# Patient Record
Sex: MALE | Race: WHITE | NOT HISPANIC OR LATINO | Employment: OTHER | URBAN - METROPOLITAN AREA
[De-identification: names, ages, dates, MRNs, and addresses within clinical notes are randomized per-mention and may not be internally consistent; named-entity substitution may affect disease eponyms.]

---

## 2017-01-13 ENCOUNTER — HOSPITAL ENCOUNTER (EMERGENCY)
Facility: HOSPITAL | Age: 57
Discharge: HOME | End: 2017-01-13
Attending: EMERGENCY MEDICINE
Payer: MEDICARE

## 2017-01-13 VITALS
HEART RATE: 97 BPM | HEIGHT: 67 IN | DIASTOLIC BLOOD PRESSURE: 46 MMHG | OXYGEN SATURATION: 96 % | WEIGHT: 309 LBS | TEMPERATURE: 100 F | BODY MASS INDEX: 48.5 KG/M2 | RESPIRATION RATE: 19 BRPM | SYSTOLIC BLOOD PRESSURE: 97 MMHG

## 2017-01-13 DIAGNOSIS — E86.0 DEHYDRATION: Primary | ICD-10-CM

## 2017-01-13 DIAGNOSIS — R55 NEAR SYNCOPE: ICD-10-CM

## 2017-01-13 LAB
ALBUMIN SERPL BCP-MCNC: 3.7 G/DL
ALP SERPL-CCNC: 175 U/L
ALT SERPL W/O P-5'-P-CCNC: 52 U/L
ANION GAP SERPL CALC-SCNC: 18 MMOL/L
AST SERPL-CCNC: 45 U/L
BASOPHILS # BLD AUTO: 0.02 K/UL
BASOPHILS NFR BLD: 0.3 %
BILIRUB SERPL-MCNC: 0.7 MG/DL
BUN SERPL-MCNC: 51 MG/DL
CALCIUM SERPL-MCNC: 8.9 MG/DL
CHLORIDE SERPL-SCNC: 97 MMOL/L
CO2 SERPL-SCNC: 25 MMOL/L
CREAT SERPL-MCNC: 10.7 MG/DL
DIFFERENTIAL METHOD: ABNORMAL
EOSINOPHIL # BLD AUTO: 0.2 K/UL
EOSINOPHIL NFR BLD: 2.7 %
ERYTHROCYTE [DISTWIDTH] IN BLOOD BY AUTOMATED COUNT: 15.3 %
EST. GFR  (AFRICAN AMERICAN): 6 ML/MIN/1.73 M^2
EST. GFR  (NON AFRICAN AMERICAN): 5 ML/MIN/1.73 M^2
FLUAV AG SPEC QL IA: NEGATIVE
FLUBV AG SPEC QL IA: NEGATIVE
GLUCOSE SERPL-MCNC: 208 MG/DL
HCT VFR BLD AUTO: 33.1 %
HGB BLD-MCNC: 10.5 G/DL
LYMPHOCYTES # BLD AUTO: 1.5 K/UL
LYMPHOCYTES NFR BLD: 19.7 %
MCH RBC QN AUTO: 38.6 PG
MCHC RBC AUTO-ENTMCNC: 31.7 %
MCV RBC AUTO: 122 FL
MONOCYTES # BLD AUTO: 0.7 K/UL
MONOCYTES NFR BLD: 8.5 %
NEUTROPHILS # BLD AUTO: 5.4 K/UL
NEUTROPHILS NFR BLD: 68.5 %
PLATELET # BLD AUTO: 124 K/UL
PMV BLD AUTO: 10.6 FL
POTASSIUM SERPL-SCNC: 4.3 MMOL/L
PROT SERPL-MCNC: 6.9 G/DL
RBC # BLD AUTO: 2.72 M/UL
SODIUM SERPL-SCNC: 140 MMOL/L
SPECIMEN SOURCE: NORMAL
WBC # BLD AUTO: 7.81 K/UL

## 2017-01-13 PROCEDURE — 93005 ELECTROCARDIOGRAM TRACING: CPT

## 2017-01-13 PROCEDURE — 87400 INFLUENZA A/B EACH AG IA: CPT

## 2017-01-13 PROCEDURE — 25000003 PHARM REV CODE 250: Performed by: EMERGENCY MEDICINE

## 2017-01-13 PROCEDURE — 80053 COMPREHEN METABOLIC PANEL: CPT

## 2017-01-13 PROCEDURE — 85025 COMPLETE CBC W/AUTO DIFF WBC: CPT

## 2017-01-13 PROCEDURE — 99284 EMERGENCY DEPT VISIT MOD MDM: CPT

## 2017-01-13 RX ADMIN — SODIUM CHLORIDE 100 ML: 0.9 INJECTION, SOLUTION INTRAVENOUS at 05:01

## 2017-01-13 NOTE — ED AVS SNAPSHOT
[facility]  [address]  [place] [address]               Angel Farmer Jr.   2017  3:33 PM   ED    Description:  Male : 1960   Department:  [facility]           Your Care was Coordinated By:     Provider Role From To    Lakhwinder Garrido MD Attending Provider [phone] --      Reason for Visit     Dizziness           Diagnoses this Visit        Comments    Dehydration    -  Primary     Near syncope           ED Disposition     None           To Do List           Follow-up Information     Schedule an appointment as soon as possible for a visit with Vadim Berry Iii, MD.    Specialty:  Family Medicine    Contact information:    [address]  [address]  [place] [address]  [phone]        [facility]     [facility] Nurse Care [facility] -  Assistance  Registered nurses in the [facility] provide clinical advisement, health education, appointment booking, and other advisory services.  Call for this free service at [phone].             Medications           Message regarding Medications     Verify the changes and/or additions to your medication regime listed below are the same as discussed with your clinician today.  If any of these changes or additions are incorrect, please notify your healthcare provider.        These medications were administered today        Dose Freq    sodium chloride 0.9% bolus 100 mL 100 mL ED 1 Time    Sig: Inject 100 mLs into the vein ED 1 Time.    Class: Normal    Route: Intravenous           Verify that the below list of medications is an accurate representation of the medications you are currently taking.  If none reported, the list may be blank. If incorrect, please contact your healthcare provider. Carry this list with you in case of emergency.           Current Medications     ammonium lactate 12 % Crea Apply 1 application topically 3 (three) times daily as needed.    aspirin  "81 MG Chew Take 1 tablet (81 mg total) by mouth once daily.    AZOPT 1 % ophthalmic suspension     B complex-vitamin C-folic acid (B COMPLEX-VITAMIN C-FOLIC ACID) 0.8 mg Tab Take 0.8 mg by mouth once daily.      blood sugar diagnostic (ONETOUCH ULTRA TEST) Strp TEST BLOOD GLUCOSE FOUR TIMES A DAY. At AM, noon, 6pm and bedtime.    blood-glucose meter (ADVANCED GLUCOSE METER) Misc 1 each by Misc.(Non-Drug; Combo Route) route 4 (four) times daily with meals and nightly.    bromfenac (XIBROM) 0.09 % ophthalmic solution     diclofenac (VOLTAREN) 0.1 % ophthalmic solution     fluticasone (FLONASE) 50 mcg/actuation nasal spray 1 spray by Each Nare route once daily.    folic acid (FOLVITE) 1 MG tablet Take 1 mg by mouth once daily.    FOSRENOL 1,000 mg chewable tablet     gabapentin (NEURONTIN) 300 MG capsule TAKE 1 BY MOUTH EVERY      EVENING    GLUCOTROL 5 mg tablet     insulin aspart (NOVOLOG) 100 unit/mL injection Inject 15 Units into the skin 3 (three) times daily before meals. And additional insulin as directed.    insulin glargine (LANTUS SOLOSTAR) 100 unit/mL (3 mL) InPn pen Inject 55 Units into the skin every evening.    lancets Misc 1 each by Misc.(Non-Drug; Combo Route) route 4 (four) times daily with meals and nightly.    liraglutide 0.6 mg/0.1 mL, 18 mg/3 mL, subq PNIJ (VICTOZA 2-SHOAIB) 0.6 mg/0.1 mL (18 mg/3 mL) PnIj Inject 1.2 mg into the skin every morning.    omega-3 acid ethyl esters (LOVAZA) 1 gram capsule Take 2 capsules (2 g total) by mouth 2 (two) times daily.    pen needle, diabetic (BD INSULIN PEN NEEDLE UF MINI) 31 gauge x 3/16" Ndle Use 4-5 times a day.    pravastatin (PRAVACHOL) 40 MG tablet Take 1 tablet (40 mg total) by mouth once daily.    prednisoLONE acetate (PRED FORTE) 1 % DrpS     SENSIPAR 30 mg Tab     sevelamer carbonate (RENVELA) 2.4 gram PwPk     tobramycin-dexamethasone 0.3-0.1% (TOBRADEX) 0.3-0.1 % DrpS            Clinical Reference Information           Your Vitals Were     BP Pulse " "Temp Resp Height Weight    107/65 90 100 °F (37.8 °C) (Oral) 13 5' 7" (1.702 m) 140.2 kg (309 lb)    SpO2 BMI             97% 48.4 kg/m2         Allergies as of 1/13/2017        Reactions    Keflex [Cephalexin] Nausea Only      Immunizations Administered on Date of Encounter - 1/13/2017     None      ED Micro, Lab, POCT     Start Ordered       Status Ordering Provider    01/13/17 1542 01/13/17 1542  CBC auto differential  STAT      Final result     01/13/17 1542 01/13/17 1542  Comprehensive metabolic panel  STAT      Final result     01/13/17 1542 01/13/17 1542  Influenza antigen Nasal Swab  STAT      Final result       ED Imaging Orders     Start Ordered       Status Ordering Provider    01/13/17 1542 01/13/17 1542  X-Ray Chest PA And Lateral  1 time imaging      Final result       Discharge References/Attachments     NEAR SYNCOPE, UNKNOWN (ENGLISH)       [facility] complies with applicable Federal civil rights laws and does not discriminate on the basis of race, color, national origin, age, disability, or sex.        Language Assistance Services     ATTENTION: Language assistance services are available, free of charge. Please call [phone].      ATENCIÓN: Si habla español, tiene a cortez disposición servicios gratuitos de asistencia lingüística. Llame al [phone].     CHÚ Ý: N?u b?n nói Ti?ng Vi?t, có các d?ch v? h? tr? ngôn ng? mi?n phí dành cho b?n. G?i s? [phone].        "

## 2017-01-13 NOTE — ED PROVIDER NOTES
"Encounter Date: 1/13/2017       History     Chief Complaint   Patient presents with    Dizziness     Patient had full dialysis and complaints of dizziness with low blood pressure.  Gave fluids after feeling dizzy.     Review of patient's allergies indicates:   Allergen Reactions    Keflex [cephalexin] Nausea Only     HPI Comments: 56-year-old male with history of end-stage renal disease on hemodialysis, who self dialyzes at home, presents to the emergency department reporting lightheadedness.  Patient reports he believes too much fluid was taken off, and while he was able to drink some fluids at home, his lightheadedness has persisted and his blood pressure has been somewhat low.  Reports this is happening the past, but is really this severe.  Does not report dizziness, as noted in the triage note, and denies any spinning sensation.  Reports "when I stand up, I feel very lightheaded."  Denies any headache, focal numbness or weakness, changes in vision, nausea, vomiting, chest pain, shortness of breath, abdominal pain, constipation, diarrhea fever, chills.    The history is provided by the patient.     Past Medical History   Diagnosis Date    Diabetes mellitus type II     Dialysis patient     Hyperlipidemia     Hypertension     Kidney failure     MIKE (obstructive sleep apnea)     Urinary tract infection      No past medical history pertinent negatives.  Past Surgical History   Procedure Laterality Date    Tonsillectomy, adenoidectomy      Av fistula placement       left lower arm     Family History   Problem Relation Age of Onset    Colon cancer Mother     Lung cancer Mother     Diabetes Mother     Diabetes Father     Heart disease Father     Hypertension Father     Diabetes Maternal Grandmother     Diabetes Maternal Grandfather      Social History   Substance Use Topics    Smoking status: Never Smoker    Smokeless tobacco: Never Used    Alcohol use No     Review of Systems   Constitutional: " Negative for chills, fatigue and fever.   HENT: Negative for congestion, rhinorrhea, sinus pressure, sore throat and voice change.    Eyes: Negative for photophobia, pain and redness.   Respiratory: Negative for cough and choking.    Cardiovascular: Negative for chest pain, palpitations and leg swelling.   Gastrointestinal: Negative for abdominal pain, constipation, diarrhea, nausea and vomiting.   Genitourinary: Negative for flank pain, penile pain and penile swelling.   Musculoskeletal: Negative for back pain, neck pain and neck stiffness.   Neurological: Positive for light-headedness. Negative for tremors, seizures, numbness and headaches.   All other systems reviewed and are negative.      Physical Exam   Initial Vitals   BP Pulse Resp Temp SpO2   01/13/17 1501 01/13/17 1501 01/13/17 1501 01/13/17 1501 01/13/17 1501   125/69 84 20 100 °F (37.8 °C) 100 %     Physical Exam    Nursing note and vitals reviewed.  Constitutional: He appears well-developed and well-nourished. No distress.   Obese   HENT:   Head: Normocephalic and atraumatic.   Oropharynx clear; dry mucous membranes   Eyes: Conjunctivae and EOM are normal. Pupils are equal, round, and reactive to light.   Neck: Normal range of motion. Neck supple. No tracheal deviation present.   Cardiovascular: Normal rate, regular rhythm, normal heart sounds and intact distal pulses.   Pulmonary/Chest: Breath sounds normal. No respiratory distress. He has no wheezes. He has no rhonchi. He has no rales.   Abdominal: Soft. Bowel sounds are normal. He exhibits no distension. There is no tenderness. There is no rebound and no guarding.   Musculoskeletal: Normal range of motion. He exhibits no edema or tenderness.   Shunt to the left upper extremity   Neurological: He is alert and oriented to person, place, and time. He has normal strength. No cranial nerve deficit or sensory deficit.   Skin: Skin is warm and dry.   Psychiatric: He has a normal mood and affect. Thought  content normal.         ED Course   Procedures  Labs Reviewed   CBC W/ AUTO DIFFERENTIAL   COMPREHENSIVE METABOLIC PANEL   INFLUENZA A AND B ANTIGEN     EKG Readings: (Independently Interpreted)   Initial Reading: No STEMI. Previous EKG: Compared with most recent EKG Previous EKG Date: 1/12/15 (Minimal non-specific change). Heart Rate: 77. Conduction: Nonspecific intraventricular block. Axis: Left Axis Deviation.       X-Rays:   Independently Interpreted Readings:   Chest X-Ray: Technique:  Chest PA and lateral radiographs    Comparison: 1/12/15    Findings:     Lung volumes are low but symmetric. No pleural fluid or pneumothorax. The mediastinal structures are midline. The cardiac silhouette is normal in size. The osseous structures demonstrate no acute abnormality.    Read by radiologist and visualized by me     Medical Decision Making:   Initial Assessment:   56-year-old male presents to the ED after dialysis complaining of near syncope and lightheadedness  Differential Diagnosis:   Dehydration, metabolic derangement, CVA, infection  Independently Interpreted Test(s):   I have ordered and independently interpreted X-rays - see prior notes.  I have ordered and independently interpreted EKG Reading(s) - see prior notes  Clinical Tests:   Lab Tests: Reviewed       <> Summary of Lab: Within normal limits  ED Management:  Patient gently rehydrated.  At this time, reports resolution of symptoms.  Blood pressures have been stable and constant.  Able to ambulate about the ED without any difficulty or lightheadedness.  Instructed to follow-up with his primary care physician Monday, return to the emergency room for any recurrent or worsening symptoms.                   ED Course     Clinical Impression:   The primary encounter diagnosis was Dehydration. A diagnosis of Near syncope was also pertinent to this visit.    Disposition:   Disposition: Discharged  Condition: Stable       Lakhwinder Garrido MD  01/13/17 [id number]

## 2017-01-14 NOTE — ED NOTES
Pt ambulates around nurses' station with steady gait. No complaints. Pt O2 sat 98% while ambulating.

## 2017-01-14 NOTE — ED NOTES
Pt arrived to ED via EMS with c/o hypotension after dialysis. Pt states he became hypotensive during dialysis, but was able to finish his treatment. On arrival, pt hypotensive but AAO. Only c/o is that pt feels more fatigued than usual. Pt has no other complaints. Pt does not present in distress. Pt placed on cardiac monitor, bp cuff and continuous pulse ox. Call light within reach, side rails up x 2.

## 2017-02-15 RX ORDER — PRAVASTATIN SODIUM 40 MG/1
40 TABLET ORAL DAILY
Qty: 30 TABLET | Refills: 6 | Status: SHIPPED | OUTPATIENT
Start: 2017-02-15 | End: 2017-06-16 | Stop reason: SDUPTHER

## 2017-03-14 ENCOUNTER — HOSPITAL ENCOUNTER (OUTPATIENT)
Dept: CARDIOLOGY | Facility: HOSPITAL | Age: 57
Discharge: HOME | End: 2017-03-14
Attending: NURSE PRACTITIONER
Payer: MEDICARE

## 2017-03-14 DIAGNOSIS — N18.6 END STAGE RENAL DISEASE: ICD-10-CM

## 2017-03-14 LAB
DIASTOLIC DYSFUNCTION: NO
ESTIMATED PA SYSTOLIC PRESSURE: 18.05
MITRAL VALVE MOBILITY: NORMAL
RETIRED EF AND QEF - SEE NOTES: 65 (ref 55–65)
TRICUSPID VALVE REGURGITATION: NORMAL

## 2017-03-14 PROCEDURE — 93306 TTE W/DOPPLER COMPLETE: CPT | Mod: 26,,, | Performed by: INTERNAL MEDICINE

## 2017-03-14 PROCEDURE — 93306 TTE W/DOPPLER COMPLETE: CPT

## 2017-03-15 ENCOUNTER — OFFICE VISIT (OUTPATIENT)
Dept: PODIATRY | Facility: CLINIC | Age: 57
End: 2017-03-15
Payer: MEDICARE

## 2017-03-15 VITALS
SYSTOLIC BLOOD PRESSURE: 99 MMHG | DIASTOLIC BLOOD PRESSURE: 58 MMHG | HEIGHT: 68 IN | WEIGHT: 309 LBS | BODY MASS INDEX: 46.83 KG/M2 | HEART RATE: 64 BPM

## 2017-03-15 DIAGNOSIS — B35.1 ONYCHOMYCOSIS DUE TO DERMATOPHYTE: ICD-10-CM

## 2017-03-15 DIAGNOSIS — L85.9 HYPERKERATOSIS: ICD-10-CM

## 2017-03-15 DIAGNOSIS — B35.3 TINEA PEDIS OF LEFT FOOT: ICD-10-CM

## 2017-03-15 DIAGNOSIS — E11.42 DIABETIC POLYNEUROPATHY ASSOCIATED WITH TYPE 2 DIABETES MELLITUS: Primary | ICD-10-CM

## 2017-03-15 PROCEDURE — 99214 OFFICE O/P EST MOD 30 MIN: CPT | Mod: PBBFAC,PO | Performed by: PODIATRIST

## 2017-03-15 PROCEDURE — 99213 OFFICE O/P EST LOW 20 MIN: CPT | Mod: S$PBB,,, | Performed by: PODIATRIST

## 2017-03-15 PROCEDURE — 99999 PR PBB SHADOW E&M-EST. PATIENT-LVL IV: CPT | Mod: PBBFAC,,, | Performed by: PODIATRIST

## 2017-03-15 RX ORDER — INSULIN ASPART 100 [IU]/ML
15 INJECTION, SOLUTION INTRAVENOUS; SUBCUTANEOUS
COMMUNITY
Start: 2017-02-23 | End: 2017-11-23 | Stop reason: SDUPTHER

## 2017-03-15 NOTE — MR AVS SNAPSHOT
[facility]   [place]  [place] [address]  Phone: [phone]                  Angel Farmer Jr.   3/15/2017 3:00 PM   Office Visit    Description:  Male : 1960   Provider:  Galindo Anderson DPM   Department:  [facility]           Reason for Visit     Follow-up     Diabetes Mellitus                To Do List           Future Appointments        Provider Department Dept Phone    2017 8:00 AM Balwinder Chen DPM [facility] [phone]      Goals (5 Years of Data)     None      [facility]     [facility] Care [facility] -  Assistance  Registered nurses in the [facility] Center provide clinical advisement, health education, appointment booking, and other advisory services.  Call for this free service at [phone].             Medications           Message regarding Medications     Verify the changes and/or additions to your medication regime listed below are the same as discussed with your clinician today.  If any of these changes or additions are incorrect, please notify your healthcare provider.        STOP taking these medications     insulin aspart (NOVOLOG) 100 unit/mL injection Inject 15 Units into the skin 3 (three) times daily before meals. And additional insulin as directed.    tobramycin-dexamethasone 0.3-0.1% (TOBRADEX) 0.3-0.1 % DrpS            Verify that the below list of medications is an accurate representation of the medications you are currently taking.  If none reported, the list may be blank. If incorrect, please contact your healthcare provider. Carry this list with you in case of emergency.           Current Medications     ammonium lactate 12 % Crea Apply 1 application topically 3 (three) times daily as needed.    AZOPT 1 % ophthalmic suspension     B complex-vitamin C-folic acid (B COMPLEX-VITAMIN C-FOLIC ACID) 0.8 mg Tab Take 0.8 mg by mouth once daily.      blood sugar diagnostic (ONETOUCH ULTRA TEST) Strp TEST  "BLOOD GLUCOSE FOUR TIMES A DAY. At AM, noon, 6pm and bedtime.    bromfenac (XIBROM) 0.09 % ophthalmic solution     diclofenac (VOLTAREN) 0.1 % ophthalmic solution     fluticasone (FLONASE) 50 mcg/actuation nasal spray 1 spray by Each Nare route once daily.    folic acid (FOLVITE) 1 MG tablet Take 1 mg by mouth once daily.    FOSRENOL 1,000 mg chewable tablet     gabapentin (NEURONTIN) 300 MG capsule TAKE 1 BY MOUTH EVERY      EVENING    GLUCOTROL 5 mg tablet     insulin glargine (LANTUS SOLOSTAR) 100 unit/mL (3 mL) InPn pen Inject 55 Units into the skin every evening.    lancets Misc 1 each by Misc.(Non-Drug; Combo Route) route 4 (four) times daily with meals and nightly.    liraglutide 0.6 mg/0.1 mL, 18 mg/3 mL, subq PNIJ (VICTOZA 2-SHOAIB) 0.6 mg/0.1 mL (18 mg/3 mL) PnIj Inject 1.2 mg into the skin every morning.    NOVOLOG FLEXPEN 100 unit/mL InPn pen     pen needle, diabetic (BD INSULIN PEN NEEDLE UF MINI) 31 gauge x 3/16" Ndle Use 4-5 times a day.    pravastatin (PRAVACHOL) 40 MG tablet Take 1 tablet (40 mg total) by mouth once daily.    prednisoLONE acetate (PRED FORTE) 1 % DrpS     SENSIPAR 30 mg Tab     sevelamer carbonate (RENVELA) 2.4 gram PwPk     aspirin 81 MG Chew Take 1 tablet (81 mg total) by mouth once daily.    blood-glucose meter (ADVANCED GLUCOSE METER) Misc 1 each by Misc.(Non-Drug; Combo Route) route 4 (four) times daily with meals and nightly.    omega-3 acid ethyl esters (LOVAZA) 1 gram capsule Take 2 capsules (2 g total) by mouth 2 (two) times daily.           Clinical Reference Information           Your Vitals Were     BP Pulse Height Weight BMI    99/58 64 5' 8" (1.727 m) 140.2 kg (309 lb) 46.98 kg/m2      Blood Pressure          Most Recent Value    BP  (!)  99/58      Allergies as of 3/15/2017     Keflex [Cephalexin]      Immunizations Administered on Date of Encounter - 3/15/2017     None      Language Assistance Services     ATTENTION: Language assistance services are available, free of " charge. Please call [phone].      ATENCIÓN: Si habla español, tiene a cortez disposición servicios gratuitos de asistencia lingüística. Llame al [phone].     CHÚ Ý: N?u b?n nói Ti?ng Vi?t, có các d?ch v? h? tr? ngôn ng? mi?n phí dành cho b?n. G?i s? [phone].         [facility] complies with applicable Federal civil rights laws and does not discriminate on the basis of race, color, national origin, age, disability, or sex.

## 2017-03-19 NOTE — PROGRESS NOTES
Subjective:      Patient ID: Angel Farmer Jr. is a 56 y.o. male.    Chief Complaint: Follow-up and Diabetes Mellitus    Angel is a 56 y.o. male who presents to the clinic upon referral from Dr. Lynnette alonzo. provider found  for evaluation and treatment of diabetic feet. Angel has a past medical history of Diabetes mellitus type II; Dialysis patient; Hyperlipidemia; Hypertension; Kidney failure; MIKE (obstructive sleep apnea); and Urinary tract infection. Complaints today consist of neuropathy with tingling in his feet and thick, fungal nails.   He had a LLE revascularization in 2015 at [facility]. He denies any history of lower extremity wounds, infections and/or injury.    PCP: Vadim Berry Iii, MD    Date Last Seen by PCP: 12/1/16    Current shoe gear: Casual shoes    Hemoglobin A1C   Date Value Ref Range Status   07/09/2013 6.8 (H) 4.0 - 6.2 % Final             Review of Systems   Constitution: Negative for chills, fever and malaise/fatigue.   Cardiovascular: Negative for chest pain, leg swelling, orthopnea and palpitations.   Respiratory: Negative for cough, shortness of breath and wheezing.    Skin: Positive for color change, dry skin, itching and nail changes. Negative for poor wound healing and rash.   Musculoskeletal: Negative for arthritis, gout, joint pain, joint swelling, muscle weakness and myalgias.   Neurological: Positive for numbness and paresthesias. Negative for disturbances in coordination, dizziness, focal weakness and tremors.           Objective:      Physical Exam   Cardiovascular:   Dorsalis pedis and posterior tibial pulses are diminished Bilaterally. Toes are cool to touch. Feet are warm proximally.There is decreased digital hair. Skin is atrophic, slightly hyperpigmented, and mildly edematous       Musculoskeletal:        Right foot: There is normal range of motion and no deformity.        Left foot: There is normal range of motion and no deformity.   Musculoskeletal:  Muscle strength is  5/5 in all groups bilaterally.  Metatarsophalangeal and subtalar range of motion are within normal limits without crepitus bilaterally. There is limitation of ankle dorsiflexion with knees extended and flexed Bilaterally.       Feet:   Right Foot:   Protective Sensation: 10 sites tested. 8 sites sensed.   Skin Integrity: Positive for callus and dry skin (superficial heel fissures, bilat). Negative for ulcer, blister or skin breakdown.   Left Foot:   Protective Sensation: 10 sites tested. 8 sites sensed.   Skin Integrity: Positive for callus and dry skin. Negative for ulcer, blister or skin breakdown.   Neurological:   Kingfisher-Bailey 5.07 monofilamant testing is diminished both feet. Sharp/dull sensation diminished Bilaterally. Light touch absent Bilaterally.       Skin: Skin is warm, dry and intact.   Toenails 1-5 bilaterally are elongated by 2-3 mm, thickened by 2-3 mm, discolored/yellowed, dystrophic, brittle with subungual debris. No incurvation. Mild xerosis, bilat. No open wounds.    Diffuse hyperkeratosis and mild fissuring at plantar heels, L>R.               Assessment:       Encounter Diagnoses   Name Primary?    Diabetic polyneuropathy associated with type 2 diabetes mellitus Yes    Onychomycosis due to dermatophyte     Tinea pedis of left foot     Hyperkeratosis          Plan:       Angel was seen today for foot pain and foot problem.    Diagnoses and associated orders for this visit:    Hyperkeratosis/heel fissures  - ammonium lactate (AMLACTIN) 12 % Crea; Apply 1 application topically 2 (two) times daily.      I counseled the patient on his conditions, their implications and medical management.    - Amlactin 2-3 x daily    - Gabapentin 300 mg before bed. Potential side effects discussed.     - Shoe inspection. Diabetic Foot Education. Patient reminded of the importance of good nutrition and blood sugar control to help prevent podiatric complications of diabetes. Patient instructed on proper foot  hygeine. We discussed wearing proper shoe gear, daily foot inspections, never walking without protective shoe gear, never putting sharp instruments to feet    - With patient's permission, nails were aggressively reduced and debrided x 10 to their soft tissue attachment mechanically and with electric , removing all offending nail and debris. Patient relates relief following the procedure. She will continue to monitor the areas daily, inspect her feet, wear protective shoe gear when ambulatory, moisturizer to maintain skin integrity and follow in this office in approximately 3-4 months, sooner p.r.n.    Requests f/u on Tues or Thursday due to HD schedule.

## 2017-04-30 ENCOUNTER — HOSPITAL ENCOUNTER (INPATIENT)
Facility: HOSPITAL | Age: 57
LOS: 1 days | Discharge: HOME | DRG: 871 | End: 2017-05-01
Attending: EMERGENCY MEDICINE | Admitting: FAMILY MEDICINE
Payer: MEDICARE

## 2017-04-30 DIAGNOSIS — I10 ESSENTIAL HYPERTENSION: ICD-10-CM

## 2017-04-30 DIAGNOSIS — R78.81 BACTEREMIA: Primary | ICD-10-CM

## 2017-04-30 DIAGNOSIS — Z99.2 ESRD ON DIALYSIS: ICD-10-CM

## 2017-04-30 DIAGNOSIS — N18.6 ESRD (END STAGE RENAL DISEASE): ICD-10-CM

## 2017-04-30 DIAGNOSIS — G47.33 OSA (OBSTRUCTIVE SLEEP APNEA): ICD-10-CM

## 2017-04-30 DIAGNOSIS — N18.6 ESRD ON DIALYSIS: ICD-10-CM

## 2017-04-30 LAB
ALBUMIN SERPL BCP-MCNC: 3.6 G/DL
ALP SERPL-CCNC: 186 U/L
ALT SERPL W/O P-5'-P-CCNC: 55 U/L
ANION GAP SERPL CALC-SCNC: 19 MMOL/L
AST SERPL-CCNC: 44 U/L
BASOPHILS # BLD AUTO: 0 K/UL
BASOPHILS NFR BLD: 0 %
BILIRUB SERPL-MCNC: 0.7 MG/DL
BUN SERPL-MCNC: 64 MG/DL
CALCIUM SERPL-MCNC: 8.4 MG/DL
CHLORIDE SERPL-SCNC: 101 MMOL/L
CO2 SERPL-SCNC: 22 MMOL/L
CREAT SERPL-MCNC: 12.7 MG/DL
DIFFERENTIAL METHOD: ABNORMAL
EOSINOPHIL # BLD AUTO: 0.1 K/UL
EOSINOPHIL NFR BLD: 2.7 %
ERYTHROCYTE [DISTWIDTH] IN BLOOD BY AUTOMATED COUNT: 14.4 %
EST. GFR  (AFRICAN AMERICAN): 5 ML/MIN/1.73 M^2
EST. GFR  (NON AFRICAN AMERICAN): 4 ML/MIN/1.73 M^2
GLUCOSE SERPL-MCNC: 233 MG/DL
HCT VFR BLD AUTO: 30.2 %
HGB BLD-MCNC: 9.8 G/DL
LYMPHOCYTES # BLD AUTO: 1.3 K/UL
LYMPHOCYTES NFR BLD: 24.2 %
MCH RBC QN AUTO: 38.3 PG
MCHC RBC AUTO-ENTMCNC: 32.5 %
MCV RBC AUTO: 118 FL
MONOCYTES # BLD AUTO: 0.6 K/UL
MONOCYTES NFR BLD: 10.9 %
NEUTROPHILS # BLD AUTO: 3.3 K/UL
NEUTROPHILS NFR BLD: 62.2 %
PLATELET # BLD AUTO: 111 K/UL
PMV BLD AUTO: 10.3 FL
POCT GLUCOSE: 171 MG/DL (ref 70–110)
POCT GLUCOSE: 236 MG/DL (ref 70–110)
POTASSIUM SERPL-SCNC: 4.3 MMOL/L
PROT SERPL-MCNC: 7.1 G/DL
RBC # BLD AUTO: 2.56 M/UL
SODIUM SERPL-SCNC: 142 MMOL/L
WBC # BLD AUTO: 5.24 K/UL

## 2017-04-30 PROCEDURE — 99285 EMERGENCY DEPT VISIT HI MDM: CPT | Mod: 25

## 2017-04-30 PROCEDURE — 25000003 PHARM REV CODE 250: Performed by: FAMILY MEDICINE

## 2017-04-30 PROCEDURE — 85025 COMPLETE CBC W/AUTO DIFF WBC: CPT

## 2017-04-30 PROCEDURE — 96365 THER/PROPH/DIAG IV INF INIT: CPT

## 2017-04-30 PROCEDURE — 63600175 PHARM REV CODE 636 W HCPCS: Performed by: FAMILY MEDICINE

## 2017-04-30 PROCEDURE — 27000190 HC CPAP FULL FACE MASK W/VALVE

## 2017-04-30 PROCEDURE — 80053 COMPREHEN METABOLIC PANEL: CPT

## 2017-04-30 PROCEDURE — 11000001 HC ACUTE MED/SURG PRIVATE ROOM

## 2017-04-30 PROCEDURE — 25000003 PHARM REV CODE 250: Performed by: EMERGENCY MEDICINE

## 2017-04-30 PROCEDURE — 94660 CPAP INITIATION&MGMT: CPT

## 2017-04-30 PROCEDURE — 87040 BLOOD CULTURE FOR BACTERIA: CPT

## 2017-04-30 RX ORDER — IBUPROFEN 200 MG
24 TABLET ORAL
Status: DISCONTINUED | OUTPATIENT
Start: 2017-04-30 | End: 2017-05-01 | Stop reason: HOSPADM

## 2017-04-30 RX ORDER — HEPARIN SODIUM 5000 [USP'U]/ML
5000 INJECTION, SOLUTION INTRAVENOUS; SUBCUTANEOUS EVERY 8 HOURS
Status: DISCONTINUED | OUTPATIENT
Start: 2017-04-30 | End: 2017-05-01 | Stop reason: HOSPADM

## 2017-04-30 RX ORDER — SEVELAMER CARBONATE 800 MG/1
800 TABLET, FILM COATED ORAL
Status: DISCONTINUED | OUTPATIENT
Start: 2017-04-30 | End: 2017-05-01 | Stop reason: HOSPADM

## 2017-04-30 RX ORDER — PRAVASTATIN SODIUM 40 MG/1
40 TABLET ORAL DAILY
Status: DISCONTINUED | OUTPATIENT
Start: 2017-05-01 | End: 2017-04-30

## 2017-04-30 RX ORDER — GLUCAGON 1 MG
1 KIT INJECTION
Status: DISCONTINUED | OUTPATIENT
Start: 2017-04-30 | End: 2017-05-01 | Stop reason: HOSPADM

## 2017-04-30 RX ORDER — MEROPENEM AND SODIUM CHLORIDE 500 MG/50ML
500 INJECTION, SOLUTION INTRAVENOUS
Status: COMPLETED | OUTPATIENT
Start: 2017-04-30 | End: 2017-04-30

## 2017-04-30 RX ORDER — LANTHANUM CARBONATE 500 MG/1
1000 TABLET, CHEWABLE ORAL
Status: DISCONTINUED | OUTPATIENT
Start: 2017-04-30 | End: 2017-05-01 | Stop reason: HOSPADM

## 2017-04-30 RX ORDER — ONDANSETRON 2 MG/ML
4 INJECTION INTRAMUSCULAR; INTRAVENOUS EVERY 6 HOURS PRN
Status: DISCONTINUED | OUTPATIENT
Start: 2017-04-30 | End: 2017-05-01 | Stop reason: HOSPADM

## 2017-04-30 RX ORDER — DOXYCYCLINE 50 MG/1
50 TABLET ORAL DAILY
COMMUNITY
End: 2018-03-20

## 2017-04-30 RX ORDER — IBUPROFEN 200 MG
16 TABLET ORAL
Status: DISCONTINUED | OUTPATIENT
Start: 2017-04-30 | End: 2017-05-01 | Stop reason: HOSPADM

## 2017-04-30 RX ORDER — MEROPENEM AND SODIUM CHLORIDE 500 MG/50ML
500 INJECTION, SOLUTION INTRAVENOUS EVERY 24 HOURS
Status: DISCONTINUED | OUTPATIENT
Start: 2017-05-01 | End: 2017-05-01 | Stop reason: SDUPTHER

## 2017-04-30 RX ORDER — ONDANSETRON 2 MG/ML
4 INJECTION INTRAMUSCULAR; INTRAVENOUS
Status: DISPENSED | OUTPATIENT
Start: 2017-04-30 | End: 2017-05-01

## 2017-04-30 RX ORDER — GABAPENTIN 300 MG/1
300 CAPSULE ORAL NIGHTLY
Status: DISCONTINUED | OUTPATIENT
Start: 2017-04-30 | End: 2017-05-01 | Stop reason: HOSPADM

## 2017-04-30 RX ORDER — INSULIN ASPART 100 [IU]/ML
1-10 INJECTION, SOLUTION INTRAVENOUS; SUBCUTANEOUS
Status: DISCONTINUED | OUTPATIENT
Start: 2017-04-30 | End: 2017-05-01 | Stop reason: HOSPADM

## 2017-04-30 RX ORDER — FOLIC ACID 1 MG/1
1 TABLET ORAL DAILY
Status: DISCONTINUED | OUTPATIENT
Start: 2017-05-01 | End: 2017-05-01 | Stop reason: HOSPADM

## 2017-04-30 RX ORDER — PRAVASTATIN SODIUM 40 MG/1
40 TABLET ORAL NIGHTLY
Status: DISCONTINUED | OUTPATIENT
Start: 2017-04-30 | End: 2017-05-01 | Stop reason: HOSPADM

## 2017-04-30 RX ORDER — CINACALCET 30 MG/1
30 TABLET, FILM COATED ORAL
Status: DISCONTINUED | OUTPATIENT
Start: 2017-05-01 | End: 2017-05-01

## 2017-04-30 RX ORDER — BRINZOLAMIDE 10 MG/ML
1 SUSPENSION/ DROPS OPHTHALMIC 3 TIMES DAILY
Status: DISCONTINUED | OUTPATIENT
Start: 2017-04-30 | End: 2017-05-01 | Stop reason: HOSPADM

## 2017-04-30 RX ORDER — LANTHANUM CARBONATE 500 MG/1
1000 TABLET, CHEWABLE ORAL
Status: DISCONTINUED | OUTPATIENT
Start: 2017-04-30 | End: 2017-04-30

## 2017-04-30 RX ADMIN — BRINZOLAMIDE 1 DROP: 10 SUSPENSION/ DROPS OPHTHALMIC at 09:04

## 2017-04-30 RX ADMIN — GABAPENTIN 300 MG: 300 CAPSULE ORAL at 09:04

## 2017-04-30 RX ADMIN — MEROPENEM AND SODIUM CHLORIDE 500 MG: 500 INJECTION, SOLUTION INTRAVENOUS at 01:04

## 2017-04-30 RX ADMIN — INSULIN ASPART 2 UNITS: 100 INJECTION, SOLUTION INTRAVENOUS; SUBCUTANEOUS at 05:04

## 2017-04-30 RX ADMIN — INSULIN DETEMIR 30 UNITS: 100 INJECTION, SOLUTION SUBCUTANEOUS at 09:04

## 2017-04-30 RX ADMIN — PRAVASTATIN SODIUM 40 MG: 40 TABLET ORAL at 09:04

## 2017-04-30 RX ADMIN — HEPARIN SODIUM 5000 UNITS: 5000 INJECTION, SOLUTION INTRAVENOUS; SUBCUTANEOUS at 09:04

## 2017-04-30 NOTE — ED PROVIDER NOTES
"Encounter Date: 4/30/2017       History     Chief Complaint   Patient presents with    Abnormal Lab     blood culture result from blood drawn on Wednesday while at dialysis and Dr Peter called him to come to ER for evaluation     Review of patient's allergies indicates:   Allergen Reactions    Keflex [cephalexin] Nausea Only     HPI Comments: 57 Y/O M WITH ESRD ON DIALYSIS M/W/F PRESENTS TO ED FOR ADMISSION FOR GRAM NEG BACTEREMIA.  PT REPORTS HE HAS HAD A FEVER AFTER DIALYSIS FOR "WEEKS" AND "I RAN A FEVER AFTER DIALYSIS ON WED SO WHEN I WAS THERE ON Friday THEY GAVE ME AN IV ANTI-BIOTIC.  APPARENTLY THAT ONE ISN'T GOING TO WORK BECAUSE THE DOCTOR CALLED ME TODAY AND TOLD ME TO COME HERE."  PT REPORTS HE IS FEELING WELL.  HE DENIES ANY CHEST PAIN, COUGH, SOB, ABD PAIN, HA, NECK PAIN OR RASH.  PT REPORTS HE FEELS WELL.  PT REPORTS EATING 2 DONUTS THIS MORNING FOR BREAKFAST    The history is provided by the patient. No  was used.     Past Medical History:   Diagnosis Date    Diabetes mellitus type II     Dialysis patient     Hyperlipidemia     Hypertension     Kidney failure     MIKE (obstructive sleep apnea)     Urinary tract infection      Past Surgical History:   Procedure Laterality Date    AV FISTULA PLACEMENT      left lower arm    TONSILLECTOMY, ADENOIDECTOMY       Family History   Problem Relation Age of Onset    Colon cancer Mother     Lung cancer Mother     Diabetes Mother     Diabetes Father     Heart disease Father     Hypertension Father     Diabetes Maternal Grandmother     Diabetes Maternal Grandfather      Social History   Substance Use Topics    Smoking status: Never Smoker    Smokeless tobacco: Never Used    Alcohol use No     Review of Systems   Constitutional: Positive for fever. Negative for activity change, appetite change and chills.   HENT: Negative for congestion and sore throat.    Eyes: Negative for discharge and redness.   Respiratory: Negative for " cough and shortness of breath.    Cardiovascular: Negative for chest pain and palpitations.   Gastrointestinal: Negative for abdominal pain, diarrhea, nausea and vomiting.   Endocrine: Negative for polydipsia and polyphagia.   Genitourinary: Negative for flank pain.   Musculoskeletal: Negative for neck pain and neck stiffness.   Skin: Negative for pallor and rash.   Neurological: Negative for dizziness and weakness.   Hematological: Does not bruise/bleed easily.   Psychiatric/Behavioral: Negative for agitation and confusion.   All other systems reviewed and are negative.      Physical Exam   Initial Vitals   BP Pulse Resp Temp SpO2   04/30/17 1139 04/30/17 1139 04/30/17 1139 04/30/17 1139 04/30/17 1139   126/65 68 20 98.6 °F (37 °C) 96 %     Physical Exam    Nursing note and vitals reviewed.  Constitutional: He appears well-developed and well-nourished. He is not diaphoretic. No distress.   HENT:   Head: Normocephalic and atraumatic.   Mouth/Throat: Oropharynx is clear and moist.   Eyes: Conjunctivae and EOM are normal. No scleral icterus.   Neck: Normal range of motion. Neck supple.   Cardiovascular: Normal rate, regular rhythm and normal heart sounds. Exam reveals no gallop and no friction rub.    No murmur heard.  Pulmonary/Chest: Breath sounds normal. No respiratory distress. He has no wheezes. He has no rhonchi. He has no rales. He exhibits no tenderness.   Abdominal: Soft. Bowel sounds are normal. He exhibits no distension. There is no tenderness.   Musculoskeletal: Normal range of motion.   Lymphadenopathy:     He has no cervical adenopathy.   Neurological: He is alert and oriented to person, place, and time.   Skin: Skin is warm and dry.   Psychiatric: He has a normal mood and affect. His behavior is normal. Judgment and thought content normal.         ED Course   Procedures  Labs Reviewed   CBC W/ AUTO DIFFERENTIAL - Abnormal; Notable for the following:        Result Value    RBC 2.56 (*)     Hemoglobin 9.8  (*)     Hematocrit 30.2 (*)      (*)     MCH 38.3 (*)     Platelets 111 (*)     All other components within normal limits   COMPREHENSIVE METABOLIC PANEL - Abnormal; Notable for the following:     CO2 22 (*)     Glucose 233 (*)     BUN, Bld 64 (*)     Creatinine 12.7 (*)     Calcium 8.4 (*)     Alkaline Phosphatase 186 (*)     AST 44 (*)     ALT 55 (*)     Anion Gap 19 (*)     eGFR if  5 (*)     eGFR if non  4 (*)     All other components within normal limits             Medical Decision Making:   Initial Assessment:   57 Y/O WM ON DIALYSIS PRESENTS FOR ADMISSION OF GN BACTEREMIA NOTED ON BLOOD CULTURES DONE AT DIALYSIS  Differential Diagnosis:   DDX: BACTEREMIA, ESRD ON DIALYSIS  Clinical Tests:   Lab Tests: Ordered and Reviewed       <> Summary of Lab: NORMAL WBC  ED Management:  PT HAS BEEN TREATED WITH IV MERREM AND WILL BE ADMITTED TO [facility] FOR IV ABX.   Other:   I have discussed this case with another health care provider.       <> Summary of the Discussion: [facility]                   ED Course     Clinical Impression:   The primary encounter diagnosis was Bacteremia. A diagnosis of ESRD on dialysis was also pertinent to this visit.    Disposition:   Disposition: Admitted  Condition: Stable       Peri Lund MD  04/30/17 1214

## 2017-04-30 NOTE — IP AVS SNAPSHOT
[facility]  [address]  [place] [address]  Phone: [phone]           Patient Discharge Instructions   Our goal is to set you up for success. This packet includes information on your condition, medications, and your home care.  It will help you care for yourself to prevent having to return to the hospital.     Please ask your nurse if you have any questions.      There are many details to remember when preparing to leave the hospital. Here is what you will need to do:    1. Take your medicine. If you are prescribed medications, review your Medication List on the following pages. You may have new medications to  at the pharmacy and others that you'll need to stop taking. Review the instructions for how and when to take your medications. Talk with your doctor or nurses if you are unsure of what to do.     2. Go to your follow-up appointments. Specific follow-up information is listed in the following pages. Your may be contacted by a nurse or clinical provider about future appointments. Be sure we have all of the phone numbers to reach you. Please contact your provider's office if you are unable to make an appointment.     3. Watch for warning signs. Your doctor or nurse will give you detailed warning signs to watch for and when to call for assistance. These instructions may also include educational information about your condition. If you experience any of warning signs to your health, call your doctor.               ** Verify the list of medication(s) below is accurate and up to date. Carry this with you in case of emergency. If your medications have changed, please notify your healthcare provider.             Medication List      START taking these medications        Additional Info                      ceFEPIme in dextrose 5% 2 gram/50 mL Pgbk   Commonly known as:  MAXIPIME   Quantity:  14 g   Refills:  0   Indications:  Bacteremia    Instructions:  Following dialysis: 2 gram IV Q  Mon and Wed x 2 weeks 3 gram IV Q Friday x 2 weeks     Begin Date    AM    Noon    PM    Bedtime       vitamin renal formula (B-complex-vitamin c-folic acid) 1 mg Cap   Commonly known as:  NEPHROCAP   Quantity:  30 capsule   Refills:  2   Dose:  1 capsule    Last time this was given:  1 capsule on 5/1/2017 12:05 PM   Instructions:  Take 1 capsule by mouth once daily.     Begin Date    AM    Noon    PM    Bedtime         CONTINUE taking these medications        Additional Info                      ammonium lactate 12 % Crea   Quantity:  140 g   Refills:  3   Dose:  1 application    Instructions:  Apply 1 application topically 3 (three) times daily as needed.     Begin Date    AM    Noon    PM    Bedtime       aspirin 81 MG Chew   Quantity:  360 tablet   Refills:  0   Dose:  81 mg    Instructions:  Take 1 tablet (81 mg total) by mouth once daily.     Begin Date    AM    Noon    PM    Bedtime       AZOPT 1 % ophthalmic suspension   Refills:  0   Generic drug:  brinzolamide    Last time this was given:  1 drop on 5/1/2017  5:26 AM     Begin Date    AM    Noon    PM    Bedtime       blood sugar diagnostic Strp   Commonly known as:  ONETOUCH ULTRA TEST   Quantity:  100 strip   Refills:  5   Comments:  ICD10 E11.21    Instructions:  TEST BLOOD GLUCOSE FOUR TIMES A DAY. At AM, noon, 6pm and bedtime.     Begin Date    AM    Noon    PM    Bedtime       blood-glucose meter Misc   Commonly known as:  ADVANCED GLUCOSE METER   Quantity:  1 each   Refills:  0   Dose:  1 each    Instructions:  1 each by Misc.(Non-Drug; Combo Route) route 4 (four) times daily with meals and nightly.     Begin Date    AM    Noon    PM    Bedtime       bromfenac 0.09 % ophthalmic solution   Commonly known as:  XIBROM   Refills:  0      Begin Date    AM    Noon    PM    Bedtime       diclofenac 0.1 % ophthalmic solution   Commonly known as:  VOLTAREN   Refills:  0      Begin Date    AM    Noon    PM    Bedtime       doxycycline 50 MG tablet   Commonly  known as:  ADOXA   Refills:  0   Dose:  50 mg    Instructions:  Take 50 mg by mouth once daily.     Begin Date    AM    Noon    PM    Bedtime       fluticasone 50 mcg/actuation nasal spray   Commonly known as:  FLONASE   Quantity:  16 g   Refills:  0   Dose:  1 spray    Instructions:  1 spray by Each Nare route once daily.     Begin Date    AM    Noon    PM    Bedtime       folic acid 1 MG tablet   Commonly known as:  FOLVITE   Refills:  0   Dose:  1 mg    Last time this was given:  1 mg on 5/1/2017  8:31 AM   Instructions:  Take 1 mg by mouth once daily.     Begin Date    AM    Noon    PM    Bedtime       FOSRENOL 1000 MG chewable tablet   Refills:  0   Generic drug:  lanthanum    Last time this was given:  1,000 mg on 5/1/2017 12:00 PM     Begin Date    AM    Noon    PM    Bedtime       gabapentin 300 MG capsule   Commonly known as:  NEURONTIN   Quantity:  90 capsule   Refills:  3    Last time this was given:  300 mg on 4/30/2017  9:24 PM   Instructions:  TAKE 1 BY MOUTH EVERY      EVENING     Begin Date    AM    Noon    PM    Bedtime       GLUCOTROL 5 MG tablet   Refills:  0   Generic drug:  glipiZIDE      Begin Date    AM    Noon    PM    Bedtime       insulin glargine 100 unit/mL (3 mL) Inpn pen   Commonly known as:  LANTUS SOLOSTAR   Quantity:  16.5 mL   Refills:  11   Dose:  55 Units    Instructions:  Inject 55 Units into the skin every evening.     Begin Date    AM    Noon    PM    Bedtime       lancets Misc   Quantity:  200 each   Refills:  6   Dose:  1 each    Instructions:  1 each by Misc.(Non-Drug; Combo Route) route 4 (four) times daily with meals and nightly.     Begin Date    AM    Noon    PM    Bedtime       liraglutide 0.6 mg/0.1 mL (18 mg/3 mL) subq PNIJ 0.6 mg/0.1 mL (18 mg/3 mL) Pnij   Commonly known as:  VICTOZA 2-SHOAIB   Quantity:  6 mL   Refills:  11   Dose:  1.2 mg    Instructions:  Inject 1.2 mg into the skin every morning.     Begin Date    AM    Noon    PM    Bedtime       NEPHRO-TINO 0.8 mg  "Tab   Refills:  0   Dose:  0.8 mg   Generic drug:  B complex-vitamin C-folic acid    Instructions:  Take 0.8 mg by mouth once daily.     Begin Date    AM    Noon    PM    Bedtime       NOVOLOG FLEXPEN 100 unit/mL Inpn pen   Refills:  0   Generic drug:  insulin aspart    Last time this was given:  4 Units on 5/1/2017 12:03 PM     Begin Date    AM    Noon    PM    Bedtime       omega-3 acid ethyl esters 1 gram capsule   Commonly known as:  LOVAZA   Quantity:  120 capsule   Refills:  11   Dose:  2 g    Instructions:  Take 2 capsules (2 g total) by mouth 2 (two) times daily.     Begin Date    AM    Noon    PM    Bedtime       pen needle, diabetic 31 gauge x 3/16" Ndle   Commonly known as:  BD INSULIN PEN NEEDLE UF MINI   Quantity:  500 each   Refills:  6    Instructions:  Use 4-5 times a day.     Begin Date    AM    Noon    PM    Bedtime       pravastatin 40 MG tablet   Commonly known as:  PRAVACHOL   Quantity:  30 tablet   Refills:  6   Dose:  40 mg   Comments:  Advise patient to get future refills from Dr GIOVANA Vásquez.    Last time this was given:  40 mg on 4/30/2017  9:25 PM   Instructions:  Take 1 tablet (40 mg total) by mouth once daily.     Begin Date    AM    Noon    PM    Bedtime       prednisoLONE acetate 1 % Drps   Commonly known as:  PRED FORTE   Refills:  0      Begin Date    AM    Noon    PM    Bedtime       SENSIPAR 30 MG Tab   Refills:  0   Generic drug:  cinacalcet      Begin Date    AM    Noon    PM    Bedtime       sevelamer carbonate 2.4 gram Pwpk   Commonly known as:  RENVELA   Refills:  0      Begin Date    AM    Noon    PM    Bedtime            Where to Get Your Medications      These medications were sent to [facility] [facility] [place] - [address]  [address], [place] [address]     Phone:  [phone]     vitamin renal formula (B-complex-vitamin c-folic acid) 1 mg Cap         You can get these medications from any pharmacy     Bring a paper " prescription for each of these medications     ceFEPIme in dextrose 5% 2 gram/50 mL Pgbk                  Please bring to all follow up appointments:    1. A copy of your discharge instructions.  2. All medicines you are currently taking in their original bottles.  3. Identification and insurance card.    Please arrive 15 minutes ahead of scheduled appointment time.    Please call 24 hours in advance if you must reschedule your appointment and/or time.        Your Scheduled Appointments     May 09, 2017  2:00 PM CDT   Established Patient Visit with Benito Doherty MD   [facility] ([facility]    [address], [address]  [facility] [address]   [phone]            Jul 25, 2017  8:00 AM CDT   Established Patient Visit with Balwinder Chen DPM   [facility] ([facility])    [address]  [place] [id number] [phone]              Follow-up Information     Follow up with [facility] On 5/9/2017.    Specialty:  Family Medicine    Why:  time: 2:00    Contact information:    [address], [address]  [facility] [id number] [phone]        Discharge Instructions     Future Orders    Activity as tolerated     Call MD for:  difficulty breathing or increased cough     Call MD for:  increased confusion or weakness     Call MD for:  persistent dizziness, light-headedness, or visual disturbances     Call MD for:  persistent nausea and vomiting or diarrhea     Call MD for:  redness, tenderness, or signs of infection (pain, swelling, redness, odor or green/yellow discharge around incision site)     Call MD for:  severe persistent headache     Call MD for:  severe uncontrolled pain     Call MD for:  temperature >100.4     Call MD for:  worsening rash     Diet renal       Discharge References/Attachments     BACTEREMIA, SUSPECTED (ADULT) (ENGLISH)    DIABETES, DIET (ENGLISH)    HIGH BLOOD PRESSURE (HYPERTENSION), DISCHARGE  "INSTRUCTIONS (ENGLISH)    CEFEPIME INJECTION (ENGLISH)    RENAL B-COMPLEX VITAMIN WITH FOLATE AND VITAMIN C (ENGLISH)        Primary Diagnosis     Your primary diagnosis was:  Bacteria In Blood      Admission Information     Date & Time Provider Department CSN    4/30/2017 11:45 AM Bruna Gauthier MD [facility] [id number]      Care Providers     Provider Role Specialty Primary office phone    Bruna Gauthier MD Attending Provider Family Medicine [phone]    Vadim Berry III, MD Physician  Family Medicine  [phone]    Margoth Felton MD Consulting Physician  Nephrology [phone]    Carey Christensen MD Consulting Physician  Infectious Diseases [phone]    Beatriz Sanchez MD Consulting Physician  Infectious Diseases [phone]      Your Vitals Were     BP Pulse Temp Resp Height Weight    114/76 (BP Location: Right arm, Patient Position: Lying, BP Method: Automatic) 60 97.9 °F (36.6 °C) (Oral) 18 5' 7" (1.702 m) 141.3 kg (311 lb 8.2 oz)    SpO2 BMI             97% 48.79 kg/m2         Recent Lab Values        7/9/2013                           8:32 AM           A1C 6.8 (H)                       Pending Labs     Order Current Status    Hepatitis B Surface Antibody, Qual/Quant In process    Hepatitis B surface antigen In process    Blood culture Preliminary result    Blood culture Preliminary result      Allergies as of 5/1/2017        Reactions    Keflex [Cephalexin] Nausea Only      [facility]     [facility] Care [facility] - 24/7 Assistance  Unless otherwise directed by your provider, please contact [facility], our nurse care line that is available for 24/7 assistance.     Registered nurses in the [facility] provide clinical advisement, health education, appointment booking, and other advisory services.  Call for this free service at [phone].        Advance Directives     An advance directive is a document which, in the event you are no longer " able to make decisions for yourself, tells your healthcare team what kind of treatment you do or do not want to receive, or who you would like to make those decisions for you.  If you do not currently have an advance directive, [facility] encourages you to create one.  For more information call:  [phone]), [phone] ([phone]),  or log on to [email or web address].        Language Assistance Services     ATTENTION: Language assistance services are available, free of charge. Please call [phone].      ATENCIÓN: Si habla español, tiene a cortez disposición servicios gratuitos de asistencia lingüística. Llame al [phone].     CHÚ Ý: N?u b?n nói Ti?ng Vi?t, có các d?ch v? h? tr? ngôn ng? mi?n phí dành cho b?n. G?i s? [phone].        Pneumonmia Discharge Instructions                Chronic Kindey Disease Education             Diabetes Discharge Instructions                                    [facility] complies with applicable Federal civil rights laws and does not discriminate on the basis of race, color, national origin, age, disability, or sex.

## 2017-04-30 NOTE — ED TRIAGE NOTES
States blood cultures were drawn at dialysis last week and was called and told culture was positive and would need to come to hospital for IV abx. Nephrologist is Dr Forrest / [place]

## 2017-04-30 NOTE — PLAN OF CARE
Pt transferred to room 427 via wheelchair from ED, pt AAOx4, VSS, NAD noted, tele applied, fall and safety precautions in place, pt oriented to room and call bell, bed alarm set, fall contact signed by pt,  plan of care reviewed with pt, will continue to monitor pt status.

## 2017-04-30 NOTE — H&P
"[facility]  History & Physical    SUBJECTIVE:     Chief Complaint/Reason for Admission: Blood culture positive for G negative rods at outside facility     History of Present Illness:  Patient is a 56 y.o. male with a pmhx of ESRD with HD MWF, HTN., IDDN, presents to the ED after having been called by Dr. Lopez for a blood culture that was positive for G- rods and told to present himself to the ED. For the past 6-8 weeks as per patient he has had "fevers" ranging from 100.3-99.6 and was recently given abx during HD. When asked which abx he was given he could not specifically recollect. Patient denies felling bad, states that he has not had any cough, night sweats, fatigue, change in appetite and was actually not happy about being admitted but states that he was told he would could die if he did not.     In the ED CBC did not show signs if active infections but was started on Meropenem due to G- rods in outside culture. We do not have access here to those cultures or possible abx sensitivities.      PTA Medications   Medication Sig    ammonium lactate 12 % Crea Apply 1 application topically 3 (three) times daily as needed.    AZOPT 1 % ophthalmic suspension     B complex-vitamin C-folic acid (B COMPLEX-VITAMIN C-FOLIC ACID) 0.8 mg Tab Take 0.8 mg by mouth once daily.      bromfenac (XIBROM) 0.09 % ophthalmic solution     doxycycline (ADOXA) 50 MG tablet Take 50 mg by mouth once daily.    folic acid (FOLVITE) 1 MG tablet Take 1 mg by mouth once daily.    gabapentin (NEURONTIN) 300 MG capsule TAKE 1 BY MOUTH EVERY      EVENING    insulin glargine (LANTUS SOLOSTAR) 100 unit/mL (3 mL) InPn pen Inject 55 Units into the skin every evening.    liraglutide 0.6 mg/0.1 mL, 18 mg/3 mL, subq PNIJ (VICTOZA 2-SHOAIB) 0.6 mg/0.1 mL (18 mg/3 mL) PnIj Inject 1.2 mg into the skin every morning.    NOVOLOG FLEXPEN 100 unit/mL InPn pen     pravastatin (PRAVACHOL) 40 MG tablet Take 1 tablet (40 mg total) by " "mouth once daily.    SENSIPAR 30 mg Tab     aspirin 81 MG Chew Take 1 tablet (81 mg total) by mouth once daily.    blood sugar diagnostic (ONETOUCH ULTRA TEST) Strp TEST BLOOD GLUCOSE FOUR TIMES A DAY. At AM, noon, 6pm and bedtime.    blood-glucose meter (ADVANCED GLUCOSE METER) Misc 1 each by Misc.(Non-Drug; Combo Route) route 4 (four) times daily with meals and nightly.    diclofenac (VOLTAREN) 0.1 % ophthalmic solution     fluticasone (FLONASE) 50 mcg/actuation nasal spray 1 spray by Each Nare route once daily.    FOSRENOL 1,000 mg chewable tablet     GLUCOTROL 5 mg tablet     lancets Misc 1 each by Misc.(Non-Drug; Combo Route) route 4 (four) times daily with meals and nightly.    omega-3 acid ethyl esters (LOVAZA) 1 gram capsule Take 2 capsules (2 g total) by mouth 2 (two) times daily.    pen needle, diabetic (BD INSULIN PEN NEEDLE UF MINI) 31 gauge x 3/16" Ndle Use 4-5 times a day.    prednisoLONE acetate (PRED FORTE) 1 % DrpS     sevelamer carbonate (RENVELA) 2.4 gram PwPk        Review of patient's allergies indicates:   Allergen Reactions    Keflex [cephalexin] Nausea Only       Past Medical History:   Diagnosis Date    Diabetes mellitus type II     Dialysis patient     Hyperlipidemia     Hypertension     Kidney failure     MIKE (obstructive sleep apnea)     Urinary tract infection      Past Surgical History:   Procedure Laterality Date    AV FISTULA PLACEMENT      left lower arm    TONSILLECTOMY, ADENOIDECTOMY       Family History   Problem Relation Age of Onset    Colon cancer Mother     Lung cancer Mother     Diabetes Mother     Diabetes Father     Heart disease Father     Hypertension Father     Diabetes Maternal Grandmother     Diabetes Maternal Grandfather      Social History   Substance Use Topics    Smoking status: Never Smoker    Smokeless tobacco: Never Used    Alcohol use No        Review of Systems:  Review of Systems   Constitutional: Positive for fever. Negative " for chills, diaphoresis, malaise/fatigue and weight loss.   Cardiovascular: Negative for chest pain, palpitations, orthopnea, claudication, leg swelling and PND.   Neurological: Negative for weakness.         OBJECTIVE:     Vital Signs (Most Recent):  Temp: 98.1 °F (36.7 °C) (04/30/17 1616)  Pulse: (!) 55 (04/30/17 1616)  Resp: 18 (04/30/17 1616)  BP: 121/65 (04/30/17 1616)  SpO2: 98 % (04/30/17 1616)    Physical Exam:  Physical Exam   Constitutional: He is well-developed, well-nourished, and in no distress.   Morbidly obese, left upper extremity AVF with palpable thrill    Eyes: Pupils are equal, round, and reactive to light.   Cardiovascular: Regular rhythm, normal heart sounds and intact distal pulses.  Exam reveals no gallop and no friction rub.    No murmur heard.  Pulmonary/Chest: Effort normal and breath sounds normal. No respiratory distress. He has no wheezes. He has no rales. He exhibits no tenderness.   Abdominal: Soft. He exhibits no distension and no mass. There is no tenderness. There is no rebound and no guarding.   Musculoskeletal: Normal range of motion.   Nursing note and vitals reviewed.      Laboratory:  CBC:   Recent Labs  Lab 04/30/17  1229   WBC 5.24   RBC 2.56*   HGB 9.8*   HCT 30.2*   *   *   MCH 38.3*   MCHC 32.5     BMP:   Recent Labs  Lab 04/30/17  1229   *      K 4.3      CO2 22*   BUN 64*   CREATININE 12.7*   CALCIUM 8.4*     CMP:   Recent Labs  Lab 04/30/17  1229   *   CALCIUM 8.4*   ALBUMIN 3.6   PROT 7.1      K 4.3   CO2 22*      BUN 64*   CREATININE 12.7*   ALKPHOS 186*   ALT 55*   AST 44*   BILITOT 0.7     LFTs:   Recent Labs  Lab 04/30/17  1229   ALT 55*   AST 44*   ALKPHOS 186*   BILITOT 0.7   PROT 7.1   ALBUMIN 3.6     Cardiac markers: No results for input(s): CKMB, CPKMB, TROPONINT, TROPONINI, MYOGLOBIN in the last 168 hours.  Microbiology Results (last 7 days)     Procedure Component Value Units Date/Time    Blood culture  [[id number]] Collected:  04/30/17 1343    Order Status:  Sent Specimen:  Blood from Peripheral, Hand, Right Updated:  04/30/17 1344    Blood culture [[id number]] Collected:  04/30/17 1343    Order Status:  Sent Specimen:  Blood from Peripheral, Hand, Right Updated:  04/30/17 1344          Diagnostic Results:  Labs: Reviewed    ASSESSMENT/PLAN:   Patient is a 55 yo male with pmhx of ESRD, , IDDM that has been admitted for Gram negative bacteremia     Gram negative rods bacteremia   Patient has no active signs of sepsis, normal WBC, afebrile, no tachycardia  Have started patient on renally dosed meropenem   Blood cultures re-drawn today to evaluate bactermia status  Will attempt to reach out to Dr. Lopez for culture results and possible abx sensitivities     ESRD  Consulted Nephrology for HD MWF  Will continue home medications   Will get lipid panel to evaluate risk score       IDDM  Patient takes both victoza and lantus at home  Will start patient on 30 units detemir BID with sliding scale   hgba1c will be done on this admission last one in 2013 of 6.8     PPX  Famotidine   Heparin     Dispo: Follow up on repeat cultures, patient is not septic or SIRS but due to high risk for infection patient admitted to repeat cultures and start IV abx. Will need to determine if patient will need to continue outpatient IV abx vs PO abx. Consider ID consult based on results and John results.   Ron Arteaga MD  4/30/2017  5:13 PM  [employer] [facility] PGY-2

## 2017-05-01 VITALS
OXYGEN SATURATION: 97 % | DIASTOLIC BLOOD PRESSURE: 70 MMHG | SYSTOLIC BLOOD PRESSURE: 110 MMHG | BODY MASS INDEX: 48.89 KG/M2 | HEIGHT: 67 IN | RESPIRATION RATE: 18 BRPM | TEMPERATURE: 98 F | HEART RATE: 76 BPM | WEIGHT: 311.5 LBS

## 2017-05-01 LAB
ALBUMIN SERPL BCP-MCNC: 3.5 G/DL
ALP SERPL-CCNC: 174 U/L
ALT SERPL W/O P-5'-P-CCNC: 50 U/L
ANION GAP SERPL CALC-SCNC: 20 MMOL/L
AST SERPL-CCNC: 41 U/L
BASOPHILS # BLD AUTO: 0.01 K/UL
BASOPHILS NFR BLD: 0.2 %
BILIRUB SERPL-MCNC: 0.6 MG/DL
BUN SERPL-MCNC: 73 MG/DL
CALCIUM SERPL-MCNC: 8.5 MG/DL
CHLORIDE SERPL-SCNC: 102 MMOL/L
CO2 SERPL-SCNC: 24 MMOL/L
CREAT SERPL-MCNC: 14.2 MG/DL
DIFFERENTIAL METHOD: ABNORMAL
EOSINOPHIL # BLD AUTO: 0.2 K/UL
EOSINOPHIL NFR BLD: 3.9 %
ERYTHROCYTE [DISTWIDTH] IN BLOOD BY AUTOMATED COUNT: 14.6 %
EST. GFR  (AFRICAN AMERICAN): 4 ML/MIN/1.73 M^2
EST. GFR  (NON AFRICAN AMERICAN): 3 ML/MIN/1.73 M^2
GLUCOSE SERPL-MCNC: 97 MG/DL
HCT VFR BLD AUTO: 31 %
HGB BLD-MCNC: 10.1 G/DL
LYMPHOCYTES # BLD AUTO: 1.8 K/UL
LYMPHOCYTES NFR BLD: 32.6 %
MAGNESIUM SERPL-MCNC: 2.7 MG/DL
MCH RBC QN AUTO: 38.4 PG
MCHC RBC AUTO-ENTMCNC: 32.6 %
MCV RBC AUTO: 118 FL
MONOCYTES # BLD AUTO: 0.5 K/UL
MONOCYTES NFR BLD: 9.2 %
NEUTROPHILS # BLD AUTO: 3.1 K/UL
NEUTROPHILS NFR BLD: 53.9 %
PHOSPHATE SERPL-MCNC: 5.2 MG/DL
PLATELET # BLD AUTO: 115 K/UL
PMV BLD AUTO: 10.5 FL
POCT GLUCOSE: 124 MG/DL (ref 70–110)
POCT GLUCOSE: 152 MG/DL (ref 70–110)
POCT GLUCOSE: 216 MG/DL (ref 70–110)
POTASSIUM SERPL-SCNC: 4.2 MMOL/L
PROT SERPL-MCNC: 6.7 G/DL
RBC # BLD AUTO: 2.63 M/UL
SODIUM SERPL-SCNC: 146 MMOL/L
WBC # BLD AUTO: 5.65 K/UL

## 2017-05-01 PROCEDURE — 86706 HEP B SURFACE ANTIBODY: CPT

## 2017-05-01 PROCEDURE — 63600175 PHARM REV CODE 636 W HCPCS: Performed by: HOSPITALIST

## 2017-05-01 PROCEDURE — 36415 COLL VENOUS BLD VENIPUNCTURE: CPT

## 2017-05-01 PROCEDURE — 80100016 HC MAINTENANCE HEMODIALYSIS

## 2017-05-01 PROCEDURE — 83735 ASSAY OF MAGNESIUM: CPT

## 2017-05-01 PROCEDURE — 63600175 PHARM REV CODE 636 W HCPCS: Performed by: FAMILY MEDICINE

## 2017-05-01 PROCEDURE — 25000003 PHARM REV CODE 250: Performed by: FAMILY MEDICINE

## 2017-05-01 PROCEDURE — 80053 COMPREHEN METABOLIC PANEL: CPT

## 2017-05-01 PROCEDURE — 84100 ASSAY OF PHOSPHORUS: CPT

## 2017-05-01 PROCEDURE — 85025 COMPLETE CBC W/AUTO DIFF WBC: CPT

## 2017-05-01 PROCEDURE — 25000003 PHARM REV CODE 250: Performed by: INTERNAL MEDICINE

## 2017-05-01 PROCEDURE — 87340 HEPATITIS B SURFACE AG IA: CPT

## 2017-05-01 RX ORDER — SODIUM CHLORIDE 9 MG/ML
INJECTION, SOLUTION INTRAVENOUS ONCE
Status: DISCONTINUED | OUTPATIENT
Start: 2017-05-01 | End: 2017-05-01 | Stop reason: HOSPADM

## 2017-05-01 RX ORDER — CINACALCET 30 MG/1
30 TABLET, FILM COATED ORAL
Status: DISCONTINUED | OUTPATIENT
Start: 2017-05-01 | End: 2017-05-01 | Stop reason: HOSPADM

## 2017-05-01 RX ORDER — SODIUM CHLORIDE 9 MG/ML
INJECTION, SOLUTION INTRAVENOUS
Status: DISCONTINUED | OUTPATIENT
Start: 2017-05-01 | End: 2017-05-01 | Stop reason: HOSPADM

## 2017-05-01 RX ORDER — CEFEPIME HYDROCHLORIDE 2 G/50ML
INJECTION, SOLUTION INTRAVENOUS
Qty: 14 G | Refills: 0 | OUTPATIENT
Start: 2017-05-01 | End: 2017-05-01

## 2017-05-01 RX ORDER — CEFEPIME HYDROCHLORIDE 2 G/50ML
2 INJECTION, SOLUTION INTRAVENOUS ONCE
Status: COMPLETED | OUTPATIENT
Start: 2017-05-01 | End: 2017-05-01

## 2017-05-01 RX ORDER — CEFEPIME HYDROCHLORIDE 2 G/50ML
INJECTION, SOLUTION INTRAVENOUS
Qty: 14 G | Refills: 0 | Status: ON HOLD | OUTPATIENT
Start: 2017-05-01 | End: 2017-05-25

## 2017-05-01 RX ADMIN — INSULIN ASPART 4 UNITS: 100 INJECTION, SOLUTION INTRAVENOUS; SUBCUTANEOUS at 12:05

## 2017-05-01 RX ADMIN — Medication 1 CAPSULE: at 12:05

## 2017-05-01 RX ADMIN — MEROPENEM 500 MG: 500 INJECTION, POWDER, FOR SOLUTION INTRAVENOUS at 08:05

## 2017-05-01 RX ADMIN — INSULIN DETEMIR 30 UNITS: 100 INJECTION, SOLUTION SUBCUTANEOUS at 08:05

## 2017-05-01 RX ADMIN — BRINZOLAMIDE 1 DROP: 10 SUSPENSION/ DROPS OPHTHALMIC at 05:05

## 2017-05-01 RX ADMIN — LANTHANUM CARBONATE 1000 MG: 500 TABLET, CHEWABLE ORAL at 07:05

## 2017-05-01 RX ADMIN — LANTHANUM CARBONATE 1000 MG: 500 TABLET, CHEWABLE ORAL at 12:05

## 2017-05-01 RX ADMIN — HEPARIN SODIUM 5000 UNITS: 5000 INJECTION, SOLUTION INTRAVENOUS; SUBCUTANEOUS at 05:05

## 2017-05-01 RX ADMIN — CEFEPIME HYDROCHLORIDE 2 G: 2 INJECTION, SOLUTION INTRAVENOUS at 06:05

## 2017-05-01 RX ADMIN — FOLIC ACID 1 MG: 1 TABLET ORAL at 08:05

## 2017-05-01 NOTE — PLAN OF CARE
Future Appointments  Date Time Provider Department Center   7/25/2017 8:00 AM Balwinder Chen DPM [facility] [place]     Awaiting ID recommendations for IV abx as patient goes to HD MWF if patient can have abx with HD or if patient needs IV abx QD.        05/01/17 1146   Discharge Assessment   Assessment Type Discharge Planning Assessment   Confirmed/corrected address and phone number on facesheet? Yes   Prior to hospitilization cognitive status: Alert/Oriented   Prior to hospitalization functional status: Independent   Current cognitive status: Alert/Oriented   Arrived From home or self-care   Is patient able to care for self after discharge? Yes   Patient's perception of discharge disposition home or selfcare   Readmission Within The Last 30 Days no previous admission in last 30 days   Patient currently being followed by outpatient case management? No   Patient currently receives home health services? No   Does the patient currently use HME? Yes   Equipment Currently Used at Home CPAP   Do you have any problems affording any of your prescribed medications? No   Is the patient taking medications as prescribed? yes   Do you have any financial concerns preventing you from receiving the healthcare you need? No   Does the patient have transportation to healthcare appointments? Yes   Transportation Available car;family or friend will provide   On Dialysis? Yes   If yes, what is the name of the dialysis unit? [facility] M[facility] 545am   Does the patient receive outpatient dialysis? Yes   Does the patient receive services at the [facility]? No   Discharge Plan A Home;Home with family   Discharge Plan B Home;Home with family   Patient/Family In Agreement With Plan yes     Sarika Lai RN, CCM, CMSRN  RN Transition Navigator  [phone]

## 2017-05-01 NOTE — SUBJECTIVE & OBJECTIVE
Past Medical History:   Diagnosis Date    Diabetes mellitus type II     Dialysis patient     Hyperlipidemia     Hypertension     Kidney failure     MIKE (obstructive sleep apnea)     Urinary tract infection        Past Surgical History:   Procedure Laterality Date    AV FISTULA PLACEMENT      left lower arm    TONSILLECTOMY, ADENOIDECTOMY         Review of patient's allergies indicates:   Allergen Reactions    Keflex [cephalexin] Nausea Only       Medications:  Prescriptions Prior to Admission   Medication Sig    ammonium lactate 12 % Crea Apply 1 application topically 3 (three) times daily as needed.    AZOPT 1 % ophthalmic suspension     B complex-vitamin C-folic acid (B COMPLEX-VITAMIN C-FOLIC ACID) 0.8 mg Tab Take 0.8 mg by mouth once daily.      bromfenac (XIBROM) 0.09 % ophthalmic solution     doxycycline (ADOXA) 50 MG tablet Take 50 mg by mouth once daily.    folic acid (FOLVITE) 1 MG tablet Take 1 mg by mouth once daily.    gabapentin (NEURONTIN) 300 MG capsule TAKE 1 BY MOUTH EVERY      EVENING    insulin glargine (LANTUS SOLOSTAR) 100 unit/mL (3 mL) InPn pen Inject 55 Units into the skin every evening.    liraglutide 0.6 mg/0.1 mL, 18 mg/3 mL, subq PNIJ (VICTOZA 2-SHOAIB) 0.6 mg/0.1 mL (18 mg/3 mL) PnIj Inject 1.2 mg into the skin every morning.    NOVOLOG FLEXPEN 100 unit/mL InPn pen     pravastatin (PRAVACHOL) 40 MG tablet Take 1 tablet (40 mg total) by mouth once daily.    SENSIPAR 30 mg Tab     aspirin 81 MG Chew Take 1 tablet (81 mg total) by mouth once daily.    blood sugar diagnostic (ONETOUCH ULTRA TEST) Strp TEST BLOOD GLUCOSE FOUR TIMES A DAY. At AM, noon, 6pm and bedtime.    blood-glucose meter (ADVANCED GLUCOSE METER) Misc 1 each by Misc.(Non-Drug; Combo Route) route 4 (four) times daily with meals and nightly.    diclofenac (VOLTAREN) 0.1 % ophthalmic solution     fluticasone (FLONASE) 50 mcg/actuation nasal spray 1 spray by Each Nare route once daily.    FOSRENOL 1,000  "mg chewable tablet     GLUCOTROL 5 mg tablet     lancets Misc 1 each by Misc.(Non-Drug; Combo Route) route 4 (four) times daily with meals and nightly.    omega-3 acid ethyl esters (LOVAZA) 1 gram capsule Take 2 capsules (2 g total) by mouth 2 (two) times daily.    pen needle, diabetic (BD INSULIN PEN NEEDLE UF MINI) 31 gauge x 3/16" Ndle Use 4-5 times a day.    prednisoLONE acetate (PRED FORTE) 1 % DrpS     sevelamer carbonate (RENVELA) 2.4 gram PwPk      Antibiotics     Start     Stop Route Frequency Ordered    04/30/17 1615  meropenem 500 mg in sodium chloride 0.9 % 100 mL IVPB (ready to mix system)      05/01 0414 IV ED 1 Time 04/30/17 1606    05/01/17 0900  meropenem (MERREM) 500 mg in sodium chloride 0.9% 50 mL IVPB      -- IV Every 24 hours (non-standard times) 05/01/17 0749        Antifungals     None        Antivirals     None             There is no immunization history on file for this patient.    Family History     Problem Relation (Age of Onset)    Colon cancer Mother    Diabetes Mother, Father, Maternal Grandmother, Maternal Grandfather    Heart disease Father    Hypertension Father    Lung cancer Mother        Social History     Social History    Marital status: Single     Spouse name: N/A    Number of children: N/A    Years of education: N/A     Social History Main Topics    Smoking status: Never Smoker    Smokeless tobacco: Never Used    Alcohol use No    Drug use: No    Sexual activity: Not Asked     Other Topics Concern    None     Social History Narrative     Review of Systems   Constitutional: Positive for activity change and fever. Negative for appetite change and chills.   HENT: Positive for congestion. Negative for sore throat.    Respiratory: Negative for cough and chest tightness.    Cardiovascular: Negative for chest pain and palpitations.   Gastrointestinal: Negative for abdominal distention and abdominal pain.   Genitourinary: Negative for dysuria, flank pain and " frequency.   Musculoskeletal: Negative for back pain and joint swelling.   Skin: Negative for pallor, rash and wound.   Neurological: Negative for weakness and headaches.   Hematological: Negative for adenopathy.     Objective:     Vital Signs (Most Recent):  Temp: 98 °F (36.7 °C) (05/01/17 0758)  Pulse: (!) 54 (05/01/17 0758)  Resp: 19 (05/01/17 0758)  BP: 130/61 (05/01/17 0758)  SpO2: 97 % (05/01/17 0200) Vital Signs (24h Range):  Temp:  [97.3 °F (36.3 °C)-98.6 °F (37 °C)] 98 °F (36.7 °C)  Pulse:  [51-78] 54  Resp:  [18-20] 19  SpO2:  [96 %-98 %] 97 %  BP: (107-134)/(57-69) 130/61     Weight: (!) 141.3 kg (311 lb 8.2 oz)  Body mass index is 48.79 kg/(m^2).    Estimated Creatinine Clearance: 8.1 mL/min (based on Cr of 14.2).    Physical Exam   Constitutional: He is oriented to person, place, and time. He appears well-developed and well-nourished.   HENT:   Head: Normocephalic and atraumatic.   Eyes: EOM are normal. Pupils are equal, round, and reactive to light.   Cardiovascular: Normal rate and regular rhythm.    Pulmonary/Chest: Effort normal and breath sounds normal.   Abdominal: Soft. Bowel sounds are normal. He exhibits no distension. There is no tenderness.   Musculoskeletal: Normal range of motion.        Arms:  Neurological: He is alert and oriented to person, place, and time.   Skin: Skin is warm and dry.   Psychiatric: He has a normal mood and affect.       Significant Labs:   Blood Culture:   Recent Labs  Lab 04/30/17  1343   LABBLOO No Growth to date  No Growth to date     CMP:   Recent Labs  Lab 04/30/17  1229 05/01/17  0318    146*   K 4.3 4.2    102   CO2 22* 24   * 97   BUN 64* 73*   CREATININE 12.7* 14.2*   CALCIUM 8.4* 8.5*   PROT 7.1 6.7   ALBUMIN 3.6 3.5   BILITOT 0.7 0.6   ALKPHOS 186* 174*   AST 44* 41*   ALT 55* 50*   ANIONGAP 19* 20*   EGFRNONAA 4* 3*       Significant Imaging: CXR: I have reviewed all pertinent results/findings within the past 24 hours:  low lung  volumes, no area of focal consolidation

## 2017-05-01 NOTE — PLAN OF CARE
Faxed Prescription for IV [facility]fepime to [facility] (ph [phone]/fax [phone]) spoke to Dick and she received fax that they will give him his IV cefepime on this Wednesday. Informed nurse LOLIS Marte to inform patient upon discharge.    Future Appointments  Date Time Provider Department Center   5/9/2017 2:00 PM Benito Doherty MD [facility]   7/25/2017 8:00 AM PAVAN Johnson [facility] [place]     No other dme or HH needed.       05/01/17 1638   Final Note   Assessment Type Final Discharge Note   Discharge Disposition Home   Discharge planning education complete? Yes   Hospital Follow Up  Appt(s) scheduled? Yes   Discharge plans and expectations educations in teach back method with documentation complete? Yes   Offered [facility] -- Bedside Delivery? n/a   Discharge/Hospital Encounter Summary to (non[facility]) PCP No   Referral to Outpatient Case Management complete? No   Referral to / orders for Home Health Complete? No   30 day supply of medicines given at discharge, if documented non-compliance / non-adherence? No   Any social issues identified prior to discharge? No   Did you assess the readiness or willingness of the family or caregiver to support self management of care? No   Right Care Referral Info   Post Acute Recommendation No Care     Sarika Lai RN, [facility], CMSRN  RN Transition Navigator  [phone]

## 2017-05-01 NOTE — PLAN OF CARE
Problem: Hemodialysis (Adult)  Goal: Signs and Symptoms of Listed Potential Problems Will be Absent, Minimized or Managed (Hemodialysis)  Signs and symptoms of listed potential problems will be absent, minimized or managed by discharge/transition of care (reference Hemodialysis (Adult) CPG).  Outcome: Ongoing (interventions implemented as appropriate)    05/01/17 1616   Hemodialysis   Problems Assessed (Hemodialysis) all   Problems Present (Hemodialysis) electrolyte imbalance;fluid imbalance   HD with Uf x 4.5h. POC reviewed with pt.

## 2017-05-01 NOTE — CONSULTS
[facility]  Infectious Disease  Consult Note    Patient Name: Angel Farmer Jr.  MRN: [id number]  Admission Date: 4/30/2017  Hospital Length of Stay: 1 days  Attending Physician: Bruna Gauthier MD  Primary Care Provider: Vadim Berry Iii, MD     Isolation Status: No active isolations        Consults  Assessment/Plan:     * Bacteremia  Patient with 3 positive blood cultures at HD unit in the past 3 months (listed above). All different organisms. Makes it more likely that patient is being contaminated from needle sticks either at HD or from insulin injection.  Started on meropenem 4/30.  Blood Cx 4/30 NGTD.    Rec:   ---Stop meropenem   ---Ordered ceftriaxone 2 g to be given at end of HD  ---Give 2 week course of ceftriaxone 2-2-3g with MWF HD.  ---Can be discharged today if able to arrange antibiotics with HD.  ---Repeat blood cultures 1 week after completion of antibiotic course to ensure clearance      Thank you for your consult. I will sign off. Please contact us if you have any additional questions.    Maggie Blanco MD HO-II  Infectious Disease  [facility]    Subjective:     Principal Problem: Bacteremia    HPI: 56M with  ESRD on MWF HD at [facility] on [address] with Dr. Forrest, HTN, DMII was admitted 4/30 after being called by nephrologist to present to ED for admission for GNR bacteremia. Patient reports that for the past 6-8 weeks he has had fevers of 99.9-101 F after his HD sessions (takes his temperature with a thermometer at home). Had blood cultures drawn at his HD unit this past Wednesday and was given antibiotics with HD. Called yesterday and told to come to hospital for IV antibiotics. Admitted by family medicine team and started on Meropenem. Patient reports he feels well. Denies fever, chills, or cough. No skin breakdown. Does report some sinus congestion.    Spoke with the nurse at his HD unit ([phone]).   3/6/2017 Acinetobacter and Proprionobacter in anaerobic  bottle only  4/10/17 MRSA  (Per nephrology consult note, patient received Vancomycin with HD after this result)  4/26/17 with Serratia marcescens sensitive to Cefazolin, cefepime, ceftazadime, ceftriaxone, ciprofloxacin, gentamicin, levofloxacin, and tobramycin.        Past Medical History:   Diagnosis Date    Diabetes mellitus type II     Dialysis patient     Hyperlipidemia     Hypertension     Kidney failure     MIKE (obstructive sleep apnea)     Urinary tract infection        Past Surgical History:   Procedure Laterality Date    AV FISTULA PLACEMENT      left lower arm    TONSILLECTOMY, ADENOIDECTOMY         Review of patient's allergies indicates:   Allergen Reactions    Keflex [cephalexin] Nausea Only       Medications:  Prescriptions Prior to Admission   Medication Sig    ammonium lactate 12 % Crea Apply 1 application topically 3 (three) times daily as needed.    AZOPT 1 % ophthalmic suspension     B complex-vitamin C-folic acid (B COMPLEX-VITAMIN C-FOLIC ACID) 0.8 mg Tab Take 0.8 mg by mouth once daily.      bromfenac (XIBROM) 0.09 % ophthalmic solution     doxycycline (ADOXA) 50 MG tablet Take 50 mg by mouth once daily.    folic acid (FOLVITE) 1 MG tablet Take 1 mg by mouth once daily.    gabapentin (NEURONTIN) 300 MG capsule TAKE 1 BY MOUTH EVERY      EVENING    insulin glargine (LANTUS SOLOSTAR) 100 unit/mL (3 mL) InPn pen Inject 55 Units into the skin every evening.    liraglutide 0.6 mg/0.1 mL, 18 mg/3 mL, subq PNIJ (VICTOZA 2-SHOAIB) 0.6 mg/0.1 mL (18 mg/3 mL) PnIj Inject 1.2 mg into the skin every morning.    NOVOLOG FLEXPEN 100 unit/mL InPn pen     pravastatin (PRAVACHOL) 40 MG tablet Take 1 tablet (40 mg total) by mouth once daily.    SENSIPAR 30 mg Tab     aspirin 81 MG Chew Take 1 tablet (81 mg total) by mouth once daily.    blood sugar diagnostic (ONETOUCH ULTRA TEST) Strp TEST BLOOD GLUCOSE FOUR TIMES A DAY. At AM, noon, 6pm and bedtime.    blood-glucose meter (ADVANCED  "GLUCOSE METER) Misc 1 each by Misc.(Non-Drug; Combo Route) route 4 (four) times daily with meals and nightly.    diclofenac (VOLTAREN) 0.1 % ophthalmic solution     fluticasone (FLONASE) 50 mcg/actuation nasal spray 1 spray by Each Nare route once daily.    FOSRENOL 1,000 mg chewable tablet     GLUCOTROL 5 mg tablet     lancets Misc 1 each by Misc.(Non-Drug; Combo Route) route 4 (four) times daily with meals and nightly.    omega-3 acid ethyl esters (LOVAZA) 1 gram capsule Take 2 capsules (2 g total) by mouth 2 (two) times daily.    pen needle, diabetic (BD INSULIN PEN NEEDLE UF MINI) 31 gauge x 3/16" Ndle Use 4-5 times a day.    prednisoLONE acetate (PRED FORTE) 1 % DrpS     sevelamer carbonate (RENVELA) 2.4 gram PwPk      Antibiotics     Start     Stop Route Frequency Ordered    04/30/17 1615  meropenem 500 mg in sodium chloride 0.9 % 100 mL IVPB (ready to mix system)      05/01 0414 IV ED 1 Time 04/30/17 1606    05/01/17 0900  meropenem (MERREM) 500 mg in sodium chloride 0.9% 50 mL IVPB      -- IV Every 24 hours (non-standard times) 05/01/17 0749        Antifungals     None        Antivirals     None             There is no immunization history on file for this patient.    Family History     Problem Relation (Age of Onset)    Colon cancer Mother    Diabetes Mother, Father, Maternal Grandmother, Maternal Grandfather    Heart disease Father    Hypertension Father    Lung cancer Mother        Social History     Social History    Marital status: Single     Spouse name: N/A    Number of children: N/A    Years of education: N/A     Social History Main Topics    Smoking status: Never Smoker    Smokeless tobacco: Never Used    Alcohol use No    Drug use: No    Sexual activity: Not Asked     Other Topics Concern    None     Social History Narrative     Review of Systems   Constitutional: Positive for activity change and fever. Negative for appetite change and chills.   HENT: Positive for congestion. " Negative for sore throat.    Respiratory: Negative for cough and chest tightness.    Cardiovascular: Negative for chest pain and palpitations.   Gastrointestinal: Negative for abdominal distention and abdominal pain.   Genitourinary: Negative for dysuria, flank pain and frequency.   Musculoskeletal: Negative for back pain and joint swelling.   Skin: Negative for pallor, rash and wound.   Neurological: Negative for weakness and headaches.   Hematological: Negative for adenopathy.     Objective:     Vital Signs (Most Recent):  Temp: 98 °F (36.7 °C) (05/01/17 0758)  Pulse: (!) 54 (05/01/17 0758)  Resp: 19 (05/01/17 0758)  BP: 130/61 (05/01/17 0758)  SpO2: 97 % (05/01/17 0200) Vital Signs (24h Range):  Temp:  [97.3 °F (36.3 °C)-98.6 °F (37 °C)] 98 °F (36.7 °C)  Pulse:  [51-78] 54  Resp:  [18-20] 19  SpO2:  [96 %-98 %] 97 %  BP: (107-134)/(57-69) 130/61     Weight: (!) 141.3 kg (311 lb 8.2 oz)  Body mass index is 48.79 kg/(m^2).    Estimated Creatinine Clearance: 8.1 mL/min (based on Cr of 14.2).    Physical Exam   Constitutional: He is oriented to person, place, and time. He appears well-developed and well-nourished.   HENT:   Head: Normocephalic and atraumatic.   Eyes: EOM are normal. Pupils are equal, round, and reactive to light.   Cardiovascular: Normal rate and regular rhythm.    Pulmonary/Chest: Effort normal and breath sounds normal.   Abdominal: Soft. Bowel sounds are normal. He exhibits no distension. There is no tenderness.   Musculoskeletal: Normal range of motion.        Arms:  Neurological: He is alert and oriented to person, place, and time.   Skin: Skin is warm and dry.   Psychiatric: He has a normal mood and affect.       Significant Labs:   Blood Culture:   Recent Labs  Lab 04/30/17  1343   LABBLOO No Growth to date  No Growth to date     CMP:   Recent Labs  Lab 04/30/17  1229 05/01/17  0318    146*   K 4.3 4.2    102   CO2 22* 24   * 97   BUN 64* 73*   CREATININE 12.7* 14.2*    CALCIUM 8.4* 8.5*   PROT 7.1 6.7   ALBUMIN 3.6 3.5   BILITOT 0.7 0.6   ALKPHOS 186* 174*   AST 44* 41*   ALT 55* 50*   ANIONGAP 19* 20*   EGFRNONAA 4* 3*       Significant Imaging: CXR: I have reviewed all pertinent results/findings within the past 24 hours:  low lung volumes, no area of focal consolidation

## 2017-05-01 NOTE — PLAN OF CARE
Problem: Sepsis/Septic Shock (Adult)  Goal: Signs and Symptoms of Listed Potential Problems Will be Absent, Minimized or Managed (Sepsis/Septic Shock)  Signs and symptoms of listed potential problems will be absent, minimized or managed by discharge/transition of care (reference Sepsis/Septic Shock (Adult) CPG).   Outcome: Ongoing (interventions implemented as appropriate)  Pt labs were drawn, IV antibiotics administered in the ED. Pt remained Afebrile throughout the night. Pt stable. NO distress noted. POC reviewed with pt. Pt verbalized understanding. Pt remains SB-SR on the monitor. NO true red alarms noted. Pt denied pain. Fall precautions maintained. Bed in lowest position, call light in reach and bed alarm on.

## 2017-05-01 NOTE — CONSULTS
Nephrology Consult  H&P      Consult Requested By: Bruna Gauthier MD  Reason for Consult: Gr- bacteremia, ESRD    SUBJECTIVE:     History of Present Illness:  Patient is a 56 y.o. male sent by me yesterday to Ed after Reciving call from [facility] regarding blood cultures for Gr - rods. Pt was getting Vancomycin for MRSA + bacteremia from 4/10/17 and still having intermittent fevers   ESRD on HD with Dr. Araceli Pickens in [facility] MWF, 4.5 hours, EDW 140kg  ROS    Past Medical History:   Diagnosis Date    Diabetes mellitus type II     Dialysis patient     Hyperlipidemia     Hypertension     Kidney failure     MIKE (obstructive sleep apnea)     Urinary tract infection      Past Surgical History:   Procedure Laterality Date    AV FISTULA PLACEMENT      left lower arm    TONSILLECTOMY, ADENOIDECTOMY       Family History   Problem Relation Age of Onset    Colon cancer Mother     Lung cancer Mother     Diabetes Mother     Diabetes Father     Heart disease Father     Hypertension Father     Diabetes Maternal Grandmother     Diabetes Maternal Grandfather      Social History   Substance Use Topics    Smoking status: Never Smoker    Smokeless tobacco: Never Used    Alcohol use No       Review of patient's allergies indicates:   Allergen Reactions    Keflex [cephalexin] Nausea Only            OBJECTIVE:     Vital Signs (Most Recent)  Vitals:    05/01/17 0400 05/01/17 0520 05/01/17 0720 05/01/17 0758   BP: (!) 107/57   130/61   BP Location:    Right arm   Patient Position:    Sitting   BP Method:    Automatic   Pulse: (!) 51 (!) 52 (!) 55 (!) 54   Resp: 20   19   Temp: 97.5 °F (36.4 °C)   98 °F (36.7 °C)   TempSrc: Oral   Oral   SpO2:       Weight:       Height:               Date 05/01/17 0700 - 05/02/17 0659   Shift [id number] 1154-3606 [id number] 24 Hour Total   I  N  T  A  K  E   P.O. 180   180    Shift Total  (mL/kg) 180  (1.3)   180  (1.3)   O  U  T  P  U  T   Shift Total  (mL/kg)       Weight  (kg) 141.3 141.3 141.3 141.3           Medications:   sodium chloride 0.9%   Intravenous Once    brinzolamide  1 drop Both Eyes TID    cinacalcet  30 mg Oral Daily with dinner    folic acid  1 mg Oral Daily    gabapentin  300 mg Oral QHS    heparin (porcine)  5,000 Units Subcutaneous Q8H    insulin detemir  30 Units Subcutaneous BID    lanthanum  1,000 mg Oral TID WM    meropenem (MERREM) IVPB  500 mg Intravenous Q24H    pravastatin  40 mg Oral QHS    sevelamer carbonate  800 mg Oral TID WM           Physical Exam    Laboratory:    Recent Labs  Lab 04/30/17  1229 05/01/17  0318   WBC 5.24 5.65   HGB 9.8* 10.1*   HCT 30.2* 31.0*   * 115*   MONO 10.9  0.6 9.2  0.5       Recent Labs  Lab 04/30/17  1229 05/01/17  0318    146*   K 4.3 4.2    102   CO2 22* 24   BUN 64* 73*   CREATININE 12.7* 14.2*   CALCIUM 8.4* 8.5*   PHOS  --  5.2*       Diagnostic Results:  X-Ray: Reviewed    ASSESSMENT/PLAN:   1. Serratia maracennes bacteremia - please consult ID for recommendations--> 3rd episode of different organism bacteremia in the last 6 weeks    2 ESRD - on HD with Dr. Araceli Pickens in [facility] MWF, 4.5 hours, EDW 140kg  HD today as per normal schedule    2. HTN - controlled, cont home meds  3. Anemia - Hb 10.1 today, will hold epo today  4. MBD - cont sensipar and Fosrenol  5. Access - L wrist AVF, recent ballon PTA by Dr. Moran in [facility], f/u in 2 weeks  6. Nutrition - renal diet, nephrocaps  7. DM2  8. Severe MIKE- BiPAP 16/8      Thank you for consult, will follow  With any question please call [phone]  Margoth Felton    [facility]  SHALA Forrest MD, FACP,   KADEN Pickens MD,   MD GEORGIA Walker, NP  [address]  [place], [address] [phone]

## 2017-05-01 NOTE — PROGRESS NOTES
.Pharmacy New Medication Education    Patient accepted medication education.    Pharmacy educated patient on the following medications, using the teach-back method.   Brinzolamide  Cinacalcet  Folic acid  Gabapentin  Heparin  Novolog  Detemir  Lanthanum  Merrem  Pravachol  renvela  Learners of pharmacy medication education included:  patient    Patient +/- learner response:  verbalize understanding

## 2017-05-01 NOTE — ASSESSMENT & PLAN NOTE
Patient with 3 positive blood cultures at HD unit in the past 3 months (listed above). All different organisms. Makes it more likely that patient is being contaminated from needle sticks either at HD or from insulin injection.  Started on meropenem 4/30.  Blood Cx 4/30 NGTD.    Rec:   ---Stop meropenem   ---Ordered ceftriaxone 2 g to be given at end of HD  ---Give 2 week course of ceftriaxone 2-2-3g with F HD.  ---Can be discharged today if able to arrange antibiotics with HD.  ---Repeat blood cultures 1 week after completion of antibiotic course to ensure clearance

## 2017-05-01 NOTE — PROGRESS NOTES
"PGY-1 Progress Note    Hospital Stay Day 1    Patient is 56 y.o. year old male with PMH of ESRD (HD MWF), HTN, IDDM admitted for gram negative paige bacteriemia found on outside labs.    Subjective: Patient seen and examined. Pt is concerned about HD states that he usually has HD at 5 am on Monday.  He has no other complaints. Denies fever, chills, sob, cp,n/v/d/c.    Scheduled Meds:   brinzolamide  1 drop Both Eyes TID    cinacalcet  30 mg Oral Daily with breakfast    folic acid  1 mg Oral Daily    gabapentin  300 mg Oral QHS    heparin (porcine)  5,000 Units Subcutaneous Q8H    insulin detemir  30 Units Subcutaneous BID    lanthanum  1,000 mg Oral TID WM    meropenem (MERREM) IVPB  500 mg Intravenous Q24H    pravastatin  40 mg Oral QHS    sevelamer carbonate  800 mg Oral TID WM     Continuous Infusions:   PRN Meds:dextrose 50%, dextrose 50%, glucagon (human recombinant), glucose, glucose, insulin aspart, ondansetron    Review of patient's allergies indicates:   Allergen Reactions    Keflex [cephalexin] Nausea Only     ROS  As above.  Objectives:     Vitals(Most Recent)      BP  Min: 107/57  Max: 134/59  Temp  Av.9 °F (36.6 °C)  Min: 97.3 °F (36.3 °C)  Max: 98.6 °F (37 °C)  Pulse  Av.4  Min: 51  Max: 78  Resp  Av  Min: 18  Max: 20  SpO2  Av.8 %  Min: 96 %  Max: 98 %  Height  Av' 7" (170.2 cm)  Min: 5' 7" (170.2 cm)  Max: 5' 7" (170.2 cm)  Weight  Av kg (310 lb 12.1 oz)  Min: 140.6 kg (310 lb)  Max: 141.3 kg (311 lb 8.2 oz)             Vitals([id number])  Temp:  [97.3 °F (36.3 °C)-98.6 °F (37 °C)]   Pulse:  [51-78]   Resp:  [18-20]   BP: (107-134)/(57-69)   SpO2:  [96 %-98 %]      Physical Exam   Constitutional: He is well-developed, well-nourished, and in no distress.   Morbidly obese, left upper extremity AVF with palpable thrill    Eyes: Pupils are equal, round, and reactive to light.   Cardiovascular: Regular rhythm, normal heart sounds and intact distal pulses. Exam reveals " no gallop and no friction rub.   No murmur heard.  Pulmonary/Chest: Effort normal and breath sounds normal. No respiratory distress. He has no wheezes. He has no rales. He exhibits no tenderness.   Abdominal: Soft. He exhibits no distension and no mass. There is no tenderness. There is no rebound and no guarding.   Musculoskeletal: Normal range of motion.   Nursing note and vitals reviewed.  Labs:    Recent Labs  Lab 04/30/17  1229 05/01/17  0318   WBC 5.24 5.65   HGB 9.8* 10.1*   HCT 30.2* 31.0*   * 118*   RBC 2.56* 2.63*   MCH 38.3* 38.4*   MCHC 32.5 32.6   RDW 14.4 14.6*   * 115*   MPV 10.3 10.5   GRAN 62.2  3.3 53.9  3.1   LYMPH 24.2  1.3 32.6  1.8   MONO 10.9  0.6 9.2  0.5   EOSINOPHIL 2.7 3.9   BASOPHIL 0.0 0.2       Recent Labs  Lab 04/30/17  1229 05/01/17 0318    146*   K 4.3 4.2    102   CO2 22* 24   BUN 64* 73*   CREATININE 12.7* 14.2*   * 97   CALCIUM 8.4* 8.5*   PROT 7.1 6.7   ALBUMIN 3.6 3.5   ALKPHOS 186* 174*   BILITOT 0.7 0.6   ALT 55* 50*   AST 44* 41*   ESTGFRAFRICA 5* 4*   EGFRNONAA 4* 3*   ANIONGAP 19* 20*   MG  --  2.7*   PHOS  --  5.2*       Imaging  Imaging Results         X-Ray Chest 1 View (Final result) Result time:  05/01/17 03:02:20    Final result by Luca Goel MD (05/01/17 03:02:20)    Impression:    Hypoventilatory lung changes.  No interval change when compared to radiograph dated 01/13/2017.    Narrative:    Findings:    Mediastinal structures are midline.  Cardiac silhouette is magnified by AP technique, limiting accurate assessment.  Lungs are underexpanded but symmetric.  No focal consolidation.  No pneumothorax or pleural effusions.  No acute osseous abnormalities.  No free air beneath the diaphragm.              Micro:  Microbiology Results (last 7 days)     Procedure Component Value Units Date/Time    Blood culture [[id number]] Collected:  04/30/17 1343    Order Status:  Completed Specimen:  Blood from Peripheral, Hand, Right  Updated:  05/01/17 0315     Blood Culture, Routine No Growth to date    Blood culture [[id number]] Collected:  04/30/17 1343    Order Status:  Completed Specimen:  Blood from Peripheral, Hand, Right Updated:  05/01/17 0315     Blood Culture, Routine No Growth to date           Assessment/Plan: Patient is 56 y.o. year old male with PMH of ESRD (HD MWF), HTN, IDDM admitted for gram negative paige bacteriemia found on outside labs.      Gram negative rods bacteremia   - Patient has no active signs of sepsis, normal WBC, afebrile, no tachycardia  - Blood cx x 2 No growth x 2 days  -Patient started on meropenum on admission, will likely deescalate today   -Will attempt to reach out to Dr. Lopez for culture results and possible abx sensitivities      ESRD  -Consulted Nephrology for HD MWF  -Will continue home medications  -Will get lipid panel to evaluate risk score         IDDM  -Patient takes both victoza and lantus at home  -Will start patient on 30 units detemir BID with sliding scale   -[id number]  7/9/13 of 6.8     PPx: famotidine, heparin  Diet: diabetic  Code; full  Dispo: pending ID recs, pending nephro, blood cx    Maya Mckinnon D.O.  \[facility] [place] [facility]\"" Family Medicine HO-1  05/01/2017

## 2017-05-02 ENCOUNTER — PATIENT OUTREACH (OUTPATIENT)
Dept: ADMINISTRATIVE | Facility: CLINIC | Age: 57
End: 2017-05-02
Payer: MEDICARE

## 2017-05-02 LAB — HBV SURFACE AG SERPL QL IA: NEGATIVE

## 2017-05-02 NOTE — PATIENT INSTRUCTIONS
Bacteremia, Suspected (Adult)  Your fever today is probably due to a viral illness. However, sometimes fever can be an early sign of a more serious bacterial infection. Bacteremia is a bacterial infection that has spread to the bloodstream. This is serious because it can spread to other organs, including the kidneys, brain, and lungs.  Your healthcare provider will perform tests (cultures) to check for bacteremia. Until the test results are known you should watch for the signs listed below.  Causes  Bacteremia usually starts with a typical infection, but it then spreads to the blood. Almost any type of infection can cause bacteremia, including a urinary tract infection, skin infection, gastrointestinal problem, surgical complication, or pneumonia.  Symptoms  At first symptoms may seem like any typical infection or illness, but then they worsen. Symptoms of bacteremia can include:  · Fever and chills  · Loss of appetite  · Nausea or vomiting  · Trouble breathing or fast breathing  · Fast heart rate  · Feeling lightheaded or faint  · Skin rashes or blotches  Home care  Follow these guidelines when caring for yourself at home.  · Rest at home for the first 2 to 3 days. When resuming activity, don't let yourself become overly tired.  · You can take acetaminophen or ibuprofen for pain, unless you were given a different pain medicine to use. (Note: If you have chronic liver or kidney disease or have ever had a stomach ulcer or gastrointestinal bleeding, talk with your healthcare provider before using these medicines. Also talk to your provider if you are taking medicine to prevent blood clots.) Aspirin should never be given to anyone younger than 18 years of age who is ill with a viral infection or fever. It may cause severe liver or brain damage.  · If you were given antibiotics, take them until they are used up, or your healthcare provider tells you to stop. It is important to finish the antibiotics even though you  feel better. This is to make sure the infection has cleared.  · Your appetite may be poor, so a light diet is fine. Avoid dehydration by drinking 6 to 8 glasses of fluid per day (such as water, soft drinks, sports drinks, juices, tea, or soup).  Follow-up care  Follow up with your healthcare provider, or as advised.  · If a culture was done, you will be notified if your treatment needs to be changed. You can call as directed for the results.  · If X-rays, a CT, or an ultrasound were done, a specialist will review them. You will be notified of any findings that may affect your care.  Call 911  Contact emergency services right away if any of these occur:  · Trouble breathing or swallowing, or wheezing  · Chest pain  · Confusion or sudden change in behavior  · Extreme drowsiness or trouble awakening  · Fainting or loss of consciousness  · Rapid heart rate  · Low blood pressure  · Vomiting blood, or large amounts of blood in stool  · Seizure  When to seek medical advice  Call your healthcare provider right away if any of these occur:  · Cough with lots of colored sputum (mucus), or blood in your sputum  · Severe headache  · Severe face, neck, throat, or ear pain  · Pain in the right lower abdomen  · Weakness, dizziness, repeated vomiting, or diarrhea  · Joint pain or a new rash  · Burning when urinating  · Fever of 100.4°F (38°C) or higher  Date Last Reviewed: 7/30/2015  © 9006-9984 [employer] [address] [place] [address], [place] [address]. All rights reserved. This information is not intended as a substitute for professional medical care. Always follow your healthcare professional's instructions.

## 2017-05-02 NOTE — NURSING
Patient discharge, IV removed per protocol, discharge instructions received and verbalized understanding. Fall precautions in place

## 2017-05-03 LAB
HEP. B SURF AB, QUAL: NEGATIVE
HEP. B SURF AB, QUANT.: 7 MIU/ML

## 2017-05-04 NOTE — DISCHARGE SUMMARY
"[facility]  Discharge Summary      Admit Date: 4/30/2017    Discharge Date and Time: 5/1/2017  8:15 PM    Attending Physician: Dr. Bruna Gauthier    Reason for Admission: Blood culture positive for G negative rods at outside facility    Procedures Performed: * No surgery found *    Hospital Course:   Patient is a 56 y.o. male with a pmhx of ESRD with HD MWF, HTN., IDDN, presents to the ED after having been called by Dr. Lopez for a blood culture that was positive for G- rods and told to present himself to the ED. For the past 6-8 weeks as per patient he has had "fevers" ranging from 100.3-99.6 and was recently given abx during HD. When asked which abx he was given he could not specifically recollect. Patient denies felling bad, states that he has not had any cough, night sweats, fatigue, change in appetite and was actually not happy about being admitted but states that he was told he would could die if he did not.      In the ED, CBC did not show signs of active infections but he was started on Meropenem due to G- rods in outside culture with and continued until time of discharge. Blood cx for [facility] showed no growth however, OSH labs showed Serratia marcescens. Infections disease was consulted with recommendations for 2 weeks of IV ABX cefepime 2-2-3g following HD and repeat blood cx following completion.      Patient was hemodynamically stable and deemed appropriate for discharge.     Consults: ID    Significant Diagnostic Studies:   Blood cx x 2: 4/30/17 No growth to date      Final Diagnoses:    Principal Problem: Bacteremia   Secondary Diagnoses:   Active Hospital Problems    Diagnosis  POA    *Bacteremia [R78.81]  Yes    MIKE (obstructive sleep apnea) - very severe latest sleep study says BPAP @ 16/8 [G47.33]  Yes    ESRD on dialysis [N18.6, Z99.2]  Yes    Diabetes mellitus, type 2 [E11.9]  Yes    Hypertension [I10]  Yes     -TTE (3/25/14): EF 60%, pseudonormal diastolic dysfunction. " E/e' 11.         Resolved Hospital Problems    Diagnosis Date Resolved POA   No resolved problems to display.       Discharged Condition: good    Disposition: Home or Self Care    Follow Up/Patient Instructions:     Medications:  Reconciled Home Medications:   Discharge Medication List as of 5/1/2017  5:20 PM      START taking these medications    Details   vitamin renal formula, B-complex-vitamin c-folic acid, (NEPHROCAP) 1 mg Cap Take 1 capsule by mouth once daily., Starting 5/1/2017, Until Discontinued, Normal         CONTINUE these medications which have CHANGED    Details   ceFEPIme in dextrose 5% (MAXIPIME) 2 gram/50 mL PgBk Following dialysis:  2 gram IV Q Mon and Wed x 2 weeks  3 gram IV Q Friday x 2 weeks, Print         CONTINUE these medications which have NOT CHANGED    Details   ammonium lactate 12 % Crea Apply 1 application topically 3 (three) times daily as needed., Starting 11/30/2016, Until Discontinued, Normal      aspirin 81 MG Chew Take 1 tablet (81 mg total) by mouth once daily., Starting 3/18/2014, Until Wed 8/24/16, Normal      AZOPT 1 % ophthalmic suspension Starting 8/2/2016, Until Discontinued, Historical Med      B complex-vitamin C-folic acid (B COMPLEX-VITAMIN C-FOLIC ACID) 0.8 mg Tab Take 0.8 mg by mouth once daily.  , Until Discontinued, Historical Med      blood sugar diagnostic (ONETOUCH ULTRA TEST) Strp TEST BLOOD GLUCOSE FOUR TIMES A DAY. At AM, noon, 6pm and bedtime., Print      blood-glucose meter (ADVANCED GLUCOSE METER) Misc 1 each by Misc.(Non-Drug; Combo Route) route 4 (four) times daily with meals and nightly., Starting 10/20/2014, Until Wed 8/24/16, Print      bromfenac (XIBROM) 0.09 % ophthalmic solution Starting 9/24/2015, Until Discontinued, Historical Med      diclofenac (VOLTAREN) 0.1 % ophthalmic solution Starting 8/11/2016, Until Discontinued, Historical Med      doxycycline (ADOXA) 50 MG tablet Take 50 mg by mouth once daily., Until Discontinued, Historical Med     "  fluticasone (FLONASE) 50 mcg/actuation nasal spray 1 spray by Each Nare route once daily., Starting 1/13/2014, Until Discontinued, Normal      folic acid (FOLVITE) 1 MG tablet Take 1 mg by mouth once daily., Until Discontinued, Historical Med      FOSRENOL 1,000 mg chewable tablet Starting 12/19/2013, Until Discontinued, Historical Med      gabapentin (NEURONTIN) 300 MG capsule TAKE 1 BY MOUTH EVERY      EVENING, Normal      insulin glargine (LANTUS SOLOSTAR) 100 unit/mL (3 mL) InPn pen Inject 55 Units into the skin every evening., Starting 12/1/2016, Until Fri 12/1/17, Normal      lancets Misc 1 each by Misc.(Non-Drug; Combo Route) route 4 (four) times daily with meals and nightly., Starting 4/10/2015, Until Discontinued, Normal      liraglutide 0.6 mg/0.1 mL, 18 mg/3 mL, subq PNIJ (VICTOZA 2-SHOAIB) 0.6 mg/0.1 mL (18 mg/3 mL) PnIj Inject 1.2 mg into the skin every morning., Starting 10/28/2016, Until Sat 10/28/17, Normal      NOVOLOG FLEXPEN 100 unit/mL InPn pen Starting 2/23/2017, Until Discontinued, Historical Med      omega-3 acid ethyl esters (LOVAZA) 1 gram capsule Take 2 capsules (2 g total) by mouth 2 (two) times daily., Starting 3/12/2013, Until Wed 8/24/16, Print      pen needle, diabetic (BD INSULIN PEN NEEDLE UF MINI) 31 gauge x 3/16" Ndle Use 4-5 times a day., Normal      pravastatin (PRAVACHOL) 40 MG tablet Take 1 tablet (40 mg total) by mouth once daily., Starting 2/15/2017, Until Discontinued, Normal      prednisoLONE acetate (PRED FORTE) 1 % DrpS Starting 9/20/2016, Until Discontinued, Historical Med      SENSIPAR 30 mg Tab Starting 8/11/2016, Until Discontinued, Historical Med      GLUCOTROL 5 mg tablet Starting 10/22/2015, Until Discontinued, Historical Med      sevelamer carbonate (RENVELA) 2.4 gram PwPk Starting 10/1/2015, Until Discontinued, Historical Med             Discharge Procedure Orders  Diet renal     Activity as tolerated     Call MD for:  temperature >100.4     Call MD for:  " persistent nausea and vomiting or diarrhea     Call MD for:  severe uncontrolled pain     Call MD for:  redness, tenderness, or signs of infection (pain, swelling, redness, odor or green/yellow discharge around incision site)     Call MD for:  difficulty breathing or increased cough     Call MD for:  severe persistent headache     Call MD for:  worsening rash     Call MD for:  persistent dizziness, light-headedness, or visual disturbances     Call MD for:  increased confusion or weakness       Follow-up Information     Follow up with [facility] On 5/9/2017.    Specialty:  Family Medicine    Why:  time: 2:00    Contact information:    [address], [address]  [place] [address] [phone]        Maya Mckinnon D.O.  [facility] HO-1  05/04/2017

## 2017-05-05 LAB
BACTERIA BLD CULT: NORMAL
BACTERIA BLD CULT: NORMAL

## 2017-05-09 ENCOUNTER — TELEPHONE (OUTPATIENT)
Dept: FAMILY MEDICINE | Facility: HOSPITAL | Age: 57
End: 2017-05-09

## 2017-05-09 ENCOUNTER — OFFICE VISIT (OUTPATIENT)
Dept: FAMILY MEDICINE | Facility: HOSPITAL | Age: 57
End: 2017-05-09
Attending: FAMILY MEDICINE
Payer: MEDICARE

## 2017-05-09 VITALS
HEART RATE: 73 BPM | DIASTOLIC BLOOD PRESSURE: 60 MMHG | HEIGHT: 67 IN | SYSTOLIC BLOOD PRESSURE: 112 MMHG | WEIGHT: 311.75 LBS | BODY MASS INDEX: 48.93 KG/M2

## 2017-05-09 DIAGNOSIS — E11.00 TYPE 2 DIABETES MELLITUS WITH HYPEROSMOLARITY WITHOUT COMA, UNSPECIFIED LONG TERM INSULIN USE STATUS: ICD-10-CM

## 2017-05-09 DIAGNOSIS — R78.81 BACTEREMIA: Primary | ICD-10-CM

## 2017-05-09 PROCEDURE — 99215 OFFICE O/P EST HI 40 MIN: CPT | Performed by: FAMILY MEDICINE

## 2017-05-09 RX ORDER — DOXYCYCLINE HYCLATE 50 MG/1
CAPSULE ORAL
Status: ON HOLD | COMMUNITY
Start: 2017-04-21 | End: 2017-05-25 | Stop reason: HOSPADM

## 2017-05-09 NOTE — PROGRESS NOTES
Subjective:       Patient ID: Angel Farmer Jr. is a 56 y.o. male.    Chief Complaint: Follow-up (ER)    HPI   Pt is a 57 yo M with hx of ESRD, HTN and DM who presents to the clinic for a follow up to a recent hospital stay. Pt was sent to the hospital after blood work at dialysis after he was found have + blood cx. Blood cx did not grow anything while at the hospital and pt was eventually d/c for 2 weeks of Cefepime to be given in HD. Since his d/c, pt has remained afebrile but he does endorse feeling warm daily.      Review of Systems   Constitutional: Negative for chills, diaphoresis, fatigue and fever.        Pt c/o feeling warm    HENT: Negative for sneezing and sore throat.    Eyes: Negative for pain.   Respiratory: Negative for cough, chest tightness, shortness of breath and wheezing.    Cardiovascular: Negative for chest pain and palpitations.   Gastrointestinal: Negative for abdominal pain, constipation, diarrhea, rectal pain and vomiting.   Genitourinary: Negative for dysuria.   Musculoskeletal: Negative for back pain.   Skin: Negative for rash.   Neurological: Negative for headaches.   Psychiatric/Behavioral: Negative for confusion.       Objective:      Vitals:    05/09/17 1405   BP: 112/60   Pulse: 73     Physical Exam   Constitutional: He is oriented to person, place, and time. He appears well-developed and well-nourished.   Obese.    HENT:   Head: Normocephalic and atraumatic.   Eyes: Conjunctivae are normal. Pupils are equal, round, and reactive to light.   Neck: Normal range of motion.   Cardiovascular: Normal rate, regular rhythm and normal heart sounds.    Pulmonary/Chest: Effort normal and breath sounds normal. He has no wheezes.   Abdominal: Soft. Bowel sounds are normal. There is no tenderness.   Musculoskeletal: Normal range of motion.   left upper extremity AVF with palpable thrill     Neurological: He is alert and oriented to person, place, and time.   Skin: Skin is warm and dry. No rash  noted.         3/6/2017 Acinetobacter and Proprionobacter in anaerobic bottle only  4/10/17 MRSA (Per nephrology consult note, patient received Vancomycin with HD after this result)  4/26/17 with Serratia marcescens sensitive to Cefazolin, cefepime, ceftazadime, ceftriaxone, ciprofloxacin, gentamicin, levofloxacin, and tobramycin.    Assessment:       1. Bacteremia    2. Type 2 diabetes mellitus with hyperosmolarity without coma, unspecified long term insulin use status        Plan:       Bacteremia  Last Bcx were no growth on 4/30 x 2  Pt still on cefepime with dialysis for total of 2 weeks  Pt states he was told he needs a follow up with ID. Although, no indications for follow up will put in referral per pt request.   Referral placed to [facility] ID    Type 2 diabetes mellitus with hyperosmolarity without coma, unspecified long term insulin use status  - Refilled test strips for pt  - Cont current therapy      Return if symptoms worsen or fail to improve.

## 2017-05-11 DIAGNOSIS — E11.9 TYPE 2 DIABETES MELLITUS WITHOUT COMPLICATION: ICD-10-CM

## 2017-05-11 DIAGNOSIS — E11.21 TYPE 2 DIABETES MELLITUS WITH DIABETIC NEPHROPATHY, WITH LONG-TERM CURRENT USE OF INSULIN: ICD-10-CM

## 2017-05-11 DIAGNOSIS — Z79.4 TYPE 2 DIABETES MELLITUS WITH DIABETIC NEPHROPATHY, WITH LONG-TERM CURRENT USE OF INSULIN: ICD-10-CM

## 2017-05-16 RX ORDER — PEN NEEDLE, DIABETIC 31 GX5/16"
NEEDLE, DISPOSABLE MISCELLANEOUS
Qty: 500 EACH | Refills: 5 | Status: SHIPPED | OUTPATIENT
Start: 2017-05-16 | End: 2018-04-19 | Stop reason: SDUPTHER

## 2017-05-19 DIAGNOSIS — R78.81 BACTEREMIA: Primary | ICD-10-CM

## 2017-05-20 ENCOUNTER — HOSPITAL ENCOUNTER (INPATIENT)
Facility: HOSPITAL | Age: 57
LOS: 5 days | Discharge: HOME | DRG: 864 | End: 2017-05-25
Attending: EMERGENCY MEDICINE | Admitting: FAMILY MEDICINE
Payer: MEDICARE

## 2017-05-20 DIAGNOSIS — E11.00 TYPE 2 DIABETES MELLITUS WITH HYPEROSMOLARITY WITHOUT COMA, UNSPECIFIED LONG TERM INSULIN USE STATUS: ICD-10-CM

## 2017-05-20 DIAGNOSIS — Z99.2 TYPE 2 DIABETES MELLITUS WITH CHRONIC KIDNEY DISEASE ON CHRONIC DIALYSIS, WITH LONG-TERM CURRENT USE OF INSULIN: ICD-10-CM

## 2017-05-20 DIAGNOSIS — E11.22 HYPERTENSION ASSOCIATED WITH STAGE 5 CHRONIC KIDNEY DISEASE DUE TO TYPE 2 DIABETES MELLITUS: ICD-10-CM

## 2017-05-20 DIAGNOSIS — N18.5 HYPERTENSION ASSOCIATED WITH STAGE 5 CHRONIC KIDNEY DISEASE DUE TO TYPE 2 DIABETES MELLITUS: ICD-10-CM

## 2017-05-20 DIAGNOSIS — N18.6 ESRD ON DIALYSIS: ICD-10-CM

## 2017-05-20 DIAGNOSIS — E11.22 TYPE 2 DIABETES MELLITUS WITH CHRONIC KIDNEY DISEASE ON CHRONIC DIALYSIS, WITH LONG-TERM CURRENT USE OF INSULIN: ICD-10-CM

## 2017-05-20 DIAGNOSIS — R50.9 FUO (FEVER OF UNKNOWN ORIGIN): ICD-10-CM

## 2017-05-20 DIAGNOSIS — Z99.2 ESRD ON DIALYSIS: ICD-10-CM

## 2017-05-20 DIAGNOSIS — R78.81 BACTEREMIA: ICD-10-CM

## 2017-05-20 DIAGNOSIS — N18.6 TYPE 2 DIABETES MELLITUS WITH CHRONIC KIDNEY DISEASE ON CHRONIC DIALYSIS, WITH LONG-TERM CURRENT USE OF INSULIN: ICD-10-CM

## 2017-05-20 DIAGNOSIS — H00.014 HORDEOLUM EXTERNUM OF LEFT UPPER EYELID: ICD-10-CM

## 2017-05-20 DIAGNOSIS — R50.9 FEVER: Primary | ICD-10-CM

## 2017-05-20 DIAGNOSIS — Z79.4 TYPE 2 DIABETES MELLITUS WITH CHRONIC KIDNEY DISEASE ON CHRONIC DIALYSIS, WITH LONG-TERM CURRENT USE OF INSULIN: ICD-10-CM

## 2017-05-20 DIAGNOSIS — I12.0 HYPERTENSION ASSOCIATED WITH STAGE 5 CHRONIC KIDNEY DISEASE DUE TO TYPE 2 DIABETES MELLITUS: ICD-10-CM

## 2017-05-20 DIAGNOSIS — N18.6 ESRD (END STAGE RENAL DISEASE): ICD-10-CM

## 2017-05-20 LAB
ALBUMIN SERPL BCP-MCNC: 3.5 G/DL
ALP SERPL-CCNC: 146 U/L
ALT SERPL W/O P-5'-P-CCNC: 39 U/L
ANION GAP SERPL CALC-SCNC: 18 MMOL/L
AST SERPL-CCNC: 36 U/L
BASOPHILS # BLD AUTO: 0.02 K/UL
BASOPHILS NFR BLD: 0.4 %
BILIRUB SERPL-MCNC: 1 MG/DL
BUN SERPL-MCNC: 46 MG/DL
CALCIUM SERPL-MCNC: 8.9 MG/DL
CHLORIDE SERPL-SCNC: 97 MMOL/L
CO2 SERPL-SCNC: 25 MMOL/L
CREAT SERPL-MCNC: 10.5 MG/DL
CRP SERPL-MCNC: 36.4 MG/L
DIFFERENTIAL METHOD: ABNORMAL
EOSINOPHIL # BLD AUTO: 0.1 K/UL
EOSINOPHIL NFR BLD: 1.2 %
ERYTHROCYTE [DISTWIDTH] IN BLOOD BY AUTOMATED COUNT: 15.4 %
ERYTHROCYTE [SEDIMENTATION RATE] IN BLOOD BY WESTERGREN METHOD: 40 MM/HR
EST. GFR  (AFRICAN AMERICAN): 6 ML/MIN/1.73 M^2
EST. GFR  (NON AFRICAN AMERICAN): 5 ML/MIN/1.73 M^2
FLUAV AG SPEC QL IA: NEGATIVE
FLUBV AG SPEC QL IA: NEGATIVE
GLUCOSE SERPL-MCNC: 146 MG/DL
HCT VFR BLD AUTO: 33 %
HGB BLD-MCNC: 10.6 G/DL
LACTATE SERPL-SCNC: 1.6 MMOL/L
LYMPHOCYTES # BLD AUTO: 0.7 K/UL
LYMPHOCYTES NFR BLD: 13 %
MAGNESIUM SERPL-MCNC: 1.9 MG/DL
MCH RBC QN AUTO: 38.4 PG
MCHC RBC AUTO-ENTMCNC: 32.1 %
MCV RBC AUTO: 120 FL
MONOCYTES # BLD AUTO: 0.5 K/UL
MONOCYTES NFR BLD: 10.2 %
NEUTROPHILS # BLD AUTO: 3.8 K/UL
NEUTROPHILS NFR BLD: 75 %
PHOSPHATE SERPL-MCNC: 4.6 MG/DL
PLATELET # BLD AUTO: 100 K/UL
PMV BLD AUTO: 11.1 FL
POCT GLUCOSE: 193 MG/DL (ref 70–110)
POCT GLUCOSE: 89 MG/DL (ref 70–110)
POTASSIUM SERPL-SCNC: 4.5 MMOL/L
PROCALCITONIN SERPL IA-MCNC: 4.36 NG/ML
PROT SERPL-MCNC: 7.2 G/DL
RBC # BLD AUTO: 2.76 M/UL
SODIUM SERPL-SCNC: 140 MMOL/L
SPECIMEN SOURCE: NORMAL
WBC # BLD AUTO: 5.08 K/UL

## 2017-05-20 PROCEDURE — 85025 COMPLETE CBC W/AUTO DIFF WBC: CPT

## 2017-05-20 PROCEDURE — 84100 ASSAY OF PHOSPHORUS: CPT

## 2017-05-20 PROCEDURE — 25000003 PHARM REV CODE 250: Performed by: INTERNAL MEDICINE

## 2017-05-20 PROCEDURE — 63600175 PHARM REV CODE 636 W HCPCS: Performed by: INTERNAL MEDICINE

## 2017-05-20 PROCEDURE — 85652 RBC SED RATE AUTOMATED: CPT

## 2017-05-20 PROCEDURE — 99284 EMERGENCY DEPT VISIT MOD MDM: CPT | Mod: 25

## 2017-05-20 PROCEDURE — 11000001 HC ACUTE MED/SURG PRIVATE ROOM

## 2017-05-20 PROCEDURE — 80053 COMPREHEN METABOLIC PANEL: CPT

## 2017-05-20 PROCEDURE — 25000003 PHARM REV CODE 250: Performed by: FAMILY MEDICINE

## 2017-05-20 PROCEDURE — 84145 PROCALCITONIN (PCT): CPT

## 2017-05-20 PROCEDURE — 94761 N-INVAS EAR/PLS OXIMETRY MLT: CPT

## 2017-05-20 PROCEDURE — 86140 C-REACTIVE PROTEIN: CPT

## 2017-05-20 PROCEDURE — 96374 THER/PROPH/DIAG INJ IV PUSH: CPT

## 2017-05-20 PROCEDURE — 83735 ASSAY OF MAGNESIUM: CPT

## 2017-05-20 PROCEDURE — 87040 BLOOD CULTURE FOR BACTERIA: CPT

## 2017-05-20 PROCEDURE — 87400 INFLUENZA A/B EACH AG IA: CPT | Mod: 59

## 2017-05-20 PROCEDURE — 25000003 PHARM REV CODE 250: Performed by: STUDENT IN AN ORGANIZED HEALTH CARE EDUCATION/TRAINING PROGRAM

## 2017-05-20 PROCEDURE — 83605 ASSAY OF LACTIC ACID: CPT

## 2017-05-20 RX ORDER — INSULIN ASPART 100 [IU]/ML
0-5 INJECTION, SOLUTION INTRAVENOUS; SUBCUTANEOUS
Status: DISCONTINUED | OUTPATIENT
Start: 2017-05-20 | End: 2017-05-25 | Stop reason: HOSPADM

## 2017-05-20 RX ORDER — CIPROFLOXACIN 2 MG/ML
400 INJECTION, SOLUTION INTRAVENOUS
Status: DISCONTINUED | OUTPATIENT
Start: 2017-05-20 | End: 2017-05-20

## 2017-05-20 RX ORDER — LANTHANUM CARBONATE 500 MG/1
500 TABLET, CHEWABLE ORAL
Status: DISCONTINUED | OUTPATIENT
Start: 2017-05-20 | End: 2017-05-21

## 2017-05-20 RX ORDER — CEFEPIME HYDROCHLORIDE 1 G/50ML
1 INJECTION, SOLUTION INTRAVENOUS DAILY
Status: DISCONTINUED | OUTPATIENT
Start: 2017-05-21 | End: 2017-05-25 | Stop reason: HOSPADM

## 2017-05-20 RX ORDER — IBUPROFEN 200 MG
16 TABLET ORAL
Status: DISCONTINUED | OUTPATIENT
Start: 2017-05-20 | End: 2017-05-25 | Stop reason: HOSPADM

## 2017-05-20 RX ORDER — GABAPENTIN 300 MG/1
300 CAPSULE ORAL 3 TIMES DAILY
Status: DISCONTINUED | OUTPATIENT
Start: 2017-05-20 | End: 2017-05-22 | Stop reason: DRUGHIGH

## 2017-05-20 RX ORDER — IBUPROFEN 200 MG
24 TABLET ORAL
Status: DISCONTINUED | OUTPATIENT
Start: 2017-05-20 | End: 2017-05-25 | Stop reason: HOSPADM

## 2017-05-20 RX ORDER — ACETAMINOPHEN 325 MG/1
650 TABLET ORAL EVERY 6 HOURS PRN
Status: DISCONTINUED | OUTPATIENT
Start: 2017-05-20 | End: 2017-05-21

## 2017-05-20 RX ORDER — PRAVASTATIN SODIUM 40 MG/1
40 TABLET ORAL DAILY
Status: DISCONTINUED | OUTPATIENT
Start: 2017-05-20 | End: 2017-05-25 | Stop reason: HOSPADM

## 2017-05-20 RX ORDER — FLUTICASONE PROPIONATE 50 MCG
1 SPRAY, SUSPENSION (ML) NASAL DAILY
Status: DISCONTINUED | OUTPATIENT
Start: 2017-05-20 | End: 2017-05-25 | Stop reason: HOSPADM

## 2017-05-20 RX ORDER — NAPROXEN SODIUM 220 MG/1
81 TABLET, FILM COATED ORAL DAILY
Status: DISCONTINUED | OUTPATIENT
Start: 2017-05-20 | End: 2017-05-25 | Stop reason: HOSPADM

## 2017-05-20 RX ORDER — FOLIC ACID 1 MG/1
1 TABLET ORAL DAILY
Status: DISCONTINUED | OUTPATIENT
Start: 2017-05-20 | End: 2017-05-25 | Stop reason: HOSPADM

## 2017-05-20 RX ORDER — GLUCAGON 1 MG
1 KIT INJECTION
Status: DISCONTINUED | OUTPATIENT
Start: 2017-05-20 | End: 2017-05-25 | Stop reason: HOSPADM

## 2017-05-20 RX ORDER — CEFEPIME HYDROCHLORIDE 1 G/50ML
1 INJECTION, SOLUTION INTRAVENOUS ONCE
Status: COMPLETED | OUTPATIENT
Start: 2017-05-20 | End: 2017-05-20

## 2017-05-20 RX ORDER — ONDANSETRON 8 MG/1
8 TABLET, ORALLY DISINTEGRATING ORAL EVERY 8 HOURS PRN
Status: DISCONTINUED | OUTPATIENT
Start: 2017-05-20 | End: 2017-05-25 | Stop reason: HOSPADM

## 2017-05-20 RX ORDER — LANTHANUM CARBONATE 1000 MG/1
250 TABLET, CHEWABLE ORAL
Status: DISCONTINUED | OUTPATIENT
Start: 2017-05-20 | End: 2017-05-20

## 2017-05-20 RX ADMIN — VANCOMYCIN HYDROCHLORIDE 1000 MG: 1 INJECTION, POWDER, LYOPHILIZED, FOR SOLUTION INTRAVENOUS at 06:05

## 2017-05-20 RX ADMIN — CEFEPIME HYDROCHLORIDE 1 G: 1 INJECTION, SOLUTION INTRAVENOUS at 03:05

## 2017-05-20 RX ADMIN — PRAVASTATIN SODIUM 40 MG: 40 TABLET ORAL at 06:05

## 2017-05-20 RX ADMIN — GABAPENTIN 300 MG: 300 CAPSULE ORAL at 09:05

## 2017-05-20 RX ADMIN — ACETAMINOPHEN 650 MG: 325 TABLET ORAL at 01:05

## 2017-05-20 RX ADMIN — LANTHANUM CARBONATE 500 MG: 500 TABLET, CHEWABLE ORAL at 07:05

## 2017-05-20 RX ADMIN — ASPIRIN 81 MG CHEWABLE TABLET 81 MG: 81 TABLET CHEWABLE at 03:05

## 2017-05-20 NOTE — CONSULTS
Nephrology Consult  H&P      Consult Requested By: Jamal Millan MD  Reason for Consult: persistent infection, ESRD    SUBJECTIVE:     History of Present Illness:  Patient is a 56 y.o. male recently admitted for Cristela Saldaña. Pt was getting Vancomycin for MRSA + bacteremia from 4/10/17, was completed, he also on doxycycline for stye and still having intermittent fevers   ESRD on HD with Dr. Araceli Pickens in [facility], 4.5 hours, EDW 140kg  He saw Dr. Rivas in  this Thursday and was recommended to get SERENITY    Review of Systems   Constitutional: Positive for diaphoresis and fever. Negative for chills.   HENT: Negative for hearing loss.    Eyes: Negative for blurred vision and double vision.   Respiratory: Negative for cough and shortness of breath.    Cardiovascular: Negative for chest pain and leg swelling.   Gastrointestinal: Negative for abdominal pain, nausea and vomiting.   Genitourinary: Negative for dysuria and urgency.   Musculoskeletal: Positive for myalgias.   Skin: Negative for itching and rash.   Neurological: Positive for weakness. Negative for dizziness, tingling and headaches.   Endo/Heme/Allergies: Does not bruise/bleed easily.   Psychiatric/Behavioral: Negative for depression.       Past Medical History:   Diagnosis Date    Diabetes mellitus type II     Dialysis patient     Hyperlipidemia     Hypertension     Kidney failure     MIKE (obstructive sleep apnea)     Urinary tract infection      Past Surgical History:   Procedure Laterality Date    AV FISTULA PLACEMENT      left lower arm    TONSILLECTOMY, ADENOIDECTOMY       Family History   Problem Relation Age of Onset    Colon cancer Mother     Lung cancer Mother     Diabetes Mother     Diabetes Father     Heart disease Father     Hypertension Father     Diabetes Maternal Grandmother     Diabetes Maternal Grandfather      Social History   Substance Use Topics    Smoking status: Never Smoker    Smokeless tobacco:  "Never Used    Alcohol use No       Review of patient's allergies indicates:   Allergen Reactions    Keflex [cephalexin] Nausea Only            OBJECTIVE:     Vital Signs (Most Recent)  Vitals:    05/20/17 1030 05/20/17 1325   BP: 122/87    BP Location: Right arm    Patient Position: Sitting    Pulse: 90    Resp: 16    Temp: (!) 101.1 °F (38.4 °C) 99.6 °F (37.6 °C)   TempSrc: Oral Oral   SpO2: 97%    Weight: (!) 140.2 kg (309 lb)    Height: 5' 7" (1.702 m)        Medications:   aspirin  81 mg Oral Daily    fluticasone  1 spray Each Nare Daily    folic acid  1 mg Oral Daily    gabapentin  300 mg Oral TID    lanthanum  1,000 mg Oral TID WM    pravastatin  40 mg Oral Daily    vitamin renal formula (B-complex-vitamin c-folic acid)  1 capsule Oral Daily           Physical Exam   Constitutional: He is oriented to person, place, and time and well-developed, well-nourished, and in no distress. No distress.   HENT:   Head: Atraumatic.   Mouth/Throat: Oropharynx is clear and moist.   Eyes: EOM are normal. No scleral icterus.   Neck: Neck supple. No JVD present.   Cardiovascular: Normal rate, regular rhythm, normal heart sounds and intact distal pulses.  Exam reveals no friction rub.    No murmur heard.  Pulmonary/Chest: Effort normal and breath sounds normal. No respiratory distress. He has no wheezes. He has no rales.   Abdominal: Soft. Bowel sounds are normal. He exhibits no distension. There is no tenderness.   Musculoskeletal: He exhibits no edema.   L wrist AVF   Neurological: He is alert and oriented to person, place, and time.   Skin: Skin is warm. No rash noted. He is diaphoretic. No erythema.       Laboratory:    Recent Labs  Lab 05/20/17  1139   WBC 5.08   HGB 10.6*   HCT 33.0*   *   MONO 10.2  0.5       Recent Labs  Lab 05/20/17  1139      K 4.5   CL 97   CO2 25   BUN 46*   CREATININE 10.5*   CALCIUM 8.9   PHOS 4.6*       Diagnostic Results:  X-Ray: Reviewed    ASSESSMENT/PLAN:   1. " Fever/chills - pan culture, please consult ID for recommendations--> 4th episode of different organism bacteremia in the last 8 weeks. I discussed case with Dr. Scanlon (EJ) - his ID doctor who saw him 3 days ago. For now he recommended Vanc + cefepime and cont doxy. He will need SERENITY      2 ESRD - on HD with Dr. Araceli Pickens in [facility] MWF, 4.5 hours, EDW 140kg  HD monday as per normal schedule    2. HTN - controlled, cont home meds  3. Anemia -epo with HD  4. MBD - cont sensipar and Fosrenol  5. Access - L wrist AVF, recent ballon PTA by Dr. Moran in [facility], f/u in 2 weeks  6. Nutrition - renal diet, nephrocaps  7. DM2  8. Severe MIKE- BiPAP 16/8      Thank you for consult, will follow  With any question please call [phone]  Margoth Felton    [facility]  SHALA Forrest MD, FACP,   KADEN Pickens MD,   MD GEORGIA Walker, NP  [address]  [place], [address] [phone]

## 2017-05-20 NOTE — PLAN OF CARE
Problem: Patient Care Overview  Goal: Plan of Care Review  Pt on RA SPO2 96%.  No apparent distress.  Will continue to monitor.

## 2017-05-20 NOTE — CONSULTS
Full note to follow     Patient with h/o serratia bacteremia although this culture not in [facility] system   Treated with cefipime or perhaps some other drug   14 days then fevers recurred     Rec   Vanc x 1 dose   Cefipime 1d qd   No need for cipro - will DC

## 2017-05-20 NOTE — H&P
[facility]  History & Physical    SUBJECTIVE:     Chief Complaint/Reason for Admission:  Fevers    History of Present Illness:  Patient is a 56 y.o. male with a pmhx of ESRD with HD MWF (aneuric) , HTN., IDDM presents to ED for fevers x 2 days with Tmax at home of 102.  Patient also c/o nausea, decreased appetite, abdominal pain and body aches.     Recent Hospitalization 4/30-5/1 or Bacteriemia with  Serratia marcescens ([facility] blood cx).  Infections disease was consulted  with recommendations for 2 weeks of outpatient IV ABX cefepime 2-2-3g following HD and repeat blood cx following completion.      In ED, Tmax 101.1. Flu negative, CXR no acute process. Blood cx obtained.     (Not in a hospital admission)    Review of patient's allergies indicates:   Allergen Reactions    Keflex [cephalexin] Nausea Only       Past Medical History:   Diagnosis Date    Diabetes mellitus type II     Dialysis patient     Hyperlipidemia     Hypertension     Kidney failure     MIKE (obstructive sleep apnea)     Urinary tract infection      Past Surgical History:   Procedure Laterality Date    AV FISTULA PLACEMENT      left lower arm    TONSILLECTOMY, ADENOIDECTOMY       Family History   Problem Relation Age of Onset    Colon cancer Mother     Lung cancer Mother     Diabetes Mother     Diabetes Father     Heart disease Father     Hypertension Father     Diabetes Maternal Grandmother     Diabetes Maternal Grandfather      Social History   Substance Use Topics    Smoking status: Never Smoker    Smokeless tobacco: Never Used    Alcohol use No        Review of Systems:  Review of Systems   Constitutional: Negative for activity change, chills. positive for fatigue, fevers, generalized muscle aches.   HENT: Negative for congestion, rhinorrhea and sore throat.    Eyes: Negative for visual disturbance.   Respiratory: Negative for cough, chest tightness and shortness of breath.    Cardiovascular: Negative for  chest pain, palpitations and leg swelling.   Gastrointestinal: positive ab pain, nausea. Negative constipation, diarrhea, nausea and vomiting.   Genitourinary: does not produce urine at baseline  Musculoskeletal: Negative for arthralgias and myalgias.   Skin: Negative for rash.   Neurological: Negative for dizziness, light-headedness and headaches.   Psychiatric/Behavioral:  Negative for agitation.        OBJECTIVE:     Vital Signs (Most Recent):  Temp: 99.6 °F (37.6 °C) (05/20/17 1325)  Pulse: 90 (05/20/17 1030)  Resp: 16 (05/20/17 1030)  BP: 122/87 (05/20/17 1030)  SpO2: 97 % (05/20/17 1030)    Physical Exam:  Physical Exam   Constitutional:  oriented to person, place, and time.  appears well-developed and well-nourished. No distress.   HENT:   Head: stye on left upper eye lid  Mouth/Throat: Oropharynx is clear and moist.   Eyes: Conjunctivae and EOM are normal. Pupils are equal, round, and reactive to light.   Neck: Normal range of motion. Neck supple.   Cardiovascular: Normal rate, regular rhythm, normal heart sounds and intact distal pulses.    Pulmonary/Chest: Effort normal and breath sounds normal.   Abdominal: Soft. Bowel sounds are normal. no distension. There is no tenderness.   Musculoskeletal: Normal range of motion.  no edema, tenderness or deformity.   Neurological:  alert and oriented to person, place, and time.  normal reflexes.   Skin: Left AVF   Psychiatric:  normal mood and affect.  behavior is normal. Judgment and thought content normal.   Nursing note and vitals reviewed.      Laboratory:  LABS  CBC    Recent Labs  Lab 05/20/17  1139   WBC 5.08   RBC 2.76*   HGB 10.6*   HCT 33.0*   *   *   MCH 38.4*   MCHC 32.1     BMP    Recent Labs  Lab 05/20/17  1139      K 4.5   CO2 25   CL 97   BUN 46*   CREATININE 10.5*   *       POCT-Glucose  No results found for: POCTGLUCOSE      Recent Labs  Lab 05/20/17  1139   CALCIUM 8.9   MG 1.9   PHOS 4.6*     LFT    Recent Labs  Lab  05/20/17  1139   PROT 7.2   ALBUMIN 3.5   BILITOT 1.0   AST 36   ALKPHOS 146*   ALT 39       LAST HbA1c  Lab Results   Component Value Date    HGBA1C 6.8 (H) 07/09/2013       Diagnostic Results:  Imaging Results         CT Abdomen Pelvis  Without Contrast (Final result) Result time:  05/20/17 15:07:44    Final result by Last Plummer MD (05/20/17 15:07:44)    Impression:         No acute process in the abdomen or pelvis.  Etiology of fever is not visualized on this examination.    Diffuse atherosclerosis of the aorta and its branches.    Stable myelolipoma of the left adrenal gland.    Bilateral nonobstructing renal stones.      Electronically signed by: LAST PLUMMER MD  Date:     05/20/17  Time:    15:07     Narrative:    Time of Procedure: 05/20/17 14:57:46  Accession # [id number]    CT OF THE ABDOMEN & PELVIS WITH PO CONTRAST:       Technique: CT examination of the abdomen and pelvis was obtained using 5 mm axial images. Coronal and sagittal reformats were obtained.    Comparison: CT abdomen and pelvis from 08/23/2012     Clinical Indication:  Fever of unknown origin    Results:    The visualized portions of the lungs, heart, and pericardium demonstrate no significant abnormalities.  Gynecomastia is noted.    Liver is enlarged in size.  No focal hepatic lesions in this noncontrast examination.  The gallbladder demonstrates no evidence of gallbladder wall thickening, dilatation, or surrounding inflammatory changes.  No evidence of cholelithiasis.No evidence of intra or extrahepatic biliary ductal dilation. Spleen demonstrates a few calcifications consistent with prior granulomatous disease.  Stomach, duodenum, pancreas, and right adrenal gland are normal in appearance.  Left adrenal gland demonstrates a 1.5 CM fat containing lesion consistent with myelolipoma.    Kidneys are atrophic bilaterally.  Multiple nonobstructing renal stones are visualized bilaterally.  The largest on the right measures 7 mm.  The  largest on the left measures 10 mm.  No hydronephrosis.  The ureters are normal in course and caliber with no evidence of nephrolithiasis, dilatation, or surrounding inflammatory changes.  Urinary bladder is decompressed.  Prostate is not enlarged.    Small bowel is normal in caliber.  No evidence of obstruction.  Normal appendix (series 601, image 61).  Colon demonstrates no focal wall thickening.    Aorta tapers appropriately with mild calcific atherosclerosis.  Atherosclerosis of the celiac and superior mesenteric artery, renal arteries, and JASON.    No lymphadenopathy.    The osseous structures demonstrate no evidence of fractures, dislocations, or osseous destructive processes.            X-Ray Chest PA And Lateral (Final result) Result time:  05/20/17 11:33:41    Final result by Last Plummer MD (05/20/17 11:33:41)    Impression:       No acute intrathoracic process.          Electronically signed by: LAST PLUMMER MD  Date:     05/20/17  Time:    11:33     Narrative:    [id number]  Accession # [id number]      Study:  XR CHEST PA AND LATERAL    Indication: Fever.    Comparison: Chest radiograph from 04/30/2017.    Findings:     XR CHEST PA AND LATERAL.    Cardiac silhouette is normal in appearance.  Pulmonary vasculature is within normal limits.    Lungs well-aerated.  No focal consolidation.   No pleural effusion.  Degenerative changes of the thoracic spine.                ASSESSMENT/PLAN:   Patient is a 56 y.o. male with a pmhx of ESRD with HD MWF (aneuric) , HTN., IDDM, hx of serratia bacteremia admitted for fevers.     Plan: Will admit for observation to [facility].    Fevers of unknown origin  - Patient with recent hospitialziation for fevers 2/2 serratia bacterima and completed 2 week course of IV Cefepime per ID recommendations.   - Fevers at home x 2 days with Tmax 102   - In ED Tmax 101.1, LA wnl, WBC 5.08, cxr no acute process  - ID consulted  - Echo to assess for vegetations pending  - CT  abdomen: no acute process  - IV Abx: Vanc, cefepime, cipro  -Blood cx pending  - Can not obtain urine cx as patient is anuric     ESRD HD ([facility])  - Nephro consulted, Dr. Felton  Per her note patient was seen by Dr. Christina at  and recommended SERENITY  - HD per nephro    IDDM  -A1c 6.8 (7/9/13)  repeat pending  -   - SSI  - POCT glucose QID    HTN  -stable  -continue home medications    Anemia of chronic disease  -Epo with HD  - folic acid    Code: full  Diet: Cardiac/diabetic 2000/renal   Ppx:  scd  Dispo: F/u ID consult, bcx, temperature    Case discussed with staff, Dr. Millan.      Maya Mckinnon D.O.  [facility] HO-1  05/20/2017

## 2017-05-20 NOTE — ED NOTES
Pt sitting up in bed, AAO x's 3, wife at bedside. Pt stated that he came to the ER with c/o fever that started after HD yesterday.

## 2017-05-20 NOTE — ED PROVIDER NOTES
"Encounter Date: 5/20/2017       History     Chief Complaint   Patient presents with    Fever     Patient states that he has been running fever off and on x 8 weeks - diagnosed with "blood virus". Continues with fever, stye to left eyelid, and nausea. Presents awake, alert, oriented. Skin w/d.      Review of patient's allergies indicates:   Allergen Reactions    Keflex [cephalexin] Nausea Only     HPI Comments: Patient is a 56-year-old male who says he had fever yesterday of 102.  He was admitted in this facility about 3 weeks ago with bacteremia.  He has a history of end-stage renal disease and was dialyzed yesterday.  He denies shortness of breath today.  He has had no chest pain or abdominal pain.  He has nausea but no vomiting.  No congestion or cough.    The history is provided by the patient.     Past Medical History:   Diagnosis Date    Diabetes mellitus type II     Dialysis patient     Hyperlipidemia     Hypertension     Kidney failure     MIKE (obstructive sleep apnea)     Urinary tract infection      Past Surgical History:   Procedure Laterality Date    AV FISTULA PLACEMENT      left lower arm    TONSILLECTOMY, ADENOIDECTOMY       Family History   Problem Relation Age of Onset    Colon cancer Mother     Lung cancer Mother     Diabetes Mother     Diabetes Father     Heart disease Father     Hypertension Father     Diabetes Maternal Grandmother     Diabetes Maternal Grandfather      Social History   Substance Use Topics    Smoking status: Never Smoker    Smokeless tobacco: Never Used    Alcohol use No     Review of Systems   Constitutional: Positive for fatigue and fever.   Respiratory: Negative for cough and shortness of breath.    Cardiovascular: Negative for chest pain.   Gastrointestinal: Positive for nausea. Negative for abdominal pain and vomiting.   All other systems reviewed and are negative.      Physical Exam   Initial Vitals   BP Pulse Resp Temp SpO2   05/20/17 1030 05/20/17 " 1030 05/20/17 1030 05/20/17 1030 05/20/17 1030   122/87 90 16 101.1 °F (38.4 °C) 97 %     Physical Exam    Nursing note and vitals reviewed.  Constitutional: No distress.   HENT:   Head: Normocephalic and atraumatic.   Mouth/Throat: Oropharynx is clear and moist.   Eyes: EOM are normal.   Neck: Normal range of motion. Neck supple.   Cardiovascular: Normal rate, regular rhythm and normal heart sounds.   Pulmonary/Chest: Breath sounds normal.   Abdominal: Soft. There is no tenderness.   Musculoskeletal: Normal range of motion.   Neurological: He is alert and oriented to person, place, and time.   Skin: Skin is warm and dry.   Psychiatric: His behavior is normal. Thought content normal.         ED Course   Procedures  Labs Reviewed   CBC W/ AUTO DIFFERENTIAL - Abnormal; Notable for the following:        Result Value    RBC 2.76 (*)     Hemoglobin 10.6 (*)     Hematocrit 33.0 (*)      (*)     MCH 38.4 (*)     RDW 15.4 (*)     Platelets 100 (*)     Lymph # 0.7 (*)     Gran% 75.0 (*)     Lymph% 13.0 (*)     All other components within normal limits   COMPREHENSIVE METABOLIC PANEL - Abnormal; Notable for the following:     Glucose 146 (*)     BUN, Bld 46 (*)     Creatinine 10.5 (*)     Alkaline Phosphatase 146 (*)     Anion Gap 18 (*)     eGFR if  6 (*)     eGFR if non  5 (*)     All other components within normal limits   PHOSPHORUS - Abnormal; Notable for the following:     Phosphorus 4.6 (*)     All other components within normal limits   CULTURE, BLOOD   CULTURE, BLOOD   LACTIC ACID, PLASMA   INFLUENZA A AND B ANTIGEN   MAGNESIUM     Imaging Results         X-Ray Chest PA And Lateral (Final result) Result time:  05/20/17 11:33:41    Final result by Last Plummer MD (05/20/17 11:33:41)    Impression:       No acute intrathoracic process.          Electronically signed by: LAST PLUMMER MD  Date:     05/20/17  Time:    11:33     Narrative:    [id number]  Accession #  [id number]      Study:  XR CHEST PA AND LATERAL    Indication: Fever.    Comparison: Chest radiograph from 04/30/2017.    Findings:     XR CHEST PA AND LATERAL.    Cardiac silhouette is normal in appearance.  Pulmonary vasculature is within normal limits.    Lungs well-aerated.  No focal consolidation.   No pleural effusion.  Degenerative changes of the thoracic spine.                                        ED Course     Clinical Impression:   The primary encounter diagnosis was Fever. Diagnoses of ESRD (end stage renal disease) and Type 2 diabetes mellitus with hyperosmolarity without coma, unspecified long term insulin use status were also pertinent to this visit.          Víctor Zarate MD  05/20/17 1961

## 2017-05-21 LAB
ALBUMIN SERPL BCP-MCNC: 3.2 G/DL
ALP SERPL-CCNC: 127 U/L
ALT SERPL W/O P-5'-P-CCNC: 36 U/L
ANION GAP SERPL CALC-SCNC: 20 MMOL/L
AST SERPL-CCNC: 36 U/L
BASOPHILS # BLD AUTO: 0 K/UL
BASOPHILS NFR BLD: 0 %
BILIRUB SERPL-MCNC: 0.9 MG/DL
BUN SERPL-MCNC: 63 MG/DL
CALCIUM SERPL-MCNC: 8.5 MG/DL
CHLORIDE SERPL-SCNC: 95 MMOL/L
CO2 SERPL-SCNC: 25 MMOL/L
CREAT SERPL-MCNC: 13.1 MG/DL
DIFFERENTIAL METHOD: ABNORMAL
EOSINOPHIL # BLD AUTO: 0.1 K/UL
EOSINOPHIL NFR BLD: 1.3 %
ERYTHROCYTE [DISTWIDTH] IN BLOOD BY AUTOMATED COUNT: 15.1 %
EST. GFR  (AFRICAN AMERICAN): 4 ML/MIN/1.73 M^2
EST. GFR  (NON AFRICAN AMERICAN): 4 ML/MIN/1.73 M^2
GLUCOSE SERPL-MCNC: 187 MG/DL
HCT VFR BLD AUTO: 29.1 %
HGB BLD-MCNC: 9.4 G/DL
LYMPHOCYTES # BLD AUTO: 0.8 K/UL
LYMPHOCYTES NFR BLD: 15.4 %
MAGNESIUM SERPL-MCNC: 1.9 MG/DL
MCH RBC QN AUTO: 38.2 PG
MCHC RBC AUTO-ENTMCNC: 32.3 %
MCV RBC AUTO: 118 FL
MONOCYTES # BLD AUTO: 0.8 K/UL
MONOCYTES NFR BLD: 13.7 %
NEUTROPHILS # BLD AUTO: 3.8 K/UL
NEUTROPHILS NFR BLD: 69.4 %
PHOSPHATE SERPL-MCNC: 6.6 MG/DL
PLATELET # BLD AUTO: 90 K/UL
PMV BLD AUTO: 10.9 FL
POCT GLUCOSE: 171 MG/DL (ref 70–110)
POCT GLUCOSE: 217 MG/DL (ref 70–110)
POCT GLUCOSE: 269 MG/DL (ref 70–110)
POCT GLUCOSE: 283 MG/DL (ref 70–110)
POTASSIUM SERPL-SCNC: 4.6 MMOL/L
PROT SERPL-MCNC: 6.6 G/DL
RBC # BLD AUTO: 2.46 M/UL
SODIUM SERPL-SCNC: 140 MMOL/L
VANCOMYCIN SERPL-MCNC: 9.5 UG/ML
WBC # BLD AUTO: 5.46 K/UL

## 2017-05-21 PROCEDURE — 25000003 PHARM REV CODE 250: Performed by: INTERNAL MEDICINE

## 2017-05-21 PROCEDURE — 84100 ASSAY OF PHOSPHORUS: CPT

## 2017-05-21 PROCEDURE — 83036 HEMOGLOBIN GLYCOSYLATED A1C: CPT

## 2017-05-21 PROCEDURE — 83735 ASSAY OF MAGNESIUM: CPT

## 2017-05-21 PROCEDURE — 25000003 PHARM REV CODE 250: Performed by: FAMILY MEDICINE

## 2017-05-21 PROCEDURE — 80053 COMPREHEN METABOLIC PANEL: CPT

## 2017-05-21 PROCEDURE — 25000003 PHARM REV CODE 250: Performed by: STUDENT IN AN ORGANIZED HEALTH CARE EDUCATION/TRAINING PROGRAM

## 2017-05-21 PROCEDURE — 63600175 PHARM REV CODE 636 W HCPCS: Performed by: STUDENT IN AN ORGANIZED HEALTH CARE EDUCATION/TRAINING PROGRAM

## 2017-05-21 PROCEDURE — 11000001 HC ACUTE MED/SURG PRIVATE ROOM

## 2017-05-21 PROCEDURE — 97161 PT EVAL LOW COMPLEX 20 MIN: CPT

## 2017-05-21 PROCEDURE — 85025 COMPLETE CBC W/AUTO DIFF WBC: CPT

## 2017-05-21 PROCEDURE — 94761 N-INVAS EAR/PLS OXIMETRY MLT: CPT

## 2017-05-21 PROCEDURE — 97165 OT EVAL LOW COMPLEX 30 MIN: CPT

## 2017-05-21 PROCEDURE — 36415 COLL VENOUS BLD VENIPUNCTURE: CPT

## 2017-05-21 PROCEDURE — 80202 ASSAY OF VANCOMYCIN: CPT

## 2017-05-21 PROCEDURE — 63600175 PHARM REV CODE 636 W HCPCS: Performed by: FAMILY MEDICINE

## 2017-05-21 PROCEDURE — G8980 MOBILITY D/C STATUS: HCPCS | Mod: CH

## 2017-05-21 PROCEDURE — G8979 MOBILITY GOAL STATUS: HCPCS | Mod: CH

## 2017-05-21 PROCEDURE — G8978 MOBILITY CURRENT STATUS: HCPCS | Mod: CH

## 2017-05-21 RX ORDER — SODIUM CHLORIDE 9 MG/ML
INJECTION, SOLUTION INTRAVENOUS ONCE
Status: DISCONTINUED | OUTPATIENT
Start: 2017-05-21 | End: 2017-05-25 | Stop reason: HOSPADM

## 2017-05-21 RX ORDER — LANTHANUM CARBONATE 500 MG/1
1000 TABLET, CHEWABLE ORAL
Status: DISCONTINUED | OUTPATIENT
Start: 2017-05-21 | End: 2017-05-24

## 2017-05-21 RX ORDER — SODIUM CHLORIDE 9 MG/ML
INJECTION, SOLUTION INTRAVENOUS
Status: DISCONTINUED | OUTPATIENT
Start: 2017-05-21 | End: 2017-05-25 | Stop reason: HOSPADM

## 2017-05-21 RX ORDER — CINACALCET 30 MG/1
30 TABLET, FILM COATED ORAL
Status: DISCONTINUED | OUTPATIENT
Start: 2017-05-22 | End: 2017-05-25 | Stop reason: HOSPADM

## 2017-05-21 RX ADMIN — GABAPENTIN 300 MG: 300 CAPSULE ORAL at 05:05

## 2017-05-21 RX ADMIN — LANTHANUM CARBONATE 500 MG: 500 TABLET, CHEWABLE ORAL at 08:05

## 2017-05-21 RX ADMIN — ACETAMINOPHEN 650 MG: 325 TABLET ORAL at 03:05

## 2017-05-21 RX ADMIN — INSULIN ASPART 2 UNITS: 100 INJECTION, SOLUTION INTRAVENOUS; SUBCUTANEOUS at 05:05

## 2017-05-21 RX ADMIN — CEFEPIME HYDROCHLORIDE 1 G: 1 INJECTION, SOLUTION INTRAVENOUS at 08:05

## 2017-05-21 RX ADMIN — ASPIRIN 81 MG CHEWABLE TABLET 81 MG: 81 TABLET CHEWABLE at 08:05

## 2017-05-21 RX ADMIN — GABAPENTIN 300 MG: 300 CAPSULE ORAL at 01:05

## 2017-05-21 RX ADMIN — PRAVASTATIN SODIUM 40 MG: 40 TABLET ORAL at 08:05

## 2017-05-21 RX ADMIN — LANTHANUM CARBONATE 1000 MG: 500 TABLET, CHEWABLE ORAL at 05:05

## 2017-05-21 RX ADMIN — INSULIN ASPART 3 UNITS: 100 INJECTION, SOLUTION INTRAVENOUS; SUBCUTANEOUS at 01:05

## 2017-05-21 RX ADMIN — Medication 1 CAPSULE: at 08:05

## 2017-05-21 RX ADMIN — INSULIN DETEMIR 22 UNITS: 100 INJECTION, SOLUTION SUBCUTANEOUS at 08:05

## 2017-05-21 RX ADMIN — FOLIC ACID 1 MG: 1 TABLET ORAL at 08:05

## 2017-05-21 RX ADMIN — LANTHANUM CARBONATE 1000 MG: 500 TABLET, CHEWABLE ORAL at 12:05

## 2017-05-21 RX ADMIN — GABAPENTIN 300 MG: 300 CAPSULE ORAL at 09:05

## 2017-05-21 NOTE — CONSULTS
"[facility]  Infectious Disease  Consult Note    Patient Name: Angel Farmer Jr.  MRN: [id number]  Admission Date: 5/20/2017  Hospital Length of Stay: 1 days  Attending Physician: Jamal Millan MD  Primary Care Provider: Vadim Berry Iii, MD     Isolation Status: No active isolations    Patient information was obtained from patient and ER records.      Consults  Assessment/Plan:     * Fever    Unclear etiology of fever - concerning for bacteria again and possible abscess or access related infection.  Recent CT-abd was normal and acces sie does not appear clinicall infected.  Agree with 1 dose of vanc and cefipime for now and await culture results             Thank you for your consult. I will follow-up with patient. Please contact us if you have any additional questions.    Galileo Katz MD  Infectious Disease  [facility]    Subjective:     Principal Problem: Fever    HPI: No notes on file    Past Medical History:   Diagnosis Date    Diabetes mellitus type II     Dialysis patient     Hyperlipidemia     Hypertension     Kidney failure     MIKE (obstructive sleep apnea)     Urinary tract infection        Past Surgical History:   Procedure Laterality Date    AV FISTULA PLACEMENT      left lower arm    TONSILLECTOMY, ADENOIDECTOMY         Review of patient's allergies indicates:   Allergen Reactions    Keflex [cephalexin] Nausea Only       Medications:  Prescriptions Prior to Admission   Medication Sig    ammonium lactate 12 % Crea Apply 1 application topically 3 (three) times daily as needed.    aspirin 81 MG Chew Take 1 tablet (81 mg total) by mouth once daily.    AZOPT 1 % ophthalmic suspension     B complex-vitamin C-folic acid (B COMPLEX-VITAMIN C-FOLIC ACID) 0.8 mg Tab Take 0.8 mg by mouth once daily.      BD INSULIN PEN NEEDLE UF MINI 31 gauge x 3/16" Ndle USE 4 TO 5 TIMES A DAY    blood sugar diagnostic (ONETOUCH ULTRA TEST) Strp TEST BLOOD GLUCOSE FOUR " TIMES A DAY. At AM, noon, 6pm and bedtime.    blood-glucose meter (ADVANCED GLUCOSE METER) Misc 1 each by Misc.(Non-Drug; Combo Route) route 4 (four) times daily with meals and nightly.    bromfenac (XIBROM) 0.09 % ophthalmic solution     ceFEPIme in dextrose 5% (MAXIPIME) 2 gram/50 mL PgBk Following dialysis:  2 gram IV Q Mon and Wed x 2 weeks  3 gram IV Q Friday x 2 weeks    diclofenac (VOLTAREN) 0.1 % ophthalmic solution     doxycycline (ADOXA) 50 MG tablet Take 50 mg by mouth once daily.    doxycycline (VIBRAMYCIN) 50 MG capsule     fluticasone (FLONASE) 50 mcg/actuation nasal spray 1 spray by Each Nare route once daily.    folic acid (FOLVITE) 1 MG tablet Take 1 mg by mouth once daily.    FOSRENOL 1,000 mg chewable tablet 3 (three) times daily with meals.     gabapentin (NEURONTIN) 300 MG capsule TAKE 1 BY MOUTH EVERY      EVENING    insulin glargine (LANTUS SOLOSTAR) 100 unit/mL (3 mL) InPn pen Inject 55 Units into the skin every evening.    lancets Misc 1 each by Misc.(Non-Drug; Combo Route) route 4 (four) times daily with meals and nightly.    liraglutide 0.6 mg/0.1 mL, 18 mg/3 mL, subq PNIJ (VICTOZA 2-SHOAIB) 0.6 mg/0.1 mL (18 mg/3 mL) PnIj Inject 1.2 mg into the skin every morning.    NOVOLOG FLEXPEN 100 unit/mL InPn pen 15 Units 3 (three) times daily with meals.     omega-3 acid ethyl esters (LOVAZA) 1 gram capsule Take 2 capsules (2 g total) by mouth 2 (two) times daily.    pravastatin (PRAVACHOL) 40 MG tablet Take 1 tablet (40 mg total) by mouth once daily.    prednisoLONE acetate (PRED FORTE) 1 % DrpS     SENSIPAR 30 mg Tab      Antibiotics     Start     Stop Route Frequency Ordered    05/21/17 0900  cefepime in dextrose 5 % 1 gram/50 mL IVPB 1 g      -- IV Daily 05/20/17 1717        Antifungals     None        Antivirals     None             There is no immunization history on file for this patient.    Family History     Problem Relation (Age of Onset)    Colon cancer Mother    Diabetes  Mother, Father, Maternal Grandmother, Maternal Grandfather    Heart disease Father    Hypertension Father    Lung cancer Mother        Social History     Social History    Marital status: Single     Spouse name: N/A    Number of children: N/A    Years of education: N/A     Social History Main Topics    Smoking status: Never Smoker    Smokeless tobacco: Never Used    Alcohol use No    Drug use: No    Sexual activity: Not Currently     Other Topics Concern    None     Social History Narrative    None     Review of Systems   Constitutional: Positive for fever. Negative for chills, diaphoresis and fatigue.   HENT: Negative.    Eyes: Negative.    Respiratory: Negative.    Cardiovascular: Negative.    Gastrointestinal: Positive for abdominal distention and diarrhea.   Endocrine: Negative.    Genitourinary: Negative.    Musculoskeletal: Negative.    Allergic/Immunologic: Negative.    Neurological: Negative.    Hematological: Negative.    Psychiatric/Behavioral: Negative.      Objective:     Vital Signs (Most Recent):  Temp: 99.6 °F (37.6 °C) (05/21/17 0603)  Pulse: 81 (05/21/17 0508)  Resp: 19 (05/21/17 0508)  BP: (!) 160/91 (05/21/17 0508)  SpO2: 95 % (05/21/17 0342) Vital Signs (24h Range):  Temp:  [99.6 °F (37.6 °C)-101.1 °F (38.4 °C)] 99.6 °F (37.6 °C)  Pulse:  [72-90] 81  Resp:  [16-19] 19  SpO2:  [94 %-99 %] 95 %  BP: (108-160)/(56-91) 160/91     Weight: (!) 140.6 kg (310 lb)  Body mass index is 48.55 kg/m².    Estimated Creatinine Clearance: 8.7 mL/min (based on Cr of 13.1).    Physical Exam   Constitutional: He is oriented to person, place, and time. He appears well-developed and well-nourished.   HENT:   Head: Normocephalic.   Neck: Normal range of motion. Neck supple.   Cardiovascular: Normal rate, regular rhythm, normal heart sounds and intact distal pulses.    Pulmonary/Chest: Effort normal and breath sounds normal.   Abdominal: Soft. He exhibits distension.   Musculoskeletal: Normal range of motion.    Neurological: He is alert and oriented to person, place, and time.   Skin: Skin is warm and dry.   Left arm access wnl        Significant Labs: All pertinent labs within the past 24 hours have been reviewed.    Significant Imaging: I have reviewed all pertinent imaging results/findings within the past 24 hours.

## 2017-05-21 NOTE — SUBJECTIVE & OBJECTIVE
"Past Medical History:   Diagnosis Date    Diabetes mellitus type II     Dialysis patient     Hyperlipidemia     Hypertension     Kidney failure     MIKE (obstructive sleep apnea)     Urinary tract infection        Past Surgical History:   Procedure Laterality Date    AV FISTULA PLACEMENT      left lower arm    TONSILLECTOMY, ADENOIDECTOMY         Review of patient's allergies indicates:   Allergen Reactions    Keflex [cephalexin] Nausea Only       Medications:  Prescriptions Prior to Admission   Medication Sig    ammonium lactate 12 % Crea Apply 1 application topically 3 (three) times daily as needed.    aspirin 81 MG Chew Take 1 tablet (81 mg total) by mouth once daily.    AZOPT 1 % ophthalmic suspension     B complex-vitamin C-folic acid (B COMPLEX-VITAMIN C-FOLIC ACID) 0.8 mg Tab Take 0.8 mg by mouth once daily.      BD INSULIN PEN NEEDLE UF MINI 31 gauge x 3/16" Ndle USE 4 TO 5 TIMES A DAY    blood sugar diagnostic (ONETOUCH ULTRA TEST) Strp TEST BLOOD GLUCOSE FOUR TIMES A DAY. At AM, noon, 6pm and bedtime.    blood-glucose meter (ADVANCED GLUCOSE METER) Misc 1 each by Misc.(Non-Drug; Combo Route) route 4 (four) times daily with meals and nightly.    bromfenac (XIBROM) 0.09 % ophthalmic solution     ceFEPIme in dextrose 5% (MAXIPIME) 2 gram/50 mL PgBk Following dialysis:  2 gram IV Q Mon and Wed x 2 weeks  3 gram IV Q Friday x 2 weeks    diclofenac (VOLTAREN) 0.1 % ophthalmic solution     doxycycline (ADOXA) 50 MG tablet Take 50 mg by mouth once daily.    doxycycline (VIBRAMYCIN) 50 MG capsule     fluticasone (FLONASE) 50 mcg/actuation nasal spray 1 spray by Each Nare route once daily.    folic acid (FOLVITE) 1 MG tablet Take 1 mg by mouth once daily.    FOSRENOL 1,000 mg chewable tablet 3 (three) times daily with meals.     gabapentin (NEURONTIN) 300 MG capsule TAKE 1 BY MOUTH EVERY      EVENING    insulin glargine (LANTUS SOLOSTAR) 100 unit/mL (3 mL) InPn pen Inject 55 Units into the " skin every evening.    lancets Misc 1 each by Misc.(Non-Drug; Combo Route) route 4 (four) times daily with meals and nightly.    liraglutide 0.6 mg/0.1 mL, 18 mg/3 mL, subq PNIJ (VICTOZA 2-SHOAIB) 0.6 mg/0.1 mL (18 mg/3 mL) PnIj Inject 1.2 mg into the skin every morning.    NOVOLOG FLEXPEN 100 unit/mL InPn pen 15 Units 3 (three) times daily with meals.     omega-3 acid ethyl esters (LOVAZA) 1 gram capsule Take 2 capsules (2 g total) by mouth 2 (two) times daily.    pravastatin (PRAVACHOL) 40 MG tablet Take 1 tablet (40 mg total) by mouth once daily.    prednisoLONE acetate (PRED FORTE) 1 % DrpS     SENSIPAR 30 mg Tab      Antibiotics     Start     Stop Route Frequency Ordered    05/21/17 0900  cefepime in dextrose 5 % 1 gram/50 mL IVPB 1 g      -- IV Daily 05/20/17 1717        Antifungals     None        Antivirals     None           There is no immunization history for the selected administration types on file for this patient.    Family History     Problem Relation (Age of Onset)    Colon cancer Mother    Diabetes Mother, Father, Maternal Grandmother, Maternal Grandfather    Heart disease Father    Hypertension Father    Lung cancer Mother        Social History     Social History    Marital status: Single     Spouse name: N/A    Number of children: N/A    Years of education: N/A     Social History Main Topics    Smoking status: Never Smoker    Smokeless tobacco: Never Used    Alcohol use No    Drug use: No    Sexual activity: Not Currently     Other Topics Concern    None     Social History Narrative    None     Review of Systems   Constitutional: Positive for fever. Negative for chills, diaphoresis and fatigue.   HENT: Negative.    Eyes: Negative.    Respiratory: Negative.    Cardiovascular: Negative.    Gastrointestinal: Positive for abdominal distention and diarrhea.   Endocrine: Negative.    Genitourinary: Positive for penile pain.   Musculoskeletal: Negative.    Allergic/Immunologic: Negative.     Neurological: Negative.    Hematological: Negative.    Psychiatric/Behavioral: Negative.      Objective:     Vital Signs (Most Recent):  Temp: 99.1 °F (37.3 °C) (05/21/17 0800)  Pulse: 69 (05/21/17 0800)  Resp: 19 (05/21/17 0800)  BP: 126/67 (05/21/17 0800)  SpO2: 96 % (05/21/17 1609) Vital Signs (24h Range):  Temp:  [99.1 °F (37.3 °C)-100.7 °F (38.2 °C)] 99.1 °F (37.3 °C)  Pulse:  [69-83] 69  Resp:  [16-19] 19  SpO2:  [94 %-98 %] 96 %  BP: (108-160)/(56-91) 126/67     Weight: (!) 140.6 kg (310 lb)  Body mass index is 48.55 kg/m².    Estimated Creatinine Clearance: 8.7 mL/min (based on Cr of 13.1).    Physical Exam   Constitutional: He is oriented to person, place, and time. He appears well-developed and well-nourished.   HENT:   Head: Normocephalic.   Neck: Normal range of motion. Neck supple.   Cardiovascular: Normal rate, regular rhythm, normal heart sounds and intact distal pulses.    Pulmonary/Chest: Effort normal and breath sounds normal.   Abdominal: Soft. He exhibits distension.   Musculoskeletal: Normal range of motion.   Neurological: He is alert and oriented to person, place, and time.   Skin: Skin is warm and dry.   Left arm access wnl      Past Medical History:   Diagnosis Date    Diabetes mellitus type II     Dialysis patient     Hyperlipidemia     Hypertension     Kidney failure     MIKE (obstructive sleep apnea)     Urinary tract infection        Past Surgical History:   Procedure Laterality Date    AV FISTULA PLACEMENT      left lower arm    TONSILLECTOMY, ADENOIDECTOMY         Review of patient's allergies indicates:   Allergen Reactions    Keflex [cephalexin] Nausea Only       Medications:  Prescriptions Prior to Admission   Medication Sig    ammonium lactate 12 % Crea Apply 1 application topically 3 (three) times daily as needed.    aspirin 81 MG Chew Take 1 tablet (81 mg total) by mouth once daily.    AZOPT 1 % ophthalmic suspension     B complex-vitamin C-folic acid (B  "COMPLEX-VITAMIN C-FOLIC ACID) 0.8 mg Tab Take 0.8 mg by mouth once daily.      BD INSULIN PEN NEEDLE UF MINI 31 gauge x 3/16" Ndle USE 4 TO 5 TIMES A DAY    blood sugar diagnostic (ONETOUCH ULTRA TEST) Strp TEST BLOOD GLUCOSE FOUR TIMES A DAY. At AM, noon, 6pm and bedtime.    blood-glucose meter (ADVANCED GLUCOSE METER) Misc 1 each by Misc.(Non-Drug; Combo Route) route 4 (four) times daily with meals and nightly.    bromfenac (XIBROM) 0.09 % ophthalmic solution     ceFEPIme in dextrose 5% (MAXIPIME) 2 gram/50 mL PgBk Following dialysis:  2 gram IV Q Mon and Wed x 2 weeks  3 gram IV Q Friday x 2 weeks    diclofenac (VOLTAREN) 0.1 % ophthalmic solution     doxycycline (ADOXA) 50 MG tablet Take 50 mg by mouth once daily.    doxycycline (VIBRAMYCIN) 50 MG capsule     fluticasone (FLONASE) 50 mcg/actuation nasal spray 1 spray by Each Nare route once daily.    folic acid (FOLVITE) 1 MG tablet Take 1 mg by mouth once daily.    FOSRENOL 1,000 mg chewable tablet 3 (three) times daily with meals.     gabapentin (NEURONTIN) 300 MG capsule TAKE 1 BY MOUTH EVERY      EVENING    insulin glargine (LANTUS SOLOSTAR) 100 unit/mL (3 mL) InPn pen Inject 55 Units into the skin every evening.    lancets Misc 1 each by Misc.(Non-Drug; Combo Route) route 4 (four) times daily with meals and nightly.    liraglutide 0.6 mg/0.1 mL, 18 mg/3 mL, subq PNIJ (VICTOZA 2-SHOAIB) 0.6 mg/0.1 mL (18 mg/3 mL) PnIj Inject 1.2 mg into the skin every morning.    NOVOLOG FLEXPEN 100 unit/mL InPn pen 15 Units 3 (three) times daily with meals.     omega-3 acid ethyl esters (LOVAZA) 1 gram capsule Take 2 capsules (2 g total) by mouth 2 (two) times daily.    pravastatin (PRAVACHOL) 40 MG tablet Take 1 tablet (40 mg total) by mouth once daily.    prednisoLONE acetate (PRED FORTE) 1 % DrpS     SENSIPAR 30 mg Tab      Antibiotics     Start     Stop Route Frequency Ordered    05/21/17 0900  cefepime in dextrose 5 % 1 gram/50 mL IVPB 1 g      -- IV " Daily 05/20/17 6517        Antifungals     None        Antivirals     None           There is no immunization history for the selected administration types on file for this patient.    Family History     Problem Relation (Age of Onset)    Colon cancer Mother    Diabetes Mother, Father, Maternal Grandmother, Maternal Grandfather    Heart disease Father    Hypertension Father    Lung cancer Mother        Social History     Social History    Marital status: Single     Spouse name: N/A    Number of children: N/A    Years of education: N/A     Social History Main Topics    Smoking status: Never Smoker    Smokeless tobacco: Never Used    Alcohol use No    Drug use: No    Sexual activity: Not Currently     Other Topics Concern    None     Social History Narrative    None     Review of Systems   Constitutional: Positive for fever. Negative for chills, diaphoresis and fatigue.   HENT: Negative.    Eyes: Negative.    Respiratory: Negative.    Cardiovascular: Negative.    Gastrointestinal: Positive for abdominal distention and diarrhea.   Endocrine: Negative.    Genitourinary: Negative.    Musculoskeletal: Negative.    Allergic/Immunologic: Negative.    Neurological: Negative.    Hematological: Negative.    Psychiatric/Behavioral: Negative.      Objective:     Vital Signs (Most Recent):  Temp: 99.1 °F (37.3 °C) (05/21/17 0800)  Pulse: 69 (05/21/17 0800)  Resp: 19 (05/21/17 0800)  BP: 126/67 (05/21/17 0800)  SpO2: 96 % (05/21/17 1609) Vital Signs (24h Range):  Temp:  [99.1 °F (37.3 °C)-100.7 °F (38.2 °C)] 99.1 °F (37.3 °C)  Pulse:  [69-83] 69  Resp:  [16-19] 19  SpO2:  [94 %-98 %] 96 %  BP: (108-160)/(56-91) 126/67     Weight: (!) 140.6 kg (310 lb)  Body mass index is 48.55 kg/m².    Estimated Creatinine Clearance: 8.7 mL/min (based on Cr of 13.1).    Physical Exam   Constitutional: He is oriented to person, place, and time. He appears well-developed and well-nourished.   HENT:   Head: Normocephalic.   Neck: Normal  range of motion. Neck supple.   Cardiovascular: Normal rate, regular rhythm, normal heart sounds and intact distal pulses.    Pulmonary/Chest: Effort normal and breath sounds normal.   Abdominal: Soft. He exhibits distension.   Musculoskeletal: Normal range of motion.   Neurological: He is alert and oriented to person, place, and time.   Skin: Skin is warm and dry.   Left arm access wnl        Significant Labs: All pertinent labs within the past 24 hours have been reviewed.    Significant Imaging: I have reviewed all pertinent imaging results/findings within the past 24 hours.    Significant Labs: All pertinent labs within the past 24 hours have been reviewed.    Significant Imaging: I have reviewed all pertinent imaging results/findings within the past 24 hours.

## 2017-05-21 NOTE — PT/OT/SLP EVAL
Occupational Therapy  Evaluation/Discharge    Angel Farmer Jr.   MRN: [id number]   Admitting Diagnosis: Fever    OT Date of Treatment: 17   OT Start Time: 1158  OT Stop Time: 1217  OT Total Time (min): 19 min    Billable Minutes:  Evaluation 19    Diagnosis: Fever     Past Medical History:   Diagnosis Date    Diabetes mellitus type II     Dialysis patient     Hyperlipidemia     Hypertension     Kidney failure     MIKE (obstructive sleep apnea)     Urinary tract infection       Past Surgical History:   Procedure Laterality Date    AV FISTULA PLACEMENT      left lower arm    TONSILLECTOMY, ADENOIDECTOMY           General Precautions: Standard, fall  Orthopedic Precautions:    Braces:            Patient History:  Living Environment  Living Environment Comment: Lives w/brother and brother's GF, children in [place], [place]; has tub/shower combo, but will soak in tub.  Indep all needs, no DME, drives.    Prior level of function:            Dominant hand: right    Subjective:  Communicated with nurse prior to session.    Chief Complaint: I have a blood infection  Patient/Family stated goals: get rid f this infection    Pain Ratin/10              Pain Rating Post-Intervention: 0/10    Objective:       Cognitive Exam:  Oriented to: AO4  Follows Commands/attention: Follows multistep  commands  Communication: clear/fluent  Memory:  No Deficits noted  Safety awareness/insight to disability: intact  Coping skills/emotional control: Appropriate to situation    Visual/perceptual:  Intact    Physical Exam:  Postural examination/scapula alignment: Rounded shoulder and Head forward  Skin integrity: bruising, skin tears extremities  Edema: Mild     Sensation:   Intact    Upper Extremity Range of Motion:  Right Upper Extremity: WFL  Left Upper Extremity: WFL    Upper Extremity Strength:  Right Upper Extremity: WFL  Left Upper Extremity: WFL   Strength: WFL    Fine motor coordination:   Intact    Gross motor coordination:  "WFL    Functional Mobility:  Bed Mobility:  Rolling/Turning Right: Modified independent  Scooting/Bridging: Independent  Supine to Sit: Independent    Transfers:  Sit <> Stand Assistance: Independent  Sit <> Stand Assistive Device: No Assistive Device    Functional Ambulation: indep-mod I----defer to PT    Activities of Daily Living:  Feeding Level of Assistance: Independent  Feeding adaptive equipment:      UE adaptive equipment:   LE Dressing Level of Assistance: Modified independent  LE adaptive equipment:      Grooming Level of Assistance: Independent              Bathing adaptive equipment:     Balance:   Static Sit: NORMAL: No deviations seen in posture held statically  Dynamic Sit: NORMAL: No deviations seen in posture held dynamically  Static Stand: GOOD+: Takes MAXIMAL challenges from all directions  Dynamic stand: GOOD+: Independent gait (with or without assistive device)    Therapeutic Activities and Exercises:      AM-PAC 6 CLICK ADL  How much help from another person does this patient currently need?  1 = Unable, Total/Dependent Assistance  2 = A lot, Maximum/Moderate Assistance  3 = A little, Minimum/Contact Guard/Supervision  4 = None, Modified [place]/Independent    Putting on and taking off regular lower body clothing? : 4  Bathing (including washing, rinsing, drying)?: 4  Toileting, which includes using toilet, bedpan, or urinal? : 4  Putting on and taking off regular upper body clothing?: 4  Taking care of personal grooming such as brushing teeth?: 4  Eating meals?: 4  Total Score: 24    AM-PAC Raw Score CMS "G-Code Modifier Level of Impairment Assistance   6 % Total / Unable   7 - 9 CM 80 - 100% Maximal Assist   10 - 14 CL 60 - 80% Moderate Assist   15 - 19 CK 40 - 60% Moderate Assist   20 - 22 CJ 20 - 40% Minimal Assist   23 CI 1-20% SBA / CGA   24 CH 0% Independent/ Mod I       Patient left HOB elevated with all lines intact, call button in reach and family " present    Assessment:  Angel Farmer Jr. is a 56 y.o. male with a medical diagnosis of Fever and presents with no functional deficits at this time.  Discharge OT.    Rehab identified problem list/impairments:      Rehab potential is excellent.    Activity tolerance: Excellent    Discharge recommendations: Discharge Facility/Level Of Care Needs: home     Barriers to discharge: Barriers to Discharge: None    Equipment recommendations: none     GOALS:    Occupational Therapy Goals     Not on file          Multidisciplinary Problems (Resolved)        Problem: Occupational Therapy Goal    Goal Priority Disciplines Outcome Interventions   Occupational Therapy Goal   (Resolved)     OT, PT/OT Outcome(s) achieved                    PLAN:  Discharge OT  Plan of Care reviewed with: patient, family         Vicente Reeves OT  05/21/2017

## 2017-05-21 NOTE — PLAN OF CARE
"   05/21/17 1849   Discharge Assessment   Assessment Type Discharge Planning Assessment   Confirmed/corrected address and phone number on facesheet? Yes   Assessment information obtained from? Patient   Expected Length of Stay (days) 3   Communicated expected length of stay with patient/caregiver yes   Prior to hospitilization cognitive status: Alert/Oriented   Prior to hospitalization functional status: Independent   Current cognitive status: Alert/Oriented   Current Functional Status: Independent   Arrived From home or self-care   Lives With alone   Able to Return to Prior Arrangements yes   Is patient able to care for self after discharge? Yes   Does the patient have family/friends to help with healtcare needs after discharge? yes   How many people do you have in your home that can help with your care after discharge? 0   Who are your caregiver(s) and their phone number(s)? Natalia De Guzman [phone]   Patient's perception of discharge disposition home or selfcare   Readmission Within The Last 30 Days other (see comments)  (last admit4/30-5/1 for "blood virus" , this admit for fever >102)   Patient currently being followed by outpatient case management? No   Patient currently receives home health services? No   Does the patient currently use HME? Yes   Patient currently receives private duty nursing? No   Patient currently receives any other outside agency services? No   Equipment Currently Used at Home CPAP   Do you have any problems affording any of your prescribed medications? No   Is the patient taking medications as prescribed? yes   Do you have any financial concerns preventing you from receiving the healthcare you need? No   Does the patient have transportation to healthcare appointments? Yes   Transportation Available family or friend will provide;car   On Dialysis? Yes   If yes, what is the name of the dialysis unit? [facility] 5:45 am   Does the patient receive outpatient dialysis? Yes "   Does the patient receive services at the [facility]? No   Are there any open cases? No   Discharge Plan A Home   Discharge Plan B Home with family   Patient/Family In Agreement With Plan yes     Sonia Orosco RN Transitional Navigator  [phone]

## 2017-05-21 NOTE — PROGRESS NOTES
"[facility]  Infectious Disease  Progress Note    Patient Name: Angel Farmer Jr.  MRN: [id number]  Admission Date: 5/20/2017  Length of Stay: 1 days  Attending Physician: Jamal Millan MD  Primary Care Provider: Vadim Berry Iii, MD    Isolation Status: No active isolations  Assessment/Plan:      * Fever    Unclear etiology of fever - concerning for bacteria again and possible abscess or access related infection.  Recent CT-abd was normal and acces sie does not appear clinicall infected.  Agree with 1 dose of vanc and cefipime for now and await culture results     Keep on current Abx  - still no clear source  - await culture results             Anticipated Disposition:     Thank you for your consult. I will follow-up with patient. Please contact us if you have any additional questions.    Galileo Katz MD  Infectious Disease  [facility]    Subjective:     Principal Problem:Fever    HPI: No notes on file  Past Medical History:   Diagnosis Date    Diabetes mellitus type II     Dialysis patient     Hyperlipidemia     Hypertension     Kidney failure     MIKE (obstructive sleep apnea)     Urinary tract infection        Past Surgical History:   Procedure Laterality Date    AV FISTULA PLACEMENT      left lower arm    TONSILLECTOMY, ADENOIDECTOMY         Review of patient's allergies indicates:   Allergen Reactions    Keflex [cephalexin] Nausea Only       Medications:  Prescriptions Prior to Admission   Medication Sig    ammonium lactate 12 % Crea Apply 1 application topically 3 (three) times daily as needed.    aspirin 81 MG Chew Take 1 tablet (81 mg total) by mouth once daily.    AZOPT 1 % ophthalmic suspension     B complex-vitamin C-folic acid (B COMPLEX-VITAMIN C-FOLIC ACID) 0.8 mg Tab Take 0.8 mg by mouth once daily.      BD INSULIN PEN NEEDLE UF MINI 31 gauge x 3/16" Ndle USE 4 TO 5 TIMES A DAY    blood sugar diagnostic (ONETOUCH ULTRA TEST) Strp TEST BLOOD " GLUCOSE FOUR TIMES A DAY. At AM, noon, 6pm and bedtime.    blood-glucose meter (ADVANCED GLUCOSE METER) Misc 1 each by Misc.(Non-Drug; Combo Route) route 4 (four) times daily with meals and nightly.    bromfenac (XIBROM) 0.09 % ophthalmic solution     ceFEPIme in dextrose 5% (MAXIPIME) 2 gram/50 mL PgBk Following dialysis:  2 gram IV Q Mon and Wed x 2 weeks  3 gram IV Q Friday x 2 weeks    diclofenac (VOLTAREN) 0.1 % ophthalmic solution     doxycycline (ADOXA) 50 MG tablet Take 50 mg by mouth once daily.    doxycycline (VIBRAMYCIN) 50 MG capsule     fluticasone (FLONASE) 50 mcg/actuation nasal spray 1 spray by Each Nare route once daily.    folic acid (FOLVITE) 1 MG tablet Take 1 mg by mouth once daily.    FOSRENOL 1,000 mg chewable tablet 3 (three) times daily with meals.     gabapentin (NEURONTIN) 300 MG capsule TAKE 1 BY MOUTH EVERY      EVENING    insulin glargine (LANTUS SOLOSTAR) 100 unit/mL (3 mL) InPn pen Inject 55 Units into the skin every evening.    lancets Misc 1 each by Misc.(Non-Drug; Combo Route) route 4 (four) times daily with meals and nightly.    liraglutide 0.6 mg/0.1 mL, 18 mg/3 mL, subq PNIJ (VICTOZA 2-SHOAIB) 0.6 mg/0.1 mL (18 mg/3 mL) PnIj Inject 1.2 mg into the skin every morning.    NOVOLOG FLEXPEN 100 unit/mL InPn pen 15 Units 3 (three) times daily with meals.     omega-3 acid ethyl esters (LOVAZA) 1 gram capsule Take 2 capsules (2 g total) by mouth 2 (two) times daily.    pravastatin (PRAVACHOL) 40 MG tablet Take 1 tablet (40 mg total) by mouth once daily.    prednisoLONE acetate (PRED FORTE) 1 % DrpS     SENSIPAR 30 mg Tab      Antibiotics     Start     Stop Route Frequency Ordered    05/21/17 0900  cefepime in dextrose 5 % 1 gram/50 mL IVPB 1 g      -- IV Daily 05/20/17 1717        Antifungals     None        Antivirals     None           There is no immunization history for the selected administration types on file for this patient.    Family History     Problem Relation  (Age of Onset)    Colon cancer Mother    Diabetes Mother, Father, Maternal Grandmother, Maternal Grandfather    Heart disease Father    Hypertension Father    Lung cancer Mother        Social History     Social History    Marital status: Single     Spouse name: N/A    Number of children: N/A    Years of education: N/A     Social History Main Topics    Smoking status: Never Smoker    Smokeless tobacco: Never Used    Alcohol use No    Drug use: No    Sexual activity: Not Currently     Other Topics Concern    None     Social History Narrative    None     Review of Systems   Constitutional: Positive for fever. Negative for chills, diaphoresis and fatigue.   HENT: Negative.    Eyes: Negative.    Respiratory: Negative.    Cardiovascular: Negative.    Gastrointestinal: Positive for abdominal distention and diarrhea.   Endocrine: Negative.    Genitourinary: Positive for penile pain.   Musculoskeletal: Negative.    Allergic/Immunologic: Negative.    Neurological: Negative.    Hematological: Negative.    Psychiatric/Behavioral: Negative.      Objective:     Vital Signs (Most Recent):  Temp: 99.1 °F (37.3 °C) (05/21/17 0800)  Pulse: 69 (05/21/17 0800)  Resp: 19 (05/21/17 0800)  BP: 126/67 (05/21/17 0800)  SpO2: 96 % (05/21/17 1609) Vital Signs (24h Range):  Temp:  [99.1 °F (37.3 °C)-100.7 °F (38.2 °C)] 99.1 °F (37.3 °C)  Pulse:  [69-83] 69  Resp:  [16-19] 19  SpO2:  [94 %-98 %] 96 %  BP: (108-160)/(56-91) 126/67     Weight: (!) 140.6 kg (310 lb)  Body mass index is 48.55 kg/m².    Estimated Creatinine Clearance: 8.7 mL/min (based on Cr of 13.1).    Physical Exam   Constitutional: He is oriented to person, place, and time. He appears well-developed and well-nourished.   HENT:   Head: Normocephalic.   Neck: Normal range of motion. Neck supple.   Cardiovascular: Normal rate, regular rhythm, normal heart sounds and intact distal pulses.    Pulmonary/Chest: Effort normal and breath sounds normal.   Abdominal: Soft. He  "exhibits distension.   Musculoskeletal: Normal range of motion.   Neurological: He is alert and oriented to person, place, and time.   Skin: Skin is warm and dry.   Left arm access wnl      Past Medical History:   Diagnosis Date    Diabetes mellitus type II     Dialysis patient     Hyperlipidemia     Hypertension     Kidney failure     MIKE (obstructive sleep apnea)     Urinary tract infection        Past Surgical History:   Procedure Laterality Date    AV FISTULA PLACEMENT      left lower arm    TONSILLECTOMY, ADENOIDECTOMY         Review of patient's allergies indicates:   Allergen Reactions    Keflex [cephalexin] Nausea Only       Medications:  Prescriptions Prior to Admission   Medication Sig    ammonium lactate 12 % Crea Apply 1 application topically 3 (three) times daily as needed.    aspirin 81 MG Chew Take 1 tablet (81 mg total) by mouth once daily.    AZOPT 1 % ophthalmic suspension     B complex-vitamin C-folic acid (B COMPLEX-VITAMIN C-FOLIC ACID) 0.8 mg Tab Take 0.8 mg by mouth once daily.      BD INSULIN PEN NEEDLE UF MINI 31 gauge x 3/16" Ndle USE 4 TO 5 TIMES A DAY    blood sugar diagnostic (ONETOUCH ULTRA TEST) Strp TEST BLOOD GLUCOSE FOUR TIMES A DAY. At AM, noon, 6pm and bedtime.    blood-glucose meter (ADVANCED GLUCOSE METER) Misc 1 each by Misc.(Non-Drug; Combo Route) route 4 (four) times daily with meals and nightly.    bromfenac (XIBROM) 0.09 % ophthalmic solution     ceFEPIme in dextrose 5% (MAXIPIME) 2 gram/50 mL PgBk Following dialysis:  2 gram IV Q Mon and Wed x 2 weeks  3 gram IV Q Friday x 2 weeks    diclofenac (VOLTAREN) 0.1 % ophthalmic solution     doxycycline (ADOXA) 50 MG tablet Take 50 mg by mouth once daily.    doxycycline (VIBRAMYCIN) 50 MG capsule     fluticasone (FLONASE) 50 mcg/actuation nasal spray 1 spray by Each Nare route once daily.    folic acid (FOLVITE) 1 MG tablet Take 1 mg by mouth once daily.    FOSRENOL 1,000 mg chewable tablet 3 (three) " times daily with meals.     gabapentin (NEURONTIN) 300 MG capsule TAKE 1 BY MOUTH EVERY      EVENING    insulin glargine (LANTUS SOLOSTAR) 100 unit/mL (3 mL) InPn pen Inject 55 Units into the skin every evening.    lancets Misc 1 each by Misc.(Non-Drug; Combo Route) route 4 (four) times daily with meals and nightly.    liraglutide 0.6 mg/0.1 mL, 18 mg/3 mL, subq PNIJ (VICTOZA 2-SHOAIB) 0.6 mg/0.1 mL (18 mg/3 mL) PnIj Inject 1.2 mg into the skin every morning.    NOVOLOG FLEXPEN 100 unit/mL InPn pen 15 Units 3 (three) times daily with meals.     omega-3 acid ethyl esters (LOVAZA) 1 gram capsule Take 2 capsules (2 g total) by mouth 2 (two) times daily.    pravastatin (PRAVACHOL) 40 MG tablet Take 1 tablet (40 mg total) by mouth once daily.    prednisoLONE acetate (PRED FORTE) 1 % DrpS     SENSIPAR 30 mg Tab      Antibiotics     Start     Stop Route Frequency Ordered    05/21/17 0900  cefepime in dextrose 5 % 1 gram/50 mL IVPB 1 g      -- IV Daily 05/20/17 1717        Antifungals     None        Antivirals     None           There is no immunization history for the selected administration types on file for this patient.    Family History     Problem Relation (Age of Onset)    Colon cancer Mother    Diabetes Mother, Father, Maternal Grandmother, Maternal Grandfather    Heart disease Father    Hypertension Father    Lung cancer Mother        Social History     Social History    Marital status: Single     Spouse name: N/A    Number of children: N/A    Years of education: N/A     Social History Main Topics    Smoking status: Never Smoker    Smokeless tobacco: Never Used    Alcohol use No    Drug use: No    Sexual activity: Not Currently     Other Topics Concern    None     Social History Narrative    None     Review of Systems   Constitutional: Positive for fever. Negative for chills, diaphoresis and fatigue.   HENT: Negative.    Eyes: Negative.    Respiratory: Negative.    Cardiovascular: Negative.     Gastrointestinal: Positive for abdominal distention and diarrhea.   Endocrine: Negative.    Genitourinary: Negative.    Musculoskeletal: Negative.    Allergic/Immunologic: Negative.    Neurological: Negative.    Hematological: Negative.    Psychiatric/Behavioral: Negative.      Objective:     Vital Signs (Most Recent):  Temp: 99.1 °F (37.3 °C) (05/21/17 0800)  Pulse: 69 (05/21/17 0800)  Resp: 19 (05/21/17 0800)  BP: 126/67 (05/21/17 0800)  SpO2: 96 % (05/21/17 1609) Vital Signs (24h Range):  Temp:  [99.1 °F (37.3 °C)-100.7 °F (38.2 °C)] 99.1 °F (37.3 °C)  Pulse:  [69-83] 69  Resp:  [16-19] 19  SpO2:  [94 %-98 %] 96 %  BP: (108-160)/(56-91) 126/67     Weight: (!) 140.6 kg (310 lb)  Body mass index is 48.55 kg/m².    Estimated Creatinine Clearance: 8.7 mL/min (based on Cr of 13.1).    Physical Exam   Constitutional: He is oriented to person, place, and time. He appears well-developed and well-nourished.   HENT:   Head: Normocephalic.   Neck: Normal range of motion. Neck supple.   Cardiovascular: Normal rate, regular rhythm, normal heart sounds and intact distal pulses.    Pulmonary/Chest: Effort normal and breath sounds normal.   Abdominal: Soft. He exhibits distension.   Musculoskeletal: Normal range of motion.   Neurological: He is alert and oriented to person, place, and time.   Skin: Skin is warm and dry.   Left arm access wnl        Significant Labs: All pertinent labs within the past 24 hours have been reviewed.    Significant Imaging: I have reviewed all pertinent imaging results/findings within the past 24 hours.    Significant Labs: All pertinent labs within the past 24 hours have been reviewed.    Significant Imaging: I have reviewed all pertinent imaging results/findings within the past 24 hours.

## 2017-05-21 NOTE — PLAN OF CARE
05/21/17 1846   Readmission Questionnaire   At the time of your discharge, did someone talk to you about what your health problems were? Yes   At the time of discharge, did someone talk to you about what to watch out for regarding worsening of your health problem? Yes   At the time of discharge, did someone talk to you about what to do if you experienced worsening of your health problem? Yes   At the time of discharge, did someone talk to you about which medication to take when you left the hospital and which ones to stop taking? Yes   At the time of discharge, did someone talk to you about when and where to follow up with a doctor after you left the hospital? Yes   What do you believe caused you to be sick enough to be re-admitted? fever >102   How often do you need to have someone help you when you read instructions, pamphlets, or other written material from your doctor or pharmacy? Never   Do you have problems taking your medications as prescribed? Yes   Do you have any problems affording any of  your prescribed medications? To be determined   Do you have problems obtaining/receiving your medications? No   Does the patient have transportation to healthcare appointments? Yes   Lives With friend(s)   Living Arrangements house   Does the patient have family/friends to help with healtcare needs after discharge? yes   Who are your caregiver(s) and their phone number(s)? Natalia Gab [phone]   Does your caregiver provide all the help you need? Yes   Are you currently feeling confused? No   Are you currently having problems thinking? No   Are you currently having memory problems? No   Have you felt down, depressed, or hopeless? 0   Have you felt little interest or pleasure in doing things? 0   In the last 7 days, my sleep quality was: good     Sonia Orosco RN Transitional Navigator  [phone]

## 2017-05-21 NOTE — SUBJECTIVE & OBJECTIVE
"Past Medical History:   Diagnosis Date    Diabetes mellitus type II     Dialysis patient     Hyperlipidemia     Hypertension     Kidney failure     MIKE (obstructive sleep apnea)     Urinary tract infection        Past Surgical History:   Procedure Laterality Date    AV FISTULA PLACEMENT      left lower arm    TONSILLECTOMY, ADENOIDECTOMY         Review of patient's allergies indicates:   Allergen Reactions    Keflex [cephalexin] Nausea Only       Medications:  Prescriptions Prior to Admission   Medication Sig    ammonium lactate 12 % Crea Apply 1 application topically 3 (three) times daily as needed.    aspirin 81 MG Chew Take 1 tablet (81 mg total) by mouth once daily.    AZOPT 1 % ophthalmic suspension     B complex-vitamin C-folic acid (B COMPLEX-VITAMIN C-FOLIC ACID) 0.8 mg Tab Take 0.8 mg by mouth once daily.      BD INSULIN PEN NEEDLE UF MINI 31 gauge x 3/16" Ndle USE 4 TO 5 TIMES A DAY    blood sugar diagnostic (ONETOUCH ULTRA TEST) Strp TEST BLOOD GLUCOSE FOUR TIMES A DAY. At AM, noon, 6pm and bedtime.    blood-glucose meter (ADVANCED GLUCOSE METER) Misc 1 each by Misc.(Non-Drug; Combo Route) route 4 (four) times daily with meals and nightly.    bromfenac (XIBROM) 0.09 % ophthalmic solution     ceFEPIme in dextrose 5% (MAXIPIME) 2 gram/50 mL PgBk Following dialysis:  2 gram IV Q Mon and Wed x 2 weeks  3 gram IV Q Friday x 2 weeks    diclofenac (VOLTAREN) 0.1 % ophthalmic solution     doxycycline (ADOXA) 50 MG tablet Take 50 mg by mouth once daily.    doxycycline (VIBRAMYCIN) 50 MG capsule     fluticasone (FLONASE) 50 mcg/actuation nasal spray 1 spray by Each Nare route once daily.    folic acid (FOLVITE) 1 MG tablet Take 1 mg by mouth once daily.    FOSRENOL 1,000 mg chewable tablet 3 (three) times daily with meals.     gabapentin (NEURONTIN) 300 MG capsule TAKE 1 BY MOUTH EVERY      EVENING    insulin glargine (LANTUS SOLOSTAR) 100 unit/mL (3 mL) InPn pen Inject 55 Units into the " skin every evening.    lancets Misc 1 each by Misc.(Non-Drug; Combo Route) route 4 (four) times daily with meals and nightly.    liraglutide 0.6 mg/0.1 mL, 18 mg/3 mL, subq PNIJ (VICTOZA 2-SHOAIB) 0.6 mg/0.1 mL (18 mg/3 mL) PnIj Inject 1.2 mg into the skin every morning.    NOVOLOG FLEXPEN 100 unit/mL InPn pen 15 Units 3 (three) times daily with meals.     omega-3 acid ethyl esters (LOVAZA) 1 gram capsule Take 2 capsules (2 g total) by mouth 2 (two) times daily.    pravastatin (PRAVACHOL) 40 MG tablet Take 1 tablet (40 mg total) by mouth once daily.    prednisoLONE acetate (PRED FORTE) 1 % DrpS     SENSIPAR 30 mg Tab      Antibiotics     Start     Stop Route Frequency Ordered    05/21/17 0900  cefepime in dextrose 5 % 1 gram/50 mL IVPB 1 g      -- IV Daily 05/20/17 1717        Antifungals     None        Antivirals     None             There is no immunization history on file for this patient.    Family History     Problem Relation (Age of Onset)    Colon cancer Mother    Diabetes Mother, Father, Maternal Grandmother, Maternal Grandfather    Heart disease Father    Hypertension Father    Lung cancer Mother        Social History     Social History    Marital status: Single     Spouse name: N/A    Number of children: N/A    Years of education: N/A     Social History Main Topics    Smoking status: Never Smoker    Smokeless tobacco: Never Used    Alcohol use No    Drug use: No    Sexual activity: Not Currently     Other Topics Concern    None     Social History Narrative    None     Review of Systems   Constitutional: Positive for fever. Negative for chills, diaphoresis and fatigue.   HENT: Negative.    Eyes: Negative.    Respiratory: Negative.    Cardiovascular: Negative.    Gastrointestinal: Positive for abdominal distention and diarrhea.   Endocrine: Negative.    Genitourinary: Negative.    Musculoskeletal: Negative.    Allergic/Immunologic: Negative.    Neurological: Negative.    Hematological:  Negative.    Psychiatric/Behavioral: Negative.      Objective:     Vital Signs (Most Recent):  Temp: 99.6 °F (37.6 °C) (05/21/17 0603)  Pulse: 81 (05/21/17 0508)  Resp: 19 (05/21/17 0508)  BP: (!) 160/91 (05/21/17 0508)  SpO2: 95 % (05/21/17 0342) Vital Signs (24h Range):  Temp:  [99.6 °F (37.6 °C)-101.1 °F (38.4 °C)] 99.6 °F (37.6 °C)  Pulse:  [72-90] 81  Resp:  [16-19] 19  SpO2:  [94 %-99 %] 95 %  BP: (108-160)/(56-91) 160/91     Weight: (!) 140.6 kg (310 lb)  Body mass index is 48.55 kg/m².    Estimated Creatinine Clearance: 8.7 mL/min (based on Cr of 13.1).    Physical Exam   Constitutional: He is oriented to person, place, and time. He appears well-developed and well-nourished.   HENT:   Head: Normocephalic.   Neck: Normal range of motion. Neck supple.   Cardiovascular: Normal rate, regular rhythm, normal heart sounds and intact distal pulses.    Pulmonary/Chest: Effort normal and breath sounds normal.   Abdominal: Soft. He exhibits distension.   Musculoskeletal: Normal range of motion.   Neurological: He is alert and oriented to person, place, and time.   Skin: Skin is warm and dry.   Left arm access wnl        Significant Labs: All pertinent labs within the past 24 hours have been reviewed.    Significant Imaging: I have reviewed all pertinent imaging results/findings within the past 24 hours.

## 2017-05-21 NOTE — PT/OT/SLP EVAL
Physical Therapy  Evaluation/Discharge    Angel Farmer Jr.   MRN: [id number]   Admitting Diagnosis: Fever    PT Received On: 05/21/17  PT Start Time: 1032     PT Stop Time: 1053    PT Total Time (min): 21 min       Billable Minutes:  Evaluation 21    Diagnosis: Fever    Past Medical History:   Diagnosis Date    Diabetes mellitus type II     Dialysis patient     Hyperlipidemia     Hypertension     Kidney failure     MIKE (obstructive sleep apnea)     Urinary tract infection       Past Surgical History:   Procedure Laterality Date    AV FISTULA PLACEMENT      left lower arm    TONSILLECTOMY, ADENOIDECTOMY         Referring physician: Mckinnon   Date referred to PT: 5/20/17    General Precautions: Standard, fall  Orthopedic Precautions: N/A   Braces: N/A       Do you have any cultural, spiritual, Cheondoism conflicts, given your current situation?: none    Patient History:  Living Environment Comment: pt lives with his brother and brother's girlfriend, SSH, threshold entry , tub shower combo, sits or stands as needed. Drives self to HD at [facility] 1 mile from home. Baseline ambulation about 200 feet before getting tired.   Equipment Currently Used at Home: none, CPAP  DME owned (not currently used): none though has access to his mother's shower chair and other DME, but pt's mother was only 160# and pt is 310#    Previous Level of Function:  Ambulation Skills: independent  Transfer Skills: independent  ADL Skills: independent  Work/Leisure Activity: independent    Subjective:  Communicated with RN prior to session.  Patient reports his baseline gait is 200 feet before he gets tired. Reports recent fall which appears to be an accident, as he missed the lip of a step climbing onto a gazebo. Reports B foot neuropathy / pain, worse on HD days and in L foot. Does not want to use any DME. Says he stands to shower and then sits in the tub to soak to help his neuropathy  Chief Complaint: none  Patient goals:  PLOF    Pain Ratin/10               Pain Rating Post-Intervention: 0/10    Objective:   Patient found with: peripheral IV     Cognitive Exam:  Oriented to: Person, Place, Time and Situation    Follows Commands/attention: Follows multistep  commands  Communication: clear/fluent  Safety awareness/insight to disability: intact    Physical Exam:  Postural examination/scapula alignment: Rounded shoulder, Head forward and Posterior pelvic tilt, morbidly obese    Skin integrity: Visible skin intact  Edema: None noted     Sensation: reports neuropathy, worse in L foot      Lower Extremity Range of Motion:  Right Lower Extremity: WFL  Left Lower Extremity: WFL    Lower Extremity Strength:  Right Lower Extremity: WFL  Left Lower Extremity: WFL     Gross motor coordination: WFL    Functional Mobility:  Bed Mobility:  Rolling/Turning to Left: Modified independent  Rolling/Turning Right: Modified independent  Scooting/Bridging: Modified Independent  Supine to Sit: Modified Independent  Sit to Supine: Modified Independent    Transfers:  Sit <> Stand Assistance: Modified Independent  Sit <> Stand Assistive Device: No Assistive Device    Gait:   Gait Distance: over 500 feet without DME, mod Ind, wide base of support, lateral sway, normal kaykay, able to ambulate while manipulating his cell phone.  Assistance 1: Modified Independent  Gait Assistive Device: No device      Balance:   Static Sit: GOOD+: Takes MAXIMAL challenges from all directions.    Dynamic Sit: GOOD+: Maintains balance through MAXIMAL excursions of active trunk motion  Static Stand: GOOD: Takes MODERATE challenges from all directions  Dynamic stand: GOOD: Needs SUPERVISION only during gait and able to self right with moderate     Therapeutic Activities and Exercises:      AM-PAC 6 CLICK MOBILITY  How much help from another person does this patient currently need?   1 = Unable, Total/Dependent Assistance  2 = A lot, Maximum/Moderate Assistance  3 = A little,  Minimum/Contact Guard/Supervision  4 = None, Modified [place]/Independent    Turning over in bed (including adjusting bedclothes, sheets and blankets)?: 4  Sitting down on and standing up from a chair with arms (e.g., wheelchair, bedside commode, etc.): 4  Moving from lying on back to sitting on the side of the bed?: 4  Moving to and from a bed to a chair (including a wheelchair)?: 4  Need to walk in hospital room?: 4  Climbing 3-5 steps with a railing?: 4  Total Score: 24     AM-PAC Raw Score CMS G-Code Modifier Level of Impairment Assistance   6 % Total / Unable   7 - 9 CM 80 - 100% Maximal Assist   10 - 14 CL 60 - 80% Moderate Assist   15 - 19 CK 40 - 60% Moderate Assist   20 - 22 CJ 20 - 40% Minimal Assist   23 CI 1-20% SBA / CGA   24 CH 0% Independent/ Mod I     Patient left supine with all lines intact, call button in reach and family present.    Assessment:   Angel Farmer Jr. is a 56 y.o. male with a medical diagnosis of Fever and presents with no functional deficits, only minimal deficits in endurance. No further PT needs, may ambulate with mobility tech while here. .    Rehab identified problem list/impairments:      Rehab potential is good.    Activity tolerance: Good    Discharge recommendations: Discharge Facility/Level Of Care Needs: home     Barriers to discharge: Barriers to Discharge: None    Equipment recommendations: Equipment Needed After Discharge: none     GOALS:    Physical Therapy Goals        Problem: Physical Therapy Goal    Goal Priority Disciplines Outcome Goal Variances Interventions   Physical Therapy Goal   (Resolved)     PT/OT, PT Outcome(s) achieved                     PLAN:    Plan of Care reviewed with: patient    Functional Assessment Tool Used: ampac  Score: 24  Functional Limitation: Mobility: Walking and moving around  Mobility: Walking and Moving Around Current Status (): CH  Mobility: Walking and Moving Around Goal Status (): CH  Mobility: Walking and  Moving Around Discharge Status (): EMILE Moncada, PT  05/21/2017

## 2017-05-21 NOTE — PLAN OF CARE
Problem: Patient Care Overview  Goal: Plan of Care Review  Outcome: Ongoing (interventions implemented as appropriate)  Patient oriented to room. Call bell with in reach, bed in low position, side rails up x2 and bed alarm activated. Patient instructed to call for assistance before attempting to get out of bed.   Patient informed on hourly rounding schedule.

## 2017-05-21 NOTE — PROGRESS NOTES
Progress Note  Nephrology      Consult Requested By: Jamal Millan MD  Reason for Consult: ESRD, fever    SUBJECTIVE:     Review of Systems   Constitutional: Positive for chills and fever.   Respiratory: Negative for cough and shortness of breath.    Cardiovascular: Negative for chest pain and leg swelling.   Gastrointestinal: Negative for nausea.     Patient Active Problem List   Diagnosis    Hypertension    Diabetes mellitus, type 2    Hyperlipidemia    Macrocytic anemia    Allergic rhinitis    ESRD on dialysis    Atypical chest pain    Psoriasis    Sleep apnea    Foot pain    MIKE (obstructive sleep apnea) - very severe latest sleep study says BPAP @ 16/8    Multifactorial peripheral neuropathy    Fungal infection of foot    Flu-like symptoms    Bacteremia    Fever       OBJECTIVE:     Medications:   aspirin  81 mg Oral Daily    ceFEPime (MAXIPIME) IVPB  1 g Intravenous Daily    fluticasone  1 spray Each Nare Daily    folic acid  1 mg Oral Daily    gabapentin  300 mg Oral TID    lanthanum  1,000 mg Oral TID WM    pravastatin  40 mg Oral Daily    vitamin renal formula (B-complex-vitamin c-folic acid)  1 capsule Oral Daily        Vitals:    05/21/17 0800   BP: 126/67   Pulse: 69   Resp: 19   Temp: 99.1 °F (37.3 °C)     No intake/output data recorded.  Physical Exam   Constitutional: He is oriented to person, place, and time. He appears well-developed and well-nourished. No distress.   HENT:   Head: Normocephalic and atraumatic.   Eyes: EOM are normal. No scleral icterus.   Cardiovascular: Normal rate, regular rhythm, normal heart sounds and intact distal pulses.  Exam reveals no gallop and no friction rub.    No murmur heard.  Pulmonary/Chest: Effort normal and breath sounds normal. He has no wheezes. He has no rales.   Abdominal: Soft. Bowel sounds are normal. He exhibits no distension. There is no tenderness.   Musculoskeletal: Normal range of motion. He exhibits no edema.    Lymphadenopathy:     He has no cervical adenopathy.   Neurological: He is alert and oriented to person, place, and time.   Skin: Skin is warm and dry. No rash noted. He is not diaphoretic.   Psychiatric: Thought content normal.     Laboratory:    Recent Labs  Lab 05/20/17  1139 05/21/17  0501   WBC 5.08 5.46   HGB 10.6* 9.4*   HCT 33.0* 29.1*   * 90*   MONO 10.2  0.5 13.7  0.8       Recent Labs  Lab 05/20/17  1139 05/21/17  0501    140   K 4.5 4.6   CL 97 95   CO2 25 25   BUN 46* 63*   CREATININE 10.5* 13.1*   CALCIUM 8.9 8.5*   PHOS 4.6* 6.6*     Labs reviewed  Diagnostic Results:  X-Ray: Reviewed  US: Reviewed  Echo: Reviewed      ASSESSMENT/PLAN:     1. Fever/chills - pan culture, appreciate ID recommendations--> 4th episode of different organism bacteremia in the last 8 weeks. I discussed case with Dr. Scanlon () - his ID doctor    Vanc + cefepime    He  needs SERENITY        2 ESRD - on HD with Dr. Araceli Pickens in [facility] MWF, 4.5 hours, EDW 140kg  HD monday as per normal schedule     2. HTN - controlled, cont home meds  3. Anemia -epo with HD  4. MBD - cont sensipar and Fosrenol  5. Access - L wrist AVF, recent ballon PTA by Dr. Moran in [facility], f/u in 2 weeks  6. Nutrition - renal diet, nephrocaps  7. DM2  8. Severe MIKE- BiPAP 16/8     Thank you for allowing me to participate in the care of your patients  With any question please call [phone]  Margoth Felton    [facility]  SHALA Forrest MD, FACP,   KADEN Pickens MD,   MD GEORGIA Walker, NP  [address]  [place], [id number] [phone]

## 2017-05-21 NOTE — PLAN OF CARE
Problem: Occupational Therapy Goal  Goal: Occupational Therapy Goal  Outcome: Outcome(s) achieved Date Met: 05/21/17  OT katina performed, pt with no functional deficits at this time.  Discharge OT

## 2017-05-21 NOTE — PLAN OF CARE
Problem: Physical Therapy Goal  Goal: Physical Therapy Goal  Outcome: Outcome(s) achieved Date Met: 05/21/17  PT evaluation completed. No acute needs identified.

## 2017-05-21 NOTE — PLAN OF CARE
Problem: Patient Care Overview  Goal: Plan of Care Review  Outcome: Ongoing (interventions implemented as appropriate)  Pt in NAD. AAOX4. Scheduled medication given per MAR. Pt temp raised to 100.4 given PRN tylenol given. Blood sugar maintained without need for supplemental insulin. Continuous cardiac monitoring in place box: 8641 normal sinus rhythm HR: 80s. Denies pain. Encouraged to call for assistance verbalized understanding. Safety maintained bed in low locked position, SR up X2, and call bell in reach. Will continue to monitor.

## 2017-05-21 NOTE — PROGRESS NOTES
Progress Note  [facility]    Admit Date: 5/20/2017   LOS: 1 day     SUBJECTIVE:     Follow-up For:  Fever    Angel Farmer Jr. had an episode of fever overnight which was treated with medication. He has been feeling malaise and warm overnight. He denies N/V/CP/SOB.     Scheduled Meds:   aspirin  81 mg Oral Daily    ceFEPime (MAXIPIME) IVPB  1 g Intravenous Daily    fluticasone  1 spray Each Nare Daily    folic acid  1 mg Oral Daily    gabapentin  300 mg Oral TID    lanthanum  1,000 mg Oral TID WM    pravastatin  40 mg Oral Daily    vitamin renal formula (B-complex-vitamin c-folic acid)  1 capsule Oral Daily     Continuous Infusions:   PRN Meds:acetaminophen, dextrose 50%, dextrose 50%, glucagon (human recombinant), glucose, glucose, insulin aspart, ondansetron    Review of patient's allergies indicates:   Allergen Reactions    Keflex [cephalexin] Nausea Only       Review of Systems  Review of Systems   Constitutional: Negative for activity change, chills. positive for fatigue, fevers, generalized muscle aches.   HENT: Negative for congestion, rhinorrhea and sore throat.    Eyes: Negative for visual disturbance.   Respiratory: Negative for cough, chest tightness and shortness of breath.    Cardiovascular: Negative for chest pain, palpitations and leg swelling.   Gastrointestinal: Negative constipation, diarrhea, nausea and vomiting.   Genitourinary: does not produce urine at baseline  Musculoskeletal: Negative for arthralgias and myalgias.   Skin: Negative for rash.   Neurological: Negative for dizziness, light-headedness and headaches.   Psychiatric/Behavioral:  Negative for agitation.       OBJECTIVE:     Vital Signs (Most Recent)  Temp: 99.1 °F (37.3 °C) (05/21/17 0800)  Pulse: 69 (05/21/17 0800)  Resp: 19 (05/21/17 0800)  BP: 126/67 (05/21/17 0800)  SpO2: 98 % (05/21/17 0847)    Vital Signs Range (Last 24H):  Temp:  [99.1 °F (37.3 °C)-101.1 °F (38.4 °C)]   Pulse:  [69-90]   Resp:  [16-19]   BP:  (108-160)/(56-91)   SpO2:  [94 %-99 %]     I & O (Last 24H):No intake or output data in the 24 hours ending 05/21/17 0915  Wt Readings from Last 3 Encounters:   05/20/17 (!) 140.6 kg (310 lb)   05/09/17 (!) 141.4 kg (311 lb 11.7 oz)   04/30/17 (!) 141.3 kg (311 lb 8.2 oz)     Physical Exam:  Physical Exam   Constitutional:  oriented to person, place, and time.  appears well-developed and well-nourished. No distress.   HENT:   Head: stye on left upper eye lid  Mouth/Throat: Oropharynx is clear and moist.   Eyes: Conjunctivae and EOM are normal. Pupils are equal, round, and reactive to light.   Neck: Normal range of motion. Neck supple.   Cardiovascular: Normal rate, regular rhythm, normal heart sounds and intact distal pulses.    Pulmonary/Chest: Effort normal and breath sounds normal.   Abdominal: Soft. Bowel sounds are normal. no distension. There is no tenderness.   Musculoskeletal: Normal range of motion.  no edema, tenderness or deformity.   Neurological:  alert and oriented to person, place, and time.  normal reflexes.   Skin: Left AVF   Psychiatric:  normal mood and affect.  behavior is normal. Judgment and thought content normal.   Nursing note and vitals reviewed.    Laboratory:  LABS  CBC    Recent Labs  Lab 05/20/17  1139 05/21/17  0501   WBC 5.08 5.46   RBC 2.76* 2.46*   HGB 10.6* 9.4*   HCT 33.0* 29.1*   * 90*   * 118*   MCH 38.4* 38.2*   MCHC 32.1 32.3     BMP    Recent Labs  Lab 05/20/17  1139 05/21/17  0501    140   K 4.5 4.6   CO2 25 25   CL 97 95   BUN 46* 63*   CREATININE 10.5* 13.1*   * 187*       POCT-Glucose  POCT Glucose   Date Value Ref Range Status   05/21/2017 171 (H) 70 - 110 mg/dL Final   05/20/2017 193 (H) 70 - 110 mg/dL Final   05/20/2017 89 70 - 110 mg/dL Final         Recent Labs  Lab 05/20/17  1139 05/21/17  0501   CALCIUM 8.9 8.5*   MG 1.9 1.9   PHOS 4.6* 6.6*     LFT    Recent Labs  Lab 05/20/17  1139 05/21/17  0501   PROT 7.2 6.6   ALBUMIN 3.5 3.2*    BILITOT 1.0 0.9   AST 36 36   ALKPHOS 146* 127   ALT 39 36         COAGS  No results for input(s): INR, APTT in the last 168 hours.    Invalid input(s): PT  CE  No results for input(s): TROPONINI, CKTOTAL, CKMB in the last 168 hours.  ABGs  No results for input(s): PH, PCO2, PO2, HCO3, POCSATURATED, BE in the last 24 hours.  BNP  No results for input(s): BNP in the last 168 hours.  UA  No results for input(s): COLORU, CLARITYU, SPECGRAV, PHUR, PROTEINUA, GLUCOSEU, BLOODU, WBCU, RBCU, BACTERIA, MUCUS in the last 24 hours.    Invalid input(s):  BILIRUBINCON  LAST HbA1c  Lab Results   Component Value Date    HGBA1C 6.8 (H) 07/09/2013       ASSESSMENT/PLAN:   Patient is a 56 y.o. male with a pmhx of ESRD with HD MWF (aneuric) , HTN., IDDM, hx of serratia bacteremia admitted for fevers.         Fevers of unknown origin  - Patient with recent hospitialziation for fevers 2/2 serratia bacterima and completed 2 week course of IV Cefepime per ID recommendations.   - Fevers at home x 2 days with Tmax 102   - In ED Tmax 101.1, LA wnl, WBC 5.08, cxr no acute process  - ID consulted: agreed with antibiotics regimen and awaiting cultures.   - Echo to assess for vegetations pending  - CT abdomen: no acute process  - IV Abx: Vanc, cefepime, cipro  -Blood cx pending  - Can not obtain urine cx as patient is anuric   - ESR: 40, PROCAL: 4.36  - Tylenol was given once overnight for pt was complaining of malaise with fever.    ESRD HD (MWF)  - Nephro consulted, Dr. Felton  Per her note patient was seen by Dr. Christina at  and recommended SERENITY  - HD per nephro     IDDM  -A1c pending  -   - SSI  - POCT glucose QID     HTN  -stable  -continue home medications     Anemia of chronic disease  -Epo with HD  - folic acid     Code: full  Diet: Cardiac/diabetic 2000/renal   Ppx:  scd  Dispo: F/u ID consult, bcx, temperature         5/21/2017 Radha Castañeda MD  9:25 AM

## 2017-05-22 LAB
ALBUMIN SERPL BCP-MCNC: 3.3 G/DL
ALP SERPL-CCNC: 171 U/L
ALT SERPL W/O P-5'-P-CCNC: 48 U/L
ANION GAP SERPL CALC-SCNC: 23 MMOL/L
AST SERPL-CCNC: 51 U/L
BASOPHILS # BLD AUTO: 0.01 K/UL
BASOPHILS NFR BLD: 0.2 %
BILIRUB SERPL-MCNC: 0.7 MG/DL
BUN SERPL-MCNC: 83 MG/DL
CALCIUM SERPL-MCNC: 8.6 MG/DL
CHLORIDE SERPL-SCNC: 94 MMOL/L
CO2 SERPL-SCNC: 23 MMOL/L
CREAT SERPL-MCNC: 15.8 MG/DL
DIFFERENTIAL METHOD: ABNORMAL
EOSINOPHIL # BLD AUTO: 0.2 K/UL
EOSINOPHIL NFR BLD: 4.5 %
ERYTHROCYTE [DISTWIDTH] IN BLOOD BY AUTOMATED COUNT: 14.8 %
EST. GFR  (AFRICAN AMERICAN): 3 ML/MIN/1.73 M^2
EST. GFR  (NON AFRICAN AMERICAN): 3 ML/MIN/1.73 M^2
ESTIMATED AVG GLUCOSE: 143 MG/DL
GLUCOSE SERPL-MCNC: 162 MG/DL
HBA1C MFR BLD HPLC: 6.6 %
HCT VFR BLD AUTO: 31.3 %
HGB BLD-MCNC: 10.3 G/DL
LYMPHOCYTES # BLD AUTO: 1.4 K/UL
LYMPHOCYTES NFR BLD: 28.8 %
MAGNESIUM SERPL-MCNC: 2.4 MG/DL
MCH RBC QN AUTO: 37.9 PG
MCHC RBC AUTO-ENTMCNC: 32.9 %
MCV RBC AUTO: 115 FL
MONOCYTES # BLD AUTO: 0.6 K/UL
MONOCYTES NFR BLD: 12.6 %
NEUTROPHILS # BLD AUTO: 2.6 K/UL
NEUTROPHILS NFR BLD: 53.9 %
PHOSPHATE SERPL-MCNC: 7.9 MG/DL
PLATELET # BLD AUTO: 95 K/UL
PMV BLD AUTO: 10.9 FL
POCT GLUCOSE: 169 MG/DL (ref 70–110)
POCT GLUCOSE: 188 MG/DL (ref 70–110)
POCT GLUCOSE: 199 MG/DL (ref 70–110)
POTASSIUM SERPL-SCNC: 5.1 MMOL/L
PROT SERPL-MCNC: 6.9 G/DL
RBC # BLD AUTO: 2.72 M/UL
SODIUM SERPL-SCNC: 140 MMOL/L
WBC # BLD AUTO: 4.86 K/UL

## 2017-05-22 PROCEDURE — 85025 COMPLETE CBC W/AUTO DIFF WBC: CPT

## 2017-05-22 PROCEDURE — 80100016 HC MAINTENANCE HEMODIALYSIS

## 2017-05-22 PROCEDURE — 25000003 PHARM REV CODE 250: Performed by: STUDENT IN AN ORGANIZED HEALTH CARE EDUCATION/TRAINING PROGRAM

## 2017-05-22 PROCEDURE — 93306 TTE W/DOPPLER COMPLETE: CPT

## 2017-05-22 PROCEDURE — 25000003 PHARM REV CODE 250: Performed by: OPHTHALMOLOGY

## 2017-05-22 PROCEDURE — 80053 COMPREHEN METABOLIC PANEL: CPT

## 2017-05-22 PROCEDURE — 84100 ASSAY OF PHOSPHORUS: CPT

## 2017-05-22 PROCEDURE — 25000003 PHARM REV CODE 250: Performed by: FAMILY MEDICINE

## 2017-05-22 PROCEDURE — 86703 HIV-1/HIV-2 1 RESULT ANTBDY: CPT

## 2017-05-22 PROCEDURE — 80074 ACUTE HEPATITIS PANEL: CPT

## 2017-05-22 PROCEDURE — 11000001 HC ACUTE MED/SURG PRIVATE ROOM

## 2017-05-22 PROCEDURE — 36415 COLL VENOUS BLD VENIPUNCTURE: CPT

## 2017-05-22 PROCEDURE — 83735 ASSAY OF MAGNESIUM: CPT

## 2017-05-22 PROCEDURE — 94761 N-INVAS EAR/PLS OXIMETRY MLT: CPT

## 2017-05-22 RX ORDER — ERYTHROMYCIN 5 MG/G
OINTMENT OPHTHALMIC NIGHTLY
Status: DISCONTINUED | OUTPATIENT
Start: 2017-05-22 | End: 2017-05-25 | Stop reason: HOSPADM

## 2017-05-22 RX ORDER — DOXYCYCLINE HYCLATE 100 MG
100 TABLET ORAL EVERY 12 HOURS
Status: DISCONTINUED | OUTPATIENT
Start: 2017-05-22 | End: 2017-05-25 | Stop reason: HOSPADM

## 2017-05-22 RX ORDER — SIMETHICONE 125 MG
125 TABLET,CHEWABLE ORAL EVERY 6 HOURS PRN
Status: DISCONTINUED | OUTPATIENT
Start: 2017-05-22 | End: 2017-05-25 | Stop reason: HOSPADM

## 2017-05-22 RX ORDER — ACETAMINOPHEN 325 MG/1
650 TABLET ORAL ONCE
Status: COMPLETED | OUTPATIENT
Start: 2017-05-22 | End: 2017-05-22

## 2017-05-22 RX ORDER — GABAPENTIN 300 MG/1
300 CAPSULE ORAL NIGHTLY
Status: DISCONTINUED | OUTPATIENT
Start: 2017-05-23 | End: 2017-05-25 | Stop reason: HOSPADM

## 2017-05-22 RX ADMIN — LANTHANUM CARBONATE 1000 MG: 500 TABLET, CHEWABLE ORAL at 06:05

## 2017-05-22 RX ADMIN — INSULIN DETEMIR 22 UNITS: 100 INJECTION, SOLUTION SUBCUTANEOUS at 09:05

## 2017-05-22 RX ADMIN — GABAPENTIN 300 MG: 300 CAPSULE ORAL at 05:05

## 2017-05-22 RX ADMIN — ERYTHROMYCIN: 5 OINTMENT OPHTHALMIC at 09:05

## 2017-05-22 RX ADMIN — ACETAMINOPHEN 650 MG: 325 TABLET ORAL at 08:05

## 2017-05-22 RX ADMIN — DOXYCYCLINE HYCLATE 100 MG: 100 TABLET, COATED ORAL at 09:05

## 2017-05-22 RX ADMIN — CINACALCET HYDROCHLORIDE 30 MG: 30 TABLET, COATED ORAL at 06:05

## 2017-05-22 NOTE — PLAN OF CARE
Problem: Hemodialysis (Adult)  Goal: Signs and Symptoms of Listed Potential Problems Will be Absent, Minimized or Managed (Hemodialysis)  Signs and symptoms of listed potential problems will be absent, minimized or managed by discharge/transition of care (reference Hemodialysis (Adult) CPG).  Outcome: Ongoing (interventions implemented as appropriate)   05/22/17 1027   Hemodialysis   Problems Assessed (Hemodialysis) all   Problems Present (Hemodialysis) fluid imbalance;electrolyte imbalance   POC discussed with patient  HD with [facility] today

## 2017-05-22 NOTE — PROGRESS NOTES
Progress Note  [facility]    Admit Date: 5/20/2017   LOS: 2 days     SUBJECTIVE:     Follow-up For:  Fever    ID: Patient is a 56 y.o. male with a pmhx of ESRD with HD MWF (aneuric) , HTN., IDDM, recent  bacteriemas (treated with cefepime x 2 weeks) admitted for fevers.     Subjective:  Patient seen and examined.  Patient seen in HD today. He endorses mild abdominal distention but states that he is passing gas and denies n/v/d/c. Denies any CP, SOB, headaches, fever or chills.       Scheduled Meds:   sodium chloride 0.9%   Intravenous Once    aspirin  81 mg Oral Daily    ceFEPime (MAXIPIME) IVPB  1 g Intravenous Daily    cinacalcet  30 mg Oral Daily with breakfast    fluticasone  1 spray Each Nare Daily    folic acid  1 mg Oral Daily    gabapentin  300 mg Oral TID    insulin detemir  22 Units Subcutaneous BID    lanthanum  1,000 mg Oral TID WM    pravastatin  40 mg Oral Daily    vitamin renal formula (B-complex-vitamin c-folic acid)  1 capsule Oral Daily     Continuous Infusions:   PRN Meds:sodium chloride 0.9%, dextrose 50%, dextrose 50%, glucagon (human recombinant), glucose, glucose, insulin aspart, ondansetron, pneumoc 13-edwin conj-dip cr(PF)    Review of patient's allergies indicates:   Allergen Reactions    Keflex [cephalexin] Nausea Only       Review of Systems  Review of Systems   Constitutional: Negative for activity change, chills. positive for fatigue, fevers, generalized muscle aches.   HENT: Negative for congestion, rhinorrhea and sore throat.    Eyes: Negative for visual disturbance.   Respiratory: Negative for cough, chest tightness and shortness of breath.    Cardiovascular: Negative for chest pain, palpitations and leg swelling.   Gastrointestinal: Negative constipation, diarrhea, nausea and vomiting.   Genitourinary: does not produce urine at baseline  Musculoskeletal: Negative for arthralgias and myalgias.   Skin: Negative for rash.   Neurological: Negative for dizziness,  light-headedness and headaches.   Psychiatric/Behavioral:  Negative for agitation.       OBJECTIVE:     Vital Signs (Most Recent)  Temp: 98.2 °F (36.8 °C) (05/22/17 0420)  Pulse: (!) 57 (05/22/17 0420)  Resp: 19 (05/22/17 0420)  BP: 119/67 (05/22/17 0420)  SpO2: 97 % (05/22/17 0412)    Vital Signs Range (Last 24H):  Temp:  [98.2 °F (36.8 °C)-99.1 °F (37.3 °C)]   Pulse:  [57-69]   Resp:  [19]   BP: (119-138)/(62-69)   SpO2:  [94 %-98 %]     I & O (Last 24H):    Intake/Output Summary (Last 24 hours) at 05/22/17 0747  Last data filed at 05/21/17 1200   Gross per 24 hour   Intake              890 ml   Output                0 ml   Net              890 ml     Wt Readings from Last 3 Encounters:   05/20/17 (!) 140.6 kg (310 lb)   05/09/17 (!) 141.4 kg (311 lb 11.7 oz)   04/30/17 (!) 141.3 kg (311 lb 8.2 oz)     Physical Exam:  Physical Exam   Constitutional:  oriented to person, place, and time.  appears well-developed and well-nourished. No distress.   HENT:   Head: stye on left upper eye lid  Mouth/Throat: Oropharynx is clear and moist.   Eyes: Conjunctivae and EOM are normal. Pupils are equal, round, and reactive to light.   Neck: Normal range of motion. Neck supple.   Cardiovascular: Normal rate, regular rhythm, normal heart sounds and intact distal pulses.    Pulmonary/Chest: Effort normal and breath sounds normal.   Abdominal: Soft. Bowel sounds are normal. no distension. There is no tenderness.   Musculoskeletal: Normal range of motion.  no edema, tenderness or deformity.   Neurological:  alert and oriented to person, place, and time.  normal reflexes.   Skin: Left AVF   Psychiatric:  normal mood and affect.  behavior is normal. Judgment and thought content normal.   Nursing note and vitals reviewed.    Laboratory:  LABS  CBC    Recent Labs  Lab 05/20/17  1139 05/21/17  0501 05/22/17  0456   WBC 5.08 5.46 4.86   RBC 2.76* 2.46* 2.72*   HGB 10.6* 9.4* 10.3*   HCT 33.0* 29.1* 31.3*   * 90* 95*   * 118*  115*   MCH 38.4* 38.2* 37.9*   MCHC 32.1 32.3 32.9     BMP    Recent Labs  Lab 05/20/17  1139 05/21/17  0501 05/22/17  0456    140 140   K 4.5 4.6 5.1   CO2 25 25 23   CL 97 95 94*   BUN 46* 63* 83*   CREATININE 10.5* 13.1* 15.8*   * 187* 162*       POCT-Glucose  POCT Glucose   Date Value Ref Range Status   05/22/2017 199 (H) 70 - 110 mg/dL Final   05/21/2017 269 (H) 70 - 110 mg/dL Final   05/21/2017 217 (H) 70 - 110 mg/dL Final   05/21/2017 283 (H) 70 - 110 mg/dL Final   05/21/2017 171 (H) 70 - 110 mg/dL Final   05/20/2017 193 (H) 70 - 110 mg/dL Final   05/20/2017 89 70 - 110 mg/dL Final         Recent Labs  Lab 05/20/17  1139 05/21/17  0501 05/22/17  0456   CALCIUM 8.9 8.5* 8.6*   MG 1.9 1.9 2.4   PHOS 4.6* 6.6* 7.9*     LFT    Recent Labs  Lab 05/20/17  1139 05/21/17  0501 05/22/17  0456   PROT 7.2 6.6 6.9   ALBUMIN 3.5 3.2* 3.3*   BILITOT 1.0 0.9 0.7   AST 36 36 51*   ALKPHOS 146* 127 171*   ALT 39 36 48*     LAST HbA1c  Lab Results   Component Value Date    HGBA1C 6.8 (H) 07/09/2013       ASSESSMENT/PLAN:   Patient is a 56 y.o. male with a pmhx of ESRD with HD MWF (aneuric) , HTN., IDDM, hx of serratia bacteremia admitted for fevers.      Fevers of unknown origin  - Patient with recent hospitialziation for fevers 2/2 serratia bacterima and completed 2 week course of IV Cefepime per ID recommendations.   - Fevers at home x 2 days with Tmax 102   - In ED Tmax 101.1, LA wnl, WBC 5.08, cxr no acute process.  - Today, afebrile >24hours, WBC 4.86  - ID consulted: agreed with antibiotics regimen and awaiting cultures.   - Echo to assess for vegetations pending  - CT abdomen: no acute process  - s/p IV Abx: Vanc, cefepime, cipro  - Now on IV cipro  -Blood cx no growth  - Can not obtain urine cx as patient is anuric   - ESR: 40, PROCAL: 4.36  - HIV and HEP panel pending.       ESRD HD ([facility])  - Nephro consulted, Dr. Felton  Per her note patient was seen by ID: Dr. Christina at  and recommended SERENITY  - HD  per nephro     IDDM  - A1c pending  -   - SSI  - POCT glucose QID     HTN  -stable  -continue home medications     Anemia of chronic disease  -Epo with HD  - folic acid     Code: full  Diet: Cardiac/diabetic 2000/renal   Ppx:  scd  Dispo: F/u ID consult, bcx, temperature     Maya Mckinnon D.O.  [facility] HO-1  05/22/2017

## 2017-05-22 NOTE — PROCEDURES
Pt seen and examined on HD, tolerating procedure well  Review of Systems   Constitutional: Negative for chills and fever.   Respiratory: Negative for cough and shortness of breath.    Cardiovascular: Negative for chest pain and leg swelling.   Gastrointestinal: Negative for nausea.     ,   AP -180  +200  Temp:  [97.8 °F (36.6 °C)-98.2 °F (36.8 °C)]   Pulse:  [57-58]   Resp:  [18-19]   BP: (108-122)/(56-69)   SpO2:  [97 %]     Physical Exam   Constitutional: He is oriented to person, place, and time and well-developed, well-nourished, and in no distress. No distress.   Morbidly obese   HENT:   Head: Normocephalic and atraumatic.   Mouth/Throat: Oropharynx is clear and moist.   Eyes: EOM are normal. No scleral icterus.   Neck: Neck supple. No JVD present.   Cardiovascular: Normal rate and regular rhythm.  Exam reveals no friction rub.    No murmur heard.  Pulmonary/Chest: Effort normal and breath sounds normal. No respiratory distress. He has no wheezes. He has no rales.   Abdominal: Soft. Bowel sounds are normal. He exhibits no distension. There is no tenderness.   Musculoskeletal: He exhibits no edema.   Neurological: He is alert and oriented to person, place, and time.   Skin: Skin is warm and dry. No rash noted. He is not diaphoretic. No erythema.   Psychiatric: Affect normal.       Recent Labs  Lab 05/21/17  0501 05/22/17  0456   WBC 5.46 4.86   HGB 9.4* 10.3*   HCT 29.1* 31.3*   PLT 90* 95*       Recent Labs  Lab 05/21/17  0501 05/22/17  0456    140   K 4.6 5.1   CL 95 94*   CO2 25 23   BUN 63* 83*   CREATININE 13.1* 15.8*   CALCIUM 8.5* 8.6*     A/P  1. ESRD (N18.6 Z99.2) - on HD with Dr. Araceli Pickens in [facility] MWF, 4.5 hours, EDW 140kg  2. HTN (I10) - controlled cont home meds  3. Anemia of chronic kidney disease treated with JUANITO (N18.9 D63.1) -hold epo today    Recent Labs  Lab 05/20/17  1139 05/21/17  0501 05/22/17  0456   HGB 10.6* 9.4* 10.3*   HCT 33.0* 29.1* 31.3*   * 90*  95*       Iron on hold due to FUO  Lab Results   Component Value Date    IRON 21 (L) 09/27/2011    TIBC 193 (L) 09/27/2011    FERRITIN 489 (H) 09/27/2011       4. MBD (E88.9 M90.80) -    Recent Labs  Lab 05/22/17  0456   CALCIUM 8.6*   PHOS 7.9*       Recent Labs  Lab 05/20/17  1139 05/21/17  0501 05/22/17  0456   MG 1.9 1.9 2.4       cont sensipar and Fosrenol    5. Hemodialysis Access (Z99.2 V45.11)-   6. Nutrition/Hypoalbuminemia (E88.09) - L wrist AVF, recent ballon PTA by Dr. Moran in [facility], f/u in 2 weeks    Recent Labs  Lab 05/21/17  0501 05/22/17  0456   ALBUMIN 3.2* 3.3*     Nepro with meals TID. Renal vitamins daily    7. DM2  8. Severe MIKE- BiPAP 16/8  9. Fever/chills - pan culture, appreciate ID recommendations--> 4th episode of different organism bacteremia in the last 8 weeks. I discussed case with Dr. Scanlon (EJ) - his ID doctor    Vanc + cefepime - now afebrile, blood cultures so far negative   He  needs SERENITY      Thank you for allowing me to participate in care of your patient  With any question please call [phone]  Margoth Felton    [facility]  SHALA Forrest MD, FACP,   KADEN Pickens MD,   MD GEORGIA Walker, NP  [address]  [place], [address]  [phone]  After hours answering service: [phone]

## 2017-05-22 NOTE — PROGRESS NOTES
V/O Dr IGGY Mckinnon Reduce dose Gabapentin to 300mg po qhs based on renal function(CrCl 7.2 ml/min)

## 2017-05-22 NOTE — PLAN OF CARE
Problem: Patient Care Overview  Goal: Plan of Care Review  Outcome: Ongoing (interventions implemented as appropriate)  Pt in NAD. AAOX4. Scheduled medication given per MAR. Blood sugar maintained WDL. Continuous cardiac monitoring in place box: 8641 normal sinus rhythm HR: 80s. Denies pain. Encouraged to call for assistance verbalized understanding. Safety maintained bed in low locked position, SR up X2, and call bell in reach. Will continue to monitor

## 2017-05-22 NOTE — H&P
56 year old man c/o of stye on left upper eyelid for 3- 4 months comes and goes not draining at present and is large. Cannot see well with eye due to DM, also sees Dr. Hammond for stye, was doing warm compresses but not doing better  POH - Sees retina specialist and has been getting injections in eye, cataracts  PMH - Admitted with fever and has IDDM, HTN, Kidney failure, sleep dyspnea  Exam Va cc OD = 20/50, OS - 20/200  Pupil- wnl, EOM - full, Ext + chalazion large on left upper eyelid  SLE Conj- clear OU, Cornea - clear OU, Lens - + NSC OU, AC- deep OU  Fundus - C:D = 0.3 OU, + NPDR OU with macular edema  Imp/PLan  1. Chalazion KAREN - will start Doxycycline 100 mg po bid for 10 days, Erythromycin oint OS qhs  Will come tomorrow to drain the chalazion, to have endoscopy tomorrow.  2. IDDM -  does have retinopathy, f/u with Dr. Pollock when discharged.

## 2017-05-23 LAB
ALBUMIN SERPL BCP-MCNC: 3.1 G/DL
ALP SERPL-CCNC: 166 U/L
ALT SERPL W/O P-5'-P-CCNC: 46 U/L
ANION GAP SERPL CALC-SCNC: 16 MMOL/L
AST SERPL-CCNC: 46 U/L
BASOPHILS # BLD AUTO: 0.01 K/UL
BASOPHILS NFR BLD: 0.3 %
BILIRUB SERPL-MCNC: 0.7 MG/DL
BUN SERPL-MCNC: 49 MG/DL
CALCIUM SERPL-MCNC: 7.8 MG/DL
CHLORIDE SERPL-SCNC: 96 MMOL/L
CO2 SERPL-SCNC: 27 MMOL/L
CREAT SERPL-MCNC: 10.5 MG/DL
DIASTOLIC DYSFUNCTION: NO
DIFFERENTIAL METHOD: ABNORMAL
EOSINOPHIL # BLD AUTO: 0.2 K/UL
EOSINOPHIL NFR BLD: 5.3 %
ERYTHROCYTE [DISTWIDTH] IN BLOOD BY AUTOMATED COUNT: 14.7 %
EST. GFR  (AFRICAN AMERICAN): 6 ML/MIN/1.73 M^2
EST. GFR  (NON AFRICAN AMERICAN): 5 ML/MIN/1.73 M^2
ESTIMATED PA SYSTOLIC PRESSURE: 15.11
GLUCOSE SERPL-MCNC: 163 MG/DL
HAV IGM SERPL QL IA: NEGATIVE
HBV CORE IGM SERPL QL IA: NEGATIVE
HBV SURFACE AG SERPL QL IA: NEGATIVE
HCT VFR BLD AUTO: 29.3 %
HCV AB SERPL QL IA: NEGATIVE
HGB BLD-MCNC: 9.4 G/DL
HIV 1+2 AB+HIV1 P24 AG SERPL QL IA: NEGATIVE
LYMPHOCYTES # BLD AUTO: 1.2 K/UL
LYMPHOCYTES NFR BLD: 29 %
MAGNESIUM SERPL-MCNC: 2.1 MG/DL
MCH RBC QN AUTO: 37.6 PG
MCHC RBC AUTO-ENTMCNC: 32.1 %
MCV RBC AUTO: 117 FL
MONOCYTES # BLD AUTO: 0.6 K/UL
MONOCYTES NFR BLD: 14.9 %
NEUTROPHILS # BLD AUTO: 2 K/UL
NEUTROPHILS NFR BLD: 50.5 %
PHOSPHATE SERPL-MCNC: 5.8 MG/DL
PLATELET # BLD AUTO: 92 K/UL
PMV BLD AUTO: 11 FL
POCT GLUCOSE: 155 MG/DL (ref 70–110)
POCT GLUCOSE: 163 MG/DL (ref 70–110)
POCT GLUCOSE: 166 MG/DL (ref 70–110)
POCT GLUCOSE: 197 MG/DL (ref 70–110)
POTASSIUM SERPL-SCNC: 4.1 MMOL/L
PROT SERPL-MCNC: 6.5 G/DL
RBC # BLD AUTO: 2.5 M/UL
RETIRED EF AND QEF - SEE NOTES: 55 (ref 55–65)
SODIUM SERPL-SCNC: 139 MMOL/L
WBC # BLD AUTO: 3.97 K/UL

## 2017-05-23 PROCEDURE — 25000003 PHARM REV CODE 250: Performed by: INTERNAL MEDICINE

## 2017-05-23 PROCEDURE — 25000003 PHARM REV CODE 250: Performed by: FAMILY MEDICINE

## 2017-05-23 PROCEDURE — 71000039 HC RECOVERY, EACH ADD'L HOUR: Performed by: INTERNAL MEDICINE

## 2017-05-23 PROCEDURE — 25000003 PHARM REV CODE 250: Performed by: OPHTHALMOLOGY

## 2017-05-23 PROCEDURE — 63600175 PHARM REV CODE 636 W HCPCS: Performed by: STUDENT IN AN ORGANIZED HEALTH CARE EDUCATION/TRAINING PROGRAM

## 2017-05-23 PROCEDURE — 84100 ASSAY OF PHOSPHORUS: CPT

## 2017-05-23 PROCEDURE — 25000003 PHARM REV CODE 250: Performed by: STUDENT IN AN ORGANIZED HEALTH CARE EDUCATION/TRAINING PROGRAM

## 2017-05-23 PROCEDURE — 36415 COLL VENOUS BLD VENIPUNCTURE: CPT

## 2017-05-23 PROCEDURE — 83735 ASSAY OF MAGNESIUM: CPT

## 2017-05-23 PROCEDURE — 089PXZZ DRAINAGE OF LEFT UPPER EYELID, EXTERNAL APPROACH: ICD-10-PCS | Performed by: OPHTHALMOLOGY

## 2017-05-23 PROCEDURE — 11000001 HC ACUTE MED/SURG PRIVATE ROOM

## 2017-05-23 PROCEDURE — 94761 N-INVAS EAR/PLS OXIMETRY MLT: CPT

## 2017-05-23 PROCEDURE — 93325 DOPPLER ECHO COLOR FLOW MAPG: CPT

## 2017-05-23 PROCEDURE — 85025 COMPLETE CBC W/AUTO DIFF WBC: CPT

## 2017-05-23 PROCEDURE — 80053 COMPREHEN METABOLIC PANEL: CPT

## 2017-05-23 PROCEDURE — 71000033 HC RECOVERY, INTIAL HOUR: Performed by: INTERNAL MEDICINE

## 2017-05-23 PROCEDURE — 93320 DOPPLER ECHO COMPLETE: CPT

## 2017-05-23 RX ORDER — FENTANYL CITRATE 50 UG/ML
100 INJECTION, SOLUTION INTRAMUSCULAR; INTRAVENOUS ONCE
Status: COMPLETED | OUTPATIENT
Start: 2017-05-23 | End: 2017-05-23

## 2017-05-23 RX ORDER — MIDAZOLAM HYDROCHLORIDE 1 MG/ML
2 INJECTION INTRAMUSCULAR; INTRAVENOUS ONCE
Status: COMPLETED | OUTPATIENT
Start: 2017-05-23 | End: 2017-05-23

## 2017-05-23 RX ADMIN — FENTANYL CITRATE 50 MCG: 50 INJECTION, SOLUTION INTRAMUSCULAR; INTRAVENOUS at 02:05

## 2017-05-23 RX ADMIN — DOXYCYCLINE HYCLATE 100 MG: 100 TABLET, COATED ORAL at 08:05

## 2017-05-23 RX ADMIN — GABAPENTIN 300 MG: 300 CAPSULE ORAL at 08:05

## 2017-05-23 RX ADMIN — PRAVASTATIN SODIUM 40 MG: 40 TABLET ORAL at 08:05

## 2017-05-23 RX ADMIN — Medication 1 CAPSULE: at 08:05

## 2017-05-23 RX ADMIN — ASPIRIN 81 MG CHEWABLE TABLET 81 MG: 81 TABLET CHEWABLE at 08:05

## 2017-05-23 RX ADMIN — FLUTICASONE PROPIONATE 1 SPRAY: 50 SPRAY, METERED NASAL at 08:05

## 2017-05-23 RX ADMIN — INSULIN DETEMIR 22 UNITS: 100 INJECTION, SOLUTION SUBCUTANEOUS at 08:05

## 2017-05-23 RX ADMIN — MIDAZOLAM HYDROCHLORIDE 1 MG: 1 INJECTION, SOLUTION INTRAMUSCULAR; INTRAVENOUS at 02:05

## 2017-05-23 RX ADMIN — FOLIC ACID 1 MG: 1 TABLET ORAL at 08:05

## 2017-05-23 RX ADMIN — LANTHANUM CARBONATE 1000 MG: 500 TABLET, CHEWABLE ORAL at 05:05

## 2017-05-23 RX ADMIN — ERYTHROMYCIN: 5 OINTMENT OPHTHALMIC at 08:05

## 2017-05-23 RX ADMIN — CEFEPIME HYDROCHLORIDE 1 G: 1 INJECTION, SOLUTION INTRAVENOUS at 08:05

## 2017-05-23 NOTE — SUBJECTIVE & OBJECTIVE
"Interval History: Patient feels a bit better - SERENITY done - another procedure planned for today    Review of Systems   All other systems reviewed and are negative.    Past Medical History:   Diagnosis Date    Diabetes mellitus type II     Dialysis patient     Hyperlipidemia     Hypertension     Kidney failure     MIKE (obstructive sleep apnea)     Urinary tract infection        Past Surgical History:   Procedure Laterality Date    AV FISTULA PLACEMENT      left lower arm    TONSILLECTOMY, ADENOIDECTOMY         Review of patient's allergies indicates:   Allergen Reactions    Keflex [cephalexin] Nausea Only       Medications:  Prescriptions Prior to Admission   Medication Sig    ammonium lactate 12 % Crea Apply 1 application topically 3 (three) times daily as needed.    aspirin 81 MG Chew Take 1 tablet (81 mg total) by mouth once daily.    AZOPT 1 % ophthalmic suspension     B complex-vitamin C-folic acid (B COMPLEX-VITAMIN C-FOLIC ACID) 0.8 mg Tab Take 0.8 mg by mouth once daily.      BD INSULIN PEN NEEDLE UF MINI 31 gauge x 3/16" Ndle USE 4 TO 5 TIMES A DAY    blood sugar diagnostic (ONETOUCH ULTRA TEST) Strp TEST BLOOD GLUCOSE FOUR TIMES A DAY. At AM, noon, 6pm and bedtime.    blood-glucose meter (ADVANCED GLUCOSE METER) Misc 1 each by Misc.(Non-Drug; Combo Route) route 4 (four) times daily with meals and nightly.    bromfenac (XIBROM) 0.09 % ophthalmic solution     ceFEPIme in dextrose 5% (MAXIPIME) 2 gram/50 mL PgBk Following dialysis:  2 gram IV Q Mon and Wed x 2 weeks  3 gram IV Q Friday x 2 weeks    diclofenac (VOLTAREN) 0.1 % ophthalmic solution     doxycycline (ADOXA) 50 MG tablet Take 50 mg by mouth once daily.    doxycycline (VIBRAMYCIN) 50 MG capsule     fluticasone (FLONASE) 50 mcg/actuation nasal spray 1 spray by Each Nare route once daily.    folic acid (FOLVITE) 1 MG tablet Take 1 mg by mouth once daily.    FOSRENOL 1,000 mg chewable tablet 3 (three) times daily with meals.     " gabapentin (NEURONTIN) 300 MG capsule TAKE 1 BY MOUTH EVERY      EVENING    insulin glargine (LANTUS SOLOSTAR) 100 unit/mL (3 mL) InPn pen Inject 55 Units into the skin every evening.    lancets Misc 1 each by Misc.(Non-Drug; Combo Route) route 4 (four) times daily with meals and nightly.    liraglutide 0.6 mg/0.1 mL, 18 mg/3 mL, subq PNIJ (VICTOZA 2-SHOAIB) 0.6 mg/0.1 mL (18 mg/3 mL) PnIj Inject 1.2 mg into the skin every morning.    NOVOLOG FLEXPEN 100 unit/mL InPn pen 15 Units 3 (three) times daily with meals.     omega-3 acid ethyl esters (LOVAZA) 1 gram capsule Take 2 capsules (2 g total) by mouth 2 (two) times daily.    pravastatin (PRAVACHOL) 40 MG tablet Take 1 tablet (40 mg total) by mouth once daily.    prednisoLONE acetate (PRED FORTE) 1 % DrpS     SENSIPAR 30 mg Tab      Antibiotics     Start     Stop Route Frequency Ordered    05/21/17 0900  cefepime in dextrose 5 % 1 gram/50 mL IVPB 1 g      -- IV Daily 05/20/17 1717        Antifungals     None        Antivirals     None           There is no immunization history for the selected administration types on file for this patient.    Family History     Problem Relation (Age of Onset)    Colon cancer Mother    Diabetes Mother, Father, Maternal Grandmother, Maternal Grandfather    Heart disease Father    Hypertension Father    Lung cancer Mother        Social History     Social History    Marital status: Single     Spouse name: N/A    Number of children: N/A    Years of education: N/A     Social History Main Topics    Smoking status: Never Smoker    Smokeless tobacco: Never Used    Alcohol use No    Drug use: No    Sexual activity: Not Currently     Other Topics Concern    None     Social History Narrative    None     Review of Systems   Constitutional: Positive for fever. Negative for chills, diaphoresis and fatigue.   HENT: Negative.    Eyes: Negative.    Respiratory: Negative.    Cardiovascular: Negative.    Gastrointestinal: Positive for  abdominal distention and diarrhea.   Endocrine: Negative.    Genitourinary: Positive for penile pain.   Musculoskeletal: Negative.    Allergic/Immunologic: Negative.    Neurological: Negative.    Hematological: Negative.    Psychiatric/Behavioral: Negative.      Objective:     Vital Signs (Most Recent):  Temp: 99.1 °F (37.3 °C) (05/21/17 0800)  Pulse: 69 (05/21/17 0800)  Resp: 19 (05/21/17 0800)  BP: 126/67 (05/21/17 0800)  SpO2: 96 % (05/21/17 1609) Vital Signs (24h Range):  Temp:  [99.1 °F (37.3 °C)-100.7 °F (38.2 °C)] 99.1 °F (37.3 °C)  Pulse:  [69-83] 69  Resp:  [16-19] 19  SpO2:  [94 %-98 %] 96 %  BP: (108-160)/(56-91) 126/67     Weight: (!) 140.6 kg (310 lb)  Body mass index is 48.55 kg/m².    Estimated Creatinine Clearance: 8.7 mL/min (based on Cr of 13.1).    Physical Exam   Constitutional: He is oriented to person, place, and time. He appears well-developed and well-nourished.   HENT:   Head: Normocephalic.   Neck: Normal range of motion. Neck supple.   Cardiovascular: Normal rate, regular rhythm, normal heart sounds and intact distal pulses.    Pulmonary/Chest: Effort normal and breath sounds normal.   Abdominal: Soft. He exhibits distension.   Musculoskeletal: Normal range of motion.   Neurological: He is alert and oriented to person, place, and time.   Skin: Skin is warm and dry.   Left arm access wnl      Past Medical History:   Diagnosis Date    Diabetes mellitus type II     Dialysis patient     Hyperlipidemia     Hypertension     Kidney failure     MIKE (obstructive sleep apnea)     Urinary tract infection        Past Surgical History:   Procedure Laterality Date    AV FISTULA PLACEMENT      left lower arm    TONSILLECTOMY, ADENOIDECTOMY         Review of patient's allergies indicates:   Allergen Reactions    Keflex [cephalexin] Nausea Only       Medications:  Prescriptions Prior to Admission   Medication Sig    ammonium lactate 12 % Crea Apply 1 application topically 3 (three) times daily  "as needed.    aspirin 81 MG Chew Take 1 tablet (81 mg total) by mouth once daily.    AZOPT 1 % ophthalmic suspension     B complex-vitamin C-folic acid (B COMPLEX-VITAMIN C-FOLIC ACID) 0.8 mg Tab Take 0.8 mg by mouth once daily.      BD INSULIN PEN NEEDLE UF MINI 31 gauge x 3/16" Ndle USE 4 TO 5 TIMES A DAY    blood sugar diagnostic (ONETOUCH ULTRA TEST) Strp TEST BLOOD GLUCOSE FOUR TIMES A DAY. At AM, noon, 6pm and bedtime.    blood-glucose meter (ADVANCED GLUCOSE METER) Misc 1 each by Misc.(Non-Drug; Combo Route) route 4 (four) times daily with meals and nightly.    bromfenac (XIBROM) 0.09 % ophthalmic solution     ceFEPIme in dextrose 5% (MAXIPIME) 2 gram/50 mL PgBk Following dialysis:  2 gram IV Q Mon and Wed x 2 weeks  3 gram IV Q Friday x 2 weeks    diclofenac (VOLTAREN) 0.1 % ophthalmic solution     doxycycline (ADOXA) 50 MG tablet Take 50 mg by mouth once daily.    doxycycline (VIBRAMYCIN) 50 MG capsule     fluticasone (FLONASE) 50 mcg/actuation nasal spray 1 spray by Each Nare route once daily.    folic acid (FOLVITE) 1 MG tablet Take 1 mg by mouth once daily.    FOSRENOL 1,000 mg chewable tablet 3 (three) times daily with meals.     gabapentin (NEURONTIN) 300 MG capsule TAKE 1 BY MOUTH EVERY      EVENING    insulin glargine (LANTUS SOLOSTAR) 100 unit/mL (3 mL) InPn pen Inject 55 Units into the skin every evening.    lancets Misc 1 each by Misc.(Non-Drug; Combo Route) route 4 (four) times daily with meals and nightly.    liraglutide 0.6 mg/0.1 mL, 18 mg/3 mL, subq PNIJ (VICTOZA 2-SHOAIB) 0.6 mg/0.1 mL (18 mg/3 mL) PnIj Inject 1.2 mg into the skin every morning.    NOVOLOG FLEXPEN 100 unit/mL InPn pen 15 Units 3 (three) times daily with meals.     omega-3 acid ethyl esters (LOVAZA) 1 gram capsule Take 2 capsules (2 g total) by mouth 2 (two) times daily.    pravastatin (PRAVACHOL) 40 MG tablet Take 1 tablet (40 mg total) by mouth once daily.    prednisoLONE acetate (PRED FORTE) 1 % Abhay  "    SENSIPAR 30 mg Tab      Antibiotics     Start     Stop Route Frequency Ordered    05/21/17 0900  cefepime in dextrose 5 % 1 gram/50 mL IVPB 1 g      -- IV Daily 05/20/17 1717        Antifungals     None        Antivirals     None           There is no immunization history for the selected administration types on file for this patient.    Family History     Problem Relation (Age of Onset)    Colon cancer Mother    Diabetes Mother, Father, Maternal Grandmother, Maternal Grandfather    Heart disease Father    Hypertension Father    Lung cancer Mother        Social History     Social History    Marital status: Single     Spouse name: N/A    Number of children: N/A    Years of education: N/A     Social History Main Topics    Smoking status: Never Smoker    Smokeless tobacco: Never Used    Alcohol use No    Drug use: No    Sexual activity: Not Currently     Other Topics Concern    None     Social History Narrative    None     Review of Systems   Constitutional: Positive for fever. Negative for chills, diaphoresis and fatigue.   HENT: Negative.    Eyes: Negative.    Respiratory: Negative.    Cardiovascular: Negative.    Gastrointestinal: Positive for abdominal distention and diarrhea.   Endocrine: Negative.    Genitourinary: Negative.    Musculoskeletal: Negative.    Allergic/Immunologic: Negative.    Neurological: Negative.    Hematological: Negative.    Psychiatric/Behavioral: Negative.      Objective:     Vital Signs (Most Recent):  Temp: 99.1 °F (37.3 °C) (05/21/17 0800)  Pulse: 69 (05/21/17 0800)  Resp: 19 (05/21/17 0800)  BP: 126/67 (05/21/17 0800)  SpO2: 96 % (05/21/17 1609) Vital Signs (24h Range):  Temp:  [99.1 °F (37.3 °C)-100.7 °F (38.2 °C)] 99.1 °F (37.3 °C)  Pulse:  [69-83] 69  Resp:  [16-19] 19  SpO2:  [94 %-98 %] 96 %  BP: (108-160)/(56-91) 126/67     Weight: (!) 140.6 kg (310 lb)  Body mass index is 48.55 kg/m².    Estimated Creatinine Clearance: 8.7 mL/min (based on Cr of 13.1).    Physical  Exam   Constitutional: He is oriented to person, place, and time. He appears well-developed and well-nourished.   HENT:   Head: Normocephalic.   Neck: Normal range of motion. Neck supple.   Cardiovascular: Normal rate, regular rhythm, normal heart sounds and intact distal pulses.    Pulmonary/Chest: Effort normal and breath sounds normal.   Abdominal: Soft. He exhibits distension.   Musculoskeletal: Normal range of motion.   Neurological: He is alert and oriented to person, place, and time.   Skin: Skin is warm and dry.   Left arm access wnl        Significant Labs: All pertinent labs within the past 24 hours have been reviewed.    Significant Imaging: I have reviewed all pertinent imaging results/findings within the past 24 hours.    Significant Labs: All pertinent labs within the past 24 hours have been reviewed.    Significant Imaging: I have reviewed all pertinent imaging results/findings within the past 24 hours.  Objective:     Vital Signs (Most Recent):  Temp: 97 °F (36.1 °C) (05/23/17 1455)  Pulse: (!) 48 (05/23/17 1425)  Resp: 12 (05/23/17 1425)  BP: (!) 103/52 (05/23/17 1440)  SpO2: 98 % (05/23/17 1603) Vital Signs (24h Range):  Temp:  [97 °F (36.1 °C)-99.1 °F (37.3 °C)] 97 °F (36.1 °C)  Pulse:  [48-65] 48  Resp:  [12-20] 12  SpO2:  [91 %-99 %] 98 %  BP: ()/(52-95) 103/52     Weight: (!) 140.6 kg (310 lb)  Body mass index is 48.55 kg/m².    Estimated Creatinine Clearance: 10.9 mL/min (based on Cr of 10.5).    Physical Exam    Significant Labs: All pertinent labs within the past 24 hours have been reviewed.    Significant Imaging: I have reviewed all pertinent imaging results/findings within the past 24 hours.

## 2017-05-23 NOTE — PROGRESS NOTES
Progress Note  [facility]    Admit Date: 5/20/2017   LOS: 3 days     SUBJECTIVE:     Follow-up For:  Fever    ID: Patient is a 56 y.o. male with a pmhx of ESRD with HD MWF (aneuric) , HTN., IDDM, recent  bacteriemas (treated with cefepime x 2 weeks) admitted for fevers.     Subjective:  Patient seen and examined. Patient remains afebrile. He c/o hunger (currently NPO pending SERENITY). He has not other complaints. Denies any CP, SOB, N/V/D, headaches, fever or chills. '      Scheduled Meds:   sodium chloride 0.9%   Intravenous Once    aspirin  81 mg Oral Daily    ceFEPime (MAXIPIME) IVPB  1 g Intravenous Daily    cinacalcet  30 mg Oral Daily with breakfast    doxycycline  100 mg Oral Q12H    erythromycin   Left Eye QHS    fluticasone  1 spray Each Nare Daily    folic acid  1 mg Oral Daily    gabapentin  300 mg Oral QHS    insulin detemir  22 Units Subcutaneous BID    lanthanum  1,000 mg Oral TID WM    pravastatin  40 mg Oral Daily    vitamin renal formula (B-complex-vitamin c-folic acid)  1 capsule Oral Daily     Continuous Infusions:   PRN Meds:sodium chloride 0.9%, dextrose 50%, dextrose 50%, glucagon (human recombinant), glucose, glucose, insulin aspart, ondansetron, pneumoc 13-edwin conj-dip cr(PF), simethicone    Review of patient's allergies indicates:   Allergen Reactions    Keflex [cephalexin] Nausea Only       Review of Systems  Review of Systems   Constitutional: Negative for activity change, chills.  HENT: Negative for congestion, rhinorrhea and sore throat.    Eyes: Negative for visual disturbance.   Respiratory: Negative for cough, chest tightness and shortness of breath.    Cardiovascular: Negative for chest pain, palpitations and leg swelling.   Gastrointestinal: Negative constipation, diarrhea, nausea and vomiting.   Genitourinary: does not produce urine at baseline  Musculoskeletal: Negative for arthralgias and myalgias.   Skin: Negative for rash.   Neurological: Negative for  dizziness, light-headedness and headaches.   Psychiatric/Behavioral:  Negative for agitation.       OBJECTIVE:     Vital Signs (Most Recent)  Temp: 97.3 °F (36.3 °C) (05/23/17 0800)  Pulse: 61 (05/23/17 0800)  Resp: 18 (05/23/17 0800)  BP: (!) 89/52 (05/23/17 0800)  SpO2: 99 % (05/23/17 0819)    Vital Signs Range (Last 24H):  Temp:  [97.3 °F (36.3 °C)-99.1 °F (37.3 °C)]   Pulse:  [55-72]   Resp:  [18-19]   BP: ()/(52-88)   SpO2:  [98 %-99 %]     I & O (Last 24H):    Intake/Output Summary (Last 24 hours) at 05/23/17 0937  Last data filed at 05/22/17 1420   Gross per 24 hour   Intake              400 ml   Output             2400 ml   Net            -2000 ml     Wt Readings from Last 3 Encounters:   05/20/17 (!) 140.6 kg (310 lb)   05/09/17 (!) 141.4 kg (311 lb 11.7 oz)   04/30/17 (!) 141.3 kg (311 lb 8.2 oz)     Physical Exam:  Physical Exam   Constitutional:  oriented to person, place, and time.  appears well-developed and well-nourished. No distress.   HENT:   Head: stye on left upper eye lid  Mouth/Throat: Oropharynx is clear and moist.   Eyes: Conjunctivae and EOM are normal. Pupils are equal, round, and reactive to light.   Neck: Normal range of motion. Neck supple.   Cardiovascular: Normal rate, regular rhythm, normal heart sounds and intact distal pulses.    Pulmonary/Chest: Effort normal and breath sounds normal.   Abdominal: Soft. Bowel sounds are normal. no distension. There is no tenderness.   Musculoskeletal: Normal range of motion.  no edema, tenderness or deformity.   Neurological:  alert and oriented to person, place, and time.  normal reflexes.   Skin: Left AVF   Psychiatric:  normal mood and affect.  behavior is normal. Judgment and thought content normal.   Nursing note and vitals reviewed.    Laboratory:  LABS  CBC    Recent Labs  Lab 05/21/17  0501 05/22/17  0456 05/23/17  0622   WBC 5.46 4.86 3.97   RBC 2.46* 2.72* 2.50*   HGB 9.4* 10.3* 9.4*   HCT 29.1* 31.3* 29.3*   PLT 90* 95* 92*   MCV  118* 115* 117*   MCH 38.2* 37.9* 37.6*   MCHC 32.3 32.9 32.1     BMP    Recent Labs  Lab 05/21/17  0501 05/22/17  0456 05/23/17  0622    140 139   K 4.6 5.1 4.1   CO2 25 23 27   CL 95 94* 96   BUN 63* 83* 49*   CREATININE 13.1* 15.8* 10.5*   * 162* 163*       POCT-Glucose  POCT Glucose   Date Value Ref Range Status   05/23/2017 166 (H) 70 - 110 mg/dL Final   05/22/2017 188 (H) 70 - 110 mg/dL Final   05/22/2017 169 (H) 70 - 110 mg/dL Final   05/22/2017 199 (H) 70 - 110 mg/dL Final   05/21/2017 269 (H) 70 - 110 mg/dL Final   05/21/2017 217 (H) 70 - 110 mg/dL Final   05/21/2017 283 (H) 70 - 110 mg/dL Final   05/21/2017 171 (H) 70 - 110 mg/dL Final   05/20/2017 193 (H) 70 - 110 mg/dL Final   05/20/2017 89 70 - 110 mg/dL Final         Recent Labs  Lab 05/21/17  0501 05/22/17  0456 05/23/17  0622   CALCIUM 8.5* 8.6* 7.8*   MG 1.9 2.4 2.1   PHOS 6.6* 7.9* 5.8*     LFT    Recent Labs  Lab 05/21/17  0501 05/22/17  0456 05/23/17  0622   PROT 6.6 6.9 6.5   ALBUMIN 3.2* 3.3* 3.1*   BILITOT 0.9 0.7 0.7   AST 36 51* 46*   ALKPHOS 127 171* 166*   ALT 36 48* 46*     LAST HbA1c  Lab Results   Component Value Date    HGBA1C 6.6 (H) 05/21/2017       ASSESSMENT/PLAN:   Patient is a 56 y.o. male with a pmhx of ESRD with HD MWF (aneuric) , HTN., IDDM, hx of serratia bacteremia admitted for fevers.      Fevers of unknown origin  - Patient with recent hospitialziation for fevers 2/2 serratia bacterima and completed 2 week course of IV Cefepime per ID recommendations.   - In ED Tmax 101.1, LA wnl, WBC 5.08, cxr no acute process.  - Today, afebrile since 5/20 @0508   - WBC 3.97  - ID consulted  - Echo: no vegatations noted.  SERENITY pending  - CT abdomen: no acute process  - Now on IV cipro  -Blood cx no growth  - HIV and HEP panel pending.   - Flu negative      ESRD HD ([facility])  - Nephro consulted, Dr. Felton  Per her note patient was seen by ID: Dr. Christina at  and recommended SERENITY  - HD per nephro     IDDM  - A1c pending  - BG  162  - SSI  - POCT glucose QID     HTN  -stable  -continue home medications     Anemia of chronic disease  -Epo with HD  - folic acid     Code: full  Diet: Cardiac/diabetic 2000/renal   Ppx:  scd  Dispo: F/u ID consult, bcx, temperature     Maya Mckinnon D.O.  [facility] HO-1  05/23/2017

## 2017-05-23 NOTE — PROGRESS NOTES
Nephrology Progress Note    DATE OF ADMISSION: 5/20/2017 10:47 AM  DATE OF SERVICE: 5/23/2017  REFERRING PHYSICIAN: Jamal Millan MD  REASON FOR CONSULTATION: ESRD    He is doing well, says he typically has fever on dialysis days, last fever was Sunday, none yesterday after dialysis.    Physical Exam:  Temp:  [97 °F (36.1 °C)-99.1 °F (37.3 °C)] 97 °F (36.1 °C)  Pulse:  [48-65] 48  Resp:  [12-20] 12  SpO2:  [91 %-99 %] 98 %  BP: ()/(52-95) 103/52  Vitals:    05/23/17 1440 05/23/17 1450 05/23/17 1455 05/23/17 1603   BP: (!) 103/52      BP Location:       Patient Position:       BP Method:       Pulse:       Resp:       Temp:   97 °F (36.1 °C)    TempSrc:   Skin    SpO2:  (!) 94% 95% 98%   Weight:       Height:         Intake/Output Summary (Last 24 hours) at 05/23/17 1607  Last data filed at 05/23/17 1456   Gross per 24 hour   Intake               50 ml   Output                0 ml   Net               50 ml     Wt Readings from Last 1 Encounters:   05/20/17 1645 (!) 140.6 kg (310 lb)   05/20/17 1030 (!) 140.2 kg (309 lb)     Vitals - reviewed  Gen - No acute distress, well nourished, well developed, obese  HENT - normocephalic, atraumatic  Eyes - extraocular motions intact  CVS - regular rate and rhythm, no murmurs or rubs  Resp - Clear to auscultation, no wheezes, rales, or rhonchi  Abd - obese, Soft, non-tender, non-distended, no rebound or guarding  Ext - no cyanosis or edema  Skin - no rash or lesions appreciated  Neuro - alert and oriented, speech normal, moving all four extremities  Psych - Normal mood and affect, normal thought process  Access - L wrist AVF with good thrill, no erythema, induration, TTP or signs of infection noted.  Healing sites from old sutures removed    Meds - Scheduled Meds:   sodium chloride 0.9%   Intravenous Once    aspirin  81 mg Oral Daily    benzocaine   Mouth/Throat Once    ceFEPime (MAXIPIME) IVPB  1 g Intravenous Daily    cinacalcet  30 mg Oral Daily with breakfast     doxycycline  100 mg Oral Q12H    erythromycin   Left Eye QHS    fluticasone  1 spray Each Nare Daily    folic acid  1 mg Oral Daily    gabapentin  300 mg Oral QHS    insulin detemir  22 Units Subcutaneous BID    lanthanum  1,000 mg Oral TID WM    pravastatin  40 mg Oral Daily    vitamin renal formula (B-complex-vitamin c-folic acid)  1 capsule Oral Daily     Continuous Infusions:   PRN Meds:.sodium chloride 0.9%, dextrose 50%, dextrose 50%, glucagon (human recombinant), glucose, glucose, insulin aspart, ondansetron, pneumoc 13-edwin conj-dip cr(PF), simethicone    Labs -   Recent Labs  Lab 05/21/17  0501 05/22/17  0456 05/23/17  0622   WBC 5.46 4.86 3.97   HGB 9.4* 10.3* 9.4*   HCT 29.1* 31.3* 29.3*   PLT 90* 95* 92*   MONO 13.7  0.8 12.6  0.6 14.9  0.6       Recent Labs  Lab 05/21/17  0501 05/22/17  0456 05/23/17  0622    140 139   K 4.6 5.1 4.1   CL 95 94* 96   CO2 25 23 27   BUN 63* 83* 49*   CREATININE 13.1* 15.8* 10.5*   CALCIUM 8.5* 8.6* 7.8*   PHOS 6.6* 7.9* 5.8*   MG 1.9 2.4 2.1     Imaging:   Echo 5/23/17:  EF: normal > 55%.   Valve: no significant valvular abnormalities. No significant regurgitation or stenosis.   No vegetations seen.   Mild descending aortic plaques    A/P   1. ESRD - usual HD on MWF with Dr Araceli Pick[facility] with Rx: 4hrs 45 min, Dry weight 140kg.   - hold HD today and continue HD on usual MWF schedule  - renally dose medications for dialysis  2. HTN - continue current regimen  3. Anemia of chronic kidney disease - Epo with HD, holding IV iron in the setting of FUO  4. MBD/secondary hyperparathyroidism - uncontrolled hyperphosphatemia, continue sensipar and forenol, low phos diet  5. Access - L wrist AVF, recent ballon PTA by Dr. Moran in [facility], f/u in 2 weeks  6. Nutrition - Nepro with meals, renal vitamins, renal diet     DM2  Severe MIKE- BiPAP 16/8    Fever/chills - Cx thus far NGTD, appreciate ID recommendations--> Dr. Scanlon () - his ID doctor    Cefepime and Doxy - now afebrile, blood cultures so far negative  Negative SERENITY    Thank you for allowing me to participate in the care of your patient.  Please call with any questions.    Hazel Chew MD   Nephrology  [facility]  Office [phone]    Cross covering for:  [facility]  SHALA Forrest MD, KADEN MITCHELL MD,   MD GEORGIA Walker, NP  [address]  [place], [address] [phone]  After hours answering service: [phone]

## 2017-05-23 NOTE — PLAN OF CARE
Problem: Patient Care Overview  Goal: Plan of Care Review  Outcome: Ongoing (interventions implemented as appropriate)  Received pt on RA; no distress noted.

## 2017-05-23 NOTE — PLAN OF CARE
Problem: Patient Care Overview  Goal: Plan of Care Review  Outcome: Ongoing (interventions implemented as appropriate)  Pt on RA, SPO2  99%.  Pt in no apparent respiratory distress. Will continue to monitor.

## 2017-05-23 NOTE — PLAN OF CARE
Pt resting quietly, denies complaints, VSS. O2 sat 93-95% on room air. Back to baseline. Will call report to floor.

## 2017-05-23 NOTE — BRIEF OP NOTE
Transesophageal echocardiogram.   Indication: bacteremia.     Staff: Donaldo Mai MD.   Fellow: Colin Solorio MD.     Consent obtained.     Sedation: Versed and fentanyl. See nursing note for exact dosage.     Findings:   EF: normal > 55%.   Valve: no significant valvular abnormalities. No significant regurgitation or stenosis.   No vegetations seen.   Mild descending aortic plaques.     Complications: none.     Plan/rec:   Post procedure recovery per protocol.   Continue treatment for bacteremia per ID protocol.       Colin Solorio MD

## 2017-05-23 NOTE — PROGRESS NOTES
"[facility]  Infectious Disease  Progress Note    Patient Name: Angel Faremr Jr.  MRN: [id number]  Admission Date: 5/20/2017  Length of Stay: 3 days  Attending Physician: Jamal Millan MD  Primary Care Provider: Vadim eBrry Iii, MD    Isolation Status: No active isolations  Assessment/Plan:      * Fever    Doing a bit better - review of EJ cultures and [facility] not that helpful   SERENITY done - report pending     Another procedure planned for today     For now keep on cefipime             Anticipated Disposition:     Thank you for your consult. I will follow-up with patient. Please contact us if you have any additional questions.    Galileo Katz MD  Infectious Disease  [facility]    Subjective:     Principal Problem:Fever    HPI: No notes on file  Interval History: Patient feels a bit better - SERENITY done - another procedure planned for today    Review of Systems   All other systems reviewed and are negative.    Past Medical History:   Diagnosis Date    Diabetes mellitus type II     Dialysis patient     Hyperlipidemia     Hypertension     Kidney failure     MIKE (obstructive sleep apnea)     Urinary tract infection        Past Surgical History:   Procedure Laterality Date    AV FISTULA PLACEMENT      left lower arm    TONSILLECTOMY, ADENOIDECTOMY         Review of patient's allergies indicates:   Allergen Reactions    Keflex [cephalexin] Nausea Only       Medications:  Prescriptions Prior to Admission   Medication Sig    ammonium lactate 12 % Crea Apply 1 application topically 3 (three) times daily as needed.    aspirin 81 MG Chew Take 1 tablet (81 mg total) by mouth once daily.    AZOPT 1 % ophthalmic suspension     B complex-vitamin C-folic acid (B COMPLEX-VITAMIN C-FOLIC ACID) 0.8 mg Tab Take 0.8 mg by mouth once daily.      BD INSULIN PEN NEEDLE UF MINI 31 gauge x 3/16" Ndle USE 4 TO 5 TIMES A DAY    blood sugar diagnostic (ONETOUCH ULTRA TEST) Strp TEST BLOOD " GLUCOSE FOUR TIMES A DAY. At AM, noon, 6pm and bedtime.    blood-glucose meter (ADVANCED GLUCOSE METER) Misc 1 each by Misc.(Non-Drug; Combo Route) route 4 (four) times daily with meals and nightly.    bromfenac (XIBROM) 0.09 % ophthalmic solution     ceFEPIme in dextrose 5% (MAXIPIME) 2 gram/50 mL PgBk Following dialysis:  2 gram IV Q Mon and Wed x 2 weeks  3 gram IV Q Friday x 2 weeks    diclofenac (VOLTAREN) 0.1 % ophthalmic solution     doxycycline (ADOXA) 50 MG tablet Take 50 mg by mouth once daily.    doxycycline (VIBRAMYCIN) 50 MG capsule     fluticasone (FLONASE) 50 mcg/actuation nasal spray 1 spray by Each Nare route once daily.    folic acid (FOLVITE) 1 MG tablet Take 1 mg by mouth once daily.    FOSRENOL 1,000 mg chewable tablet 3 (three) times daily with meals.     gabapentin (NEURONTIN) 300 MG capsule TAKE 1 BY MOUTH EVERY      EVENING    insulin glargine (LANTUS SOLOSTAR) 100 unit/mL (3 mL) InPn pen Inject 55 Units into the skin every evening.    lancets Misc 1 each by Misc.(Non-Drug; Combo Route) route 4 (four) times daily with meals and nightly.    liraglutide 0.6 mg/0.1 mL, 18 mg/3 mL, subq PNIJ (VICTOZA 2-SHOAIB) 0.6 mg/0.1 mL (18 mg/3 mL) PnIj Inject 1.2 mg into the skin every morning.    NOVOLOG FLEXPEN 100 unit/mL InPn pen 15 Units 3 (three) times daily with meals.     omega-3 acid ethyl esters (LOVAZA) 1 gram capsule Take 2 capsules (2 g total) by mouth 2 (two) times daily.    pravastatin (PRAVACHOL) 40 MG tablet Take 1 tablet (40 mg total) by mouth once daily.    prednisoLONE acetate (PRED FORTE) 1 % DrpS     SENSIPAR 30 mg Tab      Antibiotics     Start     Stop Route Frequency Ordered    05/21/17 0900  cefepime in dextrose 5 % 1 gram/50 mL IVPB 1 g      -- IV Daily 05/20/17 1717        Antifungals     None        Antivirals     None           There is no immunization history for the selected administration types on file for this patient.    Family History     Problem Relation  (Age of Onset)    Colon cancer Mother    Diabetes Mother, Father, Maternal Grandmother, Maternal Grandfather    Heart disease Father    Hypertension Father    Lung cancer Mother        Social History     Social History    Marital status: Single     Spouse name: N/A    Number of children: N/A    Years of education: N/A     Social History Main Topics    Smoking status: Never Smoker    Smokeless tobacco: Never Used    Alcohol use No    Drug use: No    Sexual activity: Not Currently     Other Topics Concern    None     Social History Narrative    None     Review of Systems   Constitutional: Positive for fever. Negative for chills, diaphoresis and fatigue.   HENT: Negative.    Eyes: Negative.    Respiratory: Negative.    Cardiovascular: Negative.    Gastrointestinal: Positive for abdominal distention and diarrhea.   Endocrine: Negative.    Genitourinary: Positive for penile pain.   Musculoskeletal: Negative.    Allergic/Immunologic: Negative.    Neurological: Negative.    Hematological: Negative.    Psychiatric/Behavioral: Negative.      Objective:     Vital Signs (Most Recent):  Temp: 99.1 °F (37.3 °C) (05/21/17 0800)  Pulse: 69 (05/21/17 0800)  Resp: 19 (05/21/17 0800)  BP: 126/67 (05/21/17 0800)  SpO2: 96 % (05/21/17 1609) Vital Signs (24h Range):  Temp:  [99.1 °F (37.3 °C)-100.7 °F (38.2 °C)] 99.1 °F (37.3 °C)  Pulse:  [69-83] 69  Resp:  [16-19] 19  SpO2:  [94 %-98 %] 96 %  BP: (108-160)/(56-91) 126/67     Weight: (!) 140.6 kg (310 lb)  Body mass index is 48.55 kg/m².    Estimated Creatinine Clearance: 8.7 mL/min (based on Cr of 13.1).    Physical Exam   Constitutional: He is oriented to person, place, and time. He appears well-developed and well-nourished.   HENT:   Head: Normocephalic.   Neck: Normal range of motion. Neck supple.   Cardiovascular: Normal rate, regular rhythm, normal heart sounds and intact distal pulses.    Pulmonary/Chest: Effort normal and breath sounds normal.   Abdominal: Soft. He  "exhibits distension.   Musculoskeletal: Normal range of motion.   Neurological: He is alert and oriented to person, place, and time.   Skin: Skin is warm and dry.   Left arm access wnl      Past Medical History:   Diagnosis Date    Diabetes mellitus type II     Dialysis patient     Hyperlipidemia     Hypertension     Kidney failure     MIKE (obstructive sleep apnea)     Urinary tract infection        Past Surgical History:   Procedure Laterality Date    AV FISTULA PLACEMENT      left lower arm    TONSILLECTOMY, ADENOIDECTOMY         Review of patient's allergies indicates:   Allergen Reactions    Keflex [cephalexin] Nausea Only       Medications:  Prescriptions Prior to Admission   Medication Sig    ammonium lactate 12 % Crea Apply 1 application topically 3 (three) times daily as needed.    aspirin 81 MG Chew Take 1 tablet (81 mg total) by mouth once daily.    AZOPT 1 % ophthalmic suspension     B complex-vitamin C-folic acid (B COMPLEX-VITAMIN C-FOLIC ACID) 0.8 mg Tab Take 0.8 mg by mouth once daily.      BD INSULIN PEN NEEDLE UF MINI 31 gauge x 3/16" Ndle USE 4 TO 5 TIMES A DAY    blood sugar diagnostic (ONETOUCH ULTRA TEST) Strp TEST BLOOD GLUCOSE FOUR TIMES A DAY. At AM, noon, 6pm and bedtime.    blood-glucose meter (ADVANCED GLUCOSE METER) Misc 1 each by Misc.(Non-Drug; Combo Route) route 4 (four) times daily with meals and nightly.    bromfenac (XIBROM) 0.09 % ophthalmic solution     ceFEPIme in dextrose 5% (MAXIPIME) 2 gram/50 mL PgBk Following dialysis:  2 gram IV Q Mon and Wed x 2 weeks  3 gram IV Q Friday x 2 weeks    diclofenac (VOLTAREN) 0.1 % ophthalmic solution     doxycycline (ADOXA) 50 MG tablet Take 50 mg by mouth once daily.    doxycycline (VIBRAMYCIN) 50 MG capsule     fluticasone (FLONASE) 50 mcg/actuation nasal spray 1 spray by Each Nare route once daily.    folic acid (FOLVITE) 1 MG tablet Take 1 mg by mouth once daily.    FOSRENOL 1,000 mg chewable tablet 3 (three) " times daily with meals.     gabapentin (NEURONTIN) 300 MG capsule TAKE 1 BY MOUTH EVERY      EVENING    insulin glargine (LANTUS SOLOSTAR) 100 unit/mL (3 mL) InPn pen Inject 55 Units into the skin every evening.    lancets Misc 1 each by Misc.(Non-Drug; Combo Route) route 4 (four) times daily with meals and nightly.    liraglutide 0.6 mg/0.1 mL, 18 mg/3 mL, subq PNIJ (VICTOZA 2-SHOAIB) 0.6 mg/0.1 mL (18 mg/3 mL) PnIj Inject 1.2 mg into the skin every morning.    NOVOLOG FLEXPEN 100 unit/mL InPn pen 15 Units 3 (three) times daily with meals.     omega-3 acid ethyl esters (LOVAZA) 1 gram capsule Take 2 capsules (2 g total) by mouth 2 (two) times daily.    pravastatin (PRAVACHOL) 40 MG tablet Take 1 tablet (40 mg total) by mouth once daily.    prednisoLONE acetate (PRED FORTE) 1 % DrpS     SENSIPAR 30 mg Tab      Antibiotics     Start     Stop Route Frequency Ordered    05/21/17 0900  cefepime in dextrose 5 % 1 gram/50 mL IVPB 1 g      -- IV Daily 05/20/17 1717        Antifungals     None        Antivirals     None           There is no immunization history for the selected administration types on file for this patient.    Family History     Problem Relation (Age of Onset)    Colon cancer Mother    Diabetes Mother, Father, Maternal Grandmother, Maternal Grandfather    Heart disease Father    Hypertension Father    Lung cancer Mother        Social History     Social History    Marital status: Single     Spouse name: N/A    Number of children: N/A    Years of education: N/A     Social History Main Topics    Smoking status: Never Smoker    Smokeless tobacco: Never Used    Alcohol use No    Drug use: No    Sexual activity: Not Currently     Other Topics Concern    None     Social History Narrative    None     Review of Systems   Constitutional: Positive for fever. Negative for chills, diaphoresis and fatigue.   HENT: Negative.    Eyes: Negative.    Respiratory: Negative.    Cardiovascular: Negative.     Gastrointestinal: Positive for abdominal distention and diarrhea.   Endocrine: Negative.    Genitourinary: Negative.    Musculoskeletal: Negative.    Allergic/Immunologic: Negative.    Neurological: Negative.    Hematological: Negative.    Psychiatric/Behavioral: Negative.      Objective:     Vital Signs (Most Recent):  Temp: 99.1 °F (37.3 °C) (05/21/17 0800)  Pulse: 69 (05/21/17 0800)  Resp: 19 (05/21/17 0800)  BP: 126/67 (05/21/17 0800)  SpO2: 96 % (05/21/17 1609) Vital Signs (24h Range):  Temp:  [99.1 °F (37.3 °C)-100.7 °F (38.2 °C)] 99.1 °F (37.3 °C)  Pulse:  [69-83] 69  Resp:  [16-19] 19  SpO2:  [94 %-98 %] 96 %  BP: (108-160)/(56-91) 126/67     Weight: (!) 140.6 kg (310 lb)  Body mass index is 48.55 kg/m².    Estimated Creatinine Clearance: 8.7 mL/min (based on Cr of 13.1).    Physical Exam   Constitutional: He is oriented to person, place, and time. He appears well-developed and well-nourished.   HENT:   Head: Normocephalic.   Neck: Normal range of motion. Neck supple.   Cardiovascular: Normal rate, regular rhythm, normal heart sounds and intact distal pulses.    Pulmonary/Chest: Effort normal and breath sounds normal.   Abdominal: Soft. He exhibits distension.   Musculoskeletal: Normal range of motion.   Neurological: He is alert and oriented to person, place, and time.   Skin: Skin is warm and dry.   Left arm access wnl        Significant Labs: All pertinent labs within the past 24 hours have been reviewed.    Significant Imaging: I have reviewed all pertinent imaging results/findings within the past 24 hours.    Significant Labs: All pertinent labs within the past 24 hours have been reviewed.    Significant Imaging: I have reviewed all pertinent imaging results/findings within the past 24 hours.  Objective:     Vital Signs (Most Recent):  Temp: 97 °F (36.1 °C) (05/23/17 1455)  Pulse: (!) 48 (05/23/17 1425)  Resp: 12 (05/23/17 1425)  BP: (!) 103/52 (05/23/17 1440)  SpO2: 98 % (05/23/17 1603) Vital Signs  (24h Range):  Temp:  [97 °F (36.1 °C)-99.1 °F (37.3 °C)] 97 °F (36.1 °C)  Pulse:  [48-65] 48  Resp:  [12-20] 12  SpO2:  [91 %-99 %] 98 %  BP: ()/(52-95) 103/52     Weight: (!) 140.6 kg (310 lb)  Body mass index is 48.55 kg/m².    Estimated Creatinine Clearance: 10.9 mL/min (based on Cr of 10.5).    Physical Exam    Significant Labs: All pertinent labs within the past 24 hours have been reviewed.    Significant Imaging: I have reviewed all pertinent imaging results/findings within the past 24 hours.

## 2017-05-23 NOTE — PLAN OF CARE
Patient not seen on rounds - getting SERENITY     Did get information from Dr. Peterson at  - no growth from cultures from last week     Data from [facility] confirm the previous ID notes - last infection with serratia sensitive to cefipime and ceftriaxone and many more abx.    Continue cefipime for now - await results of SERENITY

## 2017-05-23 NOTE — PROGRESS NOTES
.Pharmacy New Medication Education    Patient accepted medication education.    Pharmacy educated patient on the following medications, using the teach-back method.   Asa  Cefepime  Cincalcet  D50%  Doxycycline  Erythromycin  Flonase  Folic acid  Gabapentin  Glucagon  Glucose  Novolog  Detemir  Lanthanum  Zofran  Pravachol  Simethicone  Nephrocaps    Learners of pharmacy medication education included:  patient    Patient +/- learner response:  verbalize understanding

## 2017-05-23 NOTE — PLAN OF CARE
Procedure complete, bite block removed, no adverse effects noted, pt fully awake, denies complaints, no distress noted. See assessment and VS per flowsheet

## 2017-05-24 LAB
ALBUMIN SERPL BCP-MCNC: 3.3 G/DL
ALP SERPL-CCNC: 169 U/L
ALT SERPL W/O P-5'-P-CCNC: 47 U/L
ANION GAP SERPL CALC-SCNC: 18 MMOL/L
AST SERPL-CCNC: 46 U/L
BASOPHILS # BLD AUTO: 0.01 K/UL
BASOPHILS NFR BLD: 0.2 %
BILIRUB SERPL-MCNC: 0.6 MG/DL
BUN SERPL-MCNC: 68 MG/DL
CALCIUM SERPL-MCNC: 8.1 MG/DL
CHLORIDE SERPL-SCNC: 96 MMOL/L
CO2 SERPL-SCNC: 23 MMOL/L
CREAT SERPL-MCNC: 13.1 MG/DL
DIASTOLIC DYSFUNCTION: NO
DIFFERENTIAL METHOD: ABNORMAL
EOSINOPHIL # BLD AUTO: 0.2 K/UL
EOSINOPHIL NFR BLD: 4.5 %
ERYTHROCYTE [DISTWIDTH] IN BLOOD BY AUTOMATED COUNT: 14.3 %
EST. GFR  (AFRICAN AMERICAN): 4 ML/MIN/1.73 M^2
EST. GFR  (NON AFRICAN AMERICAN): 4 ML/MIN/1.73 M^2
GLUCOSE SERPL-MCNC: 152 MG/DL
HCT VFR BLD AUTO: 29.4 %
HGB BLD-MCNC: 9.9 G/DL
LYMPHOCYTES # BLD AUTO: 1.7 K/UL
LYMPHOCYTES NFR BLD: 38 %
MAGNESIUM SERPL-MCNC: 2.4 MG/DL
MCH RBC QN AUTO: 38.8 PG
MCHC RBC AUTO-ENTMCNC: 33.7 %
MCV RBC AUTO: 115 FL
MONOCYTES # BLD AUTO: 0.5 K/UL
MONOCYTES NFR BLD: 11.7 %
NEUTROPHILS # BLD AUTO: 2 K/UL
NEUTROPHILS NFR BLD: 45.6 %
PHOSPHATE SERPL-MCNC: 7.6 MG/DL
PLATELET # BLD AUTO: 99 K/UL
PMV BLD AUTO: 10.7 FL
POCT GLUCOSE: 152 MG/DL (ref 70–110)
POCT GLUCOSE: 206 MG/DL (ref 70–110)
POCT GLUCOSE: 207 MG/DL (ref 70–110)
POTASSIUM SERPL-SCNC: 4.3 MMOL/L
PROCALCITONIN SERPL IA-MCNC: 3.66 NG/ML
PROT SERPL-MCNC: 6.7 G/DL
RBC # BLD AUTO: 2.55 M/UL
RETIRED EF AND QEF - SEE NOTES: 55 (ref 55–65)
SODIUM SERPL-SCNC: 137 MMOL/L
WBC # BLD AUTO: 4.45 K/UL

## 2017-05-24 PROCEDURE — 25000003 PHARM REV CODE 250: Performed by: OPHTHALMOLOGY

## 2017-05-24 PROCEDURE — 84100 ASSAY OF PHOSPHORUS: CPT

## 2017-05-24 PROCEDURE — 25000003 PHARM REV CODE 250: Performed by: INTERNAL MEDICINE

## 2017-05-24 PROCEDURE — 25000003 PHARM REV CODE 250: Performed by: STUDENT IN AN ORGANIZED HEALTH CARE EDUCATION/TRAINING PROGRAM

## 2017-05-24 PROCEDURE — 36415 COLL VENOUS BLD VENIPUNCTURE: CPT

## 2017-05-24 PROCEDURE — 83735 ASSAY OF MAGNESIUM: CPT

## 2017-05-24 PROCEDURE — 85025 COMPLETE CBC W/AUTO DIFF WBC: CPT

## 2017-05-24 PROCEDURE — 80053 COMPREHEN METABOLIC PANEL: CPT

## 2017-05-24 PROCEDURE — 94761 N-INVAS EAR/PLS OXIMETRY MLT: CPT

## 2017-05-24 PROCEDURE — 84145 PROCALCITONIN (PCT): CPT

## 2017-05-24 PROCEDURE — 80100016 HC MAINTENANCE HEMODIALYSIS

## 2017-05-24 PROCEDURE — 25000003 PHARM REV CODE 250: Performed by: FAMILY MEDICINE

## 2017-05-24 PROCEDURE — 11000001 HC ACUTE MED/SURG PRIVATE ROOM

## 2017-05-24 RX ORDER — LANTHANUM CARBONATE 500 MG/1
1500 TABLET, CHEWABLE ORAL
Status: DISCONTINUED | OUTPATIENT
Start: 2017-05-24 | End: 2017-05-25 | Stop reason: HOSPADM

## 2017-05-24 RX ADMIN — FLUTICASONE PROPIONATE 1 SPRAY: 50 SPRAY, METERED NASAL at 08:05

## 2017-05-24 RX ADMIN — DOXYCYCLINE HYCLATE 100 MG: 100 TABLET, COATED ORAL at 08:05

## 2017-05-24 RX ADMIN — ERYTHROMYCIN: 5 OINTMENT OPHTHALMIC at 08:05

## 2017-05-24 RX ADMIN — INSULIN ASPART 2 UNITS: 100 INJECTION, SOLUTION INTRAVENOUS; SUBCUTANEOUS at 04:05

## 2017-05-24 RX ADMIN — LANTHANUM CARBONATE 1000 MG: 500 TABLET, CHEWABLE ORAL at 08:05

## 2017-05-24 RX ADMIN — INSULIN DETEMIR 22 UNITS: 100 INJECTION, SOLUTION SUBCUTANEOUS at 08:05

## 2017-05-24 RX ADMIN — GABAPENTIN 300 MG: 300 CAPSULE ORAL at 08:05

## 2017-05-24 RX ADMIN — CEFEPIME HYDROCHLORIDE 1 G: 1 INJECTION, SOLUTION INTRAVENOUS at 08:05

## 2017-05-24 RX ADMIN — PRAVASTATIN SODIUM 40 MG: 40 TABLET ORAL at 08:05

## 2017-05-24 RX ADMIN — Medication 1 CAPSULE: at 08:05

## 2017-05-24 RX ADMIN — FOLIC ACID 1 MG: 1 TABLET ORAL at 08:05

## 2017-05-24 RX ADMIN — ASPIRIN 81 MG CHEWABLE TABLET 81 MG: 81 TABLET CHEWABLE at 08:05

## 2017-05-24 RX ADMIN — LANTHANUM CARBONATE 1500 MG: 500 TABLET, CHEWABLE ORAL at 04:05

## 2017-05-24 NOTE — PLAN OF CARE
Problem: Hemodialysis (Adult)  Goal: Signs and Symptoms of Listed Potential Problems Will be Absent, Minimized or Managed (Hemodialysis)  Signs and symptoms of listed potential problems will be absent, minimized or managed by discharge/transition of care (reference Hemodialysis (Adult) CPG).   Outcome: Ongoing (interventions implemented as appropriate)   05/24/17 1007   Hemodialysis   Problems Assessed (Hemodialysis) electrolyte imbalance;fluid imbalance   Problems Present (Hemodialysis) electrolyte imbalance;fluid imbalance   Hd with uf

## 2017-05-24 NOTE — PROGRESS NOTES
Nephrology Progress Note    DATE OF ADMISSION: 5/20/2017 10:47 AM  DATE OF SERVICE: 5/24/2017  REFERRING PHYSICIAN: Jamal Millan MD  REASON FOR CONSULTATION: ESRD    He is doing well, no fevers or chills overnight.  He has chronic diarrhea at baseline, remains unchanged, no melana or hematochezia.  No CP, SOB, N/V.  Tolerating dialysis.    Physical Exam:  Temp:  [97 °F (36.1 °C)-98.6 °F (37 °C)] 98 °F (36.7 °C)  Pulse:  [48-64] 59  Resp:  [12-20] 18  SpO2:  [91 %-99 %] 98 %  BP: ()/(49-95) 116/72  Vitals:    05/24/17 0845 05/24/17 0900 05/24/17 0930 05/24/17 1000   BP: (!) 137/52 126/73 (!) 115/57 116/72   BP Location: Right arm Right arm Right arm Right arm   Patient Position: Lying Lying Lying Lying   BP Method: Automatic Automatic Automatic Automatic   Pulse: (!) 49 (!) 50 62 (!) 59   Resp:       Temp:       TempSrc:       SpO2:       Weight:       Height:           Intake/Output Summary (Last 24 hours) at 05/24/17 1153  Last data filed at 05/23/17 1456   Gross per 24 hour   Intake               50 ml   Output                0 ml   Net               50 ml     Wt Readings from Last 1 Encounters:   05/20/17 1645 (!) 140.6 kg (310 lb)   05/20/17 1030 (!) 140.2 kg (309 lb)     Vitals - reviewed  Gen - No acute distress, well nourished, well developed, obese  HENT - normocephalic, atraumatic  Eyes - extraocular motions intact  CVS - regular rate and rhythm, no murmurs or rubs  Resp - Clear to auscultation, no wheezes, rales, or rhonchi  Abd - obese, Soft, non-tender, non-distended, no rebound or guarding  Ext - no cyanosis or edema  Skin - no rash or lesions appreciated  Neuro - alert and oriented, speech normal, moving all four extremities  Psych - Normal mood and affect, normal thought process  Access - L wrist AVF with good thrill, no erythema, induration, TTP or signs of infection noted.  Healing sites from old sutures removed    Patient seen and examined on dialysis.  /65  HR 56  AP -230    180        Meds - Scheduled Meds:   sodium chloride 0.9%   Intravenous Once    aspirin  81 mg Oral Daily    benzocaine   Mouth/Throat Once    ceFEPime (MAXIPIME) IVPB  1 g Intravenous Daily    cinacalcet  30 mg Oral Daily with breakfast    doxycycline  100 mg Oral Q12H    erythromycin   Left Eye QHS    fluticasone  1 spray Each Nare Daily    folic acid  1 mg Oral Daily    gabapentin  300 mg Oral QHS    insulin detemir  22 Units Subcutaneous BID    lanthanum  1,000 mg Oral TID WM    pravastatin  40 mg Oral Daily    vitamin renal formula (B-complex-vitamin c-folic acid)  1 capsule Oral Daily     Continuous Infusions:   PRN Meds:.sodium chloride 0.9%, dextrose 50%, dextrose 50%, glucagon (human recombinant), glucose, glucose, insulin aspart, ondansetron, pneumoc 13-edwin conj-dip cr(PF), simethicone    Labs -     Recent Labs  Lab 05/22/17 0456 05/23/17  0622 05/24/17  0616   WBC 4.86 3.97 4.45   HGB 10.3* 9.4* 9.9*   HCT 31.3* 29.3* 29.4*   PLT 95* 92* 99*   MONO 12.6  0.6 14.9  0.6 11.7  0.5       Recent Labs  Lab 05/22/17 0456 05/23/17  0622 05/24/17  0616    139 137   K 5.1 4.1 4.3   CL 94* 96 96   CO2 23 27 23   BUN 83* 49* 68*   CREATININE 15.8* 10.5* 13.1*   CALCIUM 8.6* 7.8* 8.1*   PHOS 7.9* 5.8* 7.6*   MG 2.4 2.1 2.4     Imaging:   Echo 5/23/17:  EF: normal > 55%.   Valve: no significant valvular abnormalities. No significant regurgitation or stenosis.   No vegetations seen.   Mild descending aortic plaques    A/P   1. ESRD - usual HD on MWF with Dr Araceli Pickens at [facility] with Rx: 4hrs 45 min, Dry weight 140kg.   - HD today and continue HD on usual [facility] schedule  - renally dose medications for dialysis  2. HTN - continue current regimen  3. Anemia of chronic kidney disease - Epo with HD, holding IV iron in the setting of FUO  4. MBD/secondary hyperparathyroidism - uncontrolled hyperphosphatemia, continue sensipar and fosrenol, low phos diet, increase Fosrenol to 1500  QAC  5. Access - L wrist AVF, recent ballon PTA by Dr. Moran in [facility], f/u in 2 weeks  6. Nutrition - Nepro with meals, renal vitamins, renal diet     DM2  Severe MIKE- BiPAP 16/8    Fever/chills - Cx thus far NGTD, appreciate ID recommendations--> Dr. Scanlon ([facility]) - his ID doctor   Cefepime and Doxy - now afebrile, blood cultures so far negative  Negative SERENITY    Thank you for allowing me to participate in the care of your patient.  Please call with any questions.    Hazel Chew MD   Nephrology  [facility]  Office [phone]    Cross covering for:  [facility]  SHALA Forrest MD, KADEN MITCHELL MD,   MD GEORGIA Walker, NP  [address]  [place], [address]  [phone]  After hours answering service: [phone]

## 2017-05-24 NOTE — PLAN OF CARE
Problem: Patient Care Overview  Goal: Plan of Care Review  Outcome: Ongoing (interventions implemented as appropriate)  Patient on RA, no respiratory distress noted. Will continue to monitor.

## 2017-05-24 NOTE — PROGRESS NOTES
Patient seen for incision and drainage of chalazion left upper eyelid. Informed consent obtained. Subcutaneous injection of 2% lidocaine with epinephrine was administered to the left upper eyelid in the chalazion area. A clamp was then placed and the lid everted. An incision was made with the 11 blade on the backside and the lesion was drained using a curette. A lot of oily material extruded from this incision site. The clamp was removed and erythromycin ointment was placed in the eye. The patient tolerated the procedure well. The patient will be followed in clinic on discharge and was asked to continue doxycycline and ointment.

## 2017-05-24 NOTE — PROGRESS NOTES
Progress Note  [facility]    Admit Date: 5/20/2017   LOS: 4 days     SUBJECTIVE:     Follow-up For:  Fever    ID: Patient is a 56 y.o. male with a pmhx of ESRD with HD MWF (aneuric) , HTN., IDDM, recent  bacteriemas (treated with cefepime x 2 weeks) admitted for fevers.     Subjective:  Patient seen and examined while in HD today.  Patient remains afebrile.  He has no complaints. Denies any CP, SOB, N/V/D, headaches, fever or chills.       Scheduled Meds:   sodium chloride 0.9%   Intravenous Once    aspirin  81 mg Oral Daily    benzocaine   Mouth/Throat Once    ceFEPime (MAXIPIME) IVPB  1 g Intravenous Daily    cinacalcet  30 mg Oral Daily with breakfast    doxycycline  100 mg Oral Q12H    erythromycin   Left Eye QHS    fluticasone  1 spray Each Nare Daily    folic acid  1 mg Oral Daily    gabapentin  300 mg Oral QHS    insulin detemir  22 Units Subcutaneous BID    lanthanum  1,000 mg Oral TID WM    pravastatin  40 mg Oral Daily    vitamin renal formula (B-complex-vitamin c-folic acid)  1 capsule Oral Daily     Continuous Infusions:   PRN Meds:sodium chloride 0.9%, dextrose 50%, dextrose 50%, glucagon (human recombinant), glucose, glucose, insulin aspart, ondansetron, pneumoc 13-edwin conj-dip cr(PF), simethicone    Review of patient's allergies indicates:   Allergen Reactions    Keflex [cephalexin] Nausea Only       Review of Systems  Review of Systems   Constitutional: Negative for activity change, chills.  HENT: Negative for congestion, rhinorrhea and sore throat.    Eyes: Negative for visual disturbance.   Respiratory: Negative for cough, chest tightness and shortness of breath.    Cardiovascular: Negative for chest pain, palpitations and leg swelling.   Gastrointestinal: Negative constipation, diarrhea, nausea and vomiting.   Genitourinary: does not produce urine at baseline  Musculoskeletal: Negative for arthralgias and myalgias.   Skin: Negative for rash.   Neurological: Negative for  dizziness, light-headedness and headaches.   Psychiatric/Behavioral:  Negative for agitation.       OBJECTIVE:     Vital Signs (Most Recent)  Temp: 98 °F (36.7 °C) (05/24/17 0840)  Pulse: (!) 59 (05/24/17 1000)  Resp: 18 (05/24/17 0840)  BP: 116/72 (05/24/17 1000)  SpO2: 98 % (05/24/17 0434)    Vital Signs Range (Last 24H):  Temp:  [97 °F (36.1 °C)-98.6 °F (37 °C)]   Pulse:  [48-64]   Resp:  [12-20]   BP: ()/(49-95)   SpO2:  [91 %-99 %]     I & O (Last 24H):    Intake/Output Summary (Last 24 hours) at 05/24/17 1018  Last data filed at 05/23/17 1456   Gross per 24 hour   Intake               50 ml   Output                0 ml   Net               50 ml     Wt Readings from Last 3 Encounters:   05/20/17 (!) 140.6 kg (310 lb)   05/09/17 (!) 141.4 kg (311 lb 11.7 oz)   04/30/17 (!) 141.3 kg (311 lb 8.2 oz)     Physical Exam:  Physical Exam   Constitutional:  oriented to person, place, and time.  appears well-developed and well-nourished. No distress.   HENT:   Head: stye on left upper eye lid lanced yesterday  Mouth/Throat: Oropharynx is clear and moist.   Eyes: Conjunctivae and EOM are normal. Pupils are equal, round, and reactive to light.   Neck: Normal range of motion. Neck supple.   Cardiovascular: Normal rate, regular rhythm, normal heart sounds and intact distal pulses.    Pulmonary/Chest: Effort normal and breath sounds normal.   Abdominal: Soft. Bowel sounds are normal. no distension. There is no tenderness.   Musculoskeletal: Normal range of motion.  no edema, tenderness or deformity.   Neurological:  alert and oriented to person, place, and time.  normal reflexes.   Skin: Left AVF   Psychiatric:  normal mood and affect.  behavior is normal. Judgment and thought content normal.   Nursing note and vitals reviewed.    Laboratory:  LABS  CBC    Recent Labs  Lab 05/22/17  0456 05/23/17  0622 05/24/17  0616   WBC 4.86 3.97 4.45   RBC 2.72* 2.50* 2.55*   HGB 10.3* 9.4* 9.9*   HCT 31.3* 29.3* 29.4*   PLT 95* 92*  99*   * 117* 115*   MCH 37.9* 37.6* 38.8*   MCHC 32.9 32.1 33.7     BMP    Recent Labs  Lab 05/22/17 0456 05/23/17 0622 05/24/17  0616    139 137   K 5.1 4.1 4.3   CO2 23 27 23   CL 94* 96 96   BUN 83* 49* 68*   CREATININE 15.8* 10.5* 13.1*   * 163* 152*       POCT-Glucose  POCT Glucose   Date Value Ref Range Status   05/24/2017 152 (H) 70 - 110 mg/dL Final   05/23/2017 197 (H) 70 - 110 mg/dL Final   05/23/2017 155 (H) 70 - 110 mg/dL Final   05/23/2017 163 (H) 70 - 110 mg/dL Final   05/23/2017 166 (H) 70 - 110 mg/dL Final   05/22/2017 188 (H) 70 - 110 mg/dL Final   05/22/2017 169 (H) 70 - 110 mg/dL Final   05/22/2017 199 (H) 70 - 110 mg/dL Final   05/21/2017 269 (H) 70 - 110 mg/dL Final   05/21/2017 217 (H) 70 - 110 mg/dL Final   05/21/2017 283 (H) 70 - 110 mg/dL Final         Recent Labs  Lab 05/22/17 0456 05/23/17 0622 05/24/17  0616   CALCIUM 8.6* 7.8* 8.1*   MG 2.4 2.1 2.4   PHOS 7.9* 5.8* 7.6*     LFT    Recent Labs  Lab 05/22/17 0456 05/23/17 0622 05/24/17  0616   PROT 6.9 6.5 6.7   ALBUMIN 3.3* 3.1* 3.3*   BILITOT 0.7 0.7 0.6   AST 51* 46* 46*   ALKPHOS 171* 166* 169*   ALT 48* 46* 47*     LAST HbA1c  Lab Results   Component Value Date    HGBA1C 6.6 (H) 05/21/2017       ASSESSMENT/PLAN:   Patient is a 56 y.o. male with a pmhx of ESRD with HD MWF (aneuric) , HTN., IDDM, hx of serratia bacteremia admitted for fevers and has been afebrile since 5/20 with current negative work up.       Fevers of unknown origin  - Patient with recent hospitialziation for fevers 2/2 serratia bacterima and completed 2 week course of IV Cefepime per ID recommendations.   - In ED Tmax 101.1, LA wnl, WBC 5.08, cxr no acute process.  - Today, afebrile since 5/20 @0508   - WBC 4.45  - ID consulted  - Echo: no vegatations noted.  SERENITY no vegatations  - CT abdomen: no acute process  -  IV cipro  - Blood cx no growth  - HIV negative   - Hep panel negative  - Flu negative    Chalazion on left upper eyelid  -  Opthalmology consulted  - s/p lancing  - doxycycline 100mg BID      ESRD HD ([facility])  - Nephro consulted, Dr. Felton  - HD today     IDDM  - A1c 6.6  -   - SSI  - POCT glucose QID     HTN  -stable  -continue home medications     Anemia of chronic disease  -Epo with HD  - folic acid     Code: full  Diet: Cardiac/diabetic 2000/renal   Ppx:  scd  Dispo: F/u ID consult, bcx, temperature     Maya Mckinnon D.O.  [facility] HO-1  05/24/2017

## 2017-05-25 VITALS
OXYGEN SATURATION: 98 % | HEART RATE: 58 BPM | TEMPERATURE: 98 F | RESPIRATION RATE: 18 BRPM | WEIGHT: 306.5 LBS | SYSTOLIC BLOOD PRESSURE: 109 MMHG | BODY MASS INDEX: 48.11 KG/M2 | HEIGHT: 67 IN | DIASTOLIC BLOOD PRESSURE: 58 MMHG

## 2017-05-25 LAB
ALBUMIN SERPL BCP-MCNC: 3.2 G/DL
ALP SERPL-CCNC: 165 U/L
ALT SERPL W/O P-5'-P-CCNC: 54 U/L
ANION GAP SERPL CALC-SCNC: 14 MMOL/L
AST SERPL-CCNC: 57 U/L
BACTERIA BLD CULT: NORMAL
BACTERIA BLD CULT: NORMAL
BASOPHILS # BLD AUTO: 0.01 K/UL
BASOPHILS NFR BLD: 0.2 %
BILIRUB SERPL-MCNC: 0.6 MG/DL
BUN SERPL-MCNC: 34 MG/DL
CALCIUM SERPL-MCNC: 8.2 MG/DL
CHLORIDE SERPL-SCNC: 98 MMOL/L
CO2 SERPL-SCNC: 27 MMOL/L
CREAT SERPL-MCNC: 8.4 MG/DL
DIFFERENTIAL METHOD: ABNORMAL
EOSINOPHIL # BLD AUTO: 0.2 K/UL
EOSINOPHIL NFR BLD: 3.4 %
ERYTHROCYTE [DISTWIDTH] IN BLOOD BY AUTOMATED COUNT: 14.3 %
EST. GFR  (AFRICAN AMERICAN): 7 ML/MIN/1.73 M^2
EST. GFR  (NON AFRICAN AMERICAN): 6 ML/MIN/1.73 M^2
GLUCOSE SERPL-MCNC: 148 MG/DL
HCT VFR BLD AUTO: 29.3 %
HGB BLD-MCNC: 9.5 G/DL
LYMPHOCYTES # BLD AUTO: 1.6 K/UL
LYMPHOCYTES NFR BLD: 35.8 %
MAGNESIUM SERPL-MCNC: 2.2 MG/DL
MCH RBC QN AUTO: 37.5 PG
MCHC RBC AUTO-ENTMCNC: 32.4 %
MCV RBC AUTO: 116 FL
MONOCYTES # BLD AUTO: 0.5 K/UL
MONOCYTES NFR BLD: 10.5 %
NEUTROPHILS # BLD AUTO: 2.2 K/UL
NEUTROPHILS NFR BLD: 49.9 %
PHOSPHATE SERPL-MCNC: 5.1 MG/DL
PLATELET # BLD AUTO: 93 K/UL
PMV BLD AUTO: 10.7 FL
POCT GLUCOSE: 155 MG/DL (ref 70–110)
POCT GLUCOSE: 188 MG/DL (ref 70–110)
POTASSIUM SERPL-SCNC: 3.9 MMOL/L
PROT SERPL-MCNC: 6.7 G/DL
RBC # BLD AUTO: 2.53 M/UL
SODIUM SERPL-SCNC: 139 MMOL/L
WBC # BLD AUTO: 4.38 K/UL

## 2017-05-25 PROCEDURE — 25000003 PHARM REV CODE 250: Performed by: FAMILY MEDICINE

## 2017-05-25 PROCEDURE — 25000003 PHARM REV CODE 250: Performed by: INTERNAL MEDICINE

## 2017-05-25 PROCEDURE — 25000003 PHARM REV CODE 250: Performed by: STUDENT IN AN ORGANIZED HEALTH CARE EDUCATION/TRAINING PROGRAM

## 2017-05-25 PROCEDURE — 36415 COLL VENOUS BLD VENIPUNCTURE: CPT

## 2017-05-25 PROCEDURE — 25000003 PHARM REV CODE 250: Performed by: OPHTHALMOLOGY

## 2017-05-25 PROCEDURE — 94761 N-INVAS EAR/PLS OXIMETRY MLT: CPT

## 2017-05-25 PROCEDURE — 80053 COMPREHEN METABOLIC PANEL: CPT

## 2017-05-25 PROCEDURE — 84100 ASSAY OF PHOSPHORUS: CPT

## 2017-05-25 PROCEDURE — 85025 COMPLETE CBC W/AUTO DIFF WBC: CPT

## 2017-05-25 PROCEDURE — 83735 ASSAY OF MAGNESIUM: CPT

## 2017-05-25 RX ORDER — CEFEPIME HYDROCHLORIDE 2 G/50ML
INJECTION, SOLUTION INTRAVENOUS
Qty: 14 G | Refills: 0 | OUTPATIENT
Start: 2017-05-25 | End: 2017-05-25

## 2017-05-25 RX ORDER — DOXYCYCLINE HYCLATE 100 MG
100 TABLET ORAL EVERY 12 HOURS
Qty: 24 TABLET | Refills: 0 | Status: SHIPPED | OUTPATIENT
Start: 2017-05-25 | End: 2017-06-06

## 2017-05-25 RX ORDER — CEFEPIME HYDROCHLORIDE 2 G/50ML
INJECTION, SOLUTION INTRAVENOUS
Qty: 14 G | Refills: 0 | Status: SHIPPED | OUTPATIENT
Start: 2017-05-25 | End: 2018-12-02 | Stop reason: ALTCHOICE

## 2017-05-25 RX ORDER — ERYTHROMYCIN 5 MG/G
OINTMENT OPHTHALMIC NIGHTLY
Qty: 1 G | Refills: 1 | Status: SHIPPED | OUTPATIENT
Start: 2017-05-25 | End: 2017-05-30

## 2017-05-25 RX ORDER — CEFEPIME HYDROCHLORIDE 1 G/50ML
2 INJECTION, SOLUTION INTRAVENOUS
Qty: 1200 ML | Refills: 0
Start: 2017-05-26 | End: 2017-06-25

## 2017-05-25 RX ADMIN — FOLIC ACID 1 MG: 1 TABLET ORAL at 08:05

## 2017-05-25 RX ADMIN — DOXYCYCLINE HYCLATE 100 MG: 100 TABLET, COATED ORAL at 08:05

## 2017-05-25 RX ADMIN — FLUTICASONE PROPIONATE 1 SPRAY: 50 SPRAY, METERED NASAL at 08:05

## 2017-05-25 RX ADMIN — LANTHANUM CARBONATE 1500 MG: 500 TABLET, CHEWABLE ORAL at 12:05

## 2017-05-25 RX ADMIN — PRAVASTATIN SODIUM 40 MG: 40 TABLET ORAL at 08:05

## 2017-05-25 RX ADMIN — INSULIN DETEMIR 22 UNITS: 100 INJECTION, SOLUTION SUBCUTANEOUS at 08:05

## 2017-05-25 RX ADMIN — ASPIRIN 81 MG CHEWABLE TABLET 81 MG: 81 TABLET CHEWABLE at 08:05

## 2017-05-25 RX ADMIN — CEFEPIME HYDROCHLORIDE 1 G: 1 INJECTION, SOLUTION INTRAVENOUS at 08:05

## 2017-05-25 RX ADMIN — CINACALCET HYDROCHLORIDE 30 MG: 30 TABLET, COATED ORAL at 07:05

## 2017-05-25 RX ADMIN — Medication 1 CAPSULE: at 08:05

## 2017-05-25 RX ADMIN — LANTHANUM CARBONATE 1500 MG: 500 TABLET, CHEWABLE ORAL at 07:05

## 2017-05-25 NOTE — PLAN OF CARE
Discharge instructions given. Patient verbalized understanding. No distress noted. Iv removed with catheter tip intact. Personal belongings at bedside. Patient discharged to front of hospital via wheelchair.

## 2017-05-25 NOTE — PLAN OF CARE
Patient at HD today could not examine - no new issues by chart review     Discussion with primary team - no clear source of fever/infection/leuocyctosis could be found but WBC clearly responded to ABX with cefipime.      Recommend:     1. IV cefipime 2 grams IV after dialysis - ie 3x per week  (no additional doses are needed with this regimen)     Will discuss with patient in the AM  - plan for at least 4 weeks of treatment

## 2017-05-25 NOTE — PROGRESS NOTES
.Pharmacy New Medication Education    Patient accepted medication education.    Pharmacy educated patient on the following medications, using the teach-back method.   fosrenol  Learners of pharmacy medication education included:  patient    Patient +/- learner response:  verbalize understanding

## 2017-05-25 NOTE — PROGRESS NOTES
Progress Note  [facility]    Admit Date: 5/20/2017   LOS: 5 days     SUBJECTIVE:     Follow-up For:  Fever    ID: Patient is a 56 y.o. male with a pmhx of ESRD with HD MWF (aneuric) , HTN., IDDM, recent  bacteriemas (treated with cefepime x 2 weeks) admitted for fevers.     Subjective:  Patient seen and examined. Patient remains afebrile.  He has no complaints. Denies any CP, SOB, N/V/D, headaches, fever or chills.       Scheduled Meds:   sodium chloride 0.9%   Intravenous Once    aspirin  81 mg Oral Daily    benzocaine   Mouth/Throat Once    ceFEPime (MAXIPIME) IVPB  1 g Intravenous Daily    cinacalcet  30 mg Oral Daily with breakfast    doxycycline  100 mg Oral Q12H    erythromycin   Left Eye QHS    fluticasone  1 spray Each Nare Daily    folic acid  1 mg Oral Daily    gabapentin  300 mg Oral QHS    insulin detemir  22 Units Subcutaneous BID    lanthanum  1,500 mg Oral TID WM    pravastatin  40 mg Oral Daily    vitamin renal formula (B-complex-vitamin c-folic acid)  1 capsule Oral Daily     Continuous Infusions:   PRN Meds:sodium chloride 0.9%, dextrose 50%, dextrose 50%, glucagon (human recombinant), glucose, glucose, insulin aspart, ondansetron, pneumoc 13-edwin conj-dip cr(PF), simethicone    Review of patient's allergies indicates:   Allergen Reactions    Keflex [cephalexin] Nausea Only       Review of Systems  Review of Systems   Constitutional: Negative for activity change, chills.  HENT: Negative for congestion, rhinorrhea and sore throat.    Eyes: Negative for visual disturbance.   Respiratory: Negative for cough, chest tightness and shortness of breath.    Cardiovascular: Negative for chest pain, palpitations and leg swelling.   Gastrointestinal: Negative constipation, diarrhea, nausea and vomiting.   Genitourinary: does not produce urine at baseline  Musculoskeletal: Negative for arthralgias and myalgias.   Skin: Negative for rash.   Neurological: Negative for dizziness,  light-headedness and headaches.   Psychiatric/Behavioral:  Negative for agitation.       OBJECTIVE:     Vital Signs (Most Recent)  Temp: 97.6 °F (36.4 °C) (05/25/17 0757)  Pulse: 66 (05/25/17 0805)  Resp: 18 (05/25/17 0757)  BP: (!) 104/53 (05/25/17 0757)  SpO2: 97 % (05/25/17 0825)    Vital Signs Range (Last 24H):  Temp:  [97.6 °F (36.4 °C)-98.6 °F (37 °C)]   Pulse:  [53-66]   Resp:  [16-18]   BP: ()/(46-70)   SpO2:  [97 %-98 %]     I & O (Last 24H):    Intake/Output Summary (Last 24 hours) at 05/25/17 1024  Last data filed at 05/25/17 0015   Gross per 24 hour   Intake              520 ml   Output             2400 ml   Net            -1880 ml     Wt Readings from Last 3 Encounters:   05/25/17 (!) 139 kg (306 lb 8 oz)   05/09/17 (!) 141.4 kg (311 lb 11.7 oz)   04/30/17 (!) 141.3 kg (311 lb 8.2 oz)     Physical Exam:  Physical Exam   Constitutional:  oriented to person, place, and time.  appears well-developed and well-nourished. No distress.   HENT:   Head: stye on left upper eye lid lanced yesterday  Mouth/Throat: Oropharynx is clear and moist.   Eyes: Conjunctivae and EOM are normal. Pupils are equal, round, and reactive to light.   Neck: Normal range of motion. Neck supple.   Cardiovascular: Normal rate, regular rhythm, normal heart sounds and intact distal pulses.    Pulmonary/Chest: Effort normal and breath sounds normal.   Abdominal: Soft. Bowel sounds are normal. no distension. There is no tenderness.   Musculoskeletal: Normal range of motion.  no edema, tenderness or deformity.   Neurological:  alert and oriented to person, place, and time.  normal reflexes.   Skin: Left AVF   Psychiatric:  normal mood and affect.  behavior is normal. Judgment and thought content normal.   Nursing note and vitals reviewed.    Laboratory:  LABS  CBC    Recent Labs  Lab 05/23/17  0622 05/24/17  0616 05/25/17  0506   WBC 3.97 4.45 4.38   RBC 2.50* 2.55* 2.53*   HGB 9.4* 9.9* 9.5*   HCT 29.3* 29.4* 29.3*   PLT 92* 99* 93*    * 115* 116*   MCH 37.6* 38.8* 37.5*   MCHC 32.1 33.7 32.4     BMP    Recent Labs  Lab 05/23/17  0622 05/24/17  0616 05/25/17  0506    137 139   K 4.1 4.3 3.9   CO2 27 23 27   CL 96 96 98   BUN 49* 68* 34*   CREATININE 10.5* 13.1* 8.4*   * 152* 148*       POCT-Glucose  POCT Glucose   Date Value Ref Range Status   05/25/2017 155 (H) 70 - 110 mg/dL Final   05/24/2017 207 (H) 70 - 110 mg/dL Final   05/24/2017 206 (H) 70 - 110 mg/dL Final   05/24/2017 152 (H) 70 - 110 mg/dL Final   05/23/2017 197 (H) 70 - 110 mg/dL Final   05/23/2017 155 (H) 70 - 110 mg/dL Final   05/23/2017 163 (H) 70 - 110 mg/dL Final   05/23/2017 166 (H) 70 - 110 mg/dL Final   05/22/2017 188 (H) 70 - 110 mg/dL Final   05/22/2017 169 (H) 70 - 110 mg/dL Final         Recent Labs  Lab 05/23/17  0622 05/24/17  0616 05/25/17  0506   CALCIUM 7.8* 8.1* 8.2*   MG 2.1 2.4 2.2   PHOS 5.8* 7.6* 5.1*     LFT    Recent Labs  Lab 05/23/17  0622 05/24/17  0616 05/25/17  0506   PROT 6.5 6.7 6.7   ALBUMIN 3.1* 3.3* 3.2*   BILITOT 0.7 0.6 0.6   AST 46* 46* 57*   ALKPHOS 166* 169* 165*   ALT 46* 47* 54*     LAST HbA1c  Lab Results   Component Value Date    HGBA1C 6.6 (H) 05/21/2017       ASSESSMENT/PLAN:   Patient is a 56 y.o. male with a pmhx of ESRD with HD MWF (aneuric) , HTN., IDDM, hx of serratia bacteremia admitted for fevers and has been afebrile since 5/20 with current negative work up.       Fevers of unknown origin  - Patient with recent hospitialziation for fevers 2/2 serratia bacterima and completed 2 week course of IV Cefepime per ID recommendations.  patient states that HD site did not have cefepime so alternative IVabx was used.   - In ED Tmax 101.1, LA wnl, WBC 5.08, cxr no acute process.  - Today, afebrile since 5/20 @0508   - WBC 4.38  - ID consulted  recommended cefepime 2 g MWF following HD.  - Echo: no vegatations noted.  SERENITY no vegatations  - CT abdomen: no acute process  -  IV cipro  - Blood cx no growth  - HIV negative    - Hep panel negative  - Flu negative    Chalazion on left upper eyelid  - Opthalmology consulted  - s/p lancing  - doxycycline 100mg BID      ESRD HD ([facility])  - Nephro consulted, Dr. Felton  - HD yesterday      IDDM  - A1c 6.6  - stable  - SSI  - POCT glucose QID     HTN  -stable  -continue home medications     Anemia of chronic disease  -Epo with HD  - folic acid     Code: full  Diet: Cardiac/diabetic 2000/renal   Ppx:  scd  Dispo: F/u social work set up of IVabx     Maya Mckinnon D.O.  \[facility]\"" Family Medicine HO-1  05/25/2017

## 2017-05-25 NOTE — PLAN OF CARE
Problem: Patient Care Overview  Goal: Plan of Care Review  Outcome: Ongoing (interventions implemented as appropriate)  Pt on RA with sats of 98. Will continue to monitor.

## 2017-05-25 NOTE — PLAN OF CARE
Problem: Patient Care Overview  Goal: Plan of Care Review  Outcome: Ongoing (interventions implemented as appropriate)  Pt in NAD. AAOX4. Scheduled medication given per MAR. Blood sugar maintained with supplemental insulin. Pt temp remained WDL. Continuous cardiac monitoring in place box: 8641 normal sinus rhythm HR: 80s. Denies pain. Encouraged to call for assistance verbalized understanding. Safety maintained bed in low locked position, SR up X2, and call bell in reach. Will continue to monitor

## 2017-05-26 ENCOUNTER — NURSE TRIAGE (OUTPATIENT)
Dept: ADMINISTRATIVE | Facility: CLINIC | Age: 57
End: 2017-05-26

## 2017-05-26 NOTE — TELEPHONE ENCOUNTER
Reason for Disposition   [1] Follow-up call to recent contact AND [2] information only call, no triage required    Protocols used: ST INFORMATION ONLY CALL-A-AH    Patient was attempting to return call to [facility] nurse. Please call patient when available. Please contact caller with any further care advice.

## 2017-05-29 NOTE — DISCHARGE SUMMARY
[facility]  Discharge Summary     Patient ID:  Angel Farmer Jr.  [id number]  56 y.o.  1960    Admit date: 5/20/2017    Discharge Date and Time: 5/25/2017  1:25 PM    Admitting Physician: Jamal Millan MD     Discharge Provider: Galileo Vásquez MD    Reason for Admission: ESRD (end stage renal disease) [N18.6]  Fever [R50.9]  Type 2 diabetes mellitus with hyperosmolarity without coma, unspecified long term insulin use status [E11.00]  Bacteremia [R78.81]    Admission Condition: stable    Procedures Performed: Procedure(s) (LRB):  TRANSESOPHAGEAL ECHOCARDIOGRAM (SERENITY) (N/A)    History of Present Illness:  Patient is a 56 y.o. male with a pmhx of ESRD with HD MWF (aneuric) , HTN., IDDM presents to ED for fevers x 2 days with Tmax at home of 102.  Patient also c/o nausea, decreased appetite, abdominal pain and body aches.      Recent Hospitalization 4/30-5/1 or Bacteriemia with  Serratia marcescens (OSH blood cx).  Infections disease was consulted  with recommendations for 2 weeks of outpatient IV ABX cefepime 2-2-3g following HD and repeat blood cx following completion.       In ED, Tmax 101.1. Flu negative, CXR no acute process. Blood cx obtained.     Hospital Course (synopsis of major diagnoses, care, treatment, and services provided during the course of the hospital stay):     Patient was started on broad spectrum antibiotics. Patient was seen by ID from  who recommended cefepime however patient received rocephin which was sensitive on blood cultures at the [facility]. Patient blood cultures, UA, and urine cultures were ordered. Patient was non septic. ID was consulted who contacted ID at . CXR and CT abdomen found no acute process. Nephrology was consulted for patient's HD requirements. Nephrology discussed with ID at  who recommended patient to have an SERENITY concerns for vegetations. 2D echo followed by SERENITY found no evidence of vegetations. Patient remained afebrile and without signs of  infection during hospital course.  ID recommend to complete 4 week course of cefepime 3 times a week after HD.  Patient was stable at discharge.     Fevers of unknown origin  - Patient with recent hospitialziation for fevers 2/2 serratia bacterima and completed 2 week course of IV Cefepime per ID recommendations.  patient states that HD site did not have cefepime so alternative IVabx was used.   - In ED Tmax 101.1, LA wnl, WBC 5.08, cxr no acute process.  - Today, afebrile since 5/20 @0508   - WBC 4.38  - ID consulted  recommended cefepime 2 g MWF following HD.  - Echo: no vegatations noted.  SERENITY no vegatations  - CT abdomen: no acute process  -  IV cipro  - Blood cx no growth  - HIV negative   - Hep panel negative  - Flu negative     Chalazion on left upper eyelid  - Opthalmology consulted  - s/p lancing  - doxycycline 100mg BID        ESRD HD (MWF)  - Nephro consulted, Dr. Felton  - HD yesterday      IDDM  - A1c 6.6  - stable  - SSI  - POCT glucose QID     HTN  -stable  -continue home medications     Anemia of chronic disease  -Epo with HD  - folic acid    Consults: Infectious Disease, Nephrology and Ophthalmology.    Significant Diagnostic Studies:   Imaging Results          CT Abdomen Pelvis  Without Contrast (Final result)  Result time 05/20/17 15:07:44    Final result by Last Plummer MD (05/20/17 15:07:44)                 Impression:         No acute process in the abdomen or pelvis.  Etiology of fever is not visualized on this examination.    Diffuse atherosclerosis of the aorta and its branches.    Stable myelolipoma of the left adrenal gland.    Bilateral nonobstructing renal stones.      Electronically signed by: LAST PLUMMER MD  Date:     05/20/17  Time:    15:07              Narrative:    Time of Procedure: 05/20/17 14:57:46  Accession # [id number]    CT OF THE ABDOMEN & PELVIS WITH PO CONTRAST:       Technique: CT examination of the abdomen and pelvis was obtained using 5 mm axial images.  Coronal and sagittal reformats were obtained.    Comparison: CT abdomen and pelvis from 08/23/2012     Clinical Indication:  Fever of unknown origin    Results:    The visualized portions of the lungs, heart, and pericardium demonstrate no significant abnormalities.  Gynecomastia is noted.    Liver is enlarged in size.  No focal hepatic lesions in this noncontrast examination.  The gallbladder demonstrates no evidence of gallbladder wall thickening, dilatation, or surrounding inflammatory changes.  No evidence of cholelithiasis.No evidence of intra or extrahepatic biliary ductal dilation. Spleen demonstrates a few calcifications consistent with prior granulomatous disease.  Stomach, duodenum, pancreas, and right adrenal gland are normal in appearance.  Left adrenal gland demonstrates a 1.5 CM fat containing lesion consistent with myelolipoma.    Kidneys are atrophic bilaterally.  Multiple nonobstructing renal stones are visualized bilaterally.  The largest on the right measures 7 mm.  The largest on the left measures 10 mm.  No hydronephrosis.  The ureters are normal in course and caliber with no evidence of nephrolithiasis, dilatation, or surrounding inflammatory changes.  Urinary bladder is decompressed.  Prostate is not enlarged.    Small bowel is normal in caliber.  No evidence of obstruction.  Normal appendix (series 601, image 61).  Colon demonstrates no focal wall thickening.    Aorta tapers appropriately with mild calcific atherosclerosis.  Atherosclerosis of the celiac and superior mesenteric artery, renal arteries, and JASON.    No lymphadenopathy.    The osseous structures demonstrate no evidence of fractures, dislocations, or osseous destructive processes.                             X-Ray Chest PA And Lateral (Final result)  Result time 05/20/17 11:33:41    Final result by Last Plummer MD (05/20/17 11:33:41)                 Impression:       No acute intrathoracic process.          Electronically  signed by: TRA YOUNGBLOOD MD  Date:     05/20/17  Time:    11:33              Narrative:    [id number]  Accession # [id number]      Study:  XR CHEST PA AND LATERAL    Indication: Fever.    Comparison: Chest radiograph from 04/30/2017.    Findings:     XR CHEST PA AND LATERAL.    Cardiac silhouette is normal in appearance.  Pulmonary vasculature is within normal limits.    Lungs well-aerated.  No focal consolidation.   No pleural effusion.  Degenerative changes of the thoracic spine.                               Final Diagnoses:    Principal Problem: Fever   Secondary Diagnoses:   Active Hospital Problems    Diagnosis  POA    *Fever [R50.9]  Yes    ESRD on dialysis [N18.6, Z99.2]  Not Applicable    Macrocytic anemia [D53.9]  Yes    Diabetes mellitus, type 2 [E11.9]  Yes    Hypertension [I10]  Yes     -TTE (3/25/14): EF 60%, pseudonormal diastolic dysfunction. E/e' 11.         Resolved Hospital Problems    Diagnosis Date Resolved POA   No resolved problems to display.       Discharged Condition: stable    Discharge Exam:  Constitutional:  oriented to person, place, and time.  appears well-developed and well-nourished. No distress.   HENT:   Head: stye on left upper eye lid lanced yesterday  Mouth/Throat: Oropharynx is clear and moist.   Eyes: Conjunctivae and EOM are normal. Pupils are equal, round, and reactive to light.   Neck: Normal range of motion. Neck supple.   Cardiovascular: Normal rate, regular rhythm, normal heart sounds and intact distal pulses.    Pulmonary/Chest: Effort normal and breath sounds normal.   Abdominal: Soft. Bowel sounds are normal. no distension. There is no tenderness.   Musculoskeletal: Normal range of motion.  no edema, tenderness or deformity.   Neurological:  alert and oriented to person, place, and time.  normal reflexes.   Skin: Left AVF   Psychiatric:  normal mood and affect.  behavior is normal. Judgment and thought content normal.   Nursing note and vitals  "reviewed.    Disposition: Home or Self Care    Follow Up/Patient Instructions:     Medications:  Reconciled Home Medications:   Discharge Medication List as of 5/26/2017 10:15 AM      START taking these medications    Details   cefepime in dextrose 5 % (MAXIPIME) 1 gram/50 mL PgBk Inject 100 mLs (2 g total) into the vein every Mon, Wed, Fri. Give after dialysis, Starting Fri 5/26/2017, Until Sun 6/25/2017, No Print      doxycycline (VIBRA-TABS) 100 MG tablet Take 1 tablet (100 mg total) by mouth every 12 (twelve) hours., Starting Thu 5/25/2017, Until Tue 6/6/2017, Normal      erythromycin (ROMYCIN) ophthalmic ointment Place into the left eye every evening., Starting Thu 5/25/2017, Until Tue 5/30/2017, Normal         CONTINUE these medications which have CHANGED    Details   ceFEPIme in dextrose 5% (MAXIPIME) 2 gram/50 mL PgBk Following dialysis:  2 gram IV Q Mon, Wed, Friday x 4 weeks, Print         CONTINUE these medications which have NOT CHANGED    Details   ammonium lactate 12 % Crea Apply 1 application topically 3 (three) times daily as needed., Starting 11/30/2016, Until Discontinued, Normal      aspirin 81 MG Chew Take 1 tablet (81 mg total) by mouth once daily., Starting 3/18/2014, Until Wed 8/24/16, Normal      AZOPT 1 % ophthalmic suspension Starting 8/2/2016, Until Discontinued, Historical Med      B complex-vitamin C-folic acid (B COMPLEX-VITAMIN C-FOLIC ACID) 0.8 mg Tab Take 0.8 mg by mouth once daily.  , Until Discontinued, Historical Med      BD INSULIN PEN NEEDLE UF MINI 31 gauge x 3/16" Ndle USE 4 TO 5 TIMES A DAY, Normal      blood sugar diagnostic (ONETOUCH ULTRA TEST) Strp TEST BLOOD GLUCOSE FOUR TIMES A DAY. At AM, noon, 6pm and bedtime., Print      blood-glucose meter (ADVANCED GLUCOSE METER) Misc 1 each by Misc.(Non-Drug; Combo Route) route 4 (four) times daily with meals and nightly., Starting 10/20/2014, Until Wed 8/24/16, Print      bromfenac (XIBROM) 0.09 % ophthalmic solution Starting " 9/24/2015, Until Discontinued, Historical Med      diclofenac (VOLTAREN) 0.1 % ophthalmic solution Starting 8/11/2016, Until Discontinued, Historical Med      doxycycline (ADOXA) 50 MG tablet Take 50 mg by mouth once daily., Until Discontinued, Historical Med      fluticasone (FLONASE) 50 mcg/actuation nasal spray 1 spray by Each Nare route once daily., Starting 1/13/2014, Until Discontinued, Normal      folic acid (FOLVITE) 1 MG tablet Take 1 mg by mouth once daily., Until Discontinued, Historical Med      FOSRENOL 1,000 mg chewable tablet 3 (three) times daily with meals. , Starting 12/19/2013, Until Discontinued, Historical Med      gabapentin (NEURONTIN) 300 MG capsule TAKE 1 BY MOUTH EVERY      EVENING, Normal      insulin glargine (LANTUS SOLOSTAR) 100 unit/mL (3 mL) InPn pen Inject 55 Units into the skin every evening., Starting 12/1/2016, Until Fri 12/1/17, Normal      lancets Misc 1 each by Misc.(Non-Drug; Combo Route) route 4 (four) times daily with meals and nightly., Starting 4/10/2015, Until Discontinued, Normal      liraglutide 0.6 mg/0.1 mL, 18 mg/3 mL, subq PNIJ (VICTOZA 2-SHOAIB) 0.6 mg/0.1 mL (18 mg/3 mL) PnIj Inject 1.2 mg into the skin every morning., Starting 10/28/2016, Until Sat 10/28/17, Normal      NOVOLOG FLEXPEN 100 unit/mL InPn pen 15 Units 3 (three) times daily with meals. , Starting 2/23/2017, Until Discontinued, Historical Med      omega-3 acid ethyl esters (LOVAZA) 1 gram capsule Take 2 capsules (2 g total) by mouth 2 (two) times daily., Starting 3/12/2013, Until Wed 8/24/16, Print      pravastatin (PRAVACHOL) 40 MG tablet Take 1 tablet (40 mg total) by mouth once daily., Starting 2/15/2017, Until Discontinued, Normal      prednisoLONE acetate (PRED FORTE) 1 % DrpS Starting 9/20/2016, Until Discontinued, Historical Med      SENSIPAR 30 mg Tab Starting 8/11/2016, Until Discontinued, Historical Med         STOP taking these medications       doxycycline (VIBRAMYCIN) 50 MG capsule Comments:    Reason for Stopping:               Discharge Procedure Orders  Diet general     Diet Diabetic 2000 Calories     Diet renal     Activity as tolerated     Call MD for:  temperature >100.4     Call MD for:  persistent nausea and vomiting or diarrhea     Call MD for:  severe uncontrolled pain     Call MD for:  redness, tenderness, or signs of infection (pain, swelling, redness, odor or green/yellow discharge around incision site)     Call MD for:  difficulty breathing or increased cough     Call MD for:  severe persistent headache     Call MD for:  worsening rash     Call MD for:  persistent dizziness, light-headedness, or visual disturbances     Call MD for:  increased confusion or weakness         Activity: activity as tolerated  Diet: renal diet    Signed:  Galileo Vásquez MD  5/29/2017  2:29 PM

## 2017-06-15 PROBLEM — N18.5 HYPERTENSION ASSOCIATED WITH STAGE 5 CHRONIC KIDNEY DISEASE DUE TO TYPE 2 DIABETES MELLITUS: Status: ACTIVE | Noted: 2017-06-15

## 2017-06-15 PROBLEM — I12.0 HYPERTENSION ASSOCIATED WITH STAGE 5 CHRONIC KIDNEY DISEASE DUE TO TYPE 2 DIABETES MELLITUS: Status: ACTIVE | Noted: 2017-06-15

## 2017-06-15 PROBLEM — E11.22 HYPERTENSION ASSOCIATED WITH STAGE 5 CHRONIC KIDNEY DISEASE DUE TO TYPE 2 DIABETES MELLITUS: Status: ACTIVE | Noted: 2017-06-15

## 2017-06-15 PROBLEM — R50.9 FUO (FEVER OF UNKNOWN ORIGIN): Status: ACTIVE | Noted: 2017-06-15

## 2017-06-17 RX ORDER — PRAVASTATIN SODIUM 40 MG/1
40 TABLET ORAL DAILY
Qty: 30 TABLET | Refills: 6 | Status: SHIPPED | OUTPATIENT
Start: 2017-06-17 | End: 2017-12-21 | Stop reason: SDUPTHER

## 2017-06-27 DIAGNOSIS — G62.9 MULTIFACTORIAL PERIPHERAL NEUROPATHY: ICD-10-CM

## 2017-06-28 RX ORDER — GABAPENTIN 300 MG
CAPSULE ORAL
Qty: 90 CAPSULE | Refills: 2 | Status: SHIPPED | OUTPATIENT
Start: 2017-06-28 | End: 2018-08-09 | Stop reason: SDUPTHER

## 2017-08-01 ENCOUNTER — OFFICE VISIT (OUTPATIENT)
Dept: PODIATRY | Facility: CLINIC | Age: 57
End: 2017-08-01
Payer: MEDICARE

## 2017-08-01 VITALS
HEIGHT: 67 IN | SYSTOLIC BLOOD PRESSURE: 98 MMHG | WEIGHT: 306 LBS | DIASTOLIC BLOOD PRESSURE: 60 MMHG | HEART RATE: 60 BPM | BODY MASS INDEX: 48.03 KG/M2

## 2017-08-01 DIAGNOSIS — B35.1 ONYCHOMYCOSIS DUE TO DERMATOPHYTE: ICD-10-CM

## 2017-08-01 DIAGNOSIS — E11.42 DIABETIC POLYNEUROPATHY ASSOCIATED WITH TYPE 2 DIABETES MELLITUS: Primary | ICD-10-CM

## 2017-08-01 PROCEDURE — 3044F HG A1C LEVEL LT 7.0%: CPT | Mod: ,,, | Performed by: PODIATRIST

## 2017-08-01 PROCEDURE — 11721 DEBRIDE NAIL 6 OR MORE: CPT | Mod: S$PBB,Q9,, | Performed by: PODIATRIST

## 2017-08-01 PROCEDURE — 99214 OFFICE O/P EST MOD 30 MIN: CPT | Mod: PBBFAC,PO | Performed by: PODIATRIST

## 2017-08-01 PROCEDURE — 99999 PR PBB SHADOW E&M-EST. PATIENT-LVL IV: CPT | Mod: PBBFAC,,, | Performed by: PODIATRIST

## 2017-08-01 PROCEDURE — 11721 DEBRIDE NAIL 6 OR MORE: CPT | Mod: PBBFAC,PO | Performed by: PODIATRIST

## 2017-08-01 PROCEDURE — 99212 OFFICE O/P EST SF 10 MIN: CPT | Mod: 25,S$PBB,, | Performed by: PODIATRIST

## 2017-08-01 RX ORDER — DOXYCYCLINE 100 MG/1
CAPSULE ORAL
COMMUNITY
Start: 2017-07-19 | End: 2018-03-20

## 2017-08-01 NOTE — PROGRESS NOTES
Subjective:      Patient ID: Angel Farmer Jr. is a 56 y.o. male.    Chief Complaint: Follow-up and Diabetic Foot Exam    Angel is a 56 y.o. male who presents to the clinic upon referral from Dr. Lynnette alonzo. provider found  for evaluation and treatment of diabetic feet. Angel has a past medical history of Diabetes mellitus type II; Dialysis patient; Hyperlipidemia; Hypertension; Kidney failure; MIKE (obstructive sleep apnea); and Urinary tract infection. Complaints today consist of neuropathy with tingling in his feet and thick, fungal nails.   [facility] He denies any history of lower extremity wounds, infections and/or injury.    PCP: Vadim Berry Iii, MD  Seen by Dr. Doherty on 5/9/17  Date Last Seen by PCP: 12/1/16    Current shoe gear: Casual shoes    Hemoglobin A1C   Date Value Ref Range Status   05/21/2017 6.6 (H) 4.5 - 6.2 % Final     Comment:     According to ADA guidelines, hemoglobin A1C <7.0% represents  optimal control in non-pregnant diabetic patients.  Different  metrics may apply to specific populations.   Standards of Medical Care in Diabetes - 2016.  For the purpose of screening for the presence of diabetes:  <5.7%     Consistent with the absence of diabetes  5.7-6.4%  Consistent with increasing risk for diabetes   (prediabetes)  >or=6.5%  Consistent with diabetes  Currently no consensus exists for use of hemoglobin A1C  for diagnosis of diabetes for children.     07/09/2013 6.8 (H) 4.0 - 6.2 % Final             Review of Systems   Constitution: Negative for chills, fever and malaise/fatigue.   Cardiovascular: Negative for chest pain, leg swelling, orthopnea and palpitations.   Respiratory: Negative for cough, shortness of breath and wheezing.    Skin: Positive for color change, dry skin, itching and nail changes. Negative for poor wound healing and rash.   Musculoskeletal: Negative for arthritis, gout, joint pain, joint swelling, muscle weakness and  myalgias.   Neurological: Positive for numbness and paresthesias. Negative for disturbances in coordination, dizziness, focal weakness and tremors.           Objective:      Physical Exam   Cardiovascular:   Dorsalis pedis and posterior tibial pulses are diminished Bilaterally. Toes are cool to touch. Feet are warm proximally.There is decreased digital hair. Skin is atrophic, slightly hyperpigmented, and mildly edematous       Musculoskeletal:        Right foot: There is normal range of motion and no deformity.        Left foot: There is normal range of motion and no deformity.   Musculoskeletal:  Muscle strength is 5/5 in all groups bilaterally.  Metatarsophalangeal and subtalar range of motion are within normal limits without crepitus bilaterally. There is limitation of ankle dorsiflexion with knees extended and flexed Bilaterally.       Feet:   Right Foot:   Protective Sensation: 10 sites tested. 8 sites sensed.   Skin Integrity: Positive for callus and dry skin (superficial heel fissures, bilat). Negative for ulcer, blister or skin breakdown.   Left Foot:   Protective Sensation: 10 sites tested. 8 sites sensed.   Skin Integrity: Positive for callus and dry skin. Negative for ulcer, blister or skin breakdown.   Neurological:   [place] 5.07 monofilamant testing is diminished both feet. Sharp/dull sensation diminished Bilaterally. Light touch absent Bilaterally.       Skin: Skin is warm, dry and intact.   Toenails 1-5 bilaterally are elongated by 2-3 mm, thickened by 2-3 mm, discolored/yellowed, dystrophic, brittle with subungual debris. No incurvation. Mild xerosis, bilat. No open wounds.    Diffuse hyperkeratosis and mild fissuring at plantar heels, L>R.               Assessment:       Encounter Diagnoses   Name Primary?    Diabetic polyneuropathy associated with type 2 diabetes mellitus Yes    Onychomycosis due to dermatophyte          Plan:       Angel was seen today for foot pain and foot  problem.    Diagnoses and associated orders for this visit:    Encounter Diagnoses   Name Primary?    Diabetic polyneuropathy associated with type 2 diabetes mellitus Yes    Onychomycosis due to dermatophyte          I counseled the patient on his conditions, their implications and medical management.    - Shoe inspection. Diabetic Foot Education. Patient reminded of the importance of good nutrition and blood sugar control to help prevent podiatric complications of diabetes. Patient instructed on proper foot hygeine. We discussed wearing proper shoe gear, daily foot inspections, never walking without protective shoe gear, never putting sharp instruments to feet    - With patient's permission, nails were aggressively reduced and debrided x 10 to their soft tissue attachment mechanically and with electric , removing all offending nail and debris. Patient relates relief following the procedure. She will continue to monitor the areas daily, inspect her feet, wear protective shoe gear when ambulatory, moisturizer to maintain skin integrity and follow in this office in approximately 3-4 months, sooner p.r.n.    Requests f/u on Tues or Thursday due to HD schedule.    Leticia Singh DPM PGY-3    I have personally taken the history and examined this patient and agree with the resident's note as stated as above.   Balwinder Chen DPM, PREETHI

## 2017-10-05 DIAGNOSIS — E11.22 TYPE 2 DIABETES MELLITUS WITH DIABETIC CHRONIC KIDNEY DISEASE: ICD-10-CM

## 2017-10-10 DIAGNOSIS — Z79.4 TYPE 2 DIABETES MELLITUS WITH CHRONIC KIDNEY DISEASE, WITH LONG-TERM CURRENT USE OF INSULIN, UNSPECIFIED CKD STAGE: ICD-10-CM

## 2017-10-10 DIAGNOSIS — E11.22 TYPE 2 DIABETES MELLITUS WITH CHRONIC KIDNEY DISEASE, WITH LONG-TERM CURRENT USE OF INSULIN, UNSPECIFIED CKD STAGE: ICD-10-CM

## 2017-10-10 RX ORDER — LIRAGLUTIDE 6 MG/ML
INJECTION SUBCUTANEOUS
Refills: 10 | Status: CANCELLED | OUTPATIENT
Start: 2017-10-10

## 2017-10-21 ENCOUNTER — HOSPITAL ENCOUNTER (EMERGENCY)
Facility: HOSPITAL | Age: 57
Discharge: HOME | End: 2017-10-21
Attending: EMERGENCY MEDICINE
Payer: MEDICARE

## 2017-10-21 VITALS
SYSTOLIC BLOOD PRESSURE: 96 MMHG | OXYGEN SATURATION: 96 % | WEIGHT: 304 LBS | HEIGHT: 67 IN | HEART RATE: 75 BPM | RESPIRATION RATE: 18 BRPM | BODY MASS INDEX: 47.71 KG/M2 | TEMPERATURE: 100 F | DIASTOLIC BLOOD PRESSURE: 56 MMHG

## 2017-10-21 DIAGNOSIS — M25.561 PAIN OF RIGHT PATELLOFEMORAL JOINT: Primary | ICD-10-CM

## 2017-10-21 PROCEDURE — 25000003 PHARM REV CODE 250: Performed by: EMERGENCY MEDICINE

## 2017-10-21 PROCEDURE — 99283 EMERGENCY DEPT VISIT LOW MDM: CPT

## 2017-10-21 RX ORDER — HYDROCODONE BITARTRATE AND ACETAMINOPHEN 5; 325 MG/1; MG/1
1 TABLET ORAL EVERY 4 HOURS PRN
Qty: 18 TABLET | Refills: 0 | Status: SHIPPED | OUTPATIENT
Start: 2017-10-21 | End: 2018-03-20

## 2017-10-21 RX ORDER — DICLOFENAC SODIUM 10 MG/G
2 GEL TOPICAL 4 TIMES DAILY
Qty: 100 G | Refills: 0 | Status: SHIPPED | OUTPATIENT
Start: 2017-10-21 | End: 2018-03-20

## 2017-10-21 RX ORDER — OXYCODONE AND ACETAMINOPHEN 5; 325 MG/1; MG/1
2 TABLET ORAL
Status: COMPLETED | OUTPATIENT
Start: 2017-10-21 | End: 2017-10-21

## 2017-10-21 RX ADMIN — OXYCODONE AND ACETAMINOPHEN 2 TABLET: 5; 325 TABLET ORAL at 02:10

## 2017-10-21 NOTE — ED PROVIDER NOTES
"Encounter Date: 10/21/2017       History     Chief Complaint   Patient presents with    Knee Pain     pain to R knee after "stooping down and feeling a crack". pt states he applied ice at home. Pain resolved for few days until he heard "crack" again yesterday. Reports pain with weight bearing.      56-year-old male presents emergency Department with right-sided knee pain.  4 days ago he bent down to get something from underneath the bed, and felt a crack.  The pain got better for a few days and then today when he was walking back and after getting dialysis he heard a crack.  His wife reports that he sometimes feels like he is walking with his leg stiff.  He has some swelling, and pain but has full range of motion and strength.  Pain is worse when taking stairs and it's in the front of his knee.  Denies any trauma aside from bending down to get something out from under her bed      The history is provided by the patient.     Review of patient's allergies indicates:   Allergen Reactions    Keflex [cephalexin] Nausea Only     Past Medical History:   Diagnosis Date    Diabetes mellitus type II     Dialysis patient     Hyperlipidemia     Hypertension     Kidney failure     MIKE (obstructive sleep apnea)     Urinary tract infection      Past Surgical History:   Procedure Laterality Date    AV FISTULA PLACEMENT      left lower arm    TONSILLECTOMY, ADENOIDECTOMY       Family History   Problem Relation Age of Onset    Colon cancer Mother     Lung cancer Mother     Diabetes Mother     Diabetes Father     Heart disease Father     Hypertension Father     Diabetes Maternal Grandmother     Diabetes Maternal Grandfather      Social History   Substance Use Topics    Smoking status: Never Smoker    Smokeless tobacco: Never Used    Alcohol use No     Review of Systems   Eyes: Negative.    Endocrine: Negative.    Genitourinary: Negative.    All other systems reviewed and are negative.      Physical Exam "     Initial Vitals   BP Pulse Resp Temp SpO2   10/21/17 1308 10/21/17 1253 10/21/17 1253 10/21/17 1253 10/21/17 1253   (!) 91/45 74 18 99.6 °F (37.6 °C) 95 %      MAP       10/21/17 1308       60.33         Physical Exam    Nursing note and vitals reviewed.  Constitutional:   Chronically ill appearing   HENT:   Head: Normocephalic.   Eyes: EOM are normal. Pupils are equal, round, and reactive to light.   Neck: Normal range of motion.   Cardiovascular: Normal rate.   Pulmonary/Chest: Breath sounds normal.   Abdominal: Soft. Bowel sounds are normal.   Musculoskeletal: Normal range of motion.   Has crepitus over knee mild amount of swelling, No bony tenderness, some medial joint line tenderness, has full flexion to 135, extension to -10, has no laxity with ACL or PCL, negative lou   Neurological: He is alert and oriented to person, place, and time.   Skin: Skin is warm and dry.   Psychiatric: He has a normal mood and affect.         ED Course   Procedures  Labs Reviewed - No data to display          Medical Decision Making:   Initial Assessment:   55 yo M with several days of nontraumatic R knee pain, overweight. Discussed topical NSAIDS, analgesics, ice and wrap, f/u with Dr Fierro. No indication for Xrays, and patient agreed. Patient was counseled on reasons to return to the ED and expressed understanding, patient was discharged in stable condition with appropriate outpatient follow up plan.                      ED Course      Clinical Impression:   The encounter diagnosis was Pain of right patellofemoral joint.                           Noni Gonzalez MD  10/21/17 2117

## 2017-10-21 NOTE — DISCHARGE INSTRUCTIONS
1) PLEASE FOLLOW UP WITH YOUR PRIMARY CARE PROVIDER IN 3 DAYS IF YOU ARE NOT SIGNIFICANTLY IMPROVED  2) PLEASE TAKE YOUR MEDICATIONS AS PRESCRIBED  3) RETURN TO THE ED FOR WORSENING SYMPTOMS- ICE YOUR KNEE 20min on 20 min off as often as possible

## 2017-10-21 NOTE — ED NOTES
Patient has verified the spelling of their name and  on armband  LOC: The patient is awake, alert, and aware of environment with an appropriate affect, the patient is oriented x 3 and speaking appropriately.   APPEARANCE: Patient resting comfortably and in no acute distress, patient is clean and well groomed, patient's clothing is properly fastened.   SKIN: The skin is warm and dry, color consistent with ethnicity, patient has normal skin turgor and moist mucus membranes, skin intact, no breakdown or bruising noted.   :   Anuric, HD on M,W,F with fistula to left arm positive for thrill and bruit  MUSCULOSKELETAL: Patient moving all extremities spontaneously, no obvious swelling or deformities noted.   RESPIRATORY: Airway is open and patent, respirations are spontaneous, patient has a normal effort and rate, no accessory muscle use noted, bilateral breath sounds clear, denies SOB   ABDOMEN: Soft and non tender to palpation, no distention noted, normoactive bowel sounds present in all four quadrants.   CARDIAC:  Normal rate and rhythm, no peripheral edema noted, less then 3 second capillary refill, denies chest pain  COMPLAINT:  Right knee pain which he rates 8 out 10, hypotension, nausea, generalized weakness

## 2017-10-26 DIAGNOSIS — Z79.4 TYPE 2 DIABETES MELLITUS WITH STAGE 4 CHRONIC KIDNEY DISEASE, WITH LONG-TERM CURRENT USE OF INSULIN: ICD-10-CM

## 2017-10-26 DIAGNOSIS — E11.22 TYPE 2 DIABETES MELLITUS WITH STAGE 4 CHRONIC KIDNEY DISEASE, WITH LONG-TERM CURRENT USE OF INSULIN: ICD-10-CM

## 2017-10-26 DIAGNOSIS — N18.4 TYPE 2 DIABETES MELLITUS WITH STAGE 4 CHRONIC KIDNEY DISEASE, WITH LONG-TERM CURRENT USE OF INSULIN: ICD-10-CM

## 2017-10-27 DIAGNOSIS — N18.4 TYPE 2 DIABETES MELLITUS WITH STAGE 4 CHRONIC KIDNEY DISEASE, WITH LONG-TERM CURRENT USE OF INSULIN: ICD-10-CM

## 2017-10-27 DIAGNOSIS — Z79.4 TYPE 2 DIABETES MELLITUS WITH STAGE 4 CHRONIC KIDNEY DISEASE, WITH LONG-TERM CURRENT USE OF INSULIN: ICD-10-CM

## 2017-10-27 DIAGNOSIS — E11.22 TYPE 2 DIABETES MELLITUS WITH STAGE 4 CHRONIC KIDNEY DISEASE, WITH LONG-TERM CURRENT USE OF INSULIN: ICD-10-CM

## 2017-10-27 RX ORDER — INSULIN GLARGINE 100 [IU]/ML
INJECTION, SOLUTION SUBCUTANEOUS
Refills: 10 | Status: CANCELLED | OUTPATIENT
Start: 2017-10-27

## 2017-10-27 RX ORDER — INSULIN GLARGINE 100 [IU]/ML
55 INJECTION, SOLUTION SUBCUTANEOUS NIGHTLY
Qty: 16.5 ML | Refills: 11 | Status: SHIPPED | OUTPATIENT
Start: 2017-10-27 | End: 2018-03-08 | Stop reason: ALTCHOICE

## 2017-11-02 ENCOUNTER — OFFICE VISIT (OUTPATIENT)
Dept: PODIATRY | Facility: CLINIC | Age: 57
End: 2017-11-02
Payer: MEDICARE

## 2017-11-02 VITALS
DIASTOLIC BLOOD PRESSURE: 51 MMHG | HEART RATE: 78 BPM | HEIGHT: 67 IN | BODY MASS INDEX: 47.71 KG/M2 | WEIGHT: 304 LBS | SYSTOLIC BLOOD PRESSURE: 80 MMHG

## 2017-11-02 DIAGNOSIS — B35.1 ONYCHOMYCOSIS: ICD-10-CM

## 2017-11-02 DIAGNOSIS — E11.42 DIABETIC POLYNEUROPATHY ASSOCIATED WITH TYPE 2 DIABETES MELLITUS: Primary | ICD-10-CM

## 2017-11-02 PROCEDURE — 99999 PR PBB SHADOW E&M-EST. PATIENT-LVL III: CPT | Mod: PBBFAC,,, | Performed by: PODIATRIST

## 2017-11-02 PROCEDURE — 99213 OFFICE O/P EST LOW 20 MIN: CPT | Mod: PBBFAC,PO | Performed by: PODIATRIST

## 2017-11-02 PROCEDURE — 99499 UNLISTED E&M SERVICE: CPT | Mod: S$PBB,,, | Performed by: PODIATRIST

## 2017-11-02 PROCEDURE — 11721 DEBRIDE NAIL 6 OR MORE: CPT | Mod: PBBFAC,PO | Performed by: PODIATRIST

## 2017-11-02 PROCEDURE — 11721 DEBRIDE NAIL 6 OR MORE: CPT | Mod: S$PBB,Q9,, | Performed by: PODIATRIST

## 2017-11-02 RX ORDER — DOXYCYCLINE HYCLATE 100 MG
TABLET ORAL
COMMUNITY
Start: 2017-09-20 | End: 2018-03-20

## 2017-11-03 NOTE — PROGRESS NOTES
Subjective:      Patient ID: nAgel Farmer Jr. is a 57 y.o. male.    Chief Complaint: Follow-up (Dr. Berry 10/27/17) and Diabetic Foot Exam    Angel is a 57 y.o. male who presents to the clinic upon referral from Dr. Lynnette alonzo. provider found  for evaluation and treatment of diabetic feet. Angel has a past medical history of Diabetes mellitus type II; Dialysis patient; Hyperlipidemia; Hypertension; Kidney failure; MIKE (obstructive sleep apnea); and Urinary tract infection. Complaints today consist of neuropathy with tingling in his feet and thick, fungal nails.   He [facility].     PCP: Vadim Berry Iii, MD    Date Last Seen by PCP: 10/27/17    Current shoe gear: Casual shoes    Hemoglobin A1C   Date Value Ref Range Status   05/21/2017 6.6 (H) 4.5 - 6.2 % Final     Comment:     According to ADA guidelines, hemoglobin A1C <7.0% represents  optimal control in non-pregnant diabetic patients.  Different  metrics may apply to specific populations.   Standards of Medical Care in Diabetes - 2016.  For the purpose of screening for the presence of diabetes:  <5.7%     Consistent with the absence of diabetes  5.7-6.4%  Consistent with increasing risk for diabetes   (prediabetes)  >or=6.5%  Consistent with diabetes  Currently no consensus exists for use of hemoglobin A1C  for diagnosis of diabetes for children.     07/09/2013 6.8 (H) 4.0 - 6.2 % Final             Review of Systems   Constitution: Negative for chills, fever and malaise/fatigue.   Cardiovascular: Negative for chest pain, leg swelling, orthopnea and palpitations.   Respiratory: Negative for cough, shortness of breath and wheezing.    Skin: Positive for color change, dry skin and nail changes. Negative for itching, poor wound healing and rash.   Musculoskeletal: Negative for arthritis, gout, joint pain, joint swelling, muscle weakness and myalgias.   Neurological: Positive for numbness and paresthesias. Negative for  disturbances in coordination, dizziness, focal weakness and tremors.           Objective:      Physical Exam   Constitutional: He appears well-nourished. No distress.   Cardiovascular:   Pulses:       Dorsalis pedis pulses are 1+ on the right side, and 1+ on the left side.        Posterior tibial pulses are 1+ on the right side, and 1+ on the left side.   Dorsalis pedis and posterior tibial pulses are diminished Bilaterally. Toes are cool to touch. Feet are warm proximally.There is decreased digital hair. Skin is atrophic, slightly hyperpigmented, and mildly edematous       Musculoskeletal:        Right foot: There is normal range of motion and no deformity.        Left foot: There is normal range of motion and no deformity.   Musculoskeletal:  Muscle strength is 5/5 in all groups bilaterally.  Metatarsophalangeal and subtalar range of motion are within normal limits without crepitus bilaterally. There is limitation of ankle dorsiflexion with knees extended and flexed Bilaterally.       Feet:   Right Foot:   Protective Sensation: 10 sites tested. 8 sites sensed.   Skin Integrity: Positive for dry skin (superficial heel fissures, bilat). Negative for ulcer, blister, skin breakdown or callus.   Left Foot:   Protective Sensation: 10 sites tested. 8 sites sensed.   Skin Integrity: Positive for dry skin. Negative for ulcer, blister, skin breakdown or callus.   Neurological:   [place]-Bailey 5.07 monofilamant testing is diminished both feet. Sharp/dull sensation diminished Bilaterally. Light touch absent Bilaterally.       Skin: Skin is warm, dry and intact. Capillary refill takes less than 2 seconds. No ecchymosis and no rash noted. He is not diaphoretic. No cyanosis or erythema. Nails show no clubbing.   Toenails 1-5 bilaterally are elongated by 2-3 mm, thickened by 2-3 mm, discolored/yellowed, dystrophic, brittle with subungual debris. No incurvation. Mild xerosis, bilat. No open wounds.    Diffuse hyperkeratosis of  heel bilateral.    No open lesions or macerations bilateral lower extremity.                 Assessment:       Encounter Diagnoses   Name Primary?    Diabetic polyneuropathy associated with type 2 diabetes mellitus Yes    Onychomycosis          Plan:       I counseled the patient on his conditions, their implications and medical management.    Shoe inspection. Diabetic Foot Education. Patient reminded of the importance of good nutrition and blood sugar control to help prevent podiatric complications of diabetes. Patient instructed on proper foot hygeine. We discussed wearing proper shoe gear, daily foot inspections, never walking without protective shoe gear, never putting sharp instruments to feet    With patient's permission, nails were aggressively reduced and debrided x 10 to their soft tissue attachment mechanically and with electric , removing all offending nail and debris. Patient relates relief following the procedure. She will continue to monitor the areas daily, inspect her feet, wear protective shoe gear when ambulatory, moisturizer to maintain skin integrity and follow in this office in approximately 3-4 months, sooner p.r.n.

## 2017-11-27 RX ORDER — INSULIN ASPART 100 [IU]/ML
15 INJECTION, SOLUTION INTRAVENOUS; SUBCUTANEOUS
Qty: 13.5 ML | Refills: 11 | Status: SHIPPED | OUTPATIENT
Start: 2017-11-27 | End: 2018-08-21

## 2018-02-06 ENCOUNTER — OFFICE VISIT (OUTPATIENT)
Dept: PODIATRY | Facility: CLINIC | Age: 58
End: 2018-02-06
Payer: MEDICARE

## 2018-02-06 VITALS
SYSTOLIC BLOOD PRESSURE: 72 MMHG | DIASTOLIC BLOOD PRESSURE: 49 MMHG | HEIGHT: 67 IN | BODY MASS INDEX: 47.71 KG/M2 | WEIGHT: 304 LBS | HEART RATE: 72 BPM

## 2018-02-06 DIAGNOSIS — E11.42 DIABETIC POLYNEUROPATHY ASSOCIATED WITH TYPE 2 DIABETES MELLITUS: Primary | ICD-10-CM

## 2018-02-06 DIAGNOSIS — B35.1 ONYCHOMYCOSIS: ICD-10-CM

## 2018-02-06 PROCEDURE — 11721 DEBRIDE NAIL 6 OR MORE: CPT | Mod: PBBFAC,PO | Performed by: PODIATRIST

## 2018-02-06 PROCEDURE — 99499 UNLISTED E&M SERVICE: CPT | Mod: S$PBB,,, | Performed by: PODIATRIST

## 2018-02-06 PROCEDURE — 99212 OFFICE O/P EST SF 10 MIN: CPT | Mod: PBBFAC,PO,25 | Performed by: PODIATRIST

## 2018-02-06 PROCEDURE — 99999 PR PBB SHADOW E&M-EST. PATIENT-LVL II: CPT | Mod: PBBFAC,,, | Performed by: PODIATRIST

## 2018-02-06 RX ORDER — KETOROLAC TROMETHAMINE 5 MG/ML
SOLUTION OPHTHALMIC
COMMUNITY
Start: 2018-01-25 | End: 2018-12-02 | Stop reason: ALTCHOICE

## 2018-02-06 RX ORDER — DOXYCYCLINE HYCLATE 50 MG/1
CAPSULE ORAL
COMMUNITY
Start: 2017-12-27 | End: 2018-08-09

## 2018-02-06 NOTE — PROCEDURES
"Routine Foot Care  Date/Time: 2/6/2018 8:12 AM  Performed by: DLYON THOMPSON  Authorized by: DYLON THOMPSON     Time out: Immediately prior to procedure a "time out" was called to verify the correct patient, procedure, equipment, support staff and site/side marked as required.    Consent Done?:  Yes (Verbal)  Hyperkeratotic Skin Lesions?: No      Nail Care Type:  Debride  Location(s): All  (Left 1st Toe, Left 3rd Toe, Left 2nd Toe, Left 4th Toe, Left 5th Toe, Right 1st Toe, Right 2nd Toe, Right 3rd Toe, Right 4th Toe and Right 5th Toe)  Patient tolerance:  Patient tolerated the procedure well with no immediate complications      "

## 2018-02-07 NOTE — PROGRESS NOTES
Subjective:      Patient ID: Angel Farmer Jr. is a 57 y.o. male.    Chief Complaint: No chief complaint on file.    Angel is a 57 y.o. male who presents to the clinic upon referral from Dr. Lynnette alonzo. provider found  for evaluation and treatment of diabetic feet. Angel has a past medical history of Diabetes mellitus type II; Dialysis patient; Hyperlipidemia; Hypertension; Kidney failure; MIKE (obstructive sleep apnea); and Urinary tract infection. Complaints today consist of neuropathy with tingling in his feet and thick, fungal nails.   He had a LLE revascularization in 2015 at [facility] for PAD. No new complaints.    PCP: Vadim Berry Iii, MD    Date Last Seen by PCP: 10/27/17    Current shoe gear: Casual shoes    Hemoglobin A1C   Date Value Ref Range Status   05/21/2017 6.6 (H) 4.5 - 6.2 % Final     Comment:     According to ADA guidelines, hemoglobin A1C <7.0% represents  optimal control in non-pregnant diabetic patients.  Different  metrics may apply to specific populations.   Standards of Medical Care in Diabetes - 2016.  For the purpose of screening for the presence of diabetes:  <5.7%     Consistent with the absence of diabetes  5.7-6.4%  Consistent with increasing risk for diabetes   (prediabetes)  >or=6.5%  Consistent with diabetes  Currently no consensus exists for use of hemoglobin A1C  for diagnosis of diabetes for children.     07/09/2013 6.8 (H) 4.0 - 6.2 % Final             Review of Systems   Constitution: Negative for chills, fever and malaise/fatigue.   Cardiovascular: Negative for chest pain, leg swelling, orthopnea and palpitations.   Respiratory: Negative for cough, shortness of breath and wheezing.    Skin: Positive for color change, dry skin and nail changes. Negative for itching, poor wound healing and rash.   Musculoskeletal: Negative for arthritis, gout, joint pain, joint swelling, muscle weakness and myalgias.   Neurological: Positive for numbness and paresthesias. Negative for disturbances  in coordination, dizziness, focal weakness and tremors.           Objective:      Physical Exam   Constitutional: He appears well-nourished. No distress.   Cardiovascular:   Pulses:       Dorsalis pedis pulses are 1+ on the right side, and 1+ on the left side.        Posterior tibial pulses are 1+ on the right side, and 1+ on the left side.   Dorsalis pedis and posterior tibial pulses are diminished Bilaterally. Toes are cool to touch. Feet are warm proximally.There is decreased digital hair. Skin is atrophic, slightly hyperpigmented, and mildly edematous       Musculoskeletal:        Right foot: There is normal range of motion and no deformity.        Left foot: There is normal range of motion and no deformity.   Musculoskeletal:  Muscle strength is 5/5 in all groups bilaterally.  Metatarsophalangeal and subtalar range of motion are within normal limits without crepitus bilaterally. There is limitation of ankle dorsiflexion with knees extended and flexed Bilaterally.       Feet:   Right Foot:   Protective Sensation: 10 sites tested. 8 sites sensed.   Skin Integrity: Positive for dry skin (superficial heel fissures, bilat). Negative for ulcer, blister, skin breakdown or callus.   Left Foot:   Protective Sensation: 10 sites tested. 8 sites sensed.   Skin Integrity: Positive for dry skin. Negative for ulcer, blister, skin breakdown or callus.   Neurological:   [place]-Bailey 5.07 monofilamant testing is diminished both feet. Sharp/dull sensation diminished Bilaterally. Light touch absent Bilaterally.       Skin: Skin is warm, dry and intact. Capillary refill takes less than 2 seconds. No ecchymosis and no rash noted. He is not diaphoretic. No cyanosis or erythema. Nails show no clubbing.   Toenails 1-5 bilaterally are elongated by 2-3 mm, thickened by 2-3 mm, discolored/yellowed, dystrophic, brittle with subungual debris. No incurvation. Mild xerosis, bilat. No open wounds.    Diffuse hyperkeratosis of heel  bilateral.    No open lesions or macerations bilateral lower extremity.                 Assessment:       Encounter Diagnoses   Name Primary?    Diabetic polyneuropathy associated with type 2 diabetes mellitus Yes    Onychomycosis          Plan:       I counseled the patient on his conditions, their implications and medical management.    Shoe inspection. Diabetic Foot Education. Patient reminded of the importance of good nutrition and blood sugar control to help prevent podiatric complications of diabetes. Patient instructed on proper foot hygeine. We discussed wearing proper shoe gear, daily foot inspections, never walking without protective shoe gear, never putting sharp instruments to feet    With patient's permission, nails were aggressively reduced and debrided x 10 to their soft tissue attachment mechanically and with electric , removing all offending nail and debris. Patient relates relief following the procedure. She will continue to monitor the areas daily, inspect her feet, wear protective shoe gear when ambulatory, moisturizer to maintain skin integrity and follow in this office in approximately 3-4 months, sooner p.r.n.

## 2018-03-08 ENCOUNTER — OFFICE VISIT (OUTPATIENT)
Dept: FAMILY MEDICINE | Facility: HOSPITAL | Age: 58
End: 2018-03-08
Attending: FAMILY MEDICINE
Payer: MEDICARE

## 2018-03-08 VITALS
RESPIRATION RATE: 20 BRPM | SYSTOLIC BLOOD PRESSURE: 83 MMHG | DIASTOLIC BLOOD PRESSURE: 60 MMHG | WEIGHT: 305.56 LBS | HEART RATE: 64 BPM | HEIGHT: 67 IN | BODY MASS INDEX: 47.96 KG/M2

## 2018-03-08 DIAGNOSIS — Z79.4 TYPE 2 DIABETES MELLITUS WITH DIABETIC NEPHROPATHY, WITH LONG-TERM CURRENT USE OF INSULIN: ICD-10-CM

## 2018-03-08 DIAGNOSIS — N18.6 TYPE 2 DIABETES MELLITUS WITH CHRONIC KIDNEY DISEASE ON CHRONIC DIALYSIS, WITH LONG-TERM CURRENT USE OF INSULIN: Primary | ICD-10-CM

## 2018-03-08 DIAGNOSIS — E11.21 TYPE 2 DIABETES MELLITUS WITH DIABETIC NEPHROPATHY, WITH LONG-TERM CURRENT USE OF INSULIN: ICD-10-CM

## 2018-03-08 DIAGNOSIS — Z99.2 TYPE 2 DIABETES MELLITUS WITH CHRONIC KIDNEY DISEASE ON CHRONIC DIALYSIS, WITH LONG-TERM CURRENT USE OF INSULIN: Primary | ICD-10-CM

## 2018-03-08 DIAGNOSIS — Z79.4 TYPE 2 DIABETES MELLITUS WITH CHRONIC KIDNEY DISEASE ON CHRONIC DIALYSIS, WITH LONG-TERM CURRENT USE OF INSULIN: Primary | ICD-10-CM

## 2018-03-08 DIAGNOSIS — E11.22 TYPE 2 DIABETES MELLITUS WITH CHRONIC KIDNEY DISEASE ON CHRONIC DIALYSIS, WITH LONG-TERM CURRENT USE OF INSULIN: Primary | ICD-10-CM

## 2018-03-08 DIAGNOSIS — M65.342 TRIGGER FINGER, LEFT RING FINGER: ICD-10-CM

## 2018-03-08 PROCEDURE — 99213 OFFICE O/P EST LOW 20 MIN: CPT | Performed by: FAMILY MEDICINE

## 2018-03-08 RX ORDER — INSULIN GLARGINE 100 [IU]/ML
55 INJECTION, SOLUTION SUBCUTANEOUS DAILY
Qty: 1 BOX | Refills: 3 | Status: SHIPPED | OUTPATIENT
Start: 2018-03-08 | End: 2018-08-06 | Stop reason: SDUPTHER

## 2018-03-08 RX ORDER — INSULIN GLARGINE 100 [IU]/ML
55 INJECTION, SOLUTION SUBCUTANEOUS DAILY
Qty: 1 BOX | Refills: 3 | OUTPATIENT
Start: 2018-03-08 | End: 2018-03-08 | Stop reason: SDUPTHER

## 2018-03-08 RX ORDER — INSULIN GLARGINE 100 [IU]/ML
55 INJECTION, SOLUTION SUBCUTANEOUS DAILY
Status: DISCONTINUED | OUTPATIENT
Start: 2018-03-08 | End: 2018-03-08

## 2018-03-08 NOTE — PROGRESS NOTES
Subjective:       Patient ID: Angel Farmer Jr. is a 57 y.o. male.    Chief Complaint: Medication Refill    HPI   58 y/o M here for medication refill on his insulin and diabetic test strips. Patient has ESRD and had HD yesterday and had 4.5L taken off. States this is about 1 L than he normal gets taken off. States he feels fine though. Also complains of some locking and clicking of his left 4th finger. States that it started after he started HD. Mildly painful with finger flexion. Denies trauma to the effected finger or hand.     Review of Systems   Constitutional: Negative for chills and fever.   HENT: Negative for congestion, rhinorrhea and sore throat.    Eyes: Negative for discharge and redness.   Respiratory: Negative for cough, shortness of breath, wheezing and stridor.    Cardiovascular: Negative for chest pain, palpitations and leg swelling.   Gastrointestinal: Negative for abdominal distention, abdominal pain, blood in stool, constipation, diarrhea, nausea and vomiting.   Genitourinary: Negative for difficulty urinating, dysuria and hematuria.   Musculoskeletal: Negative for back pain and neck pain.   Skin: Negative for rash.   Neurological: Negative for dizziness, seizures, light-headedness and headaches.   Psychiatric/Behavioral: Negative for agitation, behavioral problems and confusion.   All other systems reviewed and are negative.      Objective:      Vitals:    03/08/18 1149   BP: (!) 83/60   Pulse: 64   Resp: 20     Physical Exam   Constitutional: He is oriented to person, place, and time. He appears well-developed and well-nourished. No distress.   HENT:   Head: Normocephalic and atraumatic.   Right Ear: External ear normal.   Left Ear: External ear normal.   Nose: Nose normal.   Mouth/Throat: Oropharynx is clear and moist. No oropharyngeal exudate.   Eyes: Conjunctivae and EOM are normal. Pupils are equal, round, and reactive to light. Right eye exhibits no discharge. Left eye exhibits no  discharge. No scleral icterus.   Neck: Normal range of motion. Neck supple. No tracheal deviation present. No thyromegaly present.   Cardiovascular: Normal rate, regular rhythm, normal heart sounds and intact distal pulses.  Exam reveals no gallop and no friction rub.    No murmur heard.  Pulmonary/Chest: Effort normal and breath sounds normal. No respiratory distress. He has no wheezes. He has no rales. He exhibits no tenderness.   Abdominal: Soft. Bowel sounds are normal. He exhibits no distension and no mass. There is no tenderness.   Musculoskeletal: Normal range of motion. He exhibits no edema or tenderness.   Lymphadenopathy:     He has no cervical adenopathy.   Neurological: He is alert and oriented to person, place, and time.   Skin: Skin is warm. No rash noted. He is not diaphoretic.   Psychiatric: He has a normal mood and affect. His behavior is normal. Judgment and thought content normal.   Nursing note and vitals reviewed.      Assessment:       1. 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 Trigger finger, left ring finger        Plan:       Type 2 diabetes mellitus with chronic kidney disease on chronic dialysis, with long-term current use of insulin    Trigger finger, left ring finger    Refilled basaglar and test strips and sent to [facility] mail order with staff attending signature (Dr Shafer). BP low but stable and patient is asymptomatic.     No Follow-up on file.      3/14/2018 Satya Rhoades M.D.  [facility] Resident PGY-2

## 2018-03-14 PROBLEM — M65.342 TRIGGER FINGER, LEFT RING FINGER: Status: ACTIVE | Noted: 2018-03-14

## 2018-03-15 NOTE — PROGRESS NOTES
I was present in clinic during the visit and assume the primary medical care of this patient.  I discussed the case with Dr. Rhoades, and I have reviewed and agree with the assessment and plan.

## 2018-03-20 ENCOUNTER — OFFICE VISIT (OUTPATIENT)
Dept: FAMILY MEDICINE | Facility: HOSPITAL | Age: 58
End: 2018-03-20
Attending: FAMILY MEDICINE
Payer: MEDICARE

## 2018-03-20 VITALS
HEART RATE: 67 BPM | WEIGHT: 308.63 LBS | SYSTOLIC BLOOD PRESSURE: 102 MMHG | DIASTOLIC BLOOD PRESSURE: 59 MMHG | BODY MASS INDEX: 46.77 KG/M2 | HEIGHT: 68 IN

## 2018-03-20 DIAGNOSIS — L02.211 ABSCESS OF ABDOMINAL WALL: Primary | ICD-10-CM

## 2018-03-20 PROCEDURE — 99215 OFFICE O/P EST HI 40 MIN: CPT

## 2018-03-20 RX ORDER — CHLORHEXIDINE GLUCONATE 40 MG/ML
SOLUTION TOPICAL DAILY PRN
Qty: 473 ML | Refills: 0 | Status: SHIPPED | OUTPATIENT
Start: 2018-03-20 | End: 2018-12-12

## 2018-03-20 NOTE — PROGRESS NOTES
Subjective:       Patient ID: Angel Farmer Jr. is a 57 y.o. male.    Chief Complaint: Mass    HPI   56 yo M hx of ESRD, previously HTN, DM (6.6, may 2017), HLD, MIKE comes in for abscess on abdominal wall. Reports that he noticed a bump on his abdomen last Friday. Reports mild pain to palpation, 3/10, non-radiating. Reports bump has improved since Friday.    Review of Systems   Constitutional: Negative for chills, fatigue, fever and unexpected weight change.   HENT: Negative for sore throat.    Eyes: Negative for visual disturbance.   Respiratory: Negative for cough, chest tightness and shortness of breath.    Cardiovascular: Negative for chest pain.   Gastrointestinal: Negative for abdominal pain, constipation, diarrhea, nausea and vomiting.   Endocrine: Negative for polyuria.   Genitourinary: Negative for dysuria and hematuria.   Neurological: Negative for headaches.       Objective:      Vitals:    03/20/18 1454   BP: (!) 102/59   Pulse: 67     Physical Exam   Constitutional: He is oriented to person, place, and time. He appears well-developed and well-nourished. No distress.   HENT:   Head: Normocephalic and atraumatic.   Right Ear: External ear normal.   Left Ear: External ear normal.   Eyes: Conjunctivae and EOM are normal. Right eye exhibits no discharge. Left eye exhibits no discharge. No scleral icterus.   Neck: Normal range of motion.   Cardiovascular: Normal rate, regular rhythm and normal heart sounds.  Exam reveals no gallop and no friction rub.    No murmur heard.  Pulmonary/Chest: Effort normal and breath sounds normal. No respiratory distress. He has no wheezes. He has no rales. He exhibits no tenderness.   Musculoskeletal: He exhibits no edema or tenderness.   Neurological: He is alert and oriented to person, place, and time.   Skin: Skin is warm. He is not diaphoretic. There is erythema (non-tender).   Psychiatric: He has a normal mood and affect. His behavior is normal. Judgment and thought  content normal.   Nursing note and vitals reviewed.              Assessment:       1. Abscess of abdominal wall        Plan:       Abscess of abdominal wall  -     chlorhexidine (HIBICLENS) 4 % external liquid; Apply topically daily as needed.  Dispense: 473 mL; Refill: 0    - advised to warm compress area. If persistent or worsen, RTC for possible I&D. Will need cultures due to history of bacteremia    Follow-up in about 1 week (around 3/27/2018), or if symptoms worsen or fail to improve.

## 2018-03-27 ENCOUNTER — OFFICE VISIT (OUTPATIENT)
Dept: FAMILY MEDICINE | Facility: HOSPITAL | Age: 58
End: 2018-03-27
Attending: FAMILY MEDICINE
Payer: MEDICARE

## 2018-03-27 VITALS
WEIGHT: 304.44 LBS | HEIGHT: 68 IN | BODY MASS INDEX: 46.14 KG/M2 | SYSTOLIC BLOOD PRESSURE: 95 MMHG | HEART RATE: 70 BPM | DIASTOLIC BLOOD PRESSURE: 55 MMHG

## 2018-03-27 DIAGNOSIS — N18.6 ESRD ON DIALYSIS: ICD-10-CM

## 2018-03-27 DIAGNOSIS — Z79.4 TYPE 2 DIABETES MELLITUS WITH CHRONIC KIDNEY DISEASE ON CHRONIC DIALYSIS, WITH LONG-TERM CURRENT USE OF INSULIN: ICD-10-CM

## 2018-03-27 DIAGNOSIS — E78.5 HYPERLIPIDEMIA, UNSPECIFIED HYPERLIPIDEMIA TYPE: ICD-10-CM

## 2018-03-27 DIAGNOSIS — Z99.2 ESRD ON DIALYSIS: ICD-10-CM

## 2018-03-27 DIAGNOSIS — Z99.2 TYPE 2 DIABETES MELLITUS WITH CHRONIC KIDNEY DISEASE ON CHRONIC DIALYSIS, WITH LONG-TERM CURRENT USE OF INSULIN: ICD-10-CM

## 2018-03-27 DIAGNOSIS — E11.22 TYPE 2 DIABETES MELLITUS WITH CHRONIC KIDNEY DISEASE ON CHRONIC DIALYSIS, WITH LONG-TERM CURRENT USE OF INSULIN: ICD-10-CM

## 2018-03-27 DIAGNOSIS — I10 ESSENTIAL HYPERTENSION: ICD-10-CM

## 2018-03-27 DIAGNOSIS — N18.6 TYPE 2 DIABETES MELLITUS WITH CHRONIC KIDNEY DISEASE ON CHRONIC DIALYSIS, WITH LONG-TERM CURRENT USE OF INSULIN: ICD-10-CM

## 2018-03-27 PROCEDURE — 99215 OFFICE O/P EST HI 40 MIN: CPT

## 2018-03-27 NOTE — PROGRESS NOTES
I have reviewed the notes, assessments, and/or procedures performed by  Dr. Vásquez, I concur with her/his documentation of Angel Farmer Jr..

## 2018-03-27 NOTE — PROGRESS NOTES
Subjective:       Patient ID: Angel Farmer Jr. is a 57 y.o. male.    Chief Complaint: Follow-up    HPI   56 yo M hx of ESRD, previously HTN, DM (6.6, may 2017), HLD, MIKE comes in for f/u of an abscess. Patient applied warm compress and reports he feels it improved. Came in to confirm.    Review of Systems   Constitutional: Negative for chills, fatigue, fever and unexpected weight change.   HENT: Negative for sore throat.    Eyes: Negative for visual disturbance.   Respiratory: Negative for cough, chest tightness and shortness of breath.    Cardiovascular: Negative for chest pain.   Gastrointestinal: Negative for abdominal pain, constipation, diarrhea, nausea and vomiting.   Endocrine: Negative for polyuria.   Genitourinary: Negative for dysuria and hematuria.   Neurological: Negative for headaches.       Objective:      Vitals:    03/27/18 1434   BP: (!) 95/55   Pulse: 70     Physical Exam   Constitutional: He is oriented to person, place, and time. He appears well-developed and well-nourished. No distress.   HENT:   Head: Normocephalic and atraumatic.   Right Ear: External ear normal.   Left Ear: External ear normal.   Eyes: Conjunctivae and EOM are normal. Right eye exhibits no discharge. Left eye exhibits no discharge. No scleral icterus.   Neck: Normal range of motion.   Cardiovascular: Normal rate, regular rhythm and normal heart sounds.  Exam reveals no gallop and no friction rub.    No murmur heard.  Pulmonary/Chest: Effort normal and breath sounds normal. No respiratory distress. He has no wheezes. He has no rales. He exhibits no tenderness.   Musculoskeletal: He exhibits no edema or tenderness.   Neurological: He is alert and oriented to person, place, and time.   Skin: Skin is warm. He is not diaphoretic. No erythema.   Non-tender, non-erythematous, abscess resolving   Psychiatric: He has a normal mood and affect. His behavior is normal. Judgment and thought content normal.   Nursing note and vitals  reviewed.      Assessment:       1. Abscess, abdomen    2. Type 2 diabetes mellitus with chronic kidney disease on chronic dialysis, with long-term current use of insulin    3. ESRD on dialysis    4. Hyperlipidemia, unspecified hyperlipidemia type    5. Essential hypertension        Plan:       Abscess, abdomen    Type 2 diabetes mellitus with chronic kidney disease on chronic dialysis, with long-term current use of insulin    ESRD on dialysis    Hyperlipidemia, unspecified hyperlipidemia type    Essential hypertension    - Abscess resolved  - HTN stable on no meds  - HbA1C 6 (2/2018) reported from HD  - Lipid panel provided for [facility]  - Labs will be sent from [facility]    Follow-up in about 6 months (around 9/27/2018).

## 2018-04-19 DIAGNOSIS — E11.9 TYPE 2 DIABETES MELLITUS WITHOUT COMPLICATION: ICD-10-CM

## 2018-04-20 RX ORDER — PEN NEEDLE, DIABETIC 31 GX5/16"
NEEDLE, DISPOSABLE MISCELLANEOUS
Qty: 500 EACH | Refills: 4 | Status: SHIPPED | OUTPATIENT
Start: 2018-04-20 | End: 2021-03-15 | Stop reason: SDUPTHER

## 2018-04-24 ENCOUNTER — OFFICE VISIT (OUTPATIENT)
Dept: FAMILY MEDICINE | Facility: HOSPITAL | Age: 58
End: 2018-04-24
Attending: FAMILY MEDICINE
Payer: MEDICARE

## 2018-04-24 VITALS
WEIGHT: 305.75 LBS | SYSTOLIC BLOOD PRESSURE: 88 MMHG | HEIGHT: 67 IN | HEART RATE: 63 BPM | BODY MASS INDEX: 47.99 KG/M2 | DIASTOLIC BLOOD PRESSURE: 52 MMHG

## 2018-04-24 DIAGNOSIS — R10.32 LEFT GROIN PAIN: Primary | ICD-10-CM

## 2018-04-24 PROCEDURE — 99215 OFFICE O/P EST HI 40 MIN: CPT

## 2018-04-24 NOTE — PROGRESS NOTES
Subjective:       Patient ID: Angel Farmer Jr. is a 57 y.o. male.    Chief Complaint: Groin Pain    HPI   58 yo M hx of ESRD, previously HTN, DM, HLD, MIKE comes in for left groin pain. Reports that he was in his usual health when he noticed left groin pain while attempting to get out of bed. Reports aching like pain that is non-radiating. Denies N/V. Reports improve formed stool (typically diarrhea on HD) since having that left groin pain. Currently has no pain right now    Review of Systems   Constitutional: Negative for chills, fatigue, fever and unexpected weight change.   HENT: Negative for sore throat.    Eyes: Negative for visual disturbance.   Respiratory: Negative for cough, chest tightness and shortness of breath.    Cardiovascular: Negative for chest pain.   Gastrointestinal: Negative for abdominal pain, constipation, diarrhea, nausea and vomiting.   Endocrine: Negative for polyuria.   Genitourinary: Negative for dysuria and hematuria.   Neurological: Negative for headaches.       Objective:      Vitals:    04/24/18 0904   BP: (!) 88/52   Pulse: 63     Physical Exam   Constitutional: He is oriented to person, place, and time. He appears well-developed and well-nourished. No distress.   HENT:   Head: Normocephalic and atraumatic.   Right Ear: External ear normal.   Left Ear: External ear normal.   Eyes: Conjunctivae and EOM are normal. Right eye exhibits no discharge. Left eye exhibits no discharge. No scleral icterus.   Neck: Normal range of motion.   Cardiovascular: Normal rate, regular rhythm and normal heart sounds.  Exam reveals no gallop and no friction rub.    No murmur heard.  Pulmonary/Chest: Effort normal and breath sounds normal. No respiratory distress. He has no wheezes. He has no rales. He exhibits no tenderness.   Abdominal: Soft. Bowel sounds are normal. He exhibits no distension. There is no tenderness. There is no guarding.   Morbid obesity, unable to assess groin   Musculoskeletal: He  exhibits no edema or tenderness.   Neurological: He is alert and oriented to person, place, and time.   Skin: Skin is warm. He is not diaphoretic.   Psychiatric: He has a normal mood and affect. His behavior is normal. Judgment and thought content normal.   Nursing note and vitals reviewed.      Assessment:       1. Left groin pain        Plan:       Left groin pain  -     Cancel: POCT URINALYSIS  -     Cancel: Ambulatory Referral to General Surgery  -     US Abdomen Limited; Future; Expected date: 04/24/2018    - HTN stable on no meds  - HbA1C 6 (2/2018) reported from HD. Reports will bring labs next visit    Follow-up in about 1 month (around 5/24/2018), or if symptoms worsen or fail to improve.

## 2018-04-26 ENCOUNTER — HOSPITAL ENCOUNTER (OUTPATIENT)
Dept: RADIOLOGY | Facility: HOSPITAL | Age: 58
Discharge: HOME | End: 2018-04-26
Payer: MEDICARE

## 2018-04-26 DIAGNOSIS — R10.32 LEFT GROIN PAIN: ICD-10-CM

## 2018-04-26 PROCEDURE — 76705 ECHO EXAM OF ABDOMEN: CPT | Mod: TC

## 2018-04-26 PROCEDURE — 76705 ECHO EXAM OF ABDOMEN: CPT | Mod: 26,,, | Performed by: RADIOLOGY

## 2018-05-01 ENCOUNTER — OFFICE VISIT (OUTPATIENT)
Dept: FAMILY MEDICINE | Facility: HOSPITAL | Age: 58
End: 2018-05-01
Attending: FAMILY MEDICINE
Payer: MEDICARE

## 2018-05-01 VITALS
SYSTOLIC BLOOD PRESSURE: 81 MMHG | HEART RATE: 66 BPM | BODY MASS INDEX: 45.88 KG/M2 | DIASTOLIC BLOOD PRESSURE: 51 MMHG | HEIGHT: 68 IN | WEIGHT: 302.69 LBS

## 2018-05-01 DIAGNOSIS — E78.5 HYPERLIPIDEMIA, UNSPECIFIED HYPERLIPIDEMIA TYPE: ICD-10-CM

## 2018-05-01 DIAGNOSIS — Z79.4 TYPE 2 DIABETES MELLITUS WITH CHRONIC KIDNEY DISEASE ON CHRONIC DIALYSIS, WITH LONG-TERM CURRENT USE OF INSULIN: ICD-10-CM

## 2018-05-01 DIAGNOSIS — R53.1 WEAKNESS: Primary | ICD-10-CM

## 2018-05-01 DIAGNOSIS — Z99.2 TYPE 2 DIABETES MELLITUS WITH CHRONIC KIDNEY DISEASE ON CHRONIC DIALYSIS, WITH LONG-TERM CURRENT USE OF INSULIN: ICD-10-CM

## 2018-05-01 DIAGNOSIS — E11.22 TYPE 2 DIABETES MELLITUS WITH CHRONIC KIDNEY DISEASE ON CHRONIC DIALYSIS, WITH LONG-TERM CURRENT USE OF INSULIN: ICD-10-CM

## 2018-05-01 DIAGNOSIS — N18.6 TYPE 2 DIABETES MELLITUS WITH CHRONIC KIDNEY DISEASE ON CHRONIC DIALYSIS, WITH LONG-TERM CURRENT USE OF INSULIN: ICD-10-CM

## 2018-05-01 DIAGNOSIS — M79.18 GLUTEAL PAIN: ICD-10-CM

## 2018-05-01 PROCEDURE — 99215 OFFICE O/P EST HI 40 MIN: CPT

## 2018-05-01 NOTE — PROGRESS NOTES
Subjective:       Patient ID: Angel Farmer Jr. is a 57 y.o. male.    Chief Complaint: Results    HPI   56 yo M hx of ESRD, previously HTN, DM, HLD, MIKE comes in for f/u or left groin pain. Reports pain have resolved. US neg for hernia. Reports having an abscess on his left buttocks that started yesterday. Denies pain but he feels it on his buttocks. Reports using hot compress without improvement. Also concerned about having low BP the day after HD. Reports feeling somewhat tired and fatigue with the low BP    Review of Systems   Constitutional: Negative for chills, fatigue, fever and unexpected weight change.   HENT: Negative for sore throat.    Eyes: Negative for visual disturbance.   Respiratory: Negative for cough, chest tightness and shortness of breath.    Cardiovascular: Negative for chest pain.   Gastrointestinal: Negative for abdominal pain, constipation, diarrhea, nausea and vomiting.   Endocrine: Negative for polyuria.   Genitourinary: Negative for dysuria and hematuria.   Neurological: Negative for headaches.       Objective:      Vitals:    05/01/18 0851   BP: (!) 81/51   Pulse: 66     Physical Exam   Constitutional: He is oriented to person, place, and time. He appears well-developed and well-nourished. No distress.   HENT:   Head: Normocephalic and atraumatic.   Right Ear: External ear normal.   Left Ear: External ear normal.   Eyes: Conjunctivae and EOM are normal. Right eye exhibits no discharge. Left eye exhibits no discharge. No scleral icterus.   Neck: Normal range of motion.   Cardiovascular: Normal rate, regular rhythm and normal heart sounds.  Exam reveals no gallop and no friction rub.    No murmur heard.  Pulmonary/Chest: Effort normal and breath sounds normal. No respiratory distress. He has no wheezes. He has no rales. He exhibits no tenderness.   Musculoskeletal: He exhibits no edema or tenderness.   Neurological: He is alert and oriented to person, place, and time.   Darken lesion,  non-tender, non-erythematous around left medial aspect of the buttocks, ~ 1 cm in diameter   Skin: Skin is warm. He is not diaphoretic.   Psychiatric: He has a normal mood and affect. His behavior is normal. Judgment and thought content normal.   Nursing note and vitals reviewed.      Assessment:       1. Weakness    2. Hyperlipidemia, unspecified hyperlipidemia type    3. Type 2 diabetes mellitus with chronic kidney disease on chronic dialysis, with long-term current use of insulin    4. Gluteal pain        Plan:       Weakness  -     Ambulatory referral to Home Health    Hyperlipidemia, unspecified hyperlipidemia type    Type 2 diabetes mellitus with chronic kidney disease on chronic dialysis, with long-term current use of insulin    Gluteal pain    - HbA1C 6 (2/2018) reported from HD  - Gluteal pain does not appear to be an abscess. 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  - Reports having difficulty with movement inside his home. Will have social work and nursing come to home to evaluate and assist to prevent falls.  - Advised patient to discuss low BP with Dr. Jauregui, nephrologist.    Follow-up in about 3 months (around 8/1/2018), or if symptoms worsen or fail to improve.

## 2018-05-08 ENCOUNTER — OFFICE VISIT (OUTPATIENT)
Dept: PODIATRY | Facility: CLINIC | Age: 58
End: 2018-05-08
Payer: MEDICARE

## 2018-05-08 VITALS
BODY MASS INDEX: 47.4 KG/M2 | HEIGHT: 67 IN | DIASTOLIC BLOOD PRESSURE: 51 MMHG | SYSTOLIC BLOOD PRESSURE: 88 MMHG | WEIGHT: 302 LBS | HEART RATE: 88 BPM

## 2018-05-08 DIAGNOSIS — B35.1 ONYCHOMYCOSIS: ICD-10-CM

## 2018-05-08 DIAGNOSIS — E11.42 DIABETIC POLYNEUROPATHY ASSOCIATED WITH TYPE 2 DIABETES MELLITUS: Primary | ICD-10-CM

## 2018-05-08 PROCEDURE — 99499 UNLISTED E&M SERVICE: CPT | Mod: S$PBB,,, | Performed by: PODIATRIST

## 2018-05-08 PROCEDURE — 99999 PR PBB SHADOW E&M-EST. PATIENT-LVL III: CPT | Mod: PBBFAC,,, | Performed by: PODIATRIST

## 2018-05-08 PROCEDURE — 11721 DEBRIDE NAIL 6 OR MORE: CPT | Mod: PBBFAC,PO | Performed by: PODIATRIST

## 2018-05-08 PROCEDURE — 99213 OFFICE O/P EST LOW 20 MIN: CPT | Mod: PBBFAC,PO,25 | Performed by: PODIATRIST

## 2018-05-08 NOTE — PROCEDURES
"Angel Farmer Jr. is a 57 y.o. male patient.    Pulse: 88 (05/08/18 0726)  BP: (!) 88/51 (05/08/18 0726)  Weight: (!) 137 kg (302 lb) (05/08/18 0726)  Height: 5' 7" (170.2 cm) (05/08/18 0726)       Routine Foot Care  Date/Time: 5/8/2018 8:21 AM  Performed by: BALWINDER CHEN  Authorized by: BALWINDER CHEN     Time out: Immediately prior to procedure a "time out" was called to verify the correct patient, procedure, equipment, support staff and site/side marked as required.    Hyperkeratotic Skin Lesions?: No      Nail Care Type:  Debride  Location(s): All  (Left 1st Toe, Left 3rd Toe, Left 2nd Toe, Left 4th Toe, Left 5th Toe, Right 1st Toe, Right 2nd Toe, Right 3rd Toe, Right 4th Toe and Right 5th Toe)  Patient tolerance:  Patient tolerated the procedure well with no immediate complications        Balwinder Chen  5/8/2018  "

## 2018-05-10 NOTE — PROGRESS NOTES
Subjective:      Patient ID: Angel Farmer Jr. is a 57 y.o. male.    Chief Complaint: Diabetes Mellitus (DR.Nguyen ECHEVERRIA 5/1/18); Diabetic Foot Exam; and Routine Foot Care    Angel is a 57 y.o. male who presents to the clinic for evaluation and treatment of diabetic feet. Angel has a past medical history of Diabetes mellitus type II; Dialysis patient; Hyperlipidemia; Hypertension; Kidney failure; MIKE (obstructive sleep apnea); and Urinary tract infection. He had a LLE revascularization in 2015 at [facility] for PAD. No new complaints.    PCP: Vadim Berry Iii, MD  Seen by Galileo Vásquez MD on 5/1/18  Date Last Seen by PCP: 10/27/17    Current shoe gear: Casual shoes    Hemoglobin A1C   Date Value Ref Range Status   05/21/2017 6.6 (H) 4.5 - 6.2 % Final     Comment:     According to ADA guidelines, hemoglobin A1C <7.0% represents  optimal control in non-pregnant diabetic patients.  Different  metrics may apply to specific populations.   Standards of Medical Care in Diabetes - 2016.  For the purpose of screening for the presence of diabetes:  <5.7%     Consistent with the absence of diabetes  5.7-6.4%  Consistent with increasing risk for diabetes   (prediabetes)  >or=6.5%  Consistent with diabetes  Currently no consensus exists for use of hemoglobin A1C  for diagnosis of diabetes for children.     07/09/2013 6.8 (H) 4.0 - 6.2 % Final             Review of Systems   Constitution: Negative for chills, fever and malaise/fatigue.   Cardiovascular: Negative for chest pain, leg swelling, orthopnea and palpitations.   Respiratory: Negative for cough, shortness of breath and wheezing.    Skin: Positive for color change, dry skin and nail changes. Negative for itching, poor wound healing and rash.   Musculoskeletal: Negative for arthritis, gout, joint pain, joint swelling, muscle weakness and myalgias.   Neurological: Positive for numbness and paresthesias. Negative for disturbances in coordination, dizziness, focal weakness and  tremors.           Objective:      Physical Exam   Constitutional: He appears well-nourished. No distress.   Cardiovascular:   Pulses:       Dorsalis pedis pulses are 1+ on the right side, and 1+ on the left side.        Posterior tibial pulses are 1+ on the right side, and 1+ on the left side.   Dorsalis pedis and posterior tibial pulses are diminished Bilaterally. Toes are cool to touch. Feet are warm proximally.There is decreased digital hair. Skin is atrophic, slightly hyperpigmented, and mildly edematous       Musculoskeletal:        Right foot: There is normal range of motion and no deformity.        Left foot: There is normal range of motion and no deformity.   Musculoskeletal:  Muscle strength is 5/5 in all groups bilaterally.  Metatarsophalangeal and subtalar range of motion are within normal limits without crepitus bilaterally. There is limitation of ankle dorsiflexion with knees extended and flexed Bilaterally.       Feet:   Right Foot:   Protective Sensation: 10 sites tested. 8 sites sensed.   Skin Integrity: Positive for dry skin (superficial heel fissures, bilat). Negative for ulcer, blister, skin breakdown or callus.   Left Foot:   Protective Sensation: 10 sites tested. 8 sites sensed.   Skin Integrity: Positive for dry skin. Negative for ulcer, blister, skin breakdown or callus.   Neurological:   [place]-Bailey 5.07 monofilamant testing is diminished both feet. Sharp/dull sensation diminished Bilaterally. Light touch absent Bilaterally.       Skin: Skin is warm, dry and intact. Capillary refill takes less than 2 seconds. No ecchymosis and no rash noted. He is not diaphoretic. No cyanosis or erythema. Nails show no clubbing.   Toenails 1-5 bilaterally are elongated by 2-3 mm, thickened by 2-3 mm, discolored/yellowed, dystrophic, brittle with subungual debris. No incurvation. Mild xerosis, bilat. No open wounds.    Diffuse hyperkeratosis of heel bilateral.    No open lesions or macerations bilateral  lower extremity.                 Assessment:       Encounter Diagnoses   Name Primary?    Diabetic polyneuropathy associated with type 2 diabetes mellitus Yes    Onychomycosis          Plan:       I counseled the patient on his conditions, their implications and medical management.    Shoe inspection. Diabetic Foot Education. Patient reminded of the importance of good nutrition and blood sugar control to help prevent podiatric complications of diabetes. Patient instructed on proper foot hygeine. We discussed wearing proper shoe gear, daily foot inspections, never walking without protective shoe gear, never putting sharp instruments to feet    With patient's permission, nails were aggressively reduced and debrided x 10 to their soft tissue attachment mechanically and with electric , removing all offending nail and debris. Patient relates relief following the procedure. She will continue to monitor the areas daily, inspect her feet, wear protective shoe gear when ambulatory, moisturizer to maintain skin integrity and follow in this office in approximately 3-4 months, sooner p.r.n.

## 2018-05-24 ENCOUNTER — HOSPITAL ENCOUNTER (OUTPATIENT)
Dept: CARDIOLOGY | Facility: HOSPITAL | Age: 58
Discharge: HOME | End: 2018-05-24
Attending: INTERNAL MEDICINE
Payer: MEDICARE

## 2018-05-24 DIAGNOSIS — I49.9 IRREGULAR HEARTBEAT: ICD-10-CM

## 2018-05-24 DIAGNOSIS — I95.9 HYPOTENSION, UNSPECIFIED HYPOTENSION TYPE: ICD-10-CM

## 2018-05-24 DIAGNOSIS — I10 HYPERTENSION: Primary | ICD-10-CM

## 2018-05-24 LAB
DIASTOLIC DYSFUNCTION: NO
ESTIMATED PA SYSTOLIC PRESSURE: 8.86
RETIRED EF AND QEF - SEE NOTES: 65 (ref 55–65)

## 2018-05-24 PROCEDURE — 93306 TTE W/DOPPLER COMPLETE: CPT

## 2018-05-25 ENCOUNTER — CLINICAL SUPPORT (OUTPATIENT)
Dept: LAB | Facility: HOSPITAL | Age: 58
End: 2018-05-25
Attending: INTERNAL MEDICINE
Payer: MEDICARE

## 2018-05-25 DIAGNOSIS — I49.9 IRREGULAR HEARTBEAT: ICD-10-CM

## 2018-05-25 DIAGNOSIS — I10 HYPERTENSION: ICD-10-CM

## 2018-05-25 PROCEDURE — 93005 ELECTROCARDIOGRAM TRACING: CPT

## 2018-05-25 PROCEDURE — 93010 EKG 12-LEAD: ICD-10-PCS | Mod: S$GLB,,, | Performed by: INTERNAL MEDICINE

## 2018-05-25 PROCEDURE — 93010 ELECTROCARDIOGRAM REPORT: CPT | Mod: S$GLB,,, | Performed by: INTERNAL MEDICINE

## 2018-08-06 NOTE — TELEPHONE ENCOUNTER
----- Message from Zelalem Farmer sent at 8/2/2018  4:57 PM CDT -----  PT NEED REFILL insulin glargine (BASAGLAR KWIKPEN U-100 INSULIN) 100 unit/mL (3 mL) InPn pen

## 2018-08-07 ENCOUNTER — HOSPITAL ENCOUNTER (OUTPATIENT)
Dept: RADIOLOGY | Facility: HOSPITAL | Age: 58
Discharge: HOME | End: 2018-08-07
Attending: INTERNAL MEDICINE
Payer: MEDICARE

## 2018-08-07 DIAGNOSIS — N18.6 ESRD (END STAGE RENAL DISEASE): ICD-10-CM

## 2018-08-07 PROCEDURE — 76770 US EXAM ABDO BACK WALL COMP: CPT | Mod: TC

## 2018-08-07 PROCEDURE — 76770 US EXAM ABDO BACK WALL COMP: CPT | Mod: 26,,, | Performed by: RADIOLOGY

## 2018-08-07 RX ORDER — INSULIN GLARGINE 100 [IU]/ML
55 INJECTION, SOLUTION SUBCUTANEOUS DAILY
Qty: 1 BOX | Refills: 3 | Status: SHIPPED | OUTPATIENT
Start: 2018-08-07 | End: 2018-08-21

## 2018-08-09 ENCOUNTER — OFFICE VISIT (OUTPATIENT)
Dept: PODIATRY | Facility: CLINIC | Age: 58
End: 2018-08-09
Payer: MEDICARE

## 2018-08-09 VITALS
BODY MASS INDEX: 47.4 KG/M2 | WEIGHT: 302 LBS | HEART RATE: 64 BPM | DIASTOLIC BLOOD PRESSURE: 44 MMHG | HEIGHT: 67 IN | SYSTOLIC BLOOD PRESSURE: 70 MMHG | TEMPERATURE: 99 F

## 2018-08-09 DIAGNOSIS — N18.6 ESRD (END STAGE RENAL DISEASE) ON DIALYSIS: ICD-10-CM

## 2018-08-09 DIAGNOSIS — B35.1 ONYCHOMYCOSIS: ICD-10-CM

## 2018-08-09 DIAGNOSIS — E11.42 DIABETIC POLYNEUROPATHY ASSOCIATED WITH TYPE 2 DIABETES MELLITUS: Primary | ICD-10-CM

## 2018-08-09 DIAGNOSIS — Z99.2 ESRD (END STAGE RENAL DISEASE) ON DIALYSIS: ICD-10-CM

## 2018-08-09 DIAGNOSIS — G62.9 MULTIFACTORIAL PERIPHERAL NEUROPATHY: ICD-10-CM

## 2018-08-09 PROCEDURE — 99999 PR PBB SHADOW E&M-EST. PATIENT-LVL III: CPT | Mod: PBBFAC,,, | Performed by: PODIATRIST

## 2018-08-09 PROCEDURE — 99499 UNLISTED E&M SERVICE: CPT | Mod: S$PBB,,, | Performed by: PODIATRIST

## 2018-08-09 PROCEDURE — 11721 DEBRIDE NAIL 6 OR MORE: CPT | Mod: PBBFAC,PO | Performed by: PODIATRIST

## 2018-08-09 PROCEDURE — 99213 OFFICE O/P EST LOW 20 MIN: CPT | Mod: PBBFAC,PO | Performed by: PODIATRIST

## 2018-08-09 RX ORDER — MIDODRINE HYDROCHLORIDE 10 MG/1
TABLET ORAL
Status: ON HOLD | COMMUNITY
Start: 2018-07-27 | End: 2019-02-28 | Stop reason: HOSPADM

## 2018-08-09 RX ORDER — GABAPENTIN 300 MG/1
CAPSULE ORAL
Qty: 90 CAPSULE | Refills: 3 | Status: SHIPPED | OUTPATIENT
Start: 2018-08-09 | End: 2021-03-15 | Stop reason: SDUPTHER

## 2018-08-09 NOTE — PROCEDURES
"Routine Foot Care  Date/Time: 8/9/2018 9:00 AM  Performed by: DYLON THOMPSON  Authorized by: DYLON THOMPSON     Time out: Immediately prior to procedure a "time out" was called to verify the correct patient, procedure, equipment, support staff and site/side marked as required.    Consent Done?:  Yes (Verbal)  Hyperkeratotic Skin Lesions?: No      Nail Care Type:  Debride  Location(s): All  (Left 1st Toe, Left 3rd Toe, Left 2nd Toe, Left 4th Toe, Left 5th Toe, Right 1st Toe, Right 2nd Toe, Right 3rd Toe, Right 4th Toe and Right 5th Toe)  Patient tolerance:  Patient tolerated the procedure well with no immediate complications        "

## 2018-08-10 NOTE — PROGRESS NOTES
"Subjective:      Patient ID: Angel Farmer Jr. is a 57 y.o. male.    Chief Complaint: No chief complaint on file.    Angel is a 57 y.o. male who presents to the clinic for evaluation and treatment of diabetic feet. Angel has a past medical history of Diabetes mellitus type II; Dialysis patient; Hyperlipidemia; Hypertension; Kidney failure; MIKE (obstructive sleep apnea); and Urinary tract infection. He had a LLE revascularization in 2015 at [facility] for PAD. Complains of hypotension the past month. Pending stress test.    PCP: Satya Noriega MD  Seen by Galileo Vásquez MD on 5/1/18  Date Last Seen by PCP: 10/27/17    Current shoe gear: Casual shoes    Hemoglobin A1C   Date Value Ref Range Status   05/21/2017 6.6 (H) 4.5 - 6.2 % Final     Comment:     According to ADA guidelines, hemoglobin A1C <7.0% represents  optimal control in non-pregnant diabetic patients.  Different  metrics may apply to specific populations.   Standards of Medical Care in Diabetes - 2016.  For the purpose of screening for the presence of diabetes:  <5.7%     Consistent with the absence of diabetes  5.7-6.4%  Consistent with increasing risk for diabetes   (prediabetes)  >or=6.5%  Consistent with diabetes  Currently no consensus exists for use of hemoglobin A1C  for diagnosis of diabetes for children.     07/09/2013 6.8 (H) 4.0 - 6.2 % Final     Vitals:    08/09/18 0822   BP: (!) 70/44   Pulse: 64   Temp: 98.5 °F (36.9 °C)   Weight: (!) 137 kg (302 lb)   Height: 5' 7" (1.702 m)   PainSc:   4      Past Medical History:   Diagnosis Date    Diabetes mellitus type II     Dialysis patient     Hyperlipidemia     Hypertension     Kidney failure     MIKE (obstructive sleep apnea)     Urinary tract infection        Past Surgical History:   Procedure Laterality Date    AV FISTULA PLACEMENT      left lower arm    TONSILLECTOMY, ADENOIDECTOMY         Family History   Problem Relation Age of Onset    Colon cancer Mother     Lung cancer Mother " "    Diabetes Mother     Diabetes Father     Heart disease Father     Hypertension Father     Diabetes Maternal Grandmother     Diabetes Maternal Grandfather        Social History     Social History    Marital status: Single     Spouse name: N/A    Number of children: N/A    Years of education: N/A     Social History Main Topics    Smoking status: Never Smoker    Smokeless tobacco: Never Used    Alcohol use No    Drug use: No    Sexual activity: Not Currently     Other Topics Concern    None     Social History Narrative    None       Current Outpatient Prescriptions   Medication Sig Dispense Refill    ammonium lactate 12 % Crea Apply 1 application topically 3 (three) times daily as needed. 140 g 3    AZOPT 1 % ophthalmic suspension       B complex-vitamin C-folic acid (B COMPLEX-VITAMIN C-FOLIC ACID) 0.8 mg Tab Take 0.8 mg by mouth once daily.        BD ULTRA-FINE MINI PEN NEEDLE 31 gauge x 3/16" Ndle USE 4 TO 5 TIMES A DAY     ( DISCARD PEN NEEDLE AFTER EACH USE ) 500 each 4    blood sugar diagnostic (ONETOUCH ULTRA TEST) Strp TEST BLOOD GLUCOSE FOUR TIMES A DAY. At AM, noon, 6pm and bedtime. 100 strip 11    bromfenac (XIBROM) 0.09 % ophthalmic solution       ceFEPIme in dextrose 5% (MAXIPIME) 2 gram/50 mL PgBk Following dialysis:  2 gram IV Q Mon, Wed, Friday x 4 weeks 14 g 0    chlorhexidine (HIBICLENS) 4 % external liquid Apply topically daily as needed. 473 mL 0    fluticasone (FLONASE) 50 mcg/actuation nasal spray 1 spray by Each Nare route once daily. 16 g 0    FOSRENOL 1,000 mg chewable tablet 3 (three) times daily with meals.       gabapentin (NEURONTIN) 300 MG capsule TAKE 1 BY MOUTH EVERY      EVENING 90 capsule 3    insulin aspart (NOVOLOG FLEXPEN) 100 unit/mL InPn pen Inject 15 Units into the skin 3 (three) times daily before meals. 13.5 mL 11    insulin glargine (BASAGLAR KWIKPEN U-100 INSULIN) 100 unit/mL (3 mL) InPn pen Inject 55 Units into the skin once daily. 1 Box 3    " ketorolac 0.5% (ACULAR) 0.5 % Drop       lancets Misc 1 each by Misc.(Non-Drug; Combo Route) route 4 (four) times daily with meals and nightly. 200 each 6    liraglutide 0.6 mg/0.1 mL, 18 mg/3 mL, subq PNIJ (VICTOZA 2-SHOAIB) 0.6 mg/0.1 mL (18 mg/3 mL) PnIj Inject 1.2 mg into the skin every morning. 6 mL 11    midodrine (PROAMATINE) 10 MG tablet       PRAVACHOL 40 mg tablet TAKE 1 BY MOUTH DAILY      *GET FUTURE REFILLS FROM   DR CHAPO DUNNE* 30 tablet 4    prednisoLONE acetate (PRED FORTE) 1 % DrpS       aspirin 81 MG Chew Take 1 tablet (81 mg total) by mouth once daily. 360 tablet 0    blood-glucose meter (ADVANCED GLUCOSE METER) Misc 1 each by Misc.(Non-Drug; Combo Route) route 4 (four) times daily with meals and nightly. 1 each 0    omega-3 acid ethyl esters (LOVAZA) 1 gram capsule Take 2 capsules (2 g total) by mouth 2 (two) times daily. 120 capsule 11     No current facility-administered medications for this visit.        Review of patient's allergies indicates:   Allergen Reactions    Keflex [cephalexin] Nausea Only             Review of Systems   Constitution: Negative for chills, fever and malaise/fatigue.   Cardiovascular: Negative for chest pain, leg swelling, orthopnea and palpitations.   Respiratory: Negative for cough, shortness of breath and wheezing.    Skin: Positive for color change, dry skin and nail changes. Negative for itching, poor wound healing and rash.   Musculoskeletal: Negative for arthritis, gout, joint pain, joint swelling, muscle weakness and myalgias.   Neurological: Positive for numbness and paresthesias. Negative for disturbances in coordination, dizziness, focal weakness and tremors.           Objective:      Physical Exam   Constitutional: He appears well-nourished. No distress.   Cardiovascular:   Pulses:       Dorsalis pedis pulses are 1+ on the right side, and 1+ on the left side.        Posterior tibial pulses are 1+ on the right side, and 1+ on the left side.   Dorsalis  pedis and posterior tibial pulses are diminished Bilaterally. Toes are cool to touch. Feet are warm proximally.There is decreased digital hair. Skin is atrophic, slightly hyperpigmented, and mildly edematous       Musculoskeletal:        Right foot: There is normal range of motion and no deformity.        Left foot: There is normal range of motion and no deformity.   Musculoskeletal:  Muscle strength is 5/5 in all groups bilaterally.  Metatarsophalangeal and subtalar range of motion are within normal limits without crepitus bilaterally. There is limitation of ankle dorsiflexion with knees extended and flexed Bilaterally.       Feet:   Right Foot:   Protective Sensation: 10 sites tested. 8 sites sensed.   Skin Integrity: Positive for dry skin (superficial heel fissures, bilat). Negative for ulcer, blister, skin breakdown or callus.   Left Foot:   Protective Sensation: 10 sites tested. 8 sites sensed.   Skin Integrity: Positive for dry skin. Negative for ulcer, blister, skin breakdown or callus.   Neurological:   [place]-Bailey 5.07 monofilamant testing is diminished both feet. Sharp/dull sensation diminished Bilaterally. Light touch absent Bilaterally.       Skin: Skin is warm, dry and intact. Capillary refill takes less than 2 seconds. No ecchymosis and no rash noted. He is not diaphoretic. No cyanosis or erythema. Nails show no clubbing.   Toenails 1-5 bilaterally are elongated by 2-3 mm, thickened by 2-3 mm, discolored/yellowed, dystrophic, brittle with subungual debris. No incurvation. Mild xerosis, bilat. No open wounds.    Diffuse hyperkeratosis of heel bilateral.    No open lesions or macerations bilateral lower extremity.                 Assessment:       Encounter Diagnoses   Name Primary?    Diabetic polyneuropathy associated with type 2 diabetes mellitus Yes    Onychomycosis     Multifactorial peripheral neuropathy     ESRD (end stage renal disease) on dialysis          Plan:       I counseled the  patient on his conditions, their implications and medical management.    Shoe inspection. Diabetic Foot Education. Patient reminded of the importance of good nutrition and blood sugar control to help prevent podiatric complications of diabetes. Patient instructed on proper foot hygeine. We discussed wearing proper shoe gear, daily foot inspections, never walking without protective shoe gear, never putting sharp instruments to feet    Routine foot care per attached note. Patient relates relief following the procedure. She will continue to monitor the areas daily, inspect her feet, wear protective shoe gear when ambulatory, moisturizer to maintain skin integrity and follow in this office in approximately 3-4 months, sooner p.r.n.

## 2018-08-21 ENCOUNTER — OFFICE VISIT (OUTPATIENT)
Dept: FAMILY MEDICINE | Facility: HOSPITAL | Age: 58
End: 2018-08-21
Attending: FAMILY MEDICINE
Payer: MEDICARE

## 2018-08-21 VITALS
BODY MASS INDEX: 48.58 KG/M2 | DIASTOLIC BLOOD PRESSURE: 49 MMHG | SYSTOLIC BLOOD PRESSURE: 82 MMHG | HEART RATE: 69 BPM | WEIGHT: 310.19 LBS

## 2018-08-21 DIAGNOSIS — E11.42 DIABETIC POLYNEUROPATHY ASSOCIATED WITH TYPE 2 DIABETES MELLITUS: ICD-10-CM

## 2018-08-21 DIAGNOSIS — Z99.2 ESRD ON DIALYSIS: Primary | ICD-10-CM

## 2018-08-21 DIAGNOSIS — Z79.4 TYPE 2 DIABETES MELLITUS WITH CHRONIC KIDNEY DISEASE ON CHRONIC DIALYSIS, WITH LONG-TERM CURRENT USE OF INSULIN: ICD-10-CM

## 2018-08-21 DIAGNOSIS — N18.6 TYPE 2 DIABETES MELLITUS WITH CHRONIC KIDNEY DISEASE ON CHRONIC DIALYSIS, WITH LONG-TERM CURRENT USE OF INSULIN: ICD-10-CM

## 2018-08-21 DIAGNOSIS — E11.22 TYPE 2 DIABETES MELLITUS WITH CHRONIC KIDNEY DISEASE ON CHRONIC DIALYSIS, WITH LONG-TERM CURRENT USE OF INSULIN: ICD-10-CM

## 2018-08-21 DIAGNOSIS — Z99.2 TYPE 2 DIABETES MELLITUS WITH CHRONIC KIDNEY DISEASE ON CHRONIC DIALYSIS, WITH LONG-TERM CURRENT USE OF INSULIN: ICD-10-CM

## 2018-08-21 DIAGNOSIS — G47.33 OSA (OBSTRUCTIVE SLEEP APNEA): ICD-10-CM

## 2018-08-21 DIAGNOSIS — N18.6 ESRD ON DIALYSIS: Primary | ICD-10-CM

## 2018-08-21 PROCEDURE — 99215 OFFICE O/P EST HI 40 MIN: CPT | Performed by: FAMILY MEDICINE

## 2018-08-21 RX ORDER — PRAVASTATIN SODIUM 40 MG/1
40 TABLET ORAL DAILY
Qty: 90 TABLET | Refills: 0 | Status: SHIPPED | OUTPATIENT
Start: 2018-08-21 | End: 2018-08-21

## 2018-08-21 RX ORDER — INSULIN GLARGINE 100 [IU]/ML
55 INJECTION, SOLUTION SUBCUTANEOUS DAILY
Qty: 49.5 ML | Refills: 0 | Status: SHIPPED | OUTPATIENT
Start: 2018-08-21 | End: 2018-12-14 | Stop reason: SDUPTHER

## 2018-08-21 RX ORDER — INSULIN ASPART 100 [IU]/ML
15 INJECTION, SOLUTION INTRAVENOUS; SUBCUTANEOUS
Qty: 40.5 ML | Refills: 0 | Status: SHIPPED | OUTPATIENT
Start: 2018-08-21 | End: 2018-12-12

## 2018-08-21 RX ORDER — PRAVASTATIN SODIUM 40 MG/1
40 TABLET ORAL DAILY
Qty: 90 TABLET | Refills: 0 | Status: SHIPPED | OUTPATIENT
Start: 2018-08-21 | End: 2018-12-12

## 2018-08-21 NOTE — PROGRESS NOTES
I have reviewed the notes, assessments, and/or procedures performed by Dr. Noriega, I concur with her/his documentation of Angel Farmer Jr..

## 2018-08-21 NOTE — PROGRESS NOTES
Subjective:       Patient ID: Angel Farmer Jr. is a 57 y.o. male.    Chief Complaint: Diabetes (follow-up)    Mr. Farmer is a 57 year old male with PMH ESRD dialyzed M,W,F; previously HTN, DM, HLD, MIKE who presents for DM and hypotension follow-up. States he has been feeling warm and nauseated. Denied LH. Has trouble walking because he feels like he is gonna fall. Denies vertigo. Denies weakness. Eats a turkey sandwich lunch, eats fruits every night before bed. Denies sodas/sugar drinks. Saw eye doctor a month ago (retina specialist for injections). Checks feet daily. Reports neuropathy in feet, gabapentin is helping a lot. Saw podiatrist earlier this month, due again next year.     DMII- Novolog, glargine and victoza. 55units glargine nightly. 15U bid novolog. BS's have been running , occasionally up to 210-220. Last A1C 6.0. Denies polyuria, doesn't urinate at all.     Patient has had low BP's during dialysis and on the days after. States he started midodrine about a month ago, 10mg TID by the nephrologist. Doesn't think it has been helping.     MIKE- very severe MIKE, uses bipap at home about 85% of the time. States it has been helping. He doesn't fall asleep during     States he went to his cardiologist and did a stress test that didn't show problems. Was recommended to start eating salt. Nephrology recommended against a high salt diet.       Review of Systems   Constitutional: Negative for chills and fever.   HENT: Negative for congestion and sore throat.    Eyes: Negative for pain and discharge.   Respiratory: Negative for cough and shortness of breath.    Cardiovascular: Negative for chest pain.   Gastrointestinal: Negative for abdominal pain, diarrhea, nausea and vomiting.   Genitourinary: Negative for difficulty urinating and dysuria.   Musculoskeletal: Negative for back pain and neck pain.   Skin: Negative for rash.   Neurological: Negative for light-headedness and headaches.   Hematological: Does  not bruise/bleed easily.   Psychiatric/Behavioral: Negative for agitation and behavioral problems.       Objective:      Vitals:    08/21/18 1452   BP: (!) 82/49   Pulse: 69     Physical Exam   Constitutional: He is oriented to person, place, and time. He appears well-developed and well-nourished. No distress.   BMI 48.58, 310lbs.   HENT:   Head: Normocephalic and atraumatic.   Right Ear: External ear normal.   Left Ear: External ear normal.   Mouth/Throat: No oropharyngeal exudate.   Eyes: Conjunctivae and EOM are normal. Right eye exhibits no discharge. Left eye exhibits no discharge.   Neck: Normal range of motion. Neck supple. No tracheal deviation present.   Cardiovascular: Normal rate, regular rhythm and normal heart sounds. Exam reveals no gallop and no friction rub.   No murmur heard.  Pulmonary/Chest: Effort normal and breath sounds normal. No respiratory distress.   Abdominal: Soft. He exhibits no distension. There is no tenderness.   Musculoskeletal: He exhibits no edema or deformity.   L forearm graft +thrill    Foot exam: very dry skin, no skin breakdown. Healed ulcer over L big toe.     >10seconds on up and go test   Neurological: He is alert and oriented to person, place, and time.   Skin: Skin is warm and dry. He is not diaphoretic.   Acanthosis nigricans over neck region.    Psychiatric: He has a normal mood and affect. His behavior is normal.   Nursing note and vitals reviewed.      Assessment:       1. ESRD on dialysis    2. MIKE (obstructive sleep apnea) - very severe latest sleep study says YAMINI @ 16/8    3. Type 2 diabetes mellitus with chronic kidney disease on chronic dialysis, with long-term current use of insulin    4. Diabetic polyneuropathy associated with type 2 diabetes mellitus        Plan:       ESRD on dialysis  - continue dialysis M,W,F; continue midodrine for low bp, continue to manage in conjunction with nephrology and cardiology.     MIKE (obstructive sleep apnea) - very severe  latest sleep study says BPAP @ 16/8  - continue BPAP machine, continue to encourage weight loss    Type 2 diabetes mellitus with chronic kidney disease on chronic dialysis, with long-term current use of insulin  -     insulin glargine (BASAGLAR KWIKPEN U-100 INSULIN) 100 unit/mL (3 mL) InPn pen; Inject 55 Units into the skin once daily.  Dispense: 49.5 mL; Refill: 0  -     liraglutide 0.6 mg/0.1 mL, 18 mg/3 mL, subq PNIJ (VICTOZA 2-SHOAIB) 0.6 mg/0.1 mL (18 mg/3 mL) PnIj; Inject 1.2 mg into the skin every morning.  Dispense: 18 mL; Refill: 0  -     insulin aspart U-100 (NOVOLOG FLEXPEN U-100 INSULIN) 100 unit/mL InPn pen; Inject 15 Units into the skin 3 (three) times daily before meals.  Dispense: 40.5 mL; Refill: 0  -     Discontinue: pravastatin (PRAVACHOL) 40 MG tablet; Take 1 tablet (40 mg total) by mouth once daily.  Dispense: 90 tablet; Refill: 0  -     pravastatin (PRAVACHOL) 40 MG tablet; Take 1 tablet (40 mg total) by mouth once daily.  Dispense: 90 tablet; Refill: 0    Diabetic polyneuropathy associated with type 2 diabetes mellitus    Counseled patient on diet for weight loss. Patient not interested in nutrition consult. Offered patient physical therapy for fall risk, can offer again at next visit.     Follow-up in about 3 months (around 11/21/2018).

## 2018-09-04 DIAGNOSIS — E11.21 TYPE 2 DIABETES MELLITUS WITH DIABETIC NEPHROPATHY, WITH LONG-TERM CURRENT USE OF INSULIN: ICD-10-CM

## 2018-09-04 DIAGNOSIS — Z79.4 TYPE 2 DIABETES MELLITUS WITH DIABETIC NEPHROPATHY, WITH LONG-TERM CURRENT USE OF INSULIN: ICD-10-CM

## 2018-09-04 NOTE — TELEPHONE ENCOUNTER
----- Message from Verna Sinha sent at 9/4/2018  8:50 AM CDT -----  Patient needs a prescription for blood sugar diagnostic (ONETOUCH ULTRA TEST) Strp. Needs 100 strips. Needs ASAP

## 2018-09-06 ENCOUNTER — HOSPITAL ENCOUNTER (EMERGENCY)
Facility: HOSPITAL | Age: 58
Discharge: HOME | End: 2018-09-06
Attending: EMERGENCY MEDICINE
Payer: MEDICARE

## 2018-09-06 VITALS
DIASTOLIC BLOOD PRESSURE: 62 MMHG | OXYGEN SATURATION: 97 % | WEIGHT: 310 LBS | SYSTOLIC BLOOD PRESSURE: 120 MMHG | RESPIRATION RATE: 16 BRPM | BODY MASS INDEX: 48.65 KG/M2 | HEIGHT: 67 IN | TEMPERATURE: 98 F | HEART RATE: 72 BPM
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
## 2018-09-06 DIAGNOSIS — S42.255A CLOSED NONDISPLACED FRACTURE OF GREATER TUBEROSITY OF LEFT HUMERUS, INITIAL ENCOUNTER: ICD-10-CM

## 2018-09-06 DIAGNOSIS — S02.401A MAXILLARY SINUS FRACTURE, CLOSED, INITIAL ENCOUNTER: ICD-10-CM

## 2018-09-06 PROCEDURE — 99285 EMERGENCY DEPT VISIT HI MDM: CPT

## 2018-09-06 PROCEDURE — 25000003 PHARM REV CODE 250: Performed by: EMERGENCY MEDICINE

## 2018-09-06 RX ORDER — DOXYCYCLINE 100 MG/1
100 CAPSULE ORAL 2 TIMES DAILY
Qty: 14 CAPSULE | Refills: 0 | Status: SHIPPED | OUTPATIENT
Start: 2018-09-06 | End: 2018-09-13

## 2018-09-06 RX ORDER — HYDROCODONE BITARTRATE AND ACETAMINOPHEN 5; 325 MG/1; MG/1
1 TABLET ORAL
Status: COMPLETED | OUTPATIENT
Start: 2018-09-06 | End: 2018-09-06

## 2018-09-06 RX ORDER — HYDROCODONE BITARTRATE AND ACETAMINOPHEN 5; 325 MG/1; MG/1
1 TABLET ORAL EVERY 6 HOURS PRN
Qty: 30 TABLET | Refills: 0 | Status: SHIPPED | OUTPATIENT
Start: 2018-09-06 | End: 2018-12-12

## 2018-09-06 RX ORDER — AMOXICILLIN 250 MG
1 CAPSULE ORAL 2 TIMES DAILY
Qty: 30 TABLET | Refills: 0 | Status: SHIPPED | OUTPATIENT
Start: 2018-09-06 | End: 2018-12-14

## 2018-09-06 RX ADMIN — HYDROCODONE BITARTRATE AND ACETAMINOPHEN 1 TABLET: 5; 325 TABLET ORAL at 11:09

## 2018-09-06 NOTE — ED PROVIDER NOTES
Encounter Date: 9/6/2018    SCRIBE #1 NOTE: I, Gabino Vásquez, am scribing for, and in the presence of,  Dr. Windy Garces. I have scribed the entire note.       History     Chief Complaint   Patient presents with    Fall     trip and fall at home resultuing in epistaxis and shoulder pain     Angel Farmer Jr. is a 57 y.o. male who  has a past medical history of Diabetes mellitus type II, Dialysis patient, Hyperlipidemia, Hypertension, Kidney failure, MIKE (obstructive sleep apnea), and Urinary tract infection.    The patient presents to the ED due to fall with head injury that occurred prior to arrival.  Patient reports he tripped and fell face first onto the ground and landed on his face and left shoulder.  He admits to epistaxis, right cheek bone pain, left shoulder pain, and abd upset but denies any LOC, nausea, vomiting, or excessive drowsiness.  PT was helped up by bystanders.  Epistaxis is resolved at this time.        The history is provided by the patient.     Review of patient's allergies indicates:   Allergen Reactions    Keflex [cephalexin] Nausea Only     Past Medical History:   Diagnosis Date    Diabetes mellitus type II     Dialysis patient     Hyperlipidemia     Hypertension     Kidney failure     MIKE (obstructive sleep apnea)     Urinary tract infection      Past Surgical History:   Procedure Laterality Date    AV FISTULA PLACEMENT      left lower arm    TONSILLECTOMY, ADENOIDECTOMY       Family History   Problem Relation Age of Onset    Colon cancer Mother     Lung cancer Mother     Diabetes Mother     Diabetes Father     Heart disease Father     Hypertension Father     Diabetes Maternal Grandmother     Diabetes Maternal Grandfather      Social History     Tobacco Use    Smoking status: Never Smoker    Smokeless tobacco: Never Used   Substance Use Topics    Alcohol use: No    Drug use: No     Review of Systems   HENT: Positive for nosebleeds.         Right cheek bone pain.    Gastrointestinal: Negative for nausea and vomiting.   Musculoskeletal: Positive for arthralgias and myalgias.   Neurological: Negative for syncope.        No excessive drowsiness.   All other systems reviewed and are negative.      Physical Exam     Initial Vitals [09/06/18 1021]   BP Pulse Resp Temp SpO2   (!) 106/57 (!) 56 20 98 °F (36.7 °C) (!) 94 %      MAP       --         Physical Exam    Nursing note and vitals reviewed.  Constitutional: He appears well-developed and well-nourished. No distress.   HENT:   Head: Normocephalic.       Blood noted from left nare. No active bleeding. Tenderness to left maxillary sinus. No bony deformity noted.   Eyes: Conjunctivae and EOM are normal. Pupils are equal, round, and reactive to light.   Neck: Normal range of motion.   Cardiovascular: Normal rate and regular rhythm.   No murmur heard.  Pulmonary/Chest: Breath sounds normal. No respiratory distress.   Abdominal: Bowel sounds are normal. He exhibits no distension.   Musculoskeletal: He exhibits no tenderness.   Left shoulder non-tender to palpation. Left shoulder with limited abduction and could not actively lift shoulder 90 degrees.  NO deformity noted.     Neurological: He is alert and oriented to person, place, and time. He has normal strength.   Skin: Skin is warm and dry.   Psychiatric: He has a normal mood and affect. His behavior is normal.         ED Course   Procedures  Labs Reviewed - No data to display       Imaging Results          CT Maxillofacial Without Contrast (Final result)  Result time 09/06/18 11:32:07    Final result by Vadim Franco MD (09/06/18 11:32:07)                 Impression:      Comminuted fracture of the posterior wall of the left maxillary sinus.      Electronically signed by: Vadim Franco MD  Date:    09/06/2018  Time:    11:32             Narrative:    EXAMINATION:  CT MAXILLOFACIAL WITHOUT CONTRAST    CLINICAL HISTORY:  fall with left cheek pain and nose bleed;    TECHNIQUE:  Low  dose axial images, sagittal and coronal reformations were obtained through the face.  Contrast was not administered.    COMPARISON:  None    FINDINGS:  There is a comminuted fracture of the posterior wall of the left maxillary sinus.  There is associated hemorrhage within the maxillary antrum and soft tissue gas.    Bony orbits, nasal bones, zygomatic arches, pterygoid plates, maxilla and mandible are otherwise intact.  Temporomandibular joints are aligned.    Mastoid air cells are aerated.    Optic globes, optic nerves, and extraocular muscles are unremarkable.  Left periorbital soft tissue swelling noted.  No infiltration of retrobulbar fat.    Salivary glands are unremarkable.                               CT Head Without Contrast (Final result)  Result time 09/06/18 11:24:41    Final result by Vadim Franco MD (09/06/18 11:24:41)                 Impression:      1. No intracranial hemorrhage.  2. Air-fluid level with hyperdense material in the left maxillary antrum.      Electronically signed by: Vadim Franco MD  Date:    09/06/2018  Time:    11:24             Narrative:    EXAMINATION:  CT HEAD WITHOUT CONTRAST    CLINICAL HISTORY:  closed head injury;    TECHNIQUE:  Low dose axial CT images obtained throughout the head without intravenous contrast. Sagittal and coronal reconstructions were performed.    COMPARISON:  None.    FINDINGS:  Intracranial compartment:    Ventricles and sulci are normal in size for age without evidence of hydrocephalus. No extra-axial blood or fluid collections.    The brain parenchyma appears normal. No parenchymal mass, hemorrhage, edema or major vascular distribution infarct.    Skull/extracranial contents (limited evaluation): No fracture. Mastoid air cells and are essentially clear.  Air-fluid level with hyperdense material noted in the left maxillary antrum.                               X-Ray Shoulder 2 or More Views Left (Final result)  Result time 09/06/18 11:32:36     Final result by Vadim Franco MD (09/06/18 11:32:36)                 Impression:      As above.      Electronically signed by: Vadim Franco MD  Date:    09/06/2018  Time:    11:32             Narrative:    EXAMINATION:  XR SHOULDER COMPLETE 2 OR MORE VIEWS LEFT    CLINICAL HISTORY:  left shoulder pain s/p fall;    TECHNIQUE:  Two or three views of the left shoulder were performed.    COMPARISON:  None    FINDINGS:  Bones: Suspected nondisplaced fracture of the greater tuberosity.  No lytic or blastic lesion.    Joints: No evidence for glenohumeral dislocation.  Mild acromioclavicular arthrosis noted.    Soft tissues: Unremarkable.                                 Medical Decision Making:   Clinical Tests:   Radiological Study: Ordered and Reviewed  ED Management:  12:03 PM Case discussed with ENT, Dr. Arrington regarding maxillary sinus fracture.    12:05 PM Case discussed with Orthopedic Surgeon, Elian regarding left shoulder fracture.    No IC injury.  Treat maxillary sinus fx with abx, pain meds, sinus precautions.  Treat humerus fracture with sling, pain meds, and Ortho follow up.          Other:   I have discussed this case with another health care provider.                      Clinical Impression:     1. Fall, initial encounter    2. Closed nondisplaced fracture of greater tuberosity of left humerus, initial encounter    3. Maxillary sinus fracture, closed, initial encounter            Disposition:   Disposition: Discharged  Condition: Stable       I, Dr. Windy Garces, personally performed the services described in this documentation.   All medical record entries made by the scribe were at my direction and in my presence.   I have reviewed the chart and agree that the record is accurate and complete.   Windy Gacres MD.  7:15 PM 09/06/2018                    Windy Garces MD  09/06/18 1918

## 2018-09-06 NOTE — ED NOTES
Dried blood removed with sterile water and 4X4.  No lacerations or open areas noted.  Collection of dried blood noted in left nares, cleaned with bleeding resolved

## 2018-09-06 NOTE — ED NOTES
"Patient states he "tripped over his own feet" and landed face first on the floor.  Denies LOC.  CO left shoulder pain, states unable to lift arm  "

## 2018-09-06 NOTE — DISCHARGE INSTRUCTIONS
Wear sling for comfort and to prevent further damage.  Apply ice to area of pain.  Take pain medication as needed.  Take antibiotics as directed to prevent sinus infection.  No blowing your nose; no sucking on straws.  Follow up with ENT for your sinus fracture.

## 2018-09-12 ENCOUNTER — TELEPHONE (OUTPATIENT)
Dept: FAMILY MEDICINE | Facility: HOSPITAL | Age: 58
End: 2018-09-12

## 2018-09-12 RX ORDER — NEOMYCIN SULFATE, POLYMYXIN B SULFATE, AND DEXAMETHASONE 3.5; 10000; 1 MG/G; [USP'U]/G; MG/G
OINTMENT OPHTHALMIC
COMMUNITY
Start: 2018-08-28 | End: 2018-12-14

## 2018-09-12 NOTE — TELEPHONE ENCOUNTER
----- Message from Verna Sinha sent at 9/12/2018  2:31 PM CDT -----  Patient needs blood sugar diagnostic (ONETOUCH ULTRA TEST) Strp to go to the correct pharmacy [facility] [address]

## 2018-09-14 DIAGNOSIS — E11.21 TYPE 2 DIABETES MELLITUS WITH DIABETIC NEPHROPATHY, WITH LONG-TERM CURRENT USE OF INSULIN: ICD-10-CM

## 2018-09-14 DIAGNOSIS — Z79.4 TYPE 2 DIABETES MELLITUS WITH DIABETIC NEPHROPATHY, WITH LONG-TERM CURRENT USE OF INSULIN: ICD-10-CM

## 2018-09-14 NOTE — TELEPHONE ENCOUNTER
----- Message from Verna Sinha sent at 9/14/2018  4:06 PM CDT -----  Patient needs a refill on blood sugar diagnostic (ONETOUCH ULTRA TEST) Strp. This is the 4th message he has left. He is completely out and needs the refill ASAP please send it to the [facility] in [place]

## 2018-09-17 ENCOUNTER — HOSPITAL ENCOUNTER (EMERGENCY)
Facility: HOSPITAL | Age: 58
Discharge: HOME | End: 2018-09-17
Attending: EMERGENCY MEDICINE
Payer: MEDICARE

## 2018-09-17 VITALS
DIASTOLIC BLOOD PRESSURE: 58 MMHG | TEMPERATURE: 98 F | HEART RATE: 55 BPM | HEIGHT: 67 IN | BODY MASS INDEX: 48.44 KG/M2 | SYSTOLIC BLOOD PRESSURE: 123 MMHG | RESPIRATION RATE: 18 BRPM | OXYGEN SATURATION: 98 % | WEIGHT: 308.63 LBS
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
## 2018-09-17 DIAGNOSIS — S83.91XA SPRAIN OF RIGHT KNEE, UNSPECIFIED LIGAMENT, INITIAL ENCOUNTER: Primary | ICD-10-CM

## 2018-09-17 PROCEDURE — 99283 EMERGENCY DEPT VISIT LOW MDM: CPT | Mod: 25

## 2018-09-17 PROCEDURE — 63600175 PHARM REV CODE 636 W HCPCS: Performed by: EMERGENCY MEDICINE

## 2018-09-17 PROCEDURE — 96372 THER/PROPH/DIAG INJ SC/IM: CPT

## 2018-09-17 RX ORDER — TRAMADOL HYDROCHLORIDE 50 MG/1
50 TABLET ORAL EVERY 6 HOURS PRN
Qty: 12 TABLET | Refills: 0 | Status: SHIPPED | OUTPATIENT
Start: 2018-09-17 | End: 2018-09-27

## 2018-09-17 RX ORDER — KETOROLAC TROMETHAMINE 30 MG/ML
30 INJECTION, SOLUTION INTRAMUSCULAR; INTRAVENOUS
Status: COMPLETED | OUTPATIENT
Start: 2018-09-17 | End: 2018-09-17

## 2018-09-17 RX ADMIN — KETOROLAC TROMETHAMINE 30 MG: 30 INJECTION, SOLUTION INTRAMUSCULAR at 12:09

## 2018-09-17 NOTE — ED NOTES
S/p had fall last night, right knee buckle and had fall, today pain is worse, wanted to get it check out, denies loc, denies blood thinners

## 2018-09-17 NOTE — ED PROVIDER NOTES
"Encounter Date: 9/17/2018    SCRIBE #1 NOTE: I, Juanito Rivera, am scribing for, and in the presence of,  Dr. Garrido. I have scribed the entire note.       History     Chief Complaint   Patient presents with    Fall     fell last night states "right knee buckled" reports pain to right knee unable to bear weight on rle denies loc or hitting head.     57-year-old male presents emergency department complaining of right knee pain.  Reports an aching pain to the anterior inferior aspect of his knee that is constant but much worse when ambulating, states that the pain makes it unable for him to bear weight.  He reports that his pain began a week or 2 ago when he fell as he was leaving his primary care physician's office.  He reports he has had an aching pain there intermittently but more persistently since that time.  It is similar to pain he had last year when he had what he believes was a meniscal injury that was unrepaired.  Reports his pain became more severe when he fell last night.  Reports he was walking up some steps when his right knee buckled, and he then fell on that knee.  Denies any other injury. No other symptoms reported at this time.      The history is provided by the patient.     Review of patient's allergies indicates:   Allergen Reactions    Keflex [cephalexin] Nausea Only     Past Medical History:   Diagnosis Date    Diabetes mellitus type II     Dialysis patient     Hyperlipidemia     Hypertension     Kidney failure     MIKE (obstructive sleep apnea)     Urinary tract infection      Past Surgical History:   Procedure Laterality Date    AV FISTULA PLACEMENT      left lower arm    TONSILLECTOMY, ADENOIDECTOMY      TRANSESOPHAGEAL ECHOCARDIOGRAM (SERENITY) N/A 5/23/2017    Performed by Donaldo Mai MD at [facility] OR     Family History   Problem Relation Age of Onset    Colon cancer Mother     Lung cancer Mother     Diabetes Mother     Diabetes Father     Heart disease Father     " Hypertension Father     Diabetes Maternal Grandmother     Diabetes Maternal Grandfather      Social History     Tobacco Use    Smoking status: Never Smoker    Smokeless tobacco: Never Used   Substance Use Topics    Alcohol use: No    Drug use: No     Review of Systems   Constitutional: Negative for chills, fatigue and fever.   HENT: Negative for facial swelling, trouble swallowing and voice change.    Eyes: Negative for photophobia, pain and redness.   Respiratory: Negative for cough, choking and shortness of breath.    Cardiovascular: Negative for chest pain, palpitations and leg swelling.   Gastrointestinal: Negative for abdominal pain, diarrhea, nausea and vomiting.   Genitourinary: Negative for dysuria, frequency and urgency.   Musculoskeletal: Negative for back pain, neck pain and neck stiffness.        Right knee pain   Neurological: Negative for seizures, speech difficulty, light-headedness, numbness and headaches.   All other systems reviewed and are negative.      Physical Exam     Initial Vitals [09/17/18 1158]   BP Pulse Resp Temp SpO2   (!) 123/58 (!) 55 18 98.4 °F (36.9 °C) 98 %      MAP       --         Physical Exam    Nursing note and vitals reviewed.  Constitutional: He appears well-developed and well-nourished. No distress.   Obese   HENT:   Head: Normocephalic and atraumatic.   Mouth/Throat: Oropharynx is clear and moist.   Eyes: Conjunctivae and EOM are normal. Pupils are equal, round, and reactive to light.   Neck: Normal range of motion. Neck supple. No tracheal deviation present.   Cardiovascular: Normal rate, regular rhythm, normal heart sounds and intact distal pulses.   Pulmonary/Chest: Breath sounds normal. No respiratory distress. He has no wheezes. He has no rhonchi. He has no rales.   Abdominal: Soft. He exhibits no distension. There is no tenderness.   Musculoskeletal: He exhibits tenderness. He exhibits no edema.   Right knee ecchymosis. Able to flex and extend the knee albeit  with mild pain; diffuse tenderness without point or bony tenderness, no deformity, mild swelling   Neurological: He is alert and oriented to person, place, and time. He has normal strength. No cranial nerve deficit or sensory deficit.   Skin: Skin is warm and dry. Capillary refill takes less than 2 seconds.         ED Course   Procedures  Labs Reviewed - No data to display     Imaging interpreted by radiologist and visualized by me:   Imaging Results          X-Ray Knee 3 View Right (Final result)  Result time 09/17/18 12:22:54    Final result by Thomas Poe MD (09/17/18 12:22:54)                 Impression:      Right knee suspected mild soft tissue swelling/contusion, without acute displaced fracture-dislocation identified.      Electronically signed by: Thomas Poe MD  Date:    09/17/2018  Time:    12:22             Narrative:    EXAMINATION:  XR KNEE 3 VIEW RIGHT    CLINICAL HISTORY:  Unspecified fall, initial encounter    TECHNIQUE:  AP, lateral, and Merchant views of the right knee were performed.    COMPARISON:  None    FINDINGS:  Overall alignment is within normal limits.  No displaced fracture, dislocation or destructive osseous process.  There is generalized osteopenia, advanced for age.  No large suprapatellar joint effusion definitively seen.  Joint spaces appear relatively maintained.  No subcutaneous emphysema or radiodense retained foreign body.  There is mild subcutaneous stranding noted about the knee which may represent localized edema/contusion in the setting of trauma.  Scattered atherosclerotic vascular calcifications, advanced for age.                            Medical Decision Making:   Initial Assessment:   Angel Farmer Jr. is a 57 y.o. male who presents to the ED due to a fall last night.  Clinical Tests:   Radiological Study: Ordered and Reviewed                      Clinical Impression:     1. Sprain of right knee, unspecified ligament, initial encounter    2. Fall              Scribe Attestation I, Dr. Lakhwinder Garrido, personally performed the services described in this documentation. All medical record entries made by the scribe were at my direction and in my presence.  I have reviewed the chart and agree that the record reflects my personal performance and is accurate and complete. Lakhwinder Garrido MD.  12:50 PM 09/17/2018                        Lakhwinder Garrido MD  09/17/18 [id number]

## 2018-09-18 DIAGNOSIS — E11.21 TYPE 2 DIABETES MELLITUS WITH DIABETIC NEPHROPATHY, WITH LONG-TERM CURRENT USE OF INSULIN: ICD-10-CM

## 2018-09-18 DIAGNOSIS — Z79.4 TYPE 2 DIABETES MELLITUS WITH DIABETIC NEPHROPATHY, WITH LONG-TERM CURRENT USE OF INSULIN: ICD-10-CM

## 2018-10-12 DIAGNOSIS — Z99.2 TYPE 2 DIABETES MELLITUS WITH CHRONIC KIDNEY DISEASE ON CHRONIC DIALYSIS, WITH LONG-TERM CURRENT USE OF INSULIN: ICD-10-CM

## 2018-10-12 DIAGNOSIS — N18.6 TYPE 2 DIABETES MELLITUS WITH CHRONIC KIDNEY DISEASE ON CHRONIC DIALYSIS, WITH LONG-TERM CURRENT USE OF INSULIN: ICD-10-CM

## 2018-10-12 DIAGNOSIS — Z79.4 TYPE 2 DIABETES MELLITUS WITH CHRONIC KIDNEY DISEASE ON CHRONIC DIALYSIS, WITH LONG-TERM CURRENT USE OF INSULIN: ICD-10-CM

## 2018-10-12 DIAGNOSIS — E11.22 TYPE 2 DIABETES MELLITUS WITH CHRONIC KIDNEY DISEASE ON CHRONIC DIALYSIS, WITH LONG-TERM CURRENT USE OF INSULIN: ICD-10-CM

## 2018-10-18 ENCOUNTER — OFFICE VISIT (OUTPATIENT)
Dept: OTOLARYNGOLOGY | Facility: CLINIC | Age: 58
End: 2018-10-18
Payer: MEDICARE

## 2018-10-18 VITALS
WEIGHT: 314.13 LBS | BODY MASS INDEX: 49.3 KG/M2 | SYSTOLIC BLOOD PRESSURE: 132 MMHG | HEIGHT: 67 IN | DIASTOLIC BLOOD PRESSURE: 74 MMHG | HEART RATE: 83 BPM

## 2018-10-18 DIAGNOSIS — S02.40DA CLOSED FRACTURE OF LEFT SIDE OF MAXILLA, INITIAL ENCOUNTER: Primary | ICD-10-CM

## 2018-10-18 PROCEDURE — 99213 OFFICE O/P EST LOW 20 MIN: CPT | Mod: PBBFAC,PN | Performed by: OTOLARYNGOLOGY

## 2018-10-18 PROCEDURE — 99203 OFFICE O/P NEW LOW 30 MIN: CPT | Mod: S$PBB,,, | Performed by: OTOLARYNGOLOGY

## 2018-10-18 PROCEDURE — 99999 PR PBB SHADOW E&M-EST. PATIENT-LVL III: CPT | Mod: PBBFAC,,, | Performed by: OTOLARYNGOLOGY

## 2018-10-18 RX ORDER — AMOXICILLIN 500 MG/1
CAPSULE ORAL
COMMUNITY
Start: 2018-10-16 | End: 2018-12-02 | Stop reason: ALTCHOICE

## 2018-10-18 RX ORDER — HYDROCODONE BITARTRATE AND ACETAMINOPHEN 7.5; 325 MG/1; MG/1
TABLET ORAL
COMMUNITY
Start: 2018-10-16 | End: 2018-12-02 | Stop reason: ALTCHOICE

## 2018-10-18 NOTE — PROGRESS NOTES
"CC:  "Check broken bones in the face"  Subjective:     Angel Farmer Jr. is a 57 y.o. male who was referred by the emergency room physician for facial trauma. He presented to the ED on 9/2/2018 after a  fall with head injury.  Patient reports he tripped and fell face first onto the ground and landed on his face and left shoulder.  He had epistaxis, right cheek bone pain, left shoulder pain at the time of the fall. He denies these symptoms today.       Past Medical History  He has a past medical history of Diabetes mellitus type II, Dialysis patient, Hyperlipidemia, Hypertension, Kidney failure, MIKE (obstructive sleep apnea), and Urinary tract infection.    Past Surgical History  He has a past surgical history that includes TONSILLECTOMY, ADENOIDECTOMY; AV fistula placement; and TRANSESOPHAGEAL ECHOCARDIOGRAM (SERENITY) (N/A, 5/23/2017).    Family History  His family history includes Colon cancer in his mother; Diabetes in his father, maternal grandfather, maternal grandmother, and mother; Heart disease in his father; Hypertension in his father; Lung cancer in his mother.    Social History  He reports that  has never smoked. he has never used smokeless tobacco. He reports that he does not drink alcohol or use drugs.    Allergies  He is allergic to keflex [cephalexin].    Medications  He has a current medication list which includes the following prescription(s): ammonium lactate, amoxicillin, azopt, b complex-vitamin c-folic acid, bd ultra-fine mini pen needle, bromfenac, cefepime in dextrose 5%, chlorhexidine, fluticasone, fosrenol, gabapentin, hydrocodone-acetaminophen, insulin aspart u-100, insulin glargine, ketorolac 0.5%, lancets, liraglutide 0.6 mg/0.1 ml (18 mg/3 ml) subq pnij, midodrine, neomycin-polymyxin-dexamethasone, onetouch ultra blue test strip, pravastatin, prednisolone acetate, senna-docusate 8.6-50 mg, aspirin, blood-glucose meter, hydrocodone-acetaminophen, and omega-3 acid ethyl esters.    Review of " "Systems   Constitutional: Negative for activity change and unexpected weight change.   Eyes: Negative for pain and visual disturbance.   Respiratory: Negative for shortness of breath and stridor.    Cardiovascular: Negative for chest pain and leg swelling.   Gastrointestinal: Negative for abdominal pain and nausea.   Endocrine: Negative for cold intolerance and heat intolerance.   Musculoskeletal: Negative for gait problem and joint swelling.   Skin: Negative for color change and wound.   Allergic/Immunologic: Negative for immunocompromised state.   Neurological: Negative for seizures and weakness.   Hematological: Negative for adenopathy. Does not bruise/bleed easily.   Psychiatric/Behavioral: Negative for agitation and confusion.          Objective:     /74 (BP Location: Right arm, Patient Position: Sitting, BP Method: Large (Automatic))   Pulse 83   Ht 5' 7" (1.702 m)   Wt (!) 142.5 kg (314 lb 2.5 oz)   BMI 49.20 kg/m²    Physical Exam    Constitutional: Well appearing / communicating.  NAD.  Eyes: EOM I Bilaterally  Head/Face: Normocephalic.  Negative paranasal sinus pressure/tenderness.  Salivary glands WNL.  House Brackmann I Bilaterally.    Right Ear: External Auditory Canal WNL,TM w/o masses/lesions/perforations.  Auricle WNL.  Left Ear: External Auditory Canal WNL,TM w/o masses/lesions/perforations. Auricle WNL.  Nose: No gross nasal septal deviation. Inferior Turbinates 3+ bilaterally. No septal perforation. No masses/lesions. External nasal skin without masses/lesions.  Oral Cavity: Gingiva/lips WNL.  FOM Soft, no masses palpated. Oral Tongue mobile. Hard Palate WNL.   Oropharynx: BOT WNL. No masses/lesions noted. Tonsillar fossa/pharyngeal wall without lesions. Posterior oropharynx WNL.  Soft palate without masses. Midline uvula.   Neck/Lymphatic: No LAD I-VI bilaterally.  No thyromegaly.  No masses noted on exam.    Mirror laryngoscopy/nasopharyngoscopy: Active gag reflex.  Unable to " perform.    Neuro/Psychiatric: AOx3.  Normal mood and affect.   Cardiovascular: Normal carotid pulses bilaterally, no increasing jugular venous distention noted at cervical region bilaterally.    Respiratory: Normal respiratory effort, no stridor, no retractions noted.        Data Reviewed    CT maxillofacial 9/6/2018: Comminuted fracture of the posterior wall of the left maxillary sinus.      Assessment:     1. Closed fracture of left side of maxilla, initial encounter          Plan:     I had a long discussion with the patient regarding his condition.  I reassured him that his fracture of the left posterior maxillary wall will not require any further intervention and has/will heal spontaneously.  All questions regarding his fracture and condition were answered to his apparent satisfaction.     Follow-up if symptoms worsen or fail to improve.     Edelmira Arrington MD

## 2018-12-02 ENCOUNTER — HOSPITAL ENCOUNTER (EMERGENCY)
Facility: HOSPITAL | Age: 58
Discharge: HOME | End: 2018-12-02
Attending: EMERGENCY MEDICINE
Payer: MEDICARE

## 2018-12-02 VITALS
RESPIRATION RATE: 16 BRPM | HEIGHT: 67 IN | WEIGHT: 310 LBS | BODY MASS INDEX: 48.65 KG/M2 | DIASTOLIC BLOOD PRESSURE: 68 MMHG | TEMPERATURE: 97 F | HEART RATE: 75 BPM | SYSTOLIC BLOOD PRESSURE: 158 MMHG | OXYGEN SATURATION: 99 %

## 2018-12-02 DIAGNOSIS — R53.81 PHYSICAL DECONDITIONING: ICD-10-CM

## 2018-12-02 DIAGNOSIS — N18.6 ESRD (END STAGE RENAL DISEASE): ICD-10-CM

## 2018-12-02 DIAGNOSIS — E66.01 MORBID OBESITY: ICD-10-CM

## 2018-12-02 DIAGNOSIS — M79.604 PAIN IN BOTH LOWER EXTREMITIES: Primary | ICD-10-CM

## 2018-12-02 DIAGNOSIS — M79.605 PAIN IN BOTH LOWER EXTREMITIES: Primary | ICD-10-CM

## 2018-12-02 PROCEDURE — 99282 EMERGENCY DEPT VISIT SF MDM: CPT

## 2018-12-02 NOTE — ED TRIAGE NOTES
Pt complains of bilateral posterior  upper leg pain x 1 week, no new injury, pt did fall in sept and family states he has progressively gotten weaker,family reports he sits at his computer all day and makes very little movement through the day.   Pt complains of leg pain when attempting to get up from a sitting position.   Pt also complains of 2 warts on dorsal side of right hand x 1 week itching

## 2018-12-02 NOTE — ED PROVIDER NOTES
Encounter Date: 12/2/2018    SCRIBE #1 NOTE: I, Suzanne Phelps, am scribing for, and in the presence of,  Dr. Huggins. I have scribed the entire note.       History     Chief Complaint   Patient presents with    Groin Pain     Bilateral groin pain x 1 week with no h/o injury. States pain is worse when he is trying to get up. Presents in no distress.     Time seen by provider: 1:00 AM    This is a 58 y.o. male who presents with complaint of bilateral groin/hamstring pain x 1 week, with left tiburcio pain tonight after standing up from a seated position. He notes his pain is worsened when standing up after being seated, or with any movement. Family reports patient has not been active recently, and spends most of the day seated. Patient denies any abdominal pain, nausea, vomiting, fever, numbness/tingling, or any other concerning symptoms. He has had minimal relief with Tylenol at home in the past. He is a MWF dialysis patient, last dialyzed on Friday, and no longer makes urine. He has not been to physical therapy for these issues.      The history is provided by the patient.     Review of patient's allergies indicates:   Allergen Reactions    Keflex [cephalexin] Nausea Only     Past Medical History:   Diagnosis Date    Diabetes mellitus type II     Dialysis patient     Hyperlipidemia     Hypertension     Kidney failure     MIKE (obstructive sleep apnea)     Urinary tract infection      Past Surgical History:   Procedure Laterality Date    AV FISTULA PLACEMENT      left lower arm    TONSILLECTOMY, ADENOIDECTOMY      TRANSESOPHAGEAL ECHOCARDIOGRAM (SERENITY) N/A 5/23/2017    Performed by Donaldo Mai MD at [facility] OR     Family History   Problem Relation Age of Onset    Colon cancer Mother     Lung cancer Mother     Diabetes Mother     Diabetes Father     Heart disease Father     Hypertension Father     Diabetes Maternal Grandmother     Diabetes Maternal Grandfather      Social History     Tobacco Use     Smoking status: Never Smoker    Smokeless tobacco: Never Used   Substance Use Topics    Alcohol use: No    Drug use: No     Review of Systems   Musculoskeletal: Positive for arthralgias and myalgias.   All other systems reviewed and are negative.      Physical Exam     Initial Vitals [12/02/18 0004]   BP Pulse Resp Temp SpO2   (!) 158/68 75 16 97 °F (36.1 °C) 99 %      MAP       --         Physical Exam    Nursing note and vitals reviewed.  Constitutional: He appears well-developed and well-nourished. He is not diaphoretic. No distress.   HENT:   Head: Normocephalic and atraumatic.   Mouth/Throat: Oropharynx is clear and moist.   Eyes: Conjunctivae and EOM are normal.   Neck: Normal range of motion. Neck supple.   Cardiovascular: Normal rate, regular rhythm and normal heart sounds.   Pulmonary/Chest: Breath sounds normal. No respiratory distress.   Abdominal: Soft. There is no tenderness.   Musculoskeletal: Normal range of motion. He exhibits edema and tenderness.   Mild TTP to the lateral aspect of the left quadriceps muscle  Bilateral pitting LE edema   Neurological: He is alert and oriented to person, place, and time. He has normal strength.   Skin: Skin is warm and dry.       ED Course   Procedures  Labs Reviewed - No data to display          Medical Decision Making:   ED Management:  58-year-old male who has progressive difficulty with ambulation and complains of pain to both of his hamstring muscles.  Patient says has increased pain when getting up from a seated position.  There has been no trauma. He has been compliant with dialysis.  I see no emergency medical condition here and have advised him to follow up with his primary physician for possible physical therapy if they feel this is warranted.                      Clinical Impression:     1. Pain in both lower extremities    2. Physical deconditioning    3. ESRD (end stage renal disease)    4. Morbid obesity      Disposition:   Disposition:  Discharged  Condition: Stable       I, Dr. Víctor Zarate, personally performed the services described in this documentation. All medical record entries made by the scribe were at my direction and in my presence. I have reviewed the chart and agree that the record reflects my personal performance and is accurate and complete. Víctor Zarate MD.  2:27 AM 12/02/2018       Víctor Zarate MD  12/02/18 0229

## 2018-12-12 ENCOUNTER — OFFICE VISIT (OUTPATIENT)
Dept: FAMILY MEDICINE | Facility: HOSPITAL | Age: 58
End: 2018-12-12
Attending: FAMILY MEDICINE
Payer: MEDICARE

## 2018-12-12 VITALS
BODY MASS INDEX: 48.72 KG/M2 | SYSTOLIC BLOOD PRESSURE: 103 MMHG | HEART RATE: 64 BPM | DIASTOLIC BLOOD PRESSURE: 53 MMHG | WEIGHT: 310.44 LBS | HEIGHT: 67 IN

## 2018-12-12 DIAGNOSIS — Z99.2 TYPE 2 DIABETES MELLITUS WITH CHRONIC KIDNEY DISEASE ON CHRONIC DIALYSIS, WITH LONG-TERM CURRENT USE OF INSULIN: ICD-10-CM

## 2018-12-12 DIAGNOSIS — E11.22 TYPE 2 DIABETES MELLITUS WITH CHRONIC KIDNEY DISEASE ON CHRONIC DIALYSIS, WITH LONG-TERM CURRENT USE OF INSULIN: ICD-10-CM

## 2018-12-12 DIAGNOSIS — Z99.2 ESRD ON DIALYSIS: ICD-10-CM

## 2018-12-12 DIAGNOSIS — L02.91 ABSCESS: Primary | ICD-10-CM

## 2018-12-12 DIAGNOSIS — B37.2 YEAST DERMATITIS: ICD-10-CM

## 2018-12-12 DIAGNOSIS — Z79.4 TYPE 2 DIABETES MELLITUS WITH CHRONIC KIDNEY DISEASE ON CHRONIC DIALYSIS, WITH LONG-TERM CURRENT USE OF INSULIN: ICD-10-CM

## 2018-12-12 DIAGNOSIS — N18.6 ESRD ON DIALYSIS: ICD-10-CM

## 2018-12-12 DIAGNOSIS — N18.6 TYPE 2 DIABETES MELLITUS WITH CHRONIC KIDNEY DISEASE ON CHRONIC DIALYSIS, WITH LONG-TERM CURRENT USE OF INSULIN: ICD-10-CM

## 2018-12-12 PROCEDURE — 99214 OFFICE O/P EST MOD 30 MIN: CPT | Performed by: FAMILY MEDICINE

## 2018-12-12 RX ORDER — NYSTATIN 100000 U/G
CREAM TOPICAL 2 TIMES DAILY
Qty: 30 G | Refills: 1 | Status: ON HOLD | OUTPATIENT
Start: 2018-12-12 | End: 2019-02-28 | Stop reason: HOSPADM

## 2018-12-12 RX ORDER — PRAVASTATIN SODIUM 40 MG/1
TABLET ORAL
COMMUNITY
Start: 2018-11-28 | End: 2018-12-14 | Stop reason: SDUPTHER

## 2018-12-12 RX ORDER — CLINDAMYCIN HYDROCHLORIDE 300 MG/1
300 CAPSULE ORAL EVERY 6 HOURS
Qty: 28 CAPSULE | Refills: 0 | Status: SHIPPED | OUTPATIENT
Start: 2018-12-12 | End: 2019-01-12 | Stop reason: SDUPTHER

## 2018-12-12 NOTE — PROGRESS NOTES
"Subjective:       Patient ID: Angel Farmer Jr. is a 58 y.o. male.    Chief Complaint: Abscess    Mr. Farmer is a 57yo male presenting to the office today with complaint of skin rash and skin lesion. Pt notes that he recently noticed a lesion on his lower R trunk. This has been present for 1 week. He describes it at a abscess-like lesion, and notes that his brother tried to "pop" it 2 days prior, and did express some puss. He notes it is painful and red. Pt also complains today of an itchy, odorous rash in his inguinal fold. He states he is unable to clean the area well, and is only able to wash 3 days per week due to his dialysis schedule. He has tried some OTC powders, but these have been ineffective thus far. Pt denies any fevers, chills, N/V, diarrhea, CP, SOB, or rhinorrhea. Pt has no other concerns at this time.       Review of Systems   Constitutional: Negative for chills, fatigue and fever.   HENT: Negative for congestion, rhinorrhea and sore throat.    Respiratory: Negative for cough, shortness of breath and wheezing.    Cardiovascular: Negative for chest pain.   Gastrointestinal: Negative for abdominal distention, abdominal pain, constipation, diarrhea, nausea and vomiting.   Musculoskeletal: Positive for arthralgias.   Skin: Positive for wound.   Neurological: Negative for light-headedness and headaches.       Objective:      Vitals:    12/12/18 1526   BP: (!) 103/53   Pulse: 64     Physical Exam   Constitutional: He is oriented to person, place, and time. He appears well-developed and well-nourished.   HENT:   Right Ear: External ear normal.   Left Ear: External ear normal.   Neck: Neck supple. Thyromegaly present.   Cardiovascular: Normal rate, regular rhythm and normal heart sounds.   Pulmonary/Chest: Effort normal and breath sounds normal. He has no wheezes.   Abdominal: Soft. He exhibits no distension. There is no tenderness.   Neurological: He is alert and oriented to person, place, and time. "   Skin: Lesion and rash noted.            Assessment:       1. Abscess    2. Yeast dermatitis    3. Type 2 diabetes mellitus with chronic kidney disease on chronic dialysis, with long-term current use of insulin    4. ESRD on dialysis        Plan:       Abscess  Procedure: Angel was seen in the clinic room and was unable to safely climb onto on the treatment table therefore procedure was performed in the chair. Attention was directed to the abscess which was located on the right flank area, where an wound measuring 2 cm is noted. The area was prepped with betadine. Approximately 2 cc of 2% lidocaine with epi was injected into the muna-abscess area. Once the area was anesthetized allowing 2-3 minutes for the anesthetic to take effect, I utilized a #11 scalpel to cut through the skin into the abscess area. I allowed the pus to drain utilizing gauze to absorb drainage and blood.. The abscess cavity was explored breaking up any loculations within the abscess cavity. The wound was then packed with antibiotic coated gauze, placing a gauze dressing over the wound and securing with paper tape. The patient tolerated the procedure well.    -     clindamycin (CLEOCIN) 300 MG capsule; Take 1 capsule (300 mg total) by mouth every 6 (six) hours.  Dispense: 28 capsule; Refill: 0    Yeast dermatitis  -     nystatin (MYCOSTATIN) cream; Apply topically 2 (two) times daily.  Dispense: 30 g; Refill: 1  Advised daily cleaning, thorough drying of skin.     Type 2 diabetes mellitus with chronic kidney disease on chronic dialysis, with long-term current use of insulin  Last A1c was 6.1  Diabetes controlled  Continue glargine and liraglutide daily injections    ESRD on dialysis  Stable volume status today.  Follow up with Nephrologist for HD as instructed.      Follow-up in about 2 days (around 12/14/2018) for wound check and re-packing.      12/12/2018 Satya Rhoades M.D.  [facility] Resident PGY3

## 2018-12-13 NOTE — PROGRESS NOTES
I assume primary medical responsibility for this patient, I have reviewed the case history, findings, diagnosis and treatment plan with the resident and agree that the care is reasonable and necessary. This service has been performed by a resident without the presence of a teaching physician under the primary care exception  Bruna Gauthier  12/13/2018

## 2018-12-14 ENCOUNTER — OFFICE VISIT (OUTPATIENT)
Dept: FAMILY MEDICINE | Facility: HOSPITAL | Age: 58
End: 2018-12-14
Attending: FAMILY MEDICINE
Payer: MEDICARE

## 2018-12-14 VITALS
DIASTOLIC BLOOD PRESSURE: 49 MMHG | HEIGHT: 67 IN | HEART RATE: 60 BPM | WEIGHT: 308.88 LBS | BODY MASS INDEX: 48.48 KG/M2 | SYSTOLIC BLOOD PRESSURE: 90 MMHG

## 2018-12-14 DIAGNOSIS — N18.6 TYPE 2 DIABETES MELLITUS WITH CHRONIC KIDNEY DISEASE ON CHRONIC DIALYSIS, WITH LONG-TERM CURRENT USE OF INSULIN: ICD-10-CM

## 2018-12-14 DIAGNOSIS — Z99.2 TYPE 2 DIABETES MELLITUS WITH CHRONIC KIDNEY DISEASE ON CHRONIC DIALYSIS, WITH LONG-TERM CURRENT USE OF INSULIN: ICD-10-CM

## 2018-12-14 DIAGNOSIS — E11.22 TYPE 2 DIABETES MELLITUS WITH CHRONIC KIDNEY DISEASE ON CHRONIC DIALYSIS, WITH LONG-TERM CURRENT USE OF INSULIN: ICD-10-CM

## 2018-12-14 DIAGNOSIS — L02.211 ABSCESS OF FLANK: Primary | ICD-10-CM

## 2018-12-14 DIAGNOSIS — E78.5 HYPERLIPIDEMIA, UNSPECIFIED HYPERLIPIDEMIA TYPE: ICD-10-CM

## 2018-12-14 DIAGNOSIS — Z79.4 TYPE 2 DIABETES MELLITUS WITH CHRONIC KIDNEY DISEASE ON CHRONIC DIALYSIS, WITH LONG-TERM CURRENT USE OF INSULIN: ICD-10-CM

## 2018-12-14 PROCEDURE — 99214 OFFICE O/P EST MOD 30 MIN: CPT | Performed by: STUDENT IN AN ORGANIZED HEALTH CARE EDUCATION/TRAINING PROGRAM

## 2018-12-14 RX ORDER — PRAVASTATIN SODIUM 40 MG/1
40 TABLET ORAL DAILY
Qty: 30 TABLET | Refills: 6 | Status: SHIPPED | OUTPATIENT
Start: 2018-12-14 | End: 2022-01-24 | Stop reason: DRUGHIGH

## 2018-12-14 RX ORDER — INSULIN GLARGINE 100 [IU]/ML
55 INJECTION, SOLUTION SUBCUTANEOUS NIGHTLY
Qty: 49.5 ML | Refills: 0 | Status: SHIPPED | OUTPATIENT
Start: 2018-12-14 | End: 2018-12-18 | Stop reason: SDUPTHER

## 2018-12-15 NOTE — PROGRESS NOTES
Subjective:       Patient ID: Angel Farmer Jr. is a 58 y.o. male.    Chief Complaint: Wound Check    HPI   Patient here today for wound check of right flank abscess that was drained and subsequently packed intiially on 12/12. Patient has no fevers, chills, n/v, chest pain or SOB since last seen. Patient notes some drainage since wound was packed. Patient has not changed bandage since last packed. Patient is ESRD on HD requesting insulin refill and statin. Patient has no new complaints at this time.     Review of Systems   Constitutional: Negative for appetite change and diaphoresis.   Eyes: Negative for visual disturbance.   Respiratory: Negative for shortness of breath and wheezing.    Cardiovascular: Negative for chest pain.   Musculoskeletal: Positive for arthralgias and back pain.   Skin: Positive for wound. Negative for color change.   Allergic/Immunologic: Negative for immunocompromised state.   Neurological: Negative for headaches.   Hematological: Negative for adenopathy.   Psychiatric/Behavioral: Negative for agitation.       Objective:      Vitals:    12/14/18 1213   BP: (!) 90/49   Pulse: 60     Physical Exam   Constitutional: He is oriented to person, place, and time. He appears well-developed and well-nourished.   HENT:   Head: Normocephalic and atraumatic.   Eyes: EOM are normal.   Neck: Normal range of motion. Neck supple.   Cardiovascular: Normal rate and regular rhythm.   Pulmonary/Chest: Effort normal and breath sounds normal.   Abdominal: Soft. Bowel sounds are normal. He exhibits no distension. There is no tenderness.   Patient large panus with notable right flank abscess. 0.5 previous packing removed. No active signs of infx.    Neurological: He is alert and oriented to person, place, and time.   Psychiatric: He has a normal mood and affect.       Assessment:       1. Abscess of flank    2. Type 2 diabetes mellitus with chronic kidney disease on chronic dialysis, with long-term current use of  insulin    3. Hyperlipidemia, unspecified hyperlipidemia type        Plan:       Abscess of flank        -    Patient with 0.5 cm abscess on right flank that is healing well.        -    Procedure completed by Dr. Berry. 2cc of 1% lidocaine injected into wound site. Packing removed with no complication noted. Borders cleaned with EToH and bandage placed over wound site. No packing placed. Wound to close on own. Patient to continue Cleocin course as prescribed by his PCP.     Type 2 diabetes mellitus with chronic kidney disease on chronic dialysis, with long-term current use of insulin  -     insulin (BASAGLAR KWIKPEN U-100 INSULIN) glargine 100 units/mL (3mL) SubQ pen; Inject 55 Units into the skin every evening.  Dispense: 49.5 mL; Refill: 0    Hyperlipidemia, unspecified hyperlipidemia type  -     pravastatin (PRAVACHOL) 40 MG tablet; Take 1 tablet (40 mg total) by mouth once daily.  Dispense: 30 tablet; Refill: 6      Follow-up if symptoms worsen or fail to improve.     Daniel Cat MD  [employer] [facility], PGY-1

## 2018-12-18 DIAGNOSIS — Z99.2 TYPE 2 DIABETES MELLITUS WITH CHRONIC KIDNEY DISEASE ON CHRONIC DIALYSIS, WITH LONG-TERM CURRENT USE OF INSULIN: ICD-10-CM

## 2018-12-18 DIAGNOSIS — E11.22 TYPE 2 DIABETES MELLITUS WITH CHRONIC KIDNEY DISEASE ON CHRONIC DIALYSIS, WITH LONG-TERM CURRENT USE OF INSULIN: ICD-10-CM

## 2018-12-18 DIAGNOSIS — Z79.4 TYPE 2 DIABETES MELLITUS WITH CHRONIC KIDNEY DISEASE ON CHRONIC DIALYSIS, WITH LONG-TERM CURRENT USE OF INSULIN: ICD-10-CM

## 2018-12-18 DIAGNOSIS — N18.6 TYPE 2 DIABETES MELLITUS WITH CHRONIC KIDNEY DISEASE ON CHRONIC DIALYSIS, WITH LONG-TERM CURRENT USE OF INSULIN: ICD-10-CM

## 2018-12-18 RX ORDER — INSULIN GLARGINE 100 [IU]/ML
55 INJECTION, SOLUTION SUBCUTANEOUS NIGHTLY
Qty: 49.5 ML | Refills: 0 | Status: SHIPPED | OUTPATIENT
Start: 2018-12-18 | End: 2018-12-21 | Stop reason: SDUPTHER

## 2018-12-18 NOTE — TELEPHONE ENCOUNTER
----- Message from Diana Claros MA sent at 12/18/2018 12:56 PM CST -----  Contact: [place]; [facility]  [phone]  Requesting refill on basaglar; please advise pharmacy.  Thanks.

## 2018-12-21 ENCOUNTER — TELEPHONE (OUTPATIENT)
Dept: FAMILY MEDICINE | Facility: HOSPITAL | Age: 58
End: 2018-12-21

## 2018-12-21 DIAGNOSIS — N18.6 TYPE 2 DIABETES MELLITUS WITH CHRONIC KIDNEY DISEASE ON CHRONIC DIALYSIS, WITH LONG-TERM CURRENT USE OF INSULIN: ICD-10-CM

## 2018-12-21 DIAGNOSIS — Z79.4 TYPE 2 DIABETES MELLITUS WITH CHRONIC KIDNEY DISEASE ON CHRONIC DIALYSIS, WITH LONG-TERM CURRENT USE OF INSULIN: ICD-10-CM

## 2018-12-21 DIAGNOSIS — E11.22 TYPE 2 DIABETES MELLITUS WITH CHRONIC KIDNEY DISEASE ON CHRONIC DIALYSIS, WITH LONG-TERM CURRENT USE OF INSULIN: ICD-10-CM

## 2018-12-21 DIAGNOSIS — Z99.2 TYPE 2 DIABETES MELLITUS WITH CHRONIC KIDNEY DISEASE ON CHRONIC DIALYSIS, WITH LONG-TERM CURRENT USE OF INSULIN: ICD-10-CM

## 2018-12-21 RX ORDER — INSULIN GLARGINE 100 [IU]/ML
55 INJECTION, SOLUTION SUBCUTANEOUS NIGHTLY
Qty: 49.5 ML | Refills: 0 | Status: SHIPPED | OUTPATIENT
Start: 2018-12-21 | End: 2018-12-21 | Stop reason: SDUPTHER

## 2018-12-21 RX ORDER — INSULIN GLARGINE 100 [IU]/ML
55 INJECTION, SOLUTION SUBCUTANEOUS NIGHTLY
Qty: 49.5 ML | Refills: 0 | Status: ON HOLD | OUTPATIENT
Start: 2018-12-21 | End: 2019-03-20 | Stop reason: HOSPADM

## 2018-12-21 NOTE — TELEPHONE ENCOUNTER
----- Message from Diana Claros MA sent at 12/21/2018 11:24 AM CST -----  Contact: Natalia Darian; sister in law  [phone]  Patient was seen on 12/19; rx for insulin was printed instead of being escribed.  Sister in law said they have left 4 prior messages and patient is on his last dose.  Please send to pharmacy.  Thanks.

## 2019-01-12 ENCOUNTER — HOSPITAL ENCOUNTER (EMERGENCY)
Facility: HOSPITAL | Age: 59
Discharge: HOME | End: 2019-01-12
Attending: EMERGENCY MEDICINE
Payer: MEDICARE

## 2019-01-12 VITALS
TEMPERATURE: 99 F | BODY MASS INDEX: 46.68 KG/M2 | HEART RATE: 60 BPM | OXYGEN SATURATION: 99 % | WEIGHT: 308 LBS | SYSTOLIC BLOOD PRESSURE: 141 MMHG | HEIGHT: 68 IN | RESPIRATION RATE: 16 BRPM | DIASTOLIC BLOOD PRESSURE: 63 MMHG

## 2019-01-12 DIAGNOSIS — L02.211 ABSCESS OF SKIN OF ABDOMEN: Primary | ICD-10-CM

## 2019-01-12 LAB
ALBUMIN SERPL BCP-MCNC: 3.2 G/DL
ALP SERPL-CCNC: 192 U/L
ALT SERPL W/O P-5'-P-CCNC: 49 U/L
ANION GAP SERPL CALC-SCNC: 11 MMOL/L
AST SERPL-CCNC: 51 U/L
BASOPHILS # BLD AUTO: 0.01 K/UL
BASOPHILS NFR BLD: 0.2 %
BILIRUB SERPL-MCNC: 0.7 MG/DL
BUN SERPL-MCNC: 42 MG/DL
CALCIUM SERPL-MCNC: 9.6 MG/DL
CHLORIDE SERPL-SCNC: 107 MMOL/L
CO2 SERPL-SCNC: 25 MMOL/L
CREAT SERPL-MCNC: 8.8 MG/DL
CRP SERPL-MCNC: 20.5 MG/L
DIFFERENTIAL METHOD: ABNORMAL
EOSINOPHIL # BLD AUTO: 0.1 K/UL
EOSINOPHIL NFR BLD: 2.8 %
ERYTHROCYTE [DISTWIDTH] IN BLOOD BY AUTOMATED COUNT: 14.8 %
EST. GFR  (AFRICAN AMERICAN): 7 ML/MIN/1.73 M^2
EST. GFR  (NON AFRICAN AMERICAN): 6 ML/MIN/1.73 M^2
GLUCOSE SERPL-MCNC: 168 MG/DL
HCT VFR BLD AUTO: 36.5 %
HGB BLD-MCNC: 11.1 G/DL
LYMPHOCYTES # BLD AUTO: 1.1 K/UL
LYMPHOCYTES NFR BLD: 22.4 %
MCH RBC QN AUTO: 33.4 PG
MCHC RBC AUTO-ENTMCNC: 30.4 G/DL
MCV RBC AUTO: 110 FL
MONOCYTES # BLD AUTO: 0.5 K/UL
MONOCYTES NFR BLD: 10.3 %
NEUTROPHILS # BLD AUTO: 3.2 K/UL
NEUTROPHILS NFR BLD: 64.1 %
PLATELET # BLD AUTO: 126 K/UL
PMV BLD AUTO: 9.8 FL
POCT GLUCOSE: 136 MG/DL (ref 70–110)
POCT GLUCOSE: 199 MG/DL (ref 70–110)
POTASSIUM SERPL-SCNC: 3.7 MMOL/L
PROT SERPL-MCNC: 6.8 G/DL
RBC # BLD AUTO: 3.32 M/UL
SODIUM SERPL-SCNC: 143 MMOL/L
WBC # BLD AUTO: 4.95 K/UL

## 2019-01-12 PROCEDURE — 25000003 PHARM REV CODE 250: Performed by: EMERGENCY MEDICINE

## 2019-01-12 PROCEDURE — 10060 I&D ABSCESS SIMPLE/SINGLE: CPT

## 2019-01-12 PROCEDURE — 80053 COMPREHEN METABOLIC PANEL: CPT

## 2019-01-12 PROCEDURE — 96365 THER/PROPH/DIAG IV INF INIT: CPT | Mod: 59

## 2019-01-12 PROCEDURE — 85025 COMPLETE CBC W/AUTO DIFF WBC: CPT

## 2019-01-12 PROCEDURE — 99284 EMERGENCY DEPT VISIT MOD MDM: CPT | Mod: 25

## 2019-01-12 PROCEDURE — S0077 INJECTION, CLINDAMYCIN PHOSP: HCPCS | Performed by: EMERGENCY MEDICINE

## 2019-01-12 PROCEDURE — 82962 GLUCOSE BLOOD TEST: CPT | Mod: 91

## 2019-01-12 PROCEDURE — 96366 THER/PROPH/DIAG IV INF ADDON: CPT

## 2019-01-12 PROCEDURE — 86140 C-REACTIVE PROTEIN: CPT

## 2019-01-12 RX ORDER — LIDOCAINE HYDROCHLORIDE 10 MG/ML
1 INJECTION INFILTRATION; PERINEURAL
Status: COMPLETED | OUTPATIENT
Start: 2019-01-12 | End: 2019-01-12

## 2019-01-12 RX ORDER — CLINDAMYCIN PHOSPHATE 600 MG/50ML
600 INJECTION, SOLUTION INTRAVENOUS
Status: COMPLETED | OUTPATIENT
Start: 2019-01-12 | End: 2019-01-12

## 2019-01-12 RX ORDER — LIDOCAINE HYDROCHLORIDE 10 MG/ML
INJECTION INFILTRATION; PERINEURAL
Status: DISCONTINUED
Start: 2019-01-12 | End: 2019-01-12 | Stop reason: HOSPADM

## 2019-01-12 RX ORDER — CLINDAMYCIN HYDROCHLORIDE 300 MG/1
300 CAPSULE ORAL EVERY 6 HOURS
Qty: 28 CAPSULE | Refills: 0 | Status: SHIPPED | OUTPATIENT
Start: 2019-01-12 | End: 2019-04-23

## 2019-01-12 RX ADMIN — CLINDAMYCIN IN 5 PERCENT DEXTROSE 600 MG: 12 INJECTION, SOLUTION INTRAVENOUS at 05:01

## 2019-01-12 RX ADMIN — LIDOCAINE HYDROCHLORIDE 1 ML: 10 INJECTION, SOLUTION INFILTRATION; PERINEURAL at 04:01

## 2019-01-12 NOTE — ED PROVIDER NOTES
Encounter Date: 1/12/2019    SCRIBE #1 NOTE: I, Ofelia Davenport, am scribing for, and in the presence of,  Dr. Contreras. I have scribed the entire note.       History     Chief Complaint   Patient presents with    Abscess     c/o abscess to RLQ x3 days. States he received 1 dose of IV antibiotics yesterday in dialysis.      Angel Farmer Jr. is a 58 y.o. male who  has a past medical history of Diabetes mellitus type II, Dialysis patient, Hyperlipidemia, Hypertension, Kidney failure, MIKE (obstructive sleep apnea), and Urinary tract infection.    He reports onset of symptoms was 4 days ago. The patient states he has area of pain and swelling to abdomen.  The patient notes yesterday he received IV antibiotics while at dialysis. He is uncertain of the name of the antibiotics. He denies any associated drainage, fever, chills, nausea or vomiting. He has experienced similar symptoms in the past. Has had previous I&D.  Of note the patient receives dialysis every Mon/Wed/Fri with his last session being yesterday. The patient admits to no longer producing urine.        The history is provided by the patient.     Review of patient's allergies indicates:   Allergen Reactions    Keflex [cephalexin] Nausea Only     Past Medical History:   Diagnosis Date    Diabetes mellitus type II     Dialysis patient     Hyperlipidemia     Hypertension     Kidney failure     MIKE (obstructive sleep apnea)     Urinary tract infection      Past Surgical History:   Procedure Laterality Date    AV FISTULA PLACEMENT      left lower arm    TONSILLECTOMY, ADENOIDECTOMY      TRANSESOPHAGEAL ECHOCARDIOGRAM (SERENITY) N/A 5/23/2017    Performed by Donaldo Mai MD at [facility] OR     Family History   Problem Relation Age of Onset    Colon cancer Mother     Lung cancer Mother     Diabetes Mother     Diabetes Father     Heart disease Father     Hypertension Father     Diabetes Maternal Grandmother     Diabetes Maternal Grandfather       Social History     Tobacco Use    Smoking status: Never Smoker    Smokeless tobacco: Never Used   Substance Use Topics    Alcohol use: No    Drug use: No     Review of Systems   Constitutional: Negative for chills and fever.   HENT: Negative for congestion, ear pain, rhinorrhea and sore throat.    Respiratory: Negative for cough, shortness of breath and wheezing.    Cardiovascular: Negative for chest pain and palpitations.   Gastrointestinal: Negative for abdominal pain, diarrhea, nausea and vomiting.   Genitourinary: Negative for dysuria and hematuria.   Musculoskeletal: Negative for back pain, myalgias and neck pain.   Skin: Positive for wound. Negative for rash.        Positive for abscess to right lower abdomen   Neurological: Negative for dizziness, weakness, light-headedness and headaches.   Psychiatric/Behavioral: Negative for confusion.       Physical Exam     Initial Vitals [01/12/19 1505]   BP Pulse Resp Temp SpO2   (!) 145/67 67 18 98.5 °F (36.9 °C) 99 %      MAP       --         Physical Exam    Nursing note and vitals reviewed.  Constitutional: He appears well-developed and well-nourished. He is not diaphoretic. No distress.   HENT:   Head: Normocephalic and atraumatic.   Right Ear: External ear normal.   Left Ear: External ear normal.   Nose: Nose normal.   Eyes: Conjunctivae and EOM are normal.   Neck: Normal range of motion. Neck supple.   Cardiovascular: Normal rate, regular rhythm and normal heart sounds. Exam reveals no gallop and no friction rub.    No murmur heard.  Pulmonary/Chest: Breath sounds normal. He has no wheezes. He has no rhonchi. He has no rales.   Abdominal: Soft. There is tenderness (to abscess). There is no rebound and no guarding.   Obese abdomen. Large area of induration and erythema to RLQ with tenderness. No drainage   Musculoskeletal: Normal range of motion. He exhibits no edema or tenderness.   Dialysis shunt to left forearm with palpable thrill   Lymphadenopathy:      He has no cervical adenopathy.   Neurological: He is alert and oriented to person, place, and time. He has normal strength. GCS score is 15. GCS eye subscore is 4. GCS verbal subscore is 5. GCS motor subscore is 6.   Skin: Skin is warm and dry. Abscess noted. No rash noted.         ED Course   I & D - Incision and Drainage  Date/Time: 1/12/2019 4:26 PM  Performed by: Homa Contreras MD  Authorized by: Homa Contreras MD   Type: abscess  Body area: trunk  Location details: abdomen  Anesthesia: local infiltration    Anesthesia:  Local Anesthetic: lidocaine 1% without epinephrine  Anesthetic total: 2 mL  Patient sedated: no  Scalpel size: 11  Incision type: single straight  Complexity: simple  Drainage: bloody and  purulent  Drainage amount: copious  Wound treatment: incision,  drainage and  expression of material  Packing material: none  Complications: No  Specimens: No  Implants: No  Patient tolerance: Patient tolerated the procedure well with no immediate complications        Labs Reviewed   CBC W/ AUTO DIFFERENTIAL - Abnormal; Notable for the following components:       Result Value    RBC 3.32 (*)     Hemoglobin 11.1 (*)     Hematocrit 36.5 (*)      (*)     MCH 33.4 (*)     MCHC 30.4 (*)     RDW 14.8 (*)     Platelets 126 (*)     All other components within normal limits   COMPREHENSIVE METABOLIC PANEL - Abnormal; Notable for the following components:    Glucose 168 (*)     BUN, Bld 42 (*)     Creatinine 8.8 (*)     Albumin 3.2 (*)     Alkaline Phosphatase 192 (*)     AST 51 (*)     ALT 49 (*)     eGFR if  7 (*)     eGFR if non  6 (*)     All other components within normal limits   C-REACTIVE PROTEIN - Abnormal; Notable for the following components:    CRP 20.5 (*)     All other components within normal limits   POCT GLUCOSE - Abnormal; Notable for the following components:    POCT Glucose 199 (*)     All other components within normal limits   POCT GLUCOSE - Abnormal;  Notable for the following components:    POCT Glucose 136 (*)     All other components within normal limits   POCT GLUCOSE MONITORING CONTINUOUS          Imaging Results    None     ---------------------------BEDSIDE US LIMITED---------------------  Interpreted by the emergency provider  Time: 3:24 PM  Indication: abdominal lesion  Structures/organs investigated:right abdominal wall  Interpretation: fluid collection noted  Post-procedure: Patient tolerated the procedure well with no immediate complication       Medical Decision Making:   Initial Assessment:   The patient presents to the ED due to abscess  Differential Diagnosis:   Abscess, cellulitis, wound  Clinical Tests:   Lab Tests: Ordered and Reviewed  ED Management:  4:30 PM I&D completed without complication.     Upon re-evaluation, the patient's status has improved.  After complete ED evaluation, clinical impression is most consistent with abscess.  PCP follow-up within 1 week was recommended.    After taking into careful account the patient's history, physical exam findings, as well as empirical and objective data obtained throughout ED workup, I feel no emergent medical condition has been identified. No further evaluation or admission was felt to be required, and the patient is stable for discharge from the ED. The patient and any additional family present were updated with test results, overall clinical impression, and recommended further plan of care, including discharge instructions as provided and outpatient follow-up for continued evaluation and management as needed. All questions were answered. The patient expressed understanding and agreed with current plan for discharge and follow-up plan of care. Strict ED return precautions were provided, including return/worsening of current symptoms, new symptoms, or any other concerns.                        Clinical Impression:     1. Abscess of skin of abdomen        Disposition:   Disposition:  Discharged  Condition: Stable    I, Homa Contreras,  personally performed the services described in this documentation. All medical record entries made by the scribe were at my direction and in my presence.  I have reviewed the chart and agree that the record reflects my personal performance and is accurate and complete. Homa Contreras M.D. 6:43 PM01/12/2019                      Homa Contreras MD  01/12/19 1849

## 2019-01-13 NOTE — ED NOTES
"Pt seen in the ED complaining of an abscess on the RLQ of the abd, pt stated "yesterday it was flat and when I woke up today I had a bump". According to pt report he had blood cultures drawn yesterday and abx tx during her dialysis appointment. Pt denies any fever, chills or exudate draining from the abscess. Pt stable, will continue to monitor.    "

## 2019-02-12 DIAGNOSIS — E78.5 HYPERLIPIDEMIA, UNSPECIFIED HYPERLIPIDEMIA TYPE: ICD-10-CM

## 2019-02-26 ENCOUNTER — HOSPITAL ENCOUNTER (INPATIENT)
Facility: HOSPITAL | Age: 59
LOS: 2 days | Discharge: HOME | DRG: 551 | End: 2019-02-28
Attending: EMERGENCY MEDICINE | Admitting: FAMILY MEDICINE
Payer: MEDICARE

## 2019-02-26 DIAGNOSIS — R53.1 WEAKNESS: ICD-10-CM

## 2019-02-26 DIAGNOSIS — R27.0 ATAXIA: Primary | ICD-10-CM

## 2019-02-26 DIAGNOSIS — Z99.2 ESRD ON DIALYSIS: ICD-10-CM
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
## 2019-02-26 DIAGNOSIS — N18.6 ESRD ON DIALYSIS: ICD-10-CM

## 2019-02-26 DIAGNOSIS — I63.9 CVA (CEREBRAL VASCULAR ACCIDENT): ICD-10-CM

## 2019-02-26 DIAGNOSIS — M54.2 NECK PAIN: ICD-10-CM

## 2019-02-26 LAB
ALBUMIN SERPL BCP-MCNC: 3.7 G/DL
ALP SERPL-CCNC: 179 U/L
ALT SERPL W/O P-5'-P-CCNC: 84 U/L
ANION GAP SERPL CALC-SCNC: 10 MMOL/L
APTT BLDCRRT: 31 SEC
AST SERPL-CCNC: 81 U/L
BASOPHILS # BLD AUTO: 0.01 K/UL
BASOPHILS NFR BLD: 0.2 %
BILIRUB SERPL-MCNC: 1 MG/DL
BUN SERPL-MCNC: 42 MG/DL
CALCIUM SERPL-MCNC: 9.4 MG/DL
CHLORIDE SERPL-SCNC: 105 MMOL/L
CO2 SERPL-SCNC: 26 MMOL/L
CREAT SERPL-MCNC: 8.6 MG/DL
DIFFERENTIAL METHOD: ABNORMAL
EOSINOPHIL # BLD AUTO: 0.2 K/UL
EOSINOPHIL NFR BLD: 2.5 %
ERYTHROCYTE [DISTWIDTH] IN BLOOD BY AUTOMATED COUNT: 15.3 %
EST. GFR  (AFRICAN AMERICAN): 7 ML/MIN/1.73 M^2
EST. GFR  (NON AFRICAN AMERICAN): 6 ML/MIN/1.73 M^2
GLUCOSE SERPL-MCNC: 78 MG/DL
HCT VFR BLD AUTO: 33.9 %
HGB BLD-MCNC: 10.4 G/DL
LYMPHOCYTES # BLD AUTO: 1.2 K/UL
LYMPHOCYTES NFR BLD: 18.8 %
MAGNESIUM SERPL-MCNC: 2.4 MG/DL
MCH RBC QN AUTO: 32.5 PG
MCHC RBC AUTO-ENTMCNC: 30.7 G/DL
MCV RBC AUTO: 106 FL
MONOCYTES # BLD AUTO: 0.6 K/UL
MONOCYTES NFR BLD: 9.5 %
NEUTROPHILS # BLD AUTO: 4.2 K/UL
NEUTROPHILS NFR BLD: 68.8 %
PHOSPHATE SERPL-MCNC: 4.1 MG/DL
PLATELET # BLD AUTO: 90 K/UL
PMV BLD AUTO: 10.5 FL
POCT GLUCOSE: 74 MG/DL (ref 70–110)
POCT GLUCOSE: 94 MG/DL (ref 70–110)
POTASSIUM SERPL-SCNC: 4.5 MMOL/L
PROT SERPL-MCNC: 6.7 G/DL
RBC # BLD AUTO: 3.2 M/UL
SODIUM SERPL-SCNC: 141 MMOL/L
TROPONIN I SERPL DL<=0.01 NG/ML-MCNC: 0.03 NG/ML
WBC # BLD AUTO: 6.12 K/UL

## 2019-02-26 PROCEDURE — 83735 ASSAY OF MAGNESIUM: CPT

## 2019-02-26 PROCEDURE — 99285 EMERGENCY DEPT VISIT HI MDM: CPT | Mod: 25

## 2019-02-26 PROCEDURE — 84484 ASSAY OF TROPONIN QUANT: CPT

## 2019-02-26 PROCEDURE — 93010 ELECTROCARDIOGRAM REPORT: CPT | Mod: ,,, | Performed by: INTERNAL MEDICINE

## 2019-02-26 PROCEDURE — 84100 ASSAY OF PHOSPHORUS: CPT

## 2019-02-26 PROCEDURE — 82962 GLUCOSE BLOOD TEST: CPT

## 2019-02-26 PROCEDURE — 80053 COMPREHEN METABOLIC PANEL: CPT

## 2019-02-26 PROCEDURE — 85730 THROMBOPLASTIN TIME PARTIAL: CPT

## 2019-02-26 PROCEDURE — 93010 EKG 12-LEAD: ICD-10-PCS | Mod: ,,, | Performed by: INTERNAL MEDICINE

## 2019-02-26 PROCEDURE — 25000003 PHARM REV CODE 250: Performed by: STUDENT IN AN ORGANIZED HEALTH CARE EDUCATION/TRAINING PROGRAM

## 2019-02-26 PROCEDURE — 25000003 PHARM REV CODE 250: Performed by: EMERGENCY MEDICINE

## 2019-02-26 PROCEDURE — 93005 ELECTROCARDIOGRAM TRACING: CPT

## 2019-02-26 PROCEDURE — 85025 COMPLETE CBC W/AUTO DIFF WBC: CPT

## 2019-02-26 PROCEDURE — 11000001 HC ACUTE MED/SURG PRIVATE ROOM

## 2019-02-26 RX ORDER — LABETALOL HYDROCHLORIDE 5 MG/ML
10 INJECTION, SOLUTION INTRAVENOUS EVERY 4 HOURS PRN
Status: DISCONTINUED | OUTPATIENT
Start: 2019-02-26 | End: 2019-02-28 | Stop reason: HOSPADM

## 2019-02-26 RX ORDER — INSULIN ASPART 100 [IU]/ML
8 INJECTION, SOLUTION INTRAVENOUS; SUBCUTANEOUS
Status: DISCONTINUED | OUTPATIENT
Start: 2019-02-27 | End: 2019-02-28 | Stop reason: HOSPADM

## 2019-02-26 RX ORDER — NAPROXEN SODIUM 220 MG/1
81 TABLET, FILM COATED ORAL DAILY
Status: DISCONTINUED | OUTPATIENT
Start: 2019-02-27 | End: 2019-02-28 | Stop reason: HOSPADM

## 2019-02-26 RX ORDER — MIDODRINE HYDROCHLORIDE 5 MG/1
10 TABLET ORAL 3 TIMES DAILY
Status: DISCONTINUED | OUTPATIENT
Start: 2019-02-27 | End: 2019-02-28 | Stop reason: HOSPADM

## 2019-02-26 RX ORDER — SODIUM CHLORIDE 0.9 % (FLUSH) 0.9 %
3 SYRINGE (ML) INJECTION EVERY 8 HOURS
Status: DISCONTINUED | OUTPATIENT
Start: 2019-02-27 | End: 2019-02-28 | Stop reason: HOSPADM

## 2019-02-26 RX ORDER — GABAPENTIN 300 MG/1
300 CAPSULE ORAL NIGHTLY
Status: DISCONTINUED | OUTPATIENT
Start: 2019-02-26 | End: 2019-02-28

## 2019-02-26 RX ORDER — HEPARIN SODIUM 5000 [USP'U]/ML
7500 INJECTION, SOLUTION INTRAVENOUS; SUBCUTANEOUS EVERY 8 HOURS
Status: DISCONTINUED | OUTPATIENT
Start: 2019-02-27 | End: 2019-02-28 | Stop reason: HOSPADM

## 2019-02-26 RX ORDER — ONDANSETRON 8 MG/1
8 TABLET, ORALLY DISINTEGRATING ORAL EVERY 6 HOURS PRN
Status: DISCONTINUED | OUTPATIENT
Start: 2019-02-26 | End: 2019-02-28 | Stop reason: HOSPADM

## 2019-02-26 RX ORDER — ACETAMINOPHEN 500 MG
1000 TABLET ORAL
Status: COMPLETED | OUTPATIENT
Start: 2019-02-26 | End: 2019-02-26

## 2019-02-26 RX ORDER — PRAVASTATIN SODIUM 20 MG/1
40 TABLET ORAL DAILY
Status: DISCONTINUED | OUTPATIENT
Start: 2019-02-27 | End: 2019-02-28 | Stop reason: HOSPADM

## 2019-02-26 RX ADMIN — ACETAMINOPHEN 1000 MG: 500 TABLET ORAL at 06:02

## 2019-02-26 RX ADMIN — GABAPENTIN 300 MG: 300 CAPSULE ORAL at 11:02

## 2019-02-26 RX ADMIN — ONDANSETRON 8 MG: 8 TABLET, ORALLY DISINTEGRATING ORAL at 11:02

## 2019-02-26 NOTE — ED NOTES
Pt to Room 25 with c/o fall this morning. Family states he stumbled this morning with his walker as he was getting dressed for dialysis and landed on right side. Pt complains of left shoulder pain and left calf pain. Pt states his calf normally hurts due to previous injury and surgery. Pt denies any head injury or LOC. Pt denies any chest pain, nausea, vomiting, diarrhea, constipation, dizziness, or headache. Pt and family states the patient has generalized weakness since Sunday. Pt has 2+ pitting edema bilateral lower extremities. Pt is AAO x 3. Respirations even and unlabored, lung sounds clear and equal bilaterally. Skin warm and dry to touch. Abdomen soft and non-distended, no guarding or tenderness noted. No acute distress noted. VSS. Cardiac monitor and pulse ox applied. Fall precautions. Family at the bedside. Will continue to monitor closely.

## 2019-02-26 NOTE — ED PROVIDER NOTES
"Encounter Date: 2/26/2019    SCRIBE #1 NOTE: I, Ofelia Davenport, am scribing for, and in the presence of,  Dr. Jones I have scribed the entire note.       History     Chief Complaint   Patient presents with    Fall     pt on heprin, wife reports increased weakness and " stumbling " since sun, pt states fell on right side, complains of left shoulder pain      This is a 58 y.o. male who has a past medical history of Diabetes mellitus type II, Dialysis patient, Hyperlipidemia, Hypertension, Kidney failure, MIKE (obstructive sleep apnea), and Urinary tract infection.     The patient presents to the Emergency Department due to fall.   He reports onset of symptoms was about 5 AM this morning  The patient states he was attempting to get ready to go to dialysis when his arms became weak causing him to fall.  He reports he fell unto his right side.  The patient denies any LOC or head injury associated with the fall.  The family was able to assist the patient from the floor with her spouse.  As per family the patient has been stumbling with walking since Saturday.   She notes the patient will walk then appear as though he is going to fall and catch himself along the wall  The patient reports when he walks he feels uncoordinated.   Per family the patient does have difficulty getting out of bed at baseline but she believes this has gotten worse since Saturday.   Symptoms are associated with bilateral shoulder pain.  Pt denies neck pain, back pain, leg pain, dizziness, light headedness, nausea, vomiting, chest pain or shortness.   The symptoms are worsened with exertion   Patient has no prior history of similar symptoms.   Of note he was able to have dialysis today        The history is provided by the patient.     Review of patient's allergies indicates:   Allergen Reactions    Keflex [cephalexin] Nausea Only     Past Medical History:   Diagnosis Date    Anemia due to stage 5 chronic kidney disease 3/1/2019    Diabetes " mellitus type II     Dialysis patient     Hyperlipidemia     Hypertension     Kidney failure     MIKE (obstructive sleep apnea)     Urinary tract infection      Past Surgical History:   Procedure Laterality Date    AV FISTULA PLACEMENT      left lower arm    SPINE, CERVICAL, POSTERIOR APPROACH  LAMINECTOMY C3-C6; C6-C7; WITH MEDIAL DISCECTOMY N/A 3/3/2019    Performed by Ruddy Wylie MD at [facility] OR 2ND FLR    TONSILLECTOMY, ADENOIDECTOMY      TRANSESOPHAGEAL ECHOCARDIOGRAM (SERENITY) N/A 5/23/2017    Performed by Donaldo [facility] at [facility] OR     Family History   Problem Relation Age of Onset    Colon cancer Mother     Lung cancer Mother     Diabetes Mother     Diabetes Father     Heart disease Father     Hypertension Father     Diabetes Maternal Grandmother     Diabetes Maternal Grandfather      Social History     Tobacco Use    Smoking status: Never Smoker    Smokeless tobacco: Never Used   Substance Use Topics    Alcohol use: No    Drug use: No     Review of Systems   Constitutional: Negative for chills and fever.   Respiratory: Negative for shortness of breath.    Cardiovascular: Negative for chest pain.   Gastrointestinal: Negative for nausea and vomiting.   Musculoskeletal:        Positive for right and left arm pain   Neurological: Positive for weakness (arms and legs). Negative for dizziness and numbness.       Physical Exam     Initial Vitals [02/26/19 1339]   BP Pulse Resp Temp SpO2   (!) 125/58 63 20 98.5 °F (36.9 °C) 99 %      MAP       --         Physical Exam    Nursing note and vitals reviewed.  Constitutional: He appears well-developed and well-nourished. He is not diaphoretic. No distress.   Morbidly obese. Appears comfortable lying in bed   HENT:   Head: Normocephalic and atraumatic.   Poor dentition   Eyes: Conjunctivae are normal.   Horizontal nystagmus with rapid movements but fatigable. No vertical or rotary nystagmus   Neck: Neck supple.   Cardiovascular: Normal rate,  regular rhythm and normal heart sounds. Exam reveals no gallop and no friction rub.    No murmur heard.  Pulmonary/Chest: Breath sounds normal. He has no wheezes. He has no rhonchi. He has no rales.   Abdominal: Soft. There is no tenderness. There is no rebound and no guarding.   Obese   Musculoskeletal: He exhibits edema and tenderness.    Intact distal pulses. TTP over left and right shoulder. Severe limitation of ROM for left and right shoulder secondary to arthritis and pain.   No midline C/T/L spine tenderness, step-off or deformity  Tenderness to left anterior thigh, no significant swelling. 1-2+ pitting edema.   Neurological: He is alert and oriented to person, place, and time.   Equal  strength bilaterally. Upper extrermity coordination unable to fully assessed but appears normal. Unable to test LUE  Unable to test heel to shin. 4/5 strength bilaterally. No sensory deficit. Patient able to stand with assistance. However, with initiation of walking, legs become weak and shaky.    Skin: Skin is warm and dry. No rash noted.   Ecchymosis to right right side of abdomen just above iliac crest. No wounds noted         ED Course   Procedures  Labs Reviewed   CBC W/ AUTO DIFFERENTIAL - Abnormal; Notable for the following components:       Result Value    RBC 3.20 (*)     Hemoglobin 10.4 (*)     Hematocrit 33.9 (*)      (*)     MCH 32.5 (*)     MCHC 30.7 (*)     RDW 15.3 (*)     Platelets 90 (*)     All other components within normal limits   COMPREHENSIVE METABOLIC PANEL - Abnormal; Notable for the following components:    BUN, Bld 42 (*)     Creatinine 8.6 (*)     Alkaline Phosphatase 179 (*)     AST 81 (*)     ALT 84 (*)     eGFR if  7 (*)     eGFR if non  6 (*)     All other components within normal limits   TROPONIN I - Abnormal; Notable for the following components:    Troponin I 0.034 (*)     All other components within normal limits   MAGNESIUM   PHOSPHORUS   APTT    POCT GLUCOSE   POCT GLUCOSE     EKG Readings: (Independently Interpreted)   EKG: sinu bradycardia at 56 bpm, left axis deviation, LBBB, no hypertrophy, no ST-T changes as read by me (Dr. Ha).      ECG Results          EKG 12-lead (Final result)  Result time 02/26/19 18:24:05    Final result by Interface, Lab In [facility] (02/26/19 18:24:05)                 Narrative:    Test Reason : R53.1,    Vent. Rate : 056 BPM     Atrial Rate : 056 BPM     P-R Int : 166 ms          QRS Dur : 142 ms      QT Int : 502 ms       P-R-T Axes : 028 -72 090 degrees     QTc Int : 484 ms    Sinus bradycardia  Left axis deviation  Nonspecific intraventricular block  Inferior infarct ,age undetermined  Abnormal ECG  When compared with ECG of 25-MAY-2018 12:52,  Significant changes have occurred  Confirmed by Mahesh Villegas MD ([id number]) on 2/26/2019 6:23:55 PM    Referred By: [email or web address]   SELF           Confirmed By:Mahesh Villegas MD                            Imaging Results          MRI Brain Without Contrast (Final result)  Result time 02/26/19 23:16:39    Final result by Gabriella Drake MD (02/26/19 23:16:39)                 Impression:      No acute intracranial abnormalities identified.  No evidence of acute infarction.    Mild to moderate periventricular chronic microvascular ischemic disease.      Electronically signed by: Gabriella Drake MD  Date:    02/26/2019  Time:    23:16             Narrative:    EXAMINATION:  MRI BRAIN WITHOUT CONTRAST    CLINICAL HISTORY:  Syncope/fainting; Unspecified fall, initial encounter    TECHNIQUE:  Multiplanar multisequence MR imaging of the brain was performed without contrast.    COMPARISON:  CT head from the same date.    FINDINGS:  Motion limited examination, specifically involving axial T2 FLAIR sequence.    There is mild-to-moderate periventricular chronic microvascular ischemic disease.  No diffusion signal abnormality to suggest acute infarction.  Ventricles are normal in size and  configuration without evidence for hydrocephalus.  No intracranial hemorrhage or extraaxial fluid collection.  No mass or mass effect.  Flow voids are normal in appearance.    Visualized paranasal sinuses and mastoid air cells are clear.  Orbits appear normal.                               MRA Neck without contrast (Final result)  Result time 02/26/19 23:19:02   Procedure changed from MRA Neck with and without contrast     Final result by Gabriella Drake MD (02/26/19 23:19:02)                 Impression:      MRA neck without significant focal stenosis or occlusion.      Electronically signed by: Gabriella Drake MD  Date:    02/26/2019  Time:    23:19             Narrative:    EXAMINATION:  MRA NECK WITHOUT CONTRAST    CLINICAL HISTORY:  Stroke;Ataxia, unspecified    TECHNIQUE:  MRA neck was performed without the use of IV contrast.    COMPARISON:  Concurrent MRI brain.    FINDINGS:  Mild motion artifact seen at the vessel origins from the arch.  Vertebral artery origins are within normal limits.  The vertebral arteries are unremarkable throughout their course without evidence for focal stenosis or occlusion.  The bilateral common carotid arteries, carotid bifurcation, and extra cranial portions of the internal carotid arteries are patent without evidence for focal stenosis or occlusion.  Bilateral internal carotid arteries are tortuous along their mid to distal course.                               CT Head Without Contrast (Final result)  Result time 02/26/19 17:08:32    Final result by Thomas Poe MD (02/26/19 17:08:32)                 Impression:      No acute intracranial abnormality identified.  Specifically, no intracranial hemorrhage.      Electronically signed by: Thomas Poe MD  Date:    02/26/2019  Time:    17:08             Narrative:    EXAMINATION:  CT HEAD WITHOUT CONTRAST    CLINICAL HISTORY:  fall, heparinized for dialysis; Unspecified fall, initial encounter    TECHNIQUE:  Low dose axial CT  images obtained throughout the head without intravenous contrast. Sagittal and coronal reconstructions were performed.    COMPARISON:  Head and maxillofacial CT 09/06/2018    FINDINGS:  Intracranial compartment:    Ventricles and sulci are normal in size for age without evidence of hydrocephalus. No extra-axial blood or fluid collections.    The brain parenchyma appears normal.  No new focal areas of abnormal parenchymal attenuation.  No parenchymal mass, hemorrhage, edema or major vascular distribution infarct.  Skull base atherosclerotic vascular calcifications noted.    Skull/extracranial contents (limited evaluation): No fracture. Mastoid air cells and paranasal sinuses are essentially clear.  Visualized portions of the orbits are within normal limits.                                 Medical Decision Making:   Initial Assessment:   This is an emergent evaluation of a 58 y.o.male patient with presentation of unsteady gait/ataxia, frequent falls.  Patient has history of chronic left upper extremity weakness but now also progressively worsening right upper extremity weakness and pain. History is limited regarding timeline of patient's neuro deficits.    Initial differentials include but are not limited to:  Acute CVA, brain mass, neuropathy, spinal cord lesion    Plan:  Basic labs, troponin, head CT, EKG    Clinical Tests:   Lab Tests: Ordered and Reviewed  Radiological Study: Ordered and Reviewed  Medical Tests: Ordered and Reviewed  Other:   I have discussed this case with another health care provider.       <> Summary of the Discussion: Case turned over to Dr. Shea at 6:00 p.m. pending workup and admit for ataxia, r/o CVA.                   ED Course as of Mar 05 2039   Tue Feb 26, 2019   1337 Sort note: Angel Farmer Jr. nontoxic/afebrile 58 y.o.  presented to the ED with c/o fall to right side.  Dialysis pt on heparin     Patient seen and medically screened by Physician assistant in Sort process due to ED  crowding.  Appropriate tests and/or medications ordered.  Care transferred to an alternate provider when [facility], additional orders, and disposition. AHM    [AM]   1555 WBC: 6.12 [NP]   1555 Hemoglobin: (!) 10.4 [NP]   1556 Platelets: (!) 90 [NP]   1556 Sodium: 141 [NP]   1556 Potassium: 4.5 [NP]   1556 Chloride: 105 [NP]   1556 CO2: 26 [NP]   1556 Glucose: 78 [NP]   1556 BUN, Bld: (!) 42 [NP]   1556 Creatinine: (!) 8.6 [NP]   1556 Calcium: 9.4 [NP]   1556 Magnesium: 2.4 [NP]   1556 Phosphorus: 4.1 [NP]   1556 POCT Glucose: 74 [NP]      ED Course User Index  [AM] Elizabeth Murillo PA-C  [NP] Galindo Ha MD     Clinical Impression:     1. Ataxia    2. Weakness    3. Fall         Scribe attestation:  I, Dr. Galindo Ha, personally performed the services described in this documentation.   All medical record entries made by the scribe were at my direction and in my presence.   I have reviewed the chart and agree that the record is accurate and complete.   Galindo Ha MD.                    Galindo Ha MD  03/05/19 2040

## 2019-02-27 LAB
ALBUMIN SERPL BCP-MCNC: 3.4 G/DL
ALP SERPL-CCNC: 159 U/L
ALT SERPL W/O P-5'-P-CCNC: 80 U/L
ANION GAP SERPL CALC-SCNC: 13 MMOL/L
APTT BLDCRRT: 31.9 SEC
AST SERPL-CCNC: 86 U/L
BASOPHILS # BLD AUTO: 0.01 K/UL
BASOPHILS NFR BLD: 0.2 %
BILIRUB SERPL-MCNC: 1 MG/DL
BNP SERPL-MCNC: 69 PG/ML
BUN SERPL-MCNC: 57 MG/DL
CALCIUM SERPL-MCNC: 9.7 MG/DL
CHLORIDE SERPL-SCNC: 104 MMOL/L
CHOLEST SERPL-MCNC: 103 MG/DL
CHOLEST/HDLC SERPL: 2.3 {RATIO}
CK SERPL-CCNC: 621 U/L
CO2 SERPL-SCNC: 23 MMOL/L
CREAT SERPL-MCNC: 10.1 MG/DL
CRP SERPL-MCNC: 7.5 MG/L
DIFFERENTIAL METHOD: ABNORMAL
EOSINOPHIL # BLD AUTO: 0.2 K/UL
EOSINOPHIL NFR BLD: 2.6 %
ERYTHROCYTE [DISTWIDTH] IN BLOOD BY AUTOMATED COUNT: 15.4 %
ERYTHROCYTE [SEDIMENTATION RATE] IN BLOOD BY WESTERGREN METHOD: 31 MM/HR
EST. GFR  (AFRICAN AMERICAN): 6 ML/MIN/1.73 M^2
EST. GFR  (NON AFRICAN AMERICAN): 5 ML/MIN/1.73 M^2
ESTIMATED AVG GLUCOSE: 97 MG/DL
FOLATE SERPL-MCNC: 11.9 NG/ML
GLUCOSE SERPL-MCNC: 90 MG/DL
HBA1C MFR BLD HPLC: 5 %
HCT VFR BLD AUTO: 33.1 %
HDLC SERPL-MCNC: 45 MG/DL
HDLC SERPL: 43.7 %
HGB BLD-MCNC: 10.2 G/DL
INR PPP: 1
LDLC SERPL CALC-MCNC: 44.6 MG/DL
LYMPHOCYTES # BLD AUTO: 1.4 K/UL
LYMPHOCYTES NFR BLD: 22.9 %
MAGNESIUM SERPL-MCNC: 2.3 MG/DL
MAGNESIUM SERPL-MCNC: 2.3 MG/DL
MCH RBC QN AUTO: 32.5 PG
MCHC RBC AUTO-ENTMCNC: 30.8 G/DL
MCV RBC AUTO: 105 FL
MONOCYTES # BLD AUTO: 0.6 K/UL
MONOCYTES NFR BLD: 9.2 %
NEUTROPHILS # BLD AUTO: 4 K/UL
NEUTROPHILS NFR BLD: 64.9 %
NONHDLC SERPL-MCNC: 58 MG/DL
PHOSPHATE SERPL-MCNC: 6.8 MG/DL
PHOSPHATE SERPL-MCNC: 6.9 MG/DL
PLATELET # BLD AUTO: 96 K/UL
PMV BLD AUTO: 10.7 FL
POCT GLUCOSE: 102 MG/DL (ref 70–110)
POCT GLUCOSE: 139 MG/DL (ref 70–110)
POCT GLUCOSE: 91 MG/DL (ref 70–110)
POCT GLUCOSE: 95 MG/DL (ref 70–110)
POCT GLUCOSE: 98 MG/DL (ref 70–110)
POTASSIUM SERPL-SCNC: 4.8 MMOL/L
PROT SERPL-MCNC: 6.4 G/DL
PROTHROMBIN TIME: 10.7 SEC
RBC # BLD AUTO: 3.14 M/UL
SODIUM SERPL-SCNC: 140 MMOL/L
TRIGL SERPL-MCNC: 67 MG/DL
TSH SERPL DL<=0.005 MIU/L-ACNC: 1.54 UIU/ML
VIT B12 SERPL-MCNC: 968 PG/ML
WBC # BLD AUTO: 6.12 K/UL

## 2019-02-27 PROCEDURE — 84443 ASSAY THYROID STIM HORMONE: CPT

## 2019-02-27 PROCEDURE — 63600175 PHARM REV CODE 636 W HCPCS: Performed by: FAMILY MEDICINE

## 2019-02-27 PROCEDURE — 11000001 HC ACUTE MED/SURG PRIVATE ROOM

## 2019-02-27 PROCEDURE — 83036 HEMOGLOBIN GLYCOSYLATED A1C: CPT

## 2019-02-27 PROCEDURE — 85610 PROTHROMBIN TIME: CPT

## 2019-02-27 PROCEDURE — 63600175 PHARM REV CODE 636 W HCPCS: Performed by: STUDENT IN AN ORGANIZED HEALTH CARE EDUCATION/TRAINING PROGRAM

## 2019-02-27 PROCEDURE — 84252 ASSAY OF VITAMIN B-2: CPT

## 2019-02-27 PROCEDURE — 86140 C-REACTIVE PROTEIN: CPT

## 2019-02-27 PROCEDURE — 25000003 PHARM REV CODE 250: Performed by: STUDENT IN AN ORGANIZED HEALTH CARE EDUCATION/TRAINING PROGRAM

## 2019-02-27 PROCEDURE — 84425 ASSAY OF VITAMIN B-1: CPT

## 2019-02-27 PROCEDURE — 82607 VITAMIN B-12: CPT

## 2019-02-27 PROCEDURE — 84100 ASSAY OF PHOSPHORUS: CPT | Mod: 91

## 2019-02-27 PROCEDURE — A4216 STERILE WATER/SALINE, 10 ML: HCPCS | Performed by: STUDENT IN AN ORGANIZED HEALTH CARE EDUCATION/TRAINING PROGRAM

## 2019-02-27 PROCEDURE — 85730 THROMBOPLASTIN TIME PARTIAL: CPT

## 2019-02-27 PROCEDURE — 97530 THERAPEUTIC ACTIVITIES: CPT

## 2019-02-27 PROCEDURE — 25000003 PHARM REV CODE 250: Performed by: FAMILY MEDICINE

## 2019-02-27 PROCEDURE — 82550 ASSAY OF CK (CPK): CPT

## 2019-02-27 PROCEDURE — 97165 OT EVAL LOW COMPLEX 30 MIN: CPT

## 2019-02-27 PROCEDURE — 86592 SYPHILIS TEST NON-TREP QUAL: CPT

## 2019-02-27 PROCEDURE — 82140 ASSAY OF AMMONIA: CPT

## 2019-02-27 PROCEDURE — 94761 N-INVAS EAR/PLS OXIMETRY MLT: CPT

## 2019-02-27 PROCEDURE — 83880 ASSAY OF NATRIURETIC PEPTIDE: CPT

## 2019-02-27 PROCEDURE — 82746 ASSAY OF FOLIC ACID SERUM: CPT

## 2019-02-27 PROCEDURE — 85652 RBC SED RATE AUTOMATED: CPT

## 2019-02-27 PROCEDURE — S5571 INSULIN DISPOS PEN 3 ML: HCPCS | Performed by: STUDENT IN AN ORGANIZED HEALTH CARE EDUCATION/TRAINING PROGRAM

## 2019-02-27 PROCEDURE — 97161 PT EVAL LOW COMPLEX 20 MIN: CPT

## 2019-02-27 PROCEDURE — 84100 ASSAY OF PHOSPHORUS: CPT

## 2019-02-27 PROCEDURE — 85025 COMPLETE CBC W/AUTO DIFF WBC: CPT

## 2019-02-27 PROCEDURE — 36415 COLL VENOUS BLD VENIPUNCTURE: CPT

## 2019-02-27 PROCEDURE — 80053 COMPREHEN METABOLIC PANEL: CPT

## 2019-02-27 PROCEDURE — 90935 HEMODIALYSIS ONE EVALUATION: CPT

## 2019-02-27 PROCEDURE — 80061 LIPID PANEL: CPT

## 2019-02-27 PROCEDURE — 84207 ASSAY OF VITAMIN B-6: CPT

## 2019-02-27 PROCEDURE — 86592 SYPHILIS TEST NON-TREP QUAL: CPT | Mod: 91

## 2019-02-27 PROCEDURE — 83735 ASSAY OF MAGNESIUM: CPT

## 2019-02-27 PROCEDURE — 83735 ASSAY OF MAGNESIUM: CPT | Mod: 91

## 2019-02-27 PROCEDURE — 99900035 HC TECH TIME PER 15 MIN (STAT)

## 2019-02-27 RX ORDER — AMOXICILLIN 250 MG
1 CAPSULE ORAL DAILY PRN
Status: DISCONTINUED | OUTPATIENT
Start: 2019-02-27 | End: 2019-02-28 | Stop reason: HOSPADM

## 2019-02-27 RX ORDER — LIDOCAINE 50 MG/G
1 PATCH TOPICAL
Status: DISCONTINUED | OUTPATIENT
Start: 2019-02-27 | End: 2019-02-28 | Stop reason: HOSPADM

## 2019-02-27 RX ORDER — SODIUM CHLORIDE 9 MG/ML
INJECTION, SOLUTION INTRAVENOUS
Status: DISCONTINUED | OUTPATIENT
Start: 2019-02-27 | End: 2019-02-28 | Stop reason: HOSPADM

## 2019-02-27 RX ORDER — SODIUM CHLORIDE 9 MG/ML
INJECTION, SOLUTION INTRAVENOUS ONCE
Status: DISCONTINUED | OUTPATIENT
Start: 2019-02-27 | End: 2019-02-28 | Stop reason: HOSPADM

## 2019-02-27 RX ORDER — ACETAMINOPHEN 325 MG/1
650 TABLET ORAL ONCE
Status: COMPLETED | OUTPATIENT
Start: 2019-02-27 | End: 2019-02-27

## 2019-02-27 RX ADMIN — INSULIN DETEMIR 20 UNITS: 100 INJECTION, SOLUTION SUBCUTANEOUS at 09:02

## 2019-02-27 RX ADMIN — LIDOCAINE 1 PATCH: 50 PATCH TOPICAL at 10:02

## 2019-02-27 RX ADMIN — INSULIN ASPART 8 UNITS: 100 INJECTION, SOLUTION INTRAVENOUS; SUBCUTANEOUS at 09:02

## 2019-02-27 RX ADMIN — MENTHOL, METHYL SALICYLATE: 10; 15 CREAM TOPICAL at 04:02

## 2019-02-27 RX ADMIN — PRAVASTATIN SODIUM 40 MG: 20 TABLET ORAL at 09:02

## 2019-02-27 RX ADMIN — HEPARIN SODIUM 7500 UNITS: 5000 INJECTION, SOLUTION INTRAVENOUS; SUBCUTANEOUS at 04:02

## 2019-02-27 RX ADMIN — Medication 3 ML: at 09:02

## 2019-02-27 RX ADMIN — HEPARIN SODIUM 7500 UNITS: 5000 INJECTION, SOLUTION INTRAVENOUS; SUBCUTANEOUS at 06:02

## 2019-02-27 RX ADMIN — ASPIRIN 81 MG 81 MG: 81 TABLET ORAL at 09:02

## 2019-02-27 RX ADMIN — LORAZEPAM 2 MG: 2 INJECTION INTRAMUSCULAR; INTRAVENOUS at 04:02

## 2019-02-27 RX ADMIN — HEPARIN SODIUM 7500 UNITS: 5000 INJECTION, SOLUTION INTRAVENOUS; SUBCUTANEOUS at 09:02

## 2019-02-27 RX ADMIN — GABAPENTIN 300 MG: 300 CAPSULE ORAL at 09:02

## 2019-02-27 RX ADMIN — MENTHOL, METHYL SALICYLATE: 10; 15 CREAM TOPICAL at 09:02

## 2019-02-27 RX ADMIN — Medication 3 ML: at 06:02

## 2019-02-27 RX ADMIN — ACETAMINOPHEN 650 MG: 325 TABLET ORAL at 12:02

## 2019-02-27 NOTE — ASSESSMENT & PLAN NOTE
Patient with complaint of ataxia and weakness   CT head is negative for any acute hemorrhagic event  Anti-platelet therapy: ASA, consider plavix   Atorvastatin for secondary stroke prevention   Labs: HgA1C, Lipid Panel, TSH, Vitamin B-12, B6, Thiamine, Folate, RPR  admit to tele  Consult Neuro  Q4 nuero checks  MRA head and neck   2D echo with bubble study  PT/OT/ST evaluation  NPO pending speech evaluation  Elevate the head of the bed with aspiration precautions  Fall precautions  hold home blood pressure medications to allow for permissive hypertension    IV labetalol PRN for SBP >220 or DBP >120  Will provide stroke education prior to discharge

## 2019-02-27 NOTE — CONSULTS
Nephrology Consult  H&P      Consult Requested By: Severyn Yaroshevsky, MD  Reason for Consult: persistent infection, ESRD    SUBJECTIVE:     History of Present Illness:  Patient is a 58 y.o. male  admitted for TIA  [facility] 140[facility]      Review of Systems   Constitutional: Negative for chills, diaphoresis and fever.   HENT: Negative for hearing loss.    Eyes: Negative for blurred vision and double vision.   Respiratory: Negative for cough and shortness of breath.    Cardiovascular: Negative for chest pain and leg swelling.   Gastrointestinal: Negative for abdominal pain, nausea and vomiting.   Genitourinary: Negative for dysuria and urgency.   Musculoskeletal: Positive for myalgias.   Skin: Negative for itching and rash.   Neurological: Positive for weakness. Negative for dizziness, tingling and headaches.   Endo/Heme/Allergies: Does not bruise/bleed easily.   Psychiatric/Behavioral: Negative for depression.       Past Medical History:   Diagnosis Date    Diabetes mellitus type II     Dialysis patient     Hyperlipidemia     Hypertension     Kidney failure     MIKE (obstructive sleep apnea)     Urinary tract infection      Past Surgical History:   Procedure Laterality Date    AV FISTULA PLACEMENT      left lower arm    TONSILLECTOMY, ADENOIDECTOMY      TRANSESOPHAGEAL ECHOCARDIOGRAM (SERENITY) N/A 5/23/2017    Performed by Donaldo Mai MD at [facility] OR     Family History   Problem Relation Age of Onset    Colon cancer Mother     Lung cancer Mother     Diabetes Mother     Diabetes Father     Heart disease Father     Hypertension Father     Diabetes Maternal Grandmother     Diabetes Maternal Grandfather      Social History     Tobacco Use    Smoking status: Never Smoker    Smokeless tobacco: Never Used   Substance Use Topics    Alcohol use: No    Drug use: No       Review of patient's allergies indicates:   Allergen Reactions    Keflex  [cephalexin] Nausea Only            OBJECTIVE:     Vital Signs (Most Recent)  Vitals:    02/27/19 0734 02/27/19 0852 02/27/19 0907 02/27/19 1206   BP:  (!) 147/65     BP Location:       Patient Position:  Lying     Pulse:  68 62 66   Resp:  18     Temp:  98 °F (36.7 °C)     TempSrc:  Oral     SpO2: 97%      Weight:       Height:           Medications:   sodium chloride 0.9%   Intravenous Once    aspirin  81 mg Oral Daily    gabapentin  300 mg Oral QHS    heparin (porcine)  7,500 Units Subcutaneous Q8H    insulin aspart U-100  8 Units Subcutaneous QID (WM & HS)    insulin detemir U-100  30 Units Subcutaneous QHS    lidocaine  1 patch Transdermal Q24H    methyl salicylate-menthol 15-10%   Topical (Top) TID    midodrine  10 mg Oral TID    pravastatin  40 mg Oral Daily    sodium chloride 0.9%  3 mL Intravenous Q8H           Physical Exam   Constitutional: He is oriented to person, place, and time and well-developed, well-nourished, and in no distress. No distress.   HENT:   Head: Atraumatic.   Mouth/Throat: Oropharynx is clear and moist.   Eyes: EOM are normal. No scleral icterus.   Neck: Neck supple. No JVD present.   Cardiovascular: Normal rate, regular rhythm, normal heart sounds and intact distal pulses. Exam reveals no friction rub.   No murmur heard.  Pulmonary/Chest: Effort normal and breath sounds normal. No respiratory distress. He has no wheezes. He has no rales.   Abdominal: Soft. Bowel sounds are normal. He exhibits no distension. There is no tenderness.   Musculoskeletal: He exhibits no edema.   L wrist AVF   Neurological: He is alert and oriented to person, place, and time.   Skin: Skin is warm. No rash noted. He is diaphoretic. No erythema.       Laboratory:  Recent Labs   Lab 02/26/19  1517 02/27/19  0844   WBC 6.12 6.12   HGB 10.4* 10.2*   HCT 33.9* 33.1*   PLT 90* 96*   MONO 9.5  0.6 9.2  0.6     Recent Labs   Lab 02/26/19  1517 02/27/19  0844    140   K 4.5 4.8    104   CO2 26  23   BUN 42* 57*   CREATININE 8.6* 10.1*   CALCIUM 9.4 9.7   PHOS 4.1 6.9*  6.8*       Diagnostic Results:  X-Ray: Reviewed    ASSESSMENT/PLAN:   1. ESRD - on HD with Dr. Araceli Pickens in [facility] MWF, 4.5 hours, EDW 140kg  HD monday as per normal schedule    2. HTN - controlled, cont home meds  3. Anemia -epo with HD    Recent Labs   Lab 02/26/19  1517 02/27/19  0844   WBC 6.12 6.12   HGB 10.4* 10.2*   HCT 33.9* 33.1*   PLT 90* 96*       Lab Results   Component Value Date    IRON 21 (L) 09/27/2011    TIBC 193 (L) 09/27/2011    FERRITIN 489 (H) 09/27/2011     4. MBD - cont sensipar and Fosrenol  Lab Results   Component Value Date    CALCIUM 9.7 02/27/2019    PHOS 6.8 (H) 02/27/2019    PHOS 6.9 (H) 02/27/2019     Recent Labs   Lab 02/26/19  1517 02/27/19  0844   MG 2.4 2.3  2.3       No results found for: [id number]  Lab Results   Component Value Date    CO2 23 02/27/2019       5. Access - L wrist AVF, follows Dr. Moran [facility]  6. Nutrition - renal diet, nephrocaps  7. DM2  8. Severe MIKE- BiPAP 16/8      Thank you for consult, will follow  With any question please call [phone]  Margoth Felton    [facility]  SHALA Forrest MD, FACP,   KADEN Pickens MD,   OM[facility] [place]IA Graham, NP  [address]  [place], [address] [phone]

## 2019-02-27 NOTE — PROVIDER PROGRESS NOTES - EMERGENCY DEPT.
Encounter Date: 2/26/2019    ED Physician Progress Notes        Physician Note:   Discussed pt's case with family medicine who will admit for mri and rehab placement

## 2019-02-27 NOTE — PLAN OF CARE
Recommendations     1. Change diet order to Diabetic 1800 calories, Cardiac.   2. Encourage good diet compliance.   Goals: Pt to meet 85% of EEN/EPN.   Nutrition Goal Status: new

## 2019-02-27 NOTE — ASSESSMENT & PLAN NOTE
ESRD on HD MWF   Last dialysis was 02/26/2019  No need for emergent dialysis   Will continue to monitor   Nephrology consulted to arrange dialysis Dr. Pickens

## 2019-02-27 NOTE — PLAN OF CARE
Problem: Physical Therapy Goal  Goal: Physical Therapy Goal  Goals to be met by: 3/27/19     Patient will increase functional independence with mobility by performin. Supine <> sit with MInimal Assistance  2. Sit to stand transfer with Contact Guard Assistance  3. Bed to chair transfer with Contact Guard Assistance using Rolling Walker  4. Gait  x 25 feet with Contact Guard Assistance using Rolling Walker.     Outcome: Ongoing (interventions implemented as appropriate)  PT evaluation completed, note to follow. Pt presenting with L thigh pain and L shoulder pain with limited L hip ROM/strength and BUE ROM/strength leading to pt requiring increased assist for functional mobility and limited activity tolerance this date. Recommending SNF placement upon d/c.

## 2019-02-27 NOTE — SIGNIFICANT EVENT
Patient seen and examined in the hospital. Patient is 57 yo male with morbid obesity (BMI 48), DMII, MIKE, HTN who presents after a fall. Patient was seen by me in the clinic once on 08/21/2018, and did not present for his f/u three months later. Seen by Dr. Rhoades for abscess drainage in December. During first visit, patient was having some trouble walking but refused PT/OT, nutrition counseling. Patient admitted for stroke, low suspicion given symptoms, MRI reviewed. Would consider CPK to rule out myopathy (patient on statin) & ESR/CRP to rule out polymyalgia rheumatica , but likely cause is chronic deconditioning in the setting of severe chronic disease as he has had similar symptoms since August. Recommend PT/OT while in the hospital with nutrition counseling. Patients long term prognosis is very poor with ESRD, morbid obesity, DMII, MIKE and non-adherence to medical plan. Can consider palliative consult as well to better address patient's pain complaints.     2/27/2019 8:49 AM Satya Noriega M.D.

## 2019-02-27 NOTE — HPI
Angel Farmer Jr. is a 58 y.o. male with history of Diabetes mellitus type II, ESRD on HD MWF, Hyperlipidemia, Hypertension and MIKE who presents to the ED for evaluation of upper extremity weakness and ataxia after a fall today at 5 am.The patient states he was attempting to get ready to go to dialysis when his arms became weak causing him to fall. He reports he fell unto his right side.The patient denies any LOC or head injury associated with the fall. The family was able to assist the patient from the floor with her spouse. As per family the patient has been stumbling with walking since Saturday. Notes that the  patient will walk then appear as though he is going to fall and catch himself along the wall.  The patient reports when he walks he feels uncoordinated. Per family the patient does have difficulty getting out of bed at baseline but they  believe this has gotten worse since Saturday. Symptoms are associated with bilateral shoulder pain, weakness in BUE and weakness in the LLE. .Pt denies neck pain, back pain, leg pain, dizziness, light headedness, nausea, vomiting, chest pain or shortness. The symptoms are worsened with exertion. Patient has no prior history of similar symptoms.     Of note he was able to have dialysis today, the patient states he missed Monday appt because he was having difficulty walking.

## 2019-02-27 NOTE — ED NOTES
Pt repositioned and pulled up in the bed. Pt denies any other needs. Pain medication administered.

## 2019-02-27 NOTE — PROCEDURES
Pt seen and examined on HD, tolerating procedure well  Temp:  [98 °F (36.7 °C)-98.1 °F (36.7 °C)]   Pulse:  [62-68]   Resp:  [16-18]   BP: (147-148)/(65-70)   SpO2:  [97 %-99 %]     For more details please see consult note from earlier today

## 2019-02-27 NOTE — PROGRESS NOTES
"PGY-3 Progress Note [facility]    Follow up for:   Chief Complaint   Patient presents with    Fall     pt on heprin, wife reports increased weakness and " stumbling " since sun, pt states fell on right side, complains of left shoulder pain        Hospital Stay Day 1    Subjective: AF VSS, good uop, -bm since /+flatus, tolerating diet without n/v, ambulating with assistance but reluctant 2/2 pain. Pt reports pain in LLE quadriceps with movement, no pain on palpation.  Denies any CP, SOB, N/V/D, headaches, fever or chills. Patient reports a small folliculitis on stomach.    Scheduled Meds:   sodium chloride 0.9%   Intravenous Once    aspirin  81 mg Oral Daily    gabapentin  300 mg Oral QHS    heparin (porcine)  7,500 Units Subcutaneous Q8H    insulin aspart U-100  8 Units Subcutaneous QID (WM & HS)    insulin detemir U-100  30 Units Subcutaneous QHS    midodrine  10 mg Oral TID    pravastatin  40 mg Oral Daily    sodium chloride 0.9%  3 mL Intravenous Q8H     Continuous Infusions:  PRN Meds:sodium chloride 0.9%, labetalol, ondansetron    Review of patient's allergies indicates:   Allergen Reactions    Keflex [cephalexin] Nausea Only       Objectives:     Vital Signs (Most Recent)  Temp: 98 °F (36.7 °C) (19)  Pulse: 62 (19 09)  Resp: 18 (19)  BP: (!) 147/65 (19 0852)  SpO2: 97 % (19 0734)    Vitals(Most Recent)      BP  Min: 125/58  Max: 149/70  Temp  Av °F (36.7 °C)  Min: 97.7 °F (36.5 °C)  Max: 98.5 °F (36.9 °C)  Pulse  Av.1  Min: 60  Max: 71  Resp  Av.5  Min: 15  Max: 20  SpO2  Av.8 %  Min: 97 %  Max: 100 %  Height  Av' 7" (170.2 cm)  Min: 5' 7" (170.2 cm)  Max: 5' 7" (170.2 cm)  Weight  Av.8 kg (310 lb 7.7 oz)  Min: 140 kg (308 lb 10.3 oz)  Max: 141.7 kg (312 lb 6.3 oz)             Vitals([id number])  Temp:  [97.7 °F (36.5 °C)-98.5 °F (36.9 °C)]   Pulse:  [60-71]   Resp:  [15-20]   BP: (125-149)/(58-72)   SpO2:  [97 %-100 %]     I & " O([id number])  No intake or output data in the 24 hours ending 02/27/19 0958  Urinalysis  No results for input(s): COLORU, CLARITYU, SPECGRAV, PHUR, PROTEINUA, GLUCOSEU, BILIRUBINCON, BLOODU, WBCU, RBCU, BACTERIA, MUCUS, NITRITE, LEUKOCYTESUR, UROBILINOGEN, HYALINECASTS in the last 24 hours.      Physical Exam   Constitutional: He is oriented to person, place, and time. He appears well-developed and well-nourished. He is cooperative.   Morbidly obese, unkempt    HENT:   Head: Normocephalic and atraumatic.   Right Ear: External ear normal.   Left Ear: External ear normal.   Mouth/Throat: Abnormal dentition (poor dentition).   Eyes: Conjunctivae are normal. Pupils are equal, round, and reactive to light.    Neck: Normal range of motion. Neck supple.   Cardiovascular: Normal rate, regular rhythm, normal heart sounds and intact distal pulses. Exam reveals no gallop and no friction rub.   No murmur heard.  Pulmonary/Chest: Effort normal and breath sounds normal. No respiratory distress.   Abdominal: Soft. Bowel sounds are normal. He exhibits no distension. There is no tenderness. There is no guarding.   Musculoskeletal: Normal range of motion. He exhibits edema (1-2+ BLE) with venous stasis changes.        Arms:  Severe limitation of ROM for left and right shoulder secondary to arthritis and pain.   No midline C/T/L spine tenderness, step-off or deformity  Tenderness to left anterior thigh, no significant swelling. 1-2+ pitting edema.   Feet:   Right Foot:   Skin Integrity: Positive for dry skin (dry cracked skin ).   Left Foot:   Skin Integrity: Positive for dry skin (dry cracked skin ).   Neurological: He is alert and oriented to person, place, and time. No cranial nerve deficit or sensory deficit.   Equal  strength bilaterally. Upper extrermity coordination unable to fully assessed but appears normal. Unable to test LUE  Unable to test heel to shin. 4/5 strength BUE. No sensory deficit. Patient able to stand with  assistance. However, with initiation of walking, legs become weak and shake, gait unsteady   Skin: Skin is warm and dry.   Psychiatric: He has a normal mood and affect.   Nursing note and vitals reviewed.       CRANIAL NERVES      CN III, IV, VI   Pupils are equal, round, and reactive to light.        LABS  CBC  Recent Labs   Lab 02/26/19  1517 02/27/19  0844   WBC 6.12 6.12   RBC 3.20* 3.14*   HGB 10.4* 10.2*   HCT 33.9* 33.1*   PLT 90* 96*   * 105*   MCH 32.5* 32.5*   MCHC 30.7* 30.8*     BMP  Recent Labs   Lab 02/26/19  1517 02/27/19  0844    140   K 4.5 4.8   CO2 26 23    104   BUN 42* 57*   CREATININE 8.6* 10.1*   GLU 78 90       POCT-Glucose  POCT Glucose   Date Value Ref Range Status   02/27/2019 91 70 - 110 mg/dL Final   02/27/2019 98 70 - 110 mg/dL Final   02/26/2019 94 70 - 110 mg/dL Final   02/26/2019 74 70 - 110 mg/dL Final       Recent Labs   Lab 02/26/19  1517 02/27/19  0844   CALCIUM 9.4 9.7   MG 2.4 2.3  2.3   PHOS 4.1 6.9*  6.8*     LFT  Recent Labs   Lab 02/26/19  1517 02/27/19  0844   PROT 6.7 6.4   ALBUMIN 3.7 3.4*   BILITOT 1.0 1.0   AST 81* 86*   ALKPHOS 179* 159*   ALT 84* 80*         COAGS  Recent Labs   Lab 02/26/19  1517 02/27/19  0844   INR  --  1.0   APTT 31.0 31.9     CE  Recent Labs   Lab 02/26/19  1517   TROPONINI 0.034*     ABGs  No results for input(s): PH, PCO2, PO2, HCO3, POCSATURATED, BE in the last 24 hours.  BNP  Recent Labs   Lab 02/27/19  0845   BNP 69     UA  No results for input(s): COLORU, CLARITYU, SPECGRAV, PHUR, PROTEINUA, GLUCOSEU, BLOODU, WBCU, RBCU, BACTERIA, MUCUS in the last 24 hours.    Invalid input(s):  BILIRUBINCON  LAST HbA1c  Lab Results   Component Value Date    HGBA1C 5.0 02/27/2019           Imaging  Imaging Results          MRI Brain Without Contrast (Final result)  Result time 02/26/19 23:16:39    Final result by Gabriella Drake MD (02/26/19 23:16:39)                 Impression:      No acute intracranial abnormalities identified.   No evidence of acute infarction.    Mild to moderate periventricular chronic microvascular ischemic disease.      Electronically signed by: Gabriella Drake MD  Date:    02/26/2019  Time:    23:16             Narrative:    EXAMINATION:  MRI BRAIN WITHOUT CONTRAST    CLINICAL HISTORY:  Syncope/fainting; Unspecified fall, initial encounter    TECHNIQUE:  Multiplanar multisequence MR imaging of the brain was performed without contrast.    COMPARISON:  CT head from the same date.    FINDINGS:  Motion limited examination, specifically involving axial T2 FLAIR sequence.    There is mild-to-moderate periventricular chronic microvascular ischemic disease.  No diffusion signal abnormality to suggest acute infarction.  Ventricles are normal in size and configuration without evidence for hydrocephalus.  No intracranial hemorrhage or extraaxial fluid collection.  No mass or mass effect.  Flow voids are normal in appearance.    Visualized paranasal sinuses and mastoid air cells are clear.  Orbits appear normal.                               MRA Neck without contrast (Final result)  Result time 02/26/19 23:19:02   Procedure changed from MRA Neck with and without contrast     Final result by Gabriella rDake MD (02/26/19 23:19:02)                 Impression:      MRA neck without significant focal stenosis or occlusion.      Electronically signed by: Gabriella Drake MD  Date:    02/26/2019  Time:    23:19             Narrative:    EXAMINATION:  MRA NECK WITHOUT CONTRAST    CLINICAL HISTORY:  Stroke;Ataxia, unspecified    TECHNIQUE:  MRA neck was performed without the use of IV contrast.    COMPARISON:  Concurrent MRI brain.    FINDINGS:  Mild motion artifact seen at the vessel origins from the arch.  Vertebral artery origins are within normal limits.  The vertebral arteries are unremarkable throughout their course without evidence for focal stenosis or occlusion.  The bilateral common carotid arteries, carotid bifurcation, and extra  cranial portions of the internal carotid arteries are patent without evidence for focal stenosis or occlusion.  Bilateral internal carotid arteries are tortuous along their mid to distal course.                               CT Head Without Contrast (Final result)  Result time 02/26/19 17:08:32    Final result by Thomas Poe MD (02/26/19 17:08:32)                 Impression:      No acute intracranial abnormality identified.  Specifically, no intracranial hemorrhage.      Electronically signed by: Thomas Poe MD  Date:    02/26/2019  Time:    17:08             Narrative:    EXAMINATION:  CT HEAD WITHOUT CONTRAST    CLINICAL HISTORY:  fall, heparinized for dialysis; Unspecified fall, initial encounter    TECHNIQUE:  Low dose axial CT images obtained throughout the head without intravenous contrast. Sagittal and coronal reconstructions were performed.    COMPARISON:  Head and maxillofacial CT 09/06/2018    FINDINGS:  Intracranial compartment:    Ventricles and sulci are normal in size for age without evidence of hydrocephalus. No extra-axial blood or fluid collections.    The brain parenchyma appears normal.  No new focal areas of abnormal parenchymal attenuation.  No parenchymal mass, hemorrhage, edema or major vascular distribution infarct.  Skull base atherosclerotic vascular calcifications noted.    Skull/extracranial contents (limited evaluation): No fracture. Mastoid air cells and paranasal sinuses are essentially clear.  Visualized portions of the orbits are within normal limits.                                Micro:  Microbiology Results (last 7 days)     ** No results found for the last 168 hours. **               Assessment/Plan:   Patient is a 58 y.o. male who has a past medical history of Diabetes mellitus type II, Dialysis patient, Hyperlipidemia, Hypertension, Kidney failure, MIKE (obstructive sleep apnea), and Urinary tract infection. presents today with   Active Hospital Problems    Diagnosis  POA     *CVA (cerebral vascular accident) [I63.9]  Unknown    Ataxia [R27.0]  Yes    MIKE (obstructive sleep apnea) - very severe latest sleep study says BPAP @ 16/8 [G47.33]  Yes    ESRD on dialysis [N18.6, Z99.2]  Not Applicable    Diabetes mellitus, type 2 [E11.9]  Yes    Hyperlipidemia [E78.5]  Yes    Hypertension [I10]  Yes     -TTE (3/25/14): EF 60%, pseudonormal diastolic dysfunction. E/e' 11.         Resolved Hospital Problems   No resolved problems to display.     CVA (cerebral vascular accident)  Patient with complaint of ataxia and weakness   CT head is negative for any acute hemorrhagic event  Anti-platelet therapy: ASA, consider plavix   Atorvastatin for secondary stroke prevention   Labs: HgA1C, Lipid Panel, TSH, Vitamin B-12, B6, Thiamine, Folate, RPR  admit to tele  Consult Neuro  Q4 nuero checks  MRA head and neck   2D echo with bubble study  PT/OT/ST evaluation  NPO pending speech evaluation  Elevate the head of the bed with aspiration precautions  Fall precautions  hold home blood pressure medications to allow for permissive hypertension    IV labetalol PRN for SBP >220 or DBP >120  Will provide stroke education prior to discharge     Ataxia  Will get MRI c spine  MRI Brain - No acute intracranial abnormalities identified.  No evidence of acute infarction. Mild to moderate periventricular chronic microvascular ischemic disease.  MRA neck without significant focal stenosis or occlusion      MIKE (obstructive sleep apnea)   very severe latest sleep study says BPAP @ 16/8  patient with history of MIKE   CPAP QHS      ESRD on dialysis  ESRD on HD MWF   Last dialysis was 02/26/2019  No need for emergent dialysis   Will continue to monitor   Nephrology consulted to arrange dialysis Dr. Pickens      Hyperlipidemia  Pending lipid panel   Resume home statin      Diabetes mellitus, type 2  Pending Hemoglobin A1c   Detemir 30 units QHS   aspart 8 units ACHS   POCT Glucose ACHS   Titrate insulin as needed to  maintain optimal glucose levels   U/S of LLE 2/2 quad pain     Hypertension  Patient with history of HTN   Will hold Home BP medications for 24 hours to allow for permissive hypertension   PRN labetalol for SBP>220    Current Diet Order   Procedures    Diet renal [facility]; Cardiac (Low Na/Chol), Consistent Carbohydrate     Order Specific Question:   Indicate patient location for additional diet options:     Answer:   [facility]     Order Specific Question:   Additional Diet Options:     Answer:   Cardiac (Low Na/Chol)     Order Specific Question:   Additional Diet Options:     Answer:   Consistent Carbohydrate     VTE Risk Mitigation (From admission, onward)        Ordered     heparin (porcine) injection 7,500 Units  Every 8 hours      02/26/19 2316     IP VTE HIGH RISK PATIENT  Once      02/26/19 2316     Place sequential compression device  Until discontinued      02/26/19 2316          PT/OT: ordered and following, recommend SNF  Code: Full  Dispo: Awaiting Neuro recs, HD today    Case discussed with staff    Guillermo Martell Jr., MD  [facility] HO-3  02/27/2019 8:41 AM

## 2019-02-27 NOTE — PLAN OF CARE
Problem: Adult Inpatient Plan of Care  Goal: Plan of Care Review  Outcome: Ongoing (interventions implemented as appropriate)  Plan of care discussed with patient. Patient treated with PRN medication for pain and nausea, well controlled at this time. Telemetry monitored. Blood sugar monitored. Patient encouraged to use call light as needs arise. Will continue to monitor.

## 2019-02-27 NOTE — PLAN OF CARE
Problem: Occupational Therapy Goal  Goal: Occupational Therapy Goal  Goals to be met by: 3/27/19     Patient will increase functional independence with ADLs by performing:    LE Dressing with Moderate Assistance.  Grooming while standing with Minimal Assistance.  Toileting from toilet with Moderate Assistance for hygiene and clothing management.   Supine to sit with Moderate Assistance.  Step transfer with Contact Guard Assistance  Toilet transfer to bedside commode with Contact Guard Assistance.  Increased functional strength to WNL for self care skills and functional mobility.  Upper extremity exercise program x10 reps per handout, with independence.    Outcome: Ongoing (interventions implemented as appropriate)  Pt reports varying prior levels regarding ADLs and functional mobility. Currently, pt requiring increased assist at this time and unsafe to return home; pt is a high fall risk. Recommending SNF placement at d/c

## 2019-02-27 NOTE — PT/OT/SLP EVAL
"Occupational Therapy   Evaluation    Name: Angel Farmer Jr.  MRN: [id number]  Admitting Diagnosis:  CVA (cerebral vascular accident)      Recommendations:     Discharge Recommendations: nursing facility, skilled  Discharge Equipment Recommendations:  commode, tub bench  Barriers to discharge:  Decreased caregiver support, Inaccessible home environment    Assessment:     Angel Farmer Jr. is a 58 y.o. male with a medical diagnosis of CVA (cerebral vascular accident).  He presents with pain, deconditioning, impaired ADLs and functional mobility . Performance deficits affecting function: weakness, impaired self care skills, impaired balance, decreased coordination, decreased safety awareness, decreased ROM, impaired endurance, impaired functional mobilty, gait instability, decreased lower extremity function, decreased upper extremity function, impaired coordination, impaired sensation, pain, impaired fine motor.      Pt reports varying prior levels regarding ADLs and functional mobility. Currently, pt requiring increased assist at this time and unsafe to return home; pt is a high fall risk. Recommending SNF placement at d/c     Rehab Prognosis: Good; patient would benefit from acute skilled OT services to address these deficits and reach maximum level of function.       Plan:     Patient to be seen 5 x/week to address the above listed problems via self-care/home management, therapeutic activities, therapeutic exercises  · Plan of Care Expires: 03/27/19  · Plan of Care Reviewed with: patient    Subjective     Chief Complaint: Pt states, " My quads hurt." Pt continues to state, " I can't" with all requests asked my therapists, however, does not try before stating   Patient/Family Comments/goals: return to PLOF     Occupational Profile:  Living Environment: Pt states he lives with his brother and TROO in [facility], 2 steps to enter, t/s combo  Previous level of function: pt questionable historian- reports varying level of " assists over the past 2 weeks. States he was mod I prior to Sunday in which he is currently having to use a RW and w/c, states he falls OOB often, however, still drives. States last week he was attending OP therapy as well as going to the gym. States he was leg pressing 100# and performing bench presses with 5#; states it was when he was standing at home 2 weeks ago, he felt is quad pop and it has been hurting ever since   Equipment Used at Home:  wheelchair, walker, rolling  Assistance upon Discharge: reports someone is home 24/7 at this time, however, unknown if they are able to provide current level of assist required by pt     Pain/Comfort:  · Pain Rating 1: 10/10(0 at rest; 10/10 with movement )  · Location - Side 1: Left  · Location - Orientation 1: generalized  · Location 1: (quadriceps)  · Pain Addressed 1: Reposition, Distraction, Nurse notified, Cessation of Activity    Patients cultural, spiritual, Sabianist conflicts given the current situation:      Objective:     Communicated with: nsg prior to session.      General Precautions: Standard, fall   Orthopedic Precautions:N/A   Braces: N/A     Occupational Performance:    Bed Mobility:    · Patient completed Scooting/Bridging with maximal assistance  · Patient completed Supine to Sit with maximal assistance and 2 persons  · Patient completed Sit to Supine with minimum assistance    Functional Mobility/Transfers:  · Patient completed Sit <> Stand Transfer with contact guard assistance and moderate assistance  with  rolling walker   · Functional Mobility: CGA lateral steps to HOB with RW, however, then pt unable to take forward steps     Activities of Daily Living:  · Upper Body Dressing: maximal assistance    · Lower Body Dressing: total assistance    · Toileting: total assistance pt soiled in urine     Cognitive/Visual Perceptual:  Cognitive/Psychosocial Skills:     -       Oriented to: Person, Place, Time and Situation   -       Follows  "Commands/attention:Follows multistep  commands  -       Communication: clear/fluent  -       Memory: No Deficits noted  -       Safety awareness/insight to disability: impaired   -       Mood/Affect/Coping skills/emotional control: Appropriate to situation    Physical Exam:  Balance:    -       static sitting balance SBA; pt with limited effort to perform dynamic balance and falling posteriorly with BLE MMT; poor standing   Postural examination/scapula alignment:    -       Forward head  Skin integrity: Bruising of L posterior back   Edema:  Mild B hands   Sensation:    -       Intact light touch, however, reports, " I have no feeling in my hands."   Dominant hand:    -       Right   Upper Extremity Range of Motion:   BUE ROM significantly limited at shoulders and appears at elbows as well (pt noted to put forth minimal effort during testing), WFL at wrists and hands   Upper Extremity Strength:  Unable to accurately assess; minimal effort put forth by pt during testing and stating, " I can't. I can't." after all requests prior to trying    Strength:  Fair - / poor bilaterally   Fine Motor Coordination:    -       Intact    AMPAC 6 Click ADL:  AMPAC Total Score: 11    Treatment & Education:  Pt requires increased time 2/2 continually stating with all requests, " I can't, I can't" prior to all attempts at axs   Performed skills as above   Functional standing trials x 3; 1st attempt mod A of 2; 2nd and 3rd attempts pt was CGA  Lateral steps to HOB CGA with RW, however, when attempting to step forward, pt states he is unable to lift feet off of floor to step forward   Max/total A for all ADLs this date; with all attempts, pt states, " I can't." prior to attempting and then states, " Can you just do it? I can't." and puts forth minimal effort for axs   Education:    Patient left supine with all lines intact, call button in reach, bed alarm on and nsg notified    GOALS:   Multidisciplinary Problems     Occupational " Therapy Goals        Problem: Occupational Therapy Goal    Goal Priority Disciplines Outcome Interventions   Occupational Therapy Goal     OT, PT/OT Ongoing (interventions implemented as appropriate)    Description:  Goals to be met by: 3/27/19     Patient will increase functional independence with ADLs by performing:    LE Dressing with Moderate Assistance.  Grooming while standing with Minimal Assistance.  Toileting from toilet with Moderate Assistance for hygiene and clothing management.   Supine to sit with Moderate Assistance.  Step transfer with Contact Guard Assistance  Toilet transfer to bedside commode with Contact Guard Assistance.  Increased functional strength to WNL for self care skills and functional mobility.  Upper extremity exercise program x10 reps per handout, with independence.                      History:     Past Medical History:   Diagnosis Date    Diabetes mellitus type II     Dialysis patient     Hyperlipidemia     Hypertension     Kidney failure     MIKE (obstructive sleep apnea)     Urinary tract infection        Past Surgical History:   Procedure Laterality Date    AV FISTULA PLACEMENT      left lower arm    TONSILLECTOMY, ADENOIDECTOMY      TRANSESOPHAGEAL ECHOCARDIOGRAM (SERENITY) N/A 5/23/2017    Performed by Donaldo Mai MD at [facility] OR       Time Tracking:     OT Date of Treatment: 02/27/19  OT Start Time: 0852  OT Stop Time: 0930  OT Total Time (min): 38 min    Billable Minutes:Evaluation 10  Self Care/Home Management 13 co treatment with HARESH Juárez OT  2/27/2019

## 2019-02-27 NOTE — ASSESSMENT & PLAN NOTE
Patient with history of HTN   Will hold Home BP medications for 24 hours to allow for permissive hypertension   PRN labetalol fir SBP>220

## 2019-02-27 NOTE — ED NOTES
Pt repositioned in bed and informed of planned admission. Pt and family informed of Family medicine en route to assess patient and update plan of care.

## 2019-02-27 NOTE — ASSESSMENT & PLAN NOTE
Pending Hemoglobin A1c   Detemir 30 units QHS   aspart 8 units ACHS   POCT Glucose ACHS   Titrate insulin as needed to maintain optimal glucose levels

## 2019-02-27 NOTE — CONSULTS
Neurology Consult Note    Reason for Consult - cva workup and ataxia      History of Present Illness -   59 yo M with h/o MIKE, DM2, HTN, HLD, ESRD presented to ED yesterday with complaint of UE weakness and ataxia that led to fall that AM. Patient initially an acute onset process of UE weakness and ataxia to primary team but to neurology, explains more of a gradual process ongoing for the past few months. Patient states that he is an ESRD patient on HD and has been for the past 7 years. He states that he has been generally weak and fatigued since then. 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  However, since ESRD, he stays at home all day everyday except every other day when goes to dialysis for a few hours. At home, he is tired throughout the day since ESRD and sleeps during daylight and then has trouble falling asleep at night and staying asleep at time due to daytime napping and because of noncompliance with CPAP. He states that he has slowly been deconditioning and doing less and less physical activity since diagnosis of ESRD 7 years ago. He can still brush his teeth, bathe himself, and clean his room but mostly sleeps during day and lays in bed and watches TV at night. Regarding new onset ataxia, he states that he was walking without unsteadiness until September 2018 when he tripped on his foot for unknown reason.  When he fell, he injured his left shoulder and since then felt unsteady when walking and has needed a walker to ambulate and states this unsteadiness only occurred after the fall and injury. He states that he started to see PT and slowly over time his confidence in walking improved and then by November 2018, he no longer needed the walker. He states 3 days ago, he fell but denies suddenly worsening in gait prior to that fall; states while gait improved  gradually since fall in September, it never fully improved. Denies sudden onset weakness. No dizziness or lightheadedness.     Regarding his UE weakness, he states that 1.5 months ago, he started to develop weakness in his BUE's; he states that it was gradual but cannot specify which part of limb the weakness started in.  Denies burning pain in UE's. States he also has numbness in bilateral hands for years. Denies weakness in hands until 1.5 months ago. Denies neck pain.     Of note, per chart review, he has had complaints of chronic left hip pain as far back as May 2018 and left shoulder pain since 2018. Today, on interview, his consistent concern is his left leg pain and bilateral upper extremity weakness and overall deconditioning and fatigue.  On review of systems, he does have neuropathic pain (burning pain and tingling) of BLE's restricted to bilateral feet; ongoing/stable x 8 years; under control with gabapentin. No history of seizures or strokes or episodes of LOC. No falls except in 2018 once and now again once at day of admit.       Vitals -     Vitals:    19 1206   BP:    Pulse: 66   Resp:    Temp:        Neurological Exam -     Gen: Alert and oriented to state/city/place name, year/month/date/day, full name/, situation    CN: PERRLA. EOMI. No hemianopsia/quandrantopia appreciated. Tactile sensation subjectively intact and equal bilaterally in V1-3 distribution.  Smile symmetric; no nasolabial fold flattening.  Uvual midline.  Testing of trapezius and sternocleidomastoid resulted with adequate strength to resistance.  Tongue protrusion midline.    Tonicity:No appreciable hyper/hypo-tonicity of the upper/lower extremities.    Strength: flexion/extension at shoulder, elbow, and wrist and abduction of fingers 5/5 on right. Adduction of shoulder on right 5/5. Abduction of shoulder of shoulder on right 3/5. Abduction of thumb on rightt 4/5. Extension/flexion of MCP/PIP/DIP's on  right 5/5. flexion/extension at shoulder, elbow, and wrist and abduction of fingers 3/5 on left. Adduction/abduction of shoulder on left 3/5. Abduction of thumb on left 3/5. Flexion of hip 5/5 on right; 2/5 on left but limited due to pain. Flexion of knee 5/5 on right; 3/5 on left but limited due to pain. plantarflexion/dorsiflexion 5/5 bilaterally.    Sensation: tactile sensation grossly and subjectively intact in upper and lower extremities bilaterally. Pinprick decreased in bilateral LE's distal to knee. Vibration/proprioception intact in BLE's. Pinprick decreased proximally throughout LUE proximal to wrist (does not follow any dermatome). Pinprick intact throughout RUE.    Reflexes: triceps, biceps, brachioradialis, patellar 2+ and symmetric bilaterally. achillis 1+ bilaterally.    Special reflexes: On attempt, did not illicit Sanford's or Babinski's bilaterally.    Cerebellar: finger-nose nonconcerning bilaterally.    Other: romberg testing deferred; patient states cannot stand due to pain in LLE    Gait: deferred; patient states cannot stand due to pain in LLE          Assessment and Plan -       57 yo M with gradual onset ataxia and UE weakness    Per chart review, he has had complaints of chronic left hip pain as far back as May 2018 and left shoulder pain since September 2018. Today, on interview, his consistent concern is his left leg pain and bilateral upper extremity weakness and overall deconditioning and fatigue. There is no right lower extremity weakness proximally or distally. There is no distal left lower extremity weakness noted; left proximal strength testing is limited due to pain. There is small fiber neuropathy evident in BLE's but not large fiber neuropathy. There is no signs of cerebellar abnormalities on exam or MRI Brain.    Gait abnormality; presumed 2/2 left leg pain, generalized deconditioning due to ESRD, and decreased confidence in walking now due to weakness/pain in UE  - continue  PT/OT; dispo and DME's per these services  - MRI Brain without abnormalities significant for clinical complaint but also confounded by motion artifact; can be repeated after MRI C spine as patient unlikely to tolerate both in a row given his claustrophobia  - workup of left leg pain per primary; potential orthopedic concern    BUE weakness and LUE pain; possible R CTS; multiple distributions of weakness in LUE but exam all confounded by pain which is due to injury to left shoulder  - MRI C spine WO; provide ativan 2 mg PO 30 min prior MRI and then 1 mg 15 min prior to MRI; discuss with radiology tech a larger mask during MRI for comfort if mask needed (patient complained of this)  - EMG/NCS outpatient  - workup of left shoulder injury per primary; potential orthopedic concern    Deconditioning  - per primary; would start with nutrition service recommendations    Depression/anxiety  - per primary; patient did endorse some concern here; denied SI/HI/AH/VH  - addressing this would help with his overall well being / quality of life    Insomnia  - per primary; addressing this would help with his overall well being / quality of life    Refer to neurology clinic to be seen within 2-3 weeks    Ravi Underwood MD  PGY-IV Neurology

## 2019-02-27 NOTE — H&P
"[facility] Medicine  History & Physical    Patient Name: Angel Farmer Jr.  MRN: [id number]  Admission Date: 2/26/2019  Attending Physician: Severyn Yaroshevsky, MD   Primary Care Provider: Satya Noriega MD         Patient information was obtained from patient, relative(s) and ER records.     Subjective:     Principal Problem:CVA (cerebral vascular accident)    Chief Complaint:   Chief Complaint   Patient presents with    Fall     pt on heprin, wife reports increased weakness and " stumbling " since sun, pt states fell on right side, complains of left shoulder pain         HPI: Angel Farmer Jr. is a 58 y.o. male with history of Diabetes mellitus type II, ESRD on HD MWF, Hyperlipidemia, Hypertension and MIKE who presents to the ED for evaluation of upper extremity weakness and ataxia after a fall today at 5 am.The patient states he was attempting to get ready to go to dialysis when his arms became weak causing him to fall. He reports he fell unto his right side.The patient denies any LOC or head injury associated with the fall. The family was able to assist the patient from the floor with her spouse. As per family the patient has been stumbling with walking since Saturday. Notes that the  patient will walk then appear as though he is going to fall and catch himself along the wall.  The patient reports when he walks he feels uncoordinated. Per family the patient does have difficulty getting out of bed at baseline but they  believe this has gotten worse since Saturday. Symptoms are associated with bilateral shoulder pain, weakness in BUE and weakness in the LLE. .Pt denies neck pain, back pain, leg pain, dizziness, light headedness, nausea, vomiting, chest pain or shortness. The symptoms are worsened with exertion. Patient has no prior history of similar symptoms.     Of note he was able to have dialysis today, the patient states he missed Monday appt because he was having difficulty " "walking.                Past Medical History:   Diagnosis Date    Diabetes mellitus type II     Dialysis patient     Hyperlipidemia     Hypertension     Kidney failure     MIKE (obstructive sleep apnea)     Urinary tract infection        Past Surgical History:   Procedure Laterality Date    AV FISTULA PLACEMENT      left lower arm    TONSILLECTOMY, ADENOIDECTOMY      TRANSESOPHAGEAL ECHOCARDIOGRAM (SERENITY) N/A 5/23/2017    Performed by Donaldo Mai MD at [facility] OR       Review of patient's allergies indicates:   Allergen Reactions    Keflex [cephalexin] Nausea Only       No current facility-administered medications on file prior to encounter.      Current Outpatient Medications on File Prior to Encounter   Medication Sig    BD ULTRA-FINE MINI PEN NEEDLE 31 gauge x 3/16" Ndle USE 4 TO 5 TIMES A DAY     ( DISCARD PEN NEEDLE AFTER EACH USE )    clindamycin (CLEOCIN) 300 MG capsule Take 1 capsule (300 mg total) by mouth every 6 (six) hours.    fluticasone (FLONASE) 50 mcg/actuation nasal spray 1 spray by Each Nare route once daily.    FOSRENOL 1,000 mg chewable tablet 3 (three) times daily with meals.     gabapentin (NEURONTIN) 300 MG capsule TAKE 1 BY MOUTH EVERY      EVENING    insulin (BASAGLAR KWIKPEN U-100 INSULIN) glargine 100 units/mL (3mL) SubQ pen Inject 55 Units into the skin every evening.    lancets Misc 1 each by Misc.(Non-Drug; Combo Route) route 4 (four) times daily with meals and nightly.    liraglutide 0.6 mg/0.1 mL, 18 mg/3 mL, subq PNIJ (VICTOZA 2-SHOAIB) 0.6 mg/0.1 mL (18 mg/3 mL) PnIj Inject 1.2 mg into the skin once daily.    midodrine (PROAMATINE) 10 MG tablet     nystatin (MYCOSTATIN) cream Apply topically 2 (two) times daily.    ONETOUCH ULTRA BLUE TEST STRIP Strp USE ONE STRIP TO CHECK BLOOD GLUCOSE 4 TIMES DAILY.AT MORNING, NOON,6:OO PM AND BEDTIME.    pravastatin (PRAVACHOL) 40 MG tablet Take 1 tablet (40 mg total) by mouth once daily.    prednisoLONE acetate (PRED " FORTE) 1 % DrpS     ammonium lactate 12 % Crea Apply 1 application topically 3 (three) times daily as needed.    aspirin 81 MG Chew Take 1 tablet (81 mg total) by mouth once daily.    AZOPT 1 % ophthalmic suspension     blood-glucose meter (ADVANCED GLUCOSE METER) Misc 1 each by Misc.(Non-Drug; Combo Route) route 4 (four) times daily with meals and nightly.     Family History     Problem Relation (Age of Onset)    Colon cancer Mother    Diabetes Mother, Father, Maternal Grandmother, Maternal Grandfather    Heart disease Father    Hypertension Father    Lung cancer Mother        Tobacco Use    Smoking status: Never Smoker    Smokeless tobacco: Never Used   Substance and Sexual Activity    Alcohol use: No    Drug use: No    Sexual activity: Not Currently     Review of Systems   Constitutional: Positive for activity change. Negative for chills, fatigue and fever.   HENT: Negative for congestion and ear pain.    Eyes: Negative for pain.   Respiratory: Negative for apnea, chest tightness, shortness of breath and wheezing.    Cardiovascular: Negative for chest pain and palpitations.   Gastrointestinal: Negative for abdominal distention, abdominal pain, constipation, diarrhea, nausea and rectal pain.   Endocrine: Negative for polydipsia.   Genitourinary: Negative for flank pain.   Musculoskeletal: Positive for gait problem. Negative for arthralgias, back pain, neck pain and neck stiffness.   Skin: Negative for rash.   Neurological: Positive for weakness. Negative for dizziness, tremors, facial asymmetry, speech difficulty and light-headedness.   Psychiatric/Behavioral: Negative for agitation.     Objective:     Vital Signs (Most Recent):  Temp: 97.7 °F (36.5 °C) (02/26/19 1732)  Pulse: 70 (02/26/19 2059)  Resp: 15 (02/26/19 2059)  BP: (!) 147/70 (02/26/19 2059)  SpO2: 97 % (02/26/19 2059) Vital Signs (24h Range):  Temp:  [97.7 °F (36.5 °C)-98.5 °F (36.9 °C)] 97.7 °F (36.5 °C)  Pulse:  [60-70] 70  Resp:  [15-20]  15  SpO2:  [97 %-100 %] 97 %  BP: (125-149)/(58-70) 147/70     Weight: (!) 140 kg (308 lb 10.3 oz)  Body mass index is 48.34 kg/m².    Physical Exam   Constitutional: He is oriented to person, place, and time. He appears well-developed and well-nourished. He is cooperative.   Morbidly obese, unkempt    HENT:   Head: Normocephalic and atraumatic.   Right Ear: External ear normal.   Left Ear: External ear normal.   Mouth/Throat: Abnormal dentition (poor dentition).   Eyes: Conjunctivae are normal. Pupils are equal, round, and reactive to light. Right eye exhibits nystagmus. Left eye exhibits nystagmus.   Horizontal nystagmus with rapid movements but fatigable. No vertical or rotary nystagmus    Neck: Normal range of motion. Neck supple.   Cardiovascular: Normal rate, regular rhythm, normal heart sounds and intact distal pulses. Exam reveals no gallop and no friction rub.   No murmur heard.  Pulmonary/Chest: Effort normal and breath sounds normal. No respiratory distress.   Abdominal: Soft. Bowel sounds are normal. He exhibits no distension. There is no tenderness. There is no guarding.   Musculoskeletal: Normal range of motion. He exhibits edema (1-2+ BLE).        Arms:  Severe limitation of ROM for left and right shoulder secondary to arthritis and pain.   No midline C/T/L spine tenderness, step-off or deformity  Tenderness to left anterior thigh, no significant swelling. 1-2+ pitting edema.   Feet:   Right Foot:   Skin Integrity: Positive for dry skin (dry cracked skin ).   Left Foot:   Skin Integrity: Positive for dry skin (dry cracked skin ).   Neurological: He is alert and oriented to person, place, and time. No cranial nerve deficit or sensory deficit.   Equal  strength bilaterally. Upper extrermity coordination unable to fully assessed but appears normal. Unable to test LUE  Unable to test heel to shin. 4/5 strength BUE. No sensory deficit. Patient able to stand with assistance. However, with initiation of  walking, legs become weak and shake, gait unsteady   Skin: Skin is warm and dry.   Psychiatric: He has a normal mood and affect.   Nursing note and vitals reviewed.        CRANIAL NERVES     CN III, IV, VI   Pupils are equal, round, and reactive to light.       Significant Labs:   A1C: No results for input(s): HGBA1C in the last 4320 hours.  CBC:   Recent Labs   Lab 02/26/19  1517   WBC 6.12   HGB 10.4*   HCT 33.9*   PLT 90*     CMP:   Recent Labs   Lab 02/26/19  1517      K 4.5      CO2 26   GLU 78   BUN 42*   CREATININE 8.6*   CALCIUM 9.4   PROT 6.7   ALBUMIN 3.7   BILITOT 1.0   ALKPHOS 179*   AST 81*   ALT 84*   ANIONGAP 10   EGFRNONAA 6*     Cardiac Markers: No results for input(s): CKMB, MYOGLOBIN, BNP, TROPISTAT in the last 48 hours.  Coagulation:   Recent Labs   Lab 02/26/19  1517   APTT 31.0     Lipid Panel: No results for input(s): CHOL, HDL, LDLCALC, TRIG, CHOLHDL in the last 48 hours.  POCT Glucose:   Recent Labs   Lab 02/26/19  1520 02/26/19  1911   POCTGLUCOSE 74 94     Troponin:   Recent Labs   Lab 02/26/19  1517   TROPONINI 0.034*     TSH: No results for input(s): TSH in the last 4320 hours.  Urine Studies: No results for input(s): COLORU, APPEARANCEUA, PHUR, SPECGRAV, PROTEINUA, GLUCUA, KETONESU, BILIRUBINUA, OCCULTUA, NITRITE, UROBILINOGEN, LEUKOCYTESUR, RBCUA, WBCUA, BACTERIA, SQUAMEPITHEL, HYALINECASTS in the last 48 hours.    Invalid input(s): WRIGHTSUR  All pertinent labs within the past 24 hours have been reviewed.    Significant Imaging:   CT Head Without Contrast   Final Result      No acute intracranial abnormality identified.  Specifically, no intracranial hemorrhage.         Electronically signed by: Thomas Poe MD   Date:    02/26/2019   Time:    17:08      MRI Brain Without Contrast    (Results Pending)   MRA Neck without contrast    (Results Pending)      I have reviewed and interpreted all pertinent imaging results/findings within the past 24 hours.     EKG: Sinus  Bradycardia with left axis deviation. Nonspecific intraventricular block. Inferior infarct , age undetermined. When compared to previous EKG 05/25/2018 significant changes have occurred     Assessment/Plan:     * CVA (cerebral vascular accident)    Patient with complaint of ataxia and weakness   CT head is negative for any acute hemorrhagic event  Anti-platelet therapy: ASA, consider plavix   Atorvastatin for secondary stroke prevention   Labs: HgA1C, Lipid Panel, TSH, Vitamin B-12, B6, Thiamine, Folate, RPR  admit to tele  Consult Neuro  Q4 nuero checks  MRA head and neck   2D echo with bubble study  PT/OT/ST evaluation  NPO pending speech evaluation  Elevate the head of the bed with aspiration precautions  Fall precautions  hold home blood pressure medications to allow for permissive hypertension    IV labetalol PRN for SBP >220 or DBP >120  Will provide stroke education prior to discharge       Ataxia    As above        MIKE (obstructive sleep apnea) - very severe latest sleep study says BPAP @ 16/8    patient with history of MIKE   CPAP QHS        ESRD on dialysis    ESRD on HD MWF   Last dialysis was 02/26/2019  No need for emergent dialysis   Will continue to monitor   Nephrology consulted to arrange dialysis Dr. Pickens        Hyperlipidemia    Pending lipid panel   Resume home statin        Diabetes mellitus, type 2    Pending Hemoglobin A1c   Detemir 30 units QHS   aspart 8 units ACHS   POCT Glucose ACHS   Titrate insulin as needed to maintain optimal glucose levels        Hypertension    Patient with history of HTN   Will hold Home BP medications for 24 hours to allow for permissive hypertension   PRN labetalol fir SBP>220         VTE Risk Mitigation (From admission, onward)    None             D'Amico C Johnson, MD  Department of Hospital Medicine   [facility]

## 2019-02-27 NOTE — PT/OT/SLP EVAL
Physical Therapy Evaluation and Treatment    Patient Name:  Angel Farmer Jr.   MRN:  [id number]    Recommendations:     Discharge Recommendations:  nursing facility, skilled   Discharge Equipment Recommendations: (TBD)   Barriers to discharge: Inaccessible home and increased need for physical assist    Assessment:     Angel Farmer Jr. is a 58 y.o. male admitted with a medical diagnosis of CVA (cerebral vascular accident).  He presents with the following impairments/functional limitations:  weakness, impaired endurance, impaired self care skills, impaired functional mobilty, gait instability, impaired balance, decreased lower extremity function, decreased upper extremity function, pain, decreased ROM, decreased safety awareness. Pt presenting with L thigh pain and L shoulder pain with limited L hip ROM/strength and BUE ROM/strength leading to pt requiring increased assist for functional mobility and limited activity tolerance this date. Recommending SNF placement upon d/c.    Rehab Prognosis: Good; patient would benefit from acute skilled PT services to address these deficits and reach maximum level of function.    Recent Surgery: * No surgery found *      Plan:     During this hospitalization, patient to be seen 6 x/week to address the identified rehab impairments via gait training, therapeutic activities, therapeutic exercises and progress toward the following goals:    · Plan of Care Expires:  03/27/19    Subjective     Chief Complaint: R quad pain  Patient/Family Comments/goals: pt reporting R quad pain onset ~2 weeks ago, L shoulder pain since L shoulder fracture in September of 2018. Reporting weakness worsening on Sunday  Pain/Comfort:  · Pain Rating 1: (0/10 at rest, 10/10 with activity)  · Location - Side 1: Left  · Location - Orientation 1: generalized  · Location 1: thigh  · Pain Addressed 1: Reposition, Distraction, Cessation of Activity, Nurse notified  · Pain Rating Post-Intervention 1:  "0/10    Patients cultural, spiritual, Orthodox conflicts given the current situation: no    Living Environment:  Pt lives with his brother and sister-in-law in a [facility] with 2 SLY with no rails and tub/shower combo.  Pt provided following information and is a questionable historian. Prior to admission, patients level of function was mod I without AD for gait and ADLs, requiring increased time for dressing and ADLs 2/2 UE ROM limitations but able to complete without assist. Pt reporting even at baseline he has difficulty getting out of bed, stating 90% of the times he falls to his knees then stands up from the floor holding onto the bed. Reporting since this past Sunday, weakness progression in LLE and BUEs and has been using a RW. Prior was driving himself to dialysis MWF and to OP therapy Tues and Thurs. Stating he heard a "pop" when standing from seated position at home ~2 weeks ago and since then has had intermittent burning pain in L quad with mobility. Stating send of last week he was still able to go to therapy and complete bench press with 5# and leg press with 100#.  Equipment used at home: walker, rolling, wheelchair.  DME owned (not currently used): wheelchair.  Upon discharge, patient will have assistance from -- reporting either his brother or TORO is home with him at all times but unsure of physical assist they are able to provide..    Objective:     Communicated with LOLIS Tovar prior to session.  Patient found all lines intact bed alarm  upon PT entry to room.    General Precautions: Standard, fall   Orthopedic Precautions:N/A   Braces: N/A     Exams:  · Pt states "I can't" when asked to complete functional mobility and self-care tasks prior to attempting to complete  · Gross Motor Coordination:  no deficits noted  · Postural Exam:  Patient presented with the following abnormalities:    · -       Rounded shoulders  · Sensation:    · -       Impaired  light/touch intact on testing but pt reporting "I can't " "feel my hands"  · Skin Integrity/Edema:      · -       Edema: Mild BUEs  · RLE ROM: WFL except hip ROM limited by body habitus and weakness as pt throws trunk backwards when attempting to raise R hip into flexion  · RLE Strength: Deficits: ankle DF 4/5, knee ext 4+/5, hip flexion 1+/5 (pushing back into extension)  · LLE ROM: WFL except hip flexion limited by body habitus  · LLE Strength: Deficits: ankle DF 4/5, knee ext 4+/5, hip flexion 1+/5 (reporting L quad pain with attempt)    Functional Mobility:  · Bed Mobility:     · Scooting: contact guard assistance  · Supine to Sit: maximal assistance and of 2 persons  · Sit to Supine: minimum assistance and to raise LLE into bed  · Transfers:     · Sit to Stand:  Mod A x 2 for 1st stand, CGA x 2 for 2nd/3rd stands with rolling walker  · Gait: 3-4 side steps left towards HOB with RW and CGA/min A. Attempted to take steps forward away from bed but pt with LLE shaking and reporting his leg is too weak.      Therapeutic Activities and Exercises:  Pt reporting he is unable to use washcloth to wash his face prior to attempting, was able to complete with motivation although pt with limited B shoulder ROM as pt puts forth limited effort with activities, RUE able to reach mouth and face.   Pt sat EOB with supervision and completed 3 stands from EOB:  1st stand: mod A x 2 -- maintains hands on RW 2/2 shoulder weakness and limited ROM  Stands 2 and 3: CGA x 2  Able to side steps towards HOB without knee buckling then c/o L thigh pain and LLE weakness with attempt at forward stepping.    AM-PAC 6 CLICK MOBILITY  Total Score:11     Patient left HOB elevated with all lines intact, call button in reach, bed alarm on and RN notified.    GOALS:   Multidisciplinary Problems     Physical Therapy Goals        Problem: Physical Therapy Goal    Goal Priority Disciplines Outcome Goal Variances Interventions   Physical Therapy Goal     PT, PT/OT Ongoing (interventions implemented as " appropriate)     Description:  Goals to be met by: 3/27/19     Patient will increase functional independence with mobility by performin. Supine <> sit with MInimal Assistance  2. Sit to stand transfer with Contact Guard Assistance  3. Bed to chair transfer with Contact Guard Assistance using Rolling Walker  4. Gait  x 25 feet with Contact Guard Assistance using Rolling Walker.                       History:     Past Medical History:   Diagnosis Date    Diabetes mellitus type II     Dialysis patient     Hyperlipidemia     Hypertension     Kidney failure     MIKE (obstructive sleep apnea)     Urinary tract infection        Past Surgical History:   Procedure Laterality Date    AV FISTULA PLACEMENT      left lower arm    TONSILLECTOMY, ADENOIDECTOMY      TRANSESOPHAGEAL ECHOCARDIOGRAM (SERENITY) N/A 2017    Performed by Donaldo Mai MD at [facility] OR       Time Tracking:     PT Received On: 19  PT Start Time: 852     PT Stop Time: 930  PT Total Time (min): 38 min with OT    Billable Minutes: Evaluation 15 and Therapeutic Activity 10      Soo Kam, PT  2019

## 2019-02-27 NOTE — SUBJECTIVE & OBJECTIVE
"Past Medical History:   Diagnosis Date    Diabetes mellitus type II     Dialysis patient     Hyperlipidemia     Hypertension     Kidney failure     MIKE (obstructive sleep apnea)     Urinary tract infection        Past Surgical History:   Procedure Laterality Date    AV FISTULA PLACEMENT      left lower arm    TONSILLECTOMY, ADENOIDECTOMY      TRANSESOPHAGEAL ECHOCARDIOGRAM (SERENITY) N/A 5/23/2017    Performed by Donaldo Mai MD at [facility] OR       Review of patient's allergies indicates:   Allergen Reactions    Keflex [cephalexin] Nausea Only       No current facility-administered medications on file prior to encounter.      Current Outpatient Medications on File Prior to Encounter   Medication Sig    BD ULTRA-FINE MINI PEN NEEDLE 31 gauge x 3/16" Ndle USE 4 TO 5 TIMES A DAY     ( DISCARD PEN NEEDLE AFTER EACH USE )    clindamycin (CLEOCIN) 300 MG capsule Take 1 capsule (300 mg total) by mouth every 6 (six) hours.    fluticasone (FLONASE) 50 mcg/actuation nasal spray 1 spray by Each Nare route once daily.    FOSRENOL 1,000 mg chewable tablet 3 (three) times daily with meals.     gabapentin (NEURONTIN) 300 MG capsule TAKE 1 BY MOUTH EVERY      EVENING    insulin (BASAGLAR KWIKPEN U-100 INSULIN) glargine 100 units/mL (3mL) SubQ pen Inject 55 Units into the skin every evening.    lancets Misc 1 each by Misc.(Non-Drug; Combo Route) route 4 (four) times daily with meals and nightly.    liraglutide 0.6 mg/0.1 mL, 18 mg/3 mL, subq PNIJ (VICTOZA 2-SHOAIB) 0.6 mg/0.1 mL (18 mg/3 mL) PnIj Inject 1.2 mg into the skin once daily.    midodrine (PROAMATINE) 10 MG tablet     nystatin (MYCOSTATIN) cream Apply topically 2 (two) times daily.    ONETOUCH ULTRA BLUE TEST STRIP Strp USE ONE STRIP TO CHECK BLOOD GLUCOSE 4 TIMES DAILY.AT MORNING, NOON,6:OO PM AND BEDTIME.    pravastatin (PRAVACHOL) 40 MG tablet Take 1 tablet (40 mg total) by mouth once daily.    prednisoLONE acetate (PRED FORTE) 1 % DrpS     " ammonium lactate 12 % Crea Apply 1 application topically 3 (three) times daily as needed.    aspirin 81 MG Chew Take 1 tablet (81 mg total) by mouth once daily.    AZOPT 1 % ophthalmic suspension     blood-glucose meter (ADVANCED GLUCOSE METER) Misc 1 each by Misc.(Non-Drug; Combo Route) route 4 (four) times daily with meals and nightly.     Family History     Problem Relation (Age of Onset)    Colon cancer Mother    Diabetes Mother, Father, Maternal Grandmother, Maternal Grandfather    Heart disease Father    Hypertension Father    Lung cancer Mother        Tobacco Use    Smoking status: Never Smoker    Smokeless tobacco: Never Used   Substance and Sexual Activity    Alcohol use: No    Drug use: No    Sexual activity: Not Currently     Review of Systems   Constitutional: Positive for activity change. Negative for chills, fatigue and fever.   HENT: Negative for congestion and ear pain.    Eyes: Negative for pain.   Respiratory: Negative for apnea, chest tightness, shortness of breath and wheezing.    Cardiovascular: Negative for chest pain and palpitations.   Gastrointestinal: Negative for abdominal distention, abdominal pain, constipation, diarrhea, nausea and rectal pain.   Endocrine: Negative for polydipsia.   Genitourinary: Negative for flank pain.   Musculoskeletal: Positive for gait problem. Negative for arthralgias, back pain, neck pain and neck stiffness.   Skin: Negative for rash.   Neurological: Positive for weakness. Negative for dizziness, tremors, facial asymmetry, speech difficulty and light-headedness.   Psychiatric/Behavioral: Negative for agitation.     Objective:     Vital Signs (Most Recent):  Temp: 97.7 °F (36.5 °C) (02/26/19 1732)  Pulse: 70 (02/26/19 2059)  Resp: 15 (02/26/19 2059)  BP: (!) 147/70 (02/26/19 2059)  SpO2: 97 % (02/26/19 2059) Vital Signs (24h Range):  Temp:  [97.7 °F (36.5 °C)-98.5 °F (36.9 °C)] 97.7 °F (36.5 °C)  Pulse:  [60-70] 70  Resp:  [15-20] 15  SpO2:  [97 %-100 %]  97 %  BP: (125-149)/(58-70) 147/70     Weight: (!) 140 kg (308 lb 10.3 oz)  Body mass index is 48.34 kg/m².    Physical Exam   Constitutional: He is oriented to person, place, and time. He appears well-developed and well-nourished. He is cooperative.   Morbidly obese, unkempt    HENT:   Head: Normocephalic and atraumatic.   Right Ear: External ear normal.   Left Ear: External ear normal.   Mouth/Throat: Abnormal dentition (poor dentition).   Eyes: Conjunctivae are normal. Pupils are equal, round, and reactive to light. Right eye exhibits nystagmus. Left eye exhibits nystagmus.   Horizontal nystagmus with rapid movements but fatigable. No vertical or rotary nystagmus    Neck: Normal range of motion. Neck supple.   Cardiovascular: Normal rate, regular rhythm, normal heart sounds and intact distal pulses. Exam reveals no gallop and no friction rub.   No murmur heard.  Pulmonary/Chest: Effort normal and breath sounds normal. No respiratory distress.   Abdominal: Soft. Bowel sounds are normal. He exhibits no distension. There is no tenderness. There is no guarding.   Musculoskeletal: Normal range of motion. He exhibits edema (1-2+ BLE).        Arms:  Severe limitation of ROM for left and right shoulder secondary to arthritis and pain.   No midline C/T/L spine tenderness, step-off or deformity  Tenderness to left anterior thigh, no significant swelling. 1-2+ pitting edema.   Feet:   Right Foot:   Skin Integrity: Positive for dry skin (dry cracked skin ).   Left Foot:   Skin Integrity: Positive for dry skin (dry cracked skin ).   Neurological: He is alert and oriented to person, place, and time. No cranial nerve deficit or sensory deficit.   Equal  strength bilaterally. Upper extrermity coordination unable to fully assessed but appears normal. Unable to test LUE  Unable to test heel to shin. 4/5 strength BUE. No sensory deficit. Patient able to stand with assistance. However, with initiation of walking, legs become  weak and shake, gait unsteady   Skin: Skin is warm and dry.   Psychiatric: He has a normal mood and affect.   Nursing note and vitals reviewed.        CRANIAL NERVES     CN III, IV, VI   Pupils are equal, round, and reactive to light.       Significant Labs:   A1C: No results for input(s): HGBA1C in the last 4320 hours.  CBC:   Recent Labs   Lab 02/26/19  1517   WBC 6.12   HGB 10.4*   HCT 33.9*   PLT 90*     CMP:   Recent Labs   Lab 02/26/19  1517      K 4.5      CO2 26   GLU 78   BUN 42*   CREATININE 8.6*   CALCIUM 9.4   PROT 6.7   ALBUMIN 3.7   BILITOT 1.0   ALKPHOS 179*   AST 81*   ALT 84*   ANIONGAP 10   EGFRNONAA 6*     Cardiac Markers: No results for input(s): CKMB, MYOGLOBIN, BNP, TROPISTAT in the last 48 hours.  Coagulation:   Recent Labs   Lab 02/26/19  1517   APTT 31.0     Lipid Panel: No results for input(s): CHOL, HDL, LDLCALC, TRIG, CHOLHDL in the last 48 hours.  POCT Glucose:   Recent Labs   Lab 02/26/19  1520 02/26/19  1911   POCTGLUCOSE 74 94     Troponin:   Recent Labs   Lab 02/26/19  1517   TROPONINI 0.034*     TSH: No results for input(s): TSH in the last 4320 hours.  Urine Studies: No results for input(s): COLORU, APPEARANCEUA, PHUR, SPECGRAV, PROTEINUA, GLUCUA, KETONESU, BILIRUBINUA, OCCULTUA, NITRITE, UROBILINOGEN, LEUKOCYTESUR, RBCUA, WBCUA, BACTERIA, SQUAMEPITHEL, HYALINECASTS in the last 48 hours.    Invalid input(s): WRIGHTSUR  All pertinent labs within the past 24 hours have been reviewed.    Significant Imaging:   CT Head Without Contrast   Final Result      No acute intracranial abnormality identified.  Specifically, no intracranial hemorrhage.         Electronically signed by: Thomas Poe MD   Date:    02/26/2019   Time:    17:08      MRI Brain Without Contrast    (Results Pending)   MRA Neck without contrast    (Results Pending)      I have reviewed and interpreted all pertinent imaging results/findings within the past 24 hours.     EKG: Sinus Bradycardia with left axis  deviation. Nonspecific intraventricular block. Inferior infarct , age undetermined. When compared to previous EKG 05/25/2018 significant changes have occurred

## 2019-02-28 ENCOUNTER — HOSPITAL ENCOUNTER (INPATIENT)
Facility: HOSPITAL | Age: 59
LOS: 20 days | Discharge: NURSING, REHAB OR LONG-TERM CARE | DRG: 518 | End: 2019-03-20
Attending: NEUROLOGICAL SURGERY | Admitting: NEUROLOGICAL SURGERY
Payer: MEDICARE

## 2019-02-28 VITALS
SYSTOLIC BLOOD PRESSURE: 117 MMHG | BODY MASS INDEX: 47.16 KG/M2 | TEMPERATURE: 97 F | HEIGHT: 67 IN | DIASTOLIC BLOOD PRESSURE: 56 MMHG | OXYGEN SATURATION: 98 % | HEART RATE: 59 BPM | RESPIRATION RATE: 18 BRPM | WEIGHT: 300.5 LBS

## 2019-02-28 DIAGNOSIS — M79.605 LEFT LEG PAIN: ICD-10-CM

## 2019-02-28 DIAGNOSIS — Z74.09 IMPAIRED FUNCTIONAL MOBILITY AND ENDURANCE: ICD-10-CM

## 2019-02-28 DIAGNOSIS — Z79.4 TYPE 2 DIABETES MELLITUS WITH CHRONIC KIDNEY DISEASE ON CHRONIC DIALYSIS, WITH LONG-TERM CURRENT USE OF INSULIN: ICD-10-CM

## 2019-02-28 DIAGNOSIS — D63.1 ANEMIA DUE TO STAGE 5 CHRONIC KIDNEY DISEASE: ICD-10-CM

## 2019-02-28 DIAGNOSIS — Z99.2 TYPE 2 DIABETES MELLITUS WITH CHRONIC KIDNEY DISEASE ON CHRONIC DIALYSIS, WITH LONG-TERM CURRENT USE OF INSULIN: ICD-10-CM

## 2019-02-28 DIAGNOSIS — Z98.890 S/P LAMINECTOMY: ICD-10-CM

## 2019-02-28 DIAGNOSIS — E66.01 CLASS 3 SEVERE OBESITY DUE TO EXCESS CALORIES WITH SERIOUS COMORBIDITY AND BODY MASS INDEX (BMI) OF 45.0 TO 49.9 IN ADULT: ICD-10-CM

## 2019-02-28 DIAGNOSIS — R94.31 LONG QT INTERVAL: ICD-10-CM

## 2019-02-28 DIAGNOSIS — Z99.2 ESRD (END STAGE RENAL DISEASE) ON DIALYSIS: ICD-10-CM

## 2019-02-28 DIAGNOSIS — R19.7 DIARRHEA, UNSPECIFIED TYPE: ICD-10-CM

## 2019-02-28 DIAGNOSIS — N18.6 ESRD (END STAGE RENAL DISEASE) ON DIALYSIS: ICD-10-CM

## 2019-02-28 DIAGNOSIS — M89.8X9 METABOLIC BONE DISEASE: ICD-10-CM

## 2019-02-28 DIAGNOSIS — N18.6 ESRD ON DIALYSIS: ICD-10-CM

## 2019-02-28 DIAGNOSIS — I95.9 HYPOTENSION, UNSPECIFIED HYPOTENSION TYPE: ICD-10-CM

## 2019-02-28 DIAGNOSIS — Z99.2 ESRD ON DIALYSIS: ICD-10-CM

## 2019-02-28 DIAGNOSIS — G47.33 OSA (OBSTRUCTIVE SLEEP APNEA): ICD-10-CM

## 2019-02-28 DIAGNOSIS — N18.6 TYPE 2 DIABETES MELLITUS WITH CHRONIC KIDNEY DISEASE ON CHRONIC DIALYSIS, WITH LONG-TERM CURRENT USE OF INSULIN: ICD-10-CM

## 2019-02-28 DIAGNOSIS — N18.5 ANEMIA DUE TO STAGE 5 CHRONIC KIDNEY DISEASE: ICD-10-CM

## 2019-02-28 DIAGNOSIS — E11.22 TYPE 2 DIABETES MELLITUS WITH CHRONIC KIDNEY DISEASE ON CHRONIC DIALYSIS, WITH LONG-TERM CURRENT USE OF INSULIN: ICD-10-CM

## 2019-02-28 DIAGNOSIS — E83.39 HYPERPHOSPHATEMIA: ICD-10-CM

## 2019-02-28 DIAGNOSIS — Z78.9 PROBLEM WITH VASCULAR ACCESS: ICD-10-CM

## 2019-02-28 DIAGNOSIS — R00.1 BRADYCARDIA: ICD-10-CM

## 2019-02-28 DIAGNOSIS — I95.9 HYPOTENSIVE EPISODE: ICD-10-CM

## 2019-02-28 DIAGNOSIS — G95.9 CERVICAL MYELOPATHY: ICD-10-CM

## 2019-02-28 LAB
ALBUMIN SERPL BCP-MCNC: 3.3 G/DL
ALP SERPL-CCNC: 165 U/L
ALT SERPL W/O P-5'-P-CCNC: 80 U/L
AMMONIA PLAS-SCNC: 78 UMOL/L
ANION GAP SERPL CALC-SCNC: 9 MMOL/L
AORTIC ROOT ANNULUS: 3.56 CM
AORTIC VALVE CUSP SEPERATION: 2.13 CM
AST SERPL-CCNC: 91 U/L
AV INDEX (PROSTH): 0.55
AV MEAN GRADIENT: 6.92 MMHG
AV PEAK GRADIENT: 13.54 MMHG
AV VALVE AREA: 1.75 CM2
AV VELOCITY RATIO: 0.59
BASOPHILS # BLD AUTO: 0.01 K/UL
BASOPHILS NFR BLD: 0.2 %
BILIRUB SERPL-MCNC: 1 MG/DL
BSA FOR ECHO PROCEDURE: 2.58 M2
BUN SERPL-MCNC: 38 MG/DL
CALCIUM SERPL-MCNC: 9.3 MG/DL
CHLORIDE SERPL-SCNC: 101 MMOL/L
CO2 SERPL-SCNC: 26 MMOL/L
CREAT SERPL-MCNC: 6.9 MG/DL
CV ECHO LV RWT: 0.41 CM
DIFFERENTIAL METHOD: ABNORMAL
DOP CALC AO PEAK VEL: 1.84 M/S
DOP CALC AO VTI: 43.03 CM
DOP CALC LVOT AREA: 3.2 CM2
DOP CALC LVOT DIAMETER: 2.02 CM
DOP CALC LVOT PEAK VEL: 1.09 M/S
DOP CALC LVOT STROKE VOLUME: 75.27 CM3
DOP CALCLVOT PEAK VEL VTI: 23.5 CM
E WAVE DECELERATION TIME: 260.67 MSEC
E/A RATIO: 1.27
ECHO LV POSTERIOR WALL: 1.12 CM (ref 0.6–1.1)
EOSINOPHIL # BLD AUTO: 0.2 K/UL
EOSINOPHIL NFR BLD: 3.5 %
ERYTHROCYTE [DISTWIDTH] IN BLOOD BY AUTOMATED COUNT: 15.3 %
EST. GFR  (AFRICAN AMERICAN): 9 ML/MIN/1.73 M^2
EST. GFR  (NON AFRICAN AMERICAN): 8 ML/MIN/1.73 M^2
FRACTIONAL SHORTENING: 22 % (ref 28–44)
GLUCOSE SERPL-MCNC: 86 MG/DL
HCT VFR BLD AUTO: 33.9 %
HGB BLD-MCNC: 10.5 G/DL
INTERVENTRICULAR SEPTUM: 1.49 CM (ref 0.6–1.1)
IVRT: 0.1 MSEC
LEFT ATRIUM SIZE: 4.12 CM
LEFT INTERNAL DIMENSION IN SYSTOLE: 4.24 CM (ref 2.1–4)
LEFT VENTRICLE DIASTOLIC VOLUME INDEX: 60.27 ML/M2
LEFT VENTRICLE DIASTOLIC VOLUME: 144.89 ML
LEFT VENTRICLE MASS INDEX: 125.8 G/M2
LEFT VENTRICLE SYSTOLIC VOLUME INDEX: 33.4 ML/M2
LEFT VENTRICLE SYSTOLIC VOLUME: 80.3 ML
LEFT VENTRICULAR INTERNAL DIMENSION IN DIASTOLE: 5.46 CM (ref 3.5–6)
LEFT VENTRICULAR MASS: 302.44 G
LYMPHOCYTES # BLD AUTO: 1.5 K/UL
LYMPHOCYTES NFR BLD: 31.3 %
MAGNESIUM SERPL-MCNC: 2.3 MG/DL
MCH RBC QN AUTO: 32.5 PG
MCHC RBC AUTO-ENTMCNC: 31 G/DL
MCV RBC AUTO: 105 FL
MONOCYTES # BLD AUTO: 0.5 K/UL
MONOCYTES NFR BLD: 11 %
MV PEAK A VEL: 0.59 M/S
MV PEAK E VEL: 0.75 M/S
NEUTROPHILS # BLD AUTO: 2.7 K/UL
NEUTROPHILS NFR BLD: 53.8 %
PHOSPHATE SERPL-MCNC: 6.8 MG/DL
PLATELET # BLD AUTO: 95 K/UL
PMV BLD AUTO: 11.3 FL
POCT GLUCOSE: 109 MG/DL (ref 70–110)
POCT GLUCOSE: 145 MG/DL (ref 70–110)
POCT GLUCOSE: 93 MG/DL (ref 70–110)
POCT GLUCOSE: 97 MG/DL (ref 70–110)
POTASSIUM SERPL-SCNC: 4.3 MMOL/L
PROT SERPL-MCNC: 6.4 G/DL
PV PEAK VELOCITY: 1.48 CM/S
RA PRESSURE: 3 MMHG
RBC # BLD AUTO: 3.23 M/UL
RPR SER QL: NORMAL
RPR SER QL: NORMAL
SODIUM SERPL-SCNC: 136 MMOL/L
WBC # BLD AUTO: 4.92 K/UL

## 2019-02-28 PROCEDURE — 20600001 HC STEP DOWN PRIVATE ROOM

## 2019-02-28 PROCEDURE — 63600175 PHARM REV CODE 636 W HCPCS: Performed by: FAMILY MEDICINE

## 2019-02-28 PROCEDURE — 80053 COMPREHEN METABOLIC PANEL: CPT

## 2019-02-28 PROCEDURE — 25000003 PHARM REV CODE 250: Performed by: STUDENT IN AN ORGANIZED HEALTH CARE EDUCATION/TRAINING PROGRAM

## 2019-02-28 PROCEDURE — 97116 GAIT TRAINING THERAPY: CPT

## 2019-02-28 PROCEDURE — 36415 COLL VENOUS BLD VENIPUNCTURE: CPT

## 2019-02-28 PROCEDURE — 85025 COMPLETE CBC W/AUTO DIFF WBC: CPT

## 2019-02-28 PROCEDURE — 25000003 PHARM REV CODE 250: Performed by: FAMILY MEDICINE

## 2019-02-28 PROCEDURE — 83735 ASSAY OF MAGNESIUM: CPT

## 2019-02-28 PROCEDURE — 86580 TB INTRADERMAL TEST: CPT | Performed by: FAMILY MEDICINE

## 2019-02-28 PROCEDURE — A4216 STERILE WATER/SALINE, 10 ML: HCPCS | Performed by: STUDENT IN AN ORGANIZED HEALTH CARE EDUCATION/TRAINING PROGRAM

## 2019-02-28 PROCEDURE — 63600175 PHARM REV CODE 636 W HCPCS: Performed by: STUDENT IN AN ORGANIZED HEALTH CARE EDUCATION/TRAINING PROGRAM

## 2019-02-28 PROCEDURE — 97110 THERAPEUTIC EXERCISES: CPT

## 2019-02-28 PROCEDURE — 97530 THERAPEUTIC ACTIVITIES: CPT

## 2019-02-28 PROCEDURE — 94761 N-INVAS EAR/PLS OXIMETRY MLT: CPT

## 2019-02-28 PROCEDURE — 84100 ASSAY OF PHOSPHORUS: CPT

## 2019-02-28 RX ORDER — TRAMADOL HYDROCHLORIDE 50 MG/1
50 TABLET ORAL EVERY 8 HOURS PRN
Status: DISCONTINUED | OUTPATIENT
Start: 2019-02-28 | End: 2019-02-28 | Stop reason: HOSPADM

## 2019-02-28 RX ORDER — PRAVASTATIN SODIUM 40 MG/1
TABLET ORAL
Qty: 90 TABLET | Refills: 1 | OUTPATIENT
Start: 2019-02-28

## 2019-02-28 RX ORDER — ACETAMINOPHEN 325 MG/1
650 TABLET ORAL EVERY 6 HOURS PRN
Status: DISCONTINUED | OUTPATIENT
Start: 2019-02-28 | End: 2019-02-28 | Stop reason: HOSPADM

## 2019-02-28 RX ORDER — SYRING-NEEDL,DISP,INSUL,0.3 ML 29 G X1/2"
296 SYRINGE, EMPTY DISPOSABLE MISCELLANEOUS ONCE
Status: COMPLETED | OUTPATIENT
Start: 2019-02-28 | End: 2019-02-28

## 2019-02-28 RX ORDER — VENLAFAXINE 37.5 MG/1
37.5 TABLET ORAL DAILY
Status: DISCONTINUED | OUTPATIENT
Start: 2019-02-28 | End: 2019-02-28 | Stop reason: HOSPADM

## 2019-02-28 RX ORDER — GABAPENTIN 100 MG/1
100 CAPSULE ORAL
Status: DISCONTINUED | OUTPATIENT
Start: 2019-03-01 | End: 2019-02-28 | Stop reason: HOSPADM

## 2019-02-28 RX ADMIN — HEPARIN SODIUM 7500 UNITS: 5000 INJECTION, SOLUTION INTRAVENOUS; SUBCUTANEOUS at 05:02

## 2019-02-28 RX ADMIN — TUBERCULIN PURIFIED PROTEIN DERIVATIVE 5 UNITS: 5 INJECTION INTRADERMAL at 04:02

## 2019-02-28 RX ADMIN — LIDOCAINE 1 PATCH: 50 PATCH TOPICAL at 12:02

## 2019-02-28 RX ADMIN — ACETAMINOPHEN 650 MG: 325 TABLET ORAL at 01:02

## 2019-02-28 RX ADMIN — HEPARIN SODIUM 7500 UNITS: 5000 INJECTION, SOLUTION INTRAVENOUS; SUBCUTANEOUS at 09:02

## 2019-02-28 RX ADMIN — ASPIRIN 81 MG 81 MG: 81 TABLET ORAL at 10:02

## 2019-02-28 RX ADMIN — Medication 3 ML: at 05:02

## 2019-02-28 RX ADMIN — Medication 296 ML: at 12:02

## 2019-02-28 RX ADMIN — Medication 3 ML: at 09:02

## 2019-02-28 RX ADMIN — ACETAMINOPHEN 650 MG: 325 TABLET ORAL at 09:02

## 2019-02-28 RX ADMIN — HEPARIN SODIUM 7500 UNITS: 5000 INJECTION, SOLUTION INTRAVENOUS; SUBCUTANEOUS at 03:02

## 2019-02-28 RX ADMIN — VENLAFAXINE 37.5 MG: 37.5 TABLET ORAL at 03:02

## 2019-02-28 RX ADMIN — PRAVASTATIN SODIUM 40 MG: 20 TABLET ORAL at 10:02

## 2019-02-28 RX ADMIN — MIDODRINE HYDROCHLORIDE 10 MG: 5 TABLET ORAL at 10:02

## 2019-02-28 RX ADMIN — TRAMADOL HYDROCHLORIDE 50 MG: 50 TABLET, FILM COATED ORAL at 11:02

## 2019-02-28 RX ADMIN — MENTHOL, METHYL SALICYLATE: 10; 15 CREAM TOPICAL at 03:02

## 2019-02-28 RX ADMIN — Medication 3 ML: at 03:02

## 2019-02-28 RX ADMIN — MENTHOL, METHYL SALICYLATE: 10; 15 CREAM TOPICAL at 09:02

## 2019-02-28 RX ADMIN — MENTHOL, METHYL SALICYLATE: 10; 15 CREAM TOPICAL at 10:02

## 2019-02-28 NOTE — DISCHARGE SUMMARY
[facility]  Discharge Summary      Admit Date: 2/26/2019    Discharge Date and Time: 2/28/2019 11:01 PM    Attending Physician: Jamal Millan M.D.      Reason for Admission: Fall, CVA workup    Procedures Performed: None    Hospital Course:    Angel Farmer Jr. is a 58 y.o. male with history of Diabetes mellitus type II, ESRD on HD MWF, Hyperlipidemia, Hypertension and MIKE who presented to the ED on 2/26/19 for evaluation of upper extremity weakness and ataxia after a fall today at 5 am.The patient stated he was attempting to get ready to go to dialysis when his arms became weak causing him to fall. He reports he fell unto his right side.The patient denied any LOC or head injury associated with the fall. The family was able to assist the patient from the floor. As per family the patient has been stumbling with walking since Saturday. Notes that the  patient will walk then appear as though he is going to fall and catch himself along the wall.  The patient reported when he walks he feels uncoordinated. Per family the patient did have difficulty getting out of bed at baseline but they  believe this has gotten worse since Saturday. Symptoms are associated with bilateral shoulder pain, weakness in BUE and weakness in the LLE. Pt denied neck pain, back pain, leg pain, dizziness, light headedness, nausea, vomiting, chest pain or shortness of breath. The symptoms worsened with exertion. Patient ha no prior history of similar symptoms.      Of note he was able to have dialysis on day of admission, the patient stated he missed Monday appt because he was having difficulty walking.     Patient was admitted to tele for workup for potential CVA, as he was complaining of ataxia and weakness. The CT head was negative for any acute hemorrhagic event, and MRA head and neck was ordered and demonstrated mild-to-moderate periventricular chronic microvascular ischemic disease, no acute infarction suggested.  Patient was receiving heparin with dialysis. Patient was placed on ASA. Atorvastatin was provided for secondary stroke prevention, and labs including HbA1C, Lipid Panel, TSH, B12, B6, thiamine, folate, and RPR were ordered. Patient received Q4 neuro checks, and neurology was consulted. Home BP medications were held for permissive hypertension with PRN labetalol for SBP>220. Patient has MIKE, provided with CPAP QHS.  Nephrology was consulted for dialysis needs. Home statin was continued for HLD. HgbA1c was drawn, DMIII was treated with detamir 30 units QHS, Aspart 8 units ACHS, POCT glucose ACHS, and insulin was titrated as needed.    On 2/27/19 patient reported LLE quad pain with movement. Patient had venous stasis changes BLE, with +1 pitting edema. Patient had 4/5 strength BUE. Patient was able to stand with assistance, but was unsteady when walking. Patient continued to refuse CPAP qhs. Patient given tylenol PRN for leg pain.    Neurology consult stated that patient must comply with MIKE treatment as it is leading to significant deconditioning. Also stated that patient should not be on gabapentin or Lyrica due to GFR=5, recommended switching to a hepatically metabolized neuropathic pain reliever. As B12 <400, supplement.  Requested MRI C spine, demonstrated significant motion artifacts and multilevel variable degree of foraminal narrowing.  Patient transferred and admitted to [facility] for close monitoring and higher level of care.     Consults: dietary, nephrology and neurology    Significant Diagnostic Studies: Labs:   BMP:   Recent Labs   Lab 02/26/19  1517 02/27/19  0844 02/28/19  0501   GLU 78 90 86    140 136   K 4.5 4.8 4.3    104 101   CO2 26 23 26   BUN 42* 57* 38*   CREATININE 8.6* 10.1* 6.9*   CALCIUM 9.4 9.7 9.3   MG 2.4 2.3  2.3 2.3   , CMP   Recent Labs   Lab 02/26/19  1517 02/27/19  0844 02/28/19  0501    140 136   K 4.5 4.8 4.3    104 101   CO2 26 23 26   GLU 78 90 86   BUN 42*  57* 38*   CREATININE 8.6* 10.1* 6.9*   CALCIUM 9.4 9.7 9.3   PROT 6.7 6.4 6.4   ALBUMIN 3.7 3.4* 3.3*   BILITOT 1.0 1.0 1.0   ALKPHOS 179* 159* 165*   AST 81* 86* 91*   ALT 84* 80* 80*   ANIONGAP 10 13 9   ESTGFRAFRICA 7* 6* 9*   EGFRNONAA 6* 5* 8*   , CBC   Recent Labs   Lab 02/26/19  1517 02/27/19  0844 02/28/19  0501   WBC 6.12 6.12 4.92   HGB 10.4* 10.2* 10.5*   HCT 33.9* 33.1* 33.9*   PLT 90* 96* 95*   , INR   Lab Results   Component Value Date    INR 1.0 02/27/2019    INR 1.0 03/14/2014    INR 1.0 12/27/2011   , Lipid Panel   Lab Results   Component Value Date    CHOL 103 (L) 02/27/2019    HDL 45 02/27/2019    LDLCALC 44.6 (L) 02/27/2019    TRIG 67 02/27/2019    CHOLHDL 43.7 02/27/2019   , Troponin   Recent Labs   Lab 02/26/19  1517   TROPONINI 0.034*    and A1C:   Recent Labs   Lab 02/27/19  0845   HGBA1C 5.0     Microbiology:   Blood Culture   Lab Results   Component Value Date    LABBLOO No growth after 5 days. 05/20/2017   , Sputum Culture No results found for: GSRESP, RESPIRATORYC, Urine Culture    Lab Results   Component Value Date    LABURIN  03/30/2012     MULTIPLE ORGANISMS ISOLATED. NONE IN PREDOMINANCE. REPEAT IF CLINICALLY NECESSARY.    and Wound Culture:   Radiology:   Imaging Results          MRI Brain Without Contrast (Final result)  Result time 02/26/19 23:16:39    Final result by Gabriella Drake MD (02/26/19 23:16:39)                 Impression:      No acute intracranial abnormalities identified.  No evidence of acute infarction.    Mild to moderate periventricular chronic microvascular ischemic disease.      Electronically signed by: Gabriella Drake MD  Date:    02/26/2019  Time:    23:16             Narrative:    EXAMINATION:  MRI BRAIN WITHOUT CONTRAST    CLINICAL HISTORY:  Syncope/fainting; Unspecified fall, initial encounter    TECHNIQUE:  Multiplanar multisequence MR imaging of the brain was performed without contrast.    COMPARISON:  CT head from the same date.    FINDINGS:  Motion  limited examination, specifically involving axial T2 FLAIR sequence.    There is mild-to-moderate periventricular chronic microvascular ischemic disease.  No diffusion signal abnormality to suggest acute infarction.  Ventricles are normal in size and configuration without evidence for hydrocephalus.  No intracranial hemorrhage or extraaxial fluid collection.  No mass or mass effect.  Flow voids are normal in appearance.    Visualized paranasal sinuses and mastoid air cells are clear.  Orbits appear normal.                               MRA Neck without contrast (Final result)  Result time 02/26/19 23:19:02   Procedure changed from MRA Neck with and without contrast     Final result by Gabriella Drake MD (02/26/19 23:19:02)                 Impression:      MRA neck without significant focal stenosis or occlusion.      Electronically signed by: Gabriella Drake MD  Date:    02/26/2019  Time:    23:19             Narrative:    EXAMINATION:  MRA NECK WITHOUT CONTRAST    CLINICAL HISTORY:  Stroke;Ataxia, unspecified    TECHNIQUE:  MRA neck was performed without the use of IV contrast.    COMPARISON:  Concurrent MRI brain.    FINDINGS:  Mild motion artifact seen at the vessel origins from the arch.  Vertebral artery origins are within normal limits.  The vertebral arteries are unremarkable throughout their course without evidence for focal stenosis or occlusion.  The bilateral common carotid arteries, carotid bifurcation, and extra cranial portions of the internal carotid arteries are patent without evidence for focal stenosis or occlusion.  Bilateral internal carotid arteries are tortuous along their mid to distal course.                               CT Head Without Contrast (Final result)  Result time 02/26/19 17:08:32    Final result by Thomas Poe MD (02/26/19 17:08:32)                 Impression:      No acute intracranial abnormality identified.  Specifically, no intracranial hemorrhage.      Electronically  signed by: Thomas Poe MD  Date:    02/26/2019  Time:    17:08             Narrative:    EXAMINATION:  CT HEAD WITHOUT CONTRAST    CLINICAL HISTORY:  fall, heparinized for dialysis; Unspecified fall, initial encounter    TECHNIQUE:  Low dose axial CT images obtained throughout the head without intravenous contrast. Sagittal and coronal reconstructions were performed.    COMPARISON:  Head and maxillofacial CT 09/06/2018    FINDINGS:  Intracranial compartment:    Ventricles and sulci are normal in size for age without evidence of hydrocephalus. No extra-axial blood or fluid collections.    The brain parenchyma appears normal.  No new focal areas of abnormal parenchymal attenuation.  No parenchymal mass, hemorrhage, edema or major vascular distribution infarct.  Skull base atherosclerotic vascular calcifications noted.    Skull/extracranial contents (limited evaluation): No fracture. Mastoid air cells and paranasal sinuses are essentially clear.  Visualized portions of the orbits are within normal limits.                                Cardiac Graphics: Echocardiogram:   2D echo with color flow doppler:   Results for orders placed or performed during the hospital encounter of 05/24/18   2D echo with color flow doppler   Result Value Ref Range    QEF 65 55 - 65    Diastolic Dysfunction No     Est. PA Systolic Pressure 8.86     and Transthoracic echo (TTE) complete (Cupid Only): No results found for this or any previous visit.  Endoscopy:     Final Diagnoses:    Principal Problem: CVA (cerebral vascular accident)   Secondary Diagnoses:   Active Hospital Problems    Diagnosis  POA    *CVA (cerebral vascular accident) [I63.9]  Unknown    Ataxia [R27.0]  Yes    MIKE (obstructive sleep apnea) - very severe latest sleep study says BPAHARESH @ 16/8 [G47.33]  Yes    ESRD on dialysis [N18.6, Z99.2]  Not Applicable    Diabetes mellitus, type 2 [E11.9]  Yes    Hyperlipidemia [E78.5]  Yes    Hypertension [I10]  Yes     -TTE  (3/25/14): EF 60%, pseudonormal diastolic dysfunction. E/e' 11.         Resolved Hospital Problems   No resolved problems to display.       Discharged Condition: stable    Disposition: Another Health Care Institution Not Defined    Follow Up/Patient Instructions:     Medications:  Transfer Medications (for Discharge Readmit only):   No current facility-administered medications for this encounter.      No current outpatient medications on file.     Facility-Administered Medications Ordered in Other Encounters   Medication Dose Route Frequency Provider Last Rate Last Dose    0.9%  NaCl infusion   Intravenous PRN Queenie Law MD        0.9%  NaCl infusion   Intravenous Once Queenie Law MD        acetaminophen tablet 500 mg  500 mg Oral Q6H PRN Shyanne Doan MD   500 mg at 03/05/19 0918    dextrose 50% injection 12.5 g  12.5 g Intravenous PRN Vania Pineda NP        diclofenac sodium 1 % gel   Topical (Top) Daily Shyanne Doan MD        epoetin shefali (PROCRIT) injection 7,000 units kit  7,000 Units Intravenous Every Mon, Wed, Fri Delia Gabriel NP   7,000 Units at 03/04/19 1300    gabapentin capsule 300 mg  300 mg Oral QHS Shyanne Doan MD   300 mg at 03/04/19 2052    glucagon (human recombinant) injection 1 mg  1 mg Intramuscular PRN Vania Pineda NP        glucose chewable tablet 16 g  16 g Oral PRN Rocco Chand MD        insulin aspart U-100 pen 1-10 Units  1-10 Units Subcutaneous Q6H PRN Vania Pineda NP   4 Units at 03/05/19 1411    insulin aspart U-100 pen 5 Units  5 Units Subcutaneous Q6H Ayad oRgers PA-C        midodrine tablet 10 mg  10 mg Per NG tube TID Jorge Oviedo MD   10 mg at 03/05/19 1600    norepinephrine 32 mg in dextrose 5 % 250 mL infusion  0.02 mcg/kg/min Intravenous Continuous Jayson Roach MD 15.1 mL/hr at 03/05/19 1701 0.24 mcg/kg/min at 03/05/19 1701    ondansetron injection 4 mg  4 mg Intravenous Q6H PRN Gladis Johnson PA-C   4 mg at  03/04/19 1522    oxyCODONE immediate release tablet 5 mg  5 mg Oral Q6H PRN Jayson Roach MD   5 mg at 03/05/19 0918    pantoprazole suspension 40 mg  40 mg Per NG tube BID Vania Pineda NP   40 mg at 03/04/19 2057    polyethylene glycol packet 17 g  17 g Oral Daily Thomas Davila MD   17 g at 03/02/19 0831    pravastatin tablet 40 mg  40 mg Oral Daily Rocco Chand MD   40 mg at 03/05/19 0901    senna-docusate 8.6-50 mg per tablet 1 tablet  1 tablet Oral BID Thomas Davila MD   1 tablet at 03/05/19 0904     No discharge procedures on file.  Follow-up Information     [facility] On 3/12/2019.    Specialty:  Family Medicine  Why:  11:00 am       Contact information:  [address], [address]  [facility] [address] [phone]           Please follow up.    Why:  [facility] outpatient [facility].  Dr. Weber in [address] [place] [facility]  [phone].

## 2019-02-28 NOTE — PROGRESS NOTES
made phone contact with long term care access services and completed a level of care eligibility tool (LOCET) for nursing facility admission screening.  Level one preadmission screening and resident review form faxed to [employer] for nursing facility admission approval.

## 2019-02-28 NOTE — PLAN OF CARE
Problem: Adult Inpatient Plan of Care  Goal: Plan of Care Review  Outcome: Ongoing (interventions implemented as appropriate)  Pt is aaox4. Patient safety maintained. Pain controlled with acetaminophen as orderd. Blood sugar monitored. Patient sleep and rested over the night. No N&V,  No diarrhea. No distress noted. Will continue to monitor.

## 2019-02-28 NOTE — NURSING
Family Medicine notified patient is c/o upper leg pain . MD to put tylenol prn for pain . Will continue to monitor.

## 2019-02-28 NOTE — PLAN OF CARE
02/28/19 1726   Post-Acute Status   Post-Acute Authorization Placement   Post-Acute Placement Status Patient Evaluation by Facility     Referral declined by [facility].  [facility] and [facility] have referral under review.    Pas[address] and [address] form attached in [facility].

## 2019-02-28 NOTE — PLAN OF CARE
Problem: Occupational Therapy Goal  Goal: Occupational Therapy Goal  Goals to be met by: 3/27/19     Patient will increase functional independence with ADLs by performing:    LE Dressing with Moderate Assistance.  Grooming while standing with Minimal Assistance.  Toileting from toilet with Moderate Assistance for hygiene and clothing management.   Supine to sit with Moderate Assistance.  Step transfer with Contact Guard Assistance  Toilet transfer to bedside commode with Contact Guard Assistance.  Increased functional strength to WNL for self care skills and functional mobility.  Upper extremity exercise program x10 reps per handout, with independence.     Outcome: Ongoing (interventions implemented as appropriate)  Angel Farmer Jr. is a 58 y.o. male with a medical diagnosis of CVA (cerebral vascular accident).  Performance deficits affecting function are weakness, impaired endurance, impaired self care skills, impaired functional mobilty, gait instability, impaired balance, decreased upper extremity function, decreased coordination, decreased lower extremity function, decreased safety awareness, pain, impaired fine motor. Pt found up in chair.  Agreeable to therapy.  Pt continues to report high level of pain in L quad which hinders progression of gait.  Decreased BUE strength however no pain excluding end range pain in L shld planes. Continue OT services to address functional goals, progressing as able.    AICHA Parrish/L

## 2019-02-28 NOTE — PROGRESS NOTES
Per admit TN note 2/27:  Pt on HD MWF at [facility];  goes to OP PT at [facility]   pt has home Bipap, wc and rw       TN spoke with pt and TORO Fisher at length -- per pt and Ms. Fisher - ok to consult SNF per therapy recc -   1.  [facility]; 2.  [facility]  and 3.  [facility]     referrals sent per [facility]        This TN notified DR. Martell of need for CXR and PPD orders.  REGINA Parisi aware of case.

## 2019-02-28 NOTE — PT/OT/SLP PROGRESS
Occupational Therapy   Treatment    Name: Angel Farmer Jr.  MRN: [id number]  Admitting Diagnosis:  CVA (cerebral vascular accident)       Recommendations:     Discharge Recommendations: nursing facility, skilled  Discharge Equipment Recommendations:  commode, tub bench  Barriers to discharge:  Decreased caregiver support, Inaccessible home environment    Assessment:     Angel Farmer Jr. is a 58 y.o. male with a medical diagnosis of CVA (cerebral vascular accident).  Performance deficits affecting function are weakness, impaired endurance, impaired self care skills, impaired functional mobilty, gait instability, impaired balance, decreased upper extremity function, decreased coordination, decreased lower extremity function, decreased safety awareness, pain, impaired fine motor. Pt found up in chair.  Agreeable to therapy.  Pt continues to report high level of pain in L quad which hinders progression of gait.  Decreased BUE strength however no pain excluding end range pain in L shld planes. Continue OT services to address functional goals, progressing as able.      Rehab Prognosis:  Fair; patient would benefit from acute skilled OT services to address these deficits and reach maximum level of function.       Plan:     Patient to be seen 5 x/week to address the above listed problems via self-care/home management, therapeutic activities, therapeutic exercises  · Plan of Care Expires: 03/27/19  · Plan of Care Reviewed with: patient    Subjective     Pain/Comfort:  · Pain Rating 1: 8/10  · Location - Side 1: Left  · Location - Orientation 1: generalized  · Location 1: thigh(quad)  · Pain Addressed 1: Reposition, Pre-medicate for activity, Nurse notified, Distraction  · Pain Rating Post-Intervention 1: 8/10    Objective:     Communicated with: RN prior to session.  Patient found up in chair with bed alarm, peripheral IV upon OT entry to room.    General Precautions: Standard, fall   Orthopedic Precautions:N/A   Braces:  N/A     Occupational Performance:     Bed Mobility:    · Declined back to bed    Functional Mobility/Transfers:  · Patient completed Sit <> Stand Transfer with moderate assistance and of 2 persons  with  rolling walker and vc's for effective technqiue and hand placement.  Pt required [place] assist to maintain L hand on chair to push up.    · Functional Mobility: Pt ambulated 5 steps forward with Min-Mod A x 1 using RW and additional assist following with chair for safety.  Pt with poor control of LE's and difficulty maintaining grasp on RW with L hand. Pt requires assist and vc's for RW mgmt/safety.    [facility] 6 Click ADL: 11    Treatment & Education:  Sit<>stand x 2 trials with Mod A x 2 using RW.  1st stand, pt stood ~ 1-1 1/2 minutes, 2nd stand pt ambulated.   Decreased strength throughout BUE's.    Patient left up in chair with all lines intact, call button in reach and RN notifiedEducation:      GOALS:   Multidisciplinary Problems     Occupational Therapy Goals        Problem: Occupational Therapy Goal    Goal Priority Disciplines Outcome Interventions   Occupational Therapy Goal     OT, PT/OT Ongoing (interventions implemented as appropriate)    Description:  Goals to be met by: 3/27/19     Patient will increase functional independence with ADLs by performing:    LE Dressing with Moderate Assistance.  Grooming while standing with Minimal Assistance.  Toileting from toilet with Moderate Assistance for hygiene and clothing management.   Supine to sit with Moderate Assistance.  Step transfer with Contact Guard Assistance  Toilet transfer to bedside commode with Contact Guard Assistance.  Increased functional strength to WNL for self care skills and functional mobility.  Upper extremity exercise program x10 reps per handout, with independence.                      Time Tracking:     OT Date of Treatment: 02/28/19  OT Start Time: 1114  OT Stop Time: 1154  OT Total Time (min): 40 min    Billable Minutes:Therapeutic  Activity 23    AICHA Parrish/LIZZIE  2/28/2019

## 2019-02-28 NOTE — PLAN OF CARE
Problem: Adult Inpatient Plan of Care  Goal: Plan of Care Review  Outcome: Ongoing (interventions implemented as appropriate)  Patient on RA with sats as documented.  Will continue to monitor.  Patient states he does not want QHS cpap.

## 2019-02-28 NOTE — PLAN OF CARE
TN met with pt   lives with brother Luciano and TORO De Guzman  [phone]  Ms. De Guzman is pt's primary caregiver   pt was able to perform adl's until the last few weeks   family assists at home --     Pt on HD MWF at [facility];  goes to OP PT [facility]   pt has home Bipap, wc and rw      TN spoke with pt and TORO Fisher at length -- per pt and Ms. Fisher - ok to consult SNF per therapy recc -   1.  [facility]; 2.  [facility]  and 3.  [facility]     referrals sent per [facility]     TN / SW -- pt needs a CXR / PPD        02/27/19 5267   Discharge Assessment   Assessment Type Discharge Planning Assessment   Confirmed/corrected address and phone number on facesheet? Yes   Assessment information obtained from? Patient   Expected Length of Stay (days) 3   Communicated expected length of stay with patient/caregiver yes   Prior to hospitilization cognitive status: Alert/Oriented   Prior to hospitalization functional status: Assistive Equipment   Current cognitive status: Alert/Oriented   Current Functional Status: Assistive Equipment   Lives With sibling(s)   Able to Return to Prior Arrangements yes   Is patient able to care for self after discharge? Yes   Who are your caregiver(s) and their phone number(s)? (primary caregiver - sister in law Natalia De Guzman  [phone]; brother Luciano )   Patient's perception of discharge disposition home or selfcare;home health   Readmission Within the Last 30 Days no previous admission in last 30 days   Patient currently being followed by outpatient case management? No   Patient currently receives any other outside agency services? No   Equipment Currently Used at Home walker, rolling;wheelchair;BIPAP   Do you have any problems affording any of your prescribed medications? TBD   Is the patient taking medications as prescribed? yes   Does the patient have transportation home? Yes   Transportation Anticipated family or friend will provide   Does the patient receive  services at the [facility]? No   Discharge Plan A Skilled Nursing Facility   Patient/Family in Agreement with Plan yes

## 2019-02-28 NOTE — NURSING
VN cued into pt's room for evening round. Pt denies pain and nausea at this time. Reviewed POC with pt. Pt verbalized understanding. Pt states he feels uncomfortable and would like a stool softener. LBM 2/24/19. Informed MD. No new order.

## 2019-02-28 NOTE — RESIDENT HANDOFF
Handoff     Primary Team: Networked reference to record PCT  Room Number: K529/K529 A     Patient Name: Angel Farmer Jr. MRN: [id number]     Date of Birth: [id number] Allergies: Keflex [cephalexin]     Age: 58 y.o. Admit Date: 2/26/2019     Sex: male  BMI: Body mass index is 47.06 kg/m².     Code Status: Full Code        Illness Level (current clinical status): Watcher - NO    Reason for Admission: Fall w. CVA WORKUP    Brief HPI (pertinent PMH and diagnosis or differential diagnosis):   Angel Farmer Jr. is a 58 y.o. male with history of Diabetes mellitus type II, ESRD on HD MWF, Hyperlipidemia, Hypertension and MIKE who presented to the ED on 2/26/19 for evaluation of upper extremity weakness and ataxia after a fall.     Procedure Date: None    Hospital Course (updated, brief assessment by system or problem, significant events):     Imaging negative for acute hemorrhagic changes, neuro recs for outpatient follow-up. Neurosurgery consult pending for spinal stenosis on MRI. Pt recieves MWF dialysis.     PT recommended SNF for DC on 2/27/19.    Tasks (specific, using if-then statements): If patient's neurosurgery recs are for outpt f/u, discharge to SNF.    Estimated Discharge Date: 2/29, if neurosurgery recs outpatient f/u    Discharge Disposition: SNF pending acceptance

## 2019-03-01 PROBLEM — I95.9 HYPOTENSION: Status: ACTIVE | Noted: 2019-03-01

## 2019-03-01 PROBLEM — R55 PRE-SYNCOPE: Status: ACTIVE | Noted: 2019-03-01

## 2019-03-01 PROBLEM — D63.1 ANEMIA DUE TO STAGE 5 CHRONIC KIDNEY DISEASE: Status: ACTIVE | Noted: 2019-03-01

## 2019-03-01 PROBLEM — R00.1 BRADYCARDIA: Status: ACTIVE | Noted: 2019-03-01

## 2019-03-01 PROBLEM — M79.605 LEFT LEG PAIN: Status: ACTIVE | Noted: 2019-03-01

## 2019-03-01 PROBLEM — M89.8X9 METABOLIC BONE DISEASE: Status: ACTIVE | Noted: 2019-03-01

## 2019-03-01 PROBLEM — E83.39 HYPERPHOSPHATEMIA: Status: ACTIVE | Noted: 2019-03-01

## 2019-03-01 PROBLEM — Z78.9 PROBLEM WITH VASCULAR ACCESS: Status: ACTIVE | Noted: 2019-03-01

## 2019-03-01 PROBLEM — Z01.818 PRE-OP EVALUATION: Status: ACTIVE | Noted: 2019-03-01

## 2019-03-01 PROBLEM — N18.5 ANEMIA DUE TO STAGE 5 CHRONIC KIDNEY DISEASE: Status: ACTIVE | Noted: 2019-03-01

## 2019-03-01 LAB
ALBUMIN SERPL BCP-MCNC: 3.2 G/DL
ALP SERPL-CCNC: 163 U/L
ALT SERPL W/O P-5'-P-CCNC: 67 U/L
ANION GAP SERPL CALC-SCNC: 11 MMOL/L
ANION GAP SERPL CALC-SCNC: 12 MMOL/L
AST SERPL-CCNC: 80 U/L
BASOPHILS # BLD AUTO: 0.02 K/UL
BASOPHILS # BLD AUTO: 0.02 K/UL
BASOPHILS NFR BLD: 0.4 %
BASOPHILS NFR BLD: 0.4 %
BILIRUB SERPL-MCNC: 0.8 MG/DL
BUN SERPL-MCNC: 65 MG/DL
BUN SERPL-MCNC: 69 MG/DL
CALCIUM SERPL-MCNC: 8.7 MG/DL
CALCIUM SERPL-MCNC: 9 MG/DL
CHLORIDE SERPL-SCNC: 97 MMOL/L
CHLORIDE SERPL-SCNC: 97 MMOL/L
CO2 SERPL-SCNC: 23 MMOL/L
CO2 SERPL-SCNC: 25 MMOL/L
CREAT SERPL-MCNC: 9.1 MG/DL
CREAT SERPL-MCNC: 9.4 MG/DL
DIFFERENTIAL METHOD: ABNORMAL
DIFFERENTIAL METHOD: ABNORMAL
EOSINOPHIL # BLD AUTO: 0.1 K/UL
EOSINOPHIL # BLD AUTO: 0.2 K/UL
EOSINOPHIL NFR BLD: 2.4 %
EOSINOPHIL NFR BLD: 3.2 %
ERYTHROCYTE [DISTWIDTH] IN BLOOD BY AUTOMATED COUNT: 15 %
ERYTHROCYTE [DISTWIDTH] IN BLOOD BY AUTOMATED COUNT: 15 %
EST. GFR  (AFRICAN AMERICAN): 6.4 ML/MIN/1.73 M^2
EST. GFR  (AFRICAN AMERICAN): 6.7 ML/MIN/1.73 M^2
EST. GFR  (NON AFRICAN AMERICAN): 5.6 ML/MIN/1.73 M^2
EST. GFR  (NON AFRICAN AMERICAN): 5.8 ML/MIN/1.73 M^2
FERRITIN SERPL-MCNC: 914 NG/ML
GLUCOSE SERPL-MCNC: 103 MG/DL
GLUCOSE SERPL-MCNC: 90 MG/DL
HCT VFR BLD AUTO: 29.5 %
HCT VFR BLD AUTO: 31.7 %
HGB BLD-MCNC: 10.2 G/DL
HGB BLD-MCNC: 9.5 G/DL
IMM GRANULOCYTES # BLD AUTO: 0.01 K/UL
IMM GRANULOCYTES # BLD AUTO: 0.02 K/UL
IMM GRANULOCYTES NFR BLD AUTO: 0.2 %
IMM GRANULOCYTES NFR BLD AUTO: 0.4 %
IRON SERPL-MCNC: 169 UG/DL
LACTATE SERPL-SCNC: 1 MMOL/L
LYMPHOCYTES # BLD AUTO: 1.2 K/UL
LYMPHOCYTES # BLD AUTO: 1.4 K/UL
LYMPHOCYTES NFR BLD: 27.3 %
LYMPHOCYTES NFR BLD: 30.3 %
MAGNESIUM SERPL-MCNC: 4.1 MG/DL
MCH RBC QN AUTO: 32.9 PG
MCH RBC QN AUTO: 33.4 PG
MCHC RBC AUTO-ENTMCNC: 32.2 G/DL
MCHC RBC AUTO-ENTMCNC: 32.2 G/DL
MCV RBC AUTO: 102 FL
MCV RBC AUTO: 104 FL
MONOCYTES # BLD AUTO: 0.4 K/UL
MONOCYTES # BLD AUTO: 0.7 K/UL
MONOCYTES NFR BLD: 13.8 %
MONOCYTES NFR BLD: 8.9 %
NEUTROPHILS # BLD AUTO: 2.5 K/UL
NEUTROPHILS # BLD AUTO: 2.7 K/UL
NEUTROPHILS NFR BLD: 51.9 %
NEUTROPHILS NFR BLD: 60.8 %
NRBC BLD-RTO: 0 /100 WBC
NRBC BLD-RTO: 0 /100 WBC
PHOSPHATE SERPL-MCNC: 8.2 MG/DL
PLATELET # BLD AUTO: 76 K/UL
PLATELET # BLD AUTO: 90 K/UL
PMV BLD AUTO: 11.1 FL
PMV BLD AUTO: 11.4 FL
POCT GLUCOSE: 112 MG/DL (ref 70–110)
POCT GLUCOSE: 119 MG/DL (ref 70–110)
POCT GLUCOSE: 121 MG/DL (ref 70–110)
POCT GLUCOSE: 142 MG/DL (ref 70–110)
POTASSIUM SERPL-SCNC: 5 MMOL/L
POTASSIUM SERPL-SCNC: 5.3 MMOL/L
PROT SERPL-MCNC: 7.1 G/DL
RBC # BLD AUTO: 2.89 M/UL
RBC # BLD AUTO: 3.05 M/UL
SATURATED IRON: 78 %
SODIUM SERPL-SCNC: 131 MMOL/L
SODIUM SERPL-SCNC: 134 MMOL/L
TOTAL IRON BINDING CAPACITY: 218 UG/DL
TRANSFERRIN SERPL-MCNC: 147 MG/DL
TROPONIN I SERPL DL<=0.01 NG/ML-MCNC: 0.02 NG/ML
TROPONIN I SERPL DL<=0.01 NG/ML-MCNC: 0.02 NG/ML
WBC # BLD AUTO: 4.51 K/UL
WBC # BLD AUTO: 4.72 K/UL

## 2019-03-01 PROCEDURE — 93010 ELECTROCARDIOGRAM REPORT: CPT | Mod: ,,, | Performed by: INTERNAL MEDICINE

## 2019-03-01 PROCEDURE — 93005 ELECTROCARDIOGRAM TRACING: CPT

## 2019-03-01 PROCEDURE — 93010 ELECTROCARDIOGRAM REPORT: CPT | Mod: 76,,, | Performed by: INTERNAL MEDICINE

## 2019-03-01 PROCEDURE — 84100 ASSAY OF PHOSPHORUS: CPT

## 2019-03-01 PROCEDURE — 99222 PR INITIAL HOSPITAL CARE,LEVL II: ICD-10-PCS | Mod: GC,,, | Performed by: HOSPITALIST

## 2019-03-01 PROCEDURE — 99233 PR SUBSEQUENT HOSPITAL CARE,LEVL III: ICD-10-PCS | Mod: ,,, | Performed by: INTERNAL MEDICINE

## 2019-03-01 PROCEDURE — 99223 1ST HOSP IP/OBS HIGH 75: CPT | Mod: AI,GC,, | Performed by: NEUROLOGICAL SURGERY

## 2019-03-01 PROCEDURE — 83540 ASSAY OF IRON: CPT

## 2019-03-01 PROCEDURE — 25000003 PHARM REV CODE 250: Performed by: STUDENT IN AN ORGANIZED HEALTH CARE EDUCATION/TRAINING PROGRAM

## 2019-03-01 PROCEDURE — 90935 HEMODIALYSIS ONE EVALUATION: CPT | Mod: ,,, | Performed by: INTERNAL MEDICINE

## 2019-03-01 PROCEDURE — 80053 COMPREHEN METABOLIC PANEL: CPT

## 2019-03-01 PROCEDURE — 80100016 HC MAINTENANCE HEMODIALYSIS

## 2019-03-01 PROCEDURE — 27000190 HC CPAP FULL FACE MASK W/VALVE

## 2019-03-01 PROCEDURE — 36415 COLL VENOUS BLD VENIPUNCTURE: CPT

## 2019-03-01 PROCEDURE — 99223 PR INITIAL HOSPITAL CARE,LEVL III: ICD-10-PCS | Mod: AI,GC,, | Performed by: NEUROLOGICAL SURGERY

## 2019-03-01 PROCEDURE — 82728 ASSAY OF FERRITIN: CPT

## 2019-03-01 PROCEDURE — 90935 PR HEMODIALYSIS, ONE EVALUATION: ICD-10-PCS | Mod: ,,, | Performed by: INTERNAL MEDICINE

## 2019-03-01 PROCEDURE — 20000000 HC ICU ROOM

## 2019-03-01 PROCEDURE — 99222 PR INITIAL HOSPITAL CARE,LEVL II: ICD-10-PCS | Mod: 25,,, | Performed by: INTERNAL MEDICINE

## 2019-03-01 PROCEDURE — 93010 EKG 12-LEAD: ICD-10-PCS | Mod: 76,,, | Performed by: INTERNAL MEDICINE

## 2019-03-01 PROCEDURE — 86901 BLOOD TYPING SEROLOGIC RH(D): CPT

## 2019-03-01 PROCEDURE — 99900035 HC TECH TIME PER 15 MIN (STAT)

## 2019-03-01 PROCEDURE — 99222 1ST HOSP IP/OBS MODERATE 55: CPT | Mod: GC,,, | Performed by: HOSPITALIST

## 2019-03-01 PROCEDURE — 83735 ASSAY OF MAGNESIUM: CPT

## 2019-03-01 PROCEDURE — 83605 ASSAY OF LACTIC ACID: CPT

## 2019-03-01 PROCEDURE — 99222 1ST HOSP IP/OBS MODERATE 55: CPT | Mod: 25,,, | Performed by: INTERNAL MEDICINE

## 2019-03-01 PROCEDURE — 84484 ASSAY OF TROPONIN QUANT: CPT | Mod: 91

## 2019-03-01 PROCEDURE — 85025 COMPLETE CBC W/AUTO DIFF WBC: CPT | Mod: 91

## 2019-03-01 PROCEDURE — 80048 BASIC METABOLIC PNL TOTAL CA: CPT

## 2019-03-01 PROCEDURE — 99233 SBSQ HOSP IP/OBS HIGH 50: CPT | Mod: ,,, | Performed by: INTERNAL MEDICINE

## 2019-03-01 PROCEDURE — 84484 ASSAY OF TROPONIN QUANT: CPT

## 2019-03-01 RX ORDER — GENTAMICIN SULFATE 80 MG/100ML
80 INJECTION, SOLUTION INTRAVENOUS
Status: DISCONTINUED | OUTPATIENT
Start: 2019-03-01 | End: 2019-03-04

## 2019-03-01 RX ORDER — GABAPENTIN 300 MG/1
300 CAPSULE ORAL NIGHTLY
Status: DISCONTINUED | OUTPATIENT
Start: 2019-03-02 | End: 2019-03-20 | Stop reason: HOSPADM

## 2019-03-01 RX ORDER — ACETAMINOPHEN 500 MG
500 TABLET ORAL EVERY 6 HOURS PRN
Status: DISCONTINUED | OUTPATIENT
Start: 2019-03-01 | End: 2019-03-20 | Stop reason: HOSPADM

## 2019-03-01 RX ORDER — SODIUM CHLORIDE 9 MG/ML
INJECTION, SOLUTION INTRAVENOUS ONCE
Status: DISCONTINUED | OUTPATIENT
Start: 2019-03-01 | End: 2019-03-07

## 2019-03-01 RX ORDER — GABAPENTIN 300 MG/1
300 CAPSULE ORAL 3 TIMES DAILY
Status: DISCONTINUED | OUTPATIENT
Start: 2019-03-01 | End: 2019-03-01

## 2019-03-01 RX ORDER — SODIUM CHLORIDE 9 MG/ML
INJECTION, SOLUTION INTRAVENOUS CONTINUOUS
Status: DISCONTINUED | OUTPATIENT
Start: 2019-03-01 | End: 2019-03-01

## 2019-03-01 RX ORDER — IBUPROFEN 200 MG
16 TABLET ORAL
Status: DISCONTINUED | OUTPATIENT
Start: 2019-03-01 | End: 2019-03-06

## 2019-03-01 RX ORDER — DICLOFENAC SODIUM 10 MG/G
GEL TOPICAL DAILY
Status: DISCONTINUED | OUTPATIENT
Start: 2019-03-01 | End: 2019-03-20 | Stop reason: HOSPADM

## 2019-03-01 RX ORDER — PRAVASTATIN SODIUM 40 MG/1
40 TABLET ORAL DAILY
Status: DISCONTINUED | OUTPATIENT
Start: 2019-03-01 | End: 2019-03-20 | Stop reason: HOSPADM

## 2019-03-01 RX ORDER — LANTHANUM CARBONATE 500 MG/1
1000 TABLET, CHEWABLE ORAL
Status: DISCONTINUED | OUTPATIENT
Start: 2019-03-01 | End: 2019-03-01

## 2019-03-01 RX ORDER — SODIUM CHLORIDE 9 MG/ML
INJECTION, SOLUTION INTRAVENOUS
Status: DISCONTINUED | OUTPATIENT
Start: 2019-03-01 | End: 2019-03-07

## 2019-03-01 RX ORDER — INSULIN ASPART 100 [IU]/ML
0-5 INJECTION, SOLUTION INTRAVENOUS; SUBCUTANEOUS
Status: DISCONTINUED | OUTPATIENT
Start: 2019-03-01 | End: 2019-03-03

## 2019-03-01 RX ADMIN — DICLOFENAC: 10 GEL TOPICAL at 08:03

## 2019-03-01 RX ADMIN — PRAVASTATIN SODIUM 40 MG: 40 TABLET ORAL at 09:03

## 2019-03-01 RX ADMIN — ACETAMINOPHEN 500 MG: 500 TABLET, FILM COATED ORAL at 10:03

## 2019-03-01 RX ADMIN — GABAPENTIN 300 MG: 300 CAPSULE ORAL at 09:03

## 2019-03-01 NOTE — CONSULTS
[facility] Hospital Medicine  Consult Note    Patient Name: Angel Farmer Jr.  MRN: [id number]  Admission Date: 2/28/2019  Hospital Length of Stay: 1 days  Attending Physician: Ruddy Wylie MD   Primary Care Provider: Satya Noriega MD     [facility] Medicine Team: Networked reference to record PCT  Amy Zimmer MD      Patient information was obtained from patient and past medical records.     Inpatient consult to Hospital Medicine-General  Consult performed by: Amy Zimmer MD  Consult ordered by: Ed Rahman DO  Reason for consult: pre-op risk evaluation         Subjective:     Principal Problem: <principal problem not specified>    Chief Complaint:   Chief Complaint   Patient presents with    Gait Problem        HPI: 57 yo male with a PMH of ESRD with dialysis MWF (last on weds), DM, presenting as transfer from [facility] as admission to neurosurgery for evaluation after progressive course of unsteady gait as well as bilateral upper extremity weakness for the last several months.  Mr. Farmer has had wobbly gait since this past September, at which point he recalls falling and has been at 80-90% of his normal function.  He has been able to walk without assist, however not for very long distances.  He states that since last Sunday he has felt progression of his symptoms and now feels it would be difficult to stand up to ambulate at all. His UE symptoms have been present for about 3 months including not writing as well as he is used to. No sensory disturbance noted as well as no neck or back pain and no pain from the neck radiating into the hands. MRI at OSH on 2/27 revealed significant cervical stenosis.      Hospital medicine consulted for pre-op risk assessment. Pt reports that he can not walk for couple blocks or going up stairs due to weakness but denies chest pain or sob. He was diagnosed with sever MIKE based on sleep study on 2015. He usis BiPAP at  night but did not use it for the last week. He is diabetic and uses long acting insulin only. He is on HD MWF with Cr 9 today. Denies previous history of stroke, TIA or CAD. Last Echo on 02828/19 with EF 60%    Past Medical History:   Diagnosis Date    Diabetes mellitus type II     Dialysis patient     Hyperlipidemia     Hypertension     Kidney failure     MIKE (obstructive sleep apnea)     Urinary tract infection        Past Surgical History:   Procedure Laterality Date    AV FISTULA PLACEMENT      left lower arm    TONSILLECTOMY, ADENOIDECTOMY      TRANSESOPHAGEAL ECHOCARDIOGRAM (SERNEITY) N/A 5/23/2017    Performed by Donaldo Mai MD at [facility] OR       Review of patient's allergies indicates:   Allergen Reactions    Keflex [cephalexin] Nausea Only       Current Facility-Administered Medications on File Prior to Encounter   Medication    [COMPLETED] magnesium citrate solution 296 mL    [COMPLETED] tuberculin injection 5 Units    [DISCONTINUED] 0.9%  NaCl infusion    [DISCONTINUED] 0.9%  NaCl infusion    [DISCONTINUED] acetaminophen tablet 650 mg    [DISCONTINUED] aspirin chewable tablet 81 mg    [DISCONTINUED] gabapentin capsule 100 mg    [DISCONTINUED] gabapentin capsule 300 mg    [DISCONTINUED] heparin (porcine) injection 7,500 Units    [DISCONTINUED] insulin aspart U-100 pen 8 Units    [DISCONTINUED] insulin detemir U-100 pen 20 Units    [DISCONTINUED] labetalol injection 10 mg    [DISCONTINUED] lidocaine 5 % patch 1 patch    [DISCONTINUED] lorazepam (ATIVAN) injection 2 mg    [DISCONTINUED] methyl salicylate-menthol 15-10% cream    [DISCONTINUED] midodrine tablet 10 mg    [DISCONTINUED] ondansetron disintegrating tablet 8 mg    [DISCONTINUED] pravastatin tablet 40 mg    [DISCONTINUED] senna-docusate 8.6-50 mg per tablet 1 tablet    [DISCONTINUED] sodium chloride 0.9% flush 3 mL    [DISCONTINUED] traMADol tablet 50 mg    [DISCONTINUED] venlafaxine tablet 37.5 mg     Current  "Outpatient Medications on File Prior to Encounter   Medication Sig    ammonium lactate 12 % Crea Apply 1 application topically 3 (three) times daily as needed.    aspirin 81 MG Chew Take 1 tablet (81 mg total) by mouth once daily.    BD ULTRA-FINE MINI PEN NEEDLE 31 gauge x 3/16" Ndle USE 4 TO 5 TIMES A DAY     ( DISCARD PEN NEEDLE AFTER EACH USE )    blood-glucose meter (ADVANCED GLUCOSE METER) Misc 1 each by Misc.(Non-Drug; Combo Route) route 4 (four) times daily with meals and nightly.    clindamycin (CLEOCIN) 300 MG capsule Take 1 capsule (300 mg total) by mouth every 6 (six) hours.    fluticasone (FLONASE) 50 mcg/actuation nasal spray 1 spray by Each Nare route once daily.    gabapentin (NEURONTIN) 300 MG capsule TAKE 1 BY MOUTH EVERY      EVENING    insulin (BASAGLAR KWIKPEN U-100 INSULIN) glargine 100 units/mL (3mL) SubQ pen Inject 55 Units into the skin every evening. (Patient taking differently: Inject 40 Units into the skin every evening. )    lancets Misc 1 each by Misc.(Non-Drug; Combo Route) route 4 (four) times daily with meals and nightly.    liraglutide 0.6 mg/0.1 mL, 18 mg/3 mL, subq PNIJ (VICTOZA 2-SHOAIB) 0.6 mg/0.1 mL (18 mg/3 mL) PnIj Inject 1.2 mg into the skin once daily.    ONETOUCH ULTRA BLUE TEST STRIP Strp USE ONE STRIP TO CHECK BLOOD GLUCOSE 4 TIMES DAILY.AT MORNING, NOON,6:OO PM AND BEDTIME.    pravastatin (PRAVACHOL) 40 MG tablet Take 1 tablet (40 mg total) by mouth once daily. (Patient taking differently: Take 40 mg by mouth every evening. )     Family History     Problem Relation (Age of Onset)    Colon cancer Mother    Diabetes Mother, Father, Maternal Grandmother, Maternal Grandfather    Heart disease Father    Hypertension Father    Lung cancer Mother        Tobacco Use    Smoking status: Never Smoker    Smokeless tobacco: Never Used   Substance and Sexual Activity    Alcohol use: No    Drug use: No    Sexual activity: Not Currently     Review of Systems "   Constitutional: Negative for chills, fever and unexpected weight change.   HENT: Negative for ear pain, sinus pressure and sore throat.    Eyes: Negative for pain.   Respiratory: Negative for cough and shortness of breath.    Cardiovascular: Negative for chest pain and leg swelling.   Gastrointestinal: Negative for abdominal pain, diarrhea, nausea and vomiting.   Genitourinary: Negative for dysuria and hematuria.   Musculoskeletal: Positive for arthralgias (left upper shoulder) and gait problem. Negative for back pain and myalgias.   Skin: Negative for rash and wound.   Neurological: Positive for weakness (BUE). Negative for headaches.     Objective:     Vital Signs (Most Recent):  Temp: 97.6 °F (36.4 °C) (03/01/19 1101)  Pulse: (!) 58 (03/01/19 1101)  Resp: 17 (03/01/19 1101)  BP: 136/60 (03/01/19 1101)  SpO2: 99 % (03/01/19 1101) Vital Signs (24h Range):  Temp:  [97 °F (36.1 °C)-98.1 °F (36.7 °C)] 97.6 °F (36.4 °C)  Pulse:  [56-60] 58  Resp:  [17-20] 17  SpO2:  [96 %-99 %] 99 %  BP: (117-166)/(56-78) 136/60        There is no height or weight on file to calculate BMI.    Physical Exam        General: Well developed, well nourished male in NAD  HEENT: Conjunctiva clear; Oropharynx clear  Neck: No JVD noted, Supple  CV: Normal S1, S2 with no murmurs  Resp: Lungs CTA Bilaterally, Unlabored  Abdomen: NTND, BS normoactive x4 quads, soft  Extrem: No cyanosis, clubbing, edema.  Skin: No rashes, lesions, ulcers  PSYCH: Oriented x3  Neuro:   No neck pain or back pain.   5/5 strength in the upper extremities b/l.   3+ UE reflexes  LLE 3/5 strength proximally (limited due to muscle strain)  RLE 5/5 strength  2+ LE reflexes, no clonus or babinsky    Significant Labs:   Recent Labs   Lab 03/01/19  0449   WBC 4.72   RBC 3.05*   HGB 10.2*   HCT 31.7*   PLT 76*   *   MCH 33.4*   MCHC 32.2       Recent Labs   Lab 02/26/19  1517 02/27/19  0844 02/28/19  0501 03/01/19  0449    140 136 134*   K 4.5 4.8 4.3 5.3*   CL  105 104 101 97   CO2 26 23 26 25   BUN 42* 57* 38* 65*   CREATININE 8.6* 10.1* 6.9* 9.1*   CALCIUM 9.4 9.7 9.3 9.0   MG 2.4 2.3  2.3 2.3  --        Significant Imaging: Reviewed    Assessment/Plan:     Pre-op evaluation     A 59 yo male with PMH ESRD (MWF dialysis, anuric), DM presenting with worsening gait disturbance with upper extremity loss of coordination progressive symptoms over last month.  Increased reflexes in UE's, dysmetria, patient has signs concerning for cervical myelopathy with MRI C3-6 stenosis most critical at C5-6.  Neurosurgery planing to for surgery and asking for pre-op risk evaluation     Cardiovascular Risk Assessment:  Non-emergent surgery.  No active cardiac problems   Intermediate risk surgery.  Functional Status: < 4 METS likely due to weakness but pt denies chest pain or sob  Revised cardiac risk index is 10.1  Pt with sever MIKE. Keep bed elevated and use BiPAP at night     Recommendation:  1. Pt has 10.1 % for major cardiac events during the surgery   2. Pt with limited bilateral upper extremities weakness and seem benefit outweighs the risk  3. Pt with left thigh pain after history of fall down, recommend left thigh x-ray   4. Recommend Nephrology evaluation for the need of dialysis before surgery        VTE Risk Mitigation (From admission, onward)        Ordered     IP VTE HIGH RISK PATIENT  Once      03/01/19 0240     Place SWAPNIL hose  Until discontinued      03/01/19 0240     Place sequential compression device  Until discontinued      03/01/19 0240              Thank you for your consult. I will sign off. Please contact us if you have any additional questions.    Amy Zimmer MD  Department of Hospital Medicine   [facility]

## 2019-03-01 NOTE — ASSESSMENT & PLAN NOTE
On fosrenol as outpatient 2000mg tid  Will order 100 mg tid wm for now and adjust as needed   if fosrenol is unavailable would use sevelamer as a phosphate binder 800-1600 mg tid wm

## 2019-03-01 NOTE — SUBJECTIVE & OBJECTIVE
Past Medical History:   Diagnosis Date    Diabetes mellitus type II     Dialysis patient     Hyperlipidemia     Hypertension     Kidney failure     MIKE (obstructive sleep apnea)     Urinary tract infection        Past Surgical History:   Procedure Laterality Date    AV FISTULA PLACEMENT      left lower arm    TONSILLECTOMY, ADENOIDECTOMY      TRANSESOPHAGEAL ECHOCARDIOGRAM (SERENITY) N/A 5/23/2017    Performed by Donaldo Mai MD at [facility] OR       Review of patient's allergies indicates:   Allergen Reactions    Keflex [cephalexin] Nausea Only       Current Facility-Administered Medications on File Prior to Encounter   Medication    [COMPLETED] magnesium citrate solution 296 mL    [COMPLETED] tuberculin injection 5 Units    [DISCONTINUED] 0.9%  NaCl infusion    [DISCONTINUED] 0.9%  NaCl infusion    [DISCONTINUED] acetaminophen tablet 650 mg    [DISCONTINUED] aspirin chewable tablet 81 mg    [DISCONTINUED] gabapentin capsule 100 mg    [DISCONTINUED] gabapentin capsule 300 mg    [DISCONTINUED] heparin (porcine) injection 7,500 Units    [DISCONTINUED] insulin aspart U-100 pen 8 Units    [DISCONTINUED] insulin detemir U-100 pen 20 Units    [DISCONTINUED] labetalol injection 10 mg    [DISCONTINUED] lidocaine 5 % patch 1 patch    [DISCONTINUED] lorazepam (ATIVAN) injection 2 mg    [DISCONTINUED] methyl salicylate-menthol 15-10% cream    [DISCONTINUED] midodrine tablet 10 mg    [DISCONTINUED] ondansetron disintegrating tablet 8 mg    [DISCONTINUED] pravastatin tablet 40 mg    [DISCONTINUED] senna-docusate 8.6-50 mg per tablet 1 tablet    [DISCONTINUED] sodium chloride 0.9% flush 3 mL    [DISCONTINUED] traMADol tablet 50 mg    [DISCONTINUED] venlafaxine tablet 37.5 mg     Current Outpatient Medications on File Prior to Encounter   Medication Sig    ammonium lactate 12 % Crea Apply 1 application topically 3 (three) times daily as needed.    aspirin 81 MG Chew Take 1 tablet (81 mg total) by  "mouth once daily.    BD ULTRA-FINE MINI PEN NEEDLE 31 gauge x 3/16" Ndle USE 4 TO 5 TIMES A DAY     ( DISCARD PEN NEEDLE AFTER EACH USE )    blood-glucose meter (ADVANCED GLUCOSE METER) Misc 1 each by Misc.(Non-Drug; Combo Route) route 4 (four) times daily with meals and nightly.    clindamycin (CLEOCIN) 300 MG capsule Take 1 capsule (300 mg total) by mouth every 6 (six) hours.    fluticasone (FLONASE) 50 mcg/actuation nasal spray 1 spray by Each Nare route once daily.    gabapentin (NEURONTIN) 300 MG capsule TAKE 1 BY MOUTH EVERY      EVENING    insulin (BASAGLAR KWIKPEN U-100 INSULIN) glargine 100 units/mL (3mL) SubQ pen Inject 55 Units into the skin every evening. (Patient taking differently: Inject 40 Units into the skin every evening. )    lancets Misc 1 each by Misc.(Non-Drug; Combo Route) route 4 (four) times daily with meals and nightly.    liraglutide 0.6 mg/0.1 mL, 18 mg/3 mL, subq PNIJ (VICTOZA 2-SHOAIB) 0.6 mg/0.1 mL (18 mg/3 mL) PnIj Inject 1.2 mg into the skin once daily.    ONETOUCH ULTRA BLUE TEST STRIP Strp USE ONE STRIP TO CHECK BLOOD GLUCOSE 4 TIMES DAILY.AT MORNING, NOON,6:OO PM AND BEDTIME.    pravastatin (PRAVACHOL) 40 MG tablet Take 1 tablet (40 mg total) by mouth once daily. (Patient taking differently: Take 40 mg by mouth every evening. )     Family History     Problem Relation (Age of Onset)    Colon cancer Mother    Diabetes Mother, Father, Maternal Grandmother, Maternal Grandfather    Heart disease Father    Hypertension Father    Lung cancer Mother        Tobacco Use    Smoking status: Never Smoker    Smokeless tobacco: Never Used   Substance and Sexual Activity    Alcohol use: No    Drug use: No    Sexual activity: Not Currently     Review of Systems   Constitutional: Negative for chills, fever and unexpected weight change.   HENT: Negative for ear pain, sinus pressure and sore throat.    Eyes: Negative for pain.   Respiratory: Negative for cough and shortness of breath.  "   Cardiovascular: Negative for chest pain and leg swelling.   Gastrointestinal: Negative for abdominal pain, diarrhea, nausea and vomiting.   Genitourinary: Negative for dysuria and hematuria.   Musculoskeletal: Positive for arthralgias (left upper shoulder) and gait problem. Negative for back pain and myalgias.   Skin: Negative for rash and wound.   Neurological: Positive for weakness (BUE). Negative for headaches.     Objective:     Vital Signs (Most Recent):  Temp: 97.6 °F (36.4 °C) (03/01/19 1101)  Pulse: (!) 58 (03/01/19 1101)  Resp: 17 (03/01/19 1101)  BP: 136/60 (03/01/19 1101)  SpO2: 99 % (03/01/19 1101) Vital Signs (24h Range):  Temp:  [97 °F (36.1 °C)-98.1 °F (36.7 °C)] 97.6 °F (36.4 °C)  Pulse:  [56-60] 58  Resp:  [17-20] 17  SpO2:  [96 %-99 %] 99 %  BP: (117-166)/(56-78) 136/60        There is no height or weight on file to calculate BMI.    Physical Exam        General: Well developed, well nourished male in NAD  HEENT: Conjunctiva clear; Oropharynx clear  Neck: No JVD noted, Supple  CV: Normal S1, S2 with no murmurs  Resp: Lungs CTA Bilaterally, Unlabored  Abdomen: NTND, BS normoactive x4 quads, soft  Extrem: No cyanosis, clubbing, edema.  Skin: No rashes, lesions, ulcers  PSYCH: Oriented x3  Neuro:   No neck pain or back pain.   5/5 strength in the upper extremities b/l.   3+ UE reflexes  LLE 3/5 strength proximally (limited due to muscle strain)  RLE 5/5 strength  2+ LE reflexes, no clonus or babinsky    Significant Labs:   Recent Labs   Lab 03/01/19  0449   WBC 4.72   RBC 3.05*   HGB 10.2*   HCT 31.7*   PLT 76*   *   MCH 33.4*   MCHC 32.2       Recent Labs   Lab 02/26/19  1517 02/27/19  0844 02/28/19  0501 03/01/19  0449    140 136 134*   K 4.5 4.8 4.3 5.3*    104 101 97   CO2 26 23 26 25   BUN 42* 57* 38* 65*   CREATININE 8.6* 10.1* 6.9* 9.1*   CALCIUM 9.4 9.7 9.3 9.0   MG 2.4 2.3  2.3 2.3  --        Significant Imaging: Reviewed

## 2019-03-01 NOTE — ASSESSMENT & PLAN NOTE
59 yo male with a PMH of ESRD with dialysis MWF (last on weds), DM, presenting as transfer from [facility] as admission to neurosurgery for evaluation after progressive course of unsteady gait as well as bilateral upper extremity weakness for the last several months.     ESRD on HD with Dr. Araceli Pickens in [facility] MWF, 4.5 hours, EDW 140kg  Last HD 2/27/19 @L removed no available current DW    HD today for clearance and volume management  No longer on midodrine as bp improved as per patient    Renal 2 gm K 1000 cc fluid restriction diet

## 2019-03-01 NOTE — SUBJECTIVE & OBJECTIVE
Interval History: naeon    Medications:  Continuous Infusions:  Scheduled Meds:   gabapentin  300 mg Oral TID    pravastatin  40 mg Oral Daily     PRN Meds:dextrose 50%, glucose, insulin aspart U-100     Review of Systems  Objective:        There is no height or weight on file to calculate BMI.  Vital Signs (Most Recent):  Temp: 97.5 °F (36.4 °C) (03/01/19 0751)  Pulse: 60 (03/01/19 0751)  Resp: 18 (03/01/19 0751)  BP: (!) 148/66 (03/01/19 0751)  SpO2: 99 % (03/01/19 0751) Vital Signs (24h Range):  Temp:  [97 °F (36.1 °C)-98.1 °F (36.7 °C)] 97.5 °F (36.4 °C)  Pulse:  [56-60] 60  Resp:  [18-20] 18  SpO2:  [96 %-99 %] 99 %  BP: (117-166)/(56-78) 148/66                           Hemodialysis AV Fistula Left forearm (Active)   Needle Size 15ga 2/28/2019  8:20 AM   Site Assessment Clean;Dry;Intact;No redness;No swelling 2/28/2019  8:20 AM   Patency Thrill;Bruit 2/28/2019  8:20 AM   Status Accessed 2/28/2019  8:20 AM   Flows Good 2/28/2019  8:20 AM   Dressing Status Clean;Dry;Intact 2/27/2019  7:25 PM   Site Condition No complications 2/28/2019  8:20 AM   Dressing Gauze 2/27/2019  7:30 AM       Neurosurgery Physical Exam   AOX3  PERRL, face symm, tongue midline  RUE: 4/5 in right deltoid, biceps, WE, triceps; 3/5 HI  LUE: 3/5 in left deltoid, 4/5 biceps, WE, triceps; 3/5 HI  BLE: 4/5  SILT  No paulina's    Significant Labs:  Recent Labs   Lab 02/28/19  0501 03/01/19  0449   GLU 86 103    134*   K 4.3 5.3*    97   CO2 26 25   BUN 38* 65*   CREATININE 6.9* 9.1*   CALCIUM 9.3 9.0   MG 2.3  --      Recent Labs   Lab 02/28/19  0501 03/01/19  0449   WBC 4.92 4.72   HGB 10.5* 10.2*   HCT 33.9* 31.7*   PLT 95* 76*     No results for input(s): LABPT, INR, APTT in the last 48 hours.  Microbiology Results (last 7 days)     ** No results found for the last 168 hours. **            Significant Diagnostics:

## 2019-03-01 NOTE — ASSESSMENT & PLAN NOTE
57 yo male with PMH ESRD (MWF dialysis, anuric), DM presenting with worsening gait disturbance with upper extremity loss of coordination progressive symptoms over last month.  Increased reflexes in UE's, dysmetria, patient has signs concerning for cervical myelopathy with MRI C3-6 stenosis most critical at C5-6.         --Nephrology following for dialysis.  Patient last received dialysis on Wednesday.  Will ensure receives dosage tomorrow  --Full pre operative lab work-up  --Will discuss risk and benefit of surgery, pt is anxious about idea of surgery and would like to further discuss options  --Review of imaging with staff in AM and formulation of operative plan  --Please page neurosurgery with any changes in exam

## 2019-03-01 NOTE — PLAN OF CARE
PT LIVES WITH SIBLINGS    TRANSFERRED FROM [facility]        03/01/19 1348   Discharge Assessment   Assessment Type Discharge Planning Assessment   Confirmed/corrected address and phone number on facesheet? Yes   Assessment information obtained from? Patient   Expected Length of Stay (days) 3   Communicated expected length of stay with patient/caregiver yes   Prior to hospitilization cognitive status: Alert/Oriented   Prior to hospitalization functional status: Assistive Equipment   Current cognitive status: Alert/Oriented   Current Functional Status: Assistive Equipment   Lives With sibling(s)   Able to Return to Prior Arrangements yes   Is patient able to care for self after discharge? Yes   Patient's perception of discharge disposition home or selfcare;home health   Readmission Within the Last 30 Days no previous admission in last 30 days   Patient currently being followed by outpatient case management? No   Patient currently receives any other outside agency services? No   Equipment Currently Used at Home walker, rolling;wheelchair;BIPAP   Do you have any problems affording any of your prescribed medications? No   Is the patient taking medications as prescribed? yes   Does the patient have transportation home? Yes   Transportation Anticipated family or friend will provide   Does the patient receive services at the [facility]? No   Discharge Plan A Skilled Nursing Facility   Discharge Plan B Rehab   Patient/Family in Agreement with Plan yes

## 2019-03-01 NOTE — HPI
57 yo male with a PMH of ESRD with dialysis MWF (last on weds), DM, presenting as transfer from [facility] as admission to neurosurgery for evaluation after progressive course of unsteady gait as well as bilateral upper extremity weakness for the last several months.  Mr. Farmer has had wobbly gait since this past September, at which point he recalls falling and has been at 80-90% of his normal function.  He has been able to walk without assist, however not for very long distances.  He states that since last Sunday he has felt progression of his symptoms and now feels it would be difficult to stand up to ambulate at all. His UE symptoms have been present for about 3 months including not writing as well as he is used to. No sensory disturbance noted as well as no neck or back pain and no pain from the neck radiating into the hands. MRI at OSH on 2/27 revealed significant cervical stenosis.      Hospital medicine consulted for pre-op risk assessment. Pt reports that he can not walk for couple blocks or going up stairs due to weakness but denies chest pain or sob. He was diagnosed with sever MIKE based on sleep study on 2015. He usis BiPAP at night but did not use it for the last week. He is diabetic and uses long acting insulin only. He is on HD MWF with Cr 9 today. Denies previous history of stroke, TIA or CAD. Last Echo on 02828/19 with EF 60%

## 2019-03-01 NOTE — ASSESSMENT & PLAN NOTE
58 M with progressive cervical myelopathy and stenosis from C3-6 with myelomalacia  -Medicine for preop clearance  -NPO pm  -Will plan for C3-6 posterior decompression/fusion tomorrow.

## 2019-03-01 NOTE — PROGRESS NOTES
Neurology Progress Note    Interval Hx -     Patient still with BUE weakness. Still complains of unsteadiness when walking; walked with PT; states cannot get up and stand up for me due to pain in LLE. All issues above stable; no worsening. No other complaints brought up by patient.      Vitals -     Vitals:    19 1702   BP: 132/61   Pulse: (!) 56   Resp: 20   Temp: 97.7 °F (36.5 °C)       Neurological Exam -     Gen: Alert and oriented to state/city/place name, year/month/date/day, full name/, situation    CN: PERBOB. EOMI. No hemianopsia/quandrantopia appreciated. Tactile sensation subjectively intact and equal bilaterally in V1-3 distribution.  Smile symmetric; no nasolabial fold flattening.  Uvual midline.  Testing of trapezius and sternocleidomastoid resulted with adequate strength to resistance.  Tongue protrusion midline.    Tonicity:No appreciable hyper/hypo-tonicity of the upper/lower extremities.    Strength: flexion/extension at shoulder, elbow, and wrist and abduction of fingers 5/5 on right. Adduction of shoulder on right 5/5. Abduction of shoulder of shoulder on right 3/5. Abduction of thumb on rightt 4/5. Extension/flexion of MCP/PIP/DIP's on right 5/5. flexion/extension at shoulder, elbow, and wrist and abduction of fingers 3/5 on left. Adduction/abduction of shoulder on left 3/5. Abduction of thumb on left 3/5. Flexion of hip 5/5 on right; 2/5 on left but limited due to pain. Flexion of knee 5/5 on right; 3/5 on left but limited due to pain. plantarflexion/dorsiflexion 5/5 bilaterally.    Sensation: tactile sensation grossly and subjectively intact in upper and lower extremities bilaterally. Pinprick decreased in bilateral LE's distal to knee. Vibration/proprioception intact in BLE's. Pinprick decreased proximally throughout LUE proximal to wrist (does not follow any dermatome). Pinprick intact throughout RUE.    Reflexes: triceps, biceps, brachioradialis, patellar 2+ and symmetric  bilaterally. achillis 1+ bilaterally.    Special reflexes: On attempt, did not illicit Sanford's or Babinski's bilaterally.    Cerebellar: finger-nose nonconcerning bilaterally.    Other: romberg testing deferred; patient states cannot stand due to pain in LLE    Gait: deferred; patient states cannot stand due to pain in LLE          Assessment and Plan -       57 yo M with gradual onset unsteadiness in gait and UE weakness. Gait abnormality 2/2 LLE proximal pain. UE weakness 2/2 cervical cord abnormalities.     Per chart review, he has had complaints of chronic left hip pain as far back as May 2018 and left shoulder pain since September 2018. His consistent concern is his left leg pain and bilateral upper extremity weakness and overall deconditioning and fatigue. There is no right lower extremity weakness proximally or distally. There is no distal left lower extremity weakness noted; left proximal strength testing is limited due to pain. There is small fiber neuropathy evident in BLE's but no large fiber neuropathy. There is no signs of cerebellar abnormalities on exam or MRI Brain.    Gait abnormality; presumed 2/2 left leg pain, generalized deconditioning due to ESRD, and decreased confidence in walking now due to weakness/pain in UE  - continue PT/OT; dispo and DME's per these services  - MRI Brain without abnormalities significant for clinical complaint but also confounded by motion artifact; can be repeated under sedation when appropriate but not necessary at this time  - workup of left leg pain per primary; potential orthopedic concern    BUE weakness and LUE pain; weakness secondary to cervical spinal stenosis and NF narrowing; LUE pain due to injury to left shoulder  - MRI C spine WO with significant spinal stenosis C3-4 and C6-7; moderate-severe bilateral NF narrowing at C3-C6; consult neurosurgery; likely to be an outpatient procedure but would differ triaging to neurosurgery  - EMG/NCS outpatient to  assess severity  - workup of left shoulder injury per primary; potential orthopedic concern    Bilateral lower extremity neuropathic pain; likely 2/2 DM and ESRD  - EMG/NCS outpatient  - switch gabapentin 300 mg QHS to 100 mg daily but only on days of HD; provide medication 4 hours after HD. Add venlafaxine 37.5 mg once daily; renal dosing; may help with depression/anxiety, as well    Deconditioning  - per primary; would start with nutrition service recommendations    Depression/anxiety  - per primary; patient did endorse some concern here; denied SI/HI/AH/VH  - addressing this would help with his overall well being / quality of life    Insomnia  - per primary; addressing this would help with his overall well being / quality of life    Will be signing off at this time. Dr. Brumfield has been made aware of this case and will check to see if patient needs anything else tomorrow from neurology but we are unlikely to provide any further recommendations at this time.    Refer to neurology clinic to be seen within 2-3 weeks    Ravi Underwood MD  PGY-IV Neurology

## 2019-03-01 NOTE — HPI
CC: cervical stenosis with gait impairment    Nephrology consulted for ESRD management    57 yo male with a PMH of ESRD with dialysis MWF (last on weds), DM, presenting as transfer from [facility] as admission to neurosurgery for evaluation after progressive course of unsteady gait as well as bilateral upper extremity weakness for the last several months.  Mr. Farmer has had wobbly gait since this past September, at which point he recalls falling and has been at 80-90% of his normal function.  He has been able to walk without assist, however not for very long distances.  He states that since last Sunday he has felt progression of his symptoms and now feels it would be difficult to stand up to ambulate at all.  He also endorses a pulled muscle of his LLE that is limiting his mobility.  His UE symptoms have been present for about 3 months including not writing as well as he is used to.  HE is able to write numbers, but not always his name.  He has been having difficulty using a fork to eat over the past month.  No sensory disturbance noted as well as no neck or back pain and no pain from the neck radiating into the hands.  He has intermittently been taking ASA 81 mg qdaily. He denies recent falls or trauma to the neck.  No other blood thinning medication.  MRI at OSH on 2/27 revealed significant cervical stenosis.    No SoB, cp he has in the last 2 weeks had evaluation and fistulogram of AVF and treatment at outpatient center    ESRD on HD with Dr. Araceli Pickens in [facility] MWF, 4.5 hours, EDW 140kg  Last HD 2/27/19 @L removed no available current DW

## 2019-03-01 NOTE — PROGRESS NOTES
Duration of HD treatment 1 hours and 500cc removed. Needles removed and hemostasis achieved. Dressing intact and no drainage noted. Thrill and bruit present.

## 2019-03-01 NOTE — SIGNIFICANT EVENT
Having trouble obtaining bp during dialysis; hr decreased to 40's. Patient c/o feeling bad; began to complain of things getting cloudy. BP: 60's-70's systolic when able to obtain. Dr. Law at bedside; patient rinsed back. Code called (see code documentation). Critical Care and Neurosurgery spoke; patient will be admitted to ICU. Patient feeling better; no cloudiness. VS stable. Labs drawn. Needles pulled per JOSHUA Rae LPN. Pressure held X 5 minutes. Hemostasis achieved. Pressure dressings applied. + thrill and bruit. Patient transferred to [facility] accompanied by icu RN and dialysis nurse.

## 2019-03-01 NOTE — PROGRESS NOTES
Progress Note  Nephrology      Consult Requested By: Severyn Yaroshevsky, MD      SUBJECTIVE:     Overnight events  Patient is a 58 y.o. male     Patient Active Problem List   Diagnosis    Hypertension    Diabetes mellitus, type 2    Hyperlipidemia    Macrocytic anemia    Allergic rhinitis    ESRD on dialysis    Atypical chest pain    Psoriasis    Sleep apnea    Foot pain    MIKE (obstructive sleep apnea) - very severe latest sleep study says BPAP @ 16/8    Multifactorial peripheral neuropathy    Fungal infection of foot    Flu-like symptoms    Bacteremia    Fever    Hypertension associated with stage 5 chronic kidney disease due to type 2 diabetes mellitus    FUO (fever of unknown origin)    Trigger finger, left ring finger    CVA (cerebral vascular accident)    Ataxia    Cervical myelopathy     Past Medical History:   Diagnosis Date    Diabetes mellitus type II     Dialysis patient     Hyperlipidemia     Hypertension     Kidney failure     MIKE (obstructive sleep apnea)     Urinary tract infection               OBJECTIVE:     Vitals:    02/28/19 1228 02/28/19 1544 02/28/19 1702 02/28/19 1942   BP: (!) 121/58  132/61    BP Location:       Patient Position: Sitting  Lying    Pulse: 60 60 (!) 56 (!) 57   Resp: 20 20 18   Temp: 98.1 °F (36.7 °C)  97.7 °F (36.5 °C)    TempSrc: Oral  Oral    SpO2:  96%  98%   Weight:       Height:           Temp: 97.7 °F (36.5 °C) (02/28/19 1702)  Pulse: (!) 57 (02/28/19 1942)  Resp: 18 (02/28/19 1942)  BP: 132/61 (02/28/19 1702)  SpO2: 98 % (02/28/19 1942)    Date 02/28/19 0700 - 03/01/19 0659   Shift 8315-1397 9716-2325 9621-5573 24 Hour Total   INTAKE   P.O. 330 240  570   Shift Total(mL/kg) 330(2.4) 240(1.8)  570(4.2)   OUTPUT   Urine(mL/kg/hr) 0(0) 0  0   Shift Total(mL/kg) 0(0) 0(0)  0(0)   Weight (kg) 136.3 136.3 136.3 136.3             Medications:   sodium chloride 0.9%   Intravenous Once    aspirin  81 mg Oral Daily    [START ON 3/1/2019]  gabapentin  100 mg Oral Every Mon, Wed, Fri    heparin (porcine)  7,500 Units Subcutaneous Q8H    insulin aspart U-100  8 Units Subcutaneous QID (WM & HS)    insulin detemir U-100  20 Units Subcutaneous QHS    lidocaine  1 patch Transdermal Q24H    methyl salicylate-menthol 15-10%   Topical (Top) TID    midodrine  10 mg Oral TID    pravastatin  40 mg Oral Daily    sodium chloride 0.9%  3 mL Intravenous Q8H    venlafaxine  37.5 mg Oral Daily                 Physical Exam:  General appearance: NAD  Pain LLE  Weak  Unsteady  No SOB  No cough  Poor intake  Lungs: diminished breath sounds  Heart: pulse 57  Abdomen: soft  Extremities: edema   Skin: edema      Laboratory:  ABG  Labs reviewed  No results found for this or any previous visit (from the past 336 hour(s)).  Recent Results (from the past 336 hour(s))   CBC auto differential    Collection Time: 02/28/19  5:01 AM   Result Value Ref Range    WBC 4.92 3.90 - 12.70 K/uL    Hemoglobin 10.5 (L) 14.0 - 18.0 g/dL    Hematocrit 33.9 (L) 40.0 - 54.0 %    Platelets 95 (L) 150 - 350 K/uL   CBC auto differential    Collection Time: 02/27/19  8:44 AM   Result Value Ref Range    WBC 6.12 3.90 - 12.70 K/uL    Hemoglobin 10.2 (L) 14.0 - 18.0 g/dL    Hematocrit 33.1 (L) 40.0 - 54.0 %    Platelets 96 (L) 150 - 350 K/uL   CBC auto differential    Collection Time: 02/26/19  3:17 PM   Result Value Ref Range    WBC 6.12 3.90 - 12.70 K/uL    Hemoglobin 10.4 (L) 14.0 - 18.0 g/dL    Hematocrit 33.9 (L) 40.0 - 54.0 %    Platelets 90 (L) 150 - 350 K/uL     Urinalysis  No results for input(s): COLORU, CLARITYU, SPECGRAV, PHUR, PROTEINUA, GLUCOSEU, BILIRUBINCON, BLOODU, WBCU, RBCU, BACTERIA, MUCUS, NITRITE, LEUKOCYTESUR, UROBILINOGEN, HYALINECASTS in the last 24 hours.    Diagnostic Results:  X-Ray: Reviewed  US: Reviewed  Echo: Reviewed  ACCESS    ASSESSMENT/PLAN:   Cervical cord abnormalities  LLE pain  ESRD  Anemia  Metabolic bone disease  Dialysis in am

## 2019-03-01 NOTE — H&P
[facility] Hospital Medicine  History & Physical    Patient Name: Angel Farmer Jr.  MRN: [id number]  Admission Date: 2/28/2019  Attending Physician: Ruddy Wylie MD  Primary Care Provider: Satya Noriega MD    [facility] Medicine Team: Networked reference to record PCT  Rocco Chand MD     Patient information was obtained from patient.     Subjective:     Principal Problem: Cervical myelopathy    Chief Complaint:   Chief Complaint   Patient presents with    Gait Problem        HPI: 59 yo male with a PMH of ESRD with dialysis MWF (last on weds), DM, presenting as transfer from [facility] as admission to neurosurgery for evaluation after progressive course of unsteady gait as well as bilateral upper extremity weakness for the last several months.  Mr. Farmer has had wobbly gait since this past September, at which point he recalls falling and has been at 80-90% of his normal function.  He has been able to walk without assist, however not for very long distances.  He states that since last Sunday he has felt progression of his symptoms and now feels it would be difficult to stand up to ambulate at all.  He also endorses a pulled muscle of his LLE that is limiting his mobility.  His UE symptoms have been present for about 3 months including not writing as well as he is used to.  HE is able to write numbers, but not always his name.  He has been having difficulty using a fork to eat over the past month.  No sensory disturbance noted as well as no neck or back pain and no pain from the neck radiating into the hands.  He has intermittently been taking ASA 81 mg qdaily. He denies recent falls or trauma to the neck.  No other blood thinning medication.  MRI at OSH on 2/27 revealed significant cervical stenosis.      Past Medical History:   Diagnosis Date    Diabetes mellitus type II     Dialysis patient     Hyperlipidemia     Hypertension     Kidney failure     MIKE (obstructive sleep  "apnea)     Urinary tract infection        Past Surgical History:   Procedure Laterality Date    AV FISTULA PLACEMENT      left lower arm    TONSILLECTOMY, ADENOIDECTOMY      TRANSESOPHAGEAL ECHOCARDIOGRAM (SERENITY) N/A 5/23/2017    Performed by Donaldo Mai MD at [facility] OR       Review of patient's allergies indicates:   Allergen Reactions    Keflex [cephalexin] Nausea Only       Current Facility-Administered Medications on File Prior to Encounter   Medication    [COMPLETED] magnesium citrate solution 296 mL    [COMPLETED] tuberculin injection 5 Units    [DISCONTINUED] 0.9%  NaCl infusion    [DISCONTINUED] 0.9%  NaCl infusion    [DISCONTINUED] acetaminophen tablet 650 mg    [DISCONTINUED] aspirin chewable tablet 81 mg    [DISCONTINUED] gabapentin capsule 100 mg    [DISCONTINUED] gabapentin capsule 300 mg    [DISCONTINUED] heparin (porcine) injection 7,500 Units    [DISCONTINUED] insulin aspart U-100 pen 8 Units    [DISCONTINUED] insulin detemir U-100 pen 20 Units    [DISCONTINUED] labetalol injection 10 mg    [DISCONTINUED] lidocaine 5 % patch 1 patch    [DISCONTINUED] lorazepam (ATIVAN) injection 2 mg    [DISCONTINUED] methyl salicylate-menthol 15-10% cream    [DISCONTINUED] midodrine tablet 10 mg    [DISCONTINUED] ondansetron disintegrating tablet 8 mg    [DISCONTINUED] pravastatin tablet 40 mg    [DISCONTINUED] senna-docusate 8.6-50 mg per tablet 1 tablet    [DISCONTINUED] sodium chloride 0.9% flush 3 mL    [DISCONTINUED] traMADol tablet 50 mg    [DISCONTINUED] venlafaxine tablet 37.5 mg     Current Outpatient Medications on File Prior to Encounter   Medication Sig    ammonium lactate 12 % Crea Apply 1 application topically 3 (three) times daily as needed.    aspirin 81 MG Chew Take 1 tablet (81 mg total) by mouth once daily.    BD ULTRA-FINE MINI PEN NEEDLE 31 gauge x 3/16" Ndle USE 4 TO 5 TIMES A DAY     ( DISCARD PEN NEEDLE AFTER EACH USE )    blood-glucose meter (ADVANCED " GLUCOSE METER) Misc 1 each by Misc.(Non-Drug; Combo Route) route 4 (four) times daily with meals and nightly.    clindamycin (CLEOCIN) 300 MG capsule Take 1 capsule (300 mg total) by mouth every 6 (six) hours.    fluticasone (FLONASE) 50 mcg/actuation nasal spray 1 spray by Each Nare route once daily.    gabapentin (NEURONTIN) 300 MG capsule TAKE 1 BY MOUTH EVERY      EVENING    insulin (BASAGLAR KWIKPEN U-100 INSULIN) glargine 100 units/mL (3mL) SubQ pen Inject 55 Units into the skin every evening. (Patient taking differently: Inject 40 Units into the skin every evening. )    lancets Misc 1 each by Misc.(Non-Drug; Combo Route) route 4 (four) times daily with meals and nightly.    liraglutide 0.6 mg/0.1 mL, 18 mg/3 mL, subq PNIJ (VICTOZA 2-SHOAIB) 0.6 mg/0.1 mL (18 mg/3 mL) PnIj Inject 1.2 mg into the skin once daily.    ONETOUCH ULTRA BLUE TEST STRIP Strp USE ONE STRIP TO CHECK BLOOD GLUCOSE 4 TIMES DAILY.AT MORNING, NOON,6:OO PM AND BEDTIME.    pravastatin (PRAVACHOL) 40 MG tablet Take 1 tablet (40 mg total) by mouth once daily. (Patient taking differently: Take 40 mg by mouth every evening. )     Family History     Problem Relation (Age of Onset)    Colon cancer Mother    Diabetes Mother, Father, Maternal Grandmother, Maternal Grandfather    Heart disease Father    Hypertension Father    Lung cancer Mother        Tobacco Use    Smoking status: Never Smoker    Smokeless tobacco: Never Used   Substance and Sexual Activity    Alcohol use: No    Drug use: No    Sexual activity: Not Currently     Review of Systems  Objective:     Vital Signs (Most Recent):    Vital Signs (24h Range):  Temp:  [97 °F (36.1 °C)-98.1 °F (36.7 °C)] 97 °F (36.1 °C)  Pulse:  [56-77] 59  Resp:  [18-20] 18  SpO2:  [96 %-99 %] 98 %  BP: (115-134)/(55-65) 117/56        There is no height or weight on file to calculate BMI.    Physical Exam        CN 2-12 intact  No neck pain or back pain.   5/5 strength in the upper extremities b/l. L  deltoid strength diminished secondary to inflamed left shoulder joint from arthritis.   Positive hoffmans bilaterally with 3+ UE reflexes  LLE 3/5 strength proximally (limited due to muscle strain)  RLE 5/5 strength  2+ LE reflexes, no clonus or babinsky  Sensation intact to light tough  Proprioception intact  +significant dysmetria bilaterally.     Significant Labs: Reviewed    Significant Imaging: Reviewed    Assessment/Plan:     Cervical myelopathy    59 yo male with PMH ESRD (MWF dialysis, anuric), DM presenting with worsening gait disturbance with upper extremity loss of coordination progressive symptoms over last month.  Increased reflexes in UE's, dysmetria, patient has signs concerning for cervical myelopathy with MRI C3-6 stenosis most critical at C5-6.         --Nephrology following for dialysis.  Patient last received dialysis on Wednesday.  Will ensure receives dosage tomorrow  --Full pre operative lab work-up  --Will discuss risk and benefit of surgery, pt is anxious about idea of surgery and would like to further discuss options  --Restarted on home medications  --NPO after midnight for possible surgery, will likely need pre op optimization given comorbidities. On IVF 100cc/hour  --Review of imaging with staff in AM and formulation of operative plan  --Please page neurosurgery with any changes in exam             VTE Risk Mitigation (From admission, onward)        Ordered     IP VTE HIGH RISK PATIENT  Once      03/01/19 0240     Place SWAPNIL hose  Until discontinued      03/01/19 0240     Place sequential compression device  Until discontinued      03/01/19 0240             Rocco Chand MD  Department of Hospital Medicine   [facility]

## 2019-03-01 NOTE — PROGRESS NOTES
[facility] STAFF HEMODIALYSIS NOTE     Patient currently on hemodialysis for removal of uremic toxins and volume.    Patient seen and evaluated on hemodialysis, tolerating treatment, see HD flowsheet for vitals and assessments.      Ultrafiltration goal is 2-3 L as tolerated     Labs have been reviewed and the dialysate bath has been adjusted.     Assessment/Plan:     HD today for removal of uremic toxins and volume.

## 2019-03-01 NOTE — PLAN OF CARE
Problem: Adult Inpatient Plan of Care  Goal: Plan of Care Review  Outcome: Ongoing (interventions implemented as appropriate)  Patient on RA with sats as documented.\  Will continue to monitor.

## 2019-03-01 NOTE — PLAN OF CARE
Problem: Physical Therapy Goal  Goal: Physical Therapy Goal  Goals to be met by: 3/27/19     Patient will increase functional independence with mobility by performin. Supine <> sit with MInimal Assistance  2. Sit to stand transfer with Contact Guard Assistance  3. Bed to chair transfer with Contact Guard Assistance using Rolling Walker  4. Gait  x 25 feet with Contact Guard Assistance using Rolling Walker.      Outcome: Ongoing (interventions implemented as appropriate)  Continue working toward goals.

## 2019-03-01 NOTE — CONSULTS
[facility] [address]  Nephrology  Consult Note    Patient Name: Angel Farmer Jr.  MRN: [id number]  Admission Date: 2/28/2019  Hospital Length of Stay: 1 days  Attending Provider: Ruddy Wylie MD   Primary Care Physician: Satya Noriega MD  Principal Problem:<principal problem not specified>    Consults  Subjective:     HPI: CC: cervical stenosis with gait impairment    Nephrology consulted for ESRD management    57 yo male with a PMH of ESRD with dialysis MWF (last on weds), DM, presenting as transfer from [facility] as admission to neurosurgery for evaluation after progressive course of unsteady gait as well as bilateral upper extremity weakness for the last several months.  Mr. Farmer has had wobbly gait since this past September, at which point he recalls falling and has been at 80-90% of his normal function.  He has been able to walk without assist, however not for very long distances.  He states that since last Sunday he has felt progression of his symptoms and now feels it would be difficult to stand up to ambulate at all.  He also endorses a pulled muscle of his LLE that is limiting his mobility.  His UE symptoms have been present for about 3 months including not writing as well as he is used to.  HE is able to write numbers, but not always his name.  He has been having difficulty using a fork to eat over the past month.  No sensory disturbance noted as well as no neck or back pain and no pain from the neck radiating into the hands.  He has intermittently been taking ASA 81 mg qdaily. He denies recent falls or trauma to the neck.  No other blood thinning medication.  MRI at OSH on 2/27 revealed significant cervical stenosis.    No SoB, cp he has in the last 2 weeks had evaluation and fistulogram of AVF and treatment at outpatient center    ESRD on HD with Dr. Araceli Pickens in [facility] MWF, 4.5 hours, EDW 140kg  Last HD 2/27/19 @L removed no available current DW        Past  Medical History:   Diagnosis Date    Diabetes mellitus type II     Dialysis patient     Hyperlipidemia     Hypertension     Kidney failure     MIKE (obstructive sleep apnea)     Urinary tract infection        Past Surgical History:   Procedure Laterality Date    AV FISTULA PLACEMENT      left lower arm    TONSILLECTOMY, ADENOIDECTOMY      TRANSESOPHAGEAL ECHOCARDIOGRAM (SERENITY) N/A 5/23/2017    Performed by Donaldo Mai MD at [facility] OR       Review of patient's allergies indicates:   Allergen Reactions    Keflex [cephalexin] Nausea Only     Current Facility-Administered Medications   Medication Frequency    dextrose 50% injection 12.5 g PRN    gabapentin capsule 300 mg TID    glucose chewable tablet 16 g PRN    insulin aspart U-100 pen 0-5 Units QID (AC + HS) PRN    pravastatin tablet 40 mg Daily     Family History     Problem Relation (Age of Onset)    Colon cancer Mother    Diabetes Mother, Father, Maternal Grandmother, Maternal Grandfather    Heart disease Father    Hypertension Father    Lung cancer Mother        Tobacco Use    Smoking status: Never Smoker    Smokeless tobacco: Never Used   Substance and Sexual Activity    Alcohol use: No    Drug use: No    Sexual activity: Not Currently     Review of Systems   Constitutional: Negative for activity change, appetite change, chills, diaphoresis, fatigue, fever and unexpected weight change.   HENT: Negative for congestion, ear discharge, ear pain, facial swelling, hearing loss, nosebleeds, sinus pressure, sore throat and trouble swallowing.    Eyes: Negative for photophobia, pain, discharge, redness, itching and visual disturbance.   Respiratory: Negative for apnea, cough, chest tightness, shortness of breath and wheezing.    Cardiovascular: Negative for chest pain, palpitations and leg swelling.   Gastrointestinal: Negative for abdominal distention, abdominal pain, constipation, diarrhea and vomiting.   Endocrine: Negative for cold  intolerance, heat intolerance, polydipsia and polyuria.   Genitourinary: Negative for decreased urine volume, difficulty urinating, dysuria, flank pain, frequency, hematuria, scrotal swelling, testicular pain and urgency.   Musculoskeletal: Negative for arthralgias, back pain, gait problem, joint swelling, myalgias, neck pain and neck stiffness.   Skin: Negative for color change, pallor, rash and wound.   Allergic/Immunologic: Negative for environmental allergies and food allergies.   Neurological: Positive for weakness. Negative for dizziness, tremors, seizures, syncope, facial asymmetry, speech difficulty, light-headedness, numbness and headaches.   Hematological: Negative for adenopathy. Does not bruise/bleed easily.   Psychiatric/Behavioral: Negative for agitation, behavioral problems, dysphoric mood and sleep disturbance. The patient is not nervous/anxious.      Objective:     Vital Signs (Most Recent):  Temp: 97.6 °F (36.4 °C) (03/01/19 1101)  Pulse: (!) 58 (03/01/19 1101)  Resp: 17 (03/01/19 1101)  BP: 136/60 (03/01/19 1101)  SpO2: 99 % (03/01/19 1101)  O2 Device (Oxygen Therapy): room air (03/01/19 0800) Vital Signs (24h Range):  Temp:  [97 °F (36.1 °C)-98.1 °F (36.7 °C)] 97.6 °F (36.4 °C)  Pulse:  [56-60] 58  Resp:  [17-20] 17  SpO2:  [96 %-99 %] 99 %  BP: (117-166)/(56-78) 136/60        There is no height or weight on file to calculate BMI.  There is no height or weight on file to calculate BSA.    No intake/output data recorded.    Physical Exam   Constitutional: He is oriented to person, place, and time. He appears well-developed and well-nourished. No distress.   Morbid obesity precludes most of exam  NAD   HENT:   Head: Normocephalic and atraumatic.   Mouth/Throat: Oropharynx is clear and moist. No oropharyngeal exudate.   Eyes: Conjunctivae and EOM are normal. Pupils are equal, round, and reactive to light. Right eye exhibits no discharge. Left eye exhibits no discharge. No scleral icterus.   Neck:  Normal range of motion. Neck supple. No JVD present. No tracheal deviation present. No thyromegaly present.   Cardiovascular: Normal rate, regular rhythm and normal heart sounds. Exam reveals no gallop and no friction rub.   No murmur heard.  LLA AVF with guaze at an old cannulation site with BRB\  Good thrill and bruit no tenderness  + 2 bilateral LE pitting edema   Pulmonary/Chest: Effort normal and breath sounds normal. No stridor. No respiratory distress. He has no wheezes. He has no rales. He exhibits no tenderness.   Abdominal: Soft. Bowel sounds are normal. He exhibits no distension and no mass. There is no tenderness. There is no rebound and no guarding. No hernia.   Musculoskeletal: Normal range of motion. He exhibits no edema or tenderness.   Lymphadenopathy:     He has no cervical adenopathy.   Neurological: He is alert and oriented to person, place, and time. No cranial nerve deficit. He exhibits normal muscle tone. Coordination normal.   Skin: Skin is warm and dry. No rash noted. He is not diaphoretic. No erythema. No pallor.   Psychiatric: He has a normal mood and affect. His behavior is normal. Judgment and thought content normal.   Nursing note and vitals reviewed.      Significant Labs:  All labs within the past 24 hours have been reviewed.    Significant Imaging:  Labs: Reviewed  X-Ray: Reviewed  Echo: Reviewed    Assessment/Plan:     ESRD on dialysis    59 yo male with a PMH of ESRD with dialysis MWF (last on weds), DM, presenting as transfer from [facility] as admission to neurosurgery for evaluation after progressive course of unsteady gait as well as bilateral upper extremity weakness for the last several months.     ESRD on HD with Dr. Araceli Pickens in [facility] MWF, 4.5 hours, EDW 140kg  Last HD 2/27/19 @L removed no available current DW    HD today for clearance and volume management  No longer on midodrine as bp improved as per patient    Renal 2 gm K 1000 cc fluid restriction  diet         Anemia due to stage 5 chronic kidney disease    Hbg 10.2 no acute need for JUANITO but will likely need at next treatment   Will order iron studies first and assess    Thrombocytopenia  eval as per primary team     Metabolic bone disease    Check pth vit d   Follow calcium and phos serially     Problem with vascular access    Known LAVF stensoois with recent procedure now with bleeding at cannulation site 48 hrs after last cannulation    Advisevascular surgery  to evaluate and   Will order US of fistula     Hyperphosphatemia    On fosrenol as outpatient 2000mg tid  Will order 100 mg tid wm for now and adjust as needed   if fosrenol is unavailable would use sevelamer as a phosphate binder 800-1600 mg tid wm            Thank you for your consult. I will follow-up with patient. Please contact us if you have any additional questions.    Queenie Lwa MD  Nephrology  [facility]

## 2019-03-01 NOTE — PLAN OF CARE
Problem: Adult Inpatient Plan of Care  Goal: Plan of Care Review  Outcome: Ongoing (interventions implemented as appropriate)  Pt is oriented x4. Patient safety maintained. Blood sugar monitored. Pt is anxious but cooperative. Pt is obese and is to be ambulated with assistance due to generalized weakness and throbbing pain in left leg. Continue to monitor.

## 2019-03-01 NOTE — PLAN OF CARE
SW following for DC needs. SW in communication with CM.    Needs to be determined after the OR.     Ofelia Lund, LONG  [facility]  [id number]

## 2019-03-01 NOTE — HOSPITAL COURSE
3/1: naeon, discussed surgery with pt and mother  3/2: Pt margarette down & became hypotensive in dialysis - code blue - ok now, but going to MICU for workup.  Nephro thinks hypotensive from hypovolemia. He is stable overnight. EKG with old changes. Trop normal. Neurologically at baseline.   3/3: Stepdown and cleared by MICU for surgery. OR today for C3-C6 decompression and fusion   3/4 -  -151, ow AFVSS, NAEON, leukocytosis to 20, sodium 134, potassium 6.3, ow labs stable, exam stable  3.5: NAEON.   3/6: NAEON. No issues per nursing. Still on Levo for MAPs goal. Exam stable.   3/7: Last day of MAP goal. NAEON. Stable on Exam. On Levo.  3/8 -  -184, ow AFVSS, NAEON, labs stable, exam stable  3/11 - AFVSS, NAEON, platelets 85, ow labs stable, exam stable  3/12 : AF, -168.  NEIL ON.   3/13 - AFVSS, NAEON, hgb 7.4, ow labs stable, exam stable  3/14: H/H stable. Dialysis today. Pending rehab.   3/15: h/h improving. + diarrhea/vomiting. Ordered c diff panel and KUB. Pending rehab.  3/16: Patient admits continued diarrhea, but denies vomiting. Abdominal XR shows partial SBO vs ileus. + c diff antigen, - c diff toxins. Started on PO vancomycin. Consulted GI. Dialysis today.  3/17: hypotensive at times, continues to endorse diarrhea, ow AFVSS, NAEON, labs stable, exam stable  3/18: AFVSS. NAEON. SBP: 115-142. Admits diarrhea. ID rec no c diff treatment. gen surg rec CT with contrast and with oral contrast. Coordinated CT with nephrology and radiology. Will get CT in am and dialysis in afternoon. Sutures removed  3/19: AFVSS. NAEON. No BM since this am. CT abdomen shows no SBO or ileus. Patient had HD today. Awaiting endo rec's before discharge. Likely discharge tomorrow.  3/20: Accepted for discharge to [facility].

## 2019-03-01 NOTE — ASSESSMENT & PLAN NOTE
Hbg 10.2 no acute need for JUANITO but will likely need at next treatment   Will order iron studies first and assess    Thrombocytopenia  eval as per primary team

## 2019-03-01 NOTE — PROGRESS NOTES
[facility]  Neurosurgery  Progress Note    Subjective:     History of Present Illness: No notes on file    Post-Op Info:  * No surgery found *         Interval History: naeon    Medications:  Continuous Infusions:  Scheduled Meds:   gabapentin  300 mg Oral TID    pravastatin  40 mg Oral Daily     PRN Meds:dextrose 50%, glucose, insulin aspart U-100     Review of Systems    Review of Systems   Constitutional: Negative for diaphoresis, fever and weight loss.   Respiratory: Negative for shortness of breath.    Cardiovascular: Negative for chest pain.   Gastrointestinal: Negative for blood in stool.   Genitourinary: Negative for hematuria.   Endo/Heme/Allergies: Does not bruise/bleed easily.   All other systems reviewed and are negative.      Objective:        There is no height or weight on file to calculate BMI.  Vital Signs (Most Recent):  Temp: 97.5 °F (36.4 °C) (03/01/19 0751)  Pulse: 60 (03/01/19 0751)  Resp: 18 (03/01/19 0751)  BP: (!) 148/66 (03/01/19 0751)  SpO2: 99 % (03/01/19 0751) Vital Signs (24h Range):  Temp:  [97 °F (36.1 °C)-98.1 °F (36.7 °C)] 97.5 °F (36.4 °C)  Pulse:  [56-60] 60  Resp:  [18-20] 18  SpO2:  [96 %-99 %] 99 %  BP: (117-166)/(56-78) 148/66                           Hemodialysis AV Fistula Left forearm (Active)   Needle Size 15ga 2/28/2019  8:20 AM   Site Assessment Clean;Dry;Intact;No redness;No swelling 2/28/2019  8:20 AM   Patency Thrill;Bruit 2/28/2019  8:20 AM   Status Accessed 2/28/2019  8:20 AM   Flows Good 2/28/2019  8:20 AM   Dressing Status Clean;Dry;Intact 2/27/2019  7:25 PM   Site Condition No complications 2/28/2019  8:20 AM   Dressing Gauze 2/27/2019  7:30 AM       Neurosurgery Physical Exam   AOX3  PERRL, face symm, tongue midline  RUE: 4/5 in right deltoid, biceps, WE, triceps; 3/5 HI  LUE: 3/5 in left deltoid, 4/5 biceps, WE, triceps; 3/5 HI  BLE: 4/5  SILT  Lynnette gallardo's    Significant Labs:  Recent Labs   Lab 02/28/19  0501 03/01/19  0449   GLU 86  103    134*   K 4.3 5.3*    97   CO2 26 25   BUN 38* 65*   CREATININE 6.9* 9.1*   CALCIUM 9.3 9.0   MG 2.3  --      Recent Labs   Lab 02/28/19  0501 03/01/19  0449   WBC 4.92 4.72   HGB 10.5* 10.2*   HCT 33.9* 31.7*   PLT 95* 76*     No results for input(s): LABPT, INR, APTT in the last 48 hours.  Microbiology Results (last 7 days)     ** No results found for the last 168 hours. **            Significant Diagnostics:      Assessment/Plan:     Cervical myelopathy    58 M with progressive cervical myelopathy and stenosis from C3-6 with myelomalacia  -Medicine for preop clearance  -NPO pm  -Will plan for C3-6 posterior decompression/fusion tomorrow.         Ed Rahman, DO  Neurosurgery  [facility]

## 2019-03-01 NOTE — ASSESSMENT & PLAN NOTE
Cardiovascular Risk Assessment:  Non-emergent surgery.  No active cardiac problems   Intermediate risk surgery.  Functional Status: < 4 METS likely due to weakness but pt denies chest pain or sob  Revised cardiac risk index is 10.1  Pt with sever MIKE. Keep bed elevated and use BiPAP at night     Recommendation:  1. Pt has 10.1 % for major cardiac events during the surgery   2. Pt with limited bilateral upper extremities weakness and seem benefit outweighs the risk  3. Pt with left thigh pain after history of fall down, recommend left thigh x-ray   4. Recommend Nephrology evaluation for the need of dialysis before surgery

## 2019-03-01 NOTE — PT/OT/SLP PROGRESS
"Physical Therapy Treatment    Patient Name:  Angel Farmer Jr.   MRN:  [id number]    Recommendations:     Discharge Recommendations:  nursing facility, skilled   Discharge Equipment Recommendations: commode, tub bench   Barriers to discharge: increased physical assistance for all functional mobility    Assessment:     Angel Farmer Jr. is a 58 y.o. male admitted with a medical diagnosis of CVA (cerebral vascular accident).  He presents with the following impairments/functional limitations:  weakness, impaired endurance, impaired functional mobilty, impaired self care skills, gait instability, impaired balance, decreased upper extremity function, decreased lower extremity function, decreased safety awareness, pain, decreased coordination, decreased ROM, impaired fine motor.  Pt would continue to benefit from P.T. To address impairments listed above.    Rehab Prognosis: Fair; patient would benefit from acute skilled PT services to address these deficits and reach maximum level of function.    Recent Surgery: * No surgery found *      Plan:     During this hospitalization, patient to be seen 6 x/week to address the identified rehab impairments via gait training, therapeutic activities, therapeutic exercises and progress toward the following goals:    · Plan of Care Expires:  03/27/19    Subjective     Chief Complaint: thigh pain.    Patient/Family Comments/goals: "My thigh muscle hurts.  Pain/Comfort:  Pain Rating 1: 8/10  Location - Side 1: Left  Location - Orientation 1: generalized  Location 1: (quad muscle(thigh))  Pain Addressed 1: Reposition, Pre-medicate for activity, Nurse notified  Pain Rating Post-Intervention 1: 8/10      Objective:     Communicated with RN(Yajaira) prior to session.  Patient found all lines intact, call button in reach, chair alarm on and RN notified peripheral IV(chair alarm)  upon PT entry to room.     General Precautions: Standard, fall   Orthopedic Precautions:N/A   Braces:   "     Functional Mobility:  · Transfers:     · Sit to Stand:  moderate assistance and of 2\ persons with rolling walker  · Gait: 5 forward and backward with Rw and Mod/Loli of 1 with assist of another for chair pulled behind pt for increased safety.  Pt ambulates with decreased kaykay and step lenght with decreased foot clearance from floor,L > R, and no heel strike.  VC's for upright posture and sequencing. and no   · Balance: sitting good, standing fair/fair- Rw, gait fair-/poor+      AM-PAC 6 CLICK MOBILITY  Turning over in bed (including adjusting bedclothes, sheets and blankets)?: 2  Sitting down on and standing up from a chair with arms (e.g., wheelchair, bedside commode, etc.): 2  Moving from lying on back to sitting on the side of the bed?: 2  Moving to and from a bed to a chair (including a wheelchair)?: 3  Need to walk in hospital room?: 2  Climbing 3-5 steps with a railing?: 1  Basic Mobility Total Score: 12       Therapeutic Activities and Exercises:   Seated BLE therex: AP, LAQ, hip ABD/ADD with pillow, hip flexion x 15 reps   with pt's trunk leaning back in chair for increased range.  Pt sits leaning to the left resting on L forearm.  Standing marching in place with Rw and Loli of 2 for balance/safety with decreased L hip flexion on first bout and decreased hip flexion on R second bout.  10 reps x 2      Patient left up in chair with all lines intact, call button in reach, chair alarm on and RN notified..    GOALS:   Multidisciplinary Problems     Physical Therapy Goals        Problem: Physical Therapy Goal    Goal Priority Disciplines Outcome Goal Variances Interventions   Physical Therapy Goal     PT, PT/OT Ongoing (interventions implemented as appropriate)     Description:  Goals to be met by: 3/27/19     Patient will increase functional independence with mobility by performin. Supine <> sit with MInimal Assistance  2. Sit to stand transfer with Contact Guard Assistance  3. Bed to chair  transfer with Contact Guard Assistance using Rolling Walker  4. Gait  x 25 feet with Contact Guard Assistance using Rolling Walker.                       Time Tracking:     PT Received On: 02/28/19  PT Start Time: 1114     PT Stop Time: 1208  PT Total Time (min): 54 min   1154 to 1208 PTA treating pt alone for therex.     Billable Minutes: Gait Training 9  Therapeutic Exercise 14      Treatment Type: Treatment  PT/PTA: PTA     PTA Visit Number: 1     Linda Devi PTA  03/01/2019

## 2019-03-01 NOTE — PROGRESS NOTES
HD treatment started. Access to left lower arm. Access arterial line without any issues. Venous site was stuck 4 time before getting a successful line. Dr. Law notified. No complaints of discomfort at this time.

## 2019-03-01 NOTE — ASSESSMENT & PLAN NOTE
Known LAVF stensoois with recent procedure now with bleeding at cannulation site 48 hrs after last cannulation    Advisevascular surgery  to evaluate and   Will order US of fistula

## 2019-03-01 NOTE — SUBJECTIVE & OBJECTIVE
Past Medical History:   Diagnosis Date    Diabetes mellitus type II     Dialysis patient     Hyperlipidemia     Hypertension     Kidney failure     MIKE (obstructive sleep apnea)     Urinary tract infection        Past Surgical History:   Procedure Laterality Date    AV FISTULA PLACEMENT      left lower arm    TONSILLECTOMY, ADENOIDECTOMY      TRANSESOPHAGEAL ECHOCARDIOGRAM (SERENITY) N/A 5/23/2017    Performed by Donaldo Mai MD at [facility] OR       Review of patient's allergies indicates:   Allergen Reactions    Keflex [cephalexin] Nausea Only     Current Facility-Administered Medications   Medication Frequency    dextrose 50% injection 12.5 g PRN    gabapentin capsule 300 mg TID    glucose chewable tablet 16 g PRN    insulin aspart U-100 pen 0-5 Units QID (AC + HS) PRN    pravastatin tablet 40 mg Daily     Family History     Problem Relation (Age of Onset)    Colon cancer Mother    Diabetes Mother, Father, Maternal Grandmother, Maternal Grandfather    Heart disease Father    Hypertension Father    Lung cancer Mother        Tobacco Use    Smoking status: Never Smoker    Smokeless tobacco: Never Used   Substance and Sexual Activity    Alcohol use: No    Drug use: No    Sexual activity: Not Currently     Review of Systems   Constitutional: Negative for activity change, appetite change, chills, diaphoresis, fatigue, fever and unexpected weight change.   HENT: Negative for congestion, ear discharge, ear pain, facial swelling, hearing loss, nosebleeds, sinus pressure, sore throat and trouble swallowing.    Eyes: Negative for photophobia, pain, discharge, redness, itching and visual disturbance.   Respiratory: Negative for apnea, cough, chest tightness, shortness of breath and wheezing.    Cardiovascular: Negative for chest pain, palpitations and leg swelling.   Gastrointestinal: Negative for abdominal distention, abdominal pain, constipation, diarrhea and vomiting.   Endocrine: Negative for cold  intolerance, heat intolerance, polydipsia and polyuria.   Genitourinary: Negative for decreased urine volume, difficulty urinating, dysuria, flank pain, frequency, hematuria, scrotal swelling, testicular pain and urgency.   Musculoskeletal: Negative for arthralgias, back pain, gait problem, joint swelling, myalgias, neck pain and neck stiffness.   Skin: Negative for color change, pallor, rash and wound.   Allergic/Immunologic: Negative for environmental allergies and food allergies.   Neurological: Positive for weakness. Negative for dizziness, tremors, seizures, syncope, facial asymmetry, speech difficulty, light-headedness, numbness and headaches.   Hematological: Negative for adenopathy. Does not bruise/bleed easily.   Psychiatric/Behavioral: Negative for agitation, behavioral problems, dysphoric mood and sleep disturbance. The patient is not nervous/anxious.      Objective:     Vital Signs (Most Recent):  Temp: 97.6 °F (36.4 °C) (03/01/19 1101)  Pulse: (!) 58 (03/01/19 1101)  Resp: 17 (03/01/19 1101)  BP: 136/60 (03/01/19 1101)  SpO2: 99 % (03/01/19 1101)  O2 Device (Oxygen Therapy): room air (03/01/19 0800) Vital Signs (24h Range):  Temp:  [97 °F (36.1 °C)-98.1 °F (36.7 °C)] 97.6 °F (36.4 °C)  Pulse:  [56-60] 58  Resp:  [17-20] 17  SpO2:  [96 %-99 %] 99 %  BP: (117-166)/(56-78) 136/60        There is no height or weight on file to calculate BMI.  There is no height or weight on file to calculate BSA.    No intake/output data recorded.    Physical Exam   Constitutional: He is oriented to person, place, and time. He appears well-developed and well-nourished. No distress.   Morbid obesity precludes most of exam  NAD   HENT:   Head: Normocephalic and atraumatic.   Mouth/Throat: Oropharynx is clear and moist. No oropharyngeal exudate.   Eyes: Conjunctivae and EOM are normal. Pupils are equal, round, and reactive to light. Right eye exhibits no discharge. Left eye exhibits no discharge. No scleral icterus.   Neck:  Normal range of motion. Neck supple. No JVD present. No tracheal deviation present. No thyromegaly present.   Cardiovascular: Normal rate, regular rhythm and normal heart sounds. Exam reveals no gallop and no friction rub.   No murmur heard.  LLA AVF with guaze at an old cannulation site with BRB\  Good thrill and bruit no tenderness  + 2 bilateral LE pitting edema   Pulmonary/Chest: Effort normal and breath sounds normal. No stridor. No respiratory distress. He has no wheezes. He has no rales. He exhibits no tenderness.   Abdominal: Soft. Bowel sounds are normal. He exhibits no distension and no mass. There is no tenderness. There is no rebound and no guarding. No hernia.   Musculoskeletal: Normal range of motion. He exhibits no edema or tenderness.   Lymphadenopathy:     He has no cervical adenopathy.   Neurological: He is alert and oriented to person, place, and time. No cranial nerve deficit. He exhibits normal muscle tone. Coordination normal.   Skin: Skin is warm and dry. No rash noted. He is not diaphoretic. No erythema. No pallor.   Psychiatric: He has a normal mood and affect. His behavior is normal. Judgment and thought content normal.   Nursing note and vitals reviewed.      Significant Labs:  All labs within the past 24 hours have been reviewed.    Significant Imaging:  Labs: Reviewed  X-Ray: Reviewed  Echo: Reviewed

## 2019-03-01 NOTE — SUBJECTIVE & OBJECTIVE
Past Medical History:   Diagnosis Date    Diabetes mellitus type II     Dialysis patient     Hyperlipidemia     Hypertension     Kidney failure     MIKE (obstructive sleep apnea)     Urinary tract infection        Past Surgical History:   Procedure Laterality Date    AV FISTULA PLACEMENT      left lower arm    TONSILLECTOMY, ADENOIDECTOMY      TRANSESOPHAGEAL ECHOCARDIOGRAM (SERENITY) N/A 5/23/2017    Performed by Donaldo Mai MD at [facility] OR       Review of patient's allergies indicates:   Allergen Reactions    Keflex [cephalexin] Nausea Only       Current Facility-Administered Medications on File Prior to Encounter   Medication    [COMPLETED] magnesium citrate solution 296 mL    [COMPLETED] tuberculin injection 5 Units    [DISCONTINUED] 0.9%  NaCl infusion    [DISCONTINUED] 0.9%  NaCl infusion    [DISCONTINUED] acetaminophen tablet 650 mg    [DISCONTINUED] aspirin chewable tablet 81 mg    [DISCONTINUED] gabapentin capsule 100 mg    [DISCONTINUED] gabapentin capsule 300 mg    [DISCONTINUED] heparin (porcine) injection 7,500 Units    [DISCONTINUED] insulin aspart U-100 pen 8 Units    [DISCONTINUED] insulin detemir U-100 pen 20 Units    [DISCONTINUED] labetalol injection 10 mg    [DISCONTINUED] lidocaine 5 % patch 1 patch    [DISCONTINUED] lorazepam (ATIVAN) injection 2 mg    [DISCONTINUED] methyl salicylate-menthol 15-10% cream    [DISCONTINUED] midodrine tablet 10 mg    [DISCONTINUED] ondansetron disintegrating tablet 8 mg    [DISCONTINUED] pravastatin tablet 40 mg    [DISCONTINUED] senna-docusate 8.6-50 mg per tablet 1 tablet    [DISCONTINUED] sodium chloride 0.9% flush 3 mL    [DISCONTINUED] traMADol tablet 50 mg    [DISCONTINUED] venlafaxine tablet 37.5 mg     Current Outpatient Medications on File Prior to Encounter   Medication Sig    ammonium lactate 12 % Crea Apply 1 application topically 3 (three) times daily as needed.    aspirin 81 MG Chew Take 1 tablet (81 mg total) by  "mouth once daily.    BD ULTRA-FINE MINI PEN NEEDLE 31 gauge x 3/16" Ndle USE 4 TO 5 TIMES A DAY     ( DISCARD PEN NEEDLE AFTER EACH USE )    blood-glucose meter (ADVANCED GLUCOSE METER) Misc 1 each by Misc.(Non-Drug; Combo Route) route 4 (four) times daily with meals and nightly.    clindamycin (CLEOCIN) 300 MG capsule Take 1 capsule (300 mg total) by mouth every 6 (six) hours.    fluticasone (FLONASE) 50 mcg/actuation nasal spray 1 spray by Each Nare route once daily.    gabapentin (NEURONTIN) 300 MG capsule TAKE 1 BY MOUTH EVERY      EVENING    insulin (BASAGLAR KWIKPEN U-100 INSULIN) glargine 100 units/mL (3mL) SubQ pen Inject 55 Units into the skin every evening. (Patient taking differently: Inject 40 Units into the skin every evening. )    lancets Misc 1 each by Misc.(Non-Drug; Combo Route) route 4 (four) times daily with meals and nightly.    liraglutide 0.6 mg/0.1 mL, 18 mg/3 mL, subq PNIJ (VICTOZA 2-SHOAIB) 0.6 mg/0.1 mL (18 mg/3 mL) PnIj Inject 1.2 mg into the skin once daily.    ONETOUCH ULTRA BLUE TEST STRIP Strp USE ONE STRIP TO CHECK BLOOD GLUCOSE 4 TIMES DAILY.AT MORNING, NOON,6:OO PM AND BEDTIME.    pravastatin (PRAVACHOL) 40 MG tablet Take 1 tablet (40 mg total) by mouth once daily. (Patient taking differently: Take 40 mg by mouth every evening. )     Family History     Problem Relation (Age of Onset)    Colon cancer Mother    Diabetes Mother, Father, Maternal Grandmother, Maternal Grandfather    Heart disease Father    Hypertension Father    Lung cancer Mother        Tobacco Use    Smoking status: Never Smoker    Smokeless tobacco: Never Used   Substance and Sexual Activity    Alcohol use: No    Drug use: No    Sexual activity: Not Currently     Review of Systems  Objective:     Vital Signs (Most Recent):    Vital Signs (24h Range):  Temp:  [97 °F (36.1 °C)-98.1 °F (36.7 °C)] 97 °F (36.1 °C)  Pulse:  [56-77] 59  Resp:  [18-20] 18  SpO2:  [96 %-99 %] 98 %  BP: (115-134)/(55-65) 117/56 "        There is no height or weight on file to calculate BMI.    Physical Exam        CN 2-12 intact  No neck pain or back pain.   5/5 strength in the upper extremities b/l. L deltoid strength diminished secondary to inflamed left shoulder joint from arthritis.   Positive hoffmans bilaterally with 3+ UE reflexes  LLE 3/5 strength proximally (limited due to muscle strain)  RLE 5/5 strength  2+ LE reflexes, no clonus or babinsky  Sensation intact to light tough  Proprioception intact  +significant dysmetria bilaterally.     Significant Labs: Reviewed    Significant Imaging: Reviewed

## 2019-03-02 LAB
ALBUMIN SERPL BCP-MCNC: 3 G/DL
ALP SERPL-CCNC: 159 U/L
ALT SERPL W/O P-5'-P-CCNC: 68 U/L
ANION GAP SERPL CALC-SCNC: 13 MMOL/L
AST SERPL-CCNC: 66 U/L
BASOPHILS # BLD AUTO: 0.02 K/UL
BASOPHILS NFR BLD: 0.5 %
BILIRUB SERPL-MCNC: 0.9 MG/DL
BUN SERPL-MCNC: 57 MG/DL
CALCIUM SERPL-MCNC: 8.8 MG/DL
CHLORIDE SERPL-SCNC: 100 MMOL/L
CO2 SERPL-SCNC: 22 MMOL/L
CREAT SERPL-MCNC: 8.2 MG/DL
DIFFERENTIAL METHOD: ABNORMAL
EOSINOPHIL # BLD AUTO: 0.1 K/UL
EOSINOPHIL NFR BLD: 2.7 %
ERYTHROCYTE [DISTWIDTH] IN BLOOD BY AUTOMATED COUNT: 15.2 %
EST. GFR  (AFRICAN AMERICAN): 7.6 ML/MIN/1.73 M^2
EST. GFR  (NON AFRICAN AMERICAN): 6.6 ML/MIN/1.73 M^2
GLUCOSE SERPL-MCNC: 84 MG/DL
HCT VFR BLD AUTO: 27.7 %
HGB BLD-MCNC: 9.1 G/DL
IMM GRANULOCYTES # BLD AUTO: 0 K/UL
IMM GRANULOCYTES NFR BLD AUTO: 0 %
LYMPHOCYTES # BLD AUTO: 1.2 K/UL
LYMPHOCYTES NFR BLD: 27.3 %
MAGNESIUM SERPL-MCNC: 2.8 MG/DL
MCH RBC QN AUTO: 34 PG
MCHC RBC AUTO-ENTMCNC: 32.9 G/DL
MCV RBC AUTO: 103 FL
MONOCYTES # BLD AUTO: 0.6 K/UL
MONOCYTES NFR BLD: 13.3 %
NEUTROPHILS # BLD AUTO: 2.5 K/UL
NEUTROPHILS NFR BLD: 56.2 %
NRBC BLD-RTO: 0 /100 WBC
PHOSPHATE SERPL-MCNC: 7.8 MG/DL
PLATELET # BLD AUTO: 77 K/UL
PMV BLD AUTO: 11.4 FL
POCT GLUCOSE: 138 MG/DL (ref 70–110)
POCT GLUCOSE: 74 MG/DL (ref 70–110)
POCT GLUCOSE: 91 MG/DL (ref 70–110)
POCT GLUCOSE: 95 MG/DL (ref 70–110)
POTASSIUM SERPL-SCNC: 5 MMOL/L
PROT SERPL-MCNC: 5.9 G/DL
RBC # BLD AUTO: 2.68 M/UL
SODIUM SERPL-SCNC: 135 MMOL/L
WBC # BLD AUTO: 4.43 K/UL

## 2019-03-02 PROCEDURE — 85025 COMPLETE CBC W/AUTO DIFF WBC: CPT

## 2019-03-02 PROCEDURE — 25000003 PHARM REV CODE 250: Performed by: STUDENT IN AN ORGANIZED HEALTH CARE EDUCATION/TRAINING PROGRAM

## 2019-03-02 PROCEDURE — 83735 ASSAY OF MAGNESIUM: CPT

## 2019-03-02 PROCEDURE — 94761 N-INVAS EAR/PLS OXIMETRY MLT: CPT

## 2019-03-02 PROCEDURE — 63600175 PHARM REV CODE 636 W HCPCS

## 2019-03-02 PROCEDURE — S5571 INSULIN DISPOS PEN 3 ML: HCPCS

## 2019-03-02 PROCEDURE — 84100 ASSAY OF PHOSPHORUS: CPT

## 2019-03-02 PROCEDURE — 80053 COMPREHEN METABOLIC PANEL: CPT

## 2019-03-02 PROCEDURE — 99233 SBSQ HOSP IP/OBS HIGH 50: CPT | Mod: ,,, | Performed by: INTERNAL MEDICINE

## 2019-03-02 PROCEDURE — 25000003 PHARM REV CODE 250

## 2019-03-02 PROCEDURE — 99233 PR SUBSEQUENT HOSPITAL CARE,LEVL III: ICD-10-PCS | Mod: ,,, | Performed by: INTERNAL MEDICINE

## 2019-03-02 PROCEDURE — 99232 SBSQ HOSP IP/OBS MODERATE 35: CPT | Mod: GC,,, | Performed by: NEUROLOGICAL SURGERY

## 2019-03-02 PROCEDURE — 94660 CPAP INITIATION&MGMT: CPT

## 2019-03-02 PROCEDURE — 99232 PR SUBSEQUENT HOSPITAL CARE,LEVL II: ICD-10-PCS | Mod: GC,,, | Performed by: NEUROLOGICAL SURGERY

## 2019-03-02 PROCEDURE — 99900035 HC TECH TIME PER 15 MIN (STAT)

## 2019-03-02 PROCEDURE — 20600001 HC STEP DOWN PRIVATE ROOM

## 2019-03-02 RX ORDER — PSEUDOEPHEDRINE/ACETAMINOPHEN 30MG-500MG
100 TABLET ORAL
Status: COMPLETED | OUTPATIENT
Start: 2019-03-02 | End: 2019-03-02

## 2019-03-02 RX ORDER — SYRING-NEEDL,DISP,INSUL,0.3 ML 29 G X1/2"
296 SYRINGE, EMPTY DISPOSABLE MISCELLANEOUS
Status: COMPLETED | OUTPATIENT
Start: 2019-03-02 | End: 2019-03-02

## 2019-03-02 RX ORDER — POLYETHYLENE GLYCOL 3350 17 G/17G
17 POWDER, FOR SOLUTION ORAL DAILY
Status: DISCONTINUED | OUTPATIENT
Start: 2019-03-02 | End: 2019-03-18

## 2019-03-02 RX ORDER — LANTHANUM CARBONATE 500 MG/1
1000 TABLET, CHEWABLE ORAL
Status: DISCONTINUED | OUTPATIENT
Start: 2019-03-02 | End: 2019-03-03

## 2019-03-02 RX ORDER — DIAZEPAM 5 MG/1
5 TABLET ORAL ONCE
Status: COMPLETED | OUTPATIENT
Start: 2019-03-02 | End: 2019-03-02

## 2019-03-02 RX ORDER — AMOXICILLIN 250 MG
1 CAPSULE ORAL 2 TIMES DAILY
Status: DISCONTINUED | OUTPATIENT
Start: 2019-03-02 | End: 2019-03-18

## 2019-03-02 RX ADMIN — PRAVASTATIN SODIUM 40 MG: 40 TABLET ORAL at 08:03

## 2019-03-02 RX ADMIN — ACETAMINOPHEN 500 MG: 500 TABLET, FILM COATED ORAL at 08:03

## 2019-03-02 RX ADMIN — INSULIN DETEMIR 25 UNITS: 100 INJECTION, SOLUTION SUBCUTANEOUS at 11:03

## 2019-03-02 RX ADMIN — ACETAMINOPHEN 500 MG: 500 TABLET, FILM COATED ORAL at 01:03

## 2019-03-02 RX ADMIN — DICLOFENAC: 10 GEL TOPICAL at 08:03

## 2019-03-02 RX ADMIN — POLYETHYLENE GLYCOL 3350 17 G: 17 POWDER, FOR SOLUTION ORAL at 08:03

## 2019-03-02 RX ADMIN — STANDARDIZED SENNA CONCENTRATE AND DOCUSATE SODIUM 1 TABLET: 8.6; 5 TABLET, FILM COATED ORAL at 08:03

## 2019-03-02 RX ADMIN — SODIUM CHLORIDE 500 ML: 0.9 INJECTION, SOLUTION INTRAVENOUS at 01:03

## 2019-03-02 RX ADMIN — Medication 100 ML: at 03:03

## 2019-03-02 RX ADMIN — MAGNESIUM CITRATE 296 ML: 1.75 LIQUID ORAL at 03:03

## 2019-03-02 RX ADMIN — DIAZEPAM 5 MG: 5 TABLET ORAL at 11:03

## 2019-03-02 RX ADMIN — GABAPENTIN 300 MG: 300 CAPSULE ORAL at 08:03

## 2019-03-02 RX ADMIN — LANTHANUM CARBONATE 1000 MG: 500 TABLET, CHEWABLE ORAL at 08:03

## 2019-03-02 NOTE — ASSESSMENT & PLAN NOTE
- Neurosurgery formerly primary team prior to step up  - Planned C3-6 posterior decompression/fusion for 3/2/19  - Receiving vanc and gentamycin for surgical ppx  - Initially had medical clearance from hospitalist team, with RCRI Score of 2 points indicated 10.1% chance of MACE  - Patient otherwise with pre-syncopal event with hemodynamic changes.  Otherwise, troponins remained flat and normal, and patient did not have any lactic acidosis.  EKG reviewed, noted LBBB, but was present previously in 5/2018 and EKGs also without significant change during the past few years, unlikely to have a cardiac event with negative troponins, non-reproducible EKG changes, and no chest pain   - Patient however does note that his dry weight is 140 kg, where he is currently at 134 kg, event likely secondary to excessive volume removal.  Of note, patient with CVP of 3 with echo on 2/28 after dialysis Tuesday/Wednesday  - overall, patient remains low/moderate risk with 10% risk for major cardiac events per risk stratification

## 2019-03-02 NOTE — ASSESSMENT & PLAN NOTE
58 M with progressive cervical myelopathy and stenosis from C3-6 with myelomalacia. S/p code for hypovolemia during HD. Sable this morning and labs WNL. He stepped up to ICU for further care and work up.    -Will plan for C3-6 posterior decompression/fusion pending clearance   -Will obtain consent   -Pain control PRN  -ADA per MICU  -HD per nephrology  -Daily PT/OT  -Please document preop medical clearance   -Further care per ICU  -Will continue to follow

## 2019-03-02 NOTE — ASSESSMENT & PLAN NOTE
- Pain in left thigh ongoing since fall  - XR left femur pending  - ordered U/S for LLE swelling to evaluate for DVT

## 2019-03-02 NOTE — DISCHARGE SUMMARY
[facility]  Discharge Summary        Admit Date: 2/26/2019     Discharge Date and Time  2/28/19 @ 8pm      Attending Physician: Jamal Millan M.D.       Reason for Admission: Fall, CVA workup     Procedures Performed: None     Hospital Course:    Angel Farmer Jr. is a 58 y.o. male with history of Diabetes mellitus type II, ESRD on HD MWF, Hyperlipidemia, Hypertension and MIKE who presented to the ED on 2/26/19 for evaluation of upper extremity weakness and ataxia after a fall today at 5 am.The patient stated he was attempting to get ready to go to dialysis when his arms became weak causing him to fall. He reports he fell unto his right side.The patient denied any LOC or head injury associated with the fall. The family was able to assist the patient from the floor. As per family the patient has been stumbling with walking since Saturday. Notes that the  patient will walk then appear as though he is going to fall and catch himself along the wall.  The patient reported when he walks he feels uncoordinated. Per family the patient did have difficulty getting out of bed at baseline but they  believe this has gotten worse since Saturday. Symptoms are associated with bilateral shoulder pain, weakness in BUE and weakness in the LLE. Pt denied neck pain, back pain, leg pain, dizziness, light headedness, nausea, vomiting, chest pain or shortness of breath. The symptoms worsened with exertion. Patient ha no prior history of similar symptoms.      Of note he was able to have dialysis on day of admission, the patient stated he missed Monday appt because he was having difficulty walking.      Patient was admitted to tele for workup for potential CVA, as he was complaining of ataxia and weakness. The CT head was negative for any acute hemorrhagic event, and MRA head and neck was ordered and demonstrated mild-to-moderate periventricular chronic microvascular ischemic disease, no acute infarction suggested.  Patient was receiving heparin with dialysis. Patient was placed on ASA. Atorvastatin was provided for secondary stroke prevention, and labs including HbA1C, Lipid Panel, TSH, B12, B6, thiamine, folate, and RPR were ordered. Patient received Q4 neuro checks, and neurology was consulted. Home BP medications were held for permissive hypertension with PRN labetalol for SBP>220. Patient has MIKE, provided with CPAP QHS.  Nephrology was consulted for dialysis needs. Home statin was continued for HLD. HgbA1c was drawn, DMIII was treated with detamir 30 units QHS, Aspart 8 units ACHS, POCT glucose ACHS, and insulin was titrated as needed.     On 2/27/19 patient reported LLE quad pain with movement. Patient had venous stasis changes BLE, with +1 pitting edema. Patient had 4/5 strength BUE. Patient was able to stand with assistance, but was unsteady when walking. Patient continued to refuse CPAP qhs. Patient given tylenol PRN for leg pain.     Neurology consult stated that patient must comply with MIKE treatment as it is leading to significant deconditioning. Also stated that patient should not be on gabapentin or Lyrica due to GFR=5, recommended switching to a hepatically metabolized neuropathic pain reliever. As B12 <400, supplement.  Requested MRI C spine, demonstrated significant motion artifacts and multilevel variable degree of foraminal narrowing. Recommended outpatient neurology f/u in 2-3 weeks.      PT recommended SNF for DC on 2/27/19.     Nephrology consulted and provided EPO with HD on 2/27/19 due to anemia of 10.2/33.1.     Nutrition recommended diabetic diet of 1800 calories, cardiac.        Consults: dietary, nephrology and neurology     Significant Diagnostic Studies: Labs:   BMP:         Recent Labs   Lab 02/26/19  1517 02/27/19  0844 02/28/19  0501   GLU 78 90 86    140 136   K 4.5 4.8 4.3    104 101   CO2 26 23 26   BUN 42* 57* 38*   CREATININE 8.6* 10.1* 6.9*   CALCIUM 9.4 9.7 9.3   MG 2.4 2.3   2.3 2.3   , CMP         Recent Labs   Lab 02/26/19  1517 02/27/19  0844 02/28/19  0501    140 136   K 4.5 4.8 4.3    104 101   CO2 26 23 26   GLU 78 90 86   BUN 42* 57* 38*   CREATININE 8.6* 10.1* 6.9*   CALCIUM 9.4 9.7 9.3   PROT 6.7 6.4 6.4   ALBUMIN 3.7 3.4* 3.3*   BILITOT 1.0 1.0 1.0   ALKPHOS 179* 159* 165*   AST 81* 86* 91*   ALT 84* 80* 80*   ANIONGAP 10 13 9   ESTGFRAFRICA 7* 6* 9*   EGFRNONAA 6* 5* 8*   , CBC         Recent Labs   Lab 02/26/19  1517 02/27/19  0844 02/28/19  0501   WBC 6.12 6.12 4.92   HGB 10.4* 10.2* 10.5*   HCT 33.9* 33.1* 33.9*   PLT 90* 96* 95*   , INR         Lab Results   Component Value Date     INR 1.0 02/27/2019     INR 1.0 03/14/2014     INR 1.0 12/27/2011   , Lipid Panel         Lab Results   Component Value Date     CHOL 103 (L) 02/27/2019     HDL 45 02/27/2019     LDLCALC 44.6 (L) 02/27/2019     TRIG 67 02/27/2019     CHOLHDL 43.7 02/27/2019   , Troponin       Recent Labs   Lab 02/26/19  1517   TROPONINI 0.034*    and A1C:       Recent Labs   Lab 02/27/19  0845   HGBA1C 5.0      Microbiology:   Blood Culture         Lab Results   Component Value Date     LABBLOO No growth after 5 days. 05/20/2017   , Sputum Culture No results found for: GSRESP, RESPIRATORYC, Urine Culture           Lab Results   Component Value Date     LABURIN   03/30/2012       MULTIPLE ORGANISMS ISOLATED. NONE IN PREDOMINANCE. REPEAT IF CLINICALLY NECESSARY.    and Wound Culture: negative  Radiology:       Imaging Results                   MRI Brain Without Contrast (Final result)  Result time 02/26/19 23:16:39                Final result by Gabriella Drake MD (02/26/19 23:16:39)                               Impression:        No acute intracranial abnormalities identified.  No evidence of acute infarction.     Mild to moderate periventricular chronic microvascular ischemic disease.        Electronically signed by:     Gabriella Drake MD  Date:                                             02/26/2019  Time:                                            23:16                         Narrative:     EXAMINATION:  MRI BRAIN WITHOUT CONTRAST     CLINICAL HISTORY:  Syncope/fainting; Unspecified fall, initial encounter     TECHNIQUE:  Multiplanar multisequence MR imaging of the brain was performed without contrast.     COMPARISON:  CT head from the same date.     FINDINGS:  Motion limited examination, specifically involving axial T2 FLAIR sequence.     There is mild-to-moderate periventricular chronic microvascular ischemic disease.  No diffusion signal abnormality to suggest acute infarction.  Ventricles are normal in size and configuration without evidence for hydrocephalus.  No intracranial hemorrhage or extraaxial fluid collection.  No mass or mass effect.  Flow voids are normal in appearance.     Visualized paranasal sinuses and mastoid air cells are clear.  Orbits appear normal.                                           MRA Neck without contrast (Final result)  Result time 02/26/19 23:19:02   Procedure changed from MRA Neck with and without contrast                 Final result by Gabriella Drake MD (02/26/19 23:19:02)                               Impression:        MRA neck without significant focal stenosis or occlusion.        Electronically signed by:     Gabriella Drake MD  Date:                                            02/26/2019  Time:                                            23:19                         Narrative:     EXAMINATION:  MRA NECK WITHOUT CONTRAST     CLINICAL HISTORY:  Stroke;Ataxia, unspecified     TECHNIQUE:  MRA neck was performed without the use of IV contrast.     COMPARISON:  Concurrent MRI brain.     FINDINGS:  Mild motion artifact seen at the vessel origins from the arch.  Vertebral artery origins are within normal limits.  The vertebral arteries are unremarkable throughout their course without evidence for focal stenosis or occlusion.  The bilateral common carotid  arteries, carotid bifurcation, and extra cranial portions of the internal carotid arteries are patent without evidence for focal stenosis or occlusion.  Bilateral internal carotid arteries are tortuous along their mid to distal course.                                                  CT Head Without Contrast (Final result)  Result time 02/26/19 17:08:32                Final result by Thomas Poe MD (02/26/19 17:08:32)                               Impression:        No acute intracranial abnormality identified.  Specifically, no intracranial hemorrhage.        Electronically signed by:     Thomas Poe MD  Date:                                            02/26/2019  Time:                                            17:08                         Narrative:     EXAMINATION:  CT HEAD WITHOUT CONTRAST     CLINICAL HISTORY:  fall, heparinized for dialysis; Unspecified fall, initial encounter     TECHNIQUE:  Low dose axial CT images obtained throughout the head without intravenous contrast. Sagittal and coronal reconstructions were performed.     COMPARISON:  Head and maxillofacial CT 09/06/2018     FINDINGS:  Intracranial compartment:     Ventricles and sulci are normal in size for age without evidence of hydrocephalus. No extra-axial blood or fluid collections.     The brain parenchyma appears normal.  No new focal areas of abnormal parenchymal attenuation.  No parenchymal mass, hemorrhage, edema or major vascular distribution infarct.  Skull base atherosclerotic vascular calcifications noted.     Skull/extracranial contents (limited evaluation): No fracture. Mastoid air cells and paranasal sinuses are essentially clear.  Visualized portions of the orbits are within normal limits.                                             Cardiac Graphics: Echocardiogram:   2D echo with color flow doppler:         Results for orders placed or performed during the hospital encounter of 05/24/18   2D echo with color flow doppler  "  Result Value Ref Range     QEF 65 55 - 65     Diastolic Dysfunction No       Est. PA Systolic Pressure 8.86         Final Diagnoses:               Principal Problem: CVA (cerebral vascular accident)          Discharged Condition: Stable      Disposition: Home or Self Car     Follow Up/Patient Instructions:      Medications:   Angel Farmer Jr.   Home Medication Instructions ASHWIN:[id number]    Printed on:03/01/19 7851   Medication Information                      ammonium lactate 12 % Crea  Apply 1 application topically 3 (three) times daily as needed.             aspirin 81 MG Chew  Take 1 tablet (81 mg total) by mouth once daily.             BD ULTRA-FINE MINI PEN NEEDLE 31 gauge x 3/16" Ndle  USE 4 TO 5 TIMES A DAY     ( DISCARD PEN NEEDLE AFTER EACH USE )             blood-glucose meter (ADVANCED GLUCOSE METER) Misc  1 each by Misc.(Non-Drug; Combo Route) route 4 (four) times daily with meals and nightly.             clindamycin (CLEOCIN) 300 MG capsule  Take 1 capsule (300 mg total) by mouth every 6 (six) hours.             fluticasone (FLONASE) 50 mcg/actuation nasal spray  1 spray by Each Nare route once daily.             gabapentin (NEURONTIN) 300 MG capsule  TAKE 1 BY MOUTH EVERY      EVENING             insulin (BASAGLAR KWIKPEN U-100 INSULIN) glargine 100 units/mL (3mL) SubQ pen  Inject 55 Units into the skin every evening.             lancets Misc  1 each by Misc.(Non-Drug; Combo Route) route 4 (four) times daily with meals and nightly.             liraglutide 0.6 mg/0.1 mL, 18 mg/3 mL, subq PNIJ (VICTOZA 2-SHOAIB) 0.6 mg/0.1 mL (18 mg/3 mL) PnIj  Inject 1.2 mg into the skin once daily.             ONETOUCH ULTRA BLUE TEST STRIP Strp  USE ONE STRIP TO CHECK BLOOD GLUCOSE 4 TIMES DAILY.AT MORNING, NOON,6:OO PM AND BEDTIME.             pravastatin (PRAVACHOL) 40 MG tablet  Take 1 tablet (40 mg total) by mouth once daily.                     Follow-up Information      [facility] " 3/12/2019.    Specialty:  Family Medicine  Why:  11:00 am       Contact information:  [address], [address]  [facility] [address] [phone]              Please follow up.    Why:  [facility] outpatient [facility].  Dr. Weber in [address] in the [facility].  [phone].                                   Daniel Cat MD  [employer] [facility], PGY-1

## 2019-03-02 NOTE — ASSESSMENT & PLAN NOTE
Nephrology following  Received partial session today during which episode occurred  Follow up electrolytes  Repeat dialysis as indicated and pending syncope workup

## 2019-03-02 NOTE — ASSESSMENT & PLAN NOTE
Neurosurgery formerly primary team prior to step up  Planned C3-6 posterior decompression/fusion for 3/2/19  Receiving vanc and gentamycin for surgical ppx  Initially had medical clearance from hospitalist team, with RCRI Score of 2 points indicated 10.1% chance of MACE  Final clearance pending syncope workup

## 2019-03-02 NOTE — HPI
Per hospital medicine HPI:   57 yo male with a PMH of ESRD with dialysis MWF (last on 2/27), DM, presenting as transfer from [facility] as admission to neurosurgery for evaluation after progressive course of unsteady gait as well as bilateral upper extremity weakness for the last several months.  Mr. aFrmer has had wobbly gait since this past September, at which point he recalls falling and has been at 80-90% of his normal function.  He has been able to walk without assist, however not for very long distances.  He states that since last Sunday he has felt progression of his symptoms and now feels it would be difficult to stand up to ambulate at all. His UE symptoms have been present for about 3 months including not writing as well as he is used to. No sensory disturbance noted as well as no neck or back pain and no pain from the neck radiating into the hands. MRI at OSH on 2/27 revealed significant cervical stenosis.      Patient was receiving dialysis on 3/1/19 routinely, and about 1 hour and 45 minutes into the session with removal of 1400cc, he had a pre-syncopal episode. Patient endorses feeling generally weak, lightheaded, like he was going to pass out. Staff who were present for the event reported that patient appeared pale and diaphoretic and, his blood pressure dropped to 65/40 and his HR was 41 when it occurred. It was acute in onset, severe, and gradually resolved over ten minutes. He denied any HA, CP, SOB, abd pain, N/V. He had no change in his extremity weakness and denied numbness. Has had constipation for the last several days while hospitalized. Does not make urine.

## 2019-03-02 NOTE — ASSESSMENT & PLAN NOTE
- Nephrology following  - Received partial session today during which episode occurred  - no significant cardiac impairment given recent 2D echo  - phosphate elevated, will re-start phos binder per nephrology (fosrenol 100 mg PO TIDWM)

## 2019-03-02 NOTE — SIGNIFICANT EVENT
"  Rapid Response Nurse Note     Rapid Response Metrics:     Admit Date: 2019  LOS: 1  Code Status: Full Code   Date of Consult: 2019  : 1960  Age: 58 y.o.  Weight:   Wt Readings from Last 1 Encounters:   19 (!) 139.9 kg (308 lb 6.8 oz)     Sex: male  Race: White   Bed: 6080/6080 A:   MRN: [id number]  Time Rapid Response Team page Received: 1611   Time Rapid Response Team at Bedside: 1613  Time Rapid Response Team left Bedside: 1655  Was the patient discharged from an ICU this admission?   no  Was the patient discharged from a PACU within last 24 hours?  no  Did the patient receive conscious sedation/general anesthesia within last 24 hours?  no  Was the patient in the ED within the past 24 hours?  no  Was the patient started on NIPPV within the past 24 hours?  no  Did this progress into an ARC or CPA:  no  Attending Physician: Daina Kauffman MD  Primary Service: Networked reference to record PCT   Consult Requested By: Daina Kauffman MD   Rapid Response Indication(s): Hypotension     SITUATION:     Reason for Call:   Called to evaluate the patient for Circulatory    BACKGROUND:     Why is the patient in the hospital?: Neurosurgery Evaluation after progressive course of unsteady gait  What changed?: Hypotensive episode during HD    ASSESSMENT:     What did you find: Upon arrival to bedside in acute dialysis, AAOx3 stated he is feeling better than 2 minutes ago. Stated he was feeling things were getting "cloudy" before his dialysis treatment was discontinued and pt was rinsed back. Additional 500cc bolus NS administered via dialysis RN. Dialysis RN and MD Law reported patients systolic BP dropped to 68 and his heart rate dropped into the 30's. Once fluids were administered and dialysis was rinsed back pt's blood pressure increased appropriately. Critical care and nephrology teams at bedside and discussed plans to upgrade patient to ICU for closer monitoring. CCS also spoke with neurosurgery " regarding transfer. /33 HR 59 98% on RA. Pt transferred via bed on portable monitor to room 6080.     RECOMMENDATIONS:     We recommend: Following critical care teams recommendation to upgrade to ICU    FOLLOW-UP/CONTINGENCY PLAN:       Call the rapid response Nurse at x [phone] for additional questions or concerns.      PHYSICIAN ESCALATION:     Orders received and case discussed with MD Jimenez and MD Martinez.      Disposition: Tx in ICU bed 6080.

## 2019-03-02 NOTE — PLAN OF CARE
Problem: Adult Inpatient Plan of Care  Goal: Plan of Care Review  Outcome: Ongoing (interventions implemented as appropriate)  No acute events throughout shift, pt is AAOx4. Room air while awake, BiPAP at night, tolerating well, no s/s of resp distress. 02 sat > 92% on monitor. NPO at MN. Bathed and linens changed. No falls, fall precaution maintained. POC: possible spinal sgy in am.  VS and assessment per flow sheet, patient progressing towards goals as tolerated, plan of care reviewed with Angel Farmer and family, all concerns addressed, will continue to monitor.

## 2019-03-02 NOTE — PROGRESS NOTES
[facility]  Critical Care Medicine  Progress Note    Patient Name: Angel Farmer Jr.  MRN: [id number]  Admission Date: 2/28/2019  Hospital Length of Stay: 1 days  Code Status: Full Code  Attending Provider: Daina Kauffman MD  Primary Care Provider: Satya Noriega MD   Principal Problem: Cervical myelopathy    Subjective:     HPI:  Per hospital medicine HPI:   57 yo male with a PMH of ESRD with dialysis MWF (last on 2/27), DM, presenting as transfer from [facility] as admission to neurosurgery for evaluation after progressive course of unsteady gait as well as bilateral upper extremity weakness for the last several months.  Mr. Farmer has had wobbly gait since this past September, at which point he recalls falling and has been at 80-90% of his normal function.  He has been able to walk without assist, however not for very long distances.  He states that since last Sunday he has felt progression of his symptoms and now feels it would be difficult to stand up to ambulate at all. His UE symptoms have been present for about 3 months including not writing as well as he is used to. No sensory disturbance noted as well as no neck or back pain and no pain from the neck radiating into the hands. MRI at OSH on 2/27 revealed significant cervical stenosis.      Patient was receiving dialysis on 3/1/19 routinely, and about 1 hour and 45 minutes into the session with removal of 1400cc, he had a pre-syncopal episode. Patient endorses feeling generally weak, lightheaded, like he was going to pass out. Staff who were present for the event reported that patient appeared pale and diaphoretic and, his blood pressure dropped to 65/40 and his HR was 41 when it occurred. It was acute in onset, severe, and gradually resolved over ten minutes. He denied any HA, CP, SOB, abd pain, N/V. He had no change in his extremity weakness and denied numbness. Has had constipation for the last several days while  hospitalized. Does not make urine.     Hospital/ICU Course:  No notes on file        Review of Systems   Constitutional: Positive for diaphoresis. Negative for chills and fever.   HENT: Negative for congestion and sore throat.    Eyes: Negative for discharge and redness.   Respiratory: Negative for cough, shortness of breath and wheezing.    Cardiovascular: Negative for chest pain and leg swelling.   Gastrointestinal: Negative for abdominal pain, constipation, diarrhea, nausea and vomiting.   Genitourinary: Negative for dysuria and hematuria.   Musculoskeletal: Negative for back pain and neck pain.   Skin: Positive for pallor. Negative for rash and wound.   Neurological: Positive for weakness and light-headedness. Negative for syncope, numbness and headaches.   Psychiatric/Behavioral: Negative for behavioral problems and confusion.       Scheduled Meds:   sodium chloride 0.9%   Intravenous Once    diclofenac sodium   Topical (Top) Daily    [START ON 3/2/2019] gabapentin  300 mg Oral QHS    gentamicin  80 mg Intravenous 30 Min Pre-Op    pravastatin  40 mg Oral Daily    vancomycin (VANCOCIN) IVPB  20 mg/kg Intravenous 30 Min Pre-Op     Continuous Infusions:  PRN Meds:.sodium chloride 0.9%, acetaminophen, dextrose 50%, glucose, insulin aspart U-100          Objective:     Vital Signs (Most Recent):  Temp: 97.8 °F (36.6 °C) (03/01/19 1713)  Pulse: 63 (03/01/19 1713)  Resp: 17 (03/01/19 1713)  BP: (!) 132/53 (03/01/19 1713)  SpO2: 100 % (03/01/19 1713) Vital Signs (24h Range):  Temp:  [97 °F (36.1 °C)-97.8 °F (36.6 °C)] 97.8 °F (36.6 °C)  Pulse:  [46-63] 63  Resp:  [14-18] 17  SpO2:  [96 %-100 %] 100 %  BP: ()/(17-78) 132/53   Weight: (!) 139.9 kg (308 lb 6.8 oz)  Body mass index is 48.31 kg/m².      Intake/Output Summary (Last 24 hours) at 3/1/2019 1816  Last data filed at 3/1/2019 1610  Gross per 24 hour   Intake 950 ml   Output 1442 ml   Net -492 ml       Physical Exam   Constitutional: He is oriented to  person, place, and time. He appears well-developed and well-nourished.    male who appears stated age, lying in bed   HENT:   Head: Normocephalic and atraumatic.   Mouth/Throat: Oropharynx is clear and moist. No oropharyngeal exudate.   Eyes: EOM are normal. Pupils are equal, round, and reactive to light.   Neck: No JVD present. No tracheal deviation present.   Cardiovascular: Regular rhythm, normal heart sounds and intact distal pulses.   Bradycardia in 50s   Pulmonary/Chest: Effort normal and breath sounds normal. No stridor. No respiratory distress. He has no wheezes. He has no rales.   Abdominal: Soft. Bowel sounds are normal. He exhibits no distension and no mass. There is no tenderness. There is no guarding.   Obese   Musculoskeletal: He exhibits edema. He exhibits no tenderness or deformity.   Palpable thrill in LUE fistula  1+ pitting edema to bilateral shins   Neurological: He is alert and oriented to person, place, and time. No cranial nerve deficit.   Strength 4/5 in BUE, 4/5 in LLE limited by pain, 5/5 in RLE  Sensation to light touch intact in all extremities   Skin: No rash noted. He is diaphoretic. No erythema.   Psychiatric: He has a normal mood and affect. His behavior is normal. Thought content normal.       Vents:     Lines/Drains/Airways     Drain                 Hemodialysis AV Fistula Left forearm -- days          Peripheral Intravenous Line                 Peripheral IV - Single Lumen 02/26/19 1517 Right Antecubital 3 days              Significant Labs:    CBC/Anemia Profile:  Recent Labs   Lab 02/28/19  0501 03/01/19  0449   WBC 4.92 4.72   HGB 10.5* 10.2*   HCT 33.9* 31.7*   PLT 95* 76*   * 104*   RDW 15.3* 15.0*        Chemistries:  Recent Labs   Lab 02/28/19  0501 03/01/19  0449    134*   K 4.3 5.3*    97   CO2 26 25   BUN 38* 65*   CREATININE 6.9* 9.1*   CALCIUM 9.3 9.0   ALBUMIN 3.3*  --    PROT 6.4  --    BILITOT 1.0  --    ALKPHOS 165*  --    ALT 80*  --     AST 91*  --    MG 2.3  --    PHOS 6.8*  --        A1C: No results for input(s): HGBA1C in the last 48 hours.  Cardiac Markers: No results for input(s): CKMB, TROPONINT, MYOGLOBIN in the last 48 hours.  Lactic Acid:   Recent Labs   Lab 03/01/19  1627   LACTATE 1.0     Troponin: No results for input(s): TROPONINI in the last 48 hours.    Significant Imaging:  MRI C spine - cervical stensosis at C3-4 and C6-7  CXR - pending  XR femur left - pending      ABG  No results for input(s): PH, PO2, PCO2, HCO3, BE in the last 168 hours.  Assessment/Plan:     Neuro   * Cervical myelopathy    Neurosurgery formerly primary team prior to step up  Planned C3-6 posterior decompression/fusion for 3/2/19  Receiving vanc and gentamycin for surgical ppx  Initially had medical clearance from hospitalist team, with RCRI Score of 2 points indicated 10.1% chance of MACE  Final clearance pending syncope workup     Multifactorial peripheral neuropathy    Continue gabapentin     Cardiac/Vascular   Bradycardia    See presyncope     Hypotension    See presyncope     Hyperlipidemia    Continue statin     Renal/   Anemia due to stage 5 chronic kidney disease    Hg stable at present  Transfusion goal to maintain >7     ESRD on dialysis    Nephrology following  Received partial session today during which episode occurred  Follow up electrolytes  Repeat dialysis as indicated and pending syncope workup     Endocrine   Diabetes mellitus, type 2    Monitor glucose  Continue sliding scale insulin     Orthopedic   Left leg pain    Pain in left thigh ongoing since fall  XR left femur pending  Continue diclofenac gel for pain     Other   Pre-syncope    Episode occurred during dialysis, now resolved  EKG with sinus rhythm, mild bradycardia, QRS prolongation that was present on old EKG, new QT interval prolongation but less than 1/2 of RR interval, no ischemic ST changes  Repeat lab work including lactate and troponin to assess for infectious and cardiac  causes  Cardiac monitoring for potential arrhythmia  DDx also includes dialysis dysequilibrium, vasovagal episode, orthostasis, neurogenic shock        Critical Care Daily Checklist:    A: Awake: RASS Goal/Actual Goal:    Actual: Springer Agitation Sedation Scale (RASS): Alert and calm   B: Spontaneous Breathing Trial Performed?     C: SAT & SBT Coordinated?                        D: Delirium: CAM-ICU Overall CAM-ICU: Negative   E: Early Mobility Performed? Yes   F: Feeding Goal:    Status:     Current Diet Order   Procedures    Diet NPO    Diet diabetic [facility]; 2000 Calorie; Renal; Thin     Renal/Diabetic     Order Specific Question:   Indicate patient location for additional diet options:     Answer:   [facility]     Order Specific Question:   Total calories:     Answer:   2000 Calorie     Order Specific Question:   Additional Diet Options:     Answer:   Renal     Order Specific Question:   Fluid consistency:     Answer:   Thin      AS: Analgesia/Sedation Gabapentin, dicolfenac gel   T: Thromboembolic Prophylaxis SCDs    H: HOB > 300 Yes   U: Stress Ulcer Prophylaxis (if needed) None   G: Glucose Control SSI   B: Bowel Function Stool Occurrence: 0   I: Indwelling Catheter (Lines & Douglas) Necessity PIV   D: De-escalation of Antimicrobials/Pharmacotherapies Vanc/gent for surgical ppx    Plan for the day/ETD Monitor overnight, syncope workup    Code Status:  Family/Goals of Care: Full Code         Critical secondary to Patient has a condition that poses threat to life and bodily function: presyncopal episode with profound bradycardia and hypotension      Critical care was time spent personally by me on the following activities: development of treatment plan with patient or surrogate and bedside caregivers, discussions with consultants, evaluation of patient's response to treatment, examination of patient, ordering and performing treatments and interventions, ordering and review of laboratory  studies, ordering and review of radiographic studies, pulse oximetry, re-evaluation of patient's condition. This critical care time did not overlap with that of any other provider or involve time for any procedures.     Devaughn Cook MD  Critical Care Medicine  [facility]

## 2019-03-02 NOTE — SUBJECTIVE & OBJECTIVE
Review of Systems   Constitutional: Positive for diaphoresis. Negative for chills and fever.   HENT: Negative for congestion and sore throat.    Eyes: Negative for discharge and redness.   Respiratory: Negative for cough, shortness of breath and wheezing.    Cardiovascular: Negative for chest pain and leg swelling.   Gastrointestinal: Negative for abdominal pain, constipation, diarrhea, nausea and vomiting.   Genitourinary: Negative for dysuria and hematuria.   Musculoskeletal: Negative for back pain and neck pain.   Skin: Positive for pallor. Negative for rash and wound.   Neurological: Positive for weakness and light-headedness. Negative for syncope, numbness and headaches.   Psychiatric/Behavioral: Negative for behavioral problems and confusion.       Scheduled Meds:   sodium chloride 0.9%   Intravenous Once    diclofenac sodium   Topical (Top) Daily    [START ON 3/2/2019] gabapentin  300 mg Oral QHS    gentamicin  80 mg Intravenous 30 Min Pre-Op    pravastatin  40 mg Oral Daily    vancomycin (VANCOCIN) IVPB  20 mg/kg Intravenous 30 Min Pre-Op     Continuous Infusions:  PRN Meds:.sodium chloride 0.9%, acetaminophen, dextrose 50%, glucose, insulin aspart U-100          Objective:     Vital Signs (Most Recent):  Temp: 97.8 °F (36.6 °C) (03/01/19 1713)  Pulse: 63 (03/01/19 1713)  Resp: 17 (03/01/19 1713)  BP: (!) 132/53 (03/01/19 1713)  SpO2: 100 % (03/01/19 1713) Vital Signs (24h Range):  Temp:  [97 °F (36.1 °C)-97.8 °F (36.6 °C)] 97.8 °F (36.6 °C)  Pulse:  [46-63] 63  Resp:  [14-18] 17  SpO2:  [96 %-100 %] 100 %  BP: ()/(17-78) 132/53   Weight: (!) 139.9 kg (308 lb 6.8 oz)  Body mass index is 48.31 kg/m².      Intake/Output Summary (Last 24 hours) at 3/1/2019 1816  Last data filed at 3/1/2019 1610  Gross per 24 hour   Intake 950 ml   Output 1442 ml   Net -492 ml       Physical Exam   Constitutional: He is oriented to person, place, and time. He appears well-developed and well-nourished.     male who appears stated age, lying in bed   HENT:   Head: Normocephalic and atraumatic.   Mouth/Throat: Oropharynx is clear and moist. No oropharyngeal exudate.   Eyes: EOM are normal. Pupils are equal, round, and reactive to light.   Neck: No JVD present. No tracheal deviation present.   Cardiovascular: Regular rhythm, normal heart sounds and intact distal pulses.   Bradycardia in 50s   Pulmonary/Chest: Effort normal and breath sounds normal. No stridor. No respiratory distress. He has no wheezes. He has no rales.   Abdominal: Soft. Bowel sounds are normal. He exhibits no distension and no mass. There is no tenderness. There is no guarding.   Obese   Musculoskeletal: He exhibits edema. He exhibits no tenderness or deformity.   Palpable thrill in LUE fistula  1+ pitting edema to bilateral shins   Neurological: He is alert and oriented to person, place, and time. No cranial nerve deficit.   Strength 4/5 in BUE, 4/5 in LLE limited by pain, 5/5 in RLE  Sensation to light touch intact in all extremities   Skin: No rash noted. He is diaphoretic. No erythema.   Psychiatric: He has a normal mood and affect. His behavior is normal. Thought content normal.       Vents:     Lines/Drains/Airways     Drain                 Hemodialysis AV Fistula Left forearm -- days          Peripheral Intravenous Line                 Peripheral IV - Single Lumen 02/26/19 1517 Right Antecubital 3 days              Significant Labs:    CBC/Anemia Profile:  Recent Labs   Lab 02/28/19  0501 03/01/19  0449   WBC 4.92 4.72   HGB 10.5* 10.2*   HCT 33.9* 31.7*   PLT 95* 76*   * 104*   RDW 15.3* 15.0*        Chemistries:  Recent Labs   Lab 02/28/19  0501 03/01/19  0449    134*   K 4.3 5.3*    97   CO2 26 25   BUN 38* 65*   CREATININE 6.9* 9.1*   CALCIUM 9.3 9.0   ALBUMIN 3.3*  --    PROT 6.4  --    BILITOT 1.0  --    ALKPHOS 165*  --    ALT 80*  --    AST 91*  --    MG 2.3  --    PHOS 6.8*  --        A1C: No results for input(s):  HGBA1C in the last 48 hours.  Cardiac Markers: No results for input(s): CKMB, TROPONINT, MYOGLOBIN in the last 48 hours.  Lactic Acid:   Recent Labs   Lab 03/01/19  1627   LACTATE 1.0     Troponin: No results for input(s): TROPONINI in the last 48 hours.    Significant Imaging:  MRI C spine - cervical stensosis at C3-4 and C6-7  CXR - pending  XR femur left - pending

## 2019-03-02 NOTE — HPI
57 yo male with a PMH of ESRD with dialysis MWF (last on weds), DM, presenting as transfer from [facility] as admission to neurosurgery for evaluation after progressive course of unsteady gait as well as bilateral upper extremity weakness for the last several months.  Mr. Farmer has had wobbly gait since this past September, at which point he recalls falling and has been at 80-90% of his normal function.  He has been able to walk without assist, however not for very long distances.  He states that since last Sunday he has felt progression of his symptoms and now feels it would be difficult to stand up to ambulate at all.  He also endorses a pulled muscle of his LLE that is limiting his mobility.  His UE symptoms have been present for about 3 months including not writing as well as he is used to.  HE is able to write numbers, but not always his name.  He has been having difficulty using a fork to eat over the past month.  No sensory disturbance noted as well as no neck or back pain and no pain from the neck radiating into the hands.  He has intermittently been taking ASA 81 mg qdaily. He denies recent falls or trauma to the neck.  No other blood thinning medication.  MRI at OSH on 2/27 revealed significant cervical stenosis.

## 2019-03-02 NOTE — SUBJECTIVE & OBJECTIVE
Pt margarette down & became hypotensive in dialysis - code blue - ok now, but going to MICU for workup.  Nephro thinks hypotensive from hypovolemia. He is stable overnight. EKG with old changes. Trop normal. Neurologically at baseline.       Review of Systems  Objective:     Vital Signs (Most Recent):  Temp: 97.9 °F (36.6 °C) (03/02/19 0701)  Pulse: (!) 59 (03/02/19 1000)  Resp: 20 (03/02/19 1000)  BP: (!) 128/58 (03/02/19 1000)  SpO2: 100 % (03/02/19 1000) Vital Signs (24h Range):  Temp:  [97.6 °F (36.4 °C)-98.9 °F (37.2 °C)] 97.9 °F (36.6 °C)  Pulse:  [46-70] 59  Resp:  [9-38] 20  SpO2:  [96 %-100 %] 100 %  BP: ()/(17-85) 128/58     Weight: 134.3 kg (296 lb 1.2 oz)  Body mass index is 46.37 kg/m².       Significant Labs:   Recent Labs   Lab 03/02/19  0307   WBC 4.43   RBC 2.68*   HGB 9.1*   HCT 27.7*   PLT 77*   *   MCH 34.0*   MCHC 32.9     Recent Labs   Lab 03/02/19  0307   *   K 5.0      CO2 22*   BUN 57*   CREATININE 8.2*   MG 2.8*         Significant Imaging: Reviewed    Neurosurgery Physical Exam   AAOX3  CN 2-12 intact  3 to 4- over 5 through BUE  5 over 5 through BLE  Positive hoffmans bilaterally with 3+ UE reflexes  2+ LE reflexes, no clonus or babinsky  Sensation intact to light tough  Proprioception intact

## 2019-03-02 NOTE — PROGRESS NOTES
[facility]  Critical Care Medicine  Progress Note    Patient Name: Angel Farmer Jr.  MRN: [id number]  Admission Date: 2/28/2019  Hospital Length of Stay: 2 days  Code Status: Full Code  Attending Provider: Daina Kauffman MD  Primary Care Provider: Satya Noriega MD   Principal Problem: Cervical myelopathy    Subjective:     HPI:  Per hospital medicine HPI:   59 yo male with a PMH of ESRD with dialysis MWF (last on 2/27), DM, presenting as transfer from [facility] as admission to neurosurgery for evaluation after progressive course of unsteady gait as well as bilateral upper extremity weakness for the last several months.  Mr. Farmer has had wobbly gait since this past September, at which point he recalls falling and has been at 80-90% of his normal function.  He has been able to walk without assist, however not for very long distances.  He states that since last Sunday he has felt progression of his symptoms and now feels it would be difficult to stand up to ambulate at all. His UE symptoms have been present for about 3 months including not writing as well as he is used to. No sensory disturbance noted as well as no neck or back pain and no pain from the neck radiating into the hands. MRI at OSH on 2/27 revealed significant cervical stenosis.      Patient was receiving dialysis on 3/1/19 routinely, and about 1 hour and 45 minutes into the session with removal of 1400cc, he had a pre-syncopal episode. Patient endorses feeling generally weak, lightheaded, like he was going to pass out. Staff who were present for the event reported that patient appeared pale and diaphoretic and, his blood pressure dropped to 65/40 and his HR was 41 when it occurred. It was acute in onset, severe, and gradually resolved over ten minutes. He denied any HA, CP, SOB, abd pain, N/V. He had no change in his extremity weakness and denied numbness. Has had constipation for the last several days while  hospitalized. Does not make urine.     Interval History/Significant Events: Patient admitted to Critical Care Medicine after episode of hypotension and bradycardia.  Patient otherwise noted improvement after being rinsed back.  Patient otherwise without significant issues overnight without any evidence of hemodynamic instability.  Otherwise patient without symptoms of chest pain, worsening shortness of breath, or blurry vision.      Review of Systems   Constitutional: Positive for diaphoresis. Negative for chills and fever.   HENT: Negative for congestion and sore throat.    Eyes: Negative for discharge and redness.   Respiratory: Negative for cough, shortness of breath and wheezing.    Cardiovascular: Negative for chest pain and leg swelling.   Gastrointestinal: Negative for abdominal pain, constipation, diarrhea, nausea and vomiting.   Genitourinary: Negative for dysuria and hematuria.   Musculoskeletal: Negative for back pain and neck pain.   Skin: Positive for pallor. Negative for rash and wound.   Neurological: Positive for weakness and light-headedness. Negative for syncope, numbness and headaches.   Psychiatric/Behavioral: Negative for behavioral problems and confusion.     Objective:     Vital Signs (Most Recent):  Temp: 98.2 °F (36.8 °C) (03/02/19 1101)  Pulse: 63 (03/02/19 1200)  Resp: 10 (03/02/19 1200)  BP: (!) 158/70 (03/02/19 1200)  SpO2: (!) 93 % (03/02/19 1200) Vital Signs (24h Range):  Temp:  [97.6 °F (36.4 °C)-98.9 °F (37.2 °C)] 98.2 °F (36.8 °C)  Pulse:  [46-70] 63  Resp:  [9-38] 10  SpO2:  [93 %-100 %] 93 %  BP: ()/(17-85) 158/70   Weight: 134.3 kg (296 lb 1.2 oz)  Body mass index is 46.37 kg/m².      Intake/Output Summary (Last 24 hours) at 3/2/2019 1216  Last data filed at 3/1/2019 2000  Gross per 24 hour   Intake 1000 ml   Output 1442 ml   Net -442 ml       Physical Exam   Constitutional: He is oriented to person, place, and time. He appears well-developed and well-nourished.     male who appears stated age, lying in bed   HENT:   Head: Normocephalic and atraumatic.   Mouth/Throat: Oropharynx is clear and moist. No oropharyngeal exudate.   Eyes: EOM are normal. Pupils are equal, round, and reactive to light.   Neck: No JVD present. No tracheal deviation present.   Cardiovascular: Regular rhythm, normal heart sounds and intact distal pulses.   Bradycardia in 50s   Pulmonary/Chest: Effort normal and breath sounds normal. No stridor. No respiratory distress. He has no wheezes. He has no rales.   Abdominal: Soft. Bowel sounds are normal. He exhibits no distension and no mass. There is no tenderness. There is no guarding.   Obese   Musculoskeletal: He exhibits edema. He exhibits no tenderness or deformity.   Palpable thrill in LUE fistula  1+ pitting edema to bilateral shins   Neurological: He is alert and oriented to person, place, and time. No cranial nerve deficit.   Strength 4/5 in BUE, 4/5 in LLE limited by pain, 5/5 in RLE  Sensation to light touch intact in all extremities   Skin: No rash noted. He is diaphoretic. No erythema.   Psychiatric: He has a normal mood and affect. His behavior is normal. Thought content normal.       Vents:     Lines/Drains/Airways     Drain                 Hemodialysis AV Fistula Left forearm -- days          Peripheral Intravenous Line                 Peripheral IV - Single Lumen 02/26/19 1517 Right Antecubital 3 days              Significant Labs:    Recent Results (from the past 24 hour(s))   POCT glucose    Collection Time: 03/01/19  4:14 PM   Result Value Ref Range    POCT Glucose 119 (H) 70 - 110 mg/dL   Iron and TIBC    Collection Time: 03/01/19  4:27 PM   Result Value Ref Range    Iron 169 (H) 45 - 160 ug/dL    Transferrin 147 (L) 200 - 375 mg/dL    TIBC 218 (L) 250 - 450 ug/dL    Saturated Iron 78 (H) 20 - 50 %   Ferritin    Collection Time: 03/01/19  4:27 PM   Result Value Ref Range    Ferritin 914 (H) 20.0 - 300.0 ng/mL   CBC auto differential     Collection Time: 03/01/19  4:27 PM   Result Value Ref Range    WBC 4.51 3.90 - 12.70 K/uL    RBC 2.89 (L) 4.60 - 6.20 M/uL    Hemoglobin 9.5 (L) 14.0 - 18.0 g/dL    Hematocrit 29.5 (L) 40.0 - 54.0 %     (H) 82 - 98 fL    MCH 32.9 (H) 27.0 - 31.0 pg    MCHC 32.2 32.0 - 36.0 g/dL    RDW 15.0 (H) 11.5 - 14.5 %    Platelets 90 (L) 150 - 350 K/uL    MPV 11.1 9.2 - 12.9 fL    Immature Granulocytes 0.2 0.0 - 0.5 %    Gran # (ANC) 2.7 1.8 - 7.7 K/uL    Immature Grans (Abs) 0.01 0.00 - 0.04 K/uL    Lymph # 1.2 1.0 - 4.8 K/uL    Mono # 0.4 0.3 - 1.0 K/uL    Eos # 0.1 0.0 - 0.5 K/uL    Baso # 0.02 0.00 - 0.20 K/uL    nRBC 0 0 /100 WBC    Gran% 60.8 38.0 - 73.0 %    Lymph% 27.3 18.0 - 48.0 %    Mono% 8.9 4.0 - 15.0 %    Eosinophil% 2.4 0.0 - 8.0 %    Basophil% 0.4 0.0 - 1.9 %    Differential Method Automated    Comprehensive metabolic panel    Collection Time: 03/01/19  4:27 PM   Result Value Ref Range    Sodium 131 (L) 136 - 145 mmol/L    Potassium 5.0 3.5 - 5.1 mmol/L    Chloride 97 95 - 110 mmol/L    CO2 23 23 - 29 mmol/L    Glucose 90 70 - 110 mg/dL    BUN, Bld 69 (H) 6 - 20 mg/dL    Creatinine 9.4 (H) 0.5 - 1.4 mg/dL    Calcium 8.7 8.7 - 10.5 mg/dL    Total Protein 7.1 6.0 - 8.4 g/dL    Albumin 3.2 (L) 3.5 - 5.2 g/dL    Total Bilirubin 0.8 0.1 - 1.0 mg/dL    Alkaline Phosphatase 163 (H) 55 - 135 U/L    AST 80 (H) 10 - 40 U/L    ALT 67 (H) 10 - 44 U/L    Anion Gap 11 8 - 16 mmol/L    eGFR if African American 6.4 (A) >60 mL/min/1.73 m^2    eGFR if non African American 5.6 (A) >60 mL/min/1.73 m^2   Troponin I    Collection Time: 03/01/19  4:27 PM   Result Value Ref Range    Troponin I 0.024 0.000 - 0.026 ng/mL   Lactic acid, plasma    Collection Time: 03/01/19  4:27 PM   Result Value Ref Range    Lactate (Lactic Acid) 1.0 0.5 - 2.2 mmol/L   Magnesium    Collection Time: 03/01/19  4:27 PM   Result Value Ref Range    Magnesium 4.1 (H) 1.6 - 2.6 mg/dL   Phosphorus    Collection Time: 03/01/19  4:27 PM   Result Value Ref  Range    Phosphorus 8.2 (H) 2.7 - 4.5 mg/dL   POCT glucose    Collection Time: 03/01/19 10:11 PM   Result Value Ref Range    POCT Glucose 142 (H) 70 - 110 mg/dL   Troponin I    Collection Time: 03/01/19 11:10 PM   Result Value Ref Range    Troponin I 0.018 0.000 - 0.026 ng/mL   CBC auto differential    Collection Time: 03/02/19  3:07 AM   Result Value Ref Range    WBC 4.43 3.90 - 12.70 K/uL    RBC 2.68 (L) 4.60 - 6.20 M/uL    Hemoglobin 9.1 (L) 14.0 - 18.0 g/dL    Hematocrit 27.7 (L) 40.0 - 54.0 %     (H) 82 - 98 fL    MCH 34.0 (H) 27.0 - 31.0 pg    MCHC 32.9 32.0 - 36.0 g/dL    RDW 15.2 (H) 11.5 - 14.5 %    Platelets 77 (L) 150 - 350 K/uL    MPV 11.4 9.2 - 12.9 fL    Immature Granulocytes 0.0 0.0 - 0.5 %    Gran # (ANC) 2.5 1.8 - 7.7 K/uL    Immature Grans (Abs) 0.00 0.00 - 0.04 K/uL    Lymph # 1.2 1.0 - 4.8 K/uL    Mono # 0.6 0.3 - 1.0 K/uL    Eos # 0.1 0.0 - 0.5 K/uL    Baso # 0.02 0.00 - 0.20 K/uL    nRBC 0 0 /100 WBC    Gran% 56.2 38.0 - 73.0 %    Lymph% 27.3 18.0 - 48.0 %    Mono% 13.3 4.0 - 15.0 %    Eosinophil% 2.7 0.0 - 8.0 %    Basophil% 0.5 0.0 - 1.9 %    Differential Method Automated    Comprehensive metabolic panel    Collection Time: 03/02/19  3:07 AM   Result Value Ref Range    Sodium 135 (L) 136 - 145 mmol/L    Potassium 5.0 3.5 - 5.1 mmol/L    Chloride 100 95 - 110 mmol/L    CO2 22 (L) 23 - 29 mmol/L    Glucose 84 70 - 110 mg/dL    BUN, Bld 57 (H) 6 - 20 mg/dL    Creatinine 8.2 (H) 0.5 - 1.4 mg/dL    Calcium 8.8 8.7 - 10.5 mg/dL    Total Protein 5.9 (L) 6.0 - 8.4 g/dL    Albumin 3.0 (L) 3.5 - 5.2 g/dL    Total Bilirubin 0.9 0.1 - 1.0 mg/dL    Alkaline Phosphatase 159 (H) 55 - 135 U/L    AST 66 (H) 10 - 40 U/L    ALT 68 (H) 10 - 44 U/L    Anion Gap 13 8 - 16 mmol/L    eGFR if African American 7.6 (A) >60 mL/min/1.73 m^2    eGFR if non African American 6.6 (A) >60 mL/min/1.73 m^2   Magnesium    Collection Time: 03/02/19  3:07 AM   Result Value Ref Range    Magnesium 2.8 (H) 1.6 - 2.6 mg/dL    Phosphorus    Collection Time: 03/02/19  3:07 AM   Result Value Ref Range    Phosphorus 7.8 (H) 2.7 - 4.5 mg/dL   POCT glucose    Collection Time: 03/02/19  7:46 AM   Result Value Ref Range    POCT Glucose 74 70 - 110 mg/dL   POCT glucose    Collection Time: 03/02/19  8:36 AM   Result Value Ref Range    POCT Glucose 95 70 - 110 mg/dL   POCT glucose    Collection Time: 03/02/19 11:01 AM   Result Value Ref Range    POCT Glucose 91 70 - 110 mg/dL         Significant Imaging:  I have reviewed all pertinent imaging results/findings within the past 24 hours.     KUB 3/2/2019    Mild gastric distension.  Stool and air seen throughout the colon and rectum, correlate for constipation.        ABG  No results for input(s): PH, PO2, PCO2, HCO3, BE in the last 168 hours.  Assessment/Plan:     Neuro   * Cervical myelopathy    - Neurosurgery formerly primary team prior to step up  - Planned C3-6 posterior decompression/fusion for 3/2/19  - Receiving vanc and gentamycin for surgical ppx  - Initially had medical clearance from hospitalist team, with RCRI Score of 2 points indicated 10.1% chance of MACE  - Patient otherwise with pre-syncopal event with hemodynamic changes.  Otherwise, troponins remained flat and normal, and patient did not have any lactic acidosis.  EKG reviewed, noted LBBB, but was present previously in 5/2018 and EKGs also without significant change during the past few years, unlikely to have a cardiac event with negative troponins, non-reproducible EKG changes, and no chest pain   - Patient however does note that his dry weight is 140 kg, where he is currently at 134 kg, event likely secondary to excessive volume removal.  Of note, patient with CVP of 3 with echo on 2/28 after dialysis Tuesday/Wednesday  - overall, patient remains low/moderate risk with 10% risk for major cardiac events per risk stratification     Multifactorial peripheral neuropathy    - Continue gabapentin     Cardiac/Vascular   Bradycardia     - tele reviewed in last 24 hours, one PVC but otherwise noted sinus   - HR now in low 60s, asymptomatic   - continue to monitor, but otherwise no issues      Hypotension    - See presyncope     Hyperlipidemia    - Continue pravastatin 40 mg PO daily      Renal/   Anemia due to stage 5 chronic kidney disease    Hg stable at present  Transfusion goal to maintain >7     ESRD on dialysis    - Nephrology following  - Received partial session today during which episode occurred  - no significant cardiac impairment given recent 2D echo  - phosphate elevated, will re-start phos binder per nephrology (fosrenol 100 mg PO TIDWM)     Endocrine   Diabetes mellitus, type 2    - on 40 u basiglar at home with recent A1c of 5  - can restart 25 units qHS here  - accuchecks AC HS   - renal diet     Orthopedic   Left leg pain    - Pain in left thigh ongoing since fall  - XR left femur pending  - ordered U/S for LLE swelling to evaluate for DVT      Other   Pre-syncope    - as per above         Critical Care Daily Checklist:    A: Awake: RASS Goal/Actual Goal:    Actual: Springer Agitation Sedation Scale (RASS): Alert and calm   B: Spontaneous Breathing Trial Performed?     C: SAT & SBT Coordinated?  none                      D: Delirium: CAM-ICU Overall CAM-ICU: Negative   E: Early Mobility Performed? Yes   F: Feeding Goal:    Status:     Current Diet Order   Procedures    Diet NPO      AS: Analgesia/Sedation none   T: Thromboembolic Prophylaxis holding   H: HOB > 300 Yes   U: Stress Ulcer Prophylaxis (if needed) none   G: Glucose Control insulin   B: Bowel Function Stool Occurrence: 0   I: Indwelling Catheter (Lines & Douglas) Necessity PIV   D: De-escalation of Antimicrobials/Pharmacotherapies none    Plan for the day/ETD Step down    Code Status:  Family/Goals of Care: Full Code         Critical secondary to Patient has a condition that poses threat to life and bodily function: syncope      Critical care was time spent personally  by me on the following activities: development of treatment plan with patient or surrogate and bedside caregivers, discussions with consultants, evaluation of patient's response to treatment, examination of patient, ordering and performing treatments and interventions, ordering and review of laboratory studies, ordering and review of radiographic studies, pulse oximetry, re-evaluation of patient's condition. This critical care time did not overlap with that of any other provider or involve time for any procedures.     Skinny Kam MD  Critical Care Medicine  [facility]

## 2019-03-02 NOTE — ASSESSMENT & PLAN NOTE
- tele reviewed in last 24 hours, one PVC but otherwise noted sinus   - HR now in low 60s, asymptomatic   - continue to monitor, but otherwise no issues

## 2019-03-02 NOTE — PROGRESS NOTES
[facility]  Neurosurgery  Progress Note    Subjective:     History of Present Illness: 59 yo male with a PMH of ESRD with dialysis MWF (last on weds), DM, presenting as transfer from [facility] as admission to neurosurgery for evaluation after progressive course of unsteady gait as well as bilateral upper extremity weakness for the last several months.  Mr. Farmer has had wobbly gait since this past September, at which point he recalls falling and has been at 80-90% of his normal function.  He has been able to walk without assist, however not for very long distances.  He states that since last Sunday he has felt progression of his symptoms and now feels it would be difficult to stand up to ambulate at all.  He also endorses a pulled muscle of his LLE that is limiting his mobility.  His UE symptoms have been present for about 3 months including not writing as well as he is used to.  HE is able to write numbers, but not always his name.  He has been having difficulty using a fork to eat over the past month.  No sensory disturbance noted as well as no neck or back pain and no pain from the neck radiating into the hands.  He has intermittently been taking ASA 81 mg qdaily. He denies recent falls or trauma to the neck.  No other blood thinning medication.  MRI at OSH on 2/27 revealed significant cervical stenosis.          Post-Op Info:  * No surgery found *         Pt margarette down & became hypotensive in dialysis - code blue - ok now, but going to MICU for workup.  Nephro thinks hypotensive from hypovolemia. He is stable overnight. EKG with old changes. Trop normal. Neurologically at baseline.       Review of Systems  Objective:     Vital Signs (Most Recent):  Temp: 97.9 °F (36.6 °C) (03/02/19 0701)  Pulse: (!) 59 (03/02/19 1000)  Resp: 20 (03/02/19 1000)  BP: (!) 128/58 (03/02/19 1000)  SpO2: 100 % (03/02/19 1000) Vital Signs (24h Range):  Temp:  [97.6 °F (36.4 °C)-98.9 °F (37.2 °C)] 97.9 °F  (36.6 °C)  Pulse:  [46-70] 59  Resp:  [9-38] 20  SpO2:  [96 %-100 %] 100 %  BP: ()/(17-85) 128/58     Weight: 134.3 kg (296 lb 1.2 oz)  Body mass index is 46.37 kg/m².       Significant Labs:   Recent Labs   Lab 03/02/19  0307   WBC 4.43   RBC 2.68*   HGB 9.1*   HCT 27.7*   PLT 77*   *   MCH 34.0*   MCHC 32.9     Recent Labs   Lab 03/02/19  0307   *   K 5.0      CO2 22*   BUN 57*   CREATININE 8.2*   MG 2.8*         Significant Imaging: Reviewed    Neurosurgery Physical Exam   AAOX3  CN 2-12 intact  3 to 4- over 5 through BUE  5 over 5 through BLE  Positive hoffmans bilaterally with 3+ UE reflexes  2+ LE reflexes, no clonus or babinsky  Sensation intact to light tough  Proprioception intact          Assessment/Plan:     * Cervical myelopathy    58 M with progressive cervical myelopathy and stenosis from C3-6 with myelomalacia. S/p code due to hypovolemia during HD. Sable this morning and labs WNL. He stepped up to ICU for further care and work up.    -Neurologically stable  -Will plan for C3-6 posterior decompression/fusion pending clearance   -Will obtain consent   -Pain control PRN  -ADA per MICU  -HD per nephrology  -Daily PT/OT  -Please document preop medical clearance   -Further care per ICU  -Will continue to follow        Jemima Thomas MD  Neurosurgery  [facility]

## 2019-03-02 NOTE — SUBJECTIVE & OBJECTIVE
Interval History/Significant Events: Patient admitted to Critical Care Medicine after episode of hypotension and bradycardia.  Patient otherwise noted improvement after being rinsed back.  Patient otherwise without significant issues overnight without any evidence of hemodynamic instability.  Otherwise patient without symptoms of chest pain, worsening shortness of breath, or blurry vision.      Review of Systems   Constitutional: Positive for diaphoresis. Negative for chills and fever.   HENT: Negative for congestion and sore throat.    Eyes: Negative for discharge and redness.   Respiratory: Negative for cough, shortness of breath and wheezing.    Cardiovascular: Negative for chest pain and leg swelling.   Gastrointestinal: Negative for abdominal pain, constipation, diarrhea, nausea and vomiting.   Genitourinary: Negative for dysuria and hematuria.   Musculoskeletal: Negative for back pain and neck pain.   Skin: Positive for pallor. Negative for rash and wound.   Neurological: Positive for weakness and light-headedness. Negative for syncope, numbness and headaches.   Psychiatric/Behavioral: Negative for behavioral problems and confusion.     Objective:     Vital Signs (Most Recent):  Temp: 98.2 °F (36.8 °C) (03/02/19 1101)  Pulse: 63 (03/02/19 1200)  Resp: 10 (03/02/19 1200)  BP: (!) 158/70 (03/02/19 1200)  SpO2: (!) 93 % (03/02/19 1200) Vital Signs (24h Range):  Temp:  [97.6 °F (36.4 °C)-98.9 °F (37.2 °C)] 98.2 °F (36.8 °C)  Pulse:  [46-70] 63  Resp:  [9-38] 10  SpO2:  [93 %-100 %] 93 %  BP: ()/(17-85) 158/70   Weight: 134.3 kg (296 lb 1.2 oz)  Body mass index is 46.37 kg/m².      Intake/Output Summary (Last 24 hours) at 3/2/2019 1216  Last data filed at 3/1/2019 2000  Gross per 24 hour   Intake 1000 ml   Output 1442 ml   Net -442 ml       Physical Exam   Constitutional: He is oriented to person, place, and time. He appears well-developed and well-nourished.    male who appears stated age, lying in  bed   HENT:   Head: Normocephalic and atraumatic.   Mouth/Throat: Oropharynx is clear and moist. No oropharyngeal exudate.   Eyes: EOM are normal. Pupils are equal, round, and reactive to light.   Neck: No JVD present. No tracheal deviation present.   Cardiovascular: Regular rhythm, normal heart sounds and intact distal pulses.   Bradycardia in 50s   Pulmonary/Chest: Effort normal and breath sounds normal. No stridor. No respiratory distress. He has no wheezes. He has no rales.   Abdominal: Soft. Bowel sounds are normal. He exhibits no distension and no mass. There is no tenderness. There is no guarding.   Obese   Musculoskeletal: He exhibits edema. He exhibits no tenderness or deformity.   Palpable thrill in LUE fistula  1+ pitting edema to bilateral shins   Neurological: He is alert and oriented to person, place, and time. No cranial nerve deficit.   Strength 4/5 in BUE, 4/5 in LLE limited by pain, 5/5 in RLE  Sensation to light touch intact in all extremities   Skin: No rash noted. He is diaphoretic. No erythema.   Psychiatric: He has a normal mood and affect. His behavior is normal. Thought content normal.       Vents:     Lines/Drains/Airways     Drain                 Hemodialysis AV Fistula Left forearm -- days          Peripheral Intravenous Line                 Peripheral IV - Single Lumen 02/26/19 1517 Right Antecubital 3 days              Significant Labs:    Recent Results (from the past 24 hour(s))   POCT glucose    Collection Time: 03/01/19  4:14 PM   Result Value Ref Range    POCT Glucose 119 (H) 70 - 110 mg/dL   Iron and TIBC    Collection Time: 03/01/19  4:27 PM   Result Value Ref Range    Iron 169 (H) 45 - 160 ug/dL    Transferrin 147 (L) 200 - 375 mg/dL    TIBC 218 (L) 250 - 450 ug/dL    Saturated Iron 78 (H) 20 - 50 %   Ferritin    Collection Time: 03/01/19  4:27 PM   Result Value Ref Range    Ferritin 914 (H) 20.0 - 300.0 ng/mL   CBC auto differential    Collection Time: 03/01/19  4:27 PM    Result Value Ref Range    WBC 4.51 3.90 - 12.70 K/uL    RBC 2.89 (L) 4.60 - 6.20 M/uL    Hemoglobin 9.5 (L) 14.0 - 18.0 g/dL    Hematocrit 29.5 (L) 40.0 - 54.0 %     (H) 82 - 98 fL    MCH 32.9 (H) 27.0 - 31.0 pg    MCHC 32.2 32.0 - 36.0 g/dL    RDW 15.0 (H) 11.5 - 14.5 %    Platelets 90 (L) 150 - 350 K/uL    MPV 11.1 9.2 - 12.9 fL    Immature Granulocytes 0.2 0.0 - 0.5 %    Gran # (ANC) 2.7 1.8 - 7.7 K/uL    Immature Grans (Abs) 0.01 0.00 - 0.04 K/uL    Lymph # 1.2 1.0 - 4.8 K/uL    Mono # 0.4 0.3 - 1.0 K/uL    Eos # 0.1 0.0 - 0.5 K/uL    Baso # 0.02 0.00 - 0.20 K/uL    nRBC 0 0 /100 WBC    Gran% 60.8 38.0 - 73.0 %    Lymph% 27.3 18.0 - 48.0 %    Mono% 8.9 4.0 - 15.0 %    Eosinophil% 2.4 0.0 - 8.0 %    Basophil% 0.4 0.0 - 1.9 %    Differential Method Automated    Comprehensive metabolic panel    Collection Time: 03/01/19  4:27 PM   Result Value Ref Range    Sodium 131 (L) 136 - 145 mmol/L    Potassium 5.0 3.5 - 5.1 mmol/L    Chloride 97 95 - 110 mmol/L    CO2 23 23 - 29 mmol/L    Glucose 90 70 - 110 mg/dL    BUN, Bld 69 (H) 6 - 20 mg/dL    Creatinine 9.4 (H) 0.5 - 1.4 mg/dL    Calcium 8.7 8.7 - 10.5 mg/dL    Total Protein 7.1 6.0 - 8.4 g/dL    Albumin 3.2 (L) 3.5 - 5.2 g/dL    Total Bilirubin 0.8 0.1 - 1.0 mg/dL    Alkaline Phosphatase 163 (H) 55 - 135 U/L    AST 80 (H) 10 - 40 U/L    ALT 67 (H) 10 - 44 U/L    Anion Gap 11 8 - 16 mmol/L    eGFR if African American 6.4 (A) >60 mL/min/1.73 m^2    eGFR if non African American 5.6 (A) >60 mL/min/1.73 m^2   Troponin I    Collection Time: 03/01/19  4:27 PM   Result Value Ref Range    Troponin I 0.024 0.000 - 0.026 ng/mL   Lactic acid, plasma    Collection Time: 03/01/19  4:27 PM   Result Value Ref Range    Lactate (Lactic Acid) 1.0 0.5 - 2.2 mmol/L   Magnesium    Collection Time: 03/01/19  4:27 PM   Result Value Ref Range    Magnesium 4.1 (H) 1.6 - 2.6 mg/dL   Phosphorus    Collection Time: 03/01/19  4:27 PM   Result Value Ref Range    Phosphorus 8.2 (H) 2.7 -  4.5 mg/dL   POCT glucose    Collection Time: 03/01/19 10:11 PM   Result Value Ref Range    POCT Glucose 142 (H) 70 - 110 mg/dL   Troponin I    Collection Time: 03/01/19 11:10 PM   Result Value Ref Range    Troponin I 0.018 0.000 - 0.026 ng/mL   CBC auto differential    Collection Time: 03/02/19  3:07 AM   Result Value Ref Range    WBC 4.43 3.90 - 12.70 K/uL    RBC 2.68 (L) 4.60 - 6.20 M/uL    Hemoglobin 9.1 (L) 14.0 - 18.0 g/dL    Hematocrit 27.7 (L) 40.0 - 54.0 %     (H) 82 - 98 fL    MCH 34.0 (H) 27.0 - 31.0 pg    MCHC 32.9 32.0 - 36.0 g/dL    RDW 15.2 (H) 11.5 - 14.5 %    Platelets 77 (L) 150 - 350 K/uL    MPV 11.4 9.2 - 12.9 fL    Immature Granulocytes 0.0 0.0 - 0.5 %    Gran # (ANC) 2.5 1.8 - 7.7 K/uL    Immature Grans (Abs) 0.00 0.00 - 0.04 K/uL    Lymph # 1.2 1.0 - 4.8 K/uL    Mono # 0.6 0.3 - 1.0 K/uL    Eos # 0.1 0.0 - 0.5 K/uL    Baso # 0.02 0.00 - 0.20 K/uL    nRBC 0 0 /100 WBC    Gran% 56.2 38.0 - 73.0 %    Lymph% 27.3 18.0 - 48.0 %    Mono% 13.3 4.0 - 15.0 %    Eosinophil% 2.7 0.0 - 8.0 %    Basophil% 0.5 0.0 - 1.9 %    Differential Method Automated    Comprehensive metabolic panel    Collection Time: 03/02/19  3:07 AM   Result Value Ref Range    Sodium 135 (L) 136 - 145 mmol/L    Potassium 5.0 3.5 - 5.1 mmol/L    Chloride 100 95 - 110 mmol/L    CO2 22 (L) 23 - 29 mmol/L    Glucose 84 70 - 110 mg/dL    BUN, Bld 57 (H) 6 - 20 mg/dL    Creatinine 8.2 (H) 0.5 - 1.4 mg/dL    Calcium 8.8 8.7 - 10.5 mg/dL    Total Protein 5.9 (L) 6.0 - 8.4 g/dL    Albumin 3.0 (L) 3.5 - 5.2 g/dL    Total Bilirubin 0.9 0.1 - 1.0 mg/dL    Alkaline Phosphatase 159 (H) 55 - 135 U/L    AST 66 (H) 10 - 40 U/L    ALT 68 (H) 10 - 44 U/L    Anion Gap 13 8 - 16 mmol/L    eGFR if African American 7.6 (A) >60 mL/min/1.73 m^2    eGFR if non African American 6.6 (A) >60 mL/min/1.73 m^2   Magnesium    Collection Time: 03/02/19  3:07 AM   Result Value Ref Range    Magnesium 2.8 (H) 1.6 - 2.6 mg/dL   Phosphorus    Collection Time:  03/02/19  3:07 AM   Result Value Ref Range    Phosphorus 7.8 (H) 2.7 - 4.5 mg/dL   POCT glucose    Collection Time: 03/02/19  7:46 AM   Result Value Ref Range    POCT Glucose 74 70 - 110 mg/dL   POCT glucose    Collection Time: 03/02/19  8:36 AM   Result Value Ref Range    POCT Glucose 95 70 - 110 mg/dL   POCT glucose    Collection Time: 03/02/19 11:01 AM   Result Value Ref Range    POCT Glucose 91 70 - 110 mg/dL         Significant Imaging:  I have reviewed all pertinent imaging results/findings within the past 24 hours.     KUB 3/2/2019    Mild gastric distension.  Stool and air seen throughout the colon and rectum, correlate for constipation.

## 2019-03-02 NOTE — ASSESSMENT & PLAN NOTE
Episode occurred during dialysis, now resolved  EKG with sinus rhythm, mild bradycardia, QRS prolongation that was present on old EKG, new QT interval prolongation but less than 1/2 of RR interval, no ischemic ST changes  Repeat lab work including lactate and troponin to assess for infectious and cardiac causes  Cardiac monitoring for potential arrhythmia  DDx also includes dialysis dysequilibrium

## 2019-03-03 ENCOUNTER — ANESTHESIA EVENT (OUTPATIENT)
Dept: SURGERY | Facility: HOSPITAL | Age: 59
DRG: 518 | End: 2019-03-03
Payer: MEDICARE

## 2019-03-03 ENCOUNTER — ANESTHESIA (OUTPATIENT)
Dept: SURGERY | Facility: HOSPITAL | Age: 59
DRG: 518 | End: 2019-03-03
Payer: MEDICARE

## 2019-03-03 PROBLEM — E87.1 HYPONATREMIA: Status: ACTIVE | Noted: 2019-03-03

## 2019-03-03 PROBLEM — T68.XXXA HYPOTHERMIA: Status: ACTIVE | Noted: 2019-03-03

## 2019-03-03 PROBLEM — E87.5 HYPERKALEMIA: Status: ACTIVE | Noted: 2019-03-03

## 2019-03-03 PROBLEM — Z98.890 S/P LAMINECTOMY: Status: ACTIVE | Noted: 2019-03-03

## 2019-03-03 PROBLEM — E66.01 MORBID OBESITY: Status: ACTIVE | Noted: 2019-03-03

## 2019-03-03 LAB
ABO + RH BLD: NORMAL
ALBUMIN SERPL BCP-MCNC: 3 G/DL
ALBUMIN SERPL BCP-MCNC: 3.1 G/DL
ALLENS TEST: ABNORMAL
ALP SERPL-CCNC: 165 U/L
ALP SERPL-CCNC: 174 U/L
ALT SERPL W/O P-5'-P-CCNC: 59 U/L
ALT SERPL W/O P-5'-P-CCNC: 62 U/L
ANION GAP SERPL CALC-SCNC: 13 MMOL/L
ANION GAP SERPL CALC-SCNC: 18 MMOL/L
APTT BLDCRRT: 25.2 SEC
AST SERPL-CCNC: 53 U/L
AST SERPL-CCNC: 56 U/L
BASOPHILS # BLD AUTO: 0.01 K/UL
BASOPHILS # BLD AUTO: 0.01 K/UL
BASOPHILS NFR BLD: 0.1 %
BASOPHILS NFR BLD: 0.2 %
BILIRUB SERPL-MCNC: 0.8 MG/DL
BILIRUB SERPL-MCNC: 0.9 MG/DL
BLD GP AB SCN CELLS X3 SERPL QL: NORMAL
BLOOD GROUP ANTIBODIES SERPL: NORMAL
BUN SERPL-MCNC: 84 MG/DL
BUN SERPL-MCNC: 89 MG/DL
CALCIUM SERPL-MCNC: 8.8 MG/DL
CALCIUM SERPL-MCNC: 8.8 MG/DL
CHLORIDE SERPL-SCNC: 100 MMOL/L
CHLORIDE SERPL-SCNC: 99 MMOL/L
CO2 SERPL-SCNC: 17 MMOL/L
CO2 SERPL-SCNC: 21 MMOL/L
CREAT SERPL-MCNC: 10.7 MG/DL
CREAT SERPL-MCNC: 11 MG/DL
DELSYS: ABNORMAL
DIFFERENTIAL METHOD: ABNORMAL
DIFFERENTIAL METHOD: ABNORMAL
EOSINOPHIL # BLD AUTO: 0 K/UL
EOSINOPHIL # BLD AUTO: 0.2 K/UL
EOSINOPHIL NFR BLD: 0.2 %
EOSINOPHIL NFR BLD: 2.5 %
ERYTHROCYTE [DISTWIDTH] IN BLOOD BY AUTOMATED COUNT: 15 %
ERYTHROCYTE [DISTWIDTH] IN BLOOD BY AUTOMATED COUNT: 15.3 %
ERYTHROCYTE [SEDIMENTATION RATE] IN BLOOD BY WESTERGREN METHOD: 14 MM/H
EST. GFR  (AFRICAN AMERICAN): 5.3 ML/MIN/1.73 M^2
EST. GFR  (AFRICAN AMERICAN): 5.5 ML/MIN/1.73 M^2
EST. GFR  (NON AFRICAN AMERICAN): 4.6 ML/MIN/1.73 M^2
EST. GFR  (NON AFRICAN AMERICAN): 4.8 ML/MIN/1.73 M^2
FIO2: 40
GLUCOSE SERPL-MCNC: 209 MG/DL
GLUCOSE SERPL-MCNC: 91 MG/DL (ref 70–110)
GLUCOSE SERPL-MCNC: 93 MG/DL
HCO3 UR-SCNC: 19.8 MMOL/L (ref 24–28)
HCO3 UR-SCNC: 21.5 MMOL/L (ref 24–28)
HCT VFR BLD AUTO: 28.3 %
HCT VFR BLD AUTO: 30.5 %
HCT VFR BLD CALC: 26 %PCV (ref 36–54)
HGB BLD-MCNC: 9 G/DL
HGB BLD-MCNC: 9.9 G/DL
IMM GRANULOCYTES # BLD AUTO: 0.02 K/UL
IMM GRANULOCYTES # BLD AUTO: 0.06 K/UL
IMM GRANULOCYTES NFR BLD AUTO: 0.3 %
IMM GRANULOCYTES NFR BLD AUTO: 0.5 %
INR PPP: 1
IP: 15
IT: 151
LYMPHOCYTES # BLD AUTO: 0.9 K/UL
LYMPHOCYTES # BLD AUTO: 1.3 K/UL
LYMPHOCYTES NFR BLD: 22.4 %
LYMPHOCYTES NFR BLD: 7.8 %
MAGNESIUM SERPL-MCNC: 3.4 MG/DL
MAP: 10
MCH RBC QN AUTO: 33.2 PG
MCH RBC QN AUTO: 33.6 PG
MCHC RBC AUTO-ENTMCNC: 31.8 G/DL
MCHC RBC AUTO-ENTMCNC: 32.5 G/DL
MCV RBC AUTO: 103 FL
MCV RBC AUTO: 104 FL
MODE: ABNORMAL
MONOCYTES # BLD AUTO: 0.5 K/UL
MONOCYTES # BLD AUTO: 1 K/UL
MONOCYTES NFR BLD: 16 %
MONOCYTES NFR BLD: 4.8 %
NEUTROPHILS # BLD AUTO: 3.5 K/UL
NEUTROPHILS # BLD AUTO: 9.5 K/UL
NEUTROPHILS NFR BLD: 58.6 %
NEUTROPHILS NFR BLD: 86.6 %
NRBC BLD-RTO: 0 /100 WBC
NRBC BLD-RTO: 0 /100 WBC
PCO2 BLDA: 38.6 MMHG (ref 35–45)
PCO2 BLDA: 39.3 MMHG (ref 35–45)
PEEP: 5
PH SMN: 7.32 [PH] (ref 7.35–7.45)
PH SMN: 7.34 [PH] (ref 7.35–7.45)
PHOSPHATE SERPL-MCNC: 9.1 MG/DL
PIP: 20
PLATELET # BLD AUTO: 72 K/UL
PLATELET # BLD AUTO: 95 K/UL
PMV BLD AUTO: 11 FL
PMV BLD AUTO: 11.2 FL
PO2 BLDA: 147 MMHG (ref 80–100)
PO2 BLDA: 88 MMHG (ref 80–100)
POC BE: -4 MMOL/L
POC BE: -6 MMOL/L
POC IONIZED CALCIUM: 1.12 MMOL/L (ref 1.06–1.42)
POC SATURATED O2: 96 % (ref 95–100)
POC SATURATED O2: 99 % (ref 95–100)
POC TCO2: 21 MMOL/L (ref 23–27)
POC TCO2: 23 MMOL/L (ref 23–27)
POCT GLUCOSE: 195 MG/DL (ref 70–110)
POCT GLUCOSE: 196 MG/DL (ref 70–110)
POCT GLUCOSE: 221 MG/DL (ref 70–110)
POCT GLUCOSE: 226 MG/DL (ref 70–110)
POCT GLUCOSE: 257 MG/DL (ref 70–110)
POCT GLUCOSE: 98 MG/DL (ref 70–110)
POCT GLUCOSE: 98 MG/DL (ref 70–110)
POTASSIUM BLD-SCNC: 5.6 MMOL/L (ref 3.5–5.1)
POTASSIUM SERPL-SCNC: 5.6 MMOL/L
POTASSIUM SERPL-SCNC: 6.1 MMOL/L
PROT SERPL-MCNC: 6 G/DL
PROT SERPL-MCNC: 6.1 G/DL
PROTHROMBIN TIME: 10.2 SEC
RBC # BLD AUTO: 2.71 M/UL
RBC # BLD AUTO: 2.95 M/UL
SAMPLE: ABNORMAL
SAMPLE: ABNORMAL
SITE: ABNORMAL
SODIUM BLD-SCNC: 134 MMOL/L (ref 136–145)
SODIUM SERPL-SCNC: 134 MMOL/L
SODIUM SERPL-SCNC: 134 MMOL/L
SP02: 100
VT: 706
WBC # BLD AUTO: 10.96 K/UL
WBC # BLD AUTO: 5.99 K/UL

## 2019-03-03 PROCEDURE — 37799 UNLISTED PX VASCULAR SURGERY: CPT

## 2019-03-03 PROCEDURE — 36000711: Performed by: NEUROLOGICAL SURGERY

## 2019-03-03 PROCEDURE — 93010 EKG 12-LEAD: ICD-10-PCS | Mod: ,,, | Performed by: INTERNAL MEDICINE

## 2019-03-03 PROCEDURE — 27201423 OPTIME MED/SURG SUP & DEVICES STERILE SUPPLY: Performed by: NEUROLOGICAL SURGERY

## 2019-03-03 PROCEDURE — 25000003 PHARM REV CODE 250: Performed by: STUDENT IN AN ORGANIZED HEALTH CARE EDUCATION/TRAINING PROGRAM

## 2019-03-03 PROCEDURE — 85730 THROMBOPLASTIN TIME PARTIAL: CPT

## 2019-03-03 PROCEDURE — 99291 PR CRITICAL CARE, E/M 30-74 MINUTES: ICD-10-PCS | Mod: ,,, | Performed by: NURSE PRACTITIONER

## 2019-03-03 PROCEDURE — 36620 INSERTION CATHETER ARTERY: CPT | Mod: 59,,, | Performed by: ANESTHESIOLOGY

## 2019-03-03 PROCEDURE — 99900035 HC TECH TIME PER 15 MIN (STAT)

## 2019-03-03 PROCEDURE — 25000003 PHARM REV CODE 250: Performed by: NEUROLOGICAL SURGERY

## 2019-03-03 PROCEDURE — 37000009 HC ANESTHESIA EA ADD 15 MINS: Performed by: NEUROLOGICAL SURGERY

## 2019-03-03 PROCEDURE — 36000710: Performed by: NEUROLOGICAL SURGERY

## 2019-03-03 PROCEDURE — 80053 COMPREHEN METABOLIC PANEL: CPT | Mod: 91

## 2019-03-03 PROCEDURE — 94002 VENT MGMT INPAT INIT DAY: CPT

## 2019-03-03 PROCEDURE — 94761 N-INVAS EAR/PLS OXIMETRY MLT: CPT

## 2019-03-03 PROCEDURE — 85025 COMPLETE CBC W/AUTO DIFF WBC: CPT | Mod: 91

## 2019-03-03 PROCEDURE — 27800505 HC CATH, RADIAL ARTERY KIT: Performed by: NURSE ANESTHETIST, CERTIFIED REGISTERED

## 2019-03-03 PROCEDURE — 86870 RBC ANTIBODY IDENTIFICATION: CPT

## 2019-03-03 PROCEDURE — 20000000 HC ICU ROOM

## 2019-03-03 PROCEDURE — 63600175 PHARM REV CODE 636 W HCPCS: Performed by: NEUROLOGICAL SURGERY

## 2019-03-03 PROCEDURE — 85610 PROTHROMBIN TIME: CPT

## 2019-03-03 PROCEDURE — 27100025 HC TUBING, SET FLUID WARMER: Performed by: NURSE ANESTHETIST, CERTIFIED REGISTERED

## 2019-03-03 PROCEDURE — 99233 SBSQ HOSP IP/OBS HIGH 50: CPT | Mod: GC,,, | Performed by: NEUROLOGICAL SURGERY

## 2019-03-03 PROCEDURE — 63600175 PHARM REV CODE 636 W HCPCS: Performed by: NURSE ANESTHETIST, CERTIFIED REGISTERED

## 2019-03-03 PROCEDURE — 27200966 HC CLOSED SUCTION SYSTEM

## 2019-03-03 PROCEDURE — 36620 PR INSERT CATH,ART,PERCUT,SHORTTERM: ICD-10-PCS | Mod: 59,,, | Performed by: ANESTHESIOLOGY

## 2019-03-03 PROCEDURE — 86901 BLOOD TYPING SEROLOGIC RH(D): CPT

## 2019-03-03 PROCEDURE — 80048 BASIC METABOLIC PNL TOTAL CA: CPT

## 2019-03-03 PROCEDURE — 93005 ELECTROCARDIOGRAM TRACING: CPT

## 2019-03-03 PROCEDURE — 86922 COMPATIBILITY TEST ANTIGLOB: CPT

## 2019-03-03 PROCEDURE — 36415 COLL VENOUS BLD VENIPUNCTURE: CPT

## 2019-03-03 PROCEDURE — 25000003 PHARM REV CODE 250: Performed by: NURSE ANESTHETIST, CERTIFIED REGISTERED

## 2019-03-03 PROCEDURE — 63600175 PHARM REV CODE 636 W HCPCS: Performed by: NURSE PRACTITIONER

## 2019-03-03 PROCEDURE — 80053 COMPREHEN METABOLIC PANEL: CPT

## 2019-03-03 PROCEDURE — 63048 PR LAMINECT/FACETECT/FORAMINOT, EA ADDTL VERTEBRAL SEGM: ICD-10-PCS | Mod: ,,, | Performed by: NEUROLOGICAL SURGERY

## 2019-03-03 PROCEDURE — D9220A PRA ANESTHESIA: ICD-10-PCS | Mod: ANES,,, | Performed by: ANESTHESIOLOGY

## 2019-03-03 PROCEDURE — 99233 PR SUBSEQUENT HOSPITAL CARE,LEVL III: ICD-10-PCS | Mod: GC,,, | Performed by: NEUROLOGICAL SURGERY

## 2019-03-03 PROCEDURE — D9220A PRA ANESTHESIA: Mod: ANES,,, | Performed by: ANESTHESIOLOGY

## 2019-03-03 PROCEDURE — D9220A PRA ANESTHESIA: Mod: CRNA,,, | Performed by: NURSE ANESTHETIST, CERTIFIED REGISTERED

## 2019-03-03 PROCEDURE — 63045 PR LAMINEC/FACETECT/FORAMIN,CERVICAL 1 SEG: ICD-10-PCS | Mod: 22,,, | Performed by: NEUROLOGICAL SURGERY

## 2019-03-03 PROCEDURE — C1729 CATH, DRAINAGE: HCPCS | Performed by: NEUROLOGICAL SURGERY

## 2019-03-03 PROCEDURE — 25000003 PHARM REV CODE 250: Performed by: NURSE PRACTITIONER

## 2019-03-03 PROCEDURE — 63045 LAM FACETEC & FORAMOT CRV: CPT | Mod: 22,,, | Performed by: NEUROLOGICAL SURGERY

## 2019-03-03 PROCEDURE — 37000008 HC ANESTHESIA 1ST 15 MINUTES: Performed by: NEUROLOGICAL SURGERY

## 2019-03-03 PROCEDURE — 63048 LAM FACETEC &FORAMOT EA ADDL: CPT | Mod: ,,, | Performed by: NEUROLOGICAL SURGERY

## 2019-03-03 PROCEDURE — 93010 ELECTROCARDIOGRAM REPORT: CPT | Mod: ,,, | Performed by: INTERNAL MEDICINE

## 2019-03-03 PROCEDURE — 25000003 PHARM REV CODE 250

## 2019-03-03 PROCEDURE — D9220A PRA ANESTHESIA: ICD-10-PCS | Mod: CRNA,,, | Performed by: NURSE ANESTHETIST, CERTIFIED REGISTERED

## 2019-03-03 PROCEDURE — 84100 ASSAY OF PHOSPHORUS: CPT

## 2019-03-03 PROCEDURE — 99291 CRITICAL CARE FIRST HOUR: CPT | Mod: ,,, | Performed by: NURSE PRACTITIONER

## 2019-03-03 PROCEDURE — 83735 ASSAY OF MAGNESIUM: CPT

## 2019-03-03 PROCEDURE — 94003 VENT MGMT INPAT SUBQ DAY: CPT

## 2019-03-03 RX ORDER — LIDOCAINE HCL/PF 100 MG/5ML
SYRINGE (ML) INTRAVENOUS
Status: DISCONTINUED | OUTPATIENT
Start: 2019-03-03 | End: 2019-03-03

## 2019-03-03 RX ORDER — NOREPINEPHRINE BITARTRATE 1 MG/ML
INJECTION, SOLUTION INTRAVENOUS
Status: COMPLETED
Start: 2019-03-03 | End: 2019-03-03

## 2019-03-03 RX ORDER — BACITRACIN ZINC 500 UNIT/G
OINTMENT (GRAM) TOPICAL
Status: DISCONTINUED | OUTPATIENT
Start: 2019-03-03 | End: 2019-03-03 | Stop reason: HOSPADM

## 2019-03-03 RX ORDER — CEFAZOLIN SODIUM 1 G/3ML
INJECTION, POWDER, FOR SOLUTION INTRAMUSCULAR; INTRAVENOUS
Status: DISCONTINUED | OUTPATIENT
Start: 2019-03-03 | End: 2019-03-03

## 2019-03-03 RX ORDER — CHLORHEXIDINE GLUCONATE ORAL RINSE 1.2 MG/ML
15 SOLUTION DENTAL 2 TIMES DAILY
Status: DISCONTINUED | OUTPATIENT
Start: 2019-03-03 | End: 2019-03-05

## 2019-03-03 RX ORDER — ONDANSETRON 2 MG/ML
INJECTION INTRAMUSCULAR; INTRAVENOUS
Status: DISCONTINUED | OUTPATIENT
Start: 2019-03-03 | End: 2019-03-03

## 2019-03-03 RX ORDER — DEXAMETHASONE SODIUM PHOSPHATE 4 MG/ML
INJECTION, SOLUTION INTRA-ARTICULAR; INTRALESIONAL; INTRAMUSCULAR; INTRAVENOUS; SOFT TISSUE
Status: DISCONTINUED | OUTPATIENT
Start: 2019-03-03 | End: 2019-03-03

## 2019-03-03 RX ORDER — PHENYLEPHRINE HYDROCHLORIDE 10 MG/ML
INJECTION INTRAVENOUS
Status: DISCONTINUED | OUTPATIENT
Start: 2019-03-03 | End: 2019-03-03

## 2019-03-03 RX ORDER — NOREPINEPHRINE BITARTRATE/D5W 4MG/250ML
0.02 PLASTIC BAG, INJECTION (ML) INTRAVENOUS CONTINUOUS
Status: DISCONTINUED | OUTPATIENT
Start: 2019-03-03 | End: 2019-03-05

## 2019-03-03 RX ORDER — BACITRACIN 50000 [IU]/1
INJECTION, POWDER, FOR SOLUTION INTRAMUSCULAR
Status: DISCONTINUED | OUTPATIENT
Start: 2019-03-03 | End: 2019-03-03 | Stop reason: HOSPADM

## 2019-03-03 RX ORDER — VANCOMYCIN HYDROCHLORIDE 1 G/20ML
INJECTION, POWDER, LYOPHILIZED, FOR SOLUTION INTRAVENOUS
Status: DISCONTINUED | OUTPATIENT
Start: 2019-03-03 | End: 2019-03-03 | Stop reason: HOSPADM

## 2019-03-03 RX ORDER — EPHEDRINE SULFATE 50 MG/ML
INJECTION, SOLUTION INTRAVENOUS
Status: DISCONTINUED | OUTPATIENT
Start: 2019-03-03 | End: 2019-03-03

## 2019-03-03 RX ORDER — SODIUM CHLORIDE 9 MG/ML
INJECTION, SOLUTION INTRAVENOUS CONTINUOUS
Status: DISCONTINUED | OUTPATIENT
Start: 2019-03-03 | End: 2019-03-03

## 2019-03-03 RX ORDER — PROPOFOL 10 MG/ML
VIAL (ML) INTRAVENOUS
Status: DISCONTINUED | OUTPATIENT
Start: 2019-03-03 | End: 2019-03-03

## 2019-03-03 RX ORDER — SODIUM CHLORIDE 9 MG/ML
INJECTION, SOLUTION INTRAVENOUS CONTINUOUS PRN
Status: DISCONTINUED | OUTPATIENT
Start: 2019-03-03 | End: 2019-03-03

## 2019-03-03 RX ORDER — VASOPRESSIN 20 [USP'U]/ML
INJECTION, SOLUTION INTRAMUSCULAR; SUBCUTANEOUS
Status: DISCONTINUED | OUTPATIENT
Start: 2019-03-03 | End: 2019-03-03

## 2019-03-03 RX ORDER — FENTANYL CITRATE 50 UG/ML
INJECTION, SOLUTION INTRAMUSCULAR; INTRAVENOUS
Status: DISCONTINUED | OUTPATIENT
Start: 2019-03-03 | End: 2019-03-03

## 2019-03-03 RX ORDER — NALOXONE HCL 0.4 MG/ML
VIAL (ML) INJECTION
Status: DISCONTINUED | OUTPATIENT
Start: 2019-03-03 | End: 2019-03-03

## 2019-03-03 RX ORDER — GLUCAGON 1 MG
1 KIT INJECTION
Status: DISCONTINUED | OUTPATIENT
Start: 2019-03-03 | End: 2019-03-06

## 2019-03-03 RX ORDER — DEXMEDETOMIDINE HYDROCHLORIDE 4 UG/ML
0.2 INJECTION, SOLUTION INTRAVENOUS CONTINUOUS
Status: DISCONTINUED | OUTPATIENT
Start: 2019-03-03 | End: 2019-03-04

## 2019-03-03 RX ORDER — MIDAZOLAM HYDROCHLORIDE 1 MG/ML
INJECTION, SOLUTION INTRAMUSCULAR; INTRAVENOUS
Status: DISCONTINUED | OUTPATIENT
Start: 2019-03-03 | End: 2019-03-03

## 2019-03-03 RX ORDER — INSULIN ASPART 100 [IU]/ML
1-10 INJECTION, SOLUTION INTRAVENOUS; SUBCUTANEOUS EVERY 6 HOURS PRN
Status: DISCONTINUED | OUTPATIENT
Start: 2019-03-03 | End: 2019-03-06

## 2019-03-03 RX ORDER — KETAMINE HYDROCHLORIDE 10 MG/ML
INJECTION, SOLUTION INTRAMUSCULAR; INTRAVENOUS
Status: DISCONTINUED | OUTPATIENT
Start: 2019-03-03 | End: 2019-03-03

## 2019-03-03 RX ORDER — SODIUM BICARBONATE 1 MEQ/ML
SYRINGE (ML) INTRAVENOUS
Status: DISCONTINUED | OUTPATIENT
Start: 2019-03-03 | End: 2019-03-03

## 2019-03-03 RX ORDER — PROPOFOL 10 MG/ML
VIAL (ML) INTRAVENOUS CONTINUOUS PRN
Status: DISCONTINUED | OUTPATIENT
Start: 2019-03-03 | End: 2019-03-03

## 2019-03-03 RX ORDER — PANTOPRAZOLE SODIUM 40 MG/1
40 FOR SUSPENSION ORAL 2 TIMES DAILY
Status: DISCONTINUED | OUTPATIENT
Start: 2019-03-03 | End: 2019-03-11

## 2019-03-03 RX ORDER — ROCURONIUM BROMIDE 10 MG/ML
INJECTION, SOLUTION INTRAVENOUS
Status: DISCONTINUED | OUTPATIENT
Start: 2019-03-03 | End: 2019-03-03

## 2019-03-03 RX ADMIN — VANCOMYCIN HYDROCHLORIDE 500 MG: 1 INJECTION, POWDER, LYOPHILIZED, FOR SOLUTION INTRAVENOUS at 01:03

## 2019-03-03 RX ADMIN — ROCURONIUM BROMIDE 30 MG: 10 INJECTION, SOLUTION INTRAVENOUS at 11:03

## 2019-03-03 RX ADMIN — INSULIN ASPART 4 UNITS: 100 INJECTION, SOLUTION INTRAVENOUS; SUBCUTANEOUS at 06:03

## 2019-03-03 RX ADMIN — INSULIN HUMAN 10 UNITS: 100 INJECTION, SOLUTION PARENTERAL at 09:03

## 2019-03-03 RX ADMIN — LIDOCAINE HYDROCHLORIDE 50 MG: 20 INJECTION, SOLUTION INTRAVENOUS at 11:03

## 2019-03-03 RX ADMIN — CHLORHEXIDINE GLUCONATE 0.12% ORAL RINSE 15 ML: 1.2 LIQUID ORAL at 08:03

## 2019-03-03 RX ADMIN — PANTOPRAZOLE SODIUM 40 MG: 40 GRANULE, DELAYED RELEASE ORAL at 10:03

## 2019-03-03 RX ADMIN — VASOPRESSIN 1 UNITS: 20 INJECTION INTRAVENOUS at 01:03

## 2019-03-03 RX ADMIN — SODIUM CHLORIDE, SODIUM GLUCONATE, SODIUM ACETATE, POTASSIUM CHLORIDE, MAGNESIUM CHLORIDE, SODIUM PHOSPHATE, DIBASIC, AND POTASSIUM PHOSPHATE: .53; .5; .37; .037; .03; .012; .00082 INJECTION, SOLUTION INTRAVENOUS at 11:03

## 2019-03-03 RX ADMIN — DEXMEDETOMIDINE HYDROCHLORIDE 0.2 MCG/KG/HR: 100 INJECTION, SOLUTION, CONCENTRATE INTRAVENOUS at 05:03

## 2019-03-03 RX ADMIN — REMIFENTANIL HYDROCHLORIDE 0.1 MCG/KG/MIN: 1 INJECTION, POWDER, LYOPHILIZED, FOR SOLUTION INTRAVENOUS at 12:03

## 2019-03-03 RX ADMIN — SODIUM CHLORIDE: 0.9 INJECTION, SOLUTION INTRAVENOUS at 12:03

## 2019-03-03 RX ADMIN — PROPOFOL 200 MCG/KG/MIN: 10 INJECTION, EMULSION INTRAVENOUS at 12:03

## 2019-03-03 RX ADMIN — KETAMINE HYDROCHLORIDE 25 MG: 10 INJECTION, SOLUTION INTRAMUSCULAR; INTRAVENOUS at 12:03

## 2019-03-03 RX ADMIN — DEXAMETHASONE SODIUM PHOSPHATE 8 MG: 4 INJECTION, SOLUTION INTRAMUSCULAR; INTRAVENOUS at 12:03

## 2019-03-03 RX ADMIN — PRAVASTATIN SODIUM 40 MG: 40 TABLET ORAL at 10:03

## 2019-03-03 RX ADMIN — Medication 0.02 MCG/KG/MIN: at 05:03

## 2019-03-03 RX ADMIN — EPHEDRINE SULFATE 10 MG: 50 INJECTION, SOLUTION INTRAMUSCULAR; INTRAVENOUS; SUBCUTANEOUS at 12:03

## 2019-03-03 RX ADMIN — FENTANYL CITRATE 50 MCG: 50 INJECTION, SOLUTION INTRAMUSCULAR; INTRAVENOUS at 11:03

## 2019-03-03 RX ADMIN — CALCIUM CHLORIDE 500 MG: 100 INJECTION, SOLUTION INTRAVENOUS at 02:03

## 2019-03-03 RX ADMIN — PHENYLEPHRINE HYDROCHLORIDE 200 MCG: 10 INJECTION INTRAVENOUS at 12:03

## 2019-03-03 RX ADMIN — VASOPRESSIN 1 UNITS: 20 INJECTION INTRAVENOUS at 12:03

## 2019-03-03 RX ADMIN — ONDANSETRON 4 MG: 2 INJECTION INTRAMUSCULAR; INTRAVENOUS at 02:03

## 2019-03-03 RX ADMIN — ACETAMINOPHEN 500 MG: 500 TABLET, FILM COATED ORAL at 04:03

## 2019-03-03 RX ADMIN — VASOPRESSIN 0.5 UNITS/MIN: 20 INJECTION INTRAVENOUS at 01:03

## 2019-03-03 RX ADMIN — GABAPENTIN 300 MG: 300 CAPSULE ORAL at 10:03

## 2019-03-03 RX ADMIN — ACETAMINOPHEN 500 MG: 500 TABLET, FILM COATED ORAL at 10:03

## 2019-03-03 RX ADMIN — PROPOFOL 100 MG: 10 INJECTION, EMULSION INTRAVENOUS at 11:03

## 2019-03-03 RX ADMIN — DICLOFENAC: 10 GEL TOPICAL at 10:03

## 2019-03-03 RX ADMIN — NOREPINEPHRINE BITARTRATE 0.02 MCG/KG/MIN: 1 INJECTION, SOLUTION, CONCENTRATE INTRAVENOUS at 04:03

## 2019-03-03 RX ADMIN — SODIUM BICARBONATE 25 MEQ: 84 INJECTION, SOLUTION INTRAVENOUS at 02:03

## 2019-03-03 RX ADMIN — SODIUM CHLORIDE: 0.9 INJECTION, SOLUTION INTRAVENOUS at 05:03

## 2019-03-03 RX ADMIN — STANDARDIZED SENNA CONCENTRATE AND DOCUSATE SODIUM 1 TABLET: 8.6; 5 TABLET, FILM COATED ORAL at 10:03

## 2019-03-03 RX ADMIN — VASOPRESSIN 1 UNITS: 20 INJECTION INTRAVENOUS at 02:03

## 2019-03-03 RX ADMIN — MIDAZOLAM HYDROCHLORIDE 2 MG: 1 INJECTION, SOLUTION INTRAMUSCULAR; INTRAVENOUS at 11:03

## 2019-03-03 RX ADMIN — CEFAZOLIN 3 G: 330 INJECTION, POWDER, FOR SOLUTION INTRAMUSCULAR; INTRAVENOUS at 01:03

## 2019-03-03 RX ADMIN — VASOPRESSIN 2 UNITS/HR: 20 INJECTION INTRAVENOUS at 02:03

## 2019-03-03 RX ADMIN — NALOXONE HYDROCHLORIDE 40 MCG: 0.4 INJECTION, SOLUTION INTRAMUSCULAR; INTRAVENOUS; SUBCUTANEOUS at 03:03

## 2019-03-03 RX ADMIN — SODIUM CHLORIDE, SODIUM GLUCONATE, SODIUM ACETATE, POTASSIUM CHLORIDE, MAGNESIUM CHLORIDE, SODIUM PHOSPHATE, DIBASIC, AND POTASSIUM PHOSPHATE: .53; .5; .37; .037; .03; .012; .00082 INJECTION, SOLUTION INTRAVENOUS at 12:03

## 2019-03-03 RX ADMIN — DEXTROSE MONOHYDRATE 25 G: 25 INJECTION, SOLUTION INTRAVENOUS at 09:03

## 2019-03-03 NOTE — ASSESSMENT & PLAN NOTE
58 M with progressive cervical myelopathy and stenosis from C3-6 with myelomalacia. S/p code for hypovolemia during HD. Sable this morning and labs WNL. He stepped up to ICU for further care and work up.    -Stepdown and cleared by MICU for surgery  -Will plan for C3-6 posterior decompression/fusio  -Consent obtained  -Pain control PRN  -Keep NPO  -HD per nephrology  -Daily PT/OT

## 2019-03-03 NOTE — SUBJECTIVE & OBJECTIVE
Stepdown and cleared by MICU for surgery. OR today for C3-C6 decompression and fusion       Review of Systems  Objective:     Vital Signs (Most Recent):  Temp: 98 °F (36.7 °C) (03/03/19 1011)  Pulse: 65 (03/03/19 1138)  Resp: 19 (03/03/19 1011)  BP: (!) 141/63 (03/03/19 1011)  SpO2: 98 % (03/03/19 1011) Vital Signs (24h Range):  Temp:  [96.5 °F (35.8 °C)-98.4 °F (36.9 °C)] 98 °F (36.7 °C)  Pulse:  [58-75] 65  Resp:  [16-23] 19  SpO2:  [94 %-98 %] 98 %  BP: (137-159)/(60-82) 141/63     Weight: 134.3 kg (296 lb 1.2 oz)  Body mass index is 46.37 kg/m².       Significant Labs:   Recent Labs   Lab 03/03/19  0815   WBC 5.99   RBC 2.95*   HGB 9.9*   HCT 30.5*   PLT 72*   *   MCH 33.6*   MCHC 32.5     Recent Labs   Lab 03/03/19  0815   *   K 5.6*      CO2 21*   BUN 84*   CREATININE 10.7*   MG 3.4*         Significant Imaging: Reviewed    Neurosurgery Physical Exam   AAOX3  CN 2-12 intact  3 to 4- over 5 through BUE  5 over 5 through BLE  Positive hoffmans bilaterally with 3+ UE reflexes  2+ LE reflexes, no clonus or babinsky  Sensation intact to light tough  Proprioception intact

## 2019-03-03 NOTE — PROGRESS NOTES
[facility]  Neurosurgery  Progress Note    Subjective:     History of Present Illness: 57 yo male with a PMH of ESRD with dialysis MWF (last on weds), DM, presenting as transfer from [facility] as admission to neurosurgery for evaluation after progressive course of unsteady gait as well as bilateral upper extremity weakness for the last several months.  Mr. Farmer has had wobbly gait since this past September, at which point he recalls falling and has been at 80-90% of his normal function.  He has been able to walk without assist, however not for very long distances.  He states that since last Sunday he has felt progression of his symptoms and now feels it would be difficult to stand up to ambulate at all.  He also endorses a pulled muscle of his LLE that is limiting his mobility.  His UE symptoms have been present for about 3 months including not writing as well as he is used to.  HE is able to write numbers, but not always his name.  He has been having difficulty using a fork to eat over the past month.  No sensory disturbance noted as well as no neck or back pain and no pain from the neck radiating into the hands.  He has intermittently been taking ASA 81 mg qdaily. He denies recent falls or trauma to the neck.  No other blood thinning medication.  MRI at OSH on 2/27 revealed significant cervical stenosis.      =    Post-Op Info:  Procedure(s) (LRB):  SPINE, CERVICAL, POSTERIOR APPROACH  LAMINECTOMY C3-C6; C6-C7; WITH MEDIAL DISCECTOMY (N/A)   Day of Surgery     Stepdown and cleared by MICU for surgery. OR today for C3-C6 decompression and fusion       Review of Systems  Objective:     Vital Signs (Most Recent):  Temp: 98 °F (36.7 °C) (03/03/19 1011)  Pulse: 65 (03/03/19 1138)  Resp: 19 (03/03/19 1011)  BP: (!) 141/63 (03/03/19 1011)  SpO2: 98 % (03/03/19 1011) Vital Signs (24h Range):  Temp:  [96.5 °F (35.8 °C)-98.4 °F (36.9 °C)] 98 °F (36.7 °C)  Pulse:  [58-75] 65  Resp:  [16-23]  19  SpO2:  [94 %-98 %] 98 %  BP: (137-159)/(60-82) 141/63     Weight: 134.3 kg (296 lb 1.2 oz)  Body mass index is 46.37 kg/m².       Significant Labs:   Recent Labs   Lab 03/03/19  0815   WBC 5.99   RBC 2.95*   HGB 9.9*   HCT 30.5*   PLT 72*   *   MCH 33.6*   MCHC 32.5     Recent Labs   Lab 03/03/19  0815   *   K 5.6*      CO2 21*   BUN 84*   CREATININE 10.7*   MG 3.4*         Significant Imaging: Reviewed    Neurosurgery Physical Exam   AAOX3  CN 2-12 intact  3 to 4- over 5 through BUE  5 over 5 through BLE  Positive hoffmans bilaterally with 3+ UE reflexes  2+ LE reflexes, no clonus or babinsky  Sensation intact to light tough  Proprioception intact          Assessment/Plan:     * Cervical myelopathy    58 M with progressive cervical myelopathy and stenosis from C3-6 with myelomalacia. S/p code for hypovolemia during HD. Sable this morning and labs WNL. He stepped up to ICU for further care and work up.    -Stepdown and cleared by MICU for surgery  -Will plan for C3-6 posterior decompression/fusio  -Consent obtained  -Pain control PRN  -Keep NPO  -HD per nephrology  -Daily PT/OT       Jemima Thomas MD  Neurosurgery  [facility]

## 2019-03-03 NOTE — HOSPITAL COURSE
3/3: pt admitted to post-up, MAP goal >75, on levo  3/4: extubated   3/5:Daily weights, 5 units Q 6hrs Insulin apart added, diabetic diet started    3/8/2019: remains on low dose pressors trying to wean today. Plan for CRRT today; started Prozac for mood and Lactulose to help with constipation.  03/09/2019: no issues. Got albumin before HD, still on minimal pressor support. Weaning off.   03/10/2019 intermittent need for levophed.   03/11/2019 NAEO. Off pressors. Cont midodrine.

## 2019-03-03 NOTE — ASSESSMENT & PLAN NOTE
--hypotension during  surgical intervention  --MAP goal >75  --on levo gtt  --pending EKG  --TTE-(2/28)-EF 65%

## 2019-03-03 NOTE — PROGRESS NOTES
Patient arrived to [facility] from OR    Current symptoms:     Skin assessment done: Y  Wounds noted: abrasion right abdominal skin fold    NCC notified: Vania KELLY

## 2019-03-03 NOTE — ASSESSMENT & PLAN NOTE
POD0-cervical laminectomy C3-C6, C6-C7 with medial discectomy  --NSGY following  --MAP >75 on Levo gtt  --Neuro checks q 1 hr  --HOB >30  --PT/OT/SLP when able

## 2019-03-03 NOTE — ANESTHESIA PROCEDURE NOTES
Arterial    Diagnosis: Cervical     Patient location during procedure: done in OR  Procedure start time: 3/3/2019 12:16 PM  Timeout: 3/3/2019 12:15 PM  Procedure end time: 3/3/2019 12:25 PM  Staffing  Anesthesiologist: Thomas Elizondo MD  Performed: anesthesiologist   Anesthesiologist was present at the time of the procedure.  Preanesthetic Checklist  Completed: patient identified, site marked, surgical consent, pre-op evaluation, timeout performed, IV checked, risks and benefits discussed, monitors and equipment checked and anesthesia consent givenArterial  Skin Prep: chlorhexidine gluconate  Local Infiltration: none  Orientation: right  Location: radial  Catheter Size: 20 G  Catheter placement by Ultrasound guidance. Heme positive aspiration all ports.  Vessel Caliber: small, patent, compressibility normal  Needle advanced into vessel with real time Ultrasound guidance.Insertion Attempts: 2  Assessment  Dressing: secured with tape and tegaderm

## 2019-03-03 NOTE — PLAN OF CARE
Problem: Adult Inpatient Plan of Care  Goal: Plan of Care Review  Outcome: Ongoing (interventions implemented as appropriate)  Patient aaox4, pleasant cooperative, MONREAL with significant weakness in all extremities, 3/5 to BUE, RLE, 2/5 to LLE - medicated for burning/cramping with prn tylenol x2 with mild relief and given one time dose of valium with full relief for ~ 4hours. VSS, CBS WDL, lungs CTA on RA, SR with BBB on tele, +BM this shift, anuric, skin with psoriasis plaques but no areas of breakdown noted. Weigh shift assistance provided frequently throughout shift. High fall risk and safety precautions maintained - will continue to monitor.

## 2019-03-03 NOTE — H&P
[facility]  Neurocritical Care  History & Physical    Admit Date: 2/28/2019  Service Date: 03/03/2019  Length of Stay: 3    Subjective:     Chief Complaint: S/P laminectomy    History of Present Illness: The patient  is a 58 y.o. male with PMHx of of DMII, ESRD on HD MWF(last dialysis 3/1), HLD, HTN and MIKE admitted to [facility] s/p cervical laminectomy C3-C6, C6-C7 w/medial discectomy. Per chart review,  Mr. Farmer has had wobbly gait since this past September, at which point he recalls falling and has been at 80-90% of his normal function.  He has been able to walk without assist, however not for very long distances.  He states that since last Sunday he has felt progression of his symptoms and now feels it would be difficult to stand up to ambulate at all. His UE symptoms have been present for about 3 months including not writing as well as he is used to.MRI cervical spine (2/27)at OSH on 2/27 revealed severe disc space narrowing C3-C4 and C5-C6 level.  Patient admitted to [facility] for close monitoring and higher level of care.            Past Medical History:   Diagnosis Date    Anemia due to stage 5 chronic kidney disease 3/1/2019    Diabetes mellitus type II     Dialysis patient     Hyperlipidemia     Hypertension     Kidney failure     MIKE (obstructive sleep apnea)     Urinary tract infection      Past Surgical History:   Procedure Laterality Date    AV FISTULA PLACEMENT      left lower arm    TONSILLECTOMY, ADENOIDECTOMY      TRANSESOPHAGEAL ECHOCARDIOGRAM (SERENITY) N/A 5/23/2017    Performed by Donaldo Mai MD at [facility] OR      No current facility-administered medications on file prior to encounter.      Current Outpatient Medications on File Prior to Encounter   Medication Sig Dispense Refill    ammonium lactate 12 % Crea Apply 1 application topically 3 (three) times daily as needed. 140 g 3    aspirin 81 MG Chew Take 1 tablet (81 mg total) by mouth once daily. 360 tablet 0    BD  "ULTRA-FINE MINI PEN NEEDLE 31 gauge x 3/16" Ndle USE 4 TO 5 TIMES A DAY     ( DISCARD PEN NEEDLE AFTER EACH USE ) 500 each 4    blood-glucose meter (ADVANCED GLUCOSE METER) Misc 1 each by Misc.(Non-Drug; Combo Route) route 4 (four) times daily with meals and nightly. 1 each 0    clindamycin (CLEOCIN) 300 MG capsule Take 1 capsule (300 mg total) by mouth every 6 (six) hours. 28 capsule 0    fluticasone (FLONASE) 50 mcg/actuation nasal spray 1 spray by Each Nare route once daily. 16 g 0    gabapentin (NEURONTIN) 300 MG capsule TAKE 1 BY MOUTH EVERY      EVENING 90 capsule 3    insulin (BASAGLAR KWIKPEN U-100 INSULIN) glargine 100 units/mL (3mL) SubQ pen Inject 55 Units into the skin every evening. (Patient taking differently: Inject 40 Units into the skin every evening. ) 49.5 mL 0    lancets Misc 1 each by Misc.(Non-Drug; Combo Route) route 4 (four) times daily with meals and nightly. 200 each 6    liraglutide 0.6 mg/0.1 mL, 18 mg/3 mL, subq PNIJ (VICTOZA 2-SHOAIB) 0.6 mg/0.1 mL (18 mg/3 mL) PnIj Inject 1.2 mg into the skin once daily. 18 mL 3    ONETOUCH ULTRA BLUE TEST STRIP Strp USE ONE STRIP TO CHECK BLOOD GLUCOSE 4 TIMES DAILY.AT MORNING, NOON,6:OO PM AND BEDTIME. 100 strip 11    pravastatin (PRAVACHOL) 40 MG tablet Take 1 tablet (40 mg total) by mouth once daily. (Patient taking differently: Take 40 mg by mouth every evening. ) 30 tablet 6      Allergies: Keflex [cephalexin]    Family History   Problem Relation Age of Onset    Colon cancer Mother     Lung cancer Mother     Diabetes Mother     Diabetes Father     Heart disease Father     Hypertension Father     Diabetes Maternal Grandmother     Diabetes Maternal Grandfather      Social History     Tobacco Use    Smoking status: Never Smoker    Smokeless tobacco: Never Used   Substance Use Topics    Alcohol use: No    Drug use: No     Review of Systems-Unable to assess , pt intubated post-op  Objective:     Vitals:    Temp: 98 °F (36.7 " °C)  Pulse: 65  Rhythm: normal sinus rhythm  BP: (!) 141/63  MAP (mmHg): 91  Resp: 19  SpO2: 98 %  Oxygen Concentration (%): 41  O2 Device (Oxygen Therapy): room air  Vent Mode: Spont  Pressure Support: 5 cmH20  PEEP/CPAP: 5 cmH20  Peak Airway Pressure: 11 cmH2O  Mean Airway Pressure: 7.2 cmH20  Plateau Pressure: 0 cmH20    Temp  Min: 96.5 °F (35.8 °C)  Max: 98.4 °F (36.9 °C)  Pulse  Min: 58  Max: 75  BP  Min: 137/60  Max: 159/69  MAP (mmHg)  Min: 91  Max: 106  Resp  Min: 16  Max: 19  SpO2  Min: 94 %  Max: 98 %  Oxygen Concentration (%)  Min: 41  Max: 41    03/02 0701 - 03/03 0700  In: 180 [P.O.:180]  Out: 1    Unmeasured Output  Stool Occurrence: 1       Physical Exam  GA: well developed,  comfortable, no acute distress.   HEENT: No scleral icterus or JVD.   Pulmonary: Diminished to auscultation A/L.   Cardiac: RRR S1 & S2 w/o rubs/murmurs/gallops.   Abdominal: Bowel sounds present x 4. No appreciable hepatosplenomegaly.  Skin: No jaundice, rashes, or visible lesions.  Neuro:  --GCS: E1 VT1 M4  --Mental Status:  Sedated post-op, doesn't open eyes to painful stimuli, doesn't follow commands  --CN II-XII -JACIEL  --Pupils 2, sluggish  --Corneal reflex, gag, cough intact.  --LUE withdraws  --RUE doesn't respond to pain  --LLE withdraws  --RLE doesn't respond to pain    Today I personally reviewed pertinent medications, lines/drains/airways, imaging, laboratory results,     Assessment/Plan:     Cardiac/Vascular   Hypotension    --hypotension during  surgical intervention  --MAP goal >75  --on levo gtt  --pending EKG  --TTE-(2/28)-EF 65%     Hyperlipidemia    -continue statin     Renal/   Hyponatremia    --Na 134  --NS @75  --Follow CMP     Hyperkalemia    -preop K 5.6  -pending CMP  -EKG     ESRD on dialysis    ESRD on HD MWF   Last dialysis 3/1  Nephrology following     Endocrine   Morbid obesity    Body mass index is 46.37 kg/m².       Diabetes mellitus, type 2    --A1C (2/27)-5  --Scheduled Detemir Qhs  --PRN SSI      Orthopedic   * S/P laminectomy    POD0-cervical laminectomy C3-C6, C6-C7 with medial discectomy  --NSGY following  --MAP >75 on Levo gtt  --Neuro checks q 1 hr  --HOB >30  --PT/OT/SLP when able          Hypothermia    -post op pt was hypothermic (95.2F)  -on bear hugger           The patient is being Prophylaxed for:  Venous Thromboembolism with: Mechanical  Stress Ulcer with: PPI  Ventilator Pneumonia with: chlorhexidine oral care    Full Code    Vania Pineda NP  Neurocritical Care  [facility]    Critical care time >31 min.

## 2019-03-03 NOTE — SUBJECTIVE & OBJECTIVE
"Past Medical History:   Diagnosis Date    Anemia due to stage 5 chronic kidney disease 3/1/2019    Diabetes mellitus type II     Dialysis patient     Hyperlipidemia     Hypertension     Kidney failure     MIKE (obstructive sleep apnea)     Urinary tract infection      Past Surgical History:   Procedure Laterality Date    AV FISTULA PLACEMENT      left lower arm    TONSILLECTOMY, ADENOIDECTOMY      TRANSESOPHAGEAL ECHOCARDIOGRAM (SERENITY) N/A 5/23/2017    Performed by Donaldo Mai MD at [facility] OR      No current facility-administered medications on file prior to encounter.      Current Outpatient Medications on File Prior to Encounter   Medication Sig Dispense Refill    ammonium lactate 12 % Crea Apply 1 application topically 3 (three) times daily as needed. 140 g 3    aspirin 81 MG Chew Take 1 tablet (81 mg total) by mouth once daily. 360 tablet 0    BD ULTRA-FINE MINI PEN NEEDLE 31 gauge x 3/16" Ndle USE 4 TO 5 TIMES A DAY     ( DISCARD PEN NEEDLE AFTER EACH USE ) 500 each 4    blood-glucose meter (ADVANCED GLUCOSE METER) Misc 1 each by Misc.(Non-Drug; Combo Route) route 4 (four) times daily with meals and nightly. 1 each 0    clindamycin (CLEOCIN) 300 MG capsule Take 1 capsule (300 mg total) by mouth every 6 (six) hours. 28 capsule 0    fluticasone (FLONASE) 50 mcg/actuation nasal spray 1 spray by Each Nare route once daily. 16 g 0    gabapentin (NEURONTIN) 300 MG capsule TAKE 1 BY MOUTH EVERY      EVENING 90 capsule 3    insulin (BASAGLAR KWIKPEN U-100 INSULIN) glargine 100 units/mL (3mL) SubQ pen Inject 55 Units into the skin every evening. (Patient taking differently: Inject 40 Units into the skin every evening. ) 49.5 mL 0    lancets Misc 1 each by Misc.(Non-Drug; Combo Route) route 4 (four) times daily with meals and nightly. 200 each 6    liraglutide 0.6 mg/0.1 mL, 18 mg/3 mL, subq PNIJ (VICTOZA 2-SHOAIB) 0.6 mg/0.1 mL (18 mg/3 mL) PnIj Inject 1.2 mg into the skin once daily. 18 mL 3    " ONETOUCH ULTRA BLUE TEST STRIP Strp USE ONE STRIP TO CHECK BLOOD GLUCOSE 4 TIMES DAILY.AT MORNING, NOON,6:OO PM AND BEDTIME. 100 strip 11    pravastatin (PRAVACHOL) 40 MG tablet Take 1 tablet (40 mg total) by mouth once daily. (Patient taking differently: Take 40 mg by mouth every evening. ) 30 tablet 6      Allergies: Keflex [cephalexin]    Family History   Problem Relation Age of Onset    Colon cancer Mother     Lung cancer Mother     Diabetes Mother     Diabetes Father     Heart disease Father     Hypertension Father     Diabetes Maternal Grandmother     Diabetes Maternal Grandfather      Social History     Tobacco Use    Smoking status: Never Smoker    Smokeless tobacco: Never Used   Substance Use Topics    Alcohol use: No    Drug use: No     Review of Systems-Unable to assess , pt intubated post-op  Objective:     Vitals:    Temp: 98 °F (36.7 °C)  Pulse: 65  Rhythm: normal sinus rhythm  BP: (!) 141/63  MAP (mmHg): 91  Resp: 19  SpO2: 98 %  Oxygen Concentration (%): 41  O2 Device (Oxygen Therapy): room air  Vent Mode: Spont  Pressure Support: 5 cmH20  PEEP/CPAP: 5 cmH20  Peak Airway Pressure: 11 cmH2O  Mean Airway Pressure: 7.2 cmH20  Plateau Pressure: 0 cmH20    Temp  Min: 96.5 °F (35.8 °C)  Max: 98.4 °F (36.9 °C)  Pulse  Min: 58  Max: 75  BP  Min: 137/60  Max: 159/69  MAP (mmHg)  Min: 91  Max: 106  Resp  Min: 16  Max: 19  SpO2  Min: 94 %  Max: 98 %  Oxygen Concentration (%)  Min: 41  Max: 41    03/02 0701 - 03/03 0700  In: 180 [P.O.:180]  Out: 1    Unmeasured Output  Stool Occurrence: 1       Physical Exam  GA: well developed,  comfortable, no acute distress.   HEENT: No scleral icterus or JVD.   Pulmonary: Diminished to auscultation A/L.   Cardiac: RRR S1 & S2 w/o rubs/murmurs/gallops.   Abdominal: Bowel sounds present x 4. No appreciable hepatosplenomegaly.  Skin: No jaundice, rashes, or visible lesions.  Neuro:  --GCS: E1 VT1 M4  --Mental Status:  Sedated post-op, doesn't open eyes to painful  stimuli, doesn't follow commands  --CN II-XII -JACIEL  --Pupils 2, sluggish  --Corneal reflex, gag, cough intact.  --LUE withdraws  --RUE doesn't respond to pain  --LLE withdraws  --RLE doesn't respond to pain    Today I personally reviewed pertinent medications, lines/drains/airways, imaging, laboratory results,

## 2019-03-03 NOTE — ANESTHESIA PREPROCEDURE EVALUATION
03/03/2019  Angel aFrmer Jr. is a 58 y.o., male.    Anesthesia Evaluation    I have reviewed the Patient Summary Reports.        Review of Systems  Anesthesia Hx:  No problems with previous Anesthesia    Social:  Non-Smoker    Hematology/Oncology:  Hematology Normal   Oncology Normal     EENT/Dental:EENT/Dental Normal   Cardiovascular:   Hypertension    Pulmonary:   Sleep Apnea    Renal/:   Chronic Renal Disease, ESRD    Hepatic/GI:  Hepatic/GI Normal    Musculoskeletal:  Musculoskeletal Normal    Neurological:   CVA    Endocrine:   Diabetes, type 2    Dermatological:  Skin Normal    Psych:  Psychiatric Normal           Physical Exam  General:  Well nourished    Airway/Jaw/Neck:  Airway Findings: Mouth Opening: Normal Tongue: Normal  General Airway Assessment: Adult  Mallampati: II  Improves to II with phonation.  TM Distance: Normal, at least 6 cm  Jaw/Neck Findings:  Neck ROM: Normal ROM      Dental:  Dental Findings: In tact   Chest/Lungs:  Chest/Lungs Findings: Clear to auscultation, Normal Respiratory Rate     Heart/Vascular:  Heart Findings: Rate: Normal  Rhythm: Regular Rhythm  Sounds: Normal             Anesthesia Plan  Type of Anesthesia, risks & benefits discussed:  Anesthesia Type:  general  Patient's Preference: General  Intra-op Monitoring Plan:   Intra-op Monitoring Plan Comments:   Post Op Pain Control Plan:   Post Op Pain Control Plan Comments:   Induction:   IV  Beta Blocker:  Patient is not currently on a Beta-Blocker (No further documentation required).       Informed Consent: Patient understands risks and agrees with Anesthesia plan.  Questions answered. Anesthesia consent signed with patient.  ASA Score: 4     Day of Surgery Review of History & Physical: I have interviewed and examined the patient. I have reviewed the patient's H&P dated:  There are no significant changes.           Ready For Surgery From Anesthesia Perspective.

## 2019-03-03 NOTE — HPI
The patient  is a 58 y.o. male with PMHx of of DMII, ESRD on HD MWF(last dialysis 3/1), HLD, HTN and MIKE admitted to [facility] s/p cervical laminectomy C3-C6, C6-C7 w/medial discectomy. Per chart review,  Mr. Farmer has had wobbly gait since this past September, at which point he recalls falling and has been at 80-90% of his normal function.  He has been able to walk without assist, however not for very long distances.  He states that since last Sunday he has felt progression of his symptoms and now feels it would be difficult to stand up to ambulate at all. His UE symptoms have been present for about 3 months including not writing as well as he is used to.MRI cervical spine (2/27)at OSH on 2/27 revealed severe disc space narrowing C3-C4 and C5-C6 level.  Patient admitted to [facility] for close monitoring and higher level of care.

## 2019-03-03 NOTE — TRANSFER OF CARE
"Anesthesia Transfer of Care Note    Patient: Angel Farmer Jr.    Procedure(s) Performed: Procedure(s) (LRB):  SPINE, CERVICAL, POSTERIOR APPROACH  LAMINECTOMY C3-C6; C6-C7; WITH MEDIAL DISCECTOMY (N/A)    Patient location: ICU    Anesthesia Type: general    Transport from OR: Upon arrival to PACU/ICU, patient attached to ventilator and auscultated to confirm bilateral breath sounds and adequate TV. Continuous ECG monitoring in transport. Continuous SpO2 monitoring in transport    Post pain: adequate analgesia    Post assessment: no apparent anesthetic complications    Post vital signs: stable    Level of consciousness: sedated    Nausea/Vomiting: no nausea/vomiting    Complications: none    Transfer of care protocol was followed      Last vitals:   Visit Vitals  BP (!) 141/63 (Patient Position: Lying)   Pulse 65   Temp 36.7 °C (98 °F) (Oral)   Resp 19   Ht 5' 7" (1.702 m)   Wt 134.3 kg (296 lb 1.2 oz)   SpO2 98%   BMI 46.37 kg/m²     "

## 2019-03-03 NOTE — NURSING TRANSFER
Nursing Transfer Note      3/2/2019     Transfer to 7076 from 6080    Transfer via bed    Transfer with TELE, chart, pt belongings, medication    Transported by this RN and MARK Wilson    Medicines sent: ointment    Chart send with patient: yes    Notified: pt notified family of transfer     Patient reassessed at: 3/2/19 @ 1800    Upon arrival to floor: chart placed outside pt's room and nursing notified

## 2019-03-03 NOTE — PROGRESS NOTES
Received pt from OR to room 9080. ETT secured at 25cm at the lip. Pt placed on 980 vent with no complications.

## 2019-03-04 LAB
ALBUMIN SERPL BCP-MCNC: 3 G/DL
ALBUMIN SERPL BCP-MCNC: 3.1 G/DL
ALBUMIN SERPL BCP-MCNC: 3.1 G/DL
ALBUMIN SERPL BCP-MCNC: 3.2 G/DL
ALLENS TEST: ABNORMAL
ALP SERPL-CCNC: 157 U/L
ALT SERPL W/O P-5'-P-CCNC: 48 U/L
ANION GAP SERPL CALC-SCNC: 10 MMOL/L
ANION GAP SERPL CALC-SCNC: 11 MMOL/L
ANION GAP SERPL CALC-SCNC: 11 MMOL/L
ANION GAP SERPL CALC-SCNC: 12 MMOL/L
ANION GAP SERPL CALC-SCNC: 12 MMOL/L
ANION GAP SERPL CALC-SCNC: 15 MMOL/L
ANION GAP SERPL CALC-SCNC: 16 MMOL/L
ANION GAP SERPL CALC-SCNC: 17 MMOL/L
ANISOCYTOSIS BLD QL SMEAR: SLIGHT
APTT BLDCRRT: 23.4 SEC
AST SERPL-CCNC: 46 U/L
BASOPHILS # BLD AUTO: 0.01 K/UL
BASOPHILS NFR BLD: 0.1 %
BILIRUB SERPL-MCNC: 1 MG/DL
BUN SERPL-MCNC: 39 MG/DL
BUN SERPL-MCNC: 40 MG/DL
BUN SERPL-MCNC: 43 MG/DL
BUN SERPL-MCNC: 51 MG/DL
BUN SERPL-MCNC: 51 MG/DL
BUN SERPL-MCNC: 73 MG/DL
BUN SERPL-MCNC: 88 MG/DL
BUN SERPL-MCNC: 98 MG/DL
CALCIUM SERPL-MCNC: 8.8 MG/DL
CALCIUM SERPL-MCNC: 9.2 MG/DL
CALCIUM SERPL-MCNC: 9.3 MG/DL
CALCIUM SERPL-MCNC: 9.3 MG/DL
CALCIUM SERPL-MCNC: 9.4 MG/DL
CHLORIDE SERPL-SCNC: 100 MMOL/L
CHLORIDE SERPL-SCNC: 101 MMOL/L
CHLORIDE SERPL-SCNC: 101 MMOL/L
CHLORIDE SERPL-SCNC: 102 MMOL/L
CHLORIDE SERPL-SCNC: 102 MMOL/L
CHLORIDE SERPL-SCNC: 98 MMOL/L
CO2 SERPL-SCNC: 17 MMOL/L
CO2 SERPL-SCNC: 20 MMOL/L
CO2 SERPL-SCNC: 21 MMOL/L
CO2 SERPL-SCNC: 22 MMOL/L
CO2 SERPL-SCNC: 23 MMOL/L
CO2 SERPL-SCNC: 23 MMOL/L
CO2 SERPL-SCNC: 24 MMOL/L
CREAT SERPL-MCNC: 11.2 MG/DL
CREAT SERPL-MCNC: 11.7 MG/DL
CREAT SERPL-MCNC: 5.3 MG/DL
CREAT SERPL-MCNC: 5.6 MG/DL
CREAT SERPL-MCNC: 5.9 MG/DL
CREAT SERPL-MCNC: 6.6 MG/DL
CREAT SERPL-MCNC: 6.7 MG/DL
CREAT SERPL-MCNC: 8.6 MG/DL
DELSYS: ABNORMAL
DIFFERENTIAL METHOD: ABNORMAL
EOSINOPHIL # BLD AUTO: 0 K/UL
EOSINOPHIL NFR BLD: 0 %
ERYTHROCYTE [DISTWIDTH] IN BLOOD BY AUTOMATED COUNT: 15.6 %
ERYTHROCYTE [SEDIMENTATION RATE] IN BLOOD BY WESTERGREN METHOD: 14 MM/H
ERYTHROCYTE [SEDIMENTATION RATE] IN BLOOD BY WESTERGREN METHOD: 14 MM/H
EST. GFR  (AFRICAN AMERICAN): 11.3 ML/MIN/1.73 M^2
EST. GFR  (AFRICAN AMERICAN): 12.1 ML/MIN/1.73 M^2
EST. GFR  (AFRICAN AMERICAN): 12.9 ML/MIN/1.73 M^2
EST. GFR  (AFRICAN AMERICAN): 4.9 ML/MIN/1.73 M^2
EST. GFR  (AFRICAN AMERICAN): 5.2 ML/MIN/1.73 M^2
EST. GFR  (AFRICAN AMERICAN): 7.2 ML/MIN/1.73 M^2
EST. GFR  (AFRICAN AMERICAN): 9.7 ML/MIN/1.73 M^2
EST. GFR  (AFRICAN AMERICAN): 9.9 ML/MIN/1.73 M^2
EST. GFR  (NON AFRICAN AMERICAN): 10.4 ML/MIN/1.73 M^2
EST. GFR  (NON AFRICAN AMERICAN): 11.2 ML/MIN/1.73 M^2
EST. GFR  (NON AFRICAN AMERICAN): 4.3 ML/MIN/1.73 M^2
EST. GFR  (NON AFRICAN AMERICAN): 4.5 ML/MIN/1.73 M^2
EST. GFR  (NON AFRICAN AMERICAN): 6.2 ML/MIN/1.73 M^2
EST. GFR  (NON AFRICAN AMERICAN): 8.4 ML/MIN/1.73 M^2
EST. GFR  (NON AFRICAN AMERICAN): 8.6 ML/MIN/1.73 M^2
EST. GFR  (NON AFRICAN AMERICAN): 9.8 ML/MIN/1.73 M^2
FIBRINOGEN PPP-MCNC: 429 MG/DL
FIO2: 35
FIO2: 35
GLUCOSE SERPL-MCNC: 146 MG/DL
GLUCOSE SERPL-MCNC: 149 MG/DL
GLUCOSE SERPL-MCNC: 155 MG/DL
GLUCOSE SERPL-MCNC: 168 MG/DL
GLUCOSE SERPL-MCNC: 169 MG/DL
GLUCOSE SERPL-MCNC: 171 MG/DL
GLUCOSE SERPL-MCNC: 187 MG/DL
GLUCOSE SERPL-MCNC: 187 MG/DL (ref 70–110)
GLUCOSE SERPL-MCNC: 192 MG/DL
HAV IGM SERPL QL IA: NEGATIVE
HBV CORE IGM SERPL QL IA: NEGATIVE
HBV SURFACE AG SERPL QL IA: NEGATIVE
HCO3 UR-SCNC: 19.3 MMOL/L (ref 24–28)
HCO3 UR-SCNC: 20.1 MMOL/L (ref 24–28)
HCO3 UR-SCNC: 20.2 MMOL/L (ref 24–28)
HCT VFR BLD AUTO: 27.8 %
HCT VFR BLD CALC: 22 %PCV (ref 36–54)
HCT VFR BLD CALC: 28 %PCV (ref 36–54)
HCV AB SERPL QL IA: NEGATIVE
HGB BLD-MCNC: 9 G/DL
HIV1+2 IGG SERPL QL IA.RAPID: NEGATIVE
HYPOCHROMIA BLD QL SMEAR: ABNORMAL
IMM GRANULOCYTES # BLD AUTO: 0.12 K/UL
IMM GRANULOCYTES NFR BLD AUTO: 0.6 %
INR PPP: 1
LYMPHOCYTES # BLD AUTO: 1.1 K/UL
LYMPHOCYTES NFR BLD: 5.6 %
MAGNESIUM SERPL-MCNC: 2.3 MG/DL
MAGNESIUM SERPL-MCNC: 2.9 MG/DL
MAGNESIUM SERPL-MCNC: 3.7 MG/DL
MCH RBC QN AUTO: 33.6 PG
MCHC RBC AUTO-ENTMCNC: 32.4 G/DL
MCV RBC AUTO: 104 FL
MODE: ABNORMAL
MONOCYTES # BLD AUTO: 2.2 K/UL
MONOCYTES NFR BLD: 11.4 %
NEUTROPHILS # BLD AUTO: 16.1 K/UL
NEUTROPHILS NFR BLD: 82.3 %
NRBC BLD-RTO: 0 /100 WBC
OVALOCYTES BLD QL SMEAR: ABNORMAL
PCO2 BLDA: 36.8 MMHG (ref 35–45)
PCO2 BLDA: 37.1 MMHG (ref 35–45)
PCO2 BLDA: 39.2 MMHG (ref 35–45)
PEEP: 5
PEEP: 5
PH SMN: 7.3 [PH] (ref 7.35–7.45)
PH SMN: 7.34 [PH] (ref 7.35–7.45)
PH SMN: 7.35 [PH] (ref 7.35–7.45)
PHOSPHATE SERPL-MCNC: 5.9 MG/DL
PHOSPHATE SERPL-MCNC: 6 MG/DL
PHOSPHATE SERPL-MCNC: 9.6 MG/DL
PHOSPHATE SERPL-MCNC: 9.6 MG/DL
PIP: 21
PLATELET # BLD AUTO: 127 K/UL
PMV BLD AUTO: 11.2 FL
PO2 BLDA: 109 MMHG (ref 80–100)
PO2 BLDA: 228 MMHG (ref 80–100)
PO2 BLDA: 95 MMHG (ref 80–100)
POC BE: -5 MMOL/L
POC BE: -6 MMOL/L
POC BE: -7 MMOL/L
POC IONIZED CALCIUM: 1.14 MMOL/L (ref 1.06–1.42)
POC IONIZED CALCIUM: ABNORMAL MMOL/L
POC SATURATED O2: 100 % (ref 95–100)
POC SATURATED O2: 97 % (ref 95–100)
POC SATURATED O2: 98 % (ref 95–100)
POC TCO2: 20 MMOL/L (ref 23–27)
POC TCO2: 21 MMOL/L (ref 23–27)
POC TCO2: 21 MMOL/L (ref 23–27)
POCT GLUCOSE: 149 MG/DL (ref 70–110)
POCT GLUCOSE: 157 MG/DL (ref 70–110)
POCT GLUCOSE: 165 MG/DL (ref 70–110)
POCT GLUCOSE: 209 MG/DL (ref 70–110)
POCT GLUCOSE: 225 MG/DL (ref 70–110)
POCT GLUCOSE: 256 MG/DL (ref 70–110)
POCT GLUCOSE: 352 MG/DL (ref 70–110)
POIKILOCYTOSIS BLD QL SMEAR: SLIGHT
POLYCHROMASIA BLD QL SMEAR: ABNORMAL
POTASSIUM BLD-SCNC: 5.7 MMOL/L (ref 3.5–5.1)
POTASSIUM BLD-SCNC: 6.1 MMOL/L (ref 3.5–5.1)
POTASSIUM SERPL-SCNC: 4.5 MMOL/L
POTASSIUM SERPL-SCNC: 4.7 MMOL/L
POTASSIUM SERPL-SCNC: 4.7 MMOL/L
POTASSIUM SERPL-SCNC: 5 MMOL/L
POTASSIUM SERPL-SCNC: 5 MMOL/L
POTASSIUM SERPL-SCNC: 5.7 MMOL/L
POTASSIUM SERPL-SCNC: 5.8 MMOL/L
POTASSIUM SERPL-SCNC: 6.1 MMOL/L
POTASSIUM SERPL-SCNC: 6.3 MMOL/L
POTASSIUM SERPL-SCNC: 6.3 MMOL/L
PROT SERPL-MCNC: 6.4 G/DL
PROTHROMBIN TIME: 10.2 SEC
PS: 17
PS: 17
RBC # BLD AUTO: 2.68 M/UL
SAMPLE: ABNORMAL
SITE: ABNORMAL
SODIUM BLD-SCNC: 132 MMOL/L (ref 136–145)
SODIUM BLD-SCNC: 134 MMOL/L (ref 136–145)
SODIUM SERPL-SCNC: 132 MMOL/L
SODIUM SERPL-SCNC: 134 MMOL/L
SODIUM SERPL-SCNC: 135 MMOL/L
SODIUM SERPL-SCNC: 135 MMOL/L
SODIUM SERPL-SCNC: 137 MMOL/L
SP02: 100
SP02: 98
WBC # BLD AUTO: 19.52 K/UL

## 2019-03-04 PROCEDURE — 94003 VENT MGMT INPAT SUBQ DAY: CPT

## 2019-03-04 PROCEDURE — 85730 THROMBOPLASTIN TIME PARTIAL: CPT

## 2019-03-04 PROCEDURE — 80048 BASIC METABOLIC PNL TOTAL CA: CPT

## 2019-03-04 PROCEDURE — 37799 UNLISTED PX VASCULAR SURGERY: CPT

## 2019-03-04 PROCEDURE — 25000003 PHARM REV CODE 250: Performed by: STUDENT IN AN ORGANIZED HEALTH CARE EDUCATION/TRAINING PROGRAM

## 2019-03-04 PROCEDURE — 83735 ASSAY OF MAGNESIUM: CPT

## 2019-03-04 PROCEDURE — 25000003 PHARM REV CODE 250: Performed by: NURSE PRACTITIONER

## 2019-03-04 PROCEDURE — 63600175 PHARM REV CODE 636 W HCPCS: Performed by: NURSE PRACTITIONER

## 2019-03-04 PROCEDURE — 63600175 PHARM REV CODE 636 W HCPCS: Performed by: PHYSICIAN ASSISTANT

## 2019-03-04 PROCEDURE — 99291 PR CRITICAL CARE, E/M 30-74 MINUTES: ICD-10-PCS | Mod: GC,,, | Performed by: PSYCHIATRY & NEUROLOGY

## 2019-03-04 PROCEDURE — 86703 HIV-1/HIV-2 1 RESULT ANTBDY: CPT

## 2019-03-04 PROCEDURE — 80069 RENAL FUNCTION PANEL: CPT | Mod: 91

## 2019-03-04 PROCEDURE — 99291 CRITICAL CARE FIRST HOUR: CPT | Mod: GC,,, | Performed by: PSYCHIATRY & NEUROLOGY

## 2019-03-04 PROCEDURE — 80074 ACUTE HEPATITIS PANEL: CPT

## 2019-03-04 PROCEDURE — 63600175 PHARM REV CODE 636 W HCPCS: Mod: JG | Performed by: NURSE PRACTITIONER

## 2019-03-04 PROCEDURE — 85384 FIBRINOGEN ACTIVITY: CPT

## 2019-03-04 PROCEDURE — 99900035 HC TECH TIME PER 15 MIN (STAT)

## 2019-03-04 PROCEDURE — 83735 ASSAY OF MAGNESIUM: CPT | Mod: 91

## 2019-03-04 PROCEDURE — 90945 DIALYSIS ONE EVALUATION: CPT

## 2019-03-04 PROCEDURE — 85610 PROTHROMBIN TIME: CPT

## 2019-03-04 PROCEDURE — 80053 COMPREHEN METABOLIC PANEL: CPT

## 2019-03-04 PROCEDURE — 25000003 PHARM REV CODE 250: Performed by: PSYCHIATRY & NEUROLOGY

## 2019-03-04 PROCEDURE — 25000003 PHARM REV CODE 250: Performed by: GENERAL PRACTICE

## 2019-03-04 PROCEDURE — 27000221 HC OXYGEN, UP TO 24 HOURS

## 2019-03-04 PROCEDURE — 63600175 PHARM REV CODE 636 W HCPCS: Performed by: PSYCHIATRY & NEUROLOGY

## 2019-03-04 PROCEDURE — 90945 PR DIALYSIS, NOT HEMO, 1 EVAL: ICD-10-PCS | Mod: ,,, | Performed by: INTERNAL MEDICINE

## 2019-03-04 PROCEDURE — 82803 BLOOD GASES ANY COMBINATION: CPT

## 2019-03-04 PROCEDURE — 84100 ASSAY OF PHOSPHORUS: CPT

## 2019-03-04 PROCEDURE — 90945 DIALYSIS ONE EVALUATION: CPT | Mod: ,,, | Performed by: INTERNAL MEDICINE

## 2019-03-04 PROCEDURE — 63600175 PHARM REV CODE 636 W HCPCS

## 2019-03-04 PROCEDURE — 94761 N-INVAS EAR/PLS OXIMETRY MLT: CPT

## 2019-03-04 PROCEDURE — 85025 COMPLETE CBC W/AUTO DIFF WBC: CPT

## 2019-03-04 PROCEDURE — 20000000 HC ICU ROOM

## 2019-03-04 RX ORDER — HYDROCODONE BITARTRATE AND ACETAMINOPHEN 500; 5 MG/1; MG/1
TABLET ORAL CONTINUOUS
Status: ACTIVE | OUTPATIENT
Start: 2019-03-04 | End: 2019-03-05

## 2019-03-04 RX ORDER — OXYCODONE HYDROCHLORIDE 5 MG/1
5 TABLET ORAL EVERY 6 HOURS PRN
Status: DISCONTINUED | OUTPATIENT
Start: 2019-03-04 | End: 2019-03-20 | Stop reason: HOSPADM

## 2019-03-04 RX ORDER — FENTANYL CITRATE 50 UG/ML
INJECTION, SOLUTION INTRAMUSCULAR; INTRAVENOUS
Status: COMPLETED
Start: 2019-03-04 | End: 2019-03-04

## 2019-03-04 RX ORDER — MIDODRINE HYDROCHLORIDE 5 MG/1
10 TABLET ORAL 3 TIMES DAILY
Status: DISCONTINUED | OUTPATIENT
Start: 2019-03-04 | End: 2019-03-04

## 2019-03-04 RX ORDER — FENTANYL CITRATE 50 UG/ML
INJECTION, SOLUTION INTRAMUSCULAR; INTRAVENOUS
Status: DISPENSED
Start: 2019-03-04 | End: 2019-03-05

## 2019-03-04 RX ORDER — FENTANYL CITRATE 50 UG/ML
25 INJECTION, SOLUTION INTRAMUSCULAR; INTRAVENOUS ONCE
Status: COMPLETED | OUTPATIENT
Start: 2019-03-04 | End: 2019-03-04

## 2019-03-04 RX ORDER — HYDROCODONE BITARTRATE AND ACETAMINOPHEN 500; 5 MG/1; MG/1
TABLET ORAL CONTINUOUS
Status: DISCONTINUED | OUTPATIENT
Start: 2019-03-04 | End: 2019-03-04

## 2019-03-04 RX ORDER — MAGNESIUM SULFATE HEPTAHYDRATE 40 MG/ML
2 INJECTION, SOLUTION INTRAVENOUS
Status: ACTIVE | OUTPATIENT
Start: 2019-03-04 | End: 2019-03-05

## 2019-03-04 RX ORDER — FENTANYL CITRATE 50 UG/ML
50 INJECTION, SOLUTION INTRAMUSCULAR; INTRAVENOUS ONCE
Status: COMPLETED | OUTPATIENT
Start: 2019-03-04 | End: 2019-03-04

## 2019-03-04 RX ORDER — MIDODRINE HYDROCHLORIDE 5 MG/1
10 TABLET ORAL 3 TIMES DAILY
Status: DISCONTINUED | OUTPATIENT
Start: 2019-03-04 | End: 2019-03-06

## 2019-03-04 RX ORDER — ONDANSETRON 2 MG/ML
4 INJECTION INTRAMUSCULAR; INTRAVENOUS EVERY 6 HOURS PRN
Status: DISCONTINUED | OUTPATIENT
Start: 2019-03-04 | End: 2019-03-20 | Stop reason: HOSPADM

## 2019-03-04 RX ADMIN — ERYTHROPOIETIN 7000 UNITS: 4000 INJECTION, SOLUTION INTRAVENOUS; SUBCUTANEOUS at 01:03

## 2019-03-04 RX ADMIN — Medication 0.07 MCG/KG/MIN: at 12:03

## 2019-03-04 RX ADMIN — DEXTROSE MONOHYDRATE 25 G: 500 INJECTION PARENTERAL at 01:03

## 2019-03-04 RX ADMIN — STANDARDIZED SENNA CONCENTRATE AND DOCUSATE SODIUM 1 TABLET: 8.6; 5 TABLET, FILM COATED ORAL at 08:03

## 2019-03-04 RX ADMIN — PRAVASTATIN SODIUM 40 MG: 40 TABLET ORAL at 08:03

## 2019-03-04 RX ADMIN — OXYCODONE HYDROCHLORIDE 5 MG: 5 TABLET ORAL at 11:03

## 2019-03-04 RX ADMIN — INSULIN HUMAN 10 UNITS: 100 INJECTION, SOLUTION PARENTERAL at 01:03

## 2019-03-04 RX ADMIN — DICLOFENAC: 10 GEL TOPICAL at 10:03

## 2019-03-04 RX ADMIN — MIDODRINE HYDROCHLORIDE 10 MG: 5 TABLET ORAL at 08:03

## 2019-03-04 RX ADMIN — CALCIUM GLUCONATE 1 G: 94 INJECTION, SOLUTION INTRAVENOUS at 01:03

## 2019-03-04 RX ADMIN — FENTANYL CITRATE 50 MCG: 50 INJECTION, SOLUTION INTRAMUSCULAR; INTRAVENOUS at 02:03

## 2019-03-04 RX ADMIN — Medication 0.08 MCG/KG/MIN: at 06:03

## 2019-03-04 RX ADMIN — INSULIN ASPART 2 UNITS: 100 INJECTION, SOLUTION INTRAVENOUS; SUBCUTANEOUS at 06:03

## 2019-03-04 RX ADMIN — GABAPENTIN 300 MG: 300 CAPSULE ORAL at 08:03

## 2019-03-04 RX ADMIN — SODIUM CHLORIDE: 0.9 INJECTION, SOLUTION INTRAVENOUS at 03:03

## 2019-03-04 RX ADMIN — SODIUM CHLORIDE: 0.9 INJECTION, SOLUTION INTRAVENOUS at 10:03

## 2019-03-04 RX ADMIN — MIDODRINE HYDROCHLORIDE 10 MG: 5 TABLET ORAL at 11:03

## 2019-03-04 RX ADMIN — FENTANYL CITRATE 25 MCG: 50 INJECTION INTRAMUSCULAR; INTRAVENOUS at 01:03

## 2019-03-04 RX ADMIN — FENTANYL CITRATE 25 MCG: 50 INJECTION INTRAMUSCULAR; INTRAVENOUS at 07:03

## 2019-03-04 RX ADMIN — PANTOPRAZOLE SODIUM 40 MG: 40 GRANULE, DELAYED RELEASE ORAL at 08:03

## 2019-03-04 RX ADMIN — Medication 0.06 MCG/KG/MIN: at 10:03

## 2019-03-04 RX ADMIN — ACETAMINOPHEN 500 MG: 500 TABLET, FILM COATED ORAL at 10:03

## 2019-03-04 RX ADMIN — ONDANSETRON 4 MG: 2 INJECTION INTRAMUSCULAR; INTRAVENOUS at 03:03

## 2019-03-04 RX ADMIN — Medication 0.12 MCG/KG/MIN: at 11:03

## 2019-03-04 RX ADMIN — CHLORHEXIDINE GLUCONATE 0.12% ORAL RINSE 15 ML: 1.2 LIQUID ORAL at 08:03

## 2019-03-04 RX ADMIN — SODIUM CHLORIDE: 0.9 INJECTION, SOLUTION INTRAVENOUS at 05:03

## 2019-03-04 RX ADMIN — FENTANYL CITRATE 50 MCG: 50 INJECTION INTRAMUSCULAR; INTRAVENOUS at 02:03

## 2019-03-04 RX ADMIN — MIDODRINE HYDROCHLORIDE 10 MG: 5 TABLET ORAL at 03:03

## 2019-03-04 NOTE — PT/OT/SLP PROGRESS
Occupational Therapy  Discontinued       Patient Name:  Angel Farmer Jr.   MRN:  [id number]    OT orders written on 3/3/2019. Orders acknowledged and discontinued on this date.   Pt with progressive cervical myelopathy and stenosis from C3-6 with myelomalacia. S/p code for hypovolemia during HD. Pt remains intubated and in NSCC at this time.     Pt with progressive mobility, log rolling and and positioning orders for nursing staff at this time.     Please re consult when appropriate for skilled OT services.     JUSTIN Benítez  3/4/2019

## 2019-03-04 NOTE — ASSESSMENT & PLAN NOTE
POD 1 s/p cervical laminectomy C3-C6, C6-C7 with medial discectomy  Hypotension during case  Maintain MAP > 75, norepi

## 2019-03-04 NOTE — CONSULTS
See consult note from Dr. Law on 3/1/19 at 1236.    OLIVIA Gabriel DNP, APRN, FNP-C  Department of Nephrology  [facility]  Pager: [phone]

## 2019-03-04 NOTE — PROGRESS NOTES
[facility]  Nephrology  Progress Note    Patient Name: Angel Farmer Jr.  MRN: [id number]  Admission Date: 2/28/2019  Hospital Length of Stay: 4 days  Attending Provider: Jorge Oviedo MD   Primary Care Physician: Satya Noriega MD  Principal Problem:S/P laminectomy    Subjective:     HPI: CC: cervical stenosis with gait impairment    Nephrology consulted for ESRD management    59 yo male with a PMH of ESRD with dialysis MWF (last on weds), DM, presenting as transfer from [facility] as admission to neurosurgery for evaluation after progressive course of unsteady gait as well as bilateral upper extremity weakness for the last several months.  Mr. Farmer has had wobbly gait since this past September, at which point he recalls falling and has been at 80-90% of his normal function.  He has been able to walk without assist, however not for very long distances.  He states that since last Sunday he has felt progression of his symptoms and now feels it would be difficult to stand up to ambulate at all.  He also endorses a pulled muscle of his LLE that is limiting his mobility.  His UE symptoms have been present for about 3 months including not writing as well as he is used to.  HE is able to write numbers, but not always his name.  He has been having difficulty using a fork to eat over the past month.  No sensory disturbance noted as well as no neck or back pain and no pain from the neck radiating into the hands.  He has intermittently been taking ASA 81 mg qdaily. He denies recent falls or trauma to the neck.  No other blood thinning medication.  MRI at OSH on 2/27 revealed significant cervical stenosis.    No SoB, cp he has in the last 2 weeks had evaluation and fistulogram of AVF and treatment at outpatient center    ESRD on HD with Dr. Araceli Pickens in [facility] MWF, 4.5 hours, EDW 140kg  Last HD 2/27/19 @L removed no available current DW        Interval History: Patient stepped up  to ICU on 3/1 after becoming diaphoretic, hypotensive (SBP 60-70s), and bradycardic (HR 40s) in the [facility]. Only completed a hr and 45 min of treatment that day w/ 500 mls removed. Patient now s/p C3-C6 posterior decompression/fusion on 3/3.   Found to hyperkalemic on AM labs, started on SLED this morning for metabolic clearance.   Remains on Levophed for BP support.     Review of patient's allergies indicates:   Allergen Reactions    Keflex [cephalexin] Nausea Only     Current Facility-Administered Medications   Medication Frequency    0.9%  NaCl infusion (CRRT USE ONLY) Continuous    0.9%  NaCl infusion PRN    0.9%  NaCl infusion Once    acetaminophen tablet 500 mg Q6H PRN    calcium gluconate 1g in dextrose 5% 100mL (ready to mix system) Q10 Min PRN    chlorhexidine 0.12 % solution 15 mL BID    dextrose 50% injection 12.5 g PRN    dextrose 50% injection 25 g PRN    dextrose 50% injection 25 g PRN    diclofenac sodium 1 % gel Daily    gabapentin capsule 300 mg QHS    glucagon (human recombinant) injection 1 mg PRN    glucose chewable tablet 16 g PRN    insulin aspart U-100 pen 1-10 Units Q6H PRN    magnesium sulfate 2g in water 50mL IVPB (premix) PRN    midodrine tablet 10 mg TID    norepinephrine 4 mg in dextrose 5% 250 mL infusion (premix) (titrating) Continuous    pantoprazole suspension 40 mg BID    polyethylene glycol packet 17 g Daily    pravastatin tablet 40 mg Daily    senna-docusate 8.6-50 mg per tablet 1 tablet BID       Objective:     Vital Signs (Most Recent):  Temp: 96.6 °F (35.9 °C) (03/04/19 1125)  Pulse: 70 (03/04/19 1125)  Resp: 20 (03/04/19 1125)  BP: (!) 151/69 (03/04/19 0801)  SpO2: 100 % (03/04/19 1125)  O2 Device (Oxygen Therapy): ventilator (03/04/19 1125) Vital Signs (24h Range):  Temp:  [95 °F (35 °C)-97.9 °F (36.6 °C)] 96.6 °F (35.9 °C)  Pulse:  [52-73] 70  Resp:  [7-33] 20  SpO2:  [98 %-100 %] 100 %  BP: (126-151)/(60-69) 151/69  Arterial Line BP: (125-159)/(42-59)  143/46     Weight: 134.3 kg (296 lb 1.2 oz) (03/02/19 0300)  Body mass index is 46.37 kg/m².  Body surface area is 2.52 meters squared.    I/O last 3 completed shifts:  In: 2372.4 [I.V.:2272.4; IV Piggyback:100]  Out: 1294 [Drains:270; Other:224; Blood:800]    Physical Exam   Constitutional: He is oriented to person, place, and time. He appears well-developed and well-nourished. He is intubated.    male who appears stated age, lying in bed   HENT:   Head: Normocephalic and atraumatic.   Mouth/Throat: Oropharynx is clear and moist. No oropharyngeal exudate.   Eyes: EOM are normal. Pupils are equal, round, and reactive to light.   Neck: No JVD present. No tracheal deviation present.   Cardiovascular: Regular rhythm, normal heart sounds and intact distal pulses.   Pulmonary/Chest: Effort normal and breath sounds normal. No stridor. He is intubated. No respiratory distress. He has no wheezes. He has no rales.   Abdominal: Soft. Bowel sounds are normal. He exhibits no distension and no mass. There is no tenderness. There is no guarding.   Obese   Musculoskeletal: He exhibits edema. He exhibits no tenderness or deformity.   Palpable thrill in LUE fistula  1+ pitting edema to bilateral shins   Neurological: He is alert and oriented to person, place, and time. No cranial nerve deficit.   Sensation to light touch intact in all extremities   Skin: No rash noted. He is not diaphoretic. No erythema.   Psychiatric: He has a normal mood and affect. His behavior is normal. Thought content normal.     Significant Labs:  CBC:   Recent Labs   Lab 03/04/19  0359   WBC 19.52*   RBC 2.68*   HGB 9.0*   HCT 27.8*   *   *   MCH 33.6*   MCHC 32.4     CMP:   Recent Labs   Lab 03/04/19  0359 03/04/19  0751   *  187*  187* 155*   CALCIUM 9.4  9.4  9.4 9.3   ALBUMIN 3.1*  3.1*  --    PROT 6.4  --    *  132*  132* 135*   K 6.1*  6.1*  6.1*  6.1* 5.8*  5.8*   CO2 17*  17*  17* 20*   CL 98  98  98  100   BUN 98*  98*  98* 73*   CREATININE 11.7*  11.7*  11.7* 8.6*   ALKPHOS 157*  --    ALT 48*  --    AST 46*  --    BILITOT 1.0  --             Assessment/Plan:     * S/P laminectomy    - Being follow by admitting service      ESRD on dialysis    57 yo male with a PMH of ESRD with dialysis MWF (last on weds), DM, presenting as transfer from [facility] as admission to neurosurgery for evaluation after progressive course of unsteady gait as well as bilateral upper extremity weakness for the last several months.     ESRD on HD with Dr. Araceli Pickens in [facility], 4.5 hours, EDW 140kg  Last HD 2/27/19 @L removed no available current DW    Plan/Assessment:   -Will plan for SLED for metabolic clearance and volume management, target -350 ml/hr as tolerated, keep MAP >65  -Continue pressor for BP support as needed, titrate as needed with RRT for volume management. On Levophed   -Hyperkalemia 5.8 from 6.1, potassium bath adjusted   -Intubated, FiO2 35%  -Renal function panels q6-8 hrs for frequent monitoring   -Hgb 9.0 from 10.2 on admit, consider JUANITO with RRT   -Renal 2 gm when able to tolerate oral intake             Thank you for your consult. I will follow-up with patient. Please contact us if you have any additional questions.    Delia Gabriel NP  Nephrology  [facility]

## 2019-03-04 NOTE — PLAN OF CARE
Problem: Adult Inpatient Plan of Care  Goal: Plan of Care Review  Outcome: Ongoing (interventions implemented as appropriate)  POC reviewed with pt at 1500. Pt verbalized understanding. Questions and concerns addressed. Pt extubated at 1230. Passed bedside ANNIE at 1500. Still on levo for BP control. On CRRT. Pt progressing toward goals. Will continue to monitor. See flowsheets for full assessment and VS info.

## 2019-03-04 NOTE — PROCEDURES
"Angel Farmer Jr. is a 58 y.o. male patient.    Temp: 97.3 °F (36.3 °C) (03/04/19 0129)  Pulse: (!) 56 (03/04/19 0129)  Resp: (!) 32 (03/04/19 0129)  BP: 136/65 (03/04/19 0100)  SpO2: 98 % (03/04/19 0129)  Weight: 134.3 kg (296 lb 1.2 oz) (03/02/19 0300)  Height: 5' 7" (170.2 cm) (03/01/19 1607)       Central Line  Date/Time: 3/4/2019 3:02 AM  Location procedure was performed: [facility] NEURO CRITICAL CARE  Performed by: Jayson Roach MD  Assisting provider: Osmani Monroy MD  Pre-operative Diagnosis: ESRD  Post-operative diagnosis: ESRD  Consent Done: Emergent Situation  Time out: Immediately prior to procedure a "time out" was called to verify the correct patient, procedure, equipment, support staff and site/side marked as required.  Indications: hemodialysis  Anesthesia: local infiltration    Anesthesia:  Local Anesthetic: lidocaine 1% without epinephrine  Anesthetic total: 3 mL  Preparation: skin prepped with ChloraPrep  Skin prep agent dried: skin prep agent completely dried prior to procedure  Sterile barriers: all five maximum sterile barriers used - cap, mask, sterile gown, sterile gloves, and large sterile sheet  Hand hygiene: hand hygiene performed prior to central venous catheter insertion  Location details: left internal jugular  Catheter type: trialysis  Catheter size: 12 Fr  Catheter Length: 20cm    Ultrasound guidance: yes  Vessel Caliber: large, patent, compressibility normal  Needle advanced into vessel with real time Ultrasound guidance.  Guidewire confirmed in vessel.  Sterile sheath used.  Manometry: Yes  Number of attempts: 2  Post-procedure: line sutured,  chlorhexidine patch,  sterile dressing applied and blood return through all ports  Complications: No  Comments: Attempted L femoral with Dr. Shah prior to L IJ. Arterial stick by myself at L femoral, pressure held, hemostasis achieved.           Jayson Roach  3/4/2019  "

## 2019-03-04 NOTE — PLAN OF CARE
Problem: Adult Inpatient Plan of Care  Goal: Plan of Care Review  Outcome: Ongoing (interventions implemented as appropriate)  Patient awakens spontaneously and to voice. Plan of care and all safety precautions maintained and discussed. Vital signs stable. HR SB-SR (Precedex turned off due to bradycardia-Dr. Roach aware). Vented AC PEEP 5.0 FiO2 35%.Patient shifted x2 with insulin and dextrose. Trialysis placed for CRRT for elevated potassium. Patient remains free from fall/injury. Will continue to monitor.

## 2019-03-04 NOTE — NURSING
Manometry for Central Line Procedure     Manometry Performed: yes    Manometry performed by: MD Marley

## 2019-03-04 NOTE — OP NOTE
DATE OF PROCEDURE: 03/03/2019  PREOPERATIVE DIAGNOSES:  1. Cervical spondylosis with myelopathy and radiculopathy  2. C3-4, C4-5, C5-6 spinal stenosis  3. Morbid obesity BMI 46.37     POSTOPERATIVE DIAGNOSES:  1. Cervical spondylosis with myelopathy and radiculopathy  2. C3-4, C4-5, C5-6 spinal stenosis  3. Morbid obesity BMI 46.37    PROCEDURES PERFORMED:     1. Open posterior access to the cervical spine.  2. Laminectomy, medial facetectomy and bilateral foraminotomy at C2-3, C3-4, C4-5, C5-6, C6-7   3. Fluoroscopic localization.     PRIMARY SURGEON: Ruddy Wylie M.D.     ASSISTANT SURGEONS: GITA.    Complexity:  This was deemed to be a more complex procedure requiring more time skills due to the morbid obesity of the patient (BMI 46.37).  1 hr more was needed to position the patient, to expose the spine to close the wound due to the high amount of fatty tissue.    INDICATION: This is a 58 years old male, end-stage renal disease on dialysis with worsening myelopathy and radiculopathy caused by cervical spondylosis and severe stenosis.  He presented with quadriparesis.  Risks, benefits, alternatives and limitation were discussed with the patient. The risks included the nerve root injury, spinal cord injury that can cause paralysis, hardware failure and   subsidence that could require reoperation as well as hardware and screw   malpositioning and a vertebral artery injury that can cause a stroke. The   patient wanted to proceed and consent was obtained.     DESCRIPTION OF THE PROCEDURE: The patient was intubated under general   anesthesia and the patient was transferred to a table prone on bolsters and all pressure points were carefully padded.  We fixed the head with the 3 pins Delacruz headholder. Fluoroscopy was used to make sure that the cervical column was well aligned and neutral.      A midline incision was  then planned with fluoroscopic localization and based on the prior cervical midline incision. The  patient was prepped and draped in a typical sterile fashion. An incision was made with a 15-blade. The fascial layer was then dissected with the Bovie and orthostatic retractors were  placed. The midline avascular plane was dissected to expose the spinous processes. Subperiosteal dissection was carried out with the Bovie. Exposure was then completed in subperiosteal fashion from C2 to C7.      We then used the rongeur to partially resect the spinous process of C2 and C6 and a high-speed bur drill was used to drill the superior lamina C7 and C3 and we identify the ligament between the C2-3 and C6-7 lamina. The ligament was then resected with Kerrisons. We then drilled  struts at the interface of the lateral mass and the lamina from C3 to C6  bilaterally. By drilling the struts, the yellow ligament was exposed and this  ligament was resected with Kerrison. We then carefully dissected the ligament    from the dura. We were able to remove the C3, C4, C5 and C6 laminae and the ligaments in one piece. Hemostasis was done with bipolar and Surgiflo. We completed a C2-3 and C6-7  laminectomy with Kerrisons. At the end, the cevical dural sac was fully decompressed.  We completed bilateral medial facetectomy and foraminotomy at C2-3, C3 4, C4-5, C5-6 and C6-7. Hemostasis was done to control the epidural bleeding with Surgifoam, bipolar and irrigation with copious saline was carried out.     We then placed 1 epidural drain and the distal end of the drains were tunneled under the skin. The cervical wound was closed in 3 layers. The muscular layer was  closed with 0 Vicryl and 1 g of vancomycin powder was placed over the muscle layer.The fascia was closed with interrupted 0 Vicryl. The subcutaneous layer was closed with 2-0 Vicryls. The skin was closed with staples. The drain were fixed to the skin with 2-0 nylon. The IPG incision was closed in two layers. The deep layer was closed with interrupted inverted 2.0 Vicryl and the  wound was closed with staples. We put Bacitracin, Telfa and Island dressing to cover the incision.       At the end of the surgery, the patient was then transferred in the PACU under the care of the anesthesiologist. The blood loss was about 800 mL. The final count was completed and nothing was missing. There was no complication.

## 2019-03-04 NOTE — ASSESSMENT & PLAN NOTE
58 M with progressive cervical myelopathy and stenosis from C3-6 with myelomalacia. S/p code for hypovolemia during HD. Stable this morning and labs WNL. He stepped up to ICU for further care and work up.     -underwent C3-6 posterior decompression yesterday  -admitted to [facility]  -wean to extubate today  -MAP goals >75 mmHg  -Pain control PRN  -HD per nephrology  -Daily PT/OT  -Please call Neurosurgery with any questions or exam changes

## 2019-03-04 NOTE — SUBJECTIVE & OBJECTIVE
Interval History: 3/4 -  -151, ow AFVSS, NAEON, leukocytosis to 20, sodium 134, potassium 6.3, ow labs stable, exam stable    Medications:  Continuous Infusions:   sodium chloride 0.9% 200 mL/hr at 03/04/19 1529    norepinephrine bitartrate-D5W 0.06 mcg/kg/min (03/04/19 1501)     Scheduled Meds:   sodium chloride 0.9%   Intravenous Once    chlorhexidine  15 mL Mouth/Throat BID    diclofenac sodium   Topical (Top) Daily    epoetin shefali (PROCRIT) injection  7,000 Units Intravenous Every Mon, Wed, Fri    fentaNYL        gabapentin  300 mg Oral QHS    midodrine  10 mg Per NG tube TID    pantoprazole  40 mg Per NG tube BID    polyethylene glycol  17 g Oral Daily    pravastatin  40 mg Oral Daily    senna-docusate 8.6-50 mg  1 tablet Oral BID     PRN Meds:sodium chloride 0.9%, acetaminophen, [COMPLETED] calcium gluconate IVPB **AND** calcium gluconate IVPB, dextrose 50%, [COMPLETED] dextrose 50% **AND** dextrose 50% **AND** [COMPLETED] insulin regular, [COMPLETED] dextrose 50% **AND** dextrose 50% **AND** [COMPLETED] insulin regular, glucagon (human recombinant), glucose, insulin aspart U-100, magnesium sulfate IVPB, ondansetron       Objective:     Weight: 134.3 kg (296 lb 1.2 oz)  Body mass index is 46.37 kg/m².  Vital Signs (Most Recent):  Temp: 97.8 °F (36.6 °C) (03/04/19 1501)  Pulse: 70 (03/04/19 1501)  Resp: 17 (03/04/19 1501)  BP: (!) 146/67 (03/04/19 1501)  SpO2: 100 % (03/04/19 1501) Vital Signs (24h Range):  Temp:  [95 °F (35 °C)-97.9 °F (36.6 °C)] 97.8 °F (36.6 °C)  Pulse:  [52-85] 70  Resp:  [7-33] 17  SpO2:  [98 %-100 %] 100 %  BP: (117-169)/(56-74) 146/67  Arterial Line BP: (125-168)/(42-59) 166/48     Date 03/04/19 0700 - 03/05/19 0659   Shift 9333-9050 1175-4171 4594-7515 24 Hour Total   INTAKE   I.V.(mL/kg) 1643.8(12.2) 49.8(0.4)  1693.6(12.6)   NG/   120   Shift Total(mL/kg) 1763.8(13.1) 49.8(0.4)  1813.6(13.5)   OUTPUT   Other 1562 412  1974   Shift Total(mL/kg) 1562(11.6)  412(3.1)  1974(14.7)   Weight (kg) 134.3 134.3 134.3 134.3              Vent Mode: Spont  Oxygen Concentration (%):  [] 100  Resp Rate Total:  [11 br/min-71 br/min] 71 br/min  PEEP/CPAP:  [5 cmH20] 5 cmH20  Pressure Support:  [5 cwX05-84 cmH20] 17 cmH20  Mean Airway Pressure:  [4 pdS44-63 cmH20] 4 cmH20         Closed/Suction Drain 03/03/19 1431 Posterior Neck Accordion 10 Fr. (Active)   Site Description Unable to view 3/4/2019 11:01 AM   Drainage Sanguineous 3/4/2019  3:25 AM   Status To bulb suction 3/4/2019 11:01 AM   Output (mL) 110 mL 3/3/2019  5:40 PM            NG/OG Tube 03/03/19 1658 Left mouth (Active)   Placement Check placement verified by x-ray;placement verified by aspirate characteristics;placement verified by distal tube length measurement 3/4/2019 11:01 AM   Tolerance no signs/symptoms of discomfort 3/4/2019 11:01 AM   Securement secured to commercial device 3/4/2019 11:01 AM   Clamp Status/Tolerance clamped 3/4/2019 11:01 AM   Insertion Site Appearance no redness, warmth, tenderness, skin breakdown, drainage 3/4/2019 11:01 AM   Flush/Irrigation flushed w/;water;no resistance met 3/4/2019 11:01 AM   Feeding Action feeding held 3/4/2019 11:01 AM   Intake (mL) 120 mL 3/4/2019  9:01 AM   Tube Output(mL)(Include Discarded Residual) 160 mL 3/3/2019  6:00 PM       Male External Urinary Catheter 03/03/19 1648 Medium (Active)   Collection Container Urimeter 3/4/2019  3:25 AM   Securement Method secured to top of thigh w/ adhesive device 3/4/2019  3:25 AM   Skin no redness;no breakdown 3/4/2019  3:25 AM   Tolerance no signs/symptoms of discomfort 3/4/2019  3:25 AM   Output (mL) 0 mL 3/3/2019  6:00 PM            Hemodialysis AV Fistula Left forearm (Active)   Needle Size 15ga 3/1/2019  4:19 PM   Site Assessment Intact 3/4/2019  3:25 AM   Patency Present;Thrill;Bruit 3/4/2019  3:25 AM   Status Deaccessed 3/4/2019  3:25 AM   Flows Good 2/28/2019  8:20 AM   Dressing Status Clean;Dry;Intact 3/2/2019  3:01  PM   Site Condition No complications 3/4/2019  3:25 AM   Dressing Gauze 3/4/2019  3:25 AM            Trialysis (Dialysis) Catheter 03/04/19 0250 left internal jugular (Active)   IV Device Securement sutures 3/4/2019 11:01 AM   Patency/Care flushed w/o difficulty;infusing 3/4/2019 11:01 AM   Site Assessment Clean;Dry;Intact;No redness;No swelling 3/4/2019 11:01 AM   Status Accessed 3/4/2019 11:01 AM   Flows Good 3/4/2019 11:01 AM   Dressing Intervention Dressing reinforced 3/4/2019 11:01 AM   Dressing Status Biopatch in place;Clean;Dry;Intact 3/4/2019 11:01 AM   Dressing Change Due 03/11/19 3/4/2019 11:01 AM   Daily Line Review Performed 3/4/2019 11:01 AM       Neurosurgery Physical Exam     E4VTM6  AO x3  CN II -XII grossly intact  4-/5 BUE  5/5 BLE  SILT  +Sanford bilaterally w 3+ UE reflexes  2+ LE reflexes, no clonus or Babinski        Significant Labs:  Recent Labs   Lab 03/03/19  0815  03/04/19  0359 03/04/19  0751 03/04/19  1159   GLU 93   < > 187*  187*  187* 155* 149*  146*   *   < > 132*  132*  132* 135* 135*  137   K 5.6*   < > 6.1*  6.1*  6.1*  6.1* 5.8*  5.8* 5.7*  5.8*  5.8*      < > 98  98  98 100 100  102   CO2 21*   < > 17*  17*  17* 20* 24  23   BUN 84*   < > 98*  98*  98* 73* 51*  51*   CREATININE 10.7*   < > 11.7*  11.7*  11.7* 8.6* 6.7*  6.6*   CALCIUM 8.8   < > 9.4  9.4  9.4 9.3 9.3  9.4   MG 3.4*  --  3.7*  --  2.9*    < > = values in this interval not displayed.     Recent Labs   Lab 03/03/19  0815  03/03/19  1718 03/04/19  0325 03/04/19  0359   WBC 5.99  --  10.96  --  19.52*   HGB 9.9*  --  9.0*  --  9.0*   HCT 30.5*   < > 28.3* 28* 27.8*   PLT 72*  --  95*  --  127*    < > = values in this interval not displayed.     Recent Labs   Lab 03/03/19  0844 03/04/19  1250   INR 1.0 1.0   APTT 25.2 23.4     Microbiology Results (last 7 days)     ** No results found for the last 168 hours. **          Significant Diagnostics:  CT: No results found in the last 24  hours.  MRI: No results found in the last 24 hours.

## 2019-03-04 NOTE — PROGRESS NOTES
CRRT:    8-hr SLED initiated.  Patient has a newly inserted left IJ trialysis with ok to use order; no outflow on arterial port, with good flow on venous port; started via reversed ports.  As per the team, start patient on 3K acid bath, labs to be rechecked after an hour.    UF rate set to 200ml/hr

## 2019-03-04 NOTE — SUBJECTIVE & OBJECTIVE
Interval History: Patient stepped up to ICU on 3/1 after becoming diaphoretic, hypotensive (SBP 60-70s), and bradycardic (HR 40s) in the [facility]. Only completed a hr and 45 min of treatment that day w/ 500 mls removed. Patient now s/p C3-C6 posterior decompression/fusion on 3/3.   Found to hyperkalemic on AM labs, started on SLED this morning for metabolic clearance.     Review of patient's allergies indicates:   Allergen Reactions    Keflex [cephalexin] Nausea Only     Current Facility-Administered Medications   Medication Frequency    0.9%  NaCl infusion (CRRT USE ONLY) Continuous    0.9%  NaCl infusion PRN    0.9%  NaCl infusion Once    acetaminophen tablet 500 mg Q6H PRN    calcium gluconate 1g in dextrose 5% 100mL (ready to mix system) Q10 Min PRN    chlorhexidine 0.12 % solution 15 mL BID    dextrose 50% injection 12.5 g PRN    dextrose 50% injection 25 g PRN    dextrose 50% injection 25 g PRN    diclofenac sodium 1 % gel Daily    gabapentin capsule 300 mg QHS    glucagon (human recombinant) injection 1 mg PRN    glucose chewable tablet 16 g PRN    insulin aspart U-100 pen 1-10 Units Q6H PRN    magnesium sulfate 2g in water 50mL IVPB (premix) PRN    midodrine tablet 10 mg TID    norepinephrine 4 mg in dextrose 5% 250 mL infusion (premix) (titrating) Continuous    pantoprazole suspension 40 mg BID    polyethylene glycol packet 17 g Daily    pravastatin tablet 40 mg Daily    senna-docusate 8.6-50 mg per tablet 1 tablet BID       Objective:     Vital Signs (Most Recent):  Temp: 96.6 °F (35.9 °C) (03/04/19 1125)  Pulse: 70 (03/04/19 1125)  Resp: 20 (03/04/19 1125)  BP: (!) 151/69 (03/04/19 0801)  SpO2: 100 % (03/04/19 1125)  O2 Device (Oxygen Therapy): ventilator (03/04/19 1125) Vital Signs (24h Range):  Temp:  [95 °F (35 °C)-97.9 °F (36.6 °C)] 96.6 °F (35.9 °C)  Pulse:  [52-73] 70  Resp:  [7-33] 20  SpO2:  [98 %-100 %] 100 %  BP: (126-151)/(60-69) 151/69  Arterial Line BP: (125-159)/(42-59)  143/46     Weight: 134.3 kg (296 lb 1.2 oz) (03/02/19 0300)  Body mass index is 46.37 kg/m².  Body surface area is 2.52 meters squared.    I/O last 3 completed shifts:  In: 2372.4 [I.V.:2272.4; IV Piggyback:100]  Out: 1294 [Drains:270; Other:224; Blood:800]    Physical Exam   Constitutional: He is oriented to person, place, and time. He appears well-developed and well-nourished. He is intubated.    male who appears stated age, lying in bed   HENT:   Head: Normocephalic and atraumatic.   Mouth/Throat: Oropharynx is clear and moist. No oropharyngeal exudate.   Eyes: EOM are normal. Pupils are equal, round, and reactive to light.   Neck: No JVD present. No tracheal deviation present.   Cardiovascular: Regular rhythm, normal heart sounds and intact distal pulses.   Pulmonary/Chest: Effort normal and breath sounds normal. No stridor. He is intubated. No respiratory distress. He has no wheezes. He has no rales.   Abdominal: Soft. Bowel sounds are normal. He exhibits no distension and no mass. There is no tenderness. There is no guarding.   Obese   Musculoskeletal: He exhibits edema. He exhibits no tenderness or deformity.   Palpable thrill in LUE fistula  1+ pitting edema to bilateral shins   Neurological: He is alert and oriented to person, place, and time. No cranial nerve deficit.   Sensation to light touch intact in all extremities   Skin: No rash noted. He is not diaphoretic. No erythema.   Psychiatric: He has a normal mood and affect. His behavior is normal. Thought content normal.     Significant Labs:  CBC:   Recent Labs   Lab 03/04/19  0359   WBC 19.52*   RBC 2.68*   HGB 9.0*   HCT 27.8*   *   *   MCH 33.6*   MCHC 32.4     CMP:   Recent Labs   Lab 03/04/19  0359 03/04/19  0751   *  187*  187* 155*   CALCIUM 9.4  9.4  9.4 9.3   ALBUMIN 3.1*  3.1*  --    PROT 6.4  --    *  132*  132* 135*   K 6.1*  6.1*  6.1*  6.1* 5.8*  5.8*   CO2 17*  17*  17* 20*   CL 98  98  98  100   BUN 98*  98*  98* 73*   CREATININE 11.7*  11.7*  11.7* 8.6*   ALKPHOS 157*  --    ALT 48*  --    AST 46*  --    BILITOT 1.0  --

## 2019-03-04 NOTE — PROGRESS NOTES
[facility]  Neurocritical Care  Progress Note    Admit Date: 2/28/2019  Service Date: 03/04/2019  Length of Stay: 4    Subjective:     Chief Complaint: S/P laminectomy    History of Present Illness: The patient  is a 58 y.o. male with PMHx of of DMII, ESRD on HD MWF(last dialysis 3/1), HLD, HTN and MIKE admitted to [facility] s/p cervical laminectomy C3-C6, C6-C7 w/medial discectomy. Per chart review,  Mr. Farmer has had wobbly gait since this past September, at which point he recalls falling and has been at 80-90% of his normal function.  He has been able to walk without assist, however not for very long distances.  He states that since last Sunday he has felt progression of his symptoms and now feels it would be difficult to stand up to ambulate at all. His UE symptoms have been present for about 3 months including not writing as well as he is used to.MRI cervical spine (2/27)at OSH on 2/27 revealed severe disc space narrowing C3-C4 and C5-C6 level.  Patient admitted to [facility] for close monitoring and higher level of care.            Hospital Course: 3/3: pt admitted to post-up, MAP goal >75, on levo  3/4: extubated       Interval History: Pt extubated with parameters today.  Doing well post extubation.  Resume home midodrine in an effort to wean norepi.     Review of Systems: Unable to obtain a complete a complete ROS due to intubation.  Pt endorses discomfort with ETT    Vitals:   Temp: 97.8 °F (36.6 °C)  Pulse: 70  Rhythm: sinus bradycardia  BP: (!) 146/67  MAP (mmHg): 96  Resp: 17  SpO2: 100 %  Oxygen Concentration (%): 100  O2 Device (Oxygen Therapy): nasal cannula  Vent Mode: Spont  Set Rate: 14 bmp  Pressure Support: 17 cmH20  PEEP/CPAP: 5 cmH20  Peak Airway Pressure: 11 cmH2O  Mean Airway Pressure: 4 cmH20  Plateau Pressure: 15 cmH20    Temp  Min: 95 °F (35 °C)  Max: 97.9 °F (36.6 °C)  Pulse  Min: 52  Max: 85  BP  Min: 117/56  Max: 169/71  MAP (mmHg)  Min: 80  Max: 107  Resp  Min: 7  Max:  33  SpO2  Min: 98 %  Max: 100 %  Oxygen Concentration (%)  Min: 34  Max: 100    03/03 0701 - 03/04 0700  In: 2372.4 [I.V.:2272.4]  Out: 1294 [Drains:270]   Unmeasured Output  Stool Occurrence: 1     Examination:   Constitutional: Morbidly obese. Mild distress.   Eyes: Conjunctiva clear, anicteric. Lids no lesions.  Head/Ears/Nose/Mouth/Throat/Neck: Moist mucous membranes. External ears, nose atraumatic.   Cardiovascular: Regular rhythm. No murmurs. No leg edema.  Respiratory: Comfortable respirations. Clear to auscultation.  Gastrointestinal: No hernia. Soft, nondistended, nontender. + bowel sounds.    Neurologic:  -GCS E4VtM6  -Alert. Oriented to person, place, and time. Follows commands.  -MONREAL  -Sensation grossly intact   -PERRL 3 mm, EOMI       Medications:   Continuous  sodium chloride 0.9% Last Rate: 200 mL/hr at 03/04/19 1529   norepinephrine bitartrate-D5W Last Rate: 0.06 mcg/kg/min (03/04/19 1501)   Scheduled  sodium chloride 0.9%  Once   chlorhexidine 15 mL BID   diclofenac sodium  Daily   epoetin shefali (PROCRIT) injection 7,000 Units Every Mon, Wed, Fri   fentaNYL     gabapentin 300 mg QHS   midodrine 10 mg TID   pantoprazole 40 mg BID   polyethylene glycol 17 g Daily   pravastatin 40 mg Daily   senna-docusate 8.6-50 mg 1 tablet BID   PRN  sodium chloride 0.9%  PRN   acetaminophen 500 mg Q6H PRN   calcium gluconate IVPB 1 g Q10 Min PRN   dextrose 50% 12.5 g PRN   dextrose 50% 25 g PRN   dextrose 50% 25 g PRN   glucagon (human recombinant) 1 mg PRN   glucose 16 g PRN   insulin aspart U-100 1-10 Units Q6H PRN   magnesium sulfate IVPB 2 g PRN   ondansetron 4 mg Q6H PRN      Today I independently reviewed pertinent medications, lines/drains/airways, imaging, cardiology results, laboratory results, microbiology results,     ISTAT: Recent Labs   Lab 03/04/19  0304 03/04/19  0325   PH 7.345*  --    PCO2 37.1  --    PO2 95  --    POCSATURATED 97  --    HCO3 20.2*  --    BE -5  --    POCNA  --  132*   POCK  --  6.1*    POCTCO2 21*  --    POCICA  --  1.14   SAMPLE ARTERIAL ARTERIAL      Chem: Recent Labs   Lab 03/04/19  0359  03/04/19  1159   *  132*  132*   < > 135*  137   K 6.1*  6.1*  6.1*  6.1*   < > 5.7*  5.8*  5.8*   CL 98  98  98   < > 100  102   CO2 17*  17*  17*   < > 24  23   *  187*  187*   < > 149*  146*   BUN 98*  98*  98*   < > 51*  51*   CREATININE 11.7*  11.7*  11.7*   < > 6.7*  6.6*   ESTGFRAFRICA 4.9*  4.9*  4.9*   < > 9.7*  9.9*   EGFRNONAA 4.3*  4.3*  4.3*   < > 8.4*  8.6*   CALCIUM 9.4  9.4  9.4   < > 9.3  9.4   MG 3.7*  --  2.9*   PHOS 9.6*  9.6*  --  6.0*   ANIONGAP 17*  17*  17*   < > 11  12   PROT 6.4  --   --    ALBUMIN 3.1*  3.1*  --  3.2*   BILITOT 1.0  --   --    ALKPHOS 157*  --   --    AST 46*  --   --    ALT 48*  --   --     < > = values in this interval not displayed.     Heme: Recent Labs   Lab 03/04/19  0359 03/04/19  1250   WBC 19.52*  --    HGB 9.0*  --    HCT 27.8*  --    *  --    INR  --  1.0   FIBRINOGEN  --  429*     Endo:   Recent Labs   Lab 03/04/19  0250 03/04/19  0634 03/04/19  1209   POCTGLUCOSE 225* 165* 149*        Assessment/Plan:     Neuro   Multifactorial peripheral neuropathy    Gabapentin      Cardiac/Vascular   Hypotension    MAP > 75  Norepi  Resume home midodrine      Renal/   Hyperkalemia    See ESRD       ESRD on dialysis    ESRD on HD MWF   Required SLED overnight  K reached 6.3 prior to shift and RRT     Endocrine   Morbid obesity    BMI 46  Difficult intubation      Orthopedic   * S/P laminectomy    POD 1 s/p cervical laminectomy C3-C6, C6-C7 with medial discectomy  Hypotension during case  Maintain MAP > 75, norepi                 The patient is being Prophylaxed for:  Venous Thromboembolism with: Mechanical  Stress Ulcer with: PPI  Ventilator Pneumonia with: none    Activity Orders          None        Full Code    Gladis Johnson PA-C  Neurocritical Care  [facility]

## 2019-03-04 NOTE — PT/OT/SLP PROGRESS
Physical Therapy      Patient Name:  Angel Farmer Jr.   MRN:  [id number]    PT orders discontinued. Pt intubated. Re-consult when pt appropriate for EOB assessment.     MARGARET FUENTES, PT   3/4/2019

## 2019-03-04 NOTE — PROGRESS NOTES
[facility]  Neurosurgery  Progress Note    Subjective:     History of Present Illness: 59 yo male with a PMH of ESRD with dialysis MWF (last on weds), DM, presenting as transfer from [facility] as admission to neurosurgery for evaluation after progressive course of unsteady gait as well as bilateral upper extremity weakness for the last several months.  Mr. Farmer has had wobbly gait since this past September, at which point he recalls falling and has been at 80-90% of his normal function.  He has been able to walk without assist, however not for very long distances.  He states that since last Sunday he has felt progression of his symptoms and now feels it would be difficult to stand up to ambulate at all.  He also endorses a pulled muscle of his LLE that is limiting his mobility.  His UE symptoms have been present for about 3 months including not writing as well as he is used to.  HE is able to write numbers, but not always his name.  He has been having difficulty using a fork to eat over the past month.  No sensory disturbance noted as well as no neck or back pain and no pain from the neck radiating into the hands.  He has intermittently been taking ASA 81 mg qdaily. He denies recent falls or trauma to the neck.  No other blood thinning medication.  MRI at OSH on 2/27 revealed significant cervical stenosis.      =    Post-Op Info:  Procedure(s) (LRB):  SPINE, CERVICAL, POSTERIOR APPROACH  LAMINECTOMY C3-C6; C6-C7; WITH MEDIAL DISCECTOMY (N/A)   1 Day Post-Op     Interval History: 3/4 -  -151, ow AFVSS, NAEON, leukocytosis to 20, sodium 134, potassium 6.3, ow labs stable, exam stable    Medications:  Continuous Infusions:   sodium chloride 0.9% 200 mL/hr at 03/04/19 1529    norepinephrine bitartrate-D5W 0.06 mcg/kg/min (03/04/19 1501)     Scheduled Meds:   sodium chloride 0.9%   Intravenous Once    chlorhexidine  15 mL Mouth/Throat BID    diclofenac sodium   Topical (Top) Daily     epoetin shefali (PROCRIT) injection  7,000 Units Intravenous Every Mon, Wed, Fri    fentaNYL        gabapentin  300 mg Oral QHS    midodrine  10 mg Per NG tube TID    pantoprazole  40 mg Per NG tube BID    polyethylene glycol  17 g Oral Daily    pravastatin  40 mg Oral Daily    senna-docusate 8.6-50 mg  1 tablet Oral BID     PRN Meds:sodium chloride 0.9%, acetaminophen, [COMPLETED] calcium gluconate IVPB **AND** calcium gluconate IVPB, dextrose 50%, [COMPLETED] dextrose 50% **AND** dextrose 50% **AND** [COMPLETED] insulin regular, [COMPLETED] dextrose 50% **AND** dextrose 50% **AND** [COMPLETED] insulin regular, glucagon (human recombinant), glucose, insulin aspart U-100, magnesium sulfate IVPB, ondansetron       Objective:     Weight: 134.3 kg (296 lb 1.2 oz)  Body mass index is 46.37 kg/m².  Vital Signs (Most Recent):  Temp: 97.8 °F (36.6 °C) (03/04/19 1501)  Pulse: 70 (03/04/19 1501)  Resp: 17 (03/04/19 1501)  BP: (!) 146/67 (03/04/19 1501)  SpO2: 100 % (03/04/19 1501) Vital Signs (24h Range):  Temp:  [95 °F (35 °C)-97.9 °F (36.6 °C)] 97.8 °F (36.6 °C)  Pulse:  [52-85] 70  Resp:  [7-33] 17  SpO2:  [98 %-100 %] 100 %  BP: (117-169)/(56-74) 146/67  Arterial Line BP: (125-168)/(42-59) 166/48     Date 03/04/19 0700 - 03/05/19 0659   Shift 3292-3915 2030-9769 1714-6404 24 Hour Total   INTAKE   I.V.(mL/kg) 1643.8(12.2) 49.8(0.4)  1693.6(12.6)   NG/   120   Shift Total(mL/kg) 1763.8(13.1) 49.8(0.4)  1813.6(13.5)   OUTPUT   Other 1562 412  1974   Shift Total(mL/kg) 1562(11.6) 412(3.1)  1974(14.7)   Weight (kg) 134.3 134.3 134.3 134.3              Vent Mode: Spont  Oxygen Concentration (%):  [] 100  Resp Rate Total:  [11 br/min-71 br/min] 71 br/min  PEEP/CPAP:  [5 cmH20] 5 cmH20  Pressure Support:  [5 zmZ92-39 cmH20] 17 cmH20  Mean Airway Pressure:  [4 yqS81-94 cmH20] 4 cmH20         Closed/Suction Drain 03/03/19 1431 Posterior Neck Accordion 10 Fr. (Active)   Site Description Unable to view 3/4/2019  11:01 AM   Drainage Sanguineous 3/4/2019  3:25 AM   Status To bulb suction 3/4/2019 11:01 AM   Output (mL) 110 mL 3/3/2019  5:40 PM            NG/OG Tube 03/03/19 1658 Left mouth (Active)   Placement Check placement verified by x-ray;placement verified by aspirate characteristics;placement verified by distal tube length measurement 3/4/2019 11:01 AM   Tolerance no signs/symptoms of discomfort 3/4/2019 11:01 AM   Securement secured to commercial device 3/4/2019 11:01 AM   Clamp Status/Tolerance clamped 3/4/2019 11:01 AM   Insertion Site Appearance no redness, warmth, tenderness, skin breakdown, drainage 3/4/2019 11:01 AM   Flush/Irrigation flushed w/;water;no resistance met 3/4/2019 11:01 AM   Feeding Action feeding held 3/4/2019 11:01 AM   Intake (mL) 120 mL 3/4/2019  9:01 AM   Tube Output(mL)(Include Discarded Residual) 160 mL 3/3/2019  6:00 PM       Male External Urinary Catheter 03/03/19 1648 Medium (Active)   Collection Container Urimeter 3/4/2019  3:25 AM   Securement Method secured to top of thigh w/ adhesive device 3/4/2019  3:25 AM   Skin no redness;no breakdown 3/4/2019  3:25 AM   Tolerance no signs/symptoms of discomfort 3/4/2019  3:25 AM   Output (mL) 0 mL 3/3/2019  6:00 PM            Hemodialysis AV Fistula Left forearm (Active)   Needle Size 15ga 3/1/2019  4:19 PM   Site Assessment Intact 3/4/2019  3:25 AM   Patency Present;Thrill;Bruit 3/4/2019  3:25 AM   Status Deaccessed 3/4/2019  3:25 AM   Flows Good 2/28/2019  8:20 AM   Dressing Status Clean;Dry;Intact 3/2/2019  3:01 PM   Site Condition No complications 3/4/2019  3:25 AM   Dressing Gauze 3/4/2019  3:25 AM            Trialysis (Dialysis) Catheter 03/04/19 0250 left internal jugular (Active)   IV Device Securement sutures 3/4/2019 11:01 AM   Patency/Care flushed w/o difficulty;infusing 3/4/2019 11:01 AM   Site Assessment Clean;Dry;Intact;No redness;No swelling 3/4/2019 11:01 AM   Status Accessed 3/4/2019 11:01 AM   Flows Good 3/4/2019 11:01 AM    Dressing Intervention Dressing reinforced 3/4/2019 11:01 AM   Dressing Status Biopatch in place;Clean;Dry;Intact 3/4/2019 11:01 AM   Dressing Change Due 03/11/19 3/4/2019 11:01 AM   Daily Line Review Performed 3/4/2019 11:01 AM       Neurosurgery Physical Exam     E4VTM6  AO x3  CN II -XII grossly intact  4-/5 BUE  5/5 BLE  SILT  +Sanford bilaterally w 3+ UE reflexes  2+ LE reflexes, no clonus or Babinski        Significant Labs:  Recent Labs   Lab 03/03/19  0815  03/04/19  0359 03/04/19  0751 03/04/19  1159   GLU 93   < > 187*  187*  187* 155* 149*  146*   *   < > 132*  132*  132* 135* 135*  137   K 5.6*   < > 6.1*  6.1*  6.1*  6.1* 5.8*  5.8* 5.7*  5.8*  5.8*      < > 98  98  98 100 100  102   CO2 21*   < > 17*  17*  17* 20* 24  23   BUN 84*   < > 98*  98*  98* 73* 51*  51*   CREATININE 10.7*   < > 11.7*  11.7*  11.7* 8.6* 6.7*  6.6*   CALCIUM 8.8   < > 9.4  9.4  9.4 9.3 9.3  9.4   MG 3.4*  --  3.7*  --  2.9*    < > = values in this interval not displayed.     Recent Labs   Lab 03/03/19  0815  03/03/19  1718 03/04/19  0325 03/04/19  0359   WBC 5.99  --  10.96  --  19.52*   HGB 9.9*  --  9.0*  --  9.0*   HCT 30.5*   < > 28.3* 28* 27.8*   PLT 72*  --  95*  --  127*    < > = values in this interval not displayed.     Recent Labs   Lab 03/03/19  0844 03/04/19  1250   INR 1.0 1.0   APTT 25.2 23.4     Microbiology Results (last 7 days)     ** No results found for the last 168 hours. **          Significant Diagnostics:  CT: No results found in the last 24 hours.  MRI: No results found in the last 24 hours.    Assessment/Plan:     * S/P laminectomy     58 M with progressive cervical myelopathy and stenosis from C3-6 with myelomalacia. S/p code for hypovolemia during HD. Stable this morning and labs WNL. He stepped up to ICU for further care and work up.     -underwent C3-6 posterior decompression yesterday  -admitted to NCC  -wean to extubate today  -MAP goals >75 mmHg  -Pain  control PRN  -HD per nephrology  -Daily PT/OT  -Please call Neurosurgery with any questions or exam changes                       Speedy Rodriguez MD  Neurosurgery  [facility]

## 2019-03-04 NOTE — ASSESSMENT & PLAN NOTE
59 yo male with a PMH of ESRD with dialysis MWF (last on weds), DM, presenting as transfer from [facility] as admission to neurosurgery for evaluation after progressive course of unsteady gait as well as bilateral upper extremity weakness for the last several months.     ESRD on HD with Dr. Araceli Pickens in [facility], 4.5 hours, EDW 140kg  Last HD 2/27/19 @L removed no available current DW    Plan/Assessment:   -Will plan for SLED for metabolic clearance and volume management, target -350 ml/hr as tolerated, keep MAP >65  -Continue pressor for BP support as needed, titrate as needed with RRT for volume management  -Hyperkalemia 5.8 from 6.1, potassium bath adjusted   -Intubated, FiO2 35%  -Renal 2 gm when able to tolerate oral intake

## 2019-03-04 NOTE — PHYSICIAN QUERY
PT Name: Angel Farmer Jr.  MR #: [id number]     Physician Query Form - Diagnosis Clarification      CDS: Ebony Leonard RN  Contact information: [email or web address]    This form is a permanent document in the medical record.     Query Date: March 4, 2019    By submitting this query, we are merely seeking further clarification of documentation.  Please utilize your independent clinical judgment when addressing the question(s) below.     The medical record contains the following:      Findings Supporting Clinical Information Location in Medical Record   CVA     Patient admitted for stroke, low suspicion given symptoms, MRI reviewed. Would consider CPK to rule out myopathy (patient on statin) & ESR/CRP to rule out polymyalgia rheumatica , but likely cause is chronic deconditioning in the setting of severe chronic disease as he has had similar symptoms since August.         CVA (cerebral vascular accident)  Patient with complaint of ataxia and weakness   CT head is negative for any acute hemorrhagic event    Ataxia  There is significant degree of spinal canal stenosis from C3-C4 through C6-C7, please see details of each levels above.  Multilevel variable degree of foraminal narrowing as detailed above.  Neurosurgery consulted  Dr. Sousa with Neurosurgery assessed patient and will possibly need surgery.  Transfer to [facility] iniated .       57 yo M with gradual onset unsteadiness in gait and UE weakness. Gait abnormality 2/2 LLE proximal pain. UE weakness 2/2 cervical cord abnormalities.        FM significant event note 2/27              FM PN 2/28                            Neurology PN 2/28     Please clarify if the CVA diagnosis has been:    [  ] Ruled In   [  ] Ruled In, Now Resolved   [  x] Ruled Out   [  ] Other/Clarification of findings (please specify):   [  ] Clinically insignificant     [  ] Clinically undetermined     Please document in your progress notes daily for the duration of treatment, until  resolved, and include in your discharge summary.

## 2019-03-04 NOTE — PLAN OF CARE
03/04/19 1600   Discharge Reassessment   Assessment Type Discharge Planning Reassessment   Provided patient/caregiver education on the expected discharge date and the discharge plan No   Do you have any problems affording any of your prescribed medications? No   Discharge Plan A Skilled Nursing Facility   Discharge Plan B Rehab   DME Needed Upon Discharge  other (see comments)  ([id number])   Patient choice form signed by patient/caregiver N/A   Anticipated Discharge Disposition Rehab   Can the patient answer the patient profile reliably? (anel)   How does the patient rate their overall health at the present time? (anel)   Describe the patient's ability to walk at the present time. Does not walk or unable to take any steps at all   How often would a person be available to care for the patient? Whenever needed   Number of comorbid conditions (as recorded on the chart) Three   During the past month, has the patient often been bothered by feeling down, depressed or hopeless? (anel)   During the past month, has the patient often been bothered by little interest or pleasure in doing things? (anel)   Post-Acute Status   Post-Acute Authorization Placement   Post-Acute Placement Status Awaiting Therapy Documentation   Discharge Delays None  (Patient extubated today/ awaiting therapy recs)       Celeste Deluca RN, CCRN-K, [facility]  Neuro-Critical Care   [phone]

## 2019-03-05 LAB
ALBUMIN SERPL BCP-MCNC: 2.9 G/DL
ALBUMIN SERPL BCP-MCNC: 2.9 G/DL
ALP SERPL-CCNC: 136 U/L
ALT SERPL W/O P-5'-P-CCNC: 30 U/L
ANION GAP SERPL CALC-SCNC: 10 MMOL/L
ANION GAP SERPL CALC-SCNC: 9 MMOL/L
ANION GAP SERPL CALC-SCNC: 9 MMOL/L
ANISOCYTOSIS BLD QL SMEAR: SLIGHT
AST SERPL-CCNC: 38 U/L
BASOPHILS # BLD AUTO: 0.04 K/UL
BASOPHILS NFR BLD: 0.2 %
BILIRUB SERPL-MCNC: 0.9 MG/DL
BUN SERPL-MCNC: 27 MG/DL
BUN SERPL-MCNC: 27 MG/DL
BUN SERPL-MCNC: 31 MG/DL
CALCIUM SERPL-MCNC: 8.9 MG/DL
CALCIUM SERPL-MCNC: 9 MG/DL
CHLORIDE SERPL-SCNC: 100 MMOL/L
CHLORIDE SERPL-SCNC: 101 MMOL/L
CHLORIDE SERPL-SCNC: 101 MMOL/L
CHLORIDE SERPL-SCNC: 99 MMOL/L
CHLORIDE SERPL-SCNC: 99 MMOL/L
CO2 SERPL-SCNC: 23 MMOL/L
CO2 SERPL-SCNC: 24 MMOL/L
CO2 SERPL-SCNC: 24 MMOL/L
CREAT SERPL-MCNC: 4.2 MG/DL
CREAT SERPL-MCNC: 4.2 MG/DL
CREAT SERPL-MCNC: 4.5 MG/DL
CREAT SERPL-MCNC: 4.5 MG/DL
CREAT SERPL-MCNC: 4.7 MG/DL
DIFFERENTIAL METHOD: ABNORMAL
EOSINOPHIL # BLD AUTO: 0.1 K/UL
EOSINOPHIL NFR BLD: 0.3 %
ERYTHROCYTE [DISTWIDTH] IN BLOOD BY AUTOMATED COUNT: 15.9 %
EST. GFR  (AFRICAN AMERICAN): 14.9 ML/MIN/1.73 M^2
EST. GFR  (AFRICAN AMERICAN): 15.7 ML/MIN/1.73 M^2
EST. GFR  (AFRICAN AMERICAN): 15.7 ML/MIN/1.73 M^2
EST. GFR  (AFRICAN AMERICAN): 17.1 ML/MIN/1.73 M^2
EST. GFR  (AFRICAN AMERICAN): 17.1 ML/MIN/1.73 M^2
EST. GFR  (NON AFRICAN AMERICAN): 12.9 ML/MIN/1.73 M^2
EST. GFR  (NON AFRICAN AMERICAN): 13.6 ML/MIN/1.73 M^2
EST. GFR  (NON AFRICAN AMERICAN): 13.6 ML/MIN/1.73 M^2
EST. GFR  (NON AFRICAN AMERICAN): 14.8 ML/MIN/1.73 M^2
EST. GFR  (NON AFRICAN AMERICAN): 14.8 ML/MIN/1.73 M^2
GLUCOSE SERPL-MCNC: 158 MG/DL
GLUCOSE SERPL-MCNC: 158 MG/DL
GLUCOSE SERPL-MCNC: 183 MG/DL
GLUCOSE SERPL-MCNC: 183 MG/DL
GLUCOSE SERPL-MCNC: 184 MG/DL
HCT VFR BLD AUTO: 24.3 %
HGB BLD-MCNC: 7.7 G/DL
IMM GRANULOCYTES # BLD AUTO: 0.12 K/UL
IMM GRANULOCYTES NFR BLD AUTO: 0.6 %
LYMPHOCYTES # BLD AUTO: 2.2 K/UL
LYMPHOCYTES NFR BLD: 10.8 %
MAGNESIUM SERPL-MCNC: 2.3 MG/DL
MAGNESIUM SERPL-MCNC: 2.4 MG/DL
MCH RBC QN AUTO: 33.6 PG
MCHC RBC AUTO-ENTMCNC: 31.7 G/DL
MCV RBC AUTO: 106 FL
MONOCYTES # BLD AUTO: 3.1 K/UL
MONOCYTES NFR BLD: 15.4 %
NEUTROPHILS # BLD AUTO: 14.8 K/UL
NEUTROPHILS NFR BLD: 72.7 %
NRBC BLD-RTO: 0 /100 WBC
PHOSPHATE SERPL-MCNC: 4.7 MG/DL
PHOSPHATE SERPL-MCNC: 4.9 MG/DL
PLATELET # BLD AUTO: 117 K/UL
PLATELET BLD QL SMEAR: ABNORMAL
PMV BLD AUTO: 10.8 FL
POCT GLUCOSE: 196 MG/DL (ref 70–110)
POCT GLUCOSE: 202 MG/DL (ref 70–110)
POCT GLUCOSE: 212 MG/DL (ref 70–110)
POCT GLUCOSE: 229 MG/DL (ref 70–110)
POTASSIUM SERPL-SCNC: 4.2 MMOL/L
POTASSIUM SERPL-SCNC: 4.2 MMOL/L
POTASSIUM SERPL-SCNC: 4.4 MMOL/L
PROT SERPL-MCNC: 5.8 G/DL
PYRIDOXAL SERPL-MCNC: 16 UG/L (ref 5–50)
RBC # BLD AUTO: 2.29 M/UL
SODIUM SERPL-SCNC: 132 MMOL/L
SODIUM SERPL-SCNC: 132 MMOL/L
SODIUM SERPL-SCNC: 133 MMOL/L
SODIUM SERPL-SCNC: 134 MMOL/L
SODIUM SERPL-SCNC: 134 MMOL/L
TOXIC GRANULES BLD QL SMEAR: PRESENT
VIT B1 BLD-MCNC: 87 UG/L (ref 38–122)
WBC # BLD AUTO: 20.31 K/UL
WBC TOXIC VACUOLES BLD QL SMEAR: PRESENT

## 2019-03-05 PROCEDURE — 99233 PR SUBSEQUENT HOSPITAL CARE,LEVL III: ICD-10-PCS | Mod: ,,, | Performed by: PSYCHIATRY & NEUROLOGY

## 2019-03-05 PROCEDURE — 80048 BASIC METABOLIC PNL TOTAL CA: CPT

## 2019-03-05 PROCEDURE — 80100008 HC CRRT DAILY MAINTENANCE

## 2019-03-05 PROCEDURE — 99291 CRITICAL CARE FIRST HOUR: CPT | Mod: GC,,, | Performed by: PSYCHIATRY & NEUROLOGY

## 2019-03-05 PROCEDURE — 63600175 PHARM REV CODE 636 W HCPCS: Performed by: PSYCHIATRY & NEUROLOGY

## 2019-03-05 PROCEDURE — 94761 N-INVAS EAR/PLS OXIMETRY MLT: CPT

## 2019-03-05 PROCEDURE — 80053 COMPREHEN METABOLIC PANEL: CPT

## 2019-03-05 PROCEDURE — 84100 ASSAY OF PHOSPHORUS: CPT

## 2019-03-05 PROCEDURE — 25000003 PHARM REV CODE 250: Performed by: STUDENT IN AN ORGANIZED HEALTH CARE EDUCATION/TRAINING PROGRAM

## 2019-03-05 PROCEDURE — 85025 COMPLETE CBC W/AUTO DIFF WBC: CPT

## 2019-03-05 PROCEDURE — 83735 ASSAY OF MAGNESIUM: CPT

## 2019-03-05 PROCEDURE — 90945 DIALYSIS ONE EVALUATION: CPT

## 2019-03-05 PROCEDURE — 99291 PR CRITICAL CARE, E/M 30-74 MINUTES: ICD-10-PCS | Mod: GC,,, | Performed by: PSYCHIATRY & NEUROLOGY

## 2019-03-05 PROCEDURE — 25000003 PHARM REV CODE 250: Performed by: NURSE PRACTITIONER

## 2019-03-05 PROCEDURE — 27000221 HC OXYGEN, UP TO 24 HOURS

## 2019-03-05 PROCEDURE — 83735 ASSAY OF MAGNESIUM: CPT | Mod: 91

## 2019-03-05 PROCEDURE — 80069 RENAL FUNCTION PANEL: CPT

## 2019-03-05 PROCEDURE — 25000003 PHARM REV CODE 250: Performed by: PSYCHIATRY & NEUROLOGY

## 2019-03-05 PROCEDURE — 20000000 HC ICU ROOM

## 2019-03-05 PROCEDURE — 99233 SBSQ HOSP IP/OBS HIGH 50: CPT | Mod: ,,, | Performed by: PSYCHIATRY & NEUROLOGY

## 2019-03-05 RX ORDER — INSULIN ASPART 100 [IU]/ML
5 INJECTION, SOLUTION INTRAVENOUS; SUBCUTANEOUS EVERY 6 HOURS
Status: DISCONTINUED | OUTPATIENT
Start: 2019-03-05 | End: 2019-03-06

## 2019-03-05 RX ORDER — RAMELTEON 8 MG/1
8 TABLET ORAL ONCE
Status: COMPLETED | OUTPATIENT
Start: 2019-03-05 | End: 2019-03-05

## 2019-03-05 RX ADMIN — GABAPENTIN 300 MG: 300 CAPSULE ORAL at 08:03

## 2019-03-05 RX ADMIN — INSULIN ASPART 4 UNITS: 100 INJECTION, SOLUTION INTRAVENOUS; SUBCUTANEOUS at 06:03

## 2019-03-05 RX ADMIN — Medication 0.26 MCG/KG/MIN: at 06:03

## 2019-03-05 RX ADMIN — RAMELTEON 8 MG: 8 TABLET, FILM COATED ORAL at 10:03

## 2019-03-05 RX ADMIN — INSULIN ASPART 5 UNITS: 100 INJECTION, SOLUTION INTRAVENOUS; SUBCUTANEOUS at 06:03

## 2019-03-05 RX ADMIN — NOREPINEPHRINE BITARTRATE 0.24 MCG/KG/MIN: 1 INJECTION, SOLUTION, CONCENTRATE INTRAVENOUS at 09:03

## 2019-03-05 RX ADMIN — INSULIN ASPART 1 UNITS: 100 INJECTION, SOLUTION INTRAVENOUS; SUBCUTANEOUS at 12:03

## 2019-03-05 RX ADMIN — MIDODRINE HYDROCHLORIDE 10 MG: 5 TABLET ORAL at 08:03

## 2019-03-05 RX ADMIN — DICLOFENAC: 10 GEL TOPICAL at 09:03

## 2019-03-05 RX ADMIN — PRAVASTATIN SODIUM 40 MG: 40 TABLET ORAL at 09:03

## 2019-03-05 RX ADMIN — OXYCODONE HYDROCHLORIDE 5 MG: 5 TABLET ORAL at 09:03

## 2019-03-05 RX ADMIN — INSULIN ASPART 4 UNITS: 100 INJECTION, SOLUTION INTRAVENOUS; SUBCUTANEOUS at 02:03

## 2019-03-05 RX ADMIN — INSULIN ASPART 4 UNITS: 100 INJECTION, SOLUTION INTRAVENOUS; SUBCUTANEOUS at 05:03

## 2019-03-05 RX ADMIN — STANDARDIZED SENNA CONCENTRATE AND DOCUSATE SODIUM 1 TABLET: 8.6; 5 TABLET, FILM COATED ORAL at 09:03

## 2019-03-05 RX ADMIN — MIDODRINE HYDROCHLORIDE 10 MG: 5 TABLET ORAL at 09:03

## 2019-03-05 RX ADMIN — Medication 0.22 MCG/KG/MIN: at 05:03

## 2019-03-05 RX ADMIN — ACETAMINOPHEN 500 MG: 500 TABLET, FILM COATED ORAL at 09:03

## 2019-03-05 RX ADMIN — MIDODRINE HYDROCHLORIDE 10 MG: 5 TABLET ORAL at 04:03

## 2019-03-05 RX ADMIN — SODIUM CHLORIDE: 0.9 INJECTION, SOLUTION INTRAVENOUS at 12:03

## 2019-03-05 RX ADMIN — OXYCODONE HYDROCHLORIDE 5 MG: 5 TABLET ORAL at 10:03

## 2019-03-05 RX ADMIN — STANDARDIZED SENNA CONCENTRATE AND DOCUSATE SODIUM 1 TABLET: 8.6; 5 TABLET, FILM COATED ORAL at 08:03

## 2019-03-05 RX ADMIN — SODIUM CHLORIDE 250 ML: 0.9 INJECTION, SOLUTION INTRAVENOUS at 03:03

## 2019-03-05 RX ADMIN — PANTOPRAZOLE SODIUM 40 MG: 40 GRANULE, DELAYED RELEASE ORAL at 08:03

## 2019-03-05 NOTE — PLAN OF CARE
Problem: Adult Inpatient Plan of Care  Goal: Plan of Care Review  Outcome: Ongoing (interventions implemented as appropriate)  POC reviewed with pt and family at 1500. Pt verbalized understanding. Questions and concerns addressed. No acute events today. Still on levo at 0.24mcg/kg/min, started flow track, gave 250ml/ NS bolus, eating diabetic diet, medications given as per MAR. Pt progressing toward goals. No CRRT today will re-evaluate tomorrow morning labs for CRRT/HD needs. Will continue to monitor. See flowsheets for full assessment and VS info.

## 2019-03-05 NOTE — PLAN OF CARE
Problem: Adult Inpatient Plan of Care  Goal: Plan of Care Review  Outcome: Ongoing (interventions implemented as appropriate)  POC reviewed with pt at 0400. Pt verbalized understanding. Questions and concerns addressed. Levo gtt. CRRT on overnight.  Will continue to monitor. See flowsheets for full assessment and VS info

## 2019-03-05 NOTE — SUBJECTIVE & OBJECTIVE
Interval History:      Review of Systems   Constitutional: Negative for fever.   Eyes: Negative for photophobia and visual disturbance.   Respiratory: Negative for chest tightness and shortness of breath.    Cardiovascular: Negative for chest pain.   Gastrointestinal: Negative for abdominal pain.   Genitourinary: Negative for flank pain.   Musculoskeletal: Positive for back pain and gait problem. Negative for neck pain.   Neurological: Positive for weakness. Negative for speech difficulty and headaches.       Objective:     Vitals:  Temp: 98.3 °F (36.8 °C)  Pulse: 89  Rhythm: sinus bradycardia  BP: (!) 165/70  MAP (mmHg): 100  CVP (mean): 12 mmHg  CI (L/min/m2): 6.5 L/min/m2  SVI: 76  SVV: 11 %  Resp: 15  SpO2: 99 %  O2 Device (Oxygen Therapy): nasal cannula w/ humidification    Temp  Min: 97.6 °F (36.4 °C)  Max: 98.3 °F (36.8 °C)  Pulse  Min: 63  Max: 93  BP  Min: 134/59  Max: 184/78  MAP (mmHg)  Min: 85  Max: 112  CVP (mean)  Min: 12 mmHg  Max: 19 mmHg  CI (L/min/m2)  Min: 4.8 L/min/m2  Max: 7.3 L/min/m2  SVI  Min: 54  Max: 86  SVV  Min: 5 %  Max: 14 %  Resp  Min: 8  Max: 29  SpO2  Min: 98 %  Max: 100 %    03/04 0701 - 03/05 0700  In: 5720.1 [I.V.:5600.1]  Out: 5485 [Drains:130]   Unmeasured Output  Stool Occurrence: 1       Physical Exam   Constitutional: He appears well-nourished.   Morbidly obese    HENT:   Head: Normocephalic and atraumatic.   Cardiovascular: Normal rate and regular rhythm.   Pulmonary/Chest: Effort normal and breath sounds normal.   Abdominal: Soft. Normal appearance.   Neurological:   AAOx4  E4V5V6 GCS (15)   PERRLA   EOMi   MONREAL spontaneously            Medications:  Continuous  norepinephrine bitartrate-D5W Last Rate: 0.24 mcg/kg/min (03/05/19 1501)   Scheduled  sodium chloride 0.9%  Once   diclofenac sodium  Daily   epoetin shefali (PROCRIT) injection 7,000 Units Every Mon, Wed, Fri   gabapentin 300 mg QHS   insulin aspart U-100 5 Units Q6H   midodrine 10 mg TID   pantoprazole 40 mg BID    polyethylene glycol 17 g Daily   pravastatin 40 mg Daily   senna-docusate 8.6-50 mg 1 tablet BID   PRN  sodium chloride 0.9%  PRN   acetaminophen 500 mg Q6H PRN   dextrose 50% 12.5 g PRN   glucagon (human recombinant) 1 mg PRN   glucose 16 g PRN   insulin aspart U-100 1-10 Units Q6H PRN   ondansetron 4 mg Q6H PRN   oxyCODONE 5 mg Q6H PRN     Today I personally reviewed pertinent medications, lines/drains/airways, imaging, cardiology results, laboratory results, microbiology results, notably:    Diet  [facility]; 2000 Calorie  Diet diabetic [facility]; 2000 Calorie

## 2019-03-05 NOTE — ASSESSMENT & PLAN NOTE
POD 2 s/p cervical laminectomy C3-C6, C6-C7 with medial discectomy  Hypotension during case  Maintain MAP > 75, norepineprhine

## 2019-03-05 NOTE — PROGRESS NOTES
[facility]  Neurosurgery  Progress Note    Subjective:     History of Present Illness: 57 yo male with a PMH of ESRD with dialysis MWF (last on weds), DM, presenting as transfer from [facility] as admission to neurosurgery for evaluation after progressive course of unsteady gait as well as bilateral upper extremity weakness for the last several months.  Mr. Farmer has had wobbly gait since this past September, at which point he recalls falling and has been at 80-90% of his normal function.  He has been able to walk without assist, however not for very long distances.  He states that since last Sunday he has felt progression of his symptoms and now feels it would be difficult to stand up to ambulate at all.  He also endorses a pulled muscle of his LLE that is limiting his mobility.  His UE symptoms have been present for about 3 months including not writing as well as he is used to.  HE is able to write numbers, but not always his name.  He has been having difficulty using a fork to eat over the past month.  No sensory disturbance noted as well as no neck or back pain and no pain from the neck radiating into the hands.  He has intermittently been taking ASA 81 mg qdaily. He denies recent falls or trauma to the neck.  No other blood thinning medication.  MRI at OSH on 2/27 revealed significant cervical stenosis.      =    Post-Op Info:  Procedure(s) (LRB):  SPINE, CERVICAL, POSTERIOR APPROACH  LAMINECTOMY C3-C6; C6-C7; WITH MEDIAL DISCECTOMY (N/A)   2 Days Post-Op     Interval History: NAEON. Exam stable.     Medications:  Continuous Infusions:   sodium chloride 0.9% Stopped (03/05/19 0350)    norepinephrine bitartrate-D5W 0.24 mcg/kg/min (03/05/19 0901)     Scheduled Meds:   sodium chloride 0.9%   Intravenous Once    chlorhexidine  15 mL Mouth/Throat BID    diclofenac sodium   Topical (Top) Daily    epoetin shefali (PROCRIT) injection  7,000 Units Intravenous Every Mon, Wed, Fri     gabapentin  300 mg Oral QHS    midodrine  10 mg Per NG tube TID    pantoprazole  40 mg Per NG tube BID    polyethylene glycol  17 g Oral Daily    pravastatin  40 mg Oral Daily    senna-docusate 8.6-50 mg  1 tablet Oral BID     PRN Meds:sodium chloride 0.9%, acetaminophen, [COMPLETED] calcium gluconate IVPB **AND** calcium gluconate IVPB, dextrose 50%, glucagon (human recombinant), glucose, insulin aspart U-100, magnesium sulfate IVPB, ondansetron, oxyCODONE       Objective:     Weight: 134.3 kg (296 lb 1.2 oz)  Body mass index is 46.37 kg/m².  Vital Signs (Most Recent):  Temp: 98.1 °F (36.7 °C) (03/05/19 0701)  Pulse: 83 (03/05/19 0801)  Resp: (!) 8 (03/05/19 0801)  BP: (!) 174/74 (03/05/19 0801)  SpO2: 100 % (03/05/19 0801) Vital Signs (24h Range):  Temp:  [95.7 °F (35.4 °C)-98.1 °F (36.7 °C)] 98.1 °F (36.7 °C)  Pulse:  [63-85] 83  Resp:  [8-25] 8  SpO2:  [98 %-100 %] 100 %  BP: (117-174)/(56-74) 174/74  Arterial Line BP: (129-168)/(39-53) 154/50     Date 03/05/19 0700 - 03/06/19 0659   Shift 7943-2385 7805-0817 9503-0042 24 Hour Total   INTAKE   I.V.(mL/kg) 122.2(0.9)   122.2(0.9)   Shift Total(mL/kg) 122.2(0.9)   122.2(0.9)   OUTPUT   Shift Total(mL/kg)       Weight (kg) 134.3 134.3 134.3 134.3              Vent Mode: Spont  Oxygen Concentration (%):  [] 100  Resp Rate Total:  [11 br/min-71 br/min] 71 br/min  PEEP/CPAP:  [5 cmH20] 5 cmH20  Mean Airway Pressure:  [4 cmH20-6.3 cmH20] 4 cmH20         Closed/Suction Drain 03/03/19 1431 Posterior Neck Accordion 10 Fr. (Active)   Site Description Unable to view 3/4/2019 11:01 AM   Drainage Sanguineous 3/4/2019  3:25 AM   Status To bulb suction 3/4/2019 11:01 AM   Output (mL) 110 mL 3/3/2019  5:40 PM            NG/OG Tube 03/03/19 1658 Left mouth (Active)   Placement Check placement verified by x-ray;placement verified by aspirate characteristics;placement verified by distal tube length measurement 3/4/2019 11:01 AM   Tolerance no signs/symptoms of discomfort  3/4/2019 11:01 AM   Securement secured to commercial device 3/4/2019 11:01 AM   Clamp Status/Tolerance clamped 3/4/2019 11:01 AM   Insertion Site Appearance no redness, warmth, tenderness, skin breakdown, drainage 3/4/2019 11:01 AM   Flush/Irrigation flushed w/;water;no resistance met 3/4/2019 11:01 AM   Feeding Action feeding held 3/4/2019 11:01 AM   Intake (mL) 120 mL 3/4/2019  9:01 AM   Tube Output(mL)(Include Discarded Residual) 160 mL 3/3/2019  6:00 PM       Male External Urinary Catheter 03/03/19 1648 Medium (Active)   Collection Container Urimeter 3/4/2019  3:25 AM   Securement Method secured to top of thigh w/ adhesive device 3/4/2019  3:25 AM   Skin no redness;no breakdown 3/4/2019  3:25 AM   Tolerance no signs/symptoms of discomfort 3/4/2019  3:25 AM   Output (mL) 0 mL 3/3/2019  6:00 PM            Hemodialysis AV Fistula Left forearm (Active)   Needle Size 15ga 3/1/2019  4:19 PM   Site Assessment Intact 3/4/2019  3:25 AM   Patency Present;Thrill;Bruit 3/4/2019  3:25 AM   Status Deaccessed 3/4/2019  3:25 AM   Flows Good 2/28/2019  8:20 AM   Dressing Status Clean;Dry;Intact 3/2/2019  3:01 PM   Site Condition No complications 3/4/2019  3:25 AM   Dressing Gauze 3/4/2019  3:25 AM            Trialysis (Dialysis) Catheter 03/04/19 0250 left internal jugular (Active)   IV Device Securement sutures 3/4/2019 11:01 AM   Patency/Care flushed w/o difficulty;infusing 3/4/2019 11:01 AM   Site Assessment Clean;Dry;Intact;No redness;No swelling 3/4/2019 11:01 AM   Status Accessed 3/4/2019 11:01 AM   Flows Good 3/4/2019 11:01 AM   Dressing Intervention Dressing reinforced 3/4/2019 11:01 AM   Dressing Status Biopatch in place;Clean;Dry;Intact 3/4/2019 11:01 AM   Dressing Change Due 03/11/19 3/4/2019 11:01 AM   Daily Line Review Performed 3/4/2019 11:01 AM       Neurosurgery Physical Exam     E4VTM6  AO x3  CN II -XII grossly intact  4-/5 BUE  5/5 BLE  SILT  +Sanford bilaterally w 3+ UE reflexes  2+ LE reflexes, no clonus or  Babinski    Significant Labs:  Recent Labs   Lab 03/04/19  2235 03/05/19  0112 03/05/19  0509   * 158*  158* 183*  183*   * 134*  134* 132*  132*   K 4.5 4.4  4.4  4.4 4.4  4.4  4.4    101  101 99  99   CO2 23 23  23 24  24   BUN 39* 31*  31* 27*  27*   CREATININE 5.3* 4.5*  4.5* 4.2*  4.2*   CALCIUM 9.2 8.9  8.9 8.9  8.9   MG 2.3 2.4 2.3     Recent Labs   Lab 03/03/19  1718 03/04/19  0325 03/04/19  0359 03/05/19  0112   WBC 10.96  --  19.52* 20.31*   HGB 9.0*  --  9.0* 7.7*   HCT 28.3* 28* 27.8* 24.3*   PLT 95*  --  127* 117*     Recent Labs   Lab 03/04/19  1250   INR 1.0   APTT 23.4     Microbiology Results (last 7 days)     ** No results found for the last 168 hours. **          Significant Diagnostics:    Reviewed.    Review of Systems        Assessment/Plan:     * S/P laminectomy     58 M with progressive cervical myelopathy and stenosis from C3-6 with myelomalacia. S/p code for hypovolemia during HD. Stable this morning and labs WNL. He stepped up to ICU for further care and work up.     -underwent C3-6 posterior decompression, will need fusion in future.  -MAP goals >75 mmHg, remains on pressors.   -Pain control PRN  -HD per nephrology  -Daily PT/OT  -Medical management per NCC.  -Please call Neurosurgery with any questions or exam changes            Cervical myelopathy    58 M with progressive cervical myelopathy and stenosis from C3-6 with myelomalacia. S/p code for hypovolemia during HD. Sable this morning and labs WNL. He stepped up to ICU for further care and work up.    -Stepdown and cleared by MICU for surgery  -Will plan for C3-6 posterior decompression/fusio  -Consent obtained  -Pain control PRN  -Keep NPO  -HD per nephrology  -Daily PT/OT         Usman Fatima MD  Neurosurgery  [facility]

## 2019-03-05 NOTE — ASSESSMENT & PLAN NOTE
58 M with progressive cervical myelopathy and stenosis from C3-6 with myelomalacia. S/p code for hypovolemia during HD. Stable this morning and labs WNL. He stepped up to ICU for further care and work up.     -underwent C3-6 posterior decompression, will need fusion in future.  -MAP goals >75 mmHg, remains on pressors.   -Pain control PRN  -HD per nephrology  -Daily PT/OT  -Medical management per NCC.  -Please call Neurosurgery with any questions or exam changes

## 2019-03-05 NOTE — PROGRESS NOTES
[facility]  Neurocritical Care  Progress Note    Admit Date: 2/28/2019  Service Date: 03/05/2019  Length of Stay: 5    Subjective:     Chief Complaint: S/P laminectomy    History of Present Illness: The patient  is a 58 y.o. male with PMHx of of DMII, ESRD on HD MWF(last dialysis 3/1), HLD, HTN and MIKE admitted to [facility] s/p cervical laminectomy C3-C6, C6-C7 w/medial discectomy. Per chart review,  Mr. Farmer has had wobbly gait since this past September, at which point he recalls falling and has been at 80-90% of his normal function.  He has been able to walk without assist, however not for very long distances.  He states that since last Sunday he has felt progression of his symptoms and now feels it would be difficult to stand up to ambulate at all. His UE symptoms have been present for about 3 months including not writing as well as he is used to.MRI cervical spine (2/27)at OSH on 2/27 revealed severe disc space narrowing C3-C4 and C5-C6 level.  Patient admitted to [facility] for close monitoring and higher level of care.            Hospital Course: 3/3: pt admitted to post-up, MAP goal >75, on levo  3/4: extubated   3/5:Daily weights, 5 units Q 6hrs Insulin apart added, diabetic diet started      Interval History:      Review of Systems   Constitutional: Negative for fever.   Eyes: Negative for photophobia and visual disturbance.   Respiratory: Negative for chest tightness and shortness of breath.    Cardiovascular: Negative for chest pain.   Gastrointestinal: Negative for abdominal pain.   Genitourinary: Negative for flank pain.   Musculoskeletal: Positive for back pain and gait problem. Negative for neck pain.   Neurological: Positive for weakness. Negative for speech difficulty and headaches.       Objective:     Vitals:  Temp: 98.3 °F (36.8 °C)  Pulse: 89  Rhythm: sinus bradycardia  BP: (!) 165/70  MAP (mmHg): 100  CVP (mean): 12 mmHg  CI (L/min/m2): 6.5 L/min/m2  SVI: 76  SVV: 11 %  Resp:  15  SpO2: 99 %  O2 Device (Oxygen Therapy): nasal cannula w/ humidification    Temp  Min: 97.6 °F (36.4 °C)  Max: 98.3 °F (36.8 °C)  Pulse  Min: 63  Max: 93  BP  Min: 134/59  Max: 184/78  MAP (mmHg)  Min: 85  Max: 112  CVP (mean)  Min: 12 mmHg  Max: 19 mmHg  CI (L/min/m2)  Min: 4.8 L/min/m2  Max: 7.3 L/min/m2  SVI  Min: 54  Max: 86  SVV  Min: 5 %  Max: 14 %  Resp  Min: 8  Max: 29  SpO2  Min: 98 %  Max: 100 %    03/04 0701 - 03/05 0700  In: 5720.1 [I.V.:5600.1]  Out: 5485 [Drains:130]   Unmeasured Output  Stool Occurrence: 1       Physical Exam   Constitutional: He appears well-nourished.   Morbidly obese    HENT:   Head: Normocephalic and atraumatic.   Cardiovascular: Normal rate and regular rhythm.   Pulmonary/Chest: Effort normal and breath sounds normal.   Abdominal: Soft. Normal appearance.   Neurological:   AAOx4  E4V5V6 GCS (15)   PERRLA   EOMi   MONREAL spontaneously            Medications:  Continuous  norepinephrine bitartrate-D5W Last Rate: 0.24 mcg/kg/min (03/05/19 1501)   Scheduled  sodium chloride 0.9%  Once   diclofenac sodium  Daily   epoetin shefali (PROCRIT) injection 7,000 Units Every Mon, Wed, Fri   gabapentin 300 mg QHS   insulin aspart U-100 5 Units Q6H   midodrine 10 mg TID   pantoprazole 40 mg BID   polyethylene glycol 17 g Daily   pravastatin 40 mg Daily   senna-docusate 8.6-50 mg 1 tablet BID   PRN  sodium chloride 0.9%  PRN   acetaminophen 500 mg Q6H PRN   dextrose 50% 12.5 g PRN   glucagon (human recombinant) 1 mg PRN   glucose 16 g PRN   insulin aspart U-100 1-10 Units Q6H PRN   ondansetron 4 mg Q6H PRN   oxyCODONE 5 mg Q6H PRN     Today I personally reviewed pertinent medications, lines/drains/airways, imaging, cardiology results, laboratory results, microbiology results, notably:    Diet  Diet diabetic [facility]; 2000 Calorie  Diet diabetic [facility]; 2000 Calorie        Assessment/Plan:     Neuro   Multifactorial peripheral neuropathy    -on Gabapentin 300 mg          Cardiac/Vascular   Hypotension    --MAP > 75  --on norepinephrine   --Resume home midodrine      Hyperlipidemia    -continue statin     Renal/   Hyponatremia    --Na 133  --NS @75  --Follow CMP     Hyperkalemia    See ESRD       ESRD on dialysis    -ESRD on HD MWF   -Required SLED overnight  -K reached 6.3 prior to shift and RRT     Endocrine   Morbid obesity    --BMI 46  --diabetic,cardiac diet resumed   -- 1800 calorie diet recs by Nutrition         Diabetes mellitus, type 2    --Hg A1C (2/27) 5.0   --Scheduled Detemir Qhs  --PRN SSI  --added 5 U Aspart q 6 hrs      Orthopedic   * S/P laminectomy    POD 2 s/p cervical laminectomy C3-C6, C6-C7 with medial discectomy  Hypotension during case  Maintain MAP > 75, norepineprhine                The patient is being Prophylaxed for:  Venous Thromboembolism with: Mechanical  Stress Ulcer with: PPI  Ventilator Pneumonia with: none    Activity Orders          None        Full Code  Level 3  I have spent 35 min with this patient, with over 50% of this time spent coordinating care and speaking with the family    Ayad Rogers PA-C  Neurocritical Care  [facility]

## 2019-03-06 LAB
ABO + RH BLD: NORMAL
ALBUMIN SERPL BCP-MCNC: 2.3 G/DL
ALBUMIN SERPL BCP-MCNC: 2.3 G/DL
ALBUMIN SERPL BCP-MCNC: 2.4 G/DL
ALBUMIN SERPL BCP-MCNC: 2.6 G/DL
ALBUMIN SERPL BCP-MCNC: 2.8 G/DL
ALP SERPL-CCNC: 129 U/L
ALT SERPL W/O P-5'-P-CCNC: 17 U/L
ANION GAP SERPL CALC-SCNC: 10 MMOL/L
ANION GAP SERPL CALC-SCNC: 9 MMOL/L
AST SERPL-CCNC: 26 U/L
BASOPHILS # BLD AUTO: 0.04 K/UL
BASOPHILS # BLD AUTO: 0.04 K/UL
BASOPHILS NFR BLD: 0.3 %
BASOPHILS NFR BLD: 0.3 %
BILIRUB SERPL-MCNC: 0.9 MG/DL
BLD GP AB SCN CELLS X3 SERPL QL: NORMAL
BLD PROD TYP BPU: NORMAL
BLD PROD TYP BPU: NORMAL
BLOOD GROUP ANTIBODIES SERPL: NORMAL
BLOOD UNIT EXPIRATION DATE: NORMAL
BLOOD UNIT EXPIRATION DATE: NORMAL
BLOOD UNIT TYPE CODE: 9500
BLOOD UNIT TYPE CODE: 9500
BLOOD UNIT TYPE: NORMAL
BLOOD UNIT TYPE: NORMAL
BUN SERPL-MCNC: 36 MG/DL
BUN SERPL-MCNC: 41 MG/DL
BUN SERPL-MCNC: 45 MG/DL
BUN SERPL-MCNC: 45 MG/DL
CALCIUM SERPL-MCNC: 8.5 MG/DL
CALCIUM SERPL-MCNC: 8.7 MG/DL
CALCIUM SERPL-MCNC: 9.1 MG/DL
CHLORIDE SERPL-SCNC: 101 MMOL/L
CHLORIDE SERPL-SCNC: 103 MMOL/L
CHLORIDE SERPL-SCNC: 99 MMOL/L
CO2 SERPL-SCNC: 22 MMOL/L
CO2 SERPL-SCNC: 24 MMOL/L
CODING SYSTEM: NORMAL
CODING SYSTEM: NORMAL
CORTIS SERPL-MCNC: 14 UG/DL
CORTIS SERPL-MCNC: 16.5 UG/DL
CORTIS SERPL-MCNC: 6.1 UG/DL
CREAT SERPL-MCNC: 5.5 MG/DL
CREAT SERPL-MCNC: 5.8 MG/DL
CREAT SERPL-MCNC: 6.4 MG/DL
CREAT SERPL-MCNC: 6.7 MG/DL
CREAT SERPL-MCNC: 6.7 MG/DL
DIFFERENTIAL METHOD: ABNORMAL
DIFFERENTIAL METHOD: ABNORMAL
DISPENSE STATUS: NORMAL
DISPENSE STATUS: NORMAL
EOSINOPHIL # BLD AUTO: 0.1 K/UL
EOSINOPHIL # BLD AUTO: 0.2 K/UL
EOSINOPHIL NFR BLD: 0.8 %
EOSINOPHIL NFR BLD: 1 %
ERYTHROCYTE [DISTWIDTH] IN BLOOD BY AUTOMATED COUNT: 15.7 %
ERYTHROCYTE [DISTWIDTH] IN BLOOD BY AUTOMATED COUNT: 15.9 %
EST. GFR  (AFRICAN AMERICAN): 10.3 ML/MIN/1.73 M^2
EST. GFR  (AFRICAN AMERICAN): 11.6 ML/MIN/1.73 M^2
EST. GFR  (AFRICAN AMERICAN): 12.3 ML/MIN/1.73 M^2
EST. GFR  (AFRICAN AMERICAN): 9.7 ML/MIN/1.73 M^2
EST. GFR  (AFRICAN AMERICAN): 9.7 ML/MIN/1.73 M^2
EST. GFR  (NON AFRICAN AMERICAN): 10 ML/MIN/1.73 M^2
EST. GFR  (NON AFRICAN AMERICAN): 10.7 ML/MIN/1.73 M^2
EST. GFR  (NON AFRICAN AMERICAN): 8.4 ML/MIN/1.73 M^2
EST. GFR  (NON AFRICAN AMERICAN): 8.4 ML/MIN/1.73 M^2
EST. GFR  (NON AFRICAN AMERICAN): 8.9 ML/MIN/1.73 M^2
GLUCOSE SERPL-MCNC: 193 MG/DL
GLUCOSE SERPL-MCNC: 195 MG/DL
GLUCOSE SERPL-MCNC: 195 MG/DL
GLUCOSE SERPL-MCNC: 196 MG/DL
GLUCOSE SERPL-MCNC: 223 MG/DL
HCT VFR BLD AUTO: 23.6 %
HCT VFR BLD AUTO: 25 %
HGB BLD-MCNC: 7.4 G/DL
HGB BLD-MCNC: 8.1 G/DL
IMM GRANULOCYTES # BLD AUTO: 0.07 K/UL
IMM GRANULOCYTES # BLD AUTO: 0.11 K/UL
IMM GRANULOCYTES NFR BLD AUTO: 0.4 %
IMM GRANULOCYTES NFR BLD AUTO: 0.7 %
INR PPP: 1.1
LYMPHOCYTES # BLD AUTO: 1.8 K/UL
LYMPHOCYTES # BLD AUTO: 3 K/UL
LYMPHOCYTES NFR BLD: 11.5 %
LYMPHOCYTES NFR BLD: 18.9 %
MAGNESIUM SERPL-MCNC: 2.1 MG/DL
MAGNESIUM SERPL-MCNC: 2.3 MG/DL
MAGNESIUM SERPL-MCNC: 2.3 MG/DL
MAGNESIUM SERPL-MCNC: 2.6 MG/DL
MAGNESIUM SERPL-MCNC: 2.6 MG/DL
MCH RBC QN AUTO: 32.8 PG
MCH RBC QN AUTO: 33.5 PG
MCHC RBC AUTO-ENTMCNC: 31.4 G/DL
MCHC RBC AUTO-ENTMCNC: 32.4 G/DL
MCV RBC AUTO: 101 FL
MCV RBC AUTO: 107 FL
MONOCYTES # BLD AUTO: 1.8 K/UL
MONOCYTES # BLD AUTO: 2.3 K/UL
MONOCYTES NFR BLD: 11.2 %
MONOCYTES NFR BLD: 14.4 %
NEUTROPHILS # BLD AUTO: 10.2 K/UL
NEUTROPHILS # BLD AUTO: 11.8 K/UL
NEUTROPHILS NFR BLD: 65 %
NEUTROPHILS NFR BLD: 75.5 %
NRBC BLD-RTO: 0 /100 WBC
NRBC BLD-RTO: 0 /100 WBC
PHOSPHATE SERPL-MCNC: 4.4 MG/DL
PHOSPHATE SERPL-MCNC: 5.4 MG/DL
PHOSPHATE SERPL-MCNC: 5.4 MG/DL
PHOSPHATE SERPL-MCNC: 5.9 MG/DL
PHOSPHATE SERPL-MCNC: 6 MG/DL
PLATELET # BLD AUTO: 116 K/UL
PLATELET # BLD AUTO: 83 K/UL
PMV BLD AUTO: 10.3 FL
PMV BLD AUTO: 10.6 FL
POCT GLUCOSE: 196 MG/DL (ref 70–110)
POCT GLUCOSE: 202 MG/DL (ref 70–110)
POCT GLUCOSE: 225 MG/DL (ref 70–110)
POCT GLUCOSE: 235 MG/DL (ref 70–110)
POCT GLUCOSE: 255 MG/DL (ref 70–110)
POCT GLUCOSE: 265 MG/DL (ref 70–110)
POTASSIUM SERPL-SCNC: 3.7 MMOL/L
POTASSIUM SERPL-SCNC: 4.1 MMOL/L
POTASSIUM SERPL-SCNC: 4.6 MMOL/L
POTASSIUM SERPL-SCNC: 5.3 MMOL/L
PROT SERPL-MCNC: 5.8 G/DL
PROTHROMBIN TIME: 10.9 SEC
RBC # BLD AUTO: 2.21 M/UL
RBC # BLD AUTO: 2.47 M/UL
SODIUM SERPL-SCNC: 131 MMOL/L
SODIUM SERPL-SCNC: 134 MMOL/L
TRANS ERYTHROCYTES VOL PATIENT: NORMAL ML
TRANS ERYTHROCYTES VOL PATIENT: NORMAL ML
VIT B2 SERPL-MCNC: 60 MCG/L (ref 1–19)
WBC # BLD AUTO: 15.56 K/UL
WBC # BLD AUTO: 15.72 K/UL

## 2019-03-06 PROCEDURE — 84100 ASSAY OF PHOSPHORUS: CPT

## 2019-03-06 PROCEDURE — 25000003 PHARM REV CODE 250: Performed by: STUDENT IN AN ORGANIZED HEALTH CARE EDUCATION/TRAINING PROGRAM

## 2019-03-06 PROCEDURE — 86870 RBC ANTIBODY IDENTIFICATION: CPT

## 2019-03-06 PROCEDURE — 25000003 PHARM REV CODE 250: Performed by: PSYCHIATRY & NEUROLOGY

## 2019-03-06 PROCEDURE — 82533 TOTAL CORTISOL: CPT

## 2019-03-06 PROCEDURE — 83735 ASSAY OF MAGNESIUM: CPT | Mod: 91

## 2019-03-06 PROCEDURE — 63600175 PHARM REV CODE 636 W HCPCS: Performed by: PSYCHIATRY & NEUROLOGY

## 2019-03-06 PROCEDURE — 85025 COMPLETE CBC W/AUTO DIFF WBC: CPT

## 2019-03-06 PROCEDURE — 25000003 PHARM REV CODE 250: Performed by: NURSE PRACTITIONER

## 2019-03-06 PROCEDURE — 99223 PR INITIAL HOSPITAL CARE,LEVL III: ICD-10-PCS | Mod: ,,, | Performed by: NURSE PRACTITIONER

## 2019-03-06 PROCEDURE — P9021 RED BLOOD CELLS UNIT: HCPCS

## 2019-03-06 PROCEDURE — 63600175 PHARM REV CODE 636 W HCPCS: Mod: JG | Performed by: NURSE PRACTITIONER

## 2019-03-06 PROCEDURE — 85610 PROTHROMBIN TIME: CPT

## 2019-03-06 PROCEDURE — 63600175 PHARM REV CODE 636 W HCPCS: Performed by: NURSE PRACTITIONER

## 2019-03-06 PROCEDURE — 20000000 HC ICU ROOM

## 2019-03-06 PROCEDURE — 86901 BLOOD TYPING SEROLOGIC RH(D): CPT

## 2019-03-06 PROCEDURE — 99291 CRITICAL CARE FIRST HOUR: CPT | Mod: GC,,, | Performed by: ANESTHESIOLOGY

## 2019-03-06 PROCEDURE — 90945 PR DIALYSIS, NOT HEMO, 1 EVAL: ICD-10-PCS | Mod: ,,, | Performed by: NURSE PRACTITIONER

## 2019-03-06 PROCEDURE — 99223 1ST HOSP IP/OBS HIGH 75: CPT | Mod: ,,, | Performed by: NURSE PRACTITIONER

## 2019-03-06 PROCEDURE — 99291 PR CRITICAL CARE, E/M 30-74 MINUTES: ICD-10-PCS | Mod: GC,,, | Performed by: ANESTHESIOLOGY

## 2019-03-06 PROCEDURE — 80053 COMPREHEN METABOLIC PANEL: CPT

## 2019-03-06 PROCEDURE — 90945 DIALYSIS ONE EVALUATION: CPT

## 2019-03-06 PROCEDURE — 90945 DIALYSIS ONE EVALUATION: CPT | Mod: ,,, | Performed by: NURSE PRACTITIONER

## 2019-03-06 PROCEDURE — S5571 INSULIN DISPOS PEN 3 ML: HCPCS | Performed by: NURSE PRACTITIONER

## 2019-03-06 PROCEDURE — 63600175 PHARM REV CODE 636 W HCPCS: Performed by: STUDENT IN AN ORGANIZED HEALTH CARE EDUCATION/TRAINING PROGRAM

## 2019-03-06 PROCEDURE — 27000221 HC OXYGEN, UP TO 24 HOURS

## 2019-03-06 PROCEDURE — 36430 TRANSFUSION BLD/BLD COMPNT: CPT

## 2019-03-06 PROCEDURE — 80069 RENAL FUNCTION PANEL: CPT

## 2019-03-06 PROCEDURE — 80069 RENAL FUNCTION PANEL: CPT | Mod: 91

## 2019-03-06 PROCEDURE — 83735 ASSAY OF MAGNESIUM: CPT

## 2019-03-06 PROCEDURE — 94761 N-INVAS EAR/PLS OXIMETRY MLT: CPT

## 2019-03-06 PROCEDURE — 86922 COMPATIBILITY TEST ANTIGLOB: CPT

## 2019-03-06 RX ORDER — GLUCAGON 1 MG
1 KIT INJECTION
Status: DISCONTINUED | OUTPATIENT
Start: 2019-03-06 | End: 2019-03-20 | Stop reason: HOSPADM

## 2019-03-06 RX ORDER — MAGNESIUM SULFATE HEPTAHYDRATE 40 MG/ML
2 INJECTION, SOLUTION INTRAVENOUS
Status: ACTIVE | OUTPATIENT
Start: 2019-03-06 | End: 2019-03-07

## 2019-03-06 RX ORDER — IBUPROFEN 200 MG
16 TABLET ORAL
Status: DISCONTINUED | OUTPATIENT
Start: 2019-03-06 | End: 2019-03-06

## 2019-03-06 RX ORDER — HYDROCODONE BITARTRATE AND ACETAMINOPHEN 500; 5 MG/1; MG/1
TABLET ORAL
Status: DISCONTINUED | OUTPATIENT
Start: 2019-03-06 | End: 2019-03-07

## 2019-03-06 RX ORDER — HYDROCODONE BITARTRATE AND ACETAMINOPHEN 500; 5 MG/1; MG/1
TABLET ORAL CONTINUOUS
Status: ACTIVE | OUTPATIENT
Start: 2019-03-06 | End: 2019-03-07

## 2019-03-06 RX ORDER — INSULIN ASPART 100 [IU]/ML
6 INJECTION, SOLUTION INTRAVENOUS; SUBCUTANEOUS
Status: DISCONTINUED | OUTPATIENT
Start: 2019-03-06 | End: 2019-03-07

## 2019-03-06 RX ORDER — INSULIN ASPART 100 [IU]/ML
1-10 INJECTION, SOLUTION INTRAVENOUS; SUBCUTANEOUS
Status: DISCONTINUED | OUTPATIENT
Start: 2019-03-06 | End: 2019-03-06

## 2019-03-06 RX ORDER — IBUPROFEN 200 MG
24 TABLET ORAL
Status: DISCONTINUED | OUTPATIENT
Start: 2019-03-06 | End: 2019-03-20 | Stop reason: HOSPADM

## 2019-03-06 RX ORDER — COSYNTROPIN 0.25 MG/ML
0.25 INJECTION, POWDER, FOR SOLUTION INTRAMUSCULAR; INTRAVENOUS ONCE
Status: COMPLETED | OUTPATIENT
Start: 2019-03-06 | End: 2019-03-06

## 2019-03-06 RX ORDER — IBUPROFEN 200 MG
16 TABLET ORAL
Status: DISCONTINUED | OUTPATIENT
Start: 2019-03-06 | End: 2019-03-20 | Stop reason: HOSPADM

## 2019-03-06 RX ORDER — GLUCAGON 1 MG
1 KIT INJECTION
Status: DISCONTINUED | OUTPATIENT
Start: 2019-03-06 | End: 2019-03-06

## 2019-03-06 RX ORDER — MIDODRINE HYDROCHLORIDE 5 MG/1
10 TABLET ORAL 3 TIMES DAILY
Status: DISCONTINUED | OUTPATIENT
Start: 2019-03-06 | End: 2019-03-08

## 2019-03-06 RX ORDER — INSULIN ASPART 100 [IU]/ML
5 INJECTION, SOLUTION INTRAVENOUS; SUBCUTANEOUS
Status: DISCONTINUED | OUTPATIENT
Start: 2019-03-06 | End: 2019-03-06

## 2019-03-06 RX ORDER — HEPARIN SODIUM 5000 [USP'U]/ML
5000 INJECTION, SOLUTION INTRAVENOUS; SUBCUTANEOUS EVERY 8 HOURS
Status: DISCONTINUED | OUTPATIENT
Start: 2019-03-06 | End: 2019-03-07

## 2019-03-06 RX ORDER — IBUPROFEN 200 MG
24 TABLET ORAL
Status: DISCONTINUED | OUTPATIENT
Start: 2019-03-06 | End: 2019-03-06

## 2019-03-06 RX ORDER — INSULIN ASPART 100 [IU]/ML
0-5 INJECTION, SOLUTION INTRAVENOUS; SUBCUTANEOUS
Status: DISCONTINUED | OUTPATIENT
Start: 2019-03-06 | End: 2019-03-20 | Stop reason: HOSPADM

## 2019-03-06 RX ADMIN — DICLOFENAC: 10 GEL TOPICAL at 09:03

## 2019-03-06 RX ADMIN — POLYETHYLENE GLYCOL 3350 17 G: 17 POWDER, FOR SOLUTION ORAL at 09:03

## 2019-03-06 RX ADMIN — NOREPINEPHRINE BITARTRATE 0.2 MCG/KG/MIN: 1 INJECTION, SOLUTION, CONCENTRATE INTRAVENOUS at 09:03

## 2019-03-06 RX ADMIN — INSULIN DETEMIR 18 UNITS: 100 INJECTION, SOLUTION SUBCUTANEOUS at 09:03

## 2019-03-06 RX ADMIN — PANTOPRAZOLE SODIUM 40 MG: 40 GRANULE, DELAYED RELEASE ORAL at 09:03

## 2019-03-06 RX ADMIN — ERYTHROPOIETIN 7000 UNITS: 4000 INJECTION, SOLUTION INTRAVENOUS; SUBCUTANEOUS at 12:03

## 2019-03-06 RX ADMIN — GABAPENTIN 300 MG: 300 CAPSULE ORAL at 09:03

## 2019-03-06 RX ADMIN — INSULIN ASPART 6 UNITS: 100 INJECTION, SOLUTION INTRAVENOUS; SUBCUTANEOUS at 12:03

## 2019-03-06 RX ADMIN — MIDODRINE HYDROCHLORIDE 10 MG: 5 TABLET ORAL at 03:03

## 2019-03-06 RX ADMIN — STANDARDIZED SENNA CONCENTRATE AND DOCUSATE SODIUM 1 TABLET: 8.6; 5 TABLET, FILM COATED ORAL at 09:03

## 2019-03-06 RX ADMIN — INSULIN ASPART 3 UNITS: 100 INJECTION, SOLUTION INTRAVENOUS; SUBCUTANEOUS at 12:03

## 2019-03-06 RX ADMIN — INSULIN ASPART 5 UNITS: 100 INJECTION, SOLUTION INTRAVENOUS; SUBCUTANEOUS at 05:03

## 2019-03-06 RX ADMIN — MIDODRINE HYDROCHLORIDE 10 MG: 5 TABLET ORAL at 09:03

## 2019-03-06 RX ADMIN — INSULIN ASPART 2 UNITS: 100 INJECTION, SOLUTION INTRAVENOUS; SUBCUTANEOUS at 06:03

## 2019-03-06 RX ADMIN — NOREPINEPHRINE BITARTRATE 0.3 MCG/KG/MIN: 1 INJECTION, SOLUTION, CONCENTRATE INTRAVENOUS at 12:03

## 2019-03-06 RX ADMIN — INSULIN ASPART 4 UNITS: 100 INJECTION, SOLUTION INTRAVENOUS; SUBCUTANEOUS at 05:03

## 2019-03-06 RX ADMIN — HEPARIN SODIUM 5000 UNITS: 5000 INJECTION, SOLUTION INTRAVENOUS; SUBCUTANEOUS at 09:03

## 2019-03-06 RX ADMIN — HEPARIN SODIUM 5000 UNITS: 5000 INJECTION, SOLUTION INTRAVENOUS; SUBCUTANEOUS at 06:03

## 2019-03-06 RX ADMIN — PRAVASTATIN SODIUM 40 MG: 40 TABLET ORAL at 09:03

## 2019-03-06 RX ADMIN — INSULIN ASPART 6 UNITS: 100 INJECTION, SOLUTION INTRAVENOUS; SUBCUTANEOUS at 06:03

## 2019-03-06 RX ADMIN — SODIUM CHLORIDE: 0.9 INJECTION, SOLUTION INTRAVENOUS at 02:03

## 2019-03-06 RX ADMIN — COSYNTROPIN 0.25 MG: 0.25 INJECTION, POWDER, LYOPHILIZED, FOR SOLUTION INTRAMUSCULAR; INTRAVENOUS at 02:03

## 2019-03-06 RX ADMIN — INSULIN ASPART 5 UNITS: 100 INJECTION, SOLUTION INTRAVENOUS; SUBCUTANEOUS at 12:03

## 2019-03-06 NOTE — ASSESSMENT & PLAN NOTE
58 M with progressive cervical myelopathy and stenosis from C3-6 with myelomalacia. S/p code for hypovolemia during HD. Stable this morning and labs WNL. He stepped up to ICU for further care and work up, now s/p C3-6 posterior decompression.    -Will need fusion in future, will discuss OR timing with   -HOB >30  -Pain control   -No C collar needed  -MAP goals >75 mmHg x 5 days (4/5), remains on pressors.   -Pain control PRN  -HD per nephrology  -Daily PT/OT  -Medical management per NCC.  -Please call Neurosurgery with any questions or exam changes

## 2019-03-06 NOTE — ASSESSMENT & PLAN NOTE
Affects A1C accuracy  Consider checking fructosamine at least 2 weeks post hospital discharge to evaluate BG control

## 2019-03-06 NOTE — SUBJECTIVE & OBJECTIVE
Interval History:  NAEON. No issues per nursing. Still on Levo for MAPs goal. Exam stable.     Medications:  Continuous Infusions:   sodium chloride 0.9% 200 mL/hr at 03/06/19 1400    norepinephrine bitartrate-D5W 0.24 mcg/kg/min (03/06/19 1702)     Scheduled Meds:   sodium chloride 0.9%   Intravenous Once    diclofenac sodium   Topical (Top) Daily    epoetin shefali (PROCRIT) injection  7,000 Units Intravenous Every Mon, Wed, Fri    gabapentin  300 mg Oral QHS    insulin aspart U-100  6 Units Subcutaneous TIDWM    insulin detemir U-100  18 Units Subcutaneous QHS    midodrine  10 mg Oral TID    pantoprazole  40 mg Per NG tube BID    polyethylene glycol  17 g Oral Daily    pravastatin  40 mg Oral Daily    senna-docusate 8.6-50 mg  1 tablet Oral BID     PRN Meds:sodium chloride, sodium chloride 0.9%, acetaminophen, dextrose 50%, dextrose 50%, glucagon (human recombinant), glucose, glucose, insulin aspart U-100, magnesium sulfate IVPB, ondansetron, oxyCODONE       Objective:     Weight: 134.3 kg (296 lb 1.2 oz)  Body mass index is 46.37 kg/m².  Vital Signs (Most Recent):  Temp: 98.1 °F (36.7 °C) (03/06/19 1608)  Pulse: 74 (03/06/19 1702)  Resp: 12 (03/06/19 1702)  BP: (!) 168/72 (03/06/19 1702)  SpO2: 99 % (03/06/19 1702) Vital Signs (24h Range):  Temp:  [97 °F (36.1 °C)-98.2 °F (36.8 °C)] 98.1 °F (36.7 °C)  Pulse:  [61-89] 74  Resp:  [8-23] 12  SpO2:  [92 %-100 %] 99 %  BP: (136-178)/(55-75) 168/72  Arterial Line BP: (132-193)/(39-65) 163/59     Date 03/06/19 0700 - 03/07/19 0659   Shift 5436-0711 6826-3213 7205-9477 24 Hour Total   INTAKE   P.O. 450 250  700   I.V.(mL/kg) 147.9(1.1) 57.8(0.4)  205.7(1.5)   Shift Total(mL/kg) 597.9(4.5) 307.8(2.3)  905.7(6.7)   OUTPUT   Other 433 836  1269   Shift Total(mL/kg) 433(3.2) 836(6.2)  1269(9.4)   Weight (kg) 134.3 134.3 134.3 134.3                        Closed/Suction Drain 03/03/19 1431 Posterior Neck Accordion 10 Fr. (Active)   Site Description Unable to view  3/4/2019 11:01 AM   Drainage Sanguineous 3/4/2019  3:25 AM   Status To bulb suction 3/4/2019 11:01 AM   Output (mL) 110 mL 3/3/2019  5:40 PM            NG/OG Tube 03/03/19 1658 Left mouth (Active)   Placement Check placement verified by x-ray;placement verified by aspirate characteristics;placement verified by distal tube length measurement 3/4/2019 11:01 AM   Tolerance no signs/symptoms of discomfort 3/4/2019 11:01 AM   Securement secured to commercial device 3/4/2019 11:01 AM   Clamp Status/Tolerance clamped 3/4/2019 11:01 AM   Insertion Site Appearance no redness, warmth, tenderness, skin breakdown, drainage 3/4/2019 11:01 AM   Flush/Irrigation flushed w/;water;no resistance met 3/4/2019 11:01 AM   Feeding Action feeding held 3/4/2019 11:01 AM   Intake (mL) 120 mL 3/4/2019  9:01 AM   Tube Output(mL)(Include Discarded Residual) 160 mL 3/3/2019  6:00 PM       Male External Urinary Catheter 03/03/19 1648 Medium (Active)   Collection Container Urimeter 3/4/2019  3:25 AM   Securement Method secured to top of thigh w/ adhesive device 3/4/2019  3:25 AM   Skin no redness;no breakdown 3/4/2019  3:25 AM   Tolerance no signs/symptoms of discomfort 3/4/2019  3:25 AM   Output (mL) 0 mL 3/3/2019  6:00 PM            Hemodialysis AV Fistula Left forearm (Active)   Needle Size 15ga 3/1/2019  4:19 PM   Site Assessment Intact 3/4/2019  3:25 AM   Patency Present;Thrill;Bruit 3/4/2019  3:25 AM   Status Deaccessed 3/4/2019  3:25 AM   Flows Good 2/28/2019  8:20 AM   Dressing Status Clean;Dry;Intact 3/2/2019  3:01 PM   Site Condition No complications 3/4/2019  3:25 AM   Dressing Gauze 3/4/2019  3:25 AM            Trialysis (Dialysis) Catheter 03/04/19 0250 left internal jugular (Active)   IV Device Securement sutures 3/4/2019 11:01 AM   Patency/Care flushed w/o difficulty;infusing 3/4/2019 11:01 AM   Site Assessment Clean;Dry;Intact;No redness;No swelling 3/4/2019 11:01 AM   Status Accessed 3/4/2019 11:01 AM   Flows Good 3/4/2019 11:01  AM   Dressing Intervention Dressing reinforced 3/4/2019 11:01 AM   Dressing Status Biopatch in place;Clean;Dry;Intact 3/4/2019 11:01 AM   Dressing Change Due 03/11/19 3/4/2019 11:01 AM   Daily Line Review Performed 3/4/2019 11:01 AM       Neurosurgery Physical Exam   E4VTM6  AO x3  CN II -XII grossly intact  4-/5 BUE  5/5 BLE  SILT  +Sanford bilaterally w 3+ UE reflexes  2+ LE reflexes, no clonus or Babinski    Significant Labs:  Recent Labs   Lab 03/06/19  0129 03/06/19  0756 03/06/19  1441   * 195*  195* 193*   * 131*  131* 134*   K 4.6 5.3*  5.3*  5.3* 3.7   CL 99 99  99 101   CO2 22* 22*  22* 24   BUN 41* 45*  45* 36*   CREATININE 6.4* 6.7*  6.7* 5.5*   CALCIUM 9.1 9.1  9.1 8.5*   MG 2.6 2.6 2.1     Recent Labs   Lab 03/05/19  0112 03/06/19  0129   WBC 20.31* 15.72*   HGB 7.7* 7.4*   HCT 24.3* 23.6*   * 116*     Recent Labs   Lab 03/06/19  1207   INR 1.1     Microbiology Results (last 7 days)     ** No results found for the last 168 hours. **          Significant Diagnostics:    Reviewed.    Review of Systems

## 2019-03-06 NOTE — SUBJECTIVE & OBJECTIVE
Interval History: NAEON. Patient comfortable on nasal cannula. Electrolytes and acid base stable. SLED treatment stopped yesterday morning. Patient net positive about 750 ml/24 hr.   Alert, denies complaints of shortness of breath.     Review of patient's allergies indicates:   Allergen Reactions    Keflex [cephalexin] Nausea Only     Current Facility-Administered Medications   Medication Frequency    0.9%  NaCl infusion (CRRT USE ONLY) Continuous    0.9%  NaCl infusion (for blood administration) Q24H PRN    0.9%  NaCl infusion PRN    0.9%  NaCl infusion Once    acetaminophen tablet 500 mg Q6H PRN    cosyntropin injection 0.25 mg Once    dextrose 50% injection 12.5 g PRN    dextrose 50% injection 25 g PRN    diclofenac sodium 1 % gel Daily    epoetin shefali (PROCRIT) injection 7,000 units kit Every Mon, Wed, Fri    gabapentin capsule 300 mg QHS    glucagon (human recombinant) injection 1 mg PRN    glucose chewable tablet 16 g PRN    glucose chewable tablet 16 g PRN    glucose chewable tablet 24 g PRN    insulin aspart U-100 pen 1-10 Units QID (AC + HS) PRN    insulin aspart U-100 pen 5 Units TIDWM    magnesium sulfate 2g in water 50mL IVPB (premix) PRN    midodrine tablet 10 mg TID    norepinephrine 32 mg in dextrose 5 % 250 mL infusion Continuous    ondansetron injection 4 mg Q6H PRN    oxyCODONE immediate release tablet 5 mg Q6H PRN    pantoprazole suspension 40 mg BID    polyethylene glycol packet 17 g Daily    pravastatin tablet 40 mg Daily    senna-docusate 8.6-50 mg per tablet 1 tablet BID       Objective:     Vital Signs (Most Recent):  Temp: 98 °F (36.7 °C) (03/06/19 1102)  Pulse: 79 (03/06/19 1302)  Resp: 15 (03/06/19 1302)  BP: (!) 146/63 (03/06/19 1302)  SpO2: 99 % (03/06/19 1302)  O2 Device (Oxygen Therapy): nasal cannula (03/06/19 1302) Vital Signs (24h Range):  Temp:  [96.9 °F (36.1 °C)-98.2 °F (36.8 °C)] 98 °F (36.7 °C)  Pulse:  [61-89] 79  Resp:  [9-23] 15  SpO2:  [92 %-100  %] 99 %  BP: (136-178)/(63-80) 146/63  Arterial Line BP: (142-193)/(39-63) 142/63     Weight: 134.3 kg (296 lb 1.2 oz) (03/02/19 0300)  Body mass index is 46.37 kg/m².  Body surface area is 2.52 meters squared.    I/O last 3 completed shifts:  In: 3922.1 [I.V.:3672.1; IV Piggyback:250]  Out: 3040 [Drains:133; Other:2907]    Physical Exam   Constitutional: He is oriented to person, place, and time. He appears well-developed and well-nourished. Nasal cannula in place.    male who appears stated age, lying in bed   HENT:   Head: Normocephalic and atraumatic.   Mouth/Throat: Oropharynx is clear and moist. No oropharyngeal exudate.   Eyes: EOM are normal. Pupils are equal, round, and reactive to light.   Neck: No JVD present. No tracheal deviation present.   Cardiovascular: Regular rhythm, normal heart sounds and intact distal pulses.   Pulmonary/Chest: Effort normal. No stridor. No respiratory distress. He has rales.   Abdominal: Soft. Bowel sounds are normal. He exhibits no distension and no mass. There is no tenderness. There is no guarding.   Obese   Musculoskeletal: He exhibits edema. He exhibits no tenderness or deformity.   Palpable thrill in LUE fistula  1+ pitting edema to bilateral shins   Neurological: He is alert and oriented to person, place, and time.   Skin: No rash noted. He is not diaphoretic. No erythema.   Psychiatric: He has a normal mood and affect. His behavior is normal. Thought content normal.       Significant Labs:  CBC:   Recent Labs   Lab 03/06/19  0129   WBC 15.72*   RBC 2.21*   HGB 7.4*   HCT 23.6*   *   *   MCH 33.5*   MCHC 31.4*     CMP:   Recent Labs   Lab 03/06/19  0129 03/06/19  0756   * 195*  195*   CALCIUM 9.1 9.1  9.1   ALBUMIN 2.8* 2.6*   PROT 5.8*  --    * 131*  131*   K 4.6 5.3*  5.3*  5.3*   CO2 22* 22*  22*   CL 99 99  99   BUN 41* 45*  45*   CREATININE 6.4* 6.7*  6.7*   ALKPHOS 129  --    ALT 17  --    AST 26  --    BILITOT 0.9  --

## 2019-03-06 NOTE — ASSESSMENT & PLAN NOTE
BG goal 140-180    Levemir 18 units q HS, first dose tonight - conservative weight based dosing  Novolog 6 units with meals  Low dose correction scale given kidney function  BG monitoring AC/HS    Discharge planning: Patient will need insulin dosing changes upon discharge.  Currently only taking basal insulin plus Victoza.

## 2019-03-06 NOTE — CONSULTS
[facility]  Endocrinology  Diabetes Consult Note    Consult Requested by: Tobi Mayo MD   Reason for admit: S/P laminectomy    HISTORY OF PRESENT ILLNESS:  Reason for Consult: Management of T2DM, Hyperglycemia     Surgical Procedure and Date: Cervical laminectomy C3-C6, C6-C7 w/ medial discectomy 3/3/19    Diabetes diagnosis year:     Lab Results   Component Value Date    HGBA1C 5.0 2019     Home Diabetes Medications: Basaglar 40 units q HS, Victoza 1.2 mg once daily,     How often checking glucose at home? 4x per day   BG readings on regimen: 90-120s  Hypoglycemia on the regimen?  Yes - about once per month  Missed doses on regimen?  No    Diabetes Complications include:     Hypoglycemia , Diabetic nephropathy  , Diabetic chronic kidney disease     , Diabetic retinopathy , Diabetic cataract  and Diabetic peripheral neuropathy     Complicating diabetes co morbidities:   MIKE, CKD and ESRD      HPI:   Patient is a 58 y.o. male with a diagnosis of HTN, HLD, MIKE, ESRD on HD, and DM2, who is s/p cervical laminectomy C3-C6, C6-C7 w/ medial discectomy 3/3/19.  Patient was admitted to the ED on 19 for upper extremity weakness and ataxia following a fall.  Patient's DM managed by PCP, Dr. Shemar Berry in [place].  Patient is prescribed MDI but only takes basal insulin and Victoza.  Positive family history of DM (mother, father, and both maternal grandparents).  Endocrine consulted for DM/BG management.    Interval HPI:   Overnight events: Remains in NCC, NAEON.  BG elevated yesterday and overnight on current insulin regimen.  CRRT per nephrology.  Eatin%  Nausea: No  Hypoglycemia and intervention: No  Fever: No  TPN and/or TF: No    PMH, PSH, FH, SH reviewed     Review of Systems    Constitutional: Positive for weight changes related to fluid volume status.  Eyes: Negative for visual disturbance.  Respiratory: Positive for dry, intermittent cough.   Cardiovascular: Negative  for chest pain.  Gastrointestinal: Positive for nausea and constipation.  Endocrine: Negative for polyuria, polydipsia.  Musculoskeletal: Positive for chronic, lower back pain.  Skin: Negative for rash.  Positive for swelling.  Neurological: Negative for syncope.  Psychiatric/Behavioral: Positive for moderate depression, denies any suicidal or homicidal ideations.    Current Medications and/or Treatments Impacting Glycemic Control  Immunotherapy:    Immunosuppressants     None        Steroids:   Hormones (From admission, onward)    None        Pressors:    Autonomic Drugs (From admission, onward)    Start     Stop Route Frequency Ordered    03/06/19 1500  midodrine tablet 10 mg      -- Oral 3 times daily 03/06/19 0950    03/05/19 0615  norepinephrine 32 mg in dextrose 5 % 250 mL infusion     Question Answer Comment   Titrate by: (in mcg/kg/min) 0.02    Titrate interval: (in minutes) 5    Titrate to maintain: (MAP or SBP) MAP    Greater than: (in mmHg) 75    Maximum dose: (in mcg/kg/min) 3        -- IV Continuous 03/05/19 0613        Hyperglycemia/Diabetes Medications:   Antihyperglycemics (From admission, onward)    Start     Stop Route Frequency Ordered    03/06/19 1645  insulin aspart U-100 pen 5 Units      -- SubQ 3 times daily with meals 03/06/19 1154    03/06/19 1254  insulin aspart U-100 pen 1-10 Units      -- SubQ Before meals & nightly PRN 03/06/19 1154             PHYSICAL EXAMINATION:  Vitals:    03/06/19 1302   BP: (!) 146/63   Pulse: 79   Resp: 15   Temp:      Body mass index is 46.37 kg/m².    Physical Exam     Constitutional: Well developed, obese, NAD.  ENT: External ears no masses with nose patent; normal hearing.  Neck: Supple; trachea midline.  Cardiovascular: Normal heart sounds, 2+ bilateral LE edema. DP +2 bilaterally.  Lungs: Normal effort; lungs anterior bilaterally clear to auscultation.  Abdomen: Soft, no masses, no hernias.  MS: No clubbing or cyanosis of nails noted; unable to assess  gait.  Skin: No rashes, lesions, or ulcers; no nodules. Lipo hypertrophy noted to midline abdomen.  Incision to posterior, midline neck and upper back PATRICK.  Psychiatric: Good judgement and insight; normal mood and affect.  Foot: Nails in fair condition, no amputations noted.  2+ swelling noted.      Labs Reviewed and Include   Recent Labs   Lab 03/06/19  0129  03/06/19  1441   *   < > 193*   CALCIUM 9.1   < > 8.5*   ALBUMIN 2.8*   < > 2.4*   PROT 5.8*  --   --    *   < > 134*   K 4.6   < > 3.7   CO2 22*   < > 24   CL 99   < > 101   BUN 41*   < > 36*   CREATININE 6.4*   < > 5.5*   ALKPHOS 129  --   --    ALT 17  --   --    AST 26  --   --    BILITOT 0.9  --   --     < > = values in this interval not displayed.     Lab Results   Component Value Date    WBC 15.72 (H) 03/06/2019    HGB 7.4 (L) 03/06/2019    HCT 23.6 (L) 03/06/2019     (H) 03/06/2019     (L) 03/06/2019     No results for input(s): TSH, FREET4 in the last 168 hours.  Lab Results   Component Value Date    HGBA1C 5.0 02/27/2019       Nutritional status:   Body mass index is 46.37 kg/m².  Lab Results   Component Value Date    ALBUMIN 2.4 (L) 03/06/2019    ALBUMIN 2.6 (L) 03/06/2019    ALBUMIN 2.8 (L) 03/06/2019     No results found for: PREALBUMIN    Estimated Creatinine Clearance: 19.8 mL/min (A) (based on SCr of 5.5 mg/dL (H)).    Accu-Checks  Recent Labs     03/04/19  0634 03/04/19  1209 03/04/19  1803 03/05/19  0009 03/05/19  0512 03/05/19  1410 03/05/19  1831 03/06/19  0011 03/06/19  0547 03/06/19  1215   POCTGLUCOSE 165* 149* 157* 196* 202* 212* 229* 265* 225* 255*        ASSESSMENT and PLAN    * S/P laminectomy    S/p cervical laminectomy C3-C6, C6-C7 w/ medial discectomy 3/3/19  Managed per primary team  Avoid hypoglycemia  Optimize BG control for surgical wound healing         Diabetes mellitus, type 2    BG goal 140-180    Levemir 18 units q HS, first dose tonight - conservative weight based dosing  Novolog 6 units with  meals  Low dose correction scale given kidney function  BG monitoring AC/HS    Discharge planning: Patient will need insulin dosing changes upon discharge.  Currently only taking basal insulin plus Victoza.       ESRD on dialysis    Caution with insulin stacking  Estimated Creatinine Clearance: 16.3 mL/min (A) (based on SCr of 6.7 mg/dL (H)).         Class 3 severe obesity due to excess calories with serious comorbidity and body mass index (BMI) of 45.0 to 49.9 in adult    may contribute to insulin resistance  Body mass index is 46.37 kg/m².         Anemia due to stage 5 chronic kidney disease    Affects A1C accuracy  Consider checking fructosamine at least 2 weeks post hospital discharge to evaluate BG control     MIKE (obstructive sleep apnea) - very severe latest sleep study says BPAP @ 16/8    May affect BG readings if uncontrolled             Plan discussed with patient, family, and RN at bedside.     Willie Mao NP  Endocrinology  [facility]

## 2019-03-06 NOTE — ASSESSMENT & PLAN NOTE
Caution with insulin stacking  Estimated Creatinine Clearance: 16.3 mL/min (A) (based on SCr of 6.7 mg/dL (H)).

## 2019-03-06 NOTE — PROGRESS NOTES
[facility]  Neurosurgery  Progress Note    Subjective:     History of Present Illness: 59 yo male with a PMH of ESRD with dialysis MWF (last on weds), DM, presenting as transfer from [facility] as admission to neurosurgery for evaluation after progressive course of unsteady gait as well as bilateral upper extremity weakness for the last several months.  Mr. Farmer has had wobbly gait since this past September, at which point he recalls falling and has been at 80-90% of his normal function.  He has been able to walk without assist, however not for very long distances.  He states that since last Sunday he has felt progression of his symptoms and now feels it would be difficult to stand up to ambulate at all.  He also endorses a pulled muscle of his LLE that is limiting his mobility.  His UE symptoms have been present for about 3 months including not writing as well as he is used to.  HE is able to write numbers, but not always his name.  He has been having difficulty using a fork to eat over the past month.  No sensory disturbance noted as well as no neck or back pain and no pain from the neck radiating into the hands.  He has intermittently been taking ASA 81 mg qdaily. He denies recent falls or trauma to the neck.  No other blood thinning medication.  MRI at OSH on 2/27 revealed significant cervical stenosis.      =    Post-Op Info:  Procedure(s) (LRB):  SPINE, CERVICAL, POSTERIOR APPROACH  LAMINECTOMY C3-C6; C6-C7; WITH MEDIAL DISCECTOMY (N/A)   3 Days Post-Op     Interval History:  NAEON. No issues per nursing. Still on Levo for MAPs goal. Exam stable.     Medications:  Continuous Infusions:   sodium chloride 0.9% 200 mL/hr at 03/06/19 1400    norepinephrine bitartrate-D5W 0.24 mcg/kg/min (03/06/19 1702)     Scheduled Meds:   sodium chloride 0.9%   Intravenous Once    diclofenac sodium   Topical (Top) Daily    epoetin shefali (PROCRIT) injection  7,000 Units Intravenous Every Mon, Wed,  Fri    gabapentin  300 mg Oral QHS    insulin aspart U-100  6 Units Subcutaneous TIDWM    insulin detemir U-100  18 Units Subcutaneous QHS    midodrine  10 mg Oral TID    pantoprazole  40 mg Per NG tube BID    polyethylene glycol  17 g Oral Daily    pravastatin  40 mg Oral Daily    senna-docusate 8.6-50 mg  1 tablet Oral BID     PRN Meds:sodium chloride, sodium chloride 0.9%, acetaminophen, dextrose 50%, dextrose 50%, glucagon (human recombinant), glucose, glucose, insulin aspart U-100, magnesium sulfate IVPB, ondansetron, oxyCODONE       Objective:     Weight: 134.3 kg (296 lb 1.2 oz)  Body mass index is 46.37 kg/m².  Vital Signs (Most Recent):  Temp: 98.1 °F (36.7 °C) (03/06/19 1608)  Pulse: 74 (03/06/19 1702)  Resp: 12 (03/06/19 1702)  BP: (!) 168/72 (03/06/19 1702)  SpO2: 99 % (03/06/19 1702) Vital Signs (24h Range):  Temp:  [97 °F (36.1 °C)-98.2 °F (36.8 °C)] 98.1 °F (36.7 °C)  Pulse:  [61-89] 74  Resp:  [8-23] 12  SpO2:  [92 %-100 %] 99 %  BP: (136-178)/(55-75) 168/72  Arterial Line BP: (132-193)/(39-65) 163/59     Date 03/06/19 0700 - 03/07/19 0659   Shift 3679-5788 1005-3629 4529-0014 24 Hour Total   INTAKE   P.O. 450 250  700   I.V.(mL/kg) 147.9(1.1) 57.8(0.4)  205.7(1.5)   Shift Total(mL/kg) 597.9(4.5) 307.8(2.3)  905.7(6.7)   OUTPUT   Other 433 836  1269   Shift Total(mL/kg) 433(3.2) 836(6.2)  1269(9.4)   Weight (kg) 134.3 134.3 134.3 134.3                        Closed/Suction Drain 03/03/19 1431 Posterior Neck Accordion 10 Fr. (Active)   Site Description Unable to view 3/4/2019 11:01 AM   Drainage Sanguineous 3/4/2019  3:25 AM   Status To bulb suction 3/4/2019 11:01 AM   Output (mL) 110 mL 3/3/2019  5:40 PM            NG/OG Tube 03/03/19 1658 Left mouth (Active)   Placement Check placement verified by x-ray;placement verified by aspirate characteristics;placement verified by distal tube length measurement 3/4/2019 11:01 AM   Tolerance no signs/symptoms of discomfort 3/4/2019 11:01 AM    Securement secured to commercial device 3/4/2019 11:01 AM   Clamp Status/Tolerance clamped 3/4/2019 11:01 AM   Insertion Site Appearance no redness, warmth, tenderness, skin breakdown, drainage 3/4/2019 11:01 AM   Flush/Irrigation flushed w/;water;no resistance met 3/4/2019 11:01 AM   Feeding Action feeding held 3/4/2019 11:01 AM   Intake (mL) 120 mL 3/4/2019  9:01 AM   Tube Output(mL)(Include Discarded Residual) 160 mL 3/3/2019  6:00 PM       Male External Urinary Catheter 03/03/19 1648 Medium (Active)   Collection Container Urimeter 3/4/2019  3:25 AM   Securement Method secured to top of thigh w/ adhesive device 3/4/2019  3:25 AM   Skin no redness;no breakdown 3/4/2019  3:25 AM   Tolerance no signs/symptoms of discomfort 3/4/2019  3:25 AM   Output (mL) 0 mL 3/3/2019  6:00 PM            Hemodialysis AV Fistula Left forearm (Active)   Needle Size 15ga 3/1/2019  4:19 PM   Site Assessment Intact 3/4/2019  3:25 AM   Patency Present;Thrill;Bruit 3/4/2019  3:25 AM   Status Deaccessed 3/4/2019  3:25 AM   Flows Good 2/28/2019  8:20 AM   Dressing Status Clean;Dry;Intact 3/2/2019  3:01 PM   Site Condition No complications 3/4/2019  3:25 AM   Dressing Gauze 3/4/2019  3:25 AM            Trialysis (Dialysis) Catheter 03/04/19 0250 left internal jugular (Active)   IV Device Securement sutures 3/4/2019 11:01 AM   Patency/Care flushed w/o difficulty;infusing 3/4/2019 11:01 AM   Site Assessment Clean;Dry;Intact;No redness;No swelling 3/4/2019 11:01 AM   Status Accessed 3/4/2019 11:01 AM   Flows Good 3/4/2019 11:01 AM   Dressing Intervention Dressing reinforced 3/4/2019 11:01 AM   Dressing Status Biopatch in place;Clean;Dry;Intact 3/4/2019 11:01 AM   Dressing Change Due 03/11/19 3/4/2019 11:01 AM   Daily Line Review Performed 3/4/2019 11:01 AM       Neurosurgery Physical Exam   [id number]  AO x3  CN II -XII grossly intact  4-/5 BUE  5/5 BLE  SILT  +Sanford bilaterally w 3+ UE reflexes  2+ LE reflexes, no clonus or  Babinski    Significant Labs:  Recent Labs   Lab 03/06/19  0129 03/06/19  0756 03/06/19  1441   * 195*  195* 193*   * 131*  131* 134*   K 4.6 5.3*  5.3*  5.3* 3.7   CL 99 99  99 101   CO2 22* 22*  22* 24   BUN 41* 45*  45* 36*   CREATININE 6.4* 6.7*  6.7* 5.5*   CALCIUM 9.1 9.1  9.1 8.5*   MG 2.6 2.6 2.1     Recent Labs   Lab 03/05/19  0112 03/06/19  0129   WBC 20.31* 15.72*   HGB 7.7* 7.4*   HCT 24.3* 23.6*   * 116*     Recent Labs   Lab 03/06/19  1207   INR 1.1     Microbiology Results (last 7 days)     ** No results found for the last 168 hours. **          Significant Diagnostics:    Reviewed.    Review of Systems        Assessment/Plan:     * S/P laminectomy     58 M with progressive cervical myelopathy and stenosis from C3-6 with myelomalacia. S/p code for hypovolemia during HD. Stable this morning and labs WNL. He stepped up to ICU for further care and work up, now s/p C3-6 posterior decompression.    -Will need fusion in future, will discuss OR timing with   -HOB >30  -Pain control   -No C collar needed  -MAP goals >75 mmHg x 5 days (4/5), remains on pressors  -Pain control PRN  -HD per nephrology  -Daily PT/OT  -SCD/TCD/SQH  -Medical management per NCC  -Please call Neurosurgery with any questions or exam changes          Jemima Thomas MD  Neurosurgery  [facility]

## 2019-03-06 NOTE — ASSESSMENT & PLAN NOTE
59 yo male with a PMH of ESRD with dialysis MWF (last on weds), DM, presenting as transfer from [facility] as admission to neurosurgery for evaluation after progressive course of unsteady gait as well as bilateral upper extremity weakness for the last several months.     ESRD on HD with Dr. Araceli Pickens in [facility], 4.5 hours, EDW 140kg  Last HD 2/27/19 @L removed no available current DW    Plan/Assessment:   -Will plan for SLED for metabolic clearance and volume management x 10 hrs, target -350 ml/hr as tolerated, keep MAP >65  -Continue pressor for BP support as needed, titrate as needed with RRT for volume management  -Remain on Levophed for BP support, MAP >75 per primary team

## 2019-03-06 NOTE — HPI
Reason for Consult: Management of T2DM, Hyperglycemia     Surgical Procedure and Date: Cervical laminectomy C3-C6, C6-C7 w/ medial discectomy 3/3/19    Diabetes diagnosis year: 1996    Lab Results   Component Value Date    HGBA1C 5.0 02/27/2019     Home Diabetes Medications: Basaglar 40 units q HS, Victoza 1.2 mg once daily,     How often checking glucose at home? 4x per day   BG readings on regimen: 90-120s  Hypoglycemia on the regimen?  Yes - about once per month  Missed doses on regimen?  No    Diabetes Complications include:     Hypoglycemia , Diabetic nephropathy  , Diabetic chronic kidney disease     , Diabetic retinopathy , Diabetic cataract  and Diabetic peripheral neuropathy     Complicating diabetes co morbidities:   MIKE, CKD and ESRD      HPI:   Patient is a 58 y.o. male with a diagnosis of HTN, HLD, MIKE, ESRD on HD, and DM2, who is s/p cervical laminectomy C3-C6, C6-C7 w/ medial discectomy 3/3/19.  Patient was admitted to the ED on 2/28/19 for upper extremity weakness and ataxia following a fall.  Patient's DM managed by PCP, Dr. Shemar Berry in [place].  Patient is prescribed MDI but only takes basal insulin and Victoza.  Positive family history of DM (mother, father, and both maternal grandparents).  Endocrine consulted for DM/BG management.

## 2019-03-06 NOTE — SUBJECTIVE & OBJECTIVE
Interval HPI:   Overnight events: Remains in NCC, NAEON.  BG elevated yesterday and overnight on current insulin regimen.  CRRT per nephrology.  Eatin%  Nausea: No  Hypoglycemia and intervention: No  Fever: No  TPN and/or TF: No    PMH, PSH, FH, SH reviewed     Review of Systems    Constitutional: Positive for weight changes related to fluid volume status.  Eyes: Negative for visual disturbance.  Respiratory: Positive for dry, intermittent cough.   Cardiovascular: Negative for chest pain.  Gastrointestinal: Positive for nausea and constipation.  Endocrine: Negative for polyuria, polydipsia.  Musculoskeletal: Positive for chronic, lower back pain.  Skin: Negative for rash.  Positive for swelling.  Neurological: Negative for syncope.  Psychiatric/Behavioral: Positive for moderate depression, denies any suicidal or homicidal ideations.    Current Medications and/or Treatments Impacting Glycemic Control  Immunotherapy:    Immunosuppressants     None        Steroids:   Hormones (From admission, onward)    None        Pressors:    Autonomic Drugs (From admission, onward)    Start     Stop Route Frequency Ordered    19 1500  midodrine tablet 10 mg      -- Oral 3 times daily 19 0950    19 0615  norepinephrine 32 mg in dextrose 5 % 250 mL infusion     Question Answer Comment   Titrate by: (in mcg/kg/min) 0.02    Titrate interval: (in minutes) 5    Titrate to maintain: (MAP or SBP) MAP    Greater than: (in mmHg) 75    Maximum dose: (in mcg/kg/min) 3        -- IV Continuous 19 0613        Hyperglycemia/Diabetes Medications:   Antihyperglycemics (From admission, onward)    Start     Stop Route Frequency Ordered    19 1645  insulin aspart U-100 pen 5 Units      -- SubQ 3 times daily with meals 19 1154    19 1254  insulin aspart U-100 pen 1-10 Units      -- SubQ Before meals & nightly PRN 19 1154             PHYSICAL EXAMINATION:  Vitals:    19 1302   BP: (!) 146/63    Pulse: 79   Resp: 15   Temp:      Body mass index is 46.37 kg/m².    Physical Exam     Constitutional: Well developed, obese, NAD.  ENT: External ears no masses with nose patent; normal hearing.  Neck: Supple; trachea midline.  Cardiovascular: Normal heart sounds, 2+ bilateral LE edema. DP +2 bilaterally.  Lungs: Normal effort; lungs anterior bilaterally clear to auscultation.  Abdomen: Soft, no masses, no hernias.  MS: No clubbing or cyanosis of nails noted; unable to assess gait.  Skin: No rashes, lesions, or ulcers; no nodules. Lipo hypertrophy noted to midline abdomen.  Incision to posterior, midline neck and upper back PATRICK.  Psychiatric: Good judgement and insight; normal mood and affect.  Foot: Nails in fair condition, no amputations noted.  2+ swelling noted.

## 2019-03-06 NOTE — PROGRESS NOTES
ICU Progress Note  Neurocritical Care    Admit Date: 2/28/2019  LOS: 6    CC: S/P laminectomy    Code Status: Full Code     SUBJECTIVE:     Interval History/Significant Events:   Hypotension  Hypovolemia  Anemia due to chronic renal disease  Body mass index is 46.37 kg/m².   Hyponatremia  Hyperkalemia  ESRD- on rrt  Type 2 dm with long term insulin use      Medications:  Continuous Infusions:   norepinephrine bitartrate-D5W 0.3 mcg/kg/min (03/06/19 1134)     Scheduled Meds:   sodium chloride 0.9%   Intravenous Once    diclofenac sodium   Topical (Top) Daily    epoetin shefali (PROCRIT) injection  7,000 Units Intravenous Every Mon, Wed, Fri    gabapentin  300 mg Oral QHS    insulin aspart U-100  5 Units Subcutaneous Q6H    midodrine  10 mg Oral TID    pantoprazole  40 mg Per NG tube BID    polyethylene glycol  17 g Oral Daily    pravastatin  40 mg Oral Daily    senna-docusate 8.6-50 mg  1 tablet Oral BID     PRN Meds:.sodium chloride 0.9%, acetaminophen, dextrose 50%, glucagon (human recombinant), glucose, insulin aspart U-100, ondansetron, oxyCODONE    OBJECTIVE:   Vital Signs (Most Recent):   Temp: 98 °F (36.7 °C) (03/06/19 1102)  Pulse: 72 (03/06/19 1135)  Resp: 19 (03/06/19 1135)  BP: (!) 166/70 (03/06/19 1102)  SpO2: 100 % (03/06/19 1135)    Vital Signs (24h Range):   Temp:  [96.9 °F (36.1 °C)-98.2 °F (36.8 °C)] 98 °F (36.7 °C)  Pulse:  [61-93] 72  Resp:  [9-29] 19  SpO2:  [92 %-100 %] 100 %  BP: (134-178)/(59-80) 166/70  Arterial Line BP: (134-193)/(39-63) 154/62    ICP/CPP (Last 24h):   CO:  [10.2 L/min-16.2 L/min] 10.6 L/min  CI:  [4.1 L/min/m2-7.3 L/min/m2] 4.3 L/min/m2    I & O (Last 24h):     Intake/Output Summary (Last 24 hours) at 3/6/2019 1149  Last data filed at 3/6/2019 1119  Gross per 24 hour   Intake 936.72 ml   Output --   Net 936.72 ml     Physical Exam:  GA: Alert, comfortable, no acute distress.   HEENT: No scleral icterus or JVD.   Pulmonary: Clear to auscultation A/P/L. No wheezing,  crackles, or rhonchi.  Cardiac: RRR S1 & S2 w/o rubs/murmurs/gallops.   Abdominal: Bowel sounds present x 4. No appreciable hepatosplenomegaly.  Skin: No jaundice, rashes, or visible lesions.  Neuro:    Awake  Alert  ox3  Non-focal         Lines/Drains/Airway:          Trialysis (Dialysis) Catheter 03/04/19 0250 left internal jugular (Active)   IV Device Securement sutures 3/6/2019  7:02 AM   Additional Site Signs no erythema;no warmth;no edema;no pain 3/6/2019  7:02 AM   Patency/Care flushed w/o difficulty;normal saline locked 3/6/2019  7:02 AM   Waveform normal 3/6/2019  7:02 AM   Site Assessment Clean;Dry;Intact;No redness;No swelling 3/6/2019  7:02 AM   Status Flushed 3/6/2019  7:02 AM   Flows Good 3/6/2019  7:02 AM   Dressing Intervention Dressing reinforced 3/6/2019  7:02 AM   Dressing Status Biopatch in place;Clean;Dry;Intact 3/6/2019  7:02 AM   Dressing Change Due 03/11/19 3/6/2019  7:02 AM   Verification by X-ray Yes 3/6/2019  7:02 AM   Site Condition No complications 3/6/2019  7:02 AM   Dressing Occlusive 3/6/2019  7:02 AM   Daily Line Review Performed 3/6/2019  7:02 AM           Arterial Line 03/03/19 1216 Right Radial (Active)   Site Assessment Clean;Dry;Intact;No redness;No swelling 3/6/2019  7:02 AM   Line Status Pulsatile blood flow 3/6/2019  7:02 AM   Art Line Waveform Appropriate 3/6/2019  7:02 AM   Arterial Line Interventions Zeroed and calibrated;Leveled 3/6/2019  7:02 AM   Color/Movement/Sensation Capillary refill less than 3 sec 3/6/2019  7:02 AM   Dressing Type Transparent 3/6/2019  7:02 AM   Dressing Status Biopatch in place;Clean;Intact;Dry 3/6/2019  7:02 AM   Dressing Intervention Dressing reinforced 3/6/2019  7:02 AM   Dressing Change Due 03/10/19 3/6/2019  7:02 AM           Closed/Suction Drain 03/03/19 1431 Posterior Neck Accordion 10 Fr. (Active)   Site Description Unable to view 3/6/2019  7:02 AM   Dressing Type Gauze 3/6/2019  7:02 AM   Dressing Status Clean;Intact;Dry 3/6/2019  7:02  AM   Dressing Intervention Dressing reinforced 3/6/2019  7:02 AM   Drainage Serosanguineous 3/6/2019  7:02 AM   Status Other (Comment) 3/6/2019  7:02 AM   Output (mL) 3 mL 3/5/2019 11:01 AM            Hemodialysis AV Fistula Left forearm (Active)   Needle Size 15ga 3/1/2019  4:19 PM   Site Assessment Clean;Dry;Intact;No redness;No swelling 3/6/2019  7:02 AM   Patency Present;Thrill;Bruit 3/6/2019  7:02 AM   Status Deaccessed 3/6/2019  7:02 AM   Flows Good 2/28/2019  8:20 AM   Dressing Intervention Dressing reinforced 3/6/2019  3:05 AM   Dressing Status Clean;Dry;Intact 3/2/2019  3:01 PM   Site Condition No complications 3/6/2019  7:02 AM   Dressing Open to air (None) 3/6/2019  7:02 AM            Trialysis (Dialysis) Catheter 03/04/19 0250 left internal jugular (Active)   IV Device Securement sutures 3/6/2019  7:02 AM   Additional Site Signs no erythema;no warmth;no edema;no pain 3/6/2019  7:02 AM   Patency/Care flushed w/o difficulty;normal saline locked 3/6/2019  7:02 AM   Waveform normal 3/6/2019  7:02 AM   Site Assessment Clean;Dry;Intact;No redness;No swelling 3/6/2019  7:02 AM   Status Flushed 3/6/2019  7:02 AM   Flows Good 3/6/2019  7:02 AM   Dressing Intervention Dressing reinforced 3/6/2019  7:02 AM   Dressing Status Biopatch in place;Clean;Dry;Intact 3/6/2019  7:02 AM   Dressing Change Due 03/11/19 3/6/2019  7:02 AM   Verification by X-ray Yes 3/6/2019  7:02 AM   Site Condition No complications 3/6/2019  7:02 AM   Dressing Occlusive 3/6/2019  7:02 AM   Daily Line Review Performed 3/6/2019  7:02 AM     Nutrition/Tube Feeds (if NPO state why):  po    Labs:  ABG: No results for input(s): PH, PO2, PCO2, HCO3, POCSATURATED, BE in the last 24 hours.  BMP:  Recent Labs   Lab 03/06/19  0756   *  131*   K 5.3*  5.3*  5.3*   CL 99  99   CO2 22*  22*   BUN 45*  45*   CREATININE 6.7*  6.7*   *  195*   MG 2.6   PHOS 6.0*     LFT:   Lab Results   Component Value Date    AST 26 03/06/2019    ALT 17  03/06/2019    ALKPHOS 129 03/06/2019    BILITOT 0.9 03/06/2019    ALBUMIN 2.6 (L) 03/06/2019    PROT 5.8 (L) 03/06/2019     CBC:   Lab Results   Component Value Date    WBC 15.72 (H) 03/06/2019    HGB 7.4 (L) 03/06/2019    HCT 23.6 (L) 03/06/2019     (H) 03/06/2019     (L) 03/06/2019     Microbiology x 7d:   Microbiology Results (last 7 days)     ** No results found for the last 168 hours. **        Imaging:   cxr  Left lung haziness  I personally reviewed the above image.    ASSESSMENT/PLAN:     Active Hospital Problems    Diagnosis    *S/P laminectomy    Morbid obesity    Hyperkalemia    Hypothermia    Hyponatremia    Pre-op evaluation    Anemia due to stage 5 chronic kidney disease    Hyperphosphatemia    Metabolic bone disease    Problem with vascular access    Left leg pain    Pre-syncope    Hypotension    Bradycardia     see      Cervical myelopathy    Multifactorial peripheral neuropathy    ESRD on dialysis    Hyperlipidemia    Diabetes mellitus, type 2            Plan:  rrt  prbc  Follow Hb  follow Na  Wean levophed gtt  stim test  Repeat echo  Endo consult    Critical secondary to Patient has a condition that poses threat to life and bodily function: hypotension/wean levo/follow bp/cbc/na    Cc time 30 min     Critical care was time spent personally by me on the following activities: development of treatment plan with patient or surrogate and bedside caregivers, discussions with consultants, evaluation of patient's response to treatment, examination of patient, ordering and performing treatments and interventions, ordering and review of laboratory studies, ordering and review of radiographic studies, pulse oximetry, re-evaluation of patient's condition. This critical care time did not overlap with that of any other provider or involve time for any procedures.

## 2019-03-06 NOTE — ASSESSMENT & PLAN NOTE
S/p cervical laminectomy C3-C6, C6-C7 w/ medial discectomy 3/3/19  Managed per primary team  Avoid hypoglycemia  Optimize BG control for surgical wound healing

## 2019-03-06 NOTE — PROGRESS NOTES
[facility]  Nephrology  Progress Note    Patient Name: Angel Farmer Jr.  MRN: [id number]  Admission Date: 2/28/2019  Hospital Length of Stay: 6 days  Attending Provider: Tobi Mayo MD   Primary Care Physician: Satya Noriega MD  Principal Problem:S/P laminectomy    Subjective:     HPI: CC: cervical stenosis with gait impairment    Nephrology consulted for ESRD management    59 yo male with a PMH of ESRD with dialysis MWF (last on weds), DM, presenting as transfer from [facility] as admission to neurosurgery for evaluation after progressive course of unsteady gait as well as bilateral upper extremity weakness for the last several months.  Mr. Farmer has had wobbly gait since this past September, at which point he recalls falling and has been at 80-90% of his normal function.  He has been able to walk without assist, however not for very long distances.  He states that since last Sunday he has felt progression of his symptoms and now feels it would be difficult to stand up to ambulate at all.  He also endorses a pulled muscle of his LLE that is limiting his mobility.  His UE symptoms have been present for about 3 months including not writing as well as he is used to.  HE is able to write numbers, but not always his name.  He has been having difficulty using a fork to eat over the past month.  No sensory disturbance noted as well as no neck or back pain and no pain from the neck radiating into the hands.  He has intermittently been taking ASA 81 mg qdaily. He denies recent falls or trauma to the neck.  No other blood thinning medication.  MRI at OSH on 2/27 revealed significant cervical stenosis.    No SoB, cp he has in the last 2 weeks had evaluation and fistulogram of AVF and treatment at outpatient center    ESRD on HD with Dr. Araceli Pickens in [facility] MWF, 4.5 hours, EDW 140kg  Last HD 2/27/19 @L removed no available current DW        Interval History: NAEON. Patient  comfortable on nasal cannula. Electrolytes and acid base stable. SLED treatment stopped yesterday morning. Patient net positive about 750 ml/24 hr.   Alert, denies complaints of shortness of breath.     Review of patient's allergies indicates:   Allergen Reactions    Keflex [cephalexin] Nausea Only     Current Facility-Administered Medications   Medication Frequency    0.9%  NaCl infusion (CRRT USE ONLY) Continuous    0.9%  NaCl infusion (for blood administration) Q24H PRN    0.9%  NaCl infusion PRN    0.9%  NaCl infusion Once    acetaminophen tablet 500 mg Q6H PRN    cosyntropin injection 0.25 mg Once    dextrose 50% injection 12.5 g PRN    dextrose 50% injection 25 g PRN    diclofenac sodium 1 % gel Daily    epoetin shefali (PROCRIT) injection 7,000 units kit Every Mon, Wed, Fri    gabapentin capsule 300 mg QHS    glucagon (human recombinant) injection 1 mg PRN    glucose chewable tablet 16 g PRN    glucose chewable tablet 16 g PRN    glucose chewable tablet 24 g PRN    insulin aspart U-100 pen 1-10 Units QID (AC + HS) PRN    insulin aspart U-100 pen 5 Units TIDWM    magnesium sulfate 2g in water 50mL IVPB (premix) PRN    midodrine tablet 10 mg TID    norepinephrine 32 mg in dextrose 5 % 250 mL infusion Continuous    ondansetron injection 4 mg Q6H PRN    oxyCODONE immediate release tablet 5 mg Q6H PRN    pantoprazole suspension 40 mg BID    polyethylene glycol packet 17 g Daily    pravastatin tablet 40 mg Daily    senna-docusate 8.6-50 mg per tablet 1 tablet BID       Objective:     Vital Signs (Most Recent):  Temp: 98 °F (36.7 °C) (03/06/19 1102)  Pulse: 79 (03/06/19 1302)  Resp: 15 (03/06/19 1302)  BP: (!) 146/63 (03/06/19 1302)  SpO2: 99 % (03/06/19 1302)  O2 Device (Oxygen Therapy): nasal cannula (03/06/19 1302) Vital Signs (24h Range):  Temp:  [96.9 °F (36.1 °C)-98.2 °F (36.8 °C)] 98 °F (36.7 °C)  Pulse:  [61-89] 79  Resp:  [9-23] 15  SpO2:  [92 %-100 %] 99 %  BP: (136-178)/(63-80)  146/63  Arterial Line BP: (142-193)/(39-63) 142/63     Weight: 134.3 kg (296 lb 1.2 oz) (03/02/19 0300)  Body mass index is 46.37 kg/m².  Body surface area is 2.52 meters squared.    I/O last 3 completed shifts:  In: 3922.1 [I.V.:3672.1; IV Piggyback:250]  Out: 3040 [Drains:133; Other:2907]    Physical Exam   Constitutional: He is oriented to person, place, and time. He appears well-developed and well-nourished. Nasal cannula in place.    male who appears stated age, lying in bed   HENT:   Head: Normocephalic and atraumatic.   Mouth/Throat: Oropharynx is clear and moist. No oropharyngeal exudate.   Eyes: EOM are normal. Pupils are equal, round, and reactive to light.   Neck: No JVD present. No tracheal deviation present.   Cardiovascular: Regular rhythm, normal heart sounds and intact distal pulses.   Pulmonary/Chest: Effort normal. No stridor. No respiratory distress. He has rales.   Abdominal: Soft. Bowel sounds are normal. He exhibits no distension and no mass. There is no tenderness. There is no guarding.   Obese   Musculoskeletal: He exhibits edema. He exhibits no tenderness or deformity.   Palpable thrill in LUE fistula  1+ pitting edema to bilateral shins   Neurological: He is alert and oriented to person, place, and time.   Skin: No rash noted. He is not diaphoretic. No erythema.   Psychiatric: He has a normal mood and affect. His behavior is normal. Thought content normal.       Significant Labs:  CBC:   Recent Labs   Lab 03/06/19 0129   WBC 15.72*   RBC 2.21*   HGB 7.4*   HCT 23.6*   *   *   MCH 33.5*   MCHC 31.4*     CMP:   Recent Labs   Lab 03/06/19 0129 03/06/19  0756   * 195*  195*   CALCIUM 9.1 9.1  9.1   ALBUMIN 2.8* 2.6*   PROT 5.8*  --    * 131*  131*   K 4.6 5.3*  5.3*  5.3*   CO2 22* 22*  22*   CL 99 99  99   BUN 41* 45*  45*   CREATININE 6.4* 6.7*  6.7*   ALKPHOS 129  --    ALT 17  --    AST 26  --    BILITOT 0.9  --           Assessment/Plan:     *  S/P laminectomy    - Being follow by admitting service      ESRD on dialysis    57 yo male with a PMH of ESRD with dialysis MWF (last on weds), DM, presenting as transfer from [facility] as admission to neurosurgery for evaluation after progressive course of unsteady gait as well as bilateral upper extremity weakness for the last several months.     ESRD on HD with Dr. Araceli Pickens in [facility] MWF, 4.5 hours, EDW 140kg  Last HD 2/27/19 @L removed no available current DW    Plan/Assessment:   -Will plan for SLED for metabolic clearance and volume management x 10 hrs, target -350 ml/hr as tolerated, keep MAP >65  -Continue pressor for BP support as needed, titrate as needed with RRT for volume management  -Remain on Levophed for BP support, MAP >75 per primary team            Case discussed with staff further recs with attestation.     I will follow-up with patient. Please contact us if you have any additional questions.    OLIVIA Gabriel DNP, APRN, FNP-C  Department of Nephrology  [facility]  Pager: [phone]

## 2019-03-06 NOTE — PLAN OF CARE
Problem: Adult Inpatient Plan of Care  Goal: Plan of Care Review  Outcome: Ongoing (interventions implemented as appropriate)  POC reviewed with pt. Pt verbalized understanding. Questions and concerns addressed. No acute events overnight. Pt remains on Levo gtt. Flotrack in place. CRRT off overnight. Pt progressing toward goals. Will continue to monitor. See flowsheets for full assessment and VS info

## 2019-03-06 NOTE — NURSING
Pt started on CRRT. A-line pressure reading 140/40s (60s), Cuff pressure reading 120/80s (90s). MD Ko resident notified. Orders to go off of pt's cuff pressure.

## 2019-03-06 NOTE — PLAN OF CARE
Problem: Adult Inpatient Plan of Care  Goal: Plan of Care Review  Outcome: Outcome(s) achieved Date Met: 03/06/19  POC reviewed with pt and family at 1400. Pt verbalized understanding. Questions and concerns addressed. No acute events today. Pt progressing toward goals. Will continue to monitor. See flowsheets for full assessment and VS info. Levo gtt remains on. CRRT done today. 2 Units pRBCs given. US of LE done.

## 2019-03-06 NOTE — ANESTHESIA POSTPROCEDURE EVALUATION
"Anesthesia Post Evaluation    Patient: Angel Farmer Jr.    Procedure(s) Performed: Procedure(s) (LRB):  SPINE, CERVICAL, POSTERIOR APPROACH  LAMINECTOMY C3-C6; C6-C7; WITH MEDIAL DISCECTOMY (N/A)    Final Anesthesia Type: general  Patient location during evaluation: ICU  Patient participation: Yes- Able to Participate  Level of consciousness: awake and alert and oriented  Post-procedure vital signs: reviewed and stable  Pain management: adequate  Airway patency: patent  PONV status at discharge: No PONV  Anesthetic complications: no      Cardiovascular status: hemodynamically stable  Respiratory status: unassisted, spontaneous ventilation and nasal cannula  Hydration status: euvolemic  Follow-up not needed.        Visit Vitals  BP (!) 150/65 (BP Location: Right arm, Patient Position: Lying)   Pulse 75   Temp 36.1 °C (96.9 °F) (Oral)   Resp (!) 9   Ht 5' 7" (1.702 m)   Wt 134.3 kg (296 lb 1.2 oz)   SpO2 100%   BMI 46.37 kg/m²       Pain/Benny Score: Pain Rating Prior to Med Admin: 8 (3/5/2019  9:18 AM)  Pain Rating Post Med Admin: 0 (3/4/2019  3:15 AM)        "

## 2019-03-07 PROBLEM — Z01.818 PRE-OP EVALUATION: Status: RESOLVED | Noted: 2019-03-01 | Resolved: 2019-03-07

## 2019-03-07 LAB
ALBUMIN SERPL BCP-MCNC: 2.4 G/DL
ALP SERPL-CCNC: 139 U/L
ALT SERPL W/O P-5'-P-CCNC: 11 U/L
ANION GAP SERPL CALC-SCNC: 11 MMOL/L
ANION GAP SERPL CALC-SCNC: 11 MMOL/L
ANION GAP SERPL CALC-SCNC: 8 MMOL/L
ANION GAP SERPL CALC-SCNC: 9 MMOL/L
ASCENDING AORTA: 3.38 CM
AST SERPL-CCNC: 20 U/L
AV INDEX (PROSTH): 0.68
AV MEAN GRADIENT: 10.17 MMHG
AV PEAK GRADIENT: 15.05 MMHG
AV VALVE AREA: 2.61 CM2
AV VELOCITY RATIO: 0.57
BASOPHILS # BLD AUTO: 0.02 K/UL
BASOPHILS NFR BLD: 0.2 %
BILIRUB SERPL-MCNC: 1.1 MG/DL
BLD PROD TYP BPU: NORMAL
BLOOD UNIT EXPIRATION DATE: NORMAL
BLOOD UNIT TYPE CODE: 9500
BLOOD UNIT TYPE: NORMAL
BSA FOR ECHO PROCEDURE: 2.54 M2
BUN SERPL-MCNC: 33 MG/DL
BUN SERPL-MCNC: 33 MG/DL
BUN SERPL-MCNC: 39 MG/DL
BUN SERPL-MCNC: 45 MG/DL
CALCIUM SERPL-MCNC: 8.9 MG/DL
CALCIUM SERPL-MCNC: 8.9 MG/DL
CALCIUM SERPL-MCNC: 9 MG/DL
CALCIUM SERPL-MCNC: 9.1 MG/DL
CHLORIDE SERPL-SCNC: 102 MMOL/L
CHLORIDE SERPL-SCNC: 103 MMOL/L
CHLORIDE SERPL-SCNC: 103 MMOL/L
CHLORIDE SERPL-SCNC: 105 MMOL/L
CO2 SERPL-SCNC: 22 MMOL/L
CO2 SERPL-SCNC: 22 MMOL/L
CO2 SERPL-SCNC: 23 MMOL/L
CO2 SERPL-SCNC: 24 MMOL/L
CODING SYSTEM: NORMAL
CREAT SERPL-MCNC: 5.5 MG/DL
CREAT SERPL-MCNC: 5.5 MG/DL
CREAT SERPL-MCNC: 6.4 MG/DL
CREAT SERPL-MCNC: 7.1 MG/DL
CV ECHO LV RWT: 0.36 CM
DIFFERENTIAL METHOD: ABNORMAL
DISPENSE STATUS: NORMAL
DOP CALC AO PEAK VEL: 1.94 M/S
DOP CALC AO VTI: 39.9 CM
DOP CALC LVOT AREA: 3.87 CM2
DOP CALC LVOT DIAMETER: 2.22 CM
DOP CALC LVOT PEAK VEL: 1.1 M/S
DOP CALC LVOT STROKE VOLUME: 104.23 CM3
DOP CALCLVOT PEAK VEL VTI: 26.94 CM
E WAVE DECELERATION TIME: 234.95 MSEC
E/A RATIO: 1.18
E/E' RATIO: 9.3
ECHO LV POSTERIOR WALL: 1.09 CM (ref 0.6–1.1)
EOSINOPHIL # BLD AUTO: 0.1 K/UL
EOSINOPHIL NFR BLD: 1.5 %
ERYTHROCYTE [DISTWIDTH] IN BLOOD BY AUTOMATED COUNT: 16.3 %
EST. GFR  (AFRICAN AMERICAN): 10.3 ML/MIN/1.73 M^2
EST. GFR  (AFRICAN AMERICAN): 12.3 ML/MIN/1.73 M^2
EST. GFR  (AFRICAN AMERICAN): 12.3 ML/MIN/1.73 M^2
EST. GFR  (AFRICAN AMERICAN): 9.1 ML/MIN/1.73 M^2
EST. GFR  (NON AFRICAN AMERICAN): 10.7 ML/MIN/1.73 M^2
EST. GFR  (NON AFRICAN AMERICAN): 10.7 ML/MIN/1.73 M^2
EST. GFR  (NON AFRICAN AMERICAN): 7.8 ML/MIN/1.73 M^2
EST. GFR  (NON AFRICAN AMERICAN): 8.9 ML/MIN/1.73 M^2
FRACTIONAL SHORTENING: 33 % (ref 28–44)
GLUCOSE SERPL-MCNC: 112 MG/DL
GLUCOSE SERPL-MCNC: 213 MG/DL
GLUCOSE SERPL-MCNC: 231 MG/DL
GLUCOSE SERPL-MCNC: 231 MG/DL
HCT VFR BLD AUTO: 24.8 %
HGB BLD-MCNC: 7.9 G/DL
IMM GRANULOCYTES # BLD AUTO: 0.05 K/UL
IMM GRANULOCYTES NFR BLD AUTO: 0.5 %
INR PPP: 1
INTERVENTRICULAR SEPTUM: 1.21 CM (ref 0.6–1.1)
LA MAJOR: 5.72 CM
LA MINOR: 5.79 CM
LA WIDTH: 3.29 CM
LEFT ATRIUM SIZE: 3.82 CM
LEFT ATRIUM VOLUME INDEX: 25.5 ML/M2
LEFT ATRIUM VOLUME: 61.48 CM3
LEFT INTERNAL DIMENSION IN SYSTOLE: 4.03 CM (ref 2.1–4)
LEFT VENTRICLE DIASTOLIC VOLUME INDEX: 75.09 ML/M2
LEFT VENTRICLE DIASTOLIC VOLUME: 181.3 ML
LEFT VENTRICLE MASS INDEX: 123.5 G/M2
LEFT VENTRICLE SYSTOLIC VOLUME INDEX: 29.5 ML/M2
LEFT VENTRICLE SYSTOLIC VOLUME: 71.28 ML
LEFT VENTRICULAR INTERNAL DIMENSION IN DIASTOLE: 6.02 CM (ref 3.5–6)
LEFT VENTRICULAR MASS: 298.26 G
LV LATERAL E/E' RATIO: 8.45
LV SEPTAL E/E' RATIO: 10.33
LYMPHOCYTES # BLD AUTO: 1.7 K/UL
LYMPHOCYTES NFR BLD: 18.3 %
MAGNESIUM SERPL-MCNC: 2 MG/DL
MAGNESIUM SERPL-MCNC: 2 MG/DL
MAGNESIUM SERPL-MCNC: 2.3 MG/DL
MCH RBC QN AUTO: 32.1 PG
MCHC RBC AUTO-ENTMCNC: 31.9 G/DL
MCV RBC AUTO: 101 FL
MONOCYTES # BLD AUTO: 1.3 K/UL
MONOCYTES NFR BLD: 13.4 %
MV PEAK A VEL: 0.79 M/S
MV PEAK E VEL: 0.93 M/S
NEUTROPHILS # BLD AUTO: 6.2 K/UL
NEUTROPHILS NFR BLD: 66.1 %
NRBC BLD-RTO: 1 /100 WBC
PHOSPHATE SERPL-MCNC: 4.8 MG/DL
PHOSPHATE SERPL-MCNC: 4.8 MG/DL
PHOSPHATE SERPL-MCNC: 5.1 MG/DL
PLATELET # BLD AUTO: 68 K/UL
PMV BLD AUTO: 10.5 FL
POCT GLUCOSE: 152 MG/DL (ref 70–110)
POCT GLUCOSE: 168 MG/DL (ref 70–110)
POCT GLUCOSE: 221 MG/DL (ref 70–110)
POCT GLUCOSE: 225 MG/DL (ref 70–110)
POTASSIUM SERPL-SCNC: 4 MMOL/L
POTASSIUM SERPL-SCNC: 4.2 MMOL/L
PROT SERPL-MCNC: 5.2 G/DL
PROTHROMBIN TIME: 10.5 SEC
RA MAJOR: 5.05 CM
RA WIDTH: 3.12 CM
RBC # BLD AUTO: 2.46 M/UL
RIGHT VENTRICULAR END-DIASTOLIC DIMENSION: 3.58 CM
SINUS: 3 CM
SODIUM SERPL-SCNC: 134 MMOL/L
SODIUM SERPL-SCNC: 136 MMOL/L
SODIUM SERPL-SCNC: 136 MMOL/L
SODIUM SERPL-SCNC: 137 MMOL/L
STJ: 3.98 CM
TDI LATERAL: 0.11
TDI SEPTAL: 0.09
TDI: 0.1
TRANS ERYTHROCYTES VOL PATIENT: NORMAL ML
TRICUSPID ANNULAR PLANE SYSTOLIC EXCURSION: 2.81 CM
WBC # BLD AUTO: 9.36 K/UL

## 2019-03-07 PROCEDURE — 20000000 HC ICU ROOM

## 2019-03-07 PROCEDURE — 83735 ASSAY OF MAGNESIUM: CPT | Mod: 91

## 2019-03-07 PROCEDURE — 90945 DIALYSIS ONE EVALUATION: CPT

## 2019-03-07 PROCEDURE — 99232 PR SUBSEQUENT HOSPITAL CARE,LEVL II: ICD-10-PCS | Mod: ,,, | Performed by: NURSE PRACTITIONER

## 2019-03-07 PROCEDURE — 85025 COMPLETE CBC W/AUTO DIFF WBC: CPT

## 2019-03-07 PROCEDURE — P9021 RED BLOOD CELLS UNIT: HCPCS

## 2019-03-07 PROCEDURE — 83735 ASSAY OF MAGNESIUM: CPT

## 2019-03-07 PROCEDURE — 99232 SBSQ HOSP IP/OBS MODERATE 35: CPT | Mod: ,,, | Performed by: NURSE PRACTITIONER

## 2019-03-07 PROCEDURE — 80100008 HC CRRT DAILY MAINTENANCE

## 2019-03-07 PROCEDURE — 25000003 PHARM REV CODE 250: Performed by: NURSE PRACTITIONER

## 2019-03-07 PROCEDURE — 84100 ASSAY OF PHOSPHORUS: CPT

## 2019-03-07 PROCEDURE — 25000003 PHARM REV CODE 250: Performed by: STUDENT IN AN ORGANIZED HEALTH CARE EDUCATION/TRAINING PROGRAM

## 2019-03-07 PROCEDURE — 63600175 PHARM REV CODE 636 W HCPCS: Performed by: STUDENT IN AN ORGANIZED HEALTH CARE EDUCATION/TRAINING PROGRAM

## 2019-03-07 PROCEDURE — 25000003 PHARM REV CODE 250: Performed by: PSYCHIATRY & NEUROLOGY

## 2019-03-07 PROCEDURE — 80053 COMPREHEN METABOLIC PANEL: CPT

## 2019-03-07 PROCEDURE — 80048 BASIC METABOLIC PNL TOTAL CA: CPT

## 2019-03-07 PROCEDURE — 99291 CRITICAL CARE FIRST HOUR: CPT | Mod: GC,,, | Performed by: ANESTHESIOLOGY

## 2019-03-07 PROCEDURE — 94761 N-INVAS EAR/PLS OXIMETRY MLT: CPT

## 2019-03-07 PROCEDURE — 80069 RENAL FUNCTION PANEL: CPT

## 2019-03-07 PROCEDURE — 99291 PR CRITICAL CARE, E/M 30-74 MINUTES: ICD-10-PCS | Mod: GC,,, | Performed by: ANESTHESIOLOGY

## 2019-03-07 PROCEDURE — 85610 PROTHROMBIN TIME: CPT

## 2019-03-07 RX ORDER — INSULIN ASPART 100 [IU]/ML
8 INJECTION, SOLUTION INTRAVENOUS; SUBCUTANEOUS
Status: DISCONTINUED | OUTPATIENT
Start: 2019-03-07 | End: 2019-03-11

## 2019-03-07 RX ORDER — POLYETHYLENE GLYCOL 3350 17 G/17G
17 POWDER, FOR SOLUTION ORAL ONCE
Status: COMPLETED | OUTPATIENT
Start: 2019-03-07 | End: 2019-03-07

## 2019-03-07 RX ORDER — HYDROCODONE BITARTRATE AND ACETAMINOPHEN 500; 5 MG/1; MG/1
TABLET ORAL
Status: DISCONTINUED | OUTPATIENT
Start: 2019-03-07 | End: 2019-03-08

## 2019-03-07 RX ORDER — BISACODYL 10 MG
10 SUPPOSITORY, RECTAL RECTAL ONCE
Status: COMPLETED | OUTPATIENT
Start: 2019-03-07 | End: 2019-03-07

## 2019-03-07 RX ADMIN — SODIUM CHLORIDE: 0.9 INJECTION, SOLUTION INTRAVENOUS at 01:03

## 2019-03-07 RX ADMIN — STANDARDIZED SENNA CONCENTRATE AND DOCUSATE SODIUM 1 TABLET: 8.6; 5 TABLET, FILM COATED ORAL at 08:03

## 2019-03-07 RX ADMIN — MIDODRINE HYDROCHLORIDE 10 MG: 5 TABLET ORAL at 08:03

## 2019-03-07 RX ADMIN — PANTOPRAZOLE SODIUM 40 MG: 40 GRANULE, DELAYED RELEASE ORAL at 08:03

## 2019-03-07 RX ADMIN — OXYCODONE HYDROCHLORIDE 5 MG: 5 TABLET ORAL at 11:03

## 2019-03-07 RX ADMIN — PRAVASTATIN SODIUM 40 MG: 40 TABLET ORAL at 08:03

## 2019-03-07 RX ADMIN — MIDODRINE HYDROCHLORIDE 10 MG: 5 TABLET ORAL at 02:03

## 2019-03-07 RX ADMIN — GABAPENTIN 300 MG: 300 CAPSULE ORAL at 08:03

## 2019-03-07 RX ADMIN — POLYETHYLENE GLYCOL 3350 17 G: 17 POWDER, FOR SOLUTION ORAL at 12:03

## 2019-03-07 RX ADMIN — INSULIN ASPART 6 UNITS: 100 INJECTION, SOLUTION INTRAVENOUS; SUBCUTANEOUS at 12:03

## 2019-03-07 RX ADMIN — DICLOFENAC: 10 GEL TOPICAL at 08:03

## 2019-03-07 RX ADMIN — INSULIN ASPART 6 UNITS: 100 INJECTION, SOLUTION INTRAVENOUS; SUBCUTANEOUS at 08:03

## 2019-03-07 RX ADMIN — INSULIN ASPART 8 UNITS: 100 INJECTION, SOLUTION INTRAVENOUS; SUBCUTANEOUS at 05:03

## 2019-03-07 RX ADMIN — OXYCODONE HYDROCHLORIDE 5 MG: 5 TABLET ORAL at 05:03

## 2019-03-07 RX ADMIN — INSULIN ASPART 2 UNITS: 100 INJECTION, SOLUTION INTRAVENOUS; SUBCUTANEOUS at 12:03

## 2019-03-07 RX ADMIN — BISACODYL 10 MG: 10 SUPPOSITORY RECTAL at 04:03

## 2019-03-07 RX ADMIN — HEPARIN SODIUM 5000 UNITS: 5000 INJECTION, SOLUTION INTRAVENOUS; SUBCUTANEOUS at 05:03

## 2019-03-07 RX ADMIN — POLYETHYLENE GLYCOL 3350 17 G: 17 POWDER, FOR SOLUTION ORAL at 08:03

## 2019-03-07 RX ADMIN — INSULIN ASPART 2 UNITS: 100 INJECTION, SOLUTION INTRAVENOUS; SUBCUTANEOUS at 08:03

## 2019-03-07 NOTE — PROGRESS NOTES
[facility]  Nephrology  Progress Note    Patient Name: Angel Farmer Jr.  MRN: [id number]  Admission Date: 2/28/2019  Hospital Length of Stay: 7 days  Attending Provider: Tobi Mayo MD   Primary Care Physician: Satya Noriega MD  Principal Problem:S/P laminectomy    Subjective:     HPI: CC: cervical stenosis with gait impairment    Nephrology consulted for ESRD management    59 yo male with a PMH of ESRD with dialysis MWF (last on weds), DM, presenting as transfer from [facility] as admission to neurosurgery for evaluation after progressive course of unsteady gait as well as bilateral upper extremity weakness for the last several months.  Mr. Farmer has had wobbly gait since this past September, at which point he recalls falling and has been at 80-90% of his normal function.  He has been able to walk without assist, however not for very long distances.  He states that since last Sunday he has felt progression of his symptoms and now feels it would be difficult to stand up to ambulate at all.  He also endorses a pulled muscle of his LLE that is limiting his mobility.  His UE symptoms have been present for about 3 months including not writing as well as he is used to.  HE is able to write numbers, but not always his name.  He has been having difficulty using a fork to eat over the past month.  No sensory disturbance noted as well as no neck or back pain and no pain from the neck radiating into the hands.  He has intermittently been taking ASA 81 mg qdaily. He denies recent falls or trauma to the neck.  No other blood thinning medication.  MRI at OSH on 2/27 revealed significant cervical stenosis.    No SoB, cp he has in the last 2 weeks had evaluation and fistulogram of AVF and treatment at outpatient center    ESRD on HD with Dr. Araceli Pickens in [facility] MWF, 4.5 hours, EDW 140kg  Last HD 2/27/19 @L removed no available current DW        Interval History: 8 hr SLED completed  overnight, pt remains net positive 1.2 L but appears comfortable on assessment. Alert, on room air. Remains on Levophed for a MAP goal >75 per primary, last day of MAP goal Neurosurgery note.     Review of patient's allergies indicates:   Allergen Reactions    Keflex [cephalexin] Nausea Only     Current Facility-Administered Medications   Medication Frequency    0.9%  NaCl infusion (CRRT USE ONLY) Continuous    0.9%  NaCl infusion (for blood administration) Q24H PRN    0.9%  NaCl infusion (for blood administration) Q24H PRN    0.9%  NaCl infusion (for blood administration) Q24H PRN    0.9%  NaCl infusion PRN    0.9%  NaCl infusion Once    acetaminophen tablet 500 mg Q6H PRN    dextrose 50% injection 12.5 g PRN    dextrose 50% injection 25 g PRN    diclofenac sodium 1 % gel Daily    epoetin shefali (PROCRIT) injection 7,000 units kit Every Mon, Wed, Fri    gabapentin capsule 300 mg QHS    glucagon (human recombinant) injection 1 mg PRN    glucose chewable tablet 16 g PRN    glucose chewable tablet 24 g PRN    insulin aspart U-100 pen 0-5 Units QID (AC + HS) PRN    insulin aspart U-100 pen 6 Units TIDWM    insulin detemir U-100 pen 20 Units QHS    magnesium sulfate 2g in water 50mL IVPB (premix) PRN    midodrine tablet 10 mg TID    norepinephrine 32 mg in dextrose 5 % 250 mL infusion Continuous    ondansetron injection 4 mg Q6H PRN    oxyCODONE immediate release tablet 5 mg Q6H PRN    pantoprazole suspension 40 mg BID    polyethylene glycol packet 17 g Daily    pravastatin tablet 40 mg Daily    senna-docusate 8.6-50 mg per tablet 1 tablet BID       Objective:     Vital Signs (Most Recent):  Temp: 98 °F (36.7 °C) (03/07/19 1102)  Pulse: 77 (03/07/19 1102)  Resp: 17 (03/07/19 1102)  BP: (!) 149/68 (03/07/19 1102)  SpO2: 97 % (03/07/19 1102)  O2 Device (Oxygen Therapy): room air (03/07/19 1102) Vital Signs (24h Range):  Temp:  [97.9 °F (36.6 °C)-98.5 °F (36.9 °C)] 98 °F (36.7 °C)  Pulse:  [67-93]  77  Resp:  [8-23] 17  SpO2:  [95 %-100 %] 97 %  BP: (101-192)/(43-83) 149/68  Arterial Line BP: (117-172)/(34-68) 136/65     Weight: 134.3 kg (296 lb 1.2 oz) (03/02/19 0300)  Body mass index is 46.37 kg/m².  Body surface area is 2.52 meters squared.    I/O last 3 completed shifts:  In: 4321.2 [P.O.:700; I.V.:3007.8; Blood:613.3]  Out: 2827 [Drains:15; Other:2812]    Physical Exam   Constitutional: He is oriented to person, place, and time. He appears well-developed and well-nourished. Nasal cannula in place.    male who appears stated age, lying in bed   HENT:   Head: Normocephalic and atraumatic.   Mouth/Throat: Oropharynx is clear and moist. No oropharyngeal exudate.   Eyes: EOM are normal. Pupils are equal, round, and reactive to light.   Neck: No JVD present. No tracheal deviation present.   Cardiovascular: Regular rhythm, normal heart sounds and intact distal pulses.   Pulmonary/Chest: Effort normal. No stridor. No respiratory distress. He has rales.   Abdominal: Soft. Bowel sounds are normal. He exhibits no distension and no mass. There is no tenderness. There is no guarding.   Obese   Musculoskeletal: He exhibits edema. He exhibits no tenderness or deformity.   Palpable thrill in LUE fistula  1+ pitting edema to bilateral shins   Neurological: He is alert and oriented to person, place, and time.   Skin: No rash noted. He is not diaphoretic. No erythema.   Psychiatric: He has a normal mood and affect. His behavior is normal. Thought content normal.     Significant Labs:  CBC:   Recent Labs   Lab 03/07/19  0358   WBC 9.36   RBC 2.46*   HGB 7.9*   HCT 24.8*   PLT 68*   *   MCH 32.1*   MCHC 31.9*     CMP:   Recent Labs   Lab 03/07/19  0358   *  231*   CALCIUM 8.9  8.9   ALBUMIN 2.4*  2.4*   PROT 5.2*     136   K 4.2  4.2   CO2 22*  22*     103   BUN 33*  33*   CREATININE 5.5*  5.5*   ALKPHOS 139*   ALT 11   AST 20   BILITOT 1.1*            Assessment/Plan:     * S/P  laminectomy    - Being follow by admitting service      ESRD (end stage renal disease) on dialysis    57 yo male with a PMH of ESRD with dialysis MWF (last on weds), DM, presenting as transfer from [facility] as admission to neurosurgery for evaluation after progressive course of unsteady gait as well as bilateral upper extremity weakness for the last several months.     ESRD on HD with Dr. Araceli Pickens in [facility] MWF, 4.5 hours, EDW 140kg  Last HD 2/27/19 @L removed no available current DW    Plan/Assessment:   -Will plan to hold RRT today, will re assess later.   -Remain on Levophed for BP support, MAP >75 per primary team (last day per note)  -Continue strict I/Os            Case discussed with staff further recs with attestation.     I will follow-up with patient. Please contact us if you have any additional questions.    OLIVIA Gabriel DNP, APRN, FNP-C  Department of Nephrology  [facility]  Pager: [phone]

## 2019-03-07 NOTE — PROGRESS NOTES
[facility]  Neurosurgery  Progress Note    Subjective:     History of Present Illness: 57 yo male with a PMH of ESRD with dialysis MWF (last on weds), DM, presenting as transfer from [facility] as admission to neurosurgery for evaluation after progressive course of unsteady gait as well as bilateral upper extremity weakness for the last several months.  Mr. Farmer has had wobbly gait since this past September, at which point he recalls falling and has been at 80-90% of his normal function.  He has been able to walk without assist, however not for very long distances.  He states that since last Sunday he has felt progression of his symptoms and now feels it would be difficult to stand up to ambulate at all.  He also endorses a pulled muscle of his LLE that is limiting his mobility.  His UE symptoms have been present for about 3 months including not writing as well as he is used to.  HE is able to write numbers, but not always his name.  He has been having difficulty using a fork to eat over the past month.  No sensory disturbance noted as well as no neck or back pain and no pain from the neck radiating into the hands.  He has intermittently been taking ASA 81 mg qdaily. He denies recent falls or trauma to the neck.  No other blood thinning medication.  MRI at OSH on 2/27 revealed significant cervical stenosis.      =    Post-Op Info:  Procedure(s) (LRB):  SPINE, CERVICAL, POSTERIOR APPROACH  LAMINECTOMY C3-C6; C6-C7; WITH MEDIAL DISCECTOMY (N/A)   4 Days Post-Op     Interval History: Last day of MAP goal. NAEON. Stable on Exam. On Levo.    Medications:  Continuous Infusions:   sodium chloride 0.9% 200 mL/hr at 03/07/19 0200    norepinephrine bitartrate-D5W 0.16 mcg/kg/min (03/07/19 1002)     Scheduled Meds:   sodium chloride 0.9%   Intravenous Once    diclofenac sodium   Topical (Top) Daily    epoetin shefali (PROCRIT) injection  7,000 Units Intravenous Every Mon, Wed, Fri    gabapentin   300 mg Oral QHS    insulin aspart U-100  6 Units Subcutaneous TIDWM    insulin detemir U-100  20 Units Subcutaneous QHS    midodrine  10 mg Oral TID    pantoprazole  40 mg Per NG tube BID    polyethylene glycol  17 g Oral Daily    polyethylene glycol  17 g Oral Once    pravastatin  40 mg Oral Daily    senna-docusate 8.6-50 mg  1 tablet Oral BID     PRN Meds:sodium chloride, sodium chloride, sodium chloride, sodium chloride 0.9%, acetaminophen, dextrose 50%, dextrose 50%, glucagon (human recombinant), glucose, glucose, insulin aspart U-100, magnesium sulfate IVPB, ondansetron, oxyCODONE       Objective:     Weight: 134.3 kg (296 lb 1.2 oz)  Body mass index is 46.37 kg/m².  Vital Signs (Most Recent):  Temp: 98.2 °F (36.8 °C) (03/07/19 0702)  Pulse: 74 (03/07/19 1002)  Resp: 15 (03/07/19 1002)  BP: (!) 142/63 (03/07/19 1002)  SpO2: 98 % (03/07/19 1002) Vital Signs (24h Range):  Temp:  [97.9 °F (36.6 °C)-98.5 °F (36.9 °C)] 98.2 °F (36.8 °C)  Pulse:  [67-93] 74  Resp:  [8-23] 15  SpO2:  [95 %-100 %] 98 %  BP: (101-192)/(43-83) 142/63  Arterial Line BP: (117-172)/(34-68) 151/53     Date 03/07/19 0700 - 03/08/19 0659   Shift 5495-3710 3556-0777 9397-4589 24 Hour Total   INTAKE   P.O. 200   200   I.V.(mL/kg) 43.2(0.3)   43.2(0.3)   Shift Total(mL/kg) 243.2(1.8)   243.2(1.8)   OUTPUT   Shift Total(mL/kg)       Weight (kg) 134.3 134.3 134.3 134.3                        Closed/Suction Drain 03/03/19 1431 Posterior Neck Accordion 10 Fr. (Active)   Site Description Unable to view 3/4/2019 11:01 AM   Drainage Sanguineous 3/4/2019  3:25 AM   Status To bulb suction 3/4/2019 11:01 AM   Output (mL) 110 mL 3/3/2019  5:40 PM            NG/OG Tube 03/03/19 1842 Left mouth (Active)   Placement Check placement verified by x-ray;placement verified by aspirate characteristics;placement verified by distal tube length measurement 3/4/2019 11:01 AM   Tolerance no signs/symptoms of discomfort 3/4/2019 11:01 AM   Securement secured to  commercial device 3/4/2019 11:01 AM   Clamp Status/Tolerance clamped 3/4/2019 11:01 AM   Insertion Site Appearance no redness, warmth, tenderness, skin breakdown, drainage 3/4/2019 11:01 AM   Flush/Irrigation flushed w/;water;no resistance met 3/4/2019 11:01 AM   Feeding Action feeding held 3/4/2019 11:01 AM   Intake (mL) 120 mL 3/4/2019  9:01 AM   Tube Output(mL)(Include Discarded Residual) 160 mL 3/3/2019  6:00 PM       Male External Urinary Catheter 03/03/19 1648 Medium (Active)   Collection Container Urimeter 3/4/2019  3:25 AM   Securement Method secured to top of thigh w/ adhesive device 3/4/2019  3:25 AM   Skin no redness;no breakdown 3/4/2019  3:25 AM   Tolerance no signs/symptoms of discomfort 3/4/2019  3:25 AM   Output (mL) 0 mL 3/3/2019  6:00 PM            Hemodialysis AV Fistula Left forearm (Active)   Needle Size 15ga 3/1/2019  4:19 PM   Site Assessment Intact 3/4/2019  3:25 AM   Patency Present;Thrill;Bruit 3/4/2019  3:25 AM   Status Deaccessed 3/4/2019  3:25 AM   Flows Good 2/28/2019  8:20 AM   Dressing Status Clean;Dry;Intact 3/2/2019  3:01 PM   Site Condition No complications 3/4/2019  3:25 AM   Dressing Gauze 3/4/2019  3:25 AM            Trialysis (Dialysis) Catheter 03/04/19 0250 left internal jugular (Active)   IV Device Securement sutures 3/4/2019 11:01 AM   Patency/Care flushed w/o difficulty;infusing 3/4/2019 11:01 AM   Site Assessment Clean;Dry;Intact;No redness;No swelling 3/4/2019 11:01 AM   Status Accessed 3/4/2019 11:01 AM   Flows Good 3/4/2019 11:01 AM   Dressing Intervention Dressing reinforced 3/4/2019 11:01 AM   Dressing Status Biopatch in place;Clean;Dry;Intact 3/4/2019 11:01 AM   Dressing Change Due 03/11/19 3/4/2019 11:01 AM   Daily Line Review Performed 3/4/2019 11:01 AM       Neurosurgery Physical Exam   [id number]  AO x3  CN II -XII grossly intact  4-/5 BUE  5/5 BLE  SILT  +Sanford bilaterally w 3+ UE reflexes  2+ LE reflexes, no clonus or Babinski    Significant Labs:  Recent Labs    Lab 03/06/19  1441 03/06/19 2003 03/07/19  0358   * 196*  196*  196* 231*  231*   * 134*  134*  134*  134* 136  136   K 3.7 4.1  4.1  4.1  4.1 4.2  4.2    103  103  103 103  103   CO2 24 22*  22*  22* 22*  22*   BUN 36* 36*  36*  36* 33*  33*   CREATININE 5.5* 5.8*  5.8*  5.8* 5.5*  5.5*   CALCIUM 8.5* 8.7  8.7  8.7 8.9  8.9   MG 2.1 2.3  2.3 2.0  2.0     Recent Labs   Lab 03/06/19  0129 03/06/19 2205 03/07/19  0358   WBC 15.72* 15.56* 9.36   HGB 7.4* 8.1* 7.9*   HCT 23.6* 25.0* 24.8*   * 83* 68*     Recent Labs   Lab 03/06/19  1207 03/07/19  0358   INR 1.1 1.0     Microbiology Results (last 7 days)     ** No results found for the last 168 hours. **          Significant Diagnostics:    Reviewed.    Review of Systems        Assessment/Plan:     * S/P laminectomy     58 M with progressive cervical myelopathy and stenosis from C3-6 with myelomalacia. S/p code for hypovolemia during HD. Stable this morning and labs WNL. He stepped up to ICU for further care and work up, now s/p C3-6 posterior decompression.      -HOB >30  -Pain control   -No C collar needed  -MAP goals >75 mmHg x 5 days (5/5) completed, Please wean off pressors.   -Pain control PRN  -HD per nephrology  -Daily PT/OT  -Medical management per NCC  -Planning for stepdown to NSGY tommrow  -Please call Neurosurgery with any questions or exam changes          Jemima Thomas MD  Neurosurgery  [facility]

## 2019-03-07 NOTE — PLAN OF CARE
03/07/19 1255   Discharge Reassessment   Assessment Type Discharge Planning Reassessment   Provided patient/caregiver education on the expected discharge date and the discharge plan No   Do you have any problems affording any of your prescribed medications? No   Discharge Plan A Skilled Nursing Facility   Discharge Plan B Rehab   DME Needed Upon Discharge  other (see comments)  (tbd)   Patient choice form signed by patient/caregiver N/A   Anticipated Discharge Disposition SNF   Can the patient answer the patient profile reliably? Yes, cognitively intact   Describe the patient's ability to walk at the present time. Does not walk or unable to take any steps at all   How often would a person be available to care for the patient? Whenever needed   Number of comorbid conditions (as recorded on the chart) Three   Post-Acute Status   Post-Acute Authorization Placement   Post-Acute Placement Status Awaiting Therapy Documentation  (Therapy ordered per MD)   Discharge Delays None       Celeste Deluca, RN, CCRN-K, [facility]  Neuro-Critical Care   [phone]

## 2019-03-07 NOTE — PROGRESS NOTES
CRRT:    Treatment ran for 6 hours 30 mins until system clotted off.    CRRT restarted via left IJ trialysis. UF rate set to 350ml/hr  Daily machine checks done.

## 2019-03-07 NOTE — PLAN OF CARE
Problem: Adult Inpatient Plan of Care  Goal: Plan of Care Review  Outcome: Ongoing (interventions implemented as appropriate)  No acute events throughout shift. See vital signs and assessments in flowsheets. See below for updates on today's progress.     Pulmonary: pt remains on room air    Cardiovascular: levo gtt continued to maintain MAP >75     Neurological: AAO x 4    Gastrointestinal: attempted to have BM, unsuccessful     Genitourinary: anuric    Endocrine: BG 190s at 220, short-acting insulin held    Integumentary/Other: skin intact  CRRT clotted of x 2, rinsed back both times    Infusions: levo gtt    Patient progressing towards goals as tolerated, plan of care communicated and reviewed with pt and family. All concerns addressed. Will continue to monitor.

## 2019-03-07 NOTE — NURSING
Per NSGY note, MAPs no longer need to be > 75. ROBIN Benz notified. Orders will be changed to MAPs > 65.

## 2019-03-07 NOTE — ASSESSMENT & PLAN NOTE
Caution with insulin stacking  Estimated Creatinine Clearance: 17.3 mL/min (A) (based on SCr of 6.4 mg/dL (H)).

## 2019-03-07 NOTE — PROGRESS NOTES
Venous pressure alarming on CRRT, air visible in line. Dialysis nurse notified. Instructed to rinse pt back. Done and awaiting dialysis RN.

## 2019-03-07 NOTE — ASSESSMENT & PLAN NOTE
58 M with progressive cervical myelopathy and stenosis from C3-6 with myelomalacia. S/p code for hypovolemia during HD. Stable this morning and labs WNL. He stepped up to ICU for further care and work up, now s/p C3-6 posterior decompression.    -Will need fusion in future, will discuss OR timing with   -HOB >30  -Pain control   -No C collar needed  -MAP goals >75 mmHg x 5 days (5/5) completed, Please wean off pressors.   -Pain control PRN  -HD per nephrology  -Daily PT/OT  -Medical management per NCC  -Planning for stepdown to NSGY tommrow  -Please call Neurosurgery with any questions or exam changes

## 2019-03-07 NOTE — PROGRESS NOTES
"[facility]  Endocrinology  Progress Note    Admit Date: 2019     Reason for Consult: Management of T2DM, Hyperglycemia     Surgical Procedure and Date: Cervical laminectomy C3-C6, C6-C7 w/ medial discectomy 3/3/19    Diabetes diagnosis year:     Lab Results   Component Value Date    HGBA1C 5.0 2019     Home Diabetes Medications: Basaglar 40 units q HS, Victoza 1.2 mg once daily,     How often checking glucose at home? 4x per day   BG readings on regimen: 90-120s  Hypoglycemia on the regimen?  Yes - about once per month  Missed doses on regimen?  No    Diabetes Complications include:     Hypoglycemia , Diabetic nephropathy  , Diabetic chronic kidney disease     , Diabetic retinopathy , Diabetic cataract  and Diabetic peripheral neuropathy     Complicating diabetes co morbidities:   MIKE, CKD and ESRD      HPI:   Patient is a 58 y.o. male with a diagnosis of HTN, HLD, MIKE, ESRD on HD, and DM2, who is s/p cervical laminectomy C3-C6, C6-C7 w/ medial discectomy 3/3/19.  Patient was admitted to the ED on 19 for upper extremity weakness and ataxia following a fall.  Patient's DM managed by PCP, Dr. Shemar Berry in [place].  Patient is prescribed MDI but only takes basal insulin and Victoza.  Positive family history of DM (mother, father, and both maternal grandparents).  Endocrine consulted for DM/BG management.    Interval HPI:   Overnight events: Remains in [facility], NAEON.  BG elevated yesterday with prandial insulin only, received first dose of basal insulin last night.  CRRT per nephrology.   Eatin%  Nausea: No  Hypoglycemia and intervention: No  Fever: No  TPN and/or TF: No    BP (!) 147/63 (BP Location: Right arm, Patient Position: Lying)   Pulse 76   Temp 98.2 °F (36.8 °C) (Oral)   Resp 17   Ht 5' 7" (1.702 m)   Wt 134.3 kg (296 lb 1.2 oz)   SpO2 98%   BMI 46.37 kg/m²      Labs Reviewed and Include    Recent Labs   Lab 19  0358   *  231*   CALCIUM 8.9  " 8.9   ALBUMIN 2.4*  2.4*   PROT 5.2*     136   K 4.2  4.2   CO2 22*  22*     103   BUN 33*  33*   CREATININE 5.5*  5.5*   ALKPHOS 139*   ALT 11   AST 20   BILITOT 1.1*     Lab Results   Component Value Date    WBC 9.36 03/07/2019    HGB 7.9 (L) 03/07/2019    HCT 24.8 (L) 03/07/2019     (H) 03/07/2019    PLT 68 (L) 03/07/2019     No results for input(s): TSH, FREET4 in the last 168 hours.  Lab Results   Component Value Date    HGBA1C 5.0 02/27/2019       Nutritional status:   Body mass index is 46.37 kg/m².  Lab Results   Component Value Date    ALBUMIN 2.4 (L) 03/07/2019    ALBUMIN 2.4 (L) 03/07/2019    ALBUMIN 2.3 (L) 03/06/2019    ALBUMIN 2.3 (L) 03/06/2019     No results found for: PREALBUMIN    Estimated Creatinine Clearance: 19.8 mL/min (A) (based on SCr of 5.5 mg/dL (H)).    Accu-Checks  Recent Labs     03/05/19  0512 03/05/19  1410 03/05/19  1831 03/06/19  0011 03/06/19  0547 03/06/19  1215 03/06/19  1823 03/06/19 2012 03/06/19  2117 03/07/19  0842   POCTGLUCOSE 202* 212* 229* 265* 225* 255* 235* 202* 196* 221*       Current Medications and/or Treatments Impacting Glycemic Control  Immunotherapy:    Immunosuppressants     None        Steroids:   Hormones (From admission, onward)    None        Pressors:    Autonomic Drugs (From admission, onward)    Start     Stop Route Frequency Ordered    03/06/19 1500  midodrine tablet 10 mg      -- Oral 3 times daily 03/06/19 0950    03/05/19 0615  norepinephrine 32 mg in dextrose 5 % 250 mL infusion     Question Answer Comment   Titrate by: (in mcg/kg/min) 0.02    Titrate interval: (in minutes) 5    Titrate to maintain: (MAP or SBP) MAP    Greater than: (in mmHg) 75    Maximum dose: (in mcg/kg/min) 3        -- IV Continuous 03/05/19 0613        Hyperglycemia/Diabetes Medications:   Antihyperglycemics (From admission, onward)    Start     Stop Route Frequency Ordered    03/06/19 2100  insulin detemir U-100 pen 18 Units      -- SubQ Nightly  03/06/19 1538    03/06/19 1645  insulin aspart U-100 pen 6 Units      -- SubQ 3 times daily with meals 03/06/19 1538    03/06/19 1638  insulin aspart U-100 pen 0-5 Units      -- SubQ Before meals & nightly PRN 03/06/19 1538          ASSESSMENT and PLAN    * S/P laminectomy    S/p cervical laminectomy C3-C6, C6-C7 w/ medial discectomy 3/3/19  Managed per primary team  Avoid hypoglycemia  Optimize BG control for surgical wound healing         Diabetes mellitus, type 2    BG goal 140-180    Increase Levemir to 20 units q HS - due to elevated FBG  Novolog 6 units with meals  Low dose correction scale given kidney function  BG monitoring AC/HS    ADDENDUM: Increased prandial Novolog to 8 units with meals     Discharge planning: Patient will need insulin dosing changes upon discharge.  Currently only taking basal insulin plus Victoza.       ESRD (end stage renal disease) on dialysis    Caution with insulin stacking  Estimated Creatinine Clearance: 17.3 mL/min (A) (based on SCr of 6.4 mg/dL (H)).         MIKE (obstructive sleep apnea) - very severe latest sleep study says BPAP @ 16/8    May affect BG readings if uncontrolled         Anemia due to stage 5 chronic kidney disease    Affects A1C accuracy  Consider checking fructosamine at least 2 weeks post hospital discharge to evaluate BG control     Class 3 severe obesity due to excess calories with serious comorbidity and body mass index (BMI) of 45.0 to 49.9 in adult    may contribute to insulin resistance  Body mass index is 45.01 kg/m².           Seen in conjunction with ROBIN Brown NP  Endocrinology  [facility]

## 2019-03-07 NOTE — ASSESSMENT & PLAN NOTE
BG goal 140-180    Increase Levemir to 20 units q HS - due to elevated FBG  Novolog 6 units with meals  Low dose correction scale given kidney function  BG monitoring AC/HS    ADDENDUM: Increased prandial Novolog to 8 units with meals     Discharge planning: Patient will need insulin dosing changes upon discharge.  Currently only taking basal insulin plus Victoza.

## 2019-03-07 NOTE — PLAN OF CARE
Problem: Adult Inpatient Plan of Care  Goal: Plan of Care Review  Outcome: Ongoing (interventions implemented as appropriate)  POC reviewed with pt and family at 1400. Pt verbalized understanding. Questions and concerns addressed. No acute events today. Pt progressing toward goals. Will continue to monitor. See flowsheets for full assessment and VS info.

## 2019-03-07 NOTE — SUBJECTIVE & OBJECTIVE
Interval History: Last day of MAP goal. NAEON. Stable on Exam. On Levo.    Medications:  Continuous Infusions:   sodium chloride 0.9% 200 mL/hr at 03/07/19 0200    norepinephrine bitartrate-D5W 0.16 mcg/kg/min (03/07/19 1002)     Scheduled Meds:   sodium chloride 0.9%   Intravenous Once    diclofenac sodium   Topical (Top) Daily    epoetin shefali (PROCRIT) injection  7,000 Units Intravenous Every Mon, Wed, Fri    gabapentin  300 mg Oral QHS    insulin aspart U-100  6 Units Subcutaneous TIDWM    insulin detemir U-100  20 Units Subcutaneous QHS    midodrine  10 mg Oral TID    pantoprazole  40 mg Per NG tube BID    polyethylene glycol  17 g Oral Daily    polyethylene glycol  17 g Oral Once    pravastatin  40 mg Oral Daily    senna-docusate 8.6-50 mg  1 tablet Oral BID     PRN Meds:sodium chloride, sodium chloride, sodium chloride, sodium chloride 0.9%, acetaminophen, dextrose 50%, dextrose 50%, glucagon (human recombinant), glucose, glucose, insulin aspart U-100, magnesium sulfate IVPB, ondansetron, oxyCODONE       Objective:     Weight: 134.3 kg (296 lb 1.2 oz)  Body mass index is 46.37 kg/m².  Vital Signs (Most Recent):  Temp: 98.2 °F (36.8 °C) (03/07/19 0702)  Pulse: 74 (03/07/19 1002)  Resp: 15 (03/07/19 1002)  BP: (!) 142/63 (03/07/19 1002)  SpO2: 98 % (03/07/19 1002) Vital Signs (24h Range):  Temp:  [97.9 °F (36.6 °C)-98.5 °F (36.9 °C)] 98.2 °F (36.8 °C)  Pulse:  [67-93] 74  Resp:  [8-23] 15  SpO2:  [95 %-100 %] 98 %  BP: (101-192)/(43-83) 142/63  Arterial Line BP: (117-172)/(34-68) 151/53     Date 03/07/19 0700 - 03/08/19 0659   Shift 0437-4436 0290-8840 7729-5407 24 Hour Total   INTAKE   P.O. 200   200   I.V.(mL/kg) 43.2(0.3)   43.2(0.3)   Shift Total(mL/kg) 243.2(1.8)   243.2(1.8)   OUTPUT   Shift Total(mL/kg)       Weight (kg) 134.3 134.3 134.3 134.3                        Closed/Suction Drain 03/03/19 1431 Posterior Neck Accordion 10 Fr. (Active)   Site Description Unable to view 3/4/2019 11:01  AM   Drainage Sanguineous 3/4/2019  3:25 AM   Status To bulb suction 3/4/2019 11:01 AM   Output (mL) 110 mL 3/3/2019  5:40 PM            NG/OG Tube 03/03/19 1658 Left mouth (Active)   Placement Check placement verified by x-ray;placement verified by aspirate characteristics;placement verified by distal tube length measurement 3/4/2019 11:01 AM   Tolerance no signs/symptoms of discomfort 3/4/2019 11:01 AM   Securement secured to commercial device 3/4/2019 11:01 AM   Clamp Status/Tolerance clamped 3/4/2019 11:01 AM   Insertion Site Appearance no redness, warmth, tenderness, skin breakdown, drainage 3/4/2019 11:01 AM   Flush/Irrigation flushed w/;water;no resistance met 3/4/2019 11:01 AM   Feeding Action feeding held 3/4/2019 11:01 AM   Intake (mL) 120 mL 3/4/2019  9:01 AM   Tube Output(mL)(Include Discarded Residual) 160 mL 3/3/2019  6:00 PM       Male External Urinary Catheter 03/03/19 1648 Medium (Active)   Collection Container Urimeter 3/4/2019  3:25 AM   Securement Method secured to top of thigh w/ adhesive device 3/4/2019  3:25 AM   Skin no redness;no breakdown 3/4/2019  3:25 AM   Tolerance no signs/symptoms of discomfort 3/4/2019  3:25 AM   Output (mL) 0 mL 3/3/2019  6:00 PM            Hemodialysis AV Fistula Left forearm (Active)   Needle Size 15ga 3/1/2019  4:19 PM   Site Assessment Intact 3/4/2019  3:25 AM   Patency Present;Thrill;Bruit 3/4/2019  3:25 AM   Status Deaccessed 3/4/2019  3:25 AM   Flows Good 2/28/2019  8:20 AM   Dressing Status Clean;Dry;Intact 3/2/2019  3:01 PM   Site Condition No complications 3/4/2019  3:25 AM   Dressing Gauze 3/4/2019  3:25 AM            Trialysis (Dialysis) Catheter 03/04/19 0250 left internal jugular (Active)   IV Device Securement sutures 3/4/2019 11:01 AM   Patency/Care flushed w/o difficulty;infusing 3/4/2019 11:01 AM   Site Assessment Clean;Dry;Intact;No redness;No swelling 3/4/2019 11:01 AM   Status Accessed 3/4/2019 11:01 AM   Flows Good 3/4/2019 11:01 AM   Dressing  Intervention Dressing reinforced 3/4/2019 11:01 AM   Dressing Status Biopatch in place;Clean;Dry;Intact 3/4/2019 11:01 AM   Dressing Change Due 03/11/19 3/4/2019 11:01 AM   Daily Line Review Performed 3/4/2019 11:01 AM       Neurosurgery Physical Exam   [id number]  AO x3  CN II -XII grossly intact  4-/5 BUE  5/5 BLE  SILT  +[place] bilaterally w 3+ UE reflexes  2+ LE reflexes, no clonus or Babinski    Significant Labs:  Recent Labs   Lab 03/06/19  1441 03/06/19 2003 03/07/19  0358   * 196*  196*  196* 231*  231*   * 134*  134*  134*  134* 136  136   K 3.7 4.1  4.1  4.1  4.1 4.2  4.2    103  103  103 103  103   CO2 24 22*  22*  22* 22*  22*   BUN 36* 36*  36*  36* 33*  33*   CREATININE 5.5* 5.8*  5.8*  5.8* 5.5*  5.5*   CALCIUM 8.5* 8.7  8.7  8.7 8.9  8.9   MG 2.1 2.3  2.3 2.0  2.0     Recent Labs   Lab 03/06/19  0129 03/06/19  2205 03/07/19  0358   WBC 15.72* 15.56* 9.36   HGB 7.4* 8.1* 7.9*   HCT 23.6* 25.0* 24.8*   * 83* 68*     Recent Labs   Lab 03/06/19  1207 03/07/19  0358   INR 1.1 1.0     Microbiology Results (last 7 days)     ** No results found for the last 168 hours. **          Significant Diagnostics:    Reviewed.    Review of Systems

## 2019-03-07 NOTE — SUBJECTIVE & OBJECTIVE
Interval History: 8 hr SLED completed overnight, pt remains net positive 1.2 L but appears comfortable on assessment. Alert, on room air. Remains on Levophed for a MAP goal >75 per primary, last day of MAP goal Neurosurgery note.     Review of patient's allergies indicates:   Allergen Reactions    Keflex [cephalexin] Nausea Only     Current Facility-Administered Medications   Medication Frequency    0.9%  NaCl infusion (CRRT USE ONLY) Continuous    0.9%  NaCl infusion (for blood administration) Q24H PRN    0.9%  NaCl infusion (for blood administration) Q24H PRN    0.9%  NaCl infusion (for blood administration) Q24H PRN    0.9%  NaCl infusion PRN    0.9%  NaCl infusion Once    acetaminophen tablet 500 mg Q6H PRN    dextrose 50% injection 12.5 g PRN    dextrose 50% injection 25 g PRN    diclofenac sodium 1 % gel Daily    epoetin shefali (PROCRIT) injection 7,000 units kit Every Mon, Wed, Fri    gabapentin capsule 300 mg QHS    glucagon (human recombinant) injection 1 mg PRN    glucose chewable tablet 16 g PRN    glucose chewable tablet 24 g PRN    insulin aspart U-100 pen 0-5 Units QID (AC + HS) PRN    insulin aspart U-100 pen 6 Units TIDWM    insulin detemir U-100 pen 20 Units QHS    magnesium sulfate 2g in water 50mL IVPB (premix) PRN    midodrine tablet 10 mg TID    norepinephrine 32 mg in dextrose 5 % 250 mL infusion Continuous    ondansetron injection 4 mg Q6H PRN    oxyCODONE immediate release tablet 5 mg Q6H PRN    pantoprazole suspension 40 mg BID    polyethylene glycol packet 17 g Daily    pravastatin tablet 40 mg Daily    senna-docusate 8.6-50 mg per tablet 1 tablet BID       Objective:     Vital Signs (Most Recent):  Temp: 98 °F (36.7 °C) (03/07/19 1102)  Pulse: 77 (03/07/19 1102)  Resp: 17 (03/07/19 1102)  BP: (!) 149/68 (03/07/19 1102)  SpO2: 97 % (03/07/19 1102)  O2 Device (Oxygen Therapy): room air (03/07/19 1102) Vital Signs (24h Range):  Temp:  [97.9 °F (36.6 °C)-98.5 °F (36.9  °C)] 98 °F (36.7 °C)  Pulse:  [67-93] 77  Resp:  [8-23] 17  SpO2:  [95 %-100 %] 97 %  BP: (101-192)/(43-83) 149/68  Arterial Line BP: (117-172)/(34-68) 136/65     Weight: 134.3 kg (296 lb 1.2 oz) (03/02/19 0300)  Body mass index is 46.37 kg/m².  Body surface area is 2.52 meters squared.    I/O last 3 completed shifts:  In: 4321.2 [P.O.:700; I.V.:3007.8; Blood:613.3]  Out: 2827 [Drains:15; Other:2812]    Physical Exam   Constitutional: He is oriented to person, place, and time. He appears well-developed and well-nourished. Nasal cannula in place.    male who appears stated age, lying in bed   HENT:   Head: Normocephalic and atraumatic.   Mouth/Throat: Oropharynx is clear and moist. No oropharyngeal exudate.   Eyes: EOM are normal. Pupils are equal, round, and reactive to light.   Neck: No JVD present. No tracheal deviation present.   Cardiovascular: Regular rhythm, normal heart sounds and intact distal pulses.   Pulmonary/Chest: Effort normal. No stridor. No respiratory distress. He has rales.   Abdominal: Soft. Bowel sounds are normal. He exhibits no distension and no mass. There is no tenderness. There is no guarding.   Obese   Musculoskeletal: He exhibits edema. He exhibits no tenderness or deformity.   Palpable thrill in LUE fistula  1+ pitting edema to bilateral shins   Neurological: He is alert and oriented to person, place, and time.   Skin: No rash noted. He is not diaphoretic. No erythema.   Psychiatric: He has a normal mood and affect. His behavior is normal. Thought content normal.     Significant Labs:  CBC:   Recent Labs   Lab 03/07/19  0358   WBC 9.36   RBC 2.46*   HGB 7.9*   HCT 24.8*   PLT 68*   *   MCH 32.1*   MCHC 31.9*     CMP:   Recent Labs   Lab 03/07/19  0358   *  231*   CALCIUM 8.9  8.9   ALBUMIN 2.4*  2.4*   PROT 5.2*     136   K 4.2  4.2   CO2 22*  22*     103   BUN 33*  33*   CREATININE 5.5*  5.5*   ALKPHOS 139*   ALT 11   AST 20   BILITOT 1.1*

## 2019-03-07 NOTE — SUBJECTIVE & OBJECTIVE
"Interval HPI:   Overnight events: Remains in [facility], NAEON.  BG elevated yesterday with prandial insulin only, received first dose of basal insulin last night.  CRRT per nephrology.   Eatin%  Nausea: No  Hypoglycemia and intervention: No  Fever: No  TPN and/or TF: No    BP (!) 147/63 (BP Location: Right arm, Patient Position: Lying)   Pulse 76   Temp 98.2 °F (36.8 °C) (Oral)   Resp 17   Ht 5' 7" (1.702 m)   Wt 134.3 kg (296 lb 1.2 oz)   SpO2 98%   BMI 46.37 kg/m²     Labs Reviewed and Include    Recent Labs   Lab 19  0358   *  231*   CALCIUM 8.9  8.9   ALBUMIN 2.4*  2.4*   PROT 5.2*     136   K 4.2  4.2   CO2 22*  22*     103   BUN 33*  33*   CREATININE 5.5*  5.5*   ALKPHOS 139*   ALT 11   AST 20   BILITOT 1.1*     Lab Results   Component Value Date    WBC 9.36 2019    HGB 7.9 (L) 2019    HCT 24.8 (L) 2019     (H) 2019    PLT 68 (L) 2019     No results for input(s): TSH, FREET4 in the last 168 hours.  Lab Results   Component Value Date    HGBA1C 5.0 2019       Nutritional status:   Body mass index is 46.37 kg/m².  Lab Results   Component Value Date    ALBUMIN 2.4 (L) 2019    ALBUMIN 2.4 (L) 2019    ALBUMIN 2.3 (L) 2019    ALBUMIN 2.3 (L) 2019     No results found for: PREALBUMIN    Estimated Creatinine Clearance: 19.8 mL/min (A) (based on SCr of 5.5 mg/dL (H)).    Accu-Checks  Recent Labs     19  0512 19  1410 19  1831 19  0011 19  0547 19  1215 19  1823 19  2117 19  0842   POCTGLUCOSE 202* 212* 229* 265* 225* 255* 235* 202* 196* 221*       Current Medications and/or Treatments Impacting Glycemic Control  Immunotherapy:    Immunosuppressants     None        Steroids:   Hormones (From admission, onward)    None        Pressors:    Autonomic Drugs (From admission, onward)    Start     Stop Route Frequency Ordered    19 1500  " midodrine tablet 10 mg      -- Oral 3 times daily 03/06/19 0950    03/05/19 0615  norepinephrine 32 mg in dextrose 5 % 250 mL infusion     Question Answer Comment   Titrate by: (in mcg/kg/min) 0.02    Titrate interval: (in minutes) 5    Titrate to maintain: (MAP or SBP) MAP    Greater than: (in mmHg) 75    Maximum dose: (in mcg/kg/min) 3        -- IV Continuous 03/05/19 0613        Hyperglycemia/Diabetes Medications:   Antihyperglycemics (From admission, onward)    Start     Stop Route Frequency Ordered    03/06/19 2100  insulin detemir U-100 pen 18 Units      -- SubQ Nightly 03/06/19 1538    03/06/19 1645  insulin aspart U-100 pen 6 Units      -- SubQ 3 times daily with meals 03/06/19 1538    03/06/19 1638  insulin aspart U-100 pen 0-5 Units      -- SubQ Before meals & nightly PRN 03/06/19 1538

## 2019-03-07 NOTE — PROGRESS NOTES
ICU Progress Note  Neurocritical Care    Admit Date: 2/28/2019  LOS: 7    CC: S/P laminectomy    Code Status: Full Code     SUBJECTIVE:     Interval History/Significant Events:    On cammy gtt  Hb still low- kidney clotted  esrd  Slight hypovolemia  Body mass index is 46.37 kg/m².      Medications:  Continuous Infusions:   sodium chloride 0.9% 200 mL/hr at 03/07/19 0200    norepinephrine bitartrate-D5W 0.16 mcg/kg/min (03/07/19 1002)     Scheduled Meds:   sodium chloride 0.9%   Intravenous Once    diclofenac sodium   Topical (Top) Daily    epoetin shefali (PROCRIT) injection  7,000 Units Intravenous Every Mon, Wed, Fri    gabapentin  300 mg Oral QHS    insulin aspart U-100  6 Units Subcutaneous TIDWM    insulin detemir U-100  20 Units Subcutaneous QHS    midodrine  10 mg Oral TID    pantoprazole  40 mg Per NG tube BID    polyethylene glycol  17 g Oral Daily    pravastatin  40 mg Oral Daily    senna-docusate 8.6-50 mg  1 tablet Oral BID     PRN Meds:.sodium chloride, sodium chloride, sodium chloride, sodium chloride 0.9%, acetaminophen, dextrose 50%, dextrose 50%, glucagon (human recombinant), glucose, glucose, insulin aspart U-100, magnesium sulfate IVPB, ondansetron, oxyCODONE    OBJECTIVE:   Vital Signs (Most Recent):   Temp: 98.2 °F (36.8 °C) (03/07/19 0702)  Pulse: 74 (03/07/19 1002)  Resp: 15 (03/07/19 1002)  BP: (!) 142/63 (03/07/19 1002)  SpO2: 98 % (03/07/19 1002)    Vital Signs (24h Range):   Temp:  [97.9 °F (36.6 °C)-98.5 °F (36.9 °C)] 98.2 °F (36.8 °C)  Pulse:  [67-93] 74  Resp:  [8-23] 15  SpO2:  [95 %-100 %] 98 %  BP: (101-192)/(43-83) 142/63  Arterial Line BP: (117-172)/(34-68) 151/53    ICP/CPP (Last 24h):   CO:  [10.3 L/min-14.2 L/min] 10.3 L/min  CI:  [4.3 L/min/m2-5.9 L/min/m2] 4.4 L/min/m2    I & O (Last 24h):     Intake/Output Summary (Last 24 hours) at 3/7/2019 1052  Last data filed at 3/7/2019 1002  Gross per 24 hour   Intake 4071.89 ml   Output 2827 ml   Net 1244.89 ml     Physical  Exam:  GA: Alert, comfortable, no acute distress.   HEENT: No scleral icterus or JVD.   Pulmonary: Clear to auscultation A/P/L. No wheezing, crackles, or rhonchi.  Cardiac: RRR S1 & S2 w/o rubs/murmurs/gallops.   Abdominal: Bowel sounds present x 4. No appreciable hepatosplenomegaly.  Skin: No jaundice, rashes, or visible lesions.  Neuro:   awake alert  ox4  3-4/ all exts      Lines/Drains/Airway:     a3  rrt-3       Trialysis (Dialysis) Catheter 03/04/19 0250 left internal jugular (Active)   IV Device Securement sutures 3/7/2019  7:02 AM   Additional Site Signs no erythema;no warmth;no edema;no pain 3/7/2019  7:02 AM   Patency/Care flushed w/o difficulty;normal saline locked 3/7/2019  7:02 AM   Waveform normal 3/7/2019  7:02 AM   Site Assessment Clean;Dry;Intact;No redness;No swelling 3/7/2019  7:02 AM   Status Flushed 3/7/2019  7:02 AM   Flows Good 3/7/2019  7:02 AM   Dressing Intervention Dressing reinforced 3/7/2019  7:02 AM   Dressing Status Biopatch in place;Clean;Dry;Intact 3/7/2019  7:02 AM   Dressing Change Due 03/11/19 3/7/2019  7:02 AM   Verification by X-ray Yes 3/7/2019  7:02 AM   Site Condition No complications 3/7/2019  7:02 AM   Dressing Occlusive 3/7/2019  7:02 AM   Daily Line Review Performed 3/7/2019  7:02 AM           Arterial Line 03/03/19 1216 Right Radial (Active)   Site Assessment Clean;Dry;Intact;No redness;No swelling 3/7/2019  7:02 AM   Line Status Pulsatile blood flow 3/7/2019  7:02 AM   Art Line Waveform Appropriate 3/7/2019  7:02 AM   Arterial Line Interventions Zeroed and calibrated;Leveled 3/7/2019  7:02 AM   Color/Movement/Sensation Capillary refill less than 3 sec 3/7/2019  7:02 AM   Dressing Type Transparent 3/7/2019  7:02 AM   Dressing Status Biopatch in place;Clean;Intact;Dry 3/7/2019  7:02 AM   Dressing Intervention Dressing reinforced 3/7/2019  7:02 AM   Dressing Change Due 03/10/19 3/7/2019  7:02 AM           Closed/Suction Drain 03/03/19 1431 Posterior Neck Accordion 10 Fr.  (Active)   Site Description Unable to view 3/7/2019  7:02 AM   Dressing Type Gauze 3/7/2019  7:02 AM   Dressing Status Clean;Intact;Dry 3/7/2019  7:02 AM   Dressing Intervention Dressing reinforced 3/7/2019  7:02 AM   Drainage Serosanguineous 3/7/2019  7:02 AM   Status Other (Comment) 3/7/2019  7:02 AM   Output (mL) 10 mL 3/7/2019  5:00 AM            Hemodialysis AV Fistula Left forearm (Active)   Needle Size 15ga 3/1/2019  4:19 PM   Site Assessment Clean;Dry;Intact;No redness;No swelling 3/7/2019  7:02 AM   Patency Present;Thrill;Bruit 3/7/2019  7:02 AM   Status Deaccessed 3/7/2019  7:02 AM   Flows Good 2/28/2019  8:20 AM   Dressing Intervention Dressing reinforced 3/6/2019  3:05 AM   Dressing Status Clean;Dry;Intact 3/2/2019  3:01 PM   Site Condition No complications 3/7/2019  7:02 AM   Dressing Open to air (None) 3/7/2019  7:02 AM            Trialysis (Dialysis) Catheter 03/04/19 0250 left internal jugular (Active)   IV Device Securement sutures 3/7/2019  7:02 AM   Additional Site Signs no erythema;no warmth;no edema;no pain 3/7/2019  7:02 AM   Patency/Care flushed w/o difficulty;normal saline locked 3/7/2019  7:02 AM   Waveform normal 3/7/2019  7:02 AM   Site Assessment Clean;Dry;Intact;No redness;No swelling 3/7/2019  7:02 AM   Status Flushed 3/7/2019  7:02 AM   Flows Good 3/7/2019  7:02 AM   Dressing Intervention Dressing reinforced 3/7/2019  7:02 AM   Dressing Status Biopatch in place;Clean;Dry;Intact 3/7/2019  7:02 AM   Dressing Change Due 03/11/19 3/7/2019  7:02 AM   Verification by X-ray Yes 3/7/2019  7:02 AM   Site Condition No complications 3/7/2019  7:02 AM   Dressing Occlusive 3/7/2019  7:02 AM   Daily Line Review Performed 3/7/2019  7:02 AM     Nutrition/Tube Feeds (if NPO state why):  po    Labs:  ABG: No results for input(s): PH, PO2, PCO2, HCO3, POCSATURATED, BE in the last 24 hours.  BMP:  Recent Labs   Lab 03/07/19  0358     136   K 4.2  4.2     103   CO2 22*  22*   BUN 33*  33*    CREATININE 5.5*  5.5*   *  231*   MG 2.0  2.0   PHOS 4.8*  4.8*     LFT:   Lab Results   Component Value Date    AST 20 03/07/2019    ALT 11 03/07/2019    ALKPHOS 139 (H) 03/07/2019    BILITOT 1.1 (H) 03/07/2019    ALBUMIN 2.4 (L) 03/07/2019    ALBUMIN 2.4 (L) 03/07/2019    PROT 5.2 (L) 03/07/2019     CBC:   Lab Results   Component Value Date    WBC 9.36 03/07/2019    HGB 7.9 (L) 03/07/2019    HCT 24.8 (L) 03/07/2019     (H) 03/07/2019    PLT 68 (L) 03/07/2019     Microbiology x 7d:   Microbiology Results (last 7 days)     ** No results found for the last 168 hours. **        Imaging:   cxr repeat  Duplex negative  I personally reviewed the above image.    ASSESSMENT/PLAN:     Active Hospital Problems    Diagnosis    *S/P laminectomy    Class 3 severe obesity due to excess calories with serious comorbidity and body mass index (BMI) of 45.0 to 49.9 in adult    Hyperkalemia    Hypothermia    Hyponatremia    Anemia due to stage 5 chronic kidney disease    Hyperphosphatemia    Metabolic bone disease    Problem with vascular access    Left leg pain    Pre-syncope    Hypotension    Bradycardia     see      Cervical myelopathy    Multifactorial peripheral neuropathy    MIKE (obstructive sleep apnea) - very severe latest sleep study says BPAP @ 16/8    ESRD (end stage renal disease) on dialysis    Hyperlipidemia    Diabetes mellitus, type 2          Plan:  PRBC  Follow Hb  Follow exam  May need platelet txn  Repeat cbc    Critical secondary to Patient has a condition that poses threat to life and bodily function: on cammy gtt  Follow exam  Follow cbc  At high risk of deterioration    Cc time 35mins     Critical care was time spent personally by me on the following activities: development of treatment plan with patient or surrogate and bedside caregivers, discussions with consultants, evaluation of patient's response to treatment, examination of patient, ordering and performing treatments  and interventions, ordering and review of laboratory studies, ordering and review of radiographic studies, pulse oximetry, re-evaluation of patient's condition. This critical care time did not overlap with that of any other provider or involve time for any procedures.

## 2019-03-08 LAB
ALBUMIN SERPL BCP-MCNC: 2.2 G/DL
ALBUMIN SERPL BCP-MCNC: 2.3 G/DL
ALP SERPL-CCNC: 128 U/L
ALT SERPL W/O P-5'-P-CCNC: 10 U/L
ANION GAP SERPL CALC-SCNC: 10 MMOL/L
ANION GAP SERPL CALC-SCNC: 10 MMOL/L
ANION GAP SERPL CALC-SCNC: 11 MMOL/L
AST SERPL-CCNC: 18 U/L
BASOPHILS # BLD AUTO: 0.02 K/UL
BASOPHILS # BLD AUTO: 0.02 K/UL
BASOPHILS NFR BLD: 0.3 %
BASOPHILS NFR BLD: 0.3 %
BILIRUB SERPL-MCNC: 0.9 MG/DL
BLD PROD TYP BPU: NORMAL
BLOOD UNIT EXPIRATION DATE: NORMAL
BLOOD UNIT TYPE CODE: 5100
BLOOD UNIT TYPE: NORMAL
BUN SERPL-MCNC: 48 MG/DL
BUN SERPL-MCNC: 50 MG/DL
BUN SERPL-MCNC: 54 MG/DL
CALCIUM SERPL-MCNC: 8.9 MG/DL
CALCIUM SERPL-MCNC: 9 MG/DL
CALCIUM SERPL-MCNC: 9.1 MG/DL
CHLORIDE SERPL-SCNC: 106 MMOL/L
CHLORIDE SERPL-SCNC: 106 MMOL/L
CHLORIDE SERPL-SCNC: 107 MMOL/L
CO2 SERPL-SCNC: 20 MMOL/L
CO2 SERPL-SCNC: 21 MMOL/L
CO2 SERPL-SCNC: 21 MMOL/L
CODING SYSTEM: NORMAL
CREAT SERPL-MCNC: 7.6 MG/DL
CREAT SERPL-MCNC: 7.8 MG/DL
CREAT SERPL-MCNC: 8.1 MG/DL
DIFFERENTIAL METHOD: ABNORMAL
DIFFERENTIAL METHOD: ABNORMAL
DISPENSE STATUS: NORMAL
EOSINOPHIL # BLD AUTO: 0.2 K/UL
EOSINOPHIL # BLD AUTO: 0.2 K/UL
EOSINOPHIL NFR BLD: 2.8 %
EOSINOPHIL NFR BLD: 2.8 %
ERYTHROCYTE [DISTWIDTH] IN BLOOD BY AUTOMATED COUNT: 16.7 %
ERYTHROCYTE [DISTWIDTH] IN BLOOD BY AUTOMATED COUNT: 16.9 %
EST. GFR  (AFRICAN AMERICAN): 7.7 ML/MIN/1.73 M^2
EST. GFR  (AFRICAN AMERICAN): 8.1 ML/MIN/1.73 M^2
EST. GFR  (AFRICAN AMERICAN): 8.3 ML/MIN/1.73 M^2
EST. GFR  (NON AFRICAN AMERICAN): 6.7 ML/MIN/1.73 M^2
EST. GFR  (NON AFRICAN AMERICAN): 7 ML/MIN/1.73 M^2
EST. GFR  (NON AFRICAN AMERICAN): 7.2 ML/MIN/1.73 M^2
GLUCOSE SERPL-MCNC: 139 MG/DL
GLUCOSE SERPL-MCNC: 156 MG/DL
GLUCOSE SERPL-MCNC: 189 MG/DL
HCT VFR BLD AUTO: 24.8 %
HCT VFR BLD AUTO: 25.5 %
HGB BLD-MCNC: 8 G/DL
HGB BLD-MCNC: 8.2 G/DL
IMM GRANULOCYTES # BLD AUTO: 0.04 K/UL
IMM GRANULOCYTES # BLD AUTO: 0.04 K/UL
IMM GRANULOCYTES NFR BLD AUTO: 0.6 %
IMM GRANULOCYTES NFR BLD AUTO: 0.7 %
INR PPP: 1
LYMPHOCYTES # BLD AUTO: 1.2 K/UL
LYMPHOCYTES # BLD AUTO: 1.5 K/UL
LYMPHOCYTES NFR BLD: 19.1 %
LYMPHOCYTES NFR BLD: 21.7 %
MAGNESIUM SERPL-MCNC: 2.3 MG/DL
MAGNESIUM SERPL-MCNC: 2.3 MG/DL
MCH RBC QN AUTO: 33.1 PG
MCH RBC QN AUTO: 33.2 PG
MCHC RBC AUTO-ENTMCNC: 32.2 G/DL
MCHC RBC AUTO-ENTMCNC: 32.3 G/DL
MCV RBC AUTO: 103 FL
MCV RBC AUTO: 103 FL
MONOCYTES # BLD AUTO: 0.9 K/UL
MONOCYTES # BLD AUTO: 1 K/UL
MONOCYTES NFR BLD: 14.9 %
MONOCYTES NFR BLD: 15 %
NEUTROPHILS # BLD AUTO: 3.8 K/UL
NEUTROPHILS # BLD AUTO: 4 K/UL
NEUTROPHILS NFR BLD: 59.6 %
NEUTROPHILS NFR BLD: 62.2 %
NRBC BLD-RTO: 0 /100 WBC
NRBC BLD-RTO: 0 /100 WBC
PHOSPHATE SERPL-MCNC: 6.1 MG/DL
PHOSPHATE SERPL-MCNC: 6.5 MG/DL
PLATELET # BLD AUTO: 83 K/UL
PLATELET # BLD AUTO: 95 K/UL
PMV BLD AUTO: 10.1 FL
PMV BLD AUTO: 10.5 FL
POCT GLUCOSE: 128 MG/DL (ref 70–110)
POCT GLUCOSE: 168 MG/DL (ref 70–110)
POCT GLUCOSE: 178 MG/DL (ref 70–110)
POCT GLUCOSE: 201 MG/DL (ref 70–110)
POTASSIUM SERPL-SCNC: 4.1 MMOL/L
POTASSIUM SERPL-SCNC: 4.2 MMOL/L
PROT SERPL-MCNC: 5 G/DL
PROTHROMBIN TIME: 10.4 SEC
RBC # BLD AUTO: 2.42 M/UL
RBC # BLD AUTO: 2.47 M/UL
SODIUM SERPL-SCNC: 137 MMOL/L
SODIUM SERPL-SCNC: 137 MMOL/L
SODIUM SERPL-SCNC: 138 MMOL/L
TRANS PLATPHERESIS VOL PATIENT: NORMAL ML
WBC # BLD AUTO: 6.11 K/UL
WBC # BLD AUTO: 6.68 K/UL

## 2019-03-08 PROCEDURE — 25000003 PHARM REV CODE 250: Performed by: STUDENT IN AN ORGANIZED HEALTH CARE EDUCATION/TRAINING PROGRAM

## 2019-03-08 PROCEDURE — 99291 PR CRITICAL CARE, E/M 30-74 MINUTES: ICD-10-PCS | Mod: GC,,, | Performed by: PSYCHIATRY & NEUROLOGY

## 2019-03-08 PROCEDURE — 99291 CRITICAL CARE FIRST HOUR: CPT | Mod: GC,,, | Performed by: PSYCHIATRY & NEUROLOGY

## 2019-03-08 PROCEDURE — 97165 OT EVAL LOW COMPLEX 30 MIN: CPT

## 2019-03-08 PROCEDURE — 36415 COLL VENOUS BLD VENIPUNCTURE: CPT

## 2019-03-08 PROCEDURE — P9047 ALBUMIN (HUMAN), 25%, 50ML: HCPCS | Mod: JG | Performed by: PHYSICIAN ASSISTANT

## 2019-03-08 PROCEDURE — 83735 ASSAY OF MAGNESIUM: CPT | Mod: 91

## 2019-03-08 PROCEDURE — 97162 PT EVAL MOD COMPLEX 30 MIN: CPT

## 2019-03-08 PROCEDURE — 85025 COMPLETE CBC W/AUTO DIFF WBC: CPT | Mod: 91

## 2019-03-08 PROCEDURE — 84100 ASSAY OF PHOSPHORUS: CPT

## 2019-03-08 PROCEDURE — 85610 PROTHROMBIN TIME: CPT

## 2019-03-08 PROCEDURE — 20000000 HC ICU ROOM

## 2019-03-08 PROCEDURE — 80048 BASIC METABOLIC PNL TOTAL CA: CPT

## 2019-03-08 PROCEDURE — 90945 DIALYSIS ONE EVALUATION: CPT

## 2019-03-08 PROCEDURE — 83735 ASSAY OF MAGNESIUM: CPT

## 2019-03-08 PROCEDURE — 63600175 PHARM REV CODE 636 W HCPCS: Performed by: NURSE PRACTITIONER

## 2019-03-08 PROCEDURE — 80053 COMPREHEN METABOLIC PANEL: CPT

## 2019-03-08 PROCEDURE — 25000003 PHARM REV CODE 250: Performed by: PSYCHIATRY & NEUROLOGY

## 2019-03-08 PROCEDURE — 25000003 PHARM REV CODE 250: Performed by: NURSE PRACTITIONER

## 2019-03-08 PROCEDURE — 94761 N-INVAS EAR/PLS OXIMETRY MLT: CPT

## 2019-03-08 PROCEDURE — 99231 PR SUBSEQUENT HOSPITAL CARE,LEVL I: ICD-10-PCS | Mod: ,,, | Performed by: NURSE PRACTITIONER

## 2019-03-08 PROCEDURE — 63600175 PHARM REV CODE 636 W HCPCS: Mod: JG | Performed by: PHYSICIAN ASSISTANT

## 2019-03-08 PROCEDURE — 99232 PR SUBSEQUENT HOSPITAL CARE,LEVL II: ICD-10-PCS | Mod: ,,, | Performed by: NURSE PRACTITIONER

## 2019-03-08 PROCEDURE — P9035 PLATELET PHERES LEUKOREDUCED: HCPCS

## 2019-03-08 PROCEDURE — 99232 SBSQ HOSP IP/OBS MODERATE 35: CPT | Mod: ,,, | Performed by: NURSE PRACTITIONER

## 2019-03-08 PROCEDURE — 99231 SBSQ HOSP IP/OBS SF/LOW 25: CPT | Mod: ,,, | Performed by: NURSE PRACTITIONER

## 2019-03-08 PROCEDURE — 25000003 PHARM REV CODE 250: Performed by: PHYSICIAN ASSISTANT

## 2019-03-08 PROCEDURE — 80069 RENAL FUNCTION PANEL: CPT

## 2019-03-08 RX ORDER — MAGNESIUM SULFATE HEPTAHYDRATE 40 MG/ML
2 INJECTION, SOLUTION INTRAVENOUS
Status: DISCONTINUED | OUTPATIENT
Start: 2019-03-08 | End: 2019-03-09

## 2019-03-08 RX ORDER — LACTULOSE 10 G/15ML
15 SOLUTION ORAL DAILY
Status: DISCONTINUED | OUTPATIENT
Start: 2019-03-08 | End: 2019-03-10

## 2019-03-08 RX ORDER — HYDROCODONE BITARTRATE AND ACETAMINOPHEN 500; 5 MG/1; MG/1
TABLET ORAL CONTINUOUS
Status: DISCONTINUED | OUTPATIENT
Start: 2019-03-08 | End: 2019-03-09

## 2019-03-08 RX ORDER — FLUOXETINE HYDROCHLORIDE 20 MG/1
20 CAPSULE ORAL DAILY
Status: DISCONTINUED | OUTPATIENT
Start: 2019-03-08 | End: 2019-03-20 | Stop reason: HOSPADM

## 2019-03-08 RX ORDER — MIDODRINE HYDROCHLORIDE 5 MG/1
20 TABLET ORAL 3 TIMES DAILY
Status: DISCONTINUED | OUTPATIENT
Start: 2019-03-08 | End: 2019-03-09

## 2019-03-08 RX ORDER — ALBUMIN HUMAN 250 G/1000ML
50 SOLUTION INTRAVENOUS ONCE
Status: COMPLETED | OUTPATIENT
Start: 2019-03-08 | End: 2019-03-08

## 2019-03-08 RX ORDER — HYDROCODONE BITARTRATE AND ACETAMINOPHEN 500; 5 MG/1; MG/1
TABLET ORAL
Status: DISCONTINUED | OUTPATIENT
Start: 2019-03-08 | End: 2019-03-08

## 2019-03-08 RX ADMIN — OXYCODONE HYDROCHLORIDE 5 MG: 5 TABLET ORAL at 09:03

## 2019-03-08 RX ADMIN — INSULIN ASPART 8 UNITS: 100 INJECTION, SOLUTION INTRAVENOUS; SUBCUTANEOUS at 04:03

## 2019-03-08 RX ADMIN — PANTOPRAZOLE SODIUM 40 MG: 40 GRANULE, DELAYED RELEASE ORAL at 08:03

## 2019-03-08 RX ADMIN — INSULIN ASPART 8 UNITS: 100 INJECTION, SOLUTION INTRAVENOUS; SUBCUTANEOUS at 07:03

## 2019-03-08 RX ADMIN — LACTULOSE 15 G: 20 SOLUTION ORAL at 10:03

## 2019-03-08 RX ADMIN — GABAPENTIN 300 MG: 300 CAPSULE ORAL at 08:03

## 2019-03-08 RX ADMIN — STANDARDIZED SENNA CONCENTRATE AND DOCUSATE SODIUM 1 TABLET: 8.6; 5 TABLET, FILM COATED ORAL at 08:03

## 2019-03-08 RX ADMIN — SODIUM CHLORIDE: 0.9 INJECTION, SOLUTION INTRAVENOUS at 09:03

## 2019-03-08 RX ADMIN — MIDODRINE HYDROCHLORIDE 20 MG: 5 TABLET ORAL at 08:03

## 2019-03-08 RX ADMIN — MIDODRINE HYDROCHLORIDE 10 MG: 5 TABLET ORAL at 08:03

## 2019-03-08 RX ADMIN — INSULIN ASPART 8 UNITS: 100 INJECTION, SOLUTION INTRAVENOUS; SUBCUTANEOUS at 10:03

## 2019-03-08 RX ADMIN — ALBUMIN HUMAN 50 G: 0.25 SOLUTION INTRAVENOUS at 04:03

## 2019-03-08 RX ADMIN — FLUOXETINE HYDROCHLORIDE 20 MG: 20 CAPSULE ORAL at 10:03

## 2019-03-08 RX ADMIN — ERYTHROPOIETIN 7000 UNITS: 4000 INJECTION, SOLUTION INTRAVENOUS; SUBCUTANEOUS at 10:03

## 2019-03-08 RX ADMIN — OXYCODONE HYDROCHLORIDE 5 MG: 5 TABLET ORAL at 02:03

## 2019-03-08 RX ADMIN — POLYETHYLENE GLYCOL 3350 17 G: 17 POWDER, FOR SOLUTION ORAL at 08:03

## 2019-03-08 RX ADMIN — DICLOFENAC: 10 GEL TOPICAL at 08:03

## 2019-03-08 RX ADMIN — INSULIN ASPART 2 UNITS: 100 INJECTION, SOLUTION INTRAVENOUS; SUBCUTANEOUS at 04:03

## 2019-03-08 RX ADMIN — MIDODRINE HYDROCHLORIDE 20 MG: 5 TABLET ORAL at 02:03

## 2019-03-08 RX ADMIN — ACETAMINOPHEN 500 MG: 500 TABLET, FILM COATED ORAL at 08:03

## 2019-03-08 RX ADMIN — PRAVASTATIN SODIUM 40 MG: 40 TABLET ORAL at 08:03

## 2019-03-08 NOTE — PROGRESS NOTES
[facility]  Neurocritical Care  Progress Note    Admit Date: 2/28/2019  Service Date: 03/08/2019  Length of Stay: 8    Subjective:     Chief Complaint: S/P laminectomy    History of Present Illness: The patient  is a 58 y.o. male with PMHx of of DMII, ESRD on HD MWF(last dialysis 3/1), HLD, HTN and MIKE admitted to [facility] s/p cervical laminectomy C3-C6, C6-C7 w/medial discectomy. Per chart review,  Mr. Farmer has had wobbly gait since this past September, at which point he recalls falling and has been at 80-90% of his normal function.  He has been able to walk without assist, however not for very long distances.  He states that since last Sunday he has felt progression of his symptoms and now feels it would be difficult to stand up to ambulate at all. His UE symptoms have been present for about 3 months including not writing as well as he is used to.MRI cervical spine (2/27)at OSH on 2/27 revealed severe disc space narrowing C3-C4 and C5-C6 level.  Patient admitted to [facility] for close monitoring and higher level of care.            Hospital Course: 3/3: pt admitted to post-up, MAP goal >75, on levo  3/4: extubated   3/5:Daily weights, 5 units Q 6hrs Insulin apart added, diabetic diet started    3/8/2019: remains on low dose pressors trying to wean today. Plan for CRRT today; started Prozac for mood and Lactulose to help with constipation.    Interval History:  Remains on minimal amount of pressor.    Review of Systems  Remains on pressors  CRRT today  OOB  Objective:     Vitals:  Temp: 98.6 °F (37 °C)  Pulse: 66  Rhythm: normal sinus rhythm  BP: (!) 106/51  MAP (mmHg): 74  CVP (mean): 12 mmHg  CI (L/min/m2): 4 L/min/m2  SVI: 55  SVV: 20 %  Resp: 15  SpO2: 98 %  O2 Device (Oxygen Therapy): room air    Temp  Min: 97.1 °F (36.2 °C)  Max: 98.6 °F (37 °C)  Pulse  Min: 57  Max: 80  BP  Min: 103/52  Max: 184/79  MAP (mmHg)  Min: 74  Max: 114  CVP (mean)  Min: 10 mmHg  Max: 18 mmHg  CI (L/min/m2)  Min: 2.9  L/min/m2  Max: 4.2 L/min/m2  SVI  Min: 40  Max: 62  SVV  Min: 12 %  Max: 31 %  Resp  Min: 10  Max: 28  SpO2  Min: 97 %  Max: 100 %    03/07 0701 - 03/08 0700  In: 971.9 [P.O.:250; I.V.:132.5]  Out: 25 [Drains:25]   Unmeasured Output  Stool Occurrence: 1       Physical Exam   Constitutional: He appears well-developed.   Eyes: EOM are normal. Pupils are equal, round, and reactive to light.   Cardiovascular: Normal rate.   Neurological: Abnormal muscle tone:    Obese  Neuro Exam:  Alert, oriented X4  Follows commands in all extemities  Limited motion in upper extremities  SILT    Medications:  Continuous  sodium chloride 0.9%    norepinephrine bitartrate-D5W Last Rate: 0.05 mcg/kg/min (03/08/19 1300)   Scheduled  diclofenac sodium  Daily   epoetin shefali (PROCRIT) injection 7,000 Units Every Mon, Wed, Fri   FLUoxetine 20 mg Daily   gabapentin 300 mg QHS   insulin aspart U-100 8 Units TIDWM   insulin detemir U-100 20 Units QHS   lactulose 15 g Daily   midodrine 20 mg TID   pantoprazole 40 mg BID   polyethylene glycol 17 g Daily   pravastatin 40 mg Daily   senna-docusate 8.6-50 mg 1 tablet BID   PRN  acetaminophen 500 mg Q6H PRN   dextrose 50% 12.5 g PRN   dextrose 50% 25 g PRN   glucagon (human recombinant) 1 mg PRN   glucose 16 g PRN   glucose 24 g PRN   insulin aspart U-100 0-5 Units QID (AC + HS) PRN   magnesium sulfate IVPB 2 g PRN   ondansetron 4 mg Q6H PRN   oxyCODONE 5 mg Q6H PRN     Today I personally reviewed pertinent medications, lines/drains/airways, imaging, cardiology results, laboratory results, microbiology results, notably:    Diet  Diet diabetic [facility]; 2000 Calorie  Diet diabetic [facility]; 2000 Calorie        Assessment/Plan:     Neuro   Multifactorial peripheral neuropathy    -on Gabapentin 300 mg         Cardiac/Vascular   Hypotension    --MAP > 65  --on minimal norepinephrine - wean as tolerated  --go up on midodrine     Renal/   ESRD (end stage renal disease) on dialysis    -ESRD  on HD MWF   -Required SLED overnight  -RRT per nephrology     Endocrine   Diabetes mellitus, type 2    --Hg A1C (2/27) 5.0   --Continue current Basal/Bolus     Orthopedic   * S/P laminectomy    POD 6 s/p cervical laminectomy C3-C6, C6-C7 with medial discectomy  Hypotension during case  MAP goal >65 now  On small dose pressors, still  OOB, PT/OT               The patient is being Prophylaxed for:  Venous Thromboembolism with: Mechanical  Stress Ulcer with: PPI  Ventilator Pneumonia with: not applicable    Activity Orders          None        Full Code    Ko Ashton MD  Neurocritical Care  [facility]

## 2019-03-08 NOTE — SUBJECTIVE & OBJECTIVE
Interval History:  Remains on minimal amount of pressor.    Review of Systems  Remains on pressors  CRRT today  OOB  Objective:     Vitals:  Temp: 98.6 °F (37 °C)  Pulse: 66  Rhythm: normal sinus rhythm  BP: (!) 106/51  MAP (mmHg): 74  CVP (mean): 12 mmHg  CI (L/min/m2): 4 L/min/m2  SVI: 55  SVV: 20 %  Resp: 15  SpO2: 98 %  O2 Device (Oxygen Therapy): room air    Temp  Min: 97.1 °F (36.2 °C)  Max: 98.6 °F (37 °C)  Pulse  Min: 57  Max: 80  BP  Min: 103/52  Max: 184/79  MAP (mmHg)  Min: 74  Max: 114  CVP (mean)  Min: 10 mmHg  Max: 18 mmHg  CI (L/min/m2)  Min: 2.9 L/min/m2  Max: 4.2 L/min/m2  SVI  Min: 40  Max: 62  SVV  Min: 12 %  Max: 31 %  Resp  Min: 10  Max: 28  SpO2  Min: 97 %  Max: 100 %    03/07 0701 - 03/08 0700  In: 971.9 [P.O.:250; I.V.:132.5]  Out: 25 [Drains:25]   Unmeasured Output  Stool Occurrence: 1       Physical Exam   Constitutional: He appears well-developed.   Eyes: EOM are normal. Pupils are equal, round, and reactive to light.   Cardiovascular: Normal rate.   Neurological: Abnormal muscle tone:    Obese  Neuro Exam:  Alert, oriented X4  Follows commands in all extemities  Limited motion in upper extremities  SILT    Medications:  Continuous  sodium chloride 0.9%    norepinephrine bitartrate-D5W Last Rate: 0.05 mcg/kg/min (03/08/19 1300)   Scheduled  diclofenac sodium  Daily   epoetin shefali (PROCRIT) injection 7,000 Units Every Mon, Wed, Fri   FLUoxetine 20 mg Daily   gabapentin 300 mg QHS   insulin aspart U-100 8 Units TIDWM   insulin detemir U-100 20 Units QHS   lactulose 15 g Daily   midodrine 20 mg TID   pantoprazole 40 mg BID   polyethylene glycol 17 g Daily   pravastatin 40 mg Daily   senna-docusate 8.6-50 mg 1 tablet BID   PRN  acetaminophen 500 mg Q6H PRN   dextrose 50% 12.5 g PRN   dextrose 50% 25 g PRN   glucagon (human recombinant) 1 mg PRN   glucose 16 g PRN   glucose 24 g PRN   insulin aspart U-100 0-5 Units QID (AC + HS) PRN   magnesium sulfate IVPB 2 g PRN   ondansetron 4 mg Q6H PRN    oxyCODONE 5 mg Q6H PRN     Today I personally reviewed pertinent medications, lines/drains/airways, imaging, cardiology results, laboratory results, microbiology results, notably:    Diet  Diet diabetic [facility]; 2000 Calorie  Diet diabetic [facility]; 2000 Calorie

## 2019-03-08 NOTE — PLAN OF CARE
Hypotension - increase midodrine 10mg q6hrly  PT/OT  Mobilize as tolerated  Wean off levophed as tolerated  MAP goals > 65 mmhg  ESRD on RRT  prozac for mood.

## 2019-03-08 NOTE — ASSESSMENT & PLAN NOTE
POD 6 s/p cervical laminectomy C3-C6, C6-C7 with medial discectomy  Hypotension during case  MAP goal >65 now  On small dose pressors, still  OOB, PT/OT

## 2019-03-08 NOTE — PROGRESS NOTES
[facility]  Neurosurgery  Progress Note    Subjective:     History of Present Illness: 59 yo male with a PMH of ESRD with dialysis MWF (last on weds), DM, presenting as transfer from [facility] as admission to neurosurgery for evaluation after progressive course of unsteady gait as well as bilateral upper extremity weakness for the last several months.  Mr. Farmer has had wobbly gait since this past September, at which point he recalls falling and has been at 80-90% of his normal function.  He has been able to walk without assist, however not for very long distances.  He states that since last Sunday he has felt progression of his symptoms and now feels it would be difficult to stand up to ambulate at all.  He also endorses a pulled muscle of his LLE that is limiting his mobility.  His UE symptoms have been present for about 3 months including not writing as well as he is used to.  HE is able to write numbers, but not always his name.  He has been having difficulty using a fork to eat over the past month.  No sensory disturbance noted as well as no neck or back pain and no pain from the neck radiating into the hands.  He has intermittently been taking ASA 81 mg qdaily. He denies recent falls or trauma to the neck.  No other blood thinning medication.  MRI at OSH on 2/27 revealed significant cervical stenosis.      =    Post-Op Info:  Procedure(s) (LRB):  SPINE, CERVICAL, POSTERIOR APPROACH  LAMINECTOMY C3-C6; C6-C7; WITH MEDIAL DISCECTOMY (N/A)   5 Days Post-Op     Interval History: 3/8 -  -184, ow AFVSS, NAEON, labs stable, exam stable    Medications:  Continuous Infusions:   sodium chloride 0.9%      norepinephrine bitartrate-D5W 0.05 mcg/kg/min (03/08/19 1700)     Scheduled Meds:   diclofenac sodium   Topical (Top) Daily    epoetin shefali (PROCRIT) injection  7,000 Units Intravenous Every Mon, Wed, Fri    FLUoxetine  20 mg Oral Daily    gabapentin  300 mg Oral QHS    insulin  aspart U-100  8 Units Subcutaneous TIDWM    insulin detemir U-100  20 Units Subcutaneous QHS    lactulose  15 g Oral Daily    midodrine  20 mg Oral TID    pantoprazole  40 mg Per NG tube BID    polyethylene glycol  17 g Oral Daily    pravastatin  40 mg Oral Daily    senna-docusate 8.6-50 mg  1 tablet Oral BID     PRN Meds:acetaminophen, dextrose 50%, dextrose 50%, glucagon (human recombinant), glucose, glucose, insulin aspart U-100, magnesium sulfate IVPB, ondansetron, oxyCODONE       Objective:     Weight: 134.3 kg (296 lb)  Body mass index is 45.01 kg/m².  Vital Signs (Most Recent):  Temp: 98.5 °F (36.9 °C) (03/08/19 1500)  Pulse: 62 (03/08/19 1700)  Resp: (!) 9 (03/08/19 1700)  BP: (!) 113/53 (03/08/19 1600)  SpO2: 96 % (03/08/19 1700) Vital Signs (24h Range):  Temp:  [97.1 °F (36.2 °C)-98.6 °F (37 °C)] 98.5 °F (36.9 °C)  Pulse:  [57-77] 62  Resp:  [9-28] 9  SpO2:  [96 %-100 %] 96 %  BP: (103-184)/(50-79) 113/53  Arterial Line BP: (120-153)/(41-56) 125/43     Date 03/08/19 0700 - 03/09/19 0659   Shift 3389-4180 9856-9877 0650-6879 24 Hour Total   INTAKE   P.O. 400   400   I.V.(mL/kg) 21.4(0.2) 10.6(0.1)  32(0.2)   Shift Total(mL/kg) 421.4(3.1) 10.6(0.1)  432(3.2)   OUTPUT   Shift Total(mL/kg)       Weight (kg) 134.3 134.3 134.3 134.3                        Closed/Suction Drain 03/03/19 1431 Posterior Neck Accordion 10 Fr. (Active)   Site Description Unable to view 3/8/2019  3:00 PM   Dressing Type Gauze 3/8/2019  3:00 PM   Dressing Status Clean;Dry;Intact 3/8/2019  3:00 PM   Dressing Intervention Dressing reinforced 3/8/2019  3:00 PM   Drainage Serosanguineous 3/8/2019  3:00 PM   Status To bulb suction 3/8/2019  3:00 PM   Output (mL) 25 mL 3/7/2019  6:02 PM            Hemodialysis AV Fistula Left forearm (Active)   Needle Size 15ga 3/1/2019  4:19 PM   Site Assessment Intact;Dry 3/8/2019  3:00 PM   Patency Present;Thrill;Bruit 3/8/2019  3:00 PM   Status Deaccessed 3/8/2019  3:00 PM   Flows Good 2/28/2019   8:20 AM   Dressing Intervention Dressing reinforced 3/6/2019  3:05 AM   Dressing Status Clean;Dry;Intact 3/2/2019  3:01 PM   Site Condition No complications 3/8/2019  3:00 PM   Dressing Open to air (None) 3/8/2019  3:00 PM            Trialysis (Dialysis) Catheter 03/04/19 0250 left internal jugular (Active)   IV Device Securement sutures;catheter securement device 3/8/2019  3:00 PM   Additional Site Signs no erythema;no warmth;no edema;no pain;no palpable cord;no streak formation;no drainage 3/8/2019  3:00 PM   Patency/Care infusing 3/8/2019  3:00 PM   Waveform normal 3/8/2019  3:00 PM   Site Assessment Clean;Intact;Dry 3/8/2019  3:00 PM   Status Flushed 3/8/2019  3:00 PM   Flows Good 3/8/2019  3:00 PM   Dressing Intervention Dressing reinforced 3/8/2019  3:00 PM   Dressing Status Biopatch in place;Clean;Dry;Intact 3/8/2019  3:00 PM   Dressing Change Due 03/11/19 3/8/2019  3:00 PM   Verification by X-ray Yes 3/8/2019  7:00 AM   Site Condition No complications 3/8/2019  3:00 PM   Dressing Occlusive 3/8/2019  3:00 PM   Daily Line Review Performed 3/8/2019  3:00 PM       Neurosurgery Physical Exam    GCS15  AO x3  CN II -XII grossly intact  4-/5 BUE  5/5 BLE  SILT  +Sanford bilaterally w 3+ UE reflexes  2+ LE reflexes, no clonus or Babinski        Significant Labs:  Recent Labs   Lab 03/07/19  1218  03/08/19  0155 03/08/19  0747 03/08/19  1313   *   < > 139* 156* 189*   *   < > 137 137 138   K 4.0   < > 4.2 4.1  4.1 4.1      < > 106 106 107   CO2 24   < > 20* 21* 21*   BUN 39*   < > 48* 50* 54*   CREATININE 6.4*   < > 7.6* 7.8* 8.1*   CALCIUM 9.1   < > 9.0 9.1 8.9   MG 2.3  --  2.3  --  2.3    < > = values in this interval not displayed.     Recent Labs   Lab 03/07/19  0358 03/08/19  0316 03/08/19  0747   WBC 9.36 6.68 6.11   HGB 7.9* 8.2* 8.0*   HCT 24.8* 25.5* 24.8*   PLT 68* 83* 95*     Recent Labs   Lab 03/07/19  0358 03/08/19  0155   INR 1.0 1.0     Microbiology Results (last 7 days)     ** No  results found for the last 168 hours. **        Significant Diagnostics:  CT: No results found in the last 24 hours.  MRI: No results found in the last 24 hours.    Assessment/Plan:     * S/P laminectomy     58 M with progressive cervical myelopathy and stenosis from C3-6 with myelomalacia. S/p code for hypovolemia during HD. Stable this morning and labs WNL. He stepped up to ICU for further care and work up, now s/p C3-6 posterior decompression.    -Will need fusion in future, will discuss OR timing with   -HOB >30  -Pain control   -No C collar needed  -Pain control PRN  -HD per nephrology  -Daily PT/OT  -Medical management per NCC  -may stepdown to NSGY when stable  -Please call Neurosurgery with any questions or exam changes            Cervical myelopathy    58 M with progressive cervical myelopathy and stenosis from C3-6 with myelomalacia. S/p code for hypovolemia during HD. Sable this morning and labs WNL. He stepped up to ICU for further care and work up.    -Stepdown and cleared by MICU for surgery  -Will plan for C3-6 posterior decompression/fusio  -Consent obtained  -Pain control PRN  -Keep NPO  -HD per nephrology  -Daily PT/OT         Speedy Rodriguez MD  Neurosurgery  [facility]

## 2019-03-08 NOTE — ASSESSMENT & PLAN NOTE
58 M with progressive cervical myelopathy and stenosis from C3-6 with myelomalacia. S/p code for hypovolemia during HD. Stable this morning and labs WNL. He stepped up to ICU for further care and work up, now s/p C3-6 posterior decompression.    -Will need fusion in future, will discuss OR timing with   -HOB >30  -Pain control   -No C collar needed  -Pain control PRN  -HD per nephrology  -Daily PT/OT  -Medical management per NCC  -may stepdown to NSGY when stable  -Please call Neurosurgery with any questions or exam changes

## 2019-03-08 NOTE — PROGRESS NOTES
[facility]  Nephrology  Progress Note    Patient Name: Agnel Farmer Jr.  MRN: [id number]  Admission Date: 2/28/2019  Hospital Length of Stay: 8 days  Attending Provider: Paul Vera MD   Primary Care Physician: Satya Noriega MD  Principal Problem:S/P laminectomy    Subjective:     Interval History: NAEON. Patient net positive 940 cc/24 hr. No distress noted on assessment.   Remains on Levophed for BP support.     Review of patient's allergies indicates:   Allergen Reactions    Keflex [cephalexin] Nausea Only     Current Facility-Administered Medications   Medication Frequency    0.9%  NaCl infusion (CRRT USE ONLY) Continuous    acetaminophen tablet 500 mg Q6H PRN    dextrose 50% injection 12.5 g PRN    dextrose 50% injection 25 g PRN    diclofenac sodium 1 % gel Daily    epoetin shefali (PROCRIT) injection 7,000 units kit Every Mon, Wed, Fri    FLUoxetine capsule 20 mg Daily    gabapentin capsule 300 mg QHS    glucagon (human recombinant) injection 1 mg PRN    glucose chewable tablet 16 g PRN    glucose chewable tablet 24 g PRN    insulin aspart U-100 pen 0-5 Units QID (AC + HS) PRN    insulin aspart U-100 pen 8 Units TIDWM    insulin detemir U-100 pen 20 Units QHS    lactulose 20 gram/30 mL solution Soln 15 g Daily    magnesium sulfate 2g in water 50mL IVPB (premix) PRN    midodrine tablet 20 mg TID    norepinephrine 32 mg in dextrose 5 % 250 mL infusion Continuous    ondansetron injection 4 mg Q6H PRN    oxyCODONE immediate release tablet 5 mg Q6H PRN    pantoprazole suspension 40 mg BID    polyethylene glycol packet 17 g Daily    pravastatin tablet 40 mg Daily    senna-docusate 8.6-50 mg per tablet 1 tablet BID       Objective:     Vital Signs (Most Recent):  Temp: 98.6 °F (37 °C) (03/08/19 1100)  Pulse: 71 (03/08/19 1200)  Resp: (!) 21 (03/08/19 1200)  BP: (!) 121/58 (03/08/19 1100)  SpO2: 97 % (03/08/19 1200)  O2 Device (Oxygen Therapy): room air  (03/08/19 1100) Vital Signs (24h Range):  Temp:  [97.1 °F (36.2 °C)-98.6 °F (37 °C)] 98.6 °F (37 °C)  Pulse:  [57-80] 71  Resp:  [10-28] 21  SpO2:  [96 %-100 %] 97 %  BP: (103-184)/(49-79) 121/58  Arterial Line BP: (115-291)/() 147/51     Weight: 134.3 kg (296 lb) (03/07/19 1502)  Body mass index is 45.01 kg/m².  Body surface area is 2.54 meters squared.    I/O last 3 completed shifts:  In: 3820.9 [P.O.:250; I.V.:2685.7; Blood:885.2]  Out: 1199 [Drains:35; Other:1164]    Physical Exam   Constitutional: He is oriented to person, place, and time. He appears well-developed and well-nourished. Nasal cannula in place.    male who appears stated age, lying in bed   HENT:   Head: Normocephalic and atraumatic.   Mouth/Throat: Oropharynx is clear and moist. No oropharyngeal exudate.   Eyes: EOM are normal. Pupils are equal, round, and reactive to light.   Neck: No JVD present. No tracheal deviation present.   Cardiovascular: Regular rhythm, normal heart sounds and intact distal pulses.   Pulmonary/Chest: Effort normal. No stridor. No respiratory distress. He has rales.   Abdominal: Soft. Bowel sounds are normal. He exhibits no distension and no mass. There is no tenderness. There is no guarding.   Obese   Musculoskeletal: He exhibits edema. He exhibits no tenderness or deformity.   Palpable thrill in LUE fistula  1+ pitting edema to bilateral shins   Neurological: He is alert and oriented to person, place, and time.   Skin: No rash noted. He is not diaphoretic. No erythema.   Psychiatric: He has a normal mood and affect. His behavior is normal. Thought content normal.       Significant Labs:  CBC:   Recent Labs   Lab 03/08/19  0747   WBC 6.11   RBC 2.42*   HGB 8.0*   HCT 24.8*   PLT 95*   *   MCH 33.1*   MCHC 32.3     CMP:   Recent Labs   Lab 03/08/19  0155 03/08/19  0747   * 156*   CALCIUM 9.0 9.1   ALBUMIN 2.2*  --    PROT 5.0*  --     137   K 4.2 4.1  4.1   CO2 20* 21*    106    BUN 48* 50*   CREATININE 7.6* 7.8*   ALKPHOS 128  --    ALT 10  --    AST 18  --    BILITOT 0.9  --             Assessment/Plan:     * S/P laminectomy    - Being follow by admitting service      Anemia due to stage 5 chronic kidney disease    Hgb 8.2, continue Epo MWF     ESRD (end stage renal disease) on dialysis    57 yo male with a PMH of ESRD with dialysis MWF (last on weds), DM, presenting as transfer [facility] as admission to neurosurgery for evaluation after progressive course of unsteady gait as well as bilateral upper extremity weakness for the last several months.     ESRD on HD with Dr. Araceli Pickens in [facility], 4.5 hours, EDW 140kg  Last HD 2/27/19 @L removed no available current DW    Plan/Assessment:   -Will plan for 10 hr SLED today for volume management and metabolic clearance, target -350 ml/hr as tolerated, keep MAP >65.  -Remain on Levophed for BP support  -Continue strict I/Os            Case discussed with staff further recs with attestation.     I will follow-up with patient. Please contact us if you have any additional questions.    OLIVIA Gabriel DNP, APRN, FNP-C  Department of Nephrology  [facility]  Pager: [phone]

## 2019-03-08 NOTE — ASSESSMENT & PLAN NOTE
59 yo male with a PMH of ESRD with dialysis MWF (last on weds), DM, presenting as transfer from [facility] as admission to neurosurgery for evaluation after progressive course of unsteady gait as well as bilateral upper extremity weakness for the last several months.     ESRD on HD with Dr. Araceli Pickens in [facility], 4.5 hours, EDW 140kg  Last HD 2/27/19 @L removed no available current DW    Plan/Assessment:   -Will plan for 10 hr SLED today for volume management and metabolic clearance, target -350 ml/hr as tolerated, keep MAP >65.  -Remain on Levophed for BP support  -Continue strict I/Os

## 2019-03-08 NOTE — PT/OT/SLP EVAL
"Physical Therapy Evaluation    Patient Name:  Angel Farmer Jr.   MRN:  [id number]    Recommendations:     Discharge Recommendations:  rehabilitation facility   Discharge Equipment Recommendations: (TBD at next level of care)   Barriers to discharge: Inaccessible home, Decreased caregiver support and patient requiring increased level of assistance at this time.    Assessment:     Angel Farmer Jr. is a 58 y.o. male admitted with a medical diagnosis of S/P laminectomy.  He presents with the following impairments/functional limitations:  weakness, impaired endurance, impaired sensation, impaired functional mobilty, impaired self care skills, gait instability, impaired balance, decreased upper extremity function, decreased lower extremity function, impaired coordination, impaired fine motor, impaired cardiopulmonary response to activity.  The patient requires total assistance x2 for bed mobility and scooting at edge of bed. He is able to sit statically with contact guard assist for ~10 minutes.He was walking on his own until Saturday when he started stumbling and fell on Tuesday.  Reports recent history of L "shoulder" fracture several months ago, was receiving outpatient PT. He is not safe to attempt standing at this time due to lower extremity weakness and body habitus. Based on his current level of function, he would benefit from inpatient rehab to maximize his functional recovery.     Rehab Prognosis: Good; patient would benefit from acute skilled PT services to address these deficits and reach maximum level of function.    Recent Surgery: Procedure(s) (LRB):  SPINE, CERVICAL, POSTERIOR APPROACH  LAMINECTOMY C3-C6; C6-C7; WITH MEDIAL DISCECTOMY (N/A) 5 Days Post-Op    Plan:     During this hospitalization, patient to be seen 4 x/week to address the identified rehab impairments via gait training, therapeutic activities, neuromuscular re-education, therapeutic exercises and progress toward the following " "goals:    · Plan of Care Expires:  04/08/19    Subjective     Chief Complaint: "I'm sorry you have to do all of this for me"  Patient/Family Comments/goals: get stronger, return home  Pain/Comfort:  · Pain Rating 1: 9/10  · Location - Orientation 1: generalized  · Location 1: neck  · Pain Addressed 1: Pre-medicate for activity, Reposition  · Pain Rating Post-Intervention 1: (same)  · Pain Rating 2: 7/10  · Location - Side 2: Left  · Location 2: (quad)  · Pain Addressed 2: Cessation of Activity  · Pain Rating Post-Intervention 2: (decreases with cessation of activity, chronic 2 months)    Patients cultural, spiritual, Sikh conflicts given the current situation: no    Living Environment:  The patient lives in a single story home in [place] with 2 small SLY with his brother and sister-in-law. Attends  MWF.   Prior to admission, patients level of function was independent.  He does not work, he was driving himself to HD PTA. Equipment used at home: walker, rolling, wheelchair, BIPAP.  DME owned (not currently used): none.  Upon discharge, patient will have assistance from family 24/7.    Objective:     Communicated with RN prior to session.  Patient found all lines intact, call button in reach and bed alarm on arterial line, blood pressure cuff, telemetry, PICC line, peripheral IV, hemovac, pulse ox (continuous)  upon PT entry to room.    General Precautions: Standard, fall, aspiration   Orthopedic Precautions:N/A   Braces: N/A , per MD notes, no c-collar needed    Exams:    Cognitive Exam  Patient is A&O x4 and follows 100% of one -step commands    Postural Exam Patient presented with the following abnormalities:    -       Rounded shoulders  -       Forward head  -       Kyphosis  -       Posterior pelvic tilt  -       Weight shift L, R shoulder elevated   Sensation    -       Light touch intact   Skin Integrity/Edema     -       Skin integrity: visibly intact  -       Edema: pitting edema bilateral lower " extremities   R LE ROM WFL   R LE Strength 3-/5 hip flexion, 4-/5 knee ext/flex, and ankle DF/PF   L LE ROM WFL   L LE Strength  2-/5 hip flexion, 3+/5 knee ext/flex, and ankle DF/PF     Balance          Static Sitting contact guard assist   Dynamic Sitting contact guard assist              Functional Mobility:    Bed Mobility  Rolling to R: total assistance x2, unable to assist in heel slide with L leg, minimal reaching with L UE  Supine to Sit:  total assistance x2  Scoot to head of bed in supine: total assistance x2  Scoot in sitting: total assistance x2   Transfers Sit to Stand:  deferred due to lower extremity weakness and body habitus         Therapeutic Activities and Exercises:   Patient educated on role of therapy, goals of session, benefits of out of bed mobility. Patient agreeable to mobilize with therapy. Educated on spinal precautions, log roll for mobility. Discussed PT plan of care during hospitalization. Patient educated that they need to call for assistance to mobilize out of bed. Whiteboard updated as appropriate. Patient educated on how their diagnosis impacts their mobility within PT scope of practice.        AM-PAC 6 CLICK MOBILITY  Total Score:8     Patient left supine with all lines intact, call button in reach and bed alarm on.    GOALS:   Multidisciplinary Problems     Physical Therapy Goals        Problem: Physical Therapy Goal    Goal Priority Disciplines Outcome Goal Variances Interventions   Physical Therapy Goal     PT, PT/OT Ongoing (interventions implemented as appropriate)     Description:  Goals to be met by: 3/15/2019     Patient will increase functional independence with mobility by performin. Supine to sit with Moderate Assistance  2. Rolling to Left and Right with Moderate Assistance.  3. Sit to stand transfer with Maximum Assistance  4. Gait  x 10 feet with Moderate Assistance using Rolling Walker.   5. Stand for 5 minutes with Moderate Assistance using Rolling  Walker  6. Lower extremity exercise program x20 reps per handout, with assistance as needed                      History:     Past Medical History:   Diagnosis Date    Anemia due to stage 5 chronic kidney disease 3/1/2019    Diabetes mellitus type II     Dialysis patient     Hyperlipidemia     Hypertension     Kidney failure     MIKE (obstructive sleep apnea)     Urinary tract infection        Past Surgical History:   Procedure Laterality Date    AV FISTULA PLACEMENT      left lower arm    SPINE, CERVICAL, POSTERIOR APPROACH  LAMINECTOMY C3-C6; C6-C7; WITH MEDIAL DISCECTOMY N/A 3/3/2019    Performed by Rud[facility] Wylie [facility] at [facility] OR [facility] F[facility]    TONSILLECTOMY, ADENOIDECTOMY      TRANSESOPHAGEAL ECHOCARDIOGRAM (SERENITY) N/A 5/23/2017    Performed by Donaldo Mai MD at [facility] OR       Time Tracking:     PT Received On: 03/08/19  PT Start Time: 0936     PT Stop Time: 1002  PT Total Time (min): 26 min     Billable Minutes: Evaluation 26      Mary Gray, PT  03/08/2019

## 2019-03-08 NOTE — ASSESSMENT & PLAN NOTE
Caution with insulin stacking  Estimated Creatinine Clearance: 14.2 mL/min (A) (based on SCr of 7.8 mg/dL (H)).

## 2019-03-08 NOTE — PLAN OF CARE
Problem: Physical Therapy Goal  Goal: Physical Therapy Goal  Goals to be met by: 3/15/2019     Patient will increase functional independence with mobility by performin. Supine to sit with Moderate Assistance  2. Rolling to Left and Right with Moderate Assistance.  3. Sit to stand transfer with Maximum Assistance  4. Gait  x 10 feet with Moderate Assistance using Rolling Walker.   5. Stand for 5 minutes with Moderate Assistance using Rolling Walker  6. Lower extremity exercise program x20 reps per handout, with assistance as needed    Outcome: Ongoing (interventions implemented as appropriate)  Evaluation completed, initiated plan of care.  Mary Gray, PT  3/8/2019

## 2019-03-08 NOTE — SUBJECTIVE & OBJECTIVE
"Interval HPI:   Overnight events: Remains in NCC. NAEON. BG elevated yesterday but trended down overnight secondary to insulin regimen change; well controlled this morning (139). CRRT per nephrology.   Eatin%  Nausea: No  Hypoglycemia and intervention: No  Fever: No  TPN and/or TF: No      /62   Pulse 72   Temp 98.5 °F (36.9 °C) (Oral)   Resp 15   Ht 5' 8" (1.727 m)   Wt 134.3 kg (296 lb)   SpO2 97%   BMI 45.01 kg/m²     Labs Reviewed and Include    Recent Labs   Lab 19  0155 19  0747   * 156*   CALCIUM 9.0 9.1   ALBUMIN 2.2*  --    PROT 5.0*  --     137   K 4.2 4.1  4.1   CO2 20* 21*    106   BUN 48* 50*   CREATININE 7.6* 7.8*   ALKPHOS 128  --    ALT 10  --    AST 18  --    BILITOT 0.9  --      Lab Results   Component Value Date    WBC 6.11 2019    HGB 8.0 (L) 2019    HCT 24.8 (L) 2019     (H) 2019    PLT 95 (L) 2019     No results for input(s): TSH, FREET4 in the last 168 hours.  Lab Results   Component Value Date    HGBA1C 5.0 2019       Nutritional status:   Body mass index is 45.01 kg/m².  Lab Results   Component Value Date    ALBUMIN 2.2 (L) 2019    ALBUMIN 2.4 (L) 2019    ALBUMIN 2.4 (L) 2019    ALBUMIN 2.4 (L) 2019     No results found for: PREALBUMIN    Estimated Creatinine Clearance: 14.2 mL/min (A) (based on SCr of 7.8 mg/dL (H)).    Accu-Checks  Recent Labs     19  0547 19  1215 19  1823 19  2117 19  0842 19  1222 19  1756 19  2143 19  0749   POCTGLUCOSE 225* 255* 235* 202* 196* 221* 225* 152* 168* 168*       Current Medications and/or Treatments Impacting Glycemic Control  Immunotherapy:    Immunosuppressants     None        Steroids:   Hormones (From admission, onward)    None        Pressors:    Autonomic Drugs (From admission, onward)    Start     Stop Route Frequency Ordered    19 1500  midodrine tablet 20 " mg      -- Oral 3 times daily 03/08/19 0955    03/05/19 0615  norepinephrine 32 mg in dextrose 5 % 250 mL infusion     Question Answer Comment   Titrate by: (in mcg/kg/min) 0.02    Titrate interval: (in minutes) 5    Titrate to maintain: (MAP or SBP) MAP    Greater than: (in mmHg) 75    Maximum dose: (in mcg/kg/min) 3        -- IV Continuous 03/05/19 0613        Hyperglycemia/Diabetes Medications:   Antihyperglycemics (From admission, onward)    Start     Stop Route Frequency Ordered    03/07/19 2100  insulin detemir U-100 pen 20 Units      -- SubQ Nightly 03/07/19 0913    03/07/19 1645  insulin aspart U-100 pen 8 Units      -- SubQ 3 times daily with meals 03/07/19 1452    03/06/19 1638  insulin aspart U-100 pen 0-5 Units      -- SubQ Before meals & nightly PRN 03/06/19 1538

## 2019-03-08 NOTE — PROGRESS NOTES
"[facility]  Endocrinology  Progress Note    Admit Date: 2019     Reason for Consult: Management of T2DM, Hyperglycemia     Surgical Procedure and Date: Cervical laminectomy C3-C6, C6-C7 w/ medial discectomy 3/3/19    Diabetes diagnosis year:     Lab Results   Component Value Date    HGBA1C 5.0 2019     Home Diabetes Medications: Basaglar 40 units q HS, Victoza 1.2 mg once daily,     How often checking glucose at home? 4x per day   BG readings on regimen: 90-120s  Hypoglycemia on the regimen?  Yes - about once per month  Missed doses on regimen?  No    Diabetes Complications include:     Hypoglycemia , Diabetic nephropathy  , Diabetic chronic kidney disease     , Diabetic retinopathy , Diabetic cataract  and Diabetic peripheral neuropathy     Complicating diabetes co morbidities:   MIKE, CKD and ESRD      HPI:   Patient is a 58 y.o. male with a diagnosis of HTN, HLD, MIKE, ESRD on HD, and DM2, who is s/p cervical laminectomy C3-C6, C6-C7 w/ medial discectomy 3/3/19.  Patient was admitted to the ED on 19 for upper extremity weakness and ataxia following a fall.  Patient's DM managed by PCP, Dr. Shemar Berry in [place].  Patient is prescribed MDI but only takes basal insulin and Victoza.  Positive family history of DM (mother, father, and both maternal grandparents).  Endocrine consulted for DM/BG management.    Interval HPI:   Overnight events: Remains in NCC. NAEON. BG elevated yesterday but trended down overnight secondary to insulin regimen change; well controlled this morning (139). CRRT per nephrology.   Eatin%  Nausea: No  Hypoglycemia and intervention: No  Fever: No  TPN and/or TF: No      /62   Pulse 72   Temp 98.5 °F (36.9 °C) (Oral)   Resp 15   Ht 5' 8" (1.727 m)   Wt 134.3 kg (296 lb)   SpO2 97%   BMI 45.01 kg/m²      Labs Reviewed and Include    Recent Labs   Lab 19  0155 19  0747   * 156*   CALCIUM 9.0 9.1   ALBUMIN 2.2*  --  "   PROT 5.0*  --     137   K 4.2 4.1  4.1   CO2 20* 21*    106   BUN 48* 50*   CREATININE 7.6* 7.8*   ALKPHOS 128  --    ALT 10  --    AST 18  --    BILITOT 0.9  --      Lab Results   Component Value Date    WBC 6.11 03/08/2019    HGB 8.0 (L) 03/08/2019    HCT 24.8 (L) 03/08/2019     (H) 03/08/2019    PLT 95 (L) 03/08/2019     No results for input(s): TSH, FREET4 in the last 168 hours.  Lab Results   Component Value Date    HGBA1C 5.0 02/27/2019       Nutritional status:   Body mass index is 45.01 kg/m².  Lab Results   Component Value Date    ALBUMIN 2.2 (L) 03/08/2019    ALBUMIN 2.4 (L) 03/07/2019    ALBUMIN 2.4 (L) 03/07/2019    ALBUMIN 2.4 (L) 03/07/2019     No results found for: PREALBUMIN    Estimated Creatinine Clearance: 14.2 mL/min (A) (based on SCr of 7.8 mg/dL (H)).    Accu-Checks  Recent Labs     03/06/19  0547 03/06/19  1215 03/06/19  1823 03/06/19 2012 03/06/19  2117 03/07/19  0842 03/07/19  1222 03/07/19  1756 03/07/19  2143 03/08/19  0749   POCTGLUCOSE 225* 255* 235* 202* 196* 221* 225* 152* 168* 168*       Current Medications and/or Treatments Impacting Glycemic Control  Immunotherapy:    Immunosuppressants     None        Steroids:   Hormones (From admission, onward)    None        Pressors:    Autonomic Drugs (From admission, onward)    Start     Stop Route Frequency Ordered    03/08/19 1500  midodrine tablet 20 mg      -- Oral 3 times daily 03/08/19 0955    03/05/19 0615  norepinephrine 32 mg in dextrose 5 % 250 mL infusion     Question Answer Comment   Titrate by: (in mcg/kg/min) 0.02    Titrate interval: (in minutes) 5    Titrate to maintain: (MAP or SBP) MAP    Greater than: (in mmHg) 75    Maximum dose: (in mcg/kg/min) 3        -- IV Continuous 03/05/19 0613        Hyperglycemia/Diabetes Medications:   Antihyperglycemics (From admission, onward)    Start     Stop Route Frequency Ordered    03/07/19 2100  insulin detemir U-100 pen 20 Units      -- SubQ Nightly 03/07/19 0913     03/07/19 1645  insulin aspart U-100 pen 8 Units      -- SubQ 3 times daily with meals 03/07/19 1452    03/06/19 1638  insulin aspart U-100 pen 0-5 Units      -- SubQ Before meals & nightly PRN 03/06/19 1538          ASSESSMENT and PLAN    * S/P laminectomy    S/p cervical laminectomy C3-C6, C6-C7 w/ medial discectomy 3/3/19  Managed per primary team  Avoid hypoglycemia  Optimize BG control for surgical wound healing         Diabetes mellitus, type 2    BG goal 140-180    Levemir 20 units q HS   Novolog 8 units with meals  Low dose correction scale given kidney function  BG monitoring AC/HS    Discharge planning: Patient will need insulin dosing changes upon discharge.  Currently only taking basal insulin plus Victoza.       ESRD (end stage renal disease) on dialysis    Caution with insulin stacking  Estimated Creatinine Clearance: 14.2 mL/min (A) (based on SCr of 7.8 mg/dL (H)).         MIKE (obstructive sleep apnea) - very severe latest sleep study says BPAP @ 16/8    May affect BG readings if uncontrolled         Anemia due to stage 5 chronic kidney disease    Affects A1C accuracy  Consider checking fructosamine at least 2 weeks post hospital discharge to evaluate BG control     Class 3 severe obesity due to excess calories with serious comorbidity and body mass index (BMI) of 45.0 to 49.9 in adult    may contribute to insulin resistance  Body mass index is 45.01 kg/m².           Seen in conjunction with ROBIN Brown NP  Endocrinology  [facility]

## 2019-03-08 NOTE — PT/OT/SLP EVAL
"Occupational Therapy   Evaluation    Name: Angel Farmer Jr.  MRN: [id number]  Admitting Diagnosis:  S/P laminectomy 5 Days Post-Op  s/p cervical laminectomy C3-C6, C6-C7 with medial discectomy    Recommendations:     Discharge Recommendations: rehabilitation facility  Discharge Equipment Recommendations:  (TBD next level of care)  Barriers to discharge:  Inaccessible home environment, Decreased caregiver support    Assessment:     Angel Farmer Jr. is a 58 y.o. male with a medical diagnosis of S/P laminectomy.  He presents with performance deficits including weakness, impaired endurance, impaired self care skills, impaired functional mobilty, decreased upper extremity function, decreased lower extremity function, pain, decreased ROM, impaired cardiopulmonary response to activity, edema. Pt would continue to benefit from OT to increase functional independence. Recommend rehab upon D/C as pt is motivated to return to PLOF.      Rehab Prognosis: Good; patient would benefit from acute skilled OT services to address these deficits and reach maximum level of function.       Plan:     Patient to be seen 4 x/week to address the above listed problems via self-care/home management, therapeutic activities, therapeutic exercises  · Plan of Care Expires: 04/08/19  · Plan of Care Reviewed with: patient    Subjective     Chief Complaint: Weakness  Patient/Family Comments/goals: Return to PLOF  Pt reports "I feel useless."    Occupational Profile:  Living Environment: Pt lives with brother, sister-in-law, and 2 nieces in 1-story house with 2 SLY and tub/shower combo.  Previous level of function: (I) with ADLs and mobility until recent decline prior to hospital admit; does not work  Equipment Used at Home:  walker, rolling, wheelchair, BIPAP  Assistance upon Discharge: Family is able to assist    Pain/Comfort:  · Pain Rating 1: 9/10  · Location 1: neck(and L quad)  · Pain Addressed 1: Pre-medicate for activity, Reposition  · Pain " Rating Post-Intervention 1: (remained throughout)    Patients cultural, spiritual, Uatsdin conflicts given the current situation: no    Objective:     Communicated with: RN prior to session.  Patient found supine with arterial line, blood pressure cuff, pulse ox (continuous), telemetry, PICC line, peripheral IV, hemovac upon OT entry to room.    General Precautions: Standard, fall, aspiration   Orthopedic Precautions:spinal precautions   Braces: N/A     Occupational Performance:    Bed Mobility:    · Patient completed Rolling/Turning to Right with total assistance x 2  · Patient completed Scooting/Bridging with total assistance x 2 while seated along EOB and in supine to HOB  · Patient completed Supine to Sit with total assistance x 2  · Patient completed Sit to Supine with total assistance x 2    Functional Mobility/Transfers:  · Unable at this time due to LE weakness    Activities of Daily Living:  · Grooming: Min A hand-over-hand assist to reach face with L UE to wipe  · Upper Body Dressing: Max A to don gown   · Lower Body Dressing: Total A to don socks    Cognitive/Visual Perceptual:  Cognitive/Psychosocial Skills:     -       Oriented to: Person, Place, Time and Situation   -       Follows Commands/attention:Follows multistep commands  -       Communication: clear/fluent  -       Safety awareness/insight to disability: intact   Visual/Perceptual:      -Intact      Physical Exam:  Balance:    -       CGA for static/dynamic sitting balance EOB ~10 min  Postural examination/scapula alignment:    -       Rounded shoulders  -       Forward head  Upper Extremity Range of Motion:     -       Right Upper Extremity: limited shld PROM to ~90 deg due to pain; limited shld AROM and decreased elbow flex  -       Left Upper Extremity: limited shld PROM to ~90 deg due to previous injury; limited shld AROM and decreased elbow flex  Upper Extremity Strength:    -       Right Upper Extremity: shld NT; elbow flex/ext 3/5  -        Left Upper Extremity: shld NT; elbow flex/ext 3/5   Strength:    -       Right Upper Extremity: WFL  -       Left Upper Extremity: WFL    AMPAC 6 Click ADL:  AMPAC Total Score: 9    Treatment & Education:  OT/PT eval; educated on OT role and POC  Education:    Patient left HOB elevated with all lines intact, call button in reach and RN notified    GOALS:   Multidisciplinary Problems     Occupational Therapy Goals        Problem: Occupational Therapy Goal    Goal Priority Disciplines Outcome Interventions   Occupational Therapy Goal     OT, PT/OT Ongoing (interventions implemented as appropriate)    Description:  Goals to be met by: 7 days (3/15/19)     Patient will increase functional independence with ADLs by performing:    UE Dressing with Moderate Assistance.  Grooming while EOB with Minimal Assistance.  Toileting from bedside commode with Maximum Assistance for hygiene and clothing management.   Supine to sit with Moderate Assistance.  Toilet transfer to bedside commode with Maximum Assistance.  Increased functional strength to WFL for ADLs.                      History:     Past Medical History:   Diagnosis Date    Anemia due to stage 5 chronic kidney disease 3/1/2019    Diabetes mellitus type II     Dialysis patient     Hyperlipidemia     Hypertension     Kidney failure     MIKE (obstructive sleep apnea)     Urinary tract infection        Past Surgical History:   Procedure Laterality Date    AV FISTULA PLACEMENT      left lower arm    SPINE, CERVICAL, POSTERIOR APPROACH  LAMINECTOMY C3-C6; C6-C7; WITH MEDIAL DISCECTOMY N/A 3/3/2019    Performed by Ruddy Wylie MD at [facility]    TONSILLECTOMY, ADENOIDECTOMY      TRANSESOPHAGEAL ECHOCARDIOGRAM (SERENITY) N/A 5/23/2017    Performed by Donaldo Mai MD at [facility] OR       Time Tracking:     OT Date of Treatment: 03/08/19  OT Start Time: 0935  OT Stop Time: 1002  OT Total Time (min): 27 min    Billable Minutes:Evaluation 27  ryan Felder, [email or web address]  3/8/2019

## 2019-03-08 NOTE — SUBJECTIVE & OBJECTIVE
Interval History: NAEON. Patient net positive 940 cc/24 hr. No distress noted on assessment.   Remains on Levophed for BP support.     Review of patient's allergies indicates:   Allergen Reactions    Keflex [cephalexin] Nausea Only     Current Facility-Administered Medications   Medication Frequency    0.9%  NaCl infusion (CRRT USE ONLY) Continuous    acetaminophen tablet 500 mg Q6H PRN    dextrose 50% injection 12.5 g PRN    dextrose 50% injection 25 g PRN    diclofenac sodium 1 % gel Daily    epoetin shefali (PROCRIT) injection 7,000 units kit Every Mon, Wed, Fri    FLUoxetine capsule 20 mg Daily    gabapentin capsule 300 mg QHS    glucagon (human recombinant) injection 1 mg PRN    glucose chewable tablet 16 g PRN    glucose chewable tablet 24 g PRN    insulin aspart U-100 pen 0-5 Units QID (AC + HS) PRN    insulin aspart U-100 pen 8 Units TIDWM    insulin detemir U-100 pen 20 Units QHS    lactulose 20 gram/30 mL solution Soln 15 g Daily    magnesium sulfate 2g in water 50mL IVPB (premix) PRN    midodrine tablet 20 mg TID    norepinephrine 32 mg in dextrose 5 % 250 mL infusion Continuous    ondansetron injection 4 mg Q6H PRN    oxyCODONE immediate release tablet 5 mg Q6H PRN    pantoprazole suspension 40 mg BID    polyethylene glycol packet 17 g Daily    pravastatin tablet 40 mg Daily    senna-docusate 8.6-50 mg per tablet 1 tablet BID       Objective:     Vital Signs (Most Recent):  Temp: 98.6 °F (37 °C) (03/08/19 1100)  Pulse: 71 (03/08/19 1200)  Resp: (!) 21 (03/08/19 1200)  BP: (!) 121/58 (03/08/19 1100)  SpO2: 97 % (03/08/19 1200)  O2 Device (Oxygen Therapy): room air (03/08/19 1100) Vital Signs (24h Range):  Temp:  [97.1 °F (36.2 °C)-98.6 °F (37 °C)] 98.6 °F (37 °C)  Pulse:  [57-80] 71  Resp:  [10-28] 21  SpO2:  [96 %-100 %] 97 %  BP: (103-184)/(49-79) 121/58  Arterial Line BP: (115-291)/() 147/51     Weight: 134.3 kg (296 lb) (03/07/19 1502)  Body mass index is 45.01 kg/m².  Body  surface area is 2.54 meters squared.    I/O last 3 completed shifts:  In: 3820.9 [P.O.:250; I.V.:2685.7; Blood:885.2]  Out: 1199 [Drains:35; Other:1164]    Physical Exam   Constitutional: He is oriented to person, place, and time. He appears well-developed and well-nourished. Nasal cannula in place.    male who appears stated age, lying in bed   HENT:   Head: Normocephalic and atraumatic.   Mouth/Throat: Oropharynx is clear and moist. No oropharyngeal exudate.   Eyes: EOM are normal. Pupils are equal, round, and reactive to light.   Neck: No JVD present. No tracheal deviation present.   Cardiovascular: Regular rhythm, normal heart sounds and intact distal pulses.   Pulmonary/Chest: Effort normal. No stridor. No respiratory distress. He has rales.   Abdominal: Soft. Bowel sounds are normal. He exhibits no distension and no mass. There is no tenderness. There is no guarding.   Obese   Musculoskeletal: He exhibits edema. He exhibits no tenderness or deformity.   Palpable thrill in LUE fistula  1+ pitting edema to bilateral shins   Neurological: He is alert and oriented to person, place, and time.   Skin: No rash noted. He is not diaphoretic. No erythema.   Psychiatric: He has a normal mood and affect. His behavior is normal. Thought content normal.       Significant Labs:  CBC:   Recent Labs   Lab 03/08/19  0747   WBC 6.11   RBC 2.42*   HGB 8.0*   HCT 24.8*   PLT 95*   *   MCH 33.1*   MCHC 32.3     CMP:   Recent Labs   Lab 03/08/19  0155 03/08/19  0747   * 156*   CALCIUM 9.0 9.1   ALBUMIN 2.2*  --    PROT 5.0*  --     137   K 4.2 4.1  4.1   CO2 20* 21*    106   BUN 48* 50*   CREATININE 7.6* 7.8*   ALKPHOS 128  --    ALT 10  --    AST 18  --    BILITOT 0.9  --

## 2019-03-08 NOTE — PLAN OF CARE
Problem: Occupational Therapy Goal  Goal: Occupational Therapy Goal  Goals to be met by: 7 days (3/15/19)     Patient will increase functional independence with ADLs by performing:    UE Dressing with Moderate Assistance.  Grooming while EOB with Minimal Assistance.  Toileting from bedside commode with Maximum Assistance for hygiene and clothing management.   Supine to sit with Moderate Assistance.  Toilet transfer to bedside commode with Maximum Assistance.  Increased functional strength to WFL for ADLs.    Outcome: Ongoing (interventions implemented as appropriate)  Eval and POC set 3/8/19

## 2019-03-08 NOTE — ASSESSMENT & PLAN NOTE
BG goal 140-180    Levemir 20 units q HS   Novolog 8 units with meals  Low dose correction scale given kidney function  BG monitoring AC/HS    Discharge planning: Patient will need insulin dosing changes upon discharge.  Currently only taking basal insulin plus Victoza.

## 2019-03-08 NOTE — SUBJECTIVE & OBJECTIVE
Interval History: 3/8 -  -184, ow AFVSS, NAEON, labs stable, exam stable    Medications:  Continuous Infusions:   sodium chloride 0.9%      norepinephrine bitartrate-D5W 0.05 mcg/kg/min (03/08/19 1700)     Scheduled Meds:   diclofenac sodium   Topical (Top) Daily    epoetin shefali (PROCRIT) injection  7,000 Units Intravenous Every Mon, Wed, Fri    FLUoxetine  20 mg Oral Daily    gabapentin  300 mg Oral QHS    insulin aspart U-100  8 Units Subcutaneous TIDWM    insulin detemir U-100  20 Units Subcutaneous QHS    lactulose  15 g Oral Daily    midodrine  20 mg Oral TID    pantoprazole  40 mg Per NG tube BID    polyethylene glycol  17 g Oral Daily    pravastatin  40 mg Oral Daily    senna-docusate 8.6-50 mg  1 tablet Oral BID     PRN Meds:acetaminophen, dextrose 50%, dextrose 50%, glucagon (human recombinant), glucose, glucose, insulin aspart U-100, magnesium sulfate IVPB, ondansetron, oxyCODONE       Objective:     Weight: 134.3 kg (296 lb)  Body mass index is 45.01 kg/m².  Vital Signs (Most Recent):  Temp: 98.5 °F (36.9 °C) (03/08/19 1500)  Pulse: 62 (03/08/19 1700)  Resp: (!) 9 (03/08/19 1700)  BP: (!) 113/53 (03/08/19 1600)  SpO2: 96 % (03/08/19 1700) Vital Signs (24h Range):  Temp:  [97.1 °F (36.2 °C)-98.6 °F (37 °C)] 98.5 °F (36.9 °C)  Pulse:  [57-77] 62  Resp:  [9-28] 9  SpO2:  [96 %-100 %] 96 %  BP: (103-184)/(50-79) 113/53  Arterial Line BP: (120-153)/(41-56) 125/43     Date 03/08/19 0700 - 03/09/19 0659   Shift 3943-4052 8843-1391 9345-9408 24 Hour Total   INTAKE   P.O. 400   400   I.V.(mL/kg) 21.4(0.2) 10.6(0.1)  32(0.2)   Shift Total(mL/kg) 421.4(3.1) 10.6(0.1)  432(3.2)   OUTPUT   Shift Total(mL/kg)       Weight (kg) 134.3 134.3 134.3 134.3                        Closed/Suction Drain 03/03/19 1431 Posterior Neck Accordion 10 Fr. (Active)   Site Description Unable to view 3/8/2019  3:00 PM   Dressing Type Gauze 3/8/2019  3:00 PM   Dressing Status Clean;Dry;Intact 3/8/2019  3:00 PM    Dressing Intervention Dressing reinforced 3/8/2019  3:00 PM   Drainage Serosanguineous 3/8/2019  3:00 PM   Status To bulb suction 3/8/2019  3:00 PM   Output (mL) 25 mL 3/7/2019  6:02 PM            Hemodialysis AV Fistula Left forearm (Active)   Needle Size 15ga 3/1/2019  4:19 PM   Site Assessment Intact;Dry 3/8/2019  3:00 PM   Patency Present;Thrill;Bruit 3/8/2019  3:00 PM   Status Deaccessed 3/8/2019  3:00 PM   Flows Good 2/28/2019  8:20 AM   Dressing Intervention Dressing reinforced 3/6/2019  3:05 AM   Dressing Status Clean;Dry;Intact 3/2/2019  3:01 PM   Site Condition No complications 3/8/2019  3:00 PM   Dressing Open to air (None) 3/8/2019  3:00 PM            Trialysis (Dialysis) Catheter 03/04/19 0250 left internal jugular (Active)   IV Device Securement sutures;catheter securement device 3/8/2019  3:00 PM   Additional Site Signs no erythema;no warmth;no edema;no pain;no palpable cord;no streak formation;no drainage 3/8/2019  3:00 PM   Patency/Care infusing 3/8/2019  3:00 PM   Waveform normal 3/8/2019  3:00 PM   Site Assessment Clean;Intact;Dry 3/8/2019  3:00 PM   Status Flushed 3/8/2019  3:00 PM   Flows Good 3/8/2019  3:00 PM   Dressing Intervention Dressing reinforced 3/8/2019  3:00 PM   Dressing Status Biopatch in place;Clean;Dry;Intact 3/8/2019  3:00 PM   Dressing Change Due 03/11/19 3/8/2019  3:00 PM   Verification by X-ray Yes 3/8/2019  7:00 AM   Site Condition No complications 3/8/2019  3:00 PM   Dressing Occlusive 3/8/2019  3:00 PM   Daily Line Review Performed 3/8/2019  3:00 PM       Neurosurgery Physical Exam    GCS15  AO x3  CN II -XII grossly intact  4-/5 BUE  5/5 BLE  SILT  +[place]ford bilaterally w 3+ UE reflexes  2+ LE reflexes, no clonus or Babinski        Significant Labs:  Recent Labs   Lab 03/07/19  1218  03/08/19  0155 03/08/19  0747 03/08/19  1313   *   < > 139* 156* 189*   *   < > 137 137 138   K 4.0   < > 4.2 4.1  4.1 4.1      < > 106 106 107   CO2 24   < > 20* 21* 21*    BUN 39*   < > 48* 50* 54*   CREATININE 6.4*   < > 7.6* 7.8* 8.1*   CALCIUM 9.1   < > 9.0 9.1 8.9   MG 2.3  --  2.3  --  2.3    < > = values in this interval not displayed.     Recent Labs   Lab 03/07/19  0358 03/08/19  0316 03/08/19  0747   WBC 9.36 6.68 6.11   HGB 7.9* 8.2* 8.0*   HCT 24.8* 25.5* 24.8*   PLT 68* 83* 95*     Recent Labs   Lab 03/07/19  0358 03/08/19  0155   INR 1.0 1.0     Microbiology Results (last 7 days)     ** No results found for the last 168 hours. **        Significant Diagnostics:  CT: No results found in the last 24 hours.  MRI: No results found in the last 24 hours.

## 2019-03-08 NOTE — PLAN OF CARE
03/08/19 1709   Post-Acute Status   Post-Acute Authorization Placement   Post-Acute Placement Status Patient List Provided     SW met with Pt at bedside. Discussed therapy recs for Rehab and provided list. He will review with his sister in law and give choices asap. He reported he was very concerned about if it would be close to [facility] where he gets HD.    Lakesha Dumont, LONG  Neurocritical Care   [facility]  [id number]

## 2019-03-08 NOTE — PLAN OF CARE
Problem: Adult Inpatient Plan of Care  Goal: Plan of Care Review  Outcome: Ongoing (interventions implemented as appropriate)  POC reviewed with pt at 1500, verbalized understanding of the POC. PRN pain meds given. CRRT today. Bath given. Still on levophed gtt. Questions and concerns addressed. Pt progressing towards goals of care. Please see flow sheet for detailed nursing assessment and VS. Will continue to monitor.

## 2019-03-08 NOTE — PLAN OF CARE
Problem: Adult Inpatient Plan of Care  Goal: Plan of Care Review  Outcome: Ongoing (interventions implemented as appropriate)  No acute events throughout day. See vital signs and assessments in flowsheets. See below for updates on today's progress.     Pulmonary: room air    Cardiovascular: levo gtt needs increased during shift    Neurological: NA    Gastrointestinal: 1 mucoid BM    Genitourinary: anuric    Endocrine:  at 2200 (no sliding scale needed)    Integumentary/Other: NA    Infusions: levo currently at 0.07    Patient progressing towards goals as tolerated, plan of care communicated and reviewed with pt and family. All concerns addressed. Will continue to monitor.

## 2019-03-09 LAB
ALBUMIN SERPL BCP-MCNC: 2.7 G/DL
ALP SERPL-CCNC: 170 U/L
ALT SERPL W/O P-5'-P-CCNC: 9 U/L
ANION GAP SERPL CALC-SCNC: 9 MMOL/L
AST SERPL-CCNC: 22 U/L
BASOPHILS # BLD AUTO: 0.02 K/UL
BASOPHILS NFR BLD: 0.3 %
BILIRUB SERPL-MCNC: 0.8 MG/DL
BUN SERPL-MCNC: 34 MG/DL
BUN SERPL-MCNC: 34 MG/DL
BUN SERPL-MCNC: 38 MG/DL
CALCIUM SERPL-MCNC: 9 MG/DL
CALCIUM SERPL-MCNC: 9 MG/DL
CALCIUM SERPL-MCNC: 9.1 MG/DL
CHLORIDE SERPL-SCNC: 106 MMOL/L
CHLORIDE SERPL-SCNC: 106 MMOL/L
CHLORIDE SERPL-SCNC: 108 MMOL/L
CO2 SERPL-SCNC: 24 MMOL/L
CREAT SERPL-MCNC: 5.6 MG/DL
CREAT SERPL-MCNC: 5.6 MG/DL
CREAT SERPL-MCNC: 6.1 MG/DL
DIFFERENTIAL METHOD: ABNORMAL
EOSINOPHIL # BLD AUTO: 0.2 K/UL
EOSINOPHIL NFR BLD: 2.9 %
ERYTHROCYTE [DISTWIDTH] IN BLOOD BY AUTOMATED COUNT: 17.2 %
EST. GFR  (AFRICAN AMERICAN): 10.9 ML/MIN/1.73 M^2
EST. GFR  (AFRICAN AMERICAN): 12.1 ML/MIN/1.73 M^2
EST. GFR  (AFRICAN AMERICAN): 12.1 ML/MIN/1.73 M^2
EST. GFR  (NON AFRICAN AMERICAN): 10.4 ML/MIN/1.73 M^2
EST. GFR  (NON AFRICAN AMERICAN): 10.4 ML/MIN/1.73 M^2
EST. GFR  (NON AFRICAN AMERICAN): 9.4 ML/MIN/1.73 M^2
GLUCOSE SERPL-MCNC: 162 MG/DL
GLUCOSE SERPL-MCNC: 162 MG/DL
GLUCOSE SERPL-MCNC: 172 MG/DL
HCT VFR BLD AUTO: 25.7 %
HGB BLD-MCNC: 7.8 G/DL
IMM GRANULOCYTES # BLD AUTO: 0.05 K/UL
IMM GRANULOCYTES NFR BLD AUTO: 0.8 %
INR PPP: 1
LYMPHOCYTES # BLD AUTO: 0.8 K/UL
LYMPHOCYTES NFR BLD: 11.8 %
MAGNESIUM SERPL-MCNC: 2.1 MG/DL
MAGNESIUM SERPL-MCNC: 2.3 MG/DL
MAGNESIUM SERPL-MCNC: 2.3 MG/DL
MCH RBC QN AUTO: 32.5 PG
MCHC RBC AUTO-ENTMCNC: 30.4 G/DL
MCV RBC AUTO: 107 FL
MONOCYTES # BLD AUTO: 1 K/UL
MONOCYTES NFR BLD: 15.4 %
NEUTROPHILS # BLD AUTO: 4.5 K/UL
NEUTROPHILS NFR BLD: 68.8 %
NRBC BLD-RTO: 0 /100 WBC
PHOSPHATE SERPL-MCNC: 5 MG/DL
PHOSPHATE SERPL-MCNC: 5 MG/DL
PHOSPHATE SERPL-MCNC: 5.1 MG/DL
PLATELET # BLD AUTO: 79 K/UL
PMV BLD AUTO: 10.3 FL
POCT GLUCOSE: 163 MG/DL (ref 70–110)
POCT GLUCOSE: 174 MG/DL (ref 70–110)
POCT GLUCOSE: 178 MG/DL (ref 70–110)
POCT GLUCOSE: 185 MG/DL (ref 70–110)
POTASSIUM SERPL-SCNC: 4 MMOL/L
PROT SERPL-MCNC: 5.6 G/DL
PROTHROMBIN TIME: 10.3 SEC
RBC # BLD AUTO: 2.4 M/UL
SODIUM SERPL-SCNC: 139 MMOL/L
SODIUM SERPL-SCNC: 139 MMOL/L
SODIUM SERPL-SCNC: 141 MMOL/L
WBC # BLD AUTO: 6.51 K/UL

## 2019-03-09 PROCEDURE — 83735 ASSAY OF MAGNESIUM: CPT

## 2019-03-09 PROCEDURE — 99291 PR CRITICAL CARE, E/M 30-74 MINUTES: ICD-10-PCS | Mod: GC,,, | Performed by: PSYCHIATRY & NEUROLOGY

## 2019-03-09 PROCEDURE — 99291 CRITICAL CARE FIRST HOUR: CPT | Mod: GC,,, | Performed by: PSYCHIATRY & NEUROLOGY

## 2019-03-09 PROCEDURE — 84100 ASSAY OF PHOSPHORUS: CPT

## 2019-03-09 PROCEDURE — 99231 SBSQ HOSP IP/OBS SF/LOW 25: CPT | Mod: ,,, | Performed by: NURSE PRACTITIONER

## 2019-03-09 PROCEDURE — 94761 N-INVAS EAR/PLS OXIMETRY MLT: CPT

## 2019-03-09 PROCEDURE — P9047 ALBUMIN (HUMAN), 25%, 50ML: HCPCS | Mod: JG | Performed by: PHYSICIAN ASSISTANT

## 2019-03-09 PROCEDURE — 80069 RENAL FUNCTION PANEL: CPT

## 2019-03-09 PROCEDURE — 20000000 HC ICU ROOM

## 2019-03-09 PROCEDURE — 25000003 PHARM REV CODE 250: Performed by: PSYCHIATRY & NEUROLOGY

## 2019-03-09 PROCEDURE — 83735 ASSAY OF MAGNESIUM: CPT | Mod: 91

## 2019-03-09 PROCEDURE — 80100008 HC CRRT DAILY MAINTENANCE

## 2019-03-09 PROCEDURE — 25000003 PHARM REV CODE 250: Performed by: STUDENT IN AN ORGANIZED HEALTH CARE EDUCATION/TRAINING PROGRAM

## 2019-03-09 PROCEDURE — 85610 PROTHROMBIN TIME: CPT

## 2019-03-09 PROCEDURE — 85025 COMPLETE CBC W/AUTO DIFF WBC: CPT

## 2019-03-09 PROCEDURE — 80053 COMPREHEN METABOLIC PANEL: CPT

## 2019-03-09 PROCEDURE — 99231 PR SUBSEQUENT HOSPITAL CARE,LEVL I: ICD-10-PCS | Mod: ,,, | Performed by: NURSE PRACTITIONER

## 2019-03-09 PROCEDURE — 25000003 PHARM REV CODE 250: Performed by: PHYSICIAN ASSISTANT

## 2019-03-09 PROCEDURE — 25000003 PHARM REV CODE 250: Performed by: NURSE PRACTITIONER

## 2019-03-09 PROCEDURE — 63600175 PHARM REV CODE 636 W HCPCS: Mod: JG | Performed by: PHYSICIAN ASSISTANT

## 2019-03-09 RX ORDER — ALBUMIN HUMAN 250 G/1000ML
50 SOLUTION INTRAVENOUS ONCE
Status: COMPLETED | OUTPATIENT
Start: 2019-03-09 | End: 2019-03-09

## 2019-03-09 RX ORDER — MIDODRINE HYDROCHLORIDE 5 MG/1
10 TABLET ORAL EVERY 6 HOURS
Status: DISCONTINUED | OUTPATIENT
Start: 2019-03-09 | End: 2019-03-10

## 2019-03-09 RX ADMIN — PRAVASTATIN SODIUM 40 MG: 40 TABLET ORAL at 08:03

## 2019-03-09 RX ADMIN — MIDODRINE HYDROCHLORIDE 10 MG: 5 TABLET ORAL at 05:03

## 2019-03-09 RX ADMIN — INSULIN ASPART 8 UNITS: 100 INJECTION, SOLUTION INTRAVENOUS; SUBCUTANEOUS at 04:03

## 2019-03-09 RX ADMIN — FLUOXETINE HYDROCHLORIDE 20 MG: 20 CAPSULE ORAL at 08:03

## 2019-03-09 RX ADMIN — ALBUMIN HUMAN 50 G: 0.25 SOLUTION INTRAVENOUS at 12:03

## 2019-03-09 RX ADMIN — INSULIN ASPART 8 UNITS: 100 INJECTION, SOLUTION INTRAVENOUS; SUBCUTANEOUS at 07:03

## 2019-03-09 RX ADMIN — MIDODRINE HYDROCHLORIDE 20 MG: 5 TABLET ORAL at 08:03

## 2019-03-09 RX ADMIN — PANTOPRAZOLE SODIUM 40 MG: 40 GRANULE, DELAYED RELEASE ORAL at 09:03

## 2019-03-09 RX ADMIN — PANTOPRAZOLE SODIUM 40 MG: 40 GRANULE, DELAYED RELEASE ORAL at 08:03

## 2019-03-09 RX ADMIN — LACTULOSE 15 G: 20 SOLUTION ORAL at 08:03

## 2019-03-09 RX ADMIN — MIDODRINE HYDROCHLORIDE 10 MG: 5 TABLET ORAL at 11:03

## 2019-03-09 RX ADMIN — OXYCODONE HYDROCHLORIDE 5 MG: 5 TABLET ORAL at 03:03

## 2019-03-09 RX ADMIN — OXYCODONE HYDROCHLORIDE 5 MG: 5 TABLET ORAL at 11:03

## 2019-03-09 RX ADMIN — GABAPENTIN 300 MG: 300 CAPSULE ORAL at 09:03

## 2019-03-09 RX ADMIN — MIDODRINE HYDROCHLORIDE 10 MG: 5 TABLET ORAL at 12:03

## 2019-03-09 RX ADMIN — NOREPINEPHRINE BITARTRATE 0.02 MCG/KG/MIN: 1 INJECTION, SOLUTION, CONCENTRATE INTRAVENOUS at 03:03

## 2019-03-09 RX ADMIN — DICLOFENAC: 10 GEL TOPICAL at 08:03

## 2019-03-09 RX ADMIN — STANDARDIZED SENNA CONCENTRATE AND DOCUSATE SODIUM 1 TABLET: 8.6; 5 TABLET, FILM COATED ORAL at 09:03

## 2019-03-09 RX ADMIN — STANDARDIZED SENNA CONCENTRATE AND DOCUSATE SODIUM 1 TABLET: 8.6; 5 TABLET, FILM COATED ORAL at 08:03

## 2019-03-09 RX ADMIN — ACETAMINOPHEN 500 MG: 500 TABLET, FILM COATED ORAL at 02:03

## 2019-03-09 RX ADMIN — POLYETHYLENE GLYCOL 3350 17 G: 17 POWDER, FOR SOLUTION ORAL at 08:03

## 2019-03-09 RX ADMIN — INSULIN ASPART 8 UNITS: 100 INJECTION, SOLUTION INTRAVENOUS; SUBCUTANEOUS at 11:03

## 2019-03-09 NOTE — SUBJECTIVE & OBJECTIVE
Interval History:  Remains on minimal amount of pressor.    Review of Systems    Remains on pressors  CRRT today  OOB  Objective:     Vitals:  Temp: 98.5 °F (36.9 °C)  Pulse: 65  Rhythm: normal sinus rhythm  BP: (!) 107/50  MAP (mmHg): 72  CVP (mean): 10 mmHg  CI (L/min/m2): 3.5 L/min/m2  SVI: 61  SVV: 18 %  Resp: 17  SpO2: 100 %  O2 Device (Oxygen Therapy): room air    Temp  Min: 98.2 °F (36.8 °C)  Max: 98.7 °F (37.1 °C)  Pulse  Min: 60  Max: 80  BP  Min: 81/46  Max: 137/62  MAP (mmHg)  Min: 58  Max: 89  CVP (mean)  Min: 10 mmHg  Max: 12 mmHg  CI (L/min/m2)  Min: 3.4 L/min/m2  Max: 4.3 L/min/m2  SVI  Min: 54  Max: 66  SVV  Min: 13 %  Max: 32 %  Resp  Min: 9  Max: 25  SpO2  Min: 96 %  Max: 100 %    03/08 0701 - 03/09 0700  In: 2415 [P.O.:400; I.V.:2015]  Out: 3388 [Drains:10]   Unmeasured Output  Stool Occurrence: 1       Physical Exam   Constitutional: He appears well-developed.   Eyes: EOM are normal. Pupils are equal, round, and reactive to light.   Cardiovascular: Normal rate.   Neurological: Abnormal muscle tone:    Obese  Neuro Exam:  Alert, oriented X4  Follows commands in all extemities  Limited motion in upper extremities  SILT    Medications:  Continuous    norepinephrine bitartrate-D5W Last Rate: 0.01 mcg/kg/min (03/09/19 1130)   Scheduled    diclofenac sodium  Daily   epoetin shefali (PROCRIT) injection 7,000 Units Every Mon, Wed, Fri   FLUoxetine 20 mg Daily   gabapentin 300 mg QHS   insulin aspart U-100 8 Units TIDWM   insulin detemir U-100 20 Units QHS   lactulose 15 g Daily   midodrine 20 mg TID   pantoprazole 40 mg BID   polyethylene glycol 17 g Daily   pravastatin 40 mg Daily   senna-docusate 8.6-50 mg 1 tablet BID   PRN    acetaminophen 500 mg Q6H PRN   dextrose 50% 12.5 g PRN   dextrose 50% 25 g PRN   glucagon (human recombinant) 1 mg PRN   glucose 16 g PRN   glucose 24 g PRN   insulin aspart U-100 0-5 Units QID (AC + HS) PRN   ondansetron 4 mg Q6H PRN   oxyCODONE 5 mg Q6H PRN     Today I personally  reviewed pertinent medications, lines/drains/airways, imaging, cardiology results, laboratory results, microbiology results, notably:    Diet  Diet diabetic [facility]; 2000 Calorie  Diet diabetic [facility]; 2000 Calorie

## 2019-03-09 NOTE — PLAN OF CARE
Problem: Adult Inpatient Plan of Care  Goal: Plan of Care Review  Outcome: Ongoing (interventions implemented as appropriate)  POc reviewed with pt at 1500. Pt verbalized understanding of the POC. Pt still on levophed gtt. Bath given. PRN pain meds given. Needs attended. Questions and concerns addressed. Pt progressing towards goals of care. Please see flow sheet for detailed nursing assessment and VS. Will continue to monitor.

## 2019-03-09 NOTE — PROGRESS NOTES
[facility]  Neurocritical Care  Progress Note    Admit Date: 2/28/2019  Service Date: 03/09/2019  Length of Stay: 9    Subjective:     Chief Complaint: S/P laminectomy    History of Present Illness: The patient  is a 58 y.o. male with PMHx of of DMII, ESRD on HD MWF(last dialysis 3/1), HLD, HTN and MIKE admitted to [facility] s/p cervical laminectomy C3-C6, C6-C7 w/medial discectomy. Per chart review,  Mr. Farmer has had wobbly gait since this past September, at which point he recalls falling and has been at 80-90% of his normal function.  He has been able to walk without assist, however not for very long distances.  He states that since last Sunday he has felt progression of his symptoms and now feels it would be difficult to stand up to ambulate at all. His UE symptoms have been present for about 3 months including not writing as well as he is used to.MRI cervical spine (2/27)at OSH on 2/27 revealed severe disc space narrowing C3-C4 and C5-C6 level.  Patient admitted to [facility] for close monitoring and higher level of care.            Hospital Course: 3/3: pt admitted to post-up, MAP goal >75, on levo  3/4: extubated   3/5:Daily weights, 5 units Q 6hrs Insulin apart added, diabetic diet started    3/8/2019: remains on low dose pressors trying to wean today. Plan for CRRT today; started Prozac for mood and Lactulose to help with constipation.  03/09/2019: no issues. Got albumin before HD, still on minimal pressor support. Weaning off.     Interval History:  Remains on minimal amount of pressor.    Review of Systems    Remains on pressors  CRRT today  OOB  Objective:     Vitals:  Temp: 98.5 °F (36.9 °C)  Pulse: 65  Rhythm: normal sinus rhythm  BP: (!) 107/50  MAP (mmHg): 72  CVP (mean): 10 mmHg  CI (L/min/m2): 3.5 L/min/m2  SVI: 61  SVV: 18 %  Resp: 17  SpO2: 100 %  O2 Device (Oxygen Therapy): room air    Temp  Min: 98.2 °F (36.8 °C)  Max: 98.7 °F (37.1 °C)  Pulse  Min: 60  Max: 80  BP  Min: 81/46  Max:  137/62  MAP (mmHg)  Min: 58  Max: 89  CVP (mean)  Min: 10 mmHg  Max: 12 mmHg  CI (L/min/m2)  Min: 3.4 L/min/m2  Max: 4.3 L/min/m2  SVI  Min: 54  Max: 66  SVV  Min: 13 %  Max: 32 %  Resp  Min: 9  Max: 25  SpO2  Min: 96 %  Max: 100 %    03/08 0701 - 03/09 0700  In: 2415 [P.O.:400; I.V.:2015]  Out: 3388 [Drains:10]   Unmeasured Output  Stool Occurrence: 1       Physical Exam   Constitutional: He appears well-developed.   Eyes: EOM are normal. Pupils are equal, round, and reactive to light.   Cardiovascular: Normal rate.   Neurological: Abnormal muscle tone:    Obese  Neuro Exam:  Alert, oriented X4  Follows commands in all extemities  Limited motion in upper extremities  SILT    Medications:  Continuous    norepinephrine bitartrate-D5W Last Rate: 0.01 mcg/kg/min (03/09/19 1130)   Scheduled    diclofenac sodium  Daily   epoetin shefali (PROCRIT) injection 7,000 Units Every Mon, Wed, Fri   FLUoxetine 20 mg Daily   gabapentin 300 mg QHS   insulin aspart U-100 8 Units TIDWM   insulin detemir U-100 20 Units QHS   lactulose 15 g Daily   midodrine 20 mg TID   pantoprazole 40 mg BID   polyethylene glycol 17 g Daily   pravastatin 40 mg Daily   senna-docusate 8.6-50 mg 1 tablet BID   PRN    acetaminophen 500 mg Q6H PRN   dextrose 50% 12.5 g PRN   dextrose 50% 25 g PRN   glucagon (human recombinant) 1 mg PRN   glucose 16 g PRN   glucose 24 g PRN   insulin aspart U-100 0-5 Units QID (AC + HS) PRN   ondansetron 4 mg Q6H PRN   oxyCODONE 5 mg Q6H PRN     Today I personally reviewed pertinent medications, lines/drains/airways, imaging, cardiology results, laboratory results, microbiology results, notably:    Diet  Diet diabetic [facility]; 2000 Calorie  Diet diabetic [facility]; 2000 Calorie        Assessment/Plan:     Neuro   Multifactorial peripheral neuropathy    -on Gabapentin 300 mg         Cardiac/Vascular   Hypotension    --MAP > 65  --on minimal norepinephrine - wean as tolerated  -- midodrine     Endocrine   Diabetes  mellitus, type 2    --Hg A1C (2/27) 5.0   --Continue current Basal/Bolus     Orthopedic   * S/P laminectomy    POD 7 s/p cervical laminectomy C3-C6, C6-C7 with medial discectomy  Hypotension during case  MAP goal >65 now  On small dose pressors, still  OOB, PT/OT  May go back to OR, keep NPO               The patient is being Prophylaxed for:  Venous Thromboembolism with: Mechanical  Stress Ulcer with: None  Ventilator Pneumonia with: not applicable    Activity Orders          None        Full Code    Ko Ashton MD  Neurocritical Care  [facility]     X Size Of Lesion In Cm (Optional): 0.6

## 2019-03-09 NOTE — PLAN OF CARE
Problem: Adult Inpatient Plan of Care  Goal: Plan of Care Review  Outcome: Ongoing (interventions implemented as appropriate)  Had CRRT for this shift. Ran for 10 hours.  Remains on Levophed @ 0.03 mcg/kg/min. Maintained MAP greater than 65 per order. 1 BM   Patient progressing towards goals as tolerated, plan of care communicated and reviewed with Angel Farmer. All concerns addressed. Will continue to monitor.   CO:  [8.4 L/min-10.4 L/min] 10 L/min  CI:  [3.4 L/min/m2-4.2 L/min/m2] 4 L/min/m2   CVP 10 - 12  Temp:  [98.2 °F (36.8 °C)-98.7 °F (37.1 °C)]   Pulse:  [63-80]   Resp:  [10-21]   BP: ()/(46-62)   SpO2:  [96 %-100 %]   Arterial Line BP: (124-156)/(42-54)    I/O this shift:  In: 1782.5 [I.V.:1782.5]  Out: 3090 [Other:3090]

## 2019-03-09 NOTE — PROGRESS NOTES
[facility]  Neurosurgery  Progress Note    Subjective:     History of Present Illness: 57 yo male with a PMH of ESRD with dialysis MWF (last on weds), DM, presenting as [facility] as admission to neurosurgery for evaluation after progressive course of unsteady gait as well as bilateral upper extremity weakness for the last several months.  Mr. Farmer has had wobbly gait since this past September, at which point he recalls falling and has been at 80-90% of his normal function.  He has been able to walk without assist, however not for very long distances.  He states that since last Sunday he has felt progression of his symptoms and now feels it would be difficult to stand up to ambulate at all.  He also endorses a pulled muscle of his LLE that is limiting his mobility.  His UE symptoms have been present for about 3 months including not writing as well as he is used to.  HE is able to write numbers, but not always his name.  He has been having difficulty using a fork to eat over the past month.  No sensory disturbance noted as well as no neck or back pain and no pain from the neck radiating into the hands.  He has intermittently been taking ASA 81 mg qdaily. He denies recent falls or trauma to the neck.  No other blood thinning medication.  MRI at OSH on 2/27 revealed significant cervical stenosis.      =    Post-Op Info:  Procedure(s) (LRB):  SPINE, CERVICAL, POSTERIOR APPROACH  LAMINECTOMY C3-C6; C6-C7; WITH MEDIAL DISCECTOMY (N/A)   6 Days Post-Op     Interval History: naeon    Medications:  Continuous Infusions:   sodium chloride 0.9% 200 mL/hr at 03/09/19 0600    norepinephrine bitartrate-D5W 0.03 mcg/kg/min (03/09/19 0800)     Scheduled Meds:   diclofenac sodium   Topical (Top) Daily    epoetin shefali (PROCRIT) injection  7,000 Units Intravenous Every Mon, Wed, Fri    FLUoxetine  20 mg Oral Daily    gabapentin  300 mg Oral QHS    insulin aspart U-100  8 Units  Subcutaneous TIDWM    insulin detemir U-100  20 Units Subcutaneous QHS    lactulose  15 g Oral Daily    midodrine  20 mg Oral TID    pantoprazole  40 mg Per NG tube BID    polyethylene glycol  17 g Oral Daily    pravastatin  40 mg Oral Daily    senna-docusate 8.6-50 mg  1 tablet Oral BID     PRN Meds:acetaminophen, dextrose 50%, dextrose 50%, glucagon (human recombinant), glucose, glucose, insulin aspart U-100, magnesium sulfate IVPB, ondansetron, oxyCODONE       Objective:     Weight: 134.3 kg (296 lb)  Body mass index is 45.01 kg/m².  Vital Signs (Most Recent):  Temp: 98.6 °F (37 °C) (03/09/19 0700)  Pulse: 62 (03/09/19 0800)  Resp: 13 (03/09/19 0800)  BP: (!) 109/52 (03/09/19 0800)  SpO2: 99 % (03/09/19 0800) Vital Signs (24h Range):  Temp:  [98.2 °F (36.8 °C)-98.7 °F (37.1 °C)] 98.6 °F (37 °C)  Pulse:  [60-80] 62  Resp:  [9-25] 13  SpO2:  [96 %-100 %] 99 %  BP: ()/(46-62) 109/52  Arterial Line BP: (124-156)/(42-56) 134/49     Date 03/09/19 0700 - 03/10/19 0659   Shift 7634-4280 5203-8561 9820-9765 24 Hour Total   INTAKE   P.O. 200   200   I.V.(mL/kg) 3.8(0)   3.8(0)   Shift Total(mL/kg) 203.8(1.5)   203.8(1.5)   OUTPUT   Shift Total(mL/kg)       Weight (kg) 134.3 134.3 134.3 134.3                        Closed/Suction Drain 03/03/19 1431 Posterior Neck Accordion 10 Fr. (Active)   Site Description Unable to view 3/8/2019  3:00 PM   Dressing Type Gauze 3/8/2019  3:00 PM   Dressing Status Clean;Dry;Intact 3/8/2019  3:00 PM   Dressing Intervention Dressing reinforced 3/8/2019  3:00 PM   Drainage Serosanguineous 3/8/2019  3:00 PM   Status To bulb suction 3/8/2019  3:00 PM   Output (mL) 25 mL 3/7/2019  6:02 PM            Hemodialysis AV Fistula Left forearm (Active)   Needle Size 15ga 3/1/2019  4:19 PM   Site Assessment Intact;Dry 3/8/2019  3:00 PM   Patency Present;Thrill;Bruit 3/8/2019  3:00 PM   Status Deaccessed 3/8/2019  3:00 PM   Flows Good 2/28/2019  8:20 AM   Dressing Intervention Dressing  reinforced 3/6/2019  3:05 AM   Dressing Status Clean;Dry;Intact 3/2/2019  3:01 PM   Site Condition No complications 3/8/2019  3:00 PM   Dressing Open to air (None) 3/8/2019  3:00 PM            Trialysis (Dialysis) Catheter 03/04/19 0250 left internal jugular (Active)   IV Device Securement sutures;catheter securement device 3/8/2019  3:00 PM   Additional Site Signs no erythema;no warmth;no edema;no pain;no palpable cord;no streak formation;no drainage 3/8/2019  3:00 PM   Patency/Care infusing 3/8/2019  3:00 PM   Waveform normal 3/8/2019  3:00 PM   Site Assessment Clean;Intact;Dry 3/8/2019  3:00 PM   Status Flushed 3/8/2019  3:00 PM   Flows Good 3/8/2019  3:00 PM   Dressing Intervention Dressing reinforced 3/8/2019  3:00 PM   Dressing Status Biopatch in place;Clean;Dry;Intact 3/8/2019  3:00 PM   Dressing Change Due 03/11/19 3/8/2019  3:00 PM   Verification by X-ray Yes 3/8/2019  7:00 AM   Site Condition No complications 3/8/2019  3:00 PM   Dressing Occlusive 3/8/2019  3:00 PM   Daily Line Review Performed 3/8/2019  3:00 PM       Neurosurgery Physical Exam      GCS15  AO x3  CN II -XII grossly intact  4-/5 BUE  5/5 BLE  SILT  +Sanford bilaterally w 3+ UE reflexes  2+ LE reflexes, no clonus or Babinski        Significant Labs:  Recent Labs   Lab 03/08/19  1313 03/09/19  0031 03/09/19  0415   * 172* 162*  162*    141 139  139   K 4.1 4.0 4.0  4.0    108 106  106   CO2 21* 24 24  24   BUN 54* 38* 34*  34*   CREATININE 8.1* 6.1* 5.6*  5.6*   CALCIUM 8.9 9.1 9.0  9.0   MG 2.3 2.1 2.3  2.3     Recent Labs   Lab 03/08/19  0316 03/08/19  0747 03/09/19  0415   WBC 6.68 6.11 6.51   HGB 8.2* 8.0* 7.8*   HCT 25.5* 24.8* 25.7*   PLT 83* 95* 79*     Recent Labs   Lab 03/08/19  0155 03/09/19  0415   INR 1.0 1.0     Microbiology Results (last 7 days)     ** No results found for the last 168 hours. **        Significant Diagnostics:  CT: No results found in the last 24 hours.  MRI: No results found in the  last 24 hours.    Review of Systems        Assessment/Plan:     * S/P laminectomy     58 M with progressive cervical myelopathy and stenosis from C3-6 with myelomalacia. S/p code for hypovolemia during HD. Stable this morning and labs WNL. He stepped up to ICU for further care and work up, now s/p C3-6 posterior decompression.    -Will need fusion in future, will discuss OR timing with   -HOB >30  -Pain control   -No C collar needed  -Pain control PRN  -HD per nephrology  -Daily PT/OT  -Medical management per NCC  -may stepdown to NSGY when stable  -Please call Neurosurgery with any questions or exam changes            Cervical myelopathy    58 M with progressive cervical myelopathy and stenosis from C3-6 with myelomalacia. S/p code for hypovolemia during HD. Sable this morning and labs WNL. He stepped up to ICU for further care and work up.    -Stepdown and cleared by MICU for surgery  -Will plan for C3-6 posterior decompression/fusio  -Consent obtained  -Pain control PRN  -Keep NPO  -HD per nephrology  -Daily PT/OT         Roland Gilbert MD  Neurosurgery  [facility]

## 2019-03-09 NOTE — ASSESSMENT & PLAN NOTE
Caution with insulin stacking  Estimated Creatinine Clearance: 19.8 mL/min (A) (based on SCr of 5.6 mg/dL (H)).

## 2019-03-09 NOTE — SUBJECTIVE & OBJECTIVE
"Interval HPI:   Overnight events: Remains in NCC, NAAMANDEEPON. BG fairly well controlled on current insulin regimen. CRRT per nephrology.   Eatin%  Nausea: No  Hypoglycemia and intervention: No  Fever: No  TPN and/or TF: No    BP (!) 115/53 (BP Location: Right arm, Patient Position: Lying)   Pulse 65   Temp 98.6 °F (37 °C) (Oral)   Resp 14   Ht 5' 8" (1.727 m)   Wt 134.3 kg (296 lb)   SpO2 99%   BMI 45.01 kg/m²     Labs Reviewed and Include    Recent Labs   Lab 19  0415   *  162*   CALCIUM 9.0  9.0   ALBUMIN 2.7*  2.7*   PROT 5.6*     139   K 4.0  4.0   CO2 24  24     106   BUN 34*  34*   CREATININE 5.6*  5.6*   ALKPHOS 170*   ALT 9*   AST 22   BILITOT 0.8     Lab Results   Component Value Date    WBC 6.51 2019    HGB 7.8 (L) 2019    HCT 25.7 (L) 2019     (H) 2019    PLT 79 (L) 2019     No results for input(s): TSH, FREET4 in the last 168 hours.  Lab Results   Component Value Date    HGBA1C 5.0 2019       Nutritional status:   Body mass index is 45.01 kg/m².  Lab Results   Component Value Date    ALBUMIN 2.7 (L) 2019    ALBUMIN 2.7 (L) 2019    ALBUMIN 2.7 (L) 2019     No results found for: PREALBUMIN    Estimated Creatinine Clearance: 19.8 mL/min (A) (based on SCr of 5.6 mg/dL (H)).    Accu-Checks  Recent Labs     19  2117 19  0842 19  1222 19  1756 19  2143 19  0749 19  1048 19  1627 19  2050   POCTGLUCOSE 202* 196* 221* 225* 152* 168* 168* 128* 201* 178*       Current Medications and/or Treatments Impacting Glycemic Control  Immunotherapy:    Immunosuppressants     None        Steroids:   Hormones (From admission, onward)    None        Pressors:    Autonomic Drugs (From admission, onward)    Start     Stop Route Frequency Ordered    19 1500  midodrine tablet 20 mg      -- Oral 3 times daily 19 0955    19 0615  norepinephrine " 32 mg in dextrose 5 % 250 mL infusion     Question Answer Comment   Titrate by: (in mcg/kg/min) 0.02    Titrate interval: (in minutes) 5    Titrate to maintain: (MAP or SBP) MAP    Greater than: (in mmHg) 75    Maximum dose: (in mcg/kg/min) 3        -- IV Continuous 03/05/19 0613        Hyperglycemia/Diabetes Medications:   Antihyperglycemics (From admission, onward)    Start     Stop Route Frequency Ordered    03/07/19 2100  insulin detemir U-100 pen 20 Units      -- SubQ Nightly 03/07/19 0913    03/07/19 1645  insulin aspart U-100 pen 8 Units      -- SubQ 3 times daily with meals 03/07/19 1452    03/06/19 1638  insulin aspart U-100 pen 0-5 Units      -- SubQ Before meals & nightly PRN 03/06/19 1538

## 2019-03-09 NOTE — PROGRESS NOTES
10hr sled started via right ij temp cath, both ports with good flows. UFrate at 350cc/hr as ordered.    Time off at 7:20am if without issues made

## 2019-03-09 NOTE — PROGRESS NOTES
"[facility]  Endocrinology  Progress Note    Admit Date: 2019     Reason for Consult: Management of T2DM, Hyperglycemia     Surgical Procedure and Date: Cervical laminectomy C3-C6, C6-C7 w/ medial discectomy 3/3/19    Diabetes diagnosis year:     Lab Results   Component Value Date    HGBA1C 5.0 2019     Home Diabetes Medications: Basaglar 40 units q HS, Victoza 1.2 mg once daily,     How often checking glucose at home? 4x per day   BG readings on regimen: 90-120s  Hypoglycemia on the regimen?  Yes - about once per month  Missed doses on regimen?  No    Diabetes Complications include:     Hypoglycemia , Diabetic nephropathy  , Diabetic chronic kidney disease     , Diabetic retinopathy , Diabetic cataract  and Diabetic peripheral neuropathy     Complicating diabetes co morbidities:   MIKE, CKD and ESRD      HPI:   Patient is a 58 y.o. male with a diagnosis of HTN, HLD, MIKE, ESRD on HD, and DM2, who is s/p cervical laminectomy C3-C6, C6-C7 w/ medial discectomy 3/3/19.  Patient was admitted to the ED on 19 for upper extremity weakness and ataxia following a fall.  Patient's DM managed by PCP, Dr. Shemar Berry in [place].  Patient is prescribed MDI but only takes basal insulin and Victoza.  Positive family history of DM (mother, father, and both maternal grandparents).  Endocrine consulted for DM/BG management.    Interval HPI:   Overnight events: Remains in NCC, NAEON. BG fairly well controlled on current insulin regimen. CRRT per nephrology.   Eatin%  Nausea: No  Hypoglycemia and intervention: No  Fever: No  TPN and/or TF: No    BP (!) 115/53 (BP Location: Right arm, Patient Position: Lying)   Pulse 65   Temp 98.6 °F (37 °C) (Oral)   Resp 14   Ht 5' 8" (1.727 m)   Wt 134.3 kg (296 lb)   SpO2 99%   BMI 45.01 kg/m²      Labs Reviewed and Include    Recent Labs   Lab 19  0415   *  162*   CALCIUM 9.0  9.0   ALBUMIN 2.7*  2.7*   PROT 5.6*     139 "   K 4.0  4.0   CO2 24  24     106   BUN 34*  34*   CREATININE 5.6*  5.6*   ALKPHOS 170*   ALT 9*   AST 22   BILITOT 0.8     Lab Results   Component Value Date    WBC 6.51 03/09/2019    HGB 7.8 (L) 03/09/2019    HCT 25.7 (L) 03/09/2019     (H) 03/09/2019    PLT 79 (L) 03/09/2019     No results for input(s): TSH, FREET4 in the last 168 hours.  Lab Results   Component Value Date    HGBA1C 5.0 02/27/2019       Nutritional status:   Body mass index is 45.01 kg/m².  Lab Results   Component Value Date    ALBUMIN 2.7 (L) 03/09/2019    ALBUMIN 2.7 (L) 03/09/2019    ALBUMIN 2.7 (L) 03/09/2019     No results found for: PREALBUMIN    Estimated Creatinine Clearance: 19.8 mL/min (A) (based on SCr of 5.6 mg/dL (H)).    Accu-Checks  Recent Labs     03/06/19 2012 03/06/19  2117 03/07/19  0842 03/07/19  1222 03/07/19  1756 03/07/19  2143 03/08/19  0749 03/08/19  1048 03/08/19  1627 03/08/19  2050   POCTGLUCOSE 202* 196* 221* 225* 152* 168* 168* 128* 201* 178*       Current Medications and/or Treatments Impacting Glycemic Control  Immunotherapy:    Immunosuppressants     None        Steroids:   Hormones (From admission, onward)    None        Pressors:    Autonomic Drugs (From admission, onward)    Start     Stop Route Frequency Ordered    03/08/19 1500  midodrine tablet 20 mg      -- Oral 3 times daily 03/08/19 0955    03/05/19 0615  norepinephrine 32 mg in dextrose 5 % 250 mL infusion     Question Answer Comment   Titrate by: (in mcg/kg/min) 0.02    Titrate interval: (in minutes) 5    Titrate to maintain: (MAP or SBP) MAP    Greater than: (in mmHg) 75    Maximum dose: (in mcg/kg/min) 3        -- IV Continuous 03/05/19 0613        Hyperglycemia/Diabetes Medications:   Antihyperglycemics (From admission, onward)    Start     Stop Route Frequency Ordered    03/07/19 2100  insulin detemir U-100 pen 20 Units      -- SubQ Nightly 03/07/19 0913    03/07/19 1645  insulin aspart U-100 pen 8 Units      -- SubQ 3 times daily  with meals 03/07/19 1452    03/06/19 1638  insulin aspart U-100 pen 0-5 Units      -- SubQ Before meals & nightly PRN 03/06/19 1538          ASSESSMENT and PLAN    * S/P laminectomy    S/p cervical laminectomy C3-C6, C6-C7 w/ medial discectomy 3/3/19  Managed per primary team  Avoid hypoglycemia  Optimize BG control for surgical wound healing         Diabetes mellitus, type 2    BG goal 140-180    Levemir 20 units q HS   Novolog 8 units with meals  Low dose correction scale given kidney function  BG monitoring AC/HS    Discharge planning: Patient will need insulin dosing changes upon discharge.  Currently only taking basal insulin plus Victoza.       ESRD (end stage renal disease) on dialysis    Caution with insulin stacking  Estimated Creatinine Clearance: 19.8 mL/min (A) (based on SCr of 5.6 mg/dL (H)).         Class 3 severe obesity due to excess calories with serious comorbidity and body mass index (BMI) of 45.0 to 49.9 in adult    may contribute to insulin resistance  Body mass index is 45.01 kg/m².         Anemia due to stage 5 chronic kidney disease    Affects A1C accuracy  Consider checking fructosamine at least 2 weeks post hospital discharge to evaluate BG control     MIKE (obstructive sleep apnea) - very severe latest sleep study says BPAP @ 16/8    May affect BG readings if uncontrolled             Willie Mao NP  Endocrinology  [facility]

## 2019-03-09 NOTE — ASSESSMENT & PLAN NOTE
POD 7 s/p cervical laminectomy C3-C6, C6-C7 with medial discectomy  Hypotension during case  MAP goal >65 now  On small dose pressors, still  OOB, PT/OT  May go back to OR, keep NPO

## 2019-03-09 NOTE — SUBJECTIVE & OBJECTIVE
Interval History: naeon    Medications:  Continuous Infusions:   sodium chloride 0.9% 200 mL/hr at 03/09/19 0600    norepinephrine bitartrate-D5W 0.03 mcg/kg/min (03/09/19 0800)     Scheduled Meds:   diclofenac sodium   Topical (Top) Daily    epoetin shefali (PROCRIT) injection  7,000 Units Intravenous Every Mon, Wed, Fri    FLUoxetine  20 mg Oral Daily    gabapentin  300 mg Oral QHS    insulin aspart U-100  8 Units Subcutaneous TIDWM    insulin detemir U-100  20 Units Subcutaneous QHS    lactulose  15 g Oral Daily    midodrine  20 mg Oral TID    pantoprazole  40 mg Per NG tube BID    polyethylene glycol  17 g Oral Daily    pravastatin  40 mg Oral Daily    senna-docusate 8.6-50 mg  1 tablet Oral BID     PRN Meds:acetaminophen, dextrose 50%, dextrose 50%, glucagon (human recombinant), glucose, glucose, insulin aspart U-100, magnesium sulfate IVPB, ondansetron, oxyCODONE       Objective:     Weight: 134.3 kg (296 lb)  Body mass index is 45.01 kg/m².  Vital Signs (Most Recent):  Temp: 98.6 °F (37 °C) (03/09/19 0700)  Pulse: 62 (03/09/19 0800)  Resp: 13 (03/09/19 0800)  BP: (!) 109/52 (03/09/19 0800)  SpO2: 99 % (03/09/19 0800) Vital Signs (24h Range):  Temp:  [98.2 °F (36.8 °C)-98.7 °F (37.1 °C)] 98.6 °F (37 °C)  Pulse:  [60-80] 62  Resp:  [9-25] 13  SpO2:  [96 %-100 %] 99 %  BP: ()/(46-62) 109/52  Arterial Line BP: (124-156)/(42-56) 134/49     Date 03/09/19 0700 - 03/10/19 0659   Shift 1429-3409 6245-7151 6554-2099 24 Hour Total   INTAKE   P.O. 200   200   I.V.(mL/kg) 3.8(0)   3.8(0)   Shift Total(mL/kg) 203.8(1.5)   203.8(1.5)   OUTPUT   Shift Total(mL/kg)       Weight (kg) 134.3 134.3 134.3 134.3                        Closed/Suction Drain 03/03/19 1431 Posterior Neck Accordion 10 Fr. (Active)   Site Description Unable to view 3/8/2019  3:00 PM   Dressing Type Gauze 3/8/2019  3:00 PM   Dressing Status Clean;Dry;Intact 3/8/2019  3:00 PM   Dressing Intervention Dressing reinforced 3/8/2019  3:00 PM    Drainage Serosanguineous 3/8/2019  3:00 PM   Status To bulb suction 3/8/2019  3:00 PM   Output (mL) 25 mL 3/7/2019  6:02 PM            Hemodialysis AV Fistula Left forearm (Active)   Needle Size 15ga 3/1/2019  4:19 PM   Site Assessment Intact;Dry 3/8/2019  3:00 PM   Patency Present;Thrill;Bruit 3/8/2019  3:00 PM   Status Deaccessed 3/8/2019  3:00 PM   Flows Good 2/28/2019  8:20 AM   Dressing Intervention Dressing reinforced 3/6/2019  3:05 AM   Dressing Status Clean;Dry;Intact 3/2/2019  3:01 PM   Site Condition No complications 3/8/2019  3:00 PM   Dressing Open to air (None) 3/8/2019  3:00 PM            Trialysis (Dialysis) Catheter 03/04/19 0250 left internal jugular (Active)   IV Device Securement sutures;catheter securement device 3/8/2019  3:00 PM   Additional Site Signs no erythema;no warmth;no edema;no pain;no palpable cord;no streak formation;no drainage 3/8/2019  3:00 PM   Patency/Care infusing 3/8/2019  3:00 PM   Waveform normal 3/8/2019  3:00 PM   Site Assessment Clean;Intact;Dry 3/8/2019  3:00 PM   Status Flushed 3/8/2019  3:00 PM   Flows Good 3/8/2019  3:00 PM   Dressing Intervention Dressing reinforced 3/8/2019  3:00 PM   Dressing Status Biopatch in place;Clean;Dry;Intact 3/8/2019  3:00 PM   Dressing Change Due 03/11/19 3/8/2019  3:00 PM   Verification by X-ray Yes 3/8/2019  7:00 AM   Site Condition No complications 3/8/2019  3:00 PM   Dressing Occlusive 3/8/2019  3:00 PM   Daily Line Review Performed 3/8/2019  3:00 PM       Neurosurgery Physical Exam      GCS15  AO x3  CN II -XII grossly intact  4-/5 BUE  5/5 BLE  SILT  +Sanford bilaterally w 3+ UE reflexes  2+ LE reflexes, no clonus or Babinski        Significant Labs:  Recent Labs   Lab 03/08/19  1313 03/09/19  0031 03/09/19  0415   * 172* 162*  162*    141 139  139   K 4.1 4.0 4.0  4.0    108 106  106   CO2 21* 24 24  24   BUN 54* 38* 34*  34*   CREATININE 8.1* 6.1* 5.6*  5.6*   CALCIUM 8.9 9.1 9.0  9.0   MG 2.3 2.1 2.3   2.3     Recent Labs   Lab 03/08/19  0316 03/08/19  0747 03/09/19  0415   WBC 6.68 6.11 6.51   HGB 8.2* 8.0* 7.8*   HCT 25.5* 24.8* 25.7*   PLT 83* 95* 79*     Recent Labs   Lab 03/08/19  0155 03/09/19  0415   INR 1.0 1.0     Microbiology Results (last 7 days)     ** No results found for the last 168 hours. **        Significant Diagnostics:  CT: No results found in the last 24 hours.  MRI: No results found in the last 24 hours.    Review of Systems

## 2019-03-10 LAB
ABO + RH BLD: NORMAL
ALBUMIN SERPL BCP-MCNC: 2.9 G/DL
ALBUMIN SERPL BCP-MCNC: 3 G/DL
ALP SERPL-CCNC: 155 U/L
ALT SERPL W/O P-5'-P-CCNC: 10 U/L
ANION GAP SERPL CALC-SCNC: 8 MMOL/L
ANION GAP SERPL CALC-SCNC: 8 MMOL/L
AST SERPL-CCNC: 21 U/L
BASOPHILS # BLD AUTO: 0.02 K/UL
BASOPHILS NFR BLD: 0.3 %
BILIRUB SERPL-MCNC: 0.8 MG/DL
BLD GP AB SCN CELLS X3 SERPL QL: NORMAL
BUN SERPL-MCNC: 47 MG/DL
BUN SERPL-MCNC: 50 MG/DL
CALCIUM SERPL-MCNC: 9.2 MG/DL
CALCIUM SERPL-MCNC: 9.4 MG/DL
CHLORIDE SERPL-SCNC: 108 MMOL/L
CHLORIDE SERPL-SCNC: 108 MMOL/L
CO2 SERPL-SCNC: 24 MMOL/L
CO2 SERPL-SCNC: 25 MMOL/L
CREAT SERPL-MCNC: 7.3 MG/DL
CREAT SERPL-MCNC: 7.4 MG/DL
DIFFERENTIAL METHOD: ABNORMAL
EOSINOPHIL # BLD AUTO: 0.2 K/UL
EOSINOPHIL NFR BLD: 3.6 %
ERYTHROCYTE [DISTWIDTH] IN BLOOD BY AUTOMATED COUNT: 17.5 %
EST. GFR  (AFRICAN AMERICAN): 8.6 ML/MIN/1.73 M^2
EST. GFR  (AFRICAN AMERICAN): 8.8 ML/MIN/1.73 M^2
EST. GFR  (NON AFRICAN AMERICAN): 7.4 ML/MIN/1.73 M^2
EST. GFR  (NON AFRICAN AMERICAN): 7.6 ML/MIN/1.73 M^2
GLUCOSE SERPL-MCNC: 124 MG/DL
GLUCOSE SERPL-MCNC: 146 MG/DL
HCT VFR BLD AUTO: 24 %
HGB BLD-MCNC: 7.5 G/DL
IMM GRANULOCYTES # BLD AUTO: 0.05 K/UL
IMM GRANULOCYTES NFR BLD AUTO: 0.9 %
INR PPP: 1
LYMPHOCYTES # BLD AUTO: 1.1 K/UL
LYMPHOCYTES NFR BLD: 17.9 %
MAGNESIUM SERPL-MCNC: 2.4 MG/DL
MAGNESIUM SERPL-MCNC: 2.4 MG/DL
MCH RBC QN AUTO: 33.3 PG
MCHC RBC AUTO-ENTMCNC: 31.3 G/DL
MCV RBC AUTO: 107 FL
MONOCYTES # BLD AUTO: 0.9 K/UL
MONOCYTES NFR BLD: 14.5 %
NEUTROPHILS # BLD AUTO: 3.7 K/UL
NEUTROPHILS NFR BLD: 62.8 %
NRBC BLD-RTO: 0 /100 WBC
PHOSPHATE SERPL-MCNC: 6 MG/DL
PHOSPHATE SERPL-MCNC: 6 MG/DL
PLATELET # BLD AUTO: 80 K/UL
PMV BLD AUTO: 10.5 FL
POCT GLUCOSE: 101 MG/DL (ref 70–110)
POCT GLUCOSE: 125 MG/DL (ref 70–110)
POCT GLUCOSE: 131 MG/DL (ref 70–110)
POCT GLUCOSE: 89 MG/DL (ref 70–110)
POTASSIUM SERPL-SCNC: 4.5 MMOL/L
POTASSIUM SERPL-SCNC: 5 MMOL/L
PROT SERPL-MCNC: 5.6 G/DL
PROTHROMBIN TIME: 10.5 SEC
RBC # BLD AUTO: 2.25 M/UL
SODIUM SERPL-SCNC: 140 MMOL/L
SODIUM SERPL-SCNC: 141 MMOL/L
WBC # BLD AUTO: 5.85 K/UL

## 2019-03-10 PROCEDURE — 90945 DIALYSIS ONE EVALUATION: CPT

## 2019-03-10 PROCEDURE — 25000003 PHARM REV CODE 250: Performed by: PSYCHIATRY & NEUROLOGY

## 2019-03-10 PROCEDURE — P9047 ALBUMIN (HUMAN), 25%, 50ML: HCPCS | Mod: JG | Performed by: PSYCHIATRY & NEUROLOGY

## 2019-03-10 PROCEDURE — 25000003 PHARM REV CODE 250: Performed by: PHYSICIAN ASSISTANT

## 2019-03-10 PROCEDURE — 25000003 PHARM REV CODE 250: Performed by: INTERNAL MEDICINE

## 2019-03-10 PROCEDURE — 25000003 PHARM REV CODE 250: Performed by: NURSE PRACTITIONER

## 2019-03-10 PROCEDURE — 86901 BLOOD TYPING SEROLOGIC RH(D): CPT

## 2019-03-10 PROCEDURE — 99291 PR CRITICAL CARE, E/M 30-74 MINUTES: ICD-10-PCS | Mod: GC,,, | Performed by: PSYCHIATRY & NEUROLOGY

## 2019-03-10 PROCEDURE — 94761 N-INVAS EAR/PLS OXIMETRY MLT: CPT

## 2019-03-10 PROCEDURE — 85610 PROTHROMBIN TIME: CPT

## 2019-03-10 PROCEDURE — 85025 COMPLETE CBC W/AUTO DIFF WBC: CPT

## 2019-03-10 PROCEDURE — 83735 ASSAY OF MAGNESIUM: CPT

## 2019-03-10 PROCEDURE — 80069 RENAL FUNCTION PANEL: CPT

## 2019-03-10 PROCEDURE — 80053 COMPREHEN METABOLIC PANEL: CPT

## 2019-03-10 PROCEDURE — 25000003 PHARM REV CODE 250: Performed by: STUDENT IN AN ORGANIZED HEALTH CARE EDUCATION/TRAINING PROGRAM

## 2019-03-10 PROCEDURE — 84100 ASSAY OF PHOSPHORUS: CPT

## 2019-03-10 PROCEDURE — 63600175 PHARM REV CODE 636 W HCPCS: Mod: JG | Performed by: PSYCHIATRY & NEUROLOGY

## 2019-03-10 PROCEDURE — 99291 CRITICAL CARE FIRST HOUR: CPT | Mod: GC,,, | Performed by: PSYCHIATRY & NEUROLOGY

## 2019-03-10 PROCEDURE — 20000000 HC ICU ROOM

## 2019-03-10 PROCEDURE — 83735 ASSAY OF MAGNESIUM: CPT | Mod: 91

## 2019-03-10 RX ORDER — MIDODRINE HYDROCHLORIDE 5 MG/1
20 TABLET ORAL EVERY 6 HOURS
Status: DISCONTINUED | OUTPATIENT
Start: 2019-03-10 | End: 2019-03-20 | Stop reason: HOSPADM

## 2019-03-10 RX ORDER — MAGNESIUM SULFATE HEPTAHYDRATE 40 MG/ML
2 INJECTION, SOLUTION INTRAVENOUS
Status: ACTIVE | OUTPATIENT
Start: 2019-03-10 | End: 2019-03-11

## 2019-03-10 RX ORDER — LACTULOSE 10 G/15ML
20 SOLUTION ORAL DAILY
Status: DISCONTINUED | OUTPATIENT
Start: 2019-03-11 | End: 2019-03-18

## 2019-03-10 RX ORDER — ALBUMIN HUMAN 250 G/1000ML
25 SOLUTION INTRAVENOUS ONCE
Status: COMPLETED | OUTPATIENT
Start: 2019-03-10 | End: 2019-03-10

## 2019-03-10 RX ORDER — HYDROCODONE BITARTRATE AND ACETAMINOPHEN 500; 5 MG/1; MG/1
TABLET ORAL CONTINUOUS
Status: ACTIVE | OUTPATIENT
Start: 2019-03-10 | End: 2019-03-11

## 2019-03-10 RX ADMIN — ACETAMINOPHEN 500 MG: 500 TABLET, FILM COATED ORAL at 08:03

## 2019-03-10 RX ADMIN — OXYCODONE HYDROCHLORIDE 5 MG: 5 TABLET ORAL at 09:03

## 2019-03-10 RX ADMIN — STANDARDIZED SENNA CONCENTRATE AND DOCUSATE SODIUM 1 TABLET: 8.6; 5 TABLET, FILM COATED ORAL at 08:03

## 2019-03-10 RX ADMIN — MIDODRINE HYDROCHLORIDE 20 MG: 5 TABLET ORAL at 05:03

## 2019-03-10 RX ADMIN — ACETAMINOPHEN 500 MG: 500 TABLET, FILM COATED ORAL at 05:03

## 2019-03-10 RX ADMIN — PANTOPRAZOLE SODIUM 40 MG: 40 GRANULE, DELAYED RELEASE ORAL at 08:03

## 2019-03-10 RX ADMIN — SODIUM CHLORIDE 500 ML: 0.9 INJECTION, SOLUTION INTRAVENOUS at 10:03

## 2019-03-10 RX ADMIN — DICLOFENAC: 10 GEL TOPICAL at 08:03

## 2019-03-10 RX ADMIN — PRAVASTATIN SODIUM 40 MG: 40 TABLET ORAL at 08:03

## 2019-03-10 RX ADMIN — GABAPENTIN 300 MG: 300 CAPSULE ORAL at 09:03

## 2019-03-10 RX ADMIN — MIDODRINE HYDROCHLORIDE 10 MG: 5 TABLET ORAL at 05:03

## 2019-03-10 RX ADMIN — OXYCODONE HYDROCHLORIDE 5 MG: 5 TABLET ORAL at 01:03

## 2019-03-10 RX ADMIN — INSULIN ASPART 8 UNITS: 100 INJECTION, SOLUTION INTRAVENOUS; SUBCUTANEOUS at 11:03

## 2019-03-10 RX ADMIN — POLYETHYLENE GLYCOL 3350 17 G: 17 POWDER, FOR SOLUTION ORAL at 08:03

## 2019-03-10 RX ADMIN — PANTOPRAZOLE SODIUM 40 MG: 40 GRANULE, DELAYED RELEASE ORAL at 09:03

## 2019-03-10 RX ADMIN — MIDODRINE HYDROCHLORIDE 20 MG: 5 TABLET ORAL at 11:03

## 2019-03-10 RX ADMIN — ALBUMIN HUMAN 25 G: 0.25 SOLUTION INTRAVENOUS at 10:03

## 2019-03-10 RX ADMIN — SODIUM CHLORIDE: 0.9 INJECTION, SOLUTION INTRAVENOUS at 09:03

## 2019-03-10 RX ADMIN — FLUOXETINE HYDROCHLORIDE 20 MG: 20 CAPSULE ORAL at 08:03

## 2019-03-10 RX ADMIN — INSULIN ASPART 8 UNITS: 100 INJECTION, SOLUTION INTRAVENOUS; SUBCUTANEOUS at 05:03

## 2019-03-10 RX ADMIN — OXYCODONE HYDROCHLORIDE 5 MG: 5 TABLET ORAL at 05:03

## 2019-03-10 RX ADMIN — LACTULOSE 15 G: 20 SOLUTION ORAL at 08:03

## 2019-03-10 RX ADMIN — INSULIN ASPART 8 UNITS: 100 INJECTION, SOLUTION INTRAVENOUS; SUBCUTANEOUS at 07:03

## 2019-03-10 RX ADMIN — STANDARDIZED SENNA CONCENTRATE AND DOCUSATE SODIUM 1 TABLET: 8.6; 5 TABLET, FILM COATED ORAL at 09:03

## 2019-03-10 NOTE — SUBJECTIVE & OBJECTIVE
Interval History: naeon    Medications:  Continuous Infusions:   norepinephrine bitartrate-D5W 0.015 mcg/kg/min (03/10/19 0800)     Scheduled Meds:   diclofenac sodium   Topical (Top) Daily    epoetin shefali (PROCRIT) injection  7,000 Units Intravenous Every Mon, Wed, Fri    FLUoxetine  20 mg Oral Daily    gabapentin  300 mg Oral QHS    insulin aspart U-100  8 Units Subcutaneous TIDWM    insulin detemir U-100  20 Units Subcutaneous QHS    lactulose  15 g Oral Daily    midodrine  10 mg Oral Q6H    pantoprazole  40 mg Per NG tube BID    polyethylene glycol  17 g Oral Daily    pravastatin  40 mg Oral Daily    senna-docusate 8.6-50 mg  1 tablet Oral BID     PRN Meds:acetaminophen, dextrose 50%, dextrose 50%, glucagon (human recombinant), glucose, glucose, insulin aspart U-100, ondansetron, oxyCODONE       Objective:     Weight: 134.3 kg (296 lb)  Body mass index is 45.01 kg/m².  Vital Signs (Most Recent):  Temp: 98.5 °F (36.9 °C) (03/10/19 0700)  Pulse: 61 (03/10/19 0800)  Resp: 14 (03/10/19 0800)  BP: (!) 98/52 (03/10/19 0800)  SpO2: 97 % (03/10/19 0800) Vital Signs (24h Range):  Temp:  [98.2 °F (36.8 °C)-98.6 °F (37 °C)] 98.5 °F (36.9 °C)  Pulse:  [54-74] 61  Resp:  [10-28] 14  SpO2:  [95 %-100 %] 97 %  BP: ()/(47-58) 98/52  Arterial Line BP: (123-169)/(45-57) 147/55     Date 03/10/19 0700 - 03/11/19 0659   Shift 3845-7336 9307-4139 9678-5200 24 Hour Total   INTAKE   P.O. 100   100   I.V.(mL/kg) 2.5(0)   2.5(0)   Shift Total(mL/kg) 102.5(0.8)   102.5(0.8)   OUTPUT   Shift Total(mL/kg)       Weight (kg) 134.3 134.3 134.3 134.3              Resp Rate Total:  [10 br/min-19 br/min] 14 br/min         Closed/Suction Drain 03/03/19 1431 Posterior Neck Accordion 10 Fr. (Active)   Site Description Unable to view 3/8/2019  3:00 PM   Dressing Type Gauze 3/8/2019  3:00 PM   Dressing Status Clean;Dry;Intact 3/8/2019  3:00 PM   Dressing Intervention Dressing reinforced 3/8/2019  3:00 PM   Drainage Serosanguineous  3/8/2019  3:00 PM   Status To bulb suction 3/8/2019  3:00 PM   Output (mL) 25 mL 3/7/2019  6:02 PM            Hemodialysis AV Fistula Left forearm (Active)   Needle Size 15ga 3/1/2019  4:19 PM   Site Assessment Intact;Dry 3/8/2019  3:00 PM   Patency Present;Thrill;Bruit 3/8/2019  3:00 PM   Status Deaccessed 3/8/2019  3:00 PM   Flows Good 2/28/2019  8:20 AM   Dressing Intervention Dressing reinforced 3/6/2019  3:05 AM   Dressing Status Clean;Dry;Intact 3/2/2019  3:01 PM   Site Condition No complications 3/8/2019  3:00 PM   Dressing Open to air (None) 3/8/2019  3:00 PM            Trialysis (Dialysis) Catheter 03/04/19 0250 left internal jugular (Active)   IV Device Securement sutures;catheter securement device 3/8/2019  3:00 PM   Additional Site Signs no erythema;no warmth;no edema;no pain;no palpable cord;no streak formation;no drainage 3/8/2019  3:00 PM   Patency/Care infusing 3/8/2019  3:00 PM   Waveform normal 3/8/2019  3:00 PM   Site Assessment Clean;Intact;Dry 3/8/2019  3:00 PM   Status Flushed 3/8/2019  3:00 PM   Flows Good 3/8/2019  3:00 PM   Dressing Intervention Dressing reinforced 3/8/2019  3:00 PM   Dressing Status Biopatch in place;Clean;Dry;Intact 3/8/2019  3:00 PM   Dressing Change Due 03/11/19 3/8/2019  3:00 PM   Verification by X-ray Yes 3/8/2019  7:00 AM   Site Condition No complications 3/8/2019  3:00 PM   Dressing Occlusive 3/8/2019  3:00 PM   Daily Line Review Performed 3/8/2019  3:00 PM       Neurosurgery Physical Exam      GCS15  AO x3  CN II -XII grossly intact  4-/5 BUE  5/5 BLE  SILT  +Sanford bilaterally w 3+ UE reflexes  2+ LE reflexes, no clonus or Babinski        Significant Labs:  Recent Labs   Lab 03/09/19  0031 03/09/19  0415 03/10/19  0135   * 162*  162* 146*    139  139 141   K 4.0 4.0  4.0 4.5    106  106 108   CO2 24 24  24 25   BUN 38* 34*  34* 47*   CREATININE 6.1* 5.6*  5.6* 7.3*   CALCIUM 9.1 9.0  9.0 9.2   MG 2.1 2.3  2.3 2.4     Recent Labs   Lab  03/08/19  0747 03/09/19  0415 03/10/19  0135   WBC 6.11 6.51 5.85   HGB 8.0* 7.8* 7.5*   HCT 24.8* 25.7* 24.0*   PLT 95* 79* 80*     Recent Labs   Lab 03/09/19  0415 03/10/19  0135   INR 1.0 1.0     Microbiology Results (last 7 days)     ** No results found for the last 168 hours. **        Significant Diagnostics:  CT: No results found in the last 24 hours.  MRI: No results found in the last 24 hours.    Review of Systems    Interval History: naeon    Medications:  Continuous Infusions:   norepinephrine bitartrate-D5W 0.02 mcg/kg/min (03/10/19 0700)     Scheduled Meds:   diclofenac sodium   Topical (Top) Daily    epoetin shefali (PROCRIT) injection  7,000 Units Intravenous Every Mon, Wed, Fri    FLUoxetine  20 mg Oral Daily    gabapentin  300 mg Oral QHS    insulin aspart U-100  8 Units Subcutaneous TIDWM    insulin detemir U-100  20 Units Subcutaneous QHS    lactulose  15 g Oral Daily    midodrine  10 mg Oral Q6H    pantoprazole  40 mg Per NG tube BID    polyethylene glycol  17 g Oral Daily    pravastatin  40 mg Oral Daily    senna-docusate 8.6-50 mg  1 tablet Oral BID     PRN Meds:acetaminophen, dextrose 50%, dextrose 50%, glucagon (human recombinant), glucose, glucose, insulin aspart U-100, ondansetron, oxyCODONE       Objective:     Weight: 134.3 kg (296 lb)  Body mass index is 45.01 kg/m².  Vital Signs (Most Recent):  Temp: 98.5 °F (36.9 °C) (03/10/19 0700)  Pulse: 60 (03/10/19 0700)  Resp: 19 (03/10/19 0700)  BP: (!) 98/54 (03/10/19 0700)  SpO2: 95 % (03/10/19 0700) Vital Signs (24h Range):  Temp:  [98.2 °F (36.8 °C)-98.6 °F (37 °C)] 98.5 °F (36.9 °C)  Pulse:  [54-74] 60  Resp:  [10-28] 19  SpO2:  [95 %-100 %] 95 %  BP: ()/(47-58) 98/54  Arterial Line BP: (123-169)/(45-57) 141/51     Date 03/10/19 0700 - 03/11/19 0659   Shift 4836-5406 1805-6808 8460-3168 24 Hour Total   INTAKE   I.V.(mL/kg) 1.2(0)   1.2(0)   Shift Total(mL/kg) 1.2(0)   1.2(0)   OUTPUT   Shift Total(mL/kg)       Weight (kg)  134.3 134.3 134.3 134.3              Resp Rate Total:  [10 br/min-19 br/min] 14 br/min         Closed/Suction Drain 03/03/19 1431 Posterior Neck Accordion 10 Fr. (Active)   Site Description Unable to view 3/8/2019  3:00 PM   Dressing Type Gauze 3/8/2019  3:00 PM   Dressing Status Clean;Dry;Intact 3/8/2019  3:00 PM   Dressing Intervention Dressing reinforced 3/8/2019  3:00 PM   Drainage Serosanguineous 3/8/2019  3:00 PM   Status To bulb suction 3/8/2019  3:00 PM   Output (mL) 25 mL 3/7/2019  6:02 PM            Hemodialysis AV Fistula Left forearm (Active)   Needle Size 15ga 3/1/2019  4:19 PM   Site Assessment Intact;Dry 3/8/2019  3:00 PM   Patency Present;Thrill;Bruit 3/8/2019  3:00 PM   Status Deaccessed 3/8/2019  3:00 PM   Flows Good 2/28/2019  8:20 AM   Dressing Intervention Dressing reinforced 3/6/2019  3:05 AM   Dressing Status Clean;Dry;Intact 3/2/2019  3:01 PM   Site Condition No complications 3/8/2019  3:00 PM   Dressing Open to air (None) 3/8/2019  3:00 PM            Trialysis (Dialysis) Catheter 03/04/19 0250 left internal jugular (Active)   IV Device Securement sutures;catheter securement device 3/8/2019  3:00 PM   Additional Site Signs no erythema;no warmth;no edema;no pain;no palpable cord;no streak formation;no drainage 3/8/2019  3:00 PM   Patency/Care infusing 3/8/2019  3:00 PM   Waveform normal 3/8/2019  3:00 PM   Site Assessment Clean;Intact;Dry 3/8/2019  3:00 PM   Status Flushed 3/8/2019  3:00 PM   Flows Good 3/8/2019  3:00 PM   Dressing Intervention Dressing reinforced 3/8/2019  3:00 PM   Dressing Status Biopatch in place;Clean;Dry;Intact 3/8/2019  3:00 PM   Dressing Change Due 03/11/19 3/8/2019  3:00 PM   Verification by X-ray Yes 3/8/2019  7:00 AM   Site Condition No complications 3/8/2019  3:00 PM   Dressing Occlusive 3/8/2019  3:00 PM   Daily Line Review Performed 3/8/2019  3:00 PM       Neurosurgery Physical Exam      GCS15  AO x3  CN II -XII grossly intact  4-/5 BUE  5/5 BLE  SILT  +Claribel  bilaterally w 3+ UE reflexes  2+ LE reflexes, no clonus or Babinski        Significant Labs:  Recent Labs   Lab 03/09/19  0031 03/09/19  0415 03/10/19  0135   * 162*  162* 146*    139  139 141   K 4.0 4.0  4.0 4.5    106  106 108   CO2 24 24  24 25   BUN 38* 34*  34* 47*   CREATININE 6.1* 5.6*  5.6* 7.3*   CALCIUM 9.1 9.0  9.0 9.2   MG 2.1 2.3  2.3 2.4     Recent Labs   Lab 03/08/19  0747 03/09/19  0415 03/10/19  0135   WBC 6.11 6.51 5.85   HGB 8.0* 7.8* 7.5*   HCT 24.8* 25.7* 24.0*   PLT 95* 79* 80*     Recent Labs   Lab 03/09/19  0415 03/10/19  0135   INR 1.0 1.0     Microbiology Results (last 7 days)     ** No results found for the last 168 hours. **        Significant Diagnostics:  CT: No results found in the last 24 hours.  MRI: No results found in the last 24 hours.    Review of Systems

## 2019-03-10 NOTE — PROGRESS NOTES
[facility]  Neurosurgery  Progress Note    Subjective:     History of Present Illness: 59 yo male with a PMH of ESRD with dialysis MWF (last on weds), DM, presenting as transfer from [facility] as admission to neurosurgery for evaluation after progressive course of unsteady gait as well as bilateral upper extremity weakness for the last several months.  Mr. Farmer has had wobbly gait since this past September, at which point he recalls falling and has been at 80-90% of his normal function.  He has been able to walk without assist, however not for very long distances.  He states that since last Sunday he has felt progression of his symptoms and now feels it would be difficult to stand up to ambulate at all.  He also endorses a pulled muscle of his LLE that is limiting his mobility.  His UE symptoms have been present for about 3 months including not writing as well as he is used to.  HE is able to write numbers, but not always his name.  He has been having difficulty using a fork to eat over the past month.  No sensory disturbance noted as well as no neck or back pain and no pain from the neck radiating into the hands.  He has intermittently been taking ASA 81 mg qdaily. He denies recent falls or trauma to the neck.  No other blood thinning medication.  MRI at OSH on 2/27 revealed significant cervical stenosis.      =      Post-Op Info:  Procedure(s) (LRB):  SPINE, CERVICAL, POSTERIOR APPROACH  LAMINECTOMY C3-C6; C6-C7; WITH MEDIAL DISCECTOMY (N/A)   7 Days Post-Op     Interval History: naeon    Medications:  Continuous Infusions:   norepinephrine bitartrate-D5W 0.015 mcg/kg/min (03/10/19 0800)     Scheduled Meds:   diclofenac sodium   Topical (Top) Daily    epoetin shefali (PROCRIT) injection  7,000 Units Intravenous Every Mon, Wed, Fri    FLUoxetine  20 mg Oral Daily    gabapentin  300 mg Oral QHS    insulin aspart U-100  8 Units Subcutaneous TIDWM    insulin detemir U-100  20 Units  Subcutaneous QHS    lactulose  15 g Oral Daily    midodrine  10 mg Oral Q6H    pantoprazole  40 mg Per NG tube BID    polyethylene glycol  17 g Oral Daily    pravastatin  40 mg Oral Daily    senna-docusate 8.6-50 mg  1 tablet Oral BID     PRN Meds:acetaminophen, dextrose 50%, dextrose 50%, glucagon (human recombinant), glucose, glucose, insulin aspart U-100, ondansetron, oxyCODONE       Objective:     Weight: 134.3 kg (296 lb)  Body mass index is 45.01 kg/m².  Vital Signs (Most Recent):  Temp: 98.5 °F (36.9 °C) (03/10/19 0700)  Pulse: 61 (03/10/19 0800)  Resp: 14 (03/10/19 0800)  BP: (!) 98/52 (03/10/19 0800)  SpO2: 97 % (03/10/19 0800) Vital Signs (24h Range):  Temp:  [98.2 °F (36.8 °C)-98.6 °F (37 °C)] 98.5 °F (36.9 °C)  Pulse:  [54-74] 61  Resp:  [10-28] 14  SpO2:  [95 %-100 %] 97 %  BP: ()/(47-58) 98/52  Arterial Line BP: (123-169)/(45-57) 147/55     Date 03/10/19 0700 - 03/11/19 0659   Shift 8696-6879 7324-1858 4418-8091 24 Hour Total   INTAKE   P.O. 100   100   I.V.(mL/kg) 2.5(0)   2.5(0)   Shift Total(mL/kg) 102.5(0.8)   102.5(0.8)   OUTPUT   Shift Total(mL/kg)       Weight (kg) 134.3 134.3 134.3 134.3              Resp Rate Total:  [10 br/min-19 br/min] 14 br/min         Closed/Suction Drain 03/03/19 1431 Posterior Neck Accordion 10 Fr. (Active)   Site Description Unable to view 3/8/2019  3:00 PM   Dressing Type Gauze 3/8/2019  3:00 PM   Dressing Status Clean;Dry;Intact 3/8/2019  3:00 PM   Dressing Intervention Dressing reinforced 3/8/2019  3:00 PM   Drainage Serosanguineous 3/8/2019  3:00 PM   Status To bulb suction 3/8/2019  3:00 PM   Output (mL) 25 mL 3/7/2019  6:02 PM            Hemodialysis AV Fistula Left forearm (Active)   Needle Size 15ga 3/1/2019  4:19 PM   Site Assessment Intact;Dry 3/8/2019  3:00 PM   Patency Present;Thrill;Bruit 3/8/2019  3:00 PM   Status Deaccessed 3/8/2019  3:00 PM   Flows Good 2/28/2019  8:20 AM   Dressing Intervention Dressing reinforced 3/6/2019  3:05 AM    Dressing Status Clean;Dry;Intact 3/2/2019  3:01 PM   Site Condition No complications 3/8/2019  3:00 PM   Dressing Open to air (None) 3/8/2019  3:00 PM            Trialysis (Dialysis) Catheter 03/04/19 0250 left internal jugular (Active)   IV Device Securement sutures;catheter securement device 3/8/2019  3:00 PM   Additional Site Signs no erythema;no warmth;no edema;no pain;no palpable cord;no streak formation;no drainage 3/8/2019  3:00 PM   Patency/Care infusing 3/8/2019  3:00 PM   Waveform normal 3/8/2019  3:00 PM   Site Assessment Clean;Intact;Dry 3/8/2019  3:00 PM   Status Flushed 3/8/2019  3:00 PM   Flows Good 3/8/2019  3:00 PM   Dressing Intervention Dressing reinforced 3/8/2019  3:00 PM   Dressing Status Biopatch in place;Clean;Dry;Intact 3/8/2019  3:00 PM   Dressing Change Due 03/11/19 3/8/2019  3:00 PM   Verification by X-ray Yes 3/8/2019  7:00 AM   Site Condition No complications 3/8/2019  3:00 PM   Dressing Occlusive 3/8/2019  3:00 PM   Daily Line Review Performed 3/8/2019  3:00 PM       Neurosurgery Physical Exam      GCS15  AO x3  CN II -XII grossly intact  4-/5 BUE  5/5 BLE  SILT  +Sanford bilaterally w 3+ UE reflexes  2+ LE reflexes, no clonus or Babinski        Significant Labs:  Recent Labs   Lab 03/09/19  0031 03/09/19 0415 03/10/19  0135   * 162*  162* 146*    139  139 141   K 4.0 4.0  4.0 4.5    106  106 108   CO2 24 24  24 25   BUN 38* 34*  34* 47*   CREATININE 6.1* 5.6*  5.6* 7.3*   CALCIUM 9.1 9.0  9.0 9.2   MG 2.1 2.3  2.3 2.4     Recent Labs   Lab 03/08/19  0747 03/09/19 0415 03/10/19  0135   WBC 6.11 6.51 5.85   HGB 8.0* 7.8* 7.5*   HCT 24.8* 25.7* 24.0*   PLT 95* 79* 80*     Recent Labs   Lab 03/09/19  0415 03/10/19  0135   INR 1.0 1.0     Microbiology Results (last 7 days)     ** No results found for the last 168 hours. **        Significant Diagnostics:  CT: No results found in the last 24 hours.  MRI: No results found in the last 24 hours.    Review of  Systems    Interval History: naeon    Medications:  Continuous Infusions:   norepinephrine bitartrate-D5W 0.02 mcg/kg/min (03/10/19 0700)     Scheduled Meds:   diclofenac sodium   Topical (Top) Daily    epoetin shefali (PROCRIT) injection  7,000 Units Intravenous Every Mon, Wed, Fri    FLUoxetine  20 mg Oral Daily    gabapentin  300 mg Oral QHS    insulin aspart U-100  8 Units Subcutaneous TIDWM    insulin detemir U-100  20 Units Subcutaneous QHS    lactulose  15 g Oral Daily    midodrine  10 mg Oral Q6H    pantoprazole  40 mg Per NG tube BID    polyethylene glycol  17 g Oral Daily    pravastatin  40 mg Oral Daily    senna-docusate 8.6-50 mg  1 tablet Oral BID     PRN Meds:acetaminophen, dextrose 50%, dextrose 50%, glucagon (human recombinant), glucose, glucose, insulin aspart U-100, ondansetron, oxyCODONE       Objective:     Weight: 134.3 kg (296 lb)  Body mass index is 45.01 kg/m².  Vital Signs (Most Recent):  Temp: 98.5 °F (36.9 °C) (03/10/19 0700)  Pulse: 60 (03/10/19 0700)  Resp: 19 (03/10/19 0700)  BP: (!) 98/54 (03/10/19 0700)  SpO2: 95 % (03/10/19 0700) Vital Signs (24h Range):  Temp:  [98.2 °F (36.8 °C)-98.6 °F (37 °C)] 98.5 °F (36.9 °C)  Pulse:  [54-74] 60  Resp:  [10-28] 19  SpO2:  [95 %-100 %] 95 %  BP: ()/(47-58) 98/54  Arterial Line BP: (123-169)/(45-57) 141/51     Date 03/10/19 0700 - 03/11/19 0659   Shift 5867-2463 3916-7395 3425-2223 24 Hour Total   INTAKE   I.V.(mL/kg) 1.2(0)   1.2(0)   Shift Total(mL/kg) 1.2(0)   1.2(0)   OUTPUT   Shift Total(mL/kg)       Weight (kg) 134.3 134.3 134.3 134.3              Resp Rate Total:  [10 br/min-19 br/min] 14 br/min         Closed/Suction Drain 03/03/19 1431 Posterior Neck Accordion 10 Fr. (Active)   Site Description Unable to view 3/8/2019  3:00 PM   Dressing Type Gauze 3/8/2019  3:00 PM   Dressing Status Clean;Dry;Intact 3/8/2019  3:00 PM   Dressing Intervention Dressing reinforced 3/8/2019  3:00 PM   Drainage Serosanguineous 3/8/2019  3:00  PM   Status To bulb suction 3/8/2019  3:00 PM   Output (mL) 25 mL 3/7/2019  6:02 PM            Hemodialysis AV Fistula Left forearm (Active)   Needle Size 15ga 3/1/2019  4:19 PM   Site Assessment Intact;Dry 3/8/2019  3:00 PM   Patency Present;Thrill;Bruit 3/8/2019  3:00 PM   Status Deaccessed 3/8/2019  3:00 PM   Flows Good 2/28/2019  8:20 AM   Dressing Intervention Dressing reinforced 3/6/2019  3:05 AM   Dressing Status Clean;Dry;Intact 3/2/2019  3:01 PM   Site Condition No complications 3/8/2019  3:00 PM   Dressing Open to air (None) 3/8/2019  3:00 PM            Trialysis (Dialysis) Catheter 03/04/19 0250 left internal jugular (Active)   IV Device Securement sutures;catheter securement device 3/8/2019  3:00 PM   Additional Site Signs no erythema;no warmth;no edema;no pain;no palpable cord;no streak formation;no drainage 3/8/2019  3:00 PM   Patency/Care infusing 3/8/2019  3:00 PM   Waveform normal 3/8/2019  3:00 PM   Site Assessment Clean;Intact;Dry 3/8/2019  3:00 PM   Status Flushed 3/8/2019  3:00 PM   Flows Good 3/8/2019  3:00 PM   Dressing Intervention Dressing reinforced 3/8/2019  3:00 PM   Dressing Status Biopatch in place;Clean;Dry;Intact 3/8/2019  3:00 PM   Dressing Change Due 03/11/19 3/8/2019  3:00 PM   Verification by X-ray Yes 3/8/2019  7:00 AM   Site Condition No complications 3/8/2019  3:00 PM   Dressing Occlusive 3/8/2019  3:00 PM   Daily Line Review Performed 3/8/2019  3:00 PM       Neurosurgery Physical Exam      GCS15  AO x3  CN II -XII grossly intact  4-/5 BUE  5/5 BLE  SILT  +[place]ford bilaterally w 3+ UE reflexes  2+ LE reflexes, no clonus or Babinski        Significant Labs:  Recent Labs   Lab 03/09/19  0031 03/09/19  0415 03/10/19  0135   * 162*  162* 146*    139  139 141   K 4.0 4.0  4.0 4.5    106  106 108   CO2 24 24  24 25   BUN 38* 34*  34* 47*   CREATININE 6.1* 5.6*  5.6* 7.3*   CALCIUM 9.1 9.0  9.0 9.2   MG 2.1 2.3  2.3 2.4     Recent Labs   Lab 03/08/19  0758  03/09/19  0415 03/10/19  0135   WBC 6.11 6.51 5.85   HGB 8.0* 7.8* 7.5*   HCT 24.8* 25.7* 24.0*   PLT 95* 79* 80*     Recent Labs   Lab 03/09/19  0415 03/10/19  0135   INR 1.0 1.0     Microbiology Results (last 7 days)     ** No results found for the last 168 hours. **        Significant Diagnostics:  CT: No results found in the last 24 hours.  MRI: No results found in the last 24 hours.    Review of Systems        Assessment/Plan:     * S/P laminectomy     58 M with progressive cervical myelopathy and stenosis from C3-6 with myelomalacia. S/p code for hypovolemia during HD. Stable this morning and labs WNL. He stepped up to ICU for further care and work up, now s/p C3-6 posterior decompression.    -Will need fusion in future, will discuss OR timing with ; please provide medical clearance   -HOB >30  -Pain control   -No C collar needed  -Pain control PRN  -HD per nephrology  -Daily PT/OT  -Medical management per NCC  -may stepdown to NSGY when stable  -Please call Neurosurgery with any questions or exam changes                Roland Gilbert MD  Neurosurgery  [facility]

## 2019-03-10 NOTE — CARE UPDATE
BG goal 140-180    Remains in NCC, NAEON  Minimal pressor support requirements, CRRT per nephrology  BG within goal on current insulin regimen  Continue Levemir 20 units q HS and Novolog 8 units with meals plus low dose correction scale    Endocrine to continue to follow    ** Please call Endocrine for any BG related issues **

## 2019-03-10 NOTE — PLAN OF CARE
Problem: Adult Inpatient Plan of Care  Goal: Plan of Care Review  Outcome: Ongoing (interventions implemented as appropriate)  POC reviewed with pt at 1430, verbalized understanding of the POC. Pt still on flotrac. Levophed off since 10am. Keeping MAP > 65. Bath given. Pt passing flatus multiple times. Acchuchecks AC&HS with scheduled Insulin aspart. Albumin 25g administered. 500CC NS bolus administered. Pls see MAR.  Pt tolerating food. Accordion drain still in place. PRN pain meds given. Plan for CRRT today. Midodrine increased to 20mg q6h.  Needs attended.  Questions and concerns addressed. Pt progressing towards goals of care. Please see flow sheet for detailed nursing assessment and VS. Will continue to monitor.

## 2019-03-10 NOTE — PROGRESS NOTES
[facility]  Neurosurgery  Progress Note    Subjective:     History of Present Illness: 57 yo male with a PMH of ESRD with dialysis MWF (last on weds), DM, presenting as transfer from [facility] as admission to neurosurgery for evaluation after progressive course of unsteady gait as well as bilateral upper extremity weakness for the last several months.  Mr. Farmer has had wobbly gait since this past September, at which point he recalls falling and has been at 80-90% of his normal function.  He has been able to walk without assist, however not for very long distances.  He states that since last Sunday he has felt progression of his symptoms and now feels it would be difficult to stand up to ambulate at all.  He also endorses a pulled muscle of his LLE that is limiting his mobility.  His UE symptoms have been present for about 3 months including not writing as well as he is used to.  HE is able to write numbers, but not always his name.  He has been having difficulty using a fork to eat over the past month.  No sensory disturbance noted as well as no neck or back pain and no pain from the neck radiating into the hands.  He has intermittently been taking ASA 81 mg qdaily. He denies recent falls or trauma to the neck.  No other blood thinning medication.  MRI at OSH on 2/27 revealed significant cervical stenosis.      =    Post-Op Info:  Procedure(s) (LRB):  SPINE, CERVICAL, POSTERIOR APPROACH  LAMINECTOMY C3-C6; C6-C7; WITH MEDIAL DISCECTOMY (N/A)   7 Days Post-Op     Interval History: naeon    Medications:  Continuous Infusions:   norepinephrine bitartrate-D5W 0.02 mcg/kg/min (03/10/19 0700)     Scheduled Meds:   diclofenac sodium   Topical (Top) Daily    epoetin shefali (PROCRIT) injection  7,000 Units Intravenous Every Mon, Wed, Fri    FLUoxetine  20 mg Oral Daily    gabapentin  300 mg Oral QHS    insulin aspart U-100  8 Units Subcutaneous TIDWM    insulin detemir U-100  20 Units  Subcutaneous QHS    lactulose  15 g Oral Daily    midodrine  10 mg Oral Q6H    pantoprazole  40 mg Per NG tube BID    polyethylene glycol  17 g Oral Daily    pravastatin  40 mg Oral Daily    senna-docusate 8.6-50 mg  1 tablet Oral BID     PRN Meds:acetaminophen, dextrose 50%, dextrose 50%, glucagon (human recombinant), glucose, glucose, insulin aspart U-100, ondansetron, oxyCODONE       Objective:     Weight: 134.3 kg (296 lb)  Body mass index is 45.01 kg/m².  Vital Signs (Most Recent):  Temp: 98.5 °F (36.9 °C) (03/10/19 0700)  Pulse: 60 (03/10/19 0700)  Resp: 19 (03/10/19 0700)  BP: (!) 98/54 (03/10/19 0700)  SpO2: 95 % (03/10/19 0700) Vital Signs (24h Range):  Temp:  [98.2 °F (36.8 °C)-98.6 °F (37 °C)] 98.5 °F (36.9 °C)  Pulse:  [54-74] 60  Resp:  [10-28] 19  SpO2:  [95 %-100 %] 95 %  BP: ()/(47-58) 98/54  Arterial Line BP: (123-169)/(45-57) 141/51     Date 03/10/19 0700 - 03/11/19 0659   Shift 8416-4457 5302-3278 6786-4764 24 Hour Total   INTAKE   I.V.(mL/kg) 1.2(0)   1.2(0)   Shift Total(mL/kg) 1.2(0)   1.2(0)   OUTPUT   Shift Total(mL/kg)       Weight (kg) 134.3 134.3 134.3 134.3              Resp Rate Total:  [10 br/min-19 br/min] 14 br/min         Closed/Suction Drain 03/03/19 1431 Posterior Neck Accordion 10 Fr. (Active)   Site Description Unable to view 3/8/2019  3:00 PM   Dressing Type Gauze 3/8/2019  3:00 PM   Dressing Status Clean;Dry;Intact 3/8/2019  3:00 PM   Dressing Intervention Dressing reinforced 3/8/2019  3:00 PM   Drainage Serosanguineous 3/8/2019  3:00 PM   Status To bulb suction 3/8/2019  3:00 PM   Output (mL) 25 mL 3/7/2019  6:02 PM            Hemodialysis AV Fistula Left forearm (Active)   Needle Size 15ga 3/1/2019  4:19 PM   Site Assessment Intact;Dry 3/8/2019  3:00 PM   Patency Present;Thrill;Bruit 3/8/2019  3:00 PM   Status Deaccessed 3/8/2019  3:00 PM   Flows Good 2/28/2019  8:20 AM   Dressing Intervention Dressing reinforced 3/6/2019  3:05 AM   Dressing Status  Clean;Dry;Intact 3/2/2019  3:01 PM   Site Condition No complications 3/8/2019  3:00 PM   Dressing Open to air (None) 3/8/2019  3:00 PM            Trialysis (Dialysis) Catheter 03/04/19 0250 left internal jugular (Active)   IV Device Securement sutures;catheter securement device 3/8/2019  3:00 PM   Additional Site Signs no erythema;no warmth;no edema;no pain;no palpable cord;no streak formation;no drainage 3/8/2019  3:00 PM   Patency/Care infusing 3/8/2019  3:00 PM   Waveform normal 3/8/2019  3:00 PM   Site Assessment Clean;Intact;Dry 3/8/2019  3:00 PM   Status Flushed 3/8/2019  3:00 PM   Flows Good 3/8/2019  3:00 PM   Dressing Intervention Dressing reinforced 3/8/2019  3:00 PM   Dressing Status Biopatch in place;Clean;Dry;Intact 3/8/2019  3:00 PM   Dressing Change Due 03/11/19 3/8/2019  3:00 PM   Verification by X-ray Yes 3/8/2019  7:00 AM   Site Condition No complications 3/8/2019  3:00 PM   Dressing Occlusive 3/8/2019  3:00 PM   Daily Line Review Performed 3/8/2019  3:00 PM       Neurosurgery Physical Exam      GCS15  AO x3  CN II -XII grossly intact  4-/5 BUE  5/5 BLE  SILT  +Sanford bilaterally w 3+ UE reflexes  2+ LE reflexes, no clonus or Babinski        Significant Labs:  Recent Labs   Lab 03/09/19  0031 03/09/19  0415 03/10/19  0135   * 162*  162* 146*    139  139 141   K 4.0 4.0  4.0 4.5    106  106 108   CO2 24 24  24 25   BUN 38* 34*  34* 47*   CREATININE 6.1* 5.6*  5.6* 7.3*   CALCIUM 9.1 9.0  9.0 9.2   MG 2.1 2.3  2.3 2.4     Recent Labs   Lab 03/08/19  0747 03/09/19 0415 03/10/19  0135   WBC 6.11 6.51 5.85   HGB 8.0* 7.8* 7.5*   HCT 24.8* 25.7* 24.0*   PLT 95* 79* 80*     Recent Labs   Lab 03/09/19  0415 03/10/19  0135   INR 1.0 1.0     Microbiology Results (last 7 days)     ** No results found for the last 168 hours. **        Significant Diagnostics:  CT: No results found in the last 24 hours.  MRI: No results found in the last 24 hours.    Review of  Systems        Assessment/Plan:     * S/P laminectomy     58 M with progressive cervical myelopathy and stenosis from C3-6 with myelomalacia. S/p code for hypovolemia during HD. Stable this morning and labs WNL. He stepped up to ICU for further care and work up, now s/p C3-6 posterior decompression.    -Will need fusion in future, will discuss OR timing with ; please provide medical clearance   -HOB >30  -Pain control   -No C collar needed  -Pain control PRN  -HD per nephrology  -Daily PT/OT  -Medical management per NCC  -may stepdown to NSGY when stable  -Please call Neurosurgery with any questions or exam changes            Cervical myelopathy    58 M with progressive cervical myelopathy and stenosis from C3-6 with myelomalacia. S/p code for hypovolemia during HD. Sable this morning and labs WNL. He stepped up to ICU for further care and work up.    -Stepdown and cleared by MICU for surgery  -Will plan for C3-6 posterior decompression/fusio  -Consent obtained  -Pain control PRN  -Keep NPO  -HD per nephrology  -Daily PT/OT         Roland Gilbert MD  Neurosurgery  [facility]

## 2019-03-10 NOTE — PROGRESS NOTES
[facility]  Neurocritical Care  Progress Note    Admit Date: 2/28/2019  Service Date: 03/10/2019  Length of Stay: 10    Subjective:     Chief Complaint: S/P laminectomy    History of Present Illness: The patient  is a 58 y.o. male with PMHx of of DMII, ESRD on HD MWF(last dialysis 3/1), HLD, HTN and MIKE admitted to [facility] s/p cervical laminectomy C3-C6, C6-C7 w/medial discectomy. Per chart review,  Mr. Farmer has had wobbly gait since this past September, at which point he recalls falling and has been at 80-90% of his normal function.  He has been able to walk without assist, however not for very long distances.  He states that since last Sunday he has felt progression of his symptoms and now feels it would be difficult to stand up to ambulate at all. His UE symptoms have been present for about 3 months including not writing as well as he is used to.MRI cervical spine (2/27)at OSH on 2/27 revealed severe disc space narrowing C3-C4 and C5-C6 level.  Patient admitted to [facility] for close monitoring and higher level of care.            Hospital Course: 3/3: pt admitted to post-up, MAP goal >75, on levo  3/4: extubated   3/5:Daily weights, 5 units Q 6hrs Insulin apart added, diabetic diet started    3/8/2019: remains on low dose pressors trying to wean today. Plan for CRRT today; started Prozac for mood and Lactulose to help with constipation.  03/09/2019: no issues. Got albumin before HD, still on minimal pressor support. Weaning off.   03/10/2019 intermittent need for levophed.       Review of Symptoms:   Constitutional: Denies fevers, weight loss, chills, or weakness.   Eyes: Denies changes in vision.   ENT: Denies dysphagia, nasal discharge, ear pain or discharge.   Cardiovascular: Denies chest pain, palpitations, orthopnea, or claudication.   Respiratory: Denies shortness of breath, cough, hemoptysis, or wheezing.   GI: Denies nausea/vomitting, hematochezia, melena, abd pain, or changes in appetite.    : Denies dysuria, incontinence, or hematuria.   Musculoskeletal: Denies joint pain or myalgias.   Skin/breast: Denies rashes, lumps, lesions, or discharge.   Neurologic: Denies headache, dizziness, vertigo, or paresthesias.   Psychiatric: Denies changes in mood or hallucinations.   Endocrine: Denies polyuria, polydipsia, heat/cold intolerance.   Hematologic/Lymph: Denies lymphadenopathy, easy bruising or easy bleeding.   Allergic/Immunologic: Denies rash, rhinitis      Medications:  Continuous Infusions:   norepinephrine bitartrate-D5W Stopped (03/10/19 0925)     Scheduled Meds:   diclofenac sodium   Topical (Top) Daily    epoetin shefali (PROCRIT) injection  7,000 Units Intravenous Every Mon, Wed, Fri    FLUoxetine  20 mg Oral Daily    gabapentin  300 mg Oral QHS    insulin aspart U-100  8 Units Subcutaneous TIDWM    insulin detemir U-100  20 Units Subcutaneous QHS    lactulose  15 g Oral Daily    midodrine  10 mg Oral Q6H    pantoprazole  40 mg Per NG tube BID    polyethylene glycol  17 g Oral Daily    pravastatin  40 mg Oral Daily    senna-docusate 8.6-50 mg  1 tablet Oral BID     PRN Meds:.acetaminophen, dextrose 50%, dextrose 50%, glucagon (human recombinant), glucose, glucose, insulin aspart U-100, ondansetron, oxyCODONE    OBJECTIVE:   Vital Signs (Most Recent):   Temp: 98.5 °F (36.9 °C) (03/10/19 0700)  Pulse: 64 (03/10/19 0900)  Resp: 18 (03/10/19 0900)  BP: (!) 98/54 (03/10/19 0700)  SpO2: 98 % (03/10/19 0900)    Vital Signs (24h Range):   Temp:  [98.2 °F (36.8 °C)-98.6 °F (37 °C)] 98.5 °F (36.9 °C)  Pulse:  [54-74] 64  Resp:  [10-28] 18  SpO2:  [95 %-100 %] 98 %  BP: ()/(47-58) 98/54  Arterial Line BP: (123-169)/(45-57) 146/57    ICP/CPP (Last 24h):   CO:  [8.2 L/min-14.1 L/min] 9.9 L/min  CI:  [3.3 L/min/m2-5.7 L/min/m2] 4 L/min/m2    I & O (Last 24h):     Intake/Output Summary (Last 24 hours) at 3/10/2019 0941  Last data filed at 3/10/2019 0900  Gross per 24 hour   Intake 811.19 ml    Output 8 ml   Net 803.19 ml     Physical Exam:  GA: Alert, comfortable, no acute distress.   HEENT: No scleral icterus or JVD. Neck supple  Pulmonary: Air entry equal to auscultation A/P/L. Wheezing no, crackles no  Cardiac: RRR, S1 & S2 w/o rubs/murmurs/gallops.   Abdominal: soft, non-tender, bowel sounds present x 4. No appreciable hepatosplenomegaly.  Skin: No jaundice, rashes, or visible lesions.  Neuro:  --Mental Status:  Awake and alert, follows commands, fluent. Spontaneous eye opening. Tracks.   --Pupils 3.5 mm, PERRL.   --Corneal reflex not done in this awake patient, gag, cough intact.  --LUE strength: 3/5  --RUE strength: 3/5  --LLE strength: 5/5  --RLE strength: 5/5  Brisk reflexes bilateral UE  Plantars equivocal bilaterally.   MSK:  No edema in UE and LE    Vent Data:   Resp Rate Total:  [10 br/min-19 br/min] 14 br/min    Lines/Drains/Airway:          Trialysis (Dialysis) Catheter 03/04/19 0250 left internal jugular (Active)   IV Device Securement sutures;catheter securement device 3/10/2019  7:00 AM   Additional Site Signs no erythema;no warmth;no edema;no pain;no palpable cord;no streak formation;no drainage 3/10/2019  7:00 AM   Patency/Care infusing 3/10/2019  7:00 AM   Waveform normal 3/10/2019  7:00 AM   Site Assessment Clean;Intact;Dry 3/10/2019  7:00 AM   Status Flushed 3/10/2019  7:00 AM   Flows Good 3/10/2019  7:00 AM   Dressing Intervention Dressing reinforced 3/10/2019  7:00 AM   Dressing Status Biopatch in place;Clean;Dry;Intact 3/10/2019  7:00 AM   Dressing Change Due 03/11/19 3/10/2019  7:00 AM   Verification by X-ray Yes 3/9/2019  7:05 PM   Site Condition No complications 3/10/2019  7:00 AM   Dressing Occlusive 3/10/2019  7:00 AM   Daily Line Review Performed 3/10/2019  7:00 AM           Arterial Line 03/03/19 1216 Right Radial (Active)   Site Assessment Clean;Dry;Intact 3/10/2019  7:00 AM   Line Status Pulsatile blood flow 3/10/2019  7:00 AM   Art Line Waveform Appropriate 3/10/2019   7:00 AM   Arterial Line Interventions Zeroed and calibrated;Leveled;Connections checked and tightened;Flushed per protocol 3/10/2019  7:00 AM   Color/Movement/Sensation Capillary refill less than 3 sec 3/10/2019  7:00 AM   Dressing Type Transparent 3/10/2019  7:00 AM   Dressing Status Biopatch in place;Clean;Dry;Intact 3/10/2019  7:00 AM   Dressing Intervention Dressing reinforced 3/10/2019  7:00 AM   Dressing Change Due 03/14/19 3/10/2019  7:00 AM           Closed/Suction Drain 03/03/19 1431 Posterior Neck Accordion 10 Fr. (Active)   Site Description Unable to view 3/10/2019  7:00 AM   Dressing Type Gauze 3/10/2019  7:00 AM   Dressing Status Clean;Dry;Intact 3/10/2019  7:00 AM   Dressing Intervention Dressing reinforced 3/10/2019  7:00 AM   Drainage Serosanguineous 3/10/2019  7:00 AM   Status To bulb suction 3/10/2019  7:00 AM   Output (mL) 3 mL 3/10/2019  6:04 AM            Hemodialysis AV Fistula Left forearm (Active)   Needle Size 15ga 3/1/2019  4:19 PM   Site Assessment Intact;Dry 3/10/2019  7:00 AM   Patency Present;Thrill;Bruit 3/10/2019  7:00 AM   Status Deaccessed 3/10/2019  7:00 AM   Flows Good 2/28/2019  8:20 AM   Dressing Intervention Dressing reinforced 3/6/2019  3:05 AM   Dressing Status Clean;Dry;Intact 3/2/2019  3:01 PM   Site Condition No complications 3/10/2019  7:00 AM   Dressing Open to air (None) 3/10/2019  7:00 AM            Trialysis (Dialysis) Catheter 03/04/19 0250 left internal jugular (Active)   IV Device Securement sutures;catheter securement device 3/10/2019  7:00 AM   Additional Site Signs no erythema;no warmth;no edema;no pain;no palpable cord;no streak formation;no drainage 3/10/2019  7:00 AM   Patency/Care infusing 3/10/2019  7:00 AM   Waveform normal 3/10/2019  7:00 AM   Site Assessment Clean;Intact;Dry 3/10/2019  7:00 AM   Status Flushed 3/10/2019  7:00 AM   Flows Good 3/10/2019  7:00 AM   Dressing Intervention Dressing reinforced 3/10/2019  7:00 AM   Dressing Status Biopatch in  place;Clean;Dry;Intact 3/10/2019  7:00 AM   Dressing Change Due 03/11/19 3/10/2019  7:00 AM   Verification by X-ray Yes 3/9/2019  7:05 PM   Site Condition No complications 3/10/2019  7:00 AM   Dressing Occlusive 3/10/2019  7:00 AM   Daily Line Review Performed 3/10/2019  7:00 AM     Nutrition/Tube Feeds (if NPO state why): po     Labs:  ABG: No results for input(s): PH, PO2, PCO2, HCO3, POCSATURATED, BE in the last 24 hours.  BMP:  Recent Labs   Lab 03/10/19  0135      K 4.5      CO2 25   BUN 47*   CREATININE 7.3*   *   MG 2.4   PHOS 6.0*     LFT:   Lab Results   Component Value Date    AST 21 03/10/2019    ALT 10 03/10/2019    ALKPHOS 155 (H) 03/10/2019    BILITOT 0.8 03/10/2019    ALBUMIN 2.9 (L) 03/10/2019    PROT 5.6 (L) 03/10/2019     CBC:   Lab Results   Component Value Date    WBC 5.85 03/10/2019    HGB 7.5 (L) 03/10/2019    HCT 24.0 (L) 03/10/2019     (H) 03/10/2019    PLT 80 (L) 03/10/2019     Microbiology x 7d:   Microbiology Results (last 7 days)     ** No results found for the last 168 hours. **        Imaging:    I personally reviewed the above image.  Today I independently reviewed pertinent medications, lines/drains/airways, imaging, cardiology results, laboratory results, microbiology results, notably:   ASSESSMENT/PLAN:     Active Hospital Problems    Diagnosis    *S/P laminectomy    Class 3 severe obesity due to excess calories with serious comorbidity and body mass index (BMI) of 45.0 to 49.9 in adult    Hyperkalemia    Hypothermia    Hyponatremia    Anemia due to stage 5 chronic kidney disease    Hyperphosphatemia    Metabolic bone disease    Problem with vascular access    Left leg pain    Pre-syncope    Hypotension    Bradycardia     see      Multifactorial peripheral neuropathy    MIKE (obstructive sleep apnea) - very severe latest sleep study says YAMINI @ 16/8    ESRD (end stage renal disease) on dialysis    Hyperlipidemia    Diabetes mellitus, type 2       Neuro:   Myelopathy s/p laminectomy  PT/OT   Started on fluoxetine for mood - patient expressed depressed due to medical condition  Gabapentin for neuropathy    Pulmonary:   Saturation > 92%    Cardiac:   Intermittent need for levophed  Hypoalbuminemia - 100ml albumin + 500 NS bolus for hypotension  Midodrine 20 mg q6  MAP goals > 65 mmhg    Renal:    ESRD on HD    ID:   Afebrile, normal wbc    Hem/Onc:   hh trending down will monitor  Normal plt count    Endocrine:    BS stable on insulin regimen    Fluids/Electrolytes/Nutrition/GI:   Tolerating po  Lines:  Art +  CVC +  ETT NA  Douglas NA  NG NA  PEG NA    Proph:  DVT:SCD/heparin  Constipation:  Last output:bowel regimen  PUP:protonix  VAP: NA      Uninterrupted Critical Care/Counseling Time (not including procedures):: 30 mins    Paul Vera MD  Neurocritical care attending    Full Code    Paul Vera MD  Neurocritical Care  [facility]

## 2019-03-10 NOTE — ASSESSMENT & PLAN NOTE
58 M with progressive cervical myelopathy and stenosis from C3-6 with myelomalacia. S/p code for hypovolemia during HD. Stable this morning and labs WNL. He stepped up to ICU for further care and work up, now s/p C3-6 posterior decompression.    -Will need fusion in future, will discuss OR timing with ; please provide medical clearance   -HOB >30  -Pain control   -No C collar needed  -Pain control PRN  -HD per nephrology  -Daily PT/OT  -Medical management per NCC  -may stepdown to NSGY when stable  -Please call Neurosurgery with any questions or exam changes

## 2019-03-11 LAB
ALBUMIN SERPL BCP-MCNC: 2.9 G/DL
ALBUMIN SERPL BCP-MCNC: 2.9 G/DL
ALBUMIN SERPL BCP-MCNC: 3.2 G/DL
ALBUMIN SERPL BCP-MCNC: 3.2 G/DL
ALP SERPL-CCNC: 172 U/L
ALT SERPL W/O P-5'-P-CCNC: 11 U/L
ANION GAP SERPL CALC-SCNC: 7 MMOL/L
ANION GAP SERPL CALC-SCNC: 7 MMOL/L
ANION GAP SERPL CALC-SCNC: 9 MMOL/L
ANION GAP SERPL CALC-SCNC: 9 MMOL/L
AST SERPL-CCNC: 24 U/L
BASOPHILS # BLD AUTO: 0.02 K/UL
BASOPHILS NFR BLD: 0.3 %
BILIRUB SERPL-MCNC: 0.9 MG/DL
BUN SERPL-MCNC: 25 MG/DL
BUN SERPL-MCNC: 31 MG/DL
BUN SERPL-MCNC: 43 MG/DL
BUN SERPL-MCNC: 43 MG/DL
CALCIUM SERPL-MCNC: 9.1 MG/DL
CALCIUM SERPL-MCNC: 9.2 MG/DL
CALCIUM SERPL-MCNC: 9.6 MG/DL
CALCIUM SERPL-MCNC: 9.6 MG/DL
CHLORIDE SERPL-SCNC: 105 MMOL/L
CHLORIDE SERPL-SCNC: 106 MMOL/L
CO2 SERPL-SCNC: 23 MMOL/L
CO2 SERPL-SCNC: 23 MMOL/L
CO2 SERPL-SCNC: 25 MMOL/L
CO2 SERPL-SCNC: 25 MMOL/L
CREAT SERPL-MCNC: 4.2 MG/DL
CREAT SERPL-MCNC: 4.7 MG/DL
CREAT SERPL-MCNC: 6.6 MG/DL
CREAT SERPL-MCNC: 6.6 MG/DL
DIFFERENTIAL METHOD: ABNORMAL
EOSINOPHIL # BLD AUTO: 0.3 K/UL
EOSINOPHIL NFR BLD: 4.5 %
ERYTHROCYTE [DISTWIDTH] IN BLOOD BY AUTOMATED COUNT: 17.5 %
EST. GFR  (AFRICAN AMERICAN): 14.9 ML/MIN/1.73 M^2
EST. GFR  (AFRICAN AMERICAN): 17.1 ML/MIN/1.73 M^2
EST. GFR  (AFRICAN AMERICAN): 9.9 ML/MIN/1.73 M^2
EST. GFR  (AFRICAN AMERICAN): 9.9 ML/MIN/1.73 M^2
EST. GFR  (NON AFRICAN AMERICAN): 12.9 ML/MIN/1.73 M^2
EST. GFR  (NON AFRICAN AMERICAN): 14.8 ML/MIN/1.73 M^2
EST. GFR  (NON AFRICAN AMERICAN): 8.6 ML/MIN/1.73 M^2
EST. GFR  (NON AFRICAN AMERICAN): 8.6 ML/MIN/1.73 M^2
GLUCOSE SERPL-MCNC: 107 MG/DL
GLUCOSE SERPL-MCNC: 114 MG/DL
GLUCOSE SERPL-MCNC: 114 MG/DL
GLUCOSE SERPL-MCNC: 93 MG/DL
HCT VFR BLD AUTO: 25.3 %
HGB BLD-MCNC: 8 G/DL
IMM GRANULOCYTES # BLD AUTO: 0.05 K/UL
IMM GRANULOCYTES NFR BLD AUTO: 0.8 %
INR PPP: 1
LYMPHOCYTES # BLD AUTO: 1 K/UL
LYMPHOCYTES NFR BLD: 14.7 %
MAGNESIUM SERPL-MCNC: 2.1 MG/DL
MAGNESIUM SERPL-MCNC: 2.2 MG/DL
MAGNESIUM SERPL-MCNC: 2.3 MG/DL
MAGNESIUM SERPL-MCNC: 2.3 MG/DL
MCH RBC QN AUTO: 33.2 PG
MCHC RBC AUTO-ENTMCNC: 31.6 G/DL
MCV RBC AUTO: 105 FL
MONOCYTES # BLD AUTO: 0.9 K/UL
MONOCYTES NFR BLD: 14.4 %
NEUTROPHILS # BLD AUTO: 4.3 K/UL
NEUTROPHILS NFR BLD: 65.3 %
NRBC BLD-RTO: 0 /100 WBC
PHOSPHATE SERPL-MCNC: 3.6 MG/DL
PHOSPHATE SERPL-MCNC: 4.3 MG/DL
PHOSPHATE SERPL-MCNC: 5.4 MG/DL
PHOSPHATE SERPL-MCNC: 5.4 MG/DL
PLATELET # BLD AUTO: 85 K/UL
PMV BLD AUTO: 10.4 FL
POCT GLUCOSE: 109 MG/DL (ref 70–110)
POCT GLUCOSE: 118 MG/DL (ref 70–110)
POCT GLUCOSE: 149 MG/DL (ref 70–110)
POCT GLUCOSE: 89 MG/DL (ref 70–110)
POTASSIUM SERPL-SCNC: 4.8 MMOL/L
POTASSIUM SERPL-SCNC: 4.9 MMOL/L
POTASSIUM SERPL-SCNC: 4.9 MMOL/L
POTASSIUM SERPL-SCNC: 5 MMOL/L
PROT SERPL-MCNC: 6 G/DL
PROTHROMBIN TIME: 10.4 SEC
RBC # BLD AUTO: 2.41 M/UL
SODIUM SERPL-SCNC: 137 MMOL/L
SODIUM SERPL-SCNC: 138 MMOL/L
WBC # BLD AUTO: 6.51 K/UL

## 2019-03-11 PROCEDURE — 25000003 PHARM REV CODE 250: Performed by: STUDENT IN AN ORGANIZED HEALTH CARE EDUCATION/TRAINING PROGRAM

## 2019-03-11 PROCEDURE — 83735 ASSAY OF MAGNESIUM: CPT | Mod: 91

## 2019-03-11 PROCEDURE — 84100 ASSAY OF PHOSPHORUS: CPT

## 2019-03-11 PROCEDURE — 99233 SBSQ HOSP IP/OBS HIGH 50: CPT | Mod: GC,,, | Performed by: PSYCHIATRY & NEUROLOGY

## 2019-03-11 PROCEDURE — 90945 DIALYSIS ONE EVALUATION: CPT

## 2019-03-11 PROCEDURE — 99232 PR SUBSEQUENT HOSPITAL CARE,LEVL II: ICD-10-PCS | Mod: ,,, | Performed by: NURSE PRACTITIONER

## 2019-03-11 PROCEDURE — 25000003 PHARM REV CODE 250: Performed by: PSYCHIATRY & NEUROLOGY

## 2019-03-11 PROCEDURE — 25000003 PHARM REV CODE 250: Performed by: NURSE PRACTITIONER

## 2019-03-11 PROCEDURE — 85025 COMPLETE CBC W/AUTO DIFF WBC: CPT

## 2019-03-11 PROCEDURE — 99233 PR SUBSEQUENT HOSPITAL CARE,LEVL III: ICD-10-PCS | Mod: ,,, | Performed by: NURSE PRACTITIONER

## 2019-03-11 PROCEDURE — 63600175 PHARM REV CODE 636 W HCPCS: Mod: JG | Performed by: NURSE PRACTITIONER

## 2019-03-11 PROCEDURE — 80069 RENAL FUNCTION PANEL: CPT

## 2019-03-11 PROCEDURE — 83735 ASSAY OF MAGNESIUM: CPT

## 2019-03-11 PROCEDURE — 99233 SBSQ HOSP IP/OBS HIGH 50: CPT | Mod: ,,, | Performed by: NURSE PRACTITIONER

## 2019-03-11 PROCEDURE — 80100008 HC CRRT DAILY MAINTENANCE

## 2019-03-11 PROCEDURE — 85610 PROTHROMBIN TIME: CPT

## 2019-03-11 PROCEDURE — 20000000 HC ICU ROOM

## 2019-03-11 PROCEDURE — 99233 PR SUBSEQUENT HOSPITAL CARE,LEVL III: ICD-10-PCS | Mod: GC,,, | Performed by: PSYCHIATRY & NEUROLOGY

## 2019-03-11 PROCEDURE — 80053 COMPREHEN METABOLIC PANEL: CPT

## 2019-03-11 PROCEDURE — 99232 SBSQ HOSP IP/OBS MODERATE 35: CPT | Mod: ,,, | Performed by: NURSE PRACTITIONER

## 2019-03-11 PROCEDURE — 94761 N-INVAS EAR/PLS OXIMETRY MLT: CPT

## 2019-03-11 RX ORDER — POLYETHYLENE GLYCOL 3350 17 G/17G
17 POWDER, FOR SOLUTION ORAL
Status: COMPLETED | OUTPATIENT
Start: 2019-03-11 | End: 2019-03-11

## 2019-03-11 RX ORDER — INSULIN ASPART 100 [IU]/ML
7 INJECTION, SOLUTION INTRAVENOUS; SUBCUTANEOUS
Status: DISCONTINUED | OUTPATIENT
Start: 2019-03-11 | End: 2019-03-12

## 2019-03-11 RX ORDER — SYRING-NEEDL,DISP,INSUL,0.3 ML 29 G X1/2"
296 SYRINGE, EMPTY DISPOSABLE MISCELLANEOUS
Status: DISCONTINUED | OUTPATIENT
Start: 2019-03-11 | End: 2019-03-11

## 2019-03-11 RX ORDER — PSEUDOEPHEDRINE/ACETAMINOPHEN 30MG-500MG
100 TABLET ORAL
Status: DISCONTINUED | OUTPATIENT
Start: 2019-03-11 | End: 2019-03-11

## 2019-03-11 RX ORDER — MICONAZOLE NITRATE 2 %
POWDER (GRAM) TOPICAL 2 TIMES DAILY PRN
Status: DISCONTINUED | OUTPATIENT
Start: 2019-03-11 | End: 2019-03-20 | Stop reason: HOSPADM

## 2019-03-11 RX ORDER — PANTOPRAZOLE SODIUM 40 MG/1
40 FOR SUSPENSION ORAL 2 TIMES DAILY
Status: DISCONTINUED | OUTPATIENT
Start: 2019-03-11 | End: 2019-03-20 | Stop reason: HOSPADM

## 2019-03-11 RX ADMIN — FLUOXETINE HYDROCHLORIDE 20 MG: 20 CAPSULE ORAL at 08:03

## 2019-03-11 RX ADMIN — MIDODRINE HYDROCHLORIDE 20 MG: 5 TABLET ORAL at 05:03

## 2019-03-11 RX ADMIN — STANDARDIZED SENNA CONCENTRATE AND DOCUSATE SODIUM 1 TABLET: 8.6; 5 TABLET, FILM COATED ORAL at 08:03

## 2019-03-11 RX ADMIN — GABAPENTIN 300 MG: 300 CAPSULE ORAL at 09:03

## 2019-03-11 RX ADMIN — STANDARDIZED SENNA CONCENTRATE AND DOCUSATE SODIUM 1 TABLET: 8.6; 5 TABLET, FILM COATED ORAL at 09:03

## 2019-03-11 RX ADMIN — POLYETHYLENE GLYCOL 3350 17 G: 17 POWDER, FOR SOLUTION ORAL at 03:03

## 2019-03-11 RX ADMIN — POLYETHYLENE GLYCOL 3350 17 G: 17 POWDER, FOR SOLUTION ORAL at 08:03

## 2019-03-11 RX ADMIN — INSULIN ASPART 7 UNITS: 100 INJECTION, SOLUTION INTRAVENOUS; SUBCUTANEOUS at 12:03

## 2019-03-11 RX ADMIN — ACETAMINOPHEN 500 MG: 500 TABLET, FILM COATED ORAL at 01:03

## 2019-03-11 RX ADMIN — DICLOFENAC: 10 GEL TOPICAL at 08:03

## 2019-03-11 RX ADMIN — MIDODRINE HYDROCHLORIDE 20 MG: 5 TABLET ORAL at 12:03

## 2019-03-11 RX ADMIN — PRAVASTATIN SODIUM 40 MG: 40 TABLET ORAL at 08:03

## 2019-03-11 RX ADMIN — PANTOPRAZOLE SODIUM 40 MG: 40 GRANULE, DELAYED RELEASE ORAL at 09:03

## 2019-03-11 RX ADMIN — LACTULOSE 20 G: 20 SOLUTION ORAL at 08:03

## 2019-03-11 RX ADMIN — ERYTHROPOIETIN 7000 UNITS: 4000 INJECTION, SOLUTION INTRAVENOUS; SUBCUTANEOUS at 11:03

## 2019-03-11 RX ADMIN — INSULIN ASPART 7 UNITS: 100 INJECTION, SOLUTION INTRAVENOUS; SUBCUTANEOUS at 08:03

## 2019-03-11 RX ADMIN — POLYETHYLENE GLYCOL 3350 17 G: 17 POWDER, FOR SOLUTION ORAL at 05:03

## 2019-03-11 RX ADMIN — PANTOPRAZOLE SODIUM 40 MG: 40 GRANULE, DELAYED RELEASE ORAL at 11:03

## 2019-03-11 RX ADMIN — POLYETHYLENE GLYCOL 3350 17 G: 17 POWDER, FOR SOLUTION ORAL at 12:03

## 2019-03-11 RX ADMIN — POLYETHYLENE GLYCOL 3350 17 G: 17 POWDER, FOR SOLUTION ORAL at 11:03

## 2019-03-11 NOTE — PROGRESS NOTES
[facility]  Neurosurgery  Progress Note    Subjective:     History of Present Illness: 57 yo male with a PMH of ESRD with dialysis MWF (last on weds), DM, presenting as transfer from [facility] as admission to neurosurgery for evaluation after progressive course of unsteady gait as well as bilateral upper extremity weakness for the last several months.  Mr. Farmer has had wobbly gait since this past September, at which point he recalls falling and has been at 80-90% of his normal function.  He has been able to walk without assist, however not for very long distances.  He states that since last Sunday he has felt progression of his symptoms and now feels it would be difficult to stand up to ambulate at all.  He also endorses a pulled muscle of his LLE that is limiting his mobility.  His UE symptoms have been present for about 3 months including not writing as well as he is used to.  HE is able to write numbers, but not always his name.  He has been having difficulty using a fork to eat over the past month.  No sensory disturbance noted as well as no neck or back pain and no pain from the neck radiating into the hands.  He has intermittently been taking ASA 81 mg qdaily. He denies recent falls or trauma to the neck.  No other blood thinning medication.  MRI at OSH on 2/27 revealed significant cervical stenosis.      =    Post-Op Info:  Procedure(s) (LRB):  SPINE, CERVICAL, POSTERIOR APPROACH  LAMINECTOMY C3-C6; C6-C7; WITH MEDIAL DISCECTOMY (N/A)   8 Days Post-Op     Interval History: 3/11 - AFVSS, NAEON, platelets 85, ow labs stable, exam stable    Medications:  Continuous Infusions:   sodium chloride 0.9% 200 mL/hr at 03/11/19 0800    norepinephrine bitartrate-D5W Stopped (03/10/19 0925)     Scheduled Meds:   diclofenac sodium   Topical (Top) Daily    epoetin shefali (PROCRIT) injection  7,000 Units Intravenous Every Mon, Wed, Fri    FLUoxetine  20 mg Oral Daily    gabapentin  300 mg  Oral QHS    insulin aspart U-100  7 Units Subcutaneous TIDWM    insulin detemir U-100  18 Units Subcutaneous QHS    lactulose  20 g Oral Daily    midodrine  20 mg Oral Q6H    pantoprazole  40 mg Per NG tube BID    polyethylene glycol  17 g Oral Daily    pravastatin  40 mg Oral Daily    senna-docusate 8.6-50 mg  1 tablet Oral BID     PRN Meds:acetaminophen, dextrose 50%, dextrose 50%, glucagon (human recombinant), glucose, glucose, insulin aspart U-100, magnesium sulfate IVPB, ondansetron, oxyCODONE       Objective:     Weight: 134.3 kg (296 lb)  Body mass index is 45.01 kg/m².  Vital Signs (Most Recent):  Temp: 97.9 °F (36.6 °C) (03/11/19 0700)  Pulse: 69 (03/11/19 0800)  Resp: 12 (03/11/19 0800)  BP: (!) 146/67 (03/11/19 0800)  SpO2: 100 % (03/11/19 0800) Vital Signs (24h Range):  Temp:  [97.7 °F (36.5 °C)-98.9 °F (37.2 °C)] 97.9 °F (36.6 °C)  Pulse:  [56-71] 69  Resp:  [10-25] 12  SpO2:  [96 %-100 %] 100 %  BP: (116-157)/(54-75) 146/67  Arterial Line BP: (119-161)/(45-64) 160/61     Date 03/11/19 0700 - 03/12/19 0659   Shift 4892-0642 8112-3952 6591-1989 24 Hour Total   INTAKE   I.V.(mL/kg) 400(3)   400(3)   Shift Total(mL/kg) 400(3)   400(3)   OUTPUT   Drains 0   0   Other 647   647   Shift Total(mL/kg) 647(4.8)   647(4.8)   Weight (kg) 134.3 134.3 134.3 134.3              Resp Rate Total:  [10 br/min-24 br/min] 16 br/min         Closed/Suction Drain 03/03/19 1431 Posterior Neck Accordion 10 Fr. (Active)   Site Description Unable to view 3/11/2019  3:05 AM   Dressing Type Gauze 3/11/2019  3:05 AM   Dressing Status Clean;Dry;Intact 3/11/2019  3:05 AM   Dressing Intervention Dressing reinforced 3/11/2019  3:05 AM   Drainage Serosanguineous 3/11/2019  3:05 AM   Status To bulb suction 3/11/2019  3:05 AM   Output (mL) 0 mL 3/11/2019  7:00 AM            Hemodialysis AV Fistula Left forearm (Active)   Needle Size 15ga 3/1/2019  4:19 PM   Site Assessment Intact;Dry 3/11/2019  3:05 AM   Patency  Present;Thrill;Bruit 3/11/2019  3:05 AM   Status Deaccessed 3/11/2019  3:05 AM   Flows Good 2/28/2019  8:20 AM   Dressing Intervention Dressing reinforced 3/6/2019  3:05 AM   Dressing Status Clean;Dry;Intact 3/2/2019  3:01 PM   Site Condition No complications 3/11/2019  3:05 AM   Dressing Open to air (None) 3/11/2019  3:05 AM            Trialysis (Dialysis) Catheter 03/04/19 0250 left internal jugular (Active)   IV Device Securement sutures;catheter securement device 3/11/2019  3:05 AM   Additional Site Signs no warmth;no erythema;no edema;no pain;no palpable cord;no streak formation;no drainage 3/11/2019  3:05 AM   Patency/Care infusing 3/11/2019  3:05 AM   Waveform normal 3/11/2019  3:05 AM   Site Assessment Clean;Dry;Intact 3/11/2019  3:05 AM   Status Flushed 3/11/2019  3:05 AM   Flows Good 3/11/2019  3:05 AM   Dressing Intervention Dressing reinforced 3/11/2019  3:05 AM   Dressing Status Biopatch in place;Clean;Dry;Intact 3/11/2019  3:05 AM   Dressing Change Due 03/11/19 3/11/2019  3:05 AM   Verification by X-ray Yes 3/9/2019  7:05 PM   Site Condition No complications 3/11/2019  3:05 AM   Dressing Occlusive 3/11/2019  3:05 AM   Daily Line Review Performed 3/11/2019  3:05 AM       Neurosurgery Physical Exam     GCS15  AO x3  CN II -XII grossly intact  4-/5 BUE  5/5 BLE  SILT  +Sanford bilaterally w 3+ UE reflexes  2+ LE reflexes, no clonus or Babinski    Significant Labs:  Recent Labs   Lab 03/10/19  0135 03/10/19  2200 03/11/19  0145   * 124* 114*  114*    140 138  138   K 4.5 5.0 4.9  4.9    108 106  106   CO2 25 24 23  23   BUN 47* 50* 43*  43*   CREATININE 7.3* 7.4* 6.6*  6.6*   CALCIUM 9.2 9.4 9.6  9.6   MG 2.4 2.4 2.3  2.3     Recent Labs   Lab 03/10/19  0135 03/11/19  0145   WBC 5.85 6.51   HGB 7.5* 8.0*   HCT 24.0* 25.3*   PLT 80* 85*     Recent Labs   Lab 03/10/19  0135 03/11/19  0145   INR 1.0 1.0     Microbiology Results (last 7 days)     ** No results found for the last 168  hours. **        Significant Diagnostics:  CT: No results found in the last 24 hours.  MRI: No results found in the last 24 hours.    Assessment/Plan:     * S/P laminectomy     58 M with progressive cervical myelopathy and stenosis from C3-6 with myelomalacia. S/p code for hypovolemia during HD. Stable this morning and labs WNL. He stepped up to ICU for further care and work up, now s/p C3-6 posterior decompression.    - Will need fusion in future, will discuss OR timing with    - please provide medical clearance for fusion  - HOB >30  - No C collar needed  - Pain control PRN  - HD per nephrology  - Daily PT/OT  - Medical management per NCC  - may stepdown to NSGY when stable and off pressors  - Please call Neurosurgery with any questions or exam changes                Speedy Rodriguez MD  Neurosurgery  [facility]

## 2019-03-11 NOTE — SUBJECTIVE & OBJECTIVE
Interval History: 3/11 - AFVSS, NAEON, platelets 85, ow labs stable, exam stable    Medications:  Continuous Infusions:   sodium chloride 0.9% 200 mL/hr at 03/11/19 0800    norepinephrine bitartrate-D5W Stopped (03/10/19 0925)     Scheduled Meds:   diclofenac sodium   Topical (Top) Daily    epoetin shefali (PROCRIT) injection  7,000 Units Intravenous Every Mon, Wed, Fri    FLUoxetine  20 mg Oral Daily    gabapentin  300 mg Oral QHS    insulin aspart U-100  7 Units Subcutaneous TIDWM    insulin detemir U-100  18 Units Subcutaneous QHS    lactulose  20 g Oral Daily    midodrine  20 mg Oral Q6H    pantoprazole  40 mg Per NG tube BID    polyethylene glycol  17 g Oral Daily    pravastatin  40 mg Oral Daily    senna-docusate 8.6-50 mg  1 tablet Oral BID     PRN Meds:acetaminophen, dextrose 50%, dextrose 50%, glucagon (human recombinant), glucose, glucose, insulin aspart U-100, magnesium sulfate IVPB, ondansetron, oxyCODONE       Objective:     Weight: 134.3 kg (296 lb)  Body mass index is 45.01 kg/m².  Vital Signs (Most Recent):  Temp: 97.9 °F (36.6 °C) (03/11/19 0700)  Pulse: 69 (03/11/19 0800)  Resp: 12 (03/11/19 0800)  BP: (!) 146/67 (03/11/19 0800)  SpO2: 100 % (03/11/19 0800) Vital Signs (24h Range):  Temp:  [97.7 °F (36.5 °C)-98.9 °F (37.2 °C)] 97.9 °F (36.6 °C)  Pulse:  [56-71] 69  Resp:  [10-25] 12  SpO2:  [96 %-100 %] 100 %  BP: (116-157)/(54-75) 146/67  Arterial Line BP: (119-161)/(45-64) 160/61     Date 03/11/19 0700 - 03/12/19 0659   Shift 1591-1045 7091-0677 9153-1019 24 Hour Total   INTAKE   I.V.(mL/kg) 400(3)   400(3)   Shift Total(mL/kg) 400(3)   400(3)   OUTPUT   Drains 0   0   Other 647   647   Shift Total(mL/kg) 647(4.8)   647(4.8)   Weight (kg) 134.3 134.3 134.3 134.3              Resp Rate Total:  [10 br/min-24 br/min] 16 br/min         Closed/Suction Drain 03/03/19 1431 Posterior Neck Accordion 10 Fr. (Active)   Site Description Unable to view 3/11/2019  3:05 AM   Dressing Type Gauze  3/11/2019  3:05 AM   Dressing Status Clean;Dry;Intact 3/11/2019  3:05 AM   Dressing Intervention Dressing reinforced 3/11/2019  3:05 AM   Drainage Serosanguineous 3/11/2019  3:05 AM   Status To bulb suction 3/11/2019  3:05 AM   Output (mL) 0 mL 3/11/2019  7:00 AM            Hemodialysis AV Fistula Left forearm (Active)   Needle Size 15ga 3/1/2019  4:19 PM   Site Assessment Intact;Dry 3/11/2019  3:05 AM   Patency Present;Thrill;Bruit 3/11/2019  3:05 AM   Status Deaccessed 3/11/2019  3:05 AM   Flows Good 2/28/2019  8:20 AM   Dressing Intervention Dressing reinforced 3/6/2019  3:05 AM   Dressing Status Clean;Dry;Intact 3/2/2019  3:01 PM   Site Condition No complications 3/11/2019  3:05 AM   Dressing Open to air (None) 3/11/2019  3:05 AM            Trialysis (Dialysis) Catheter 03/04/19 0250 left internal jugular (Active)   IV Device Securement sutures;catheter securement device 3/11/2019  3:05 AM   Additional Site Signs no warmth;no erythema;no edema;no pain;no palpable cord;no streak formation;no drainage 3/11/2019  3:05 AM   Patency/Care infusing 3/11/2019  3:05 AM   Waveform normal 3/11/2019  3:05 AM   Site Assessment Clean;Dry;Intact 3/11/2019  3:05 AM   Status Flushed 3/11/2019  3:05 AM   Flows Good 3/11/2019  3:05 AM   Dressing Intervention Dressing reinforced 3/11/2019  3:05 AM   Dressing Status Biopatch in place;Clean;Dry;Intact 3/11/2019  3:05 AM   Dressing Change Due 03/11/19 3/11/2019  3:05 AM   Verification by X-ray Yes 3/9/2019  7:05 PM   Site Condition No complications 3/11/2019  3:05 AM   Dressing Occlusive 3/11/2019  3:05 AM   Daily Line Review Performed 3/11/2019  3:05 AM       Neurosurgery Physical Exam     GCS15  AO x3  CN II -XII grossly intact  4-/5 BUE  5/5 BLE  SILT  +Sanford bilaterally w 3+ UE reflexes  2+ LE reflexes, no clonus or Babinski    Significant Labs:  Recent Labs   Lab 03/10/19  0135 03/10/19  2200 03/11/19  0145   * 124* 114*  114*    140 138  138   K 4.5 5.0 4.9   4.9    108 106  106   CO2 25 24 23  23   BUN 47* 50* 43*  43*   CREATININE 7.3* 7.4* 6.6*  6.6*   CALCIUM 9.2 9.4 9.6  9.6   MG 2.4 2.4 2.3  2.3     Recent Labs   Lab 03/10/19  0135 03/11/19  0145   WBC 5.85 6.51   HGB 7.5* 8.0*   HCT 24.0* 25.3*   PLT 80* 85*     Recent Labs   Lab 03/10/19  0135 03/11/19  0145   INR 1.0 1.0     Microbiology Results (last 7 days)     ** No results found for the last 168 hours. **        Significant Diagnostics:  CT: No results found in the last 24 hours.  MRI: No results found in the last 24 hours.

## 2019-03-11 NOTE — ASSESSMENT & PLAN NOTE
58 M with progressive cervical myelopathy and stenosis from C3-6 with myelomalacia. S/p code for hypovolemia during HD. Stable this morning and labs WNL. He stepped up to ICU for further care and work up, now s/p C3-6 posterior decompression.    - Will need fusion in future, will discuss OR timing with    - please provide medical clearance for fusion  - HOB >30  - No C collar needed  - Pain control PRN  - HD per nephrology  - Daily PT/OT  - Medical management per NCC  - may stepdown to NSGY when stable and off pressors  - Please call Neurosurgery with any questions or exam changes

## 2019-03-11 NOTE — SUBJECTIVE & OBJECTIVE
"Interval HPI:   Overnight events: Remains in NCC, NAEON. BG below goal on current insulin regimen. CRRT per nephrology.   Eatin%  Nausea: No  Hypoglycemia and intervention: No  Fever: No  TPN and/or TF: No    BP (!) 145/55 (BP Location: Right arm, Patient Position: Lying)   Pulse 62   Temp 98 °F (36.7 °C) (Oral)   Resp 16   Ht 5' 8" (1.727 m)   Wt 134.3 kg (296 lb)   SpO2 100%   BMI 45.01 kg/m²     Labs Reviewed and Include    Recent Labs   Lab 19  0145   *  114*   CALCIUM 9.6  9.6   ALBUMIN 3.2*  3.2*   PROT 6.0     138   K 4.9  4.9   CO2 23  23     106   BUN 43*  43*   CREATININE 6.6*  6.6*   ALKPHOS 172*   ALT 11   AST 24   BILITOT 0.9     Lab Results   Component Value Date    WBC 6.51 2019    HGB 8.0 (L) 2019    HCT 25.3 (L) 2019     (H) 2019    PLT 85 (L) 2019     No results for input(s): TSH, FREET4 in the last 168 hours.  Lab Results   Component Value Date    HGBA1C 5.0 2019       Nutritional status:   Body mass index is 45.01 kg/m².  Lab Results   Component Value Date    ALBUMIN 3.2 (L) 2019    ALBUMIN 3.2 (L) 2019    ALBUMIN 3.0 (L) 03/10/2019     No results found for: PREALBUMIN    Estimated Creatinine Clearance: 16.8 mL/min (A) (based on SCr of 6.6 mg/dL (H)).    Accu-Checks  Recent Labs     19  1627 19  2050 19  0748 19  1107 19  1601 19  2059 03/10/19  0753 03/10/19  1007 03/10/19  1702 03/10/19  2158   POCTGLUCOSE 201* 178* 185* 163* 174* 178* 125* 101 131* 89       Current Medications and/or Treatments Impacting Glycemic Control  Immunotherapy:    Immunosuppressants     None        Steroids:   Hormones (From admission, onward)    None        Pressors:    Autonomic Drugs (From admission, onward)    Start     Stop Route Frequency Ordered    03/10/19 1200  midodrine tablet 20 mg      -- Oral Every 6 hours 03/10/19 0946    19 1600  norepinephrine 32 mg in " dextrose 5 % 250 mL infusion     Question Answer Comment   Titrate by: (in mcg/kg/min) 0.02    Titrate interval: (in minutes) 5    Titrate to maintain: (MAP or SBP) MAP    Greater than: (in mmHg) 65    Maximum dose: (in mcg/kg/min) 3        -- IV Continuous 03/09/19 1547        Hyperglycemia/Diabetes Medications:   Antihyperglycemics (From admission, onward)    Start     Stop Route Frequency Ordered    03/07/19 2100  insulin detemir U-100 pen 20 Units      -- SubQ Nightly 03/07/19 0913    03/07/19 1645  insulin aspart U-100 pen 8 Units      -- SubQ 3 times daily with meals 03/07/19 1452    03/06/19 1638  insulin aspart U-100 pen 0-5 Units      -- SubQ Before meals & nightly PRN 03/06/19 1538

## 2019-03-11 NOTE — PLAN OF CARE
Problem: Adult Inpatient Plan of Care  Goal: Plan of Care Review  Outcome: Ongoing (interventions implemented as appropriate)  POC reviewed with pt and family at 1230. Pt and family verbalized understanding. Questions and concerns addressed. No acute events today. Pt progressing toward goals. CRRT tolerated well. Plan to trial HD with AV fistula tomorrow. Will continue to monitor. See flowsheets for full assessment and VS info.

## 2019-03-11 NOTE — PT/OT/SLP PROGRESS
Occupational Therapy      Patient Name:  Angel Farmer Jr.   MRN:  [id number]    Patient not seen today secondary to CRRT and not appropriate for OOB activity per RN. Will follow-up as appropriate.    JUSTIN Elena  3/11/2019

## 2019-03-11 NOTE — SUBJECTIVE & OBJECTIVE
Interval History: SLED overnight w/ issues. Levophed stopped yesterday. BP stable.     Review of patient's allergies indicates:   Allergen Reactions    Keflex [cephalexin] Nausea Only     Current Facility-Administered Medications   Medication Frequency    0.9%  NaCl infusion (CRRT USE ONLY) Continuous    acetaminophen tablet 500 mg Q6H PRN    dextrose 50% injection 12.5 g PRN    dextrose 50% injection 25 g PRN    diclofenac sodium 1 % gel Daily    epoetin shefali (PROCRIT) injection 7,000 units kit Every Mon, Wed, Fri    FLUoxetine capsule 20 mg Daily    gabapentin capsule 300 mg QHS    glucagon (human recombinant) injection 1 mg PRN    glucose chewable tablet 16 g PRN    glucose chewable tablet 24 g PRN    insulin aspart U-100 pen 0-5 Units QID (AC + HS) PRN    insulin aspart U-100 pen 7 Units TIDWM    insulin detemir U-100 pen 18 Units QHS    lactulose 20 gram/30 mL solution Soln 20 g Daily    midodrine tablet 20 mg Q6H    norepinephrine 32 mg in dextrose 5 % 250 mL infusion Continuous    ondansetron injection 4 mg Q6H PRN    oxyCODONE immediate release tablet 5 mg Q6H PRN    pantoprazole suspension 40 mg BID    polyethylene glycol packet 17 g Daily    polyethylene glycol packet 17 g Q2H    pravastatin tablet 40 mg Daily    senna-docusate 8.6-50 mg per tablet 1 tablet BID       Objective:     Vital Signs (Most Recent):  Temp: 98.2 °F (36.8 °C) (03/11/19 1100)  Pulse: 68 (03/11/19 1300)  Resp: 12 (03/11/19 1300)  BP: 134/60 (03/11/19 1300)  SpO2: 99 % (03/11/19 1300)  O2 Device (Oxygen Therapy): room air (03/11/19 1300) Vital Signs (24h Range):  Temp:  [97.7 °F (36.5 °C)-98.9 °F (37.2 °C)] 98.2 °F (36.8 °C)  Pulse:  [56-77] 68  Resp:  [10-27] 12  SpO2:  [97 %-100 %] 99 %  BP: (121-157)/(54-75) 134/60  Arterial Line BP: (124-161)/(50-64) 137/53     Weight: 134.3 kg (296 lb) (03/07/19 1502)  Body mass index is 45.01 kg/m².  Body surface area is 2.54 meters squared.    I/O last 3 completed  shifts:  In: 2968.7 [P.O.:1050; I.V.:1418.7; IV Piggyback:500]  Out: 2059 [Drains:6; Other:2053]    Physical Exam   Constitutional: He is oriented to person, place, and time. He appears well-developed and well-nourished. Nasal cannula in place.    male who appears stated age, lying in bed   HENT:   Head: Normocephalic and atraumatic.   Mouth/Throat: Oropharynx is clear and moist. No oropharyngeal exudate.   Eyes: EOM are normal. Pupils are equal, round, and reactive to light.   Neck: No JVD present. No tracheal deviation present.   Cardiovascular: Regular rhythm, normal heart sounds and intact distal pulses.   Pulmonary/Chest: Effort normal. No stridor. No respiratory distress. He has rales.   Abdominal: Soft. Bowel sounds are normal. He exhibits no distension and no mass. There is no tenderness. There is no guarding.   Obese   Musculoskeletal: He exhibits edema. He exhibits no tenderness or deformity.   Palpable thrill in LUE fistula  1+ pitting edema to bilateral shins   Neurological: He is alert and oriented to person, place, and time.   Skin: No rash noted. He is not diaphoretic. No erythema.   Psychiatric: He has a normal mood and affect. His behavior is normal. Thought content normal.       Significant Labs:  CBC:   Recent Labs   Lab 03/11/19 0145   WBC 6.51   RBC 2.41*   HGB 8.0*   HCT 25.3*   PLT 85*   *   MCH 33.2*   MCHC 31.6*     CMP:   Recent Labs   Lab 03/11/19 0145   *  114*   CALCIUM 9.6  9.6   ALBUMIN 3.2*  3.2*   PROT 6.0     138   K 4.9  4.9   CO2 23  23     106   BUN 43*  43*   CREATININE 6.6*  6.6*   ALKPHOS 172*   ALT 11   AST 24   BILITOT 0.9

## 2019-03-11 NOTE — PROGRESS NOTES
"[facility]  Endocrinology  Progress Note    Admit Date: 2019     Reason for Consult: Management of T2DM, Hyperglycemia     Surgical Procedure and Date: Cervical laminectomy C3-C6, C6-C7 w/ medial discectomy 3/3/19    Diabetes diagnosis year:     Lab Results   Component Value Date    HGBA1C 5.0 2019     Home Diabetes Medications: Basaglar 40 units q HS, Victoza 1.2 mg once daily,     How often checking glucose at home? 4x per day   BG readings on regimen: 90-120s  Hypoglycemia on the regimen?  Yes - about once per month  Missed doses on regimen?  No    Diabetes Complications include:     Hypoglycemia , Diabetic nephropathy  , Diabetic chronic kidney disease     , Diabetic retinopathy , Diabetic cataract  and Diabetic peripheral neuropathy     Complicating diabetes co morbidities:   MIKE, CKD and ESRD      HPI:   Patient is a 58 y.o. male with a diagnosis of HTN, HLD, MIKE, ESRD on HD, and DM2, who is s/p cervical laminectomy C3-C6, C6-C7 w/ medial discectomy 3/3/19.  Patient was admitted to the ED on 19 for upper extremity weakness and ataxia following a fall.  Patient's DM managed by PCP, Dr. Shemar Berry in [place].  Patient is prescribed MDI but only takes basal insulin and Victoza.  Positive family history of DM (mother, father, and both maternal grandparents).  Endocrine consulted for DM/BG management.    Interval HPI:   Overnight events: Remains in NCC, NAEON. BG below goal on current insulin regimen. CRRT per nephrology.   Eatin%  Nausea: No  Hypoglycemia and intervention: No  Fever: No  TPN and/or TF: No    BP (!) 145/55 (BP Location: Right arm, Patient Position: Lying)   Pulse 62   Temp 98 °F (36.7 °C) (Oral)   Resp 16   Ht 5' 8" (1.727 m)   Wt 134.3 kg (296 lb)   SpO2 100%   BMI 45.01 kg/m²      Labs Reviewed and Include    Recent Labs   Lab 19  0145   *  114*   CALCIUM 9.6  9.6   ALBUMIN 3.2*  3.2*   PROT 6.0     138   K 4.9  4.9 "   CO2 23  23     106   BUN 43*  43*   CREATININE 6.6*  6.6*   ALKPHOS 172*   ALT 11   AST 24   BILITOT 0.9     Lab Results   Component Value Date    WBC 6.51 03/11/2019    HGB 8.0 (L) 03/11/2019    HCT 25.3 (L) 03/11/2019     (H) 03/11/2019    PLT 85 (L) 03/11/2019     No results for input(s): TSH, FREET4 in the last 168 hours.  Lab Results   Component Value Date    HGBA1C 5.0 02/27/2019       Nutritional status:   Body mass index is 45.01 kg/m².  Lab Results   Component Value Date    ALBUMIN 3.2 (L) 03/11/2019    ALBUMIN 3.2 (L) 03/11/2019    ALBUMIN 3.0 (L) 03/10/2019     No results found for: PREALBUMIN    Estimated Creatinine Clearance: 16.8 mL/min (A) (based on SCr of 6.6 mg/dL (H)).    Accu-Checks  Recent Labs     03/08/19  1627 03/08/19  2050 03/09/19  0748 03/09/19  1107 03/09/19  1601 03/09/19  2059 03/10/19  0753 03/10/19  1007 03/10/19  1702 03/10/19  2158   POCTGLUCOSE 201* 178* 185* 163* 174* 178* 125* 101 131* 89       Current Medications and/or Treatments Impacting Glycemic Control  Immunotherapy:    Immunosuppressants     None        Steroids:   Hormones (From admission, onward)    None        Pressors:    Autonomic Drugs (From admission, onward)    Start     Stop Route Frequency Ordered    03/10/19 1200  midodrine tablet 20 mg      -- Oral Every 6 hours 03/10/19 0946    03/09/19 1600  norepinephrine 32 mg in dextrose 5 % 250 mL infusion     Question Answer Comment   Titrate by: (in mcg/kg/min) 0.02    Titrate interval: (in minutes) 5    Titrate to maintain: (MAP or SBP) MAP    Greater than: (in mmHg) 65    Maximum dose: (in mcg/kg/min) 3        -- IV Continuous 03/09/19 1547        Hyperglycemia/Diabetes Medications:   Antihyperglycemics (From admission, onward)    Start     Stop Route Frequency Ordered    03/07/19 2100  insulin detemir U-100 pen 20 Units      -- SubQ Nightly 03/07/19 0913    03/07/19 1645  insulin aspart U-100 pen 8 Units      -- SubQ 3 times daily with meals  03/07/19 1452    03/06/19 1638  insulin aspart U-100 pen 0-5 Units      -- SubQ Before meals & nightly PRN 03/06/19 1538          ASSESSMENT and PLAN    * S/P laminectomy    S/p cervical laminectomy C3-C6, C6-C7 w/ medial discectomy 3/3/19  Managed per primary team  Avoid hypoglycemia  Optimize BG control for surgical wound healing         Diabetes mellitus, type 2    BG goal 140-180    Decrease Levemir to 18 units q HS - due to FBG below goal  Decrease Novolog to 7 units with meals  Low dose correction scale given kidney function  BG monitoring AC/HS    Discharge planning: Patient will need insulin dosing changes upon discharge.  Currently only taking basal insulin plus Victoza.       ESRD (end stage renal disease) on dialysis    Caution with insulin stacking  Estimated Creatinine Clearance: 16.8 mL/min (A) (based on SCr of 6.6 mg/dL (H)).         Class 3 severe obesity due to excess calories with serious comorbidity and body mass index (BMI) of 45.0 to 49.9 in adult    may contribute to insulin resistance  Body mass index is 45.01 kg/m².         Anemia due to stage 5 chronic kidney disease    Affects A1C accuracy  Consider checking fructosamine at least 2 weeks post hospital discharge to evaluate BG control     MIKE (obstructive sleep apnea) - very severe latest sleep study says BPAP @ 16/8    May affect BG readings if uncontrolled             Willie Mao NP  Endocrinology  [facility]

## 2019-03-11 NOTE — ASSESSMENT & PLAN NOTE
Caution with insulin stacking  Estimated Creatinine Clearance: 16.8 mL/min (A) (based on SCr of 6.6 mg/dL (H)).

## 2019-03-11 NOTE — PT/OT/SLP PROGRESS
Physical Therapy      Patient Name:  Angel Farmer Jr.   MRN:  [id number]    Patient not seen today secondary to Unavailable (Comment)(receiving CRRT, patient not cleared for out of bed activity per RN.). Reviewed bed exercises with patient including ankle pumps, heel slides, hip abduction; unable to perform heel slide or hip abduction on L due to quad pain. Will follow-up 3/12/2019 as medically appropriate. Patient will be receiving HD tomorrow, attempt therapy early in AM.    Mary Gray, PT

## 2019-03-11 NOTE — ASSESSMENT & PLAN NOTE
BG goal 140-180    Decrease Levemir to 18 units q HS - due to FBG below goal  Decrease Novolog to 7 units with meals  Low dose correction scale given kidney function  BG monitoring AC/HS    Discharge planning: Patient will need insulin dosing changes upon discharge.  Currently only taking basal insulin plus Victoza.

## 2019-03-11 NOTE — PLAN OF CARE
03/11/19 1246   Discharge Reassessment   Assessment Type Discharge Planning Reassessment   Provided patient/caregiver education on the expected discharge date and the discharge plan No   Do you have any problems affording any of your prescribed medications? No   Discharge Plan A Skilled Nursing Facility   Discharge Plan B Rehab   DME Needed Upon Discharge  other (see comments)  (tbd)   Patient choice form signed by patient/caregiver N/A   Anticipated Discharge Disposition SNF   Can the patient answer the patient profile reliably? Yes, cognitively intact   Describe the patient's ability to walk at the present time. Major restrictions/daily assistance from another person   How often would a person be available to care for the patient? Whenever needed   Number of comorbid conditions (as recorded on the chart) Three   Post-Acute Status   Post-Acute Authorization Placement   Post-Acute Placement Status Awaiting Internal Medical Clearance   Discharge Delays None   \    Celeste Deluca RN, CCRN-K, [facility]  Neuro-Critical Care   [phone]

## 2019-03-11 NOTE — PLAN OF CARE
Problem: Adult Inpatient Plan of Care  Goal: Plan of Care Review  Recommendations  Recommendation/Intervention:   1. Encourage continued good PO intake.  2. Recommend adding renal diet restriction.   3. If PO intake decreases, recommend adding Novasource Renal OS to all meals.   RD to monitor.    Goals: Patient to consume > 75% of meals  Nutrition Goal Status: new    Full assessment completed, see RD Note 3/11/2019.

## 2019-03-11 NOTE — PROGRESS NOTES
"[facility]  Adult Nutrition  Progress Note    SUMMARY       Recommendations  Recommendation/Intervention:   1. Encourage continued good PO intake.  2. Recommend adding renal diet restriction.   3. If PO intake decreases, recommend adding Novasource Renal OS to all meals.   RD to monitor.    Goals: Patient to consume > 75% of meals  Nutrition Goal Status: new  Communication of RD Recs: (POC)    Reason for Assessment  Reason For Assessment: length of stay  Diagnosis: surgery/postoperative complications(s/p laminectomy 3/3)  Relevant Medical History: DM2, HLD, HTN, ESRD on HD  General Information Comments: S/p laminectomy 3/3, extuabted 3/4. Started on CRRT yesterday. On diet, eating very well. (NFPE not indicated at this time, patient appears nourished.)  Nutrition Discharge Planning: Adequate nutrition via PO intake.    Nutrition Risk Screen  Nutrition Risk Screen: no indicators present    Nutrition/Diet History  Spiritual, Cultural Beliefs, Mormonism Practices, Values that Affect Care: no  Factors Affecting Nutritional Intake: None identified at this time    Anthropometrics  Temp: 97.9 °F (36.6 °C)  Height: 5' 8" (172.7 cm)  Height (inches): 68 in  Weight Method: Bed Scale  Weight: 134.3 kg (296 lb)  Weight (lb): 296 lb  Ideal Body Weight (IBW), Male: 154 lb  % Ideal Body Weight, Male (lb): 192.21 lb  BMI (Calculated): 45.1  BMI Grade: greater than 40 - morbid obesity    Lab/Procedures/Meds  Pertinent Labs Reviewed: reviewed  Pertinent Labs Comments: BUN 43, Creat 6.6, Glu 114, POCT Glu , HgbA1c 5.0, Phos 5.4, Alb 3.2  Pertinent Medications Reviewed: reviewed  Pertinent Medications Comments: insulin, pantoprazole, senna-docusate, levophed    Estimated/Assessed Needs  Weight Used For Calorie Calculations: 134.3 kg (296 lb 1.2 oz)  Energy Calorie Requirements (kcal): 2148 kcal/day  Energy Need Method: [place]-St Jeor(no AF 2/2 obesity)  Protein Requirements: 134 g/day(1 g/kg)  Weight Used For " Protein Calculations: 134.3 kg (296 lb 1.2 oz)  Fluid Requirements (mL): 1 mL/kcal or per MD  Estimated Fluid Requirement Method: RDA Method  RDA Method (mL): 2148    Nutrition Prescription Ordered  Current Diet Order: Diabetic 2000    Evaluation of Received Nutrient/Fluid Intake  I/O: +4.6L since admit  Comments: LBM 3/9  % Intake of Estimated Energy Needs: 75 - 100 %  % Meal Intake: 75 - 100 %    Nutrition Risk  Level of Risk/Frequency of Follow-up: low(1x/week)     Assessment and Plan  No nutrition problem at this time.    Monitor and Evaluation  Food and Nutrient Intake: energy intake, food and beverage intake  Food and Nutrient Adminstration: diet order  Anthropometric Measurements: weight, weight change  Biochemical Data, Medical Tests and Procedures: electrolyte and renal panel, gastrointestinal profile, glucose/endocrine profile, inflammatory profile  Nutrition-Focused Physical Findings: overall appearance     Nutrition Follow-Up  RD Follow-up?: Yes

## 2019-03-11 NOTE — ASSESSMENT & PLAN NOTE
59 yo male with a PMH of ESRD with dialysis MWF (last on weds), DM, presenting as transfer from [facility] as admission to neurosurgery for evaluation after progressive course of unsteady gait as well as bilateral upper extremity weakness for the last several months.     ESRD on HD with Dr. Araceli Pickens in [facility] MWF, 4.5 hours, EDW 140kg  Last HD 2/27/19 @L removed no available current DW    Plan/Assessment:   -SLED completed overnight w/o issues  -Will plan for HD trial tomorrow for metabolic clearance and volume management  -Target UF 1-2 L as tolerated, keep MAP >65  -Now off Levophed   -Continue strict I/Os

## 2019-03-11 NOTE — PLAN OF CARE
Problem: Adult Inpatient Plan of Care  Goal: Plan of Care Review  POC reviewed with Angel at 0500. Pt verbalized understanding. Questions and concerns addressed. No acute events overnight. Initial CCRT for the shift lasted for four hours before the line clotted; restarted at 0400H with same UF rate of 300mL/hr. Still reports of intermittent back pain.   Pt progressing toward goals. Will continue to monitor. See flowsheets for full assessment and VS info

## 2019-03-11 NOTE — PROGRESS NOTES
[facility]  Nephrology  Progress Note    Patient Name: Angel Farmer Jr.  MRN: [id number]  Admission Date: 2/28/2019  Hospital Length of Stay: 11 days  Attending Provider: Paul Vera MD   Primary Care Physician: Satya Noriega MD  Principal Problem:S/P laminectomy    Subjective:     Interval History: SLED overnight w/ issues. Levophed stopped yesterday. BP stable.     Review of patient's allergies indicates:   Allergen Reactions    Keflex [cephalexin] Nausea Only     Current Facility-Administered Medications   Medication Frequency    0.9%  NaCl infusion (CRRT USE ONLY) Continuous    acetaminophen tablet 500 mg Q6H PRN    dextrose 50% injection 12.5 g PRN    dextrose 50% injection 25 g PRN    diclofenac sodium 1 % gel Daily    epoetin shefali (PROCRIT) injection 7,000 units kit Every Mon, Wed, Fri    FLUoxetine capsule 20 mg Daily    gabapentin capsule 300 mg QHS    glucagon (human recombinant) injection 1 mg PRN    glucose chewable tablet 16 g PRN    glucose chewable tablet 24 g PRN    insulin aspart U-100 pen 0-5 Units QID (AC + HS) PRN    insulin aspart U-100 pen 7 Units TIDWM    insulin detemir U-100 pen 18 Units QHS    lactulose 20 gram/30 mL solution Soln 20 g Daily    midodrine tablet 20 mg Q6H    norepinephrine 32 mg in dextrose 5 % 250 mL infusion Continuous    ondansetron injection 4 mg Q6H PRN    oxyCODONE immediate release tablet 5 mg Q6H PRN    pantoprazole suspension 40 mg BID    polyethylene glycol packet 17 g Daily    polyethylene glycol packet 17 g Q2H    pravastatin tablet 40 mg Daily    senna-docusate 8.6-50 mg per tablet 1 tablet BID       Objective:     Vital Signs (Most Recent):  Temp: 98.2 °F (36.8 °C) (03/11/19 1100)  Pulse: 68 (03/11/19 1300)  Resp: 12 (03/11/19 1300)  BP: 134/60 (03/11/19 1300)  SpO2: 99 % (03/11/19 1300)  O2 Device (Oxygen Therapy): room air (03/11/19 1300) Vital Signs (24h Range):  Temp:  [97.7 °F (36.5 °C)-98.9 °F  (37.2 °C)] 98.2 °F (36.8 °C)  Pulse:  [56-77] 68  Resp:  [10-27] 12  SpO2:  [97 %-100 %] 99 %  BP: (121-157)/(54-75) 134/60  Arterial Line BP: (124-161)/(50-64) 137/53     Weight: 134.3 kg (296 lb) (03/07/19 1502)  Body mass index is 45.01 kg/m².  Body surface area is 2.54 meters squared.    I/O last 3 completed shifts:  In: 2968.7 [P.O.:1050; I.V.:1418.7; IV Piggyback:500]  Out: 2059 [Drains:6; Other:2053]    Physical Exam   Constitutional: He is oriented to person, place, and time. He appears well-developed and well-nourished. Nasal cannula in place.    male who appears stated age, lying in bed   HENT:   Head: Normocephalic and atraumatic.   Mouth/Throat: Oropharynx is clear and moist. No oropharyngeal exudate.   Eyes: EOM are normal. Pupils are equal, round, and reactive to light.   Neck: No JVD present. No tracheal deviation present.   Cardiovascular: Regular rhythm, normal heart sounds and intact distal pulses.   Pulmonary/Chest: Effort normal. No stridor. No respiratory distress. He has rales.   Abdominal: Soft. Bowel sounds are normal. He exhibits no distension and no mass. There is no tenderness. There is no guarding.   Obese   Musculoskeletal: He exhibits edema. He exhibits no tenderness or deformity.   Palpable thrill in LUE fistula  1+ pitting edema to bilateral shins   Neurological: He is alert and oriented to person, place, and time.   Skin: No rash noted. He is not diaphoretic. No erythema.   Psychiatric: He has a normal mood and affect. His behavior is normal. Thought content normal.       Significant Labs:  CBC:   Recent Labs   Lab 03/11/19 0145   WBC 6.51   RBC 2.41*   HGB 8.0*   HCT 25.3*   PLT 85*   *   MCH 33.2*   MCHC 31.6*     CMP:   Recent Labs   Lab 03/11/19 0145   *  114*   CALCIUM 9.6  9.6   ALBUMIN 3.2*  3.2*   PROT 6.0     138   K 4.9  4.9   CO2 23  23     106   BUN 43*  43*   CREATININE 6.6*  6.6*   ALKPHOS 172*   ALT 11   AST 24   BILITOT  0.9            Assessment/Plan:     * S/P laminectomy    - Being follow by admitting service      ESRD (end stage renal disease) on dialysis    59 yo male with a PMH of ESRD with dialysis MWF (last on weds), DM, presenting as transfer from [facility] as admission to neurosurgery for evaluation after progressive course of unsteady gait as well as bilateral upper extremity weakness for the last several months.     ESRD on HD with Dr. Araceli Pickens in [facility] MWF, 4.5 hours, EDW 140kg  Last HD 2/27/19 @L removed no available current DW    Plan/Assessment:   -SLED completed overnight w/o issues  -Will plan for HD trial tomorrow for metabolic clearance and volume management  -Target UF 1-2 L as tolerated, keep MAP >65  -Now off Levophed   -Continue strict I/Os            Case discussed with staff further recs with attestation.     I will follow-up with patient. Please contact us if you have any additional questions.    OLIVIA Gabriel DNP, APRN, FNP-C  Department of Nephrology  [facility]  Pager: [phone]

## 2019-03-12 LAB
ALBUMIN SERPL BCP-MCNC: 3 G/DL
ALBUMIN SERPL BCP-MCNC: 3 G/DL
ALP SERPL-CCNC: 194 U/L
ALT SERPL W/O P-5'-P-CCNC: 14 U/L
ANION GAP SERPL CALC-SCNC: 7 MMOL/L
ANION GAP SERPL CALC-SCNC: 7 MMOL/L
AST SERPL-CCNC: 27 U/L
BASOPHILS # BLD AUTO: 0.01 K/UL
BASOPHILS NFR BLD: 0.2 %
BILIRUB SERPL-MCNC: 0.9 MG/DL
BUN SERPL-MCNC: 32 MG/DL
BUN SERPL-MCNC: 32 MG/DL
CALCIUM SERPL-MCNC: 9.4 MG/DL
CALCIUM SERPL-MCNC: 9.4 MG/DL
CHLORIDE SERPL-SCNC: 105 MMOL/L
CHLORIDE SERPL-SCNC: 105 MMOL/L
CO2 SERPL-SCNC: 25 MMOL/L
CO2 SERPL-SCNC: 25 MMOL/L
CREAT SERPL-MCNC: 5 MG/DL
CREAT SERPL-MCNC: 5 MG/DL
DIFFERENTIAL METHOD: ABNORMAL
EOSINOPHIL # BLD AUTO: 0.3 K/UL
EOSINOPHIL NFR BLD: 5.8 %
ERYTHROCYTE [DISTWIDTH] IN BLOOD BY AUTOMATED COUNT: 17.5 %
EST. GFR  (AFRICAN AMERICAN): 13.8 ML/MIN/1.73 M^2
EST. GFR  (AFRICAN AMERICAN): 13.8 ML/MIN/1.73 M^2
EST. GFR  (NON AFRICAN AMERICAN): 12 ML/MIN/1.73 M^2
EST. GFR  (NON AFRICAN AMERICAN): 12 ML/MIN/1.73 M^2
GLUCOSE SERPL-MCNC: 84 MG/DL
GLUCOSE SERPL-MCNC: 84 MG/DL
HCT VFR BLD AUTO: 23.9 %
HGB BLD-MCNC: 7.4 G/DL
IMM GRANULOCYTES # BLD AUTO: 0.05 K/UL
IMM GRANULOCYTES NFR BLD AUTO: 0.9 %
INR PPP: 1.1
LYMPHOCYTES # BLD AUTO: 1.1 K/UL
LYMPHOCYTES NFR BLD: 19.5 %
MAGNESIUM SERPL-MCNC: 2.2 MG/DL
MAGNESIUM SERPL-MCNC: 2.2 MG/DL
MCH RBC QN AUTO: 32.7 PG
MCHC RBC AUTO-ENTMCNC: 31 G/DL
MCV RBC AUTO: 106 FL
MONOCYTES # BLD AUTO: 0.8 K/UL
MONOCYTES NFR BLD: 13.5 %
NEUTROPHILS # BLD AUTO: 3.3 K/UL
NEUTROPHILS NFR BLD: 60.1 %
NRBC BLD-RTO: 0 /100 WBC
PHOSPHATE SERPL-MCNC: 4.7 MG/DL
PHOSPHATE SERPL-MCNC: 4.7 MG/DL
PLATELET # BLD AUTO: 68 K/UL
PMV BLD AUTO: 10.7 FL
POCT GLUCOSE: 122 MG/DL (ref 70–110)
POCT GLUCOSE: 99 MG/DL (ref 70–110)
POTASSIUM SERPL-SCNC: 5.1 MMOL/L
POTASSIUM SERPL-SCNC: 5.1 MMOL/L
PROT SERPL-MCNC: 5.9 G/DL
PROTHROMBIN TIME: 10.9 SEC
RBC # BLD AUTO: 2.26 M/UL
SODIUM SERPL-SCNC: 137 MMOL/L
SODIUM SERPL-SCNC: 137 MMOL/L
WBC # BLD AUTO: 5.54 K/UL

## 2019-03-12 PROCEDURE — 99233 PR SUBSEQUENT HOSPITAL CARE,LEVL III: ICD-10-PCS | Mod: ,,, | Performed by: NURSE PRACTITIONER

## 2019-03-12 PROCEDURE — 99233 SBSQ HOSP IP/OBS HIGH 50: CPT | Mod: ,,, | Performed by: PHYSICIAN ASSISTANT

## 2019-03-12 PROCEDURE — 25000003 PHARM REV CODE 250: Performed by: PSYCHIATRY & NEUROLOGY

## 2019-03-12 PROCEDURE — 25000003 PHARM REV CODE 250: Performed by: STUDENT IN AN ORGANIZED HEALTH CARE EDUCATION/TRAINING PROGRAM

## 2019-03-12 PROCEDURE — 20000000 HC ICU ROOM

## 2019-03-12 PROCEDURE — 99232 SBSQ HOSP IP/OBS MODERATE 35: CPT | Mod: ,,, | Performed by: NURSE PRACTITIONER

## 2019-03-12 PROCEDURE — 99233 PR SUBSEQUENT HOSPITAL CARE,LEVL III: ICD-10-PCS | Mod: ,,, | Performed by: PHYSICIAN ASSISTANT

## 2019-03-12 PROCEDURE — 97530 THERAPEUTIC ACTIVITIES: CPT

## 2019-03-12 PROCEDURE — 97112 NEUROMUSCULAR REEDUCATION: CPT

## 2019-03-12 PROCEDURE — 85610 PROTHROMBIN TIME: CPT

## 2019-03-12 PROCEDURE — 85025 COMPLETE CBC W/AUTO DIFF WBC: CPT

## 2019-03-12 PROCEDURE — 94761 N-INVAS EAR/PLS OXIMETRY MLT: CPT

## 2019-03-12 PROCEDURE — 80100014 HC HEMODIALYSIS 1:1

## 2019-03-12 PROCEDURE — 80053 COMPREHEN METABOLIC PANEL: CPT

## 2019-03-12 PROCEDURE — 25000003 PHARM REV CODE 250: Performed by: PHYSICIAN ASSISTANT

## 2019-03-12 PROCEDURE — 84100 ASSAY OF PHOSPHORUS: CPT

## 2019-03-12 PROCEDURE — 97110 THERAPEUTIC EXERCISES: CPT

## 2019-03-12 PROCEDURE — 99233 SBSQ HOSP IP/OBS HIGH 50: CPT | Mod: ,,, | Performed by: NURSE PRACTITIONER

## 2019-03-12 PROCEDURE — 99232 PR SUBSEQUENT HOSPITAL CARE,LEVL II: ICD-10-PCS | Mod: ,,, | Performed by: NURSE PRACTITIONER

## 2019-03-12 PROCEDURE — 83735 ASSAY OF MAGNESIUM: CPT

## 2019-03-12 RX ORDER — INSULIN ASPART 100 [IU]/ML
5 INJECTION, SOLUTION INTRAVENOUS; SUBCUTANEOUS
Status: DISCONTINUED | OUTPATIENT
Start: 2019-03-12 | End: 2019-03-13

## 2019-03-12 RX ORDER — SYRING-NEEDL,DISP,INSUL,0.3 ML 29 G X1/2"
296 SYRINGE, EMPTY DISPOSABLE MISCELLANEOUS
Status: COMPLETED | OUTPATIENT
Start: 2019-03-12 | End: 2019-03-12

## 2019-03-12 RX ORDER — PSEUDOEPHEDRINE/ACETAMINOPHEN 30MG-500MG
100 TABLET ORAL
Status: COMPLETED | OUTPATIENT
Start: 2019-03-12 | End: 2019-03-12

## 2019-03-12 RX ORDER — SODIUM CHLORIDE 9 MG/ML
INJECTION, SOLUTION INTRAVENOUS ONCE
Status: DISCONTINUED | OUTPATIENT
Start: 2019-03-12 | End: 2019-03-12

## 2019-03-12 RX ADMIN — MIDODRINE HYDROCHLORIDE 20 MG: 5 TABLET ORAL at 11:03

## 2019-03-12 RX ADMIN — DICLOFENAC: 10 GEL TOPICAL at 09:03

## 2019-03-12 RX ADMIN — GABAPENTIN 300 MG: 300 CAPSULE ORAL at 09:03

## 2019-03-12 RX ADMIN — LACTULOSE 20 G: 20 SOLUTION ORAL at 09:03

## 2019-03-12 RX ADMIN — PRAVASTATIN SODIUM 40 MG: 40 TABLET ORAL at 09:03

## 2019-03-12 RX ADMIN — INSULIN ASPART 5 UNITS: 100 INJECTION, SOLUTION INTRAVENOUS; SUBCUTANEOUS at 06:03

## 2019-03-12 RX ADMIN — MIDODRINE HYDROCHLORIDE 20 MG: 5 TABLET ORAL at 06:03

## 2019-03-12 RX ADMIN — MIDODRINE HYDROCHLORIDE 20 MG: 5 TABLET ORAL at 12:03

## 2019-03-12 RX ADMIN — Medication 100 ML: at 05:03

## 2019-03-12 RX ADMIN — INSULIN ASPART 5 UNITS: 100 INJECTION, SOLUTION INTRAVENOUS; SUBCUTANEOUS at 11:03

## 2019-03-12 RX ADMIN — MICONAZOLE NITRATE: 20 POWDER TOPICAL at 09:03

## 2019-03-12 RX ADMIN — SODIUM CHLORIDE 500 ML: 0.9 INJECTION, SOLUTION INTRAVENOUS at 03:03

## 2019-03-12 RX ADMIN — STANDARDIZED SENNA CONCENTRATE AND DOCUSATE SODIUM 1 TABLET: 8.6; 5 TABLET, FILM COATED ORAL at 09:03

## 2019-03-12 RX ADMIN — INSULIN ASPART 7 UNITS: 100 INJECTION, SOLUTION INTRAVENOUS; SUBCUTANEOUS at 08:03

## 2019-03-12 RX ADMIN — POLYETHYLENE GLYCOL 3350 17 G: 17 POWDER, FOR SOLUTION ORAL at 09:03

## 2019-03-12 RX ADMIN — PANTOPRAZOLE SODIUM 40 MG: 40 GRANULE, DELAYED RELEASE ORAL at 09:03

## 2019-03-12 RX ADMIN — FLUOXETINE HYDROCHLORIDE 20 MG: 20 CAPSULE ORAL at 09:03

## 2019-03-12 RX ADMIN — MICONAZOLE NITRATE: 20 POWDER TOPICAL at 06:03

## 2019-03-12 RX ADMIN — MAGNESIUM CITRATE 296 ML: 1.75 LIQUID ORAL at 05:03

## 2019-03-12 NOTE — ASSESSMENT & PLAN NOTE
58 M with progressive cervical myelopathy and stenosis from C3-6 with myelomalacia. S/p code for hypovolemia during HD. Stable this morning and labs WNL. He stepped up to ICU for further care and work up, now s/p C3-6 posterior decompression.    - Will need fusion in future, will discuss OR timing with    - please provide medical clearance for fusion; will consult medicine once patient able to be transferred to floor.  Stable by nsgy for ttf; off pressors overnight.   - HOB >30  - No C collar needed  - Pain control PRN  - HD per nephrology  - Daily PT/OT  - Medical management per NCC  - Please call Neurosurgery with any questions or exam changes

## 2019-03-12 NOTE — PROGRESS NOTES
[facility]  Neurosurgery  Progress Note    Subjective:     History of Present Illness: 57 yo male with a PMH of ESRD with dialysis MWF (last on weds), DM, presenting as transfer from [facility] as admission to neurosurgery for evaluation after progressive course of unsteady gait as well as bilateral upper extremity weakness for the last several months.  Mr. Farmer has had wobbly gait since this past September, at which point he recalls falling and has been at 80-90% of his normal function.  He has been able to walk without assist, however not for very long distances.  He states that since last Sunday he has felt progression of his symptoms and now feels it would be difficult to stand up to ambulate at all.  He also endorses a pulled muscle of his LLE that is limiting his mobility.  His UE symptoms have been present for about 3 months including not writing as well as he is used to.  HE is able to write numbers, but not always his name.  He has been having difficulty using a fork to eat over the past month.  No sensory disturbance noted as well as no neck or back pain and no pain from the neck radiating into the hands.  He has intermittently been taking ASA 81 mg qdaily. He denies recent falls or trauma to the neck.  No other blood thinning medication.  MRI at OSH on 2/27 revealed significant cervical stenosis.      =    Post-Op Info:  Procedure(s) (LRB):  SPINE, CERVICAL, POSTERIOR APPROACH  LAMINECTOMY C3-C6; C6-C7; WITH MEDIAL DISCECTOMY (N/A)   9 Days Post-Op     Interval History: NEIL ON, AF VSS. H+H stable.       Medications:  Continuous Infusions:   norepinephrine bitartrate-D5W Stopped (03/10/19 0925)     Scheduled Meds:   diclofenac sodium   Topical (Top) Daily    epoetin shefali (PROCRIT) injection  7,000 Units Intravenous Every Mon, Wed, Fri    FLUoxetine  20 mg Oral Daily    gabapentin  300 mg Oral QHS    insulin aspart U-100  5 Units Subcutaneous TIDWM    insulin detemir U-100   16 Units Subcutaneous QHS    lactulose  20 g Oral Daily    midodrine  20 mg Oral Q6H    pantoprazole  40 mg Oral BID    polyethylene glycol  17 g Oral Daily    pravastatin  40 mg Oral Daily    senna-docusate 8.6-50 mg  1 tablet Oral BID     PRN Meds:acetaminophen, dextrose 50%, dextrose 50%, glucagon (human recombinant), glucose, glucose, insulin aspart U-100, miconazole NITRATE 2 %, ondansetron, oxyCODONE     Review of Systems  Objective:     Weight: 135 kg (297 lb 9.9 oz)  Body mass index is 45.25 kg/m².  Vital Signs (Most Recent):  Temp: 98.4 °F (36.9 °C) (03/12/19 0700)  Pulse: 61 (03/12/19 1000)  Resp: 16 (03/12/19 1000)  BP: (!) 115/59 (03/12/19 1000)  SpO2: 100 % (03/12/19 1000) Vital Signs (24h Range):  Temp:  [98.2 °F (36.8 °C)-98.8 °F (37.1 °C)] 98.4 °F (36.9 °C)  Pulse:  [55-74] 61  Resp:  [10-31] 16  SpO2:  [94 %-100 %] 100 %  BP: (114-168)/(56-78) 115/59  Arterial Line BP: (130-161)/(46-67) 147/57     Date 03/12/19 0700 - 03/13/19 0659   Shift 7115-3140 5434-8067 0091-0246 24 Hour Total   INTAKE   P.O. 240   240   Shift Total(mL/kg) 240(1.8)   240(1.8)   OUTPUT   Shift Total(mL/kg)       Weight (kg) 135 135 135 135              Resp Rate Total:  [10 br/min-19 br/min] 10 br/min         Closed/Suction Drain 03/03/19 1431 Posterior Neck Accordion 10 Fr. (Active)   Site Description Unable to view 3/12/2019  7:00 AM   Dressing Type Gauze 3/12/2019  7:00 AM   Dressing Status Clean;Dry;Intact 3/12/2019  7:00 AM   Dressing Intervention Dressing reinforced 3/12/2019  7:00 AM   Drainage Serosanguineous 3/12/2019  7:00 AM   Status To bulb suction 3/12/2019  7:00 AM   Output (mL) 0 mL 3/12/2019  6:05 AM            Hemodialysis AV Fistula Left forearm (Active)   Needle Size 15ga 3/1/2019  4:19 PM   Site Assessment Intact;Dry 3/12/2019  7:00 AM   Patency Present;Thrill;Bruit 3/12/2019  7:00 AM   Status Deaccessed 3/12/2019  7:00 AM   Flows Good 2/28/2019  8:20 AM   Dressing Intervention Dressing reinforced  3/6/2019  3:05 AM   Dressing Status Clean;Dry;Intact 3/2/2019  3:01 PM   Site Condition No complications 3/12/2019  7:00 AM   Dressing Open to air (None) 3/12/2019  7:00 AM            Trialysis (Dialysis) Catheter 03/04/19 0250 left internal jugular (Active)   IV Device Securement sutures;catheter securement device 3/12/2019  7:00 AM   Additional Site Signs no erythema;no warmth;no edema;no pain;no palpable cord;no streak formation;no drainage 3/12/2019  7:00 AM   Patency/Care infusing 3/12/2019  7:00 AM   Waveform normal 3/12/2019  7:00 AM   Site Assessment Clean;Dry;Intact;No redness;No swelling 3/12/2019  7:00 AM   Status Deaccessed 3/12/2019  7:00 AM   Flows Good 3/12/2019  3:05 AM   Dressing Intervention Dressing reinforced 3/12/2019  7:00 AM   Dressing Status Biopatch in place;Clean;Dry;Intact 3/12/2019  7:00 AM   Dressing Change Due 03/11/19 3/12/2019  7:00 AM   Verification by X-ray Yes 3/11/2019  7:05 PM   Site Condition No complications 3/12/2019  7:00 AM   Dressing Other (Comment) 3/12/2019  7:00 AM   Daily Line Review Performed 3/12/2019  7:00 AM       Neurosurgery Physical Exam  GCS 15  A+Ox3  CN 2-12 intact  4-/5 BUE  5/5 BLE  SILT  +Sanford bilaterally w 3+ UE reflexes        Significant Labs:  Recent Labs   Lab 03/11/19  1532 03/11/19  2200 03/12/19  0115   GLU 93 107 84  84    137 137  137   K 4.8 5.0 5.1  5.1    105 105  105   CO2 25 25 25  25   BUN 25* 31* 32*  32*   CREATININE 4.2* 4.7* 5.0*  5.0*   CALCIUM 9.1 9.2 9.4  9.4   MG 2.1 2.2 2.2  2.2     Recent Labs   Lab 03/11/19  0145 03/12/19  0115   WBC 6.51 5.54   HGB 8.0* 7.4*   HCT 25.3* 23.9*   PLT 85* 68*     Recent Labs   Lab 03/11/19  0145 03/12/19  0115   INR 1.0 1.1     Microbiology Results (last 7 days)     ** No results found for the last 168 hours. **        Recent Lab Results       03/12/19  0751   03/12/19  0115   03/11/19  2200   03/11/19  2138   03/11/19  1659        Immature Granulocytes   0.9            Immature Grans (Abs)   0.05  Comment:  Mild elevation in immature granulocytes is non specific and   can be seen in a variety of conditions including stress response,   acute inflammation, trauma and pregnancy. Correlation with other   laboratory and clinical findings is essential.             Albumin   3.0 2.9            3.0           Alkaline Phosphatase   194           ALT   14           Anion Gap   7 7            7           AST   27           Baso #   0.01           Basophil%   0.2           Total Bilirubin   0.9  Comment:  For infants and newborns, interpretation of results should be based  on gestational age, weight and in agreement with clinical  observations.  Premature Infant recommended reference ranges:  Up to 24 hours.............<8.0 mg/dL  Up to 48 hours............<12.0 mg/dL  3-5 days..................<15.0 mg/dL  6-29 days.................<15.0 mg/dL             BUN, Bld   32 31            32           Calcium   9.4 9.2            9.4           Chloride   105 105            105           CO2   25 25            25           Creatinine   5.0 4.7            5.0           Differential Method   Automated           eGFR if    13.8 14.9            13.8           eGFR if non    12.0  Comment:  Calculation used to obtain the estimated glomerular filtration  rate (eGFR) is the CKD-EPI equation.    12.9  Comment:  Calculation used to obtain the estimated glomerular filtration  rate (eGFR) is the CKD-EPI equation.               12.0  Comment:  Calculation used to obtain the estimated glomerular filtration  rate (eGFR) is the CKD-EPI equation.              Eos #   0.3           Eosinophil%   5.8           Glucose   84 107            84           Gran # (ANC)   3.3           Gran%   60.1           Hematocrit   23.9           Hemoglobin   7.4           Coumadin Monitoring INR   1.1  Comment:  Coumadin Therapy:  2.0 - 3.0 for INR for all indicators except mechanical heart  valves  and antiphospholipid syndromes which should use 2.5 - 3.5.             Lymph #   1.1           Lymph%   19.5           Magnesium   2.2 2.2            2.2           MCH   32.7           MCHC   31.0           MCV   106           Mono #   0.8           Mono%   13.5           MPV   10.7           nRBC   0           Phosphorus   4.7 4.3            4.7           Platelets   68           POCT Glucose 122     118 89     Potassium   5.1 5.0            5.1           Total Protein   5.9           Protime   10.9           RBC   2.26           RDW   17.5           Sodium   137 137            137           WBC   5.54                            03/11/19  1532   03/11/19  1152        Immature Granulocytes         Immature Grans (Abs)         Albumin 2.9       Alkaline Phosphatase         ALT         Anion Gap 7       AST         Baso #         Basophil%         Total Bilirubin         BUN, Bld 25       Calcium 9.1       Chloride 106       CO2 25       Creatinine 4.2       Differential Method         eGFR if African American 17.1       eGFR if non  14.8  Comment:  Calculation used to obtain the estimated glomerular filtration  rate (eGFR) is the CKD-EPI equation.          Eos #         Eosinophil%         Glucose 93       Gran # (ANC)         Gran%         Hematocrit         Hemoglobin         Coumadin Monitoring INR         Lymph #         Lymph%         Magnesium 2.1       MCH         MCHC         MCV         Mono #         Mono%         MPV         nRBC         Phosphorus 3.6       Platelets         POCT Glucose   149     Potassium 4.8       Total Protein         Protime         RBC         RDW         Sodium 138       WBC               Significant Diagnostics:  CT: No results found in the last 24 hours.  MRI: No results found in the last 24 hours.    Assessment/Plan:     * S/P laminectomy     58 M with progressive cervical myelopathy and stenosis from C3-6 with myelomalacia. S/p code for hypovolemia  during HD. Stable this morning and labs WNL. He stepped up to ICU for further care and work up, now s/p C3-6 posterior decompression.    - Will need fusion in future, will discuss OR timing with    - please provide medical clearance for fusion; will consult medicine once patient able to be transferred to floor.  Stable by nsgy for ttf; off pressors overnight.   - HOB >30  - No C collar needed  - Pain control PRN  - HD per nephrology  - Daily PT/OT  - Medical management per NCC  - Please call Neurosurgery with any questions or exam changes                Rocco Chand MD  Neurosurgery  [facility]

## 2019-03-12 NOTE — PLAN OF CARE
Problem: Adult Inpatient Plan of Care  Goal: Plan of Care Review  Outcome: Ongoing (interventions implemented as appropriate)  POC reviewed with pt at 1430. Pt verbalized understanding. Questions and concerns addressed. No acute events today. Pt progressing toward goals. Will continue to monitor. Diet tolerated well. Plan to trial HD via fistula and step down to floor. See flowsheets for full assessment and VS info.

## 2019-03-12 NOTE — SUBJECTIVE & OBJECTIVE
Interval History: SLED completed yesterday. Patient net negative 140 ml/24 hr. Patient alert and comfortable on assessment. No longer requiring pressor support.     Review of patient's allergies indicates:   Allergen Reactions    Keflex [cephalexin] Nausea Only     Current Facility-Administered Medications   Medication Frequency    acetaminophen tablet 500 mg Q6H PRN    dextrose 50% injection 12.5 g PRN    dextrose 50% injection 25 g PRN    diclofenac sodium 1 % gel Daily    epoetin shefali (PROCRIT) injection 7,000 units kit Every Mon, Wed, Fri    FLUoxetine capsule 20 mg Daily    gabapentin capsule 300 mg QHS    glucagon (human recombinant) injection 1 mg PRN    glucose chewable tablet 16 g PRN    glucose chewable tablet 24 g PRN    insulin aspart U-100 pen 0-5 Units QID (AC + HS) PRN    insulin aspart U-100 pen 5 Units TIDWM    insulin detemir U-100 pen 16 Units QHS    lactulose 20 gram/30 mL solution Soln 20 g Daily    miconazole NITRATE 2 % top powder BID PRN    midodrine tablet 20 mg Q6H    norepinephrine 32 mg in dextrose 5 % 250 mL infusion Continuous    ondansetron injection 4 mg Q6H PRN    oxyCODONE immediate release tablet 5 mg Q6H PRN    pantoprazole suspension 40 mg BID    polyethylene glycol packet 17 g Daily    pravastatin tablet 40 mg Daily    senna-docusate 8.6-50 mg per tablet 1 tablet BID       Objective:     Vital Signs (Most Recent):  Temp: 98.4 °F (36.9 °C) (03/12/19 0700)  Pulse: 61 (03/12/19 1000)  Resp: 16 (03/12/19 1000)  BP: (!) 115/59 (03/12/19 1000)  SpO2: 100 % (03/12/19 1000)  O2 Device (Oxygen Therapy): room air (03/12/19 1000) Vital Signs (24h Range):  Temp:  [98.2 °F (36.8 °C)-98.8 °F (37.1 °C)] 98.4 °F (36.9 °C)  Pulse:  [55-74] 61  Resp:  [10-31] 16  SpO2:  [94 %-100 %] 100 %  BP: (114-168)/(56-78) 115/59  Arterial Line BP: (130-161)/(46-67) 147/57     Weight: 135 kg (297 lb 9.9 oz) (03/12/19 0300)  Body mass index is 45.25 kg/m².  Body surface area is 2.54  meters squared.    I/O last 3 completed shifts:  In: 3600 [P.O.:800; I.V.:2800]  Out: 4045 [Other:4045]    Physical Exam   Constitutional: He is oriented to person, place, and time. He appears well-developed and well-nourished. Nasal cannula in place.    male who appears stated age, lying in bed   HENT:   Head: Normocephalic and atraumatic.   Mouth/Throat: Oropharynx is clear and moist. No oropharyngeal exudate.   Eyes: EOM are normal. Pupils are equal, round, and reactive to light.   Neck: No JVD present. No tracheal deviation present.   Cardiovascular: Regular rhythm, normal heart sounds and intact distal pulses.   Pulmonary/Chest: Effort normal. No stridor. No respiratory distress. He has rales.   Abdominal: Soft. Bowel sounds are normal. He exhibits no distension and no mass. There is no tenderness. There is no guarding.   Obese   Musculoskeletal: He exhibits edema. He exhibits no tenderness or deformity.   Palpable thrill in LUE fistula  1+ pitting edema to bilateral shins   Neurological: He is alert and oriented to person, place, and time.   Skin: No rash noted. He is not diaphoretic. No erythema.   Psychiatric: He has a normal mood and affect. His behavior is normal. Thought content normal.       Significant Labs:  CBC:   Recent Labs   Lab 03/12/19 0115   WBC 5.54   RBC 2.26*   HGB 7.4*   HCT 23.9*   PLT 68*   *   MCH 32.7*   MCHC 31.0*     CMP:   Recent Labs   Lab 03/12/19 0115   GLU 84  84   CALCIUM 9.4  9.4   ALBUMIN 3.0*  3.0*   PROT 5.9*     137   K 5.1  5.1   CO2 25  25     105   BUN 32*  32*   CREATININE 5.0*  5.0*   ALKPHOS 194*   ALT 14   AST 27   BILITOT 0.9

## 2019-03-12 NOTE — PROGRESS NOTES
[facility]  Nephrology  Progress Note    Patient Name: Angel Farmer Jr.  MRN: [id number]  Admission Date: 2/28/2019  Hospital Length of Stay: 12 days  Attending Provider: Paul Vera MD   Primary Care Physician: Satya Noriega MD  Principal Problem:S/P laminectomy    Subjective:     Interval History: SLED completed yesterday. Patient net negative 140 ml/24 hr. Patient alert and comfortable on assessment. No longer requiring pressor support.     Review of patient's allergies indicates:   Allergen Reactions    Keflex [cephalexin] Nausea Only     Current Facility-Administered Medications   Medication Frequency    acetaminophen tablet 500 mg Q6H PRN    dextrose 50% injection 12.5 g PRN    dextrose 50% injection 25 g PRN    diclofenac sodium 1 % gel Daily    epoetin shefali (PROCRIT) injection 7,000 units kit Every Mon, Wed, Fri    FLUoxetine capsule 20 mg Daily    gabapentin capsule 300 mg QHS    glucagon (human recombinant) injection 1 mg PRN    glucose chewable tablet 16 g PRN    glucose chewable tablet 24 g PRN    insulin aspart U-100 pen 0-5 Units QID (AC + HS) PRN    insulin aspart U-100 pen 5 Units TIDWM    insulin detemir U-100 pen 16 Units QHS    lactulose 20 gram/30 mL solution Soln 20 g Daily    miconazole NITRATE 2 % top powder BID PRN    midodrine tablet 20 mg Q6H    norepinephrine 32 mg in dextrose 5 % 250 mL infusion Continuous    ondansetron injection 4 mg Q6H PRN    oxyCODONE immediate release tablet 5 mg Q6H PRN    pantoprazole suspension 40 mg BID    polyethylene glycol packet 17 g Daily    pravastatin tablet 40 mg Daily    senna-docusate 8.6-50 mg per tablet 1 tablet BID       Objective:     Vital Signs (Most Recent):  Temp: 98.4 °F (36.9 °C) (03/12/19 0700)  Pulse: 61 (03/12/19 1000)  Resp: 16 (03/12/19 1000)  BP: (!) 115/59 (03/12/19 1000)  SpO2: 100 % (03/12/19 1000)  O2 Device (Oxygen Therapy): room air (03/12/19 1000) Vital Signs (24h  Range):  Temp:  [98.2 °F (36.8 °C)-98.8 °F (37.1 °C)] 98.4 °F (36.9 °C)  Pulse:  [55-74] 61  Resp:  [10-31] 16  SpO2:  [94 %-100 %] 100 %  BP: (114-168)/(56-78) 115/59  Arterial Line BP: (130-161)/(46-67) 147/57     Weight: 135 kg (297 lb 9.9 oz) (03/12/19 0300)  Body mass index is 45.25 kg/m².  Body surface area is 2.54 meters squared.    I/O last 3 completed shifts:  In: 3600 [P.O.:800; I.V.:2800]  Out: 4045 [Other:4045]    Physical Exam   Constitutional: He is oriented to person, place, and time. He appears well-developed and well-nourished. Nasal cannula in place.    male who appears stated age, lying in bed   HENT:   Head: Normocephalic and atraumatic.   Mouth/Throat: Oropharynx is clear and moist. No oropharyngeal exudate.   Eyes: EOM are normal. Pupils are equal, round, and reactive to light.   Neck: No JVD present. No tracheal deviation present.   Cardiovascular: Regular rhythm, normal heart sounds and intact distal pulses.   Pulmonary/Chest: Effort normal. No stridor. No respiratory distress. He has rales.   Abdominal: Soft. Bowel sounds are normal. He exhibits no distension and no mass. There is no tenderness. There is no guarding.   Obese   Musculoskeletal: He exhibits edema. He exhibits no tenderness or deformity.   Palpable thrill in LUE fistula  1+ pitting edema to bilateral shins   Neurological: He is alert and oriented to person, place, and time.   Skin: No rash noted. He is not diaphoretic. No erythema.   Psychiatric: He has a normal mood and affect. His behavior is normal. Thought content normal.       Significant Labs:  CBC:   Recent Labs   Lab 03/12/19 0115   WBC 5.54   RBC 2.26*   HGB 7.4*   HCT 23.9*   PLT 68*   *   MCH 32.7*   MCHC 31.0*     CMP:   Recent Labs   Lab 03/12/19 0115   GLU 84  84   CALCIUM 9.4  9.4   ALBUMIN 3.0*  3.0*   PROT 5.9*     137   K 5.1  5.1   CO2 25  25     105   BUN 32*  32*   CREATININE 5.0*  5.0*   ALKPHOS 194*   ALT 14   AST 27    BILITOT 0.9          Assessment/Plan:     * S/P laminectomy    - Being follow by admitting service      ESRD (end stage renal disease) on dialysis    57 yo male with a PMH of ESRD with dialysis MWF (last on weds), DM, presenting as transfer from [facility] as admission to neurosurgery for evaluation after progressive course of unsteady gait as well as bilateral upper extremity weakness for the last several months.     ESRD on HD with Dr. Araceli Pickens in [facility] MWF, 4.5 hours, EDW 140kg  Last HD 2/27/19 @L removed no available current DW    Plan/Assessment:   -Will plan for HD trial today for metabolic clearance and volume management  -Target UF 1-2 L as tolerated, keep MAP >65  - Remains off Levophed   -Continue strict I/Os            Case discussed with staff further recs with attestation.     I will follow-up with patient. Please contact us if you have any additional questions.    OLIVIA Gabriel DNP, APRN, FNP-C  Department of Nephrology  [facility]  Pager: [phone]

## 2019-03-12 NOTE — PLAN OF CARE
Problem: Adult Inpatient Plan of Care  Goal: Plan of Care Review  Outcome: Ongoing (interventions implemented as appropriate)  POC reviewed with Angel at 0500. Pt verbalized understanding. Questions and concerns addressed. No acute events overnight. Pt progressing toward goals. Pt's BP stable overnight; had medium bowel movement once within the shift. Will continue to monitor. See flowsheets for full assessment and VS info

## 2019-03-12 NOTE — PLAN OF CARE
Problem: Occupational Therapy Goal  Goal: Occupational Therapy Goal  Goals to be met by: 7 days (3/15/19)     Patient will increase functional independence with ADLs by performing:    UE Dressing with Moderate Assistance.  Grooming while EOB with Minimal Assistance.  Toileting from bedside commode with Maximum Assistance for hygiene and clothing management.   Supine to sit with Moderate Assistance.  Toilet transfer to bedside commode with Maximum Assistance.  Increased functional strength to WFL for ADLs.     Outcome: Ongoing (interventions implemented as appropriate)  Continue OT plan of care.

## 2019-03-12 NOTE — PT/OT/SLP PROGRESS
Physical Therapy Treatment    Patient Name:  Angel Farmer Jr.   MRN:  [id number]    Recommendations:     Discharge Recommendations:  rehabilitation facility   Discharge Equipment Recommendations: (TBD at next level of care)   Barriers to discharge: Inaccessible home and Decreased caregiver support    Assessment:     Angel Farmer Jr. is a 58 y.o. male admitted with a medical diagnosis of S/P laminectomy.  He presents with the following impairments/functional limitations:  weakness, impaired endurance, impaired self care skills, impaired functional mobilty, gait instability, impaired balance, decreased coordination, decreased upper extremity function, decreased lower extremity function, decreased safety awareness, decreased ROM, impaired coordination, impaired fine motor, edema, impaired cardiopulmonary response to activity.  The patient's mobility is limited by upper and lower extremity weakness and body habitus. Bed mobility total assistance x2, he is able to sit for 15 minutes with stand by assistance. Performed lateral scooting towards head of bed in sitting, maximum assistance x2 for ~6 reps. Performed sit to stand with maximum assistance x2 and hand held assistance, maintained approximately 10 seconds before sitting. Session limited by patient nausea, began dry heaving, resolved and returned to bed. Based on current level of mobility, this patient is an excellent candidate for inpatient rehabilitation, has a qualifying diagnosis, shows increasing activity tolerance,  is motivated to participate in treatment, and has a supportive family willing to assist the patient upon discharge.      Rehab Prognosis: Good; patient would benefit from acute skilled PT services to address these deficits and reach maximum level of function.    Recent Surgery: Procedure(s) (LRB):  SPINE, CERVICAL, POSTERIOR APPROACH  LAMINECTOMY C3-C6; C6-C7; WITH MEDIAL DISCECTOMY (N/A) 9 Days Post-Op    Plan:     During this hospitalization,  "patient to be seen 4 x/week to address the identified rehab impairments via gait training, therapeutic activities, therapeutic exercises, neuromuscular re-education and progress toward the following goals:    · Plan of Care Expires:  19    Subjective     Chief Complaint: "I feel good, just call me Mr. Wonderful"  Patient/Family Comments/goals: get stronger, "I want to walk"  Pain/Comfort:  His L quad is less painful but increases in pain with knee flexion AROM  · Pain Rating 1: (soreness at incision in sitting)  · Location - Orientation 1: generalized  · Location 1: neck  · Pain Addressed 1: Pre-medicate for activity, Reposition, Cessation of Activity  · Pain Rating Post-Intervention 1: (decreased in supine at rest)      Objective:     Communicated with RN prior to session.  Patient found all lines intact, call button in reach and bed alarm on arterial line, bed alarm, blood pressure cuff, peripheral IV, pulse ox (continuous), telemetry, central line, pressure relief boots, SCD  upon PT entry to room.     General Precautions: Standard, fall, aspiration   Orthopedic Precautions:spinal precautions   Braces: N/A     Functional Mobility:    Bed Mobility  Rolling to R: total assistance x2  Supine to Sit:  total assistance x2  Scooting in sittin repetitions, maximum assistance x2   Transfers Sit to Stand:  maximum assistance x2, verbal and manual cues to extend through hips, maintained ~10 seconds.            AM-PAC 6 CLICK MOBILITY  Turning over in bed (including adjusting bedclothes, sheets and blankets)?: 2  Sitting down on and standing up from a chair with arms (e.g., wheelchair, bedside commode, etc.): 2  Moving from lying on back to sitting on the side of the bed?: 2  Moving to and from a bed to a chair (including a wheelchair)?: 1  Need to walk in hospital room?: 1  Climbing 3-5 steps with a railing?: 1  Basic Mobility Total Score: 9       Therapeutic Activities and Exercises:   Patient educated on role " of therapy, goals of session, benefits of out of bed mobility. Patient agreeable to mobilize with therapy. In sitting R shoulder elevated. Performed dynamic balance exercises involving reaching outside BRIGIDO to improve balance/proprioception/trunk strength/abdominal engagement. Patient's reaching is limited by decreased shoulder ROM bilaterally, he is able to maintain his balance without UE support on bed. Able to reach across midline bilaterally. After scooting towards head of bed and standing, patient became nauseous and dry heaved, nausea subsided, returned to bed, all needs met. Awaiting HD.       Patient left right sidelying with all lines intact, call button in reach and bed alarm on..    GOALS:   Multidisciplinary Problems     Physical Therapy Goals        Problem: Physical Therapy Goal    Goal Priority Disciplines Outcome Goal Variances Interventions   Physical Therapy Goal     PT, PT/OT Ongoing (interventions implemented as appropriate)     Description:  Goals to be met by: 3/15/2019     Patient will increase functional independence with mobility by performin. Supine to sit with Moderate Assistance  2. Rolling to Left and Right with Moderate Assistance.  3. Sit to stand transfer with Maximum Assistance  4. Gait  x 10 feet with Moderate Assistance using Rolling Walker.   5. Stand for 5 minutes with Moderate Assistance using Rolling Walker  6. Lower extremity exercise program x20 reps per handout, with assistance as needed                      Time Tracking:     PT Received On: 19  PT Start Time: 1001     PT Stop Time: 1031  PT Total Time (min): 30 min     Billable Minutes: Therapeutic Activity 18 and Neuromuscular Re-education 12    Treatment Type: Treatment  PT/PTA: PT     PTA Visit Number: 0     Mary Gray, PT  2019

## 2019-03-12 NOTE — PROGRESS NOTES
[facility]  Neurocritical Care  Progress Note    Admit Date: 2/28/2019  Service Date: 03/12/2019  Length of Stay: 12    Subjective:     Chief Complaint: S/P laminectomy    History of Present Illness: The patient  is a 58 y.o. male with PMHx of of DMII, ESRD on HD MWF(last dialysis 3/1), HLD, HTN and MIKE admitted to [facility] s/p cervical laminectomy C3-C6, C6-C7 w/medial discectomy. Per chart review,  Mr. Farmer has had wobbly gait since this past September, at which point he recalls falling and has been at 80-90% of his normal function.  He has been able to walk without assist, however not for very long distances.  He states that since last Sunday he has felt progression of his symptoms and now feels it would be difficult to stand up to ambulate at all. His UE symptoms have been present for about 3 months including not writing as well as he is used to.MRI cervical spine (2/27)at OSH on 2/27 revealed severe disc space narrowing C3-C4 and C5-C6 level.  Patient admitted to [facility] for close monitoring and higher level of care.            Hospital Course: 3/3: pt admitted to post-up, MAP goal >75, on levo  3/4: extubated   3/5:Daily weights, 5 units Q 6hrs Insulin apart added, diabetic diet started    3/8/2019: remains on low dose pressors trying to wean today. Plan for CRRT today; started Prozac for mood and Lactulose to help with constipation.  03/09/2019: no issues. Got albumin before HD, still on minimal pressor support. Weaning off.   03/10/2019 intermittent need for levophed.   03/11/2019 NAEO. Off pressors. Cont midodrine.     INTERVAL HX- awaiting HD to ensure toleration off pressors prior to step down .       Review of Symptoms:   Constitutional: Denies fevers, weight loss, chills, or weakness.   Eyes: Denies changes in vision.   ENT: Denies dysphagia, nasal discharge, ear pain or discharge.   Cardiovascular: Denies chest pain, palpitations, orthopnea, or claudication.   Respiratory: Denies  shortness of breath, cough, hemoptysis, or wheezing.   GI: Denies nausea/vomitting, hematochezia, melena, abd pain, or changes in appetite.   : Denies dysuria, incontinence, or hematuria.   Musculoskeletal: Denies joint pain or myalgias.   Skin/breast: Denies rashes, lumps, lesions, or discharge.   Neurologic: Denies headache, dizziness, vertigo, or paresthesias.   Psychiatric: Denies changes in mood or hallucinations.   Endocrine: Denies polyuria, polydipsia, heat/cold intolerance.   Hematologic/Lymph: Denies lymphadenopathy, easy bruising or easy bleeding.   Allergic/Immunologic: Denies rash, rhinitis      Medications:  Continuous Infusions:   norepinephrine bitartrate-D5W Stopped (03/10/19 0925)     Scheduled Meds:   diclofenac sodium   Topical (Top) Daily    epoetin shefali (PROCRIT) injection  7,000 Units Intravenous Every Mon, Wed, Fri    FLUoxetine  20 mg Oral Daily    gabapentin  300 mg Oral QHS    insulin aspart U-100  5 Units Subcutaneous TIDWM    insulin detemir U-100  16 Units Subcutaneous QHS    lactulose  20 g Oral Daily    midodrine  20 mg Oral Q6H    pantoprazole  40 mg Oral BID    polyethylene glycol  17 g Oral Daily    pravastatin  40 mg Oral Daily    senna-docusate 8.6-50 mg  1 tablet Oral BID     PRN Meds:.acetaminophen, dextrose 50%, dextrose 50%, glucagon (human recombinant), glucose, glucose, insulin aspart U-100, miconazole NITRATE 2 %, ondansetron, oxyCODONE    OBJECTIVE:   Vital Signs (Most Recent):   Temp: 98.2 °F (36.8 °C) (03/12/19 1100)  Pulse: 68 (03/12/19 1700)  Resp: 16 (03/12/19 1700)  BP: 135/61 (03/12/19 1700)  SpO2: 97 % (03/12/19 1700)    Vital Signs (24h Range):   Temp:  [98.2 °F (36.8 °C)-98.8 °F (37.1 °C)] 98.2 °F (36.8 °C)  Pulse:  [55-70] 68  Resp:  [10-30] 16  SpO2:  [94 %-100 %] 97 %  BP: ()/(51-78) 135/61  Arterial Line BP: (126-160)/(46-67) 134/56    ICP/CPP (Last 24h):   CO:  [8.3 L/min-11.2 L/min] 8.5 L/min  CI:  [3.4 L/min/m2-4.5 L/min/m2] 3.5  L/min/m2    I & O (Last 24h):     Intake/Output Summary (Last 24 hours) at 3/12/2019 1813  Last data filed at 3/12/2019 1200  Gross per 24 hour   Intake 930 ml   Output 0 ml   Net 930 ml     Physical Exam:  GA: Alert, comfortable, no acute distress.   HEENT: No scleral icterus or JVD. Neck supple  Pulmonary: Air entry equal to auscultation A/P/L. Wheezing no, crackles no  Cardiac: RRR, S1 & S2 w/o rubs/murmurs/gallops.   Abdominal: soft, non-tender, bowel sounds present x 4. No appreciable hepatosplenomegaly.  Skin: No jaundice, rashes, or visible lesions.  Neuro:  --Mental Status:  Awake and alert, follows commands, fluent. Spontaneous eye opening. Tracks.   --Pupils 3.5 mm, PERRL.   --Corneal reflex not done in this awake patient, gag, cough intact.  --LUE strength: 3/5  --RUE strength: 3/5  --LLE strength: 5/5  --RLE strength: 5/5  Brisk reflexes bilateral UE  Plantars equivocal bilaterally.   MSK:  No edema in UE and LE    Vent Data:   Resp Rate Total:  [10 br/min-19 br/min] 10 br/min    Lines/Drains/Airway:          Trialysis (Dialysis) Catheter 03/04/19 0250 left internal jugular (Active)   IV Device Securement sutures;catheter securement device 3/10/2019  7:00 AM   Additional Site Signs no erythema;no warmth;no edema;no pain;no palpable cord;no streak formation;no drainage 3/10/2019  7:00 AM   Patency/Care infusing 3/10/2019  7:00 AM   Waveform normal 3/10/2019  7:00 AM   Site Assessment Clean;Intact;Dry 3/10/2019  7:00 AM   Status Flushed 3/10/2019  7:00 AM   Flows Good 3/10/2019  7:00 AM   Dressing Intervention Dressing reinforced 3/10/2019  7:00 AM   Dressing Status Biopatch in place;Clean;Dry;Intact 3/10/2019  7:00 AM   Dressing Change Due 03/11/19 3/10/2019  7:00 AM   Verification by X-ray Yes 3/9/2019  7:05 PM   Site Condition No complications 3/10/2019  7:00 AM   Dressing Occlusive 3/10/2019  7:00 AM   Daily Line Review Performed 3/10/2019  7:00 AM           Arterial Line 03/03/19 1216 Right Radial  (Active)   Site Assessment Clean;Dry;Intact 3/10/2019  7:00 AM   Line Status Pulsatile blood flow 3/10/2019  7:00 AM   Art Line Waveform Appropriate 3/10/2019  7:00 AM   Arterial Line Interventions Zeroed and calibrated;Leveled;Connections checked and tightened;Flushed per protocol 3/10/2019  7:00 AM   Color/Movement/Sensation Capillary refill less than 3 sec 3/10/2019  7:00 AM   Dressing Type Transparent 3/10/2019  7:00 AM   Dressing Status Biopatch in place;Clean;Dry;Intact 3/10/2019  7:00 AM   Dressing Intervention Dressing reinforced 3/10/2019  7:00 AM   Dressing Change Due 03/14/19 3/10/2019  7:00 AM           Closed/Suction Drain 03/03/19 1431 Posterior Neck Accordion 10 Fr. (Active)   Site Description Unable to view 3/10/2019  7:00 AM   Dressing Type Gauze 3/10/2019  7:00 AM   Dressing Status Clean;Dry;Intact 3/10/2019  7:00 AM   Dressing Intervention Dressing reinforced 3/10/2019  7:00 AM   Drainage Serosanguineous 3/10/2019  7:00 AM   Status To bulb suction 3/10/2019  7:00 AM   Output (mL) 3 mL 3/10/2019  6:04 AM            Hemodialysis AV Fistula Left forearm (Active)   Needle Size 15ga 3/1/2019  4:19 PM   Site Assessment Intact;Dry 3/10/2019  7:00 AM   Patency Present;Thrill;Bruit 3/10/2019  7:00 AM   Status Deaccessed 3/10/2019  7:00 AM   Flows Good 2/28/2019  8:20 AM   Dressing Intervention Dressing reinforced 3/6/2019  3:05 AM   Dressing Status Clean;Dry;Intact 3/2/2019  3:01 PM   Site Condition No complications 3/10/2019  7:00 AM   Dressing Open to air (None) 3/10/2019  7:00 AM            Trialysis (Dialysis) Catheter 03/04/19 0250 left internal jugular (Active)   IV Device Securement sutures;catheter securement device 3/10/2019  7:00 AM   Additional Site Signs no erythema;no warmth;no edema;no pain;no palpable cord;no streak formation;no drainage 3/10/2019  7:00 AM   Patency/Care infusing 3/10/2019  7:00 AM   Waveform normal 3/10/2019  7:00 AM   Site Assessment Clean;Intact;Dry 3/10/2019  7:00 AM    Status Flushed 3/10/2019  7:00 AM   Flows Good 3/10/2019  7:00 AM   Dressing Intervention Dressing reinforced 3/10/2019  7:00 AM   Dressing Status Biopatch in place;Clean;Dry;Intact 3/10/2019  7:00 AM   Dressing Change Due 03/11/19 3/10/2019  7:00 AM   Verification by X-ray Yes 3/9/2019  7:05 PM   Site Condition No complications 3/10/2019  7:00 AM   Dressing Occlusive 3/10/2019  7:00 AM   Daily Line Review Performed 3/10/2019  7:00 AM     Nutrition/Tube Feeds (if NPO state why): po     Labs:  ABG: No results for input(s): PH, PO2, PCO2, HCO3, POCSATURATED, BE in the last 24 hours.  BMP:  Recent Labs   Lab 03/12/19  0115     137   K 5.1  5.1     105   CO2 25  25   BUN 32*  32*   CREATININE 5.0*  5.0*   GLU 84  84   MG 2.2  2.2   PHOS 4.7*  4.7*     LFT:   Lab Results   Component Value Date    AST 27 03/12/2019    ALT 14 03/12/2019    ALKPHOS 194 (H) 03/12/2019    BILITOT 0.9 03/12/2019    ALBUMIN 3.0 (L) 03/12/2019    ALBUMIN 3.0 (L) 03/12/2019    PROT 5.9 (L) 03/12/2019     CBC:   Lab Results   Component Value Date    WBC 5.54 03/12/2019    HGB 7.4 (L) 03/12/2019    HCT 23.9 (L) 03/12/2019     (H) 03/12/2019    PLT 68 (L) 03/12/2019     Microbiology x 7d:   Microbiology Results (last 7 days)     ** No results found for the last 168 hours. **        Imaging:    I personally reviewed the above image.  Today I independently reviewed pertinent medications, lines/drains/airways, imaging, cardiology results, laboratory results, microbiology results, notably:   ASSESSMENT/PLAN:     Active Hospital Problems    Diagnosis    *S/P laminectomy    Class 3 severe obesity due to excess calories with serious comorbidity and body mass index (BMI) of 45.0 to 49.9 in adult    Hyperkalemia    Hypothermia    Hyponatremia    Anemia due to stage 5 chronic kidney disease    Hyperphosphatemia    Metabolic bone disease    Problem with vascular access    Left leg pain    Pre-syncope    Hypotension     Bradycardia     see      Multifactorial peripheral neuropathy    MIKE (obstructive sleep apnea) - very severe latest sleep study says BPAP @ 16/8    ESRD (end stage renal disease) on dialysis    Hyperlipidemia    Diabetes mellitus, type 2      Neuro:   Myelopathy s/p laminectomy  PT/OT   Started on fluoxetine for mood - patient expressed depressed due to medical condition  Gabapentin for neuropathy    Pulmonary:   Saturation > 92%    Cardiac:   Intermittent need for levophed  Hypoalbuminemia - 100ml albumin + 500 NS bolus for hypotension  Midodrine 20 mg q6  MAP goals > 65 mmhg    Renal:    ESRD on HD    ID:   Afebrile, normal wbc    Hem/Onc:   hh trending down will monitor  Normal plt count  Okay with >50     Endocrine:    BS stable on insulin regimen    Fluids/Electrolytes/Nutrition/GI:   Tolerating po  Lines:  Art +  CVC +  ETT NA  Douglas NA  NG NA  PEG NA    Proph:  DVT:SCD/heparin  Constipation:  Last output:bowel regimen  PUP:protonix  VAP: NA          Full Code    Zee Crews PA-C  Neurocritical Care  [facility]

## 2019-03-12 NOTE — ASSESSMENT & PLAN NOTE
59 yo male with a PMH of ESRD with dialysis MWF (last on weds), DM, presenting as transfer from [facility] as admission to neurosurgery for evaluation after progressive course of unsteady gait as well as bilateral upper extremity weakness for the last several months.     ESRD on HD with Dr. Araceli Pickens in [facility] MWF, 4.5 hours, EDW 140kg  Last HD 2/27/19 @L removed no available current DW    Plan/Assessment:   -Will plan for HD trial today for metabolic clearance and volume management  -Target UF 1-2 L as tolerated, keep MAP >65  - Remains off Levophed   -Continue strict I/Os

## 2019-03-12 NOTE — PROGRESS NOTES
"[facility]  Endocrinology  Progress Note    Admit Date: 2019     Reason for Consult: Management of T2DM, Hyperglycemia     Surgical Procedure and Date: Cervical laminectomy C3-C6, C6-C7 w/ medial discectomy 3/3/19    Diabetes diagnosis year:     Lab Results   Component Value Date    HGBA1C 5.0 2019     Home Diabetes Medications: Basaglar 40 units q HS, Victoza 1.2 mg once daily,     How often checking glucose at home? 4x per day   BG readings on regimen: 90-120s  Hypoglycemia on the regimen?  Yes - about once per month  Missed doses on regimen?  No    Diabetes Complications include:     Hypoglycemia , Diabetic nephropathy  , Diabetic chronic kidney disease     , Diabetic retinopathy , Diabetic cataract  and Diabetic peripheral neuropathy     Complicating diabetes co morbidities:   MIKE, CKD and ESRD      HPI:   Patient is a 58 y.o. male with a diagnosis of HTN, HLD, MIKE, ESRD on HD, and DM2, who is s/p cervical laminectomy C3-C6, C6-C7 w/ medial discectomy 3/3/19.  Patient was admitted to the ED on 19 for upper extremity weakness and ataxia following a fall.  Patient's DM managed by PCP, Dr. Shemar Berry in [place].  Patient is prescribed MDI but only takes basal insulin and Victoza.  Positive family history of DM (mother, father, and both maternal grandparents).  Endocrine consulted for DM/BG management.    Interval HPI:   Overnight events: Remains in NCC. NAEON. BG at or slightly below goal with scheduled insulin; not requiring correction scale coverage. CRRT per nephrology.   Eatin%  Nausea: No  Hypoglycemia and intervention: No  Fever: No  TPN and/or TF: No      BP (!) 149/72 (BP Location: Right arm, Patient Position: Lying)   Pulse 63   Temp 98.4 °F (36.9 °C) (Oral)   Resp 18   Ht 5' 8" (1.727 m)   Wt 135 kg (297 lb 9.9 oz)   SpO2 100%   BMI 45.25 kg/m²      Labs Reviewed and Include    Recent Labs   Lab 19  0115   GLU 84  84   CALCIUM 9.4  9.4 "   ALBUMIN 3.0*  3.0*   PROT 5.9*     137   K 5.1  5.1   CO2 25  25     105   BUN 32*  32*   CREATININE 5.0*  5.0*   ALKPHOS 194*   ALT 14   AST 27   BILITOT 0.9     Lab Results   Component Value Date    WBC 5.54 03/12/2019    HGB 7.4 (L) 03/12/2019    HCT 23.9 (L) 03/12/2019     (H) 03/12/2019    PLT 68 (L) 03/12/2019     No results for input(s): TSH, FREET4 in the last 168 hours.  Lab Results   Component Value Date    HGBA1C 5.0 02/27/2019       Nutritional status:   Body mass index is 45.25 kg/m².  Lab Results   Component Value Date    ALBUMIN 3.0 (L) 03/12/2019    ALBUMIN 3.0 (L) 03/12/2019    ALBUMIN 2.9 (L) 03/11/2019     No results found for: PREALBUMIN    Estimated Creatinine Clearance: 22.2 mL/min (A) (based on SCr of 5 mg/dL (H)).    Accu-Checks  Recent Labs     03/09/19  2059 03/10/19  0753 03/10/19  1007 03/10/19  1702 03/10/19  2158 03/11/19  0818 03/11/19  1152 03/11/19  1659 03/11/19  2138 03/12/19  0751   POCTGLUCOSE 178* 125* 101 131* 89 109 149* 89 118* 122*       Current Medications and/or Treatments Impacting Glycemic Control  Immunotherapy:    Immunosuppressants     None        Steroids:   Hormones (From admission, onward)    None        Pressors:    Autonomic Drugs (From admission, onward)    Start     Stop Route Frequency Ordered    03/10/19 1200  midodrine tablet 20 mg      -- Oral Every 6 hours 03/10/19 0946    03/09/19 1600  norepinephrine 32 mg in dextrose 5 % 250 mL infusion     Question Answer Comment   Titrate by: (in mcg/kg/min) 0.02    Titrate interval: (in minutes) 5    Titrate to maintain: (MAP or SBP) MAP    Greater than: (in mmHg) 65    Maximum dose: (in mcg/kg/min) 3        -- IV Continuous 03/09/19 0557        Hyperglycemia/Diabetes Medications:   Antihyperglycemics (From admission, onward)    Start     Stop Route Frequency Ordered    03/11/19 2100  insulin detemir U-100 pen 18 Units      -- SubQ Nightly 03/11/19 0724    03/11/19 0730  insulin aspart  U-100 pen 7 Units      -- SubQ 3 times daily with meals 03/11/19 0724    03/06/19 1638  insulin aspart U-100 pen 0-5 Units      -- SubQ Before meals & nightly PRN 03/06/19 1538          ASSESSMENT and PLAN    * S/P laminectomy    S/p cervical laminectomy C3-C6, C6-C7 w/ medial discectomy 3/3/19  Managed per primary team  Avoid hypoglycemia  Optimize BG control for surgical wound healing         Diabetes mellitus, type 2    BG goal 140-180    Decrease Levemir to 16 units q HS - due to FBG below goal  Decrease Novolog to 5 units with meals  Low dose correction scale given kidney function  BG monitoring AC/HS    Discharge planning: Patient will need insulin dosing changes upon discharge.  Currently only taking basal insulin plus Victoza.       MIKE (obstructive sleep apnea) - very severe latest sleep study says BPAP @ 16/8    May affect BG readings if uncontrolled         ESRD (end stage renal disease) on dialysis    Caution with insulin stacking  Estimated Creatinine Clearance: 22.2 mL/min (A) (based on SCr of 5 mg/dL (H)).         Anemia due to stage 5 chronic kidney disease    Affects A1C accuracy  Consider checking fructosamine at least 2 weeks post hospital discharge to evaluate BG control     Class 3 severe obesity due to excess calories with serious comorbidity and body mass index (BMI) of 45.0 to 49.9 in adult    may contribute to insulin resistance  Body mass index is 45.25 kg/m².             La Mcconnell NP  Endocrinology  [facility]

## 2019-03-12 NOTE — PT/OT/SLP PROGRESS
Occupational Therapy   Treatment    Name: Angel Farmer Jr.  MRN: [id number]  Admitting Diagnosis:  S/P laminectomy     Recommendations:     Discharge Recommendations: rehabilitation facility  Discharge Equipment Recommendations: TBD next level of care  Barriers to discharge:  Inaccessible home environment, Decreased caregiver support    Assessment:     Angel Farmer Jr. is a 58 y.o. male with a medical diagnosis of S/P laminectomy.  He presents with performance deficits including weakness, impaired endurance, impaired self care skills, decreased upper extremity function, decreased lower extremity function, pain, impaired coordination, decreased ROM, impaired cardiopulmonary response to activity. Pt progressing toward goals and would continue to benefit from OT to increase functional independence. Recommend rehab upon D/C as pt is motivated to return to PLOF.    Rehab Prognosis:  Good; patient would benefit from acute skilled OT services to address these deficits and reach maximum level of function.       Plan:     Patient to be seen 4 x/week to address the above listed problems via self-care/home management, therapeutic activities, therapeutic exercises, neuromuscular re-education  · Plan of Care Expires: 04/08/19  · Plan of Care Reviewed with: patient    Subjective     Pain/Comfort:  · Pain Rating 1: 7/10  · Location - Side 1: Left  · Location 1: (quad; c/o pain posterior neck incision site)  · Pain Addressed 1: Reposition    Objective:     Communicated with: RN prior to session.  Patient found with HOB elevated with arterial line, blood pressure cuff, pulse ox (continuous), telemetry, central line, peripheral IV, pressure relief boots, SCD upon OT entry to room.    General Precautions: Standard, fall, aspiration   Orthopedic Precautions:spinal precautions   Braces: N/A     Occupational Performance:     Bed Mobility:    · Patient completed Rolling/Turning to Right with Total A  · Patient completed Scooting/Bridging  with Total A (Max A x 2 while seated along EOB and in supine to HOB)  · Patient completed Supine to Sit with Total A (Max A x 2)  · Patient completed Sit to Supine with Total A (Max A x 2)    Functional Mobility/Transfers:  · Patient completed Sit <> Stand Transfer with Total A (Max A x 2) from EOB with hand-held assist; able to come to full stand and maintain ~30 seconds  · Functional Mobility: Unable this date    Activities of Daily Living:  · Grooming: CGA-Min A sitting EOB to wash face with L UE; unable to reach with R UE due to decreased ROM; required assist for thoroughness    · Upper Body Dressing: maximal assistance to don/doff gown  · Lower Body Dressing: total assistance to don socks      AMPAC 6 Click ADL: 9    Treatment & Education:  Pt able to sit EOB ~15 min with close SBA-CGA during static/dynamic activity; participated in reaching with B UE to improve ROM and postural control--targets below shld height due to significantly decreased shld ROM in B UE; completed LE therex with PT and practiced scooting along EOB and T/F from EOB with 2 person assist-- pt required seated rest break due to becoming nauseous; once returned to supine, educated on supine therex to complete including elbow flex/ext and shld flex within tolerable range to improve ROM and endurance    Patient left right sidelying with all lines intact, call button in reach and RN notifiedEducation:      GOALS:   Multidisciplinary Problems     Occupational Therapy Goals        Problem: Occupational Therapy Goal    Goal Priority Disciplines Outcome Interventions   Occupational Therapy Goal     OT, PT/OT Ongoing (interventions implemented as appropriate)    Description:  Goals to be met by: 7 days (3/15/19)     Patient will increase functional independence with ADLs by performing:    UE Dressing with Moderate Assistance.  Grooming while EOB with Minimal Assistance.  Toileting from bedside commode with Maximum Assistance for hygiene and clothing  management.   Supine to sit with Moderate Assistance.  Toilet transfer to bedside commode with Maximum Assistance.  Increased functional strength to WFL for ADLs.                      Time Tracking:     OT Date of Treatment: 03/12/19  OT Start Time: 1000  OT Stop Time: 1030  OT Total Time (min): 30 min (co-treat with PT)    Billable Minutes:Therapeutic Activity 15  Therapeutic Exercise 10    JUSTIN Elena  3/12/2019

## 2019-03-12 NOTE — ASSESSMENT & PLAN NOTE
Caution with insulin stacking  Estimated Creatinine Clearance: 22.2 mL/min (A) (based on SCr of 5 mg/dL (H)).

## 2019-03-12 NOTE — PLAN OF CARE
SW met with Pt at bedside regarding the rehab list given. He reported his sister in law would be calling with choices.     SW contacted Pt sister via phone to address questions. Mainly she is worried about his SS. She reported [facility] would be their first choice.    Lakesha Dumont, LONG  Neurocritical Care   [facility]  [id number]

## 2019-03-12 NOTE — ASSESSMENT & PLAN NOTE
BG goal 140-180    Decrease Levemir to 12 units q HS - due to FBG below goal  Decrease Novolog to 4 units with meals  Low dose correction scale given kidney function  BG monitoring AC/HS    Discharge planning: Patient will need insulin dosing changes upon discharge.  Currently only taking basal insulin plus Victoza.

## 2019-03-12 NOTE — SUBJECTIVE & OBJECTIVE
Interval History: NEIL ON, AF VSS. H+H stable.       Medications:  Continuous Infusions:   norepinephrine bitartrate-D5W Stopped (03/10/19 0925)     Scheduled Meds:   diclofenac sodium   Topical (Top) Daily    epoetin shefali (PROCRIT) injection  7,000 Units Intravenous Every Mon, Wed, Fri    FLUoxetine  20 mg Oral Daily    gabapentin  300 mg Oral QHS    insulin aspart U-100  5 Units Subcutaneous TIDWM    insulin detemir U-100  16 Units Subcutaneous QHS    lactulose  20 g Oral Daily    midodrine  20 mg Oral Q6H    pantoprazole  40 mg Oral BID    polyethylene glycol  17 g Oral Daily    pravastatin  40 mg Oral Daily    senna-docusate 8.6-50 mg  1 tablet Oral BID     PRN Meds:acetaminophen, dextrose 50%, dextrose 50%, glucagon (human recombinant), glucose, glucose, insulin aspart U-100, miconazole NITRATE 2 %, ondansetron, oxyCODONE     Review of Systems  Objective:     Weight: 135 kg (297 lb 9.9 oz)  Body mass index is 45.25 kg/m².  Vital Signs (Most Recent):  Temp: 98.4 °F (36.9 °C) (03/12/19 0700)  Pulse: 61 (03/12/19 1000)  Resp: 16 (03/12/19 1000)  BP: (!) 115/59 (03/12/19 1000)  SpO2: 100 % (03/12/19 1000) Vital Signs (24h Range):  Temp:  [98.2 °F (36.8 °C)-98.8 °F (37.1 °C)] 98.4 °F (36.9 °C)  Pulse:  [55-74] 61  Resp:  [10-31] 16  SpO2:  [94 %-100 %] 100 %  BP: (114-168)/(56-78) 115/59  Arterial Line BP: (130-161)/(46-67) 147/57     Date 03/12/19 0700 - 03/13/19 0659   Shift 8376-9951 8198-3702 1697-2604 24 Hour Total   INTAKE   P.O. 240   240   Shift Total(mL/kg) 240(1.8)   240(1.8)   OUTPUT   Shift Total(mL/kg)       Weight (kg) 135 135 135 135              Resp Rate Total:  [10 br/min-19 br/min] 10 br/min         Closed/Suction Drain 03/03/19 1431 Posterior Neck Accordion 10 Fr. (Active)   Site Description Unable to view 3/12/2019  7:00 AM   Dressing Type Gauze 3/12/2019  7:00 AM   Dressing Status Clean;Dry;Intact 3/12/2019  7:00 AM   Dressing Intervention Dressing reinforced 3/12/2019  7:00 AM    Drainage Serosanguineous 3/12/2019  7:00 AM   Status To bulb suction 3/12/2019  7:00 AM   Output (mL) 0 mL 3/12/2019  6:05 AM            Hemodialysis AV Fistula Left forearm (Active)   Needle Size 15ga 3/1/2019  4:19 PM   Site Assessment Intact;Dry 3/12/2019  7:00 AM   Patency Present;Thrill;Bruit 3/12/2019  7:00 AM   Status Deaccessed 3/12/2019  7:00 AM   Flows Good 2/28/2019  8:20 AM   Dressing Intervention Dressing reinforced 3/6/2019  3:05 AM   Dressing Status Clean;Dry;Intact 3/2/2019  3:01 PM   Site Condition No complications 3/12/2019  7:00 AM   Dressing Open to air (None) 3/12/2019  7:00 AM            Trialysis (Dialysis) Catheter 03/04/19 0250 left internal jugular (Active)   IV Device Securement sutures;catheter securement device 3/12/2019  7:00 AM   Additional Site Signs no erythema;no warmth;no edema;no pain;no palpable cord;no streak formation;no drainage 3/12/2019  7:00 AM   Patency/Care infusing 3/12/2019  7:00 AM   Waveform normal 3/12/2019  7:00 AM   Site Assessment Clean;Dry;Intact;No redness;No swelling 3/12/2019  7:00 AM   Status Deaccessed 3/12/2019  7:00 AM   Flows Good 3/12/2019  3:05 AM   Dressing Intervention Dressing reinforced 3/12/2019  7:00 AM   Dressing Status Biopatch in place;Clean;Dry;Intact 3/12/2019  7:00 AM   Dressing Change Due 03/11/19 3/12/2019  7:00 AM   Verification by X-ray Yes 3/11/2019  7:05 PM   Site Condition No complications 3/12/2019  7:00 AM   Dressing Other (Comment) 3/12/2019  7:00 AM   Daily Line Review Performed 3/12/2019  7:00 AM       Neurosurgery Physical Exam  GCS 15  A+Ox3  CN 2-12 intact  4-/5 BUE  5/5 BLE  SILT  +[place] bilaterally w 3+ UE reflexes        Significant Labs:  Recent Labs   Lab 03/11/19  1532 03/11/19  2200 03/12/19  0115   GLU 93 107 84  84    137 137  137   K 4.8 5.0 5.1  5.1    105 105  105   CO2 25 25 25  25   BUN 25* 31* 32*  32*   CREATININE 4.2* 4.7* 5.0*  5.0*   CALCIUM 9.1 9.2 9.4  9.4   MG 2.1 2.2 2.2  2.2      Recent Labs   Lab 03/11/19  0145 03/12/19  0115   WBC 6.51 5.54   HGB 8.0* 7.4*   HCT 25.3* 23.9*   PLT 85* 68*     Recent Labs   Lab 03/11/19 0145 03/12/19  0115   INR 1.0 1.1     Microbiology Results (last 7 days)     ** No results found for the last 168 hours. **        Recent Lab Results       03/12/19  0751   03/12/19  0115   03/11/19  2200   03/11/19  2138   03/11/19  1659        Immature Granulocytes   0.9           Immature Grans (Abs)   0.05  Comment:  Mild elevation in immature granulocytes is non specific and   can be seen in a variety of conditions including stress response,   acute inflammation, trauma and pregnancy. Correlation with other   laboratory and clinical findings is essential.             Albumin   3.0 2.9            3.0           Alkaline Phosphatase   194           ALT   14           Anion Gap   7 7            7           AST   27           Baso #   0.01           Basophil%   0.2           Total Bilirubin   0.9  Comment:  For infants and newborns, interpretation of results should be based  on gestational age, weight and in agreement with clinical  observations.  Premature Infant recommended reference ranges:  Up to 24 hours.............<8.0 mg/dL  Up to 48 hours............<12.0 mg/dL  3-5 days..................<15.0 mg/dL  6-29 days.................<15.0 mg/dL             BUN, Bld   32 31            32           Calcium   9.4 9.2            9.4           Chloride   105 105            105           CO2   25 25            25           Creatinine   5.0 4.7            5.0           Differential Method   Automated           eGFR if    13.8 14.9            13.8           eGFR if non    12.0  Comment:  Calculation used to obtain the estimated glomerular filtration  rate (eGFR) is the CKD-EPI equation.    12.9  Comment:  Calculation used to obtain the estimated glomerular filtration  rate (eGFR) is the CKD-EPI equation.               12.0  Comment:  Calculation  used to obtain the estimated glomerular filtration  rate (eGFR) is the CKD-EPI equation.              Eos #   0.3           Eosinophil%   5.8           Glucose   84 107            84           Gran # (ANC)   3.3           Gran%   60.1           Hematocrit   23.9           Hemoglobin   7.4           Coumadin Monitoring INR   1.1  Comment:  Coumadin Therapy:  2.0 - 3.0 for INR for all indicators except mechanical heart valves  and antiphospholipid syndromes which should use 2.5 - 3.5.             Lymph #   1.1           Lymph%   19.5           Magnesium   2.2 2.2            2.2           MCH   32.7           MCHC   31.0           MCV   106           Mono #   0.8           Mono%   13.5           MPV   10.7           nRBC   0           Phosphorus   4.7 4.3            4.7           Platelets   68           POCT Glucose 122     118 89     Potassium   5.1 5.0            5.1           Total Protein   5.9           Protime   10.9           RBC   2.26           RDW   17.5           Sodium   137 137            137           WBC   5.54                            03/11/19  1532   03/11/19  1152        Immature Granulocytes         Immature Grans (Abs)         Albumin 2.9       Alkaline Phosphatase         ALT         Anion Gap 7       AST         Baso #         Basophil%         Total Bilirubin         BUN, Bld 25       Calcium 9.1       Chloride 106       CO2 25       Creatinine 4.2       Differential Method         eGFR if African American 17.1       eGFR if non  14.8  Comment:  Calculation used to obtain the estimated glomerular filtration  rate (eGFR) is the CKD-EPI equation.          Eos #         Eosinophil%         Glucose 93       Gran # (ANC)         Gran%         Hematocrit         Hemoglobin         Coumadin Monitoring INR         Lymph #         Lymph%         Magnesium 2.1       MCH         MCHC         MCV         Mono #         Mono%         MPV         nRBC         Phosphorus 3.6       Platelets          POCT Glucose   149     Potassium 4.8       Total Protein         Protime         RBC         RDW         Sodium 138       WBC               Significant Diagnostics:  CT: No results found in the last 24 hours.  MRI: No results found in the last 24 hours.

## 2019-03-12 NOTE — SUBJECTIVE & OBJECTIVE
"Interval HPI:   Overnight events: Remains in NCC. NAEON. BG at or slightly below goal with scheduled insulin; not requiring correction scale coverage. CRRT per nephrology.   Eatin%  Nausea: No  Hypoglycemia and intervention: No  Fever: No  TPN and/or TF: No      BP (!) 149/72 (BP Location: Right arm, Patient Position: Lying)   Pulse 63   Temp 98.4 °F (36.9 °C) (Oral)   Resp 18   Ht 5' 8" (1.727 m)   Wt 135 kg (297 lb 9.9 oz)   SpO2 100%   BMI 45.25 kg/m²     Labs Reviewed and Include    Recent Labs   Lab 19  0115   GLU 84  84   CALCIUM 9.4  9.4   ALBUMIN 3.0*  3.0*   PROT 5.9*     137   K 5.1  5.1   CO2 25  25     105   BUN 32*  32*   CREATININE 5.0*  5.0*   ALKPHOS 194*   ALT 14   AST 27   BILITOT 0.9     Lab Results   Component Value Date    WBC 5.54 2019    HGB 7.4 (L) 2019    HCT 23.9 (L) 2019     (H) 2019    PLT 68 (L) 2019     No results for input(s): TSH, FREET4 in the last 168 hours.  Lab Results   Component Value Date    HGBA1C 5.0 2019       Nutritional status:   Body mass index is 45.25 kg/m².  Lab Results   Component Value Date    ALBUMIN 3.0 (L) 2019    ALBUMIN 3.0 (L) 2019    ALBUMIN 2.9 (L) 2019     No results found for: PREALBUMIN    Estimated Creatinine Clearance: 22.2 mL/min (A) (based on SCr of 5 mg/dL (H)).    Accu-Checks  Recent Labs     19  2059 03/10/19  0753 03/10/19  1007 03/10/19  1702 03/10/19  2158 19  0818 19  1152 19  1659 19  2138 19  0751   POCTGLUCOSE 178* 125* 101 131* 89 109 149* 89 118* 122*       Current Medications and/or Treatments Impacting Glycemic Control  Immunotherapy:    Immunosuppressants     None        Steroids:   Hormones (From admission, onward)    None        Pressors:    Autonomic Drugs (From admission, onward)    Start     Stop Route Frequency Ordered    03/10/19 1200  midodrine tablet 20 mg      -- Oral Every 6 hours 03/10/19 " 0946    03/09/19 1600  norepinephrine 32 mg in dextrose 5 % 250 mL infusion     Question Answer Comment   Titrate by: (in mcg/kg/min) 0.02    Titrate interval: (in minutes) 5    Titrate to maintain: (MAP or SBP) MAP    Greater than: (in mmHg) 65    Maximum dose: (in mcg/kg/min) 3        -- IV Continuous 03/09/19 1547        Hyperglycemia/Diabetes Medications:   Antihyperglycemics (From admission, onward)    Start     Stop Route Frequency Ordered    03/11/19 2100  insulin detemir U-100 pen 18 Units      -- SubQ Nightly 03/11/19 0724    03/11/19 0730  insulin aspart U-100 pen 7 Units      -- SubQ 3 times daily with meals 03/11/19 0724    03/06/19 1638  insulin aspart U-100 pen 0-5 Units      -- SubQ Before meals & nightly PRN 03/06/19 1538

## 2019-03-12 NOTE — PLAN OF CARE
Problem: Physical Therapy Goal  Goal: Physical Therapy Goal  Goals to be met by: 3/15/2019     Patient will increase functional independence with mobility by performin. Supine to sit with Moderate Assistance  2. Rolling to Left and Right with Moderate Assistance.  3. Sit to stand transfer with Maximum Assistance  4. Gait  x 10 feet with Moderate Assistance using Rolling Walker.   5. Stand for 5 minutes with Moderate Assistance using Rolling Walker  6. Lower extremity exercise program x20 reps per handout, with assistance as needed     Outcome: Ongoing (interventions implemented as appropriate)  Continue with plan of care.  Mary Gray, PT  3/12/2019

## 2019-03-12 NOTE — PLAN OF CARE
03/12/19 1643   Post-Acute Status   Post-Acute Authorization Placement   Post-Acute Placement Status Referrals Sent      sent referral to [facility].

## 2019-03-12 NOTE — ASSESSMENT & PLAN NOTE
BG goal 140-180    Decrease Levemir to 16 units q HS - due to FBG below goal  Decrease Novolog to 5 units with meals  Low dose correction scale given kidney function  BG monitoring AC/HS    Discharge planning: Patient will need insulin dosing changes upon discharge.  Currently only taking basal insulin plus Victoza.

## 2019-03-13 LAB
ALBUMIN SERPL BCP-MCNC: 2.9 G/DL
ALP SERPL-CCNC: 193 U/L
ALT SERPL W/O P-5'-P-CCNC: 12 U/L
ANION GAP SERPL CALC-SCNC: 9 MMOL/L
AST SERPL-CCNC: 27 U/L
BASOPHILS # BLD AUTO: 0.02 K/UL
BASOPHILS NFR BLD: 0.3 %
BILIRUB SERPL-MCNC: 1 MG/DL
BUN SERPL-MCNC: 28 MG/DL
CALCIUM SERPL-MCNC: 9.3 MG/DL
CHLORIDE SERPL-SCNC: 107 MMOL/L
CO2 SERPL-SCNC: 24 MMOL/L
CREAT SERPL-MCNC: 4.4 MG/DL
DIFFERENTIAL METHOD: ABNORMAL
EOSINOPHIL # BLD AUTO: 0.3 K/UL
EOSINOPHIL NFR BLD: 4.7 %
ERYTHROCYTE [DISTWIDTH] IN BLOOD BY AUTOMATED COUNT: 17.4 %
EST. GFR  (AFRICAN AMERICAN): 16.1 ML/MIN/1.73 M^2
EST. GFR  (NON AFRICAN AMERICAN): 14 ML/MIN/1.73 M^2
GLUCOSE SERPL-MCNC: 91 MG/DL
HCT VFR BLD AUTO: 23.7 %
HGB BLD-MCNC: 7.4 G/DL
IMM GRANULOCYTES # BLD AUTO: 0.02 K/UL
IMM GRANULOCYTES NFR BLD AUTO: 0.3 %
INR PPP: 1
LYMPHOCYTES # BLD AUTO: 1.1 K/UL
LYMPHOCYTES NFR BLD: 17.9 %
MAGNESIUM SERPL-MCNC: 2 MG/DL
MCH RBC QN AUTO: 33.3 PG
MCHC RBC AUTO-ENTMCNC: 31.2 G/DL
MCV RBC AUTO: 107 FL
MONOCYTES # BLD AUTO: 0.8 K/UL
MONOCYTES NFR BLD: 12.6 %
NEUTROPHILS # BLD AUTO: 3.8 K/UL
NEUTROPHILS NFR BLD: 64.2 %
NRBC BLD-RTO: 0 /100 WBC
PHOSPHATE SERPL-MCNC: 4.1 MG/DL
PLATELET # BLD AUTO: 78 K/UL
PMV BLD AUTO: 10.3 FL
POCT GLUCOSE: 100 MG/DL (ref 70–110)
POCT GLUCOSE: 108 MG/DL (ref 70–110)
POCT GLUCOSE: 169 MG/DL (ref 70–110)
POCT GLUCOSE: 93 MG/DL (ref 70–110)
POCT GLUCOSE: 94 MG/DL (ref 70–110)
POTASSIUM SERPL-SCNC: 4.3 MMOL/L
PROT SERPL-MCNC: 5.6 G/DL
PROTHROMBIN TIME: 10.6 SEC
RBC # BLD AUTO: 2.22 M/UL
SODIUM SERPL-SCNC: 140 MMOL/L
WBC # BLD AUTO: 5.97 K/UL

## 2019-03-13 PROCEDURE — 93005 ELECTROCARDIOGRAM TRACING: CPT

## 2019-03-13 PROCEDURE — 99232 PR SUBSEQUENT HOSPITAL CARE,LEVL II: ICD-10-PCS | Mod: ,,, | Performed by: NURSE PRACTITIONER

## 2019-03-13 PROCEDURE — 99232 SBSQ HOSP IP/OBS MODERATE 35: CPT | Mod: ,,, | Performed by: NURSE PRACTITIONER

## 2019-03-13 PROCEDURE — 25000003 PHARM REV CODE 250: Performed by: PSYCHIATRY & NEUROLOGY

## 2019-03-13 PROCEDURE — 84100 ASSAY OF PHOSPHORUS: CPT

## 2019-03-13 PROCEDURE — 83735 ASSAY OF MAGNESIUM: CPT

## 2019-03-13 PROCEDURE — 85025 COMPLETE CBC W/AUTO DIFF WBC: CPT

## 2019-03-13 PROCEDURE — 25000003 PHARM REV CODE 250: Performed by: STUDENT IN AN ORGANIZED HEALTH CARE EDUCATION/TRAINING PROGRAM

## 2019-03-13 PROCEDURE — 97112 NEUROMUSCULAR REEDUCATION: CPT

## 2019-03-13 PROCEDURE — 20600001 HC STEP DOWN PRIVATE ROOM

## 2019-03-13 PROCEDURE — 85610 PROTHROMBIN TIME: CPT

## 2019-03-13 PROCEDURE — 97110 THERAPEUTIC EXERCISES: CPT

## 2019-03-13 PROCEDURE — 93010 ELECTROCARDIOGRAM REPORT: CPT | Mod: ,,, | Performed by: INTERNAL MEDICINE

## 2019-03-13 PROCEDURE — 94761 N-INVAS EAR/PLS OXIMETRY MLT: CPT

## 2019-03-13 PROCEDURE — 99232 SBSQ HOSP IP/OBS MODERATE 35: CPT | Mod: ,,, | Performed by: PHYSICIAN ASSISTANT

## 2019-03-13 PROCEDURE — 93010 EKG 12-LEAD: ICD-10-PCS | Mod: ,,, | Performed by: INTERNAL MEDICINE

## 2019-03-13 PROCEDURE — 97530 THERAPEUTIC ACTIVITIES: CPT

## 2019-03-13 PROCEDURE — 99232 PR SUBSEQUENT HOSPITAL CARE,LEVL II: ICD-10-PCS | Mod: ,,, | Performed by: PHYSICIAN ASSISTANT

## 2019-03-13 PROCEDURE — 80053 COMPREHEN METABOLIC PANEL: CPT

## 2019-03-13 PROCEDURE — 63600175 PHARM REV CODE 636 W HCPCS: Performed by: NURSE PRACTITIONER

## 2019-03-13 RX ORDER — METOCLOPRAMIDE HYDROCHLORIDE 5 MG/5ML
10 SOLUTION ORAL
Status: DISCONTINUED | OUTPATIENT
Start: 2019-03-13 | End: 2019-03-13

## 2019-03-13 RX ORDER — INSULIN ASPART 100 [IU]/ML
4 INJECTION, SOLUTION INTRAVENOUS; SUBCUTANEOUS
Status: DISCONTINUED | OUTPATIENT
Start: 2019-03-13 | End: 2019-03-14

## 2019-03-13 RX ORDER — METOCLOPRAMIDE HYDROCHLORIDE 5 MG/5ML
5 SOLUTION ORAL
Status: DISCONTINUED | OUTPATIENT
Start: 2019-03-13 | End: 2019-03-18

## 2019-03-13 RX ADMIN — METOCLOPRAMIDE HYDROCHLORIDE 5 MG: 5 SOLUTION ORAL at 09:03

## 2019-03-13 RX ADMIN — POLYETHYLENE GLYCOL 3350 17 G: 17 POWDER, FOR SOLUTION ORAL at 08:03

## 2019-03-13 RX ADMIN — PANTOPRAZOLE SODIUM 40 MG: 40 GRANULE, DELAYED RELEASE ORAL at 08:03

## 2019-03-13 RX ADMIN — STANDARDIZED SENNA CONCENTRATE AND DOCUSATE SODIUM 1 TABLET: 8.6; 5 TABLET, FILM COATED ORAL at 08:03

## 2019-03-13 RX ADMIN — STANDARDIZED SENNA CONCENTRATE AND DOCUSATE SODIUM 1 TABLET: 8.6; 5 TABLET, FILM COATED ORAL at 09:03

## 2019-03-13 RX ADMIN — INSULIN ASPART 5 UNITS: 100 INJECTION, SOLUTION INTRAVENOUS; SUBCUTANEOUS at 08:03

## 2019-03-13 RX ADMIN — ACETAMINOPHEN 500 MG: 500 TABLET, FILM COATED ORAL at 11:03

## 2019-03-13 RX ADMIN — FLUOXETINE HYDROCHLORIDE 20 MG: 20 CAPSULE ORAL at 08:03

## 2019-03-13 RX ADMIN — ERYTHROPOIETIN 7000 UNITS: 4000 INJECTION, SOLUTION INTRAVENOUS; SUBCUTANEOUS at 08:03

## 2019-03-13 RX ADMIN — PRAVASTATIN SODIUM 40 MG: 40 TABLET ORAL at 08:03

## 2019-03-13 RX ADMIN — LACTULOSE 20 G: 20 SOLUTION ORAL at 08:03

## 2019-03-13 RX ADMIN — MIDODRINE HYDROCHLORIDE 20 MG: 5 TABLET ORAL at 06:03

## 2019-03-13 RX ADMIN — GABAPENTIN 300 MG: 300 CAPSULE ORAL at 09:03

## 2019-03-13 RX ADMIN — PANTOPRAZOLE SODIUM 40 MG: 40 GRANULE, DELAYED RELEASE ORAL at 09:03

## 2019-03-13 RX ADMIN — MIDODRINE HYDROCHLORIDE 20 MG: 5 TABLET ORAL at 11:03

## 2019-03-13 RX ADMIN — METOCLOPRAMIDE HYDROCHLORIDE 5 MG: 5 SOLUTION ORAL at 06:03

## 2019-03-13 RX ADMIN — DICLOFENAC: 10 GEL TOPICAL at 08:03

## 2019-03-13 RX ADMIN — METOCLOPRAMIDE HYDROCHLORIDE 5 MG: 5 SOLUTION ORAL at 11:03

## 2019-03-13 RX ADMIN — MICONAZOLE NITRATE: 20 POWDER TOPICAL at 08:03

## 2019-03-13 RX ADMIN — MIDODRINE HYDROCHLORIDE 20 MG: 5 TABLET ORAL at 05:03

## 2019-03-13 NOTE — PT/OT/SLP PROGRESS
Occupational Therapy   Treatment    Name: Angel Farmer Jr.  MRN: [id number]  Admitting Diagnosis:  S/P laminectomy  10 Days Post-Op    Recommendations:     Discharge Recommendations: rehabilitation facility  Discharge Equipment Recommendations:  (TBD next level of care)  Barriers to discharge:  Inaccessible home environment, Decreased caregiver support    Assessment:     Angel Farmer Jr. is a 58 y.o. male with a medical diagnosis of S/P laminectomy.  He presents with performance deficits including weakness, impaired endurance, impaired self care skills, impaired functional mobilty, impaired balance, decreased upper extremity function, decreased lower extremity function, decreased coordination, pain, decreased safety awareness, decreased ROM. Pt improving UE ROM as well as assistance needed for T/Fs and ADLs. Pt progressing toward goals and would continue to benefit from OT to increase functional independence and safety. Recommend rehab upon D/C as pt is motivated to return to PLOF.     Rehab Prognosis:  Good; patient would benefit from acute skilled OT services to address these deficits and reach maximum level of function.       Plan:     Patient to be seen 4 x/week to address the above listed problems via self-care/home management, therapeutic activities, neuromuscular re-education, therapeutic exercises  · Plan of Care Expires: 04/08/19  · Plan of Care Reviewed with: patient    Subjective     Pain/Comfort:  · Pain Rating 1: (Did not rate)  · Location 1: neck(incision site)  · Pain Addressed 1: Pre-medicate for activity, Reposition    Objective:     Communicated with: RN prior to session.  Patient found HOB elevated with arterial line, bed alarm, blood pressure cuff, telemetry, pulse ox (continuous), SCD, central line, pressure relief boots upon OT entry to room.    General Precautions: Standard, fall, aspiration   Orthopedic Precautions:spinal precautions   Braces: N/A     Occupational Performance:     Bed  Mobility:    · Patient completed Rolling/Turning to Right with Total A and side rail  · Patient completed Scooting/Bridging with Total A (Max A x 2 while seated along EOB and in supine to HOB)  · Patient completed Supine to Sit with Total A (Max A x 2)  · Patient completed Sit to Supine with Total A (Max A x 2)     Functional Mobility/Transfers:  · Patient completed Sit <> Stand Transfer with Mod A x 2 from EOB with hand-held assist 3 trials  · Functional Mobility: Few side steps along EOB with Mod A x 2 person assist     Activities of Daily Living:  · Upper Body Dressing: maximal assistance to don/doff gown  · Lower Body Dressing: total assistance to don socks     AMPA 6 Click ADL: 10    Treatment & Education:  Pt able to sit EOB ~20 min with close SBA once positioned in midline on EOB--requires cues and assist to relax R shld due to trapezius tightness and incisional pain; practiced sit to stand trials from EOB with 2 person assist and able to take side steps along EOB-- pt required encouragement due to fear of falling; completed UE therex while seated EOB including B elbow flex/ext to reach mouth and assisted shld flex with limited range due to pain     Patient left left sidelying with all lines intact, call button in reach, bed alarm on and RN notifiedEducation:      GOALS:   Multidisciplinary Problems     Occupational Therapy Goals        Problem: Occupational Therapy Goal    Goal Priority Disciplines Outcome Interventions   Occupational Therapy Goal     OT, PT/OT Ongoing (interventions implemented as appropriate)    Description:  Goals to be met by: 7 days (3/15/19)     Patient will increase functional independence with ADLs by performing:    UE Dressing with Moderate Assistance.  Grooming while EOB with Minimal Assistance.  Toileting from bedside commode with Maximum Assistance for hygiene and clothing management.   Supine to sit with Moderate Assistance.  Toilet transfer to bedside commode with Maximum  Assistance.  Increased functional strength to WFL for ADLs.                      Time Tracking:     OT Date of Treatment: 03/13/19  OT Start Time: 0943  OT Stop Time: 1011  OT Total Time (min): 28 min (co-treat with PT)    Billable Minutes:Therapeutic Activity 12  Therapeutic Exercise 16    JUSTIN Elena  3/13/2019

## 2019-03-13 NOTE — PT/OT/SLP PROGRESS
"Physical Therapy Treatment    Patient Name:  Angel Farmer Jr.   MRN:  [id number]    Recommendations:     Discharge Recommendations:  rehabilitation facility   Discharge Equipment Recommendations: (TBD)   Barriers to discharge: Inaccessible home and Decreased caregiver support    Assessment:     Angel Farmer Jr. is a 58 y.o. male admitted with a medical diagnosis of S/P laminectomy.  He presents with the following impairments/functional limitations:  weakness, impaired functional mobilty, impaired balance, impaired endurance, impaired self care skills, gait instability, decreased coordination, decreased lower extremity function, decreased upper extremity function, decreased safety awareness, pain, decreased ROM, impaired coordination, impaired joint extensibility. Improvement in functional mobility this date, as pt was able to perform with less assist needed and took small steps along EOB. However, pt continues to require assist of 2 to safely complete all mobility 2* weakness and balance impairments. Pt would continue to benefit from skilled acute PT in order to address current deficits and progress functional mobility.      Rehab Prognosis: Good; patient would benefit from acute skilled PT services to address these deficits and reach maximum level of function.    Recent Surgery: Procedure(s) (LRB):  SPINE, CERVICAL, POSTERIOR APPROACH  LAMINECTOMY C3-C6; C6-C7; WITH MEDIAL DISCECTOMY (N/A) 10 Days Post-Op    Plan:     During this hospitalization, patient to be seen 4 x/week to address the identified rehab impairments via gait training, therapeutic activities, therapeutic exercises, neuromuscular re-education and progress toward the following goals:    · Plan of Care Expires:  04/08/19    Subjective     Chief Complaint: neck pain  Patient/Family Comments/goals: "I want to walk."  Pain/Comfort:  · Pain Rating 1: 10/10  · Location - Orientation 1: midline  · Location 1: neck(incision site)  · Pain Addressed 1: " Reposition, Distraction      Objective:     Communicated with RN prior to session.  Patient found supine with arterial line, blood pressure cuff, central line, telemetry, pulse ox (continuous), pressure relief boots, SCD, bed alarm  upon PT entry to room.     General Precautions: Standard, fall, aspiration   Orthopedic Precautions:spinal precautions   Braces: N/A     Functional Mobility:  · Bed Mobility:     · Rolling Left:  maximal assistance with side rail   · Rolling Right: maximal assistance with side rail  · Scooting: maximal assistance and of 2 persons, seated lateral scooting along EOB   · Supine scooting to HOB totalAx2 with drawsheet   · Supine to Sit: maximal assistance and of 2 persons with cues for sequencing and technique   · Sit to Supine: maximal assistance and of 2 persons with cues for sequencing   · Transfers:     · Sit to Stand:  moderate assistance and of 2 persons with no AD and x3 reps  · Cues for hand placement, appropriate weight-shifts, and use of forward momentum to assist with ease of transfer  · Gait: ~4 small lateral steps along EOB with modAx2  · Impaired weight-shifting ability, decreased toe-floor clearance, decreased step length, instability  · V/c and t/c for standing postural control, safety awareness, upright posture, forward gaze, and sequencing of steps   · Balance:   · static sitting: CGA-SBA    · dynamic sitting: CGA-Nanette    · static standing: CGA-Nanette    · dynamic standing: modAx2      AM-PAC 6 CLICK MOBILITY  Turning over in bed (including adjusting bedclothes, sheets and blankets)?: 2  Sitting down on and standing up from a chair with arms (e.g., wheelchair, bedside commode, etc.): 2  Moving from lying on back to sitting on the side of the bed?: 2  Moving to and from a bed to a chair (including a wheelchair)?: 2  Need to walk in hospital room?: 1  Climbing 3-5 steps with a railing?: 1  Basic Mobility Total Score: 10       Therapeutic Activities and Exercises:  Sitting EOB  x~20 min with CGA-SBA for static sitting balance. Increased R trapezius tightness noted; cues provided for muscle relaxation and shoulder depression. Cues for midline positioning and seated postural control   Performed static standing x3 trials with CGA-Nanette for static standing balance (2nd person for safety); cues for appropriate weight-shifts, increased glute activation, upright posture, forward gaze. During 3rd standing trial, pt performed lateral steps along EOB as described above.   Encouragement provided throughout session for continued participation and increased effort during mobility    Patient left left sidelying with all lines intact, call button in reach, bed alarm on and RN notified..    GOALS:   Multidisciplinary Problems     Physical Therapy Goals        Problem: Physical Therapy Goal    Goal Priority Disciplines Outcome Goal Variances Interventions   Physical Therapy Goal     PT, PT/OT Ongoing (interventions implemented as appropriate)     Description:  Goals to be met by: 3/15/2019     Patient will increase functional independence with mobility by performin. Supine to sit with Moderate Assistance  2. Rolling to Left and Right with Moderate Assistance.  3. Sit to stand transfer with Maximum Assistance  4. Gait  x 10 feet with Moderate Assistance using Rolling Walker.   5. Stand for 5 minutes with Moderate Assistance using Rolling Walker  6. Lower extremity exercise program x20 reps per handout, with assistance as needed                      Time Tracking:     PT Received On: 19  PT Start Time: 0943     PT Stop Time: 1011  PT Total Time (min): 28 min     Billable Minutes: Neuromuscular Re-education 28   (co-tx with OT)    Treatment Type: Treatment  PT/PTA: PT     PTA Visit Number: 0     Taylor Kahn, PT, DPT   3/13/2019  [phone]

## 2019-03-13 NOTE — PROGRESS NOTES
3-hr dialysis treatment completed with net UF of 1L. Tolerated well. Rinsed back blood. Pulled out needles. Pressure applied and hemostasis achieved. Gauzed and taped. Positive thrill and bruit. Report given to primary RN.

## 2019-03-13 NOTE — PROGRESS NOTES
Pt placed on portable tele and transported to room 8068 via bed with RN x1 and PCT x1. Tolerated well. Bedside completed with LOLIS Dooley. Belongings at bedside with pt. Chart to US.

## 2019-03-13 NOTE — ASSESSMENT & PLAN NOTE
58 M with progressive cervical myelopathy and stenosis from C3-6 with myelomalacia. S/p code for hypovolemia during HD. Stable this morning and labs WNL. He stepped up to ICU for further care and work up, now s/p C3-6 posterior decompression.     - okay for transfer to floor from neurosurgical perspective  - HOB >30  - No C collar needed  - Pain control PRN  - HD per nephrology  - Daily PT/OT  - Medical management per NCC  - Please call Neurosurgery with any questions or exam changes

## 2019-03-13 NOTE — PROGRESS NOTES
[facility]  Neurosurgery  Progress Note    Subjective:     History of Present Illness: 59 yo male with a PMH of ESRD with dialysis MWF (last on weds), DM, presenting as transfer from [facility] as admission to neurosurgery for evaluation after progressive course of unsteady gait as well as bilateral upper extremity weakness for the last several months.  Mr. Farmer has had wobbly gait since this past September, at which point he recalls falling and has been at 80-90% of his normal function.  He has been able to walk without assist, however not for very long distances.  He states that since last Sunday he has felt progression of his symptoms and now feels it would be difficult to stand up to ambulate at all.  He also endorses a pulled muscle of his LLE that is limiting his mobility.  His UE symptoms have been present for about 3 months including not writing as well as he is used to.  HE is able to write numbers, but not always his name.  He has been having difficulty using a fork to eat over the past month.  No sensory disturbance noted as well as no neck or back pain and no pain from the neck radiating into the hands.  He has intermittently been taking ASA 81 mg qdaily. He denies recent falls or trauma to the neck.  No other blood thinning medication.  MRI at OSH on 2/27 revealed significant cervical stenosis.      =    Post-Op Info:  Procedure(s) (LRB):  SPINE, CERVICAL, POSTERIOR APPROACH  LAMINECTOMY C3-C6; C6-C7; WITH MEDIAL DISCECTOMY (N/A)   10 Days Post-Op     Interval History: 3/13 - AFVSS, NAEON, hgb 7.4, ow labs stable, exam stable    Medications:  Continuous Infusions:  Scheduled Meds:   diclofenac sodium   Topical (Top) Daily    epoetin shefali (PROCRIT) injection  7,000 Units Intravenous Every Mon, Wed, Fri    FLUoxetine  20 mg Oral Daily    gabapentin  300 mg Oral QHS    insulin aspart U-100  4 Units Subcutaneous TIDWM    insulin detemir U-100  12 Units Subcutaneous QHS     lactulose  20 g Oral Daily    metoclopramide HCl  5 mg Oral QID (AC & HS)    midodrine  20 mg Oral Q6H    pantoprazole  40 mg Oral BID    polyethylene glycol  17 g Oral Daily    pravastatin  40 mg Oral Daily    senna-docusate 8.6-50 mg  1 tablet Oral BID     PRN Meds:acetaminophen, dextrose 50%, dextrose 50%, glucagon (human recombinant), glucose, glucose, insulin aspart U-100, miconazole NITRATE 2 %, ondansetron, oxyCODONE       Objective:     Weight: 135 kg (297 lb 9.9 oz)  Body mass index is 45.25 kg/m².  Vital Signs (Most Recent):  Temp: 98.5 °F (36.9 °C) (03/13/19 0700)  Pulse: 64 (03/13/19 0900)  Resp: (!) 26 (03/13/19 0900)  BP: (!) 147/67 (03/13/19 0900)  SpO2: 100 % (03/13/19 0900) Vital Signs (24h Range):  Temp:  [98.2 °F (36.8 °C)-98.8 °F (37.1 °C)] 98.5 °F (36.9 °C)  Pulse:  [51-70] 64  Resp:  [10-33] 26  SpO2:  [96 %-100 %] 100 %  BP: ()/(51-68) 147/67  Arterial Line BP: (126-162)/(49-65) 162/65                 Resp Rate Total:  [10 br/min-20 br/min] 12 br/min         Closed/Suction Drain 03/03/19 1431 Posterior Neck Accordion 10 Fr. (Active)   Site Description Unable to view 3/13/2019  3:05 AM   Dressing Type Gauze 3/13/2019  3:05 AM   Dressing Status Clean;Dry;Intact 3/13/2019  3:05 AM   Dressing Intervention Dressing reinforced 3/13/2019  3:05 AM   Drainage Serosanguineous 3/13/2019  3:05 AM   Status To bulb suction 3/13/2019  3:05 AM   Output (mL) 0 mL 3/12/2019  6:05 AM            Hemodialysis AV Fistula Left forearm (Active)   Needle Size 15ga 3/13/2019  1:45 AM   Site Assessment Intact;Dry 3/13/2019  3:05 AM   Patency Present;Thrill;Bruit 3/13/2019  3:05 AM   Status Deaccessed 3/13/2019  3:05 AM   Flows Good 3/13/2019  1:45 AM   Dressing Intervention Dressing reinforced 3/6/2019  3:05 AM   Dressing Status Biopatch in place 3/12/2019 10:45 PM   Site Condition No complications 3/13/2019  3:05 AM   Dressing Open to air (None) 3/13/2019  3:05 AM            Trialysis (Dialysis) Catheter  03/04/19 0250 left internal jugular (Active)   IV Device Securement sutures;catheter securement device 3/13/2019  3:05 AM   Additional Site Signs no warmth;no erythema;no edema;no pain;no palpable cord;no streak formation;no drainage 3/13/2019  3:05 AM   Patency/Care infusing 3/13/2019  3:05 AM   Waveform normal 3/13/2019  3:05 AM   Site Assessment Clean;Dry;Intact 3/13/2019  3:05 AM   Status Flushed 3/13/2019  3:05 AM   Flows Good 3/13/2019  3:05 AM   Dressing Intervention Dressing reinforced 3/13/2019  3:05 AM   Dressing Status Biopatch in place;Clean;Dry;Intact 3/13/2019  3:05 AM   Dressing Change Due 03/11/19 3/13/2019  3:05 AM   Verification by X-ray Yes 3/11/2019  7:05 PM   Site Condition No complications 3/13/2019  3:05 AM   Dressing Occlusive 3/13/2019  3:05 AM   Daily Line Review Performed 3/13/2019  3:05 AM       Neurosurgery Physical Exam     GCS 15  A+Ox3  CN 2-12 intact  4-/5 BUE  5/5 BLE  SILT  +Sanford bilaterally w 3+ UE reflexes        Significant Labs:  Recent Labs   Lab 03/11/19  2200 03/12/19 0115 03/13/19 0226    84  84 91    137  137 140   K 5.0 5.1  5.1 4.3    105  105 107   CO2 25 25  25 24   BUN 31* 32*  32* 28*   CREATININE 4.7* 5.0*  5.0* 4.4*   CALCIUM 9.2 9.4  9.4 9.3   MG 2.2 2.2  2.2 2.0     Recent Labs   Lab 03/12/19 0115 03/13/19 0226   WBC 5.54 5.97   HGB 7.4* 7.4*   HCT 23.9* 23.7*   PLT 68* 78*     Recent Labs   Lab 03/12/19 0115 03/13/19 0226   INR 1.1 1.0     Microbiology Results (last 7 days)     ** No results found for the last 168 hours. **        Significant Diagnostics:  CT: No results found in the last 24 hours.  MRI: No results found in the last 24 hours.    Assessment/Plan:     * S/P laminectomy     58 M with progressive cervical myelopathy and stenosis from C3-6 with myelomalacia. S/p code for hypovolemia during HD. Stable this morning and labs WNL. He stepped up to ICU for further care and work up, now s/p C3-6 posterior  decompression.     - okay for transfer to floor from neurosurgical perspective  - HOB >30  - No C collar needed  - Pain control PRN  - HD per nephrology  - Daily PT/OT  - Medical management per NCC  - Please call Neurosurgery with any questions or exam changes                Speedy Rodriguez MD  Neurosurgery  [facility]

## 2019-03-13 NOTE — SUBJECTIVE & OBJECTIVE
Interval History: 3 hr HD trial completed last night w/o issues, UF 1 L. No distress noted during assessment.     Review of patient's allergies indicates:   Allergen Reactions    Keflex [cephalexin] Nausea Only     Current Facility-Administered Medications   Medication Frequency    acetaminophen tablet 500 mg Q6H PRN    dextrose 50% injection 12.5 g PRN    dextrose 50% injection 25 g PRN    diclofenac sodium 1 % gel Daily    epoetin shefali (PROCRIT) injection 7,000 units kit Every Mon, Wed, Fri    FLUoxetine capsule 20 mg Daily    gabapentin capsule 300 mg QHS    glucagon (human recombinant) injection 1 mg PRN    glucose chewable tablet 16 g PRN    glucose chewable tablet 24 g PRN    insulin aspart U-100 pen 0-5 Units QID (AC + HS) PRN    insulin aspart U-100 pen 4 Units TIDWM    insulin detemir U-100 pen 12 Units QHS    lactulose 20 gram/30 mL solution Soln 20 g Daily    metoclopramide HCl 5 mg/5 mL solution 5 mg QID (AC & HS)    miconazole NITRATE 2 % top powder BID PRN    midodrine tablet 20 mg Q6H    ondansetron injection 4 mg Q6H PRN    oxyCODONE immediate release tablet 5 mg Q6H PRN    pantoprazole suspension 40 mg BID    polyethylene glycol packet 17 g Daily    pravastatin tablet 40 mg Daily    senna-docusate 8.6-50 mg per tablet 1 tablet BID       Objective:     Vital Signs (Most Recent):  Temp: 98.5 °F (36.9 °C) (03/13/19 0700)  Pulse: 64 (03/13/19 0900)  Resp: (!) 26 (03/13/19 0900)  BP: (!) 147/67 (03/13/19 0900)  SpO2: 100 % (03/13/19 0900)  O2 Device (Oxygen Therapy): room air (03/13/19 0900) Vital Signs (24h Range):  Temp:  [98.2 °F (36.8 °C)-98.8 °F (37.1 °C)] 98.5 °F (36.9 °C)  Pulse:  [51-70] 64  Resp:  [10-33] 26  SpO2:  [96 %-100 %] 100 %  BP: ()/(51-68) 147/67  Arterial Line BP: (126-162)/(49-65) 162/65     Weight: 135 kg (297 lb 9.9 oz) (03/12/19 0300)  Body mass index is 45.25 kg/m².  Body surface area is 2.54 meters squared.    I/O last 3 completed shifts:  In: 1730  [P.O.:1230; IV Piggyback:500]  Out: 0     Physical Exam   Constitutional: He is oriented to person, place, and time. He appears well-developed and well-nourished. Nasal cannula in place.    male who appears stated age, lying in bed   HENT:   Head: Normocephalic and atraumatic.   Mouth/Throat: Oropharynx is clear and moist. No oropharyngeal exudate.   Eyes: EOM are normal. Pupils are equal, round, and reactive to light.   Neck: No JVD present. No tracheal deviation present.   Cardiovascular: Regular rhythm, normal heart sounds and intact distal pulses.   Pulmonary/Chest: Effort normal. No stridor. No respiratory distress.   Abdominal: Soft. Bowel sounds are normal. He exhibits no distension and no mass. There is no tenderness. There is no guarding.   Obese   Musculoskeletal: He exhibits edema. He exhibits no tenderness or deformity.   Palpable thrill in LUE fistula  1+ pitting edema to bilateral shins   Neurological: He is alert and oriented to person, place, and time.   Skin: No rash noted. He is not diaphoretic. No erythema.   Psychiatric: He has a normal mood and affect. His behavior is normal. Thought content normal.     Significant Labs:  CBC:   Recent Labs   Lab 03/13/19 0226   WBC 5.97   RBC 2.22*   HGB 7.4*   HCT 23.7*   PLT 78*   *   MCH 33.3*   MCHC 31.2*     CMP:   Recent Labs   Lab 03/13/19 0226   GLU 91   CALCIUM 9.3   ALBUMIN 2.9*   PROT 5.6*      K 4.3   CO2 24      BUN 28*   CREATININE 4.4*   ALKPHOS 193*   ALT 12   AST 27   BILITOT 1.0

## 2019-03-13 NOTE — PLAN OF CARE
Problem: Adult Inpatient Plan of Care  Goal: Plan of Care Review  Outcome: Ongoing (interventions implemented as appropriate)  POC reviewed with pt at 1400. Pt verbalized understanding. Questions and concerns addressed. No acute events today. Pt progressing toward goals. Will continue to monitor. Plan to step down this afternoon. See flowsheets for full assessment and VS info.

## 2019-03-13 NOTE — SUBJECTIVE & OBJECTIVE
"Interval HPI:   Overnight events: Remains in NCC. NAEON. Dialysis overnight. BG remains below goal with decreased scheduled insulin.   Eatin%  Nausea: No  Hypoglycemia and intervention: No  Fever: Yes  TPN and/or TF: No      /62 (BP Location: Right arm, Patient Position: Lying)   Pulse (!) 59   Temp 98.5 °F (36.9 °C) (Oral)   Resp 16   Ht 5' 8" (1.727 m)   Wt 135 kg (297 lb 9.9 oz)   SpO2 97%   BMI 45.25 kg/m²     Labs Reviewed and Include    Recent Labs   Lab 19  0226   GLU 91   CALCIUM 9.3   ALBUMIN 2.9*   PROT 5.6*      K 4.3   CO2 24      BUN 28*   CREATININE 4.4*   ALKPHOS 193*   ALT 12   AST 27   BILITOT 1.0     Lab Results   Component Value Date    WBC 5.97 2019    HGB 7.4 (L) 2019    HCT 23.7 (L) 2019     (H) 2019    PLT 78 (L) 2019     No results for input(s): TSH, FREET4 in the last 168 hours.  Lab Results   Component Value Date    HGBA1C 5.0 2019       Nutritional status:   Body mass index is 45.25 kg/m².  Lab Results   Component Value Date    ALBUMIN 2.9 (L) 2019    ALBUMIN 3.0 (L) 2019    ALBUMIN 3.0 (L) 2019     No results found for: PREALBUMIN    Estimated Creatinine Clearance: 25.2 mL/min (A) (based on SCr of 4.4 mg/dL (H)).    Accu-Checks  Recent Labs     03/10/19  1702 03/10/19  2158 19  0818 19  1152 19  1659 19  2138 19  0751 19  1145 19  2304 19  0825   POCTGLUCOSE 131* 89 109 149* 89 118* 122* 99 100 93       Current Medications and/or Treatments Impacting Glycemic Control  Immunotherapy:    Immunosuppressants     None        Steroids:   Hormones (From admission, onward)    None        Pressors:    Autonomic Drugs (From admission, onward)    Start     Stop Route Frequency Ordered    03/10/19 1200  midodrine tablet 20 mg      -- Oral Every 6 hours 03/10/19 0946    19 1600  norepinephrine 32 mg in dextrose 5 % 250 mL infusion     Question Answer " Comment   Titrate by: (in mcg/kg/min) 0.02    Titrate interval: (in minutes) 5    Titrate to maintain: (MAP or SBP) MAP    Greater than: (in mmHg) 65    Maximum dose: (in mcg/kg/min) 3        -- IV Continuous 03/09/19 1547        Hyperglycemia/Diabetes Medications:   Antihyperglycemics (From admission, onward)    Start     Stop Route Frequency Ordered    03/12/19 2100  insulin detemir U-100 pen 16 Units      -- SubQ Nightly 03/12/19 0900    03/12/19 1130  insulin aspart U-100 pen 5 Units      -- SubQ 3 times daily with meals 03/12/19 0900    03/06/19 1638  insulin aspart U-100 pen 0-5 Units      -- SubQ Before meals & nightly PRN 03/06/19 1538

## 2019-03-13 NOTE — SUBJECTIVE & OBJECTIVE
Interval History: 3/13 - AFVSS, NAEON, hgb 7.4, ow labs stable, exam stable    Medications:  Continuous Infusions:  Scheduled Meds:   diclofenac sodium   Topical (Top) Daily    epoetin shefali (PROCRIT) injection  7,000 Units Intravenous Every Mon, Wed, Fri    FLUoxetine  20 mg Oral Daily    gabapentin  300 mg Oral QHS    insulin aspart U-100  4 Units Subcutaneous TIDWM    insulin detemir U-100  12 Units Subcutaneous QHS    lactulose  20 g Oral Daily    metoclopramide HCl  5 mg Oral QID (AC & HS)    midodrine  20 mg Oral Q6H    pantoprazole  40 mg Oral BID    polyethylene glycol  17 g Oral Daily    pravastatin  40 mg Oral Daily    senna-docusate 8.6-50 mg  1 tablet Oral BID     PRN Meds:acetaminophen, dextrose 50%, dextrose 50%, glucagon (human recombinant), glucose, glucose, insulin aspart U-100, miconazole NITRATE 2 %, ondansetron, oxyCODONE       Objective:     Weight: 135 kg (297 lb 9.9 oz)  Body mass index is 45.25 kg/m².  Vital Signs (Most Recent):  Temp: 98.5 °F (36.9 °C) (03/13/19 0700)  Pulse: 64 (03/13/19 0900)  Resp: (!) 26 (03/13/19 0900)  BP: (!) 147/67 (03/13/19 0900)  SpO2: 100 % (03/13/19 0900) Vital Signs (24h Range):  Temp:  [98.2 °F (36.8 °C)-98.8 °F (37.1 °C)] 98.5 °F (36.9 °C)  Pulse:  [51-70] 64  Resp:  [10-33] 26  SpO2:  [96 %-100 %] 100 %  BP: ()/(51-68) 147/67  Arterial Line BP: (126-162)/(49-65) 162/65                 Resp Rate Total:  [10 br/min-20 br/min] 12 br/min         Closed/Suction Drain 03/03/19 1431 Posterior Neck Accordion 10 Fr. (Active)   Site Description Unable to view 3/13/2019  3:05 AM   Dressing Type Gauze 3/13/2019  3:05 AM   Dressing Status Clean;Dry;Intact 3/13/2019  3:05 AM   Dressing Intervention Dressing reinforced 3/13/2019  3:05 AM   Drainage Serosanguineous 3/13/2019  3:05 AM   Status To bulb suction 3/13/2019  3:05 AM   Output (mL) 0 mL 3/12/2019  6:05 AM            Hemodialysis AV Fistula Left forearm (Active)   Needle Size 15ga 3/13/2019  1:45 AM    Site Assessment Intact;Dry 3/13/2019  3:05 AM   Patency Present;Thrill;Bruit 3/13/2019  3:05 AM   Status Deaccessed 3/13/2019  3:05 AM   Flows Good 3/13/2019  1:45 AM   Dressing Intervention Dressing reinforced 3/6/2019  3:05 AM   Dressing Status Biopatch in place 3/12/2019 10:45 PM   Site Condition No complications 3/13/2019  3:05 AM   Dressing Open to air (None) 3/13/2019  3:05 AM            Trialysis (Dialysis) Catheter 03/04/19 0250 left internal jugular (Active)   IV Device Securement sutures;catheter securement device 3/13/2019  3:05 AM   Additional Site Signs no warmth;no erythema;no edema;no pain;no palpable cord;no streak formation;no drainage 3/13/2019  3:05 AM   Patency/Care infusing 3/13/2019  3:05 AM   Waveform normal 3/13/2019  3:05 AM   Site Assessment Clean;Dry;Intact 3/13/2019  3:05 AM   Status Flushed 3/13/2019  3:05 AM   Flows Good 3/13/2019  3:05 AM   Dressing Intervention Dressing reinforced 3/13/2019  3:05 AM   Dressing Status Biopatch in place;Clean;Dry;Intact 3/13/2019  3:05 AM   Dressing Change Due 03/11/19 3/13/2019  3:05 AM   Verification by X-ray Yes 3/11/2019  7:05 PM   Site Condition No complications 3/13/2019  3:05 AM   Dressing Occlusive 3/13/2019  3:05 AM   Daily Line Review Performed 3/13/2019  3:05 AM       Neurosurgery Physical Exam     GCS 15  A+Ox3  CN 2-12 intact  4-/5 BUE  5/5 BLE  SILT  +Sanford bilaterally w 3+ UE reflexes        Significant Labs:  Recent Labs   Lab 03/11/19  2200 03/12/19  0115 03/13/19  0226    84  84 91    137  137 140   K 5.0 5.1  5.1 4.3    105  105 107   CO2 25 25  25 24   BUN 31* 32*  32* 28*   CREATININE 4.7* 5.0*  5.0* 4.4*   CALCIUM 9.2 9.4  9.4 9.3   MG 2.2 2.2  2.2 2.0     Recent Labs   Lab 03/12/19 0115 03/13/19 0226   WBC 5.54 5.97   HGB 7.4* 7.4*   HCT 23.9* 23.7*   PLT 68* 78*     Recent Labs   Lab 03/12/19 0115 03/13/19 0226   INR 1.1 1.0     Microbiology Results (last 7 days)     ** No results found for the  last 168 hours. **        Significant Diagnostics:  CT: No results found in the last 24 hours.  MRI: No results found in the last 24 hours.

## 2019-03-13 NOTE — PROGRESS NOTES
[facility]  Nephrology  Progress Note    Patient Name: Angel Farmer Jr.  MRN: [id number]  Admission Date: 2/28/2019  Hospital Length of Stay: 13 days  Attending Provider: Devaughn Jaffe MD   Primary Care Physician: Satya Noriega MD  Principal Problem:S/P laminectomy    Subjective:     Interval History: 3 hr HD trial completed last night w/o issues, UF 1 L. No distress noted during assessment.     Review of patient's allergies indicates:   Allergen Reactions    Keflex [cephalexin] Nausea Only     Current Facility-Administered Medications   Medication Frequency    acetaminophen tablet 500 mg Q6H PRN    dextrose 50% injection 12.5 g PRN    dextrose 50% injection 25 g PRN    diclofenac sodium 1 % gel Daily    epoetin shefali (PROCRIT) injection 7,000 units kit Every Mon, Wed, Fri    FLUoxetine capsule 20 mg Daily    gabapentin capsule 300 mg QHS    glucagon (human recombinant) injection 1 mg PRN    glucose chewable tablet 16 g PRN    glucose chewable tablet 24 g PRN    insulin aspart U-100 pen 0-5 Units QID (AC + HS) PRN    insulin aspart U-100 pen 4 Units TIDWM    insulin detemir U-100 pen 12 Units QHS    lactulose 20 gram/30 mL solution Soln 20 g Daily    metoclopramide HCl 5 mg/5 mL solution 5 mg QID (AC & HS)    miconazole NITRATE 2 % top powder BID PRN    midodrine tablet 20 mg Q6H    ondansetron injection 4 mg Q6H PRN    oxyCODONE immediate release tablet 5 mg Q6H PRN    pantoprazole suspension 40 mg BID    polyethylene glycol packet 17 g Daily    pravastatin tablet 40 mg Daily    senna-docusate 8.6-50 mg per tablet 1 tablet BID       Objective:     Vital Signs (Most Recent):  Temp: 98.5 °F (36.9 °C) (03/13/19 0700)  Pulse: 64 (03/13/19 0900)  Resp: (!) 26 (03/13/19 0900)  BP: (!) 147/67 (03/13/19 0900)  SpO2: 100 % (03/13/19 0900)  O2 Device (Oxygen Therapy): room air (03/13/19 0900) Vital Signs (24h Range):  Temp:  [98.2 °F (36.8 °C)-98.8 °F (37.1 °C)] 98.5 °F  (36.9 °C)  Pulse:  [51-70] 64  Resp:  [10-33] 26  SpO2:  [96 %-100 %] 100 %  BP: ()/(51-68) 147/67  Arterial Line BP: (126-162)/(49-65) 162/65     Weight: 135 kg (297 lb 9.9 oz) (03/12/19 0300)  Body mass index is 45.25 kg/m².  Body surface area is 2.54 meters squared.    I/O last 3 completed shifts:  In: 1730 [P.O.:1230; IV Piggyback:500]  Out: 0     Physical Exam   Constitutional: He is oriented to person, place, and time. He appears well-developed and well-nourished. Nasal cannula in place.    male who appears stated age, lying in bed   HENT:   Head: Normocephalic and atraumatic.   Mouth/Throat: Oropharynx is clear and moist. No oropharyngeal exudate.   Eyes: EOM are normal. Pupils are equal, round, and reactive to light.   Neck: No JVD present. No tracheal deviation present.   Cardiovascular: Regular rhythm, normal heart sounds and intact distal pulses.   Pulmonary/Chest: Effort normal. No stridor. No respiratory distress.   Abdominal: Soft. Bowel sounds are normal. He exhibits no distension and no mass. There is no tenderness. There is no guarding.   Obese   Musculoskeletal: He exhibits edema. He exhibits no tenderness or deformity.   Palpable thrill in LUE fistula  1+ pitting edema to bilateral shins   Neurological: He is alert and oriented to person, place, and time.   Skin: No rash noted. He is not diaphoretic. No erythema.   Psychiatric: He has a normal mood and affect. His behavior is normal. Thought content normal.     Significant Labs:  CBC:   Recent Labs   Lab 03/13/19 0226   WBC 5.97   RBC 2.22*   HGB 7.4*   HCT 23.7*   PLT 78*   *   MCH 33.3*   MCHC 31.2*     CMP:   Recent Labs   Lab 03/13/19 0226   GLU 91   CALCIUM 9.3   ALBUMIN 2.9*   PROT 5.6*      K 4.3   CO2 24      BUN 28*   CREATININE 4.4*   ALKPHOS 193*   ALT 12   AST 27   BILITOT 1.0            Assessment/Plan:     * S/P laminectomy    - Being follow by admitting service      ESRD (end stage renal disease)  on dialysis    59 yo male with a PMH of ESRD with dialysis MWF (last on weds), DM, presenting as transfer from [facility] as admission to neurosurgery for evaluation after progressive course of unsteady gait as well as bilateral upper extremity weakness for the last several months.     ESRD on HD with Dr. Araceli Pickens in [facility] MWF, 4.5 hours, EDW 140kg  Last HD 2/27/19 @L removed no available current DW    Plan/Assessment:   -HD completed w/o issues, UF 1 L.  -Will provide HD tomorrow metabolic clearance and volume management  -BP stable on Midodrine   -Continue strict I/Os            Case discussed with staff further recs with attestation.     I will follow-up with patient. Please contact us if you have any additional questions.    OLIVIA Gabriel DNP, APRN, FNP-C  Department of Nephrology  [facility]  Pager: [phone]

## 2019-03-13 NOTE — PLAN OF CARE
Problem: Physical Therapy Goal  Goal: Physical Therapy Goal  Goals to be met by: 3/15/2019     Patient will increase functional independence with mobility by performin. Supine to sit with Moderate Assistance  2. Rolling to Left and Right with Moderate Assistance.  3. Sit to stand transfer with Maximum Assistance  4. Gait  x 10 feet with Moderate Assistance using Rolling Walker.   5. Stand for 5 minutes with Moderate Assistance using Rolling Walker  6. Lower extremity exercise program x20 reps per handout, with assistance as needed     Outcome: Ongoing (interventions implemented as appropriate)  Goals reviewed and remain appropriate. Pt progressing towards goals.    Taylor Kahn, PT, DPT   3/13/2019  [phone]

## 2019-03-13 NOTE — ASSESSMENT & PLAN NOTE
Caution with insulin stacking  Estimated Creatinine Clearance: 25.2 mL/min (A) (based on SCr of 4.4 mg/dL (H)).

## 2019-03-13 NOTE — ASSESSMENT & PLAN NOTE
59 yo male with a PMH of ESRD with dialysis MWF (last on weds), DM, presenting as transfer from [facility] as admission to neurosurgery for evaluation after progressive course of unsteady gait as well as bilateral upper extremity weakness for the last several months.     ESRD on HD with Dr. Araceli Pickens in [facility] MWF, 4.5 hours, EDW 140kg  Last HD 2/27/19 @L removed no available current DW    Plan/Assessment:   -HD completed w/o issues, UF 1 L.  -Will provide HD tomorrow metabolic clearance and volume management  -BP stable on Midodrine   -Continue strict I/Os

## 2019-03-13 NOTE — PROGRESS NOTES
"[facility]  Endocrinology  Progress Note    Admit Date: 2019     Reason for Consult: Management of T2DM, Hyperglycemia     Surgical Procedure and Date: Cervical laminectomy C3-C6, C6-C7 w/ medial discectomy 3/3/19    Diabetes diagnosis year:     Lab Results   Component Value Date    HGBA1C 5.0 2019     Home Diabetes Medications: Basaglar 40 units q HS, Victoza 1.2 mg once daily,     How often checking glucose at home? 4x per day   BG readings on regimen: 90-120s  Hypoglycemia on the regimen?  Yes - about once per month  Missed doses on regimen?  No    Diabetes Complications include:     Hypoglycemia , Diabetic nephropathy  , Diabetic chronic kidney disease     , Diabetic retinopathy , Diabetic cataract  and Diabetic peripheral neuropathy     Complicating diabetes co morbidities:   MIKE, CKD and ESRD      HPI:   Patient is a 58 y.o. male with a diagnosis of HTN, HLD, MIKE, ESRD on HD, and DM2, who is s/p cervical laminectomy C3-C6, C6-C7 w/ medial discectomy 3/3/19.  Patient was admitted to the ED on 19 for upper extremity weakness and ataxia following a fall.  Patient's DM managed by PCP, Dr. Shemar Berry in [place].  Patient is prescribed MDI but only takes basal insulin and Victoza.  Positive family history of DM (mother, father, and both maternal grandparents).  Endocrine consulted for DM/BG management.    Interval HPI:   Overnight events: Remains in [facility]. NAEON. Dialysis overnight. BG remains below goal with decreased scheduled insulin.   Eatin%  Nausea: No  Hypoglycemia and intervention: No  Fever: Yes  TPN and/or TF: No      /62 (BP Location: Right arm, Patient Position: Lying)   Pulse (!) 59   Temp 98.5 °F (36.9 °C) (Oral)   Resp 16   Ht 5' 8" (1.727 m)   Wt 135 kg (297 lb 9.9 oz)   SpO2 97%   BMI 45.25 kg/m²      Labs Reviewed and Include    Recent Labs   Lab 19  0226   GLU 91   CALCIUM 9.3   ALBUMIN 2.9*   PROT 5.6*      K 4.3   CO2 24 "      BUN 28*   CREATININE 4.4*   ALKPHOS 193*   ALT 12   AST 27   BILITOT 1.0     Lab Results   Component Value Date    WBC 5.97 03/13/2019    HGB 7.4 (L) 03/13/2019    HCT 23.7 (L) 03/13/2019     (H) 03/13/2019    PLT 78 (L) 03/13/2019     No results for input(s): TSH, FREET4 in the last 168 hours.  Lab Results   Component Value Date    HGBA1C 5.0 02/27/2019       Nutritional status:   Body mass index is 45.25 kg/m².  Lab Results   Component Value Date    ALBUMIN 2.9 (L) 03/13/2019    ALBUMIN 3.0 (L) 03/12/2019    ALBUMIN 3.0 (L) 03/12/2019     No results found for: PREALBUMIN    Estimated Creatinine Clearance: 25.2 mL/min (A) (based on SCr of 4.4 mg/dL (H)).    Accu-Checks  Recent Labs     03/10/19  1702 03/10/19  2158 03/11/19  0818 03/11/19  1152 03/11/19  1659 03/11/19  2138 03/12/19  0751 03/12/19  1145 03/12/19  2304 03/13/19  0825   POCTGLUCOSE 131* 89 109 149* 89 118* 122* 99 100 93       Current Medications and/or Treatments Impacting Glycemic Control  Immunotherapy:    Immunosuppressants     None        Steroids:   Hormones (From admission, onward)    None        Pressors:    Autonomic Drugs (From admission, onward)    Start     Stop Route Frequency Ordered    03/10/19 1200  midodrine tablet 20 mg      -- Oral Every 6 hours 03/10/19 0946    03/09/19 1600  norepinephrine 32 mg in dextrose 5 % 250 mL infusion     Question Answer Comment   Titrate by: (in mcg/kg/min) 0.02    Titrate interval: (in minutes) 5    Titrate to maintain: (MAP or SBP) MAP    Greater than: (in mmHg) 65    Maximum dose: (in mcg/kg/min) 3        -- IV Continuous 03/09/19 1547        Hyperglycemia/Diabetes Medications:   Antihyperglycemics (From admission, onward)    Start     Stop Route Frequency Ordered    03/12/19 2100  insulin detemir U-100 pen 16 Units      -- SubQ Nightly 03/12/19 0900    03/12/19 1130  insulin aspart U-100 pen 5 Units      -- SubQ 3 times daily with meals 03/12/19 0900    03/06/19 1638  insulin  aspart U-100 pen 0-5 Units      -- SubQ Before meals & nightly PRN 03/06/19 2198          ASSESSMENT and PLAN    * S/P laminectomy    S/p cervical laminectomy C3-C6, C6-C7 w/ medial discectomy 3/3/19  Managed per primary team  Avoid hypoglycemia  Optimize BG control for surgical wound healing         Diabetes mellitus, type 2    BG goal 140-180    Decrease Levemir to 12 units q HS - due to FBG below goal  Decrease Novolog to 4 units with meals  Low dose correction scale given kidney function  BG monitoring AC/HS    Discharge planning: Patient will need insulin dosing changes upon discharge.  Currently only taking basal insulin plus Victoza.       MIKE (obstructive sleep apnea) - very severe latest sleep study says BPAP @ 16/8    May affect BG readings if uncontrolled         ESRD (end stage renal disease) on dialysis    Caution with insulin stacking  Estimated Creatinine Clearance: 22.2 mL/min (A) (based on SCr of 5 mg/dL (H)).         Anemia due to stage 5 chronic kidney disease    Affects A1C accuracy  Consider checking fructosamine at least 2 weeks post hospital discharge to evaluate BG control     Class 3 severe obesity due to excess calories with serious comorbidity and body mass index (BMI) of 45.0 to 49.9 in adult    may contribute to insulin resistance  Body mass index is 45.25 kg/m².             La Mcconnell NP  Endocrinology  [facility]

## 2019-03-13 NOTE — PLAN OF CARE
Problem: Adult Inpatient Plan of Care  Goal: Plan of Care Review  Outcome: Ongoing (interventions implemented as appropriate)   03/13/19 4661   Plan of Care Review   Plan of Care Reviewed With patient       Pt resting in bed, call light within reach, AOx 4, BLE weakness, numbness BUE, BOB insulin held due to BG <100 and pt declining dinner tray. Call light within reach, pt oriented to room , safety, and POC, denies further needs at this time.

## 2019-03-13 NOTE — ASSESSMENT & PLAN NOTE
BG goal 140-180    Decrease Levemir to 3 units BID- first dose tonight  Decrease Novolog to 2 units with meals  Low dose correction scale given kidney function  BG monitoring AC/HS    Discharge planning: Patient will need insulin dosing changes upon discharge.  Currently only taking basal insulin plus Victoza.

## 2019-03-13 NOTE — PROGRESS NOTES
Dialysis initiated via LLE fistula using 15-gauge needles. Deep cannulation done on venous access, very weak thrill, audible bruit. UF goal 1L. Patient alert, awake and oriented.  Will continue to monitor for 3hrs.

## 2019-03-14 PROBLEM — Z74.09 IMPAIRED FUNCTIONAL MOBILITY AND ENDURANCE: Status: ACTIVE | Noted: 2019-03-14

## 2019-03-14 LAB
ABO + RH BLD: NORMAL
ALBUMIN SERPL BCP-MCNC: 2.8 G/DL
ALP SERPL-CCNC: 173 U/L
ALT SERPL W/O P-5'-P-CCNC: 12 U/L
ANION GAP SERPL CALC-SCNC: 10 MMOL/L
ANISOCYTOSIS BLD QL SMEAR: SLIGHT
AST SERPL-CCNC: 26 U/L
BASO STIPL BLD QL SMEAR: ABNORMAL
BASOPHILS # BLD AUTO: 0.03 K/UL
BASOPHILS NFR BLD: 0.4 %
BILIRUB SERPL-MCNC: 0.9 MG/DL
BLD GP AB SCN CELLS X3 SERPL QL: NORMAL
BLOOD GROUP ANTIBODIES SERPL: NORMAL
BUN SERPL-MCNC: 42 MG/DL
CALCIUM SERPL-MCNC: 9.4 MG/DL
CHLORIDE SERPL-SCNC: 107 MMOL/L
CO2 SERPL-SCNC: 22 MMOL/L
CREAT SERPL-MCNC: 6.7 MG/DL
DACRYOCYTES BLD QL SMEAR: ABNORMAL
DIFFERENTIAL METHOD: ABNORMAL
EOSINOPHIL # BLD AUTO: 0.3 K/UL
EOSINOPHIL NFR BLD: 3.7 %
ERYTHROCYTE [DISTWIDTH] IN BLOOD BY AUTOMATED COUNT: 17.3 %
EST. GFR  (AFRICAN AMERICAN): 9.7 ML/MIN/1.73 M^2
EST. GFR  (NON AFRICAN AMERICAN): 8.4 ML/MIN/1.73 M^2
GLUCOSE SERPL-MCNC: 82 MG/DL
HCT VFR BLD AUTO: 25.3 %
HGB BLD-MCNC: 7.9 G/DL
HYPOCHROMIA BLD QL SMEAR: ABNORMAL
IMM GRANULOCYTES # BLD AUTO: 0.06 K/UL
IMM GRANULOCYTES NFR BLD AUTO: 0.9 %
INR PPP: 1
LYMPHOCYTES # BLD AUTO: 1.3 K/UL
LYMPHOCYTES NFR BLD: 18.6 %
MAGNESIUM SERPL-MCNC: 2.2 MG/DL
MCH RBC QN AUTO: 32.9 PG
MCHC RBC AUTO-ENTMCNC: 31.2 G/DL
MCV RBC AUTO: 105 FL
MONOCYTES # BLD AUTO: 0.9 K/UL
MONOCYTES NFR BLD: 12.6 %
NEUTROPHILS # BLD AUTO: 4.5 K/UL
NEUTROPHILS NFR BLD: 63.8 %
NRBC BLD-RTO: 0 /100 WBC
PHOSPHATE SERPL-MCNC: 6.4 MG/DL
PLATELET # BLD AUTO: 103 K/UL
PLATELET BLD QL SMEAR: ABNORMAL
PMV BLD AUTO: 11 FL
POCT GLUCOSE: 106 MG/DL (ref 70–110)
POCT GLUCOSE: 122 MG/DL (ref 70–110)
POCT GLUCOSE: 123 MG/DL (ref 70–110)
POCT GLUCOSE: 137 MG/DL (ref 70–110)
POLYCHROMASIA BLD QL SMEAR: ABNORMAL
POTASSIUM SERPL-SCNC: 4.7 MMOL/L
PROT SERPL-MCNC: 5.6 G/DL
PROTHROMBIN TIME: 10.7 SEC
RBC # BLD AUTO: 2.4 M/UL
SODIUM SERPL-SCNC: 139 MMOL/L
TOXIC GRANULES BLD QL SMEAR: PRESENT
WBC # BLD AUTO: 6.98 K/UL

## 2019-03-14 PROCEDURE — 99024 PR POST-OP FOLLOW-UP VISIT: ICD-10-PCS | Mod: POP,,, | Performed by: PHYSICIAN ASSISTANT

## 2019-03-14 PROCEDURE — 90935 PR HEMODIALYSIS, ONE EVALUATION: ICD-10-PCS | Mod: ,,, | Performed by: NURSE PRACTITIONER

## 2019-03-14 PROCEDURE — 85610 PROTHROMBIN TIME: CPT

## 2019-03-14 PROCEDURE — 20600001 HC STEP DOWN PRIVATE ROOM

## 2019-03-14 PROCEDURE — 80053 COMPREHEN METABOLIC PANEL: CPT

## 2019-03-14 PROCEDURE — 86870 RBC ANTIBODY IDENTIFICATION: CPT

## 2019-03-14 PROCEDURE — 99232 PR SUBSEQUENT HOSPITAL CARE,LEVL II: ICD-10-PCS | Mod: ,,, | Performed by: NURSE PRACTITIONER

## 2019-03-14 PROCEDURE — 80100016 HC MAINTENANCE HEMODIALYSIS

## 2019-03-14 PROCEDURE — 83735 ASSAY OF MAGNESIUM: CPT

## 2019-03-14 PROCEDURE — 90935 HEMODIALYSIS ONE EVALUATION: CPT | Mod: ,,, | Performed by: NURSE PRACTITIONER

## 2019-03-14 PROCEDURE — 25000003 PHARM REV CODE 250: Performed by: STUDENT IN AN ORGANIZED HEALTH CARE EDUCATION/TRAINING PROGRAM

## 2019-03-14 PROCEDURE — 99232 SBSQ HOSP IP/OBS MODERATE 35: CPT | Mod: ,,, | Performed by: NURSE PRACTITIONER

## 2019-03-14 PROCEDURE — 99024 POSTOP FOLLOW-UP VISIT: CPT | Mod: POP,,, | Performed by: PHYSICIAN ASSISTANT

## 2019-03-14 PROCEDURE — 25000003 PHARM REV CODE 250: Performed by: PSYCHIATRY & NEUROLOGY

## 2019-03-14 PROCEDURE — 99222 PR INITIAL HOSPITAL CARE,LEVL II: ICD-10-PCS | Mod: ,,, | Performed by: NURSE PRACTITIONER

## 2019-03-14 PROCEDURE — 85025 COMPLETE CBC W/AUTO DIFF WBC: CPT

## 2019-03-14 PROCEDURE — 90935 HEMODIALYSIS ONE EVALUATION: CPT

## 2019-03-14 PROCEDURE — 25000003 PHARM REV CODE 250: Performed by: PHYSICIAN ASSISTANT

## 2019-03-14 PROCEDURE — 99222 1ST HOSP IP/OBS MODERATE 55: CPT | Mod: ,,, | Performed by: NURSE PRACTITIONER

## 2019-03-14 PROCEDURE — 86901 BLOOD TYPING SEROLOGIC RH(D): CPT

## 2019-03-14 PROCEDURE — 84100 ASSAY OF PHOSPHORUS: CPT

## 2019-03-14 RX ORDER — INSULIN ASPART 100 [IU]/ML
2 INJECTION, SOLUTION INTRAVENOUS; SUBCUTANEOUS
Status: DISCONTINUED | OUTPATIENT
Start: 2019-03-14 | End: 2019-03-14

## 2019-03-14 RX ORDER — MICONAZOLE NITRATE 2 %
POWDER (GRAM) TOPICAL 2 TIMES DAILY PRN
Refills: 0
Start: 2019-03-14 | End: 2022-01-24

## 2019-03-14 RX ORDER — SIMETHICONE 80 MG
1 TABLET,CHEWABLE ORAL 3 TIMES DAILY PRN
Status: DISCONTINUED | OUTPATIENT
Start: 2019-03-14 | End: 2019-03-20 | Stop reason: HOSPADM

## 2019-03-14 RX ORDER — SODIUM CHLORIDE 9 MG/ML
INJECTION, SOLUTION INTRAVENOUS
Status: DISCONTINUED | OUTPATIENT
Start: 2019-03-14 | End: 2019-03-20 | Stop reason: HOSPADM

## 2019-03-14 RX ORDER — FLUOXETINE HYDROCHLORIDE 20 MG/1
20 CAPSULE ORAL DAILY
Qty: 30 CAPSULE | Refills: 11 | Status: ON HOLD
Start: 2019-03-15 | End: 2019-07-23 | Stop reason: CLARIF

## 2019-03-14 RX ORDER — LACTULOSE 10 G/15ML
20 SOLUTION ORAL DAILY
Qty: 300 ML | Refills: 0
Start: 2019-03-15 | End: 2019-03-25

## 2019-03-14 RX ORDER — INSULIN ASPART 100 [IU]/ML
2 INJECTION, SOLUTION INTRAVENOUS; SUBCUTANEOUS 3 TIMES DAILY
Refills: 0
Start: 2019-03-14 | End: 2019-03-20 | Stop reason: HOSPADM

## 2019-03-14 RX ORDER — MIDODRINE HYDROCHLORIDE 10 MG/1
20 TABLET ORAL EVERY 6 HOURS
Qty: 240 TABLET | Refills: 11 | Status: ON HOLD
Start: 2019-03-14 | End: 2023-07-11

## 2019-03-14 RX ORDER — INSULIN ASPART 100 [IU]/ML
2 INJECTION, SOLUTION INTRAVENOUS; SUBCUTANEOUS
Status: DISCONTINUED | OUTPATIENT
Start: 2019-03-14 | End: 2019-03-17

## 2019-03-14 RX ORDER — ACETAMINOPHEN 500 MG
500 TABLET ORAL EVERY 6 HOURS PRN
Refills: 0
Start: 2019-03-14 | End: 2022-01-24 | Stop reason: DRUGHIGH

## 2019-03-14 RX ORDER — INSULIN ASPART 100 [IU]/ML
0-5 INJECTION, SOLUTION INTRAVENOUS; SUBCUTANEOUS
Refills: 0
Start: 2019-03-14 | End: 2020-06-19

## 2019-03-14 RX ORDER — PANTOPRAZOLE SODIUM 40 MG/1
40 FOR SUSPENSION ORAL 2 TIMES DAILY
Qty: 60 PACKET | Refills: 11
Start: 2019-03-14 | End: 2020-06-19

## 2019-03-14 RX ORDER — METOCLOPRAMIDE HYDROCHLORIDE 5 MG/5ML
5 SOLUTION ORAL
Status: ON HOLD
Start: 2019-03-14 | End: 2019-07-18

## 2019-03-14 RX ORDER — SODIUM CHLORIDE 9 MG/ML
INJECTION, SOLUTION INTRAVENOUS ONCE
Status: DISCONTINUED | OUTPATIENT
Start: 2019-03-14 | End: 2019-03-20

## 2019-03-14 RX ORDER — DICLOFENAC SODIUM 10 MG/G
GEL TOPICAL DAILY
Status: ON HOLD
Start: 2019-03-15 | End: 2019-07-18

## 2019-03-14 RX ORDER — OXYCODONE HYDROCHLORIDE 5 MG/1
5 TABLET ORAL EVERY 6 HOURS PRN
Qty: 40 TABLET | Refills: 0 | Status: SHIPPED | OUTPATIENT
Start: 2019-03-14 | End: 2019-03-20

## 2019-03-14 RX ADMIN — INSULIN ASPART 2 UNITS: 100 INJECTION, SOLUTION INTRAVENOUS; SUBCUTANEOUS at 08:03

## 2019-03-14 RX ADMIN — GABAPENTIN 300 MG: 300 CAPSULE ORAL at 09:03

## 2019-03-14 RX ADMIN — POLYETHYLENE GLYCOL 3350 17 G: 17 POWDER, FOR SOLUTION ORAL at 08:03

## 2019-03-14 RX ADMIN — FLUOXETINE HYDROCHLORIDE 20 MG: 20 CAPSULE ORAL at 08:03

## 2019-03-14 RX ADMIN — METOCLOPRAMIDE HYDROCHLORIDE 5 MG: 5 SOLUTION ORAL at 09:03

## 2019-03-14 RX ADMIN — LACTULOSE 20 G: 20 SOLUTION ORAL at 08:03

## 2019-03-14 RX ADMIN — PANTOPRAZOLE SODIUM 40 MG: 40 GRANULE, DELAYED RELEASE ORAL at 08:03

## 2019-03-14 RX ADMIN — METOCLOPRAMIDE HYDROCHLORIDE 5 MG: 5 SOLUTION ORAL at 05:03

## 2019-03-14 RX ADMIN — SIMETHICONE CHEW TAB 80 MG 80 MG: 80 TABLET ORAL at 04:03

## 2019-03-14 RX ADMIN — STANDARDIZED SENNA CONCENTRATE AND DOCUSATE SODIUM 1 TABLET: 8.6; 5 TABLET, FILM COATED ORAL at 08:03

## 2019-03-14 RX ADMIN — PRAVASTATIN SODIUM 40 MG: 40 TABLET ORAL at 08:03

## 2019-03-14 RX ADMIN — SIMETHICONE CHEW TAB 80 MG 80 MG: 80 TABLET ORAL at 09:03

## 2019-03-14 RX ADMIN — DICLOFENAC: 10 GEL TOPICAL at 08:03

## 2019-03-14 RX ADMIN — ACETAMINOPHEN 500 MG: 500 TABLET, FILM COATED ORAL at 11:03

## 2019-03-14 RX ADMIN — ACETAMINOPHEN 500 MG: 500 TABLET, FILM COATED ORAL at 04:03

## 2019-03-14 RX ADMIN — MIDODRINE HYDROCHLORIDE 20 MG: 5 TABLET ORAL at 05:03

## 2019-03-14 RX ADMIN — PANTOPRAZOLE SODIUM 40 MG: 40 GRANULE, DELAYED RELEASE ORAL at 09:03

## 2019-03-14 RX ADMIN — MIDODRINE HYDROCHLORIDE 20 MG: 5 TABLET ORAL at 07:03

## 2019-03-14 RX ADMIN — INSULIN ASPART 2 UNITS: 100 INJECTION, SOLUTION INTRAVENOUS; SUBCUTANEOUS at 04:03

## 2019-03-14 RX ADMIN — MIDODRINE HYDROCHLORIDE 20 MG: 5 TABLET ORAL at 11:03

## 2019-03-14 NOTE — HOSPITAL COURSE
03/13/2019: Participated with therapy.  Bed mobility MaxA-TA x 2 ppl.  Sit to stand ModA x 2 ppl.  Ambulated 4 small steps ModA x 2 ppl.  UBD MaxA and LBD TA. No SLP eval.

## 2019-03-14 NOTE — CONSULTS
[facility]  Physical Medicine & Rehab  Consult Note    Patient Name: Angel Farmer Jr.  MRN: [id number]  Admission Date: 2/28/2019  Hospital Length of Stay: 14 days  Attending Physician: Ruddy Wylie MD     Inpatient consult to Physical Medicine & Rehabilitation  Consult performed by: Yessenia Carrillo NP  Consult requested by:  Ruddy Wylie MD    Reason for Consult:  assess rehabilitation needs    Consults  Subjective:     Principal Problem: S/P laminectomy    HPI: Angel Farmer is a 58-year-old male with PMHx ofDiabetes mellitus type II, ESRD on HD MWF, Hyperlipidemia, Hypertension and MIKE.  Patient presented to [facility] on 2/28  for evaluation of upper extremity weakness and ataxia after a fall. CTH/MRI/MRA brain revealed no acute pathology. MRI C spine revealed a significant degree of spinal canal stenosis from C3-C4 through C6-C7. NSGY consulted and s/p C3-6 posterior decompression on 3/3 with drain placement.  No C-collar needed  Per NSGY, will need future fusion.  Hypotension noted during surgery. Hospital course further complicated by S/p code for hypovolemia during HD (early during course, transferred to ICU intubated and was placed on Levophed, now extubated and stepped down from ICU), depression (Started on fluoxetine), anemia, constipation with abd distention (enema x 2 without full relief), & DM2.     Functional History: Patient lives in [place] with brother, sister-in-law, and 2 nieces in 1-story house with 2 SLY.  Prior to admission, (I) with ADLs and mobility. DME: none.     Hospital Course: 03/13/2019: Participated with therapy.  Bed mobility MaxA-TA x 2 ppl.  Sit to stand ModA x 2 ppl.  Ambulated 4 small steps ModA x 2 ppl.  UBD MaxA and LBD TA. No SLP eval.          Past Medical History:   Diagnosis Date    Anemia due to stage 5 chronic kidney disease 3/1/2019    Diabetes mellitus type II     Dialysis patient     Hyperlipidemia     Hypertension     Kidney failure     MIKE  (obstructive sleep apnea)     Urinary tract infection      Past Surgical History:   Procedure Laterality Date    AV FISTULA PLACEMENT      left lower arm    SPINE, CERVICAL, POSTERIOR APPROACH  LAMINECTOMY C3-C6; C6-C7; WITH MEDIAL DISCECTOMY N/A 3/3/2019    Performed by Ruddy Wylie MD at [facility] OR 2ND FLR    TONSILLECTOMY, ADENOIDECTOMY      TRANSESOPHAGEAL ECHOCARDIOGRAM (SERENITY) N/A 5/23/2017    Performed by Donaldo Mai MD at [facility] OR     Review of patient's allergies indicates:   Allergen Reactions    Keflex [cephalexin] Nausea Only       Scheduled Medications:    sodium chloride 0.9%   Intravenous Once    diclofenac sodium   Topical (Top) Daily    epoetin shefali (PROCRIT) injection  7,000 Units Intravenous Every Mon, Wed, Fri    FLUoxetine  20 mg Oral Daily    gabapentin  300 mg Oral QHS    insulin aspart U-100  2 Units Subcutaneous TIDWM    insulin detemir U-100  3 Units Subcutaneous BID    lactulose  20 g Oral Daily    metoclopramide HCl  5 mg Oral QID (AC & HS)    midodrine  20 mg Oral Q6H    pantoprazole  40 mg Oral BID    polyethylene glycol  17 g Oral Daily    pravastatin  40 mg Oral Daily    senna-docusate 8.6-50 mg  1 tablet Oral BID       PRN Medications: sodium chloride 0.9%, acetaminophen, dextrose 50%, dextrose 50%, glucagon (human recombinant), glucose, glucose, insulin aspart U-100, miconazole NITRATE 2 %, ondansetron, oxyCODONE, simethicone    Family History     Problem Relation (Age of Onset)    Colon cancer Mother    Diabetes Mother, Father, Maternal Grandmother, Maternal Grandfather    Heart disease Father    Hypertension Father    Lung cancer Mother        Tobacco Use    Smoking status: Never Smoker    Smokeless tobacco: Never Used   Substance and Sexual Activity    Alcohol use: No    Drug use: No    Sexual activity: Not Currently     Review of Systems   Constitutional: Positive for activity change and fatigue.   HENT: Negative for trouble swallowing and voice  change.    Eyes: Negative for photophobia and visual disturbance.   Respiratory: Positive for shortness of breath. Negative for cough.    Cardiovascular: Negative for chest pain and palpitations.   Gastrointestinal: Positive for abdominal distention and constipation. Negative for abdominal pain, nausea and vomiting.   Genitourinary: Negative for difficulty urinating and flank pain.   Musculoskeletal: Positive for gait problem, myalgias and neck pain.   Skin: Negative for color change and rash.   Neurological: Positive for weakness. Negative for speech difficulty.   Psychiatric/Behavioral: Negative for agitation and confusion.     Objective:     Vital Signs (Most Recent):  Temp: 98.8 °F (37.1 °C) (03/14/19 1612)  Pulse: 84 (03/14/19 1612)  Resp: 19 (03/14/19 1612)  BP: (!) 147/67 (03/14/19 1612)  SpO2: 97 % (03/14/19 1612)    Vital Signs (24h Range):  Temp:  [98.2 °F (36.8 °C)-98.8 °F (37.1 °C)] 98.8 °F (37.1 °C)  Pulse:  [55-84] 84  Resp:  [13-19] 19  SpO2:  [96 %-100 %] 97 %  BP: (125-168)/(53-74) 147/67     Body mass index is 45.62 kg/m².    Physical Exam   Constitutional: He is oriented to person, place, and time. He appears well-developed and well-nourished.   obese   HENT:   Head: Normocephalic and atraumatic.   Eyes: Right eye exhibits no discharge. Left eye exhibits no discharge.   Neck: Decreased range of motion present.   Cardiovascular: Normal rate and intact distal pulses.   Pulmonary/Chest:   Pursed lip breathing noted  Increased effort noted, on RA   Abdominal: He exhibits distension. There is no tenderness.   Musculoskeletal: He exhibits no deformity.   RUE: 5/5.  LUE: 5/5.  RLE: 5/5.  LLE:3/5.  Neurological: He is alert and oriented to person, place, and time.   Skin: Skin is warm and dry.   Psychiatric: He has a normal mood and affect. His behavior is normal.           Diagnostic Results:   Labs: Reviewed  ECG: Reviewed  X-Ray: Reviewed  US: Reviewed  CT: Reviewed    Assessment/Plan:     * S/P  laminectomy    -UE weakenss and ataxia after a fall.  - CTH/MRI/MRA brain revealed no acute pathology  -MRI C spine revealed a significant degree of spinal canal stenosis from C3-C4 through C6-C7  -NSGY consulted and s/p C3-6 posterior decompression on 3/3 with drain placement.  -No C-collar needed. Per NSGY, will need future fusion.    -Hypotension noted during surgery.     Impaired functional mobility and endurance    -2/2 complicated hospital course     Recommendations  -  Encourage mobility, OOB in chair, and early ambulation as appropriate  -  PT/OT evaluate and treat  -  Pain management  -  DVT prophylaxis  -  Monitor for and prevent skin breakdown and pressure ulcers  · Early mobility, repositioning/weight shifting every 20-30 minutes when sitting, turn patient every 2 hours, proper mattress/overlay and chair cushioning, pressure relief/heel protector boots         Hypotension    -s/p ICU with levophed gtt  -receiving midodrine with improvement      Left leg pain    -noted on exam  -US LLE negative for DVT  -L femur xray limited 2/2 body habitus     Anemia due to stage 5 chronic kidney disease    -h/h 7.9/25     ESRD (end stage renal disease) on dialysis    -on HD MWF  -s/p code 2/2 hypovolemia during HD early in course      Recommend Skilled Nursing per Dr. Merrill.  SNF per patient/family preference.        Thank you for your consult.     Yessenia Carrillo NP  Department of Physical Medicine & Rehab  [facility]

## 2019-03-14 NOTE — CONSULTS
Inpatient consult to Physical Medicine Rehab  Consult performed by: Yessenia Carrillo NP  Consult ordered by: Zee Crews PA-C  Reason for consult: assess rehab needs      Attempted to evaluate patient for PAC recommendation x 2 attempts but not successful 2/2 HD.  Will attempt PAC evaluation at a later time.    FLAKITO Steele, FNP-C  Physical Medicine & Rehabilitation   03/14/2019  Spectralink: [id number]

## 2019-03-14 NOTE — PROGRESS NOTES
[facility]  Neurocritical Care  Progress Note    Admit Date: 2/28/2019  Service Date: 03/13/2019  Length of Stay: 13    Subjective:     Chief Complaint: S/P laminectomy    History of Present Illness: The patient  is a 58 y.o. male with PMHx of of DMII, ESRD on HD MWF(last dialysis 3/1), HLD, HTN and MIKE admitted to [facility] s/p cervical laminectomy C3-C6, C6-C7 w/medial discectomy. Per chart review,  Mr. Farmer has had wobbly gait since this past September, at which point he recalls falling and has been at 80-90% of his normal function.  He has been able to walk without assist, however not for very long distances.  He states that since last Sunday he has felt progression of his symptoms and now feels it would be difficult to stand up to ambulate at all. His UE symptoms have been present for about 3 months including not writing as well as he is used to.MRI cervical spine (2/27)at OSH on 2/27 revealed severe disc space narrowing C3-C4 and C5-C6 level.  Patient admitted to [facility] for close monitoring and higher level of care.            Hospital Course: 3/3: pt admitted to post-up, MAP goal >75, on levo  3/4: extubated   3/5:Daily weights, 5 units Q 6hrs Insulin apart added, diabetic diet started    3/8/2019: remains on low dose pressors trying to wean today. Plan for CRRT today; started Prozac for mood and Lactulose to help with constipation.  03/09/2019: no issues. Got albumin before HD, still on minimal pressor support. Weaning off.   03/10/2019 intermittent need for levophed.   03/11/2019 NAEO. Off pressors. Cont midodrine.     INTERVAL HX- awaiting HD to ensure toleration off pressors prior to step down .       Review of Symptoms:   Constitutional: Denies fevers, weight loss, chills, or weakness.   Eyes: Denies changes in vision.   ENT: Denies dysphagia, nasal discharge, ear pain or discharge.   Cardiovascular: Denies chest pain, palpitations, orthopnea, or claudication.   Respiratory: Denies  shortness of breath, cough, hemoptysis, or wheezing.   GI: Denies nausea/vomitting, hematochezia, melena, abd pain, or changes in appetite.   : Denies dysuria, incontinence, or hematuria.   Musculoskeletal: Denies joint pain or myalgias.   Skin/breast: Denies rashes, lumps, lesions, or discharge.   Neurologic: Denies headache, dizziness, vertigo, or paresthesias.   Psychiatric: Denies changes in mood or hallucinations.   Endocrine: Denies polyuria, polydipsia, heat/cold intolerance.   Hematologic/Lymph: Denies lymphadenopathy, easy bruising or easy bleeding.   Allergic/Immunologic: Denies rash, rhinitis      Medications:  Continuous Infusions:    Scheduled Meds:   diclofenac sodium   Topical (Top) Daily    epoetin shefali (PROCRIT) injection  7,000 Units Intravenous Every Mon, Wed, Fri    FLUoxetine  20 mg Oral Daily    gabapentin  300 mg Oral QHS    insulin aspart U-100  4 Units Subcutaneous TIDWM    insulin detemir U-100  12 Units Subcutaneous QHS    lactulose  20 g Oral Daily    metoclopramide HCl  5 mg Oral QID (AC & HS)    midodrine  20 mg Oral Q6H    pantoprazole  40 mg Oral BID    polyethylene glycol  17 g Oral Daily    pravastatin  40 mg Oral Daily    senna-docusate 8.6-50 mg  1 tablet Oral BID     PRN Meds:.acetaminophen, dextrose 50%, dextrose 50%, glucagon (human recombinant), glucose, glucose, insulin aspart U-100, miconazole NITRATE 2 %, ondansetron, oxyCODONE    OBJECTIVE:   Vital Signs (Most Recent):   Temp: 98.7 °F (37.1 °C) (03/13/19 1930)  Pulse: (!) 58 (03/13/19 2100)  Resp: 15 (03/13/19 1930)  BP: (!) 168/74 (03/13/19 1930)  SpO2: 96 % (03/13/19 2100)    Vital Signs (24h Range):   Temp:  [98.5 °F (36.9 °C)-98.7 °F (37.1 °C)] 98.7 °F (37.1 °C)  Pulse:  [51-82] 58  Resp:  [10-26] 15  SpO2:  [96 %-100 %] 96 %  BP: ()/(53-74) 168/74  Arterial Line BP: (127-162)/(51-65) 140/55    ICP/CPP (Last 24h):   CO:  [7.7 L/min-9.6 L/min] 9.3 L/min  CI:  [2.8 L/min/m2-3.9 L/min/m2] 2.8  L/min/m2    I & O (Last 24h):     Intake/Output Summary (Last 24 hours) at 3/13/2019 2334  Last data filed at 3/13/2019 0505  Gross per 24 hour   Intake 100 ml   Output --   Net 100 ml     Physical Exam:  GA: Alert, comfortable, no acute distress.   HEENT: No scleral icterus or JVD. Neck supple  Pulmonary: Air entry equal to auscultation A/P/L. Wheezing no, crackles no  Cardiac: RRR, S1 & S2 w/o rubs/murmurs/gallops.   Abdominal: soft, non-tender, bowel sounds present x 4. No appreciable hepatosplenomegaly.  Skin: No jaundice, rashes, or visible lesions.  Neuro:  --Mental Status:  Awake and alert, follows commands, fluent. Spontaneous eye opening. Tracks.   --Pupils 3.5 mm, PERRL.   --Corneal reflex not done in this awake patient, gag, cough intact.  --LUE strength: 3/5  --RUE strength: 3/5  --LLE strength: 5/5  --RLE strength: 5/5  Brisk reflexes bilateral UE  Plantars equivocal bilaterally.   MSK:  No edema in UE and LE    Vent Data:   Resp Rate Total:  [11 br/min-20 br/min] 12 br/min    Lines/Drains/Airway:          Trialysis (Dialysis) Catheter 03/04/19 0250 left internal jugular (Active)   IV Device Securement sutures;catheter securement device 3/10/2019  7:00 AM   Additional Site Signs no erythema;no warmth;no edema;no pain;no palpable cord;no streak formation;no drainage 3/10/2019  7:00 AM   Patency/Care infusing 3/10/2019  7:00 AM   Waveform normal 3/10/2019  7:00 AM   Site Assessment Clean;Intact;Dry 3/10/2019  7:00 AM   Status Flushed 3/10/2019  7:00 AM   Flows Good 3/10/2019  7:00 AM   Dressing Intervention Dressing reinforced 3/10/2019  7:00 AM   Dressing Status Biopatch in place;Clean;Dry;Intact 3/10/2019  7:00 AM   Dressing Change Due 03/11/19 3/10/2019  7:00 AM   Verification by X-ray Yes 3/9/2019  7:05 PM   Site Condition No complications 3/10/2019  7:00 AM   Dressing Occlusive 3/10/2019  7:00 AM   Daily Line Review Performed 3/10/2019  7:00 AM           Arterial Line 03/03/19 1216 Right Radial  (Active)   Site Assessment Clean;Dry;Intact 3/10/2019  7:00 AM   Line Status Pulsatile blood flow 3/10/2019  7:00 AM   Art Line Waveform Appropriate 3/10/2019  7:00 AM   Arterial Line Interventions Zeroed and calibrated;Leveled;Connections checked and tightened;Flushed per protocol 3/10/2019  7:00 AM   Color/Movement/Sensation Capillary refill less than 3 sec 3/10/2019  7:00 AM   Dressing Type Transparent 3/10/2019  7:00 AM   Dressing Status Biopatch in place;Clean;Dry;Intact 3/10/2019  7:00 AM   Dressing Intervention Dressing reinforced 3/10/2019  7:00 AM   Dressing Change Due 03/14/19 3/10/2019  7:00 AM           Closed/Suction Drain 03/03/19 1431 Posterior Neck Accordion 10 Fr. (Active)   Site Description Unable to view 3/10/2019  7:00 AM   Dressing Type Gauze 3/10/2019  7:00 AM   Dressing Status Clean;Dry;Intact 3/10/2019  7:00 AM   Dressing Intervention Dressing reinforced 3/10/2019  7:00 AM   Drainage Serosanguineous 3/10/2019  7:00 AM   Status To bulb suction 3/10/2019  7:00 AM   Output (mL) 3 mL 3/10/2019  6:04 AM            Hemodialysis AV Fistula Left forearm (Active)   Needle Size 15ga 3/1/2019  4:19 PM   Site Assessment Intact;Dry 3/10/2019  7:00 AM   Patency Present;Thrill;Bruit 3/10/2019  7:00 AM   Status Deaccessed 3/10/2019  7:00 AM   Flows Good 2/28/2019  8:20 AM   Dressing Intervention Dressing reinforced 3/6/2019  3:05 AM   Dressing Status Clean;Dry;Intact 3/2/2019  3:01 PM   Site Condition No complications 3/10/2019  7:00 AM   Dressing Open to air (None) 3/10/2019  7:00 AM            Trialysis (Dialysis) Catheter 03/04/19 0250 left internal jugular (Active)   IV Device Securement sutures;catheter securement device 3/10/2019  7:00 AM   Additional Site Signs no erythema;no warmth;no edema;no pain;no palpable cord;no streak formation;no drainage 3/10/2019  7:00 AM   Patency/Care infusing 3/10/2019  7:00 AM   Waveform normal 3/10/2019  7:00 AM   Site Assessment Clean;Intact;Dry 3/10/2019  7:00 AM    Status Flushed 3/10/2019  7:00 AM   Flows Good 3/10/2019  7:00 AM   Dressing Intervention Dressing reinforced 3/10/2019  7:00 AM   Dressing Status Biopatch in place;Clean;Dry;Intact 3/10/2019  7:00 AM   Dressing Change Due 03/11/19 3/10/2019  7:00 AM   Verification by X-ray Yes 3/9/2019  7:05 PM   Site Condition No complications 3/10/2019  7:00 AM   Dressing Occlusive 3/10/2019  7:00 AM   Daily Line Review Performed 3/10/2019  7:00 AM     Nutrition/Tube Feeds (if NPO state why): po     Labs:  ABG: No results for input(s): PH, PO2, PCO2, HCO3, POCSATURATED, BE in the last 24 hours.  BMP:  Recent Labs   Lab 03/13/19  0226      K 4.3      CO2 24   BUN 28*   CREATININE 4.4*   GLU 91   MG 2.0   PHOS 4.1     LFT:   Lab Results   Component Value Date    AST 27 03/13/2019    ALT 12 03/13/2019    ALKPHOS 193 (H) 03/13/2019    BILITOT 1.0 03/13/2019    ALBUMIN 2.9 (L) 03/13/2019    PROT 5.6 (L) 03/13/2019     CBC:   Lab Results   Component Value Date    WBC 5.97 03/13/2019    HGB 7.4 (L) 03/13/2019    HCT 23.7 (L) 03/13/2019     (H) 03/13/2019    PLT 78 (L) 03/13/2019     Microbiology x 7d:   Microbiology Results (last 7 days)     ** No results found for the last 168 hours. **        Imaging:    I personally reviewed the above image.  Today I independently reviewed pertinent medications, lines/drains/airways, imaging, cardiology results, laboratory results, microbiology results, notably:   ASSESSMENT/PLAN:     Active Hospital Problems    Diagnosis    *S/P laminectomy    Class 3 severe obesity due to excess calories with serious comorbidity and body mass index (BMI) of 45.0 to 49.9 in adult    Hyperkalemia    Hypothermia    Hyponatremia    Anemia due to stage 5 chronic kidney disease    Hyperphosphatemia    Metabolic bone disease    Problem with vascular access    Left leg pain    Pre-syncope    Hypotension    Bradycardia     see      Multifactorial peripheral neuropathy    MIKE (obstructive  sleep apnea) - very severe latest sleep study says BPAP @ 16/8    ESRD (end stage renal disease) on dialysis    Hyperlipidemia    Diabetes mellitus, type 2      Neuro:   Myelopathy s/p laminectomy  PT/OT   Started on fluoxetine for mood - patient expressed depressed due to medical condition  Gabapentin for neuropathy    Pulmonary:   Saturation > 92%    Cardiac:   Intermittent need for levophed  Hypoalbuminemia - 100ml albumin + 500 NS bolus for hypotension  Midodrine 20 mg q6  MAP goals > 65 mmhg    Renal:    ESRD on HD    ID:   Afebrile, normal wbc    Hem/Onc:   hh trending down will monitor  Normal plt count  Okay with >50     Endocrine:    BS stable on insulin regimen    Fluids/Electrolytes/Nutrition/GI:   Tolerating po  Lines:  Art +  CVC +  ETT NA  Douglas NA  NG NA  PEG NA    Proph:  DVT:SCD/heparin  Constipation:  Last output:bowel regimen  PUP:protonix  VAP: NA          Full Code    Zee Crews PA-C  Neurocritical Care  [facility]

## 2019-03-14 NOTE — PROGRESS NOTES
Received pt to unit via bed, alert and oriented. LFA fistula +bruit/thrill accessed w/15g needles Maintenance Hd initiated @ 1020 UF Goal 1-2L over 4hrs HD.

## 2019-03-14 NOTE — PLAN OF CARE
Problem: Adult Inpatient Plan of Care  Goal: Optimal Comfort and Wellbeing  Outcome: Ongoing (interventions implemented as appropriate)  Continue to turn Q2 and when patient request for comfort

## 2019-03-14 NOTE — HPI
Angel Farmer is a 58-year-old male with PMHx ofDiabetes mellitus type II, ESRD on HD MWF, Hyperlipidemia, Hypertension and MIKE.  Patient presented to [facility] on 2/28  for evaluation of upper extremity weakness and ataxia after a fall. CTH/MRI/MRA brain revealed no acute pathology. MRI C spine revealed a significant degree of spinal canal stenosis from C3-C4 through C6-C7. NSGY consulted and s/p C3-6 posterior decompression on 3/3 with drain placement.  No C-collar needed  Per NSGY, will need future fusion.  Hypotension noted during surgery. Hospital course further complicated by S/p code for hypovolemia during HD (early during course, transferred to ICU intubated and was placed on Levophed, now extubated and stepped down from ICU), depression (Started on fluoxetine), anemia, constipation with abd distention (enema x 2 without full relief), & DM2.     Functional History: Patient lives in [place] with brother, sister-in-law, and 2 nieces in 1-story house with 2 SLY.  Prior to admission, (I) with ADLs and mobility. DME: none.

## 2019-03-14 NOTE — ASSESSMENT & PLAN NOTE
58 M with progressive cervical myelopathy and stenosis from C3-6 with myelomalacia. S/p code for hypovolemia during HD. Stable this morning and labs WNL. He stepped up to ICU for further care and work up, now s/p C3-6 posterior decompression 3/4/19.      - Pt neurologically stable  - Neuro checks q4h  -Continue dialysis T/Th. Management per Nephrology.   - No C collar needed  - Pain control: Continue Oxycodone IR 5mg q4h prn  - Daily PT/OT. Rec rehab placement.   - H/H stable 7.9/25.3, will ctm.  -Plts 103k, goal >50k  -Diarrhea likely 2/2 recent enema. Pt continues to tolerate diet. Will ctm.

## 2019-03-14 NOTE — PLAN OF CARE
SW following for DC needs. SW in communication with CM.    Referral is under review by ABRAHAN.      03/14/19 9210   Post-Acute Status   Post-Acute Authorization Placement   Post-Acute Placement Status Pending Post-Acute Clinical Review     Ofelia Lund LMSW  [facility]  [id number]

## 2019-03-14 NOTE — PLAN OF CARE
[facility]    FACILITY TRANSFER ORDERS      Patient Name: Angel Farmer Jr.  YOB: 1960    PCP: Satya Noriega MD   PCP Address: [address] [place] [address] / [place] [id number]  PCP Phone Number: [phone]  PCP Fax: [phone]    Encounter Date: 03/14/2019    Admit to:     Vital Signs:  Routine    Diagnoses:   Active Hospital Problems    Diagnosis  POA    *S/P laminectomy [Z98.890]  Not Applicable    Impaired functional mobility and endurance [Z74.09]  Unknown    Class 3 severe obesity due to excess calories with serious comorbidity and body mass index (BMI) of 45.0 to 49.9 in adult [E66.01, Z68.42]  Not Applicable    Hyperkalemia [E87.5]  Unknown    Hypothermia [T68.XXXA]  Unknown    Hyponatremia [E87.1]  Unknown    Anemia due to stage 5 chronic kidney disease [N18.5, D63.1]  Unknown    Hyperphosphatemia [E83.39]  Unknown    Metabolic bone disease [E88.9, M90.80]  Unknown    Problem with vascular access [Z78.9]  Unknown    Left leg pain [M79.605]  Unknown    Pre-syncope [R55]  Unknown    Hypotension [I95.9]  Unknown    Bradycardia [R00.1]  Unknown     see      Multifactorial peripheral neuropathy [G62.9]  Yes    MIKE (obstructive sleep apnea) - very severe latest sleep study says BPAP @ 16/8 [G47.33]  Yes    ESRD (end stage renal disease) on dialysis [N18.6, Z99.2]  Not Applicable    Hyperlipidemia [E78.5]  Yes    Diabetes mellitus, type 2 [E11.9]  Yes      Resolved Hospital Problems    Diagnosis Date Resolved POA    Pre-op evaluation [Z01.818] 03/07/2019 Not Applicable       Allergies:  Review of patient's allergies indicates:   Allergen Reactions    Keflex [cephalexin] Nausea Only       Diet: diabetic diet: 2000 calorie    Activities: Activity as tolerated    Nursing: per facility protocol     Labs: CBC and BMP Daily for 5 days     CONSULTS:    Physical Therapy to evaluate and treat.  and Occupational Therapy to evaluate and treat.    MISCELLANEOUS  CARE:  N/A    WOUND CARE ORDERS  Leave incision open to air. Do not scrub or submerge incision. Apply bacitracin to incision twice daily. Staples may be removed after 3/18/19.    Medications: Review discharge medications with patient and family and provide education.      Current Discharge Medication List      START taking these medications    Details   acetaminophen (TYLENOL) 500 MG tablet Take 1 tablet (500 mg total) by mouth every 6 (six) hours as needed for pain or temp> 101.  Refills: 0      diclofenac sodium (VOLTAREN) 1 % Gel Apply topically once daily.      epoetin shefali 3,000 unit/mL Soln 3,000 Units, epoetin shefali 4,000 unit/mL Soln 4,000 Units injection Inject 7,000 Units into the skin every Mon, Wed, Fri.      FLUoxetine 20 MG capsule Take 1 capsule (20 mg total) by mouth once daily.  Qty: 30 capsule, Refills: 11      !! insulin aspart U-100 (NOVOLOG) 100 unit/mL InPn pen Inject 0-5 Units into the skin before meals and at bedtime as needed (Hyperglycemia).  Refills: 0  **LOW CORRECTION DOSE**    Blood Glucose  mg/dL                  Pre-meal                Bedtime  151-200                0 unit                      0 unit  201-250                2 units                    1 unit  251-300                3 units                    1 unit  301-350                4 units                    2 units  >350                     5 units                    3 units    Administer subcutaneously if needed at times designated by monitoring schedule.     DO NOT HOLD correction dose insulin for patients who are  NPO.      !! insulin aspart U-100 (NOVOLOG) 100 unit/mL InPn pen Inject 2 Units into the skin 3 (three) times daily.  Refills: 0      insulin detemir U-100 (LEVEMIR FLEXTOUCH) 100 unit/mL (3 mL) SubQ InPn pen Inject 3 Units into the skin 2 (two) times daily.  Refills: 0      lactulose (CHRONULAC) 20 gram/30 mL Soln Take 30 mLs (20 g total) by mouth once daily. for 10 days  Qty: 300 mL, Refills: 0       metoclopramide HCl (REGLAN) 5 mg/5 mL Soln Take 5 mLs (5 mg total) by mouth 4 (four) times daily before meals and nightly.      miconazole NITRATE 2 % (MICOTIN) 2 % top powder Apply topically 2 (two) times daily as needed (moisture).  Refills: 0      oxyCODONE (ROXICODONE) 5 MG immediate release tablet Take 1 tablet (5 mg total) by mouth every 6 (six) hours as needed for severe pain.  Qty: 40 tablet, Refills: 0      pantoprazole (PROTONIX) 40 mg GrPS Take 1 packet (40 mg total) by mouth 2 (two) times daily.  Qty: 60 packet, Refills: 11       !! - Potential duplicate medications found. Please discuss with provider.      CONTINUE these medications which have CHANGED    Details   midodrine (PROAMATINE) 10 MG tablet Take 2 tablets (20 mg total) by mouth every 6 (six) hours.  Qty: 240 tablet, Refills: 11         CONTINUE these medications which have NOT CHANGED    Details   ammonium lactate 12 % Crea Apply 1 application topically 3 (three) times daily as needed.  Qty: 140 g, Refills: 3    Associated Diagnoses: Multifactorial peripheral neuropathy; Tinea pedis of left foot; Diabetic polyneuropathy associated with type 2 diabetes mellitus; Hyperkeratosis; Onychomycosis due to dermatophyte      aspirin 81 MG Chew Take 1 tablet (81 mg total) by mouth once daily.  Qty: 360 tablet, Refills: 0         fluticasone (FLONASE) 50 mcg/actuation nasal spray 1 spray by Each Nare route once daily.  Qty: 16 g, Refills: 0    Associated Diagnoses: Allergic rhinitis      gabapentin (NEURONTIN) 300 MG capsule TAKE 1 BY MOUTH EVERY      EVENING  Qty: 90 capsule, Refills: 3    Associated Diagnoses: Multifactorial peripheral neuropathy         pravastatin (PRAVACHOL) 40 MG tablet Take 1 tablet (40 mg total) by mouth once daily.  Qty: 30 tablet, Refills: 6    Associated Diagnoses: Hyperlipidemia, unspecified hyperlipidemia type         STOP taking these medications       FOSRENOL 1,000 mg chewable tablet Comments:   Reason for Stopping:          nystatin (MYCOSTATIN) cream Comments:   Reason for Stopping:         prednisoLONE acetate (PRED FORTE) 1 % DrpS Comments:   Reason for Stopping:                    _________________________________  Christin Chiu PA-C  03/14/2019

## 2019-03-14 NOTE — PHYSICIAN QUERY
PT Name: Angel Farmer Jr.  MR #: [id number]     Physician Query Form - Documentation Clarification      CDS: Indira Shepherd RN, CCDS       Contact information: [email or web address]    This form is a permanent document in the medical record.     Query Date: March 14, 2019    By submitting this query, we are merely seeking further clarification of documentation. Please utilize your independent clinical judgment when addressing the question(s) below.    The Medical record reflects the following:    Supporting Clinical Findings Location in Medical Record   ESRD on dialysis  Patient currently on hemodialysis for removal of uremic toxins and volume.    Metabolic bone disease  Check pth vit d   Follow calcium and phos serially    Hyperphosphatemia  On fosrenol as outpatient 2000mg tid  Will order 100 mg tid wm for now and adjust as needed   if fosrenol is unavailable would use sevelamer as a phosphate binder 800-1600 mg tid wm 3/1-Nephrology Consult        3/1-Nephrology Consult        3/1-Nephrology Consult                                                                              Doctor, Please specify diagnosis or diagnoses associated with above clinical findings.    Please further clarify the diagnosis of Metabolic Bone Disease in pt with ESRD.    Provider Use Only     [   ] Renal Osteodystrophy     [X ] Other clarification (please specify)__On Fosrenol 2000 mg TID as outatient_______________________                                                                                                           [  ] Clinically Undetermined

## 2019-03-14 NOTE — DISCHARGE INSTRUCTIONS
Please follow ONLY the instructions that are checked below.    Activity Restrictions:  [x]  Return to work will be determined on an individual basis.  [x]  No lifting greater than 10 pounds.  [x]  Avoid bending and twisting the area of your surgery more than 45 degrees from neutral position in any direction.  [x]  No driving or operating machinery:  [x]  until cleared by your surgeon.  [x]  while taking narcotic pain medications or muscle relaxants.  [x]  No cervical collar, soft collar, or lumbar brace required.  []  Wear your brace at all times. You may be given an extra brace or soft collar to wear when showering.  []  Wear your brace at all times except when flat in bed.  []  Wear brace for comfort only.  [x]  Increase ambulation over the next 2 weeks so that you are walking 2 miles per day at 2 weeks post-operatively.  [x]  Walk on paved surfaces only. It is okay to walk up and down stairs while holding onto a side rail.  [x]  No sexual activity for 2-3 weeks.    Discharge Medication/Follow-up:  [x]  Please refer to discharge medication reconciliation form.  [x]  Do not take ANY non-steroidal anti-inflammatory drugs (NSAIDS), including the following: ibuprofen, naprosyn, Aleve, Advil, Indocin, Mobic, or Celebrex for:  []  4 weeks  [x]  8 weeks  []  6 months  [x]  Prescriptions for appropriate medication will be given upon discharge.   []  Pain control:             []  Muscle relaxer:            [x]  Take docusate (Colace 100 mg): take one capsule a day as needed for constipation. You can get this over the counter.  [x]  Follow-up appointment:  []  10-14 days post-op for wound check by physician assistant/nurse  []  4-6 weeks with MD:  []  with x-rays  []  without x-rays  [x]  An appointment will be mailed to you.    Wound Care:  []  Remove dressing or bandaid in    days.  [x]  No bandage required. Keep your incision open to the air.  [x]  You may shower on the 2nd day after your surgery. Have the force of water  hit you opposite from the incision. Pat the incision dry after your shower; do not scrub the incision.  [x]  You cannot take a bath until 8 weeks after surgery.  [x]  Apply bacitracin to incision twice a day     Call your doctor or go to the Emergency Room for any signs of infection, including: increased redness, drainage, pain, or fever (temperature ?101.5 for 24 hours). Call your doctor or go to the Emergency Room if there are any localized neurological changes; problems with speech, vision, numbness, tingling, weakness, or severe headache; or for other concerns.    Special Instructions:  [x]  No use of tobacco products.  [x]  Diet: Please eat a regular diet as tolerated.  []  Other diet:              Specific physician instructions:           Physicians need 3 days' notice for pain medicine to be refilled. Pain medicine will only be refilled between 8 AM and 5 PM, Monday through Friday, due to Food and Drug Administration regulation of documentation.    If you have any questions about this form, please call [phone].    Form No. 77819 (Revised 10/31/2013)

## 2019-03-14 NOTE — PLAN OF CARE
Problem: Adult Inpatient Plan of Care  Goal: Plan of Care Review  Outcome: Ongoing (interventions implemented as appropriate)  Patient has dialysis this morning with 3 liters removed. Multiple BMs today at dialysis as well as on the  Turned Q2 when on the unit, wedge and pillows used for comfort.. Complaints of abdominal fullness and discomfort, simethicone ordered with moderate relief. Surgical incision open to air without drainage, staples intact. C/O of pain to left shoulder, tylenol given with minimal relief. Plan of care reviewed, rehab potential discussed and questions answered.

## 2019-03-14 NOTE — SUBJECTIVE & OBJECTIVE
"Interval HPI:   Overnight events: transferred to 8th floor. KEAGAN.  HD per nephrology. BG remains below goal with decreased insulin doses.   Eatin%  Nausea: No  Hypoglycemia and intervention: No  Fever: No  TPN and/or TF: No    BP (!) 125/55 (BP Location: Right arm, Patient Position: Lying)   Pulse 62   Temp 98.3 °F (36.8 °C) (Oral)   Resp 16   Ht 5' 8" (1.727 m)   Wt (!) 136.1 kg (300 lb 0.7 oz)   SpO2 97%   BMI 45.62 kg/m²     Labs Reviewed and Include    Recent Labs   Lab 19  0316   GLU 82   CALCIUM 9.4   ALBUMIN 2.8*   PROT 5.6*      K 4.7   CO2 22*      BUN 42*   CREATININE 6.7*   ALKPHOS 173*   ALT 12   AST 26   BILITOT 0.9     Lab Results   Component Value Date    WBC 6.98 2019    HGB 7.9 (L) 2019    HCT 25.3 (L) 2019     (H) 2019    PLT 78 (L) 2019     No results for input(s): TSH, FREET4 in the last 168 hours.  Lab Results   Component Value Date    HGBA1C 5.0 2019       Nutritional status:   Body mass index is 45.62 kg/m².  Lab Results   Component Value Date    ALBUMIN 2.8 (L) 2019    ALBUMIN 2.9 (L) 2019    ALBUMIN 3.0 (L) 2019    ALBUMIN 3.0 (L) 2019     No results found for: PREALBUMIN    Estimated Creatinine Clearance: 16.6 mL/min (A) (based on SCr of 6.7 mg/dL (H)).    Accu-Checks  Recent Labs     19  1152 19  1659 19  2138 19  0751 19  1145 19  2304 19  0825 19  1432 19  1736 19  2144   POCTGLUCOSE 149* 89 118* 122* 99 100 93 108 94 169*       Current Medications and/or Treatments Impacting Glycemic Control  Immunotherapy:    Immunosuppressants     None        Steroids:   Hormones (From admission, onward)    None        Pressors:    Autonomic Drugs (From admission, onward)    Start     Stop Route Frequency Ordered    03/10/19 1200  midodrine tablet 20 mg      -- Oral Every 6 hours 03/10/19 0996        Hyperglycemia/Diabetes Medications: "   Antihyperglycemics (From admission, onward)    Start     Stop Route Frequency Ordered    03/14/19 2100  insulin detemir U-100 pen 3 Units      -- SubQ 2 times daily 03/14/19 0716    03/14/19 1130  insulin aspart U-100 pen 2 Units      -- SubQ 3 times daily with meals 03/14/19 0716    03/06/19 1638  insulin aspart U-100 pen 0-5 Units      -- SubQ Before meals & nightly PRN 03/06/19 1532

## 2019-03-14 NOTE — ASSESSMENT & PLAN NOTE
-2/2 complicated hospital course     Recommendations  -  Encourage mobility, OOB in chair, and early ambulation as appropriate  -  PT/OT evaluate and treat  -  Pain management  -  DVT prophylaxis  -  Monitor for and prevent skin breakdown and pressure ulcers  · Early mobility, repositioning/weight shifting every 20-30 minutes when sitting, turn patient every 2 hours, proper mattress/overlay and chair cushioning, pressure relief/heel protector boots

## 2019-03-14 NOTE — ASSESSMENT & PLAN NOTE
Caution with insulin stacking  Estimated Creatinine Clearance: 16.6 mL/min (A) (based on SCr of 6.7 mg/dL (H)).

## 2019-03-14 NOTE — PT/OT/SLP PROGRESS
Physical Therapy      Patient Name:  Angel Farmer Jr.   MRN:  [id number]    Patient not seen today secondary to (pt. away at dialysis on first attempt and unable to return for second attempt). Will follow-up tomorrow.    Ko Hand, PT   3/14/2019

## 2019-03-14 NOTE — PROGRESS NOTES
"[facility]  Endocrinology  Progress Note    Admit Date: 2019     Reason for Consult: Management of T2DM, Hyperglycemia     Surgical Procedure and Date: Cervical laminectomy C3-C6, C6-C7 w/ medial discectomy 3/3/19    Diabetes diagnosis year:     Lab Results   Component Value Date    HGBA1C 5.0 2019     Home Diabetes Medications: Basaglar 40 units q HS, Victoza 1.2 mg once daily,     How often checking glucose at home? 4x per day   BG readings on regimen: 90-120s  Hypoglycemia on the regimen?  Yes - about once per month  Missed doses on regimen?  No    Diabetes Complications include:     Hypoglycemia , Diabetic nephropathy  , Diabetic chronic kidney disease     , Diabetic retinopathy , Diabetic cataract  and Diabetic peripheral neuropathy     Complicating diabetes co morbidities:   MIKE, CKD and ESRD      HPI:   Patient is a 58 y.o. male with a diagnosis of HTN, HLD, MIKE, ESRD on HD, and DM2, who is s/p cervical laminectomy C3-C6, C6-C7 w/ medial discectomy 3/3/19.  Patient was admitted to the ED on 19 for upper extremity weakness and ataxia following a fall.  Patient's DM managed by PCP, Dr. Shemar Berry in [place].  Patient is prescribed MDI but only takes basal insulin and Victoza.  Positive family history of DM (mother, father, and both maternal grandparents).  Endocrine consulted for DM/BG management.    Interval HPI:   Overnight events: transferred to 8th floor. NAEON.  HD per nephrology. BG remains below goal with decreased insulin doses.   Eatin%  Nausea: No  Hypoglycemia and intervention: No  Fever: No  TPN and/or TF: No    BP (!) 125/55 (BP Location: Right arm, Patient Position: Lying)   Pulse 62   Temp 98.3 °F (36.8 °C) (Oral)   Resp 16   Ht 5' 8" (1.727 m)   Wt (!) 136.1 kg (300 lb 0.7 oz)   SpO2 97%   BMI 45.62 kg/m²      Labs Reviewed and Include    Recent Labs   Lab 19  0316   GLU 82   CALCIUM 9.4   ALBUMIN 2.8*   PROT 5.6*      K 4.7 "   CO2 22*      BUN 42*   CREATININE 6.7*   ALKPHOS 173*   ALT 12   AST 26   BILITOT 0.9     Lab Results   Component Value Date    WBC 6.98 03/14/2019    HGB 7.9 (L) 03/14/2019    HCT 25.3 (L) 03/14/2019     (H) 03/14/2019    PLT 78 (L) 03/13/2019     No results for input(s): TSH, FREET4 in the last 168 hours.  Lab Results   Component Value Date    HGBA1C 5.0 02/27/2019       Nutritional status:   Body mass index is 45.62 kg/m².  Lab Results   Component Value Date    ALBUMIN 2.8 (L) 03/14/2019    ALBUMIN 2.9 (L) 03/13/2019    ALBUMIN 3.0 (L) 03/12/2019    ALBUMIN 3.0 (L) 03/12/2019     No results found for: PREALBUMIN    Estimated Creatinine Clearance: 16.6 mL/min (A) (based on SCr of 6.7 mg/dL (H)).    Accu-Checks  Recent Labs     03/11/19  1152 03/11/19  1659 03/11/19  2138 03/12/19  0751 03/12/19  1145 03/12/19  2304 03/13/19  0825 03/13/19  1432 03/13/19  1736 03/13/19  2144   POCTGLUCOSE 149* 89 118* 122* 99 100 93 108 94 169*       Current Medications and/or Treatments Impacting Glycemic Control  Immunotherapy:    Immunosuppressants     None        Steroids:   Hormones (From admission, onward)    None        Pressors:    Autonomic Drugs (From admission, onward)    Start     Stop Route Frequency Ordered    03/10/19 1200  midodrine tablet 20 mg      -- Oral Every 6 hours 03/10/19 0946        Hyperglycemia/Diabetes Medications:   Antihyperglycemics (From admission, onward)    Start     Stop Route Frequency Ordered    03/14/19 2100  insulin detemir U-100 pen 3 Units      -- SubQ 2 times daily 03/14/19 0716    03/14/19 1130  insulin aspart U-100 pen 2 Units      -- SubQ 3 times daily with meals 03/14/19 0716    03/06/19 1638  insulin aspart U-100 pen 0-5 Units      -- SubQ Before meals & nightly PRN 03/06/19 1538          ASSESSMENT and PLAN    * S/P laminectomy    S/p cervical laminectomy C3-C6, C6-C7 w/ medial discectomy 3/3/19  Managed per primary team  Avoid hypoglycemia  Optimize BG control for  surgical wound healing         Diabetes mellitus, type 2    BG goal 140-180    Decrease Levemir to 3 units BID- first dose tonight  Decrease Novolog to 2 units with meals  Low dose correction scale given kidney function  BG monitoring AC/HS    Discharge planning: Patient will need insulin dosing changes upon discharge.  Currently only taking basal insulin plus Victoza.       MIKE (obstructive sleep apnea) - very severe latest sleep study says BPAP @ 16/8    May affect BG readings if uncontrolled         ESRD (end stage renal disease) on dialysis    Caution with insulin stacking  Estimated Creatinine Clearance: 25.2 mL/min (A) (based on SCr of 4.4 mg/dL (H)).         Anemia due to stage 5 chronic kidney disease    Affects A1C accuracy  Consider checking fructosamine at least 2 weeks post hospital discharge to evaluate BG control     Class 3 severe obesity due to excess calories with serious comorbidity and body mass index (BMI) of 45.0 to 49.9 in adult    may contribute to insulin resistance  Body mass index is 45.25 kg/m².             La Mcconnell NP  Endocrinology  [facility]

## 2019-03-14 NOTE — PLAN OF CARE
PT STEPPED DOWN FROM ICU     03/14/19 1243   Discharge Reassessment   Assessment Type Discharge Planning Reassessment   Provided patient/caregiver education on the expected discharge date and the discharge plan No   Do you have any problems affording any of your prescribed medications? No   Discharge Plan A Rehab

## 2019-03-14 NOTE — ASSESSMENT & PLAN NOTE
-UE weakenss and ataxia after a fall.  - CTH/MRI/MRA brain revealed no acute pathology  -MRI C spine revealed a significant degree of spinal canal stenosis from C3-C4 through C6-C7  -NSGY consulted and s/p C3-6 posterior decompression on 3/3 with drain placement.  -No C-collar needed. Per NSGY, will need future fusion.    -Hypotension noted during surgery.

## 2019-03-14 NOTE — PROGRESS NOTES
[facility]  Neurosurgery  Progress Note    Subjective:     History of Present Illness: 59 yo male with a PMH of ESRD with dialysis MWF (last on weds), DM, presenting as [facility] as admission to neurosurgery for evaluation after progressive course of unsteady gait as well as bilateral upper extremity weakness for the last several months.  Mr. Farmer has had wobbly gait since this past September, at which point he recalls falling and has been at 80-90% of his normal function.  He has been able to walk without assist, however not for very long distances.  He states that since last Sunday he has felt progression of his symptoms and now feels it would be difficult to stand up to ambulate at all.  He also endorses a pulled muscle of his LLE that is limiting his mobility.  His UE symptoms have been present for about 3 months including not writing as well as he is used to.  HE is able to write numbers, but not always his name.  He has been having difficulty using a fork to eat over the past month.  No sensory disturbance noted as well as no neck or back pain and no pain from the neck radiating into the hands.  He has intermittently been taking ASA 81 mg qdaily. He denies recent falls or trauma to the neck.  No other blood thinning medication.  MRI at OSH on 2/27 revealed significant cervical stenosis.      =    Post-Op Info:  Procedure(s) (LRB):  SPINE, CERVICAL, POSTERIOR APPROACH  LAMINECTOMY C3-C6; C6-C7; WITH MEDIAL DISCECTOMY (N/A)   11 Days Post-Op     Interval History: Pt in dialysis currently. He reports continued neck pain. Also reports loose stools for 2 days after receiving brown bomb enema. Tolerating diet without nausea, vomiting, or abdominal pain. Denies increased numbness/tingling, weakness, fever/chills.     Medications:  Continuous Infusions:  Scheduled Meds:   sodium chloride 0.9%   Intravenous Once    diclofenac sodium   Topical (Top) Daily    epoetin shefali  (PROCRIT) injection  7,000 Units Intravenous Every Mon, Wed, Fri    FLUoxetine  20 mg Oral Daily    gabapentin  300 mg Oral QHS    insulin aspart U-100  2 Units Subcutaneous TIDWM    insulin detemir U-100  3 Units Subcutaneous BID    lactulose  20 g Oral Daily    metoclopramide HCl  5 mg Oral QID (AC & HS)    midodrine  20 mg Oral Q6H    pantoprazole  40 mg Oral BID    polyethylene glycol  17 g Oral Daily    pravastatin  40 mg Oral Daily    senna-docusate 8.6-50 mg  1 tablet Oral BID     PRN Meds:sodium chloride 0.9%, acetaminophen, dextrose 50%, dextrose 50%, glucagon (human recombinant), glucose, glucose, insulin aspart U-100, miconazole NITRATE 2 %, ondansetron, oxyCODONE     Review of Systems  Objective:     Weight: (!) 136.1 kg (300 lb 0.7 oz)  Body mass index is 45.62 kg/m².  Vital Signs (Most Recent):  Temp: 98.2 °F (36.8 °C) (03/14/19 0740)  Pulse: 81 (03/14/19 1128)  Resp: 18 (03/14/19 0740)  BP: (!) 132/53 (03/14/19 0740)  SpO2: 98 % (03/14/19 0740) Vital Signs (24h Range):  Temp:  [98.2 °F (36.8 °C)-98.7 °F (37.1 °C)] 98.2 °F (36.8 °C)  Pulse:  [55-81] 81  Resp:  [10-20] 18  SpO2:  [96 %-100 %] 98 %  BP: (125-168)/(53-74) 132/53     Date 03/14/19 0700 - 03/15/19 0659   Shift 5635-4102 9249-4174 7820-1349 24 Hour Total   INTAKE   P.O. 50   50   Shift Total(mL/kg) 50(0.4)   50(0.4)   OUTPUT   Shift Total(mL/kg)       Weight (kg) 136.1 136.1 136.1 136.1                        Closed/Suction Drain 03/03/19 1431 Posterior Neck Accordion 10 Fr. (Active)   Site Description Unable to view 3/13/2019  3:00 PM   Dressing Type Gauze 3/13/2019  3:00 PM   Dressing Status Clean;Dry;Intact 3/13/2019  3:00 PM   Dressing Intervention Dressing reinforced 3/13/2019  3:00 PM   Drainage Serosanguineous 3/13/2019  3:00 PM   Status To bulb suction 3/13/2019  3:00 PM   Output (mL) 0 mL 3/12/2019  6:05 AM            Hemodialysis AV Fistula Left forearm (Active)   Needle Size 15ga 3/13/2019  1:45 AM   Site Assessment  Intact;Dry 3/13/2019  3:00 PM   Patency Present;Thrill;Bruit 3/13/2019  3:00 PM   Status Deaccessed 3/13/2019  3:00 PM   Flows Good 3/13/2019  1:45 AM   Dressing Intervention Dressing reinforced 3/13/2019  3:00 PM   Dressing Status Clean;Dry;Intact 3/13/2019  3:00 PM   Site Condition No complications 3/13/2019  3:00 PM   Dressing Gauze 3/13/2019  3:00 PM       Neurosurgery Physical Exam  General: well developed, well nourished, no distress. Obese.   Head: normocephalic, atraumatic  Neurologic: Alert and oriented. Thought content appropriate.  GCS: Motor: 6/Verbal: 5/Eyes: 4 GCS Total: 15  Mental Status: Awake, Alert, Oriented x 4  Language: No aphasia  Speech: No dysarthria  Cranial nerves: face symmetric, tongue midline, CN II-XII grossly intact.   Eyes: pupils equal, round, reactive to light with accomodation, EOMI.   Pulmonary: normal respirations, no signs of respiratory distress  Abdomen: soft, distended, not tender to palpation  Skin: Skin is warm, dry and intact.  Sensory: intact to light touch throughout    Motor Strength:Moves all extremities spontaneously with good tone. Strength exam pain limited. No abnormal movements seen.     Strength  Deltoids Triceps Biceps Wrist Extension Wrist Flexion Hand    Upper: R 5/5 5/5 5/5 5/5 5/5 5/5    L 5/5 5/5 5/5 5/5 5/5 5/5     Iliopsoas Quadriceps Knee  Flexion Tibialis  anterior Gastro- cnemius EHL   Lower: R 5/5 5/5 5/5 5/5 5/5 5/5    L 4/5 4+/5 4+/5 5/5 5/5 5/5     Sanford's: bilaterally.  Babinski's: Negative.  Clonus: Negative.  Incision c/d/i with no surrounding erythema, edema, or drainage.       Significant Labs:  Recent Labs   Lab 03/13/19 0226 03/14/19 0316   GLU 91 82    139   K 4.3 4.7    107   CO2 24 22*   BUN 28* 42*   CREATININE 4.4* 6.7*   CALCIUM 9.3 9.4   MG 2.0 2.2     Recent Labs   Lab 03/13/19 0226 03/14/19 0316   WBC 5.97 6.98   HGB 7.4* 7.9*   HCT 23.7* 25.3*   PLT 78* 103*     Recent Labs   Lab 03/13/19 0226 03/14/19 0316    INR 1.0 1.0     Microbiology Results (last 7 days)     ** No results found for the last 168 hours. **        Recent Lab Results       03/14/19  0737   03/14/19  0316   03/13/19  2144   03/13/19  1736   03/13/19  1432        Immature Grans (Abs)   0.06  Comment:  Mild elevation in immature granulocytes is non specific and   can be seen in a variety of conditions including stress response,   acute inflammation, trauma and pregnancy. Correlation with other   laboratory and clinical findings is essential.             Albumin   2.8           Alkaline Phosphatase   173           ALT   12           Anion Gap   10           Aniso   Slight           Antibody Identification   POS  Comment:  Anti-D           AST   26           Baso #   0.03           Basophilic Stippling   Occasional           Basophil%   0.4           Total Bilirubin   0.9  Comment:  For infants and newborns, interpretation of results should be based  on gestational age, weight and in agreement with clinical  observations.  Premature Infant recommended reference ranges:  Up to 24 hours.............<8.0 mg/dL  Up to 48 hours............<12.0 mg/dL  3-5 days..................<15.0 mg/dL  6-29 days.................<15.0 mg/dL             BUN, Bld   42           Calcium   9.4           Chloride   107           CO2   22           Creatinine   6.7           Differential Method   Automated           eGFR if    9.7           eGFR if non    8.4  Comment:  Calculation used to obtain the estimated glomerular filtration  rate (eGFR) is the CKD-EPI equation.              Eos #   0.3           Eosinophil%   3.7           Glucose   82           Gran # (ANC)   4.5           Gran%   63.8           Group & Rh   O NEG           Hematocrit   25.3           Hemoglobin   7.9           Hypo   Occasional           Immature Granulocytes   0.9           INDIRECT KEARA   POS           Coumadin Monitoring INR   1.0  Comment:  Coumadin Therapy:  2.0 -  3.0 for INR for all indicators except mechanical heart valves  and antiphospholipid syndromes which should use 2.5 - 3.5.             Lymph #   1.3           Lymph%   18.6           Magnesium   2.2           MCH   32.9           MCHC   31.2           MCV   105           Mono #   0.9           Mono%   12.6           MPV   11.0           nRBC   0           Phosphorus   6.4           Platelet Estimate   Decreased           Platelets   103           POCT Glucose 106   169 94 108     Poly   Occasional           Potassium   4.7           Total Protein   5.6           Protime   10.7           RBC   2.40           RDW   17.3           Sodium   139           Tear Drop Cells   Occasional           Toxic Granulation   Present           WBC   6.98                              All pertinent labs from the last 24 hours have been reviewed.    Significant Diagnostics:  No new imaging for review    Assessment/Plan:     * S/P laminectomy     58 M with progressive cervical myelopathy and stenosis from C3-6 with myelomalacia. S/p code for hypovolemia during HD. Stable this morning and labs WNL. He stepped up to ICU for further care and work up, now s/p C3-6 posterior decompression 3/4/19.      - Pt neurologically stable  - Neuro checks q4h  -Continue dialysis T/Th. Management per Nephrology.   - No C collar needed  - Pain control: Continue Oxycodone IR 5mg q4h prn  - Daily PT/OT. Rec rehab placement.   - H/H stable 7.9/25.3, will ctm.  -Plts 103k, goal >50k  -Diarrhea likely 2/2 recent enema. Pt continues to tolerate diet. Will ctm.                   Christin Chiu PA-C  Neurosurgery  [facility]

## 2019-03-14 NOTE — PROGRESS NOTES
Maintenance HD completed @ 1431 blood returned and 15g needles removed pressure held to sites until hemostasis achieved. LFA fistula +bruit /thrill. 3L of fluid removed over 4hrs of HD

## 2019-03-14 NOTE — ASSESSMENT & PLAN NOTE
BG goal 140-180    Continue Levemir 3 units BID.   continue Novolog to 2 units with meals  Low dose correction scale given kidney function  BG monitoring AC/HS    ADDENDUM: hold levemir tonight and dinner insulin.     Discharge planning: Patient will need insulin dosing changes upon discharge.  Currently only taking basal insulin plus Victoza.

## 2019-03-14 NOTE — SUBJECTIVE & OBJECTIVE
Interval History: Pt in dialysis currently. He reports continued neck pain. Also reports loose stools for 2 days after receiving brown bomb enema. Tolerating diet without nausea, vomiting, or abdominal pain. Denies increased numbness/tingling, weakness, fever/chills.     Medications:  Continuous Infusions:  Scheduled Meds:   sodium chloride 0.9%   Intravenous Once    diclofenac sodium   Topical (Top) Daily    epoetin shefali (PROCRIT) injection  7,000 Units Intravenous Every Mon, Wed, Fri    FLUoxetine  20 mg Oral Daily    gabapentin  300 mg Oral QHS    insulin aspart U-100  2 Units Subcutaneous TIDWM    insulin detemir U-100  3 Units Subcutaneous BID    lactulose  20 g Oral Daily    metoclopramide HCl  5 mg Oral QID (AC & HS)    midodrine  20 mg Oral Q6H    pantoprazole  40 mg Oral BID    polyethylene glycol  17 g Oral Daily    pravastatin  40 mg Oral Daily    senna-docusate 8.6-50 mg  1 tablet Oral BID     PRN Meds:sodium chloride 0.9%, acetaminophen, dextrose 50%, dextrose 50%, glucagon (human recombinant), glucose, glucose, insulin aspart U-100, miconazole NITRATE 2 %, ondansetron, oxyCODONE     Review of Systems  Objective:     Weight: (!) 136.1 kg (300 lb 0.7 oz)  Body mass index is 45.62 kg/m².  Vital Signs (Most Recent):  Temp: 98.2 °F (36.8 °C) (03/14/19 0740)  Pulse: 81 (03/14/19 1128)  Resp: 18 (03/14/19 0740)  BP: (!) 132/53 (03/14/19 0740)  SpO2: 98 % (03/14/19 0740) Vital Signs (24h Range):  Temp:  [98.2 °F (36.8 °C)-98.7 °F (37.1 °C)] 98.2 °F (36.8 °C)  Pulse:  [55-81] 81  Resp:  [10-20] 18  SpO2:  [96 %-100 %] 98 %  BP: (125-168)/(53-74) 132/53     Date 03/14/19 0700 - 03/15/19 0659   Shift 7950-3850 4146-6800 9905-4238 24 Hour Total   INTAKE   P.O. 50   50   Shift Total(mL/kg) 50(0.4)   50(0.4)   OUTPUT   Shift Total(mL/kg)       Weight (kg) 136.1 136.1 136.1 136.1                        Closed/Suction Drain 03/03/19 1431 Posterior Neck Accordion 10 Fr. (Active)   Site Description Unable  to view 3/13/2019  3:00 PM   Dressing Type Gauze 3/13/2019  3:00 PM   Dressing Status Clean;Dry;Intact 3/13/2019  3:00 PM   Dressing Intervention Dressing reinforced 3/13/2019  3:00 PM   Drainage Serosanguineous 3/13/2019  3:00 PM   Status To bulb suction 3/13/2019  3:00 PM   Output (mL) 0 mL 3/12/2019  6:05 AM            Hemodialysis AV Fistula Left forearm (Active)   Needle Size 15ga 3/13/2019  1:45 AM   Site Assessment Intact;Dry 3/13/2019  3:00 PM   Patency Present;Thrill;Bruit 3/13/2019  3:00 PM   Status Deaccessed 3/13/2019  3:00 PM   Flows Good 3/13/2019  1:45 AM   Dressing Intervention Dressing reinforced 3/13/2019  3:00 PM   Dressing Status Clean;Dry;Intact 3/13/2019  3:00 PM   Site Condition No complications 3/13/2019  3:00 PM   Dressing Gauze 3/13/2019  3:00 PM       Neurosurgery Physical Exam  General: well developed, well nourished, no distress. Obese.   Head: normocephalic, atraumatic  Neurologic: Alert and oriented. Thought content appropriate.  GCS: Motor: 6/Verbal: 5/Eyes: 4 GCS Total: 15  Mental Status: Awake, Alert, Oriented x 4  Language: No aphasia  Speech: No dysarthria  Cranial nerves: face symmetric, tongue midline, CN II-XII grossly intact.   Eyes: pupils equal, round, reactive to light with accomodation, EOMI.   Pulmonary: normal respirations, no signs of respiratory distress  Abdomen: soft, distended, not tender to palpation  Skin: Skin is warm, dry and intact.  Sensory: intact to light touch throughout    Motor Strength:Moves all extremities spontaneously with good tone. Strength exam pain limited. No abnormal movements seen.     Strength  Deltoids Triceps Biceps Wrist Extension Wrist Flexion Hand    Upper: R 5/5 5/5 5/5 5/5 5/5 5/5    L 5/5 5/5 5/5 5/5 5/5 5/5     Iliopsoas Quadriceps Knee  Flexion Tibialis  anterior Gastro- cnemius EHL   Lower: R 5/5 5/5 5/5 5/5 5/5 5/5    L 4/5 4+/5 4+/5 5/5 5/5 5/5     Sanford's: bilaterally.  Babinski's: Negative.  Clonus: Negative.  Incision  c/d/i with no surrounding erythema, edema, or drainage.       Significant Labs:  Recent Labs   Lab 03/13/19 0226 03/14/19 0316   GLU 91 82    139   K 4.3 4.7    107   CO2 24 22*   BUN 28* 42*   CREATININE 4.4* 6.7*   CALCIUM 9.3 9.4   MG 2.0 2.2     Recent Labs   Lab 03/13/19 0226 03/14/19 0316   WBC 5.97 6.98   HGB 7.4* 7.9*   HCT 23.7* 25.3*   PLT 78* 103*     Recent Labs   Lab 03/13/19 0226 03/14/19 0316   INR 1.0 1.0     Microbiology Results (last 7 days)     ** No results found for the last 168 hours. **        Recent Lab Results       03/14/19  0737   03/14/19  0316   03/13/19  2144   03/13/19  1736   03/13/19  1432        Immature Grans (Abs)   0.06  Comment:  Mild elevation in immature granulocytes is non specific and   can be seen in a variety of conditions including stress response,   acute inflammation, trauma and pregnancy. Correlation with other   laboratory and clinical findings is essential.             Albumin   2.8           Alkaline Phosphatase   173           ALT   12           Anion Gap   10           Aniso   Slight           Antibody Identification   POS  Comment:  Anti-D           AST   26           Baso #   0.03           Basophilic Stippling   Occasional           Basophil%   0.4           Total Bilirubin   0.9  Comment:  For infants and newborns, interpretation of results should be based  on gestational age, weight and in agreement with clinical  observations.  Premature Infant recommended reference ranges:  Up to 24 hours.............<8.0 mg/dL  Up to 48 hours............<12.0 mg/dL  3-5 days..................<15.0 mg/dL  6-29 days.................<15.0 mg/dL             BUN, Bld   42           Calcium   9.4           Chloride   107           CO2   22           Creatinine   6.7           Differential Method   Automated           eGFR if    9.7           eGFR if non    8.4  Comment:  Calculation used to obtain the estimated glomerular  filtration  rate (eGFR) is the CKD-EPI equation.              Eos #   0.3           Eosinophil%   3.7           Glucose   82           Gran # (ANC)   4.5           Gran%   63.8           Group & Rh   O NEG           Hematocrit   25.3           Hemoglobin   7.9           Hypo   Occasional           Immature Granulocytes   0.9           INDIRECT KEARA   POS           Coumadin Monitoring INR   1.0  Comment:  Coumadin Therapy:  2.0 - 3.0 for INR for all indicators except mechanical heart valves  and antiphospholipid syndromes which should use 2.5 - 3.5.             Lymph #   1.3           Lymph%   18.6           Magnesium   2.2           MCH   32.9           MCHC   31.2           MCV   105           Mono #   0.9           Mono%   12.6           MPV   11.0           nRBC   0           Phosphorus   6.4           Platelet Estimate   Decreased           Platelets   103           POCT Glucose 106   169 94 108     Poly   Occasional           Potassium   4.7           Total Protein   5.6           Protime   10.7           RBC   2.40           RDW   17.3           Sodium   139           Tear Drop Cells   Occasional           Toxic Granulation   Present           WBC   6.98                              All pertinent labs from the last 24 hours have been reviewed.    Significant Diagnostics:  No new imaging for review

## 2019-03-14 NOTE — SUBJECTIVE & OBJECTIVE
Past Medical History:   Diagnosis Date    Anemia due to stage 5 chronic kidney disease 3/1/2019    Diabetes mellitus type II     Dialysis patient     Hyperlipidemia     Hypertension     Kidney failure     MIKE (obstructive sleep apnea)     Urinary tract infection      Past Surgical History:   Procedure Laterality Date    AV FISTULA PLACEMENT      left lower arm    SPINE, CERVICAL, POSTERIOR APPROACH  LAMINECTOMY C3-C6; C6-C7; WITH MEDIAL DISCECTOMY N/A 3/3/2019    Performed by Rud[facility] at [facility] 2ND FLR    TONSILLECTOMY, ADENOIDECTOMY      TRANSESOPHAGEAL ECHOCARDIOGRAM (SERENITY) N/A 5/23/2017    Performed by Donaldo Mai [facility] at [facility] OR     Review of patient's allergies indicates:   Allergen Reactions    Keflex [cephalexin] Nausea Only       Scheduled Medications:    sodium chloride 0.9%   Intravenous Once    diclofenac sodium   Topical (Top) Daily    epoetin shefali (PROCRIT) injection  7,000 Units Intravenous Every Mon, Wed, Fri    FLUoxetine  20 mg Oral Daily    gabapentin  300 mg Oral QHS    insulin aspart U-100  2 Units Subcutaneous TIDWM    insulin detemir U-100  3 Units Subcutaneous BID    lactulose  20 g Oral Daily    metoclopramide HCl  5 mg Oral QID (AC & HS)    midodrine  20 mg Oral Q6H    pantoprazole  40 mg Oral BID    polyethylene glycol  17 g Oral Daily    pravastatin  40 mg Oral Daily    senna-docusate 8.6-50 mg  1 tablet Oral BID       PRN Medications: sodium chloride 0.9%, acetaminophen, dextrose 50%, dextrose 50%, glucagon (human recombinant), glucose, glucose, insulin aspart U-100, miconazole NITRATE 2 %, ondansetron, oxyCODONE, simethicone    Family History     Problem Relation (Age of Onset)    Colon cancer Mother    Diabetes Mother, Father, Maternal Grandmother, Maternal Grandfather    Heart disease Father    Hypertension Father    Lung cancer Mother        Tobacco Use    Smoking status: Never Smoker    Smokeless tobacco: Never Used   Substance and  Sexual Activity    Alcohol use: No    Drug use: No    Sexual activity: Not Currently     Review of Systems   Constitutional: Positive for activity change and fatigue.   HENT: Negative for trouble swallowing and voice change.    Eyes: Negative for photophobia and visual disturbance.   Respiratory: Positive for shortness of breath. Negative for cough.    Cardiovascular: Negative for chest pain and palpitations.   Gastrointestinal: Positive for abdominal distention and constipation. Negative for abdominal pain, nausea and vomiting.   Genitourinary: Negative for difficulty urinating and flank pain.   Musculoskeletal: Positive for gait problem, myalgias and neck pain.   Skin: Negative for color change and rash.   Neurological: Positive for weakness. Negative for speech difficulty.   Psychiatric/Behavioral: Negative for agitation and confusion.     Objective:     Vital Signs (Most Recent):  Temp: 98.8 °F (37.1 °C) (03/14/19 1612)  Pulse: 84 (03/14/19 1612)  Resp: 19 (03/14/19 1612)  BP: (!) 147/67 (03/14/19 1612)  SpO2: 97 % (03/14/19 1612)    Vital Signs (24h Range):  Temp:  [98.2 °F (36.8 °C)-98.8 °F (37.1 °C)] 98.8 °F (37.1 °C)  Pulse:  [55-84] 84  Resp:  [13-19] 19  SpO2:  [96 %-100 %] 97 %  BP: (125-168)/(53-74) 147/67     Body mass index is 45.62 kg/m².    Physical Exam   Constitutional: He is oriented to person, place, and time. He appears well-developed and well-nourished.   obese   HENT:   Head: Normocephalic and atraumatic.   Eyes: Right eye exhibits no discharge. Left eye exhibits no discharge.   Neck: Decreased range of motion present.   Cardiovascular: Normal rate and intact distal pulses.   Pulmonary/Chest:   Pursed lip breathing noted  Increased effort noted, on RA   Abdominal: He exhibits distension. There is no tenderness.   Musculoskeletal: He exhibits no deformity.   RUE: 5/5.  LUE: 5/5.  RLE: 5/5.  LLE: 4/5.     Neurological: He is alert and oriented to person, place, and time.   Skin: Skin is warm  and dry.   Psychiatric: He has a normal mood and affect. His behavior is normal.     NEUROLOGICAL EXAMINATION:     MENTAL STATUS   Oriented to person, place, and time.       Diagnostic Results:   Labs: Reviewed  ECG: Reviewed  X-Ray: Reviewed  US: Reviewed  CT: Reviewed

## 2019-03-14 NOTE — PLAN OF CARE
Problem: Adult Inpatient Plan of Care  Goal: Plan of Care Review  Outcome: Ongoing (interventions implemented as appropriate)  Duration of Hd and amt of fluid to be removed

## 2019-03-14 NOTE — PLAN OF CARE
Problem: Adult Inpatient Plan of Care  Goal: Plan of Care Review  Outcome: Ongoing (interventions implemented as appropriate)  No acute events or changes overnight. Patient blood pressure stable and WDL. Multiple loose BM's. Continues to report abdominal discomfort and bloating.     Problem: Fall Injury Risk  Goal: Absence of Fall and Fall-Related Injury  Outcome: Ongoing (interventions implemented as appropriate)  Patient remains free from falls.

## 2019-03-14 NOTE — PROGRESS NOTES
HEMODIALYSIS NOTE  Patient evaluated while undergoing hemodialysis indicated for ESRD. Tolerating session with current UFR, no complications. . UF goal 3L as tolerated.    -Pt is step-down from ICU  -HD RN to obtain DW from OP HD unit  -continue epogen  -start sevelamer carbonate  -on midodrine scheduled  -start renal diet with 800mL FR/dy  -strict I/Os  -daily wts

## 2019-03-15 LAB
ALBUMIN SERPL BCP-MCNC: 2.9 G/DL
ALP SERPL-CCNC: 159 U/L
ALT SERPL W/O P-5'-P-CCNC: 12 U/L
ANION GAP SERPL CALC-SCNC: 9 MMOL/L
AST SERPL-CCNC: 29 U/L
BASOPHILS # BLD AUTO: 0.02 K/UL
BASOPHILS NFR BLD: 0.2 %
BILIRUB SERPL-MCNC: 1.1 MG/DL
BUN SERPL-MCNC: 31 MG/DL
CALCIUM SERPL-MCNC: 9.5 MG/DL
CHLORIDE SERPL-SCNC: 103 MMOL/L
CO2 SERPL-SCNC: 26 MMOL/L
CREAT SERPL-MCNC: 6 MG/DL
DIFFERENTIAL METHOD: ABNORMAL
EOSINOPHIL # BLD AUTO: 0.2 K/UL
EOSINOPHIL NFR BLD: 2.1 %
ERYTHROCYTE [DISTWIDTH] IN BLOOD BY AUTOMATED COUNT: 17.4 %
EST. GFR  (AFRICAN AMERICAN): 11.1 ML/MIN/1.73 M^2
EST. GFR  (NON AFRICAN AMERICAN): 9.6 ML/MIN/1.73 M^2
GLUCOSE SERPL-MCNC: 109 MG/DL
HCT VFR BLD AUTO: 27.5 %
HGB BLD-MCNC: 8.4 G/DL
IMM GRANULOCYTES # BLD AUTO: 0.03 K/UL
IMM GRANULOCYTES NFR BLD AUTO: 0.3 %
INR PPP: 1
LYMPHOCYTES # BLD AUTO: 1.3 K/UL
LYMPHOCYTES NFR BLD: 13.7 %
MAGNESIUM SERPL-MCNC: 2.1 MG/DL
MCH RBC QN AUTO: 33.3 PG
MCHC RBC AUTO-ENTMCNC: 30.5 G/DL
MCV RBC AUTO: 109 FL
MONOCYTES # BLD AUTO: 0.9 K/UL
MONOCYTES NFR BLD: 9.3 %
NEUTROPHILS # BLD AUTO: 7.2 K/UL
NEUTROPHILS NFR BLD: 74.4 %
NRBC BLD-RTO: 0 /100 WBC
PHOSPHATE SERPL-MCNC: 5.7 MG/DL
PLATELET # BLD AUTO: 114 K/UL
PMV BLD AUTO: 11.1 FL
POCT GLUCOSE: 134 MG/DL (ref 70–110)
POCT GLUCOSE: 139 MG/DL (ref 70–110)
POCT GLUCOSE: 146 MG/DL (ref 70–110)
POCT GLUCOSE: 173 MG/DL (ref 70–110)
POTASSIUM SERPL-SCNC: 4 MMOL/L
PROT SERPL-MCNC: 5.8 G/DL
PROTHROMBIN TIME: 10.7 SEC
RBC # BLD AUTO: 2.52 M/UL
SODIUM SERPL-SCNC: 138 MMOL/L
WBC # BLD AUTO: 9.7 K/UL

## 2019-03-15 PROCEDURE — 99024 PR POST-OP FOLLOW-UP VISIT: ICD-10-PCS | Mod: POP,,, | Performed by: PHYSICIAN ASSISTANT

## 2019-03-15 PROCEDURE — 20600001 HC STEP DOWN PRIVATE ROOM

## 2019-03-15 PROCEDURE — 25000003 PHARM REV CODE 250: Performed by: STUDENT IN AN ORGANIZED HEALTH CARE EDUCATION/TRAINING PROGRAM

## 2019-03-15 PROCEDURE — 25000003 PHARM REV CODE 250: Performed by: PSYCHIATRY & NEUROLOGY

## 2019-03-15 PROCEDURE — 97530 THERAPEUTIC ACTIVITIES: CPT

## 2019-03-15 PROCEDURE — 63600175 PHARM REV CODE 636 W HCPCS: Performed by: PHYSICIAN ASSISTANT

## 2019-03-15 PROCEDURE — 80053 COMPREHEN METABOLIC PANEL: CPT

## 2019-03-15 PROCEDURE — 99024 POSTOP FOLLOW-UP VISIT: CPT | Mod: POP,,, | Performed by: PHYSICIAN ASSISTANT

## 2019-03-15 PROCEDURE — 87449 NOS EACH ORGANISM AG IA: CPT

## 2019-03-15 PROCEDURE — 84100 ASSAY OF PHOSPHORUS: CPT

## 2019-03-15 PROCEDURE — 85025 COMPLETE CBC W/AUTO DIFF WBC: CPT

## 2019-03-15 PROCEDURE — 85610 PROTHROMBIN TIME: CPT

## 2019-03-15 PROCEDURE — 83735 ASSAY OF MAGNESIUM: CPT

## 2019-03-15 PROCEDURE — 87493 C DIFF AMPLIFIED PROBE: CPT

## 2019-03-15 RX ORDER — SODIUM CHLORIDE 9 MG/ML
INJECTION, SOLUTION INTRAVENOUS
Status: DISCONTINUED | OUTPATIENT
Start: 2019-03-16 | End: 2019-03-20 | Stop reason: HOSPADM

## 2019-03-15 RX ORDER — HEPARIN SODIUM 5000 [USP'U]/ML
5000 INJECTION, SOLUTION INTRAVENOUS; SUBCUTANEOUS EVERY 8 HOURS
Status: DISCONTINUED | OUTPATIENT
Start: 2019-03-15 | End: 2019-03-20 | Stop reason: HOSPADM

## 2019-03-15 RX ORDER — SODIUM CHLORIDE 9 MG/ML
INJECTION, SOLUTION INTRAVENOUS ONCE
Status: DISCONTINUED | OUTPATIENT
Start: 2019-03-16 | End: 2019-03-20

## 2019-03-15 RX ADMIN — INSULIN ASPART 2 UNITS: 100 INJECTION, SOLUTION INTRAVENOUS; SUBCUTANEOUS at 05:03

## 2019-03-15 RX ADMIN — ACETAMINOPHEN 500 MG: 500 TABLET, FILM COATED ORAL at 01:03

## 2019-03-15 RX ADMIN — INSULIN ASPART 2 UNITS: 100 INJECTION, SOLUTION INTRAVENOUS; SUBCUTANEOUS at 12:03

## 2019-03-15 RX ADMIN — DICLOFENAC: 10 GEL TOPICAL at 10:03

## 2019-03-15 RX ADMIN — ONDANSETRON 4 MG: 2 INJECTION INTRAMUSCULAR; INTRAVENOUS at 02:03

## 2019-03-15 RX ADMIN — PRAVASTATIN SODIUM 40 MG: 40 TABLET ORAL at 10:03

## 2019-03-15 RX ADMIN — HEPARIN SODIUM 5000 UNITS: 5000 INJECTION, SOLUTION INTRAVENOUS; SUBCUTANEOUS at 09:03

## 2019-03-15 RX ADMIN — MIDODRINE HYDROCHLORIDE 20 MG: 5 TABLET ORAL at 06:03

## 2019-03-15 RX ADMIN — METOCLOPRAMIDE HYDROCHLORIDE 5 MG: 5 SOLUTION ORAL at 06:03

## 2019-03-15 RX ADMIN — PANTOPRAZOLE SODIUM 40 MG: 40 GRANULE, DELAYED RELEASE ORAL at 10:03

## 2019-03-15 RX ADMIN — FLUOXETINE HYDROCHLORIDE 20 MG: 20 CAPSULE ORAL at 10:03

## 2019-03-15 RX ADMIN — HEPARIN SODIUM 5000 UNITS: 5000 INJECTION, SOLUTION INTRAVENOUS; SUBCUTANEOUS at 05:03

## 2019-03-15 RX ADMIN — PANTOPRAZOLE SODIUM 40 MG: 40 GRANULE, DELAYED RELEASE ORAL at 09:03

## 2019-03-15 RX ADMIN — MIDODRINE HYDROCHLORIDE 20 MG: 5 TABLET ORAL at 12:03

## 2019-03-15 RX ADMIN — METOCLOPRAMIDE HYDROCHLORIDE 5 MG: 5 SOLUTION ORAL at 11:03

## 2019-03-15 RX ADMIN — ACETAMINOPHEN 500 MG: 500 TABLET, FILM COATED ORAL at 06:03

## 2019-03-15 RX ADMIN — METOCLOPRAMIDE HYDROCHLORIDE 5 MG: 5 SOLUTION ORAL at 09:03

## 2019-03-15 RX ADMIN — INSULIN ASPART 2 UNITS: 100 INJECTION, SOLUTION INTRAVENOUS; SUBCUTANEOUS at 08:03

## 2019-03-15 RX ADMIN — GABAPENTIN 300 MG: 300 CAPSULE ORAL at 09:03

## 2019-03-15 NOTE — PROGRESS NOTES
[facility]  Neurosurgery  Progress Note    Subjective:     History of Present Illness: 57 yo male with a PMH of ESRD with dialysis MWF (last on weds), DM, presenting as transfer from [facility] as admission to neurosurgery for evaluation after progressive course of unsteady gait as well as bilateral upper extremity weakness for the last several months.  Mr. Farmer has had wobbly gait since this past September, at which point he recalls falling and has been at 80-90% of his normal function.  He has been able to walk without assist, however not for very long distances.  He states that since last Sunday he has felt progression of his symptoms and now feels it would be difficult to stand up to ambulate at all.  He also endorses a pulled muscle of his LLE that is limiting his mobility.  His UE symptoms have been present for about 3 months including not writing as well as he is used to.  HE is able to write numbers, but not always his name.  He has been having difficulty using a fork to eat over the past month.  No sensory disturbance noted as well as no neck or back pain and no pain from the neck radiating into the hands.  He has intermittently been taking ASA 81 mg qdaily. He denies recent falls or trauma to the neck.  No other blood thinning medication.  MRI at OSH on 2/27 revealed significant cervical stenosis.          Post-Op Info:  Procedure(s) (LRB):  SPINE, CERVICAL, POSTERIOR APPROACH  LAMINECTOMY C3-C6; C6-C7; WITH MEDIAL DISCECTOMY (N/A)   12 Days Post-Op     Interval History: Patient states he has been having diarrhea all day and has been n/v. Admits continued stomach distention and abdominal pain that started before hospital admission. Admits continued neck pain. Denies new numbness/weakness, fever/chills, urinary dysfunction.    Medications:  Continuous Infusions:  Scheduled Meds:   sodium chloride 0.9%   Intravenous Once    [START ON 3/16/2019] sodium chloride 0.9%   Intravenous  Once    diclofenac sodium   Topical (Top) Daily    epoetin shefali (PROCRIT) injection  7,000 Units Intravenous Every Mon, Wed, Fri    FLUoxetine  20 mg Oral Daily    gabapentin  300 mg Oral QHS    heparin (porcine)  5,000 Units Subcutaneous Q8H    insulin aspart U-100  2 Units Subcutaneous TIDWM    insulin detemir U-100  3 Units Subcutaneous BID    lactulose  20 g Oral Daily    metoclopramide HCl  5 mg Oral QID (AC & HS)    midodrine  20 mg Oral Q6H    pantoprazole  40 mg Oral BID    polyethylene glycol  17 g Oral Daily    pravastatin  40 mg Oral Daily    senna-docusate 8.6-50 mg  1 tablet Oral BID     PRN Meds:sodium chloride 0.9%, [START ON 3/16/2019] sodium chloride 0.9%, acetaminophen, dextrose 50%, dextrose 50%, glucagon (human recombinant), glucose, glucose, insulin aspart U-100, miconazole NITRATE 2 %, ondansetron, oxyCODONE, simethicone     Review of Systems  Objective:     Weight: (!) 144.2 kg (318 lb)  Body mass index is 48.35 kg/m².  Vital Signs (Most Recent):  Temp: 98.6 °F (37 °C) (03/15/19 1545)  Pulse: 66 (03/15/19 1545)  Resp: 18 (03/15/19 1545)  BP: 137/65 (03/15/19 1545)  SpO2: 96 % (03/15/19 1545) Vital Signs (24h Range):  Temp:  [97.8 °F (36.6 °C)-98.6 °F (37 °C)] 98.6 °F (37 °C)  Pulse:  [59-70] 66  Resp:  [16-20] 18  SpO2:  [90 %-100 %] 96 %  BP: (104-144)/(52-65) 137/65     Date 03/15/19 0700 - 03/16/19 0659   Shift 2569-5353 5047-6087 0141-9137 24 Hour Total   INTAKE   P.O. 290   290   Shift Total(mL/kg) 290(2)   290(2)   OUTPUT   Shift Total(mL/kg)       Weight (kg) 144.2 144.2 144.2 144.2                        Closed/Suction Drain 03/03/19 1431 Posterior Neck Accordion 10 Fr. (Active)   Site Description Unable to view 3/13/2019  3:00 PM   Dressing Type Gauze 3/13/2019  3:00 PM   Dressing Status Clean;Dry;Intact 3/13/2019  3:00 PM   Dressing Intervention Dressing reinforced 3/13/2019  3:00 PM   Drainage Serosanguineous 3/13/2019  3:00 PM   Status To bulb suction 3/13/2019  3:00  PM   Output (mL) 0 mL 3/12/2019  6:05 AM            Hemodialysis AV Fistula Left forearm (Active)   Needle Size 15ga 3/14/2019  2:07 PM   Site Assessment No redness;No swelling 3/14/2019  8:00 PM   Patency Present;Thrill;Bruit 3/14/2019  8:00 PM   Status Deaccessed 3/14/2019  8:00 PM   Flows Good 3/14/2019 10:20 AM   Dressing Intervention New dressing 3/14/2019  2:07 PM   Dressing Status Clean;Dry;Intact 3/14/2019  8:00 PM   Site Condition No complications 3/14/2019  8:00 PM   Dressing Gauze 3/15/2019  8:51 AM       Neurosurgery Physical Exam  General: well developed, well nourished, no distress. Obese.   Head: normocephalic, atraumatic  Neurologic: Alert and oriented. Thought content appropriate.  GCS: Motor: 6/Verbal: 5/Eyes: 4 GCS Total: 15  Mental Status: Awake, Alert, Oriented x 4  Language: No aphasia  Speech: No dysarthria  Cranial nerves: face symmetric, tongue midline, CN II-XII grossly intact.   Eyes: pupils equal, round, reactive to light, EOMI.   Pulmonary: normal respirations, no signs of respiratory distress  Abdomen: soft, distended, tender to palpation in LLQ  Skin: Skin is warm, dry and intact.  Sensory: intact to light touch throughout     Motor Strength:Moves all extremities spontaneously with good tone. Strength exam pain limited. No abnormal movements seen.      Strength   Deltoids Triceps Biceps Wrist Extension Wrist Flexion Hand    Upper: R 5/5 5/5 5/5 5/5 5/5 5/5     L 5/5 5/5 5/5 5/5 5/5 5/5       Iliopsoas Quadriceps Knee  Flexion Tibialis  anterior Gastro- cnemius EHL   Lower: R 5/5 5/5 5/5 5/5 5/5 5/5     L 4/5 4+/5 4+/5 5/5 5/5 5/5      Sanford's: bilaterally.  Babinski's: Negative.  Clonus: Negative.  Incision c/d/i with no surrounding erythema, edema, or drainage. well approximated with sutures        Significant Labs:  Recent Labs   Lab 03/14/19  0316 03/15/19  0639   GLU 82 109    138   K 4.7 4.0    103   CO2 22* 26   BUN 42* 31*   CREATININE 6.7* 6.0*   CALCIUM 9.4  9.5   MG 2.2 2.1     Recent Labs   Lab 03/14/19  0316 03/15/19  0639   WBC 6.98 9.70   HGB 7.9* 8.4*   HCT 25.3* 27.5*   * 114*     Recent Labs   Lab 03/14/19  0316 03/15/19  0639   INR 1.0 1.0     Microbiology Results (last 7 days)     Procedure Component Value Units Date/Time    Clostridium difficile EIA [[id number]]     Order Status:  No result Specimen:  Stool         CMP:   Recent Labs   Lab 03/14/19  0316 03/15/19  0639   GLU 82 109   CALCIUM 9.4 9.5   ALBUMIN 2.8* 2.9*   PROT 5.6* 5.8*    138   K 4.7 4.0   CO2 22* 26    103   BUN 42* 31*   CREATININE 6.7* 6.0*   ALKPHOS 173* 159*   ALT 12 12   AST 26 29   BILITOT 0.9 1.1*       Significant Diagnostics:  No new imaging for review    Assessment/Plan:     * S/P laminectomy     58 M with progressive cervical myelopathy and stenosis from C3-6 with myelomalacia. S/p code for hypovolemia during HD. Stable this morning and labs WNL. He stepped up to ICU for further care and work up, now s/p C3-6 posterior decompression 3/4/19.      - Pt neurologically stable  - Neuro checks q4h  -Continue dialysis T/Th. Management per Nephrology.   - No C collar needed  - Pain control: Continue Oxycodone IR 5mg q4h prn  - Daily PT/OT. Rec rehab placement.   - H/H stable 8.4/27.5, will ctm.  -Plts 114k, goal >50k  -Diarrhea likely 2/2 recent enema and viral since patient vomiting; however, diarrhea has worsened and is watery and loose.  Will order c diff panel and obtain KUB.                    ALLEY Uribe-C  Neurosurgery  [facility]

## 2019-03-15 NOTE — SUBJECTIVE & OBJECTIVE
Interval History: Patient states he has been having diarrhea all day and has been n/v. Admits continued stomach distention and abdominal pain that started before hospital admission. Admits continued neck pain. Denies new numbness/weakness, fever/chills, urinary dysfunction.    Medications:  Continuous Infusions:  Scheduled Meds:   sodium chloride 0.9%   Intravenous Once    [START ON 3/16/2019] sodium chloride 0.9%   Intravenous Once    diclofenac sodium   Topical (Top) Daily    epoetin shefali (PROCRIT) injection  7,000 Units Intravenous Every Mon, Wed, Fri    FLUoxetine  20 mg Oral Daily    gabapentin  300 mg Oral QHS    heparin (porcine)  5,000 Units Subcutaneous Q8H    insulin aspart U-100  2 Units Subcutaneous TIDWM    insulin detemir U-100  3 Units Subcutaneous BID    lactulose  20 g Oral Daily    metoclopramide HCl  5 mg Oral QID (AC & HS)    midodrine  20 mg Oral Q6H    pantoprazole  40 mg Oral BID    polyethylene glycol  17 g Oral Daily    pravastatin  40 mg Oral Daily    senna-docusate 8.6-50 mg  1 tablet Oral BID     PRN Meds:sodium chloride 0.9%, [START ON 3/16/2019] sodium chloride 0.9%, acetaminophen, dextrose 50%, dextrose 50%, glucagon (human recombinant), glucose, glucose, insulin aspart U-100, miconazole NITRATE 2 %, ondansetron, oxyCODONE, simethicone     Review of Systems  Objective:     Weight: (!) 144.2 kg (318 lb)  Body mass index is 48.35 kg/m².  Vital Signs (Most Recent):  Temp: 98.6 °F (37 °C) (03/15/19 1545)  Pulse: 66 (03/15/19 1545)  Resp: 18 (03/15/19 1545)  BP: 137/65 (03/15/19 1545)  SpO2: 96 % (03/15/19 1545) Vital Signs (24h Range):  Temp:  [97.8 °F (36.6 °C)-98.6 °F (37 °C)] 98.6 °F (37 °C)  Pulse:  [59-70] 66  Resp:  [16-20] 18  SpO2:  [90 %-100 %] 96 %  BP: (104-144)/(52-65) 137/65     Date 03/15/19 0700 - 03/16/19 0659   Shift 7476-4788 4219-6885 8426-9628 24 Hour Total   INTAKE   P.O. 290   290   Shift Total(mL/kg) 290(2)   290(2)   OUTPUT   Shift Total(mL/kg)        Weight (kg) 144.2 144.2 144.2 144.2                        Closed/Suction Drain 03/03/19 1431 Posterior Neck Accordion 10 Fr. (Active)   Site Description Unable to view 3/13/2019  3:00 PM   Dressing Type Gauze 3/13/2019  3:00 PM   Dressing Status Clean;Dry;Intact 3/13/2019  3:00 PM   Dressing Intervention Dressing reinforced 3/13/2019  3:00 PM   Drainage Serosanguineous 3/13/2019  3:00 PM   Status To bulb suction 3/13/2019  3:00 PM   Output (mL) 0 mL 3/12/2019  6:05 AM            Hemodialysis AV Fistula Left forearm (Active)   Needle Size 15ga 3/14/2019  2:07 PM   Site Assessment No redness;No swelling 3/14/2019  8:00 PM   Patency Present;Thrill;Bruit 3/14/2019  8:00 PM   Status Deaccessed 3/14/2019  8:00 PM   Flows Good 3/14/2019 10:20 AM   Dressing Intervention New dressing 3/14/2019  2:07 PM   Dressing Status Clean;Dry;Intact 3/14/2019  8:00 PM   Site Condition No complications 3/14/2019  8:00 PM   Dressing Gauze 3/15/2019  8:51 AM       Neurosurgery Physical Exam  General: well developed, well nourished, no distress. Obese.   Head: normocephalic, atraumatic  Neurologic: Alert and oriented. Thought content appropriate.  GCS: Motor: 6/Verbal: 5/Eyes: 4 GCS Total: 15  Mental Status: Awake, Alert, Oriented x 4  Language: No aphasia  Speech: No dysarthria  Cranial nerves: face symmetric, tongue midline, CN II-XII grossly intact.   Eyes: pupils equal, round, reactive to light, EOMI.   Pulmonary: normal respirations, no signs of respiratory distress  Abdomen: soft, distended, not tender to palpation  Skin: Skin is warm, dry and intact.  Sensory: intact to light touch throughout     Motor Strength:Moves all extremities spontaneously with good tone. Strength exam pain limited. No abnormal movements seen.      Strength   Deltoids Triceps Biceps Wrist Extension Wrist Flexion Hand    Upper: R 5/5 5/5 5/5 5/5 5/5 5/5     L 5/5 5/5 5/5 5/5 5/5 5/5       Iliopsoas Quadriceps Knee  Flexion Tibialis  anterior Gastro-  cnemius EHL   Lower: R 5/5 5/5 5/5 5/5 5/5 5/5     L 4/5 4+/5 4+/5 5/5 5/5 5/5      Sanford's: bilaterally.  Babinski's: Negative.  Clonus: Negative.  Incision c/d/i with no surrounding erythema, edema, or drainage. well approximated with sutures        Significant Labs:  Recent Labs   Lab 03/14/19 0316 03/15/19  0639   GLU 82 109    138   K 4.7 4.0    103   CO2 22* 26   BUN 42* 31*   CREATININE 6.7* 6.0*   CALCIUM 9.4 9.5   MG 2.2 2.1     Recent Labs   Lab 03/14/19  0316 03/15/19  0639   WBC 6.98 9.70   HGB 7.9* 8.4*   HCT 25.3* 27.5*   * 114*     Recent Labs   Lab 03/14/19  0316 03/15/19  0639   INR 1.0 1.0     Microbiology Results (last 7 days)     Procedure Component Value Units Date/Time    Clostridium difficile EIA [[id number]]     Order Status:  No result Specimen:  Stool         CMP:   Recent Labs   Lab 03/14/19  0316 03/15/19  0639   GLU 82 109   CALCIUM 9.4 9.5   ALBUMIN 2.8* 2.9*   PROT 5.6* 5.8*    138   K 4.7 4.0   CO2 22* 26    103   BUN 42* 31*   CREATININE 6.7* 6.0*   ALKPHOS 173* 159*   ALT 12 12   AST 26 29   BILITOT 0.9 1.1*       Significant Diagnostics:  No new imaging for review

## 2019-03-15 NOTE — ASSESSMENT & PLAN NOTE
58 M with progressive cervical myelopathy and stenosis from C3-6 with myelomalacia. S/p code for hypovolemia during HD. Stable this morning and labs WNL. He stepped up to ICU for further care and work up, now s/p C3-6 posterior decompression 3/4/19.      - Pt neurologically stable  - Neuro checks q4h  -Continue dialysis T/Th. Management per Nephrology.   - No C collar needed  - Pain control: Continue Oxycodone IR 5mg q4h prn  - Daily PT/OT. Rec rehab placement.   - H/H stable 8.4/27.5, will ctm.  -Plts 114k, goal >50k  -Diarrhea likely 2/2 recent enema and viral since patient vomiting; however, diarrhea has worsened and is watery and loose.  Will order c diff panel and obtain KUB.

## 2019-03-15 NOTE — PLAN OF CARE
Problem: Adult Inpatient Plan of Care  Goal: Plan of Care Review  Outcome: Ongoing (interventions implemented as appropriate)  Pt AAOx4, VSS, afebrile. Pt c/o having frequent BMs throughout the night. Pain to left shoulder and hip controlled with PRN Tylenol. Full relief obtained. Pt has left-sided weakness, but able to turn well with +1 assist. Neuro checks q4h, no changes from baseline. CBG AC/HS. No SSI required. Frequent rounding maintained. WCTM.

## 2019-03-15 NOTE — PLAN OF CARE
Problem: Adult Inpatient Plan of Care  Goal: Plan of Care Review  Patient started with nausea and vomiting this afternoon. He has had multiple loose stools today as well. MD aware and ordered cdiff culture as well as an abdominal xray for his distension, awaiting both. Patient BS required no sliding scale today. Refusing medications by mouth this evening due to vomiting. VS have been stable. Education provided a well as plan of care review.

## 2019-03-15 NOTE — PLAN OF CARE
Problem: Physical Therapy Goal  Goal: Physical Therapy Goal  Goals to be met by: 3/15/2019     Patient will increase functional independence with mobility by performin. Supine to sit with Moderate Assistance  2. Rolling to Left and Right with Moderate Assistance.  3. Sit to stand transfer with Maximum Assistance  4. Gait  x 10 feet with Moderate Assistance using Rolling Walker.   5. Stand for 5 minutes with Moderate Assistance using Rolling Walker  6. Lower extremity exercise program x20 reps per handout, with assistance as needed       Discharge Recommendations: Rehab    Pt requires max/total A of 2 to sit at the EOB.    Goals remain appropriate.     Tanesha Ellis, PTA.   [phone]   3/15/2019

## 2019-03-15 NOTE — PLAN OF CARE
ABRAHAN denied the patient and is recommending SNF.     REGINA sent additional Rehab referrals to [facility] and [facility].      03/15/19 1630   Post-Acute Status   Post-Acute Authorization Placement   Post-Acute Placement Status Referrals Sent     Ofelia Lund LMSW  [facility]  [id number]

## 2019-03-15 NOTE — CARE UPDATE
BG goal 140-180. BG at or slightly below goal with scheduled insulin; not requiring correction scale.     continue Levemir to 3 units BID- first dose tonight  Continue  Novolog to 2 units with meals  Low dose correction scale given kidney function  BG monitoring AC/HS      ** Please call Endocrine for any BG related issues **

## 2019-03-15 NOTE — PT/OT/SLP PROGRESS
"Physical Therapy Treatment    Patient Name:  Angel Farmer Jr.   MRN:  [id number]  Admitting Diagnosis: S/P laminectomy  Recent Surgery: Procedure(s) (LRB):  SPINE, CERVICAL, POSTERIOR APPROACH  LAMINECTOMY C3-C6; C6-C7; WITH MEDIAL DISCECTOMY (N/A) 12 Days Post-Op    Recommendations:     Discharge Recommendations:  rehabilitation facility   Discharge Equipment Recommendations: (TBD)   Barriers to discharge: Inaccessible home  Pt requiring increased assistance at current time.       Plan:     During this hospitalization, patient to be seen 4 x/week to address the above listed problems via gait training, therapeutic activities, therapeutic exercises, neuromuscular re-education  · Plan of Care Expires:  04/08/19   Plan of Care Reviewed with: patient    This Plan of care has been discussed with the patient who was involved in its development and understands and is in agreement with the identified goals and treatment plan    Subjective     Communicated with RN (Elizabeth) prior to session.     Patient comments: "I'm sorry, I will probably have to have a BM"  Pain/Comfort:  · Pain Rating 1: 7/10  · Location - Side 1: Left  · Location - Orientation 1: generalized  · Location 1: shoulder  · Pain Addressed 1: Reposition, Distraction, Cessation of Activity, Nurse notified  · Pain Rating Post-Intervention 1: 7/10    Objective:     Patient found with: telemetry, pressure relief boots, SCD(waffle overlay)    Patient found sup in bed with HOB elevated upon PT entry to room, agreeable to treatment.  No family present in the room.    General Precautions: Standard, aspiration, fall   Orthopedic Precautions:spinal precautions   Braces: N/A       BED MOBILITY (vc's for hand placement sequencing of task):        Rolling to the R:  Max/total A of 2 via drawsheet.       Rolling to the L:  Max/total A of 2 via drawsheet.        Sup > sit at the EOB:  Max A of 2 for trunk and LE's with HOB elevated.       Sit > sup:  Total A of 2 for trunk " and LE's.                       SITTING AT THE EDGE OF THE BED (15-20 min)   Assistance Level Required: initially with max A then progressed to min A for trunk control with B UE support   Postural deviations noted: initially with R post lean, then L lateral lean, rounded shoulders, forward head, flexed cervical spine   Encouraged: upright posture, midline orientation, B feet in contact with the floor, trunk ext                 While at the EOB, pt dry heaving and with c/o nausea       TRANSFERS  (vc's for hand placement, sequencing of task and safety)   Patient completed Sit <> Stand Transfer from semi-raised bed with mod to min A of 1 for hip elevation, B knee ext, balance and support to B UE's with no assistive device x2 trial(s) Pt achieves full erect posture   Patient completed Stand <> Sit Transfer to EOB with min A for balance and safety with no assistive device        GAIT: alongside the EOB   Patient ambulated: 3-4 small steps laterally to the R   Patient required: min A for balance   Patient used:  No Assistive Device   Gait Pattern observed: step to   Gait Deviation(s): decreased kaykay, increased time in double stance, decreased velocity of limb motion, decreased step length, decreased swing-to-stance ratio, decreased toe-to-floor clearance and decreased weight-shifting ability    Comments: vc's and tc's for upright posture, weight shift, directional guidance, weight shift, step length and safety    Pt with bowel accident during session.   notified      EDUCATION  Patient provided with daily orientation and goals of this PT session.  Pt was educated on spinal precautions with 0/3 recall. They were educated to call for assistance and to transfer with hospital staff only.  Also, pt was educated on the effects of prolonged immobility and the importance of performing OOB activity and exercises to promote healing and reduce recovery time      Whiteboard updated with correct mobility information.  RN/PCT notified.  Pt requires max/total A of 2 to sit at the EOB.    Patient left sup in bed with HOB elevated, with  all lines intact, call button in reach, bed alarm on and RN notified    AM-PAC 6 CLICK MOBILITY  Turning over in bed (including adjusting bedclothes, sheets and blankets)?: 2  Sitting down on and standing up from a chair with arms (e.g., wheelchair, bedside commode, etc.): 2  Moving from lying on back to sitting on the side of the bed?: 2  Moving to and from a bed to a chair (including a wheelchair)?: 1  Need to walk in hospital room?: 2  Climbing 3-5 steps with a railing?: 1  Basic Mobility Total Score: 10     Assessment:     Angel Farmer Jr. is a 58 y.o. male admitted with a medical diagnosis of S/P laminectomy.  He presents with the following impairments/functional limitations:  weakness, impaired endurance, impaired sensation, impaired self care skills, impaired functional mobilty, gait instability, impaired balance, decreased coordination, decreased upper extremity function, decreased lower extremity function, decreased safety awareness, pain, decreased ROM, impaired coordination, edema, orthopedic precautions. requiring significant assistance and verbal cues for bed mob, sitting at the EOB, sit < > stand, standing and gait to prevent falls due to weakness, pain, limited ROM, not feeling well.   In light of pt's current functional level and deficits, it is anticipated that pt will need to participate in an intense rehab program consisting of PT and OT in order to achieve full rehab potential to return to previous level of function and roles.  Pt remains motivated to participate in PT session and will cont to benefit from skilled PT intervention.      Rehab Prognosis:  Good; patient would benefit from acute skilled PT services to address these deficits and reach maximum level of function.      GOALS:   Multidisciplinary Problems     Physical Therapy Goals        Problem: Physical Therapy Goal     Goal Priority Disciplines Outcome Goal Variances Interventions   Physical Therapy Goal     PT, PT/OT Ongoing (interventions implemented as appropriate)     Description:  Goals to be met by: 3/15/2019     Patient will increase functional independence with mobility by performin. Supine to sit with Moderate Assistance  2. Rolling to Left and Right with Moderate Assistance.  3. Sit to stand transfer with Maximum Assistance  4. Gait  x 10 feet with Moderate Assistance using Rolling Walker.   5. Stand for 5 minutes with Moderate Assistance using Rolling Walker  6. Lower extremity exercise program x20 reps per handout, with assistance as needed                      Time Tracking:     PT Received On: 03/15/19  PT Start Time: 1015     PT Stop Time: 1125  PT Total Time (min): 70 min     Billable Minutes: Therapeutic Activity 70    Treatment Type: Treatment  PT/PTA: PTA     PTA Visit Number: 1       Tanesha Ellis PTA.  Pager [phone]    3/15/2019    .

## 2019-03-16 PROBLEM — R19.7 DIARRHEA: Status: ACTIVE | Noted: 2019-03-16

## 2019-03-16 LAB
ALBUMIN SERPL BCP-MCNC: 2.9 G/DL
ALP SERPL-CCNC: 149 U/L
ALT SERPL W/O P-5'-P-CCNC: 11 U/L
ANION GAP SERPL CALC-SCNC: 11 MMOL/L
AST SERPL-CCNC: 28 U/L
BASOPHILS # BLD AUTO: 0.03 K/UL
BASOPHILS NFR BLD: 0.2 %
BILIRUB SERPL-MCNC: 1 MG/DL
BUN SERPL-MCNC: 40 MG/DL
C DIFF GDH STL QL: POSITIVE
C DIFF TOX A+B STL QL IA: NEGATIVE
C DIFF TOX GENS STL QL NAA+PROBE: NEGATIVE
CALCIUM SERPL-MCNC: 9.5 MG/DL
CHLORIDE SERPL-SCNC: 103 MMOL/L
CO2 SERPL-SCNC: 25 MMOL/L
CREAT SERPL-MCNC: 7.7 MG/DL
DIFFERENTIAL METHOD: ABNORMAL
EOSINOPHIL # BLD AUTO: 0.3 K/UL
EOSINOPHIL NFR BLD: 1.9 %
ERYTHROCYTE [DISTWIDTH] IN BLOOD BY AUTOMATED COUNT: 17.7 %
EST. GFR  (AFRICAN AMERICAN): 8.2 ML/MIN/1.73 M^2
EST. GFR  (NON AFRICAN AMERICAN): 7.1 ML/MIN/1.73 M^2
GLUCOSE SERPL-MCNC: 105 MG/DL
HCT VFR BLD AUTO: 28.5 %
HGB BLD-MCNC: 8.6 G/DL
IMM GRANULOCYTES # BLD AUTO: 0.04 K/UL
IMM GRANULOCYTES NFR BLD AUTO: 0.3 %
INR PPP: 1.1
LYMPHOCYTES # BLD AUTO: 1.1 K/UL
LYMPHOCYTES NFR BLD: 8.3 %
MAGNESIUM SERPL-MCNC: 2 MG/DL
MCH RBC QN AUTO: 33.2 PG
MCHC RBC AUTO-ENTMCNC: 30.2 G/DL
MCV RBC AUTO: 110 FL
MONOCYTES # BLD AUTO: 1 K/UL
MONOCYTES NFR BLD: 7.2 %
NEUTROPHILS # BLD AUTO: 10.8 K/UL
NEUTROPHILS NFR BLD: 82.1 %
NRBC BLD-RTO: 0 /100 WBC
PHOSPHATE SERPL-MCNC: 8.9 MG/DL
PLATELET # BLD AUTO: 140 K/UL
PMV BLD AUTO: 11.3 FL
POCT GLUCOSE: 113 MG/DL (ref 70–110)
POCT GLUCOSE: 80 MG/DL (ref 70–110)
POCT GLUCOSE: 95 MG/DL (ref 70–110)
POTASSIUM SERPL-SCNC: 4.3 MMOL/L
PROT SERPL-MCNC: 5.8 G/DL
PROTHROMBIN TIME: 10.8 SEC
RBC # BLD AUTO: 2.59 M/UL
SODIUM SERPL-SCNC: 139 MMOL/L
WBC # BLD AUTO: 13.18 K/UL

## 2019-03-16 PROCEDURE — 63600175 PHARM REV CODE 636 W HCPCS: Mod: JG | Performed by: NURSE PRACTITIONER

## 2019-03-16 PROCEDURE — 25000003 PHARM REV CODE 250: Performed by: PSYCHIATRY & NEUROLOGY

## 2019-03-16 PROCEDURE — 25000003 PHARM REV CODE 250: Performed by: STUDENT IN AN ORGANIZED HEALTH CARE EDUCATION/TRAINING PROGRAM

## 2019-03-16 PROCEDURE — 63600175 PHARM REV CODE 636 W HCPCS: Performed by: PHYSICIAN ASSISTANT

## 2019-03-16 PROCEDURE — 90935 HEMODIALYSIS ONE EVALUATION: CPT

## 2019-03-16 PROCEDURE — 20600001 HC STEP DOWN PRIVATE ROOM

## 2019-03-16 PROCEDURE — 99223 1ST HOSP IP/OBS HIGH 75: CPT | Mod: GC,,, | Performed by: INTERNAL MEDICINE

## 2019-03-16 PROCEDURE — 25000003 PHARM REV CODE 250: Performed by: NURSE PRACTITIONER

## 2019-03-16 PROCEDURE — 84100 ASSAY OF PHOSPHORUS: CPT

## 2019-03-16 PROCEDURE — 83735 ASSAY OF MAGNESIUM: CPT

## 2019-03-16 PROCEDURE — 85025 COMPLETE CBC W/AUTO DIFF WBC: CPT

## 2019-03-16 PROCEDURE — 99024 POSTOP FOLLOW-UP VISIT: CPT | Mod: POP,,, | Performed by: PHYSICIAN ASSISTANT

## 2019-03-16 PROCEDURE — 99223 PR INITIAL HOSPITAL CARE,LEVL III: ICD-10-PCS | Mod: GC,,, | Performed by: INTERNAL MEDICINE

## 2019-03-16 PROCEDURE — 90935 HEMODIALYSIS ONE EVALUATION: CPT | Mod: ,,, | Performed by: INTERNAL MEDICINE

## 2019-03-16 PROCEDURE — 99232 PR SUBSEQUENT HOSPITAL CARE,LEVL II: ICD-10-PCS | Mod: ,,, | Performed by: NURSE PRACTITIONER

## 2019-03-16 PROCEDURE — 85610 PROTHROMBIN TIME: CPT

## 2019-03-16 PROCEDURE — 25000003 PHARM REV CODE 250: Performed by: PHYSICIAN ASSISTANT

## 2019-03-16 PROCEDURE — 80053 COMPREHEN METABOLIC PANEL: CPT

## 2019-03-16 PROCEDURE — 90935 PR HEMODIALYSIS, ONE EVALUATION: ICD-10-PCS | Mod: ,,, | Performed by: INTERNAL MEDICINE

## 2019-03-16 PROCEDURE — 99232 SBSQ HOSP IP/OBS MODERATE 35: CPT | Mod: ,,, | Performed by: NURSE PRACTITIONER

## 2019-03-16 PROCEDURE — 99024 PR POST-OP FOLLOW-UP VISIT: ICD-10-PCS | Mod: POP,,, | Performed by: PHYSICIAN ASSISTANT

## 2019-03-16 RX ADMIN — GABAPENTIN 300 MG: 300 CAPSULE ORAL at 09:03

## 2019-03-16 RX ADMIN — MIDODRINE HYDROCHLORIDE 20 MG: 5 TABLET ORAL at 01:03

## 2019-03-16 RX ADMIN — MIDODRINE HYDROCHLORIDE 20 MG: 5 TABLET ORAL at 05:03

## 2019-03-16 RX ADMIN — METOCLOPRAMIDE HYDROCHLORIDE 5 MG: 5 SOLUTION ORAL at 04:03

## 2019-03-16 RX ADMIN — PRAVASTATIN SODIUM 40 MG: 40 TABLET ORAL at 01:03

## 2019-03-16 RX ADMIN — ACETAMINOPHEN 500 MG: 500 TABLET, FILM COATED ORAL at 11:03

## 2019-03-16 RX ADMIN — PANTOPRAZOLE SODIUM 40 MG: 40 GRANULE, DELAYED RELEASE ORAL at 01:03

## 2019-03-16 RX ADMIN — SIMETHICONE CHEW TAB 80 MG 80 MG: 80 TABLET ORAL at 10:03

## 2019-03-16 RX ADMIN — MIDODRINE HYDROCHLORIDE 20 MG: 5 TABLET ORAL at 12:03

## 2019-03-16 RX ADMIN — METOCLOPRAMIDE HYDROCHLORIDE 5 MG: 5 SOLUTION ORAL at 10:03

## 2019-03-16 RX ADMIN — Medication 125 MG: at 01:03

## 2019-03-16 RX ADMIN — FLUOXETINE HYDROCHLORIDE 20 MG: 20 CAPSULE ORAL at 01:03

## 2019-03-16 RX ADMIN — SIMETHICONE CHEW TAB 80 MG 80 MG: 80 TABLET ORAL at 05:03

## 2019-03-16 RX ADMIN — METOCLOPRAMIDE HYDROCHLORIDE 5 MG: 5 SOLUTION ORAL at 01:03

## 2019-03-16 RX ADMIN — SODIUM CHLORIDE: 0.9 INJECTION, SOLUTION INTRAVENOUS at 07:03

## 2019-03-16 RX ADMIN — METOCLOPRAMIDE HYDROCHLORIDE 5 MG: 5 SOLUTION ORAL at 06:03

## 2019-03-16 RX ADMIN — HEPARIN SODIUM 5000 UNITS: 5000 INJECTION, SOLUTION INTRAVENOUS; SUBCUTANEOUS at 05:03

## 2019-03-16 RX ADMIN — HEPARIN SODIUM 5000 UNITS: 5000 INJECTION, SOLUTION INTRAVENOUS; SUBCUTANEOUS at 01:03

## 2019-03-16 RX ADMIN — HEPARIN SODIUM 5000 UNITS: 5000 INJECTION, SOLUTION INTRAVENOUS; SUBCUTANEOUS at 09:03

## 2019-03-16 RX ADMIN — PANTOPRAZOLE SODIUM 40 MG: 40 GRANULE, DELAYED RELEASE ORAL at 09:03

## 2019-03-16 RX ADMIN — ERYTHROPOIETIN 7000 UNITS: 4000 INJECTION, SOLUTION INTRAVENOUS; SUBCUTANEOUS at 10:03

## 2019-03-16 RX ADMIN — ACETAMINOPHEN 500 MG: 500 TABLET, FILM COATED ORAL at 05:03

## 2019-03-16 RX ADMIN — MIDODRINE HYDROCHLORIDE 20 MG: 5 TABLET ORAL at 11:03

## 2019-03-16 NOTE — PLAN OF CARE
Problem: Adult Inpatient Plan of Care  Goal: Plan of Care Review  Outcome: Ongoing (interventions implemented as appropriate)  Pt AAO x 4 & up w/ PT yesterday. Pt denies any nausea. Pt had 3 BMs overnight. C-Diff specimen pending. HS . Pt remains free from falls & skin breakdowns. Pt able to roll & shift his weight in bed. Pt w/out any complaints or concerns. Instructed to call w/ any needs. Call bell in reach.

## 2019-03-16 NOTE — ASSESSMENT & PLAN NOTE
BG goal 140-180    Consider januvia 25 mg daily if BG trends up.   Low dose correction scale given kidney function  BG monitoring AC/HS        Discharge planning: consider holding basal insulin given no needs currently inpatient. Victoza plus correction scale.

## 2019-03-16 NOTE — ASSESSMENT & PLAN NOTE
58 M with progressive cervical myelopathy and stenosis from C3-6 with myelomalacia. S/p code for hypovolemia during HD. Stable this morning and labs WNL. He stepped up to ICU for further care and work up, now s/p C3-6 posterior decompression 3/4/19.      - Pt neurologically stable  - Neuro checks q4h  -Continue dialysis T/Th. Last dialysis today on 3/16. Management per Nephrology.   - No C collar needed  - Pain control: Continue Oxycodone IR 5mg q4h prn  - Daily PT/OT. Rec rehab placement.   - H/H stable 8.6/28.5, will ctm.  - Plts 140k, goal >50k  - KUB shows partial SBO v ileus. + c diff antigen, - c diff toxins. Started PO vancomycin on 3/16. Consulted GI  - Ordered boost for nutritional supplementation   - Discussed with Dr. Wylie

## 2019-03-16 NOTE — HPI
Mr Farmer is a 58 year old man with history of ESRD, T2DM, HLD, HTN, MIKE who is presenting to the hospital on 2/28 with unsteady gait and weakness, s/p cervical laminectomy (3/3), GI consulted due to concern for diarrhea.    He was hospitalized initially 2/26 with weakness and instability to [facility]. Workup was notable for C3-C6 stenosis and cervical myelopathy therefore was transferred to [facility] (3/1) and underwent cervical surgery on 3/3. He notes that he was well until Tuesday last week (3/12) when he started to develop. He notes that prior to the diarrhea starting he felt constipated and had been given an enema, but since has continued to have multiple episodes of diarrhea (endorsing 14/24h) since. Unable to describe diarrhea, but reports non-bloody. Denies any abdominal pain. Endorses poor appetite and an episode of nausea and non-bloody, bilious vomitus yesterday. Denies any prior history of diarrhea.    KUB was obtained (3/15) which was notable for early vs partial small bowel obstruction vs ileus, nodular contour of gastric folds. C.diff was checked with negative PCR, positive antigen, negative PCR (3/15) and was started on empiric vancomycin PO. Labs notable for rise in WBC (9 -> 13). K>4, Mg >2, albumin 2.9.    Medication reviewed:  - on reglan 5mg QID  - lactulose 3/11 - 3/14  - protonix 40mg BID  - miralax daily (last 3/14)  - senna-colace BID (last 3/14)  - oxycodone 5mg q6h prn (last 3/10)    Prior Endo Hx  - none

## 2019-03-16 NOTE — PLAN OF CARE
Problem: Adult Inpatient Plan of Care  Goal: Plan of Care Review  Outcome: Ongoing (interventions implemented as appropriate)  Hemodialysis tx complete, 2L removed in 4 hour tx, tolerated well. Blood returned via LLA AVF, 15g needles removed x2, gauze and tape applied, pressure held to each site for 5 minutes, hemostasis achieved

## 2019-03-16 NOTE — PROGRESS NOTES
"[facility]  Endocrinology  Progress Note    Admit Date: 2019     Reason for Consult: Management of T2DM, Hyperglycemia     Surgical Procedure and Date: Cervical laminectomy C3-C6, C6-C7 w/ medial discectomy 3/3/19    Diabetes diagnosis year:     Lab Results   Component Value Date    HGBA1C 5.0 2019     Home Diabetes Medications: Basaglar 40 units q HS, Victoza 1.2 mg once daily,     How often checking glucose at home? 4x per day   BG readings on regimen: 90-120s  Hypoglycemia on the regimen?  Yes - about once per month  Missed doses on regimen?  No    Diabetes Complications include:     Hypoglycemia , Diabetic nephropathy  , Diabetic chronic kidney disease     , Diabetic retinopathy , Diabetic cataract  and Diabetic peripheral neuropathy     Complicating diabetes co morbidities:   MIKE, CKD and ESRD      HPI:   Patient is a 58 y.o. male with a diagnosis of HTN, HLD, MIKE, ESRD on HD, and DM2, who is s/p cervical laminectomy C3-C6, C6-C7 w/ medial discectomy 3/3/19.  Patient was admitted to the ED on 19 for upper extremity weakness and ataxia following a fall.  Patient's DM managed by PCP, Dr. Shemar Berry in [place].  Patient is prescribed MDI but only takes basal insulin and Victoza.  Positive family history of DM (mother, father, and both maternal grandparents).  Endocrine consulted for DM/BG management.    Interval HPI:   Overnight events:  Remains in [facility]. NAEON. Multiple BMs (contact isolation - cdiff pending). HD this AM. BG at goal with scheduled insulin; not requiring correction scale.   Eatin%  Nausea: No  Hypoglycemia and intervention: No  Fever: No  TPN and/or TF: No      BP (!) 112/41   Pulse (!) 59   Temp 96.6 °F (35.9 °C) (Oral)   Resp 20   Ht 5' 8" (1.727 m)   Wt (!) 142.4 kg (314 lb)   SpO2 98%   BMI 47.74 kg/m²      Labs Reviewed and Include    Recent Labs   Lab 19  0358      CALCIUM 9.5   ALBUMIN 2.9*   PROT 5.8*      K 4.3 "   CO2 25      BUN 40*   CREATININE 7.7*   ALKPHOS 149*   ALT 11   AST 28   BILITOT 1.0     Lab Results   Component Value Date    WBC 13.18 (H) 03/16/2019    HGB 8.6 (L) 03/16/2019    HCT 28.5 (L) 03/16/2019     (H) 03/16/2019     (L) 03/16/2019     No results for input(s): TSH, FREET4 in the last 168 hours.  Lab Results   Component Value Date    HGBA1C 5.0 02/27/2019       Nutritional status:   Body mass index is 47.74 kg/m².  Lab Results   Component Value Date    ALBUMIN 2.9 (L) 03/16/2019    ALBUMIN 2.9 (L) 03/15/2019    ALBUMIN 2.8 (L) 03/14/2019     No results found for: PREALBUMIN    Estimated Creatinine Clearance: 14.8 mL/min (A) (based on SCr of 7.7 mg/dL (H)).    Accu-Checks  Recent Labs     03/13/19  1736 03/13/19  2144 03/14/19  0737 03/14/19  1419 03/14/19  1624 03/14/19  2135 03/15/19  0740 03/15/19  1131 03/15/19  1638 03/15/19  2146   POCTGLUCOSE 94 169* 106 123* 137* 122* 139* 173* 146* 134*       Current Medications and/or Treatments Impacting Glycemic Control  Immunotherapy:    Immunosuppressants     None        Steroids:   Hormones (From admission, onward)    None        Pressors:    Autonomic Drugs (From admission, onward)    Start     Stop Route Frequency Ordered    03/10/19 1200  midodrine tablet 20 mg      -- Oral Every 6 hours 03/10/19 0946        Hyperglycemia/Diabetes Medications:   Antihyperglycemics (From admission, onward)    Start     Stop Route Frequency Ordered    03/14/19 2100  insulin detemir U-100 pen 3 Units      -- SubQ 2 times daily 03/14/19 0716    03/14/19 0845  insulin aspart U-100 pen 2 Units      -- SubQ 3 times daily with meals 03/14/19 0841    03/06/19 1638  insulin aspart U-100 pen 0-5 Units      -- SubQ Before meals & nightly PRN 03/06/19 1538          ASSESSMENT and PLAN    * S/P laminectomy    S/p cervical laminectomy C3-C6, C6-C7 w/ medial discectomy 3/3/19  Managed per primary team  Avoid hypoglycemia  Optimize BG control for surgical wound  healing         Diabetes mellitus, type 2    BG goal 140-180    Continue Levemir 3 units BID.   continue Novolog to 2 units with meals  Low dose correction scale given kidney function  BG monitoring AC/HS    ADDENDUM: hold levemir tonight and dinner insulin.     Discharge planning: Patient will need insulin dosing changes upon discharge.  Currently only taking basal insulin plus Victoza.       MIKE (obstructive sleep apnea) - very severe latest sleep study says BPAP @ 16/8    May affect BG readings if uncontrolled         ESRD (end stage renal disease) on dialysis    Caution with insulin stacking  Estimated Creatinine Clearance: 16.6 mL/min (A) (based on SCr of 6.7 mg/dL (H)).         Anemia due to stage 5 chronic kidney disease    Affects A1C accuracy  Consider checking fructosamine at least 2 weeks post hospital discharge to evaluate BG control     Class 3 severe obesity due to excess calories with serious comorbidity and body mass index (BMI) of 45.0 to 49.9 in adult    may contribute to insulin resistance  Body mass index is 45.62 kg/m².             La Mcconnell NP  Endocrinology  [facility]

## 2019-03-16 NOTE — PROGRESS NOTES
C diff PCR negative  Discontinued vancomycin PO  Awaiting GI rec's, appreciate assistance.    Homa White PA-C  Neurosurgery  Pager: [phone]

## 2019-03-16 NOTE — CONSULTS
[facility]  Gastroenterology  Consult Note    Patient Name: Angel Farmer Jr.  MRN: [id number]  Admission Date: 2/28/2019  Hospital Length of Stay: 16 days  Code Status: Full Code   Attending Provider: Ruddy Wylie MD   Consulting Provider: Ruddy Cervantes MD  Primary Care Physician: Satya Noriega MD  Principal Problem:S/P laminectomy    Inpatient consult to Gastroenterology  Consult performed by: Ruddy Cervantes MD  Consult ordered by: Homa White PA-C        Subjective:     HPI:  Mr Farmer is a 58 year old man with history of ESRD, T2DM, HLD, HTN, MIKE who is presenting to the hospital on 2/28 with unsteady gait and weakness, s/p cervical laminectomy (3/3), GI consulted due to concern for diarrhea.    He was hospitalized initially 2/26 with weakness and instability to [facility]. Workup was notable for C3-C6 stenosis and cervical myelopathy therefore was transferred to [facility] (3/1) and underwent cervical surgery on 3/3. He notes that he was well until Tuesday last week (3/12) when he started to develop. He notes that prior to the diarrhea starting he felt constipated and had been given an enema, but since has continued to have multiple episodes of diarrhea (endorsing 14/24h) since. Unable to describe diarrhea, but reports non-bloody. Denies any abdominal pain. Endorses poor appetite and an episode of nausea and non-bloody, bilious vomitus yesterday. Denies any prior history of diarrhea.    KUB was obtained (3/15) which was notable for early vs partial small bowel obstruction vs ileus, nodular contour of gastric folds. C.diff was checked with negative PCR, positive antigen, negative PCR (3/15) and was started on empiric vancomycin PO. Labs notable for rise in WBC (9 -> 13). K>4, Mg >2, albumin 2.9.    Medication reviewed:  - on reglan 5mg QID  - lactulose 3/11 - 3/14  - protonix 40mg BID  - miralax daily (last 3/14)  - senna-colace BID (last 3/14)  - oxycodone 5mg q6h prn (last  3/10)    Prior Endo Hx  - none      Past Medical History:   Diagnosis Date    Anemia due to stage 5 chronic kidney disease 3/1/2019    Diabetes mellitus type II     Dialysis patient     Hyperlipidemia     Hypertension     Kidney failure     MIKE (obstructive sleep apnea)     Urinary tract infection        Past Surgical History:   Procedure Laterality Date    AV FISTULA PLACEMENT      left lower arm    SPINE, CERVICAL, POSTERIOR APPROACH  LAMINECTOMY C3-C6; C6-C7; WITH MEDIAL DISCECTOMY N/A 3/3/2019    Performed by Ruddy Wylie MD at [facility] OR 2ND FLR    TONSILLECTOMY, ADENOIDECTOMY      TRANSESOPHAGEAL ECHOCARDIOGRAM (SERENITY) N/A 5/23/2017    Performed by Donaldo Mai MD at [facility] OR       Review of patient's allergies indicates:   Allergen Reactions    Keflex [cephalexin] Nausea Only     Family History     Problem Relation (Age of Onset)    Colon cancer Mother    Diabetes Mother, Father, Maternal Grandmother, Maternal Grandfather    Heart disease Father    Hypertension Father    Lung cancer Mother        Tobacco Use    Smoking status: Never Smoker    Smokeless tobacco: Never Used   Substance and Sexual Activity    Alcohol use: No    Drug use: No    Sexual activity: Not Currently     Review of Systems   Constitutional: Positive for appetite change. Negative for activity change, chills, fatigue and fever.   HENT: Negative for sore throat and trouble swallowing.    Eyes: Negative for visual disturbance.   Respiratory: Negative for cough and shortness of breath.    Cardiovascular: Negative for chest pain.   Gastrointestinal: Positive for abdominal distention, diarrhea, nausea and vomiting (none since 3/15). Negative for abdominal pain, anal bleeding, blood in stool and constipation.   Genitourinary: Negative for dysuria.   Musculoskeletal: Negative for arthralgias and myalgias.   Skin: Negative for rash.   Neurological: Negative for dizziness, light-headedness and headaches.    Psychiatric/Behavioral: Negative for agitation and confusion.     Objective:     Vital Signs (Most Recent):  Temp: 96.4 °F (35.8 °C) (03/16/19 1135)  Pulse: 65 (03/16/19 1150)  Resp: 18 (03/16/19 1135)  BP: (!) 122/54 (03/16/19 1135)  SpO2: 98 % (03/16/19 0504) Vital Signs (24h Range):  Temp:  [96.4 °F (35.8 °C)-98.6 °F (37 °C)] 96.4 °F (35.8 °C)  Pulse:  [51-68] 65  Resp:  [18-20] 18  SpO2:  [96 %-99 %] 98 %  BP: ()/(40-65) 122/54     Weight: (!) 142.4 kg (314 lb) (03/16/19 0300)  Body mass index is 47.74 kg/m².      Intake/Output Summary (Last 24 hours) at 3/16/2019 1244  Last data filed at 3/16/2019 0600  Gross per 24 hour   Intake 540 ml   Output --   Net 540 ml       Lines/Drains/Airways     Drain                 Hemodialysis AV Fistula Left forearm -- days          Peripheral Intravenous Line                 Peripheral IV - Single Lumen 03/16/19 0530 Right Wrist less than 1 day                Physical Exam   Constitutional: He is oriented to person, place, and time. No distress.   Obese man, no distress. HD underway.   HENT:   Head: Normocephalic and atraumatic.   Mouth/Throat: Oropharynx is clear and moist. No oropharyngeal exudate.   Eyes: Conjunctivae are normal. No scleral icterus.   Neck: No JVD present.   Cardiovascular: Normal rate, regular rhythm, normal heart sounds and intact distal pulses.   Pulmonary/Chest: Effort normal and breath sounds normal. No respiratory distress.   Abdominal: Soft. He exhibits no distension and no mass. There is no tenderness (soft, non-tender abdomen). There is no rebound and no guarding.   WENDY deferred as patient undergoing HD.  Hypoactive bowel sounds.   Musculoskeletal: He exhibits no edema or tenderness.   Lymphadenopathy:     He has no cervical adenopathy.   Neurological: He is alert and oriented to person, place, and time.   Skin: Skin is warm. Capillary refill takes less than 2 seconds. He is not diaphoretic.   Psychiatric: He has a normal mood and affect.  His behavior is normal. Judgment and thought content normal.   Nursing note and vitals reviewed.      Significant Labs:  CBC:   Recent Labs   Lab 03/15/19  0639 03/16/19  0358   WBC 9.70 13.18*   HGB 8.4* 8.6*   HCT 27.5* 28.5*   * 140*     CMP:   Recent Labs   Lab 03/16/19  0358      CALCIUM 9.5   ALBUMIN 2.9*   PROT 5.8*      K 4.3   CO2 25      BUN 40*   CREATININE 7.7*   ALKPHOS 149*   ALT 11   AST 28   BILITOT 1.0     Coagulation:   Recent Labs   Lab 03/16/19  0358   INR 1.1       Significant Imaging:  Imaging results within the past 24 hours have been reviewed.    Assessment/Plan:     Diarrhea    Mr Farmer is a 58 year old man with history of ESRD, T2DM, HLD, HTN, MIKE who is presenting to the hospital on 2/28 with unsteady gait and weakness, s/p cervical laminectomy (3/3), GI consulted due to concern for diarrhea.    Likely patient's imaging and nausea vomiting are due to ileus. Most likely post-op in nature though generally would occur closer to timing of surgery. Not on any recent narcotics and electrolytes are WNL.    Regarding diarrhea:   Appears was having difficult with constipation and received multiple medications including enemas to help improve and reports significant diarrhea since. He has been on multiple bowel medications which were stopped ~2 days ago and is not on any anti-diarrheals. Imaging is not suggestive of constipation (I.e. Overload incontinence/diarrhea).    Plan  - recommend supportive care for patient's ileus and diarrhea at this time  - ileus: maintain normal electrolyte (Mg>2, K>4), avoid narcotics  - serial abdominal examinations  - continue anti-emetics. reglan could worsen diarrhea and discuss with patient risks of Td if prolonged therapy  - no antidiarrheal medications or laxatives so that can gain greater insight into bowel frequency  - will need outpatient EGD/Colon for age related screening and concern for thickened gastric folds on imaging given  recent c-spine surgery           Thank you for your consult. I will follow-up with patient. Please contact us if you have any additional questions.    Ruddy Cervantes MD  Gastroenterology  [facility]

## 2019-03-16 NOTE — PLAN OF CARE
Problem: Adult Inpatient Plan of Care  Goal: Plan of Care Review  No acute changes on shift. Pt went to HD, 2 L removed. Pt placed on special contact isolation for c-diff. One dose of PO vanc given before being d/c'd. 3 loose stools noted on shift. Pt denies any further nausea or vomiting on shift. PRN tylenol given once on shift for c/o neck and L shoulder pain. No other issues noted at this time. Pt and sister in law updated on POC. WCTM.

## 2019-03-16 NOTE — PROGRESS NOTES
Maintenance hemodialysis tx started via LLA AVF, tolerated well, flows good. Regular dialysis days are MWF

## 2019-03-16 NOTE — ASSESSMENT & PLAN NOTE
Caution with insulin stacking  Estimated Creatinine Clearance: 14.8 mL/min (A) (based on SCr of 7.7 mg/dL (H)).

## 2019-03-16 NOTE — SUBJECTIVE & OBJECTIVE
"Interval HPI:   Overnight events:  Remains in MTSU. NAEON. Multiple BMs (contact isolation - cdiff pending). HD this AM. BG at goal with scheduled insulin; not requiring correction scale.   Eatin%  Nausea: No  Hypoglycemia and intervention: No  Fever: No  TPN and/or TF: No      BP (!) 112/41   Pulse (!) 59   Temp 96.6 °F (35.9 °C) (Oral)   Resp 20   Ht 5' 8" (1.727 m)   Wt (!) 142.4 kg (314 lb)   SpO2 98%   BMI 47.74 kg/m²     Labs Reviewed and Include    Recent Labs   Lab 19  0358      CALCIUM 9.5   ALBUMIN 2.9*   PROT 5.8*      K 4.3   CO2 25      BUN 40*   CREATININE 7.7*   ALKPHOS 149*   ALT 11   AST 28   BILITOT 1.0     Lab Results   Component Value Date    WBC 13.18 (H) 2019    HGB 8.6 (L) 2019    HCT 28.5 (L) 2019     (H) 2019     (L) 2019     No results for input(s): TSH, FREET4 in the last 168 hours.  Lab Results   Component Value Date    HGBA1C 5.0 2019       Nutritional status:   Body mass index is 47.74 kg/m².  Lab Results   Component Value Date    ALBUMIN 2.9 (L) 2019    ALBUMIN 2.9 (L) 03/15/2019    ALBUMIN 2.8 (L) 2019     No results found for: PREALBUMIN    Estimated Creatinine Clearance: 14.8 mL/min (A) (based on SCr of 7.7 mg/dL (H)).    Accu-Checks  Recent Labs     19  1736 19  2144 19  0737 19  1419 19  1624 19  2135 03/15/19  0740 03/15/19  1131 03/15/19  1638 03/15/19  2146   POCTGLUCOSE 94 169* 106 123* 137* 122* 139* 173* 146* 134*       Current Medications and/or Treatments Impacting Glycemic Control  Immunotherapy:    Immunosuppressants     None        Steroids:   Hormones (From admission, onward)    None        Pressors:    Autonomic Drugs (From admission, onward)    Start     Stop Route Frequency Ordered    03/10/19 1200  midodrine tablet 20 mg      -- Oral Every 6 hours 03/10/19 0909        Hyperglycemia/Diabetes Medications:   Antihyperglycemics (From " admission, onward)    Start     Stop Route Frequency Ordered    03/14/19 2100  insulin detemir U-100 pen 3 Units      -- SubQ 2 times daily 03/14/19 0716    03/14/19 0845  insulin aspart U-100 pen 2 Units      -- SubQ 3 times daily with meals 03/14/19 0841    03/06/19 1638  insulin aspart U-100 pen 0-5 Units      -- SubQ Before meals & nightly PRN 03/06/19 1537

## 2019-03-16 NOTE — ASSESSMENT & PLAN NOTE
Mr Farmer is a 58 year old man with history of ESRD, T2DM, HLD, HTN, MIKE who is presenting to the hospital on 2/28 with unsteady gait and weakness, s/p cervical laminectomy (3/3), GI consulted due to concern for diarrhea.    Likely patient's imaging and nausea vomiting are due to ileus. Most likely post-op in nature though generally would occur closer to timing of surgery. Not on any recent narcotics and electrolytes are WNL.    Regarding diarrhea:   Appears was having difficult with constipation and received multiple medications including enemas to help improve and reports significant diarrhea since. He has been on multiple bowel medications which were stopped ~2 days ago and is not on any anti-diarrheals. Imaging is not suggestive of constipation (I.e. Overload incontinence/diarrhea).    Plan  - recommend supportive care for patient's ileus and diarrhea at this time  - ileus: maintain normal electrolyte (Mg>2, K>4), avoid narcotics  - serial abdominal examinations  - continue anti-emetics. reglan could worsen diarrhea and discuss with patient risks of Td if prolonged therapy  - no antidiarrheal medications or laxatives so that can gain greater insight into bowel frequency  - will need outpatient EGD/Colon for age related screening and concern for thickened gastric folds on imaging given recent c-spine surgery

## 2019-03-16 NOTE — PROGRESS NOTES
Patient currently on hemodialysis for removal of uremic toxins and volume control. I personally saw and examined the patient on hemodialysis. The patient is tolerating the treatment, see hemodyalisis flow sheet for vitals and assessemts. I also reviewed the chart and current medication. The dialysis bath was adjusted.

## 2019-03-16 NOTE — PROGRESS NOTES
[facility]  Neurosurgery  Progress Note    Subjective:     History of Present Illness: 59 yo male with a PMH of ESRD with dialysis MWF (last on weds), DM, presenting as transfer from [facility] as admission to neurosurgery for evaluation after progressive course of unsteady gait as well as bilateral upper extremity weakness for the last several months.  Mr. Farmer has had wobbly gait since this past September, at which point he recalls falling and has been at 80-90% of his normal function.  He has been able to walk without assist, however not for very long distances.  He states that since last Sunday he has felt progression of his symptoms and now feels it would be difficult to stand up to ambulate at all.  He also endorses a pulled muscle of his LLE that is limiting his mobility.  His UE symptoms have been present for about 3 months including not writing as well as he is used to.  HE is able to write numbers, but not always his name.  He has been having difficulty using a fork to eat over the past month.  No sensory disturbance noted as well as no neck or back pain and no pain from the neck radiating into the hands.  He has intermittently been taking ASA 81 mg qdaily. He denies recent falls or trauma to the neck.  No other blood thinning medication.  MRI at OSH on 2/27 revealed significant cervical stenosis.          Post-Op Info:  Procedure(s) (LRB):  SPINE, CERVICAL, POSTERIOR APPROACH  LAMINECTOMY C3-C6; C6-C7; WITH MEDIAL DISCECTOMY (N/A)   13 Days Post-Op     Interval History: Patient admits continued diarrhea. Denies vomiting. Admits he has not eaten today because he feels full but can tolerate drinking. Encouraged adequate PO hydration as patient is having multiple episodes of diarrhea. Patient denies new weakness/paresthesias, seizures, headaches, clumsiness with hands.    Medications:  Continuous Infusions:  Scheduled Meds:   sodium chloride 0.9%   Intravenous Once    sodium  chloride 0.9%   Intravenous Once    diclofenac sodium   Topical (Top) Daily    epoetin shefali (PROCRIT) injection  7,000 Units Intravenous Every Mon, Wed, Fri    FLUoxetine  20 mg Oral Daily    gabapentin  300 mg Oral QHS    heparin (porcine)  5,000 Units Subcutaneous Q8H    insulin aspart U-100  2 Units Subcutaneous TIDWM    insulin detemir U-100  3 Units Subcutaneous BID    lactulose  20 g Oral Daily    metoclopramide HCl  5 mg Oral QID (AC & HS)    midodrine  20 mg Oral Q6H    pantoprazole  40 mg Oral BID    polyethylene glycol  17 g Oral Daily    pravastatin  40 mg Oral Daily    senna-docusate 8.6-50 mg  1 tablet Oral BID    vancomycin  125 mg Oral Q6H     PRN Meds:sodium chloride 0.9%, sodium chloride 0.9%, acetaminophen, dextrose 50%, dextrose 50%, glucagon (human recombinant), glucose, glucose, insulin aspart U-100, miconazole NITRATE 2 %, ondansetron, oxyCODONE, simethicone     Review of Systems  Objective:     Weight: (!) 142.4 kg (314 lb)  Body mass index is 47.74 kg/m².  Vital Signs (Most Recent):  Temp: 96.4 °F (35.8 °C) (03/16/19 1135)  Pulse: 65 (03/16/19 1150)  Resp: 18 (03/16/19 1135)  BP: (!) 122/54 (03/16/19 1135)  SpO2: 98 % (03/16/19 0504) Vital Signs (24h Range):  Temp:  [96.4 °F (35.8 °C)-98.6 °F (37 °C)] 96.4 °F (35.8 °C)  Pulse:  [51-70] 65  Resp:  [18-20] 18  SpO2:  [90 %-99 %] 98 %  BP: ()/(40-65) 122/54                           Hemodialysis AV Fistula Left forearm (Active)   Needle Size 15ga 3/16/2019 11:35 AM   Site Assessment Clean;Dry;Intact 3/16/2019 11:35 AM   Patency Present;Thrill;Bruit 3/16/2019 11:35 AM   Status Deaccessed 3/16/2019 11:35 AM   Flows Good 3/16/2019  7:28 AM   Dressing Intervention New dressing 3/16/2019 11:35 AM   Dressing Status Clean;Dry;Intact 3/16/2019 11:35 AM   Site Condition No complications 3/16/2019 11:35 AM   Dressing Gauze 3/16/2019 11:35 AM       Neurosurgery Physical Exam  General: well developed, well nourished, no  distress. Obese.   Head: normocephalic, atraumatic  Neurologic: Alert and oriented. Thought content appropriate.  GCS: Motor: 6/Verbal: 5/Eyes: 4 GCS Total: 15  Mental Status: Awake, Alert, Oriented x 4  Language: No aphasia  Speech: No dysarthria  Cranial nerves: face symmetric, tongue midline, CN II-XII grossly intact.   Eyes: pupils equal, round, reactive to light, EOMI.   Pulmonary: normal respirations, no signs of respiratory distress  Abdomen: soft, distended, tender to palpation in LLQ  Skin: Skin is warm, dry and intact.  Sensory: intact to light touch throughout     Motor Strength:Moves all extremities spontaneously with good tone. Strength exam pain limited. No abnormal movements seen.      Strength   Deltoids Triceps Biceps Wrist Extension Wrist Flexion Hand    Upper: R 5/5 5/5 5/5 5/5 5/5 5/5     L 5/5 5/5 5/5 5/5 5/5 5/5       Iliopsoas Quadriceps Knee  Flexion Tibialis  anterior Gastro- cnemius EHL   Lower: R 5/5 5/5 5/5 5/5 5/5 5/5     L 4/5 4+/5 4+/5 5/5 5/5 5/5      Sanford's: bilaterally.  Babinski's: Negative.  Clonus: Negative.  Incision c/d/i with no surrounding erythema, edema, or drainage. well approximated with sutures             Significant Labs:  Recent Labs   Lab 03/15/19  0639 03/16/19  0358    105    139   K 4.0 4.3    103   CO2 26 25   BUN 31* 40*   CREATININE 6.0* 7.7*   CALCIUM 9.5 9.5   MG 2.1 2.0     Recent Labs   Lab 03/15/19  0639 03/16/19  0358   WBC 9.70 13.18*   HGB 8.4* 8.6*   HCT 27.5* 28.5*   * 140*     Recent Labs   Lab 03/15/19  0639 03/16/19  0358   INR 1.0 1.1     Microbiology Results (last 7 days)     Procedure Component Value Units Date/Time    C Diff Toxin by PCR [[id number]] Collected:  03/15/19 1930    Order Status:  Completed Updated:  03/16/19 0805     C. diff PCR Negative    Clostridium difficile EIA [[id number]]  (Abnormal) Collected:  03/15/19 1930    Order Status:  Completed Specimen:  Stool Updated:  03/16/19 0701     C. diff  Antigen Positive     C difficile Toxins A+B, EIA Negative     Comment: Testing not recommended for children <24 months old.           CMP:   Recent Labs   Lab 03/15/19  0639 03/16/19  0358    105   CALCIUM 9.5 9.5   ALBUMIN 2.9* 2.9*   PROT 5.8* 5.8*    139   K 4.0 4.3   CO2 26 25    103   BUN 31* 40*   CREATININE 6.0* 7.7*   ALKPHOS 159* 149*   ALT 12 11   AST 29 28   BILITOT 1.1* 1.0       Significant Diagnostics:  FINDINGS:  Multiple AP views of the abdomen were obtained today at 18:15 hours, 18:16 hours and 18:18 hours.  Right flank is incompletely included on the study.    Slender wires overlie the left upper quadrant and epigastrium, of uncertain significance; these may be extrinsic to the patient.    There is abundant gas in the stomach.  The gastric wall appears to have an unusual nodular contour similar to 03/03/2019; clinical considerations will determine if further investigation is warranted.    There is abundant gas in loops of small and large bowel.  I suspect small bowel distension even allowing for magnification of intra-abdominal structures due to thick body wall and bedside technique.  I am not convinced there is large bowel distension.  Findings are concerning for early or partial small bowel obstruction versus ileus.    I do not identify intramural gas, intra portal gas or free peritoneal gas.    The patient has known abundant calcific atherosclerosis in branches of the abdominal aorta accounting for tubular distribution of radiopaque material overlying the epigastrium on the abdominal image obtained with the patient in right posterior oblique rotation, centered at the xiphoid (18:16 hours).    I detect no acute disease in the base of the left hemithorax.  Right hemidiaphragm is mildly elevated as seen on earlier studies.      Impression       Findings are concerning for early or partial small bowel obstruction versus ileus.    Gastric folds appear to have a nodular contour on  the current examination and on the recent study of 03/03/2019.  Clinical considerations will determine if further investigation is warranted.  No corresponding abnormality detected on the earlier abdominal CT dated 05/20/2017.      Electronically signed by: Pascale Vasquez MD  Date: 03/15/2019  Time: 19:04     I independently reviewed the imaging.       Assessment/Plan:     * S/P laminectomy     58 M with progressive cervical myelopathy and stenosis from C3-6 with myelomalacia. S/p code for hypovolemia during HD. Stable this morning and labs WNL. He stepped up to ICU for further care and work up, now s/p C3-6 posterior decompression 3/4/19.      - Pt neurologically stable  - Neuro checks q4h  -Continue dialysis T/Th. Last dialysis today on 3/16. Management per Nephrology.   - No C collar needed  - Pain control: Continue Oxycodone IR 5mg q4h prn  - Daily PT/OT. Rec rehab placement.   - H/H stable 8.6/28.5, will ctm.  - Plts 140k, goal >50k  - KUB shows partial SBO v ileus. + c diff antigen, - c diff toxins. Started PO vancomycin on 3/16. Consulted GI  - Ordered boost for nutritional supplementation   - Discussed with Dr. Dejan White, PA-C  Neurosurgery  [facility]

## 2019-03-16 NOTE — SUBJECTIVE & OBJECTIVE
Past Medical History:   Diagnosis Date    Anemia due to stage 5 chronic kidney disease 3/1/2019    Diabetes mellitus type II     Dialysis patient     Hyperlipidemia     Hypertension     Kidney failure     MIKE (obstructive sleep apnea)     Urinary tract infection        Past Surgical History:   Procedure Laterality Date    AV FISTULA PLACEMENT      left lower arm    SPINE, CERVICAL, POSTERIOR APPROACH  LAMINECTOMY C3-C6; C6-C7; WITH MEDIAL DISCECTOMY N/A 3/3/2019    Performed by Ruddy Wylie MD at [facility] OR 2ND FLR    TONSILLECTOMY, ADENOIDECTOMY      TRANSESOPHAGEAL ECHOCARDIOGRAM (SERENITY) N/A 5/23/2017    Performed by Donaldo Mai MD at [facility] OR       Review of patient's allergies indicates:   Allergen Reactions    Keflex [cephalexin] Nausea Only     Family History     Problem Relation (Age of Onset)    Colon cancer Mother    Diabetes Mother, Father, Maternal Grandmother, Maternal Grandfather    Heart disease Father    Hypertension Father    Lung cancer Mother        Tobacco Use    Smoking status: Never Smoker    Smokeless tobacco: Never Used   Substance and Sexual Activity    Alcohol use: No    Drug use: No    Sexual activity: Not Currently     Review of Systems   Constitutional: Positive for appetite change. Negative for activity change, chills, fatigue and fever.   HENT: Negative for sore throat and trouble swallowing.    Eyes: Negative for visual disturbance.   Respiratory: Negative for cough and shortness of breath.    Cardiovascular: Negative for chest pain.   Gastrointestinal: Positive for abdominal distention, diarrhea, nausea and vomiting (none since 3/15). Negative for abdominal pain, anal bleeding, blood in stool and constipation.   Genitourinary: Negative for dysuria.   Musculoskeletal: Negative for arthralgias and myalgias.   Skin: Negative for rash.   Neurological: Negative for dizziness, light-headedness and headaches.   Psychiatric/Behavioral: Negative for agitation and  confusion.     Objective:     Vital Signs (Most Recent):  Temp: 96.4 °F (35.8 °C) (03/16/19 1135)  Pulse: 65 (03/16/19 1150)  Resp: 18 (03/16/19 1135)  BP: (!) 122/54 (03/16/19 1135)  SpO2: 98 % (03/16/19 0504) Vital Signs (24h Range):  Temp:  [96.4 °F (35.8 °C)-98.6 °F (37 °C)] 96.4 °F (35.8 °C)  Pulse:  [51-68] 65  Resp:  [18-20] 18  SpO2:  [96 %-99 %] 98 %  BP: ()/(40-65) 122/54     Weight: (!) 142.4 kg (314 lb) (03/16/19 0300)  Body mass index is 47.74 kg/m².      Intake/Output Summary (Last 24 hours) at 3/16/2019 1244  Last data filed at 3/16/2019 0600  Gross per 24 hour   Intake 540 ml   Output --   Net 540 ml       Lines/Drains/Airways     Drain                 Hemodialysis AV Fistula Left forearm -- days          Peripheral Intravenous Line                 Peripheral IV - Single Lumen 03/16/19 0530 Right Wrist less than 1 day                Physical Exam   Constitutional: He is oriented to person, place, and time. No distress.   Obese man, no distress. HD underway.   HENT:   Head: Normocephalic and atraumatic.   Mouth/Throat: Oropharynx is clear and moist. No oropharyngeal exudate.   Eyes: Conjunctivae are normal. No scleral icterus.   Neck: No JVD present.   Cardiovascular: Normal rate, regular rhythm, normal heart sounds and intact distal pulses.   Pulmonary/Chest: Effort normal and breath sounds normal. No respiratory distress.   Abdominal: Soft. He exhibits no distension and no mass. There is no tenderness (soft, non-tender abdomen). There is no rebound and no guarding.   WENDY deferred as patient undergoing HD.  Hypoactive bowel sounds.   Musculoskeletal: He exhibits no edema or tenderness.   Lymphadenopathy:     He has no cervical adenopathy.   Neurological: He is alert and oriented to person, place, and time.   Skin: Skin is warm. Capillary refill takes less than 2 seconds. He is not diaphoretic.   Psychiatric: He has a normal mood and affect. His behavior is normal. Judgment and thought content  normal.   Nursing note and vitals reviewed.      Significant Labs:  CBC:   Recent Labs   Lab 03/15/19  0639 03/16/19 0358   WBC 9.70 13.18*   HGB 8.4* 8.6*   HCT 27.5* 28.5*   * 140*     CMP:   Recent Labs   Lab 03/16/19 0358      CALCIUM 9.5   ALBUMIN 2.9*   PROT 5.8*      K 4.3   CO2 25      BUN 40*   CREATININE 7.7*   ALKPHOS 149*   ALT 11   AST 28   BILITOT 1.0     Coagulation:   Recent Labs   Lab 03/16/19 0358   INR 1.1       Significant Imaging:  Imaging results within the past 24 hours have been reviewed.

## 2019-03-16 NOTE — SUBJECTIVE & OBJECTIVE
Interval History: Patient admits continued diarrhea. Denies vomiting. Admits he has not eaten today because he feels full but can tolerate drinking. Encouraged adequate PO hydration as patient is having multiple episodes of diarrhea. Patient denies new weakness/paresthesias, seizures, headaches, clumsiness with hands.    Medications:  Continuous Infusions:  Scheduled Meds:   sodium chloride 0.9%   Intravenous Once    sodium chloride 0.9%   Intravenous Once    diclofenac sodium   Topical (Top) Daily    epoetin shefali (PROCRIT) injection  7,000 Units Intravenous Every Mon, Wed, Fri    FLUoxetine  20 mg Oral Daily    gabapentin  300 mg Oral QHS    heparin (porcine)  5,000 Units Subcutaneous Q8H    insulin aspart U-100  2 Units Subcutaneous TIDWM    insulin detemir U-100  3 Units Subcutaneous BID    lactulose  20 g Oral Daily    metoclopramide HCl  5 mg Oral QID (AC & HS)    midodrine  20 mg Oral Q6H    pantoprazole  40 mg Oral BID    polyethylene glycol  17 g Oral Daily    pravastatin  40 mg Oral Daily    senna-docusate 8.6-50 mg  1 tablet Oral BID    vancomycin  125 mg Oral Q6H     PRN Meds:sodium chloride 0.9%, sodium chloride 0.9%, acetaminophen, dextrose 50%, dextrose 50%, glucagon (human recombinant), glucose, glucose, insulin aspart U-100, miconazole NITRATE 2 %, ondansetron, oxyCODONE, simethicone     Review of Systems  Objective:     Weight: (!) 142.4 kg (314 lb)  Body mass index is 47.74 kg/m².  Vital Signs (Most Recent):  Temp: 96.4 °F (35.8 °C) (03/16/19 1135)  Pulse: 65 (03/16/19 1150)  Resp: 18 (03/16/19 1135)  BP: (!) 122/54 (03/16/19 1135)  SpO2: 98 % (03/16/19 0504) Vital Signs (24h Range):  Temp:  [96.4 °F (35.8 °C)-98.6 °F (37 °C)] 96.4 °F (35.8 °C)  Pulse:  [51-70] 65  Resp:  [18-20] 18  SpO2:  [90 %-99 %] 98 %  BP: ()/(40-65) 122/54                           Hemodialysis AV Fistula Left forearm (Active)   Needle Size 15ga 3/16/2019 11:35 AM   Site Assessment Clean;Dry;Intact  3/16/2019 11:35 AM   Patency Present;Thrill;Bruit 3/16/2019 11:35 AM   Status Deaccessed 3/16/2019 11:35 AM   Flows Good 3/16/2019  7:28 AM   Dressing Intervention New dressing 3/16/2019 11:35 AM   Dressing Status Clean;Dry;Intact 3/16/2019 11:35 AM   Site Condition No complications 3/16/2019 11:35 AM   Dressing Gauze 3/16/2019 11:35 AM       Neurosurgery Physical Exam  General: well developed, well nourished, no distress. Obese.   Head: normocephalic, atraumatic  Neurologic: Alert and oriented. Thought content appropriate.  GCS: Motor: 6/Verbal: 5/Eyes: 4 GCS Total: 15  Mental Status: Awake, Alert, Oriented x 4  Language: No aphasia  Speech: No dysarthria  Cranial nerves: face symmetric, tongue midline, CN II-XII grossly intact.   Eyes: pupils equal, round, reactive to light, EOMI.   Pulmonary: normal respirations, no signs of respiratory distress  Abdomen: soft, distended, tender to palpation in LLQ  Skin: Skin is warm, dry and intact.  Sensory: intact to light touch throughout     Motor Strength:Moves all extremities spontaneously with good tone. Strength exam pain limited. No abnormal movements seen.      Strength   Deltoids Triceps Biceps Wrist Extension Wrist Flexion Hand    Upper: R 5/5 5/5 5/5 5/5 5/5 5/5     L 5/5 5/5 5/5 5/5 5/5 5/5       Iliopsoas Quadriceps Knee  Flexion Tibialis  anterior Gastro- cnemius EHL   Lower: R 5/5 5/5 5/5 5/5 5/5 5/5     L 4/5 4+/5 4+/5 5/5 5/5 5/5      Sanford's: bilaterally.  Babinski's: Negative.  Clonus: Negative.  Incision c/d/i with no surrounding erythema, edema, or drainage. well approximated with sutures             Significant Labs:  Recent Labs   Lab 03/15/19  0639 03/16/19  0358    105    139   K 4.0 4.3    103   CO2 26 25   BUN 31* 40*   CREATININE 6.0* 7.7*   CALCIUM 9.5 9.5   MG 2.1 2.0     Recent Labs   Lab 03/15/19  0639 03/16/19  0358   WBC 9.70 13.18*   HGB 8.4* 8.6*   HCT 27.5* 28.5*   * 140*     Recent Labs   Lab 03/15/19  0639  03/16/19  0358   INR 1.0 1.1     Microbiology Results (last 7 days)     Procedure Component Value Units Date/Time    C Diff Toxin by PCR [[id number]] Collected:  03/15/19 1930    Order Status:  Completed Updated:  03/16/19 0805     C. diff PCR Negative    Clostridium difficile EIA [[id number]]  (Abnormal) Collected:  03/15/19 1930    Order Status:  Completed Specimen:  Stool Updated:  03/16/19 0701     C. diff Antigen Positive     C difficile Toxins A+B, EIA Negative     Comment: Testing not recommended for children <24 months old.           CMP:   Recent Labs   Lab 03/15/19  0639 03/16/19 0358    105   CALCIUM 9.5 9.5   ALBUMIN 2.9* 2.9*   PROT 5.8* 5.8*    139   K 4.0 4.3   CO2 26 25    103   BUN 31* 40*   CREATININE 6.0* 7.7*   ALKPHOS 159* 149*   ALT 12 11   AST 29 28   BILITOT 1.1* 1.0       Significant Diagnostics:  FINDINGS:  Multiple AP views of the abdomen were obtained today at 18:15 hours, 18:16 hours and 18:18 hours.  Right flank is incompletely included on the study.    Slender wires overlie the left upper quadrant and epigastrium, of uncertain significance; these may be extrinsic to the patient.    There is abundant gas in the stomach.  The gastric wall appears to have an unusual nodular contour similar to 03/03/2019; clinical considerations will determine if further investigation is warranted.    There is abundant gas in loops of small and large bowel.  I suspect small bowel distension even allowing for magnification of intra-abdominal structures due to thick body wall and bedside technique.  I am not convinced there is large bowel distension.  Findings are concerning for early or partial small bowel obstruction versus ileus.    I do not identify intramural gas, intra portal gas or free peritoneal gas.    The patient has known abundant calcific atherosclerosis in branches of the abdominal aorta accounting for tubular distribution of radiopaque material overlying the epigastrium on  the abdominal image obtained with the patient in right posterior oblique rotation, centered at the xiphoid (18:16 hours).    I detect no acute disease in the base of the left hemithorax.  Right hemidiaphragm is mildly elevated as seen on earlier studies.      Impression       Findings are concerning for early or partial small bowel obstruction versus ileus.    Gastric folds appear to have a nodular contour on the current examination and on the recent study of 03/03/2019.  Clinical considerations will determine if further investigation is warranted.  No corresponding abnormality detected on the earlier abdominal CT dated 05/20/2017.      Electronically signed by: Pascale Vasquez MD  Date: 03/15/2019  Time: 19:04     I independently reviewed the imaging.

## 2019-03-17 LAB
ALBUMIN SERPL BCP-MCNC: 2.8 G/DL
ALP SERPL-CCNC: 142 U/L
ALT SERPL W/O P-5'-P-CCNC: 13 U/L
ANION GAP SERPL CALC-SCNC: 10 MMOL/L
AST SERPL-CCNC: 26 U/L
BASOPHILS # BLD AUTO: 0.02 K/UL
BASOPHILS NFR BLD: 0.2 %
BILIRUB SERPL-MCNC: 0.8 MG/DL
BUN SERPL-MCNC: 30 MG/DL
CALCIUM SERPL-MCNC: 9 MG/DL
CHLORIDE SERPL-SCNC: 105 MMOL/L
CO2 SERPL-SCNC: 23 MMOL/L
CREAT SERPL-MCNC: 6 MG/DL
DIFFERENTIAL METHOD: ABNORMAL
EOSINOPHIL # BLD AUTO: 0.3 K/UL
EOSINOPHIL NFR BLD: 3 %
ERYTHROCYTE [DISTWIDTH] IN BLOOD BY AUTOMATED COUNT: 17.8 %
EST. GFR  (AFRICAN AMERICAN): 11.1 ML/MIN/1.73 M^2
EST. GFR  (NON AFRICAN AMERICAN): 9.6 ML/MIN/1.73 M^2
GLUCOSE SERPL-MCNC: 105 MG/DL
HCT VFR BLD AUTO: 26.7 %
HGB BLD-MCNC: 7.8 G/DL
IMM GRANULOCYTES # BLD AUTO: 0.03 K/UL
IMM GRANULOCYTES NFR BLD AUTO: 0.3 %
INR PPP: 1.1
LYMPHOCYTES # BLD AUTO: 1.6 K/UL
LYMPHOCYTES NFR BLD: 15.9 %
MAGNESIUM SERPL-MCNC: 2.1 MG/DL
MCH RBC QN AUTO: 32.2 PG
MCHC RBC AUTO-ENTMCNC: 29.2 G/DL
MCV RBC AUTO: 110 FL
MONOCYTES # BLD AUTO: 0.9 K/UL
MONOCYTES NFR BLD: 9.2 %
NEUTROPHILS # BLD AUTO: 7 K/UL
NEUTROPHILS NFR BLD: 71.4 %
NRBC BLD-RTO: 0 /100 WBC
PHOSPHATE SERPL-MCNC: 6.5 MG/DL
PLATELET # BLD AUTO: 169 K/UL
PMV BLD AUTO: 10.7 FL
POCT GLUCOSE: 107 MG/DL (ref 70–110)
POCT GLUCOSE: 112 MG/DL (ref 70–110)
POCT GLUCOSE: 126 MG/DL (ref 70–110)
POTASSIUM SERPL-SCNC: 3.7 MMOL/L
PROT SERPL-MCNC: 5.8 G/DL
PROTHROMBIN TIME: 11 SEC
RBC # BLD AUTO: 2.42 M/UL
SODIUM SERPL-SCNC: 138 MMOL/L
WBC # BLD AUTO: 9.81 K/UL

## 2019-03-17 PROCEDURE — 20600001 HC STEP DOWN PRIVATE ROOM

## 2019-03-17 PROCEDURE — 83735 ASSAY OF MAGNESIUM: CPT

## 2019-03-17 PROCEDURE — 25000003 PHARM REV CODE 250: Performed by: STUDENT IN AN ORGANIZED HEALTH CARE EDUCATION/TRAINING PROGRAM

## 2019-03-17 PROCEDURE — 85025 COMPLETE CBC W/AUTO DIFF WBC: CPT

## 2019-03-17 PROCEDURE — 63600175 PHARM REV CODE 636 W HCPCS: Performed by: PHYSICIAN ASSISTANT

## 2019-03-17 PROCEDURE — 84100 ASSAY OF PHOSPHORUS: CPT

## 2019-03-17 PROCEDURE — 25000003 PHARM REV CODE 250: Performed by: PSYCHIATRY & NEUROLOGY

## 2019-03-17 PROCEDURE — 80053 COMPREHEN METABOLIC PANEL: CPT

## 2019-03-17 PROCEDURE — 85610 PROTHROMBIN TIME: CPT

## 2019-03-17 PROCEDURE — 25000003 PHARM REV CODE 250: Performed by: PHYSICIAN ASSISTANT

## 2019-03-17 RX ADMIN — HEPARIN SODIUM 5000 UNITS: 5000 INJECTION, SOLUTION INTRAVENOUS; SUBCUTANEOUS at 09:03

## 2019-03-17 RX ADMIN — METOCLOPRAMIDE HYDROCHLORIDE 5 MG: 5 SOLUTION ORAL at 06:03

## 2019-03-17 RX ADMIN — MIDODRINE HYDROCHLORIDE 20 MG: 5 TABLET ORAL at 12:03

## 2019-03-17 RX ADMIN — GABAPENTIN 300 MG: 300 CAPSULE ORAL at 08:03

## 2019-03-17 RX ADMIN — SIMETHICONE CHEW TAB 80 MG 80 MG: 80 TABLET ORAL at 08:03

## 2019-03-17 RX ADMIN — MIDODRINE HYDROCHLORIDE 20 MG: 5 TABLET ORAL at 05:03

## 2019-03-17 RX ADMIN — PANTOPRAZOLE SODIUM 40 MG: 40 GRANULE, DELAYED RELEASE ORAL at 08:03

## 2019-03-17 RX ADMIN — METOCLOPRAMIDE HYDROCHLORIDE 5 MG: 5 SOLUTION ORAL at 12:03

## 2019-03-17 RX ADMIN — DICLOFENAC: 10 GEL TOPICAL at 08:03

## 2019-03-17 RX ADMIN — PRAVASTATIN SODIUM 40 MG: 40 TABLET ORAL at 08:03

## 2019-03-17 RX ADMIN — ACETAMINOPHEN 500 MG: 500 TABLET, FILM COATED ORAL at 08:03

## 2019-03-17 RX ADMIN — INSULIN ASPART 2 UNITS: 100 INJECTION, SOLUTION INTRAVENOUS; SUBCUTANEOUS at 07:03

## 2019-03-17 RX ADMIN — METOCLOPRAMIDE HYDROCHLORIDE 5 MG: 5 SOLUTION ORAL at 05:03

## 2019-03-17 RX ADMIN — HEPARIN SODIUM 5000 UNITS: 5000 INJECTION, SOLUTION INTRAVENOUS; SUBCUTANEOUS at 06:03

## 2019-03-17 RX ADMIN — FLUOXETINE HYDROCHLORIDE 20 MG: 20 CAPSULE ORAL at 08:03

## 2019-03-17 RX ADMIN — MIDODRINE HYDROCHLORIDE 20 MG: 5 TABLET ORAL at 06:03

## 2019-03-17 RX ADMIN — MICONAZOLE NITRATE: 20 POWDER TOPICAL at 09:03

## 2019-03-17 RX ADMIN — HEPARIN SODIUM 5000 UNITS: 5000 INJECTION, SOLUTION INTRAVENOUS; SUBCUTANEOUS at 03:03

## 2019-03-17 NOTE — TREATMENT PLAN
GI Treatment Plan    Angel Farmer Jr. is a 58 y.o. male admitted to hospital 2/28/2019 (Hospital Day: 18) due to S/P laminectomy.     Interval History  - reports feels a little better, no abdominal pain, nausea or vomiting  - reports persistent diarrhea, 5-10 episodes overnight per patient    Objective  Temp:  [97.6 °F (36.4 °C)-98.2 °F (36.8 °C)] 97.8 °F (36.6 °C) (03/17 1141)  Pulse:  [52-78] 62 (03/17 1500)  BP: (105-136)/(47-65) 125/58 (03/17 1141)  Resp:  [12-18] 18 (03/17 1141)  SpO2:  [99 %-100 %] 99 % (03/17 1141)    General: Alert, Oriented x3, no distress  Abdomen: Normoactive bowel sounds. Non-distended. Normal tympany. Soft. Non-tender. No peritoneal signs.    Laboratory    Recent Labs   Lab 03/15/19  0639 03/16/19  0358 03/17/19  0504   HGB 8.4* 8.6* 7.8*       Lab Results   Component Value Date    WBC 9.81 03/17/2019    HGB 7.8 (L) 03/17/2019    HCT 26.7 (L) 03/17/2019     (H) 03/17/2019     03/17/2019       Lab Results   Component Value Date     03/17/2019    K 3.7 03/17/2019     03/17/2019    CO2 23 03/17/2019    BUN 30 (H) 03/17/2019    CREATININE 6.0 (H) 03/17/2019    CALCIUM 9.0 03/17/2019    ANIONGAP 10 03/17/2019    ESTGFRAFRICA 11.1 (A) 03/17/2019    EGFRNONAA 9.6 (A) 03/17/2019       Lab Results   Component Value Date    ALT 13 03/17/2019    AST 26 03/17/2019    ALKPHOS 142 (H) 03/17/2019    BILITOT 0.8 03/17/2019       Lab Results   Component Value Date    INR 1.1 03/17/2019    INR 1.1 03/16/2019    INR 1.0 03/15/2019       Plan  - given persistent diarrhea recommend ID evaluation for evaluation for c.diff. Unclear if these results represent c.diff infection vs chronic carrier.  - senna, miralax and lactulose remain on med list, recommend discontinuing (has not been receiving)  - discontinue reglan as patient not currently nauseated and may be contributing to symptoms  - no other meds noted which would cause diarrhea  - can trial imodium if not felt to be c.diff  infection  - We will continue to follow.    Thank you for involving us in the care of Angel Farmer JrNadia. Please call with any additional questions, concerns or changes in the patient's clinical status.    Ruddy Cervantes MD  Gastroenterology Fellow, PGY IV  Spectralink: [id number]

## 2019-03-17 NOTE — CARE UPDATE
BG goal 140-180. BG remains below goal.       Hold all scheduled insulin. If BG trends up, consider januvia 25 mg daily.   Low dose correction scale given kidney function  BG monitoring AC/HS      ** Please call Endocrine for any BG related issues **

## 2019-03-17 NOTE — PLAN OF CARE
Problem: Adult Inpatient Plan of Care  Goal: Plan of Care Review  Outcome: Ongoing (interventions implemented as appropriate)  No acute changes overnight. VSS. BG monitored ACHS and stable. Hourly rounding performed. Safety maintained. Pt remained free of falls. Neuro checks done Q4. Telemetry in use. Multiple liquid BMs overnight. PRN pain meds given x 1 this shift. PRN simethicone given x 1 this shift. Nasal cannula in use. Pt resting. No needs at this time. Call bell in reach. Will continue to monitor.

## 2019-03-17 NOTE — ASSESSMENT & PLAN NOTE
58 M with progressive cervical myelopathy and stenosis from C3-6 with myelomalacia. S/p code for hypovolemia during HD. Stable this morning and labs WNL. He stepped up to ICU for further care and work up, now s/p C3-6 posterior decompression 3/4/19.      - Pt neurologically stable  - Neuro checks q4h  - Continue dialysis T/Th. Last dialysis on 3/16. Management per Nephrology.   - No C collar needed  - Pain control: Continue Oxycodone IR 5mg q4h prn  - Daily PT/OT. Rec rehab placement.   - H/H stable 8.6/28.5, will ctm.  - Plts 140k, goal >50k  - KUB shows partial SBO v ileus. + c diff antigen, - c diff toxins. Consulted GI - appreciate recs

## 2019-03-17 NOTE — SUBJECTIVE & OBJECTIVE
Interval History: 3/17 - hypotensive at times, continues to endorse diarrhea, ow AFVSS, NAEON, labs stable, exam stable    Medications:  Continuous Infusions:  Scheduled Meds:   sodium chloride 0.9%   Intravenous Once    sodium chloride 0.9%   Intravenous Once    diclofenac sodium   Topical (Top) Daily    epoetin shefali (PROCRIT) injection  7,000 Units Intravenous Every Mon, Wed, Fri    FLUoxetine  20 mg Oral Daily    gabapentin  300 mg Oral QHS    heparin (porcine)  5,000 Units Subcutaneous Q8H    lactulose  20 g Oral Daily    metoclopramide HCl  5 mg Oral QID (AC & HS)    midodrine  20 mg Oral Q6H    pantoprazole  40 mg Oral BID    polyethylene glycol  17 g Oral Daily    pravastatin  40 mg Oral Daily    senna-docusate 8.6-50 mg  1 tablet Oral BID     PRN Meds:sodium chloride 0.9%, sodium chloride 0.9%, acetaminophen, dextrose 50%, dextrose 50%, glucagon (human recombinant), glucose, glucose, insulin aspart U-100, miconazole NITRATE 2 %, ondansetron, oxyCODONE, simethicone       Objective:     Weight: (!) 143.5 kg (316 lb 4.8 oz)  Body mass index is 48.09 kg/m².  Vital Signs (Most Recent):  Temp: 98.6 °F (37 °C) (03/17/19 1559)  Pulse: 66 (03/17/19 1800)  Resp: 18 (03/17/19 1559)  BP: (!) 115/57 (03/17/19 1559)  SpO2: 99 % (03/17/19 1559) Vital Signs (24h Range):  Temp:  [97.6 °F (36.4 °C)-98.6 °F (37 °C)] 98.6 °F (37 °C)  Pulse:  [52-70] 66  Resp:  [12-18] 18  SpO2:  [99 %-100 %] 99 %  BP: (105-136)/(51-65) 115/57     Date 03/17/19 0700 - 03/18/19 0659   Shift 2842-4918 0366-9272 6646-1690 24 Hour Total   INTAKE   P.O. 480   480   Shift Total(mL/kg) 480(3.3)   480(3.3)   OUTPUT   Shift Total(mL/kg)       Weight (kg) 143.5 143.5 143.5 143.5                        Hemodialysis AV Fistula Left forearm (Active)   Needle Size 15ga 3/16/2019 11:35 AM   Site Assessment Clean;Dry;Intact;No redness;No swelling 3/17/2019  7:39 AM   Patency Present;Thrill;Bruit 3/17/2019  7:39 AM   Status Deaccessed 3/17/2019   7:39 AM   Flows Good 3/16/2019  7:28 AM   Dressing Intervention Dressing reinforced 3/17/2019  7:39 AM   Dressing Status Clean;Dry;Intact 3/17/2019  7:39 AM   Site Condition No complications 3/17/2019  7:39 AM   Dressing Gauze 3/17/2019  7:39 AM       Neurosurgery Physical Exam     General: well developed, well nourished, no distress. Obese.   Head: normocephalic, atraumatic  Neurologic: Alert and oriented. Thought content appropriate.  GCS: Motor: 6/Verbal: 5/Eyes: 4 GCS Total: 15  Mental Status: Awake, Alert, Oriented x 4  Language: No aphasia  Speech: No dysarthria  Cranial nerves: face symmetric, tongue midline, CN II-XII grossly intact.   Eyes: pupils equal, round, reactive to light, EOMI.   Pulmonary: normal respirations, no signs of respiratory distress  Abdomen: soft, distended, tender to palpation in LLQ  Skin: Skin is warm, dry and intact.  Sensory: intact to light touch throughout     Motor Strength:Moves all extremities spontaneously with good tone. Strength exam pain limited. No abnormal movements seen.      Strength   Deltoids Triceps Biceps Wrist Extension Wrist Flexion Hand    Upper: R 5/5 5/5 5/5 5/5 5/5 5/5     L 5/5 5/5 5/5 5/5 5/5 5/5       Iliopsoas Quadriceps Knee  Flexion Tibialis  anterior Gastro- cnemius EHL   Lower: R 5/5 5/5 5/5 5/5 5/5 5/5     L 4/5 4+/5 4+/5 5/5 5/5 5/5      Sanford's: bilaterally.  Babinski's: Negative.  Clonus: Negative.  Incision c/d/i with no surrounding erythema, edema, or drainage. well approximated with sutures            Significant Labs:  Recent Labs   Lab 03/16/19  0358 03/17/19  0504    105    138   K 4.3 3.7    105   CO2 25 23   BUN 40* 30*   CREATININE 7.7* 6.0*   CALCIUM 9.5 9.0   MG 2.0 2.1     Recent Labs   Lab 03/16/19  0358 03/17/19  0504   WBC 13.18* 9.81   HGB 8.6* 7.8*   HCT 28.5* 26.7*   * 169     Recent Labs   Lab 03/16/19  0358 03/17/19  0504   INR 1.1 1.1     Microbiology Results (last 7 days)     Procedure Component  Value Units Date/Time    C Diff Toxin by PCR [[id number]] Collected:  03/15/19 1930    Order Status:  Completed Updated:  03/16/19 0805     C. diff PCR Negative    Clostridium difficile EIA [[id number]]  (Abnormal) Collected:  03/15/19 1930    Order Status:  Completed Specimen:  Stool Updated:  03/16/19 0701     C. diff Antigen Positive     C difficile Toxins A+B, EIA Negative     Comment: Testing not recommended for children <24 months old.           Significant Diagnostics:  CT: No results found in the last 24 hours.  MRI: No results found in the last 24 hours.

## 2019-03-17 NOTE — PLAN OF CARE
Problem: Adult Inpatient Plan of Care  Goal: Plan of Care Review  No acute changes on shift. Pt removed from isolation on shift. Per ID, no active infection. Pt continues to have loose stools, 3 noted on shift. Denies any nausea. Glucose monitoring continued. No other issues noted. Pt updated on POC. WCTM.

## 2019-03-17 NOTE — PROGRESS NOTES
[facility]  Neurosurgery  Progress Note    Subjective:     History of Present Illness: 59 yo male with a PMH of ESRD with dialysis MWF (last on weds), DM, presenting as transfer from [facility] as admission to neurosurgery for evaluation after progressive course of unsteady gait as well as bilateral upper extremity weakness for the last several months.  Mr. Farmer has had wobbly gait since this past September, at which point he recalls falling and has been at 80-90% of his normal function.  He has been able to walk without assist, however not for very long distances.  He states that since last Sunday he has felt progression of his symptoms and now feels it would be difficult to stand up to ambulate at all.  He also endorses a pulled muscle of his LLE that is limiting his mobility.  His UE symptoms have been present for about 3 months including not writing as well as he is used to.  HE is able to write numbers, but not always his name.  He has been having difficulty using a fork to eat over the past month.  No sensory disturbance noted as well as no neck or back pain and no pain from the neck radiating into the hands.  He has intermittently been taking ASA 81 mg qdaily. He denies recent falls or trauma to the neck.  No other blood thinning medication.  MRI at OSH on 2/27 revealed significant cervical stenosis.          Post-Op Info:  Procedure(s) (LRB):  SPINE, CERVICAL, POSTERIOR APPROACH  LAMINECTOMY C3-C6; C6-C7; WITH MEDIAL DISCECTOMY (N/A)   14 Days Post-Op     Interval History: 3/17 - hypotensive at times, continues to endorse diarrhea, ow AFVSS, NAEON, labs stable, exam stable    Medications:  Continuous Infusions:  Scheduled Meds:   sodium chloride 0.9%   Intravenous Once    sodium chloride 0.9%   Intravenous Once    diclofenac sodium   Topical (Top) Daily    epoetin shefali (PROCRIT) injection  7,000 Units Intravenous Every Mon, Wed, Fri    FLUoxetine  20 mg Oral Daily     gabapentin  300 mg Oral QHS    heparin (porcine)  5,000 Units Subcutaneous Q8H    lactulose  20 g Oral Daily    metoclopramide HCl  5 mg Oral QID (AC & HS)    midodrine  20 mg Oral Q6H    pantoprazole  40 mg Oral BID    polyethylene glycol  17 g Oral Daily    pravastatin  40 mg Oral Daily    senna-docusate 8.6-50 mg  1 tablet Oral BID     PRN Meds:sodium chloride 0.9%, sodium chloride 0.9%, acetaminophen, dextrose 50%, dextrose 50%, glucagon (human recombinant), glucose, glucose, insulin aspart U-100, miconazole NITRATE 2 %, ondansetron, oxyCODONE, simethicone       Objective:     Weight: (!) 143.5 kg (316 lb 4.8 oz)  Body mass index is 48.09 kg/m².  Vital Signs (Most Recent):  Temp: 98.6 °F (37 °C) (03/17/19 1559)  Pulse: 66 (03/17/19 1800)  Resp: 18 (03/17/19 1559)  BP: (!) 115/57 (03/17/19 1559)  SpO2: 99 % (03/17/19 1559) Vital Signs (24h Range):  Temp:  [97.6 °F (36.4 °C)-98.6 °F (37 °C)] 98.6 °F (37 °C)  Pulse:  [52-70] 66  Resp:  [12-18] 18  SpO2:  [99 %-100 %] 99 %  BP: (105-136)/(51-65) 115/57     Date 03/17/19 0700 - 03/18/19 0659   Shift 5038-1981 2841-4690 5180-4872 24 Hour Total   INTAKE   P.O. 480   480   Shift Total(mL/kg) 480(3.3)   480(3.3)   OUTPUT   Shift Total(mL/kg)       Weight (kg) 143.5 143.5 143.5 143.5                        Hemodialysis AV Fistula Left forearm (Active)   Needle Size 15ga 3/16/2019 11:35 AM   Site Assessment Clean;Dry;Intact;No redness;No swelling 3/17/2019  7:39 AM   Patency Present;Thrill;Bruit 3/17/2019  7:39 AM   Status Deaccessed 3/17/2019  7:39 AM   Flows Good 3/16/2019  7:28 AM   Dressing Intervention Dressing reinforced 3/17/2019  7:39 AM   Dressing Status Clean;Dry;Intact 3/17/2019  7:39 AM   Site Condition No complications 3/17/2019  7:39 AM   Dressing Gauze 3/17/2019  7:39 AM       Neurosurgery Physical Exam     General: well developed, well nourished, no distress. Obese.   Head: normocephalic, atraumatic  Neurologic: Alert and oriented. Thought content  appropriate.  GCS: Motor: 6/Verbal: 5/Eyes: 4 GCS Total: 15  Mental Status: Awake, Alert, Oriented x 4  Language: No aphasia  Speech: No dysarthria  Cranial nerves: face symmetric, tongue midline, CN II-XII grossly intact.   Eyes: pupils equal, round, reactive to light, EOMI.   Pulmonary: normal respirations, no signs of respiratory distress  Abdomen: soft, distended, tender to palpation in LLQ  Skin: Skin is warm, dry and intact.  Sensory: intact to light touch throughout     Motor Strength:Moves all extremities spontaneously with good tone. Strength exam pain limited. No abnormal movements seen.      Strength   Deltoids Triceps Biceps Wrist Extension Wrist Flexion Hand    Upper: R 5/5 5/5 5/5 5/5 5/5 5/5     L 5/5 5/5 5/5 5/5 5/5 5/5       Iliopsoas Quadriceps Knee  Flexion Tibialis  anterior Gastro- cnemius EHL   Lower: R 5/5 5/5 5/5 5/5 5/5 5/5     L 4/5 4+/5 4+/5 5/5 5/5 5/5      Sanford's: bilaterally.  Babinski's: Negative.  Clonus: Negative.  Incision c/d/i with no surrounding erythema, edema, or drainage. well approximated with sutures            Significant Labs:  Recent Labs   Lab 03/16/19  0358 03/17/19  0504    105    138   K 4.3 3.7    105   CO2 25 23   BUN 40* 30*   CREATININE 7.7* 6.0*   CALCIUM 9.5 9.0   MG 2.0 2.1     Recent Labs   Lab 03/16/19  0358 03/17/19  0504   WBC 13.18* 9.81   HGB 8.6* 7.8*   HCT 28.5* 26.7*   * 169     Recent Labs   Lab 03/16/19  0358 03/17/19  0504   INR 1.1 1.1     Microbiology Results (last 7 days)     Procedure Component Value Units Date/Time    C Diff Toxin by PCR [[id number]] Collected:  03/15/19 1930    Order Status:  Completed Updated:  03/16/19 0805     C. diff PCR Negative    Clostridium difficile EIA [[id number]]  (Abnormal) Collected:  03/15/19 1930    Order Status:  Completed Specimen:  Stool Updated:  03/16/19 0701     C. diff Antigen Positive     C difficile Toxins A+B, EIA Negative     Comment: Testing not recommended for  children <24 months old.           Significant Diagnostics:  CT: No results found in the last 24 hours.  MRI: No results found in the last 24 hours.    Assessment/Plan:     * S/P laminectomy     58 M with progressive cervical myelopathy and stenosis from C3-6 with myelomalacia. S/p code for hypovolemia during HD. Stable this morning and labs WNL. He stepped up to ICU for further care and work up, now s/p C3-6 posterior decompression 3/4/19.      - Pt neurologically stable  - Neuro checks q4h  - Continue dialysis T/Th. Last dialysis on 3/16. Management per Nephrology.   - No C collar needed  - Pain control: Continue Oxycodone IR 5mg q4h prn  - Daily PT/OT. Rec rehab placement.   - H/H stable 8.6/28.5, will ctm.  - Plts 140k, goal >50k  - KUB shows partial SBO v ileus. + c diff antigen, - c diff toxins. Consulted GI - appreciate recs                   Speedy Rodriguez MD  Neurosurgery  [facility]

## 2019-03-17 NOTE — NURSING
Infectious Disease MD contacted for clarification of c-diff status. MD states that if C-diff PCR negative, pt has no active c-diff and can be removed from isolation. NeuroSx team paged to notify.

## 2019-03-18 LAB
ALBUMIN SERPL BCP-MCNC: 3.1 G/DL
ALP SERPL-CCNC: 165 U/L
ALT SERPL W/O P-5'-P-CCNC: 14 U/L
ANION GAP SERPL CALC-SCNC: 10 MMOL/L
AST SERPL-CCNC: 25 U/L
BASOPHILS # BLD AUTO: 0.02 K/UL
BASOPHILS NFR BLD: 0.2 %
BILIRUB SERPL-MCNC: 0.8 MG/DL
BUN SERPL-MCNC: 40 MG/DL
CALCIUM SERPL-MCNC: 9.4 MG/DL
CHLORIDE SERPL-SCNC: 106 MMOL/L
CO2 SERPL-SCNC: 24 MMOL/L
CREAT SERPL-MCNC: 7.9 MG/DL
DIFFERENTIAL METHOD: ABNORMAL
EOSINOPHIL # BLD AUTO: 0.2 K/UL
EOSINOPHIL NFR BLD: 2.5 %
ERYTHROCYTE [DISTWIDTH] IN BLOOD BY AUTOMATED COUNT: 17.5 %
EST. GFR  (AFRICAN AMERICAN): 8 ML/MIN/1.73 M^2
EST. GFR  (NON AFRICAN AMERICAN): 6.9 ML/MIN/1.73 M^2
GLUCOSE SERPL-MCNC: 103 MG/DL
HCT VFR BLD AUTO: 26.7 %
HGB BLD-MCNC: 8 G/DL
IMM GRANULOCYTES # BLD AUTO: 0.04 K/UL
IMM GRANULOCYTES NFR BLD AUTO: 0.5 %
INR PPP: 1
LYMPHOCYTES # BLD AUTO: 1.5 K/UL
LYMPHOCYTES NFR BLD: 19 %
MAGNESIUM SERPL-MCNC: 2.3 MG/DL
MCH RBC QN AUTO: 32.9 PG
MCHC RBC AUTO-ENTMCNC: 30 G/DL
MCV RBC AUTO: 110 FL
MONOCYTES # BLD AUTO: 0.8 K/UL
MONOCYTES NFR BLD: 9.5 %
NEUTROPHILS # BLD AUTO: 5.5 K/UL
NEUTROPHILS NFR BLD: 68.3 %
NRBC BLD-RTO: 0 /100 WBC
PHOSPHATE SERPL-MCNC: 7.9 MG/DL
PLATELET # BLD AUTO: 192 K/UL
PMV BLD AUTO: 10.8 FL
POCT GLUCOSE: 105 MG/DL (ref 70–110)
POCT GLUCOSE: 117 MG/DL (ref 70–110)
POCT GLUCOSE: 139 MG/DL (ref 70–110)
POCT GLUCOSE: 144 MG/DL (ref 70–110)
POCT GLUCOSE: 96 MG/DL (ref 70–110)
POTASSIUM SERPL-SCNC: 3.7 MMOL/L
PROT SERPL-MCNC: 6.3 G/DL
PROTHROMBIN TIME: 10.6 SEC
RBC # BLD AUTO: 2.43 M/UL
SODIUM SERPL-SCNC: 140 MMOL/L
WBC # BLD AUTO: 8.1 K/UL

## 2019-03-18 PROCEDURE — 99024 POSTOP FOLLOW-UP VISIT: CPT | Mod: POP,,, | Performed by: PHYSICIAN ASSISTANT

## 2019-03-18 PROCEDURE — 20600001 HC STEP DOWN PRIVATE ROOM

## 2019-03-18 PROCEDURE — 63600175 PHARM REV CODE 636 W HCPCS: Performed by: PHYSICIAN ASSISTANT

## 2019-03-18 PROCEDURE — 36415 COLL VENOUS BLD VENIPUNCTURE: CPT

## 2019-03-18 PROCEDURE — 80053 COMPREHEN METABOLIC PANEL: CPT

## 2019-03-18 PROCEDURE — 85025 COMPLETE CBC W/AUTO DIFF WBC: CPT

## 2019-03-18 PROCEDURE — 99231 PR SUBSEQUENT HOSPITAL CARE,LEVL I: ICD-10-PCS | Mod: ,,, | Performed by: NURSE PRACTITIONER

## 2019-03-18 PROCEDURE — 83735 ASSAY OF MAGNESIUM: CPT

## 2019-03-18 PROCEDURE — 97530 THERAPEUTIC ACTIVITIES: CPT

## 2019-03-18 PROCEDURE — 25000003 PHARM REV CODE 250: Performed by: STUDENT IN AN ORGANIZED HEALTH CARE EDUCATION/TRAINING PROGRAM

## 2019-03-18 PROCEDURE — 99232 PR SUBSEQUENT HOSPITAL CARE,LEVL II: ICD-10-PCS | Mod: ICN,,, | Performed by: PHYSICIAN ASSISTANT

## 2019-03-18 PROCEDURE — 99232 SBSQ HOSP IP/OBS MODERATE 35: CPT | Mod: ICN,,, | Performed by: PHYSICIAN ASSISTANT

## 2019-03-18 PROCEDURE — 84100 ASSAY OF PHOSPHORUS: CPT

## 2019-03-18 PROCEDURE — 97110 THERAPEUTIC EXERCISES: CPT

## 2019-03-18 PROCEDURE — 99231 SBSQ HOSP IP/OBS SF/LOW 25: CPT | Mod: ,,, | Performed by: NURSE PRACTITIONER

## 2019-03-18 PROCEDURE — 25000003 PHARM REV CODE 250: Performed by: PHYSICIAN ASSISTANT

## 2019-03-18 PROCEDURE — 85610 PROTHROMBIN TIME: CPT

## 2019-03-18 PROCEDURE — 99024 PR POST-OP FOLLOW-UP VISIT: ICD-10-PCS | Mod: POP,,, | Performed by: PHYSICIAN ASSISTANT

## 2019-03-18 PROCEDURE — 25000003 PHARM REV CODE 250: Performed by: PSYCHIATRY & NEUROLOGY

## 2019-03-18 RX ORDER — SODIUM CHLORIDE 9 MG/ML
INJECTION, SOLUTION INTRAVENOUS ONCE
Status: COMPLETED | OUTPATIENT
Start: 2019-03-19 | End: 2019-03-19

## 2019-03-18 RX ADMIN — PANTOPRAZOLE SODIUM 40 MG: 40 GRANULE, DELAYED RELEASE ORAL at 09:03

## 2019-03-18 RX ADMIN — HEPARIN SODIUM 5000 UNITS: 5000 INJECTION, SOLUTION INTRAVENOUS; SUBCUTANEOUS at 02:03

## 2019-03-18 RX ADMIN — PRAVASTATIN SODIUM 40 MG: 40 TABLET ORAL at 08:03

## 2019-03-18 RX ADMIN — ACETAMINOPHEN 500 MG: 500 TABLET, FILM COATED ORAL at 04:03

## 2019-03-18 RX ADMIN — SIMETHICONE CHEW TAB 80 MG 80 MG: 80 TABLET ORAL at 08:03

## 2019-03-18 RX ADMIN — SIMETHICONE CHEW TAB 80 MG 80 MG: 80 TABLET ORAL at 04:03

## 2019-03-18 RX ADMIN — MIDODRINE HYDROCHLORIDE 20 MG: 5 TABLET ORAL at 11:03

## 2019-03-18 RX ADMIN — MIDODRINE HYDROCHLORIDE 20 MG: 5 TABLET ORAL at 05:03

## 2019-03-18 RX ADMIN — FLUOXETINE HYDROCHLORIDE 20 MG: 20 CAPSULE ORAL at 08:03

## 2019-03-18 RX ADMIN — GABAPENTIN 300 MG: 300 CAPSULE ORAL at 09:03

## 2019-03-18 RX ADMIN — ACETAMINOPHEN 500 MG: 500 TABLET, FILM COATED ORAL at 08:03

## 2019-03-18 RX ADMIN — PANTOPRAZOLE SODIUM 40 MG: 40 GRANULE, DELAYED RELEASE ORAL at 08:03

## 2019-03-18 RX ADMIN — MICONAZOLE NITRATE: 20 POWDER TOPICAL at 08:03

## 2019-03-18 RX ADMIN — MIDODRINE HYDROCHLORIDE 20 MG: 5 TABLET ORAL at 12:03

## 2019-03-18 RX ADMIN — HEPARIN SODIUM 5000 UNITS: 5000 INJECTION, SOLUTION INTRAVENOUS; SUBCUTANEOUS at 09:03

## 2019-03-18 RX ADMIN — DICLOFENAC: 10 GEL TOPICAL at 08:03

## 2019-03-18 RX ADMIN — HEPARIN SODIUM 5000 UNITS: 5000 INJECTION, SOLUTION INTRAVENOUS; SUBCUTANEOUS at 05:03

## 2019-03-18 NOTE — SUBJECTIVE & OBJECTIVE
Interval History: Patient continues to have diarrhea. Denies emesis. Admits neck pain and BUE paresthesias. Denies urinary dysfunction, n/v. Sister at bedside.    Medications:  Continuous Infusions:  Scheduled Meds:   sodium chloride 0.9%   Intravenous Once    sodium chloride 0.9%   Intravenous Once    diclofenac sodium   Topical (Top) Daily    epoetin shefali (PROCRIT) injection  7,000 Units Intravenous Every Mon, Wed, Fri    FLUoxetine  20 mg Oral Daily    gabapentin  300 mg Oral QHS    heparin (porcine)  5,000 Units Subcutaneous Q8H    midodrine  20 mg Oral Q6H    pantoprazole  40 mg Oral BID    pravastatin  40 mg Oral Daily     PRN Meds:sodium chloride 0.9%, sodium chloride 0.9%, acetaminophen, dextrose 50%, dextrose 50%, glucagon (human recombinant), glucose, glucose, insulin aspart U-100, miconazole NITRATE 2 %, omnipaque, ondansetron, oxyCODONE, simethicone     Review of Systems  Objective:     Weight: (!) 143.5 kg (316 lb 4.8 oz)  Body mass index is 48.09 kg/m².  Vital Signs (Most Recent):  Temp: 98 °F (36.7 °C) (03/18/19 1613)  Pulse: 86 (03/18/19 1613)  Resp: 18 (03/18/19 1613)  BP: 136/63 (03/18/19 1613)  SpO2: 98 % (03/18/19 1613) Vital Signs (24h Range):  Temp:  [97.7 °F (36.5 °C)-98.7 °F (37.1 °C)] 98 °F (36.7 °C)  Pulse:  [55-90] 86  Resp:  [16-20] 18  SpO2:  [96 %-100 %] 98 %  BP: (115-142)/(54-65) 136/63     Date 03/18/19 0700 - 03/19/19 0659   Shift 4122-0377 4581-4465 2223-6182 24 Hour Total   INTAKE   P.O. 480 120  600   Shift Total(mL/kg) 480(3.3) 120(0.8)  600(4.2)   OUTPUT   Shift Total(mL/kg)       Weight (kg) 143.5 143.5 143.5 143.5                        Hemodialysis AV Fistula Left forearm (Active)   Needle Size 15ga 3/16/2019 11:35 AM   Site Assessment Clean;Dry;Intact;No redness;No swelling 3/18/2019  7:54 AM   Patency Present;Thrill;Bruit 3/18/2019  7:54 AM   Status Deaccessed 3/18/2019  7:54 AM   Flows Good 3/16/2019  7:28 AM   Dressing Intervention Dressing reinforced 3/18/2019   7:54 AM   Dressing Status Clean;Dry;Intact 3/18/2019  7:54 AM   Site Condition No complications 3/18/2019  7:54 AM   Dressing Gauze 3/18/2019  7:54 AM       Neurosurgery Physical Exam  General: well developed, well nourished, no distress. Obese.   Head: normocephalic, atraumatic  Neurologic: Alert and oriented. Thought content appropriate.  GCS: Motor: 6/Verbal: 5/Eyes: 4 GCS Total: 15  Mental Status: Awake, Alert, Oriented x 4  Language: No aphasia  Speech: No dysarthria  Cranial nerves: face symmetric, tongue midline, CN II-XII grossly intact.   Eyes: pupils equal, round, reactive to light, EOMI.   Pulmonary: normal respirations, no signs of respiratory distress  Abdomen: soft, distended, tender to palpation in LLQ  Skin: Skin is warm, dry and intact.  Sensory: intact to light touch throughout     Motor Strength:Moves all extremities spontaneously with good tone. Strength exam pain limited. No abnormal movements seen.      Strength   Deltoids Triceps Biceps Wrist Extension Wrist Flexion Hand    Upper: R 5/5 5/5 5/5 5/5 5/5 5/5     L 5/5 5/5 5/5 5/5 5/5 5/5       Iliopsoas Quadriceps Knee  Flexion Tibialis  anterior Gastro- cnemius EHL   Lower: R 5/5 5/5 5/5 5/5 5/5 5/5     L 4/5 4+/5 4+/5 5/5 5/5 5/5      Sanford's: bilaterally.  Babinski's: Negative.  Clonus: Negative.  Incision c/d/i with no surrounding erythema, edema, or drainage. skin well approximated. Sutures removed today.          Significant Labs:  Recent Labs   Lab 03/17/19  0504 03/18/19 0427    103    140   K 3.7 3.7    106   CO2 23 24   BUN 30* 40*   CREATININE 6.0* 7.9*   CALCIUM 9.0 9.4   MG 2.1 2.3     Recent Labs   Lab 03/17/19  0504 03/18/19 0427   WBC 9.81 8.10   HGB 7.8* 8.0*   HCT 26.7* 26.7*    192     Recent Labs   Lab 03/17/19  0504 03/18/19  0427   INR 1.1 1.0     Microbiology Results (last 7 days)     Procedure Component Value Units Date/Time    C Diff Toxin by PCR [[id number]] Collected:  03/15/19 1930     Order Status:  Completed Updated:  03/16/19 0805     C. diff PCR Negative    Clostridium difficile EIA [[id number]]  (Abnormal) Collected:  03/15/19 1930    Order Status:  Completed Specimen:  Stool Updated:  03/16/19 0701     C. diff Antigen Positive     C difficile Toxins A+B, EIA Negative     Comment: Testing not recommended for children <24 months old.           CMP:   Recent Labs   Lab 03/17/19  0504 03/18/19  0427    103   CALCIUM 9.0 9.4   ALBUMIN 2.8* 3.1*   PROT 5.8* 6.3    140   K 3.7 3.7   CO2 23 24    106   BUN 30* 40*   CREATININE 6.0* 7.9*   ALKPHOS 142* 165*   ALT 13 14   AST 26 25   BILITOT 0.8 0.8       Significant Diagnostics:  No new imaging for review

## 2019-03-18 NOTE — SUBJECTIVE & OBJECTIVE
"Past Medical History:   Diagnosis Date    Anemia due to stage 5 chronic kidney disease 3/1/2019    Diabetes mellitus type II     Dialysis patient     Hyperlipidemia     Hypertension     Kidney failure     MIKE (obstructive sleep apnea)     Urinary tract infection        Past Surgical History:   Procedure Laterality Date    AV FISTULA PLACEMENT      left lower arm    SPINE, CERVICAL, POSTERIOR APPROACH  LAMINECTOMY C3-C6; C6-C7; WITH MEDIAL DISCECTOMY N/A 3/3/2019    Performed by Ruddy Wylie MD at [facility] OR 2ND FLR    TONSILLECTOMY, ADENOIDECTOMY      TRANSESOPHAGEAL ECHOCARDIOGRAM (SEERNITY) N/A 5/23/2017    Performed by Donaldo Mai MD at [facility] OR       Review of patient's allergies indicates:   Allergen Reactions    Keflex [cephalexin] Nausea Only       Medications:  Medications Prior to Admission   Medication Sig    ammonium lactate 12 % Crea Apply 1 application topically 3 (three) times daily as needed.    aspirin 81 MG Chew Take 1 tablet (81 mg total) by mouth once daily.    BD ULTRA-FINE MINI PEN NEEDLE 31 gauge x 3/16" Ndle USE 4 TO 5 TIMES A DAY     ( DISCARD PEN NEEDLE AFTER EACH USE )    blood-glucose meter (ADVANCED GLUCOSE METER) Misc 1 each by Misc.(Non-Drug; Combo Route) route 4 (four) times daily with meals and nightly.    clindamycin (CLEOCIN) 300 MG capsule Take 1 capsule (300 mg total) by mouth every 6 (six) hours.    fluticasone (FLONASE) 50 mcg/actuation nasal spray 1 spray by Each Nare route once daily.    gabapentin (NEURONTIN) 300 MG capsule TAKE 1 BY MOUTH EVERY      EVENING    insulin (BASAGLAR KWIKPEN U-100 INSULIN) glargine 100 units/mL (3mL) SubQ pen Inject 55 Units into the skin every evening. (Patient taking differently: Inject 40 Units into the skin every evening. )    lancets Misc 1 each by Misc.(Non-Drug; Combo Route) route 4 (four) times daily with meals and nightly.    liraglutide 0.6 mg/0.1 mL, 18 mg/3 mL, subq PNIJ (VICTOZA 2-SHOAIB) 0.6 mg/0.1 mL (18 " mg/3 mL) PnIj Inject 1.2 mg into the skin once daily.    ONETOUCH ULTRA BLUE TEST STRIP Strp USE ONE STRIP TO CHECK BLOOD GLUCOSE 4 TIMES DAILY.AT MORNING, NOON,6:OO PM AND BEDTIME.    pravastatin (PRAVACHOL) 40 MG tablet Take 1 tablet (40 mg total) by mouth once daily. (Patient taking differently: Take 40 mg by mouth every evening. )     Antibiotics (From admission, onward)    None        Antifungals (From admission, onward)    Start     Stop Route Frequency Ordered    03/11/19 1540  miconazole NITRATE 2 % top powder      -- Top 2 times daily PRN 03/11/19 1440        Antivirals (From admission, onward)    None           Immunization History   Administered Date(s) Administered    PPD Test 02/28/2019       Family History     Problem Relation (Age of Onset)    Colon cancer Mother    Diabetes Mother, Father, Maternal Grandmother, Maternal Grandfather    Heart disease Father    Hypertension Father    Lung cancer Mother        Social History     Socioeconomic History    Marital status: Single     Spouse name: None    Number of children: None    Years of education: None    Highest education level: None   Social Needs    Financial resource strain: None    Food insecurity - worry: None    Food insecurity - inability: None    Transportation needs - medical: None    Transportation needs - non-medical: None   Occupational History    None   Tobacco Use    Smoking status: Never Smoker    Smokeless tobacco: Never Used   Substance and Sexual Activity    Alcohol use: No    Drug use: No    Sexual activity: Not Currently   Other Topics Concern    None   Social History Narrative    None     Review of Systems   Constitutional: Negative for appetite change, chills, diaphoresis, fatigue, fever and unexpected weight change.   HENT: Negative for congestion, ear pain, sore throat and tinnitus.    Eyes: Negative for pain, redness and visual disturbance.   Respiratory: Negative for cough, shortness of breath and wheezing.     Cardiovascular: Negative for chest pain, palpitations and leg swelling.   Gastrointestinal: Positive for constipation and diarrhea. Negative for abdominal pain, rectal pain and vomiting.   Endocrine: Negative for cold intolerance and heat intolerance.   Genitourinary: Negative for dysuria, flank pain, frequency, hematuria and urgency.   Musculoskeletal: Negative for arthralgias, back pain, myalgias and neck pain.   Skin: Negative for rash.   Allergic/Immunologic: Negative for immunocompromised state.   Neurological: Positive for weakness. Negative for dizziness, light-headedness, numbness and headaches.   Hematological: Negative for adenopathy. Does not bruise/bleed easily.   Psychiatric/Behavioral: Negative for confusion and sleep disturbance. The patient is not nervous/anxious.      Objective:     Vital Signs (Most Recent):  Temp: 98.7 °F (37.1 °C) (03/18/19 1226)  Pulse: 90 (03/18/19 1519)  Resp: 18 (03/18/19 1226)  BP: (!) 118/57 (03/18/19 1226)  SpO2: 96 % (03/18/19 1226) Vital Signs (24h Range):  Temp:  [97.7 °F (36.5 °C)-98.7 °F (37.1 °C)] 98.7 °F (37.1 °C)  Pulse:  [55-90] 90  Resp:  [16-20] 18  SpO2:  [96 %-100 %] 96 %  BP: (115-142)/(54-65) 118/57     Weight: (!) 143.5 kg (316 lb 4.8 oz)  Body mass index is 48.09 kg/m².    Estimated Creatinine Clearance: 14.5 mL/min (A) (based on SCr of 7.9 mg/dL (H)).    Physical Exam   Constitutional: He is oriented to person, place, and time. He appears well-developed and well-nourished. No distress.       HENT:   Head: Normocephalic and atraumatic.   Cardiovascular: Normal rate, regular rhythm and normal heart sounds. Exam reveals no gallop and no friction rub.   No murmur heard.  Pulmonary/Chest: Effort normal and breath sounds normal. No respiratory distress. He has no wheezes. He has no rales.   Abdominal: Soft. Bowel sounds are normal. He exhibits distension. He exhibits no mass. There is no tenderness. There is no rebound and no guarding.   Musculoskeletal: He  exhibits no edema.   Neurological: He is alert and oriented to person, place, and time.   Skin: Skin is warm and dry. He is not diaphoretic.   Psychiatric: He has a normal mood and affect. His behavior is normal.       Significant Labs:   Blood Culture: No results for input(s): LABBLOO in the last 4320 hours.  CBC:   Recent Labs   Lab 03/17/19  0504 03/18/19  0427   WBC 9.81 8.10   HGB 7.8* 8.0*   HCT 26.7* 26.7*    192     CMP:   Recent Labs   Lab 03/17/19  0504 03/18/19  0427    140   K 3.7 3.7    106   CO2 23 24    103   BUN 30* 40*   CREATININE 6.0* 7.9*   CALCIUM 9.0 9.4   PROT 5.8* 6.3   ALBUMIN 2.8* 3.1*   BILITOT 0.8 0.8   ALKPHOS 142* 165*   AST 26 25   ALT 13 14   ANIONGAP 10 10   EGFRNONAA 9.6* 6.9*     Wound Culture: No results for input(s): LABAERO in the last 4320 hours.  All pertinent labs within the past 24 hours have been reviewed.    Significant Imaging: I have reviewed all pertinent imaging results/findings within the past 24 hours.   X-Ray Abdomen AP 1 View [[id number]] Resulted: 03/15/19 1904   Order Status: Completed Updated: 03/15/19 1906   Narrative:     EXAMINATION:  XR ABDOMEN AP 1 VIEW    CLINICAL HISTORY:  abdominal distention;    TECHNIQUE:  AP View(s) of the abdomen was performed.    COMPARISON:  03/03/2019, 17:54 hours.    Abdominal and pelvic CT: 05/20/2017.    FINDINGS:  Multiple AP views of the abdomen were obtained today at 18:15 hours, 18:16 hours and 18:18 hours.  Right flank is incompletely included on the study.    Slender wires overlie the left upper quadrant and epigastrium, of uncertain significance; these may be extrinsic to the patient.    There is abundant gas in the stomach.  The gastric wall appears to have an unusual nodular contour similar to 03/03/2019; clinical considerations will determine if further investigation is warranted.    There is abundant gas in loops of small and large bowel.  I suspect small bowel distension even allowing for  magnification of intra-abdominal structures due to thick body wall and bedside technique.  I am not convinced there is large bowel distension.  Findings are concerning for early or partial small bowel obstruction versus ileus.    I do not identify intramural gas, intra portal gas or free peritoneal gas.    The patient has known abundant calcific atherosclerosis in branches of the abdominal aorta accounting for tubular distribution of radiopaque material overlying the epigastrium on the abdominal image obtained with the patient in right posterior oblique rotation, centered at the xiphoid (18:16 hours).    I detect no acute disease in the base of the left hemithorax.  Right hemidiaphragm is mildly elevated as seen on earlier studies.   Impression:       Findings are concerning for early or partial small bowel obstruction versus ileus.    Gastric folds appear to have a nodular contour on the current examination and on the recent study of 03/03/2019.  Clinical considerations will determine if further investigation is warranted.  No corresponding abnormality detected on the earlier abdominal CT dated 05/20/2017.      Electronically signed by: Pascale Vasquez MD  Date: 03/15/2019  Time: 19:04

## 2019-03-18 NOTE — CONSULTS
[facility]  Infectious Disease  Consult Note    Patient Name: Angel Farmer Jr.  MRN: [id number]  Admission Date: 2/28/2019  Hospital Length of Stay: 18 days  Attending Physician: Ruddy Wylie MD  Primary Care Provider: Satya Noriega MD     Isolation Status: No active isolations    Patient information was obtained from patient, spouse/SO and records.      Consults  Assessment/Plan:     Diarrhea    - improving spontaneously and patient has had minimal ABX exposure  - C diff testing is negative  - diarrhea is from multiple laxatives that were taken up until the day prior to testing  - rec no further C diff testing  - Rec fu GI and Gen Sx rec  - discussed w primary team  - will sign off      - Discussed with ID staff    Thank you for your consult. I will sign off. Please contact us if you have any additional questions.    ALLEY Rocha  Infectious Disease  [facility]    Subjective:     Principal Problem: S/P laminectomy    HPI: The patient  is a 58 y.o. male with PMHx of of DMII, ESRD on HD MWF(last dialysis 3/1), HLD, HTN and MIKE admitted s/p cervical laminectomy C3-C6, C6-C7 w/medial discectomy. Per chart review,  Mr. Farmer had wobbly gait since this past September, at which point he recalls falling and has been at 80-90% of his normal function.  He was able to walk without assist, however not for very long distances.  He states that he has felt progression of his symptoms and now feels it would be difficult to stand up to ambulate at all. His UE symptoms have been present for about 3 months including not writing as well as he is used to.MRI cervical spine (2/27)at OSH on 2/27 revealed severe disc space narrowing C3-C4 and C5-C6 level. He underwent cervical laminectomy C2-C7 on 3/3.     Mr. Farmer has a baseline of having BMs every 3rd day or so.  Since admission he has experienced significant constipation.  He reports having 2 Brown Bombs as inpt and was  "on multiple laxatives (Senna, Lactulose and Miralax concurrently until  3/14).  Given his diarrhea, a C diff test was sent and resulted C diff Antigen +, Toxin negative and PCR Negative.  He was given one dose of oral vancomycin only.  He reports 20 watery BMs 3/14, 10 on 3/15 and have dropped to 3 today.  On 3/15 he had n/v but resolved spontaneously.  He has had no further treatment. Xray ABD 3/15 showed: Findings are concerning for early or partial small bowel obstruction versus ileus.  He has been seen by GI and Gen Sx (rec CT Abd with contrast though thinks SBO unlikely bc passing gas and tolerating PO).  The patient reports + flatus and denies any recent fever, chills, ABD or sweats.  ID consulted re: C diff testing.       Past Medical History:   Diagnosis Date    Anemia due to stage 5 chronic kidney disease 3/1/2019    Diabetes mellitus type II     Dialysis patient     Hyperlipidemia     Hypertension     Kidney failure     MIKE (obstructive sleep apnea)     Urinary tract infection        Past Surgical History:   Procedure Laterality Date    AV FISTULA PLACEMENT      left lower arm    SPINE, CERVICAL, POSTERIOR APPROACH  LAMINECTOMY C3-C6; C6-C7; WITH MEDIAL DISCECTOMY N/A 3/3/2019    Performed by [facility] at [facility] OR 2ND FLR    TONSILLECTOMY, ADENOIDECTOMY      TRANSESOPHAGEAL ECHOCARDIOGRAM (SERENITY) N/A 5/23/2017    Performed by Donaldo Mai MD at [facility] OR       Review of patient's allergies indicates:   Allergen Reactions    Keflex [cephalexin] Nausea Only       Medications:  Medications Prior to Admission   Medication Sig    ammonium lactate 12 % Crea Apply 1 application topically 3 (three) times daily as needed.    aspirin 81 MG Chew Take 1 tablet (81 mg total) by mouth once daily.    BD ULTRA-FINE MINI PEN NEEDLE 31 gauge x 3/16" Ndle USE 4 TO 5 TIMES A DAY     ( DISCARD PEN NEEDLE AFTER EACH USE )    blood-glucose meter (ADVANCED GLUCOSE METER) Misc 1 each by " Misc.(Non-Drug; Combo Route) route 4 (four) times daily with meals and nightly.    clindamycin (CLEOCIN) 300 MG capsule Take 1 capsule (300 mg total) by mouth every 6 (six) hours.    fluticasone (FLONASE) 50 mcg/actuation nasal spray 1 spray by Each Nare route once daily.    gabapentin (NEURONTIN) 300 MG capsule TAKE 1 BY MOUTH EVERY      EVENING    insulin (BASAGLAR KWIKPEN U-100 INSULIN) glargine 100 units/mL (3mL) SubQ pen Inject 55 Units into the skin every evening. (Patient taking differently: Inject 40 Units into the skin every evening. )    lancets Misc 1 each by Misc.(Non-Drug; Combo Route) route 4 (four) times daily with meals and nightly.    liraglutide 0.6 mg/0.1 mL, 18 mg/3 mL, subq PNIJ (VICTOZA 2-SHOAIB) 0.6 mg/0.1 mL (18 mg/3 mL) PnIj Inject 1.2 mg into the skin once daily.    ONETOUCH ULTRA BLUE TEST STRIP Strp USE ONE STRIP TO CHECK BLOOD GLUCOSE 4 TIMES DAILY.AT MORNING, NOON,6:OO PM AND BEDTIME.    pravastatin (PRAVACHOL) 40 MG tablet Take 1 tablet (40 mg total) by mouth once daily. (Patient taking differently: Take 40 mg by mouth every evening. )     Antibiotics (From admission, onward)    None        Antifungals (From admission, onward)    Start     Stop Route Frequency Ordered    03/11/19 1540  miconazole NITRATE 2 % top powder      -- Top 2 times daily PRN 03/11/19 1440        Antivirals (From admission, onward)    None           Immunization History   Administered Date(s) Administered    PPD Test 02/28/2019       Family History     Problem Relation (Age of Onset)    Colon cancer Mother    Diabetes Mother, Father, Maternal Grandmother, Maternal Grandfather    Heart disease Father    Hypertension Father    Lung cancer Mother        Social History     Socioeconomic History    Marital status: Single     Spouse name: None    Number of children: None    Years of education: None    Highest education level: None   Social Needs    Financial resource strain: None    Food insecurity - worry:  None    Food insecurity - inability: None    Transportation needs - medical: None    Transportation needs - non-medical: None   Occupational History    None   Tobacco Use    Smoking status: Never Smoker    Smokeless tobacco: Never Used   Substance and Sexual Activity    Alcohol use: No    Drug use: No    Sexual activity: Not Currently   Other Topics Concern    None   Social History Narrative    None     Review of Systems   Constitutional: Negative for appetite change, chills, diaphoresis, fatigue, fever and unexpected weight change.   HENT: Negative for congestion, ear pain, sore throat and tinnitus.    Eyes: Negative for pain, redness and visual disturbance.   Respiratory: Negative for cough, shortness of breath and wheezing.    Cardiovascular: Negative for chest pain, palpitations and leg swelling.   Gastrointestinal: Positive for constipation and diarrhea. Negative for abdominal pain, rectal pain and vomiting.   Endocrine: Negative for cold intolerance and heat intolerance.   Genitourinary: Negative for dysuria, flank pain, frequency, hematuria and urgency.   Musculoskeletal: Negative for arthralgias, back pain, myalgias and neck pain.   Skin: Negative for rash.   Allergic/Immunologic: Negative for immunocompromised state.   Neurological: Positive for weakness. Negative for dizziness, light-headedness, numbness and headaches.   Hematological: Negative for adenopathy. Does not bruise/bleed easily.   Psychiatric/Behavioral: Negative for confusion and sleep disturbance. The patient is not nervous/anxious.      Objective:     Vital Signs (Most Recent):  Temp: 98.7 °F (37.1 °C) (03/18/19 1226)  Pulse: 90 (03/18/19 1519)  Resp: 18 (03/18/19 1226)  BP: (!) 118/57 (03/18/19 1226)  SpO2: 96 % (03/18/19 1226) Vital Signs (24h Range):  Temp:  [97.7 °F (36.5 °C)-98.7 °F (37.1 °C)] 98.7 °F (37.1 °C)  Pulse:  [55-90] 90  Resp:  [16-20] 18  SpO2:  [96 %-100 %] 96 %  BP: (115-142)/(54-65) 118/57     Weight: (!) 143.5  kg (316 lb 4.8 oz)  Body mass index is 48.09 kg/m².    Estimated Creatinine Clearance: 14.5 mL/min (A) (based on SCr of 7.9 mg/dL (H)).    Physical Exam   Constitutional: He is oriented to person, place, and time. He appears well-developed and well-nourished. No distress.       HENT:   Head: Normocephalic and atraumatic.   Cardiovascular: Normal rate, regular rhythm and normal heart sounds. Exam reveals no gallop and no friction rub.   No murmur heard.  Pulmonary/Chest: Effort normal and breath sounds normal. No respiratory distress. He has no wheezes. He has no rales.   Abdominal: Soft. Bowel sounds are normal. He exhibits distension. He exhibits no mass. There is no tenderness. There is no rebound and no guarding.   Musculoskeletal: He exhibits no edema.   Neurological: He is alert and oriented to person, place, and time.   Skin: Skin is warm and dry. He is not diaphoretic.   Psychiatric: He has a normal mood and affect. His behavior is normal.       Significant Labs:   Blood Culture: No results for input(s): LABBLOO in the last 4320 hours.  CBC:   Recent Labs   Lab 03/17/19  0504 03/18/19  0427   WBC 9.81 8.10   HGB 7.8* 8.0*   HCT 26.7* 26.7*    192     CMP:   Recent Labs   Lab 03/17/19  0504 03/18/19  0427    140   K 3.7 3.7    106   CO2 23 24    103   BUN 30* 40*   CREATININE 6.0* 7.9*   CALCIUM 9.0 9.4   PROT 5.8* 6.3   ALBUMIN 2.8* 3.1*   BILITOT 0.8 0.8   ALKPHOS 142* 165*   AST 26 25   ALT 13 14   ANIONGAP 10 10   EGFRNONAA 9.6* 6.9*     Wound Culture: No results for input(s): LABAERO in the last 4320 hours.  All pertinent labs within the past 24 hours have been reviewed.    Significant Imaging: I have reviewed all pertinent imaging results/findings within the past 24 hours.   X-Ray Abdomen AP 1 View [[id number]] Resulted: 03/15/19 1904   Order Status: Completed Updated: 03/15/19 1906   Narrative:     EXAMINATION:  XR ABDOMEN AP 1 VIEW    CLINICAL HISTORY:  abdominal  distention;    TECHNIQUE:  AP View(s) of the abdomen was performed.    COMPARISON:  03/03/2019, 17:54 hours.    Abdominal and pelvic CT: 05/20/2017.    FINDINGS:  Multiple AP views of the abdomen were obtained today at 18:15 hours, 18:16 hours and 18:18 hours.  Right flank is incompletely included on the study.    Slender wires overlie the left upper quadrant and epigastrium, of uncertain significance; these may be extrinsic to the patient.    There is abundant gas in the stomach.  The gastric wall appears to have an unusual nodular contour similar to 03/03/2019; clinical considerations will determine if further investigation is warranted.    There is abundant gas in loops of small and large bowel.  I suspect small bowel distension even allowing for magnification of intra-abdominal structures due to thick body wall and bedside technique.  I am not convinced there is large bowel distension.  Findings are concerning for early or partial small bowel obstruction versus ileus.    I do not identify intramural gas, intra portal gas or free peritoneal gas.    The patient has known abundant calcific atherosclerosis in branches of the abdominal aorta accounting for tubular distribution of radiopaque material overlying the epigastrium on the abdominal image obtained with the patient in right posterior oblique rotation, centered at the xiphoid (18:16 hours).    I detect no acute disease in the base of the left hemithorax.  Right hemidiaphragm is mildly elevated as seen on earlier studies.   Impression:       Findings are concerning for early or partial small bowel obstruction versus ileus.    Gastric folds appear to have a nodular contour on the current examination and on the recent study of 03/03/2019.  Clinical considerations will determine if further investigation is warranted.  No corresponding abnormality detected on the earlier abdominal CT dated 05/20/2017.      Electronically signed by: Pascale Vasquez MD  Date:  03/15/2019  Time: 19:04

## 2019-03-18 NOTE — SUBJECTIVE & OBJECTIVE
"Interval HPI:   Overnight events:  Remains in [facility]. NAEON. HD. BG at goal without insulin.   Eatin%  - eating all of meals but less than at home   Nausea: No  Hypoglycemia and intervention: No  Fever: No  TPN and/or TF: No  If    BP (!) 142/65 (BP Location: Right arm, Patient Position: Lying)   Pulse 61   Temp 97.9 °F (36.6 °C) (Oral)   Resp (!) 67   Ht 5' 8" (1.727 m)   Wt (!) 143.5 kg (316 lb 4.8 oz)   SpO2 98%   BMI 48.09 kg/m²     Labs Reviewed and Include    Recent Labs   Lab 19  0427      CALCIUM 9.4   ALBUMIN 3.1*   PROT 6.3      K 3.7   CO2 24      BUN 40*   CREATININE 7.9*   ALKPHOS 165*   ALT 14   AST 25   BILITOT 0.8     Lab Results   Component Value Date    WBC 8.10 2019    HGB 8.0 (L) 2019    HCT 26.7 (L) 2019     (H) 2019     2019     No results for input(s): TSH, FREET4 in the last 168 hours.  Lab Results   Component Value Date    HGBA1C 5.0 2019       Nutritional status:   Body mass index is 48.09 kg/m².  Lab Results   Component Value Date    ALBUMIN 3.1 (L) 2019    ALBUMIN 2.8 (L) 2019    ALBUMIN 2.9 (L) 2019     No results found for: PREALBUMIN    Estimated Creatinine Clearance: 14.5 mL/min (A) (based on SCr of 7.9 mg/dL (H)).    Accu-Checks  Recent Labs     03/15/19  1638 03/15/19  2146 19  1125 19  1636 19  2113 19  0744 19  1200 19  1648 19  2106 19  0741   POCTGLUCOSE 146* 134* 95 80 113* 112* 107 96 126* 105       Current Medications and/or Treatments Impacting Glycemic Control  Immunotherapy:    Immunosuppressants     None        Steroids:   Hormones (From admission, onward)    None        Pressors:    Autonomic Drugs (From admission, onward)    Start     Stop Route Frequency Ordered    03/10/19 1200  midodrine tablet 20 mg      -- Oral Every 6 hours 03/10/19 0993        Hyperglycemia/Diabetes Medications:   Antihyperglycemics (From " admission, onward)    Start     Stop Route Frequency Ordered    03/06/19 1638  insulin aspart U-100 pen 0-5 Units      -- SubQ Before meals & nightly PRN 03/06/19 1537

## 2019-03-18 NOTE — CONSULTS
[facility]  Infectious Disease  Consult Note    Patient Name: Angel Farmer Jr.  MRN: [id number]  Admission Date: 2/28/2019  Hospital Length of Stay: 18 days  Attending Physician: Ruddy Wylie MD  Primary Care Provider: Satya Noriega MD           Inpatient consult to Infectious Diseases  Consult performed by: ALLEY Quinones Jr.  Consult ordered by: Homa White PA-C        Consult received.  Full consult to follow.      ALLEY Rocha  Infectious Disease  [facility]

## 2019-03-18 NOTE — SUBJECTIVE & OBJECTIVE
"No current facility-administered medications on file prior to encounter.      Current Outpatient Medications on File Prior to Encounter   Medication Sig    ammonium lactate 12 % Crea Apply 1 application topically 3 (three) times daily as needed.    aspirin 81 MG Chew Take 1 tablet (81 mg total) by mouth once daily.    BD ULTRA-FINE MINI PEN NEEDLE 31 gauge x 3/16" Ndle USE 4 TO 5 TIMES A DAY     ( DISCARD PEN NEEDLE AFTER EACH USE )    blood-glucose meter (ADVANCED GLUCOSE METER) Misc 1 each by Misc.(Non-Drug; Combo Route) route 4 (four) times daily with meals and nightly.    clindamycin (CLEOCIN) 300 MG capsule Take 1 capsule (300 mg total) by mouth every 6 (six) hours.    fluticasone (FLONASE) 50 mcg/actuation nasal spray 1 spray by Each Nare route once daily.    gabapentin (NEURONTIN) 300 MG capsule TAKE 1 BY MOUTH EVERY      EVENING    insulin (BASAGLAR KWIKPEN U-100 INSULIN) glargine 100 units/mL (3mL) SubQ pen Inject 55 Units into the skin every evening. (Patient taking differently: Inject 40 Units into the skin every evening. )    lancets Misc 1 each by Misc.(Non-Drug; Combo Route) route 4 (four) times daily with meals and nightly.    liraglutide 0.6 mg/0.1 mL, 18 mg/3 mL, subq PNIJ (VICTOZA 2-SHOAIB) 0.6 mg/0.1 mL (18 mg/3 mL) PnIj Inject 1.2 mg into the skin once daily.    ONETOUCH ULTRA BLUE TEST STRIP Strp USE ONE STRIP TO CHECK BLOOD GLUCOSE 4 TIMES DAILY.AT MORNING, NOON,6:OO PM AND BEDTIME.    pravastatin (PRAVACHOL) 40 MG tablet Take 1 tablet (40 mg total) by mouth once daily. (Patient taking differently: Take 40 mg by mouth every evening. )       Review of patient's allergies indicates:   Allergen Reactions    Keflex [cephalexin] Nausea Only       Past Medical History:   Diagnosis Date    Anemia due to stage 5 chronic kidney disease 3/1/2019    Diabetes mellitus type II     Dialysis patient     Hyperlipidemia     Hypertension     Kidney failure     MIKE (obstructive sleep apnea)     " Urinary tract infection      Past Surgical History:   Procedure Laterality Date    AV FISTULA PLACEMENT      left lower arm    SPINE, CERVICAL, POSTERIOR APPROACH  LAMINECTOMY C3-C6; C6-C7; WITH MEDIAL DISCECTOMY N/A 3/3/2019    Performed by Ruddy W[facility]ie [facility] at [facility] OR 2ND FLR    TONSILLECTOMY, ADENOIDECTOMY      TRANSESOPHAGEAL ECHOCARDIOGRAM (SERENITY) N/A 5/23/2017    Performed by Donaldo [facility] MD at [facility] OR     Family History     Problem Relation (Age of Onset)    Colon cancer Mother    Diabetes Mother, Father, Maternal Grandmother, Maternal Grandfather    Heart disease Father    Hypertension Father    Lung cancer Mother        Tobacco Use    Smoking status: Never Smoker    Smokeless tobacco: Never Used   Substance and Sexual Activity    Alcohol use: No    Drug use: No    Sexual activity: Not Currently     Review of Systems   Respiratory: Cough:            Negative except above.     Objective:     Vital Signs (Most Recent):  Temp: 98.7 °F (37.1 °C) (03/18/19 1226)  Pulse: 71 (03/18/19 1226)  Resp: 18 (03/18/19 1226)  BP: (!) 118/57 (03/18/19 1226)  SpO2: 96 % (03/18/19 1226) Vital Signs (24h Range):  Temp:  [97.7 °F (36.5 °C)-98.7 °F (37.1 °C)] 98.7 °F (37.1 °C)  Pulse:  [55-71] 71  Resp:  [16-20] 18  SpO2:  [96 %-100 %] 96 %  BP: (115-142)/(54-65) 118/57     Weight: (!) 143.5 kg (316 lb 4.8 oz)  Body mass index is 48.09 kg/m².    Physical Exam     General: In no acute distress, well appearance.   CV: Regular  Pulm: non labored breathing  Abd:Obese, soft, ++ distended, non tender. No surgical scars.   Ext: wwp    Significant Labs:    CBC:     Recent Labs   Lab 03/18/19 0427   WBC 8.10   RBC 2.43*   HGB 8.0*   HCT 26.7*      *   MCH 32.9*   MCHC 30.0*     CMP:     Recent Labs   Lab 03/18/19 0427      CALCIUM 9.4   ALBUMIN 3.1*   PROT 6.3      K 3.7   CO2 24      BUN 40*   CREATININE 7.9*   ALKPHOS 165*   ALT 14   AST 25   BILITOT 0.8     Significant  Diagnostics:    Abdominal XR:      Findings are concerning for early or partial small bowel obstruction versus ileus.    Gastric folds appear to have a nodular contour on the current examination and on the recent study of 03/03/2019.  Clinical considerations will determine if further investigation is warranted.  No corresponding abnormality detected on the earlier abdominal CT dated 05/20/2017.

## 2019-03-18 NOTE — HPI
The patient  is a 58 y.o. male with PMHx of of DMII, ESRD on HD MWF(last dialysis 3/1), HLD, HTN and MIKE admitted s/p cervical laminectomy C3-C6, C6-C7 w/medial discectomy. Per chart review,  Mr. Farmer had wobbly gait since this past September, at which point he recalls falling and has been at 80-90% of his normal function.  He was able to walk without assist, however not for very long distances.  He states that he has felt progression of his symptoms and now feels it would be difficult to stand up to ambulate at all. His UE symptoms have been present for about 3 months including not writing as well as he is used to.MRI cervical spine (2/27)at OSH on 2/27 revealed severe disc space narrowing C3-C4 and C5-C6 level. He underwent cervical laminectomy C2-C7 on 3/3.     Mr. Farmer has a baseline of having BMs every 3rd day or so.  Since admission he has experienced significant constipation.  He reports having 2 Brown Bombs as inpt and was on multiple laxatives (Senna, Lactulose and Miralax concurrently until  3/14).  Given his diarrhea, a C diff test was sent and resulted C diff Antigen +, Toxin negative and PCR Negative.  He was given one dose of oral vancomycin only.  He reports 20 watery BMs 3/14, 10 on 3/15 and have dropped to 3 today.  On 3/15 he had n/v but resolved spontaneously.  He has had no further treatment. Xray ABD 3/15 showed: Findings are concerning for early or partial small bowel obstruction versus ileus.  He has been seen by GI and Gen Sx (rec CT Abd with contrast though thinks SBO unlikely bc passing gas and tolerating PO).  The patient reports + flatus and denies any recent fever, chills, ABD or sweats.  ID consulted re: C diff testing.

## 2019-03-18 NOTE — PT/OT/SLP PROGRESS
"Physical Therapy Treatment    Patient Name:  Angel Farmer Jr.   MRN:  [id number]  Admitting Diagnosis: S/P laminectomy  Recent Surgery: Procedure(s) (LRB):  SPINE, CERVICAL, POSTERIOR APPROACH  LAMINECTOMY C3-C6; C6-C7; WITH MEDIAL DISCECTOMY (N/A) 15 Days Post-Op    Recommendations:     Discharge Recommendations:  rehabilitation facility   Discharge Equipment Recommendations: (TBD)   Barriers to discharge: Inaccessible home  Pt requiring increased assistance at current time.     Plan:     During this hospitalization, patient to be seen 4 x/week to address the above listed problems via gait training, therapeutic activities, therapeutic exercises, neuromuscular re-education  · Plan of Care Expires:  04/08/19   Plan of Care Reviewed with: patient    This Plan of care has been discussed with the patient who was involved in its development and understands and is in agreement with the identified goals and treatment plan    Subjective     Communicated with RN (Yessenia QUILES) prior to session.     Patient comments: "I don't think I stand today, my legs are weak"  Pain/Comfort:  · Pain Rating 1: (No rating provived)  · Location - Orientation 1: generalized  · Location 1: (Not specified)  · Pain Addressed 1: Pre-medicate for activity, Reposition, Distraction  · Pain Rating Post-Intervention 1: (No rating provided)    Objective:     Patient found with: (No line attachments)    Patient found up in medichair upon PT entry to room, agreeable to treatment.  Sister-in-law present in the room.    General Precautions: Standard, fall   Orthopedic Precautions:spinal precautions   Braces: N/A       BED MOBILITY (vc's for hand placement sequencing of task):        Rolling to the R:  NT.       Rolling to the L:  NT.        Sup > sit at the EOB:  Not performed 2* pt found seated UIC.       Sit > sup:  Total A of 2 for lowering of trunk and mgt of B LE's with HOB at 0* angle and no use of bedrail.                       SITTING AT THE EDGE OF " THE BED    Assistance Level Required: CGA/SBA for trunk/safety with B UE support   Postural deviations noted: flexed trunk, rounded shoulder, forward head   Encouraged: upright posture, B feet in contact with floor        TRANSFERS  (vc's for hand placement, sequencing of task and safety)   Patient completed Sit <> Stand Transfer from medichair/EOB with mod A of 1 for hip elevation with no assistive device x2 trial(s)   Patient completed Stand <> Sit Transfer to EOB with mod A for controlled descent with no assistive device    Patient completed Chair <> EOB Transfer using Step Transfer technique to the L with mod A of 1 with no assistive device    GAIT: when performing SPT to bed and alongside the EOB   Patient ambulated: 2-3 steps to the L and 4-6 steps to the L and R   Patient required: mod A of 1 for balance and guarding of B knee   Patient used:  No Assistive Device   Gait Pattern observed: step to   Gait Deviation(s): decreased kaykay, increased time in double stance, decreased velocity of limb motion, decreased step length, decreased swing-to-stance ratio, decreased toe-to-floor clearance and decreased weight-shifting ability    Comments: vc's and tc's for upright posture, appropriate weight shift, directional guidance and safety    EDUCATION  Patient provided with daily orientation and goals of this PT session. They were educated to call for assistance and to transfer with hospital staff only.  Also, pt was educated on the effects of prolonged immobility and the importance of performing OOB activity and exercises to promote healing and reduce recovery time    Whiteboard updated with correct mobility information. RN/PCT notified.  Pt requires max A of 2 to sit at the EOB.    Patient left sup , with  all lines intact, call button in reach, bed alarm on, RN notified and sister-in-law present    AM-PAC 6 CLICK MOBILITY  Turning over in bed (including adjusting bedclothes, sheets and blankets)?: 2  Sitting down  on and standing up from a chair with arms (e.g., wheelchair, bedside commode, etc.): 2  Moving from lying on back to sitting on the side of the bed?: 2  Moving to and from a bed to a chair (including a wheelchair)?: 2  Need to walk in hospital room?: 2  Climbing 3-5 steps with a railing?: 1  Basic Mobility Total Score: 11     Assessment:     Angel Farmer Jr. is a 58 y.o. male admitted with a medical diagnosis of S/P laminectomy.  He presents with the following impairments/functional limitations:  weakness, impaired endurance, impaired sensation, impaired self care skills, impaired functional mobilty, gait instability, impaired balance, decreased coordination, decreased upper extremity function, decreased lower extremity function, decreased safety awareness, pain, decreased ROM, impaired coordination, impaired skin, edema, orthopedic precautions. requiring significant assistance and verbal cues for bed mob, transfers and gait to prevent falls due to weakness, pain and fear of falling.   In light of pt's current functional level and deficits, it is anticipated that pt will need to participate in an intense rehab program consisting of PT and OT in order to achieve full rehab potential to return to previous level of function and roles.  Pt will cont to benefit from skilled PT intervention to address deficits and improve functional mobility.     Rehab Prognosis:  Good; patient would benefit from acute skilled PT services to address these deficits and reach maximum level of function.      GOALS:   Multidisciplinary Problems     Physical Therapy Goals        Problem: Physical Therapy Goal    Goal Priority Disciplines Outcome Goal Variances Interventions   Physical Therapy Goal     PT, PT/OT Ongoing (interventions implemented as appropriate)     Description:  Goals to be met by: 3/28/2019     Patient will increase functional independence with mobility by performin. Supine to sit with Moderate Assistance  2. Rolling  to Left and Right with Moderate Assistance.  3. Sit to stand transfer with Maximum Assistance  4. Gait  x 10 feet with Moderate Assistance using Rolling Walker.   5. Stand for 5 minutes with Moderate Assistance using Rolling Walker  6. Lower extremity exercise program x20 reps per handout, with assistance as needed                       Time Tracking:     PT Received On: 03/18/19  PT Start Time: 1155     PT Stop Time: 1218  PT Total Time (min): 23 min     Billable Minutes: Therapeutic Activity 23    Treatment Type: Treatment  PT/PTA: PTA     PTA Visit Number: 2       Tanesha Ellis PTA.  Pager [phone]    3/18/2019    .

## 2019-03-18 NOTE — ASSESSMENT & PLAN NOTE
- improving spontaneously and patient has had minimal ABX exposure  - C diff testing is negative  - diarrhea is from multiple laxatives that were taken up until the day prior to testing  - rec no further C diff testing  - Rec fu GI and Gen Sx rec  - discussed w primary team  - will sign off

## 2019-03-18 NOTE — ASSESSMENT & PLAN NOTE
58 M with progressive cervical myelopathy and stenosis from C3-6 with myelomalacia. S/p code for hypovolemia during HD. Stable this morning and labs WNL. He stepped up to ICU for further care and work up, now s/p C3-6 posterior decompression 3/4/19.      - Pt neurologically stable  - No acute neurosurgical intervention necessary  - Neuro checks q4h  - Continue dialysis T/Th. Last dialysis on 3/16. Management per Nephrology.   - No C collar needed  - Pain control: Continue Oxycodone IR 5mg q4h prn  - Daily PT/OT. Rec rehab placement.   - H/H stable 8.0/26.7, will ctm.  - Plts 192k, goal >50k  - KUB shows partial SBO v ileus.   - Gen surg rec CT with contrast and with oral contrast. Coordinated with nephrology and radiology. Will receive CT in am and dialysis in pm  - ID recommends no further testing for c diff. (+ c diff antigen, - c diff toxins)  - Bowel regimen has been removed 2/2 diarrhea  - Gi rec outpatient EGD/Colon for age related screening and concern for thickened gastric folds on imaging given recent c-spine surgery  - Discussed with Dr. Wylie

## 2019-03-18 NOTE — PLAN OF CARE
Problem: Adult Inpatient Plan of Care  Goal: Plan of Care Review  No acute changes on shift. Pt continues with loose stools but occurring less frequently. 2 BM's noted on shift. Pt up to chair with PT/OT today. Tolerated well. Glucose readings WNL. Plan to have CT of abd/pelvis done. No other issues noted at this time. WCTM.

## 2019-03-18 NOTE — CONSULTS
"[facility]  General Surgery  Consult Note    Patient Name: Angel Farmer Jr.  MRN: [id number]  Code Status: Full Code  Admission Date: 2/28/2019  Hospital Length of Stay: 18 days  Attending Physician: Ruddy Wylie MD  Primary Care Provider: Satya Noriega MD    Patient information was obtained from patient, past medical records and ER records.     Inpatient consult to General Surgery  Consult performed by: Pauline Davison MD  Consult ordered by: Homa White PA-C        Subjective:     Principal Problem: S/P laminectomy    History of Present Illness: Mr. Farmer is a 57 yo M with PMH of ESRD with dialysis MWF (last on weds), DM, who underwent a posterior laminectomy of C3-4 on 3/3 due to cervical stenosis post op has been complicated by Code for hypovolemia during HD had to be transferred to the ICU for further workup but currently down to step down unit.     More recently he has been experiencing diarrhea, his C diff was only positive for Ag but negative for PCR. ID has been consulted pending note.     He has been having three days of  diarrhea up to 9 BM a day, today he only had x1 BM. No nausea or emesis. Has been tolerating a regular diet without any issues. States he only had some nausea on Friday.       No current facility-administered medications on file prior to encounter.      Current Outpatient Medications on File Prior to Encounter   Medication Sig    ammonium lactate 12 % Crea Apply 1 application topically 3 (three) times daily as needed.    aspirin 81 MG Chew Take 1 tablet (81 mg total) by mouth once daily.    BD ULTRA-FINE MINI PEN NEEDLE 31 gauge x 3/16" Ndle USE 4 TO 5 TIMES A DAY     ( DISCARD PEN NEEDLE AFTER EACH USE )    blood-glucose meter (ADVANCED GLUCOSE METER) Misc 1 each by Misc.(Non-Drug; Combo Route) route 4 (four) times daily with meals and nightly.    clindamycin (CLEOCIN) 300 MG capsule Take 1 capsule (300 mg total) by mouth every 6 (six) " hours.    fluticasone (FLONASE) 50 mcg/actuation nasal spray 1 spray by Each Nare route once daily.    gabapentin (NEURONTIN) 300 MG capsule TAKE 1 BY MOUTH EVERY      EVENING    insulin (BASAGLAR KWIKPEN U-100 INSULIN) glargine 100 units/mL (3mL) SubQ pen Inject 55 Units into the skin every evening. (Patient taking differently: Inject 40 Units into the skin every evening. )    lancets Misc 1 each by Misc.(Non-Drug; Combo Route) route 4 (four) times daily with meals and nightly.    liraglutide 0.6 mg/0.1 mL, 18 mg/3 mL, subq PNIJ (VICTOZA 2-SHOAIB) 0.6 mg/0.1 mL (18 mg/3 mL) PnIj Inject 1.2 mg into the skin once daily.    ONETOUCH ULTRA BLUE TEST STRIP Strp USE ONE STRIP TO CHECK BLOOD GLUCOSE 4 TIMES DAILY.AT MORNING, NOON,6:OO PM AND BEDTIME.    pravastatin (PRAVACHOL) 40 MG tablet Take 1 tablet (40 mg total) by mouth once daily. (Patient taking differently: Take 40 mg by mouth every evening. )       Review of patient's allergies indicates:   Allergen Reactions    Keflex [cephalexin] Nausea Only       Past Medical History:   Diagnosis Date    Anemia due to stage 5 chronic kidney disease 3/1/2019    Diabetes mellitus type II     Dialysis patient     Hyperlipidemia     Hypertension     Kidney failure     MIKE (obstructive sleep apnea)     Urinary tract infection      Past Surgical History:   Procedure Laterality Date    AV FISTULA PLACEMENT      left lower arm    SPINE, CERVICAL, POSTERIOR APPROACH  LAMINECTOMY C3-C6; C6-C7; WITH MEDIAL DISCECTOMY N/A 3/3/2019    Performed by [facility] at [facility] 2ND F[facility]    TONSILLECTOMY, ADENOIDECTOMY      TRANSESOPHAGEAL ECHOCARDIOGRAM (SERENITY) N/A 5/23/2017    Performed by Donaldo Mai MD at [facility] OR     Family History     Problem Relation (Age of Onset)    Colon cancer Mother    Diabetes Mother, Father, Maternal Grandmother, Maternal Grandfather    Heart disease Father    Hypertension Father    Lung cancer Mother        Tobacco Use    Smoking  status: Never Smoker    Smokeless tobacco: Never Used   Substance and Sexual Activity    Alcohol use: No    Drug use: No    Sexual activity: Not Currently     Review of Systems   Respiratory: Cough:            Negative except above.     Objective:     Vital Signs (Most Recent):  Temp: 98.7 °F (37.1 °C) (03/18/19 1226)  Pulse: 71 (03/18/19 1226)  Resp: 18 (03/18/19 1226)  BP: (!) 118/57 (03/18/19 1226)  SpO2: 96 % (03/18/19 1226) Vital Signs (24h Range):  Temp:  [97.7 °F (36.5 °C)-98.7 °F (37.1 °C)] 98.7 °F (37.1 °C)  Pulse:  [55-71] 71  Resp:  [16-20] 18  SpO2:  [96 %-100 %] 96 %  BP: (115-142)/(54-65) 118/57     Weight: (!) 143.5 kg (316 lb 4.8 oz)  Body mass index is 48.09 kg/m².    Physical Exam     General: In no acute distress, well appearance.   CV: Regular  Pulm: non labored breathing  Abd:Obese, soft, ++ distended, non tender. No surgical scars.   Ext: wwp    Significant Labs:    CBC:     Recent Labs   Lab 03/18/19  0427   WBC 8.10   RBC 2.43*   HGB 8.0*   HCT 26.7*      *   MCH 32.9*   MCHC 30.0*     CMP:     Recent Labs   Lab 03/18/19  0427      CALCIUM 9.4   ALBUMIN 3.1*   PROT 6.3      K 3.7   CO2 24      BUN 40*   CREATININE 7.9*   ALKPHOS 165*   ALT 14   AST 25   BILITOT 0.8     Significant Diagnostics:    Abdominal XR:      Findings are concerning for early or partial small bowel obstruction versus ileus.    Gastric folds appear to have a nodular contour on the current examination and on the recent study of 03/03/2019.  Clinical considerations will determine if further investigation is warranted.  No corresponding abnormality detected on the earlier abdominal CT dated 05/20/2017.    Assessment/Plan:     Diarrhea    Mr. Farmer is a 57 yo M with PMH of ESRD with dialysis MWF (last on weds), DM, who underwent a posterior laminectomy of C3-4 on 3/3 due to cervical stenosis now with diarrhea and XR showing possible SBO vs ileus.     - Unlikely to be either as patient  is passing gas, having BM and tolerating regular diet without any issues.   - Can obtain a CT Abd/pelvis with oral contrast and ideally with IV contrast if ok from Nephrology stand point (as he doesn't seem to have any kidney function at baseline).   - Will await CT scan results.            VTE Risk Mitigation (From admission, onward)        Ordered     heparin (porcine) injection 5,000 Units  Every 8 hours      03/15/19 1100     Place sequential compression device  Until discontinued      03/03/19 1717     IP VTE HIGH RISK PATIENT  Once      03/01/19 0240     Place SWAPNIL hose  Until discontinued      03/01/19 0240     Place sequential compression device  Until discontinued      03/01/19 0240          Pauline cMcarty MD  General Surgery PGY V  Beeper: [phone]             I have personally performed a detailed history and physical examination on this patient. My findings are summarized in the resident's note included in the record.  Having frequent bowel movements  Pain less on Tuesday compared to Monday  Will follow

## 2019-03-18 NOTE — PLAN OF CARE
Problem: Adult Inpatient Plan of Care  Goal: Plan of Care Review  Outcome: Ongoing (interventions implemented as appropriate)  No acute changes overnight. VSS. BG monitored ACHS and stable. Hourly rounding performed. Safety maintained. Pt remained free of falls. Neuro checks done Q4. Telemetry in use. Multiple liquid BMs overnight. PRN simethicone given x 1 this shift. Nasal cannula in use. Pt resting. No needs at this time. Call bell in reach. Will continue to monitor.

## 2019-03-18 NOTE — PLAN OF CARE
03/18/19 1557   Discharge Reassessment   Assessment Type Discharge Planning Reassessment   Provided patient/caregiver education on the expected discharge date and the discharge plan No   Do you have any problems affording any of your prescribed medications? No   Discharge Plan A Rehab   Discharge Plan B Skilled Nursing Facility   DME Needed Upon Discharge  none   Patient choice form signed by patient/caregiver N/A   Anticipated Discharge Disposition Rehab   Can the patient answer the patient profile reliably? Yes, cognitively intact

## 2019-03-18 NOTE — PT/OT/SLP PROGRESS
Occupational Therapy   Treatment    Name: Angel Farmer Jr.  MRN: [id number]  Admitting Diagnosis:  S/P laminectomy  15 Days Post-Op    Recommendations:     Discharge Recommendations: rehabilitation facility  Discharge Equipment Recommendations:  (TBD next level of care)  Barriers to discharge:       Assessment:     Angel Farmer Jr. is a 58 y.o. male with a medical diagnosis of S/P laminectomy.  He presents with continued decreased UE ROM/strength, more proximally vs. Distally. Pt reports he has difficulty feeding himself and that he mainly uses his left hand but spillage still occurs. BLEs also very weak with pt having difficulty fully extending knees and legs giving out mid-t/f. IP Rehab still appropriate in order to maximize functional (I) and to facilitate return to PLOF. Performance deficits affecting function are weakness, impaired functional mobilty, decreased safety awareness, gait instability, impaired endurance, impaired balance, decreased upper extremity function, decreased ROM, decreased lower extremity function, impaired self care skills, decreased coordination.     Rehab Prognosis:  Fair; patient would benefit from acute skilled OT services to address these deficits and reach maximum level of function.       Plan:     Patient to be seen 4 x/week to address the above listed problems via self-care/home management, therapeutic activities, therapeutic exercises, neuromuscular re-education  · Plan of Care Expires: 04/08/19  · Plan of Care Reviewed with: patient    Subjective     Pain/Comfort:  · Pain Rating 1: 0/10    Objective:     Communicated with: RN prior to session.  Patient found supine with   upon OT entry to room.    General Precautions: Standard, fall   Orthopedic Precautions:spinal precautions   Braces:       Occupational Performance:     Bed Mobility:    · Patient completed Rolling/Turning to Left with  maximal assistance and with side rail  · Patient completed Rolling/Turning to Right with  maximal assistance  · Patient completed Scooting/Bridging with contact guard assistance  · Patient completed Supine to Sit with maximal assistance and 2 persons     Functional Mobility/Transfers:  · Patient completed Sit <> Stand Transfer with moderate assistance and of 2 persons  with  no assistive device   · Patient completed Bed <> Chair Transfer using Stand Pivot technique with moderate assistance and of 2 persons with no assistive device  · T/f from bed>BS chair>Medichair. Pt's LEs gave out during first t/f and OT had to guide pt back into chair on descent with Mod A.     Activities of Daily Living:  · Lower Body Dressing: total assistance   · Toileting: total assistance    AMPAC 6 Click ADL: 10    Treatment & Education:  EOB sitting balance = Good/SBA  Poor standing balance  From supine, completed shld presses (AAROM due to weakness), elbow flex/ext and forward punches...except 10 each.  Left in Medichair with sheet tied around for safety as seatbelt not long enough.    Patient left up in chair with call button in reachEducation:      GOALS:   Multidisciplinary Problems     Occupational Therapy Goals        Problem: Occupational Therapy Goal    Goal Priority Disciplines Outcome Interventions   Occupational Therapy Goal     OT, PT/OT Ongoing (interventions implemented as appropriate)    Description:  Goals to be met by: 7 days (3/15/19)     Patient will increase functional independence with ADLs by performing:    UE Dressing with Moderate Assistance.  Grooming while EOB with Minimal Assistance.  Toileting from bedside commode with Maximum Assistance for hygiene and clothing management.   Supine to sit with Moderate Assistance.  Toilet transfer to bedside commode with Maximum Assistance.  Increased functional strength to WFL for ADLs.                      Time Tracking:     OT Date of Treatment: 03/18/19  OT Start Time: 1031  OT Stop Time: 1120  OT Total Time (min): 49 min    Billable Minutes:Therapeutic  Activity 37  Therapeutic Exercise 12    JUSTIN Lr  3/18/2019

## 2019-03-18 NOTE — ASSESSMENT & PLAN NOTE
Mr. Farmer is a 57 yo M with PMH of ESRD with dialysis MWF (last on weds), DM, who underwent a posterior laminectomy of C3-4 on 3/3 due to cervical stenosis now with diarrhea and XR showing possible SBO vs ileus.     - Unlikely to be either as patient is passing gas, having BM and tolerating regular diet without any issues.   - Can obtain a CT Abd/pelvis with oral contrast and ideally with IV contrast if ok from Nephrology stand point (as he doesn't seem to have any kidney function at baseline).   - Will await CT scan results.

## 2019-03-18 NOTE — PROGRESS NOTES
[facility]  Neurosurgery  Progress Note    Subjective:     History of Present Illness: 59 yo male with a PMH of ESRD with dialysis MWF (last on weds), DM, presenting as transfer from [facility] as admission to neurosurgery for evaluation after progressive course of unsteady gait as well as bilateral upper extremity weakness for the last several months.  Mr. Farmer has had wobbly gait since this past September, at which point he recalls falling and has been at 80-90% of his normal function.  He has been able to walk without assist, however not for very long distances.  He states that since last Sunday he has felt progression of his symptoms and now feels it would be difficult to stand up to ambulate at all.  He also endorses a pulled muscle of his LLE that is limiting his mobility.  His UE symptoms have been present for about 3 months including not writing as well as he is used to.  HE is able to write numbers, but not always his name.  He has been having difficulty using a fork to eat over the past month.  No sensory disturbance noted as well as no neck or back pain and no pain from the neck radiating into the hands.  He has intermittently been taking ASA 81 mg qdaily. He denies recent falls or trauma to the neck.  No other blood thinning medication.  MRI at OSH on 2/27 revealed significant cervical stenosis.        Post-Op Info:  Procedure(s) (LRB):  SPINE, CERVICAL, POSTERIOR APPROACH  LAMINECTOMY C3-C6; C6-C7; WITH MEDIAL DISCECTOMY (N/A)   15 Days Post-Op     Interval History: Patient continues to have diarrhea. Denies emesis. Admits neck pain and BUE paresthesias. Denies urinary dysfunction, n/v. Sister at bedside.    Medications:  Continuous Infusions:  Scheduled Meds:   sodium chloride 0.9%   Intravenous Once    sodium chloride 0.9%   Intravenous Once    diclofenac sodium   Topical (Top) Daily    epoetin shefali (PROCRIT) injection  7,000 Units Intravenous Every Mon, Wed, Fri     FLUoxetine  20 mg Oral Daily    gabapentin  300 mg Oral QHS    heparin (porcine)  5,000 Units Subcutaneous Q8H    midodrine  20 mg Oral Q6H    pantoprazole  40 mg Oral BID    pravastatin  40 mg Oral Daily     PRN Meds:sodium chloride 0.9%, sodium chloride 0.9%, acetaminophen, dextrose 50%, dextrose 50%, glucagon (human recombinant), glucose, glucose, insulin aspart U-100, miconazole NITRATE 2 %, omnipaque, ondansetron, oxyCODONE, simethicone     Review of Systems  Objective:     Weight: (!) 143.5 kg (316 lb 4.8 oz)  Body mass index is 48.09 kg/m².  Vital Signs (Most Recent):  Temp: 98 °F (36.7 °C) (03/18/19 1613)  Pulse: 86 (03/18/19 1613)  Resp: 18 (03/18/19 1613)  BP: 136/63 (03/18/19 1613)  SpO2: 98 % (03/18/19 1613) Vital Signs (24h Range):  Temp:  [97.7 °F (36.5 °C)-98.7 °F (37.1 °C)] 98 °F (36.7 °C)  Pulse:  [55-90] 86  Resp:  [16-20] 18  SpO2:  [96 %-100 %] 98 %  BP: (115-142)/(54-65) 136/63     Date 03/18/19 0700 - 03/19/19 0659   Shift 2886-1160 0890-0461 5716-8443 24 Hour Total   INTAKE   P.O. 480 120  600   Shift Total(mL/kg) 480(3.3) 120(0.8)  600(4.2)   OUTPUT   Shift Total(mL/kg)       Weight (kg) 143.5 143.5 143.5 143.5                        Hemodialysis AV Fistula Left forearm (Active)   Needle Size 15ga 3/16/2019 11:35 AM   Site Assessment Clean;Dry;Intact;No redness;No swelling 3/18/2019  7:54 AM   Patency Present;Thrill;Bruit 3/18/2019  7:54 AM   Status Deaccessed 3/18/2019  7:54 AM   Flows Good 3/16/2019  7:28 AM   Dressing Intervention Dressing reinforced 3/18/2019  7:54 AM   Dressing Status Clean;Dry;Intact 3/18/2019  7:54 AM   Site Condition No complications 3/18/2019  7:54 AM   Dressing Gauze 3/18/2019  7:54 AM       Neurosurgery Physical Exam  General: well developed, well nourished, no distress. Obese.   Head: normocephalic, atraumatic  Neurologic: Alert and oriented. Thought content appropriate.  GCS: Motor: 6/Verbal: 5/Eyes: 4 GCS Total: 15  Mental Status: Awake, Alert, Oriented x  4  Language: No aphasia  Speech: No dysarthria  Cranial nerves: face symmetric, tongue midline, CN II-XII grossly intact.   Eyes: pupils equal, round, reactive to light, EOMI.   Pulmonary: normal respirations, no signs of respiratory distress  Abdomen: soft, distended, tender to palpation in LLQ  Skin: Skin is warm, dry and intact.  Sensory: intact to light touch throughout     Motor Strength:Moves all extremities spontaneously with good tone. Strength exam pain limited. No abnormal movements seen.      Strength   Deltoids Triceps Biceps Wrist Extension Wrist Flexion Hand    Upper: R 5/5 5/5 5/5 5/5 5/5 5/5     L 5/5 5/5 5/5 5/5 5/5 5/5       Iliopsoas Quadriceps Knee  Flexion Tibialis  anterior Gastro- cnemius EHL   Lower: R 5/5 5/5 5/5 5/5 5/5 5/5     L 4/5 4+/5 4+/5 5/5 5/5 5/5      Sanford's: bilaterally.  Babinski's: Negative.  Clonus: Negative.  Incision c/d/i with no surrounding erythema, edema, or drainage. skin well approximated. Sutures removed today.          Significant Labs:  Recent Labs   Lab 03/17/19  0504 03/18/19  0427    103    140   K 3.7 3.7    106   CO2 23 24   BUN 30* 40*   CREATININE 6.0* 7.9*   CALCIUM 9.0 9.4   MG 2.1 2.3     Recent Labs   Lab 03/17/19  0504 03/18/19  0427   WBC 9.81 8.10   HGB 7.8* 8.0*   HCT 26.7* 26.7*    192     Recent Labs   Lab 03/17/19  0504 03/18/19  0427   INR 1.1 1.0     Microbiology Results (last 7 days)     Procedure Component Value Units Date/Time    C Diff Toxin by PCR [[id number]] Collected:  03/15/19 1930    Order Status:  Completed Updated:  03/16/19 0805     C. diff PCR Negative    Clostridium difficile EIA [[id number]]  (Abnormal) Collected:  03/15/19 1930    Order Status:  Completed Specimen:  Stool Updated:  03/16/19 0701     C. diff Antigen Positive     C difficile Toxins A+B, EIA Negative     Comment: Testing not recommended for children <24 months old.           CMP:   Recent Labs   Lab 03/17/19  0504 03/18/19  0427   GLU  105 103   CALCIUM 9.0 9.4   ALBUMIN 2.8* 3.1*   PROT 5.8* 6.3    140   K 3.7 3.7   CO2 23 24    106   BUN 30* 40*   CREATININE 6.0* 7.9*   ALKPHOS 142* 165*   ALT 13 14   AST 26 25   BILITOT 0.8 0.8       Significant Diagnostics:  No new imaging for review    Assessment/Plan:     * S/P laminectomy     58 M with progressive cervical myelopathy and stenosis from C3-6 with myelomalacia. S/p code for hypovolemia during HD. Stable this morning and labs WNL. He stepped up to ICU for further care and work up, now s/p C3-6 posterior decompression 3/4/19.      - Pt neurologically stable  - No acute neurosurgical intervention necessary  - Neuro checks q4h  - Continue dialysis T/Th. Last dialysis on 3/16. Management per Nephrology.   - No C collar needed  - Pain control: Continue Oxycodone IR 5mg q4h prn  - Daily PT/OT. Rec rehab placement.   - H/H stable 8.0/26.7, will ctm.  - Plts 192k, goal >50k  - KUB shows partial SBO v ileus.   - Gen surg rec CT with contrast and with oral contrast. Coordinated with nephrology and radiology. Will receive CT in am and dialysis in pm  - ID recommends no further testing for c diff. (+ c diff antigen, - c diff toxins)  - Bowel regimen has been removed 2/2 diarrhea  - Gi rec outpatient EGD/Colon for age related screening and concern for thickened gastric folds on imaging given recent c-spine surgery  - Discussed with ALLEY Ware-C  Neurosurgery  [facility]

## 2019-03-18 NOTE — HPI
Mr. Farmer is a 59 yo M with PMH of ESRD with dialysis MWF (last on weds), DM, who underwent a posterior laminectomy of C3-4 on 3/3 due to cervical stenosis post op has been complicated by Code for hypovolemia during HD had to be transferred to the ICU for further workup but currently down to step down unit.     More recently he has been experiencing diarrhea, his C diff was only positive for Ag but negative for PCR. ID has been consulted pending note.     He has been having three days of  diarrhea up to 9 BM a day, today he only had x1 BM. No nausea or emesis. Has been tolerating a regular diet without any issues. States he only had some nausea on Friday.

## 2019-03-18 NOTE — PROGRESS NOTES
"[facility]  Endocrinology  Progress Note    Admit Date: 2019     Reason for Consult: Management of T2DM, Hyperglycemia     Surgical Procedure and Date: Cervical laminectomy C3-C6, C6-C7 w/ medial discectomy 3/3/19    Diabetes diagnosis year:     Lab Results   Component Value Date    HGBA1C 5.0 2019     Home Diabetes Medications: Basaglar 40 units q HS, Victoza 1.2 mg once daily,     How often checking glucose at home? 4x per day   BG readings on regimen: 90-120s  Hypoglycemia on the regimen?  Yes - about once per month  Missed doses on regimen?  No    Diabetes Complications include:     Hypoglycemia , Diabetic nephropathy  , Diabetic chronic kidney disease     , Diabetic retinopathy , Diabetic cataract  and Diabetic peripheral neuropathy     Complicating diabetes co morbidities:   MIKE, CKD and ESRD      HPI:   Patient is a 58 y.o. male with a diagnosis of HTN, HLD, MIKE, ESRD on HD, and DM2, who is s/p cervical laminectomy C3-C6, C6-C7 w/ medial discectomy 3/3/19.  Patient was admitted to the ED on 19 for upper extremity weakness and ataxia following a fall.  Patient's DM managed by PCP, Dr. Shemar Berry in [place].  Patient is prescribed MDI but only takes basal insulin and Victoza.  Positive family history of DM (mother, father, and both maternal grandparents).  Endocrine consulted for DM/BG management.    Interval HPI:   Overnight events:  Remains in [facility]. NAEON. HD. BG at goal without insulin.   Eatin%  - eating all of meals but less than at home   Nausea: No  Hypoglycemia and intervention: No  Fever: No  TPN and/or TF: No  If    BP (!) 142/65 (BP Location: Right arm, Patient Position: Lying)   Pulse 61   Temp 97.9 °F (36.6 °C) (Oral)   Resp (!) 67   Ht 5' 8" (1.727 m)   Wt (!) 143.5 kg (316 lb 4.8 oz)   SpO2 98%   BMI 48.09 kg/m²      Labs Reviewed and Include    Recent Labs   Lab 19  0427      CALCIUM 9.4   ALBUMIN 3.1*   PROT 6.3      K " 3.7   CO2 24      BUN 40*   CREATININE 7.9*   ALKPHOS 165*   ALT 14   AST 25   BILITOT 0.8     Lab Results   Component Value Date    WBC 8.10 03/18/2019    HGB 8.0 (L) 03/18/2019    HCT 26.7 (L) 03/18/2019     (H) 03/18/2019     03/18/2019     No results for input(s): TSH, FREET4 in the last 168 hours.  Lab Results   Component Value Date    HGBA1C 5.0 02/27/2019       Nutritional status:   Body mass index is 48.09 kg/m².  Lab Results   Component Value Date    ALBUMIN 3.1 (L) 03/18/2019    ALBUMIN 2.8 (L) 03/17/2019    ALBUMIN 2.9 (L) 03/16/2019     No results found for: PREALBUMIN    Estimated Creatinine Clearance: 14.5 mL/min (A) (based on SCr of 7.9 mg/dL (H)).    Accu-Checks  Recent Labs     03/15/19  1638 03/15/19  2146 03/16/19  1125 03/16/19  1636 03/16/19  2113 03/17/19  0744 03/17/19  1200 03/17/19  1648 03/17/19  2106 03/18/19  0741   POCTGLUCOSE 146* 134* 95 80 113* 112* 107 96 126* 105       Current Medications and/or Treatments Impacting Glycemic Control  Immunotherapy:    Immunosuppressants     None        Steroids:   Hormones (From admission, onward)    None        Pressors:    Autonomic Drugs (From admission, onward)    Start     Stop Route Frequency Ordered    03/10/19 1200  midodrine tablet 20 mg      -- Oral Every 6 hours 03/10/19 0946        Hyperglycemia/Diabetes Medications:   Antihyperglycemics (From admission, onward)    Start     Stop Route Frequency Ordered    03/06/19 1638  insulin aspart U-100 pen 0-5 Units      -- SubQ Before meals & nightly PRN 03/06/19 1538          ASSESSMENT and PLAN    * S/P laminectomy    S/p cervical laminectomy C3-C6, C6-C7 w/ medial discectomy 3/3/19  Managed per primary team  Avoid hypoglycemia  Optimize BG control for surgical wound healing         Diabetes mellitus, type 2    BG goal 140-180    Consider januvia 25 mg daily if BG trends up.   Low dose correction scale given kidney function  BG monitoring AC/HS        Discharge planning:  consider holding basal insulin given no needs currently inpatient. Victoza plus correction scale.        MIKE (obstructive sleep apnea) - very severe latest sleep study says BPAP @ 16/8    May affect BG readings if uncontrolled         ESRD (end stage renal disease) on dialysis    Caution with insulin stacking  Estimated Creatinine Clearance: 14.8 mL/min (A) (based on SCr of 7.7 mg/dL (H)).         Anemia due to stage 5 chronic kidney disease    Affects A1C accuracy  Consider checking fructosamine at least 2 weeks post hospital discharge to evaluate BG control     Class 3 severe obesity due to excess calories with serious comorbidity and body mass index (BMI) of 45.0 to 49.9 in adult    may contribute to insulin resistance  Body mass index is 47.74 kg/m².             La Mcconnell NP  Endocrinology  [facility]

## 2019-03-18 NOTE — PLAN OF CARE
Referral is under review by [facility] and [facility]. Angy with [facility] assessed the patient today. REGINA sent updated notes to [facility] via [facility].      03/18/19 1429   Post-Acute Status   Post-Acute Authorization Placement   Post-Acute Placement Status Pending Post-Acute Clinical Review     Ofelia Lund LMSW  [facility]  [id number]

## 2019-03-19 LAB
ALBUMIN SERPL BCP-MCNC: 3 G/DL
ALP SERPL-CCNC: 202 U/L
ALT SERPL W/O P-5'-P-CCNC: 15 U/L
ANION GAP SERPL CALC-SCNC: 12 MMOL/L
AST SERPL-CCNC: 29 U/L
BASOPHILS # BLD AUTO: 0.01 K/UL
BASOPHILS NFR BLD: 0.2 %
BILIRUB SERPL-MCNC: 0.8 MG/DL
BUN SERPL-MCNC: 55 MG/DL
CALCIUM SERPL-MCNC: 9.1 MG/DL
CHLORIDE SERPL-SCNC: 108 MMOL/L
CO2 SERPL-SCNC: 21 MMOL/L
CREAT SERPL-MCNC: 9.5 MG/DL
DIFFERENTIAL METHOD: ABNORMAL
EOSINOPHIL # BLD AUTO: 0.1 K/UL
EOSINOPHIL NFR BLD: 2.4 %
ERYTHROCYTE [DISTWIDTH] IN BLOOD BY AUTOMATED COUNT: 17.4 %
EST. GFR  (AFRICAN AMERICAN): 6.4 ML/MIN/1.73 M^2
EST. GFR  (NON AFRICAN AMERICAN): 5.5 ML/MIN/1.73 M^2
GLUCOSE SERPL-MCNC: 101 MG/DL
HCT VFR BLD AUTO: 25.2 %
HGB BLD-MCNC: 7.7 G/DL
IMM GRANULOCYTES # BLD AUTO: 0.02 K/UL
IMM GRANULOCYTES NFR BLD AUTO: 0.3 %
INR PPP: 1.1
LYMPHOCYTES # BLD AUTO: 1.3 K/UL
LYMPHOCYTES NFR BLD: 23.1 %
MAGNESIUM SERPL-MCNC: 2.2 MG/DL
MCH RBC QN AUTO: 33.8 PG
MCHC RBC AUTO-ENTMCNC: 30.6 G/DL
MCV RBC AUTO: 111 FL
MONOCYTES # BLD AUTO: 0.6 K/UL
MONOCYTES NFR BLD: 10.3 %
NEUTROPHILS # BLD AUTO: 3.7 K/UL
NEUTROPHILS NFR BLD: 63.7 %
NRBC BLD-RTO: 0 /100 WBC
PHOSPHATE SERPL-MCNC: 9.1 MG/DL
PLATELET # BLD AUTO: 164 K/UL
PMV BLD AUTO: 10.8 FL
POCT GLUCOSE: 106 MG/DL (ref 70–110)
POCT GLUCOSE: 107 MG/DL (ref 70–110)
POCT GLUCOSE: 77 MG/DL (ref 70–110)
POTASSIUM SERPL-SCNC: 3.7 MMOL/L
PROT SERPL-MCNC: 6.2 G/DL
PROTHROMBIN TIME: 11.6 SEC
RBC # BLD AUTO: 2.28 M/UL
SODIUM SERPL-SCNC: 141 MMOL/L
WBC # BLD AUTO: 5.81 K/UL

## 2019-03-19 PROCEDURE — 85610 PROTHROMBIN TIME: CPT

## 2019-03-19 PROCEDURE — 83735 ASSAY OF MAGNESIUM: CPT

## 2019-03-19 PROCEDURE — 99223 PR INITIAL HOSPITAL CARE,LEVL III: ICD-10-PCS | Mod: ,,, | Performed by: SURGERY

## 2019-03-19 PROCEDURE — 25000003 PHARM REV CODE 250: Performed by: STUDENT IN AN ORGANIZED HEALTH CARE EDUCATION/TRAINING PROGRAM

## 2019-03-19 PROCEDURE — 84100 ASSAY OF PHOSPHORUS: CPT

## 2019-03-19 PROCEDURE — 90935 HEMODIALYSIS ONE EVALUATION: CPT

## 2019-03-19 PROCEDURE — 94761 N-INVAS EAR/PLS OXIMETRY MLT: CPT

## 2019-03-19 PROCEDURE — 99024 POSTOP FOLLOW-UP VISIT: CPT | Mod: POP,,, | Performed by: PHYSICIAN ASSISTANT

## 2019-03-19 PROCEDURE — 80100016 HC MAINTENANCE HEMODIALYSIS

## 2019-03-19 PROCEDURE — 99024 PR POST-OP FOLLOW-UP VISIT: ICD-10-PCS | Mod: POP,,, | Performed by: PHYSICIAN ASSISTANT

## 2019-03-19 PROCEDURE — 97110 THERAPEUTIC EXERCISES: CPT

## 2019-03-19 PROCEDURE — 25500020 PHARM REV CODE 255: Performed by: NEUROLOGICAL SURGERY

## 2019-03-19 PROCEDURE — 90935 HEMODIALYSIS ONE EVALUATION: CPT | Mod: ,,, | Performed by: NURSE PRACTITIONER

## 2019-03-19 PROCEDURE — 90935 PR HEMODIALYSIS, ONE EVALUATION: ICD-10-PCS | Mod: ,,, | Performed by: NURSE PRACTITIONER

## 2019-03-19 PROCEDURE — 99223 1ST HOSP IP/OBS HIGH 75: CPT | Mod: ,,, | Performed by: SURGERY

## 2019-03-19 PROCEDURE — 80053 COMPREHEN METABOLIC PANEL: CPT

## 2019-03-19 PROCEDURE — 25000003 PHARM REV CODE 250: Performed by: PHYSICIAN ASSISTANT

## 2019-03-19 PROCEDURE — 63600175 PHARM REV CODE 636 W HCPCS: Performed by: PHYSICIAN ASSISTANT

## 2019-03-19 PROCEDURE — 25500020 PHARM REV CODE 255: Performed by: STUDENT IN AN ORGANIZED HEALTH CARE EDUCATION/TRAINING PROGRAM

## 2019-03-19 PROCEDURE — 25000003 PHARM REV CODE 250: Performed by: PSYCHIATRY & NEUROLOGY

## 2019-03-19 PROCEDURE — 85025 COMPLETE CBC W/AUTO DIFF WBC: CPT

## 2019-03-19 PROCEDURE — 36415 COLL VENOUS BLD VENIPUNCTURE: CPT

## 2019-03-19 PROCEDURE — 97530 THERAPEUTIC ACTIVITIES: CPT

## 2019-03-19 PROCEDURE — 20600001 HC STEP DOWN PRIVATE ROOM

## 2019-03-19 PROCEDURE — 27000221 HC OXYGEN, UP TO 24 HOURS

## 2019-03-19 PROCEDURE — 25000003 PHARM REV CODE 250: Performed by: NURSE PRACTITIONER

## 2019-03-19 RX ORDER — SEVELAMER CARBONATE 800 MG/1
1600 TABLET, FILM COATED ORAL
Status: DISCONTINUED | OUTPATIENT
Start: 2019-03-19 | End: 2019-03-20 | Stop reason: HOSPADM

## 2019-03-19 RX ADMIN — FLUOXETINE HYDROCHLORIDE 20 MG: 20 CAPSULE ORAL at 02:03

## 2019-03-19 RX ADMIN — PANTOPRAZOLE SODIUM 40 MG: 40 GRANULE, DELAYED RELEASE ORAL at 08:03

## 2019-03-19 RX ADMIN — SEVELAMER CARBONATE 1600 MG: 800 TABLET, FILM COATED ORAL at 02:03

## 2019-03-19 RX ADMIN — MIDODRINE HYDROCHLORIDE 20 MG: 5 TABLET ORAL at 11:03

## 2019-03-19 RX ADMIN — SEVELAMER CARBONATE 1600 MG: 800 TABLET, FILM COATED ORAL at 05:03

## 2019-03-19 RX ADMIN — SIMETHICONE CHEW TAB 80 MG 80 MG: 80 TABLET ORAL at 11:03

## 2019-03-19 RX ADMIN — HEPARIN SODIUM 5000 UNITS: 5000 INJECTION, SOLUTION INTRAVENOUS; SUBCUTANEOUS at 02:03

## 2019-03-19 RX ADMIN — ACETAMINOPHEN 500 MG: 500 TABLET, FILM COATED ORAL at 11:03

## 2019-03-19 RX ADMIN — SODIUM CHLORIDE 300 ML: 0.9 INJECTION, SOLUTION INTRAVENOUS at 09:03

## 2019-03-19 RX ADMIN — PRAVASTATIN SODIUM 40 MG: 40 TABLET ORAL at 02:03

## 2019-03-19 RX ADMIN — MIDODRINE HYDROCHLORIDE 20 MG: 5 TABLET ORAL at 05:03

## 2019-03-19 RX ADMIN — DICLOFENAC: 10 GEL TOPICAL at 02:03

## 2019-03-19 RX ADMIN — SIMETHICONE CHEW TAB 80 MG 80 MG: 80 TABLET ORAL at 03:03

## 2019-03-19 RX ADMIN — HEPARIN SODIUM 5000 UNITS: 5000 INJECTION, SOLUTION INTRAVENOUS; SUBCUTANEOUS at 09:03

## 2019-03-19 RX ADMIN — IOHEXOL 15 ML: 350 INJECTION, SOLUTION INTRAVENOUS at 05:03

## 2019-03-19 RX ADMIN — ACETAMINOPHEN 500 MG: 500 TABLET, FILM COATED ORAL at 02:03

## 2019-03-19 RX ADMIN — GABAPENTIN 300 MG: 300 CAPSULE ORAL at 08:03

## 2019-03-19 RX ADMIN — MIDODRINE HYDROCHLORIDE 20 MG: 5 TABLET ORAL at 12:03

## 2019-03-19 RX ADMIN — ACETAMINOPHEN 500 MG: 500 TABLET, FILM COATED ORAL at 12:03

## 2019-03-19 RX ADMIN — PANTOPRAZOLE SODIUM 40 MG: 40 GRANULE, DELAYED RELEASE ORAL at 02:03

## 2019-03-19 RX ADMIN — HEPARIN SODIUM 5000 UNITS: 5000 INJECTION, SOLUTION INTRAVENOUS; SUBCUTANEOUS at 05:03

## 2019-03-19 RX ADMIN — SIMETHICONE CHEW TAB 80 MG 80 MG: 80 TABLET ORAL at 12:03

## 2019-03-19 RX ADMIN — IOHEXOL 100 ML: 350 INJECTION, SOLUTION INTRAVENOUS at 07:03

## 2019-03-19 RX ADMIN — IOHEXOL 15 ML: 350 INJECTION, SOLUTION INTRAVENOUS at 06:03

## 2019-03-19 NOTE — PLAN OF CARE
"Problem: Adult Inpatient Plan of Care  Goal: Plan of Care Review  Outcome: Ongoing (interventions implemented as appropriate)  Patient went to have CT of abdoment and then dialysis this morning, 2.3 liters removed. Patient is stating his "hands are too weak to feed himself". Patient continues to have several soft bowel movements a shift.      "

## 2019-03-19 NOTE — ASSESSMENT & PLAN NOTE
58 M with progressive cervical myelopathy and stenosis from C3-6 with myelomalacia. S/p code for hypovolemia during HD. Stable this morning and labs WNL. He stepped up to ICU for further care and work up, now s/p C3-6 posterior decompression 3/4/19.      - Pt neurologically stable  - No acute neurosurgical intervention necessary  - Neuro checks q4h  - Continue dialysis T/Th. Last dialysis on 3/19. Management per Nephrology.   - No C collar needed  - Pain control: Continue Oxycodone IR 5mg q4h prn  - Daily PT/OT. Rec rehab placement.   - H/H stable 7.7/25.2, will ctm.  - Plts 164k, goal >50k  - KUB shows partial SBO v ileus. CT w/wo contrast shows no SBO or ileus.  - ID recommends no further testing for c diff. (+ c diff antigen, - c diff toxins)  - Bowel regimen has been removed 2/2 diarrhea. Diarrhea resolved.  - Gi rec outpatient EGD/Colon for age related screening and concern for thickened gastric folds on imaging given recent c-spine surgery  - Awaiting final endocrine rec's prior to d/c  - Attempted to contact Natalia, patient's sister in law, per patient's request. Unable to reach her in the patient's room and attempted to call afterwards with no answer    DISPO: Awaiting final endocrine rec's prior to discharge. Likely discharge tomorrow

## 2019-03-19 NOTE — CARE UPDATE
BG goal 140-180    Remains in [facility], NAEON  BG below goal overnight without any insulin administration  Continue low dose correction scale with BG monitoring AC/HS    Endocrine to continue to follow    Discharge planning: TBD based on insulin needs closer to discharge    ** Please call Endocrine for any BG related issues **

## 2019-03-19 NOTE — SUBJECTIVE & OBJECTIVE
Interval History: CT this am shows no SBO or ileus. Patient had HD this am. Patient continues to have numbness in tips of fingers and neck pain. Patient denies BM since this am. Denies emesis, new weakness/paresthesias.     Medications:  Continuous Infusions:  Scheduled Meds:   sodium chloride 0.9%   Intravenous Once    sodium chloride 0.9%   Intravenous Once    diclofenac sodium   Topical (Top) Daily    epoetin shefali (PROCRIT) injection  7,000 Units Intravenous Every Mon, Wed, Fri    FLUoxetine  20 mg Oral Daily    gabapentin  300 mg Oral QHS    heparin (porcine)  5,000 Units Subcutaneous Q8H    midodrine  20 mg Oral Q6H    pantoprazole  40 mg Oral BID    pravastatin  40 mg Oral Daily    sevelamer carbonate  1,600 mg Oral TID WM     PRN Meds:sodium chloride 0.9%, sodium chloride 0.9%, acetaminophen, dextrose 50%, dextrose 50%, glucagon (human recombinant), glucose, glucose, insulin aspart U-100, miconazole NITRATE 2 %, ondansetron, oxyCODONE, simethicone     Review of Systems  Objective:     Weight: (!) 141.9 kg (312 lb 12.8 oz)  Body mass index is 47.56 kg/m².  Vital Signs (Most Recent):  Temp: 98.4 °F (36.9 °C) (03/19/19 1351)  Pulse: 62 (03/19/19 1351)  Resp: 16 (03/19/19 1351)  BP: 136/63 (03/19/19 1351)  SpO2: 100 % (03/19/19 1351) Vital Signs (24h Range):  Temp:  [97.6 °F (36.4 °C)-98.4 °F (36.9 °C)] 98.4 °F (36.9 °C)  Pulse:  [53-90] 62  Resp:  [10-18] 16  SpO2:  [97 %-100 %] 100 %  BP: ()/(43-68) 136/63                           Hemodialysis AV Fistula Left forearm (Active)   Needle Size 15ga 3/19/2019  9:05 AM   Site Assessment Clean;Dry;Intact 3/19/2019  9:05 AM   Patency Present;Thrill;Bruit 3/19/2019  9:05 AM   Status Accessed 3/19/2019  9:05 AM   Flows Good 3/19/2019  9:05 AM   Dressing Intervention Dressing reinforced 3/18/2019  7:54 AM   Dressing Status Clean;Dry;Intact 3/18/2019  7:28 PM   Site Condition No complications 3/19/2019  9:05 AM   Dressing Gauze 3/19/2019  9:05 AM        Neurosurgery Physical Exam  General: well developed, well nourished, no distress. Obese.   Head: normocephalic, atraumatic  Neurologic: Alert and oriented. Thought content appropriate.  GCS: Motor: 6/Verbal: 5/Eyes: 4 GCS Total: 15  Mental Status: Awake, Alert, Oriented x 4  Language: No aphasia  Speech: No dysarthria  Cranial nerves: face symmetric, tongue midline, CN II-XII grossly intact.   Eyes: pupils equal, round, reactive to light, EOMI.   Pulmonary: normal respirations, no signs of respiratory distress  Abdomen: soft, distended, non-tender to palpation  Skin: Skin is warm, dry and intact.  Sensory: intact to light touch throughout     Motor Strength:Moves all extremities spontaneously with good tone. Strength exam pain limited. No abnormal movements seen.      Strength   Deltoids Triceps Biceps Wrist Extension Wrist Flexion Hand    Upper: R 5/5 5/5 5/5 5/5 5/5 5/5     L 5/5 5/5 5/5 5/5 5/5 5/5       Iliopsoas Quadriceps Knee  Flexion Tibialis  anterior Gastro- cnemius EHL   Lower: R 5/5 5/5 5/5 5/5 5/5 5/5     L 4/5 4+/5 4+/5 5/5 5/5 5/5      Sanford's: negative  Babinski's: Negative.  Clonus: Negative.  Incision c/d/i with no surrounding erythema, edema, or drainage. skin well approximated. Well healed            Significant Labs:  Recent Labs   Lab 03/18/19 0427 03/19/19  0406    101    141   K 3.7 3.7    108   CO2 24 21*   BUN 40* 55*   CREATININE 7.9* 9.5*   CALCIUM 9.4 9.1   MG 2.3 2.2     Recent Labs   Lab 03/18/19 0427 03/19/19  0406   WBC 8.10 5.81   HGB 8.0* 7.7*   HCT 26.7* 25.2*    164     Recent Labs   Lab 03/18/19 0427 03/19/19  0406   INR 1.0 1.1     Microbiology Results (last 7 days)     Procedure Component Value Units Date/Time    C Diff Toxin by PCR [[id number]] Collected:  03/15/19 1930    Order Status:  Completed Updated:  03/16/19 0805     C. diff PCR Negative    Clostridium difficile EIA [[id number]]  (Abnormal) Collected:  03/15/19 1930    Order  Status:  Completed Specimen:  Stool Updated:  03/16/19 0701     C. diff Antigen Positive     C difficile Toxins A+B, EIA Negative     Comment: Testing not recommended for children <24 months old.           CMP:   Recent Labs   Lab 03/18/19  0427 03/19/19  0406    101   CALCIUM 9.4 9.1   ALBUMIN 3.1* 3.0*   PROT 6.3 6.2    141   K 3.7 3.7   CO2 24 21*    108   BUN 40* 55*   CREATININE 7.9* 9.5*   ALKPHOS 165* 202*   ALT 14 15   AST 25 29   BILITOT 0.8 0.8       Significant Diagnostics:  CT: Ct Abdomen Pelvis With Contrast    Result Date: 3/19/2019  There is scattered peritoneal fluid though no loculated collections seen.  Liver is enlarged measuring 24 cm. Majority of the colon is decompressed making evaluation of the wall difficult.  No pericolonic fat stranding.  Some feces in the rectosigmoid region noted. Small left pleural effusion. Probable left adrenal myelolipoma and atrophic kidneys. Significant vascular and mesenteric calcifications. Mild distention of the small bowel though no focal dilatation. Electronically signed by: Ruddy Rojo MD Date:    03/19/2019 Time:    08:28    I independently reviewed the imaging.

## 2019-03-19 NOTE — PLAN OF CARE
Problem: Adult Inpatient Plan of Care  Goal: Plan of Care Review  Outcome: Ongoing (interventions implemented as appropriate)  VSS. A/Ox4.  PRN pain med given x 1.  Patient had 4 BMs overnight, he was incontinent with each one.  Dialysis scheduled for the AM.  No acute events overnight. Safety maintained.  All questions answered. Patient updated on plan of care.  Will continue to monitor.

## 2019-03-19 NOTE — PROGRESS NOTES
[facility]  Neurosurgery  Progress Note    Subjective:     History of Present Illness: 57 yo male with a PMH of ESRD with dialysis MWF (last on weds), DM, presenting as transfer from [facility] as admission to neurosurgery for evaluation after progressive course of unsteady gait as well as bilateral upper extremity weakness for the last several months.  Mr. Farmer has had wobbly gait since this past September, at which point he recalls falling and has been at 80-90% of his normal function.  He has been able to walk without assist, however not for very long distances.  He states that since last Sunday he has felt progression of his symptoms and now feels it would be difficult to stand up to ambulate at all.  He also endorses a pulled muscle of his LLE that is limiting his mobility.  His UE symptoms have been present for about 3 months including not writing as well as he is used to.  HE is able to write numbers, but not always his name.  He has been having difficulty using a fork to eat over the past month.  No sensory disturbance noted as well as no neck or back pain and no pain from the neck radiating into the hands.  He has intermittently been taking ASA 81 mg qdaily. He denies recent falls or trauma to the neck.  No other blood thinning medication.  MRI at OSH on 2/27 revealed significant cervical stenosis.        Post-Op Info:  Procedure(s) (LRB):  SPINE, CERVICAL, POSTERIOR APPROACH  LAMINECTOMY C3-C6; C6-C7; WITH MEDIAL DISCECTOMY (N/A)   16 Days Post-Op     Interval History: CT this am shows no SBO or ileus. Patient had HD this am. Patient continues to have numbness in tips of fingers and neck pain. Patient denies BM since this am. Denies emesis, new weakness/paresthesias.     Medications:  Continuous Infusions:  Scheduled Meds:   sodium chloride 0.9%   Intravenous Once    sodium chloride 0.9%   Intravenous Once    diclofenac sodium   Topical (Top) Daily    epoetin shefali (PROCRIT)  injection  7,000 Units Intravenous Every Mon, Wed, Fri    FLUoxetine  20 mg Oral Daily    gabapentin  300 mg Oral QHS    heparin (porcine)  5,000 Units Subcutaneous Q8H    midodrine  20 mg Oral Q6H    pantoprazole  40 mg Oral BID    pravastatin  40 mg Oral Daily    sevelamer carbonate  1,600 mg Oral TID WM     PRN Meds:sodium chloride 0.9%, sodium chloride 0.9%, acetaminophen, dextrose 50%, dextrose 50%, glucagon (human recombinant), glucose, glucose, insulin aspart U-100, miconazole NITRATE 2 %, ondansetron, oxyCODONE, simethicone     Review of Systems  Objective:     Weight: (!) 141.9 kg (312 lb 12.8 oz)  Body mass index is 47.56 kg/m².  Vital Signs (Most Recent):  Temp: 98.4 °F (36.9 °C) (03/19/19 1351)  Pulse: 62 (03/19/19 1351)  Resp: 16 (03/19/19 1351)  BP: 136/63 (03/19/19 1351)  SpO2: 100 % (03/19/19 1351) Vital Signs (24h Range):  Temp:  [97.6 °F (36.4 °C)-98.4 °F (36.9 °C)] 98.4 °F (36.9 °C)  Pulse:  [53-90] 62  Resp:  [10-18] 16  SpO2:  [97 %-100 %] 100 %  BP: ()/(43-68) 136/63                           Hemodialysis AV Fistula Left forearm (Active)   Needle Size 15ga 3/19/2019  9:05 AM   Site Assessment Clean;Dry;Intact 3/19/2019  9:05 AM   Patency Present;Thrill;Bruit 3/19/2019  9:05 AM   Status Accessed 3/19/2019  9:05 AM   Flows Good 3/19/2019  9:05 AM   Dressing Intervention Dressing reinforced 3/18/2019  7:54 AM   Dressing Status Clean;Dry;Intact 3/18/2019  7:28 PM   Site Condition No complications 3/19/2019  9:05 AM   Dressing Gauze 3/19/2019  9:05 AM       Neurosurgery Physical Exam  General: well developed, well nourished, no distress. Obese.   Head: normocephalic, atraumatic  Neurologic: Alert and oriented. Thought content appropriate.  GCS: Motor: 6/Verbal: 5/Eyes: 4 GCS Total: 15  Mental Status: Awake, Alert, Oriented x 4  Language: No aphasia  Speech: No dysarthria  Cranial nerves: face symmetric, tongue midline, CN II-XII grossly intact.   Eyes: pupils equal, round, reactive to  light, EOMI.   Pulmonary: normal respirations, no signs of respiratory distress  Abdomen: soft, distended, non-tender to palpation  Skin: Skin is warm, dry and intact.  Sensory: intact to light touch throughout     Motor Strength:Moves all extremities spontaneously with good tone. Strength exam pain limited. No abnormal movements seen.      Strength   Deltoids Triceps Biceps Wrist Extension Wrist Flexion Hand    Upper: R 5/5 5/5 5/5 5/5 5/5 5/5     L 5/5 5/5 5/5 5/5 5/5 5/5       Iliopsoas Quadriceps Knee  Flexion Tibialis  anterior Gastro- cnemius EHL   Lower: R 5/5 5/5 5/5 5/5 5/5 5/5     L 4/5 4+/5 4+/5 5/5 5/5 5/5      Sanford's: negative  Babinski's: Negative.  Clonus: Negative.  Incision c/d/i with no surrounding erythema, edema, or drainage. skin well approximated. Well healed            Significant Labs:  Recent Labs   Lab 03/18/19 0427 03/19/19  0406    101    141   K 3.7 3.7    108   CO2 24 21*   BUN 40* 55*   CREATININE 7.9* 9.5*   CALCIUM 9.4 9.1   MG 2.3 2.2     Recent Labs   Lab 03/18/19 0427 03/19/19  0406   WBC 8.10 5.81   HGB 8.0* 7.7*   HCT 26.7* 25.2*    164     Recent Labs   Lab 03/18/19 0427 03/19/19  0406   INR 1.0 1.1     Microbiology Results (last 7 days)     Procedure Component Value Units Date/Time    C Diff Toxin by PCR [[id number]] Collected:  03/15/19 1930    Order Status:  Completed Updated:  03/16/19 0805     C. diff PCR Negative    Clostridium difficile EIA [[id number]]  (Abnormal) Collected:  03/15/19 1930    Order Status:  Completed Specimen:  Stool Updated:  03/16/19 0701     C. diff Antigen Positive     C difficile Toxins A+B, EIA Negative     Comment: Testing not recommended for children <24 months old.           CMP:   Recent Labs   Lab 03/18/19 0427 03/19/19  0406    101   CALCIUM 9.4 9.1   ALBUMIN 3.1* 3.0*   PROT 6.3 6.2    141   K 3.7 3.7   CO2 24 21*    108   BUN 40* 55*   CREATININE 7.9* 9.5*   ALKPHOS 165* 202*   ALT 14  15   AST 25 29   BILITOT 0.8 0.8       Significant Diagnostics:  CT: Ct Abdomen Pelvis With Contrast    Result Date: 3/19/2019  There is scattered peritoneal fluid though no loculated collections seen.  Liver is enlarged measuring 24 cm. Majority of the colon is decompressed making evaluation of the wall difficult.  No pericolonic fat stranding.  Some feces in the rectosigmoid region noted. Small left pleural effusion. Probable left adrenal myelolipoma and atrophic kidneys. Significant vascular and mesenteric calcifications. Mild distention of the small bowel though no focal dilatation. Electronically signed by: Ruddy Rojo MD Date:    03/19/2019 Time:    08:28    I independently reviewed the imaging.       Assessment/Plan:     * S/P laminectomy     58 M with progressive cervical myelopathy and stenosis from C3-6 with myelomalacia. S/p code for hypovolemia during HD. Stable this morning and labs WNL. He stepped up to ICU for further care and work up, now s/p C3-6 posterior decompression 3/4/19.      - Pt neurologically stable  - No acute neurosurgical intervention necessary  - Neuro checks q4h  - Continue dialysis T/Th. Last dialysis on 3/19. Management per Nephrology.   - No C collar needed  - Pain control: Continue Oxycodone IR 5mg q4h prn  - Daily PT/OT. Rec rehab placement.   - H/H stable 7.7/25.2, will ctm.  - Plts 164k, goal >50k  - KUB shows partial SBO v ileus. CT w/wo contrast shows no SBO or ileus.  - ID recommends no further testing for c diff. (+ c diff antigen, - c diff toxins)  - Bowel regimen has been removed 2/2 diarrhea. Diarrhea resolved.  - Gi rec outpatient EGD/Colon for age related screening and concern for thickened gastric folds on imaging given recent c-spine surgery  - Awaiting final endocrine rec's prior to d/c  - Attempted to contact Natalia, patient's sister in law, per patient's request. Unable to reach her in the patient's room and attempted to call afterwards with no answer    DISPO:  Awaiting final endocrine rec's prior to discharge. Likely discharge tomorrow                  Homa White PA-C  Neurosurgery  [facility]

## 2019-03-19 NOTE — PROGRESS NOTES
Progress Note    CT scan reviewed - No ileus or SBO.     Will sign off, please call if any questions.     Pauline Mccarty MD  General Surgery PGY V  Beeper: [phone]

## 2019-03-19 NOTE — PROGRESS NOTES
[facility] H[facility] NOTE     Patient currently on hemodialysis for removal of uremic toxins and volume.     Patient seen and evaluated on hemodialysis, tolerating treatment, see HD flowsheet for vitals and assessments.    No Hypotension, chest pain, shortness of breath, cramping, nausea or vomiting.       Ultrafiltration goal is 2 L as tolerated, keep MAP >65     Labs have been reviewed and the dialysate bath has been adjusted.     Assessment/Plan:  Seen on dialysis this morning, tolerating session with current UFR, no complications.    Will continue dialysis treatments while in-patient    Anemia of ESRD  Continue JUANITO with dialysis treatments  Hgb 7.7    BMM  Renal diet  Phos 9.1, Sevelamer 1600 mg started w/ meals   Daily renal function panel       Delia Gabriel, EILEEN, APRN, FNP-C  Nephrology Department  Pager:  [phone]

## 2019-03-19 NOTE — PLAN OF CARE
CM spoke with patient in room and his sister over the phone and advised that the patient will possibly discharge tomorrow to [facility], pending medical stability.  CM advised that [facility] was not able to meet the needs of the patient.

## 2019-03-19 NOTE — PROGRESS NOTES
Pt arrived to [facility] via bed. M,W, F maintenance pt. Thrill/bruit noted, accessed LT AVF X2 15G needles without difficulty, duration 3.5hrs, Qb 400ml/mn, Qd 800ml/min, planned UFG 2.0L, order reviewed.

## 2019-03-19 NOTE — PT/OT/SLP PROGRESS
"Physical Therapy Treatment    Patient Name:  Angel Farmer Jr.   MRN:  [id number]  Admitting Diagnosis: S/P laminectomy  Recent Surgery: Procedure(s) (LRB):  SPINE, CERVICAL, POSTERIOR APPROACH  LAMINECTOMY C3-C6; C6-C7; WITH MEDIAL DISCECTOMY (N/A) 16 Days Post-Op    Recommendations:     Discharge Recommendations:  rehabilitation facility   Discharge Equipment Recommendations: (TBD)   Barriers to discharge: Inaccessible home  Pt requiring increased assistance at current time.     Plan:     During this hospitalization, patient to be seen 4 x/week to address the above listed problems via gait training, therapeutic activities, therapeutic exercises, neuromuscular re-education  · Plan of Care Expires:  04/08/19   Plan of Care Reviewed with: patient    This Plan of care has been discussed with the patient who was involved in its development and understands and is in agreement with the identified goals and treatment plan    Subjective     Communicated with RN (Thomas) prior to session.     Patient comments: "I can't get up today, I went to dialysis and I'm tired" Pt agreeable for ex's in bed  Pain/Comfort:  · Pain Rating 1: 0/10  · Location - Side 1: Left  · Location - Orientation 1: generalized  · Location 1: leg(quads)  · Pain Addressed 1: Pre-medicate for activity, Distraction, Cessation of Activity  · Pain Rating Post-Intervention 1: (No rating provided)    Objective:     Patient found with: oxygen, pressure relief boots(waffle overlay)    Patient found sup in bed upon PT entry to room, agreeable to treatment.  Friend (Escobar) present in the room, however, he leaves at beginning of session.    General Precautions: Standard, fall   Orthopedic Precautions:spinal precautions   Braces: N/A       BED MOBILITY (vc's for hand placement sequencing of task):        Sup > sit at the EOB:  Not performed 2* pt refusal.       Pt was dependent of 2 helpers to scoot to HOB via drawsheet x2 scoot(s).           THERAPEUTIC " ACTIVITIES/EXERCISES  Pt performs B LE AAROM ther ex's while sup in bed x12 reps with vc's    EDUCATION  Patient provided with daily orientation and goals of this PT session. They were educated to call for assistance and to transfer with hospital staff only.  Also, pt was educated on the effects of prolonged immobility and the importance of performing OOB activity and exercises to promote healing and reduce recovery time    Whiteboard updated with correct mobility information. RN/PCT notified.  Pt requires max A of 2 to sit at the EOB.    Patient left in semi-L side lying, with  all lines intact, call button in reach, bed alarm on and RN notified    AM-PAC 6 CLICK MOBILITY  Turning over in bed (including adjusting bedclothes, sheets and blankets)?: 2  Sitting down on and standing up from a chair with arms (e.g., wheelchair, bedside commode, etc.): 2  Moving from lying on back to sitting on the side of the bed?: 2  Moving to and from a bed to a chair (including a wheelchair)?: 2  Need to walk in hospital room?: 2  Climbing 3-5 steps with a railing?: 1  Basic Mobility Total Score: 11     Assessment:     Angel Farmer Jr. is a 58 y.o. male admitted with a medical diagnosis of S/P laminectomy.  He presents with the following impairments/functional limitations:  weakness, impaired endurance, impaired sensation, impaired self care skills, impaired functional mobilty, gait instability, impaired balance, decreased coordination, decreased upper extremity function, decreased lower extremity function, decreased safety awareness, pain, decreased ROM, impaired coordination, impaired fine motor, impaired skin, edema, impaired cardiopulmonary response to activity. requiring significant assistance and verbal cues for bed mob, scooting to EOB/HOB, sitting at the EOB, sit < > stand transfers, standing and gait to prevent falls due to weakness, fatigue, decreased balance, fear of falling, limited ROM.   In light of pt's current  functional level and deficits, it is anticipated that pt will need to participate in an intense rehab program consisting of PT and OT in order to achieve full rehab potential to return to previous level of function and roles.  Pt remains motivated to participate in PT session and will cont to benefit from skilled PT intervention.    Rehab Prognosis:  Good; patient would benefit from acute skilled PT services to address these deficits and reach maximum level of function.      GOALS:   Multidisciplinary Problems     Physical Therapy Goals        Problem: Physical Therapy Goal    Goal Priority Disciplines Outcome Goal Variances Interventions   Physical Therapy Goal     PT, PT/OT Ongoing (interventions implemented as appropriate)     Description:  Goals to be met by: 3/28/2019     Patient will increase functional independence with mobility by performin. Supine to sit with Moderate Assistance  2. Rolling to Left and Right with Moderate Assistance.  3. Sit to stand transfer with Maximum Assistance  4. Gait  x 10 feet with Moderate Assistance using Rolling Walker.   5. Stand for 5 minutes with Moderate Assistance using Rolling Walker  6. Lower extremity exercise program x20 reps per handout, with assistance as needed                       Time Tracking:     PT Received On: 19  PT Start Time: 1447     PT Stop Time: 1512  PT Total Time (min): 25 min     Billable Minutes: Therapeutic Activity 10 and Therapeutic Exercise 15    Treatment Type: Treatment  PT/PTA: PTA     PTA Visit Number: 3       Tanesha Ellis PTA.  Pager [phone]    3/19/2019    .

## 2019-03-19 NOTE — PT/OT/SLP PROGRESS
Occupational Therapy      Patient Name:  Angel Farmer Jr.   MRN:  [id number]    Patient not seen today secondary to in dialysis for most of the day. Will follow-up per schedule.    JUSTIN Lr  3/19/2019

## 2019-03-20 VITALS
HEIGHT: 68 IN | TEMPERATURE: 98 F | DIASTOLIC BLOOD PRESSURE: 71 MMHG | OXYGEN SATURATION: 93 % | RESPIRATION RATE: 18 BRPM | SYSTOLIC BLOOD PRESSURE: 166 MMHG | BODY MASS INDEX: 45.75 KG/M2 | HEART RATE: 82 BPM | WEIGHT: 301.88 LBS

## 2019-03-20 LAB
ALBUMIN SERPL BCP-MCNC: 2.9 G/DL
ALP SERPL-CCNC: 175 U/L
ALT SERPL W/O P-5'-P-CCNC: 15 U/L
ANION GAP SERPL CALC-SCNC: 11 MMOL/L
AST SERPL-CCNC: 22 U/L
BASOPHILS # BLD AUTO: 0.02 K/UL
BASOPHILS NFR BLD: 0.4 %
BILIRUB SERPL-MCNC: 0.7 MG/DL
BUN SERPL-MCNC: 35 MG/DL
CALCIUM SERPL-MCNC: 9 MG/DL
CHLORIDE SERPL-SCNC: 105 MMOL/L
CO2 SERPL-SCNC: 22 MMOL/L
CREAT SERPL-MCNC: 7.1 MG/DL
DIFFERENTIAL METHOD: ABNORMAL
EOSINOPHIL # BLD AUTO: 0.1 K/UL
EOSINOPHIL NFR BLD: 2.2 %
ERYTHROCYTE [DISTWIDTH] IN BLOOD BY AUTOMATED COUNT: 17.7 %
EST. GFR  (AFRICAN AMERICAN): 9.1 ML/MIN/1.73 M^2
EST. GFR  (NON AFRICAN AMERICAN): 7.8 ML/MIN/1.73 M^2
GLUCOSE SERPL-MCNC: 114 MG/DL
HCT VFR BLD AUTO: 26.3 %
HGB BLD-MCNC: 7.8 G/DL
IMM GRANULOCYTES # BLD AUTO: 0.02 K/UL
IMM GRANULOCYTES NFR BLD AUTO: 0.4 %
INR PPP: 1
LYMPHOCYTES # BLD AUTO: 1.1 K/UL
LYMPHOCYTES NFR BLD: 20.9 %
MAGNESIUM SERPL-MCNC: 2 MG/DL
MCH RBC QN AUTO: 33.1 PG
MCHC RBC AUTO-ENTMCNC: 29.7 G/DL
MCV RBC AUTO: 111 FL
MONOCYTES # BLD AUTO: 0.6 K/UL
MONOCYTES NFR BLD: 11.1 %
NEUTROPHILS # BLD AUTO: 3.3 K/UL
NEUTROPHILS NFR BLD: 65 %
NRBC BLD-RTO: 0 /100 WBC
PHOSPHATE SERPL-MCNC: 6.1 MG/DL
PLATELET # BLD AUTO: 177 K/UL
PMV BLD AUTO: 10.7 FL
POCT GLUCOSE: 115 MG/DL (ref 70–110)
POCT GLUCOSE: 117 MG/DL (ref 70–110)
POCT GLUCOSE: 86 MG/DL (ref 70–110)
POTASSIUM SERPL-SCNC: 3.5 MMOL/L
PROT SERPL-MCNC: 6.1 G/DL
PROTHROMBIN TIME: 10.4 SEC
RBC # BLD AUTO: 2.36 M/UL
SODIUM SERPL-SCNC: 138 MMOL/L
WBC # BLD AUTO: 5.03 K/UL

## 2019-03-20 PROCEDURE — 25000003 PHARM REV CODE 250: Performed by: PSYCHIATRY & NEUROLOGY

## 2019-03-20 PROCEDURE — 83735 ASSAY OF MAGNESIUM: CPT

## 2019-03-20 PROCEDURE — 84100 ASSAY OF PHOSPHORUS: CPT

## 2019-03-20 PROCEDURE — 99231 PR SUBSEQUENT HOSPITAL CARE,LEVL I: ICD-10-PCS | Mod: ,,, | Performed by: NURSE PRACTITIONER

## 2019-03-20 PROCEDURE — 85025 COMPLETE CBC W/AUTO DIFF WBC: CPT

## 2019-03-20 PROCEDURE — 85610 PROTHROMBIN TIME: CPT

## 2019-03-20 PROCEDURE — 63600175 PHARM REV CODE 636 W HCPCS: Performed by: PHYSICIAN ASSISTANT

## 2019-03-20 PROCEDURE — 99231 SBSQ HOSP IP/OBS SF/LOW 25: CPT | Mod: ,,, | Performed by: NURSE PRACTITIONER

## 2019-03-20 PROCEDURE — 25000003 PHARM REV CODE 250: Performed by: STUDENT IN AN ORGANIZED HEALTH CARE EDUCATION/TRAINING PROGRAM

## 2019-03-20 PROCEDURE — 80053 COMPREHEN METABOLIC PANEL: CPT

## 2019-03-20 PROCEDURE — 25000003 PHARM REV CODE 250: Performed by: NURSE PRACTITIONER

## 2019-03-20 RX ORDER — OXYCODONE HYDROCHLORIDE 5 MG/1
5 TABLET ORAL EVERY 6 HOURS PRN
Qty: 40 TABLET | Refills: 0 | Status: ON HOLD | OUTPATIENT
Start: 2019-03-20 | End: 2019-07-18

## 2019-03-20 RX ORDER — SIMETHICONE 80 MG
80 TABLET,CHEWABLE ORAL 3 TIMES DAILY PRN
Refills: 0 | Status: ON HOLD
Start: 2019-03-20 | End: 2019-07-18

## 2019-03-20 RX ORDER — SODIUM CHLORIDE 9 MG/ML
INJECTION, SOLUTION INTRAVENOUS ONCE
Status: DISCONTINUED | OUTPATIENT
Start: 2019-03-21 | End: 2019-03-20 | Stop reason: HOSPADM

## 2019-03-20 RX ORDER — SEVELAMER CARBONATE 800 MG/1
1600 TABLET, FILM COATED ORAL
Qty: 180 TABLET | Refills: 11 | Status: ON HOLD
Start: 2019-03-20 | End: 2019-07-23 | Stop reason: CLARIF

## 2019-03-20 RX ORDER — HEPARIN SODIUM 5000 [USP'U]/ML
5000 INJECTION, SOLUTION INTRAVENOUS; SUBCUTANEOUS EVERY 8 HOURS
Status: ON HOLD
Start: 2019-03-20 | End: 2023-07-11

## 2019-03-20 RX ADMIN — HEPARIN SODIUM 5000 UNITS: 5000 INJECTION, SOLUTION INTRAVENOUS; SUBCUTANEOUS at 05:03

## 2019-03-20 RX ADMIN — DICLOFENAC: 10 GEL TOPICAL at 08:03

## 2019-03-20 RX ADMIN — MIDODRINE HYDROCHLORIDE 20 MG: 5 TABLET ORAL at 11:03

## 2019-03-20 RX ADMIN — SEVELAMER CARBONATE 1600 MG: 800 TABLET, FILM COATED ORAL at 11:03

## 2019-03-20 RX ADMIN — PANTOPRAZOLE SODIUM 40 MG: 40 GRANULE, DELAYED RELEASE ORAL at 08:03

## 2019-03-20 RX ADMIN — PRAVASTATIN SODIUM 40 MG: 40 TABLET ORAL at 08:03

## 2019-03-20 RX ADMIN — ACETAMINOPHEN 500 MG: 500 TABLET, FILM COATED ORAL at 04:03

## 2019-03-20 RX ADMIN — FLUOXETINE HYDROCHLORIDE 20 MG: 20 CAPSULE ORAL at 08:03

## 2019-03-20 RX ADMIN — SEVELAMER CARBONATE 1600 MG: 800 TABLET, FILM COATED ORAL at 04:03

## 2019-03-20 RX ADMIN — ACETAMINOPHEN 500 MG: 500 TABLET, FILM COATED ORAL at 05:03

## 2019-03-20 RX ADMIN — SEVELAMER CARBONATE 1600 MG: 800 TABLET, FILM COATED ORAL at 07:03

## 2019-03-20 RX ADMIN — MIDODRINE HYDROCHLORIDE 20 MG: 5 TABLET ORAL at 05:03

## 2019-03-20 NOTE — PLAN OF CARE
REGINA following for DC needs. REGINA in communication with CM.    Patient accepted at [facility]. SW spoke to patient's sister in law, Natalia ([phone]), who stated that they are agreeable to [facility] and that the patient is also agreeable.     [facility] sent Orders to [facility] via [employer].      03/20/19 1051   Post-Acute Status   Post-Acute Authorization Placement   Post-Acute Placement Status Authorization Obtained     Ofelia Lund LMSW  [facility]  [id number]

## 2019-03-20 NOTE — PLAN OF CARE
Patient will admit to [facility] today.    [facility] arranged stretcher transport via [facility]. Requested  time is 3:15PM.  Requested  time does not guarantee arrival time.      Nurse call report to [phone]. Patient is going to RM# [phone].    SW notified nurse of the above.      03/20/19 1405   Post-Acute Status   Post-Acute Authorization Placement   Post-Acute Placement Status Set-up Complete     Ofelia Lund LMSW  [facility]  [id number]

## 2019-03-20 NOTE — PROGRESS NOTES
"[facility]  Endocrinology  Progress Note    Admit Date: 2019     Reason for Consult: Management of T2DM, Hyperglycemia     Surgical Procedure and Date: Cervical laminectomy C3-C6, C6-C7 w/ medial discectomy 3/3/19    Diabetes diagnosis year:     Lab Results   Component Value Date    HGBA1C 5.0 2019     Home Diabetes Medications: Basaglar 40 units q HS, Victoza 1.2 mg once daily,     How often checking glucose at home? 4x per day   BG readings on regimen: 90-120s  Hypoglycemia on the regimen?  Yes - about once per month  Missed doses on regimen?  No    Diabetes Complications include:     Hypoglycemia , Diabetic nephropathy  , Diabetic chronic kidney disease     , Diabetic retinopathy , Diabetic cataract  and Diabetic peripheral neuropathy     Complicating diabetes co morbidities:   MIKE, CKD and ESRD      HPI:   Patient is a 58 y.o. male with a diagnosis of HTN, HLD, MIKE, ESRD on HD, and DM2, who is s/p cervical laminectomy C3-C6, C6-C7 w/ medial discectomy 3/3/19.  Patient was admitted to the ED on 19 for upper extremity weakness and ataxia following a fall.  Patient's DM managed by PCP, Dr. Shemar Berry in [place].  Patient is prescribed MDI but only takes basal insulin and Victoza.  Positive family history of DM (mother, father, and both maternal grandparents).  Endocrine consulted for DM/BG management.    Interval HPI:   Overnight events: Remains in MTSU, NAEON.  BG below goal without any insulin administration.  Eatin%  Nausea: No  Hypoglycemia and intervention: No  Fever: No  TPN and/or TF: No    BP (!) 101/51 (BP Location: Right arm, Patient Position: Lying)   Pulse (!) 55   Temp 98 °F (36.7 °C) (Oral)   Resp 16   Ht 5' 8" (1.727 m)   Wt (!) 136.9 kg (301 lb 14.4 oz)   SpO2 97%   BMI 45.90 kg/m²      Labs Reviewed and Include    Recent Labs   Lab 19  0406   *   CALCIUM 9.0   ALBUMIN 2.9*   PROT 6.1      K 3.5   CO2 22*      BUN 35* "   CREATININE 7.1*   ALKPHOS 175*   ALT 15   AST 22   BILITOT 0.7     Lab Results   Component Value Date    WBC 5.03 03/20/2019    HGB 7.8 (L) 03/20/2019    HCT 26.3 (L) 03/20/2019     (H) 03/20/2019     03/20/2019     No results for input(s): TSH, FREET4 in the last 168 hours.  Lab Results   Component Value Date    HGBA1C 5.0 02/27/2019       Nutritional status:   Body mass index is 45.9 kg/m².  Lab Results   Component Value Date    ALBUMIN 2.9 (L) 03/20/2019    ALBUMIN 3.0 (L) 03/19/2019    ALBUMIN 3.1 (L) 03/18/2019     No results found for: PREALBUMIN    Estimated Creatinine Clearance: 15.7 mL/min (A) (based on SCr of 7.1 mg/dL (H)).    Accu-Checks  Recent Labs     03/17/19  1648 03/17/19  2106 03/18/19  0741 03/18/19  1131 03/18/19  1640 03/18/19  2112 03/19/19  1241 03/19/19  1638 03/19/19  2050 03/20/19  0727   POCTGLUCOSE 96 126* 105 144* 117* 139* 77 107 106 86       Current Medications and/or Treatments Impacting Glycemic Control  Immunotherapy:    Immunosuppressants     None        Steroids:   Hormones (From admission, onward)    None        Pressors:    Autonomic Drugs (From admission, onward)    Start     Stop Route Frequency Ordered    03/10/19 1200  midodrine tablet 20 mg      -- Oral Every 6 hours 03/10/19 0946        Hyperglycemia/Diabetes Medications:   Antihyperglycemics (From admission, onward)    Start     Stop Route Frequency Ordered    03/06/19 1638  insulin aspart U-100 pen 0-5 Units      -- SubQ Before meals & nightly PRN 03/06/19 1538          ASSESSMENT and PLAN    * S/P laminectomy    S/p cervical laminectomy C3-C6, C6-C7 w/ medial discectomy 3/3/19  Managed per primary team  Avoid hypoglycemia  Optimize BG control for surgical wound healing         Diabetes mellitus, type 2    BG goal 140-180    Low dose correction scale given kidney function  BG monitoring AC/HS    Discharge planning: Patient discharging to rehab facility.  Recommend patient discharge with low dose  correction scale Novolog and BG monitoring AC/HS.  Lengthy discussion with patient at bedside regarding unexpected absent need for insulin.  Patient previously taking Basaglar and Victoza; however, patient tolerating diet with no insulin needs x 3 days.  Instructed patient if BG continues to stay WNL, stop all DM medication and continue checking BG twice daily at home.  Reviewed topics related to DM including: the importance of follow up with PCP, importance of checking BG, hyper/hypoglycemia, how and when to treat hypoglycemia, and when to seek medical attention.  Patient verbalized understanding, answered all questions to patient's satisfaction.  Patient would like to follow up with this PCP for ongoing DM management.     ESRD (end stage renal disease) on dialysis    Caution with insulin stacking  Estimated Creatinine Clearance: 15.7 mL/min (A) (based on SCr of 7.1 mg/dL (H)).         Class 3 severe obesity due to excess calories with serious comorbidity and body mass index (BMI) of 45.0 to 49.9 in adult    may contribute to insulin resistance  Body mass index is 45.9 kg/m².         Anemia due to stage 5 chronic kidney disease    Affects A1C accuracy  Consider checking fructosamine at least 2 weeks post hospital discharge to evaluate BG control     MIKE (obstructive sleep apnea) - very severe latest sleep study says BPAP @ 16/8    May affect BG readings if uncontrolled             Willie Mao NP  Endocrinology  [facility]

## 2019-03-20 NOTE — PLAN OF CARE
Patient to be discharged to [facility].  Care deferred to [facility].  Patient to be transported via ambulance.  Neurosurgery clinic to schedule follow up appointment.       03/20/19 1406   Final Note   Assessment Type Final Discharge Note   Anticipated Discharge Disposition Rehab   Hospital Follow Up  Appt(s) scheduled? (Neurosurgery clinic to schedule follow up appointment.)   Discharge plans and expectations educations in teach back method with documentation complete? Yes        03/20/19 1406   Final Note   Assessment Type Final Discharge Note   Anticipated Discharge Disposition Rehab   Hospital Follow Up  Appt(s) scheduled? (Neurosurgery clinic to schedule follow up appointment.)   Discharge plans and expectations educations in teach back method with documentation complete? Yes

## 2019-03-20 NOTE — PLAN OF CARE
Problem: Adult Inpatient Plan of Care  Goal: Plan of Care Review  Outcome: Ongoing (interventions implemented as appropriate)  VSS. A/Ox4.  Tylenol given twice for pain with moderate relief.  Patient had a total of 5 BMs overnight.  No acute events overnight. Safety maintained.  All questions answered. Patient updated on plan of care.  Will continue to monitor.

## 2019-03-20 NOTE — PROGRESS NOTES
IHD completed, blood returned. Pt alert & stable. Thrill/bruit noted, hemostasis 15 minutes, dry occlusive dressing applied to sites. Duration 3.5 hrs, Qb 400ml/min, Qd 800ml/min, UFG 2.3L. Report called. Pt transported to [facility] via bed with tele.

## 2019-03-20 NOTE — PLAN OF CARE
[facility]    FACILITY TRANSFER ORDERS      Patient Name: Angel Farmer Jr.  YOB: 1960    PCP: Satya Noriega MD   PCP Address: [address] [place] [address] / [place] [id number]  PCP Phone Number: [phone]  PCP Fax: [phone]    Encounter Date: 03/20/2019    Admit to: [facility]    Vital Signs:  Routine    Diagnoses:   Active Hospital Problems    Diagnosis  POA    *S/P laminectomy [Z98.890]  Not Applicable    Diarrhea [R19.7]  No    Impaired functional mobility and endurance [Z74.09]  Unknown    Class 3 severe obesity due to excess calories with serious comorbidity and body mass index (BMI) of 45.0 to 49.9 in adult [E66.01, Z68.42]  Not Applicable    Hyperkalemia [E87.5]  Unknown    Hypothermia [T68.XXXA]  Unknown    Hyponatremia [E87.1]  Unknown    Anemia due to stage 5 chronic kidney disease [N18.5, D63.1]  Unknown    Hyperphosphatemia [E83.39]  Unknown    Metabolic bone disease [E88.9, M90.80]  Unknown    Problem with vascular access [Z78.9]  Unknown    Left leg pain [M79.605]  Unknown    Pre-syncope [R55]  Unknown    Hypotension [I95.9]  Unknown    Bradycardia [R00.1]  Unknown     see      Multifactorial peripheral neuropathy [G62.9]  Yes    MIKE (obstructive sleep apnea) - very severe latest sleep study says BPAP @ 16/8 [G47.33]  Yes    ESRD (end stage renal disease) on dialysis [N18.6, Z99.2]  Not Applicable    Hyperlipidemia [E78.5]  Yes    Diabetes mellitus, type 2 [E11.9]  Yes      Resolved Hospital Problems    Diagnosis Date Resolved POA    Pre-op evaluation [Z01.818] 03/07/2019 Not Applicable       Allergies:  Review of patient's allergies indicates:   Allergen Reactions    Keflex [cephalexin] Nausea Only       Diet: diabetic diet: 2000 calorie    Activities: Activity as tolerated    Nursing: bacitracin to incision BID     Labs: CBC and BMP per facility protocol     CONSULTS:    Physical Therapy to evaluate and treat.  and Occupational  Therapy to evaluate and treat.    MISCELLANEOUS CARE:  Diabetes Care:   Fingerstick blood sugar AC and HS    WOUND CARE ORDERS  None    Medications: Review discharge medications with patient and family and provide education.    Facility provider to assess ongoing need for subcutaneous heparin for DVT prophylaxis.      Current Discharge Medication List      START taking these medications    Details   acetaminophen (TYLENOL) 500 MG tablet Take 1 tablet (500 mg total) by mouth every 6 (six) hours as needed.  Refills: 0      diclofenac sodium (VOLTAREN) 1 % Gel Apply topically once daily.      epoetin shefali 3,000 unit/mL Soln 3,000 Units, epoetin shefali 4,000 unit/mL Soln 4,000 Units injection Inject 7,000 Units into the skin every Mon, Wed, Fri.      FLUoxetine 20 MG capsule Take 1 capsule (20 mg total) by mouth once daily.  Qty: 30 capsule, Refills: 11      heparin sodium,porcine (HEPARIN, PORCINE,) 5,000 unit/mL injection Inject 1 mL (5,000 Units total) into the skin every 8 (eight) hours.      insulin aspart U-100 (NOVOLOG) 100 unit/mL InPn pen Inject 0-5 Units into the skin before meals and at bedtime as needed (Hyperglycemia).  Refills: 0      lactulose (CHRONULAC) 20 gram/30 mL Soln Take 30 mLs (20 g total) by mouth once daily. for 10 days  Qty: 300 mL, Refills: 0      metoclopramide HCl (REGLAN) 5 mg/5 mL Soln Take 5 mLs (5 mg total) by mouth 4 (four) times daily before meals and nightly.      miconazole NITRATE 2 % (MICOTIN) 2 % top powder Apply topically 2 (two) times daily as needed (moisture).  Refills: 0      oxyCODONE (ROXICODONE) 5 MG immediate release tablet Take 1 tablet (5 mg total) by mouth every 6 (six) hours as needed.  Qty: 40 tablet, Refills: 0      pantoprazole (PROTONIX) 40 mg GrPS Take 1 packet (40 mg total) by mouth 2 (two) times daily.  Qty: 60 packet, Refills: 11      sevelamer carbonate (RENVELA) 800 mg Tab Take 2 tablets (1,600 mg total) by mouth 3 (three) times daily with meals.  Qty: 180  "tablet, Refills: 11      simethicone (MYLICON) 80 MG chewable tablet Take 1 tablet (80 mg total) by mouth 3 (three) times daily as needed for Flatulence.  Refills: 0         CONTINUE these medications which have CHANGED    Details   midodrine (PROAMATINE) 10 MG tablet Take 2 tablets (20 mg total) by mouth every 6 (six) hours.  Qty: 240 tablet, Refills: 11         CONTINUE these medications which have NOT CHANGED    Details   ammonium lactate 12 % Crea Apply 1 application topically 3 (three) times daily as needed.  Qty: 140 g, Refills: 3    Associated Diagnoses: Multifactorial peripheral neuropathy; Tinea pedis of left foot; Diabetic polyneuropathy associated with type 2 diabetes mellitus; Hyperkeratosis; Onychomycosis due to dermatophyte      BD ULTRA-FINE MINI PEN NEEDLE 31 gauge x 3/16" Ndle USE 4 TO 5 TIMES A DAY     ( DISCARD PEN NEEDLE AFTER EACH USE )  Qty: 500 each, Refills: 4    Associated Diagnoses: Type 2 diabetes mellitus without complication      blood-glucose meter (ADVANCED GLUCOSE METER) Misc 1 each by Misc.(Non-Drug; Combo Route) route 4 (four) times daily with meals and nightly.  Qty: 1 each, Refills: 0    Associated Diagnoses: Diabetes mellitus, type 2      clindamycin (CLEOCIN) 300 MG capsule Take 1 capsule (300 mg total) by mouth every 6 (six) hours.  Qty: 28 capsule, Refills: 0      fluticasone (FLONASE) 50 mcg/actuation nasal spray 1 spray by Each Nare route once daily.  Qty: 16 g, Refills: 0    Associated Diagnoses: Allergic rhinitis      gabapentin (NEURONTIN) 300 MG capsule TAKE 1 BY MOUTH EVERY      EVENING  Qty: 90 capsule, Refills: 3    Associated Diagnoses: Multifactorial peripheral neuropathy      lancets Misc 1 each by Misc.(Non-Drug; Combo Route) route 4 (four) times daily with meals and nightly.  Qty: 200 each, Refills: 6    Associated Diagnoses: Diabetes mellitus, type 2      ONETOUCH ULTRA BLUE TEST STRIP Strp USE ONE STRIP TO CHECK BLOOD GLUCOSE 4 TIMES DAILY.AT MORNING, NOON,6:OO " PM AND BEDTIME.  Qty: 100 strip, Refills: 11    Comments: Please consider 90 day supplies to promote better adherence  Associated Diagnoses: Type 2 diabetes mellitus with diabetic nephropathy, with long-term current use of insulin      pravastatin (PRAVACHOL) 40 MG tablet Take 1 tablet (40 mg total) by mouth once daily.  Qty: 30 tablet, Refills: 6    Associated Diagnoses: Hyperlipidemia, unspecified hyperlipidemia type         STOP taking these medications       aspirin 81 MG Chew Comments:   Reason for Stopping:         FOSRENOL 1,000 mg chewable tablet Comments:   Reason for Stopping:         insulin (BASAGLAR KWIKPEN U-100 INSULIN) glargine 100 units/mL (3mL) SubQ pen Comments:   Reason for Stopping:         liraglutide 0.6 mg/0.1 mL, 18 mg/3 mL, subq PNIJ (VICTOZA 2-SHOAIB) 0.6 mg/0.1 mL (18 mg/3 mL) PnIj Comments:   Reason for Stopping:         nystatin (MYCOSTATIN) cream Comments:   Reason for Stopping:         prednisoLONE acetate (PRED FORTE) 1 % DrpS Comments:   Reason for Stopping:                    _________________________________  Yuridia Cannon PA-C  03/20/2019

## 2019-03-20 NOTE — DISCHARGE SUMMARY
[facility]  Neurosurgery  Discharge Summary      Patient Name: Angel Farmer Jr.  MRN: [id number]  Admission Date: 2/28/2019  Hospital Length of Stay: 20 days  Discharge Date and Time: No discharge date for patient encounter.  Attending Physician: Ruddy Wylie MD   Discharging Provider: Yuridia Cannon PA-C  Primary Care Provider: Satya Noriega MD    HPI:   57 yo male with a PMH of ESRD with dialysis MWF (last on weds), DM, presenting as transfer from [facility] as admission to neurosurgery for evaluation after progressive course of unsteady gait as well as bilateral upper extremity weakness for the last several months.  Mr. Farmer has had wobbly gait since this past September, at which point he recalls falling and has been at 80-90% of his normal function.  He has been able to walk without assist, however not for very long distances.  He states that since last Sunday he has felt progression of his symptoms and now feels it would be difficult to stand up to ambulate at all.  He also endorses a pulled muscle of his LLE that is limiting his mobility.  His UE symptoms have been present for about 3 months including not writing as well as he is used to.  HE is able to write numbers, but not always his name.  He has been having difficulty using a fork to eat over the past month.  No sensory disturbance noted as well as no neck or back pain and no pain from the neck radiating into the hands.  He has intermittently been taking ASA 81 mg qdaily. He denies recent falls or trauma to the neck.  No other blood thinning medication.  MRI at OSH on 2/27 revealed significant cervical stenosis.        Procedure(s) (LRB):  SPINE, CERVICAL, POSTERIOR APPROACH  LAMINECTOMY C3-C6; C6-C7; WITH MEDIAL DISCECTOMY (N/A)     Hospital Course: 3/1: shira, discussed surgery with pt and mother  3/2: Pt margarette down & became hypotensive in dialysis - code blue - ok now, but going to MICU for workup.  Nephro  thinks hypotensive from hypovolemia. He is stable overnight. EKG with old changes. Trop normal. Neurologically at baseline.   3/3: Stepdown and cleared by MICU for surgery. OR today for C3-C6 decompression and fusion   3/4 -  -151, ow AFVSS, NAEON, leukocytosis to 20, sodium 134, potassium 6.3, ow labs stable, exam stable  3.5: NAEON.   3/6: NAEON. No issues per nursing. Still on Levo for MAPs goal. Exam stable.   3/7: Last day of MAP goal. NAEON. Stable on Exam. On Levo.  3/8 -  -184, ow AFVSS, NAEON, labs stable, exam stable  3/11 - AFVSS, NAEON, platelets 85, ow labs stable, exam stable  3/12 : AF, -168.  NEIL ON.   3/13 - AFVSS, NAEON, hgb 7.4, ow labs stable, exam stable  3/14: H/H stable. Dialysis today. Pending rehab.   3/15: h/h improving. + diarrhea/vomiting. Ordered c diff panel and KUB. Pending rehab.  3/16: Patient admits continued diarrhea, but denies vomiting. Abdominal XR shows partial SBO vs ileus. + c diff antigen, - c diff toxins. Started on PO vancomycin. Consulted GI. Dialysis today.  3/17: hypotensive at times, continues to endorse diarrhea, ow AFVSS, NAEON, labs stable, exam stable  3/18: AFVSS. NAEON. SBP: 115-142. Admits diarrhea. ID rec no c diff treatment. gen surg rec CT with contrast and with oral contrast. Coordinated CT with nephrology and radiology. Will get CT in am and dialysis in afternoon. Sutures removed  3/19: AFVSS. NAEON. No BM since this am. CT abdomen shows no SBO or ileus. Patient had HD today. Awaiting endo rec's before discharge. Likely discharge tomorrow.  3/20: Accepted for discharge to [facility].    Consults:   Consults (From admission, onward)        Status Ordering Provider     Inpatient consult to Endocrinology  Once     Provider:  (Not yet assigned)    KELVIN Islas     Inpatient consult to Gastroenterology  Once     Provider:  (Not yet assigned)    Completed NABIL MUSTAFA     Inpatient consult to General Surgery  Once      Provider:  (Not yet assigned)    Completed NABIL MUSTAFA     Inpatient consult to Hospital Medicine-General  Once     Provider:  (Not yet assigned)    Completed GE BRO     Inpatient consult to Infectious Diseases  Once     Provider:  (Not yet assigned)    Completed NABIL MUSTAFA     Inpatient consult to Midline team  Once     Provider:  (Not yet assigned)    Completed RITESH REYES     Inpatient consult to Nephrology  Once     Provider:  (Not yet assigned)    Completed NANO KENNEDY     Inpatient consult to Nephrology  Once     Provider:  (Not yet assigned)    Completed NOEMY BERMUDEZ     Inpatient consult to Physical Medicine Rehab  Once     Provider[facility]  [facility]    Completed RITESH REYES          Significant Diagnostic Studies: CBC, BMP, MRI cervical spine, KUB, CT abdomen pelvis.    Pending Diagnostic Studies:     Procedure Component Value Units Date/Time    Basic metabolic panel [[id number]] Collected:  03/07/19 0958    Order Status:  Sent Lab Status:  In process Updated:  03/07/19 0959    Specimen:  Blood     Basic metabolic panel [[id number]] Collected:  03/05/19 2111    Order Status:  Sent Lab Status:  In process Updated:  03/05/19 2112    Specimen:  Blood     Basic metabolic panel [[id number]] Collected:  03/03/19 2051    Order Status:  Sent Lab Status:  In process Updated:  03/03/19 2052    Specimen:  Blood     Narrative:       Collection has been rescheduled by CARLI at 03/03/2019 19:42 Reason:   Nurse Draw    Magnesium [[id number]] Collected:  03/10/19 1347    Order Status:  Sent Lab Status:  In process Updated:  03/10/19 1347    Specimen:  Blood     Magnesium [[id number]] Collected:  03/07/19 0958    Order Status:  Sent Lab Status:  In process Updated:  03/07/19 0959    Specimen:  Blood     Magnesium [[id number]] Collected:  03/05/19 2111    Order Status:  Sent Lab Status:  In process Updated:  03/05/19 2112    Specimen:  Blood     Potassium [[id number]] Collected:   03/07/19 0958    Order Status:  Sent Lab Status:  In process Updated:  03/07/19 0959    Specimen:  Blood     Potassium [[id number]] Collected:  03/05/19 2111    Order Status:  Sent Lab Status:  In process Updated:  03/05/19 2112    Specimen:  Blood     Potassium [[id number]] Collected:  03/03/19 2051    Order Status:  Sent Lab Status:  In process Updated:  03/03/19 2052    Specimen:  Blood     Narrative:       Collection has been rescheduled by CARLI at 03/03/2019 19:42 Reason:   Nurse Draw    Renal function panel [[id number]] Collected:  03/10/19 1347    Order Status:  Sent Lab Status:  In process Updated:  03/10/19 1347    Specimen:  Blood     Renal function panel [[id number]] Collected:  03/07/19 0958    Order Status:  Sent Lab Status:  In process Updated:  03/07/19 0959    Specimen:  Blood     Renal function panel [[id number]] Collected:  03/05/19 2111    Order Status:  Sent Lab Status:  In process Updated:  03/05/19 2112    Specimen:  Blood         Final Active Diagnoses:    Diagnosis Date Noted POA    PRINCIPAL PROBLEM:  S/P laminectomy [Z98.890] 03/03/2019 Not Applicable    Diarrhea [R19.7] 03/16/2019 No    Impaired functional mobility and endurance [Z74.09] 03/14/2019 Unknown    Class 3 severe obesity due to excess calories with serious comorbidity and body mass index (BMI) of 45.0 to 49.9 in adult [E66.01, Z68.42] 03/03/2019 Not Applicable    Hyperkalemia [E87.5] 03/03/2019 Unknown    Hypothermia [T68.XXXA] 03/03/2019 Unknown    Hyponatremia [E87.1] 03/03/2019 Unknown    Anemia due to stage 5 chronic kidney disease [N18.5, D63.1] 03/01/2019 Unknown    Hyperphosphatemia [E83.39] 03/01/2019 Unknown    Metabolic bone disease [E88.9, M90.80] 03/01/2019 Unknown    Problem with vascular access [Z78.9] 03/01/2019 Unknown    Left leg pain [M79.605] 03/01/2019 Unknown    Pre-syncope [R55] 03/01/2019 Unknown    Hypotension [I95.9] 03/01/2019 Unknown    Bradycardia [R00.1] 03/01/2019 Unknown     Multifactorial peripheral neuropathy [G62.9] 07/14/2015 Yes    MIKE (obstructive sleep apnea) - very severe latest sleep study says BPAP @ 16/8 [G47.33] 02/25/2015 Yes    ESRD (end stage renal disease) on dialysis [N18.6, Z99.2] 03/18/2014 Not Applicable    Hyperlipidemia [E78.5]  Yes    Diabetes mellitus, type 2 [E11.9]  Yes      Problems Resolved During this Admission:    Diagnosis Date Noted Date Resolved POA    Pre-op evaluation [Z01.818] 03/01/2019 03/07/2019 Not Applicable      Discharged Condition: stable    Disposition: Rehab    Follow Up: See the patient instruction tab for detailed discharge instructions and follow up information.    Patient Instructions:   No discharge procedures on file.  Medications:  Reconciled Home Medications:      Medication List      START taking these medications    acetaminophen 500 MG tablet  Commonly known as:  TYLENOL  Take 1 tablet (500 mg total) by mouth every 6 (six) hours as needed.     diclofenac sodium 1 % Gel  Commonly known as:  VOLTAREN  Apply topically once daily.     epoetin shefali 3,000 unit/mL Soln 3,000 Units, epoetin shefali 4,000 unit/mL Soln 4,000 Units injection  Inject 7,000 Units into the skin every Mon, Wed, Fri.     FLUoxetine 20 MG capsule  Take 1 capsule (20 mg total) by mouth once daily.     heparin (porcine) 5,000 unit/mL injection  Inject 1 mL (5,000 Units total) into the skin every 8 (eight) hours.     insulin aspart U-100 100 unit/mL Inpn pen  Commonly known as:  NovoLOG  Inject 0-5 Units into the skin before meals and at bedtime as needed (Hyperglycemia).     lactulose 20 gram/30 mL Soln  Commonly known as:  CHRONULAC  Take 30 mLs (20 g total) by mouth once daily. for 10 days     metoclopramide HCl 5 mg/5 mL Soln  Commonly known as:  REGLAN  Take 5 mLs (5 mg total) by mouth 4 (four) times daily before meals and nightly.     miconazole NITRATE 2 % 2 % top powder  Commonly known as:  MICOTIN  Apply topically 2 (two) times daily as needed  "(moisture).     oxyCODONE 5 MG immediate release tablet  Commonly known as:  ROXICODONE  Take 1 tablet (5 mg total) by mouth every 6 (six) hours as needed.     pantoprazole 40 mg Grps  Commonly known as:  PROTONIX  Take 1 packet (40 mg total) by mouth 2 (two) times daily.     sevelamer carbonate 800 mg Tab  Commonly known as:  RENVELA  Take 2 tablets (1,600 mg total) by mouth 3 (three) times daily with meals.     simethicone 80 MG chewable tablet  Commonly known as:  MYLICON  Take 1 tablet (80 mg total) by mouth 3 (three) times daily as needed for Flatulence.        CHANGE how you take these medications    midodrine 10 MG tablet  Commonly known as:  PROAMATINE  Take 2 tablets (20 mg total) by mouth every 6 (six) hours.  What changed:    · how much to take  · how to take this  · when to take this     pravastatin 40 MG tablet  Commonly known as:  PRAVACHOL  Take 1 tablet (40 mg total) by mouth once daily.  What changed:  when to take this        CONTINUE taking these medications    ammonium lactate 12 % Crea  Apply 1 application topically 3 (three) times daily as needed.     BD ULTRA-FINE MINI PEN NEEDLE 31 gauge x 3/16" Ndle  Generic drug:  pen needle, diabetic  USE 4 TO 5 TIMES A DAY     ( DISCARD PEN NEEDLE AFTER EACH USE )     blood-glucose meter Misc  Commonly known as:  ADVANCED GLUCOSE METER  1 each by Misc.(Non-Drug; Combo Route) route 4 (four) times daily with meals and nightly.     clindamycin 300 MG capsule  Commonly known as:  CLEOCIN  Take 1 capsule (300 mg total) by mouth every 6 (six) hours.     fluticasone 50 mcg/actuation nasal spray  Commonly known as:  FLONASE  1 spray by Each Nare route once daily.     gabapentin 300 MG capsule  Commonly known as:  NEURONTIN  TAKE 1 BY MOUTH EVERY      EVENING     lancets Misc  1 each by Misc.(Non-Drug; Combo Route) route 4 (four) times daily with meals and nightly.     ONETOUCH ULTRA BLUE TEST STRIP Strp  Generic drug:  blood sugar diagnostic  USE ONE STRIP TO " CHECK BLOOD GLUCOSE 4 TIMES DAILY.AT MORNING, NOON,6:OO PM AND BEDTIME.        STOP taking these medications    aspirin 81 MG Chew     FOSRENOL 1000 MG chewable tablet  Generic drug:  lanthanum     insulin glargine 100 units/mL (3mL) SubQ pen  Commonly known as:  BASAGLAR KWIKPEN U-100 INSULIN     liraglutide 0.6 mg/0.1 mL (18 mg/3 mL) subq PNIJ 0.6 mg/0.1 mL (18 mg/3 mL) Pnij  Commonly known as:  VICTOZA 2-SHOAIB     nystatin cream  Commonly known as:  MYCOSTATIN     prednisoLONE acetate 1 % Drps  Commonly known as:  PRED ANA MARIAE            Yuridia Cannon PA-C  Neurosurgery  [facility]

## 2019-03-20 NOTE — ASSESSMENT & PLAN NOTE
Caution with insulin stacking  Estimated Creatinine Clearance: 15.7 mL/min (A) (based on SCr of 7.1 mg/dL (H)).

## 2019-03-20 NOTE — SUBJECTIVE & OBJECTIVE
"Interval HPI:   Overnight events: Remains in MTSU, NAEON.  BG below goal without any insulin administration.  Eatin%  Nausea: No  Hypoglycemia and intervention: No  Fever: No  TPN and/or TF: No    BP (!) 101/51 (BP Location: Right arm, Patient Position: Lying)   Pulse (!) 55   Temp 98 °F (36.7 °C) (Oral)   Resp 16   Ht 5' 8" (1.727 m)   Wt (!) 136.9 kg (301 lb 14.4 oz)   SpO2 97%   BMI 45.90 kg/m²     Labs Reviewed and Include    Recent Labs   Lab 19  0406   *   CALCIUM 9.0   ALBUMIN 2.9*   PROT 6.1      K 3.5   CO2 22*      BUN 35*   CREATININE 7.1*   ALKPHOS 175*   ALT 15   AST 22   BILITOT 0.7     Lab Results   Component Value Date    WBC 5.03 2019    HGB 7.8 (L) 2019    HCT 26.3 (L) 2019     (H) 2019     2019     No results for input(s): TSH, FREET4 in the last 168 hours.  Lab Results   Component Value Date    HGBA1C 5.0 2019       Nutritional status:   Body mass index is 45.9 kg/m².  Lab Results   Component Value Date    ALBUMIN 2.9 (L) 2019    ALBUMIN 3.0 (L) 2019    ALBUMIN 3.1 (L) 2019     No results found for: PREALBUMIN    Estimated Creatinine Clearance: 15.7 mL/min (A) (based on SCr of 7.1 mg/dL (H)).    Accu-Checks  Recent Labs     19  1648 19  2106 19  0741 19  1131 19  1640 19  2112 19  1241 19  1638 19  2050 19  0727   POCTGLUCOSE 96 126* 105 144* 117* 139* 77 107 106 86       Current Medications and/or Treatments Impacting Glycemic Control  Immunotherapy:    Immunosuppressants     None        Steroids:   Hormones (From admission, onward)    None        Pressors:    Autonomic Drugs (From admission, onward)    Start     Stop Route Frequency Ordered    03/10/19 1200  midodrine tablet 20 mg      -- Oral Every 6 hours 03/10/19 0980        Hyperglycemia/Diabetes Medications:   Antihyperglycemics (From admission, onward)    Start     Stop " Route Frequency Ordered    03/06/19 1638  insulin aspart U-100 pen 0-5 Units      -- SubQ Before meals & nightly PRN 03/06/19 1531

## 2019-03-22 NOTE — PHYSICIAN QUERY
PT Name: Angel Farmer Jr.  MR #: [id number]     Physician Query Form - Documentation Clarification      CDS: Indira Shepherd RN, CCDS       Contact information: [email or web address]    This form is a permanent document in the medical record.     Query Date: March 22, 2019    By submitting this query, we are merely seeking further clarification of documentation. Please utilize your independent clinical judgment when addressing the question(s) below.    The Medical record reflects the following:    Supporting Clinical Findings Location in Medical Record     PROCEDURES PERFORMED:   1. Open posterior access to the cervical spine.  2. Laminectomy, medial facetectomy and bilateral foraminotomy at C2-3, C3-4, C4-5, C5-6, C6-7   3. Fluoroscopic localization.     Complexity:  This was deemed to be a more complex procedure requiring more time skills due to the morbid obesity of the patient (BMI 46.37).  1 hr more was needed to position the patient, to expose the spine to close the wound due to the high amount of fatty tissue.    Findings are concerning for early or partial small bowel obstruction versus ileus.    Evaluation of the bowel demonstrates moderate amount of feces in the rectosigmoid region. Remainder of the colon demonstrates a paucity of feces. Difficult to evaluate the colonic wall though no significant pericolonic thickening. Certainly no dilatation. There is some mild distention of the small bowel up to 3.5 cm. No loculated fluid collections.   3/3-Op Note                      3/15-X-ray Abdomen    3/19-CT Abdomen               3/16: Patient admits continued diarrhea, but denies vomiting. Abdominal XR shows partial SBO vs ileus      KUB was obtained (3/15) which was notable for early vs partial small bowel obstruction vs ileus, nodular contour of gastric folds.    Likely patient's imaging and nausea vomiting are due to ileus. Most likely post-op in nature though generally would occur closer to timing of  surgery. Not on any recent narcotics and electrolytes are WNL.    - recommend supportive care for patient's ileus and diarrhea at this time  - ileus: maintain normal electrolyte (Mg>2, K>4), avoid narcotics  - serial abdominal examinations  - continue anti-emetics.     3/16-Neurosurgery PN and   3/20-D/C Summary    3/16-Gastroenterology  Consult Note                                                                              Doctor, Please specify diagnosis or diagnoses associated with above clinical findings.    Please clarify the documentation of _____Partial SBO vs Ileus______.     Provider Use Only     [   ] Ileus occurring in the post-operative period, but not related to surgery     [   ] Post-operative Ileus--IS NOT a complication of surgery     [   ] Post-operative Ileus--IS a complication of surgery     [   ] Partial SBO           [   ] Other clarification (please specify)_________________________________                                                                                                           [ x ] Clinically Undetermined

## 2019-04-12 ENCOUNTER — TELEPHONE (OUTPATIENT)
Dept: NEUROSURGERY | Facility: CLINIC | Age: 59
End: 2019-04-12

## 2019-04-12 NOTE — TELEPHONE ENCOUNTER
----- Message from Waqar Phelps sent at 4/12/2019  2:01 PM CDT -----  Contact: [facility] @ [phone]  Caller calling to schedule a f/u visit to discuss surgery, pls call

## 2019-04-12 NOTE — TELEPHONE ENCOUNTER
Spoke with [facility], she states patient is being discharged today and request follow up appt with Dr. Wylie to discuss surgery. Surgery was incomplete- due to patient coded. Also, Zeus states patient has a cyst on his back and would like to if MD can refer patient to a general surgeon regarding drainage prior to scheduled appointment-04/30.     Please advise

## 2019-04-15 NOTE — TELEPHONE ENCOUNTER
Unable to leave vm- line is busy.  Follow up appointment is needed with Dick.      Appts updated and mailed out to patient.

## 2019-04-23 ENCOUNTER — OFFICE VISIT (OUTPATIENT)
Dept: FAMILY MEDICINE | Facility: HOSPITAL | Age: 59
End: 2019-04-23
Payer: MEDICARE

## 2019-04-23 VITALS
HEART RATE: 62 BPM | DIASTOLIC BLOOD PRESSURE: 64 MMHG | WEIGHT: 275.81 LBS | BODY MASS INDEX: 43.29 KG/M2 | HEIGHT: 67 IN | SYSTOLIC BLOOD PRESSURE: 107 MMHG

## 2019-04-23 DIAGNOSIS — I10 ESSENTIAL HYPERTENSION: ICD-10-CM

## 2019-04-23 DIAGNOSIS — E11.22 TYPE 2 DIABETES MELLITUS WITH CHRONIC KIDNEY DISEASE ON CHRONIC DIALYSIS, WITH LONG-TERM CURRENT USE OF INSULIN: Primary | ICD-10-CM

## 2019-04-23 DIAGNOSIS — Z99.2 TYPE 2 DIABETES MELLITUS WITH CHRONIC KIDNEY DISEASE ON CHRONIC DIALYSIS, WITH LONG-TERM CURRENT USE OF INSULIN: Primary | ICD-10-CM

## 2019-04-23 DIAGNOSIS — N18.6 ESRD (END STAGE RENAL DISEASE) ON DIALYSIS: ICD-10-CM

## 2019-04-23 DIAGNOSIS — Z79.4 TYPE 2 DIABETES MELLITUS WITH CHRONIC KIDNEY DISEASE ON CHRONIC DIALYSIS, WITH LONG-TERM CURRENT USE OF INSULIN: Primary | ICD-10-CM

## 2019-04-23 DIAGNOSIS — G47.33 OSA (OBSTRUCTIVE SLEEP APNEA): ICD-10-CM

## 2019-04-23 DIAGNOSIS — N18.6 TYPE 2 DIABETES MELLITUS WITH CHRONIC KIDNEY DISEASE ON CHRONIC DIALYSIS, WITH LONG-TERM CURRENT USE OF INSULIN: Primary | ICD-10-CM

## 2019-04-23 DIAGNOSIS — Z99.2 ESRD (END STAGE RENAL DISEASE) ON DIALYSIS: ICD-10-CM

## 2019-04-23 DIAGNOSIS — B35.4 TINEA CORPORIS: ICD-10-CM

## 2019-04-23 PROCEDURE — 99214 OFFICE O/P EST MOD 30 MIN: CPT | Performed by: FAMILY MEDICINE

## 2019-04-23 RX ORDER — AZITHROMYCIN 250 MG/1
TABLET, FILM COATED ORAL
COMMUNITY
Start: 2019-02-16 | End: 2019-04-23

## 2019-04-23 RX ORDER — NYSTATIN 100000 [USP'U]/G
POWDER TOPICAL 2 TIMES DAILY
Qty: 30 G | Refills: 2 | Status: SHIPPED | OUTPATIENT
Start: 2019-04-23 | End: 2022-01-24

## 2019-04-23 RX ORDER — PRAVASTATIN SODIUM 40 MG/1
40 TABLET ORAL
Status: ON HOLD | COMMUNITY
End: 2019-07-18

## 2019-04-23 RX ORDER — MIDODRINE HYDROCHLORIDE 10 MG/1
10 TABLET ORAL
COMMUNITY
End: 2019-04-23

## 2019-04-23 RX ORDER — GABAPENTIN 300 MG/1
300 CAPSULE ORAL
COMMUNITY
End: 2019-04-23

## 2019-04-23 RX ORDER — LANTHANUM CARBONATE 1000 MG/1
1000 TABLET, CHEWABLE ORAL
Status: ON HOLD | COMMUNITY
End: 2019-07-18 | Stop reason: CLARIF

## 2019-04-23 RX ORDER — INSULIN LISPRO 100 [IU]/ML
15 INJECTION, SOLUTION INTRAVENOUS; SUBCUTANEOUS
Status: ON HOLD | COMMUNITY
End: 2019-07-18

## 2019-04-23 NOTE — PROGRESS NOTES
Subjective:       Patient ID: Angel Farmer Jr. is a 58 y.o. male.    Chief Complaint: Diabetes (follow-up)    58 year old male with PMH DMII, ESRD with dialysis, HTN, CVA, MIKE who presents for diabetes f/u.     Cervical myelopathy: s/p surgery 03/03/19. Reports he can walk with a walker, did improve a bit with the surgery. Still doing PT/OT with home health twice a week on non-dialysis days.     DMII: on a sliding scale, 100's-130's, highest was 291 after a subway sandwich and a cough drop. Rarely requiring insulin.    ESRD MWF: states he has been compliant.     Reports some irritation between his butt cheeks. Denies f/chills. Put a salve on it at the hospital.     Denies tobacco use, alcohol use.     Review of Systems   Constitutional: Negative for chills and fever.   HENT: Negative for congestion and sore throat.        Objective:      Vitals:    04/23/19 1602   BP: 107/64   Pulse: 62     Physical Exam   Constitutional: He is oriented to person, place, and time. He appears well-developed and well-nourished. No distress.   BMI Readings from Last 3 Encounters:  04/23/19 : 43.20 kg/m²  03/20/19 : 45.90 kg/m²  02/28/19 : 47.06 kg/m²       HENT:   Head: Normocephalic and atraumatic.   Right Ear: External ear normal.   Left Ear: External ear normal.   Mouth/Throat: No oropharyngeal exudate.   Eyes: Conjunctivae and EOM are normal. Right eye exhibits no discharge. Left eye exhibits no discharge.   Neck: Normal range of motion. Neck supple. No tracheal deviation present.   Cardiovascular: Normal rate, regular rhythm and normal heart sounds. Exam reveals no gallop and no friction rub.   No murmur heard.  Pulmonary/Chest: Effort normal and breath sounds normal. No respiratory distress.   Abdominal: Soft. He exhibits no distension. There is no tenderness.   Musculoskeletal: He exhibits no edema or deformity.   Neurological: He is alert and oriented to person, place, and time.   Skin: Skin is warm and dry. He is not  diaphoretic.   Tinea corporis in the sacral region, no fluctuance or purulent drainage.    Psychiatric: He has a normal mood and affect. His behavior is normal.   Nursing note and vitals reviewed.      Assessment:       1. 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 Tinea corporis    3. ESRD (end stage renal disease) on dialysis    4. Essential hypertension    5. MIKE (obstructive sleep apnea) - very severe latest sleep study says YAMINI @ 16/8        Plan:       Type 2 diabetes mellitus with chronic kidney disease on chronic dialysis, with long-term current use of insulin  Comments:  - stable, c/w sliding scale insulin, c/w weight loss     Tinea corporis  -     nystatin (MYCOSTATIN) powder; Apply topically 2 (two) times daily. To sacral region  Dispense: 30 g; Refill: 2    ESRD (end stage renal disease) on dialysis  Comments:  - stable, counseled to monitor b12 and folate with dialysis given macrocytic anemia  - c/w epo with dialysis     Essential hypertension  Comments:  - no meds, has been low after dialysis  - on midodrine and no BP meds for a while  - c/w midodrine     MIKE (obstructive sleep apnea) - very severe latest sleep study says YAMINI @ 16/8  Comments:  - not using CPAP much, states it difficult in his current living situation  - Will try to use it.       Follow up in about 3 months (around 7/23/2019).

## 2019-04-24 NOTE — PROGRESS NOTES
I reviewed the note and discussed with Dr. Noriega. Multiple problems on dialysis. DM management. Tinea.

## 2019-04-30 ENCOUNTER — OFFICE VISIT (OUTPATIENT)
Dept: NEUROSURGERY | Facility: CLINIC | Age: 59
End: 2019-04-30
Payer: MEDICARE

## 2019-04-30 VITALS
TEMPERATURE: 99 F | WEIGHT: 275.81 LBS | SYSTOLIC BLOOD PRESSURE: 106 MMHG | DIASTOLIC BLOOD PRESSURE: 66 MMHG | HEART RATE: 59 BPM | HEIGHT: 67 IN | BODY MASS INDEX: 43.29 KG/M2

## 2019-04-30 DIAGNOSIS — M48.02 STENOSIS OF CERVICAL SPINE WITH MYELOPATHY: ICD-10-CM

## 2019-04-30 DIAGNOSIS — Z98.890 S/P LAMINECTOMY: Primary | ICD-10-CM

## 2019-04-30 DIAGNOSIS — G99.2 STENOSIS OF CERVICAL SPINE WITH MYELOPATHY: ICD-10-CM

## 2019-04-30 PROCEDURE — 99999 PR PBB SHADOW E&M-EST. PATIENT-LVL V: CPT | Mod: PBBFAC,,, | Performed by: PHYSICIAN ASSISTANT

## 2019-04-30 PROCEDURE — 99215 OFFICE O/P EST HI 40 MIN: CPT | Mod: PBBFAC,PN | Performed by: PHYSICIAN ASSISTANT

## 2019-04-30 PROCEDURE — 99024 POSTOP FOLLOW-UP VISIT: CPT | Mod: POP,,, | Performed by: PHYSICIAN ASSISTANT

## 2019-04-30 PROCEDURE — 99999 PR PBB SHADOW E&M-EST. PATIENT-LVL V: ICD-10-PCS | Mod: PBBFAC,,, | Performed by: PHYSICIAN ASSISTANT

## 2019-04-30 PROCEDURE — 99024 PR POST-OP FOLLOW-UP VISIT: ICD-10-PCS | Mod: POP,,, | Performed by: PHYSICIAN ASSISTANT

## 2019-04-30 NOTE — PROGRESS NOTES
Established Pateint    SUBJECTIVE:     History of Present Illness:  Angel Farmer Jr. is a 58-year-old male with a history of hypertension, hyperlipidemia, obstructive sleep apnea, anemia due to chronic kidney disease, end-stage renal disease on dialysis Monday Wednesday Friday, previous CVA, multifactorial peripheral neuropathy, severe morbid obesity with a BMI of 43.20 who subsequently underwent open posterior cervical laminectomy C3-C7 laminectomy, medial facetectomy, and bilateral foraminotomy with Dr. Wylie on on 03/03/2019 due to worsening cervical myelopathy, radiculopathy, spondylosis and severe stenosis.  Patient was discharged to [facility] on 03/20/2019.  Patient has been discharged home.  He continues to complain of gait instability, upper extremity weakness, and incisional pain since surgery.  He feels like his recovery is slow and long secondary to his extended hospital stay and multiple co morbidities.  He still in a wheelchair but ambulates minimally from bed to bathroom daily.  He is still undergoing home health physical therapy for lower extremity ROM > upper extremity ROM > gait training.           Neck Disability  Neck Disability-Pain Score: 8  Neck Disability-Pain Intensity: The pain is very severe at the moment  Neck Disability-Personal Care: I need help every day in most aspects of self care  Neck Disability-Lifting: I cannot lift or carry anything at all  Neck Disability-Reading: I can read as much as I want to, with moderate pain in my neck  Neck Disability-Headaches: I have no headaches at all  Neck Disability-Concentration: I can concentrate fully, when I want to, with slight difficulty  Neck Disability-work: I cannot do any work at all  Neck Disability-Driving: I cant drive my car at all  Neck Disability-Sleeping: My sleep is greatly disturbed (3-5 hours sleeplessness)  Neck Disability-Recreation: I cant do any recreation activities at all      Review of patient's allergies indicates:  "  Allergen Reactions    Keflex [cephalexin] Nausea Only       Current Outpatient Medications   Medication Sig Dispense Refill    acetaminophen (TYLENOL) 500 MG tablet Take 1 tablet (500 mg total) by mouth every 6 (six) hours as needed.  0    ammonium lactate 12 % Crea Apply 1 application topically 3 (three) times daily as needed. 140 g 3    BD ULTRA-FINE MINI PEN NEEDLE 31 gauge x 3/16" Ndle USE 4 TO 5 TIMES A DAY     ( DISCARD PEN NEEDLE AFTER EACH USE ) 500 each 4    diclofenac sodium (VOLTAREN) 1 % Gel Apply topically once daily.      epoetin shefali 3,000 unit/mL Soln 3,000 Units, epoetin shefali 4,000 unit/mL Soln 4,000 Units injection Inject 7,000 Units into the skin every Mon, Wed, Fri.      fluticasone (FLONASE) 50 mcg/actuation nasal spray 1 spray by Each Nare route once daily. 16 g 0    gabapentin (NEURONTIN) 300 MG capsule TAKE 1 BY MOUTH EVERY      EVENING 90 capsule 3    heparin sodium,porcine (HEPARIN, PORCINE,) 5,000 unit/mL injection Inject 1 mL (5,000 Units total) into the skin every 8 (eight) hours.      insulin aspart U-100 (NOVOLOG) 100 unit/mL InPn pen Inject 0-5 Units into the skin before meals and at bedtime as needed (Hyperglycemia).  0    insulin lispro 100 unit/mL injection Inject 15 Units into the skin.      lancets Misc 1 each by Misc.(Non-Drug; Combo Route) route 4 (four) times daily with meals and nightly. 200 each 6    lanthanum (FOSRENOL) 1000 MG chewable tablet Take 1,000 mg by mouth.      miconazole NITRATE 2 % (MICOTIN) 2 % top powder Apply topically 2 (two) times daily as needed (moisture).  0    midodrine (PROAMATINE) 10 MG tablet Take 2 tablets (20 mg total) by mouth every 6 (six) hours. 240 tablet 11    nystatin (MYCOSTATIN) powder Apply topically 2 (two) times daily. To sacral region 30 g 2    ONETOUCH ULTRA BLUE TEST STRIP Strp USE ONE STRIP TO CHECK BLOOD GLUCOSE 4 TIMES DAILY.AT MORNING, NOON,6:OO PM AND BEDTIME. 100 strip 11    pravastatin (PRAVACHOL) 40 MG " tablet Take 1 tablet (40 mg total) by mouth once daily. (Patient taking differently: Take 40 mg by mouth every evening. ) 30 tablet 6    pravastatin (PRAVACHOL) 40 MG tablet Take 40 mg by mouth.      sevelamer carbonate (RENVELA) 800 mg Tab Take 2 tablets (1,600 mg total) by mouth 3 (three) times daily with meals. 180 tablet 11    simethicone (MYLICON) 80 MG chewable tablet Take 1 tablet (80 mg total) by mouth 3 (three) times daily as needed for Flatulence.  0    blood-glucose meter (ADVANCED GLUCOSE METER) Misc 1 each by Misc.(Non-Drug; Combo Route) route 4 (four) times daily with meals and nightly. 1 each 0    FLUoxetine 20 MG capsule Take 1 capsule (20 mg total) by mouth once daily. 30 capsule 11    metoclopramide HCl (REGLAN) 5 mg/5 mL Soln Take 5 mLs (5 mg total) by mouth 4 (four) times daily before meals and nightly.      oxyCODONE (ROXICODONE) 5 MG immediate release tablet Take 1 tablet (5 mg total) by mouth every 6 (six) hours as needed. 40 tablet 0    pantoprazole (PROTONIX) 40 mg GrPS Take 1 packet (40 mg total) by mouth 2 (two) times daily. 60 packet 11     No current facility-administered medications for this visit.        Past Medical History:   Diagnosis Date    Anemia due to stage 5 chronic kidney disease 3/1/2019    Diabetes mellitus type II     Dialysis patient     Hyperlipidemia     Hypertension     Kidney failure     MIKE (obstructive sleep apnea)     Urinary tract infection      Past Surgical History:   Procedure Laterality Date    AV FISTULA PLACEMENT      left lower arm    SPINE, CERVICAL, POSTERIOR APPROACH  LAMINECTOMY C3-C6; C6-C7; WITH MEDIAL DISCECTOMY N/A 3/3/2019    Performed by [facility] at [facility] 2ND FLR    TONSILLECTOMY, ADENOIDECTOMY      TRANSESOPHAGEAL ECHOCARDIOGRAM (SERENITY) N/A 5/23/2017    Performed by Donaldo Mai [facility] at [facility] OR     Family History     Problem Relation (Age of Onset)    Colon cancer Mother    Diabetes Mother, Father, Maternal  "Grandmother, Maternal Grandfather    Heart disease Father    Hypertension Father    Lung cancer Mother        Social History     Socioeconomic History    Marital status: Single     Spouse name: Not on file    Number of children: Not on file    Years of education: Not on file    Highest education level: Not on file   Occupational History    Not on file   Social Needs    Financial resource strain: Not on file    Food insecurity:     Worry: Not on file     Inability: Not on file    Transportation needs:     Medical: Not on file     Non-medical: Not on file   Tobacco Use    Smoking status: Never Smoker    Smokeless tobacco: Never Used   Substance and Sexual Activity    Alcohol use: No    Drug use: No    Sexual activity: Not Currently   Lifestyle    Physical activity:     Days per week: Not on file     Minutes per session: Not on file    Stress: Not on file   Relationships    Social connections:     Talks on phone: Not on file     Gets together: Not on file     Attends Worship service: Not on file     Active member of club or organization: Not on file     Attends meetings of clubs or organizations: Not on file     Relationship status: Not on file   Other Topics Concern    Not on file   Social History Narrative    Not on file       Review of Systems:  Review of Systems  As above  OBJECTIVE:     Vital Signs  Temp: 98.6 °F (37 °C)  Pulse: (!) 59  BP: 106/66  Pain Score:   8  Height: 5' 7" (170.2 cm)  Weight: 125.1 kg (275 lb 12.7 oz)  Body mass index is 43.2 kg/m².    Physical Exam:  Neurosurgery Physical Exam  General: well developed, well nourished, no distress.  Morbid obesity.  Neurologic: Awake, alert and oriented x3. Thought content appropriate.  GCS: Motor: 6/Verbal: 5/Eyes: 4 GCS Total: 15  Cranial nerves: II-XII grossly intact. PERRLA. EOMI without nystagmus. Face symmetric and sensation intact to light touch, tongue midline, shoulder shrug symmetric bilaterally.  Hearing equal bilaterally to " finger rub. Palate and uvula rise and fall normally in midline.    Language: no aphasia  Speech: no dysarthria   Neck: supple, without obvious masses    Sensory: intact to light touch B/L UE and LE  Motor Strength: Moves all extremities spontaneously with good tone. Full strength in bilateral lower extremities.  No abnormal movements seen.      Previous left shoulder injury with decreased left shoulder ROM.    Strength  Deltoids Triceps Biceps Wrist Extension Wrist Flexion Hand    Upper: R 4+/5 4/5 4/5 5/5 5/5 4+/5    L 2/5 5/5 5/5 4/5 4/5 4-/5     Iliopsoas Quadriceps Knee  Flexion Tibialis  anterior Gastro- cnemius EHL   Lower: R 5/5 5/5 5/5 5/5 5/5 5/5    L 5/5 5/5 5/5 5/5 5/5 5/5     Interossi muscle strength- 3/5 bilaterally    Sanford's - Negative        Lhermitte's - Negative  Spurlings-   Negative         Ankle Clonus - Negative           SLR - Negative   Gait was not tested  Cervical ROM - full  Lumbar ROM - full  No midline or paraspinal tenderness to palpation  No difficulty transitioning from seated to standing position or vice versa.  ENT: normal hearing with finger rub  Heart: RRR, no cyanosis, pallor, or edema.   Lungs- normal respiratory effort  Abdomen-  soft, symmetric and nontender  Skin: grossly intact in all 4 extremities without obvious rashes or lesions  Extremities: warm with no cyanosis or edema, or clubbing  Pulses: palpable distal pulses    Previous posterior cervical incision is well healed    Diagnostic Results:  None new    ASSESSMENT/PLAN:     58-year-old male with a history of hypertension, hyperlipidemia, obstructive sleep apnea, anemia due to chronic kidney disease, end-stage renal disease on dialysis Monday Wednesday Friday, previous CVA, multifactorial peripheral neuropathy, severe morbid obesity with a BMI of 43.20 who subsequently underwent open posterior cervical laminectomy C3-C7 laminectomy, medial facetectomy, and bilateral foraminotomy with Dr. Wylie on on 03/03/2019  due to worsening cervical myelopathy, radiculopathy, spondylosis and severe stenosis.  Patient presents for 2 weeks wound check but he is now 2 months since surgery. Incision is healing very well.  He continues have bilateral extremity weakness and gait instability.   This may be a multifactorial given his multiple medical comorbidity, deconditioning, and decreased mobility.  I recommended patient to continue aggressive home health therapy.  We discussed outpatient therapy once his home a therapy is completed but he reports that his difficulty with transportation/dialysis schedule.  I encouraged him to continue aggressive home exercise program daily for his upper and lower extremity ROM.  He will follow-up with Dr. Wylie in clinic in 1 month.  If no improvement in symptoms, we may consider repeat imaging.       All imaging were reviewed with patient. All questions were answered.  Patient verbalized understanding and agreed with the above plan of care.  Patient was encouraged to call clinic with any future concerns prior to follow up appt.     Please call with any questions.        Dick Vásquez PA-C  Neurosurgery              Note dictated with voice recognition software, please excuse any grammatical errors.

## 2019-05-10 ENCOUNTER — TELEPHONE (OUTPATIENT)
Dept: ADMINISTRATIVE | Facility: CLINIC | Age: 59
End: 2019-05-10

## 2019-05-10 NOTE — TELEPHONE ENCOUNTER
Home Health SOC 04/13/2019 - 06/11/2019 with [facility] ([place] - Dr. Vadim Berry III.  services.

## 2019-05-26 ENCOUNTER — HOSPITAL ENCOUNTER (EMERGENCY)
Facility: HOSPITAL | Age: 59
Discharge: HOME | End: 2019-05-26
Attending: EMERGENCY MEDICINE
Payer: MEDICARE

## 2019-05-26 VITALS
SYSTOLIC BLOOD PRESSURE: 154 MMHG | RESPIRATION RATE: 20 BRPM | TEMPERATURE: 99 F | OXYGEN SATURATION: 97 % | DIASTOLIC BLOOD PRESSURE: 70 MMHG | HEART RATE: 52 BPM | WEIGHT: 271.19 LBS | BODY MASS INDEX: 42.47 KG/M2

## 2019-05-26 DIAGNOSIS — J06.9 UPPER RESPIRATORY TRACT INFECTION, UNSPECIFIED TYPE: Primary | ICD-10-CM

## 2019-05-26 PROCEDURE — 99284 EMERGENCY DEPT VISIT MOD MDM: CPT | Mod: 25

## 2019-05-26 RX ORDER — BENZONATATE 100 MG/1
100 CAPSULE ORAL 3 TIMES DAILY PRN
Qty: 20 CAPSULE | Refills: 0 | Status: SHIPPED | OUTPATIENT
Start: 2019-05-26 | End: 2019-06-05

## 2019-05-26 RX ORDER — AZITHROMYCIN 250 MG/1
250 TABLET, FILM COATED ORAL DAILY
Qty: 6 TABLET | Refills: 0 | Status: ON HOLD | OUTPATIENT
Start: 2019-05-26 | End: 2019-07-18

## 2019-05-26 RX ORDER — AZITHROMYCIN 250 MG/1
250 TABLET, FILM COATED ORAL DAILY
Qty: 6 TABLET | Refills: 0 | Status: SHIPPED | OUTPATIENT
Start: 2019-05-26 | End: 2019-05-26 | Stop reason: SDUPTHER

## 2019-05-26 NOTE — ED PROVIDER NOTES
Encounter Date: 5/26/2019    SCRIBE #1 NOTE: I, Jovanny Torres, am scribing for, and in the presence of,  Dr. Gutierrez. I have scribed the entire note.       History     Chief Complaint   Patient presents with    Cough     58 year old male presents to ed cc of non productive cough x 1 week. patient is mwf dialysis last dialysis on friday.      This is a 58 y.o. male who presents with chief complaint of cough x 1 week. He reports his cough is mostly dry, although occasionally productive, and associated with nasal congestion. He is a MWF dialysis patient, and was last dialyzed on Friday. Patient denies any fever, and he does not report any other concerning symptoms. He reports some relief with Mucinex at home. He has a prior history of kidney failure on dialysis, DM, HTN, HLD, and anemia.    The history is provided by the patient.     Review of patient's allergies indicates:   Allergen Reactions    Keflex [cephalexin] Nausea Only     Past Medical History:   Diagnosis Date    Anemia due to stage 5 chronic kidney disease 3/1/2019    Diabetes mellitus type II     Dialysis patient     Hyperlipidemia     Hypertension     Kidney failure     MIKE (obstructive sleep apnea)     Urinary tract infection      Past Surgical History:   Procedure Laterality Date    AV FISTULA PLACEMENT      left lower arm    SPINE, CERVICAL, POSTERIOR APPROACH  LAMINECTOMY C3-C6; C6-C7; WITH MEDIAL DISCECTOMY N/A 3/3/2019    Performed by [facility] at [facility] OR 2ND FLR    TONSILLECTOMY, ADENOIDECTOMY      TRANSESOPHAGEAL ECHOCARDIOGRAM (SERENITY) N/A 5/23/2017    Performed by Donaldo Mai [facility] at [facility] OR     Family History   Problem Relation Age of Onset    Colon cancer Mother     Lung cancer Mother     Diabetes Mother     Diabetes Father     Heart disease Father     Hypertension Father     Diabetes Maternal Grandmother     Diabetes Maternal Grandfather      Social History     Tobacco Use    Smoking status: Never  Smoker    Smokeless tobacco: Never Used   Substance Use Topics    Alcohol use: No    Drug use: No     Review of Systems   Constitutional: Negative for chills and fever.   HENT: Positive for congestion. Negative for facial swelling and trouble swallowing.    Eyes: Negative for redness.   Respiratory: Positive for cough (dry). Negative for shortness of breath.    Cardiovascular: Negative for chest pain.   Gastrointestinal: Negative for abdominal pain, diarrhea, nausea and vomiting.   Genitourinary: Negative for dysuria and hematuria.   Musculoskeletal: Negative for gait problem.   Skin: Negative for rash.   Neurological: Negative for facial asymmetry and speech difficulty.       Physical Exam     Initial Vitals   BP Pulse Resp Temp SpO2   05/26/19 1759 05/26/19 1758 05/26/19 1758 05/26/19 1758 05/26/19 1758   (!) 194/82 (!) 50 20 98.2 °F (36.8 °C) 99 %      MAP       --                Physical Exam    Nursing note and vitals reviewed.  Constitutional: He appears well-developed and well-nourished. He is not diaphoretic. No distress.   No acute distress   HENT:   Head: Normocephalic and atraumatic.   Mouth/Throat: Oropharynx is clear and moist.   Eyes: Conjunctivae and EOM are normal.   Neck: Normal range of motion. Neck supple.   Cardiovascular: Normal rate, regular rhythm and normal heart sounds. Exam reveals no gallop and no friction rub.    No murmur heard.  Pulmonary/Chest: Breath sounds normal. No respiratory distress. He has no wheezes. He has no rhonchi. He has no rales.   Clear breath sounds bilaterally   Abdominal: Soft. There is no tenderness.   Musculoskeletal: Normal range of motion. He exhibits edema. He exhibits no tenderness.   +2 trace pedal edema   Neurological: He is alert and oriented to person, place, and time. He has normal strength.   Skin: Skin is warm and dry.         ED Course   Procedures  Labs Reviewed - No data to display       X-Rays:   Independently Interpreted Readings:   Other  Readings:  Reviewed by myself, read by radiology.    Imaging Results          X-Ray Chest AP Portable (Final result)  Result time 05/26/19 18:45:47    Final result by Gabriella Drake MD (05/26/19 18:45:47)                 Impression:      No acute cardiopulmonary process identified.      Electronically signed by: Gabriella Drake MD  Date:    05/26/2019  Time:    18:45             Narrative:    EXAMINATION:  XR CHEST AP PORTABLE    CLINICAL HISTORY:  cough;    TECHNIQUE:  Single frontal view of the chest was performed.    COMPARISON:  03/07/2019.    FINDINGS:  Cardiac silhouette is stable in size.  Lungs are hypoinflated.  There is mild interstitial prominence.  No evidence of focal consolidative process, pneumothorax, or significant effusion.  No acute osseous abnormality identified.                              Medical Decision Making:   Clinical Tests:   Radiological Study: Ordered and Reviewed              Attending Attestation:             Attending ED Notes:   I, Dr. Luca Fjaardo, personally performed the services described in this documentation. All medical record entries made by the scribe were at my direction and in my presence.  I have reviewed the chart and agree that the record reflects my personal performance and is accurate and complete. Luca Fajardo MD.  6:41 PM 05/26/2019    Pleasant 58-year-old male with a longstanding history of dialysis Monday Wednesdays and Fridays very compliant with his dialysis regimen presents with a persistent cough for over a week he has been seen by his nephrologist no fevers sweats or chills nausea vomiting diarrhea or constipation  Patient in no acute distress O2 sat 99 pulse of 50 respirations of 20 nonlabored patient does have a lot of phlegm up some pedal edema which is chronic for him he does not feel fluid overloaded chest x-ray showed mild interstitial edema although lung sounds seem to be clear patient is refusing blood work done at this time              Clinical Impression:       ICD-10-CM ICD-9-CM   1. Upper respiratory tract infection, unspecified type J06.9 465.9       Disposition:   Disposition: Discharged  Condition: Stable           Luca Gutierrez MD  05/26/19 1903

## 2019-05-26 NOTE — ED TRIAGE NOTES
Pt presented to ED with c/o non productive cough , sore throat , sinus congestion  , sob when coughing , watery eyes and weakness , pt denies any blurred vision ,  dizziness, ha , or n/v , pt has dialysis MWF , pain 0/10 , vs stable

## 2019-05-27 NOTE — ED NOTES
Awake and alert.  Respirations even and unlabored.  Voices no complaints. Family member at bedside.

## 2019-05-31 ENCOUNTER — TELEPHONE (OUTPATIENT)
Dept: NEUROSURGERY | Facility: CLINIC | Age: 59
End: 2019-05-31

## 2019-05-31 NOTE — TELEPHONE ENCOUNTER
Appointment 06/04/19 with MD cancelled- pt has no transportation until 4:00. Request appt 07/30/19 at 4:00

## 2019-05-31 NOTE — TELEPHONE ENCOUNTER
----- Message from Christy Farmer sent at 5/31/2019  3:57 PM CDT -----  Contact: [phone]/ self   Patient requesting to speak with you regarding rescheduling 6/4 visit to later time on same day. Pt prefers to be seen at 4 or later due to transportation.

## 2019-07-17 ENCOUNTER — HOSPITAL ENCOUNTER (INPATIENT)
Facility: HOSPITAL | Age: 59
LOS: 5 days | Discharge: HOME | DRG: 871 | End: 2019-07-22
Attending: EMERGENCY MEDICINE | Admitting: FAMILY MEDICINE
Payer: MEDICARE

## 2019-07-17 DIAGNOSIS — K59.00 CONSTIPATION, UNSPECIFIED CONSTIPATION TYPE: ICD-10-CM

## 2019-07-17 DIAGNOSIS — N18.6 ESRD (END STAGE RENAL DISEASE) ON DIALYSIS: ICD-10-CM

## 2019-07-17 DIAGNOSIS — R78.81 BACTEREMIA: ICD-10-CM

## 2019-07-17 DIAGNOSIS — R65.21 SEPTIC SHOCK: ICD-10-CM

## 2019-07-17 DIAGNOSIS — Z45.2 ENCOUNTER FOR CENTRAL LINE PLACEMENT: ICD-10-CM

## 2019-07-17 DIAGNOSIS — R00.0 TACHYCARDIA: ICD-10-CM

## 2019-07-17 DIAGNOSIS — Z99.2 ESRD (END STAGE RENAL DISEASE) ON DIALYSIS: ICD-10-CM

## 2019-07-17 DIAGNOSIS — A41.9 SEPSIS: Primary | ICD-10-CM

## 2019-07-17 DIAGNOSIS — A41.9 SEPTIC SHOCK: ICD-10-CM

## 2019-07-17 DIAGNOSIS — D69.6 THROMBOCYTOPENIA: ICD-10-CM

## 2019-07-17 LAB
ALBUMIN SERPL BCP-MCNC: 3.4 G/DL (ref 3.5–5.2)
ALP SERPL-CCNC: 240 U/L (ref 55–135)
ALT SERPL W/O P-5'-P-CCNC: 60 U/L (ref 10–44)
ANION GAP SERPL CALC-SCNC: 15 MMOL/L (ref 8–16)
AST SERPL-CCNC: 63 U/L (ref 10–40)
BASOPHILS # BLD AUTO: 0 K/UL (ref 0–0.2)
BASOPHILS NFR BLD: 0 % (ref 0–1.9)
BILIRUB SERPL-MCNC: 1.3 MG/DL (ref 0.1–1)
BUN SERPL-MCNC: 36 MG/DL (ref 6–20)
CALCIUM SERPL-MCNC: 9.5 MG/DL (ref 8.7–10.5)
CHLORIDE SERPL-SCNC: 100 MMOL/L (ref 95–110)
CO2 SERPL-SCNC: 23 MMOL/L (ref 23–29)
CREAT SERPL-MCNC: 5.3 MG/DL (ref 0.5–1.4)
DIFFERENTIAL METHOD: ABNORMAL
EOSINOPHIL # BLD AUTO: 0 K/UL (ref 0–0.5)
EOSINOPHIL NFR BLD: 0 % (ref 0–8)
ERYTHROCYTE [DISTWIDTH] IN BLOOD BY AUTOMATED COUNT: 15.1 % (ref 11.5–14.5)
EST. GFR  (AFRICAN AMERICAN): 13 ML/MIN/1.73 M^2
EST. GFR  (NON AFRICAN AMERICAN): 11 ML/MIN/1.73 M^2
GLUCOSE SERPL-MCNC: 145 MG/DL (ref 70–110)
HCT VFR BLD AUTO: 32 % (ref 40–54)
HGB BLD-MCNC: 10.2 G/DL (ref 14–18)
INFLUENZA A, MOLECULAR: NEGATIVE
INFLUENZA B, MOLECULAR: NEGATIVE
LACTATE SERPL-SCNC: 3.8 MMOL/L (ref 0.5–2.2)
LIPASE SERPL-CCNC: 11 U/L (ref 4–60)
LYMPHOCYTES # BLD AUTO: 0.2 K/UL (ref 1–4.8)
LYMPHOCYTES NFR BLD: 4 % (ref 18–48)
MAGNESIUM SERPL-MCNC: 1.3 MG/DL (ref 1.6–2.6)
MCH RBC QN AUTO: 34.7 PG (ref 27–31)
MCHC RBC AUTO-ENTMCNC: 31.9 G/DL (ref 32–36)
MCV RBC AUTO: 109 FL (ref 82–98)
MONOCYTES # BLD AUTO: 0.1 K/UL (ref 0.3–1)
MONOCYTES NFR BLD: 1.7 % (ref 4–15)
NEUTROPHILS # BLD AUTO: 5.1 K/UL (ref 1.8–7.7)
NEUTROPHILS NFR BLD: 94.3 % (ref 38–73)
PHOSPHATE SERPL-MCNC: 2.9 MG/DL (ref 2.7–4.5)
PLATELET # BLD AUTO: 69 K/UL (ref 150–350)
PMV BLD AUTO: 11.9 FL (ref 9.2–12.9)
POCT GLUCOSE: 159 MG/DL (ref 70–110)
POTASSIUM SERPL-SCNC: 3.9 MMOL/L (ref 3.5–5.1)
PROCALCITONIN SERPL IA-MCNC: 3.59 NG/ML
PROT SERPL-MCNC: 6.7 G/DL (ref 6–8.4)
RBC # BLD AUTO: 2.94 M/UL (ref 4.6–6.2)
SODIUM SERPL-SCNC: 138 MMOL/L (ref 136–145)
SPECIMEN SOURCE: NORMAL
WBC # BLD AUTO: 5.45 K/UL (ref 3.9–12.7)

## 2019-07-17 PROCEDURE — 93005 ELECTROCARDIOGRAM TRACING: CPT

## 2019-07-17 PROCEDURE — 84100 ASSAY OF PHOSPHORUS: CPT

## 2019-07-17 PROCEDURE — 83605 ASSAY OF LACTIC ACID: CPT

## 2019-07-17 PROCEDURE — 83690 ASSAY OF LIPASE: CPT

## 2019-07-17 PROCEDURE — 87502 INFLUENZA DNA AMP PROBE: CPT

## 2019-07-17 PROCEDURE — 63600175 PHARM REV CODE 636 W HCPCS: Performed by: EMERGENCY MEDICINE

## 2019-07-17 PROCEDURE — 96361 HYDRATE IV INFUSION ADD-ON: CPT

## 2019-07-17 PROCEDURE — 87077 CULTURE AEROBIC IDENTIFY: CPT

## 2019-07-17 PROCEDURE — 93010 EKG 12-LEAD: ICD-10-PCS | Mod: ,,, | Performed by: INTERNAL MEDICINE

## 2019-07-17 PROCEDURE — 80053 COMPREHEN METABOLIC PANEL: CPT

## 2019-07-17 PROCEDURE — 82962 GLUCOSE BLOOD TEST: CPT

## 2019-07-17 PROCEDURE — 12000002 HC ACUTE/MED SURGE SEMI-PRIVATE ROOM

## 2019-07-17 PROCEDURE — 93010 ELECTROCARDIOGRAM REPORT: CPT | Mod: ,,, | Performed by: INTERNAL MEDICINE

## 2019-07-17 PROCEDURE — 25000003 PHARM REV CODE 250: Performed by: EMERGENCY MEDICINE

## 2019-07-17 PROCEDURE — 84145 PROCALCITONIN (PCT): CPT

## 2019-07-17 PROCEDURE — 83735 ASSAY OF MAGNESIUM: CPT

## 2019-07-17 PROCEDURE — 87040 BLOOD CULTURE FOR BACTERIA: CPT | Mod: 59

## 2019-07-17 PROCEDURE — 99291 CRITICAL CARE FIRST HOUR: CPT | Mod: 25

## 2019-07-17 PROCEDURE — 85025 COMPLETE CBC W/AUTO DIFF WBC: CPT

## 2019-07-17 PROCEDURE — 87186 SC STD MICRODIL/AGAR DIL: CPT

## 2019-07-17 PROCEDURE — 96365 THER/PROPH/DIAG IV INF INIT: CPT

## 2019-07-17 PROCEDURE — 96375 TX/PRO/DX INJ NEW DRUG ADDON: CPT

## 2019-07-17 RX ORDER — ACETAMINOPHEN 500 MG
1000 TABLET ORAL
Status: COMPLETED | OUTPATIENT
Start: 2019-07-17 | End: 2019-07-17

## 2019-07-17 RX ORDER — IBUPROFEN 400 MG/1
800 TABLET ORAL
Status: COMPLETED | OUTPATIENT
Start: 2019-07-17 | End: 2019-07-17

## 2019-07-17 RX ORDER — NOREPINEPHRINE BITARTRATE/D5W 16MG/250ML
0.02 PLASTIC BAG, INJECTION (ML) INTRAVENOUS CONTINUOUS
Status: DISCONTINUED | OUTPATIENT
Start: 2019-07-17 | End: 2019-07-18

## 2019-07-17 RX ADMIN — SODIUM CHLORIDE 2052 ML: 0.9 INJECTION, SOLUTION INTRAVENOUS at 09:07

## 2019-07-17 RX ADMIN — ACETAMINOPHEN 1000 MG: 500 TABLET ORAL at 11:07

## 2019-07-17 RX ADMIN — PIPERACILLIN AND TAZOBACTAM 4.5 G: 4; .5 INJECTION, POWDER, LYOPHILIZED, FOR SOLUTION INTRAVENOUS; PARENTERAL at 09:07

## 2019-07-17 RX ADMIN — IBUPROFEN 800 MG: 400 TABLET, FILM COATED ORAL at 09:07

## 2019-07-17 RX ADMIN — VANCOMYCIN HYDROCHLORIDE 2000 MG: 100 INJECTION, POWDER, LYOPHILIZED, FOR SOLUTION INTRAVENOUS at 10:07

## 2019-07-18 ENCOUNTER — ANESTHESIA EVENT (OUTPATIENT)
Dept: EMERGENCY MEDICINE | Facility: HOSPITAL | Age: 59
DRG: 871 | End: 2019-07-18
Payer: MEDICARE

## 2019-07-18 ENCOUNTER — ANESTHESIA (OUTPATIENT)
Dept: EMERGENCY MEDICINE | Facility: HOSPITAL | Age: 59
DRG: 871 | End: 2019-07-18
Payer: MEDICARE

## 2019-07-18 LAB
ALBUMIN SERPL BCP-MCNC: 3 G/DL (ref 3.5–5.2)
ALLENS TEST: ABNORMAL
ALLENS TEST: ABNORMAL
ALP SERPL-CCNC: 215 U/L (ref 55–135)
ALT SERPL W/O P-5'-P-CCNC: 62 U/L (ref 10–44)
AMMONIA PLAS-SCNC: 61 UMOL/L (ref 10–50)
ANION GAP SERPL CALC-SCNC: 14 MMOL/L (ref 8–16)
APTT BLDCRRT: 31.8 SEC (ref 21–32)
AST SERPL-CCNC: 70 U/L (ref 10–40)
BASOPHILS # BLD AUTO: 0 K/UL (ref 0–0.2)
BASOPHILS NFR BLD: 0 % (ref 0–1.9)
BILIRUB SERPL-MCNC: 1.4 MG/DL (ref 0.1–1)
BUN SERPL-MCNC: 40 MG/DL (ref 6–20)
CALCIUM SERPL-MCNC: 9.1 MG/DL (ref 8.7–10.5)
CHLORIDE SERPL-SCNC: 101 MMOL/L (ref 95–110)
CO2 SERPL-SCNC: 20 MMOL/L (ref 23–29)
CREAT SERPL-MCNC: 5.9 MG/DL (ref 0.5–1.4)
DELSYS: ABNORMAL
DELSYS: ABNORMAL
DIFFERENTIAL METHOD: ABNORMAL
EOSINOPHIL # BLD AUTO: 0 K/UL (ref 0–0.5)
EOSINOPHIL NFR BLD: 0 % (ref 0–8)
EP: 8
ERYTHROCYTE [DISTWIDTH] IN BLOOD BY AUTOMATED COUNT: 15.1 % (ref 11.5–14.5)
EST. GFR  (AFRICAN AMERICAN): 11 ML/MIN/1.73 M^2
EST. GFR  (NON AFRICAN AMERICAN): 10 ML/MIN/1.73 M^2
ESTIMATED AVG GLUCOSE: 97 MG/DL (ref 68–131)
FIO2: 30
GLUCOSE SERPL-MCNC: 176 MG/DL (ref 70–110)
HBA1C MFR BLD HPLC: 5 % (ref 4–5.6)
HCO3 UR-SCNC: 17.7 MMOL/L (ref 24–28)
HCO3 UR-SCNC: 18.4 MMOL/L (ref 24–28)
HCT VFR BLD AUTO: 31.5 % (ref 40–54)
HGB BLD-MCNC: 10 G/DL (ref 14–18)
INR PPP: 1.1 (ref 0.8–1.2)
IP: 16
LACTATE SERPL-SCNC: 1.9 MMOL/L (ref 0.5–2.2)
LACTATE SERPL-SCNC: 2 MMOL/L (ref 0.5–2.2)
LACTATE SERPL-SCNC: 2.3 MMOL/L (ref 0.5–2.2)
LACTATE SERPL-SCNC: 2.9 MMOL/L (ref 0.5–2.2)
LACTATE SERPL-SCNC: 3.9 MMOL/L (ref 0.5–2.2)
LYMPHOCYTES # BLD AUTO: 0.2 K/UL (ref 1–4.8)
LYMPHOCYTES NFR BLD: 6.2 % (ref 18–48)
MAGNESIUM SERPL-MCNC: 1.1 MG/DL (ref 1.6–2.6)
MAGNESIUM SERPL-MCNC: 1.1 MG/DL (ref 1.6–2.6)
MCH RBC QN AUTO: 34.8 PG (ref 27–31)
MCHC RBC AUTO-ENTMCNC: 31.7 G/DL (ref 32–36)
MCV RBC AUTO: 110 FL (ref 82–98)
MODE: ABNORMAL
MONOCYTES # BLD AUTO: 0 K/UL (ref 0.3–1)
MONOCYTES NFR BLD: 0.8 % (ref 4–15)
NEUTROPHILS # BLD AUTO: 2.4 K/UL (ref 1.8–7.7)
NEUTROPHILS NFR BLD: 93 % (ref 38–73)
PCO2 BLDA: 30.6 MMHG (ref 35–45)
PCO2 BLDA: 33.4 MMHG (ref 35–45)
PH SMN: 7.35 [PH] (ref 7.35–7.45)
PH SMN: 7.37 [PH] (ref 7.35–7.45)
PHOSPHATE SERPL-MCNC: 2.9 MG/DL (ref 2.7–4.5)
PHOSPHATE SERPL-MCNC: 2.9 MG/DL (ref 2.7–4.5)
PLATELET # BLD AUTO: 58 K/UL (ref 150–350)
PMV BLD AUTO: 11.9 FL (ref 9.2–12.9)
PO2 BLDA: 103 MMHG (ref 80–100)
PO2 BLDA: 85 MMHG (ref 80–100)
POC BE: -7 MMOL/L
POC BE: -8 MMOL/L
POC SATURATED O2: 96 % (ref 95–100)
POC SATURATED O2: 98 % (ref 95–100)
POC TCO2: 19 MMOL/L (ref 23–27)
POC TCO2: 19 MMOL/L (ref 23–27)
POCT GLUCOSE: 138 MG/DL (ref 70–110)
POCT GLUCOSE: 183 MG/DL (ref 70–110)
POCT GLUCOSE: 202 MG/DL (ref 70–110)
POCT GLUCOSE: 207 MG/DL (ref 70–110)
POTASSIUM SERPL-SCNC: 3.8 MMOL/L (ref 3.5–5.1)
PROT SERPL-MCNC: 6.1 G/DL (ref 6–8.4)
PROTHROMBIN TIME: 11.9 SEC (ref 9–12.5)
RBC # BLD AUTO: 2.87 M/UL (ref 4.6–6.2)
SAMPLE: ABNORMAL
SAMPLE: ABNORMAL
SITE: ABNORMAL
SITE: ABNORMAL
SODIUM SERPL-SCNC: 135 MMOL/L (ref 136–145)
T4 FREE SERPL-MCNC: 0.88 NG/DL (ref 0.71–1.51)
TSH SERPL DL<=0.005 MIU/L-ACNC: 0.38 UIU/ML (ref 0.4–4)
WBC # BLD AUTO: 2.58 K/UL (ref 3.9–12.7)

## 2019-07-18 PROCEDURE — 99900035 HC TECH TIME PER 15 MIN (STAT)

## 2019-07-18 PROCEDURE — 93010 ELECTROCARDIOGRAM REPORT: CPT | Mod: ,,, | Performed by: INTERNAL MEDICINE

## 2019-07-18 PROCEDURE — 25000003 PHARM REV CODE 250: Performed by: FAMILY MEDICINE

## 2019-07-18 PROCEDURE — 83036 HEMOGLOBIN GLYCOSYLATED A1C: CPT

## 2019-07-18 PROCEDURE — 84439 ASSAY OF FREE THYROXINE: CPT

## 2019-07-18 PROCEDURE — 80053 COMPREHEN METABOLIC PANEL: CPT

## 2019-07-18 PROCEDURE — 83605 ASSAY OF LACTIC ACID: CPT | Mod: 91

## 2019-07-18 PROCEDURE — 20000000 HC ICU ROOM

## 2019-07-18 PROCEDURE — 63600175 PHARM REV CODE 636 W HCPCS: Performed by: STUDENT IN AN ORGANIZED HEALTH CARE EDUCATION/TRAINING PROGRAM

## 2019-07-18 PROCEDURE — 27000221 HC OXYGEN, UP TO 24 HOURS

## 2019-07-18 PROCEDURE — 25000003 PHARM REV CODE 250: Performed by: STUDENT IN AN ORGANIZED HEALTH CARE EDUCATION/TRAINING PROGRAM

## 2019-07-18 PROCEDURE — 36556 INSERT NON-TUNNEL CV CATH: CPT | Performed by: ANESTHESIOLOGY

## 2019-07-18 PROCEDURE — 94660 CPAP INITIATION&MGMT: CPT

## 2019-07-18 PROCEDURE — 84443 ASSAY THYROID STIM HORMONE: CPT

## 2019-07-18 PROCEDURE — 36600 WITHDRAWAL OF ARTERIAL BLOOD: CPT

## 2019-07-18 PROCEDURE — 85730 THROMBOPLASTIN TIME PARTIAL: CPT

## 2019-07-18 PROCEDURE — 93005 ELECTROCARDIOGRAM TRACING: CPT

## 2019-07-18 PROCEDURE — 85610 PROTHROMBIN TIME: CPT

## 2019-07-18 PROCEDURE — 94761 N-INVAS EAR/PLS OXIMETRY MLT: CPT

## 2019-07-18 PROCEDURE — 25500020 PHARM REV CODE 255: Performed by: FAMILY MEDICINE

## 2019-07-18 PROCEDURE — 25000003 PHARM REV CODE 250: Performed by: EMERGENCY MEDICINE

## 2019-07-18 PROCEDURE — 84100 ASSAY OF PHOSPHORUS: CPT

## 2019-07-18 PROCEDURE — 85025 COMPLETE CBC W/AUTO DIFF WBC: CPT

## 2019-07-18 PROCEDURE — 27000190 HC CPAP FULL FACE MASK W/VALVE

## 2019-07-18 PROCEDURE — 82140 ASSAY OF AMMONIA: CPT

## 2019-07-18 PROCEDURE — 82803 BLOOD GASES ANY COMBINATION: CPT

## 2019-07-18 PROCEDURE — 83735 ASSAY OF MAGNESIUM: CPT

## 2019-07-18 PROCEDURE — 93010 EKG 12-LEAD: ICD-10-PCS | Mod: ,,, | Performed by: INTERNAL MEDICINE

## 2019-07-18 RX ORDER — SODIUM CHLORIDE 0.9 % (FLUSH) 0.9 %
5 SYRINGE (ML) INJECTION
Status: DISCONTINUED | OUTPATIENT
Start: 2019-07-18 | End: 2019-07-22 | Stop reason: HOSPADM

## 2019-07-18 RX ORDER — SEVELAMER CARBONATE 800 MG/1
1600 TABLET, FILM COATED ORAL
Status: DISCONTINUED | OUTPATIENT
Start: 2019-07-18 | End: 2019-07-22 | Stop reason: HOSPADM

## 2019-07-18 RX ORDER — ACETAMINOPHEN 325 MG/1
650 TABLET ORAL EVERY 4 HOURS PRN
Status: DISCONTINUED | OUTPATIENT
Start: 2019-07-18 | End: 2019-07-22 | Stop reason: HOSPADM

## 2019-07-18 RX ORDER — ACETAMINOPHEN 325 MG/1
650 TABLET ORAL EVERY 8 HOURS PRN
Status: DISCONTINUED | OUTPATIENT
Start: 2019-07-18 | End: 2019-07-22 | Stop reason: HOSPADM

## 2019-07-18 RX ORDER — PANTOPRAZOLE SODIUM 40 MG/1
40 TABLET, DELAYED RELEASE ORAL DAILY
Status: DISCONTINUED | OUTPATIENT
Start: 2019-07-18 | End: 2019-07-22 | Stop reason: HOSPADM

## 2019-07-18 RX ORDER — LANTHANUM CARBONATE 500 MG/1
1000 TABLET, CHEWABLE ORAL
Status: DISCONTINUED | OUTPATIENT
Start: 2019-07-18 | End: 2019-07-22 | Stop reason: HOSPADM

## 2019-07-18 RX ORDER — AMOXICILLIN 250 MG
1 CAPSULE ORAL 2 TIMES DAILY
Status: DISCONTINUED | OUTPATIENT
Start: 2019-07-18 | End: 2019-07-22 | Stop reason: HOSPADM

## 2019-07-18 RX ORDER — MAGNESIUM SULFATE HEPTAHYDRATE 40 MG/ML
4 INJECTION, SOLUTION INTRAVENOUS ONCE
Status: DISCONTINUED | OUTPATIENT
Start: 2019-07-18 | End: 2019-07-18

## 2019-07-18 RX ORDER — FLUOXETINE HYDROCHLORIDE 20 MG/1
20 CAPSULE ORAL DAILY
Status: DISCONTINUED | OUTPATIENT
Start: 2019-07-18 | End: 2019-07-22 | Stop reason: HOSPADM

## 2019-07-18 RX ORDER — IBUPROFEN 200 MG
16 TABLET ORAL
Status: DISCONTINUED | OUTPATIENT
Start: 2019-07-18 | End: 2019-07-22 | Stop reason: HOSPADM

## 2019-07-18 RX ORDER — INSULIN ASPART 100 [IU]/ML
1-10 INJECTION, SOLUTION INTRAVENOUS; SUBCUTANEOUS
Status: DISCONTINUED | OUTPATIENT
Start: 2019-07-18 | End: 2019-07-22 | Stop reason: HOSPADM

## 2019-07-18 RX ORDER — GABAPENTIN 300 MG/1
300 CAPSULE ORAL NIGHTLY
Status: DISCONTINUED | OUTPATIENT
Start: 2019-07-18 | End: 2019-07-22 | Stop reason: HOSPADM

## 2019-07-18 RX ORDER — METOCLOPRAMIDE HYDROCHLORIDE 5 MG/5ML
5 SOLUTION ORAL
Status: DISCONTINUED | OUTPATIENT
Start: 2019-07-18 | End: 2019-07-22 | Stop reason: HOSPADM

## 2019-07-18 RX ORDER — HEPARIN SODIUM 5000 [USP'U]/ML
5000 INJECTION, SOLUTION INTRAVENOUS; SUBCUTANEOUS EVERY 8 HOURS
Status: DISCONTINUED | OUTPATIENT
Start: 2019-07-18 | End: 2019-07-21

## 2019-07-18 RX ORDER — DEXTROSE 50 % IN WATER (D50W) INTRAVENOUS SYRINGE
25
Status: DISCONTINUED | OUTPATIENT
Start: 2019-07-18 | End: 2019-07-22 | Stop reason: HOSPADM

## 2019-07-18 RX ORDER — PRAVASTATIN SODIUM 20 MG/1
40 TABLET ORAL DAILY
Status: DISCONTINUED | OUTPATIENT
Start: 2019-07-18 | End: 2019-07-22 | Stop reason: HOSPADM

## 2019-07-18 RX ORDER — SODIUM CHLORIDE FOR INHALATION 3 %
4 VIAL, NEBULIZER (ML) INHALATION
Status: DISCONTINUED | OUTPATIENT
Start: 2019-07-18 | End: 2019-07-22 | Stop reason: HOSPADM

## 2019-07-18 RX ORDER — ALBUTEROL SULFATE 2.5 MG/.5ML
2.5 SOLUTION RESPIRATORY (INHALATION)
Status: DISCONTINUED | OUTPATIENT
Start: 2019-07-18 | End: 2019-07-22 | Stop reason: HOSPADM

## 2019-07-18 RX ORDER — NOREPINEPHRINE BITARTRATE/D5W 16MG/250ML
0.02 PLASTIC BAG, INJECTION (ML) INTRAVENOUS CONTINUOUS
Status: DISCONTINUED | OUTPATIENT
Start: 2019-07-18 | End: 2019-07-18

## 2019-07-18 RX ORDER — MAGNESIUM SULFATE HEPTAHYDRATE 40 MG/ML
2 INJECTION, SOLUTION INTRAVENOUS ONCE
Status: COMPLETED | OUTPATIENT
Start: 2019-07-18 | End: 2019-07-18

## 2019-07-18 RX ORDER — IBUPROFEN 200 MG
24 TABLET ORAL
Status: DISCONTINUED | OUTPATIENT
Start: 2019-07-18 | End: 2019-07-22 | Stop reason: HOSPADM

## 2019-07-18 RX ORDER — ACETAMINOPHEN 325 MG/1
650 TABLET ORAL EVERY 8 HOURS PRN
Status: DISCONTINUED | OUTPATIENT
Start: 2019-07-18 | End: 2019-07-18

## 2019-07-18 RX ORDER — GLUCAGON 1 MG
1 KIT INJECTION
Status: DISCONTINUED | OUTPATIENT
Start: 2019-07-18 | End: 2019-07-22 | Stop reason: HOSPADM

## 2019-07-18 RX ORDER — ONDANSETRON 8 MG/1
8 TABLET, ORALLY DISINTEGRATING ORAL EVERY 6 HOURS PRN
Status: DISCONTINUED | OUTPATIENT
Start: 2019-07-18 | End: 2019-07-22 | Stop reason: HOSPADM

## 2019-07-18 RX ORDER — DEXTROSE 50 % IN WATER (D50W) INTRAVENOUS SYRINGE
12.5
Status: DISCONTINUED | OUTPATIENT
Start: 2019-07-18 | End: 2019-07-22 | Stop reason: HOSPADM

## 2019-07-18 RX ADMIN — HEPARIN SODIUM 5000 UNITS: 5000 INJECTION, SOLUTION INTRAVENOUS; SUBCUTANEOUS at 09:07

## 2019-07-18 RX ADMIN — NOREPINEPHRINE BITARTRATE 0.45 MCG/KG/MIN: 1 INJECTION, SOLUTION, CONCENTRATE INTRAVENOUS at 09:07

## 2019-07-18 RX ADMIN — INSULIN ASPART 4 UNITS: 100 INJECTION, SOLUTION INTRAVENOUS; SUBCUTANEOUS at 04:07

## 2019-07-18 RX ADMIN — PANTOPRAZOLE SODIUM 40 MG: 40 TABLET, DELAYED RELEASE ORAL at 09:07

## 2019-07-18 RX ADMIN — SENNOSIDES,DOCUSATE SODIUM 1 TABLET: 8.6; 5 TABLET, FILM COATED ORAL at 09:07

## 2019-07-18 RX ADMIN — HEPARIN SODIUM 5000 UNITS: 5000 INJECTION, SOLUTION INTRAVENOUS; SUBCUTANEOUS at 01:07

## 2019-07-18 RX ADMIN — METOCLOPRAMIDE HYDROCHLORIDE 5 MG: 5 SOLUTION ORAL at 06:07

## 2019-07-18 RX ADMIN — NOREPINEPHRINE BITARTRATE 0.45 MCG/KG/MIN: 1 INJECTION, SOLUTION, CONCENTRATE INTRAVENOUS at 05:07

## 2019-07-18 RX ADMIN — FLUOXETINE 20 MG: 20 CAPSULE ORAL at 09:07

## 2019-07-18 RX ADMIN — INSULIN ASPART 2 UNITS: 100 INJECTION, SOLUTION INTRAVENOUS; SUBCUTANEOUS at 06:07

## 2019-07-18 RX ADMIN — LANTHANUM CARBONATE 1000 MG: 500 TABLET, CHEWABLE ORAL at 07:07

## 2019-07-18 RX ADMIN — INSULIN ASPART 4 UNITS: 100 INJECTION, SOLUTION INTRAVENOUS; SUBCUTANEOUS at 12:07

## 2019-07-18 RX ADMIN — METOCLOPRAMIDE HYDROCHLORIDE 5 MG: 5 SOLUTION ORAL at 09:07

## 2019-07-18 RX ADMIN — PRAVASTATIN SODIUM 40 MG: 40 TABLET ORAL at 09:07

## 2019-07-18 RX ADMIN — SEVELAMER CARBONATE 1600 MG: 800 TABLET, FILM COATED ORAL at 07:07

## 2019-07-18 RX ADMIN — MICAFUNGIN SODIUM 100 MG: 20 INJECTION, POWDER, LYOPHILIZED, FOR SOLUTION INTRAVENOUS at 09:07

## 2019-07-18 RX ADMIN — IOHEXOL 100 ML: 350 INJECTION, SOLUTION INTRAVENOUS at 03:07

## 2019-07-18 RX ADMIN — MAGNESIUM SULFATE IN WATER 2 G: 40 INJECTION, SOLUTION INTRAVENOUS at 03:07

## 2019-07-18 RX ADMIN — ACETAMINOPHEN 650 MG: 325 TABLET ORAL at 07:07

## 2019-07-18 RX ADMIN — HEPARIN SODIUM 5000 UNITS: 5000 INJECTION, SOLUTION INTRAVENOUS; SUBCUTANEOUS at 06:07

## 2019-07-18 RX ADMIN — DEXTROSE 0.02 MCG/KG/MIN: 5 SOLUTION INTRAVENOUS at 12:07

## 2019-07-18 RX ADMIN — SODIUM CHLORIDE 500 ML: 0.9 INJECTION, SOLUTION INTRAVENOUS at 06:07

## 2019-07-18 RX ADMIN — GABAPENTIN 300 MG: 300 CAPSULE ORAL at 09:07

## 2019-07-18 NOTE — ANESTHESIA PROCEDURE NOTES
Central Line    Diagnosis: sepsis  Patient location during procedure: ED  Procedure start time: 7/18/2019 11:49 PM  Timeout: 7/18/2019 11:49 PM  Procedure end time: 7/18/2019 12:10 PM    Staffing  Authorizing Provider: Gildardo Arias MD  Performing Provider: Gildardo Arias MD    Staffing  Anesthesiologist: Rocco Horne MD  Resident/CRNA: Gildardo Arias MD  Performed: resident/CRNA   Anesthesiologist was present at the time of the procedure.  Preanesthetic Checklist  Completed: patient identified, site marked, surgical consent, pre-op evaluation, timeout performed, IV checked, risks and benefits discussed, monitors and equipment checked and anesthesia consent given  Indication   Indication: hemodynamic monitoring, vascular access, med administration     Anesthesia   local infiltration    Central Line   Skin Prep: skin prepped with ChloraPrep, skin prep agent completely dried prior to procedure  maximum sterile barriers used during central venous catheter insertion  hand hygiene performed prior to central venous catheter insertion  Location: right, internal jugular.   Catheter type: triple lumen  Catheter Size: 7 Fr  Inserted Catheter Length: 16 cm  Ultrasound: vascular probe with ultrasound  Vessel Caliber: large, patent  Vascular Doppler:  not done, compressibility normal  Needle advanced into vessel with real time Ultrasound guidance.  Manometry: none  Insertion Attempts: 1   Securement:line sutured, chlorhexidine patch, sterile dressing applied and blood return through all ports    Post-Procedure   X-Ray: placement verified by x-ray, no pneumothorax on x-ray and successful placement  Adverse Events:none    Guidewire Guidewire removed intact. Guidewire removed intact, verified with nurse.

## 2019-07-18 NOTE — H&P
[facility] Medicine  History & Physical    Patient Name: Angel Farmer Jr.  MRN: [id number]  Admission Date: 7/17/2019  Attending Physician: Beckie Zarate MD   Primary Care Provider: Satya Noriega MD         Patient information was obtained from patient, spouse/SO and ER records.     Subjective:     Principal Problem:Sepsis    Chief Complaint:   Chief Complaint   Patient presents with    Fever     patient reports temp 104 with weakness. patient has hx of ESRD had dialysis today. current temp 103.0. Patient reports having chest congestion with cough X 2 weeks has been taking Mucinex OTC.         HPI: Mr. Farmer is a 58 year old man with PMHx of ESRD on HD MWF, IDDM, HTN/HLD and MIKE presented to ED with 1 day history of fever to 1044, cough and generalized malaise. He was in his usual state of health until this morning after completing 4 hours of HD when he felt weak, light headed, and hot. His wife took his temperature and it was 104, so they presented to the ED. He also notes constipation, that is usual for him. He states he has a BM every 4 days, he no longer makes urine. He denies chest pain, palpitations, shortness of breath, abdominal pain, diarrhea, or LE edema.     In the ED he was febrile to 103, LA elevaated to 3.8, he became hypotensive and tachycardic meeting Severe sepsis. He received a total of 2L NS with minimal improvement in BP, anesthesia was consulted and central line placed and started on levophed and will be admitted to the ICU.     Past Medical History:   Diagnosis Date    Anemia due to stage 5 chronic kidney disease 3/1/2019    Diabetes mellitus type II     Dialysis patient     Hyperlipidemia     Hypertension     Kidney failure     MIKE (obstructive sleep apnea)     Urinary tract infection        Past Surgical History:   Procedure Laterality Date    AV FISTULA PLACEMENT      left lower arm    SPINE, CERVICAL, POSTERIOR APPROACH  LAMINECTOMY  "C3-C6; C6-C7; WITH MEDIAL DISCECTOMY N/A 3/3/2019    Performed by Ruddy W[facility]ie MD at [facility] OR 2ND FLR    TONSILLECTOMY, ADENOIDECTOMY      TRANSESOPHAGEAL ECHOCARDIOGRAM (SERENITY) N/A 5/23/2017    Performed by Donaldo [facility] at [facility] OR       Review of patient's allergies indicates:   Allergen Reactions    Keflex [cephalexin] Nausea Only       No current facility-administered medications on file prior to encounter.      Current Outpatient Medications on File Prior to Encounter   Medication Sig    acetaminophen (TYLENOL) 500 MG tablet Take 1 tablet (500 mg total) by mouth every 6 (six) hours as needed.    ammonium lactate 12 % Crea Apply 1 application topically 3 (three) times daily as needed.    azithromycin (Z-SHOAIB) 250 MG tablet Take 1 tablet (250 mg total) by mouth once daily. Take first 2 tablets together, then 1 every day until finished.    BD ULTRA-FINE MINI PEN NEEDLE 31 gauge x 3/16" Ndle USE 4 TO 5 TIMES A DAY     ( DISCARD PEN NEEDLE AFTER EACH USE )    blood-glucose meter (ADVANCED GLUCOSE METER) Misc 1 each by Misc.(Non-Drug; Combo Route) route 4 (four) times daily with meals and nightly.    diclofenac sodium (VOLTAREN) 1 % Gel Apply topically once daily.    epoetin shefali 3,000 unit/mL Soln 3,000 Units, epoetin sheafli 4,000 unit/mL Soln 4,000 Units injection Inject 7,000 Units into the skin every Mon, Wed, Fri.    FLUoxetine 20 MG capsule Take 1 capsule (20 mg total) by mouth once daily.    fluticasone (FLONASE) 50 mcg/actuation nasal spray 1 spray by Each Nare route once daily.    gabapentin (NEURONTIN) 300 MG capsule TAKE 1 BY MOUTH EVERY      EVENING    heparin sodium,porcine (HEPARIN, PORCINE,) 5,000 unit/mL injection Inject 1 mL (5,000 Units total) into the skin every 8 (eight) hours.    insulin aspart U-100 (NOVOLOG) 100 unit/mL InPn pen Inject 0-5 Units into the skin before meals and at bedtime as needed (Hyperglycemia).    insulin lispro 100 unit/mL injection Inject 15 Units " into the skin.    lancets Misc 1 each by Misc.(Non-Drug; Combo Route) route 4 (four) times daily with meals and nightly.    lanthanum (FOSRENOL) 1000 MG chewable tablet Take 1,000 mg by mouth.    metoclopramide HCl (REGLAN) 5 mg/5 mL Soln Take 5 mLs (5 mg total) by mouth 4 (four) times daily before meals and nightly.    miconazole NITRATE 2 % (MICOTIN) 2 % top powder Apply topically 2 (two) times daily as needed (moisture).    midodrine (PROAMATINE) 10 MG tablet Take 2 tablets (20 mg total) by mouth every 6 (six) hours.    nystatin (MYCOSTATIN) powder Apply topically 2 (two) times daily. To sacral region    ONETOUCH ULTRA BLUE TEST STRIP Strp USE ONE STRIP TO CHECK BLOOD GLUCOSE 4 TIMES DAILY.AT MORNING, NOON,6:OO PM AND BEDTIME.    oxyCODONE (ROXICODONE) 5 MG immediate release tablet Take 1 tablet (5 mg total) by mouth every 6 (six) hours as needed.    pantoprazole (PROTONIX) 40 mg GrPS Take 1 packet (40 mg total) by mouth 2 (two) times daily.    pravastatin (PRAVACHOL) 40 MG tablet Take 1 tablet (40 mg total) by mouth once daily. (Patient taking differently: Take 40 mg by mouth every evening. )    pravastatin (PRAVACHOL) 40 MG tablet Take 40 mg by mouth.    sevelamer carbonate (RENVELA) 800 mg Tab Take 2 tablets (1,600 mg total) by mouth 3 (three) times daily with meals.    simethicone (MYLICON) 80 MG chewable tablet Take 1 tablet (80 mg total) by mouth 3 (three) times daily as needed for Flatulence.     Family History     Problem Relation (Age of Onset)    Colon cancer Mother    Diabetes Mother, Father, Maternal Grandmother, Maternal Grandfather    Heart disease Father    Hypertension Father    Lung cancer Mother        Tobacco Use    Smoking status: Never Smoker    Smokeless tobacco: Never Used   Substance and Sexual Activity    Alcohol use: No    Drug use: No    Sexual activity: Not Currently     Review of Systems   Constitutional: Positive for activity change, appetite change, chills,  diaphoresis, fatigue and fever. Negative for unexpected weight change.   HENT: Negative for congestion, hearing loss, postnasal drip, rhinorrhea, sinus pain, sneezing and sore throat.    Eyes: Negative for photophobia and visual disturbance.   Respiratory: Negative for apnea, cough, choking, chest tightness, shortness of breath and wheezing.    Cardiovascular: Negative for chest pain, palpitations and leg swelling.   Gastrointestinal: Positive for constipation. Negative for abdominal distention, abdominal pain, diarrhea, nausea and vomiting.   Endocrine: Negative for cold intolerance, heat intolerance and polyuria.   Genitourinary: Positive for difficulty urinating (patient does not make urine ). Negative for flank pain, frequency and urgency.   Musculoskeletal: Negative for arthralgias, back pain and myalgias.   Skin: Negative for rash.   Neurological: Positive for dizziness and light-headedness. Negative for seizures, syncope, speech difficulty, weakness, numbness and headaches.   Hematological: Negative for adenopathy. Does not bruise/bleed easily.   Psychiatric/Behavioral: Negative for behavioral problems, confusion, decreased concentration and hallucinations. The patient is not nervous/anxious.      Objective:     Vital Signs (Most Recent):  Temp: (!) 103 °F (39.4 °C) (07/17/19 2303)  Pulse: 106 (07/18/19 0005)  Resp: 20 (07/18/19 0005)  BP: (!) 76/48 (07/18/19 0005)  SpO2: 100 % (07/18/19 0005) Vital Signs (24h Range):  Temp:  [103 °F (39.4 °C)] 103 °F (39.4 °C)  Pulse:  [] 106  Resp:  [18-32] 20  SpO2:  [95 %-100 %] 100 %  BP: ()/(43-79) 76/48     Weight: 120.2 kg (265 lb)  Body mass index is 40.29 kg/m².    Physical Exam   Constitutional: He is oriented to person, place, and time. He appears well-developed and well-nourished. He appears distressed.   HENT:   Head: Normocephalic and atraumatic.   Nose: Nose normal.   Mouth/Throat: Oropharynx is clear and moist. No oropharyngeal exudate.   Eyes:  Pupils are equal, round, and reactive to light. Conjunctivae and EOM are normal. Right eye exhibits no discharge. Left eye exhibits no discharge. No scleral icterus.   Neck: Normal range of motion. Neck supple.   Cardiovascular: Normal rate, regular rhythm, normal heart sounds and intact distal pulses. Exam reveals no gallop and no friction rub.   No murmur heard.  Pulmonary/Chest: Effort normal and breath sounds normal. No stridor. No respiratory distress. He has no wheezes. He has no rales. He exhibits no tenderness.   Abdominal: Soft. Bowel sounds are normal. He exhibits no distension and no mass. There is no tenderness. There is no rebound. No hernia.   Genitourinary: Penis normal.   Musculoskeletal: Normal range of motion. He exhibits no edema, tenderness or deformity.   Lymphadenopathy:     He has no cervical adenopathy.   Neurological: He is alert and oriented to person, place, and time. No cranial nerve deficit or sensory deficit.   Skin: Skin is warm and dry. Capillary refill takes less than 2 seconds. No rash noted. He is not diaphoretic.   Shunt in Left forearm with palpable thrill      Psychiatric: He has a normal mood and affect. His behavior is normal.   Nursing note and vitals reviewed.        CRANIAL NERVES     CN III, IV, VI   Pupils are equal, round, and reactive to light.  Extraocular motions are normal.        Significant Labs:  Recent Labs     07/17/19 2148   WBC 5.45   HGB 10.2*   HCT 32.0*   PLT 69*   *   RDW 15.1*       Recent Labs     07/17/19  2148      K 3.9      CO2 23   *   BUN 36*   CREATININE 5.3*   CALCIUM 9.5   PROT 6.7   ALBUMIN 3.4*   BILITOT 1.3*   ALKPHOS 240*   AST 63*   ALT 60*   ANIONGAP 15   ESTGFRAFRICA 13*   EGFRNONAA 11*       Recent Labs     07/17/19  2148   MG 1.3*   PHOS 2.9       Coags  Lab Results   Component Value Date    INR 1.0 03/20/2019    INR 1.1 03/19/2019    INR 1.0 03/18/2019    APTT 23.4 03/04/2019    APTT 25.2 03/03/2019    APTT  31.9 02/27/2019     No results for input(s): PT, INR, APTT in the last 168 hours.    A1c:   Lab Results   Component Value Date    HGBA1C 5.0 02/27/2019   , Last Gluc:   Recent Labs   Lab 07/17/19 2128   POCTGLUCOSE 159*       TSH:   Lab Results   Component Value Date    TSH 1.541 02/27/2019       Micro  Microbiology Results (last 7 days)     Procedure Component Value Units Date/Time    Influenza A & B by Molecular [[id number]] Collected:  07/17/19 2137    Order Status:  Completed Specimen:  Nasopharyngeal Swab Updated:  07/17/19 2229     Influenza A, Molecular Negative     Influenza B, Molecular Negative     Flu A & B Source Nasal swab    Blood culture x two cultures. Draw prior to antibiotics. [[id number]] Collected:  07/17/19 2146    Order Status:  Sent Specimen:  Blood from Peripheral, Forearm, Right Updated:  07/17/19 2146    Blood culture x two cultures. Draw prior to antibiotics. [[id number]] Collected:  07/17/19 2144    Order Status:  Sent Specimen:  Blood from Peripheral, Wrist, Right Updated:  07/17/19 2144           Imaging   Imaging Results          X-Ray Chest AP Portable (In process)                X-Ray Chest AP Portable (Final result)  Result time 07/17/19 22:14:55    Final result by Gabriella Drake MD (07/17/19 22:14:55)                 Impression:      As above.      Electronically signed by: Gabriella Drake MD  Date:    07/17/2019  Time:    22:14             Narrative:    EXAMINATION:  XR CHEST AP PORTABLE    CLINICAL HISTORY:  Sepsis;    TECHNIQUE:  Single frontal view of the chest was performed.    COMPARISON:  05/16/2019.    FINDINGS:  Lungs are significantly hypoinflated which accentuates cardiomediastinal silhouette as well as the pulmonary vascular markings.  Difficult to exclude mild pulmonary interstitial edema.  No evidence of new focal consolidation, pneumothorax, or large pleural effusion.                              Assessment/Plan:     * Sepsis  Patient presented to ED febrile to 103,  tachycardic to 120's and hypotensive to 90's/40's  Blood cultures were drawn and broad spectrum IV antibiotics started   Given ~2L bolus total of fluids without improvement in BP   Central line placed by anesthesia   Start Levophed drip   Maintain MAP >75  If BP continues to be low will give more fluids and start phenylephrine drip  Continue Vanc and Zosyn   FU blood cultures   LA 3.8, continue to trend Q4 hours   UA Pending         ESRD (end stage renal disease) on dialysis  Patient is on HD MWF, completed dialysis on day of presentation  Nephro consulted in ED, electrolytes stable, and he completed HD on day of admission  Continue to monitor   [facility] consult, appreciate recs       Hypertension  Patient is currently not on any BP medications  Hypotensive in ED, will continue to monitor   Goal BP <140/80        Diabetes mellitus, type 2  Hgb A1C 5 4 months ago, repeat pending   Home insulin regimen is SSI and oral medications  Holding oral medications   Start Moderate correction scale   Goal Glucose <180   BG check QID   Diet diabetic and renal       Hyperlipidemia  Continue home statin medication       MIKE (obstructive sleep apnea) - very severe latest sleep study says BPAP @ 16/8  Patient reports on Bipap at night   Respiratory status stable   Will hold Bipap in setting of hypotension   Continue to monitor       VTE Risk Mitigation (From admission, onward)    None        Code: Full code  Diet: Diabetic and renal   PPX: Heparin 5,000 units and Protonix PO   Dispo: Pending full septic work up and clinical improvement     Suzie Trinh MD  Department of Hospital Medicine   [facility]

## 2019-07-18 NOTE — ED PROVIDER NOTES
Encounter Date: 7/17/2019    SCRIBE #1 NOTE: I, Danelle Jhaveri, am scribing for, and in the presence of,  Dr. Shaw. I have scribed the entire note.     I, Dr. Keisha Shaw MD, personally performed the services described in this documentation. All medical record entries made by the scribe were at my direction and in my presence.  I have reviewed the chart and agree that the record reflects my personal performance and is accurate and complete. Keisha Shaw MD.    History     CHIEF COMPLAINT: fever, myalgias, constipation, cough    HISTORY OF PRESENT ILLNESS: Angel Farmer Jr. who is a 58 y.o. presents to the emergency department today with complaint of elevated temperature. He reports having cough, congestion, myalgias, and constipation for weeks. At home, temperature measured at 103. Pt given Tylenol x 2 and came to the ED for further evaluation. He denies N/V, abdominal pain, chest pain, SOB, or any other complaints. He is ESRD on dialysis, last went to dialysis today. He does not produce urine.       ALLERGIES REVIEWED  MEDICATIONS REVIEWED  PMH/PSH/SOC/FH REVIEWED     The history is provided by the patient.    Nursing/Ancillary staff note reviewed.         Review of patient's allergies indicates:   Allergen Reactions    Keflex [cephalexin] Nausea Only     Past Medical History:   Diagnosis Date    Anemia due to stage 5 chronic kidney disease 3/1/2019    Diabetes mellitus type II     Dialysis patient     Hyperlipidemia     Hypertension     Kidney failure     MIKE (obstructive sleep apnea)     Urinary tract infection      Past Surgical History:   Procedure Laterality Date    AV FISTULA PLACEMENT      left lower arm    SPINE, CERVICAL, POSTERIOR APPROACH  LAMINECTOMY C3-C6; C6-C7; WITH MEDIAL DISCECTOMY N/A 3/3/2019    Performed by Ruddy Wylie MD at [facility] OR 2ND FLR    TONSILLECTOMY, ADENOIDECTOMY      TRANSESOPHAGEAL ECHOCARDIOGRAM (SERENITY) N/A 5/23/2017    Performed by Donaldo Mai MD at  [facility] OR     Family History   Problem Relation Age of Onset    Colon cancer Mother     Lung cancer Mother     Diabetes Mother     Diabetes Father     Heart disease Father     Hypertension Father     Diabetes Maternal Grandmother     Diabetes Maternal Grandfather      Social History     Tobacco Use    Smoking status: Never Smoker    Smokeless tobacco: Never Used   Substance Use Topics    Alcohol use: No    Drug use: No     Review of Systems   Constitutional: Positive for fever. Negative for activity change, chills and diaphoresis.   HENT: Positive for congestion. Negative for drooling, ear pain, rhinorrhea, sneezing, sore throat and trouble swallowing.    Eyes: Negative for pain.   Respiratory: Positive for cough. Negative for chest tightness, shortness of breath, wheezing and stridor.    Cardiovascular: Negative for chest pain, palpitations and leg swelling.   Gastrointestinal: Positive for constipation. Negative for abdominal distention, abdominal pain, diarrhea, nausea and vomiting.   Genitourinary:        + anuria   Musculoskeletal: Negative for arthralgias, back pain, myalgias, neck pain and neck stiffness.   Skin: Negative for pallor, rash and wound.   Neurological: Negative for dizziness, syncope, weakness, light-headedness, numbness and headaches.   All other systems reviewed and are negative.      Physical Exam     Initial Vitals [07/17/19 2110]   BP Pulse Resp Temp SpO2   110/79 (!) 123 18 (!) 103 °F (39.4 °C) 96 %      MAP       --         Physical Exam    Nursing note and vitals reviewed.  Constitutional: He appears well-developed and well-nourished. He is not diaphoretic. No distress.   Warm to the touch.    HENT:   Head: Normocephalic and atraumatic.   Nose: Nose normal.   Mouth/Throat: Oropharynx is clear and moist. Mucous membranes are dry.   Eyes: Conjunctivae and EOM are normal. Pupils are equal, round, and reactive to light. No scleral icterus.   Neck: Normal range of motion. Neck  supple. No JVD present.   Cardiovascular: Normal rate, regular rhythm and normal heart sounds. Exam reveals no gallop and no friction rub.    No murmur heard.  Pulmonary/Chest: Breath sounds normal. No stridor. No respiratory distress. He has no wheezes. He exhibits no tenderness.   Abdominal: Soft. Bowel sounds are normal. He exhibits no distension and no mass. There is no tenderness. There is no rebound and no guarding.   Musculoskeletal: Normal range of motion. He exhibits edema. He exhibits no tenderness.        Cervical back: Normal.        Thoracic back: Normal.        Lumbar back: Normal.   Shunt in left forearm +thrill  2+ pitting edema   Lymphadenopathy:     He has no cervical adenopathy.   Neurological: He is alert and oriented to person, place, and time. He has normal strength. No cranial nerve deficit.   Skin: Skin is warm and dry. No rash noted. No pallor.   Psychiatric: He has a normal mood and affect. Thought content normal.         ED Course   Procedures  Labs Reviewed   CBC W/ AUTO DIFFERENTIAL - Abnormal; Notable for the following components:       Result Value    RBC 2.94 (*)     Hemoglobin 10.2 (*)     Hematocrit 32.0 (*)     Mean Corpuscular Volume 109 (*)     Mean Corpuscular Hemoglobin 34.7 (*)     Mean Corpuscular Hemoglobin Conc 31.9 (*)     RDW 15.1 (*)     Platelets 69 (*)     Lymph # 0.2 (*)     Mono # 0.1 (*)     Gran% 94.3 (*)     Lymph% 4.0 (*)     Mono% 1.7 (*)     All other components within normal limits   COMPREHENSIVE METABOLIC PANEL - Abnormal; Notable for the following components:    Glucose 145 (*)     BUN, Bld 36 (*)     Creatinine 5.3 (*)     Albumin 3.4 (*)     Total Bilirubin 1.3 (*)     Alkaline Phosphatase 240 (*)     AST 63 (*)     ALT 60 (*)     eGFR if  13 (*)     eGFR if non  11 (*)     All other components within normal limits   LACTIC ACID, PLASMA - Abnormal; Notable for the following components:    Lactate (Lactic Acid) 3.8 (*)      All other components within normal limits    Narrative:       LA critical result(s) called and verbal readback obtained from   Beatriz Merrill RN, 2019 22:22   MAGNESIUM - Abnormal; Notable for the following components:    Magnesium 1.3 (*)     All other components within normal limits   PROCALCITONIN - Abnormal; Notable for the following components:    Procalcitonin 3.59 (*)     All other components within normal limits   POCT GLUCOSE - Abnormal; Notable for the following components:    POCT Glucose 159 (*)     All other components within normal limits   INFLUENZA A & B BY MOLECULAR   PHOSPHORUS   LIPASE   URINALYSIS, REFLEX TO URINE CULTURE             EK bom, sinus tachycardia, LAD, PVCs, compared with revious in March LAD present then, now with PVCs                ECG Results          EKG 12-lead (Final result)  Result time 19 09:40:16    Final result by Interface, Lab In [facility] (19 09:40:16)                 Narrative:    Test Reason : A41.9,    Vent. Rate : 123 BPM     Atrial Rate : 123 BPM     P-R Int : 136 ms          QRS Dur : 130 ms      QT Int : 338 ms       P-R-T Axes : 043 -65 083 degrees     QTc Int : 483 ms    Sinus tachycardia with frequent Premature ventricular complexes  Left axis deviation  Nonspecific intraventricular block  Abnormal ECG  When compared with ECG of 13-MAR-2019 23:28,  Premature ventricular complexes are now Present  Vent. rate has increased BY  59 BPM    Confirmed by Ed Becerra MD ([id number]) on 2019 9:40:11 AM    Referred By: [email or web address]   SELF           Confirmed By:Ed Becerra MD                            X-Rays:   Independently Interpreted Readings:   Other Readings:  Reviewed by myself, read by radiology.      Imaging Results          X-Ray Chest AP Portable (Final result)  Result time 19 00:35:51    Final result by Gabriella Drake MD (19 00:35:51)                 Impression:      As above.      Electronically signed  by: Gabriella Drake MD  Date:    07/18/2019  Time:    00:35             Narrative:    EXAMINATION:  XR CHEST AP PORTABLE    CLINICAL HISTORY:  Encounter for adjustment and management of vascular access device    TECHNIQUE:  Single frontal view of the chest was performed.    COMPARISON:  07/17/2019 at 21:49.    FINDINGS:  Interval placement of right IJ central venous catheter with distal tip terminating over the region of the SVC.  No evidence of pneumothorax.  Lungs are markedly hypoinflated, otherwise no significant change from earlier exam.                               X-Ray Chest AP Portable (Final result)  Result time 07/17/19 22:14:55    Final result by Gabriella Drake MD (07/17/19 22:14:55)                 Impression:      As above.      Electronically signed by: Gabriella Drake MD  Date:    07/17/2019  Time:    22:14             Narrative:    EXAMINATION:  XR CHEST AP PORTABLE    CLINICAL HISTORY:  Sepsis;    TECHNIQUE:  Single frontal view of the chest was performed.    COMPARISON:  05/16/2019.    FINDINGS:  Lungs are significantly hypoinflated which accentuates cardiomediastinal silhouette as well as the pulmonary vascular markings.  Difficult to exclude mild pulmonary interstitial edema.  No evidence of new focal consolidation, pneumothorax, or large pleural effusion.                                      Medical Decision Making:   History:   Old Medical Records: I decided to obtain old medical records.  Initial Assessment:   Angel Farmer Jr. presents to the ED today with fever. The pt has triggered sepsis activation due to their HR of 123 and fever of 103. Will obtain labs including basic labs, influenza, lactic acid and blood cultures along with CXR. The pts BP is 110/73, will start with 1L NS. I will give the pt 30 cc of IVFs according to his Ideal Body Weight as he has ESRD and is on dialysis. Broad spectrum ABX will be started to cover for infectious etiology.    Differential Diagnosis:    Differential Diagnosis includes, but is not limited to:  Sepsis, bacteremia, UTI, pneumonia, cellulitis, abscess, indwelling line/catheter infection, cholecystitis, viral URI, gastroenteritis, viral syndrome, sinusitis, otitis media/externa, neoplasm, drug reaction, serotonin syndrome, intoxication/withdrawal syndrome.   Independently Interpreted Test(s):   I have ordered and independently interpreted EKG Reading(s) - see prior notes  Clinical Tests:   Lab Tests: Ordered and Reviewed  Radiological Study: Ordered and Reviewed  Medical Tests: Ordered and Reviewed  Sepsis Perfusion Assessment: I attest, a sepsis perfusion exam was performed within 6 hours of Septic Shock presentation, following fluid resuscitation.  ED Management:  2223 Lactic acid elevated, he has received fluids, repeat LA ordered with intial labs. Pt is currently feeling improved.     2250 Pts fever still high, will give tylenol.     2310 I spoke with [facility] and they accept the pt for admission. Will place in ICU.     2330 The pt has received his IVF bolus of 30 cc.kg at his ideal body weight, initially responsive to fluid however when fluids are complete he has hypotension. Pt is responsive and mentating appropriately. Pressors ordered and a central line was placed by anesthesia.     2345 I spoke with Dr Felton nephrology re:the pts admission and they will follow along.               Attending Attestation:         Attending Critical Care:   Critical Care Times:   ==============================================================  · Total Critical Care Time - exclusive of procedural time: 42 minutes.  ==============================================================  Critical care was necessary to treat or prevent imminent or life-threatening deterioration of the following conditions: sepsis.   Critical care was time spent personally by me on the following activities: obtaining history from patient or relative, examination of patient, review of x-rays  / CT sent with the patient, review of old charts, ordering lab, x-rays, and/or EKG, development of treatment plan with patient or relative, ordering and performing treatments and interventions, evaluation of patient's response to treatment, discussions with primary provider, discussion with consultants, interpretation of cardiac measurements and re-evaluation of patient's conition.   Critical Care Condition: life-threatening                  Clinical Impression:       ICD-10-CM ICD-9-CM   1. Sepsis A41.9 038.9     995.91   2. Encounter for central line placement Z45.2 V58.81   3. Tachycardia R00.0 785.0                               Keisha Shaw MD  07/18/19 1181

## 2019-07-18 NOTE — ASSESSMENT & PLAN NOTE
Hgb A1C 5 4 months ago, repeat pending   Home insulin regimen is SSI and oral medications  Holding oral medications   Start Moderate correction scale   Goal Glucose <180   BG check QID   Diet diabetic and renal

## 2019-07-18 NOTE — ASSESSMENT & PLAN NOTE
Hgb A1C 5.0 months ago  Home insulin regimen is SSI and oral medications  Holding oral medications   Start Moderate correction scale   Goal Glucose <180   BG check QID   Diet diabetic and renal

## 2019-07-18 NOTE — PLAN OF CARE
Received call regarding tachycardia and HTN (BP in 160s-170s) while on Levophed  Pt is complaining of being cold, then hot. Not complaining of SOB. Nurse and resident on call reviewed CXR and concerned about worsening pulmonary edema. I personally reviewed CXR and dont see tremendous difference between 2209 and 0024, some interstitial marking with maybe mild pulmonary edema. Pt was on 2 L   O2, had ABG with paO2 103 and Pco2 30. Based on this ABG he does not need Bipap at this point, neither he needs emergent HD  For HTN emergency  while being on levophed please stop levophed   Please continue treating for sepsis and agree with EKG

## 2019-07-18 NOTE — ASSESSMENT & PLAN NOTE
Patient reports on Bipap at night   Respiratory status stable   Will hold Bipap in setting of hypotension   Continue to monitor

## 2019-07-18 NOTE — PT/OT/SLP PROGRESS
Physical Therapy      Patient Name:  Angel Farmer Jr.   MRN:  [id number]    Patient not seen today secondary to pt leaving to go to [place]. Will follow-up .    Paula Santos, PT   7/18/2019

## 2019-07-18 NOTE — PLAN OF CARE
Pt lethargic; answers appropriately but falls back to sleep; Nurse informed team of increased lethargy.  Pt informed Tn he lives with his brother and sister in law;  Pt stated [facility] . Pt reports his brother is able to provide help at home and drives him to/from HD.  Tn gave pt d/c brochure and card and encouraged pt to call for any needs/concerns.     07/18/19 1249   Discharge Assessment   Assessment Type Discharge Planning Assessment   Confirmed/corrected address and phone number on facesheet? Yes   Assessment information obtained from? Patient;Medical Record   Expected Length of Stay (days) 2   Communicated expected length of stay with patient/caregiver yes   Prior to hospitilization cognitive status: Alert/Oriented   Prior to hospitalization functional status: Assistive Equipment   Current cognitive status:   (lethargic)   Current Functional Status: Needs Assistance   Lives With sibling(s)  (brother and sister in law)   Able to Return to Prior Arrangements yes   Is patient able to care for self after discharge? Unable to determine at this time (comments)   Who are your caregiver(s) and their phone number(s)? Natalia (sister in law) [phone]   Patient's perception of discharge disposition home or selfcare   Readmission Within the Last 30 Days no previous admission in last 30 days   Patient currently being followed by outpatient case management? No   Patient currently receives any other outside agency services? No   Equipment Currently Used at Home glucometer;walker, rolling;wheelchair;BIPAP   Do you have any problems affording any of your prescribed medications? No   Is the patient taking medications as prescribed? yes   Does the patient have transportation home? Yes   Transportation Anticipated family or friend will provide   Dialysis Name and Scheduled days [facility]   Does the patient receive services at the [facility]? No   Discharge Plan A Home with family    Discharge Plan B Home Health   DME Needed Upon Discharge    (tbd)   Patient/Family in Agreement with Plan yes

## 2019-07-18 NOTE — SUBJECTIVE & OBJECTIVE
"Past Medical History:   Diagnosis Date    Anemia due to stage 5 chronic kidney disease 3/1/2019    Diabetes mellitus type II     Dialysis patient     Hyperlipidemia     Hypertension     Kidney failure     MIKE (obstructive sleep apnea)     Urinary tract infection        Past Surgical History:   Procedure Laterality Date    AV FISTULA PLACEMENT      left lower arm    SPINE, CERVICAL, POSTERIOR APPROACH  LAMINECTOMY C3-C6; C6-C7; WITH MEDIAL DISCECTOMY N/A 3/3/2019    Performed by Ruddy Wylie MD at [facility] OR 2ND FLR    TONSILLECTOMY, ADENOIDECTOMY      TRANSESOPHAGEAL ECHOCARDIOGRAM (SERENITY) N/A 5/23/2017    Performed by Donaldo Mai MD at [facility] OR       Review of patient's allergies indicates:   Allergen Reactions    Keflex [cephalexin] Nausea Only       No current facility-administered medications on file prior to encounter.      Current Outpatient Medications on File Prior to Encounter   Medication Sig    acetaminophen (TYLENOL) 500 MG tablet Take 1 tablet (500 mg total) by mouth every 6 (six) hours as needed.    ammonium lactate 12 % Crea Apply 1 application topically 3 (three) times daily as needed.    azithromycin (Z-SHOAIB) 250 MG tablet Take 1 tablet (250 mg total) by mouth once daily. Take first 2 tablets together, then 1 every day until finished.    BD ULTRA-FINE MINI PEN NEEDLE 31 gauge x 3/16" Ndle USE 4 TO 5 TIMES A DAY     ( DISCARD PEN NEEDLE AFTER EACH USE )    blood-glucose meter (ADVANCED GLUCOSE METER) Misc 1 each by Misc.(Non-Drug; Combo Route) route 4 (four) times daily with meals and nightly.    diclofenac sodium (VOLTAREN) 1 % Gel Apply topically once daily.    epoetin shefali 3,000 unit/mL Soln 3,000 Units, epoetin shefali 4,000 unit/mL Soln 4,000 Units injection Inject 7,000 Units into the skin every Mon, Wed, Fri.    FLUoxetine 20 MG capsule Take 1 capsule (20 mg total) by mouth once daily.    fluticasone (FLONASE) 50 mcg/actuation nasal spray 1 spray by Each Nare route " once daily.    gabapentin (NEURONTIN) 300 MG capsule TAKE 1 BY MOUTH EVERY      EVENING    heparin sodium,porcine (HEPARIN, PORCINE,) 5,000 unit/mL injection Inject 1 mL (5,000 Units total) into the skin every 8 (eight) hours.    insulin aspart U-100 (NOVOLOG) 100 unit/mL InPn pen Inject 0-5 Units into the skin before meals and at bedtime as needed (Hyperglycemia).    insulin lispro 100 unit/mL injection Inject 15 Units into the skin.    lancets Misc 1 each by Misc.(Non-Drug; Combo Route) route 4 (four) times daily with meals and nightly.    lanthanum (FOSRENOL) 1000 MG chewable tablet Take 1,000 mg by mouth.    metoclopramide HCl (REGLAN) 5 mg/5 mL Soln Take 5 mLs (5 mg total) by mouth 4 (four) times daily before meals and nightly.    miconazole NITRATE 2 % (MICOTIN) 2 % top powder Apply topically 2 (two) times daily as needed (moisture).    midodrine (PROAMATINE) 10 MG tablet Take 2 tablets (20 mg total) by mouth every 6 (six) hours.    nystatin (MYCOSTATIN) powder Apply topically 2 (two) times daily. To sacral region    ONETOUCH ULTRA BLUE TEST STRIP Strp USE ONE STRIP TO CHECK BLOOD GLUCOSE 4 TIMES DAILY.AT MORNING, NOON,6:OO PM AND BEDTIME.    oxyCODONE (ROXICODONE) 5 MG immediate release tablet Take 1 tablet (5 mg total) by mouth every 6 (six) hours as needed.    pantoprazole (PROTONIX) 40 mg GrPS Take 1 packet (40 mg total) by mouth 2 (two) times daily.    pravastatin (PRAVACHOL) 40 MG tablet Take 1 tablet (40 mg total) by mouth once daily. (Patient taking differently: Take 40 mg by mouth every evening. )    pravastatin (PRAVACHOL) 40 MG tablet Take 40 mg by mouth.    sevelamer carbonate (RENVELA) 800 mg Tab Take 2 tablets (1,600 mg total) by mouth 3 (three) times daily with meals.    simethicone (MYLICON) 80 MG chewable tablet Take 1 tablet (80 mg total) by mouth 3 (three) times daily as needed for Flatulence.     Family History     Problem Relation (Age of Onset)    Colon cancer Mother     Diabetes Mother, Father, Maternal Grandmother, Maternal Grandfather    Heart disease Father    Hypertension Father    Lung cancer Mother        Tobacco Use    Smoking status: Never Smoker    Smokeless tobacco: Never Used   Substance and Sexual Activity    Alcohol use: No    Drug use: No    Sexual activity: Not Currently     Review of Systems   Constitutional: Positive for activity change, appetite change, chills, diaphoresis, fatigue and fever. Negative for unexpected weight change.   HENT: Negative for congestion, hearing loss, postnasal drip, rhinorrhea, sinus pain, sneezing and sore throat.    Eyes: Negative for photophobia and visual disturbance.   Respiratory: Negative for apnea, cough, choking, chest tightness, shortness of breath and wheezing.    Cardiovascular: Negative for chest pain, palpitations and leg swelling.   Gastrointestinal: Positive for constipation. Negative for abdominal distention, abdominal pain, diarrhea, nausea and vomiting.   Endocrine: Negative for cold intolerance, heat intolerance and polyuria.   Genitourinary: Positive for difficulty urinating (patient does not make urine ). Negative for flank pain, frequency and urgency.   Musculoskeletal: Negative for arthralgias, back pain and myalgias.   Skin: Negative for rash.   Neurological: Positive for dizziness and light-headedness. Negative for seizures, syncope, speech difficulty, weakness, numbness and headaches.   Hematological: Negative for adenopathy. Does not bruise/bleed easily.   Psychiatric/Behavioral: Negative for behavioral problems, confusion, decreased concentration and hallucinations. The patient is not nervous/anxious.      Objective:     Vital Signs (Most Recent):  Temp: (!) 103 °F (39.4 °C) (07/17/19 2303)  Pulse: 106 (07/18/19 0005)  Resp: 20 (07/18/19 0005)  BP: (!) 76/48 (07/18/19 0005)  SpO2: 100 % (07/18/19 0005) Vital Signs (24h Range):  Temp:  [103 °F (39.4 °C)] 103 °F (39.4 °C)  Pulse:  [] 106  Resp:   [18-32] 20  SpO2:  [95 %-100 %] 100 %  BP: ()/(43-79) 76/48     Weight: 120.2 kg (265 lb)  Body mass index is 40.29 kg/m².    Physical Exam   Constitutional: He is oriented to person, place, and time. He appears well-developed and well-nourished. He appears distressed.   HENT:   Head: Normocephalic and atraumatic.   Nose: Nose normal.   Mouth/Throat: Oropharynx is clear and moist. No oropharyngeal exudate.   Eyes: Pupils are equal, round, and reactive to light. Conjunctivae and EOM are normal. Right eye exhibits no discharge. Left eye exhibits no discharge. No scleral icterus.   Neck: Normal range of motion. Neck supple.   Cardiovascular: Normal rate, regular rhythm, normal heart sounds and intact distal pulses. Exam reveals no gallop and no friction rub.   No murmur heard.  Pulmonary/Chest: Effort normal and breath sounds normal. No stridor. No respiratory distress. He has no wheezes. He has no rales. He exhibits no tenderness.   Abdominal: Soft. Bowel sounds are normal. He exhibits no distension and no mass. There is no tenderness. There is no rebound. No hernia.   Genitourinary: Penis normal.   Musculoskeletal: Normal range of motion. He exhibits no edema, tenderness or deformity.   Lymphadenopathy:     He has no cervical adenopathy.   Neurological: He is alert and oriented to person, place, and time. No cranial nerve deficit or sensory deficit.   Skin: Skin is warm and dry. Capillary refill takes less than 2 seconds. No rash noted. He is not diaphoretic.   Shunt in Left forearm with palpable thrill      Psychiatric: He has a normal mood and affect. His behavior is normal.   Nursing note and vitals reviewed.        CRANIAL NERVES     CN III, IV, VI   Pupils are equal, round, and reactive to light.  Extraocular motions are normal.        Significant Labs:  Recent Labs     07/17/19 2148   WBC 5.45   HGB 10.2*   HCT 32.0*   PLT 69*   *   RDW 15.1*       Recent Labs     07/17/19 2148      K 3.9       CO2 23   *   BUN 36*   CREATININE 5.3*   CALCIUM 9.5   PROT 6.7   ALBUMIN 3.4*   BILITOT 1.3*   ALKPHOS 240*   AST 63*   ALT 60*   ANIONGAP 15   ESTGFRAFRICA 13*   EGFRNONAA 11*       Recent Labs     07/17/19 2148   MG 1.3*   PHOS 2.9       Coags  Lab Results   Component Value Date    INR 1.0 03/20/2019    INR 1.1 03/19/2019    INR 1.0 03/18/2019    APTT 23.4 03/04/2019    APTT 25.2 03/03/2019    APTT 31.9 02/27/2019     No results for input(s): PT, INR, APTT in the last 168 hours.    A1c:   Lab Results   Component Value Date    HGBA1C 5.0 02/27/2019   , Last Gluc:   Recent Labs   Lab 07/17/19 2128   POCTGLUCOSE 159*       TSH:   Lab Results   Component Value Date    TSH 1.541 02/27/2019       Micro  Microbiology Results (last 7 days)     Procedure Component Value Units Date/Time    Influenza A & B by Molecular [[id number]] Collected:  07/17/19 2137    Order Status:  Completed Specimen:  Nasopharyngeal Swab Updated:  07/17/19 2229     Influenza A, Molecular Negative     Influenza B, Molecular Negative     Flu A & B Source Nasal swab    Blood culture x two cultures. Draw prior to antibiotics. [[id number]] Collected:  07/17/19 2146    Order Status:  Sent Specimen:  Blood from Peripheral, Forearm, Right Updated:  07/17/19 2146    Blood culture x two cultures. Draw prior to antibiotics. [[id number]] Collected:  07/17/19 2144    Order Status:  Sent Specimen:  Blood from Peripheral, Wrist, Right Updated:  07/17/19 2144           Imaging   Imaging Results          X-Ray Chest AP Portable (In process)                X-Ray Chest AP Portable (Final result)  Result time 07/17/19 22:14:55    Final result by Gabriella Drake MD (07/17/19 22:14:55)                 Impression:      As above.      Electronically signed by: Gabriella Drake MD  Date:    07/17/2019  Time:    22:14             Narrative:    EXAMINATION:  XR CHEST AP PORTABLE    CLINICAL HISTORY:  Sepsis;    TECHNIQUE:  Single frontal view of the chest  was performed.    COMPARISON:  05/16/2019.    FINDINGS:  Lungs are significantly hypoinflated which accentuates cardiomediastinal silhouette as well as the pulmonary vascular markings.  Difficult to exclude mild pulmonary interstitial edema.  No evidence of new focal consolidation, pneumothorax, or large pleural effusion.

## 2019-07-18 NOTE — NURSING
Pt becoming more lethargic; arouses to vigorous and repeated stimulation. Remains oriented to self, place, and time. Notified MD; order for STAT ABG.

## 2019-07-18 NOTE — PROGRESS NOTES
[facility] Medicine  Progress Note    Patient Name: Angel Farmer Jr.  MRN: [id number]  Patient Class: IP- Inpatient   Admission Date: 7/17/2019  Length of Stay: 1 days  Attending Physician: Beckie Zarate MD  Primary Care Provider: Satya Noriega MD        Subjective:     Principal Problem:Sepsis      HPI:  Mr. Farmer is a 58 year old man with PMHx of ESRD on HD MWF, IDDM, HTN/HLD and MIKE presented to ED with 1 day history of fever to 1044, cough and generalized malaise. He was in his usual state of health until this morning after completing 4 hours of HD when he felt weak, light headed, and hot. His wife took his temperature and it was 104, so they presented to the ED. He also notes constipation, that is usual for him. He states he has a BM every 4 days, he no longer makes urine. He denies chest pain, palpitations, shortness of breath, abdominal pain, diarrhea, or LE edema.     In the ED he was febrile to 103, LA elevaated to 3.8, he became hypotensive and tachycardic meeting Severe sepsis. He received a total of 2L NS with minimal improvement in BP, anesthesia was consulted and central line placed and started on levophed and will be admitted to the ICU.     Overview/Hospital Course:  No notes on file    Interval History: Patient continues to have fevers and hypotension overnight. Patient had 1 episode of hypertension and Levophed was stopped but patient proceeded to become hypotensive again and Levophed was restarted. Patient did not tolerate Bipap and was placed on 2L of oxygen with nasal cannula. Denies any chest pain, sob, nausea, or vomiting. Last BM was on Tuesday.     Review of Systems   Constitutional: Positive for activity change and fever. Negative for unexpected weight change.   HENT: Negative for postnasal drip, rhinorrhea and sore throat.    Respiratory: Negative for cough, chest tightness, shortness of breath and wheezing.    Cardiovascular: Negative for chest  pain, palpitations and leg swelling.   Gastrointestinal: Positive for abdominal distention, abdominal pain and constipation. Negative for diarrhea, nausea and vomiting.   Skin: Negative for rash and wound.   Neurological: Negative for weakness, light-headedness and headaches.   Psychiatric/Behavioral: Negative for agitation.     Objective:     Vital Signs (Most Recent):  Temp: 100.2 °F (37.9 °C) (07/18/19 0715)  Pulse: 105 (07/18/19 0809)  Resp: (!) 23 (07/18/19 0809)  BP: (!) 84/45 (07/18/19 0745)  SpO2: 98 % (07/18/19 0809) Vital Signs (24h Range):  Temp:  [99.7 °F (37.6 °C)-103 °F (39.4 °C)] 100.2 °F (37.9 °C)  Pulse:  [] 105  Resp:  [11-32] 23  SpO2:  [93 %-100 %] 98 %  BP: ()/(40-86) 84/45     Weight: 126.2 kg (278 lb 3.5 oz)  Body mass index is 43.58 kg/m².    Intake/Output Summary (Last 24 hours) at 7/18/2019 0813  Last data filed at 7/18/2019 0700  Gross per 24 hour   Intake 3231.1 ml   Output --   Net 3231.1 ml      Physical Exam   Constitutional: He is oriented to person, place, and time. He appears well-developed and well-nourished. He appears distressed.   HENT:   Head: Normocephalic and atraumatic.   Nose: Nose normal.   Mouth/Throat: Oropharynx is clear and moist. No oropharyngeal exudate.   Eyes: Pupils are equal, round, and reactive to light. Conjunctivae and EOM are normal. Right eye exhibits no discharge. Left eye exhibits no discharge. No scleral icterus.   Neck: Normal range of motion. Neck supple.   Cardiovascular: Normal rate, regular rhythm, normal heart sounds and intact distal pulses. Exam reveals no gallop and no friction rub.   No murmur heard.  Pulmonary/Chest: Effort normal and breath sounds normal. No stridor. No respiratory distress. He has no wheezes. He has no rales. He exhibits no tenderness.   Abdominal: Soft. Bowel sounds are normal. He exhibits no distension and no mass. There is no tenderness. There is no rebound. No hernia.   Obtuse abdomen   Genitourinary: Penis  normal.   Musculoskeletal: Normal range of motion. He exhibits no edema, tenderness or deformity.   Lymphadenopathy:     He has no cervical adenopathy.   Neurological: He is alert and oriented to person, place, and time. No cranial nerve deficit or sensory deficit.   Skin: Skin is warm and dry. Capillary refill takes less than 2 seconds. No rash noted. He is not diaphoretic.   Shunt in Left forearm with palpable thrill, no edema or swelling  No ulcers in the sacral region   Psychiatric: He has a normal mood and affect. His behavior is normal.   Nursing note and vitals reviewed.      Significant Labs:   CBC:   Recent Labs   Lab 07/17/19 2148 07/18/19 0459   WBC 5.45 2.58*   HGB 10.2* 10.0*   HCT 32.0* 31.5*   PLT 69* 58*     CMP:   Recent Labs   Lab 07/17/19 2148 07/18/19 0459    135*   K 3.9 3.8    101   CO2 23 20*   * 176*   BUN 36* 40*   CREATININE 5.3* 5.9*   CALCIUM 9.5 9.1   PROT 6.7 6.1   ALBUMIN 3.4* 3.0*   BILITOT 1.3* 1.4*   ALKPHOS 240* 215*   AST 63* 70*   ALT 60* 62*   ANIONGAP 15 14   EGFRNONAA 11* 10*     Coagulation:   Recent Labs   Lab 07/18/19 0459   INR 1.1   APTT 31.8     Lactic Acid:   Recent Labs   Lab 07/17/19 2148 07/18/19 0116 07/18/19 0459   LACTATE 3.8* 2.0 3.9*       Significant Imaging:   Imaging Results          X-Ray Chest AP Portable (Final result)  Result time 07/18/19 00:35:51    Final result by Gabriella Drake MD (07/18/19 00:35:51)                 Impression:      As above.      Electronically signed by: Gabriella Drake MD  Date:    07/18/2019  Time:    00:35             Narrative:    EXAMINATION:  XR CHEST AP PORTABLE    CLINICAL HISTORY:  Encounter for adjustment and management of vascular access device    TECHNIQUE:  Single frontal view of the chest was performed.    COMPARISON:  07/17/2019 at 21:49.    FINDINGS:  Interval placement of right IJ central venous catheter with distal tip terminating over the region of the SVC.  No evidence of  pneumothorax.  Lungs are markedly hypoinflated, otherwise no significant change from earlier exam.                               X-Ray Chest AP Portable (Final result)  Result time 07/17/19 22:14:55    Final result by Gabriella Drake MD (07/17/19 22:14:55)                 Impression:      As above.      Electronically signed by: Gabriella Drake MD  Date:    07/17/2019  Time:    22:14             Narrative:    EXAMINATION:  XR CHEST AP PORTABLE    CLINICAL HISTORY:  Sepsis;    TECHNIQUE:  Single frontal view of the chest was performed.    COMPARISON:  05/16/2019.    FINDINGS:  Lungs are significantly hypoinflated which accentuates cardiomediastinal silhouette as well as the pulmonary vascular markings.  Difficult to exclude mild pulmonary interstitial edema.  No evidence of new focal consolidation, pneumothorax, or large pleural effusion.                                    Assessment/Plan:      * Sepsis  Patient presented to ED febrile to 103, tachycardic to 120's and hypotensive to 90's/40's  Blood cultures were drawn and broad spectrum IV antibiotics started   Given ~2L bolus total of fluids without improvement in BP   Central line placed by anesthesia   Start Levophed drip   Maintain MAP >75  If BP continues to be low will give more fluids and start phenylephrine drip  Continue Vanc and Zosyn   FU blood cultures   LA 3.8 trended down to 2.9 continue to trend Q4 hours   UA order but patient does not produce urine.   Added Micafungin for fungal coverage        MIKE (obstructive sleep apnea) - very severe latest sleep study says BPAP @ 16/8  Patient reports on Bipap at night   Respiratory status stable   Will hold Bipap in setting of hypotension   Currently using 2L NC  Continue to monitor     ESRD (end stage renal disease) on dialysis  Patient is on HD MWF, completed dialysis on day of presentation  Nephro consulted in ED, electrolytes stable, and he completed HD on day of admission  Continue to monitor   [facility]  nephro consult, appreciate recs       Hyperlipidemia  Continue home statin medication       Diabetes mellitus, type 2  Hgb A1C 5.0 months ago  Home insulin regimen is SSI and oral medications  Holding oral medications   Start Moderate correction scale   Goal Glucose <180   BG check QID   Diet diabetic and renal       Hypertension  Patient is currently not on any BP medications  Hypotensive in ED, will continue to monitor   Goal BP <140/80          VTE Risk Mitigation (From admission, onward)        Ordered     heparin (porcine) injection 5,000 Units  Every 8 hours      07/18/19 0126     IP VTE HIGH RISK PATIENT  Once      07/18/19 0126          Critical care time spent on the evaluation and treatment of severe organ dysfunction, review of pertinent labs and imaging studies, discussions with consulting providers and discussions with patient/family: 35 minutes.      Damaris Sarabia, PGY-2  [facility]  07/18/2019 8:48 AM

## 2019-07-18 NOTE — PROGRESS NOTES
Pharmacokinetic Initial Assessment: IV Vancomycin    Assessment/Plan:    Initiate intravenous vancomycin with loading dose of 2000 mg once with subsequent doses when random vancomycin level <20 mcg/ml.  Desired empiric serum trough concentration is 10 to 20 mcg/mL.  Draw vancomycin random level on 7/18/19 at 2200..  Pharmacy will continue to follow and monitor vancomycin.      Please contact pharmacy at extension [phone] with any questions regarding this assessment.     Thank you for the consult,   Guillermo Gee     Patient brief summary:  Angel Farmer Jr. is a 58 y.o. male initiated on antimicrobial therapy with IV Vancomycin for treatment of suspected bacteremia    Drug Allergies:   Review of patient's allergies indicates:   Allergen Reactions    Keflex [cephalexin] Nausea Only       Actual Body Weight:   120.2 kg    Renal Function:   Estimated Creatinine Clearance: 19.6 mL/min (A) (based on SCr of 5.3 mg/dL (H)).,     Dialysis Method (if applicable):  N/A     CBC (last 72 hours):  Recent Labs   Lab Result Units 07/17/19  2148   WBC K/uL 5.45   Hemoglobin g/dL 10.2*   Hematocrit % 32.0*   Platelets K/uL 69*   Gran% % 94.3*   Lymph% % 4.0*   Mono% % 1.7*   Eosinophil% % 0.0   Basophil% % 0.0   Differential Method  Automated       Metabolic Panel (last 72 hours):  Recent Labs   Lab Result Units 07/17/19  2148   Sodium mmol/L 138   Potassium mmol/L 3.9   Chloride mmol/L 100   CO2 mmol/L 23   Glucose mg/dL 145*   BUN, Bld mg/dL 36*   Creatinine mg/dL 5.3*   Albumin g/dL 3.4*   Total Bilirubin mg/dL 1.3*   Alkaline Phosphatase U/L 240*   AST U/L 63*   ALT U/L 60*   Magnesium mg/dL 1.3*   Phosphorus mg/dL 2.9       Drug levels (last 3 results):  No results for input(s): VANCOMYCINRA, VANCOMYCINPE, VANCOMYCINTR in the last 72 hours.    Microbiologic Results:  Microbiology Results (last 7 days)       Procedure Component Value Units Date/Time    Influenza A & B by Molecular [[id number]] Collected:  07/17/19 2137    Order  Status:  Completed Specimen:  Nasopharyngeal Swab Updated:  07/17/19 2229     Influenza A, Molecular Negative     Influenza B, Molecular Negative     Flu A & B Source Nasal swab    Blood culture x two cultures. Draw prior to antibiotics. [[id number]] Collected:  07/17/19 2146    Order Status:  Sent Specimen:  Blood from Peripheral, Forearm, Right Updated:  07/17/19 2146    Blood culture x two cultures. Draw prior to antibiotics. [[id number]] Collected:  07/17/19 2144    Order Status:  Sent Specimen:  Blood from Peripheral, Wrist, Right Updated:  07/17/19 2144

## 2019-07-18 NOTE — NURSING
0500-Pt became tachycardic on bedside monitor. Nurse at bedside to assess pt. Pt shivering saying he's hot then cold. CBG 180s, -170, pulse 130s-140s, temp 98.9. MD notified and came to bedside. Levophed turned off per MD order. Orders placed for ABG, BiPAP, EKG, CXray and called MD Purnima. MD Purnima stated that pt did not need emergent dialysis and recommends to D/C Bipap due to ABG results not looking bad. MD Trinh at bedside to agree to d/c bipap, respiratory notified. Labs drawn and sent to lab with Lactate 3.9, Ziggy GRAMAJO notified.     6:07 AM- pt HR normalizing at 91bpm, Levo restarted and now up to 0.14mcs/kg/min. titrating per order. Pt no longer c/o shivering/ cold.     6:42 AM- MD notified of increasing titration of Levophed to 0.3mcs/kg/min. MD ordered 500cc bolus NS to be given. Also notified Ziggy GRAMAJO of temp of 102.9 axillary and 100.8 oral. Repeat blood cultures to be drawn and tylenol to be given, pt sheets removed and AC turned down.     7:16 AM- report given to LOLIS Mcdermott. Updated pt on POC. No distress noted, bed locked and in lowest position, call light within reach, and day shift nurse at bedside. Pt on 2L NC sats 99%

## 2019-07-18 NOTE — ASSESSMENT & PLAN NOTE
Patient is currently not on any BP medications  Hypotensive in ED, will continue to monitor   Goal BP <140/80

## 2019-07-18 NOTE — ASSESSMENT & PLAN NOTE
Patient presented to ED febrile to 103, tachycardic to 120's and hypotensive to 90's/40's  Blood cultures were drawn and broad spectrum IV antibiotics started   Given ~2L bolus total of fluids without improvement in BP   Central line placed by anesthesia   Start Levophed drip   Maintain MAP >75  If BP continues to be low will give more fluids and start phenylephrine drip  Continue Vanc and Zosyn   FU blood cultures   LA 3.8 trended down to 2.9 continue to trend Q4 hours   UA order but patient does not produce urine.   Added Micafungin for fungal coverage

## 2019-07-18 NOTE — PT/OT/SLP PROGRESS
Occupational Therapy  Missed Visit    Patient Name:  Angel Farmer Jr.   MRN:  [id number]    Patient not seen today secondary to  pt leaving the floor for CT. Will follow-up     Vicente Reeves OT  7/18/2019

## 2019-07-18 NOTE — HPI
Mr. Farmer is a 58 year old man with PMHx of ESRD on HD MWF, IDDM, HTN/HLD and MIKE presented to ED with 1 day history of fever to 1044, cough and generalized malaise. He was in his usual state of health until this morning after completing 4 hours of HD when he felt weak, light headed, and hot. His wife took his temperature and it was 104, so they presented to the ED. He also notes constipation, that is usual for him. He states he has a BM every 4 days, he no longer makes urine. He denies chest pain, palpitations, shortness of breath, abdominal pain, diarrhea, or LE edema.     In the ED he was febrile to 103, LA elevaated to 3.8, he became hypotensive and tachycardic meeting Severe sepsis. He received a total of 2L NS with minimal improvement in BP, anesthesia was consulted and central line placed and started on levophed and will be admitted to the ICU.

## 2019-07-18 NOTE — SUBJECTIVE & OBJECTIVE
Interval History: Patient continues to have fevers and hypotension overnight. Patient had 1 episode of hypertension and Levophed was stopped but patient proceeded to become hypotensive again and Levophed was restarted. Patient did not tolerate Bipap and was placed on 2L of oxygen with nasal cannula. Denies any chest pain, sob, nausea, or vomiting. Last BM was on Tuesday.     Review of Systems   Constitutional: Positive for activity change and fever. Negative for unexpected weight change.   HENT: Negative for postnasal drip, rhinorrhea and sore throat.    Respiratory: Negative for cough, chest tightness, shortness of breath and wheezing.    Cardiovascular: Negative for chest pain, palpitations and leg swelling.   Gastrointestinal: Positive for abdominal distention, abdominal pain and constipation. Negative for diarrhea, nausea and vomiting.   Skin: Negative for rash and wound.   Neurological: Negative for weakness, light-headedness and headaches.   Psychiatric/Behavioral: Negative for agitation.     Objective:     Vital Signs (Most Recent):  Temp: 100.2 °F (37.9 °C) (07/18/19 0715)  Pulse: 105 (07/18/19 0809)  Resp: (!) 23 (07/18/19 0809)  BP: (!) 84/45 (07/18/19 0745)  SpO2: 98 % (07/18/19 0809) Vital Signs (24h Range):  Temp:  [99.7 °F (37.6 °C)-103 °F (39.4 °C)] 100.2 °F (37.9 °C)  Pulse:  [] 105  Resp:  [11-32] 23  SpO2:  [93 %-100 %] 98 %  BP: ()/(40-86) 84/45     Weight: 126.2 kg (278 lb 3.5 oz)  Body mass index is 43.58 kg/m².    Intake/Output Summary (Last 24 hours) at 7/18/2019 0813  Last data filed at 7/18/2019 0700  Gross per 24 hour   Intake 3231.1 ml   Output --   Net 3231.1 ml      Physical Exam   Constitutional: He is oriented to person, place, and time. He appears well-developed and well-nourished. He appears distressed.   HENT:   Head: Normocephalic and atraumatic.   Nose: Nose normal.   Mouth/Throat: Oropharynx is clear and moist. No oropharyngeal exudate.   Eyes: Pupils are equal,  round, and reactive to light. Conjunctivae and EOM are normal. Right eye exhibits no discharge. Left eye exhibits no discharge. No scleral icterus.   Neck: Normal range of motion. Neck supple.   Cardiovascular: Normal rate, regular rhythm, normal heart sounds and intact distal pulses. Exam reveals no gallop and no friction rub.   No murmur heard.  Pulmonary/Chest: Effort normal and breath sounds normal. No stridor. No respiratory distress. He has no wheezes. He has no rales. He exhibits no tenderness.   Abdominal: Soft. Bowel sounds are normal. He exhibits no distension and no mass. There is no tenderness. There is no rebound. No hernia.   Obtuse abdomen   Genitourinary: Penis normal.   Musculoskeletal: Normal range of motion. He exhibits no edema, tenderness or deformity.   Lymphadenopathy:     He has no cervical adenopathy.   Neurological: He is alert and oriented to person, place, and time. No cranial nerve deficit or sensory deficit.   Skin: Skin is warm and dry. Capillary refill takes less than 2 seconds. No rash noted. He is not diaphoretic.   Shunt in Left forearm with palpable thrill, no edema or swelling  No ulcers in the sacral region   Psychiatric: He has a normal mood and affect. His behavior is normal.   Nursing note and vitals reviewed.      Significant Labs:   CBC:   Recent Labs   Lab 07/17/19 2148 07/18/19 0459   WBC 5.45 2.58*   HGB 10.2* 10.0*   HCT 32.0* 31.5*   PLT 69* 58*     CMP:   Recent Labs   Lab 07/17/19 2148 07/18/19 0459    135*   K 3.9 3.8    101   CO2 23 20*   * 176*   BUN 36* 40*   CREATININE 5.3* 5.9*   CALCIUM 9.5 9.1   PROT 6.7 6.1   ALBUMIN 3.4* 3.0*   BILITOT 1.3* 1.4*   ALKPHOS 240* 215*   AST 63* 70*   ALT 60* 62*   ANIONGAP 15 14   EGFRNONAA 11* 10*     Coagulation:   Recent Labs   Lab 07/18/19 0459   INR 1.1   APTT 31.8     Lactic Acid:   Recent Labs   Lab 07/17/19 2148 07/18/19 0116 07/18/19 0459   LACTATE 3.8* 2.0 3.9*       Significant Imaging:    Imaging Results          X-Ray Chest AP Portable (Final result)  Result time 07/18/19 00:35:51    Final result by Gabriella Drake MD (07/18/19 00:35:51)                 Impression:      As above.      Electronically signed by: Gabriella Drake MD  Date:    07/18/2019  Time:    00:35             Narrative:    EXAMINATION:  XR CHEST AP PORTABLE    CLINICAL HISTORY:  Encounter for adjustment and management of vascular access device    TECHNIQUE:  Single frontal view of the chest was performed.    COMPARISON:  07/17/2019 at 21:49.    FINDINGS:  Interval placement of right IJ central venous catheter with distal tip terminating over the region of the SVC.  No evidence of pneumothorax.  Lungs are markedly hypoinflated, otherwise no significant change from earlier exam.                               X-Ray Chest AP Portable (Final result)  Result time 07/17/19 22:14:55    Final result by Gabriella Drake MD (07/17/19 22:14:55)                 Impression:      As above.      Electronically signed by: Gabriella Drake MD  Date:    07/17/2019  Time:    22:14             Narrative:    EXAMINATION:  XR CHEST AP PORTABLE    CLINICAL HISTORY:  Sepsis;    TECHNIQUE:  Single frontal view of the chest was performed.    COMPARISON:  05/16/2019.    FINDINGS:  Lungs are significantly hypoinflated which accentuates cardiomediastinal silhouette as well as the pulmonary vascular markings.  Difficult to exclude mild pulmonary interstitial edema.  No evidence of new focal consolidation, pneumothorax, or large pleural effusion.

## 2019-07-18 NOTE — ED NOTES
Blood pressure dropped. Patient placed in trendelenburg. IV bolus infusing via pressure bag. Dr. Shaw notified.

## 2019-07-18 NOTE — ASSESSMENT & PLAN NOTE
Patient reports on Bipap at night   Respiratory status stable   Will hold Bipap in setting of hypotension   Currently using 2L NC  Continue to monitor

## 2019-07-18 NOTE — PLAN OF CARE
Problem: Adult Inpatient Plan of Care  Goal: Plan of Care Review  Pt drowsy, but following commands appropriately. Remains oriented to self, time, and place. Vital signs stable. Levophed drip infusing for BP management; titrating to maintain MAP >65. Maintained on 2L NC; no SOB reported this shift. Repositioned frequently. Safety precautions in place. Pt remains free from fall/injury. Plan of care reviewed with patient and family. CT head and abdomen completed this afternoon.

## 2019-07-18 NOTE — ASSESSMENT & PLAN NOTE
Patient is on HD MWF, completed dialysis on day of presentation  Nephro consulted in ED, electrolytes stable, and he completed HD on day of admission  Continue to monitor   [facility] consult, appreciate recs

## 2019-07-18 NOTE — ASSESSMENT & PLAN NOTE
Patient presented to ED febrile to 103, tachycardic to 120's and hypotensive to 90's/40's  Blood cultures were drawn and broad spectrum IV antibiotics started   Given ~2L bolus total of fluids without improvement in BP   Central line placed by anesthesia   Start Levophed drip   Maintain MAP >75  If BP continues to be low will give more fluids and start phenylephrine drip  Continue Vanc and Zosyn   FU blood cultures   LA 3.8, continue to trend Q4 hours   UA Pending

## 2019-07-18 NOTE — NURSING TRANSFER
Nursing Transfer Note      7/18/2019     Transfer To:     Transfer via stretcher    Transfer with to O2, cardiac monitoring    Transported by [facility] Pineda     Medicines sent: None    Chart send with patient: No    Notified: daughter    Patient reassessed at: 7/18 0058    Upon arrival to floor: pt placed on cardiac monitoring, O2 connected, pt reassessed. No distress noted. Admit assessment performed. Levophed gtt infusing and titrated per order. Pt has blanchable redness on sacral area, present upon arrival. Meplex in place. SCD placed for VTE. meds assessed. Labs sent to lab. Will continue to monitor.

## 2019-07-19 PROBLEM — R65.21 SEPTIC SHOCK: Status: ACTIVE | Noted: 2019-07-17

## 2019-07-19 LAB
ALBUMIN SERPL BCP-MCNC: 2.5 G/DL (ref 3.5–5.2)
ALP SERPL-CCNC: 153 U/L (ref 55–135)
ALT SERPL W/O P-5'-P-CCNC: 47 U/L (ref 10–44)
ANION GAP SERPL CALC-SCNC: 13 MMOL/L (ref 8–16)
AST SERPL-CCNC: 45 U/L (ref 10–40)
BASOPHILS # BLD AUTO: 0.01 K/UL (ref 0–0.2)
BASOPHILS NFR BLD: 0.1 % (ref 0–1.9)
BILIRUB SERPL-MCNC: 0.9 MG/DL (ref 0.1–1)
BUN SERPL-MCNC: 59 MG/DL (ref 6–20)
CALCIUM SERPL-MCNC: 9.4 MG/DL (ref 8.7–10.5)
CHLORIDE SERPL-SCNC: 100 MMOL/L (ref 95–110)
CO2 SERPL-SCNC: 21 MMOL/L (ref 23–29)
CREAT SERPL-MCNC: 7.7 MG/DL (ref 0.5–1.4)
DIFFERENTIAL METHOD: ABNORMAL
EOSINOPHIL # BLD AUTO: 0 K/UL (ref 0–0.5)
EOSINOPHIL NFR BLD: 0.2 % (ref 0–8)
ERYTHROCYTE [DISTWIDTH] IN BLOOD BY AUTOMATED COUNT: 15.7 % (ref 11.5–14.5)
EST. GFR  (AFRICAN AMERICAN): 8 ML/MIN/1.73 M^2
EST. GFR  (NON AFRICAN AMERICAN): 7 ML/MIN/1.73 M^2
GLUCOSE SERPL-MCNC: 123 MG/DL (ref 70–110)
HCT VFR BLD AUTO: 28 % (ref 40–54)
HGB BLD-MCNC: 9.2 G/DL (ref 14–18)
LYMPHOCYTES # BLD AUTO: 0.7 K/UL (ref 1–4.8)
LYMPHOCYTES NFR BLD: 6 % (ref 18–48)
MAGNESIUM SERPL-MCNC: 1.9 MG/DL (ref 1.6–2.6)
MCH RBC QN AUTO: 35.1 PG (ref 27–31)
MCHC RBC AUTO-ENTMCNC: 32.9 G/DL (ref 32–36)
MCV RBC AUTO: 107 FL (ref 82–98)
MONOCYTES # BLD AUTO: 1.1 K/UL (ref 0.3–1)
MONOCYTES NFR BLD: 10 % (ref 4–15)
NEUTROPHILS # BLD AUTO: 9 K/UL (ref 1.8–7.7)
NEUTROPHILS NFR BLD: 83.7 % (ref 38–73)
PHOSPHATE SERPL-MCNC: 5.2 MG/DL (ref 2.7–4.5)
PLATELET # BLD AUTO: 49 K/UL (ref 150–350)
PMV BLD AUTO: 11.9 FL (ref 9.2–12.9)
POCT GLUCOSE: 134 MG/DL (ref 70–110)
POCT GLUCOSE: 139 MG/DL (ref 70–110)
POCT GLUCOSE: 162 MG/DL (ref 70–110)
POCT GLUCOSE: 222 MG/DL (ref 70–110)
POTASSIUM SERPL-SCNC: 4.2 MMOL/L (ref 3.5–5.1)
PROT SERPL-MCNC: 5.6 G/DL (ref 6–8.4)
RBC # BLD AUTO: 2.62 M/UL (ref 4.6–6.2)
SODIUM SERPL-SCNC: 134 MMOL/L (ref 136–145)
VANCOMYCIN SERPL-MCNC: 17 UG/ML
WBC # BLD AUTO: 10.78 K/UL (ref 3.9–12.7)

## 2019-07-19 PROCEDURE — 85025 COMPLETE CBC W/AUTO DIFF WBC: CPT

## 2019-07-19 PROCEDURE — 63600175 PHARM REV CODE 636 W HCPCS: Performed by: FAMILY MEDICINE

## 2019-07-19 PROCEDURE — 25000003 PHARM REV CODE 250: Performed by: STUDENT IN AN ORGANIZED HEALTH CARE EDUCATION/TRAINING PROGRAM

## 2019-07-19 PROCEDURE — 25000003 PHARM REV CODE 250: Performed by: INTERNAL MEDICINE

## 2019-07-19 PROCEDURE — 97165 OT EVAL LOW COMPLEX 30 MIN: CPT

## 2019-07-19 PROCEDURE — 80053 COMPREHEN METABOLIC PANEL: CPT

## 2019-07-19 PROCEDURE — 27000221 HC OXYGEN, UP TO 24 HOURS

## 2019-07-19 PROCEDURE — 25000003 PHARM REV CODE 250: Performed by: FAMILY MEDICINE

## 2019-07-19 PROCEDURE — 80202 ASSAY OF VANCOMYCIN: CPT

## 2019-07-19 PROCEDURE — 97530 THERAPEUTIC ACTIVITIES: CPT

## 2019-07-19 PROCEDURE — 84100 ASSAY OF PHOSPHORUS: CPT

## 2019-07-19 PROCEDURE — 20000000 HC ICU ROOM

## 2019-07-19 PROCEDURE — 97161 PT EVAL LOW COMPLEX 20 MIN: CPT

## 2019-07-19 PROCEDURE — 80100016 HC MAINTENANCE HEMODIALYSIS

## 2019-07-19 PROCEDURE — 94761 N-INVAS EAR/PLS OXIMETRY MLT: CPT

## 2019-07-19 PROCEDURE — 63600175 PHARM REV CODE 636 W HCPCS: Performed by: STUDENT IN AN ORGANIZED HEALTH CARE EDUCATION/TRAINING PROGRAM

## 2019-07-19 PROCEDURE — 83735 ASSAY OF MAGNESIUM: CPT

## 2019-07-19 PROCEDURE — 80100014 HC HEMODIALYSIS 1:1

## 2019-07-19 RX ORDER — RAMELTEON 8 MG/1
8 TABLET ORAL NIGHTLY PRN
Status: DISCONTINUED | OUTPATIENT
Start: 2019-07-19 | End: 2019-07-22 | Stop reason: HOSPADM

## 2019-07-19 RX ORDER — MIDODRINE HYDROCHLORIDE 5 MG/1
10 TABLET ORAL
Status: DISCONTINUED | OUTPATIENT
Start: 2019-07-19 | End: 2019-07-22 | Stop reason: HOSPADM

## 2019-07-19 RX ORDER — VANCOMYCIN HCL IN 5 % DEXTROSE 1G/250ML
1000 PLASTIC BAG, INJECTION (ML) INTRAVENOUS
Status: DISCONTINUED | OUTPATIENT
Start: 2019-07-19 | End: 2019-07-22 | Stop reason: HOSPADM

## 2019-07-19 RX ORDER — SIMETHICONE 125 MG
125 TABLET,CHEWABLE ORAL DAILY PRN
Status: DISCONTINUED | OUTPATIENT
Start: 2019-07-19 | End: 2019-07-22 | Stop reason: HOSPADM

## 2019-07-19 RX ORDER — MIDODRINE HYDROCHLORIDE 5 MG/1
10 TABLET ORAL 3 TIMES DAILY
Status: DISCONTINUED | OUTPATIENT
Start: 2019-07-19 | End: 2019-07-19

## 2019-07-19 RX ORDER — POLYETHYLENE GLYCOL 3350 17 G/17G
17 POWDER, FOR SOLUTION ORAL DAILY PRN
Status: DISCONTINUED | OUTPATIENT
Start: 2019-07-19 | End: 2019-07-22 | Stop reason: HOSPADM

## 2019-07-19 RX ORDER — NYSTATIN AND TRIAMCINOLONE ACETONIDE 100000; 1 [USP'U]/G; MG/G
CREAM TOPICAL 2 TIMES DAILY
Status: DISCONTINUED | OUTPATIENT
Start: 2019-07-19 | End: 2019-07-22 | Stop reason: HOSPADM

## 2019-07-19 RX ORDER — SODIUM CHLORIDE 9 MG/ML
INJECTION, SOLUTION INTRAVENOUS
Status: DISCONTINUED | OUTPATIENT
Start: 2019-07-19 | End: 2019-07-22 | Stop reason: HOSPADM

## 2019-07-19 RX ADMIN — NOREPINEPHRINE BITARTRATE 0.2 MCG/KG/MIN: 1 INJECTION, SOLUTION, CONCENTRATE INTRAVENOUS at 02:07

## 2019-07-19 RX ADMIN — INSULIN ASPART 2 UNITS: 100 INJECTION, SOLUTION INTRAVENOUS; SUBCUTANEOUS at 10:07

## 2019-07-19 RX ADMIN — SODIUM CHLORIDE 1000 ML: 0.9 INJECTION, SOLUTION INTRAVENOUS at 01:07

## 2019-07-19 RX ADMIN — METOCLOPRAMIDE HYDROCHLORIDE 5 MG: 5 SOLUTION ORAL at 06:07

## 2019-07-19 RX ADMIN — SENNOSIDES,DOCUSATE SODIUM 1 TABLET: 8.6; 5 TABLET, FILM COATED ORAL at 08:07

## 2019-07-19 RX ADMIN — FLUOXETINE 20 MG: 20 CAPSULE ORAL at 08:07

## 2019-07-19 RX ADMIN — SEVELAMER CARBONATE 1600 MG: 800 TABLET, FILM COATED ORAL at 08:07

## 2019-07-19 RX ADMIN — ACETAMINOPHEN 650 MG: 325 TABLET ORAL at 09:07

## 2019-07-19 RX ADMIN — SIMETHICONE 125 MG: 125 TABLET, CHEWABLE ORAL at 09:07

## 2019-07-19 RX ADMIN — PIPERACILLIN AND TAZOBACTAM 4.5 G: 4; .5 INJECTION, POWDER, LYOPHILIZED, FOR SOLUTION INTRAVENOUS; PARENTERAL at 09:07

## 2019-07-19 RX ADMIN — SEVELAMER CARBONATE 1600 MG: 800 TABLET, FILM COATED ORAL at 04:07

## 2019-07-19 RX ADMIN — NYSTATIN AND TRIAMCINOLONE ACETONIDE: 100000; 1 CREAM TOPICAL at 09:07

## 2019-07-19 RX ADMIN — METOCLOPRAMIDE HYDROCHLORIDE 5 MG: 5 SOLUTION ORAL at 11:07

## 2019-07-19 RX ADMIN — MIDODRINE HYDROCHLORIDE 10 MG: 5 TABLET ORAL at 04:07

## 2019-07-19 RX ADMIN — HEPARIN SODIUM 5000 UNITS: 5000 INJECTION, SOLUTION INTRAVENOUS; SUBCUTANEOUS at 05:07

## 2019-07-19 RX ADMIN — SENNOSIDES,DOCUSATE SODIUM 1 TABLET: 8.6; 5 TABLET, FILM COATED ORAL at 09:07

## 2019-07-19 RX ADMIN — SEVELAMER CARBONATE 1600 MG: 800 TABLET, FILM COATED ORAL at 12:07

## 2019-07-19 RX ADMIN — PRAVASTATIN SODIUM 40 MG: 40 TABLET ORAL at 08:07

## 2019-07-19 RX ADMIN — ACETAMINOPHEN 650 MG: 325 TABLET ORAL at 01:07

## 2019-07-19 RX ADMIN — VANCOMYCIN HYDROCHLORIDE 1000 MG: 1 INJECTION, POWDER, LYOPHILIZED, FOR SOLUTION INTRAVENOUS at 04:07

## 2019-07-19 RX ADMIN — HEPARIN SODIUM 5000 UNITS: 5000 INJECTION, SOLUTION INTRAVENOUS; SUBCUTANEOUS at 02:07

## 2019-07-19 RX ADMIN — GABAPENTIN 300 MG: 300 CAPSULE ORAL at 09:07

## 2019-07-19 RX ADMIN — MICAFUNGIN SODIUM 100 MG: 20 INJECTION, POWDER, LYOPHILIZED, FOR SOLUTION INTRAVENOUS at 08:07

## 2019-07-19 RX ADMIN — ONDANSETRON 8 MG: 8 TABLET, ORALLY DISINTEGRATING ORAL at 11:07

## 2019-07-19 RX ADMIN — PANTOPRAZOLE SODIUM 40 MG: 40 TABLET, DELAYED RELEASE ORAL at 08:07

## 2019-07-19 RX ADMIN — ACETAMINOPHEN 650 MG: 325 TABLET ORAL at 10:07

## 2019-07-19 RX ADMIN — LANTHANUM CARBONATE 1000 MG: 500 TABLET, CHEWABLE ORAL at 08:07

## 2019-07-19 RX ADMIN — INSULIN ASPART 2 UNITS: 100 INJECTION, SOLUTION INTRAVENOUS; SUBCUTANEOUS at 05:07

## 2019-07-19 RX ADMIN — METOCLOPRAMIDE HYDROCHLORIDE 5 MG: 5 SOLUTION ORAL at 09:07

## 2019-07-19 RX ADMIN — PIPERACILLIN AND TAZOBACTAM 4.5 G: 4; .5 INJECTION, POWDER, LYOPHILIZED, FOR SOLUTION INTRAVENOUS; PARENTERAL at 08:07

## 2019-07-19 RX ADMIN — LANTHANUM CARBONATE 1000 MG: 500 TABLET, CHEWABLE ORAL at 05:07

## 2019-07-19 RX ADMIN — POLYETHYLENE GLYCOL 3350 17 G: 17 POWDER, FOR SOLUTION ORAL at 09:07

## 2019-07-19 RX ADMIN — HEPARIN SODIUM 5000 UNITS: 5000 INJECTION, SOLUTION INTRAVENOUS; SUBCUTANEOUS at 09:07

## 2019-07-19 RX ADMIN — METOCLOPRAMIDE HYDROCHLORIDE 5 MG: 5 SOLUTION ORAL at 04:07

## 2019-07-19 RX ADMIN — LANTHANUM CARBONATE 1000 MG: 500 TABLET, CHEWABLE ORAL at 12:07

## 2019-07-19 RX ADMIN — RAMELTEON 8 MG: 8 TABLET, FILM COATED ORAL at 09:07

## 2019-07-19 NOTE — PLAN OF CARE
Pt co of quesiness, hob up, bp72 sys mean51 ,retaken still low,bp 70 sys mean 45, levophed restarted

## 2019-07-19 NOTE — PLAN OF CARE
Problem: Occupational Therapy Goal  Goal: Occupational Therapy Goal  Goals to be met by: 8/19     Patient will increase functional independence with ADLs by performing:    Feeding with Modified [place].  UE Dressing with Minimal Assistance.  Grooming while seated with Stand-by Assistance.  Rolling to Bilateral with Minimal Assistance.   Supine to sit with Moderate Assistance.  BUE HEP assistance as needed      Outcome: Ongoing (interventions implemented as appropriate)  OT eval performed, report to follow    Ongoing assessment of post acute needs

## 2019-07-19 NOTE — PROGRESS NOTES
[facility] Medicine  Progress Note    Patient Name: Angel Farmer Jr.  MRN: [id number]  Patient Class: IP- Inpatient   Admission Date: 7/17/2019  Length of Stay: 2 days  Attending Physician: Beckie Zarate MD  Primary Care Provider: Satya Noriega MD        Subjective:     Principal Problem:Septic shock      HPI:  Mr. Farmer is a 58 year old man with PMHx of ESRD on HD MWF, IDDM, HTN/HLD and MIKE presented to ED with 1 day history of fever to 1044, cough and generalized malaise. He was in his usual state of health until this morning after completing 4 hours of HD when he felt weak, light headed, and hot. His wife took his temperature and it was 104, so they presented to the ED. He also notes constipation, that is usual for him. He states he has a BM every 4 days, he no longer makes urine. He denies chest pain, palpitations, shortness of breath, abdominal pain, diarrhea, or LE edema.     In the ED he was febrile to 103, LA elevaated to 3.8, he became hypotensive and tachycardic meeting Severe sepsis. He received a total of 2L NS with minimal improvement in BP, anesthesia was consulted and central line placed and started on levophed and will be admitted to the ICU.     Overview/Hospital Course:  No notes on file    Interval History: Patient was afebrile overnight with highest temperature of 100.2. Throughout the day yesterday, patient was more fatigue and sleepy. This AM, patient was more alert and oriented. Patient was able to stay awake and respond appropriately to questions. Patient feels better today than during admission. Denies any fever, sob, chest pain, nausea, or vomiting.     Review of Systems   Constitutional: Positive for activity change and fever. Negative for unexpected weight change.   HENT: Negative for postnasal drip, rhinorrhea and sore throat.    Respiratory: Negative for cough, chest tightness, shortness of breath and wheezing.    Cardiovascular: Negative for  chest pain, palpitations and leg swelling.   Gastrointestinal: Positive for abdominal distention, abdominal pain and constipation. Negative for diarrhea, nausea and vomiting.   Skin: Negative for rash and wound.   Neurological: Negative for weakness, light-headedness and headaches.   Psychiatric/Behavioral: Negative for agitation.     Objective:     Vital Signs (Most Recent):  Temp: 99.3 °F (37.4 °C) (07/19/19 0330)  Pulse: 75 (07/19/19 0725)  Resp: 15 (07/19/19 0725)  BP: (!) 94/52 (07/19/19 0630)  SpO2: 98 % (07/19/19 0725) Vital Signs (24h Range):  Temp:  [98.1 °F (36.7 °C)-99.3 °F (37.4 °C)] 99.3 °F (37.4 °C)  Pulse:  [] 75  Resp:  [12-21] 15  SpO2:  [91 %-100 %] 98 %  BP: ()/(31-65) 94/52     Weight: 127.6 kg (281 lb 4.9 oz)  Body mass index is 44.06 kg/m².    Intake/Output Summary (Last 24 hours) at 7/19/2019 0838  Last data filed at 7/19/2019 0400  Gross per 24 hour   Intake 599.02 ml   Output 0 ml   Net 599.02 ml      Physical Exam   Constitutional: He is oriented to person, place, and time. He appears well-developed and well-nourished. He appears distressed.   Has wet towel on forehead   HENT:   Head: Normocephalic and atraumatic.   Nose: Nose normal.   Mouth/Throat: Oropharynx is clear and moist. No oropharyngeal exudate.   Eyes: Pupils are equal, round, and reactive to light. Conjunctivae and EOM are normal. Right eye exhibits no discharge. Left eye exhibits no discharge. No scleral icterus.   Neck: Normal range of motion. Neck supple.   Cardiovascular: Normal rate, regular rhythm, normal heart sounds and intact distal pulses. Exam reveals no gallop and no friction rub.   No murmur heard.  Pulmonary/Chest: Effort normal and breath sounds normal. No stridor. No respiratory distress. He has no wheezes. He has no rales. He exhibits no tenderness.   Abdominal: Soft. Bowel sounds are normal. He exhibits no distension and no mass. There is no tenderness. There is no rebound. No hernia.   Obtuse  abdomen   Genitourinary: Penis normal.   Musculoskeletal: Normal range of motion. He exhibits no edema, tenderness or deformity.   Lymphadenopathy:     He has no cervical adenopathy.   Neurological: He is alert and oriented to person, place, and time. No cranial nerve deficit or sensory deficit.   Skin: Skin is warm and dry. Capillary refill takes less than 2 seconds. No rash noted. He is not diaphoretic.   Shunt in Left forearm with palpable thrill, no edema or swelling  No ulcers in the sacral region   Psychiatric: He has a normal mood and affect. His behavior is normal.   Nursing note and vitals reviewed.      Significant Labs:   ABGs:   Recent Labs   Lab 07/18/19  1229   PH 7.348*   PCO2 33.4*   HCO3 18.4*   POCSATURATED 96   BE -7     Blood Culture:   Recent Labs   Lab 07/17/19 2144 07/17/19 2146   LABBLOO Gram stain nuno bottle: Gram negative rods  Gram stain nuno bottle: Gram positive cocci  Results called to and read back by:Nicole Prieto RN 07/18/2019  16:20  Gram stain aer bottle: Gram negative rods  Gram stain aer bottle: Gram positive cocci Gram stain nuno bottle: Gram negative rods   Results called to and read back by:Nicole Prieto RN 07/18/2019  14:32  Gram stain aer bottle: Gram negative rods  Gram stain aer bottle: Gram positive cocci     CBC:   Recent Labs   Lab 07/17/19 2148 07/18/19 0459 07/19/19  0437   WBC 5.45 2.58* 10.78   HGB 10.2* 10.0* 9.2*   HCT 32.0* 31.5* 28.0*   PLT 69* 58* 49*     CMP:   Recent Labs   Lab 07/17/19 2148 07/18/19 0459 07/19/19  0437    135* 134*   K 3.9 3.8 4.2    101 100   CO2 23 20* 21*   * 176* 123*   BUN 36* 40* 59*   CREATININE 5.3* 5.9* 7.7*   CALCIUM 9.5 9.1 9.4   PROT 6.7 6.1 5.6*   ALBUMIN 3.4* 3.0* 2.5*   BILITOT 1.3* 1.4* 0.9   ALKPHOS 240* 215* 153*   AST 63* 70* 45*   ALT 60* 62* 47*   ANIONGAP 15 14 13   EGFRNONAA 11* 10* 7*     Lactic Acid:   Recent Labs   Lab 07/18/19  0750 07/18/19  1205 07/18/19  1635   LACTATE 2.9* 2.3* 1.9        Significant Imaging:   Imaging Results          X-Ray Chest AP Portable (Final result)  Result time 07/18/19 00:35:51    Final result by Gabriella Drake MD (07/18/19 00:35:51)                 Impression:      As above.      Electronically signed by: Gabriella Drake MD  Date:    07/18/2019  Time:    00:35             Narrative:    EXAMINATION:  XR CHEST AP PORTABLE    CLINICAL HISTORY:  Encounter for adjustment and management of vascular access device    TECHNIQUE:  Single frontal view of the chest was performed.    COMPARISON:  07/17/2019 at 21:49.    FINDINGS:  Interval placement of right IJ central venous catheter with distal tip terminating over the region of the SVC.  No evidence of pneumothorax.  Lungs are markedly hypoinflated, otherwise no significant change from earlier exam.                               X-Ray Chest AP Portable (Final result)  Result time 07/17/19 22:14:55    Final result by Gabriella Drake MD (07/17/19 22:14:55)                 Impression:      As above.      Electronically signed by: Gabriella Drake MD  Date:    07/17/2019  Time:    22:14             Narrative:    EXAMINATION:  XR CHEST AP PORTABLE    CLINICAL HISTORY:  Sepsis;    TECHNIQUE:  Single frontal view of the chest was performed.    COMPARISON:  05/16/2019.    FINDINGS:  Lungs are significantly hypoinflated which accentuates cardiomediastinal silhouette as well as the pulmonary vascular markings.  Difficult to exclude mild pulmonary interstitial edema.  No evidence of new focal consolidation, pneumothorax, or large pleural effusion.                                    Assessment/Plan:      * Septic shock  Patient presented to ED febrile to 103, tachycardic to 120's and hypotensive to 90's/40's  Blood cultures were drawn and broad spectrum IV antibiotics started   Given ~2L bolus total of fluids without improvement in BP   Central line placed by anesthesia   Start Levophed drip and still continuing to require pressure  support  Maintain MAP >75  If BP continues to be low will give more fluids and start phenylephrine drip  Blood cultures: Gram neg rods, Gram positive cocci  LA 3.8 trended down to 1.9  UA order but patient does not produce urine.   Continue Vanc, Zosyn and Micafungin        MIKE (obstructive sleep apnea) - very severe latest sleep study says BPAP @ 16/8  Patient reports on Bipap at night   Respiratory status stable   Will hold Bipap in setting of hypotension   Currently using 2L NC  Continue to monitor     ESRD (end stage renal disease) on dialysis  Patient is on HD MWF, completed dialysis on day of presentation  Nephro consulted in ED, electrolytes stable, and he completed HD on day of admission  Continue to monitor   [facility] consult, appreciate recs       Hyperlipidemia  Continue home statin medication       Diabetes mellitus, type 2  Hgb A1C 5.0 months ago  Home insulin regimen is SSI and oral medications  Holding oral medications   Start Moderate correction scale   Goal Glucose <180   BG check QID   Diet diabetic and renal       Hypertension  Patient is currently not on any BP medications  Hypotensive in ED and started on Levophed  Goal BP <140/80          VTE Risk Mitigation (From admission, onward)        Ordered     heparin (porcine) injection 5,000 Units  Every 8 hours      07/18/19 0126     IP VTE HIGH RISK PATIENT  Once      07/18/19 0126        Damaris Sarabia, PGY-2  [facility]  07/19/2019 8:48 AM

## 2019-07-19 NOTE — PT/OT/SLP EVAL
Physical Therapy Evaluation    Patient Name:  Angel Farmer Jr.   MRN:  [id number]    Recommendations:     Discharge Recommendations:  (TBD)   Discharge Equipment Recommendations: (TBD)   Barriers to discharge: Decrease in functional status    Assessment:     Angel Farmer Jr. is a 58 y.o. male admitted with a medical diagnosis of Septic shock.  He presents with the following impairments/functional limitations:  weakness, impaired endurance, gait instability, impaired functional mobilty, impaired self care skills, impaired balance, decreased upper extremity function, decreased lower extremity function, decreased ROM, impaired coordination, edema, impaired muscle length, impaired cardiopulmonary response to activity     Rehab Prognosis: Fair; patient would benefit from acute skilled PT services to address these deficits and reach maximum level of function.    Recent Surgery: * No surgery found *      Plan:     During this hospitalization, patient to be seen 6 x/week to address the identified rehab impairments via gait training, therapeutic activities, therapeutic exercises, neuromuscular re-education and progress toward the following goals:    · Plan of Care Expires:  08/19/19    Subjective     Chief Complaint: abdominal pain/constipation  Patient/Family Comments/goals: feel better and get stronger  Pain/Comfort:  · Pain Rating 1: 8/10  · Location - Side 1: Left  · Location - Orientation 1: generalized  · Location 1: abdomen  · Pain Addressed 1: Cessation of Activity, Reposition, Nurse notified, Distraction  · Pain Rating Post-Intervention 1: 10/10    Patients cultural, spiritual, Muslim conflicts given the current situation: no    Living Environment:  Lives w/brother, TORO and 2 niecesin [facility], ramp access  Previous level of function: assistance as needed for ADLs; ongoing assessment 2/2 pt w/tangential answers  Roles and Routines:   Equipment Used at Home:  bedside commode, walker, rolling, wheelchair, hospital  bed  Assistance upon Discharge: family    Objective:     Communicated with nurse prior to session.  Patient found  with SCD, oxygen(ICU monitoring)  upon PT entry to room.    General Precautions: Standard, fall   Orthopedic Precautions:N/A   Braces: N/A     Exams:  · Cognition: A&O x 4; tangential conversation; follows one step commands  · Pannus settled to R side; pt positioned with hips rotated R and RLE ER  · R thigh with distal lateral bruise, mod edema R thigh/mild R knee  · BLE ROM WFL except hip fl limitation 2/2 body habitus  · BLE strength tested grossly in supine: hip fl~2+ grossly, knee ext~4, ankle df~4    Functional Mobility:  Bed Mobility:     Rolling Left:  maximal assistance and of 2 persons  Rolling Right: maximal assistance and of 2 persons  Scooting: dependence and of 2 persons      Therapeutic Activities and Exercises:   Patient educated on role of PT/POC, BLE AROM; bed mob as described above.    AM-PAC 6 CLICK MOBILITY  Total Score:7     Patient left HOB elevated with all lines intact, call button in reach and nurse notified.    GOALS:   Multidisciplinary Problems     Physical Therapy Goals        Problem: Physical Therapy Goal    Goal Priority Disciplines Outcome Goal Variances Interventions   Physical Therapy Goal     PT, PT/OT Ongoing (interventions implemented as appropriate)     Description:  Goals to be met by: 2019     Patient will increase functional independence with mobility by performin. Supine to sit with Moderate Assistance  2. Sit to supine with Moderate Assistance  3. Rolling to Left and Right with Minimal Assistance.  4. Sit to stand transfer with Minimal Assistance  5. Bed to chair transfer with Minimal Assistance using Rolling Walker  6. Gait  x 10 feet with Minimal Assistance using Rolling Walker.   7. Lower extremity exercise program x10 reps with supervision                      History:     Past Medical History:   Diagnosis Date    Anemia due to stage 5 chronic  kidney disease 3/1/2019    Diabetes mellitus type II     Dialysis patient     Hyperlipidemia     Hypertension     Kidney failure     MIKE (obstructive sleep apnea)     Urinary tract infection        Past Surgical History:   Procedure Laterality Date    AV FISTULA PLACEMENT      left lower arm    SPINE, CERVICAL, POSTERIOR APPROACH  LAMINECTOMY C3-C6; C6-C7; WITH MEDIAL DISCECTOMY N/A 3/3/2019    Performed by Ruddy Wylie MD at [facility] OR 2ND FLR    TONSILLECTOMY, ADENOIDECTOMY      TRANSESOPHAGEAL ECHOCARDIOGRAM (SERENITY) N/A 5/23/2017    Performed by Donaldo Mai MD at [facility] OR       Time Tracking:     PT Received On: 07/19/19  PT Start Time: 1429     PT Stop Time: 1500  PT Total Time (min): 31 min with OT    Billable Minutes: Evaluation 31 minutes      Paula Santos, PT  07/19/2019

## 2019-07-19 NOTE — PROCEDURES
Pt seen and examined on HD, tolerating procedure well  Review of Systems   Constitutional: Negative for chills and fever.   Respiratory: Negative for cough and shortness of breath.    Cardiovascular: Negative for chest pain and leg swelling.   Gastrointestinal: Negative for nausea.     ,   AP -180  +200  Temp:  [98.8 °F (37.1 °C)-99.7 °F (37.6 °C)]   Pulse:  [70-84]   Resp:  [11-26]   BP: ()/(45-63)   SpO2:  [97 %-100 %]     Physical Exam   Constitutional: He is oriented to person, place, and time and well-developed, well-nourished, and in no distress. No distress.   Morbidly obese   HENT:   Head: Normocephalic and atraumatic.   Mouth/Throat: Oropharynx is clear and moist.   Eyes: EOM are normal. No scleral icterus.   Neck: Neck supple. No JVD present.   Cardiovascular: Normal rate and regular rhythm. Exam reveals no friction rub.   No murmur heard.  Pulmonary/Chest: Effort normal and breath sounds normal. No respiratory distress. He has no wheezes. He has no rales.   Abdominal: Soft. Bowel sounds are normal. He exhibits no distension. There is no tenderness.   Musculoskeletal: He exhibits no edema.   Neurological: He is alert and oriented to person, place, and time.   Skin: Skin is warm and dry. No rash noted. He is not diaphoretic. No erythema.   Psychiatric: Affect normal.     Recent Labs   Lab 07/18/19  0459 07/19/19  0437   WBC 2.58* 10.78   HGB 10.0* 9.2*   HCT 31.5* 28.0*   PLT 58* 49*     Recent Labs   Lab 07/18/19  0459 07/19/19  0437   * 134*   K 3.8 4.2    100   CO2 20* 21*   BUN 40* 59*   CREATININE 5.9* 7.7*   CALCIUM 9.1 9.4     A/P  1. ESRD (N18.6 Z99.2) - on HD with Dr. Araceli Pickens in [facility] MWF, 4.5 hours, EDW 125kg  2. HTN (I10) - controlled cont home meds  3. Anemia of chronic kidney disease treated with JUANITO (N18.9 D63.1) -hold epo today  Recent Labs   Lab 07/17/19  2148 07/18/19  0459 07/19/19  0437   HGB 10.2* 10.0* 9.2*   HCT 32.0* 31.5* 28.0*   PLT 69*  58* 49*       Iron on hold due to FUO  Lab Results   Component Value Date    IRON 169 (H) 03/01/2019    TIBC 218 (L) 03/01/2019    FERRITIN 914 (H) 03/01/2019       4. MBD (E88.9 M90.80) -  Recent Labs   Lab 07/19/19  0437   CALCIUM 9.4   PHOS 5.2*     Recent Labs   Lab 07/17/19  2148 07/18/19  0159 07/19/19  0437   MG 1.3* 1.1*  1.1* 1.9       cont sensipar and Fosrenol    5. Hemodialysis Access (Z99.2 V45.11)-   6. Nutrition/Hypoalbuminemia (E88.09) - L wrist AVF, recent ballon PTA by Dr. Moran in [facility]  Recent Labs   Lab 07/18/19  0459 07/19/19  0437   LABPROT 11.9  --    ALBUMIN 3.0* 2.5*     Nepro with meals TID. Renal vitamins daily    7. DM2  8. Severe MIKE- BiPAP 16/8  9. Fever/chills - pan culture, appreciate ID recommendations--> 4th episode of different organism bacteremia in the last 8 weeks. I discussed case with Dr. Scanlon (EJ) - his ID doctor    Vanc + cefepime - now afebrile, blood cultures so far negative   He  needs SERENITY      Thank you for allowing me to participate in care of your patient  With any question please call [phone]  Margoth Felton    [facility]  SHALA Forrest MD, FACP,   KADEN Pickens MD,   MD GEORGIA Walker, NP  [address]  [place], [address]  [phone]  After hours answering service: [phone]

## 2019-07-19 NOTE — PLAN OF CARE
Pt awake and alert, oriented, denies chest pain, temp 99.4,  or sob fistula left lower arm, bruit and thrill present, weak radial pulses bilaterlly,having back pain, states has a bad back, needs surg, and very weak in legs,feet,numbness in feet , toes,has had many falls, not since feb though,left arm and hand swollen, fistula in this arm, but can not lift arm up no more than 40 deg, very little rom, shoulder ,elbow, rom done, heard cracking of both,has had broken shoulder last year, also can lift rt arm,up but moving from down to mouth limited,has trouble feeding himself at times,also just started using walker, and walking, had been in wheelchair, since  Around feb with another fall,can lift legs some , has difficulty bending knees,very limited movement in all ext, obese, no recent bm, feels he could go, but can't, on reglan, and senokot,pale, skin warm,dry,flaky,bruised,large bruise under rt thigh,petechiae left lower calf,also with discoloration calves, no redness,yeast under pannis but scrotum and penis red,moist,smells like yeast,also noted some creamy reddish drainage on pad,sheet,, with much help pt pulled up and turned, noted shearing at coccyx with foam dressing in place,and when I went to clean between buttocks noted some bld on wipe,no active bleeding noted at rectum, scrotum,penis, not sure where it came,from will notify md    Levophed in use, bp now 80's mean 60 inc to lk0.15 mcg,will titrate to keep mean 65

## 2019-07-19 NOTE — PLAN OF CARE
Dr Thomas and No here , status given, told only small amt of watery stool today,will order something more for bowels also will order something for scrotum,penis

## 2019-07-19 NOTE — PLAN OF CARE
Problem: Adult Inpatient Plan of Care  Goal: Plan of Care Review  Outcome: Ongoing (interventions implemented as appropriate)       07/18/19 2059   Plan of Care Review   Plan of Care Reviewed With Patient; Safety: call light in reach, patient oriented to room & instructed how to notify nurse if assistance is needed, current questions/concerns addressed, bed in lowest position with wheels locked & side rails up X 3. Pt educated regarding fall prevention and taking appropriate action to prevent falls Activity: encouraged per patient tolerance Neurological: Oriented x4 Respiratory: On ra while awake but on 1L when asleep due to MIKE, o2 sat wnl   Cardiac: BP normotensive with assist of levo; HR stable. Afebrile this shift. Intake/Output: Olguric, HD patient, no bm at night, Diet: diet poor intake due being lethargic Pain: Denied pain overnight  Skin: bruised and foam dressing in place  Plan: Plan of care reviewed with the patient. All questions and concerns were addressed. Labs and vitals are being monitored and levo is titrated accordingly. Will continue to monitor.             Problem: Fall Injury Risk  Goal: Absence of Fall and Fall-Related Injury  Outcome: Ongoing (interventions implemented as appropriate)  Intervention: Identify and Manage Contributors to Fall Injury Risk       07/18/19 2059   Manage Acute Allergic Reaction   Medication Review/Management medications reviewed;infusion titrated   Identify and Manage Contributors to Fall Injury Risk   Self-Care Promotion independence encouraged            Problem: Skin Injury Risk Increased  Goal: Skin Health and Integrity     Intervention: Optimize Skin Protection       07/18/19 2059   Prevent Additional Skin Injury   Head of Bed (HOB) HOB at 30-45 degrees   Pressure Reduction Devices positioning supports utilized   Pressure Reduction Techniques rest period provided between sit times;positioned off wounds;frequent weight shift encouraged;heels elevated off bed    Monitor and Manage Hypervolemia   Skin Protection adhesive use limited;incontinence pads utilized;pulse oximeter probe site changed;skin sealant/moisture barrier applied;tubing/devices free from skin contact            Problem: Adjustment to Illness (Sepsis/Septic Shock)  Goal: Optimal Coping  Outcome: Ongoing (interventions implemented as appropriate)  Intervention: Optimize Psychosocial Adjustment to Illness       07/18/19 2059   Promote Anxiety Reduction   Supportive Measures active listening utilized;positive reinforcement provided;relaxation techniques promoted   Family/Support System Care support provided            Problem: Glycemic Control Impaired (Sepsis/Septic Shock)  Goal: Blood Glucose Level Within Desired Range  Outcome: Ongoing (interventions implemented as appropriate)  Intervention: Optimize Glycemic Control       07/18/19 2059   Monitor and Manage Ketoacidosis   Glycemic Management blood glucose monitoring;supplemental insulin given            Problem: Infection (Sepsis/Septic Shock)  Goal: Absence of Infection Signs/Symptoms  Outcome: Ongoing (interventions implemented as appropriate)  Intervention: Prevent or Manage Infection Progression       07/18/19 2059   Prevent or Manage Infection   Fever Reduction/Comfort Measures lightweight clothing;lightweight bedding   Prevent Infection and Maximize Resistance   Infection Prevention equipment surfaces disinfected;rest/sleep promoted;single patient room provided   Manage Diarrhea   Isolation Precautions protective environment maintained

## 2019-07-19 NOTE — PLAN OF CARE
Problem: Adult Inpatient Plan of Care  Goal: Plan of Care Review  Outcome: Ongoing (interventions implemented as appropriate)  Patient on oxygen with documented flow.  Will attempt to wean per O2 order protocol.   Will continue to monitor.

## 2019-07-19 NOTE — SUBJECTIVE & OBJECTIVE
Interval History: Patient was afebrile overnight with highest temperature of 100.2. Throughout the day yesterday, patient was more fatigue and sleepy. This AM, patient was more alert and oriented. Patient was able to stay awake and respond appropriately to questions. Patient feels better today than during admission. Denies any fever, sob, chest pain, nausea, or vomiting.     Review of Systems   Constitutional: Positive for activity change and fever. Negative for unexpected weight change.   HENT: Negative for postnasal drip, rhinorrhea and sore throat.    Respiratory: Negative for cough, chest tightness, shortness of breath and wheezing.    Cardiovascular: Negative for chest pain, palpitations and leg swelling.   Gastrointestinal: Positive for abdominal distention, abdominal pain and constipation. Negative for diarrhea, nausea and vomiting.   Skin: Negative for rash and wound.   Neurological: Negative for weakness, light-headedness and headaches.   Psychiatric/Behavioral: Negative for agitation.     Objective:     Vital Signs (Most Recent):  Temp: 99.3 °F (37.4 °C) (07/19/19 0330)  Pulse: 75 (07/19/19 0725)  Resp: 15 (07/19/19 0725)  BP: (!) 94/52 (07/19/19 0630)  SpO2: 98 % (07/19/19 0725) Vital Signs (24h Range):  Temp:  [98.1 °F (36.7 °C)-99.3 °F (37.4 °C)] 99.3 °F (37.4 °C)  Pulse:  [] 75  Resp:  [12-21] 15  SpO2:  [91 %-100 %] 98 %  BP: ()/(31-65) 94/52     Weight: 127.6 kg (281 lb 4.9 oz)  Body mass index is 44.06 kg/m².    Intake/Output Summary (Last 24 hours) at 7/19/2019 0838  Last data filed at 7/19/2019 0400  Gross per 24 hour   Intake 599.02 ml   Output 0 ml   Net 599.02 ml      Physical Exam   Constitutional: He is oriented to person, place, and time. He appears well-developed and well-nourished. He appears distressed.   Has wet towel on forehead   HENT:   Head: Normocephalic and atraumatic.   Nose: Nose normal.   Mouth/Throat: Oropharynx is clear and moist. No oropharyngeal exudate.   Eyes:  Pupils are equal, round, and reactive to light. Conjunctivae and EOM are normal. Right eye exhibits no discharge. Left eye exhibits no discharge. No scleral icterus.   Neck: Normal range of motion. Neck supple.   Cardiovascular: Normal rate, regular rhythm, normal heart sounds and intact distal pulses. Exam reveals no gallop and no friction rub.   No murmur heard.  Pulmonary/Chest: Effort normal and breath sounds normal. No stridor. No respiratory distress. He has no wheezes. He has no rales. He exhibits no tenderness.   Abdominal: Soft. Bowel sounds are normal. He exhibits no distension and no mass. There is no tenderness. There is no rebound. No hernia.   Obtuse abdomen   Genitourinary: Penis normal.   Musculoskeletal: Normal range of motion. He exhibits no edema, tenderness or deformity.   Lymphadenopathy:     He has no cervical adenopathy.   Neurological: He is alert and oriented to person, place, and time. No cranial nerve deficit or sensory deficit.   Skin: Skin is warm and dry. Capillary refill takes less than 2 seconds. No rash noted. He is not diaphoretic.   Shunt in Left forearm with palpable thrill, no edema or swelling  No ulcers in the sacral region   Psychiatric: He has a normal mood and affect. His behavior is normal.   Nursing note and vitals reviewed.      Significant Labs:   ABGs:   Recent Labs   Lab 07/18/19  1229   PH 7.348*   PCO2 33.4*   HCO3 18.4*   POCSATURATED 96   BE -7     Blood Culture:   Recent Labs   Lab 07/17/19  2144 07/17/19  2146   LABBLOO Gram stain nuno bottle: Gram negative rods  Gram stain nuno bottle: Gram positive cocci  Results called to and read back by:Nicole Prieto RN 07/18/2019  16:20  Gram stain aer bottle: Gram negative rods  Gram stain aer bottle: Gram positive cocci Gram stain nuno bottle: Gram negative rods   Results called to and read back by:Nicole Prieto RN 07/18/2019  14:32  Gram stain aer bottle: Gram negative rods  Gram stain aer bottle: Gram positive cocci      CBC:   Recent Labs   Lab 07/17/19 2148 07/18/19 0459 07/19/19  0437   WBC 5.45 2.58* 10.78   HGB 10.2* 10.0* 9.2*   HCT 32.0* 31.5* 28.0*   PLT 69* 58* 49*     CMP:   Recent Labs   Lab 07/17/19 2148 07/18/19 0459 07/19/19  0437    135* 134*   K 3.9 3.8 4.2    101 100   CO2 23 20* 21*   * 176* 123*   BUN 36* 40* 59*   CREATININE 5.3* 5.9* 7.7*   CALCIUM 9.5 9.1 9.4   PROT 6.7 6.1 5.6*   ALBUMIN 3.4* 3.0* 2.5*   BILITOT 1.3* 1.4* 0.9   ALKPHOS 240* 215* 153*   AST 63* 70* 45*   ALT 60* 62* 47*   ANIONGAP 15 14 13   EGFRNONAA 11* 10* 7*     Lactic Acid:   Recent Labs   Lab 07/18/19  0750 07/18/19  1205 07/18/19  1635   LACTATE 2.9* 2.3* 1.9       Significant Imaging:   Imaging Results          X-Ray Chest AP Portable (Final result)  Result time 07/18/19 00:35:51    Final result by Gabriella Drake MD (07/18/19 00:35:51)                 Impression:      As above.      Electronically signed by: Gabriella Drake MD  Date:    07/18/2019  Time:    00:35             Narrative:    EXAMINATION:  XR CHEST AP PORTABLE    CLINICAL HISTORY:  Encounter for adjustment and management of vascular access device    TECHNIQUE:  Single frontal view of the chest was performed.    COMPARISON:  07/17/2019 at 21:49.    FINDINGS:  Interval placement of right IJ central venous catheter with distal tip terminating over the region of the SVC.  No evidence of pneumothorax.  Lungs are markedly hypoinflated, otherwise no significant change from earlier exam.                               X-Ray Chest AP Portable (Final result)  Result time 07/17/19 22:14:55    Final result by Gabriella Drake MD (07/17/19 22:14:55)                 Impression:      As above.      Electronically signed by: Gabriella Drake MD  Date:    07/17/2019  Time:    22:14             Narrative:    EXAMINATION:  XR CHEST AP PORTABLE    CLINICAL HISTORY:  Sepsis;    TECHNIQUE:  Single frontal view of the chest was  performed.    COMPARISON:  05/16/2019.    FINDINGS:  Lungs are significantly hypoinflated which accentuates cardiomediastinal silhouette as well as the pulmonary vascular markings.  Difficult to exclude mild pulmonary interstitial edema.  No evidence of new focal consolidation, pneumothorax, or large pleural effusion.

## 2019-07-19 NOTE — ASSESSMENT & PLAN NOTE
Patient is currently not on any BP medications  Hypotensive in ED and started on Levophed  Goal BP <140/80

## 2019-07-19 NOTE — CONSULTS
Nephrology Consult  H&P      Consult Requested By: Beckie Zarate MD  Reason for Consult: persistent infection, ESRD    SUBJECTIVE:     History of Present Illness:  Patient is a 58 y.o. male  admitted for sepsis.  ESRD on HD with Dr. Araceli Pickens in [facility] MWF, 4.5 hours, EDW 140kg      Review of Systems   Constitutional: Negative for chills, diaphoresis and fever.   HENT: Negative for hearing loss.    Eyes: Negative for blurred vision and double vision.   Respiratory: Negative for cough and shortness of breath.    Cardiovascular: Negative for chest pain and leg swelling.   Gastrointestinal: Negative for abdominal pain, nausea and vomiting.   Genitourinary: Negative for dysuria and urgency.   Musculoskeletal: Positive for myalgias.   Skin: Negative for itching and rash.   Neurological: Positive for weakness. Negative for dizziness, tingling and headaches.   Endo/Heme/Allergies: Does not bruise/bleed easily.   Psychiatric/Behavioral: Negative for depression.       Past Medical History:   Diagnosis Date    Anemia due to stage 5 chronic kidney disease 3/1/2019    Diabetes mellitus type II     Dialysis patient     Hyperlipidemia     Hypertension     Kidney failure     MIKE (obstructive sleep apnea)     Urinary tract infection      Past Surgical History:   Procedure Laterality Date    AV FISTULA PLACEMENT      left lower arm    SPINE, CERVICAL, POSTERIOR APPROACH  LAMINECTOMY C3-C6; C6-C7; WITH MEDIAL DISCECTOMY N/A 3/3/2019    Performed by Ruddy Wylie MD at [facility] OR 2ND FLR    TONSILLECTOMY, ADENOIDECTOMY      TRANSESOPHAGEAL ECHOCARDIOGRAM (SERENITY) N/A 5/23/2017    Per[facility] by [facility] at [facility] OR     Family History   Problem Relation Age of Onset    Colon cancer Mother     Lung cancer Mother     Diabetes Mother     Diabetes Father     Heart disease Father     Hypertension Father     Diabetes Maternal Grandmother     Diabetes Maternal Grandfather      Social History      Tobacco Use    Smoking status: Never Smoker    Smokeless tobacco: Never Used   Substance Use Topics    Alcohol use: No    Drug use: No       Review of patient's allergies indicates:   Allergen Reactions    Keflex [cephalexin] Nausea Only            OBJECTIVE:     Vital Signs (Most Recent)  Vitals:    07/18/19 1745 07/18/19 1800 07/18/19 1815 07/18/19 1830   BP: (!) 119/55 (!) 123/59 (!) 112/55 (!) 115/58   BP Location:  Right arm     Patient Position:  Lying     Pulse:  75  70   Resp:  14  15   Temp:       TempSrc:       SpO2:  97%  98%   Weight:       Height:           Medications:   FLUoxetine  20 mg Oral Daily    gabapentin  300 mg Oral QHS    heparin (porcine)  5,000 Units Subcutaneous Q8H    lanthanum  1,000 mg Oral TID WM    metoclopramide HCl  5 mg Oral QID (AC & HS)    micafungin (MYCAMINE) IVPB  100 mg Intravenous Q24H    pantoprazole  40 mg Oral Daily    [START ON 7/19/2019] piperacillin-tazobactam (ZOSYN) IVPB  4.5 g Intravenous Q12H    pravastatin  40 mg Oral Daily    senna-docusate 8.6-50 mg  1 tablet Oral BID    sevelamer carbonate  1,600 mg Oral TID WM           Physical Exam   Constitutional: He is oriented to person, place, and time and well-developed, well-nourished, and in no distress. No distress.   HENT:   Head: Atraumatic.   Mouth/Throat: Oropharynx is clear and moist.   Eyes: EOM are normal. No scleral icterus.   Neck: Neck supple. No JVD present.   Cardiovascular: Normal rate, regular rhythm, normal heart sounds and intact distal pulses. Exam reveals no friction rub.   No murmur heard.  Pulmonary/Chest: Effort normal and breath sounds normal. No respiratory distress. He has no wheezes. He has no rales.   Abdominal: Soft. Bowel sounds are normal. He exhibits no distension. There is no tenderness.   Musculoskeletal: He exhibits no edema.   L wrist AVF   Neurological: He is alert and oriented to person, place, and time.   Skin: Skin is warm. No rash noted. He is diaphoretic.  No erythema.       Laboratory:  Recent Labs   Lab 07/17/19 2148 07/18/19 0459   WBC 5.45 2.58*   HGB 10.2* 10.0*   HCT 32.0* 31.5*   PLT 69* 58*   MONO 1.7*  0.1* 0.8*  0.0*     Recent Labs   Lab 07/17/19 2148 07/18/19  0159 07/18/19  0459     --  135*   K 3.9  --  3.8     --  101   CO2 23  --  20*   BUN 36*  --  40*   CREATININE 5.3*  --  5.9*   CALCIUM 9.5  --  9.1   PHOS 2.9 2.9  2.9  --        Diagnostic Results:  X-Ray: Reviewed    ASSESSMENT/PLAN:   1. ESRD - on HD with [facility]. Araceli Pickens in [facility], 4.5 hours, EDW 140kg  HD was done yesterday, hypotensive, not uremic, electrolytes acceptable no reason for HD    Treat sepsis    2. HTN - controlled, cont home meds  3. Anemia -epo with HD    Recent Labs   Lab 07/17/19 2148 07/18/19 0459   WBC 5.45 2.58*   HGB 10.2* 10.0*   HCT 32.0* 31.5*   PLT 69* 58*       Lab Results   Component Value Date    IRON 169 (H) 03/01/2019    TIBC 218 (L) 03/01/2019    FERRITIN 914 (H) 03/01/2019     4. MBD - cont sensipar and Fosrenol  Lab Results   Component Value Date    CALCIUM 9.1 07/18/2019    PHOS 2.9 07/18/2019    PHOS 2.9 07/18/2019     Recent Labs   Lab 07/17/19 2148 07/18/19  0159   MG 1.3* 1.1*  1.1*       No results found for: [id number]  Lab Results   Component Value Date    CO2 20 (L) 07/18/2019       5. Access - L wrist AVF, follows Dr. Moran in [facility]  6. Nutrition - renal diet, nephrocaps  7. DM2  8. Severe MIKE- BiPAP 16/8      Thank you for consult, will follow  With any question please call [phone]  Margoth Felton    [facility]  SHALA Forrest MD, FACP,   KADEN Pickens MD,   MD GEORGIA Walker, NP  [address]  [place], [address] [phone]

## 2019-07-19 NOTE — PROGRESS NOTES
Pharmacokinetic Assessment Follow Up: IV Vancomycin    Vancomycin serum concentration assessment(s):    The measurement is below the desired definitive target range of 15 to 20 mcg/mL.    Vancomycin Regimen Plan:    Change regimen to Vancomycin 1000 mg IV every MWF hour with next serum trough concentration measured at 0400 prior to next HD session dose on 7/21     Pharmacy will continue to follow and monitor vancomycin.    Please contact pharmacy at extension [phone] for questions regarding this assessment.    Thank you for the consult,   Anastasia Bland     Patient brief summary:  Angel Farmer Jr. is a 58 y.o. male initiated on antimicrobial therapy with IV Vancomycin for treatment of suspected sepsis    The patient received a loading dose, followed by the current treatment regimen: random levels with plan to redose when level is less than 20 mcg/mL    Drug Allergies:   Review of patient's allergies indicates:   Allergen Reactions    Keflex [cephalexin] Nausea Only       Actual Body Weight:   127kg    Renal Function:   Estimated Creatinine Clearance: 13.7 mL/min (A) (based on SCr of 7.7 mg/dL (H)).,     Dialysis Method (if applicable):  none     CBC (last 72 hours):  Recent Labs   Lab Result Units 07/17/19 2148 07/18/19 0159 07/18/19 0459 07/19/19 0437   WBC K/uL 5.45  --  2.58* 10.78   Hemoglobin g/dL 10.2*  --  10.0* 9.2*   Hemoglobin A1C %  --  5.0  --   --    Hematocrit % 32.0*  --  31.5* 28.0*   Platelets K/uL 69*  --  58* 49*   Gran% % 94.3*  --  93.0* 83.7*   Lymph% % 4.0*  --  6.2* 6.0*   Mono% % 1.7*  --  0.8* 10.0   Eosinophil% % 0.0  --  0.0 0.2   Basophil% % 0.0  --  0.0 0.1   Differential Method  Automated  --  Automated Automated       Metabolic Panel (last 72 hours):  Recent Labs   Lab Result Units 07/17/19 2148 07/18/19 0159 07/18/19 0459 07/19/19 0437   Sodium mmol/L 138  --  135* 134*   Potassium mmol/L 3.9  --  3.8 4.2   Chloride mmol/L 100  --  101 100   CO2 mmol/L 23  --  20* 21*   Glucose  mg/dL 145*  --  176* 123*   BUN, Bld mg/dL 36*  --  40* 59*   Creatinine mg/dL 5.3*  --  5.9* 7.7*   Albumin g/dL 3.4*  --  3.0* 2.5*   Total Bilirubin mg/dL 1.3*  --  1.4* 0.9   Alkaline Phosphatase U/L 240*  --  215* 153*   AST U/L 63*  --  70* 45*   ALT U/L 60*  --  62* 47*   Magnesium mg/dL 1.3* 1.1*  1.1*  --  1.9   Phosphorus mg/dL 2.9 2.9  2.9  --  5.2*       Vancomycin Administrations:  vancomycin given in the last 96 hours                     vancomycin (VANCOCIN) 2,000 mg in dextrose 5 % 500 mL IVPB (mg) 2,000 mg New Bag 07/17/19 2241                      Drug levels (last 3 results):  Recent Labs   Lab Result Units 07/19/19  0437   Vancomycin, Random ug/mL 17.0       Microbiologic Results:  Microbiology Results (last 7 days)       Procedure Component Value Units Date/Time    Blood culture x two cultures. Draw prior to antibiotics. [[id number]] Collected:  07/17/19 2144    Order Status:  Completed Specimen:  Blood from Peripheral, Wrist, Right Updated:  07/18/19 1802     Blood Culture, Routine Gram stain nuno bottle: Gram negative rods      Gram stain nuno bottle: Gram positive cocci      Results called to and read back by:Nicole Prieto RN 07/18/2019  16:20      Gram stain aer bottle: Gram negative rods      Gram stain aer bottle: Gram positive cocci    Narrative:       Aerobic and anaerobic    Blood culture x two cultures. Draw prior to antibiotics. [[id number]] Collected:  07/17/19 2146    Order Status:  Completed Specimen:  Blood from Peripheral, Forearm, Right Updated:  07/18/19 1620     Blood Culture, Routine Gram stain nuno bottle: Gram negative rods       Results called to and read back by:Nicole Prieto RN 07/18/2019  14:32      Gram stain aer bottle: Gram negative rods      Gram stain aer bottle: Gram positive cocci    Narrative:       Aerobic and anaerobic    Blood culture [[id number]]     Order Status:  No result Specimen:  Blood     Blood culture [[id number]]     Order Status:  No result Specimen:  Blood      Culture, Respiratory with Gram Stain [[id number]]     Order Status:  No result Specimen:  Respiratory     Influenza A & B by Molecular [[id number]] Collected:  07/17/19 2137    Order Status:  Completed Specimen:  Nasopharyngeal Swab Updated:  07/17/19 2229     Influenza A, Molecular Negative     Influenza B, Molecular Negative     Flu A & B Source Nasal swab

## 2019-07-19 NOTE — PLAN OF CARE
Problem: Physical Therapy Goal  Goal: Physical Therapy Goal  Goals to be met by: 2019     Patient will increase functional independence with mobility by performin. Supine to sit with Moderate Assistance  2. Sit to supine with Moderate Assistance  3. Rolling to Left and Right with Minimal Assistance.  4. Sit to stand transfer with Minimal Assistance  5. Bed to chair transfer with Minimal Assistance using Rolling Walker  6. Gait  x 10 feet with Minimal Assistance using Rolling Walker.   7. Lower extremity exercise program x10 reps with supervision    Outcome: Ongoing (interventions implemented as appropriate)  PT eval performed to pt tolerance; full report to follow; recs TBD; cont with POC.

## 2019-07-19 NOTE — PT/OT/SLP EVAL
Occupational Therapy   Evaluation    Name: Angel Farmer Jr.  MRN: [id number]  Admitting Diagnosis:  Septic shock      Recommendations:     Discharge Recommendations: other (see comments)(TBD)  Discharge Equipment Recommendations:  other (see comments)(TBD)  Barriers to discharge:       Assessment:     Angel Farmer Jr. is a 58 y.o. male with a medical diagnosis of Septic shock.  He presents with performance deficits affecting function: weakness, impaired endurance, impaired self care skills, impaired functional mobilty, gait instability, impaired balance, decreased lower extremity function, decreased upper extremity function, pain, decreased ROM, impaired coordination, edema, impaired muscle length, impaired cardiopulmonary response to activity.      Rehab Prognosis: Fair; patient would benefit from acute skilled OT services to address these deficits and reach maximum level of function.       Plan:     Patient to be seen 5 x/week to address the above listed problems via self-care/home management, therapeutic activities, therapeutic exercises  · Plan of Care Expires: 08/19/19  · Plan of Care Reviewed with: patient    Subjective     Chief Complaint: abdominal pain/consitpation  Patient/Family Comments/goals: feel better and get stronger    Occupational Profile:  Living Environment: Lives w/brother, TORO and 2 niecesin [place], ramp access  Previous level of function: assistance as needed for ADLs; ongoing assessment 2/2 pt w/tangential answers  Roles and Routines:   Equipment Used at Home:  bedside commode, walker, rolling, wheelchair, hospital bed  Assistance upon Discharge: family    Pain/Comfort:  · Pain Rating 1: 8/10  · Location - Side 1: Left  · Location - Orientation 1: generalized  · Location 1: abdomen  · Pain Addressed 1: Cessation of Activity, Reposition, Nurse notified, Distraction  · Pain Rating Post-Intervention 1: 10/10    Patients cultural, spiritual, Advent conflicts given the current situation:  no    Objective:     Communicated with: nurse prior to session.  Patient found HOB elevated with SCD, oxygen(ICU monitoring) upon OT entry to room.    General Precautions: Standard, fall   Orthopedic Precautions:    Braces:       Occupational Performance:    Bed Mobility:    · Patient completed Rolling/Turning to Left with  maximal assistance and 2 persons  · Patient completed Rolling/Turning to Right with maximal assistance and 2 persons  · Patient completed Scooting/Bridging with dependent and 2 persons    Functional Mobility/Transfers:  · NT  · Functional Mobility: unable to assess    Activities of Daily Living:  · Feeding:  minimum assistance per RN report  · Toileting: total assistance for hygiene    Cognitive/Visual Perceptual:  AO4, tangential conversation    Physical Exam:  L shoulder no functional AROM, elbow/wrist/hand AROM WFL, strength 1+/5 shoulder, 3/5 elbow/wrist/hand    AMPAC 6 Click ADL:  AMPAC Total Score: 12    Treatment & Education:  Pt educated on role of OT/POC, BUE AROM within available range.  Bed mobility as above  Education:    Patient left HOB elevated with all lines intact, call button in reach and nurse notified    GOALS:   Multidisciplinary Problems     Occupational Therapy Goals        Problem: Occupational Therapy Goal    Goal Priority Disciplines Outcome Interventions   Occupational Therapy Goal     OT, PT/OT Ongoing (interventions implemented as appropriate)    Description:  Goals to be met by: 8/19     Patient will increase functional independence with ADLs by performing:    Feeding with Modified [place].  UE Dressing with Minimal Assistance.  Grooming while seated with Stand-by Assistance.  Rolling to Bilateral with Minimal Assistance.   Supine to sit with Moderate Assistance.  BUE HEP assistance as needed                        History:     Past Medical History:   Diagnosis Date    Anemia due to stage 5 chronic kidney disease 3/1/2019    Diabetes mellitus type II      Dialysis patient     Hyperlipidemia     Hypertension     Kidney failure     MIKE (obstructive sleep apnea)     Urinary tract infection        Past Surgical History:   Procedure Laterality Date    AV FISTULA PLACEMENT      left lower arm    SPINE, CERVICAL, POSTERIOR APPROACH  LAMINECTOMY C3-C6; C6-C7; WITH MEDIAL DISCECTOMY N/A 3/3/2019    Performed by Ruddy Wylie [facility] at [facility] OR 2ND FLR    TONSILLECTOMY, ADENOIDECTOMY      TRANSESOPHAGEAL ECHOCARDIOGRAM (SERENITY) N/A 5/23/2017    Performed by Donaldo Mai MD at [facility] OR       Time Tracking:     OT Date of Treatment: 07/19/19  OT Start Time: 1429  OT Stop Time: 1500  OT Total Time (min): 31 min    Billable Minutes:Evaluation 15  Therapeutic Activity 8    Vicente Reeves, OT  7/19/2019

## 2019-07-19 NOTE — PLAN OF CARE
Problem: Adult Inpatient Plan of Care  Goal: Plan of Care Review  Outcome: Ongoing (interventions implemented as appropriate)  Pt dialyzed 3000cc off, weaning levophed,pt awake and alert,only small amt of liquid stool passed, hyperactive bowel sounds,passing gas, no nausea, not eating much,not much appetite, temp 99.7 max,continues with antibiotics, started on vanc 1 gm, past dialysis, encourage inc in activity, restricted movement  In left shoulder,encouraging him to move, as he lays in bed

## 2019-07-19 NOTE — ASSESSMENT & PLAN NOTE
Patient presented to ED febrile to 103, tachycardic to 120's and hypotensive to 90's/40's  Blood cultures were drawn and broad spectrum IV antibiotics started   Given ~2L bolus total of fluids without improvement in BP   Central line placed by anesthesia   Start Levophed drip and still continuing to require pressure support  Maintain MAP >75  If BP continues to be low will give more fluids and start phenylephrine drip  Blood cultures: Gram neg rods, Gram positive cocci  LA 3.8 trended down to 1.9  UA order but patient does not produce urine.   Continue Vanc, Zosyn and Micafungin

## 2019-07-20 LAB
ALBUMIN SERPL BCP-MCNC: 2.4 G/DL (ref 3.5–5.2)
ALP SERPL-CCNC: 159 U/L (ref 55–135)
ALT SERPL W/O P-5'-P-CCNC: 44 U/L (ref 10–44)
ANION GAP SERPL CALC-SCNC: 9 MMOL/L (ref 8–16)
AST SERPL-CCNC: 42 U/L (ref 10–40)
BACTERIA BLD CULT: ABNORMAL
BASOPHILS # BLD AUTO: 0 K/UL (ref 0–0.2)
BASOPHILS NFR BLD: 0 % (ref 0–1.9)
BILIRUB SERPL-MCNC: 0.8 MG/DL (ref 0.1–1)
BUN SERPL-MCNC: 42 MG/DL (ref 6–20)
CALCIUM SERPL-MCNC: 9.1 MG/DL (ref 8.7–10.5)
CHLORIDE SERPL-SCNC: 101 MMOL/L (ref 95–110)
CO2 SERPL-SCNC: 23 MMOL/L (ref 23–29)
CREAT SERPL-MCNC: 5.4 MG/DL (ref 0.5–1.4)
DIFFERENTIAL METHOD: ABNORMAL
EOSINOPHIL # BLD AUTO: 0.1 K/UL (ref 0–0.5)
EOSINOPHIL NFR BLD: 1.2 % (ref 0–8)
ERYTHROCYTE [DISTWIDTH] IN BLOOD BY AUTOMATED COUNT: 15.5 % (ref 11.5–14.5)
EST. GFR  (AFRICAN AMERICAN): 13 ML/MIN/1.73 M^2
EST. GFR  (NON AFRICAN AMERICAN): 11 ML/MIN/1.73 M^2
FIBRINOGEN PPP-MCNC: 446 MG/DL (ref 182–366)
GLUCOSE SERPL-MCNC: 129 MG/DL (ref 70–110)
HAPTOGLOB SERPL-MCNC: 169 MG/DL (ref 30–250)
HCT VFR BLD AUTO: 25.7 % (ref 40–54)
HGB BLD-MCNC: 8.3 G/DL (ref 14–18)
LDH SERPL L TO P-CCNC: 161 U/L (ref 110–260)
LYMPHOCYTES # BLD AUTO: 0.8 K/UL (ref 1–4.8)
LYMPHOCYTES NFR BLD: 11.9 % (ref 18–48)
MAGNESIUM SERPL-MCNC: 2.1 MG/DL (ref 1.6–2.6)
MCH RBC QN AUTO: 34.3 PG (ref 27–31)
MCHC RBC AUTO-ENTMCNC: 32.3 G/DL (ref 32–36)
MCV RBC AUTO: 106 FL (ref 82–98)
MONOCYTES # BLD AUTO: 1 K/UL (ref 0.3–1)
MONOCYTES NFR BLD: 14.4 % (ref 4–15)
NEUTROPHILS # BLD AUTO: 4.9 K/UL (ref 1.8–7.7)
NEUTROPHILS NFR BLD: 72.5 % (ref 38–73)
PHOSPHATE SERPL-MCNC: 3.3 MG/DL (ref 2.7–4.5)
PLATELET # BLD AUTO: 43 K/UL (ref 150–350)
PMV BLD AUTO: 12.7 FL (ref 9.2–12.9)
POCT GLUCOSE: 105 MG/DL (ref 70–110)
POCT GLUCOSE: 145 MG/DL (ref 70–110)
POCT GLUCOSE: 181 MG/DL (ref 70–110)
POCT GLUCOSE: 86 MG/DL (ref 70–110)
POTASSIUM SERPL-SCNC: 4.3 MMOL/L (ref 3.5–5.1)
PROT SERPL-MCNC: 5.4 G/DL (ref 6–8.4)
RBC # BLD AUTO: 2.42 M/UL (ref 4.6–6.2)
SODIUM SERPL-SCNC: 133 MMOL/L (ref 136–145)
WBC # BLD AUTO: 6.79 K/UL (ref 3.9–12.7)

## 2019-07-20 PROCEDURE — 25000003 PHARM REV CODE 250: Performed by: STUDENT IN AN ORGANIZED HEALTH CARE EDUCATION/TRAINING PROGRAM

## 2019-07-20 PROCEDURE — 97110 THERAPEUTIC EXERCISES: CPT

## 2019-07-20 PROCEDURE — 86022 PLATELET ANTIBODIES: CPT

## 2019-07-20 PROCEDURE — 83010 ASSAY OF HAPTOGLOBIN QUANT: CPT

## 2019-07-20 PROCEDURE — 94761 N-INVAS EAR/PLS OXIMETRY MLT: CPT

## 2019-07-20 PROCEDURE — 25000003 PHARM REV CODE 250: Performed by: FAMILY MEDICINE

## 2019-07-20 PROCEDURE — 85025 COMPLETE CBC W/AUTO DIFF WBC: CPT

## 2019-07-20 PROCEDURE — 63600175 PHARM REV CODE 636 W HCPCS: Performed by: STUDENT IN AN ORGANIZED HEALTH CARE EDUCATION/TRAINING PROGRAM

## 2019-07-20 PROCEDURE — 84100 ASSAY OF PHOSPHORUS: CPT

## 2019-07-20 PROCEDURE — 83615 LACTATE (LD) (LDH) ENZYME: CPT

## 2019-07-20 PROCEDURE — 80053 COMPREHEN METABOLIC PANEL: CPT

## 2019-07-20 PROCEDURE — 97530 THERAPEUTIC ACTIVITIES: CPT

## 2019-07-20 PROCEDURE — 20000000 HC ICU ROOM

## 2019-07-20 PROCEDURE — 87040 BLOOD CULTURE FOR BACTERIA: CPT

## 2019-07-20 PROCEDURE — 85384 FIBRINOGEN ACTIVITY: CPT

## 2019-07-20 PROCEDURE — 83735 ASSAY OF MAGNESIUM: CPT

## 2019-07-20 RX ORDER — CYCLOBENZAPRINE HCL 5 MG
5 TABLET ORAL ONCE
Status: COMPLETED | OUTPATIENT
Start: 2019-07-20 | End: 2019-07-20

## 2019-07-20 RX ORDER — IPRATROPIUM BROMIDE AND ALBUTEROL SULFATE 2.5; .5 MG/3ML; MG/3ML
3 SOLUTION RESPIRATORY (INHALATION) EVERY 4 HOURS PRN
Status: DISCONTINUED | OUTPATIENT
Start: 2019-07-20 | End: 2019-07-22 | Stop reason: HOSPADM

## 2019-07-20 RX ORDER — SYRING-NEEDL,DISP,INSUL,0.3 ML 29 G X1/2"
296 SYRINGE, EMPTY DISPOSABLE MISCELLANEOUS ONCE
Status: COMPLETED | OUTPATIENT
Start: 2019-07-20 | End: 2019-07-20

## 2019-07-20 RX ORDER — LIDOCAINE 50 MG/G
2 PATCH TOPICAL DAILY PRN
Status: COMPLETED | OUTPATIENT
Start: 2019-07-20 | End: 2019-07-22

## 2019-07-20 RX ADMIN — LIDOCAINE 2 PATCH: 50 PATCH TOPICAL at 12:07

## 2019-07-20 RX ADMIN — LANTHANUM CARBONATE 1000 MG: 500 TABLET, CHEWABLE ORAL at 11:07

## 2019-07-20 RX ADMIN — HEPARIN SODIUM 5000 UNITS: 5000 INJECTION, SOLUTION INTRAVENOUS; SUBCUTANEOUS at 02:07

## 2019-07-20 RX ADMIN — HEPARIN SODIUM 5000 UNITS: 5000 INJECTION, SOLUTION INTRAVENOUS; SUBCUTANEOUS at 06:07

## 2019-07-20 RX ADMIN — CYCLOBENZAPRINE HYDROCHLORIDE 5 MG: 5 TABLET, FILM COATED ORAL at 12:07

## 2019-07-20 RX ADMIN — PIPERACILLIN AND TAZOBACTAM 4.5 G: 4; .5 INJECTION, POWDER, LYOPHILIZED, FOR SOLUTION INTRAVENOUS; PARENTERAL at 09:07

## 2019-07-20 RX ADMIN — METOCLOPRAMIDE HYDROCHLORIDE 5 MG: 5 SOLUTION ORAL at 10:07

## 2019-07-20 RX ADMIN — MAGNESIUM CITRATE 296 ML: 1.75 LIQUID ORAL at 08:07

## 2019-07-20 RX ADMIN — SENNOSIDES,DOCUSATE SODIUM 1 TABLET: 8.6; 5 TABLET, FILM COATED ORAL at 08:07

## 2019-07-20 RX ADMIN — GABAPENTIN 300 MG: 300 CAPSULE ORAL at 09:07

## 2019-07-20 RX ADMIN — SIMETHICONE 125 MG: 125 TABLET, CHEWABLE ORAL at 12:07

## 2019-07-20 RX ADMIN — ACETAMINOPHEN 650 MG: 325 TABLET ORAL at 11:07

## 2019-07-20 RX ADMIN — METOCLOPRAMIDE HYDROCHLORIDE 5 MG: 5 SOLUTION ORAL at 05:07

## 2019-07-20 RX ADMIN — HEPARIN SODIUM 5000 UNITS: 5000 INJECTION, SOLUTION INTRAVENOUS; SUBCUTANEOUS at 09:07

## 2019-07-20 RX ADMIN — NYSTATIN AND TRIAMCINOLONE ACETONIDE: 100000; 1 CREAM TOPICAL at 08:07

## 2019-07-20 RX ADMIN — METOCLOPRAMIDE HYDROCHLORIDE 5 MG: 5 SOLUTION ORAL at 06:07

## 2019-07-20 RX ADMIN — MIDODRINE HYDROCHLORIDE 10 MG: 5 TABLET ORAL at 11:07

## 2019-07-20 RX ADMIN — SEVELAMER CARBONATE 1600 MG: 800 TABLET, FILM COATED ORAL at 08:07

## 2019-07-20 RX ADMIN — LIDOCAINE 2 PATCH: 50 PATCH TOPICAL at 11:07

## 2019-07-20 RX ADMIN — SEVELAMER CARBONATE 1600 MG: 800 TABLET, FILM COATED ORAL at 11:07

## 2019-07-20 RX ADMIN — NYSTATIN AND TRIAMCINOLONE ACETONIDE: 100000; 1 CREAM TOPICAL at 09:07

## 2019-07-20 RX ADMIN — INSULIN ASPART 2 UNITS: 100 INJECTION, SOLUTION INTRAVENOUS; SUBCUTANEOUS at 11:07

## 2019-07-20 RX ADMIN — METOCLOPRAMIDE HYDROCHLORIDE 5 MG: 5 SOLUTION ORAL at 11:07

## 2019-07-20 RX ADMIN — MIDODRINE HYDROCHLORIDE 10 MG: 5 TABLET ORAL at 08:07

## 2019-07-20 RX ADMIN — MIDODRINE HYDROCHLORIDE 10 MG: 5 TABLET ORAL at 05:07

## 2019-07-20 RX ADMIN — PANTOPRAZOLE SODIUM 40 MG: 40 TABLET, DELAYED RELEASE ORAL at 08:07

## 2019-07-20 RX ADMIN — PRAVASTATIN SODIUM 40 MG: 40 TABLET ORAL at 08:07

## 2019-07-20 RX ADMIN — MICAFUNGIN SODIUM 100 MG: 20 INJECTION, POWDER, LYOPHILIZED, FOR SOLUTION INTRAVENOUS at 08:07

## 2019-07-20 RX ADMIN — FLUOXETINE 20 MG: 20 CAPSULE ORAL at 08:07

## 2019-07-20 RX ADMIN — LANTHANUM CARBONATE 1000 MG: 500 TABLET, CHEWABLE ORAL at 08:07

## 2019-07-20 NOTE — PLAN OF CARE
Dr Thomas here, informed status, checked by her for impaction, soft stool, ordered ss enema        1630 ss enema 1200cc given, able to hold it

## 2019-07-20 NOTE — PT/OT/SLP PROGRESS
Physical Therapy Treatment    Patient Name:  Angel Farmer Jr.   MRN:  [id number]    Recommendations:     Discharge Recommendations:  nursing facility, skilled   Discharge Equipment Recommendations: (Defer to snF)   Barriers to discharge: Decreased caregiver support and significanty impaired functional mobility    Assessment:     Angel Farmer Jr. is a 58 y.o. male admitted with a medical diagnosis of Septic shock.  He presents with the following impairments/functional limitations:  weakness, impaired functional mobilty, decreased safety awareness, impaired endurance, decreased coordination, impaired coordination, impaired cardiopulmonary response to activity, impaired fine motor, impaired balance, decreased upper extremity function, impaired skin, impaired self care skills, decreased lower extremity function, decreased ROM, edema, pain, gait instability Pt is making progress towards goals, pt sat EOB 3-4 min, maintaining vitals as tolerated (pls see chart). Continue PT POC.    Rehab Prognosis: Good; patient would benefit from acute skilled PT services to address these deficits and reach maximum level of function.    Recent Surgery: * No surgery found *      Plan:     During this hospitalization, patient to be seen 6 x/week to address the identified rehab impairments via gait training, therapeutic activities, therapeutic exercises, neuromuscular re-education and progress toward the following goals:    · Plan of Care Expires:  08/19/19    Subjective     Chief Complaint: Pain in abdomen  Patient/Family Comments/goals: Pt agreed to PT POC c/ much encouragment  Pain/Comfort:  · Pain Rating 1: 8/10  · Location - Side 1: Right  · Location - Orientation 1: lower  · Location 1: abdomen  · Pain Addressed 1: Pre-medicate for activity, Nurse notified, Reposition, Distraction, Cessation of Activity  · Pain Rating Post-Intervention 1: 8/10      Objective:     Communicated with LOLIS Garcia prior to session.  Patient found supine with  SCD, peripheral IV upon PT entry to room.     General Precautions: Standard, fall   Orthopedic Precautions:N/A   Braces: N/A     Functional Mobility:  · Bed Mobility:     · Rolling Left:  maximal assistance and of 2 persons  · Rolling Right: maximal assistance and of 2 persons  · Scooting: to EOB- maximal assistance, up in bed- total assistance and of 2 persons  · Bridging: maximal assistance  · Supine to Sit: maximal assistance and of 2 persons  · Sit to Supine: total assistance and of 2 persons  · Transfers: Pt unable too perform at this time  · Balance: static sitting EOB- poor c/ total <> max A to maintain sitting balance      AM-PAC 6 CLICK MOBILITY  Turning over in bed (including adjusting bedclothes, sheets and blankets)?: 2  Sitting down on and standing up from a chair with arms (e.g., wheelchair, bedside commode, etc.): 1  Moving from lying on back to sitting on the side of the bed?: 2  Moving to and from a bed to a chair (including a wheelchair)?: 1  Need to walk in hospital room?: 1  Climbing 3-5 steps with a railing?: 1  Basic Mobility Total Score: 8       Therapeutic Activities and Exercises:  PT instructed in bed LE exercises in supine heel slides x3; ankle pumps x15.  Pt instructed in progressive mobility as detailed above; supine <> sit: instructed in sitting tolerance activity at EOB 3-4 min. Post transfer back into bed;pt placed in L quarter lying c/ RN assist.    Patient left left sidelying with all lines intact, call button in reach, RN notified and RN present..    GOALS:   Multidisciplinary Problems     Physical Therapy Goals        Problem: Physical Therapy Goal    Goal Priority Disciplines Outcome Goal Variances Interventions   Physical Therapy Goal     PT, PT/OT Ongoing (interventions implemented as appropriate)     Description:  Goals to be met by: 2019     Patient will increase functional independence with mobility by performin. Supine to sit with Moderate Assistance  2. Sit to  supine with Moderate Assistance  3. Rolling to Left and Right with Minimal Assistance.  4. Sit to stand transfer with Minimal Assistance  5. Bed to chair transfer with Minimal Assistance using Rolling Walker  6. Gait  x 10 feet with Minimal Assistance using Rolling Walker.   7. Lower extremity exercise program x10 reps with supervision                      Time Tracking:     PT Received On: 07/20/19  PT Start Time: 0955     PT Stop Time: 1019  PT Total Time (min): 24 min     Billable Minutes: Therapeutic Activity 14 and Therapeutic Exercise 10    Treatment Type: Treatment  PT/PTA: PT     PTA Visit Number: 0     Nelly Parsons, PT  07/20/2019

## 2019-07-20 NOTE — PLAN OF CARE
Problem: Physical Therapy Goal  Goal: Physical Therapy Goal  Goals to be met by: 2019     Patient will increase functional independence with mobility by performin. Supine to sit with Moderate Assistance  2. Sit to supine with Moderate Assistance  3. Rolling to Left and Right with Minimal Assistance.  4. Sit to stand transfer with Minimal Assistance  5. Bed to chair transfer with Minimal Assistance using Rolling Walker  6. Gait  x 10 feet with Minimal Assistance using Rolling Walker.   7. Lower extremity exercise program x10 reps with supervision     Outcome: Ongoing (interventions implemented as appropriate)  Pt is making progress towards goals, pt sat EOB 3-4 min, maintaining vitals as tolerated (pls see chart). Continue PT POC.

## 2019-07-20 NOTE — PLAN OF CARE
Phys therapy here, pt positioned on side of bed, bp down 86 sys mean 60, bp had dropped to the 80's earlier but came up, pt did not co of dizziness, up 5-7 min then back down , positioned left side, bp down 83 sys mean 58 levophed restarted, will titrate, passed small amt of liq and soft stool, given rest of mag citrate

## 2019-07-20 NOTE — PLAN OF CARE
Pt co of gas pains, but feels like he cannot pass gas, bowel sounds are not as acrive as earlier, no nausea bd being very uncomfortable, just not feeling good, co of back pain, upper and mid back, new lidocaine patches applied, given tylenol, and simethicone given

## 2019-07-20 NOTE — PLAN OF CARE
1300 states back pain and abd discomfort better, still not passing as much gas as he would like, passes small liq and formed stool, no hard stool felt, soft, encouraged him to push harder , to get stool out

## 2019-07-20 NOTE — PROGRESS NOTES
Progress Note  [facility]  Admit Date: 7/17/2019   LOS: 3 days   SUBJECTIVE:   Follow-up For: Septic Shock    Patient seen and examined this AM. Doing better this am. Endorses constipation and no BM on 7/16. I/O net negative (including UF) on 7/19. Tmax: 99.4. Remained on pressors overnight. Continue to wean as tolerated. Overall clinically improving. Mr. Farmer would like us to inform his sister of the plan later today.    ROS  Constitutional: Positive for activity change and fever. Negative for unexpected weight change.   HENT: Negative for postnasal drip, rhinorrhea and sore throat.    Respiratory: Negative for cough, chest tightness, shortness of breath and wheezing.    Cardiovascular: Negative for chest pain, palpitations and leg swelling.   Gastrointestinal: Positive for abdominal distention, abdominal pain and constipation. Negative for diarrhea, nausea and vomiting.   Skin: Negative for rash and wound.   Neurological: Negative for weakness, light-headedness and headaches.   Psychiatric/Behavioral: Negative for agitation.     OBJECTIVE:   Vital Signs (Most Recent)  Temp: 98.8 °F (37.1 °C) (07/20/19 1145)  Pulse: 67 (07/20/19 1215)  Resp: 13 (07/20/19 1215)  BP: 127/60 (07/20/19 1215)  SpO2: 97 % (07/20/19 1215)    I & O (Last 24H):    Intake/Output Summary (Last 24 hours) at 7/20/2019 1628  Last data filed at 7/20/2019 0600  Gross per 24 hour   Intake 793.45 ml   Output --   Net 793.45 ml     Wt Readings from Last 3 Encounters:   07/20/19 126.2 kg (278 lb 3.5 oz)   05/26/19 123 kg (271 lb 2.7 oz)   04/30/19 125.1 kg (275 lb 12.7 oz)       Current Diet Order   Procedures    Diet renal [facility]; Cardiac (Low Na/Chol), Consistent Carbohydrate     Order Specific Question:   Indicate patient location for additional diet options:     Answer:   [facility]     Order Specific Question:   Additional Diet Options:     Answer:   Cardiac (Low Na/Chol)     Order Specific Question:   Additional  Diet Options:     Answer:   Consistent Carbohydrate        Physical Exam   Head: Normocephalic and atraumatic.   Nose: Nose normal.   Mouth/Throat: Oropharynx is clear and moist. No oropharyngeal exudate.   Eyes: Pupils are equal, round, and reactive to light. Conjunctivae and EOM are normal. Right eye exhibits no discharge. Left eye exhibits no discharge. No scleral icterus.   Neck: Normal range of motion. Neck supple.   Cardiovascular: Normal rate, regular rhythm, normal heart sounds and intact distal pulses. Exam reveals no gallop and no friction rub.   Pulmonary/Chest: No acute distress. Wheezing appreciated on exam.  Abdominal: Distended.  Genitourinary: Penis normal.   Neurological: He is alert and oriented to person, place, and time. No cranial nerve deficit or sensory deficit.   Skin: Skin tear medial sacrum. R IJ cath in place. L forearm AV fistula.   Nursing note and vitals reviewed.    Laboratory Data:  CBC  Recent Labs   Lab 07/18/19  0459 07/19/19  0437 07/20/19  0623   WBC 2.58* 10.78 6.79   RBC 2.87* 2.62* 2.42*   HGB 10.0* 9.2* 8.3*   HCT 31.5* 28.0* 25.7*   PLT 58* 49* 43*   * 107* 106*   MCH 34.8* 35.1* 34.3*   MCHC 31.7* 32.9 32.3     CMP  Recent Labs   Lab 07/18/19  0459 07/19/19  0437 07/20/19  0623   CALCIUM 9.1 9.4 9.1   PROT 6.1 5.6* 5.4*   * 134* 133*   K 3.8 4.2 4.3   CO2 20* 21* 23    100 101   BUN 40* 59* 42*   CREATININE 5.9* 7.7* 5.4*   ALKPHOS 215* 153* 159*   ALT 62* 47* 44   AST 70* 45* 42*   BILITOT 1.4* 0.9 0.8     POCT-Glucose  POCT Glucose   Date Value Ref Range Status   07/20/2019 181 (H) 70 - 110 mg/dL Final   07/20/2019 145 (H) 70 - 110 mg/dL Final   07/19/2019 222 (H) 70 - 110 mg/dL Final   07/19/2019 162 (H) 70 - 110 mg/dL Final   07/19/2019 139 (H) 70 - 110 mg/dL Final   07/19/2019 134 (H) 70 - 110 mg/dL Final   07/18/2019 138 (H) 70 - 110 mg/dL Final   07/18/2019 202 (H) 70 - 110 mg/dL Final   07/18/2019 207 (H) 70 - 110 mg/dL Final   07/18/2019 183 (H)  70 - 110 mg/dL Final   07/17/2019 159 (H) 70 - 110 mg/dL Final     COAGS  Recent Labs   Lab 07/18/19  0459   INR 1.1   APTT 31.8     UA  No results for input(s): COLORU, CLARITYU, SPECGRAV, PHUR, PROTEINUA, GLUCOSEU, BLOODU, WBCU, RBCU, BACTERIA, MUCUS in the last 24 hours.    Invalid input(s):  BILIRUBINCON  MICRO  Microbiology Results (last 7 days)     Procedure Component Value Units Date/Time    Blood culture x two cultures. Draw prior to antibiotics. [[id number]]  (Abnormal) Collected:  07/17/19 2144    Order Status:  Completed Specimen:  Blood from Peripheral, Wrist, Right Updated:  07/20/19 0957     Blood Culture, Routine Gram stain nuno bottle: Gram negative rods      Gram stain nuno bottle: Gram positive cocci      Results called to and read back by:Nicole Prieto RN 07/18/2019  16:20      Gram stain aer bottle: Gram negative rods      Gram stain aer bottle: Gram positive cocci      KLEBSIELLA SPECIES  Further identified as Klebsiella variicola  For susceptibility see order #[id number]        ENTEROCOCCUS RAFFINOSUS  For susceptibility see order #[id number]      Narrative:       Aerobic and anaerobic    Blood culture x two cultures. Draw prior to antibiotics. [[id number]]  (Abnormal)  (Susceptibility) Collected:  07/17/19 2146    Order Status:  Completed Specimen:  Blood from Peripheral, Forearm, Right Updated:  07/20/19 0956     Blood Culture, Routine Gram stain nuno bottle: Gram negative rods       Results called to and read back by:Nicole Prieto RN 07/18/2019  14:32      Gram stain aer bottle: Gram negative rods      Gram stain aer bottle: Gram positive cocci      KLEBSIELLA SPECIES  Further identified as Klebsiella variicola        ENTEROCOCCUS RAFFINOSUS  Susceptibility pending      Narrative:       Aerobic and anaerobic    Blood culture [[id number]] Collected:  07/20/19 0855    Order Status:  Sent Specimen:  Blood from Peripheral, Hand, Right Updated:  07/20/19 0855    Blood culture [[id number]] Collected:   07/20/19 0854    Order Status:  Sent Specimen:  Blood from Peripheral, Forearm, Right Updated:  07/20/19 0855    Culture, Respiratory with Gram Stain [[id number]]     Order Status:  No result Specimen:  Respiratory from Sputum, Induced     Blood culture [[id number]]     Order Status:  Canceled Specimen:  Blood     Blood culture [[id number]]     Order Status:  Canceled Specimen:  Blood     Culture, Respiratory with Gram Stain [[id number]]     Order Status:  Canceled Specimen:  Respiratory     Influenza A & B by Molecular [[id number]] Collected:  07/17/19 2137    Order Status:  Completed Specimen:  Nasopharyngeal Swab Updated:  07/17/19 2229     Influenza A, Molecular Negative     Influenza B, Molecular Negative     Flu A & B Source Nasal swab        LIPID PANEL  Lab Results   Component Value Date    CHOL 103 (L) 02/27/2019     Lab Results   Component Value Date    HDL 45 02/27/2019     Lab Results   Component Value Date    LDLCALC 44.6 (L) 02/27/2019     Lab Results   Component Value Date    TRIG 67 02/27/2019     Lab Results   Component Value Date    CHOLHDL 43.7 02/27/2019       Diagnostic Results:  I have personally reviewed the most recent images.    ASSESSMENT/PLAN:   Angel Farmer Jr. is a 58 y.o. male who presented with septic shock that is resolving. He appears much better today. Most likely will step down to the floor once he is off of pressors.     Septic shock  Resolving  Continue pressors to maintain MAP>65. Continue to wean pressors as tolerated.   Restarted home dose of midodrine 10 tid.  Continue Vanco/Zosyn until susceptibles return from the blood cultures. Blood cxs (7/17) grew Klebsiella/Enterococcus.  Suspect GI source.  Will follow-up repeat Cxs.  Remained Afebrile overnight. Tmax: 99.4  Will call and update sister later today.  Continue close monitoring and supportive care.     ESRD (end stage renal disease) on dialysis  Patient is on HD MWF.  HD yesterday on 7/19 with 3L removed.   Will follow up  with Nephro recommendations  Continue to replace electrolytes as needed and are currently stable   Continue to monitor     Hyperlipidemia  Continue home medical management with Statin         Diabetes mellitus, type 2   Holding home medications for now  HgbA1C: 5.0 (7/19)  Continue SSI  Glucose range 140-180 while in patient  Continue to monitor    ESRD on HD M/W/F  Strict I/O  Avoid renal toxic meds  Keep Mg 2 and K 4  Will follow up Nephro recommendations  Replace electrolytes PRN.    DVT ppx: Heparin  GI ppx: Protonix  Diet: Renal/Cardiac     Dispo:  Pending ability to wean off pressors, may be able to transfer to the floor later today. Will continue to closely monitor Plts. Most recent 43 and has been trending down. Patient is currently on Heparin, however, the thrombocytopenia may be secondary to his septic shock.     Case discussed with attending physician.     Lc Pabon Jr., D.O.  [facility], -2  [facility]

## 2019-07-20 NOTE — PROGRESS NOTES
Progress Note  Nephrology      Consult Requested By: Beckie Zarate MD  Reason for Consult: ESRD, fever    SUBJECTIVE:     Review of Systems   Constitutional: Negative for chills and fever.   Respiratory: Negative for cough and shortness of breath.    Cardiovascular: Negative for chest pain and leg swelling.   Gastrointestinal: Negative for nausea.     Patient Active Problem List   Diagnosis    Hypertension    Diabetes mellitus, type 2    Hyperlipidemia    Macrocytic anemia    ESRD (end stage renal disease) on dialysis    Atypical chest pain    Psoriasis    Foot pain    MIKE (obstructive sleep apnea) - very severe latest sleep study says BPAP @ 16/8    Multifactorial peripheral neuropathy    Flu-like symptoms    Bacteremia    Hypertension associated with stage 5 chronic kidney disease due to type 2 diabetes mellitus    FUO (fever of unknown origin)    Trigger finger, left ring finger    CVA (cerebral vascular accident)    Ataxia    Anemia due to stage 5 chronic kidney disease    Hyperphosphatemia    Metabolic bone disease    Left leg pain    Pre-syncope    Hypotension    Bradycardia    Class 3 severe obesity due to excess calories with serious comorbidity and body mass index (BMI) of 45.0 to 49.9 in adult    S/P laminectomy    Hyperkalemia    Hypothermia    Hyponatremia    Impaired functional mobility and endurance    Diarrhea    Septic shock       OBJECTIVE:     Medications:   FLUoxetine  20 mg Oral Daily    gabapentin  300 mg Oral QHS    heparin (porcine)  5,000 Units Subcutaneous Q8H    lanthanum  1,000 mg Oral TID WM    metoclopramide HCl  5 mg Oral QID (AC & HS)    micafungin (MYCAMINE) IVPB  100 mg Intravenous Q24H    midodrine  10 mg Oral TIDWM    nystatin-triamcinolone   Topical (Top) BID    pantoprazole  40 mg Oral Daily    piperacillin-tazobactam (ZOSYN) IVPB  4.5 g Intravenous Q12H    pravastatin  40 mg Oral Daily    senna-docusate 8.6-50 mg  1 tablet Oral BID     sevelamer carbonate  1,600 mg Oral TID WM    vancomycin (VANCOCIN) IVPB  1,000 mg Intravenous Every Mon, Wed, Fri      norepinephrine bitartrate-D5W 0.1 mcg/kg/min (07/20/19 1110)     Vitals:    07/20/19 1215   BP: 127/60   Pulse: 67   Resp: 13   Temp:      I/O last 3 completed shifts:  In: 1998.2 [P.O.:610; I.V.:338.2; Other:500; IV Piggyback:550]  Out: 3500 [Other:3500]  Physical Exam   Constitutional: He is oriented to person, place, and time. He appears well-developed and well-nourished. No distress.   HENT:   Head: Normocephalic and atraumatic.   Eyes: EOM are normal. No scleral icterus.   Cardiovascular: Normal rate, regular rhythm, normal heart sounds and intact distal pulses. Exam reveals no gallop and no friction rub.   No murmur heard.  Pulmonary/Chest: Effort normal and breath sounds normal. He has no wheezes. He has no rales.   Abdominal: Soft. Bowel sounds are normal. He exhibits no distension. There is no tenderness.   Musculoskeletal: Normal range of motion. He exhibits no edema.   Lymphadenopathy:     He has no cervical adenopathy.   Neurological: He is alert and oriented to person, place, and time.   Skin: Skin is warm and dry. No rash noted. He is not diaphoretic.   Psychiatric: Thought content normal.     Laboratory:  Recent Labs   Lab 07/18/19  0459 07/19/19 0437 07/20/19  0623   WBC 2.58* 10.78 6.79   HGB 10.0* 9.2* 8.3*   HCT 31.5* 28.0* 25.7*   PLT 58* 49* 43*   MONO 0.8*  0.0* 10.0  1.1* 14.4  1.0     Recent Labs   Lab 07/18/19  0159 07/18/19  0459 07/19/19  0437 07/20/19  0623   NA  --  135* 134* 133*   K  --  3.8 4.2 4.3   CL  --  101 100 101   CO2  --  20* 21* 23   BUN  --  40* 59* 42*   CREATININE  --  5.9* 7.7* 5.4*   CALCIUM  --  9.1 9.4 9.1   PHOS 2.9  2.9  --  5.2* 3.3     Labs reviewed  Diagnostic Results:  X-Ray: Reviewed  US: Reviewed  Echo: Reviewed      ASSESSMENT/PLAN:              ESRD - on HD with Dr. Araceli Pickens in [facility] MWF, 4.5 hours, EDW 125kg  HD  monday as per normal schedule      Hypotension chronic. Increase midodirne to 20 TID   Anemia -epo with HD  worsening thrombocytopenia -check for hemolysis and HIT  Recent Labs   Lab 07/18/19  0459 07/19/19  0437 07/20/19  0623   WBC 2.58* 10.78 6.79   HGB 10.0* 9.2* 8.3*   HCT 31.5* 28.0* 25.7*   PLT 58* 49* 43*     Lab Results   Component Value Date    IRON 169 (H) 03/01/2019    TIBC 218 (L) 03/01/2019    FERRITIN 914 (H) 03/01/2019        MBD - cont sensipar and Fosrenol  Lab Results   Component Value Date    CALCIUM 9.1 07/20/2019    PHOS 3.3 07/20/2019     Recent Labs   Lab 07/18/19  0159 07/19/19  0437 07/20/19  0623   MG 1.1*  1.1* 1.9 2.1     Lab Results   Component Value Date    CALCIUM 9.1 07/20/2019    PHOS 3.3 07/20/2019     No results found for: [id number]  Lab Results   Component Value Date    CO2 23 07/20/2019        Access - L wrist AVF, recent ballon PTA by Dr. Moran in [facility] Nutrition - renal diet, nephrocaps  DM2   Severe MIKE- BiPAP overnight 16/8     Thank you for allowing me to participate in the care of your patients  With any question please call [phone]  Margoth Felton    [facility]  SHALA Forrest MD, FACP,   KADEN Pickens MD,   MD GEORGIA Walker, NP  [address]  [place], [address]  [phone]

## 2019-07-21 PROBLEM — D69.6 THROMBOCYTOPENIA: Status: ACTIVE | Noted: 2019-07-21

## 2019-07-21 LAB
ALBUMIN SERPL BCP-MCNC: 2.2 G/DL (ref 3.5–5.2)
ALP SERPL-CCNC: 190 U/L (ref 55–135)
ALT SERPL W/O P-5'-P-CCNC: 58 U/L (ref 10–44)
ANION GAP SERPL CALC-SCNC: 10 MMOL/L (ref 8–16)
AST SERPL-CCNC: 58 U/L (ref 10–40)
BASOPHILS # BLD AUTO: 0.01 K/UL (ref 0–0.2)
BASOPHILS NFR BLD: 0.2 % (ref 0–1.9)
BILIRUB SERPL-MCNC: 0.9 MG/DL (ref 0.1–1)
BUN SERPL-MCNC: 59 MG/DL (ref 6–20)
CALCIUM SERPL-MCNC: 8.9 MG/DL (ref 8.7–10.5)
CHLORIDE SERPL-SCNC: 99 MMOL/L (ref 95–110)
CO2 SERPL-SCNC: 22 MMOL/L (ref 23–29)
CREAT SERPL-MCNC: 6.7 MG/DL (ref 0.5–1.4)
DIFFERENTIAL METHOD: ABNORMAL
EOSINOPHIL # BLD AUTO: 0.1 K/UL (ref 0–0.5)
EOSINOPHIL NFR BLD: 1.3 % (ref 0–8)
ERYTHROCYTE [DISTWIDTH] IN BLOOD BY AUTOMATED COUNT: 15.2 % (ref 11.5–14.5)
EST. GFR  (AFRICAN AMERICAN): 10 ML/MIN/1.73 M^2
EST. GFR  (NON AFRICAN AMERICAN): 8 ML/MIN/1.73 M^2
GLUCOSE SERPL-MCNC: 96 MG/DL (ref 70–110)
HCT VFR BLD AUTO: 24 % (ref 40–54)
HGB BLD-MCNC: 8 G/DL (ref 14–18)
LYMPHOCYTES # BLD AUTO: 0.8 K/UL (ref 1–4.8)
LYMPHOCYTES NFR BLD: 14.4 % (ref 18–48)
MAGNESIUM SERPL-MCNC: 2.5 MG/DL (ref 1.6–2.6)
MCH RBC QN AUTO: 35.4 PG (ref 27–31)
MCHC RBC AUTO-ENTMCNC: 33.3 G/DL (ref 32–36)
MCV RBC AUTO: 106 FL (ref 82–98)
MONOCYTES # BLD AUTO: 0.5 K/UL (ref 0.3–1)
MONOCYTES NFR BLD: 10 % (ref 4–15)
NEUTROPHILS # BLD AUTO: 4 K/UL (ref 1.8–7.7)
NEUTROPHILS NFR BLD: 74.1 % (ref 38–73)
PHOSPHATE SERPL-MCNC: 3 MG/DL (ref 2.7–4.5)
PLATELET # BLD AUTO: 29 K/UL (ref 150–350)
PMV BLD AUTO: 13 FL (ref 9.2–12.9)
POCT GLUCOSE: 130 MG/DL (ref 70–110)
POCT GLUCOSE: 143 MG/DL (ref 70–110)
POCT GLUCOSE: 143 MG/DL (ref 70–110)
POCT GLUCOSE: 96 MG/DL (ref 70–110)
POTASSIUM SERPL-SCNC: 4.8 MMOL/L (ref 3.5–5.1)
PROT SERPL-MCNC: 5.2 G/DL (ref 6–8.4)
RBC # BLD AUTO: 2.26 M/UL (ref 4.6–6.2)
SODIUM SERPL-SCNC: 131 MMOL/L (ref 136–145)
WBC # BLD AUTO: 5.4 K/UL (ref 3.9–12.7)

## 2019-07-21 PROCEDURE — 25000003 PHARM REV CODE 250: Performed by: FAMILY MEDICINE

## 2019-07-21 PROCEDURE — 25000003 PHARM REV CODE 250: Performed by: STUDENT IN AN ORGANIZED HEALTH CARE EDUCATION/TRAINING PROGRAM

## 2019-07-21 PROCEDURE — 80053 COMPREHEN METABOLIC PANEL: CPT

## 2019-07-21 PROCEDURE — 11000001 HC ACUTE MED/SURG PRIVATE ROOM

## 2019-07-21 PROCEDURE — 84100 ASSAY OF PHOSPHORUS: CPT

## 2019-07-21 PROCEDURE — 94761 N-INVAS EAR/PLS OXIMETRY MLT: CPT

## 2019-07-21 PROCEDURE — 63600175 PHARM REV CODE 636 W HCPCS: Performed by: STUDENT IN AN ORGANIZED HEALTH CARE EDUCATION/TRAINING PROGRAM

## 2019-07-21 PROCEDURE — 83735 ASSAY OF MAGNESIUM: CPT

## 2019-07-21 PROCEDURE — 85025 COMPLETE CBC W/AUTO DIFF WBC: CPT

## 2019-07-21 PROCEDURE — 99900035 HC TECH TIME PER 15 MIN (STAT)

## 2019-07-21 RX ORDER — DABIGATRAN ETEXILATE 75 MG/1
150 CAPSULE ORAL 2 TIMES DAILY
Status: DISCONTINUED | OUTPATIENT
Start: 2019-07-21 | End: 2019-07-22 | Stop reason: HOSPADM

## 2019-07-21 RX ORDER — ZINC OXIDE 20 G/100G
OINTMENT TOPICAL 2 TIMES DAILY
Status: DISCONTINUED | OUTPATIENT
Start: 2019-07-21 | End: 2019-07-21

## 2019-07-21 RX ADMIN — MIDODRINE HYDROCHLORIDE 10 MG: 5 TABLET ORAL at 04:07

## 2019-07-21 RX ADMIN — PRAVASTATIN SODIUM 40 MG: 40 TABLET ORAL at 08:07

## 2019-07-21 RX ADMIN — MICAFUNGIN SODIUM 100 MG: 20 INJECTION, POWDER, LYOPHILIZED, FOR SOLUTION INTRAVENOUS at 09:07

## 2019-07-21 RX ADMIN — PANTOPRAZOLE SODIUM 40 MG: 40 TABLET, DELAYED RELEASE ORAL at 08:07

## 2019-07-21 RX ADMIN — METOCLOPRAMIDE HYDROCHLORIDE 5 MG: 5 SOLUTION ORAL at 05:07

## 2019-07-21 RX ADMIN — LIDOCAINE 2 PATCH: 50 PATCH TOPICAL at 01:07

## 2019-07-21 RX ADMIN — NYSTATIN AND TRIAMCINOLONE ACETONIDE: 100000; 1 CREAM TOPICAL at 09:07

## 2019-07-21 RX ADMIN — PIPERACILLIN AND TAZOBACTAM 4.5 G: 4; .5 INJECTION, POWDER, LYOPHILIZED, FOR SOLUTION INTRAVENOUS; PARENTERAL at 09:07

## 2019-07-21 RX ADMIN — SIMETHICONE 125 MG: 125 TABLET, CHEWABLE ORAL at 11:07

## 2019-07-21 RX ADMIN — ZINC OXIDE: 200 OINTMENT TOPICAL at 04:07

## 2019-07-21 RX ADMIN — MIDODRINE HYDROCHLORIDE 10 MG: 5 TABLET ORAL at 11:07

## 2019-07-21 RX ADMIN — SENNOSIDES,DOCUSATE SODIUM 1 TABLET: 8.6; 5 TABLET, FILM COATED ORAL at 09:07

## 2019-07-21 RX ADMIN — ACETAMINOPHEN 650 MG: 325 TABLET ORAL at 11:07

## 2019-07-21 RX ADMIN — SEVELAMER CARBONATE 1600 MG: 800 TABLET, FILM COATED ORAL at 11:07

## 2019-07-21 RX ADMIN — GABAPENTIN 300 MG: 300 CAPSULE ORAL at 09:07

## 2019-07-21 RX ADMIN — FLUOXETINE 20 MG: 20 CAPSULE ORAL at 08:07

## 2019-07-21 RX ADMIN — ZINC OXIDE TOPICAL OINT.: at 09:07

## 2019-07-21 RX ADMIN — METOCLOPRAMIDE HYDROCHLORIDE 5 MG: 5 SOLUTION ORAL at 06:07

## 2019-07-21 RX ADMIN — SENNOSIDES,DOCUSATE SODIUM 1 TABLET: 8.6; 5 TABLET, FILM COATED ORAL at 08:07

## 2019-07-21 RX ADMIN — MIDODRINE HYDROCHLORIDE 10 MG: 5 TABLET ORAL at 06:07

## 2019-07-21 RX ADMIN — POLYETHYLENE GLYCOL 3350 17 G: 17 POWDER, FOR SOLUTION ORAL at 08:07

## 2019-07-21 RX ADMIN — METOCLOPRAMIDE HYDROCHLORIDE 5 MG: 5 SOLUTION ORAL at 09:07

## 2019-07-21 RX ADMIN — LANTHANUM CARBONATE 1000 MG: 500 TABLET, CHEWABLE ORAL at 11:07

## 2019-07-21 RX ADMIN — SEVELAMER CARBONATE 1600 MG: 800 TABLET, FILM COATED ORAL at 08:07

## 2019-07-21 RX ADMIN — DABIGATRAN ETEXILATE MESYLATE 150 MG: 75 CAPSULE ORAL at 09:07

## 2019-07-21 RX ADMIN — METOCLOPRAMIDE HYDROCHLORIDE 5 MG: 5 SOLUTION ORAL at 11:07

## 2019-07-21 RX ADMIN — LANTHANUM CARBONATE 1000 MG: 500 TABLET, CHEWABLE ORAL at 08:07

## 2019-07-21 RX ADMIN — LANTHANUM CARBONATE 1000 MG: 500 TABLET, CHEWABLE ORAL at 05:07

## 2019-07-21 RX ADMIN — SEVELAMER CARBONATE 1600 MG: 800 TABLET, FILM COATED ORAL at 05:07

## 2019-07-21 NOTE — PLAN OF CARE
Dr Thomas by informed of am labs, h and h and plt ct, plt down 23,000, fibrinogen was elevated on yest labs, still of sub q heparin, will discontinue

## 2019-07-21 NOTE — NURSING
1540 Pt arrived to floor via bed. Telemetry box 2788 in place. Pt drowsy but easily arousable. VS as charted in flowsheet. Safety precautions in place.

## 2019-07-21 NOTE — SUBJECTIVE & OBJECTIVE
Interval History: Patient is doing well today. Patient had no fevers overnight and has been off of Levophed since 2PM yesterday.  Patient is awake and alert. Patient had multiple bowel movements yesterday and his abdominal pain has improved significantly. Patient is tolerating diet with no nausea or vomiting. Denies chest pain or sob.     Review of Systems   Constitutional: Negative for activity change, appetite change, fatigue and fever.   Respiratory: Negative for cough, choking, chest tightness, shortness of breath and wheezing.    Cardiovascular: Negative for chest pain, palpitations and leg swelling.   Gastrointestinal: Negative for abdominal distention, abdominal pain, blood in stool, constipation, diarrhea, nausea and vomiting.   Genitourinary: Negative for difficulty urinating, dysuria and hematuria.   Musculoskeletal: Positive for back pain (At baseline). Negative for arthralgias and myalgias.   Skin: Negative for rash and wound.   Neurological: Negative for dizziness, light-headedness and headaches.     Objective:     Vital Signs (Most Recent):  Temp: 97.5 °F (36.4 °C) (07/21/19 0304)  Pulse: 60 (07/21/19 0630)  Resp: (!) 22 (07/21/19 0630)  BP: (!) 91/48 (07/21/19 0630)  SpO2: (!) 94 % (07/21/19 0630) Vital Signs (24h Range):  Temp:  [97.5 °F (36.4 °C)-99 °F (37.2 °C)] 97.5 °F (36.4 °C)  Pulse:  [55-70] 60  Resp:  [10-37] 22  SpO2:  [92 %-100 %] 94 %  BP: ()/(40-90) 91/48     Weight: 128 kg (282 lb 3 oz)  Body mass index is 44.2 kg/m².    Intake/Output Summary (Last 24 hours) at 7/21/2019 0805  Last data filed at 7/21/2019 0600  Gross per 24 hour   Intake 946.67 ml   Output --   Net 946.67 ml      Physical Exam   Constitutional: He is oriented to person, place, and time. Vital signs are normal. He appears well-developed and well-nourished.   HENT:   Head: Normocephalic.   Right Ear: Hearing normal.   Left Ear: Hearing normal.   Mouth/Throat: Oropharynx is clear and moist and mucous membranes are  normal. No oropharyngeal exudate.   Central line in right IJ   Eyes: Conjunctivae and EOM are normal. Right eye exhibits no discharge. Left eye exhibits no discharge.   Neck: Normal range of motion.   Cardiovascular: Normal rate, regular rhythm, normal heart sounds and intact distal pulses. Exam reveals no gallop and no friction rub.   No murmur heard.  Pulmonary/Chest: Effort normal and breath sounds normal. No respiratory distress. He has no wheezes. He exhibits no tenderness.   Abdominal: Soft. Bowel sounds are normal. He exhibits no distension. There is no tenderness. There is no guarding.   Musculoskeletal: Normal range of motion. He exhibits no edema.   Neurological: He is alert and oriented to person, place, and time.   Skin: Skin is warm. Capillary refill takes less than 2 seconds.   No overt signs of bleeding. No oozing at IV site.   No bruising   AV Fistula in the left forearm with palpable thrill  No redness in the skin folds   Psychiatric: He has a normal mood and affect.   Nursing note and vitals reviewed.      Significant Labs:   Blood Culture:   Recent Labs   Lab 07/20/19  0850 07/20/19  0855   LABBLOO No Growth to date No Growth to date     CBC:   Recent Labs   Lab 07/20/19  0623 07/21/19  0434   WBC 6.79 5.40   HGB 8.3* 8.0*   HCT 25.7* 24.0*   PLT 43* 29*     CMP:   Recent Labs   Lab 07/20/19  0623 07/21/19  0434   * 131*   K 4.3 4.8    99   CO2 23 22*   * 96   BUN 42* 59*   CREATININE 5.4* 6.7*   CALCIUM 9.1 8.9   PROT 5.4* 5.2*   ALBUMIN 2.4* 2.2*   BILITOT 0.8 0.9   ALKPHOS 159* 190*   AST 42* 58*   ALT 44 58*   ANIONGAP 9 10   EGFRNONAA 11* 8*     Magnesium:   Recent Labs   Lab 07/20/19  0623 07/21/19  0434   MG 2.1 2.5     POCT Glucose:   Recent Labs   Lab 07/20/19  1713 07/20/19  2137 07/21/19  0609   POCTGLUCOSE 86 105 96       Significant Imaging:   Imaging Results          X-Ray Chest AP Portable (Final result)  Result time 07/18/19 00:35:51    Final result by Gabriella BRITTON  MD Vidal (07/18/19 00:35:51)                 Impression:      As above.      Electronically signed by: Gabriella Drake MD  Date:    07/18/2019  Time:    00:35             Narrative:    EXAMINATION:  XR CHEST AP PORTABLE    CLINICAL HISTORY:  Encounter for adjustment and management of vascular access device    TECHNIQUE:  Single frontal view of the chest was performed.    COMPARISON:  07/17/2019 at 21:49.    FINDINGS:  Interval placement of right IJ central venous catheter with distal tip terminating over the region of the SVC.  No evidence of pneumothorax.  Lungs are markedly hypoinflated, otherwise no significant change from earlier exam.                               X-Ray Chest AP Portable (Final result)  Result time 07/17/19 22:14:55    Final result by Gabriella Drake MD (07/17/19 22:14:55)                 Impression:      As above.      Electronically signed by: Gabriella Drake MD  Date:    07/17/2019  Time:    22:14             Narrative:    EXAMINATION:  XR CHEST AP PORTABLE    CLINICAL HISTORY:  Sepsis;    TECHNIQUE:  Single frontal view of the chest was performed.    COMPARISON:  05/16/2019.    FINDINGS:  Lungs are significantly hypoinflated which accentuates cardiomediastinal silhouette as well as the pulmonary vascular markings.  Difficult to exclude mild pulmonary interstitial edema.  No evidence of new focal consolidation, pneumothorax, or large pleural effusion.

## 2019-07-21 NOTE — ASSESSMENT & PLAN NOTE
Patient is on HD MWF, completed dialysis on day of presentation  Nephro consulted in ED, electrolytes stable  Pengsvelma nephro consult, appreciate recs   Continue to monitor

## 2019-07-21 NOTE — PLAN OF CARE
Problem: Adult Inpatient Plan of Care  Goal: Plan of Care Review  Outcome: Ongoing (interventions implemented as appropriate)  Pt is AAOx3. No complaints of nausea, vomiting, or diarrhea. No complaints of pain. Pt is having large bms. Sinus margarette to normal sinus on the monitor. Left arm fistula is +/+. Turned from side to side in bed. Renal cardiac diet. ACHS and last bs was 105. Safety precautions maintained. Tolerated all medications well.

## 2019-07-21 NOTE — ASSESSMENT & PLAN NOTE
Patient presented to ED febrile to 103, tachycardic to 120's and hypotensive to 90's/40's  Blood cultures were drawn and broad spectrum IV antibiotics started   Given ~2L bolus total of fluids without improvement in BP   Central line placed by anesthesia   Patient was started on Levophed drip for pressure support and goal MAP > 75  LA 3.8 trended down to 1.9  Blood cultures: Klebsiella with sensitivities, Enterococcus pending sensitivities   UA order but patient does not produce urine.   Continue Vanc, Zosyn and Micafungin

## 2019-07-21 NOTE — ASSESSMENT & PLAN NOTE
On admission, platelets was 69  Today, Platelet is 29  Concern for HIT, Heparin for DVT ppx was discontinued  Fibrinogen: 446  Heparin-induced platelet antibody: pending

## 2019-07-21 NOTE — PROGRESS NOTES
[facility] Medicine  Progress Note    Patient Name: Angel Farmer Jr.  MRN: [id number]  Patient Class: IP- Inpatient   Admission Date: 7/17/2019  Length of Stay: 4 days  Attending Physician: Beckie Zarate MD  Primary Care Provider: Satya Noriega MD        Subjective:     Principal Problem:Septic shock      HPI:  Mr. Farmer is a 58 year old man with PMHx of ESRD on HD MWF, IDDM, HTN/HLD and MIKE presented to ED with 1 day history of fever to 1044, cough and generalized malaise. He was in his usual state of health until this morning after completing 4 hours of HD when he felt weak, light headed, and hot. His wife took his temperature and it was 104, so they presented to the ED. He also notes constipation, that is usual for him. He states he has a BM every 4 days, he no longer makes urine. He denies chest pain, palpitations, shortness of breath, abdominal pain, diarrhea, or LE edema.     In the ED he was febrile to 103, LA elevaated to 3.8, he became hypotensive and tachycardic meeting Severe sepsis. He received a total of 2L NS with minimal improvement in BP, anesthesia was consulted and central line placed and started on levophed and will be admitted to the ICU.     Overview/Hospital Course:  No notes on file    Interval History: Patient is doing well today. Patient had no fevers overnight and has been off of Levophed since 2PM yesterday.  Patient is awake and alert. Patient had multiple bowel movements yesterday and his abdominal pain has improved significantly. Patient is tolerating diet with no nausea or vomiting. Denies chest pain or sob.     Review of Systems   Constitutional: Negative for activity change, appetite change, fatigue and fever.   Respiratory: Negative for cough, choking, chest tightness, shortness of breath and wheezing.    Cardiovascular: Negative for chest pain, palpitations and leg swelling.   Gastrointestinal: Negative for abdominal distention, abdominal  pain, blood in stool, constipation, diarrhea, nausea and vomiting.   Genitourinary: Negative for difficulty urinating, dysuria and hematuria.   Musculoskeletal: Positive for back pain (At baseline). Negative for arthralgias and myalgias.   Skin: Negative for rash and wound.   Neurological: Negative for dizziness, light-headedness and headaches.     Objective:     Vital Signs (Most Recent):  Temp: 97.5 °F (36.4 °C) (07/21/19 0304)  Pulse: 60 (07/21/19 0630)  Resp: (!) 22 (07/21/19 0630)  BP: (!) 91/48 (07/21/19 0630)  SpO2: (!) 94 % (07/21/19 0630) Vital Signs (24h Range):  Temp:  [97.5 °F (36.4 °C)-99 °F (37.2 °C)] 97.5 °F (36.4 °C)  Pulse:  [55-70] 60  Resp:  [10-37] 22  SpO2:  [92 %-100 %] 94 %  BP: ()/(40-90) 91/48     Weight: 128 kg (282 lb 3 oz)  Body mass index is 44.2 kg/m².    Intake/Output Summary (Last 24 hours) at 7/21/2019 0805  Last data filed at 7/21/2019 0600  Gross per 24 hour   Intake 946.67 ml   Output --   Net 946.67 ml      Physical Exam   Constitutional: He is oriented to person, place, and time. Vital signs are normal. He appears well-developed and well-nourished.   HENT:   Head: Normocephalic.   Right Ear: Hearing normal.   Left Ear: Hearing normal.   Mouth/Throat: Oropharynx is clear and moist and mucous membranes are normal. No oropharyngeal exudate.   Central line in right IJ   Eyes: Conjunctivae and EOM are normal. Right eye exhibits no discharge. Left eye exhibits no discharge.   Neck: Normal range of motion.   Cardiovascular: Normal rate, regular rhythm, normal heart sounds and intact distal pulses. Exam reveals no gallop and no friction rub.   No murmur heard.  Pulmonary/Chest: Effort normal and breath sounds normal. No respiratory distress. He has no wheezes. He exhibits no tenderness.   Abdominal: Soft. Bowel sounds are normal. He exhibits no distension. There is no tenderness. There is no guarding.   Musculoskeletal: Normal range of motion. He exhibits no edema.   Neurological:  He is alert and oriented to person, place, and time.   Skin: Skin is warm. Capillary refill takes less than 2 seconds.   No overt signs of bleeding. No oozing at IV site.   No bruising   AV Fistula in the left forearm with palpable thrill  No redness in the skin folds   Psychiatric: He has a normal mood and affect.   Nursing note and vitals reviewed.      Significant Labs:   Blood Culture:   Recent Labs   Lab 07/20/19  0850 07/20/19  0855   LABBLOO No Growth to date No Growth to date     CBC:   Recent Labs   Lab 07/20/19  0623 07/21/19  0434   WBC 6.79 5.40   HGB 8.3* 8.0*   HCT 25.7* 24.0*   PLT 43* 29*     CMP:   Recent Labs   Lab 07/20/19  0623 07/21/19  0434   * 131*   K 4.3 4.8    99   CO2 23 22*   * 96   BUN 42* 59*   CREATININE 5.4* 6.7*   CALCIUM 9.1 8.9   PROT 5.4* 5.2*   ALBUMIN 2.4* 2.2*   BILITOT 0.8 0.9   ALKPHOS 159* 190*   AST 42* 58*   ALT 44 58*   ANIONGAP 9 10   EGFRNONAA 11* 8*     Magnesium:   Recent Labs   Lab 07/20/19  0623 07/21/19  0434   MG 2.1 2.5     POCT Glucose:   Recent Labs   Lab 07/20/19  1713 07/20/19  2137 07/21/19  0609   POCTGLUCOSE 86 105 96       Significant Imaging:   Imaging Results          X-Ray Chest AP Portable (Final result)  Result time 07/18/19 00:35:51    Final result by Gabriella Drake MD (07/18/19 00:35:51)                 Impression:      As above.      Electronically signed by: Gabriella Drake MD  Date:    07/18/2019  Time:    00:35             Narrative:    EXAMINATION:  XR CHEST AP PORTABLE    CLINICAL HISTORY:  Encounter for adjustment and management of vascular access device    TECHNIQUE:  Single frontal view of the chest was performed.    COMPARISON:  07/17/2019 at 21:49.    FINDINGS:  Interval placement of right IJ central venous catheter with distal tip terminating over the region of the SVC.  No evidence of pneumothorax.  Lungs are markedly hypoinflated, otherwise no significant change from earlier exam.                                X-Ray Chest AP Portable (Final result)  Result time 07/17/19 22:14:55    Final result by Gabriella Drake MD (07/17/19 22:14:55)                 Impression:      As above.      Electronically signed by: Gabriella Drake MD  Date:    07/17/2019  Time:    22:14             Narrative:    EXAMINATION:  XR CHEST AP PORTABLE    CLINICAL HISTORY:  Sepsis;    TECHNIQUE:  Single frontal view of the chest was performed.    COMPARISON:  05/16/2019.    FINDINGS:  Lungs are significantly hypoinflated which accentuates cardiomediastinal silhouette as well as the pulmonary vascular markings.  Difficult to exclude mild pulmonary interstitial edema.  No evidence of new focal consolidation, pneumothorax, or large pleural effusion.                                    Assessment/Plan:      Angel Farmer Jr. is a 58 y.o. male who presented with septic shock that is resolving. Patient is doing well today.     * Septic shock  Patient presented to ED febrile to 103, tachycardic to 120's and hypotensive to 90's/40's  Blood cultures were drawn and broad spectrum IV antibiotics started   Given ~2L bolus total of fluids without improvement in BP   Central line placed by anesthesia   Patient was started on Levophed drip for pressure support and goal MAP > 75  LA 3.8 trended down to 1.9  Blood cultures: Klebsiella with sensitivities, Enterococcus pending sensitivities   UA order but patient does not produce urine.   Continue Vanc, Zosyn and Micafungin        Thrombocytopenia  On admission, platelets was 69  Today, Platelet is 29  Concern for HIT, Heparin for DVT ppx was discontinued  Fibrinogen: 446  Heparin-induced platelet antibody: pending      MIKE (obstructive sleep apnea) - very severe latest sleep study says BPAP @ 16/8  Patient reports on Bipap at night   Respiratory status stable   Will hold Bipap in setting of hypotension and due to patient not tolerating Bipap   Currently using 2L NC  Continue to monitor     ESRD (end stage renal  disease) on dialysis  Patient is on HD MWF, completed dialysis on day of presentation  Nephro consulted in ED, electrolytes stable  [facility] consult, appreciate recs   Continue to monitor      Hyperlipidemia  Continue home statin medication       Diabetes mellitus, type 2  Hgb A1C 5.0  Home insulin regimen is SSI and oral medications  Holding oral medications   Start Moderate correction scale   Goal Glucose <180   BG check QID   Diet diabetic and renal       Hypertension  Patient is currently not on any BP medications  Hypotensive in ED and was started on Levophed  Now off of Levophed since 7/20 at 2PM  Goal BP <140/80          VTE Risk Mitigation (From admission, onward)        Ordered     IP VTE HIGH RISK PATIENT  Once      07/18/19 0126          Critical care time spent on the evaluation and treatment of severe organ dysfunction, review of pertinent labs and imaging studies, discussions with consulting providers and discussions with patient/family: 30 minutes.    Dispo: Following HIT Panel. Most likely transfer to floor today    Damaris Sarabia PGY-2  [facility]  07/21/2019 8:30 AM

## 2019-07-21 NOTE — ASSESSMENT & PLAN NOTE
Patient reports on Bipap at night   Respiratory status stable   Will hold Bipap in setting of hypotension and due to patient not tolerating Bipap   Currently using 2L NC  Continue to monitor

## 2019-07-21 NOTE — PLAN OF CARE
Problem: Adult Inpatient Plan of Care  Goal: Plan of Care Review  Outcome: Ongoing (interventions implemented as appropriate)  Pt off levophed at this time, did have to restart today, ss enema given, passing large amts of stool

## 2019-07-21 NOTE — ASSESSMENT & PLAN NOTE
Patient is currently not on any BP medications  Hypotensive in ED and was started on Levophed  Now off of Levophed since 7/20 at 2PM  Goal BP <140/80

## 2019-07-21 NOTE — ASSESSMENT & PLAN NOTE
Hgb A1C 5.0  Home insulin regimen is SSI and oral medications  Holding oral medications   Start Moderate correction scale   Goal Glucose <180   BG check QID   Diet diabetic and renal

## 2019-07-22 ENCOUNTER — HOSPITAL ENCOUNTER (INPATIENT)
Facility: HOSPITAL | Age: 59
LOS: 2 days | Discharge: NURSING, REHAB OR LONG-TERM CARE | DRG: 682 | End: 2019-07-25
Admitting: FAMILY MEDICINE
Payer: MEDICARE

## 2019-07-22 VITALS
HEIGHT: 67 IN | DIASTOLIC BLOOD PRESSURE: 74 MMHG | OXYGEN SATURATION: 98 % | WEIGHT: 285.69 LBS | TEMPERATURE: 99 F | HEART RATE: 60 BPM | RESPIRATION RATE: 18 BRPM | SYSTOLIC BLOOD PRESSURE: 153 MMHG | BODY MASS INDEX: 44.84 KG/M2

## 2019-07-22 DIAGNOSIS — D64.9 SYMPTOMATIC ANEMIA: Primary | ICD-10-CM

## 2019-07-22 DIAGNOSIS — R52 PAIN: ICD-10-CM
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
## 2019-07-22 PROBLEM — Z78.9 IMPAIRED MOBILITY AND ADLS: Status: ACTIVE | Noted: 2019-03-14

## 2019-07-22 LAB
ABO + RH BLD: NORMAL
ALBUMIN SERPL BCP-MCNC: 2.2 G/DL (ref 3.5–5.2)
ALBUMIN SERPL BCP-MCNC: 2.5 G/DL (ref 3.5–5.2)
ALP SERPL-CCNC: 210 U/L (ref 55–135)
ALP SERPL-CCNC: 231 U/L (ref 55–135)
ALT SERPL W/O P-5'-P-CCNC: 44 U/L (ref 10–44)
ALT SERPL W/O P-5'-P-CCNC: 48 U/L (ref 10–44)
ANION GAP SERPL CALC-SCNC: 11 MMOL/L (ref 8–16)
ANION GAP SERPL CALC-SCNC: 12 MMOL/L (ref 8–16)
AST SERPL-CCNC: 29 U/L (ref 10–40)
AST SERPL-CCNC: 32 U/L (ref 10–40)
BACTERIA BLD CULT: ABNORMAL
BASOPHILS # BLD AUTO: 0 K/UL (ref 0–0.2)
BASOPHILS # BLD AUTO: 0.01 K/UL (ref 0–0.2)
BASOPHILS NFR BLD: 0 % (ref 0–1.9)
BASOPHILS NFR BLD: 0.1 % (ref 0–1.9)
BILIRUB SERPL-MCNC: 0.8 MG/DL (ref 0.1–1)
BILIRUB SERPL-MCNC: 0.9 MG/DL (ref 0.1–1)
BLD GP AB SCN CELLS X3 SERPL QL: NORMAL
BUN SERPL-MCNC: 39 MG/DL (ref 6–20)
BUN SERPL-MCNC: 74 MG/DL (ref 6–20)
CALCIUM SERPL-MCNC: 8.8 MG/DL (ref 8.7–10.5)
CALCIUM SERPL-MCNC: 9 MG/DL (ref 8.7–10.5)
CHLORIDE SERPL-SCNC: 95 MMOL/L (ref 95–110)
CHLORIDE SERPL-SCNC: 97 MMOL/L (ref 95–110)
CO2 SERPL-SCNC: 21 MMOL/L (ref 23–29)
CO2 SERPL-SCNC: 29 MMOL/L (ref 23–29)
CREAT SERPL-MCNC: 5.4 MG/DL (ref 0.5–1.4)
CREAT SERPL-MCNC: 8.6 MG/DL (ref 0.5–1.4)
DIFFERENTIAL METHOD: ABNORMAL
DIFFERENTIAL METHOD: ABNORMAL
EOSINOPHIL # BLD AUTO: 0.1 K/UL (ref 0–0.5)
EOSINOPHIL # BLD AUTO: 0.1 K/UL (ref 0–0.5)
EOSINOPHIL NFR BLD: 1.6 % (ref 0–8)
EOSINOPHIL NFR BLD: 1.6 % (ref 0–8)
ERYTHROCYTE [DISTWIDTH] IN BLOOD BY AUTOMATED COUNT: 14.9 % (ref 11.5–14.5)
ERYTHROCYTE [DISTWIDTH] IN BLOOD BY AUTOMATED COUNT: 15 % (ref 11.5–14.5)
EST. GFR  (AFRICAN AMERICAN): 13 ML/MIN/1.73 M^2
EST. GFR  (AFRICAN AMERICAN): 7 ML/MIN/1.73 M^2
EST. GFR  (NON AFRICAN AMERICAN): 11 ML/MIN/1.73 M^2
EST. GFR  (NON AFRICAN AMERICAN): 6 ML/MIN/1.73 M^2
GLUCOSE SERPL-MCNC: 129 MG/DL (ref 70–110)
GLUCOSE SERPL-MCNC: 134 MG/DL (ref 70–110)
HCT VFR BLD AUTO: 24.6 % (ref 40–54)
HCT VFR BLD AUTO: 26.5 % (ref 40–54)
HEPARIN INDUCED THROMBOCYTOPENIA: NORMAL
HGB BLD-MCNC: 8 G/DL (ref 14–18)
HGB BLD-MCNC: 8.5 G/DL (ref 14–18)
LYMPHOCYTES # BLD AUTO: 0.9 K/UL (ref 1–4.8)
LYMPHOCYTES # BLD AUTO: 1 K/UL (ref 1–4.8)
LYMPHOCYTES NFR BLD: 13.6 % (ref 18–48)
LYMPHOCYTES NFR BLD: 15.3 % (ref 18–48)
MAGNESIUM SERPL-MCNC: 2.7 MG/DL (ref 1.6–2.6)
MCH RBC QN AUTO: 34.3 PG (ref 27–31)
MCH RBC QN AUTO: 34.5 PG (ref 27–31)
MCHC RBC AUTO-ENTMCNC: 32.1 G/DL (ref 32–36)
MCHC RBC AUTO-ENTMCNC: 32.5 G/DL (ref 32–36)
MCV RBC AUTO: 106 FL (ref 82–98)
MCV RBC AUTO: 107 FL (ref 82–98)
MONOCYTES # BLD AUTO: 0.6 K/UL (ref 0.3–1)
MONOCYTES # BLD AUTO: 1.1 K/UL (ref 0.3–1)
MONOCYTES NFR BLD: 10.4 % (ref 4–15)
MONOCYTES NFR BLD: 15 % (ref 4–15)
NEUTROPHILS # BLD AUTO: 4.5 K/UL (ref 1.8–7.7)
NEUTROPHILS # BLD AUTO: 5.1 K/UL (ref 1.8–7.7)
NEUTROPHILS NFR BLD: 69.7 % (ref 38–73)
NEUTROPHILS NFR BLD: 72.7 % (ref 38–73)
PHOSPHATE SERPL-MCNC: 4.1 MG/DL (ref 2.7–4.5)
PLATELET # BLD AUTO: 31 K/UL (ref 150–350)
PLATELET # BLD AUTO: 43 K/UL (ref 150–350)
PLATELET BLD QL SMEAR: ABNORMAL
PMV BLD AUTO: 12.8 FL (ref 9.2–12.9)
PMV BLD AUTO: 13 FL (ref 9.2–12.9)
POCT GLUCOSE: 134 MG/DL (ref 70–110)
POTASSIUM SERPL-SCNC: 4 MMOL/L (ref 3.5–5.1)
POTASSIUM SERPL-SCNC: 4.8 MMOL/L (ref 3.5–5.1)
PROT SERPL-MCNC: 5.5 G/DL (ref 6–8.4)
PROT SERPL-MCNC: 6.2 G/DL (ref 6–8.4)
RBC # BLD AUTO: 2.32 M/UL (ref 4.6–6.2)
RBC # BLD AUTO: 2.48 M/UL (ref 4.6–6.2)
SODIUM SERPL-SCNC: 130 MMOL/L (ref 136–145)
SODIUM SERPL-SCNC: 135 MMOL/L (ref 136–145)
VANCOMYCIN SERPL-MCNC: 18.9 UG/ML
WBC # BLD AUTO: 6.15 K/UL (ref 3.9–12.7)
WBC # BLD AUTO: 7.4 K/UL (ref 3.9–12.7)

## 2019-07-22 PROCEDURE — 86880 COOMBS TEST DIRECT: CPT

## 2019-07-22 PROCEDURE — 25000003 PHARM REV CODE 250: Performed by: STUDENT IN AN ORGANIZED HEALTH CARE EDUCATION/TRAINING PROGRAM

## 2019-07-22 PROCEDURE — 94761 N-INVAS EAR/PLS OXIMETRY MLT: CPT

## 2019-07-22 PROCEDURE — 63600175 PHARM REV CODE 636 W HCPCS

## 2019-07-22 PROCEDURE — 84100 ASSAY OF PHOSPHORUS: CPT

## 2019-07-22 PROCEDURE — 80053 COMPREHEN METABOLIC PANEL: CPT

## 2019-07-22 PROCEDURE — 97530 THERAPEUTIC ACTIVITIES: CPT

## 2019-07-22 PROCEDURE — G0378 HOSPITAL OBSERVATION PER HR: HCPCS

## 2019-07-22 PROCEDURE — 25000003 PHARM REV CODE 250: Performed by: FAMILY MEDICINE

## 2019-07-22 PROCEDURE — 86901 BLOOD TYPING SEROLOGIC RH(D): CPT

## 2019-07-22 PROCEDURE — 86870 RBC ANTIBODY IDENTIFICATION: CPT

## 2019-07-22 PROCEDURE — 96374 THER/PROPH/DIAG INJ IV PUSH: CPT

## 2019-07-22 PROCEDURE — 97116 GAIT TRAINING THERAPY: CPT

## 2019-07-22 PROCEDURE — 80100016 HC MAINTENANCE HEMODIALYSIS

## 2019-07-22 PROCEDURE — 85025 COMPLETE CBC W/AUTO DIFF WBC: CPT

## 2019-07-22 PROCEDURE — 99285 EMERGENCY DEPT VISIT HI MDM: CPT | Mod: 25

## 2019-07-22 PROCEDURE — 80053 COMPREHEN METABOLIC PANEL: CPT | Mod: 91

## 2019-07-22 PROCEDURE — 63600175 PHARM REV CODE 636 W HCPCS: Performed by: STUDENT IN AN ORGANIZED HEALTH CARE EDUCATION/TRAINING PROGRAM

## 2019-07-22 PROCEDURE — 93005 ELECTROCARDIOGRAM TRACING: CPT

## 2019-07-22 PROCEDURE — 83735 ASSAY OF MAGNESIUM: CPT

## 2019-07-22 PROCEDURE — 80202 ASSAY OF VANCOMYCIN: CPT

## 2019-07-22 PROCEDURE — 85025 COMPLETE CBC W/AUTO DIFF WBC: CPT | Mod: 91

## 2019-07-22 RX ORDER — ACETAMINOPHEN 325 MG/1
650 TABLET ORAL EVERY 4 HOURS PRN
Status: DISCONTINUED | OUTPATIENT
Start: 2019-07-22 | End: 2019-07-25 | Stop reason: HOSPADM

## 2019-07-22 RX ORDER — CIPROFLOXACIN 500 MG/1
500 TABLET ORAL DAILY
Qty: 10 TABLET | Refills: 0 | Status: ON HOLD | OUTPATIENT
Start: 2019-07-22 | End: 2019-07-25 | Stop reason: SDUPTHER

## 2019-07-22 RX ORDER — ONDANSETRON 2 MG/ML
4 INJECTION INTRAMUSCULAR; INTRAVENOUS
Status: COMPLETED | OUTPATIENT
Start: 2019-07-22 | End: 2019-07-22

## 2019-07-22 RX ADMIN — PIPERACILLIN AND TAZOBACTAM 4.5 G: 4; .5 INJECTION, POWDER, LYOPHILIZED, FOR SOLUTION INTRAVENOUS; PARENTERAL at 02:07

## 2019-07-22 RX ADMIN — DABIGATRAN ETEXILATE MESYLATE 150 MG: 75 CAPSULE ORAL at 08:07

## 2019-07-22 RX ADMIN — METOCLOPRAMIDE HYDROCHLORIDE 5 MG: 5 SOLUTION ORAL at 02:07

## 2019-07-22 RX ADMIN — FLUOXETINE 20 MG: 20 CAPSULE ORAL at 08:07

## 2019-07-22 RX ADMIN — MIDODRINE HYDROCHLORIDE 10 MG: 5 TABLET ORAL at 08:07

## 2019-07-22 RX ADMIN — LANTHANUM CARBONATE 1000 MG: 500 TABLET, CHEWABLE ORAL at 02:07

## 2019-07-22 RX ADMIN — SEVELAMER CARBONATE 1600 MG: 800 TABLET, FILM COATED ORAL at 05:07

## 2019-07-22 RX ADMIN — METOCLOPRAMIDE HYDROCHLORIDE 5 MG: 5 SOLUTION ORAL at 06:07

## 2019-07-22 RX ADMIN — METOCLOPRAMIDE HYDROCHLORIDE 5 MG: 5 SOLUTION ORAL at 05:07

## 2019-07-22 RX ADMIN — LANTHANUM CARBONATE 1000 MG: 500 TABLET, CHEWABLE ORAL at 05:07

## 2019-07-22 RX ADMIN — ZINC OXIDE TOPICAL OINT.: at 08:07

## 2019-07-22 RX ADMIN — ONDANSETRON 4 MG: 2 INJECTION INTRAMUSCULAR; INTRAVENOUS at 08:07

## 2019-07-22 RX ADMIN — SEVELAMER CARBONATE 1600 MG: 800 TABLET, FILM COATED ORAL at 08:07

## 2019-07-22 RX ADMIN — MIDODRINE HYDROCHLORIDE 10 MG: 5 TABLET ORAL at 06:07

## 2019-07-22 RX ADMIN — LANTHANUM CARBONATE 1000 MG: 500 TABLET, CHEWABLE ORAL at 08:07

## 2019-07-22 RX ADMIN — PRAVASTATIN SODIUM 40 MG: 40 TABLET ORAL at 08:07

## 2019-07-22 RX ADMIN — MIDODRINE HYDROCHLORIDE 10 MG: 5 TABLET ORAL at 02:07

## 2019-07-22 RX ADMIN — SENNOSIDES,DOCUSATE SODIUM 1 TABLET: 8.6; 5 TABLET, FILM COATED ORAL at 08:07

## 2019-07-22 RX ADMIN — VANCOMYCIN HYDROCHLORIDE 1000 MG: 1 INJECTION, POWDER, LYOPHILIZED, FOR SOLUTION INTRAVENOUS at 08:07

## 2019-07-22 RX ADMIN — PANTOPRAZOLE SODIUM 40 MG: 40 TABLET, DELAYED RELEASE ORAL at 08:07

## 2019-07-22 RX ADMIN — SEVELAMER CARBONATE 1600 MG: 800 TABLET, FILM COATED ORAL at 02:07

## 2019-07-22 RX ADMIN — MIDODRINE HYDROCHLORIDE 10 MG: 5 TABLET ORAL at 05:07

## 2019-07-22 RX ADMIN — NYSTATIN AND TRIAMCINOLONE ACETONIDE: 100000; 1 CREAM TOPICAL at 08:07

## 2019-07-22 NOTE — HOSPITAL COURSE
Patient was admitted to the ICU for closer monitoring and Levophed. Patient was started on Vanc and Zosyn. Patient was lethargic during one exam. Imaging was ordered. Chest X-ray showed Unchanged imaged cardiomediastinal contour. Low lung volumes.  No focal consolidation, pleural effusion or pneumothorax. CT Head showed no acute intracranial abnormality. CT of Abdomen and Pelvis showed No acute abnormality within the abdomen or pelvis.    Patient then had a marked improvement. Patient was awake and alert. Blood cultures grew Klebsiella and Enterococcus. Patient was off of Levophed on 7/20 and maintain appropriate blood pressures. Patient was transferred to the floor. Sensitivities returned and patient was discharged on Vancomycin to be given during dialysis and Ciprofloxacin PO for 2 weeks. PT/OT recommended Home Health PT/OT which was ordered for patient. Patient had some hesitation with discharge and wanted to have family discussion for discharge which was done over the phone. After conversation with family, patient felt safe and comfortable with discharge.

## 2019-07-22 NOTE — PLAN OF CARE
Problem: Adult Inpatient Plan of Care  Goal: Plan of Care Review  Outcome: Ongoing (interventions implemented as appropriate)  Patient had uneventful day.  Patient was dialyzed and 2750 taking off of patient.  Patient verbal states he is feeling a lot better today.  Patient may be going home today depending on antibiotics coverage at home.  Patient turning from side to side.  Will cont to monitor.

## 2019-07-22 NOTE — PLAN OF CARE
Pt transferred to 526 via bed, with 2 nurses on monitor, chart, meds with him, had cleaned of large bm, buttocks and scrotum red

## 2019-07-22 NOTE — ASSESSMENT & PLAN NOTE
On admission, platelets was 69  Today, Platelet is 29  Concern for HIT, Heparin for DVT ppx was discontinued  Fibrinogen: 446  Heparin-induced platelet antibody: pending  Continue Dabigatran

## 2019-07-22 NOTE — PHYSICIAN QUERY
PT Name: Angel Farmer Jr.  MR #: [id number]    Physician Query Form - Cause and Effect Relationship Clarification      CDS/: Isabela Stoner RN             Contact information: [email or web address]    This form is a permanent document in the medical record.     Query Date: July 22, 2019    By submitting this query, we are merely seeking further clarification of documentation. Please utilize your independent clinical judgment when addressing the question(s) below.    The Medical record contains the following:  Supporting Clinical Findings   Location in record     Septic shock  Patient presented to ED febrile to 103, tachycardic to 120's and hypotensive to 90's/40's  Blood cultures were drawn and broad spectrum IV antibiotics started   Given ~2L bolus total of fluids without improvement in BP   Central line placed by anesthesia   Patient was started on Levophed drip for pressure support and goal MAP > 75  LA 3.8 trended down to 1.9  Blood cultures: Klebsiella with sensitivities, Enterococcus pending sensitivities   UA order but patient does not produce urine.   Continue Vanc, Zosyn and Micafungin                                                         [facility] medicine 7/21                                                                      KLEBSIELLA SPECIES   Further identified as Klebsiella variicola                                                                                                                         Blood culture 7/17         Provider, please clarify if there is any correlation between ___Sepsis___________ and __Klebsiella variicola_______.           Are the conditions:      [ x ] Due to or associated with each other   [  ] Unrelated to each other   [  ] Other (Please Specify): _________________________   [  ] Clinically Undetermined

## 2019-07-22 NOTE — PLAN OF CARE
Problem: Physical Therapy Goal  Goal: Physical Therapy Goal  Goals to be met by: 2019     Patient will increase functional independence with mobility by performin. Supine to sit with Moderate Assistance  2. Sit to supine with Moderate Assistance  3. Rolling to Left and Right with Minimal Assistance.  4. Sit to stand transfer with Minimal Assistance  5. Bed to chair transfer with Minimal Assistance using Rolling Walker  6. Gait  x 10 feet with Minimal Assistance using Rolling Walker.   7. Lower extremity exercise program x10 reps with supervision     Outcome: Ongoing (interventions implemented as appropriate)  Per MD, pt is being discharged to home today; pt has all of his equipment needed; recommend  PT/OT.

## 2019-07-22 NOTE — SUBJECTIVE & OBJECTIVE
Interval History: Patient is feeling much better today. Patient is tolerating diet with no nausea or vomiting. Patient had a bowel movement. Denies any fever, chills, chest pain, SOB, abdominal pain. Patient will work with PT/OT today for further recommendations.     Review of Systems   Constitutional: Negative for activity change, appetite change, fatigue and fever.   Respiratory: Negative for cough, choking, chest tightness, shortness of breath and wheezing.    Cardiovascular: Negative for chest pain, palpitations and leg swelling.   Gastrointestinal: Negative for abdominal distention, abdominal pain, blood in stool, constipation, diarrhea, nausea and vomiting.   Genitourinary: Negative for difficulty urinating, dysuria and hematuria.   Musculoskeletal: Positive for back pain (At baseline). Negative for arthralgias and myalgias.   Skin: Negative for rash and wound.   Neurological: Negative for dizziness, light-headedness and headaches.     Objective:     Vital Signs (Most Recent):  Temp: 98 °F (36.7 °C) (07/22/19 0747)  Pulse: (!) 54 (07/22/19 0800)  Resp: 18 (07/22/19 0747)  BP: (!) 123/58 (07/22/19 0747)  SpO2: 98 % (07/22/19 0821) Vital Signs (24h Range):  Temp:  [96.4 °F (35.8 °C)-98.1 °F (36.7 °C)] 98 °F (36.7 °C)  Pulse:  [54-64] 54  Resp:  [14-29] 18  SpO2:  [95 %-98 %] 98 %  BP: (106-154)/(53-75) 123/58     Weight: 129.6 kg (285 lb 11.5 oz)  Body mass index is 44.75 kg/m².    Intake/Output Summary (Last 24 hours) at 7/22/2019 0927  Last data filed at 7/22/2019 0500  Gross per 24 hour   Intake 100 ml   Output 0 ml   Net 100 ml      Physical Exam   Constitutional: He is oriented to person, place, and time. Vital signs are normal. He appears well-developed and well-nourished.   HENT:   Head: Normocephalic.   Right Ear: Hearing normal.   Left Ear: Hearing normal.   Mouth/Throat: Oropharynx is clear and moist and mucous membranes are normal. No oropharyngeal exudate.   Central line in right IJ   Eyes:  Conjunctivae and EOM are normal. Right eye exhibits no discharge. Left eye exhibits no discharge.   Neck: Normal range of motion.   Cardiovascular: Normal rate, regular rhythm, normal heart sounds and intact distal pulses. Exam reveals no gallop and no friction rub.   No murmur heard.  Pulmonary/Chest: Effort normal and breath sounds normal. No respiratory distress. He has no wheezes. He exhibits no tenderness.   Abdominal: Soft. Bowel sounds are normal. He exhibits no distension. There is no tenderness. There is no guarding.   Musculoskeletal: Normal range of motion. He exhibits no edema.   Neurological: He is alert and oriented to person, place, and time.   Skin: Skin is warm. Capillary refill takes less than 2 seconds.   No overt signs of bleeding. No oozing at IV site.   No bruising   AV Fistula in the left forearm with palpable thrill  No redness in the skin folds   Psychiatric: He has a normal mood and affect.   Nursing note and vitals reviewed.      Significant Labs:   Blood Culture: No results for input(s): LABBLOO in the last 48 hours.  CBC:   Recent Labs   Lab 07/21/19 0434 07/22/19 0434   WBC 5.40 6.15   HGB 8.0* 8.0*   HCT 24.0* 24.6*   PLT 29* 31*     CMP:   Recent Labs   Lab 07/21/19 0434 07/22/19 0434   * 130*   K 4.8 4.8   CL 99 97   CO2 22* 21*   GLU 96 129*   BUN 59* 74*   CREATININE 6.7* 8.6*   CALCIUM 8.9 8.8   PROT 5.2* 5.5*   ALBUMIN 2.2* 2.2*   BILITOT 0.9 0.8   ALKPHOS 190* 210*   AST 58* 32   ALT 58* 48*   ANIONGAP 10 12   EGFRNONAA 8* 6*     Magnesium:   Recent Labs   Lab 07/21/19 0434 07/22/19 0434   MG 2.5 2.7*       Significant Imaging:   Imaging Results          X-Ray Chest AP Portable (Final result)  Result time 07/18/19 00:35:51    Final result by Gabriella Drake MD (07/18/19 00:35:51)                 Impression:      As above.      Electronically signed by: Gabriella Drake MD  Date:    07/18/2019  Time:    00:35             Narrative:    EXAMINATION:  XR CHEST AP  PORTABLE    CLINICAL HISTORY:  Encounter for adjustment and management of vascular access device    TECHNIQUE:  Single frontal view of the chest was performed.    COMPARISON:  07/17/2019 at 21:49.    FINDINGS:  Interval placement of right IJ central venous catheter with distal tip terminating over the region of the SVC.  No evidence of pneumothorax.  Lungs are markedly hypoinflated, otherwise no significant change from earlier exam.                               X-Ray Chest AP Portable (Final result)  Result time 07/17/19 22:14:55    Final result by Gabriella Drake MD (07/17/19 22:14:55)                 Impression:      As above.      Electronically signed by: Gabriella Drake MD  Date:    07/17/2019  Time:    22:14             Narrative:    EXAMINATION:  XR CHEST AP PORTABLE    CLINICAL HISTORY:  Sepsis;    TECHNIQUE:  Single frontal view of the chest was performed.    COMPARISON:  05/16/2019.    FINDINGS:  Lungs are significantly hypoinflated which accentuates cardiomediastinal silhouette as well as the pulmonary vascular markings.  Difficult to exclude mild pulmonary interstitial edema.  No evidence of new focal consolidation, pneumothorax, or large pleural effusion.

## 2019-07-22 NOTE — PHYSICIAN QUERY
PT Name: Angel Farmer Jr.  MR #: [id number]    Physician Query Form - Cause and Effect Relationship Clarification      CDS/: Isabela Stoner RN              Contact information:[email or web address]    This form is a permanent document in the medical record.     Query Date: July 22, 2019    By submitting this query, we are merely seeking further clarification of documentation. Please utilize your independent clinical judgment when addressing the question(s) below.    The Medical record contains the following:  Supporting Clinical Findings   Location in record                                                                        Septic shock  Patient presented to ED febrile to 103, tachycardic to 120's and hypotensive to 90's/40's  Blood cultures were drawn and broad spectrum IV antibiotics started   Given ~2L bolus total of fluids without improvement in BP   Central line placed by anesthesia   Patient was started on Levophed drip for pressure support and goal MAP > 75  LA 3.8 trended down to 1.9  Blood cultures: Klebsiella with sensitivities, Enterococcus pending sensitivities   UA order but patient does not produce urine.   Continue Vanc, Zosyn and Micafungin                                                                                                                                          [facility] medicine 7/21              ENTEROCOCCUS RAFFINOSUS                                                                                                                                                                                Blood culture 7/17         Provider, please clarify if there is any correlation between ___Sepsis______________ and ___Enterococcus Rafinosus_______________.           Are the conditions:      [ x ] Due to or associated with each other   [  ] Unrelated to each other   [  ] Other (Please Specify): _________________________   [  ] Clinically Undetermined

## 2019-07-22 NOTE — PROGRESS NOTES
[facility] Medicine  Progress Note    Patient Name: Angel Farmer Jr.  MRN: [id number]  Patient Class: IP- Inpatient   Admission Date: 7/17/2019  Length of Stay: 5 days  Attending Physician: Beckie Zarate MD  Primary Care Provider: Satya Noriega MD        Subjective:     Principal Problem:Septic shock      HPI:  Mr. Farmer is a 58 year old man with PMHx of ESRD on HD MWF, IDDM, HTN/HLD and MIKE presented to ED with 1 day history of fever to 1044, cough and generalized malaise. He was in his usual state of health until this morning after completing 4 hours of HD when he felt weak, light headed, and hot. His wife took his temperature and it was 104, so they presented to the ED. He also notes constipation, that is usual for him. He states he has a BM every 4 days, he no longer makes urine. He denies chest pain, palpitations, shortness of breath, abdominal pain, diarrhea, or LE edema.     In the ED he was febrile to 103, LA elevaated to 3.8, he became hypotensive and tachycardic meeting Severe sepsis. He received a total of 2L NS with minimal improvement in BP, anesthesia was consulted and central line placed and started on levophed and will be admitted to the ICU.     Overview/Hospital Course:  No notes on file    Interval History: Patient is feeling much better today. Patient is tolerating diet with no nausea or vomiting. Patient had a bowel movement. Denies any fever, chills, chest pain, SOB, abdominal pain. Patient will work with PT/OT today for further recommendations.     Review of Systems   Constitutional: Negative for activity change, appetite change, fatigue and fever.   Respiratory: Negative for cough, choking, chest tightness, shortness of breath and wheezing.    Cardiovascular: Negative for chest pain, palpitations and leg swelling.   Gastrointestinal: Negative for abdominal distention, abdominal pain, blood in stool, constipation, diarrhea, nausea and vomiting.    Genitourinary: Negative for difficulty urinating, dysuria and hematuria.   Musculoskeletal: Positive for back pain (At baseline). Negative for arthralgias and myalgias.   Skin: Negative for rash and wound.   Neurological: Negative for dizziness, light-headedness and headaches.     Objective:     Vital Signs (Most Recent):  Temp: 98 °F (36.7 °C) (07/22/19 0747)  Pulse: (!) 54 (07/22/19 0800)  Resp: 18 (07/22/19 0747)  BP: (!) 123/58 (07/22/19 0747)  SpO2: 98 % (07/22/19 0821) Vital Signs (24h Range):  Temp:  [96.4 °F (35.8 °C)-98.1 °F (36.7 °C)] 98 °F (36.7 °C)  Pulse:  [54-64] 54  Resp:  [14-29] 18  SpO2:  [95 %-98 %] 98 %  BP: (106-154)/(53-75) 123/58     Weight: 129.6 kg (285 lb 11.5 oz)  Body mass index is 44.75 kg/m².    Intake/Output Summary (Last 24 hours) at 7/22/2019 0927  Last data filed at 7/22/2019 0500  Gross per 24 hour   Intake 100 ml   Output 0 ml   Net 100 ml      Physical Exam   Constitutional: He is oriented to person, place, and time. Vital signs are normal. He appears well-developed and well-nourished.   HENT:   Head: Normocephalic.   Right Ear: Hearing normal.   Left Ear: Hearing normal.   Mouth/Throat: Oropharynx is clear and moist and mucous membranes are normal. No oropharyngeal exudate.   Central line in right IJ   Eyes: Conjunctivae and EOM are normal. Right eye exhibits no discharge. Left eye exhibits no discharge.   Neck: Normal range of motion.   Cardiovascular: Normal rate, regular rhythm, normal heart sounds and intact distal pulses. Exam reveals no gallop and no friction rub.   No murmur heard.  Pulmonary/Chest: Effort normal and breath sounds normal. No respiratory distress. He has no wheezes. He exhibits no tenderness.   Abdominal: Soft. Bowel sounds are normal. He exhibits no distension. There is no tenderness. There is no guarding.   Musculoskeletal: Normal range of motion. He exhibits no edema.   Neurological: He is alert and oriented to person, place, and time.   Skin: Skin is  warm. Capillary refill takes less than 2 seconds.   No overt signs of bleeding. No oozing at IV site.   No bruising   AV Fistula in the left forearm with palpable thrill  No redness in the skin folds   Psychiatric: He has a normal mood and affect.   Nursing note and vitals reviewed.      Significant Labs:   Blood Culture: No results for input(s): LABBLOO in the last 48 hours.  CBC:   Recent Labs   Lab 07/21/19 0434 07/22/19 0434   WBC 5.40 6.15   HGB 8.0* 8.0*   HCT 24.0* 24.6*   PLT 29* 31*     CMP:   Recent Labs   Lab 07/21/19 0434 07/22/19 0434   * 130*   K 4.8 4.8   CL 99 97   CO2 22* 21*   GLU 96 129*   BUN 59* 74*   CREATININE 6.7* 8.6*   CALCIUM 8.9 8.8   PROT 5.2* 5.5*   ALBUMIN 2.2* 2.2*   BILITOT 0.9 0.8   ALKPHOS 190* 210*   AST 58* 32   ALT 58* 48*   ANIONGAP 10 12   EGFRNONAA 8* 6*     Magnesium:   Recent Labs   Lab 07/21/19 0434 07/22/19 0434   MG 2.5 2.7*       Significant Imaging:   Imaging Results          X-Ray Chest AP Portable (Final result)  Result time 07/18/19 00:35:51    Final result by Gabriella Drake MD (07/18/19 00:35:51)                 Impression:      As above.      Electronically signed by: Gabriella Drake MD  Date:    07/18/2019  Time:    00:35             Narrative:    EXAMINATION:  XR CHEST AP PORTABLE    CLINICAL HISTORY:  Encounter for adjustment and management of vascular access device    TECHNIQUE:  Single frontal view of the chest was performed.    COMPARISON:  07/17/2019 at 21:49.    FINDINGS:  Interval placement of right IJ central venous catheter with distal tip terminating over the region of the SVC.  No evidence of pneumothorax.  Lungs are markedly hypoinflated, otherwise no significant change from earlier exam.                               X-Ray Chest AP Portable (Final result)  Result time 07/17/19 22:14:55    Final result by Gabriella Drake MD (07/17/19 22:14:55)                 Impression:      As above.      Electronically signed by: Gabriella Drake  MD  Date:    07/17/2019  Time:    22:14             Narrative:    EXAMINATION:  XR CHEST AP PORTABLE    CLINICAL HISTORY:  Sepsis;    TECHNIQUE:  Single frontal view of the chest was performed.    COMPARISON:  05/16/2019.    FINDINGS:  Lungs are significantly hypoinflated which accentuates cardiomediastinal silhouette as well as the pulmonary vascular markings.  Difficult to exclude mild pulmonary interstitial edema.  No evidence of new focal consolidation, pneumothorax, or large pleural effusion.                                      Assessment/Plan:      * Septic shock  Patient presented to ED febrile to 103, tachycardic to 120's and hypotensive to 90's/40's  Blood cultures were drawn and broad spectrum IV antibiotics started   Given ~2L bolus total of fluids without improvement in BP   Central line placed by anesthesia   Patient was started on Levophed drip for pressure support and goal MAP > 75 and now off since 7/20  LA 3.8 trended down to 1.9  Blood cultures: Klebsiella with sensitivities, Enterococcus pending sensitivities   UA order but patient does not produce urine.   Continue Vanc, Zosyn until sensitivities return        Thrombocytopenia  On admission, platelets was 69  Today, Platelet is 29  Concern for HIT, Heparin for DVT ppx was discontinued  Fibrinogen: 446  Heparin-induced platelet antibody: pending  Continue Dabigatran      MIKE (obstructive sleep apnea) - very severe latest sleep study says BPAP @ 16/8  Patient reports on Bipap at night   Respiratory status stable   Will hold Bipap in setting of hypotension and due to patient not tolerating Bipap   Currently using 2L NC  Continue to monitor     ESRD (end stage renal disease) on dialysis  Patient is on HD MWF, completed dialysis on day of presentation  Nephro consulted in ED, electrolytes stable  [facility] consult, appreciate recs   Continue to monitor      Hyperlipidemia  Continue home statin medication       Diabetes mellitus, type 2  Hgb A1C  5.0  Home insulin regimen is SSI and oral medications  Holding oral medications   Start Moderate correction scale   Goal Glucose <180   BG check QID   Diet diabetic and renal       Hypertension  Patient is currently not on any BP medications  Hypotensive in ED and was started on Levophed  Now off of Levophed since 7/20 at 2PM  Goal BP <140/80          VTE Risk Mitigation (From admission, onward)        Ordered     dabigatran etexilate capsule 150 mg  2 times daily      07/21/19 1213     IP VTE HIGH RISK PATIENT  Once      07/18/19 0126          Damaris Sarabia, PGY-2  [facility]  07/22/2019 9:31 AM

## 2019-07-22 NOTE — PT/OT/SLP PROGRESS
Occupational Therapy  Missed Visit    Patient Name:  Angel Farmer Jr.   MRN:  [id number]    Patient not seen today secondary to  1st attempt-ELKIN in MRI, 2nd attempt ELKIN in HD. Will follow-up .    Vicente Reeves OT  7/22/2019

## 2019-07-22 NOTE — CONSULTS
Consulted for high fiber education. Reviewed high fiber foods with patient. Provided him with handouts. Pt voiced understanding,.

## 2019-07-22 NOTE — ASSESSMENT & PLAN NOTE
Patient presented to ED febrile to 103, tachycardic to 120's and hypotensive to 90's/40's  Blood cultures were drawn and broad spectrum IV antibiotics started   Given ~2L bolus total of fluids without improvement in BP   Central line placed by anesthesia   Patient was started on Levophed drip for pressure support and goal MAP > 75 and now off since 7/20  LA 3.8 trended down to 1.9  Blood cultures: Klebsiella with sensitivities, Enterococcus pending sensitivities   UA order but patient does not produce urine.   Continue Vanc, Zosyn until sensitivities return

## 2019-07-22 NOTE — PLAN OF CARE
Problem: Adult Inpatient Plan of Care  Goal: Plan of Care Review  Outcome: Ongoing (interventions implemented as appropriate)  POC discussed with patient. AAOx3. VVS. Bradycardic on telemetry. Treated excoriation to scrotum and inguinal with ordered ointment. Takes meds whole with water. Assisted with position changes. Blood sugar monitored ACHS. R IJ TLC flushed for patency. Bed alarm active. Bed rails up x3. Call light in reach. Will continue to monitor.

## 2019-07-22 NOTE — NURSING
Pt tolerated tx well. 2.75 L removed during HD tx. Vitals stable. Pt had BM and was cleaned with linens changed prior to sending back to room.

## 2019-07-22 NOTE — PROGRESS NOTES
Pharmacokinetic Assessment Follow Up: IV Vancomycin    Vancomycin Regimen Plan:    Random pre-HD level this morning was 18.9. Will continue current regimen of 1g post HD MWF. Next random prior to HD on Friday 7/26.     Pharmacy will continue to follow and monitor vancomycin.    Please contact pharmacy at extension [phone] for questions regarding this assessment.    Thank you for the consult,   Anastasia Bland     Patient brief summary:  Angel Farmer Jr. is a 58 y.o. male initiated on antimicrobial therapy with IV Vancomycin for treatment of suspected sepsis    The patient received a loading dose, followed by the current treatment regimen: vancomycin 1000 mg IV every MWF hours    Drug Allergies:   Review of patient's allergies indicates:   Allergen Reactions    Keflex [cephalexin] Nausea Only       Actual Body Weight:   129kg    Renal Function:   Estimated Creatinine Clearance: 12.4 mL/min (A) (based on SCr of 8.6 mg/dL (H)).,     Dialysis Method (if applicable):  intermittent HD    CBC (last 72 hours):  Recent Labs   Lab Result Units 07/20/19 0623 07/21/19 0434 07/22/19 0434   WBC K/uL 6.79 5.40 6.15   Hemoglobin g/dL 8.3* 8.0* 8.0*   Hematocrit % 25.7* 24.0* 24.6*   Platelets K/uL 43* 29* 31*   Gran% % 72.5 74.1* 72.7   Lymph% % 11.9* 14.4* 15.3*   Mono% % 14.4 10.0 10.4   Eosinophil% % 1.2 1.3 1.6   Basophil% % 0.0 0.2 0.0   Differential Method  Automated Automated Automated       Metabolic Panel (last 72 hours):  Recent Labs   Lab Result Units 07/20/19 0623 07/21/19 0434 07/22/19 0434   Sodium mmol/L 133* 131* 130*   Potassium mmol/L 4.3 4.8 4.8   Chloride mmol/L 101 99 97   CO2 mmol/L 23 22* 21*   Glucose mg/dL 129* 96 129*   BUN, Bld mg/dL 42* 59* 74*   Creatinine mg/dL 5.4* 6.7* 8.6*   Albumin g/dL 2.4* 2.2* 2.2*   Total Bilirubin mg/dL 0.8 0.9 0.8   Alkaline Phosphatase U/L 159* 190* 210*   AST U/L 42* 58* 32   ALT U/L 44 58* 48*   Magnesium mg/dL 2.1 2.5 2.7*   Phosphorus mg/dL 3.3 3.0 4.1       Vancomycin  Administrations:  vancomycin given in the last 96 hours                     vancomycin in dextrose 5 % 1 gram/250 mL IVPB 1,000 mg (mg) 1,000 mg New Bag 07/22/19 0846     1,000 mg New Bag 07/19/19 1657                      Drug levels (last 3 results):  Recent Labs   Lab Result Units 07/22/19  0434   Vancomycin, Random ug/mL 18.9       Microbiologic Results:  Microbiology Results (last 7 days)       Procedure Component Value Units Date/Time    Blood culture [[id number]] Collected:  07/20/19 0850    Order Status:  Completed Specimen:  Blood from Peripheral, Forearm, Right Updated:  07/21/19 1812     Blood Culture, Routine No Growth to date      No Growth to date    Narrative:       Right AC    Blood culture [[id number]] Collected:  07/20/19 0855    Order Status:  Completed Specimen:  Blood from Peripheral, Hand, Right Updated:  07/21/19 1812     Blood Culture, Routine No Growth to date      No Growth to date    Narrative:       Right Hand    Blood culture x two cultures. Draw prior to antibiotics. [[id number]]  (Abnormal)  (Susceptibility) Collected:  07/17/19 2146    Order Status:  Completed Specimen:  Blood from Peripheral, Forearm, Right Updated:  07/21/19 0919     Blood Culture, Routine Gram stain nuno bottle: Gram negative rods       Results called to and read back by:Nicole Prieto RN 07/18/2019  14:32      Gram stain aer bottle: Gram negative rods      Gram stain aer bottle: Gram positive cocci      KLEBSIELLA SPECIES  Further identified as Klebsiella variicola        ENTEROCOCCUS RAFFINOSUS  Susceptibility pending      Narrative:       Aerobic and anaerobic    Blood culture x two cultures. Draw prior to antibiotics. [[id number]]  (Abnormal) Collected:  07/17/19 2144    Order Status:  Completed Specimen:  Blood from Peripheral, Wrist, Right Updated:  07/20/19 0957     Blood Culture, Routine Gram stain nuno bottle: Gram negative rods      Gram stain nuno bottle: Gram positive cocci      Results called to and read back  by:Nicole Prieto RN 07/18/2019  16:20      Gram stain aer bottle: Gram negative rods      Gram stain aer bottle: Gram positive cocci      KLEBSIELLA SPECIES  Further identified as Klebsiella variicola  For susceptibility see order #[id number]        ENTEROCOCCUS RAFFINOSUS  For susceptibility see order #[id number]      Narrative:       Aerobic and anaerobic    Culture, Respiratory with Gram Stain [[id number]]     Order Status:  No result Specimen:  Respiratory from Sputum, Induced     Blood culture [[id number]]     Order Status:  Canceled Specimen:  Blood     Blood culture [[id number]]     Order Status:  Canceled Specimen:  Blood     Culture, Respiratory with Gram Stain [[id number]]     Order Status:  Canceled Specimen:  Respiratory     Influenza A & B by Molecular [[id number]] Collected:  07/17/19 2137    Order Status:  Completed Specimen:  Nasopharyngeal Swab Updated:  07/17/19 2229     Influenza A, Molecular Negative     Influenza B, Molecular Negative     Flu A & B Source Nasal swab

## 2019-07-22 NOTE — PROGRESS NOTES
Patient back from HD in stable condition.  Patient about to eat lunch and I will give him his medications before meals.  Will cont to monitor.

## 2019-07-22 NOTE — PT/OT/SLP PROGRESS
Physical Therapy      Patient Name:  Angel Farmer Jr.   MRN:  [id number]    Patient not seen today secondary to  1st attempt-ELKIN in MRI, 2nd attempt ELKIN in HD. Will follow-up .    Paula Santos, PT   7/22/2019

## 2019-07-22 NOTE — PLAN OF CARE
pt d/c'd to home today   order for IV Vanc to be given after HD faxed to Ms. Michael at [facility] --- [place] confirmed with Ms. Michael (clinic adminsitrator) that this abx is on hand  for administration     Future Appointments   Date Time Provider Department Center   7/30/2019  4:00 PM Ruddy Wylie MD [facility] [place] [facility]   8/5/2019  9:00 AM Damion Pickens PA-C [facility]     pt has transportation to home        07/22/19 1681   Final Note   Assessment Type Final Discharge Note   Anticipated Discharge Disposition Home   What phone number can be called within the next 1-3 days to see how you are doing after discharge? [phone]   Hospital Follow Up  Appt(s) scheduled? Yes   Discharge plans and expectations educations in teach back method with documentation complete? Yes   Right Care Referral Info   Post Acute Recommendation SNF / Sub-Acute Rehab   Referral Type   (no care )

## 2019-07-22 NOTE — PT/OT/SLP PROGRESS
"Occupational Therapy   Treatment    Name: Angel Farmer Jr.  MRN: [id number]  Admitting Diagnosis:  Septic shock       Recommendations:     Discharge Recommendations: home health PT, home health OT  Discharge Equipment Recommendations:  none  Barriers to discharge:       Assessment:     Angel Farmer Jr. is a 58 y.o. male with a medical diagnosis of Septic shock.  He presents with performance deficits affecting function are weakness, impaired self care skills, impaired balance, decreased ROM, impaired endurance, gait instability, impaired functional mobilty, decreased upper extremity function, decreased lower extremity function, pain, edema.     Rehab Prognosis:  Fair; patient would benefit from acute skilled OT services to address these deficits and reach maximum level of function.       Plan:     Patient to be seen 5 x/week to address the above listed problems via self-care/home management, therapeutic activities, therapeutic exercises  · Plan of Care Expires: 08/19/19  · Plan of Care Reviewed with: patient    Subjective     Pain/Comfort:  · Pain Rating 1: 6/10  · Location - Orientation 1: generalized  · Location 1: ("whole body")  · Pain Addressed 1: Distraction, Reposition, Nurse notified, Cessation of Activity  · Pain Rating Post-Intervention 1: 6/10    Objective:     Communicated with: nurse prior to session.  Patient found HOB elevated with central line upon OT entry to room.    General Precautions: Standard, fall   Orthopedic Precautions:    Braces:       Occupational Performance:     Bed Mobility:    · Patient completed Rolling/Turning to Right with moderate assistance  · Patient completed Scooting/Bridging with moderate assistance  · Patient completed Supine to Sit with moderate assistance and maximal assistance  · Patient completed Sit to Supine with moderate assistance and maximal assistance     Functional Mobility/Transfers:  · Patient completed Sit <> Stand Transfer with min A of 2 from bed; mod A of 2 " from BS chair  with  rolling walker   · Patient completed Bed <> Chair Transfer using Step Transfer technique with mod A of 2 with rolling walker  · Functional Mobility: min A w/RW 6' to and from bed/chair    Activities of Daily Living:  ·       [facility] 6 Click ADL: 14    Treatment & Education:  As above    Patient left HOB elevated with all lines intact, call button in reach and nurse notifiedEducation:      GOALS:   Multidisciplinary Problems     Occupational Therapy Goals        Problem: Occupational Therapy Goal    Goal Priority Disciplines Outcome Interventions   Occupational Therapy Goal     OT, PT/OT Unable to achieve outcome(s) by discharge    Description:  Goals to be met by: 8/19     Patient will increase functional independence with ADLs by performing:    Feeding with Modified Post.  UE Dressing with Minimal Assistance.  Grooming while seated with Stand-by Assistance.  Rolling to Bilateral with Minimal Assistance.   Supine to sit with Moderate Assistance.  BUE HEP assistance as needed                        Time Tracking:     OT Date of Treatment: 07/22/19  OT Start Time: 1424  OT Stop Time: 1447  OT Total Time (min): 23 min    Billable Minutes:Therapeutic Activity 10    Vicente Reeves OT  7/22/2019

## 2019-07-22 NOTE — PLAN OF CARE
Problem: Occupational Therapy Goal  Goal: Occupational Therapy Goal  Goals to be met by: 8/19     Patient will increase functional independence with ADLs by performing:    Feeding with Modified [place].  UE Dressing with Minimal Assistance.  Grooming while seated with Stand-by Assistance.  Rolling to Bilateral with Minimal Assistance.   Supine to sit with Moderate Assistance.  BUE HEP assistance as needed       Outcome: Unable to achieve outcome(s) by discharge  Per MD pt to LA home today.    Red  OT/PT, pt has DME

## 2019-07-22 NOTE — PROGRESS NOTES
Progress Note  Nephrology      Consult Requested By: Beckie Zarate MD  Reason for Consult: ESRD, fever    SUBJECTIVE:     Review of Systems   Constitutional: Negative for chills and fever.   Respiratory: Negative for cough and shortness of breath.    Cardiovascular: Negative for chest pain and leg swelling.   Gastrointestinal: Negative for nausea.     Patient Active Problem List   Diagnosis    Hypertension    Diabetes mellitus, type 2    Hyperlipidemia    Macrocytic anemia    ESRD (end stage renal disease) on dialysis    Atypical chest pain    Psoriasis    Foot pain    MIKE (obstructive sleep apnea) - very severe latest sleep study says BPAP @ 16/8    Multifactorial peripheral neuropathy    Flu-like symptoms    Bacteremia    Hypertension associated with stage 5 chronic kidney disease due to type 2 diabetes mellitus    FUO (fever of unknown origin)    Trigger finger, left ring finger    CVA (cerebral vascular accident)    Ataxia    Anemia due to stage 5 chronic kidney disease    Hyperphosphatemia    Metabolic bone disease    Left leg pain    Pre-syncope    Hypotension    Bradycardia    Class 3 severe obesity due to excess calories with serious comorbidity and body mass index (BMI) of 45.0 to 49.9 in adult    S/P laminectomy    Hyperkalemia    Hypothermia    Hyponatremia    Impaired functional mobility and endurance    Diarrhea    Septic shock    Thrombocytopenia       OBJECTIVE:     Medications:   dabigatran etexilate  150 mg Oral BID    FLUoxetine  20 mg Oral Daily    gabapentin  300 mg Oral QHS    lanthanum  1,000 mg Oral TID WM    metoclopramide HCl  5 mg Oral QID (AC & HS)    midodrine  10 mg Oral TIDWM    nystatin-triamcinolone   Topical (Top) BID    pantoprazole  40 mg Oral Daily    piperacillin-tazobactam (ZOSYN) IVPB  4.5 g Intravenous Q12H    pravastatin  40 mg Oral Daily    senna-docusate 8.6-50 mg  1 tablet Oral BID    sevelamer carbonate  1,600 mg Oral TID  WM    vancomycin (VANCOCIN) IVPB  1,000 mg Intravenous Every Mon, Wed, Fri    zinc oxide   Topical (Top) BID       Vitals:    07/21/19 1600   BP:    Pulse: (!) 57   Resp:    Temp:      I/O last 3 completed shifts:  In: 1266.7 [P.O.:750; I.V.:16.7; IV Piggyback:500]  Out: -   Physical Exam   Constitutional: He is oriented to person, place, and time. He appears well-developed and well-nourished. No distress.   HENT:   Head: Normocephalic and atraumatic.   Eyes: EOM are normal. No scleral icterus.   Cardiovascular: Normal rate, regular rhythm, normal heart sounds and intact distal pulses. Exam reveals no gallop and no friction rub.   No murmur heard.  Pulmonary/Chest: Effort normal and breath sounds normal. He has no wheezes. He has no rales.   Abdominal: Soft. Bowel sounds are normal. He exhibits no distension. There is no tenderness.   Musculoskeletal: Normal range of motion. He exhibits no edema.   Lymphadenopathy:     He has no cervical adenopathy.   Neurological: He is alert and oriented to person, place, and time.   Skin: Skin is warm and dry. No rash noted. He is not diaphoretic.   Psychiatric: Thought content normal.     Laboratory:  Recent Labs   Lab 07/19/19 0437 07/20/19  0623 07/21/19  0434   WBC 10.78 6.79 5.40   HGB 9.2* 8.3* 8.0*   HCT 28.0* 25.7* 24.0*   PLT 49* 43* 29*   MONO 10.0  1.1* 14.4  1.0 10.0  0.5     Recent Labs   Lab 07/19/19 0437 07/20/19  0623 07/21/19  0434   * 133* 131*   K 4.2 4.3 4.8    101 99   CO2 21* 23 22*   BUN 59* 42* 59*   CREATININE 7.7* 5.4* 6.7*   CALCIUM 9.4 9.1 8.9   PHOS 5.2* 3.3 3.0     Labs reviewed  Diagnostic Results:  X-Ray: Reviewed  US: Reviewed  Echo: Reviewed      ASSESSMENT/PLAN:              ESRD - on HD with Dr. Araceli Pickens in [facility] MWF, 4.5 hours, EDW 125kg  HD monday as per normal schedule      Hypotension chronic. Increase midodirne to 20 TID   Anemia -epo with HD  worsening thrombocytopenia -check for hemolysis and HIT  Recent  Labs   Lab 07/19/19  0437 07/20/19  0623 07/21/19  0434   WBC 10.78 6.79 5.40   HGB 9.2* 8.3* 8.0*   HCT 28.0* 25.7* 24.0*   PLT 49* 43* 29*     Lab Results   Component Value Date    IRON 169 (H) 03/01/2019    TIBC 218 (L) 03/01/2019    FERRITIN 914 (H) 03/01/2019        MBD - cont sensipar and Fosrenol  Lab Results   Component Value Date    CALCIUM 8.9 07/21/2019    PHOS 3.0 07/21/2019     Recent Labs   Lab 07/19/19  0437 07/20/19  0623 07/21/19  0434   MG 1.9 2.1 2.5     Lab Results   Component Value Date    CALCIUM 8.9 07/21/2019    PHOS 3.0 07/21/2019     No results found for: [id number]  Lab Results   Component Value Date    CO2 22 (L) 07/21/2019        Access - L wrist AVF, recent ballon PTA by Dr. Moran in [facility] Nutrition - renal diet, nephrocaps  DM2   Severe MIKE- BiPAP overnight 16/8     Thank you for allowing me to participate in the care of your patients  With any question please call [phone]  Margoth Felton    [facility]  SHALA Forrest MD, KADEN MITCHELL MD,   MD GEORGIA Walker, NP  [address]  [place], [address]  [phone]

## 2019-07-22 NOTE — PT/OT/SLP PROGRESS
Physical Therapy Treatment    Patient Name:  Angel Farmer Jr.   MRN:  [id number]    Recommendations:     Discharge Recommendations:  home health PT, home health OT   Discharge Equipment Recommendations: none   Barriers to discharge:     Assessment:     Angel Farmer Jr. is a 58 y.o. male admitted with a medical diagnosis of Septic shock.  He presents with the following impairments/functional limitations:  weakness, impaired self care skills, impaired functional mobilty, gait instability, impaired balance, decreased ROM, decreased upper extremity function, decreased lower extremity function, pain, edema    Rehab Prognosis: Fair; patient would benefit from acute skilled PT services to address these deficits and reach maximum level of function.    Recent Surgery: * No surgery found *      Plan:     During this hospitalization, patient to be seen 6 x/week to address the identified rehab impairments via gait training, therapeutic activities, neuromuscular re-education, therapeutic exercises and progress toward the following goals:    · Plan of Care Expires:  08/19/19    Subjective     Pain/Comfort:  · Pain Rating 1: 6/10  · Location - Side 1: Right  · Location - Orientation 1: generalized  · Location 1: (whole body)  · Pain Addressed 1: Distraction, Nurse notified, Cessation of Activity  · Pain Rating Post-Intervention 1: 6/10      Objective:     Communicated with nurse prior to session.  Patient found with central line upon PT entry to room.     General Precautions: Standard, fall   Orthopedic Precautions:N/A   Braces: N/A     Functional Mobility:  · Bed Mobility:     · Rolling Right: moderate assistance  · Scooting: moderate assistance  · Supine to Sit: moderate assistance and maximal assistance  · Sit to Supine: moderate assistance and maximal assistance  · Transfers:     · Sit to Stand:  minimum assistance, of 2 persons from EOB; modA of 2 from bedside chair with rolling walker  · Bed to Chair: moderate assistance  and of 2 persons with  rolling walker  using  Step Transfer  · Gait: Patient amb 6 ft both ways bed<>chair using RW with Nanette; forward head/trunk, short step length, slow kaykay, increased lateral sway      AM-PAC 6 CLICK MOBILITY  Turning over in bed (including adjusting bedclothes, sheets and blankets)?: 2  Sitting down on and standing up from a chair with arms (e.g., wheelchair, bedside commode, etc.): 2  Moving from lying on back to sitting on the side of the bed?: 2  Moving to and from a bed to a chair (including a wheelchair)?: 3  Need to walk in hospital room?: 3  Climbing 3-5 steps with a railing?: 1  Basic Mobility Total Score: 13       Therapeutic Activities and Exercises:   Patient performed skills as listed above; pt instructed in application and use of gt/txf belt and issued to pt for home use.    Patient left HOB elevated with all lines intact, call button in reach and nurse notified..    GOALS:   Multidisciplinary Problems     Physical Therapy Goals        Problem: Physical Therapy Goal    Goal Priority Disciplines Outcome Goal Variances Interventions   Physical Therapy Goal     PT, PT/OT Ongoing (interventions implemented as appropriate)     Description:  Goals to be met by: 2019     Patient will increase functional independence with mobility by performin. Supine to sit with Moderate Assistance  2. Sit to supine with Moderate Assistance  3. Rolling to Left and Right with Minimal Assistance.  4. Sit to stand transfer with Minimal Assistance  5. Bed to chair transfer with Minimal Assistance using Rolling Walker  6. Gait  x 10 feet with Minimal Assistance using Rolling Walker.   7. Lower extremity exercise program x10 reps with supervision                      Time Tracking:     PT Received On: 19  PT Start Time:      PT Stop Time: 164  PT Total Time (min): 23 min with OT    Billable Minutes: Gait Training 13 minutes    Treatment Type: Treatment  PT/PTA: PT     PTA Visit Number:  0     Paula Santos, PT  07/22/2019

## 2019-07-23 LAB
ALBUMIN SERPL BCP-MCNC: 2.4 G/DL (ref 3.5–5.2)
ALP SERPL-CCNC: 209 U/L (ref 55–135)
ALT SERPL W/O P-5'-P-CCNC: 39 U/L (ref 10–44)
ANION GAP SERPL CALC-SCNC: 11 MMOL/L (ref 8–16)
AST SERPL-CCNC: 29 U/L (ref 10–40)
BASOPHILS # BLD AUTO: 0.01 K/UL (ref 0–0.2)
BASOPHILS NFR BLD: 0.2 % (ref 0–1.9)
BILIRUB SERPL-MCNC: 0.9 MG/DL (ref 0.1–1)
BLOOD GROUP ANTIBODIES SERPL: NORMAL
BUN SERPL-MCNC: 45 MG/DL (ref 6–20)
CALCIUM SERPL-MCNC: 9.2 MG/DL (ref 8.7–10.5)
CHLORIDE SERPL-SCNC: 95 MMOL/L (ref 95–110)
CO2 SERPL-SCNC: 28 MMOL/L (ref 23–29)
CREAT SERPL-MCNC: 5.9 MG/DL (ref 0.5–1.4)
DAT IGG-SP REAG RBC-IMP: NORMAL
DIFFERENTIAL METHOD: ABNORMAL
EOSINOPHIL # BLD AUTO: 0.1 K/UL (ref 0–0.5)
EOSINOPHIL NFR BLD: 1.7 % (ref 0–8)
ERYTHROCYTE [DISTWIDTH] IN BLOOD BY AUTOMATED COUNT: 14.7 % (ref 11.5–14.5)
EST. GFR  (AFRICAN AMERICAN): 11 ML/MIN/1.73 M^2
EST. GFR  (NON AFRICAN AMERICAN): 10 ML/MIN/1.73 M^2
GLUCOSE SERPL-MCNC: 107 MG/DL (ref 70–110)
HCT VFR BLD AUTO: 26.8 % (ref 40–54)
HGB BLD-MCNC: 8.5 G/DL (ref 14–18)
LYMPHOCYTES # BLD AUTO: 1.1 K/UL (ref 1–4.8)
LYMPHOCYTES NFR BLD: 20.3 % (ref 18–48)
MAGNESIUM SERPL-MCNC: 2.4 MG/DL (ref 1.6–2.6)
MCH RBC QN AUTO: 34.1 PG (ref 27–31)
MCHC RBC AUTO-ENTMCNC: 31.7 G/DL (ref 32–36)
MCV RBC AUTO: 108 FL (ref 82–98)
MONOCYTES # BLD AUTO: 0.8 K/UL (ref 0.3–1)
MONOCYTES NFR BLD: 15.9 % (ref 4–15)
NEUTROPHILS # BLD AUTO: 3.2 K/UL (ref 1.8–7.7)
NEUTROPHILS NFR BLD: 61.9 % (ref 38–73)
PHOSPHATE SERPL-MCNC: 4.1 MG/DL (ref 2.7–4.5)
PLATELET # BLD AUTO: 41 K/UL (ref 150–350)
PMV BLD AUTO: 12.6 FL (ref 9.2–12.9)
POCT GLUCOSE: 113 MG/DL (ref 70–110)
POCT GLUCOSE: 118 MG/DL (ref 70–110)
POCT GLUCOSE: 128 MG/DL (ref 70–110)
POCT GLUCOSE: 141 MG/DL (ref 70–110)
POTASSIUM SERPL-SCNC: 4.1 MMOL/L (ref 3.5–5.1)
PROT SERPL-MCNC: 5.9 G/DL (ref 6–8.4)
RBC # BLD AUTO: 2.49 M/UL (ref 4.6–6.2)
SODIUM SERPL-SCNC: 134 MMOL/L (ref 136–145)
TSH SERPL DL<=0.005 MIU/L-ACNC: 3.23 UIU/ML (ref 0.4–4)
WBC # BLD AUTO: 5.23 K/UL (ref 3.9–12.7)

## 2019-07-23 PROCEDURE — 84100 ASSAY OF PHOSPHORUS: CPT

## 2019-07-23 PROCEDURE — 83735 ASSAY OF MAGNESIUM: CPT

## 2019-07-23 PROCEDURE — 25000003 PHARM REV CODE 250: Performed by: STUDENT IN AN ORGANIZED HEALTH CARE EDUCATION/TRAINING PROGRAM

## 2019-07-23 PROCEDURE — 25000003 PHARM REV CODE 250: Performed by: FAMILY MEDICINE

## 2019-07-23 PROCEDURE — 84443 ASSAY THYROID STIM HORMONE: CPT

## 2019-07-23 PROCEDURE — 94761 N-INVAS EAR/PLS OXIMETRY MLT: CPT

## 2019-07-23 PROCEDURE — 63600175 PHARM REV CODE 636 W HCPCS: Performed by: STUDENT IN AN ORGANIZED HEALTH CARE EDUCATION/TRAINING PROGRAM

## 2019-07-23 PROCEDURE — 11000001 HC ACUTE MED/SURG PRIVATE ROOM

## 2019-07-23 PROCEDURE — 97164 PT RE-EVAL EST PLAN CARE: CPT

## 2019-07-23 PROCEDURE — 80053 COMPREHEN METABOLIC PANEL: CPT

## 2019-07-23 PROCEDURE — 96375 TX/PRO/DX INJ NEW DRUG ADDON: CPT

## 2019-07-23 PROCEDURE — 97166 OT EVAL MOD COMPLEX 45 MIN: CPT

## 2019-07-23 PROCEDURE — 85025 COMPLETE CBC W/AUTO DIFF WBC: CPT

## 2019-07-23 PROCEDURE — 30200315 PPD INTRADERMAL TEST REV CODE 302: Performed by: STUDENT IN AN ORGANIZED HEALTH CARE EDUCATION/TRAINING PROGRAM

## 2019-07-23 PROCEDURE — 36415 COLL VENOUS BLD VENIPUNCTURE: CPT

## 2019-07-23 PROCEDURE — 86580 TB INTRADERMAL TEST: CPT | Performed by: STUDENT IN AN ORGANIZED HEALTH CARE EDUCATION/TRAINING PROGRAM

## 2019-07-23 RX ORDER — METOCLOPRAMIDE HYDROCHLORIDE 5 MG/5ML
5 SOLUTION ORAL
Status: DISCONTINUED | OUTPATIENT
Start: 2019-07-23 | End: 2019-07-25 | Stop reason: HOSPADM

## 2019-07-23 RX ORDER — IBUPROFEN 200 MG
24 TABLET ORAL
Status: DISCONTINUED | OUTPATIENT
Start: 2019-07-23 | End: 2019-07-25 | Stop reason: HOSPADM

## 2019-07-23 RX ORDER — ONDANSETRON 8 MG/1
8 TABLET, ORALLY DISINTEGRATING ORAL EVERY 6 HOURS PRN
Status: DISCONTINUED | OUTPATIENT
Start: 2019-07-23 | End: 2019-07-25 | Stop reason: HOSPADM

## 2019-07-23 RX ORDER — SEVELAMER CARBONATE 800 MG/1
1600 TABLET, FILM COATED ORAL
Status: DISCONTINUED | OUTPATIENT
Start: 2019-07-23 | End: 2019-07-24

## 2019-07-23 RX ORDER — MIDODRINE HYDROCHLORIDE 5 MG/1
10 TABLET ORAL
Status: DISCONTINUED | OUTPATIENT
Start: 2019-07-23 | End: 2019-07-25 | Stop reason: HOSPADM

## 2019-07-23 RX ORDER — ACETAMINOPHEN 325 MG/1
650 TABLET ORAL EVERY 8 HOURS PRN
Status: DISCONTINUED | OUTPATIENT
Start: 2019-07-23 | End: 2019-07-25 | Stop reason: HOSPADM

## 2019-07-23 RX ORDER — GABAPENTIN 300 MG/1
300 CAPSULE ORAL NIGHTLY
Status: DISCONTINUED | OUTPATIENT
Start: 2019-07-23 | End: 2019-07-25 | Stop reason: HOSPADM

## 2019-07-23 RX ORDER — BRINZOLAMIDE 10 MG/ML
1 SUSPENSION/ DROPS OPHTHALMIC 2 TIMES DAILY
COMMUNITY
End: 2022-01-24

## 2019-07-23 RX ORDER — MICONAZOLE NITRATE 2 %
POWDER (GRAM) TOPICAL 2 TIMES DAILY
Status: DISCONTINUED | OUTPATIENT
Start: 2019-07-23 | End: 2019-07-25 | Stop reason: HOSPADM

## 2019-07-23 RX ORDER — FLUOXETINE HYDROCHLORIDE 20 MG/1
20 CAPSULE ORAL DAILY
Status: DISCONTINUED | OUTPATIENT
Start: 2019-07-23 | End: 2019-07-25 | Stop reason: HOSPADM

## 2019-07-23 RX ORDER — PANTOPRAZOLE SODIUM 40 MG/1
40 TABLET, DELAYED RELEASE ORAL DAILY
Status: DISCONTINUED | OUTPATIENT
Start: 2019-07-23 | End: 2019-07-25 | Stop reason: HOSPADM

## 2019-07-23 RX ORDER — CIPROFLOXACIN 500 MG/1
500 TABLET ORAL DAILY
Status: DISCONTINUED | OUTPATIENT
Start: 2019-07-23 | End: 2019-07-25 | Stop reason: HOSPADM

## 2019-07-23 RX ORDER — RAMELTEON 8 MG/1
8 TABLET ORAL NIGHTLY PRN
Status: DISCONTINUED | OUTPATIENT
Start: 2019-07-23 | End: 2019-07-25 | Stop reason: HOSPADM

## 2019-07-23 RX ORDER — AMOXICILLIN 250 MG
1 CAPSULE ORAL 2 TIMES DAILY
Status: DISCONTINUED | OUTPATIENT
Start: 2019-07-23 | End: 2019-07-25

## 2019-07-23 RX ORDER — NYSTATIN AND TRIAMCINOLONE ACETONIDE 100000; 1 [USP'U]/G; MG/G
CREAM TOPICAL 2 TIMES DAILY
Status: DISCONTINUED | OUTPATIENT
Start: 2019-07-23 | End: 2019-07-25 | Stop reason: HOSPADM

## 2019-07-23 RX ORDER — HEPARIN SODIUM 5000 [USP'U]/ML
5000 INJECTION, SOLUTION INTRAVENOUS; SUBCUTANEOUS EVERY 12 HOURS
Status: DISCONTINUED | OUTPATIENT
Start: 2019-07-23 | End: 2019-07-23

## 2019-07-23 RX ORDER — TRIAMCINOLONE ACETONIDE 1 MG/G
PASTE DENTAL 2 TIMES DAILY
Status: DISCONTINUED | OUTPATIENT
Start: 2019-07-23 | End: 2019-07-25 | Stop reason: HOSPADM

## 2019-07-23 RX ORDER — VANCOMYCIN HCL IN 5 % DEXTROSE 1G/250ML
1000 PLASTIC BAG, INJECTION (ML) INTRAVENOUS
Status: DISCONTINUED | OUTPATIENT
Start: 2019-07-24 | End: 2019-07-25 | Stop reason: HOSPADM

## 2019-07-23 RX ORDER — IBUPROFEN 200 MG
16 TABLET ORAL
Status: DISCONTINUED | OUTPATIENT
Start: 2019-07-23 | End: 2019-07-25 | Stop reason: HOSPADM

## 2019-07-23 RX ORDER — KETOROLAC TROMETHAMINE 4 MG/ML
1 SOLUTION/ DROPS OPHTHALMIC 4 TIMES DAILY
COMMUNITY
End: 2022-01-24

## 2019-07-23 RX ORDER — PANTOPRAZOLE SODIUM 40 MG/1
40 FOR SUSPENSION ORAL 2 TIMES DAILY
Status: DISCONTINUED | OUTPATIENT
Start: 2019-07-23 | End: 2019-07-23

## 2019-07-23 RX ORDER — NYSTATIN AND TRIAMCINOLONE ACETONIDE 100000; 1 [USP'U]/G; MG/G
CREAM TOPICAL 2 TIMES DAILY
Status: DISCONTINUED | OUTPATIENT
Start: 2019-07-23 | End: 2019-07-23

## 2019-07-23 RX ORDER — MORPHINE SULFATE 4 MG/ML
4 INJECTION, SOLUTION INTRAMUSCULAR; INTRAVENOUS EVERY 4 HOURS PRN
Status: DISCONTINUED | OUTPATIENT
Start: 2019-07-23 | End: 2019-07-25 | Stop reason: HOSPADM

## 2019-07-23 RX ORDER — DABIGATRAN ETEXILATE 75 MG/1
150 CAPSULE ORAL 2 TIMES DAILY
Status: DISCONTINUED | OUTPATIENT
Start: 2019-07-23 | End: 2019-07-23

## 2019-07-23 RX ORDER — GLUCAGON 1 MG
1 KIT INJECTION
Status: DISCONTINUED | OUTPATIENT
Start: 2019-07-23 | End: 2019-07-25 | Stop reason: HOSPADM

## 2019-07-23 RX ORDER — SODIUM CHLORIDE 0.9 % (FLUSH) 0.9 %
5 SYRINGE (ML) INJECTION
Status: DISCONTINUED | OUTPATIENT
Start: 2019-07-23 | End: 2019-07-25 | Stop reason: HOSPADM

## 2019-07-23 RX ORDER — INSULIN ASPART 100 [IU]/ML
1-10 INJECTION, SOLUTION INTRAVENOUS; SUBCUTANEOUS
Status: DISCONTINUED | OUTPATIENT
Start: 2019-07-23 | End: 2019-07-25 | Stop reason: HOSPADM

## 2019-07-23 RX ORDER — PRAVASTATIN SODIUM 40 MG/1
40 TABLET ORAL DAILY
Status: DISCONTINUED | OUTPATIENT
Start: 2019-07-23 | End: 2019-07-25 | Stop reason: HOSPADM

## 2019-07-23 RX ADMIN — MICONAZOLE NITRATE: 20 POWDER TOPICAL at 01:07

## 2019-07-23 RX ADMIN — GABAPENTIN 300 MG: 300 CAPSULE ORAL at 01:07

## 2019-07-23 RX ADMIN — TRIAMCINOLONE ACETONIDE: 1 PASTE TOPICAL at 05:07

## 2019-07-23 RX ADMIN — SEVELAMER CARBONATE 1600 MG: 800 TABLET, FILM COATED ORAL at 05:07

## 2019-07-23 RX ADMIN — METOCLOPRAMIDE HYDROCHLORIDE 5 MG: 5 SOLUTION ORAL at 05:07

## 2019-07-23 RX ADMIN — TUBERCULIN PURIFIED PROTEIN DERIVATIVE 5 UNITS: 5 INJECTION, SOLUTION INTRADERMAL at 11:07

## 2019-07-23 RX ADMIN — NYSTATIN AND TRIAMCINOLONE ACETONIDE: 100000; 1 CREAM TOPICAL at 11:07

## 2019-07-23 RX ADMIN — MICONAZOLE NITRATE: 20 POWDER TOPICAL at 09:07

## 2019-07-23 RX ADMIN — PIPERACILLIN AND TAZOBACTAM 4.5 G: 4; .5 INJECTION, POWDER, LYOPHILIZED, FOR SOLUTION INTRAVENOUS; PARENTERAL at 02:07

## 2019-07-23 RX ADMIN — DABIGATRAN ETEXILATE MESYLATE 150 MG: 75 CAPSULE ORAL at 09:07

## 2019-07-23 RX ADMIN — CIPROFLOXACIN HYDROCHLORIDE 500 MG: 500 TABLET, FILM COATED ORAL at 09:07

## 2019-07-23 RX ADMIN — MIDODRINE HYDROCHLORIDE 10 MG: 5 TABLET ORAL at 09:07

## 2019-07-23 RX ADMIN — SEVELAMER CARBONATE 1600 MG: 800 TABLET, FILM COATED ORAL at 11:07

## 2019-07-23 RX ADMIN — ZINC OXIDE TOPICAL OINT.: at 01:07

## 2019-07-23 RX ADMIN — METOCLOPRAMIDE HYDROCHLORIDE 5 MG: 5 SOLUTION ORAL at 11:07

## 2019-07-23 RX ADMIN — ZINC OXIDE TOPICAL OINT.: at 09:07

## 2019-07-23 RX ADMIN — MIDODRINE HYDROCHLORIDE 10 MG: 5 TABLET ORAL at 11:07

## 2019-07-23 RX ADMIN — PANTOPRAZOLE SODIUM 40 MG: 40 TABLET, DELAYED RELEASE ORAL at 09:07

## 2019-07-23 RX ADMIN — SENNOSIDES, DOCUSATE SODIUM 1 TABLET: 50; 8.6 TABLET, FILM COATED ORAL at 09:07

## 2019-07-23 RX ADMIN — SEVELAMER CARBONATE 1600 MG: 800 TABLET, FILM COATED ORAL at 09:07

## 2019-07-23 RX ADMIN — GABAPENTIN 300 MG: 300 CAPSULE ORAL at 09:07

## 2019-07-23 RX ADMIN — TRIAMCINOLONE ACETONIDE: 1 PASTE TOPICAL at 09:07

## 2019-07-23 RX ADMIN — MIDODRINE HYDROCHLORIDE 10 MG: 5 TABLET ORAL at 05:07

## 2019-07-23 RX ADMIN — PRAVASTATIN SODIUM 40 MG: 40 TABLET ORAL at 09:07

## 2019-07-23 RX ADMIN — METOCLOPRAMIDE HYDROCHLORIDE 5 MG: 5 SOLUTION ORAL at 09:07

## 2019-07-23 RX ADMIN — FLUOXETINE 20 MG: 20 CAPSULE ORAL at 09:07

## 2019-07-23 RX ADMIN — ACETAMINOPHEN 650 MG: 325 TABLET ORAL at 12:07

## 2019-07-23 RX ADMIN — NYSTATIN AND TRIAMCINOLONE ACETONIDE: 100000; 1 CREAM TOPICAL at 09:07

## 2019-07-23 NOTE — PROGRESS NOTES
Pt lethargic; answers appropriately but falls back to sleep; Nurse informed team of increased lethargy.  Pt informed Tn he lives with his brother and sister in law;  Pt stated [facility] . Pt reports his brother is able to provide help at home and drives him to/from HD.  Tn gave pt d/c brochure and card and encouraged pt to call for any needs/concerns.       07/18/19 1249   Discharge Assessment   Assessment Type Discharge Planning Assessment   Confirmed/corrected address and phone number on facesheet? Yes   Assessment information obtained from? Patient;Medical Record   Expected Length of Stay (days) 2   Communicated expected length of stay with patient/caregiver yes   Prior to hospitilization cognitive status: Alert/Oriented   Prior to hospitalization functional status: Assistive Equipment   Current cognitive status:    (lethargic)   Current Functional Status: Needs Assistance   Lives With sibling(s)  (brother and sister in law)   Able to Return to Prior Arrangements yes   Is patient able to care for self after discharge? Unable to determine at this time (comments)   Who are your caregiver(s) and their phone number(s)? Natalia (sister in law) [phone]   Patient's perception of discharge disposition home or selfcare   Readmission Within the Last 30 Days no previous admission in last 30 days   Patient currently being followed by outpatient case management? No   Patient currently receives any other outside agency services? No   Equipment Currently Used at Home glucometer;walker, rolling;wheelchair;BIPAP   Do you have any problems affording any of your prescribed medications? No   Is the patient taking medications as prescribed? yes   Does the patient have transportation home? Yes   Transportation Anticipated family or friend will provide   Dialysis Name and Scheduled days [facility]   Does the patient receive services at the [facility]? No   Discharge Plan A Home with family    Discharge Plan B Home Health   DME Needed Upon Discharge     (tbd)   Patient/Family in Agreement with Plan yes       The Sw met with the pt and the info listed above is still current. The pt is a 30 day readmit. The pt was discharged from [facility] 7-22-19 and returned the same day due to falling at home when he became very very weak. The pt and his family are not interested in nhp at this current time. The pt's brother Jelani [phone] and sister in law Natalia [phone] assist the pt with his adl's as required. The pt uses dme at home and has transportation home at d/c if he doesn't go to a facility. The Sw wrote her contact info on the white board in the pt's room and gave him a d/c brochure. The sw encouraged him to call should he have any further questions or concerns.

## 2019-07-23 NOTE — PLAN OF CARE
VN completed admission questions with patient. Plan of care was discussed including VTE prophylaxis of SCDs.  All questions answered.  Education given on safety measures and using call light before getting out of bed by himself. Patient verbalized understanding. Bed locked and in lowest position. Alarm on.

## 2019-07-23 NOTE — PT/OT/SLP RE-EVAL
"Physical Therapy Re-evaluation    Patient Name:  Angel Farmer Jr.   MRN:  [id number]    Recommendations:     Discharge Recommendations:  nursing facility, skilled   Discharge Equipment Recommendations: none   Barriers to discharge: Decreased caregiver support    Assessment:     Angel Farmer Jr. is a 58 y.o. male admitted with a medical diagnosis of Symptomatic anemia.  He presents with the following impairments/functional limitations:  weakness, gait instability, decreased upper extremity function, decreased ROM, decreased lower extremity function, impaired balance, impaired endurance, pain, impaired self care skills, impaired functional mobilty, edema, decreased safety awareness, impaired sensation, impaired skin . Patient fearful of falling and reports his body is " just wasting away", Able to come from supine <> sit <> stand with mod to min assist using RW. Able to take steps bed to chair standing transfer with min assist using RW. Would benefit from SNF placement..    Rehab Prognosis:  good; patient would benefit from acute skilled PT services to address these deficits and reach maximum level of function.      Recent Surgery: * No surgery found *      Plan:     During this hospitalization, patient to be seen 6 x/week to address the above listed problems via gait training, therapeutic activities, therapeutic exercises  · Plan of Care Expires:  08/23/19   Plan of Care Reviewed with: patient    Subjective     Communicated with primary nurse prior to session.  Patient found HOB elevated with peripheral IV, telemetry, bed alarm, SCD upon PT entry to room, agreeable to evaluation.      Chief Complaint: generalize weakness  Patient comments/goals: go home  Pain/Comfort:  · Pain Rating 1: other (see comments)(did not rate on scale)  · Location - Side 1: Right  · Location - Orientation 1: generalized  · Location 1: other (see comments)(total body aches)  · Pain Addressed 1: Distraction, Reposition    Patients " cultural, spiritual, Mandaeism conflicts given the current situation:        Objective:     Patient found with: peripheral IV, telemetry, bed alarm, SCD     General Precautions: Standard, fall   Orthopedic Precautions:N/A   Braces: N/A     Exams:  · RLE ROM: WFL  · RLE Strength: 3/5  · LLE ROM: WFL  · LLE Strength: 3/5    Functional Mobility:  · Bed Mobility:     · Supine to Sit: moderate assistance  · Transfers:     · Sit to Stand:  minimum assistance with rolling walker  · Gait: Min with RW SPT taking steps to chair at bedside  · Balance: poor + to fair    AM-PAC 6 CLICK MOBILITY  Total Score:13       Therapeutic Activities and Exercises:   na    Patient left up in chair with all lines intact, call button in reach and chair alarm on.    GOALS:   Multidisciplinary Problems     Physical Therapy Goals        Problem: Physical Therapy Goal    Goal Priority Disciplines Outcome Goal Variances Interventions   Physical Therapy Goal     PT, PT/OT Ongoing (interventions implemented as appropriate)     Description:  Goals to be met by: 2019     Patient will increase functional independence with mobility by performin. Supine to sit with MInimal Assistance  2. Sit to stand transfer with stand by assistance  3. Bed to chair transfer with stand by assistance using Rolling Walker  4. Gait  x 25 feet with Stand-by Assistance using Rolling Walker.                       History:     Past Medical History:   Diagnosis Date    Anemia due to stage 5 chronic kidney disease 3/1/2019    Anticoagulant long-term use     Diabetes mellitus type II     Dialysis patient     Encounter for blood transfusion     Hyperlipidemia     Hypertension     Kidney failure     MIKE (obstructive sleep apnea)     Stroke     Urinary tract infection        Past Surgical History:   Procedure Laterality Date    AV FISTULA PLACEMENT      left lower arm    SPINE, CERVICAL, POSTERIOR APPROACH  LAMINECTOMY C3-C6; C6-C7; WITH MEDIAL DISCECTOMY  N/A 3/3/2019    Performed by Rud[facility]ylie [facility] at [facility] OR 2ND FLR    TONSILLECTOMY, ADENOIDECTOMY      TRANSESOPHAGEAL ECHOCARDIOGRAM (SERENITY) N/A 5/23/2017    Performed by Donaldo Mai MD at [facility] OR       Time Tracking:     PT Received On: 07/23/19  PT Start Time: 1025     PT Stop Time: 1056  PT Total Time (min): 31 min     Billable Minutes: Re-eval 25      Ruddy Story, PT  07/23/2019

## 2019-07-23 NOTE — SUBJECTIVE & OBJECTIVE
Past Medical History:   Diagnosis Date    Anemia due to stage 5 chronic kidney disease 3/1/2019    Diabetes mellitus type II     Dialysis patient     Hyperlipidemia     Hypertension     Kidney failure     MIKE (obstructive sleep apnea)     Urinary tract infection        Past Surgical History:   Procedure Laterality Date    AV FISTULA PLACEMENT      left lower arm    SPINE, CERVICAL, POSTERIOR APPROACH  LAMINECTOMY C3-C6; C6-C7; WITH MEDIAL DISCECTOMY N/A 3/3/2019    Performed by [facility] at [facility] OR 2ND FLR    TONSILLECTOMY, ADENOIDECTOMY      TRANSESOPHAGEAL ECHOCARDIOGRAM (SERENITY) N/A 5/23/2017    Performed by Donaldo Mai MD at [facility] OR       Review of patient's allergies indicates:   Allergen Reactions    Keflex [cephalexin] Nausea Only       Current Facility-Administered Medications on File Prior to Encounter   Medication    [COMPLETED] lidocaine 5 % patch 2 patch    [DISCONTINUED] 0.9%  NaCl infusion    [DISCONTINUED] acetaminophen tablet 650 mg    [DISCONTINUED] acetaminophen tablet 650 mg    [DISCONTINUED] albuterol sulfate nebulizer solution 2.5 mg    [DISCONTINUED] albuterol-ipratropium 2.5 mg-0.5 mg/3 mL nebulizer solution 3 mL    [DISCONTINUED] dabigatran etexilate capsule 150 mg    [DISCONTINUED] dextrose 50 % in water (D50W) injection 12.5 g    [DISCONTINUED] dextrose 50 % in water (D50W) injection 25 g    [DISCONTINUED] FLUoxetine capsule 20 mg    [DISCONTINUED] gabapentin capsule 300 mg    [DISCONTINUED] glucagon (human recombinant) injection 1 mg    [DISCONTINUED] glucose chewable tablet 16 g    [DISCONTINUED] glucose chewable tablet 24 g    [DISCONTINUED] insulin aspart U-100 pen 1-10 Units    [DISCONTINUED] lanthanum chewable tablet 1,000 mg    [DISCONTINUED] metoclopramide HCl 5 mg/5 mL solution 5 mg    [DISCONTINUED] midodrine tablet 10 mg    [DISCONTINUED] nystatin-triamcinolone cream    [DISCONTINUED] ondansetron disintegrating tablet 8 mg     "[DISCONTINUED] pantoprazole EC tablet 40 mg    [DISCONTINUED] piperacillin-tazobactam 4.5 g in dextrose 5 % 100 mL IVPB (ready to mix system)    [DISCONTINUED] pneumoc 13-edwin conj-dip cr(PF) (PREVNAR 13 (PF)) 0.5 mL    [DISCONTINUED] polyethylene glycol packet 17 g    [DISCONTINUED] pravastatin tablet 40 mg    [DISCONTINUED] promethazine (PHENERGAN) 6.25 mg in dextrose 5 % 50 mL IVPB    [DISCONTINUED] ramelteon tablet 8 mg    [DISCONTINUED] senna-docusate 8.6-50 mg per tablet 1 tablet    [DISCONTINUED] sevelamer carbonate tablet 1,600 mg    [DISCONTINUED] simethicone chewable tablet 125 mg    [DISCONTINUED] sodium chloride 0.9% flush 5 mL    [DISCONTINUED] sodium chloride 3% nebulizer solution 4 mL    [DISCONTINUED] vancomycin in dextrose 5 % 1 gram/250 mL IVPB 1,000 mg    [DISCONTINUED] zinc oxide 16% (JESÚS'S BUTT PASTE) topical ointment     Current Outpatient Medications on File Prior to Encounter   Medication Sig    acetaminophen (TYLENOL) 500 MG tablet Take 1 tablet (500 mg total) by mouth every 6 (six) hours as needed.    ammonium lactate 12 % Crea Apply 1 application topically 3 (three) times daily as needed.    BD ULTRA-FINE MINI PEN NEEDLE 31 gauge x 3/16" Ndle USE 4 TO 5 TIMES A DAY     ( DISCARD PEN NEEDLE AFTER EACH USE )    blood-glucose meter (ADVANCED GLUCOSE METER) Misc 1 each by Misc.(Non-Drug; Combo Route) route 4 (four) times daily with meals and nightly.    ciprofloxacin HCl (CIPRO) 500 MG tablet Take 1 tablet (500 mg total) by mouth once daily Please take after dialysis on dialysis days for 10 days    epoetin shefali 3,000 unit/mL Soln 3,000 Units, epoetin shefali 4,000 unit/mL Soln 4,000 Units injection Inject 7,000 Units into the skin every Mon, Wed, Fri.    FLUoxetine 20 MG capsule Take 1 capsule (20 mg total) by mouth once daily.    fluticasone (FLONASE) 50 mcg/actuation nasal spray 1 spray by Each Nare route once daily.    gabapentin (NEURONTIN) 300 MG capsule TAKE 1 BY " MOUTH EVERY      EVENING    heparin sodium,porcine (HEPARIN, PORCINE,) 5,000 unit/mL injection Inject 1 mL (5,000 Units total) into the skin every 8 (eight) hours.    insulin aspart U-100 (NOVOLOG) 100 unit/mL InPn pen Inject 0-5 Units into the skin before meals and at bedtime as needed (Hyperglycemia).    lancets Misc 1 each by Misc.(Non-Drug; Combo Route) route 4 (four) times daily with meals and nightly.    miconazole NITRATE 2 % (MICOTIN) 2 % top powder Apply topically 2 (two) times daily as needed (moisture).    midodrine (PROAMATINE) 10 MG tablet Take 2 tablets (20 mg total) by mouth every 6 (six) hours. (Patient taking differently: Take 10 mg by mouth 3 (three) times daily. )    nystatin (MYCOSTATIN) powder Apply topically 2 (two) times daily. To sacral region    ONETOUCH ULTRA BLUE TEST STRIP Strp USE ONE STRIP TO CHECK BLOOD GLUCOSE 4 TIMES DAILY.AT MORNING, NOON,6:OO PM AND BEDTIME.    pantoprazole (PROTONIX) 40 mg GrPS Take 1 packet (40 mg total) by mouth 2 (two) times daily.    pravastatin (PRAVACHOL) 40 MG tablet Take 1 tablet (40 mg total) by mouth once daily. (Patient taking differently: Take 40 mg by mouth every evening. )    sevelamer carbonate (RENVELA) 800 mg Tab Take 2 tablets (1,600 mg total) by mouth 3 (three) times daily with meals.    vancomycin HCl (VANCOMYCIN 1 G/250 ML D5W, READY TO MIX SYSTEM,) Inject 250 mLs (1 g total) into the vein every Mon, Wed, Fri. for 10 days     Family History     Problem Relation (Age of Onset)    Colon cancer Mother    Diabetes Mother, Father, Maternal Grandmother, Maternal Grandfather    Heart disease Father    Hypertension Father    Lung cancer Mother        Tobacco Use    Smoking status: Never Smoker    Smokeless tobacco: Never Used   Substance and Sexual Activity    Alcohol use: No    Drug use: No    Sexual activity: Not Currently     Review of Systems   Constitutional: Negative for activity change, appetite change, fatigue and unexpected  weight change.   HENT: Negative for congestion, hearing loss, postnasal drip, rhinorrhea and sinus pain.    Eyes: Negative for photophobia and visual disturbance.   Respiratory: Negative for apnea, cough, choking, chest tightness, shortness of breath and wheezing.    Cardiovascular: Negative for chest pain, palpitations and leg swelling.   Gastrointestinal: Negative for abdominal distention, abdominal pain, constipation, diarrhea, nausea and vomiting.   Endocrine: Negative for cold intolerance, heat intolerance and polyuria.   Genitourinary: Negative for flank pain.   Musculoskeletal: Positive for gait problem. Negative for arthralgias, back pain, joint swelling and myalgias.   Skin: Negative for rash.   Neurological: Positive for weakness. Negative for dizziness, tremors, seizures, syncope, light-headedness, numbness and headaches.   Hematological: Negative for adenopathy. Does not bruise/bleed easily.   Psychiatric/Behavioral: Positive for dysphoric mood. Negative for agitation, behavioral problems, self-injury, sleep disturbance and suicidal ideas.     Objective:     Vital Signs (Most Recent):  Temp: 97.4 °F (36.3 °C) (07/22/19 1953)  Pulse: 60 (07/22/19 2203)  Resp: 19 (07/22/19 2203)  BP: 133/62 (07/22/19 2203)  SpO2: 95 % (07/22/19 2203) Vital Signs (24h Range):  Temp:  [96.9 °F (36.1 °C)-99 °F (37.2 °C)] 97.4 °F (36.3 °C)  Pulse:  [52-66] 60  Resp:  [15-27] 19  SpO2:  [94 %-98 %] 95 %  BP: (106-153)/() 133/62     Weight: 115 kg (253 lb 8.5 oz)  Body mass index is 39.71 kg/m².    Physical Exam   Constitutional: He is oriented to person, place, and time. He appears well-developed and well-nourished. No distress.   HENT:   Head: Normocephalic and atraumatic.   Nose: Nose normal.   Mouth/Throat: Oropharynx is clear and moist. No oropharyngeal exudate.   Eyes: Pupils are equal, round, and reactive to light. Conjunctivae and EOM are normal. Right eye exhibits no discharge. Left eye exhibits no discharge. No  scleral icterus.   Neck: Normal range of motion. Neck supple.   Cardiovascular: Normal rate, regular rhythm, normal heart sounds and intact distal pulses. Exam reveals no gallop and no friction rub.   No murmur heard.  Pulmonary/Chest: Effort normal and breath sounds normal. No stridor. He has no wheezes. He exhibits no tenderness.   Abdominal: Soft. Bowel sounds are normal. He exhibits no distension. There is no tenderness.   Musculoskeletal: Normal range of motion. He exhibits no edema, tenderness or deformity.   Lymphadenopathy:     He has no cervical adenopathy.   Neurological: He is alert and oriented to person, place, and time.   Skin: Skin is warm and dry. Capillary refill takes less than 2 seconds. No rash noted. He is not diaphoretic.   Psychiatric: He has a normal mood and affect. His behavior is normal.   Nursing note and vitals reviewed.        CRANIAL NERVES     CN III, IV, VI   Pupils are equal, round, and reactive to light.  Extraocular motions are normal.        Significant Labs:   Recent Labs     07/21/19 0434 07/22/19 0434 07/22/19 2043   WBC 5.40 6.15 7.40   HGB 8.0* 8.0* 8.5*   HCT 24.0* 24.6* 26.5*   PLT 29* 31* 43*   * 106* 107*   RDW 15.2* 15.0* 14.9*       Recent Labs     07/21/19 0434 07/22/19 0434 07/22/19 2043   * 130* 135*   K 4.8 4.8 4.0   CL 99 97 95   CO2 22* 21* 29   GLU 96 129* 134*   BUN 59* 74* 39*   CREATININE 6.7* 8.6* 5.4*   CALCIUM 8.9 8.8 9.0   PROT 5.2* 5.5* 6.2   ALBUMIN 2.2* 2.2* 2.5*   BILITOT 0.9 0.8 0.9   ALKPHOS 190* 210* 231*   AST 58* 32 29   ALT 58* 48* 44   ANIONGAP 10 12 11   ESTGFRAFRICA 10* 7* 13*   EGFRNONAA 8* 6* 11*       Recent Labs     07/20/19  0623 07/21/19 0434 07/22/19 0434   MG 2.1 2.5 2.7*   PHOS 3.3 3.0 4.1       [facility]  Lab Results   Component Value Date    INR 1.1 07/18/2019    INR 1.0 03/20/2019    INR 1.1 03/19/2019    APTT 31.8 07/18/2019    APTT 23.4 03/04/2019    APTT 25.2 03/03/2019     Recent Labs   Lab 07/18/19  0459    INR 1.1   APTT 31.8       A1c:   Lab Results   Component Value Date    HGBA1C 5.0 07/18/2019   , Last Gluc:   Recent Labs   Lab 07/20/19  2137 07/21/19  0609 07/21/19  1132 07/21/19  1652 07/21/19  1953 07/22/19  0501   POCTGLUCOSE 105 96 143* 143* 130* 134*       TSH:   Lab Results   Component Value Date    TSH 0.379 (L) 07/18/2019         ABG  Recent Labs   Lab 07/18/19  0448 07/18/19  1229   PH 7.370 7.348*   PCO2 30.6* 33.4*   PO2 103* 85   HCO3 17.7* 18.4*   POCSATURATED 98 96   BE -8 -7         Imaging   Imaging Results          X-Ray Knee 3 View Right (Final result)  Result time 07/22/19 21:30:55    Final result by Thomas Poe MD (07/22/19 21:30:55)                 Impression:      No acute displaced fracture-dislocation identified.      Electronically signed by: Thomas Poe MD  Date:    07/22/2019  Time:    21:30             Narrative:    EXAMINATION:  XR KNEE 3 VIEW RIGHT    CLINICAL HISTORY:  Pain, unspecified    TECHNIQUE:  AP, lateral, and Merchant views of the right knee were performed.    COMPARISON:  Right knee series 09/17/2018    FINDINGS:  Overall alignment is within normal limits.  No acute displaced fracture, dislocation or destructive osseous process.  Stable small nonspecific soft tissue calcification near the superior patellar pole.  No large suprapatellar joint effusion seen.  Minimal tricompartmental degenerative change.  Scattered advanced atherosclerotic vascular calcifications noted.  No subcutaneous emphysema or radiodense retained foreign body.                               X-Ray Knee 3 View Left (Final result)  Result time 07/22/19 21:35:00    Final result by Iman Chen MD (07/22/19 21:35:00)                 Impression:      Decreased bone density.  No fracture or effusion or malalignment.      Electronically signed by: Iman Chen  Date:    07/22/2019  Time:    21:35             Narrative:    EXAMINATION:  XR KNEE 3 VIEW LEFT    CLINICAL HISTORY:  Pain,  unspecified    TECHNIQUE:  AP, lateral, and Merchant views of the left knee were performed.    COMPARISON:  None    FINDINGS:  Frontal and lateral and oblique views presented.    Joint spaces and alignment are normal.  No fracture or erosion or effusion.  There is atherosclerotic calcification.  No focal soft tissue swelling or defect.  There is subjective decreased bone density                               X-Ray Femur Ap/Lat Left (Final result)  Result time 07/22/19 21:34:10    Final result by Iman Chen MD (07/22/19 21:34:10)                 Impression:      No fracture or malalignment      Electronically signed by: Iman Chen  Date:    07/22/2019  Time:    21:34             Narrative:    EXAMINATION:  XR FEMUR 2 VIEW LEFT    CLINICAL HISTORY:  Unspecified fall, initial encounter    TECHNIQUE:  AP and lateral views of the left femur were performed.    COMPARISON:  None}    FINDINGS:  Four images are presented.  There is frontal and frontal oblique views of the proximal femur.  There is frontal and lateral views of the distal femur.  No fracture or erosion or periosteal reaction.  Hip joint space and knee joint spaces are preserved.  There is subjective decreased bone density.  No focal soft tissue swelling or defect.  Visualized pelvis appears intact.

## 2019-07-23 NOTE — PT/OT/SLP EVAL
Occupational Therapy   Evaluation    Name: Angel Farmer Jr.  MRN: [id number]  Admitting Diagnosis:  Symptomatic anemia      Recommendations:     Discharge Recommendations:    Discharge Equipment Recommendations:  none  Barriers to discharge:       Assessment:     Angel Farmer Jr. is a 58 y.o. male with a medical diagnosis of Symptomatic anemia.  He presents with deconditioning, limited AROM BUE, c/o weakness and generalized body aches. Performance deficits affecting function: weakness, impaired functional mobilty, impaired endurance, gait instability, pain, impaired balance, decreased upper extremity function, decreased lower extremity function, decreased ROM, edema, impaired coordination, impaired self care skills.      Rehab Prognosis: Fair+; patient would benefit from acute skilled OT services to address these deficits and reach maximum level of function.       Plan:     Patient to be seen 5 x/week to address the above listed problems via self-care/home management, therapeutic activities, therapeutic exercises  · Plan of Care Expires: 08/23/19  · Plan of Care Reviewed with: patient    Subjective     Chief Complaint: Pt c/o limited self care skills, states he walked up to 150 ft as recently as 1-2 weeks ago  Patient/Family Comments/goals: To get stronger    Occupational Profile:  Living Environment: Pt lives with brother, TORO, and 2 nieces in [place], ramp access, tub.sh with TTB  Previous level of function: Pt required assist for UE dressing, LE dressing, tub transfer, bathing, and toileting hygiene but he reports that he is able to transfer to the toilet without assistance  Roles and Routines: Caretaker to self, brother, 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  Patient found HOB elevated with bed alarm, peripheral IV, SCD, telemetry upon OT entry to room.    General Precautions: Standard, fall   Orthopedic Precautions:N/A   Braces: N/A     Occupational Performance:    Bed Mobility:    · Patient completed Rolling/Turning to Left with  maximal assistance and 2 persons  · Patient completed Scooting/Bridging with maximal assistance and 2 persons  · Patient completed Supine to Sit with maximal assistance and 2 persons  ·   Functional Mobility/Transfers:  · Patient completed Sit <> Stand Transfer with minimum assistance and of 1 persons  with  rolling walker   · Patient completed Bed <> Chair Transfer using Stand Pivot technique with minimum assistance with rolling walker  Functional Mobility: Pt with fair dynamic seated and standing balance and required mid/mod v/c for RW management for SPT.  ·      · Activities of Daily Living:  · Lower Body Dressing: total assistance to don/doff B socks    Cognitive/Visual Perceptual:  Cognitive/Psychosocial Skills:     -       Oriented to: Person, Place, Time and Situation   -       Follows Commands/attention:Follows multistep  commands  -       Communication: clear/fluent  -       Memory: No Deficits noted  -       Safety awareness/insight to disability: impaired   -       Mood/Affect/Coping skills/emotional control:Appropriate to situation    Physical Exam:  Postural examination/scapula alignment:    -       Rounded shoulders  -       Forward head  Motor Planning:    -       WFl  Upper Extremity Range of Motion:   BUE with significant deficts: L shoulder flex ~0-45*, R shoulder flex ~0-90* distally WFL but pt reports increased effort required for elbow flexion  Upper Extremity Strength:  BUE grossly 2/5    "Strength:  3+/5  Fine Motor Coordination: -       Impaired  strength to open cap on tooth paste, dexterity preserved  Gross motor coordination:   WFL    AMPAC 6 Click ADL:  AMPAC Total Score: 15    Treatment & Education:  Pt educated on role of OT and POC.   Pt performing skills as listed above. Pt perseverating on weakness BUE, states that L shoulder was "pulled out" by EMT transport, no noted shoulder droop at this time, not painful to touch.  Education:    Patient left up in chair with all lines intact, call button in reach, chair alarm on and nsg notified    GOALS:   Multidisciplinary Problems     Occupational Therapy Goals        Problem: Occupational Therapy Goal    Goal Priority Disciplines Outcome Interventions   Occupational Therapy Goal     OT, PT/OT     Description:  Goals to be met by: 08/23/2019      Patient will increase functional independence with ADLs by performing:    Feeding with Modified [place].  UE Dressing with Minimal Assistance.  Grooming while seated with Stand-by Assistance.  Rolling to Bilateral with Minimal Assistance.   Supine to sit with Moderate Assistance.  BUE HEP assistance as needed                       History:     Past Medical History:   Diagnosis Date    Anemia due to stage 5 chronic kidney disease 3/1/2019    Anticoagulant long-term use     Diabetes mellitus type II     Dialysis patient     Encounter for blood transfusion     Hyperlipidemia     Hypertension     Kidney failure     MIKE (obstructive sleep apnea)     Stroke     Urinary tract infection        Past Surgical History:   Procedure Laterality Date    AV FISTULA PLACEMENT      left lower arm    SPINE, CERVICAL, POSTERIOR APPROACH  LAMINECTOMY C3-C6; C6-C7; WITH MEDIAL DISCECTOMY N/A 3/3/2019    Performed by [facility] at [facility] OR 2ND FLR    TONSILLECTOMY, ADENOIDECTOMY      TRANSESOPHAGEAL ECHOCARDIOGRAM (SERENITY) N/A 5/23/2017    Performed by Donaldo Mai MD at [facility] OR       Time " Tracking:     OT Date of Treatment: 07/23/19  OT Start Time: 1025  OT Stop Time: 1056  OT Total Time (min): 31 min    Billable Minutes:Evaluation 10 Co Tx PT    Gege Babin OT  7/23/2019

## 2019-07-23 NOTE — HPI
"Mr. Farmer is a 58 year old man with PMHx of ESRD on HD MWF, IDDM, HTN/HLD and MIKE was discharged on day of presentation for septic shock and bacteremia represented to the ED after a fall. He states after he got home, he tried to stand up to use the rest room and fell onto his knees. He denies dizziness, lightheadedness, HA, confusion, nausea or vomiting. He did not hit his head or have LOC. He states his legs "just came out from underneath him" and he was unable to support himself. He states he feels very weak and sad that he is unable to care for himself. Otherwise he denies chest pain, shortness of breath, abdominal pain, nausea, vomiting, diarrhea or constipation.   "

## 2019-07-23 NOTE — ASSESSMENT & PLAN NOTE
Patient unable to walk, and fell hitting his knees  Hgb 8.5, 8.0 yesterday which seems to be a little lower than his baseline.  Will continue to monitor H/H.  Consider transfusion if falls under 8.

## 2019-07-23 NOTE — PLAN OF CARE
Problem: Occupational Therapy Goal  Goal: Occupational Therapy Goal  Goals to be met by: 08/23/2019      Patient will increase functional independence with ADLs by performing:    Feeding with Modified [place].  UE Dressing with Minimal Assistance.  Grooming while seated with Stand-by Assistance.  Rolling to Bilateral with Minimal Assistance.   Supine to sit with Moderate Assistance.  BUE HEP assistance as needed     Outcome: Ongoing (interventions implemented as appropriate)  Pt would benefit from continued OT to address deficits in self care and functional mobility. Recommending SNF; DME needs likely none

## 2019-07-23 NOTE — ASSESSMENT & PLAN NOTE
Patient unable to walk, and fell hitting his knees  Hgb decreased from baseline  Will fu repeat H/H   Consider transfusion if symptoms persist or if hgb drops <8

## 2019-07-23 NOTE — ASSESSMENT & PLAN NOTE
Blood cultures grew Klebsiella and Enterococcus  Sensitivities return and narrow antibiotics  Currently requiring IV Vancomycin and PO Ciprofloxacin

## 2019-07-23 NOTE — ASSESSMENT & PLAN NOTE
Hgb A1C 5 completed this week   Hold oral anti-hyperglycemics   Will cover with SSI   Goal BG <180   Accuchecks RIVER, KERRIE and HS

## 2019-07-23 NOTE — PROGRESS NOTES
Progress Note  Nephrology      Consult Requested By: Jamal Millan MD  Reason for Consult: ESRD, fever    SUBJECTIVE:     Review of Systems   Constitutional: Negative for chills and fever.   Respiratory: Negative for cough and shortness of breath.    Cardiovascular: Negative for chest pain and leg swelling.   Gastrointestinal: Positive for diarrhea. Negative for nausea.   Neurological: Positive for dizziness.     Patient Active Problem List   Diagnosis    Hypertension    Diabetes mellitus, type 2    Hyperlipidemia    Symptomatic anemia    ESRD (end stage renal disease) on dialysis    Atypical chest pain    Psoriasis    Foot pain    MIKE (obstructive sleep apnea) - very severe latest sleep study says BPAP @ 16/8    Multifactorial peripheral neuropathy    Flu-like symptoms    Bacteremia    Hypertension associated with stage 5 chronic kidney disease due to type 2 diabetes mellitus    FUO (fever of unknown origin)    Trigger finger, left ring finger    CVA (cerebral vascular accident)    Ataxia    Anemia due to stage 5 chronic kidney disease    Hyperphosphatemia    Metabolic bone disease    Left leg pain    Pre-syncope    Hypotension    Bradycardia    Class 3 severe obesity due to excess calories with serious comorbidity and body mass index (BMI) of 45.0 to 49.9 in adult    S/P laminectomy    Hyperkalemia    Hypothermia    Hyponatremia    Impaired mobility and ADLs    Diarrhea    Septic shock    Thrombocytopenia       OBJECTIVE:     Medications:   ciprofloxacin HCl  500 mg Oral Daily    [START ON 7/24/2019] epoetin shefali-ebpx (RETACRIT) injection  7,000 Units Intravenous Every Mon, Wed, Fri    FLUoxetine  20 mg Oral Daily    gabapentin  300 mg Oral QHS    metoclopramide HCl  5 mg Oral QID (AC & HS)    miconazole NITRATE 2 %   Topical (Top) BID    midodrine  10 mg Oral TIDWM    nystatin-triamcinolone   Topical (Top) BID    pantoprazole  40 mg Oral Daily    pravastatin  40 mg  Oral Daily    senna-docusate 8.6-50 mg  1 tablet Oral BID    sevelamer carbonate  1,600 mg Oral TID WM    triamcinolone acetonide 0.1%   Mouth/Throat BID    [START ON 7/24/2019] vancomycin (VANCOCIN) IVPB  1,000 mg Intravenous Every Mon, Wed, Fri    zinc oxide   Topical (Top) BID       Vitals:    07/23/19 1700   BP:    Pulse: (!) 59   Resp:    Temp:      I/O last 3 completed shifts:  In: 250 [P.O.:150; IV Piggyback:100]  Out: -   Physical Exam   Constitutional: He is oriented to person, place, and time. He appears well-developed and well-nourished. No distress.   HENT:   Head: Normocephalic and atraumatic.   Eyes: EOM are normal. No scleral icterus.   Cardiovascular: Normal rate, regular rhythm, normal heart sounds and intact distal pulses. Exam reveals no gallop and no friction rub.   No murmur heard.  Pulmonary/Chest: Effort normal and breath sounds normal. He has no wheezes. He has no rales.   Abdominal: Soft. Bowel sounds are normal. He exhibits no distension. There is no tenderness.   Musculoskeletal: Normal range of motion. He exhibits no edema.   Lymphadenopathy:     He has no cervical adenopathy.   Neurological: He is alert and oriented to person, place, and time.   Skin: Skin is warm and dry. No rash noted. He is not diaphoretic.   Psychiatric: Thought content normal.     Laboratory:  Recent Labs   Lab 07/22/19 0434 07/22/19 2043 07/23/19  0524   WBC 6.15 7.40 5.23   HGB 8.0* 8.5* 8.5*   HCT 24.6* 26.5* 26.8*   PLT 31* 43* 41*   MONO 10.4  0.6 15.0  1.1* 15.9*  0.8     Recent Labs   Lab 07/21/19 0434 07/22/19 0434 07/22/19 2043 07/23/19  0524   * 130* 135* 134*   K 4.8 4.8 4.0 4.1   CL 99 97 95 95   CO2 22* 21* 29 28   BUN 59* 74* 39* 45*   CREATININE 6.7* 8.6* 5.4* 5.9*   CALCIUM 8.9 8.8 9.0 9.2   PHOS 3.0 4.1  --  4.1     Labs reviewed  Diagnostic Results:  X-Ray: Reviewed  US: Reviewed  Echo: Reviewed      ASSESSMENT/PLAN:              ESRD - on HD with Dr. Araceli Pickens in [facility]  [facility] MWF, 4.5 hours, EDW 125kg  HD tomorrow as per normal schedule      Hypotension chronic.  midodirne to 20 TID   Anemia -epo with HD  worsening thrombocytopenia -check for hemolysis and HIT  Recent Labs   Lab 07/22/19  0434 07/22/19  2043 07/23/19  0524   WBC 6.15 7.40 5.23   HGB 8.0* 8.5* 8.5*   HCT 24.6* 26.5* 26.8*   PLT 31* 43* 41*     Lab Results   Component Value Date    IRON 169 (H) 03/01/2019    TIBC 218 (L) 03/01/2019    FERRITIN 914 (H) 03/01/2019        MBD - cont sensipar and Fosrenol  Lab Results   Component Value Date    CALCIUM 9.2 07/23/2019    PHOS 4.1 07/23/2019     Recent Labs   Lab 07/21/19  0434 07/22/19  0434 07/23/19  0524   MG 2.5 2.7* 2.4     Lab Results   Component Value Date    CALCIUM 9.2 07/23/2019    PHOS 4.1 07/23/2019     No results found for: [id number]  Lab Results   Component Value Date    CO2 28 07/23/2019        Access - L wrist AVF, recent ballon PTA by Dr. Moran in [facility]   Nutrition - renal diet, nephrocaps  DM2     Severe MIKE- BiPAP overnight 16/8     Thank you for allowing me to participate in the care of your patients  With any question please call [phone]  Margoth Felton    [facility]  SHALA Forrest MD, FACP,   KADEN Pickens MD,   MD GEORGIA Walker, NP  [address]  [place], [address] [phone]

## 2019-07-23 NOTE — PROGRESS NOTES
The Sw met with the pt to discuss his d/c plan. The pt doesn't want to go to a nh and is afraid to lose his hd spot at [facility]. The Sw spoke to Adelaide at [facility] who states she will be willing to review the pt to see if he meets criteria for their facility. The Sw called Adelaide while in the pt's room and the pt's Nephrologist who is Dr. Araceli Pickens sees the pt's at [facility]. The pt was excited and agreeable to go to [facility] but is not willing to go to a nh for snf. The Sw also spoke to the pt's sister in law Natalia [phone] and informed her of the above mentioned info while in the pt's room with his permission. She's in agreement with the d/c plan for [facility] for the pt. The pt [facility]c Dr. Noriega doesn't work there anymore. Adelaide states she will come see the pt in the morning.

## 2019-07-23 NOTE — ED NOTES
"Notified by Dick in blood bank that patient's blood came back with antibodies, states "it might be hard to find him blood that matches his blood type"  "

## 2019-07-23 NOTE — PROGRESS NOTES
"[facility] Hospital Medicine  Progress Note    Patient Name: Angel Farmer Jr.  MRN: [id number]  Patient Class: OP- Observation   Admission Date: 7/22/2019  Length of Stay: 0 days  Attending Physician: Jamal Millan MD  Primary Care Provider: Satya Noriega MD        Subjective:     Principal Problem:Symptomatic anemia      HPI:  Mr. Farmer is a 58 year old man with PMHx of ESRD on HD MWF, IDDM, HTN/HLD and MIKE was discharged on day of presentation for septic shock and bacteremia represented to the ED after a fall. He states after he got home, he tried to stand up to use the rest room and fell onto his knees. He denies dizziness, lightheadedness, HA, confusion, nausea or vomiting. He did not hit his head or have LOC. He states his legs "just came out from underneath him" and he was unable to support himself. He states he feels very weak and sad that he is unable to care for himself. Otherwise he denies chest pain, shortness of breath, abdominal pain, nausea, vomiting, diarrhea or constipation.     Overview/Hospital Course:  No notes on file    Interval History: No acute events over night. Patient states that he just was feeling weakness and when he got home he fell because he lost so much strength being in the hospital from previous stay. States he feels better today, and is in agreement with going to SNF to rehab and get his strength back. H/H 8.5/26.8 which is a little lower than his baseline. Denies any chest pain, shortness of breath, abdominal pain, leg or calf pain. Does admit to ulcers on his tongue which are appeared since yesterday.    Review of Systems   Constitutional: Negative for chills and fever.   HENT: Negative.    Eyes: Negative.  Negative for blurred vision and photophobia.   Respiratory: Negative.  Negative for cough and shortness of breath.    Cardiovascular: Negative.  Negative for chest pain and palpitations.   Gastrointestinal: Positive for diarrhea. Negative for " abdominal pain, heartburn, nausea and vomiting.   Genitourinary: Negative.    Musculoskeletal: Negative.    Skin: Negative.  Negative for rash.   Neurological: Negative.    Psychiatric/Behavioral: Negative.      Objective:   Physical Exam   Constitutional: He is oriented to person, place, and time and well-developed, well-nourished, and in no distress. No distress.   HENT:   Head: Normocephalic and atraumatic.   Eyes: Pupils are equal, round, and reactive to light. Conjunctivae and EOM are normal.   Neck: Normal range of motion. Neck supple.   Cardiovascular: Normal rate, regular rhythm and normal heart sounds.   Pulmonary/Chest: Effort normal and breath sounds normal.   Abdominal: Soft. Bowel sounds are normal. He exhibits no distension. There is no tenderness.   Musculoskeletal: Normal range of motion.   Neurological: He is alert and oriented to person, place, and time.   Skin: Skin is warm and dry.   Psychiatric: Affect normal.     Vital Signs (Most Recent):  Temp: 97 °F (36.1 °C) (07/23/19 0859)  Pulse: 61 (07/23/19 0859)  Resp: 16 (07/23/19 0859)  BP: (!) 140/61 (07/23/19 0859)  SpO2: 96 % (07/23/19 0559) Vital Signs (24h Range):  Temp:  [96.9 °F (36.1 °C)-99 °F (37.2 °C)] 97 °F (36.1 °C)  Pulse:  [52-66] 61  Resp:  [15-27] 16  SpO2:  [94 %-99 %] 96 %  BP: (106-153)/() 140/61     Weight: 121.5 kg (267 lb 13.7 oz)  Body mass index is 41.95 kg/m².    Intake/Output Summary (Last 24 hours) at 7/23/2019 0901  Last data filed at 7/23/2019 0207  Gross per 24 hour   Intake 250 ml   Output --   Net 250 ml      Physical Exam    Significant Labs:   BMP:   Recent Labs   Lab 07/23/19 0524      *   K 4.1   CL 95   CO2 28   BUN 45*   CREATININE 5.9*   CALCIUM 9.2   MG 2.4     CBC:   Recent Labs   Lab 07/22/19  0434 07/22/19  2043 07/23/19  0524   WBC 6.15 7.40 5.23   HGB 8.0* 8.5* 8.5*   HCT 24.6* 26.5* 26.8*   PLT 31* 43* 41*     CMP:   Recent Labs   Lab 07/22/19  0434 07/22/19  2043 07/23/19  0524   NA  130* 135* 134*   K 4.8 4.0 4.1   CL 97 95 95   CO2 21* 29 28   * 134* 107   BUN 74* 39* 45*   CREATININE 8.6* 5.4* 5.9*   CALCIUM 8.8 9.0 9.2   PROT 5.5* 6.2 5.9*   ALBUMIN 2.2* 2.5* 2.4*   BILITOT 0.8 0.9 0.9   ALKPHOS 210* 231* 209*   AST 32 29 29   ALT 48* 44 39   ANIONGAP 12 11 11   EGFRNONAA 6* 11* 10*       Significant Imaging: I have reviewed all pertinent imaging results/findings within the past 24 hours.      Assessment/Plan:      * Symptomatic anemia  Patient unable to walk, and fell hitting his knees  Hgb 8.5, 8.0 yesterday which seems to be a little lower than his baseline.  Will continue to monitor H/H.  Consider transfusion if falls under 8.        Impaired mobility and ADLs  Patient was recently admitted to hospital for septic shock   2/2 Enterococcus and Klebsiella in blood cultures  Likely deconditioning due to severe infection  PT/OT recommended home with home health, however he is unable to perform ADL's   Will re-consult PT/OT for further recommendations and placement in SNF vs Rehab facility        ESRD (end stage renal disease) on dialysis  ESRD on HD MWF   No signs of fluid overload   Consult [facility], appreciate recs       Hyperlipidemia  Continue statin therapy       Diabetes mellitus, type 2  Hgb A1C 5 completed this week   Hold oral anti-hyperglycemics   Will cover with SSI   Goal BG <180   Accuchecks QID, AC and HS       Will continue Vanc and Zosyn which were given with discharge yesterday for treating bacteremia that was discovered previous visit.      VTE Risk Mitigation (From admission, onward)        Ordered     dabigatran etexilate capsule 150 mg  2 times daily      07/23/19 0024     IP VTE HIGH RISK PATIENT  Once      07/23/19 0023     Place sequential compression device  Until discontinued      07/23/19 0023                Tom Stinson MD  Department of Hospital Medicine   [facility]

## 2019-07-23 NOTE — SUBJECTIVE & OBJECTIVE
Interval History: No acute events over night. Patient states that he just was feeling weakness and when he got home he fell because he lost so much strength being in the hospital from previous stay. States he feels better today, and is in agreement with going to SNF to rehab and get his strength back. H/H 8.5/26.8 which is a little lower than his baseline. Denies any chest pain, shortness of breath, abdominal pain, leg or calf pain. Does admit to ulcers on his tongue which are appeared since yesterday.    Review of Systems  Objective:     Vital Signs (Most Recent):  Temp: 97 °F (36.1 °C) (07/23/19 0859)  Pulse: 61 (07/23/19 0859)  Resp: 16 (07/23/19 0859)  BP: (!) 140/61 (07/23/19 0859)  SpO2: 96 % (07/23/19 0559) Vital Signs (24h Range):  Temp:  [96.9 °F (36.1 °C)-99 °F (37.2 °C)] 97 °F (36.1 °C)  Pulse:  [52-66] 61  Resp:  [15-27] 16  SpO2:  [94 %-99 %] 96 %  BP: (106-153)/() 140/61     Weight: 121.5 kg (267 lb 13.7 oz)  Body mass index is 41.95 kg/m².    Intake/Output Summary (Last 24 hours) at 7/23/2019 0901  Last data filed at 7/23/2019 0207  Gross per 24 hour   Intake 250 ml   Output --   Net 250 ml      Physical Exam    Significant Labs:   BMP:   Recent Labs   Lab 07/23/19 0524      *   K 4.1   CL 95   CO2 28   BUN 45*   CREATININE 5.9*   CALCIUM 9.2   MG 2.4     CBC:   Recent Labs   Lab 07/22/19 0434 07/22/19 2043 07/23/19 0524   WBC 6.15 7.40 5.23   HGB 8.0* 8.5* 8.5*   HCT 24.6* 26.5* 26.8*   PLT 31* 43* 41*     CMP:   Recent Labs   Lab 07/22/19 0434 07/22/19 2043 07/23/19 0524   * 135* 134*   K 4.8 4.0 4.1   CL 97 95 95   CO2 21* 29 28   * 134* 107   BUN 74* 39* 45*   CREATININE 8.6* 5.4* 5.9*   CALCIUM 8.8 9.0 9.2   PROT 5.5* 6.2 5.9*   ALBUMIN 2.2* 2.5* 2.4*   BILITOT 0.8 0.9 0.9   ALKPHOS 210* 231* 209*   AST 32 29 29   ALT 48* 44 39   ANIONGAP 12 11 11   EGFRNONAA 6* 11* 10*       Significant Imaging: I have reviewed all pertinent imaging results/findings within  the past 24 hours.

## 2019-07-23 NOTE — PLAN OF CARE
07/23/19 1642   Readmission Questionnaire   At the time of your discharge, did someone talk to you about what your health problems were? Yes   At the time of discharge, did someone talk to you about what to watch out for regarding worsening of your health problem? Yes   At the time of discharge, did someone talk to you about what to do if you experienced worsening of your health problem? Yes   At the time of discharge, did someone talk to you about which medication to take when you left the hospital and which ones to stop taking? Yes   At the time of discharge, did someone talk to you about when and where to follow up with a doctor after you left the hospital? Yes   What do you believe caused you to be sick enough to be re-admitted? he fell down and was very very weak   How often do you need to have someone help you when you read instructions, pamphlets, or other written material from your doctor or pharmacy? Sometimes   Do you have problems taking your medications as prescribed? No   Do you have any problems affording any of  your prescribed medications? No   Do you have problems obtaining/receiving your medications? No   Does the patient have transportation to healthcare appointments? Yes   Lives With sibling(s);other (see comments)  (lives with brother and sister in law)   Living Arrangements house   Does the patient have family/friends to help with healtcare needs after discharge? yes   Who are your caregiver(s) and their phone number(s)? Natalia Tolleson(sister in law)[phone]   Does your caregiver provide all the help you need? Yes   Are you currently feeling confused? No   Are you currently having problems thinking? No   Are you currently having memory problems? No   Have you felt down, depressed, or hopeless? 0   Have you felt little interest or pleasure in doing things? 0   In the last 7 days, my sleep quality was: fair

## 2019-07-23 NOTE — PLAN OF CARE
Problem: Adult Inpatient Plan of Care  Goal: Plan of Care Review  Outcome: Ongoing (interventions implemented as appropriate)  Patient had uneventful day.  Patient sat up in chair for approximately 3 hours with no complaints.  Patient understands goal to sit up and eat in chair for every meal and work with PT.

## 2019-07-23 NOTE — ED NOTES
Pt reports was discharged today from ICU.  States he cannot stand at home because of chronic left thigh pain x 2 weeks and weakness. Dr Bennett at bedside.  Pt reports fell down today at home when trying to stand up from a chair.  States that he landed on his knees, denies striking head.

## 2019-07-23 NOTE — H&P
"[facility] Medicine  History & Physical    Patient Name: Angel Farmer Jr.  MRN: [id number]  Admission Date: 7/22/2019  Attending Physician: Jamal Millan MD   Primary Care Provider: Satya Noriega MD         Patient information was obtained from patient and ER records.     Subjective:     Principal Problem:Symptomatic anemia    Chief Complaint:   Chief Complaint   Patient presents with    Fatigue     was admitted to ICU for sepsis, discharged today.  Went home and called family member, reports generalized weakness, unable to stand and bear weight.          HPI: Mr. Farmer is a 58 year old man with PMHx of ESRD on HD MWF, IDDM, HTN/HLD and MIKE was discharged on day of presentation for septic shock and bacteremia represented to the ED after a fall. He states after he got home, he tried to stand up to use the rest room and fell onto his knees. He denies dizziness, lightheadedness, HA, confusion, nausea or vomiting. He did not hit his head or have LOC. He states his legs "just came out from underneath him" and he was unable to support himself. He states he feels very weak and sad that he is unable to care for himself. Otherwise he denies chest pain, shortness of breath, abdominal pain, nausea, vomiting, diarrhea or constipation.     Past Medical History:   Diagnosis Date    Anemia due to stage 5 chronic kidney disease 3/1/2019    Diabetes mellitus type II     Dialysis patient     Hyperlipidemia     Hypertension     Kidney failure     MIKE (obstructive sleep apnea)     Urinary tract infection        Past Surgical History:   Procedure Laterality Date    AV FISTULA PLACEMENT      left lower arm    SPINE, CERVICAL, POSTERIOR APPROACH  LAMINECTOMY C3-C6; C6-C7; WITH MEDIAL DISCECTOMY N/A 3/3/2019    Performed by [facility] at [facility] OR 2ND F[facility]    TONSILLECTOMY, ADENOIDECTOMY      TRANSESOPHAGEAL ECHOCARDIOGRAM (SERENITY) N/A 5/23/2017    Performed by Donaldo" MD Sergei at [facility] OR       Review of patient's allergies indicates:   Allergen Reactions    Keflex [cephalexin] Nausea Only       Current Facility-Administered Medications on File Prior to Encounter   Medication    [COMPLETED] lidocaine 5 % patch 2 patch    [DISCONTINUED] 0.9%  NaCl infusion    [DISCONTINUED] acetaminophen tablet 650 mg    [DISCONTINUED] acetaminophen tablet 650 mg    [DISCONTINUED] albuterol sulfate nebulizer solution 2.5 mg    [DISCONTINUED] albuterol-ipratropium 2.5 mg-0.5 mg/3 mL nebulizer solution 3 mL    [DISCONTINUED] dabigatran etexilate capsule 150 mg    [DISCONTINUED] dextrose 50 % in water (D50W) injection 12.5 g    [DISCONTINUED] dextrose 50 % in water (D50W) injection 25 g    [DISCONTINUED] FLUoxetine capsule 20 mg    [DISCONTINUED] gabapentin capsule 300 mg    [DISCONTINUED] glucagon (human recombinant) injection 1 mg    [DISCONTINUED] glucose chewable tablet 16 g    [DISCONTINUED] glucose chewable tablet 24 g    [DISCONTINUED] insulin aspart U-100 pen 1-10 Units    [DISCONTINUED] lanthanum chewable tablet 1,000 mg    [DISCONTINUED] metoclopramide HCl 5 mg/5 mL solution 5 mg    [DISCONTINUED] midodrine tablet 10 mg    [DISCONTINUED] nystatin-triamcinolone cream    [DISCONTINUED] ondansetron disintegrating tablet 8 mg    [DISCONTINUED] pantoprazole EC tablet 40 mg    [DISCONTINUED] piperacillin-tazobactam 4.5 g in dextrose 5 % 100 mL IVPB (ready to mix system)    [DISCONTINUED] pneumoc 13-edwin conj-dip cr(PF) (PREVNAR 13 (PF)) 0.5 mL    [DISCONTINUED] polyethylene glycol packet 17 g    [DISCONTINUED] pravastatin tablet 40 mg    [DISCONTINUED] promethazine (PHENERGAN) 6.25 mg in dextrose 5 % 50 mL IVPB    [DISCONTINUED] ramelteon tablet 8 mg    [DISCONTINUED] senna-docusate 8.6-50 mg per tablet 1 tablet    [DISCONTINUED] sevelamer carbonate tablet 1,600 mg    [DISCONTINUED] simethicone chewable tablet 125 mg    [DISCONTINUED] sodium chloride 0.9%  "flush 5 mL    [DISCONTINUED] sodium chloride 3% nebulizer solution 4 mL    [DISCONTINUED] vancomycin in dextrose 5 % 1 gram/250 mL IVPB 1,000 mg    [DISCONTINUED] zinc oxide 16% (JESÚS'S BUTT PASTE) topical ointment     Current Outpatient Medications on File Prior to Encounter   Medication Sig    acetaminophen (TYLENOL) 500 MG tablet Take 1 tablet (500 mg total) by mouth every 6 (six) hours as needed.    ammonium lactate 12 % Crea Apply 1 application topically 3 (three) times daily as needed.    BD ULTRA-FINE MINI PEN NEEDLE 31 gauge x 3/16" Ndle USE 4 TO 5 TIMES A DAY     ( DISCARD PEN NEEDLE AFTER EACH USE )    blood-glucose meter (ADVANCED GLUCOSE METER) Misc 1 each by Misc.(Non-Drug; Combo Route) route 4 (four) times daily with meals and nightly.    ciprofloxacin HCl (CIPRO) 500 MG tablet Take 1 tablet (500 mg total) by mouth once daily Please take after dialysis on dialysis days for 10 days    epoetin shefali 3,000 unit/mL Soln 3,000 Units, epoetin shefali 4,000 unit/mL Soln 4,000 Units injection Inject 7,000 Units into the skin every Mon, Wed, Fri.    FLUoxetine 20 MG capsule Take 1 capsule (20 mg total) by mouth once daily.    fluticasone (FLONASE) 50 mcg/actuation nasal spray 1 spray by Each Nare route once daily.    gabapentin (NEURONTIN) 300 MG capsule TAKE 1 BY MOUTH EVERY      EVENING    heparin sodium,porcine (HEPARIN, PORCINE,) 5,000 unit/mL injection Inject 1 mL (5,000 Units total) into the skin every 8 (eight) hours.    insulin aspart U-100 (NOVOLOG) 100 unit/mL InPn pen Inject 0-5 Units into the skin before meals and at bedtime as needed (Hyperglycemia).    lancets Misc 1 each by Misc.(Non-Drug; Combo Route) route 4 (four) times daily with meals and nightly.    miconazole NITRATE 2 % (MICOTIN) 2 % top powder Apply topically 2 (two) times daily as needed (moisture).    midodrine (PROAMATINE) 10 MG tablet Take 2 tablets (20 mg total) by mouth every 6 (six) hours. (Patient taking " differently: Take 10 mg by mouth 3 (three) times daily. )    nystatin (MYCOSTATIN) powder Apply topically 2 (two) times daily. To sacral region    ONETOUCH ULTRA BLUE TEST STRIP Strp USE ONE STRIP TO CHECK BLOOD GLUCOSE 4 TIMES DAILY.AT MORNING, NOON,6:OO PM AND BEDTIME.    pantoprazole (PROTONIX) 40 mg GrPS Take 1 packet (40 mg total) by mouth 2 (two) times daily.    pravastatin (PRAVACHOL) 40 MG tablet Take 1 tablet (40 mg total) by mouth once daily. (Patient taking differently: Take 40 mg by mouth every evening. )    sevelamer carbonate (RENVELA) 800 mg Tab Take 2 tablets (1,600 mg total) by mouth 3 (three) times daily with meals.    vancomycin HCl (VANCOMYCIN 1 G/250 ML D5W, READY TO MIX SYSTEM,) Inject 250 mLs (1 g total) into the vein every Mon, Wed, Fri. for 10 days     Family History     Problem Relation (Age of Onset)    Colon cancer Mother    Diabetes Mother, Father, Maternal Grandmother, Maternal Grandfather    Heart disease Father    Hypertension Father    Lung cancer Mother        Tobacco Use    Smoking status: Never Smoker    Smokeless tobacco: Never Used   Substance and Sexual Activity    Alcohol use: No    Drug use: No    Sexual activity: Not Currently     Review of Systems   Constitutional: Negative for activity change, appetite change, fatigue and unexpected weight change.   HENT: Negative for congestion, hearing loss, postnasal drip, rhinorrhea and sinus pain.    Eyes: Negative for photophobia and visual disturbance.   Respiratory: Negative for apnea, cough, choking, chest tightness, shortness of breath and wheezing.    Cardiovascular: Negative for chest pain, palpitations and leg swelling.   Gastrointestinal: Negative for abdominal distention, abdominal pain, constipation, diarrhea, nausea and vomiting.   Endocrine: Negative for cold intolerance, heat intolerance and polyuria.   Genitourinary: Negative for flank pain.   Musculoskeletal: Positive for gait problem. Negative for  arthralgias, back pain, joint swelling and myalgias.   Skin: Negative for rash.   Neurological: Positive for weakness. Negative for dizziness, tremors, seizures, syncope, light-headedness, numbness and headaches.   Hematological: Negative for adenopathy. Does not bruise/bleed easily.   Psychiatric/Behavioral: Positive for dysphoric mood. Negative for agitation, behavioral problems, self-injury, sleep disturbance and suicidal ideas.     Objective:     Vital Signs (Most Recent):  Temp: 97.4 °F (36.3 °C) (07/22/19 1953)  Pulse: 60 (07/22/19 2203)  Resp: 19 (07/22/19 2203)  BP: 133/62 (07/22/19 2203)  SpO2: 95 % (07/22/19 2203) Vital Signs (24h Range):  Temp:  [96.9 °F (36.1 °C)-99 °F (37.2 °C)] 97.4 °F (36.3 °C)  Pulse:  [52-66] 60  Resp:  [15-27] 19  SpO2:  [94 %-98 %] 95 %  BP: (106-153)/() 133/62     Weight: 115 kg (253 lb 8.5 oz)  Body mass index is 39.71 kg/m².    Physical Exam   Constitutional: He is oriented to person, place, and time. He appears well-developed and well-nourished. No distress.   HENT:   Head: Normocephalic and atraumatic.   Nose: Nose normal.   Mouth/Throat: Oropharynx is clear and moist. No oropharyngeal exudate.   Eyes: Pupils are equal, round, and reactive to light. Conjunctivae and EOM are normal. Right eye exhibits no discharge. Left eye exhibits no discharge. No scleral icterus.   Neck: Normal range of motion. Neck supple.   Cardiovascular: Normal rate, regular rhythm, normal heart sounds and intact distal pulses. Exam reveals no gallop and no friction rub.   No murmur heard.  Pulmonary/Chest: Effort normal and breath sounds normal. No stridor. He has no wheezes. He exhibits no tenderness.   Abdominal: Soft. Bowel sounds are normal. He exhibits no distension. There is no tenderness.   Musculoskeletal: Normal range of motion. He exhibits no edema, tenderness or deformity.   Lymphadenopathy:     He has no cervical adenopathy.   Neurological: He is alert and oriented to person, place,  and time.   Skin: Skin is warm and dry. Capillary refill takes less than 2 seconds. No rash noted. He is not diaphoretic.   Psychiatric: He has a normal mood and affect. His behavior is normal.   Nursing note and vitals reviewed.        CRANIAL NERVES     CN III, IV, VI   Pupils are equal, round, and reactive to light.  Extraocular motions are normal.        Significant Labs:   Recent Labs     07/21/19 0434 07/22/19 0434 07/22/19 2043   WBC 5.40 6.15 7.40   HGB 8.0* 8.0* 8.5*   HCT 24.0* 24.6* 26.5*   PLT 29* 31* 43*   * 106* 107*   RDW 15.2* 15.0* 14.9*       Recent Labs     07/21/19 0434 07/22/19 0434 07/22/19 2043   * 130* 135*   K 4.8 4.8 4.0   CL 99 97 95   CO2 22* 21* 29   GLU 96 129* 134*   BUN 59* 74* 39*   CREATININE 6.7* 8.6* 5.4*   CALCIUM 8.9 8.8 9.0   PROT 5.2* 5.5* 6.2   ALBUMIN 2.2* 2.2* 2.5*   BILITOT 0.9 0.8 0.9   ALKPHOS 190* 210* 231*   AST 58* 32 29   ALT 58* 48* 44   ANIONGAP 10 12 11   ESTGFRAFRICA 10* 7* 13*   EGFRNONAA 8* 6* 11*       Recent Labs     07/20/19  0623 07/21/19 0434 07/22/19 0434   MG 2.1 2.5 2.7*   PHOS 3.3 3.0 4.1       Coags  Lab Results   Component Value Date    INR 1.1 07/18/2019    INR 1.0 03/20/2019    INR 1.1 03/19/2019    APTT 31.8 07/18/2019    APTT 23.4 03/04/2019    APTT 25.2 03/03/2019     Recent Labs   Lab 07/18/19  0459   INR 1.1   APTT 31.8       A1c:   Lab Results   Component Value Date    HGBA1C 5.0 07/18/2019   , Last Gluc:   Recent Labs   Lab 07/20/19  2137 07/21/19  0609 07/21/19  1132 07/21/19  1652 07/21/19  1953 07/22/19  0501   POCTGLUCOSE 105 96 143* 143* 130* 134*       TSH:   Lab Results   Component Value Date    TSH 0.379 (L) 07/18/2019         ABG  Recent Labs   Lab 07/18/19  0448 07/18/19  1229   PH 7.370 7.348*   PCO2 30.6* 33.4*   PO2 103* 85   HCO3 17.7* 18.4*   POCSATURATED 98 96   BE -8 -7         Imaging   Imaging Results          X-Ray Knee 3 View Right (Final result)  Result time 07/22/19 21:30:55    Final result by  Thomas Poe MD (07/22/19 21:30:55)                 Impression:      No acute displaced fracture-dislocation identified.      Electronically signed by: Thomas Poe MD  Date:    07/22/2019  Time:    21:30             Narrative:    EXAMINATION:  XR KNEE 3 VIEW RIGHT    CLINICAL HISTORY:  Pain, unspecified    TECHNIQUE:  AP, lateral, and Merchant views of the right knee were performed.    COMPARISON:  Right knee series 09/17/2018    FINDINGS:  Overall alignment is within normal limits.  No acute displaced fracture, dislocation or destructive osseous process.  Stable small nonspecific soft tissue calcification near the superior patellar pole.  No large suprapatellar joint effusion seen.  Minimal tricompartmental degenerative change.  Scattered advanced atherosclerotic vascular calcifications noted.  No subcutaneous emphysema or radiodense retained foreign body.                               X-Ray Knee 3 View Left (Final result)  Result time 07/22/19 21:35:00    Final result by Iman Chen MD (07/22/19 21:35:00)                 Impression:      Decreased bone density.  No fracture or effusion or malalignment.      Electronically signed by: Iman Chen  Date:    07/22/2019  Time:    21:35             Narrative:    EXAMINATION:  XR KNEE 3 VIEW LEFT    CLINICAL HISTORY:  Pain, unspecified    TECHNIQUE:  AP, lateral, and Merchant views of the left knee were performed.    COMPARISON:  None    FINDINGS:  Frontal and lateral and oblique views presented.    Joint spaces and alignment are normal.  No fracture or erosion or effusion.  There is atherosclerotic calcification.  No focal soft tissue swelling or defect.  There is subjective decreased bone density                               X-Ray Femur Ap/Lat Left (Final result)  Result time 07/22/19 21:34:10    Final result by Iman Chen MD (07/22/19 21:34:10)                 Impression:      No fracture or malalignment      Electronically signed by: Iman  April  Date:    07/22/2019  Time:    21:34             Narrative:    EXAMINATION:  XR FEMUR 2 VIEW LEFT    CLINICAL HISTORY:  Unspecified fall, initial encounter    TECHNIQUE:  AP and lateral views of the left femur were performed.    COMPARISON:  None}    FINDINGS:  Four images are presented.  There is frontal and frontal oblique views of the proximal femur.  There is frontal and lateral views of the distal femur.  No fracture or erosion or periosteal reaction.  Hip joint space and knee joint spaces are preserved.  There is subjective decreased bone density.  No focal soft tissue swelling or defect.  Visualized pelvis appears intact.                              Assessment/Plan:     * Symptomatic anemia  Patient unable to walk, and fell hitting his knees  Hgb decreased from baseline  Will fu repeat H/H   Consider transfusion if symptoms persist or if hgb drops <8         Impaired mobility and ADLs  Patient was recently admitted to hospital for septic shock   2/2 Enterococcus and Klebsiella in blood cultures  Likely deconditioning due to severe infection  PT/OT recommended home with home health, however he is unable to perform ADL's   Will re-consult PT/OT for further recommendations and placement in SNF vs Rehab facility        ESRD (end stage renal disease) on dialysis  ESRD on HD MWF   No signs of fluid overload   Consult [facility], appreciate recs       Diabetes mellitus, type 2  Hgb A1C 5 completed this week   Hold oral anti-hyperglycemics   Will cover with SSI   Goal BG <180   Accuchecks QID, AC and HS       Hyperlipidemia  Continue statin therapy         VTE Risk Mitigation (From admission, onward)    None             Suzie Trinh MD  Department of Hospital Medicine   [facility]

## 2019-07-23 NOTE — ED PROVIDER NOTES
Encounter Date: 7/22/2019    SCRIBE #1 NOTE: I, Maggie Angel, am scribing for, and in the presence of, Sumit Bennett MD.       History     Chief Complaint   Patient presents with    Fatigue     was admitted to ICU for sepsis, discharged today.  Went home and called family member, reports generalized weakness, unable to stand and bear weight.       Time seen by provider: 8:04 PM    This is a 58 y.o. male who presents with complaint of generalized weakness and L thigh pain x2 weeks. He was discharged today, and he fell at home when trying to stand. He landed on his knees bilaterally on the floor. The patient denies any other pain or fever, and he had been hospitalized for 2 blood infections. No other palliative or provocative factors except as noted.      The history is provided by the patient.     Review of patient's allergies indicates:   Allergen Reactions    Keflex [cephalexin] Nausea Only     Past Medical History:   Diagnosis Date    Anemia due to stage 5 chronic kidney disease 3/1/2019    Anticoagulant long-term use     Diabetes mellitus type II     Dialysis patient     Encounter for blood transfusion     Hyperlipidemia     Hypertension     Kidney failure     MIKE (obstructive sleep apnea)     Stroke     Urinary tract infection      Past Surgical History:   Procedure Laterality Date    AV FISTULA PLACEMENT      left lower arm    SPINE, CERVICAL, POSTERIOR APPROACH  LAMINECTOMY C3-C6; C6-C7; WITH MEDIAL DISCECTOMY N/A 3/3/2019    [facility]    TONSILLECTOMY, ADENOIDECTOMY      TRANSESOPHAGEAL ECHOCARDIOGRAM (SERENITY) N/A 5/23/2017    Performed by Donaldo Mai MD at [facility] OR     Family History   Problem Relation Age of Onset    Colon cancer Mother     Lung cancer Mother     Diabetes Mother     Diabetes Father     Heart disease Father     Hypertension Father     Diabetes Maternal Grandmother     Diabetes Maternal Grandfather      Social  History     Tobacco Use    Smoking status: Never Smoker    Smokeless tobacco: Never Used   Substance Use Topics    Alcohol use: No    Drug use: No     Review of Systems   All other systems reviewed and are negative.      Physical Exam     Initial Vitals   BP Pulse Resp Temp SpO2   07/22/19 1946 07/22/19 1946 07/22/19 2010 07/22/19 1946 07/22/19 1946   125/67 62 (!) 27 98.6 °F (37 °C) (!) 94 %      MAP       --                Physical Exam    Nursing note and vitals reviewed.  Constitutional: He appears well-developed.   HENT:   Head: Normocephalic and atraumatic.   Eyes: EOM are normal.   Neck: Normal range of motion. Neck supple.   Cardiovascular: Normal rate and regular rhythm.   Pulmonary/Chest: Breath sounds normal. No respiratory distress.   Abdominal: Soft. There is no tenderness.   Musculoskeletal: Normal range of motion.   No acute deformity; no reproducible tenderness to L thigh   Neurological: He is oriented to person, place, and time.   Skin: Skin is warm and dry.   Psychiatric: His affect is blunt.         ED Course   Procedures  Labs Reviewed   CBC W/ AUTO DIFFERENTIAL - Abnormal; Notable for the following components:       Result Value    RBC 2.48 (*)     Hemoglobin 8.5 (*)     Hematocrit 26.5 (*)     Mean Corpuscular Volume 107 (*)     Mean Corpuscular Hemoglobin 34.3 (*)     RDW 14.9 (*)     Platelets 43 (*)     MPV 13.0 (*)     Mono # 1.1 (*)     Lymph% 13.6 (*)     Platelet Estimate Decreased (*)     All other components within normal limits   COMPREHENSIVE METABOLIC PANEL - Abnormal; Notable for the following components:    Sodium 135 (*)     Glucose 134 (*)     BUN, Bld 39 (*)     Creatinine 5.4 (*)     Albumin 2.5 (*)     Alkaline Phosphatase 231 (*)     eGFR if  13 (*)     eGFR if non  11 (*)     All other components within normal limits   TSH   TYPE & SCREEN   DIRECT ANTIGLOBULIN TEST     EKG Readings: (Independently Interpreted)   Interpretation per ED  physician. Sinus rhythm at 62 bpm without ectopy. No emergently concerning ST or T wave changes. No STEMI. QTc 493 ms.  Lateral t-wave inversions noted but unchanged from baseline.         X-Rays:   Independently Interpreted Readings:   Other Readings:  Reviewed by myself, read by radiology.     Imaging Results          X-Ray Knee 3 View Right (Final result)  Result time 07/22/19 21:30:55    Final result by Thomas Poe MD (07/22/19 21:30:55)                 Impression:      No acute displaced fracture-dislocation identified.      Electronically signed by: Thomas Poe MD  Date:    07/22/2019  Time:    21:30             Narrative:    EXAMINATION:  XR KNEE 3 VIEW RIGHT    CLINICAL HISTORY:  Pain, unspecified    TECHNIQUE:  AP, lateral, and Merchant views of the right knee were performed.    COMPARISON:  Right knee series 09/17/2018    FINDINGS:  Overall alignment is within normal limits.  No acute displaced fracture, dislocation or destructive osseous process.  Stable small nonspecific soft tissue calcification near the superior patellar pole.  No large suprapatellar joint effusion seen.  Minimal tricompartmental degenerative change.  Scattered advanced atherosclerotic vascular calcifications noted.  No subcutaneous emphysema or radiodense retained foreign body.                               X-Ray Knee 3 View Left (Final result)  Result time 07/22/19 21:35:00    Final result by Iman Chen MD (07/22/19 21:35:00)                 Impression:      Decreased bone density.  No fracture or effusion or malalignment.      Electronically signed by: Iman Chen  Date:    07/22/2019  Time:    21:35             Narrative:    EXAMINATION:  XR KNEE 3 VIEW LEFT    CLINICAL HISTORY:  Pain, unspecified    TECHNIQUE:  AP, lateral, and Merchant views of the left knee were performed.    COMPARISON:  None    FINDINGS:  Frontal and lateral and oblique views presented.    Joint spaces and alignment are normal.  No fracture or  erosion or effusion.  There is atherosclerotic calcification.  No focal soft tissue swelling or defect.  There is subjective decreased bone density                               X-Ray Femur Ap/Lat Left (Final result)  Result time 07/22/19 21:34:10    Final result by Iman Chen MD (07/22/19 21:34:10)                 Impression:      No fracture or malalignment      Electronically signed by: Iman Chen  Date:    07/22/2019  Time:    21:34             Narrative:    EXAMINATION:  XR FEMUR 2 VIEW LEFT    CLINICAL HISTORY:  Unspecified fall, initial encounter    TECHNIQUE:  AP and lateral views of the left femur were performed.    COMPARISON:  None}    FINDINGS:  Four images are presented.  There is frontal and frontal oblique views of the proximal femur.  There is frontal and lateral views of the distal femur.  No fracture or erosion or periosteal reaction.  Hip joint space and knee joint spaces are preserved.  There is subjective decreased bone density.  No focal soft tissue swelling or defect.  Visualized pelvis appears intact.                              Medical Decision Making:   Initial Assessment:   Recent inpatient with subacute weakness, now non-ambulatory.  Clinical Tests:   Lab Tests: Ordered and Reviewed  Radiological Study: Ordered and Reviewed  Medical Tests: Ordered and Reviewed  ED Management:  Presentation consistent with symptomatic anemia, discussed case with Family Practice who will admit.                      Clinical Impression:       ICD-10-CM ICD-9-CM   1. Symptomatic anemia D64.9 285.9   2. Pain R52 780.96   3. Fall W19.XXXA E888.9                    I personally performed the services described in this documentation. All medical record entries made by the scribe were at my direction and in my presence.  I have reviewed the chart and agree that the record reflects my personal performance and is accurate and complete within the limitations of emergency medical charting.   --Sumit  Donald VELAZQUEZ 10:23 PM 07/23/2019               Sumit Bennett MD  07/23/19 0330

## 2019-07-23 NOTE — PROGRESS NOTES
Pharmacokinetic Assessment Follow Up: IV Vancomycin    Vancomycin serum concentration assessment(s):    The random level was drawned correctly and can be used to guide therapy at this time.    Vancomycin Regimen Plan:    Re-dose when the random level is less than 20 mcg/mL, next level to be drawn at 0400 on 7/24     Pharmacy will continue to follow and monitor vancomycin.    Please contact pharmacy at extension [phone] for questions regarding this assessment.    Thank you for the consult,   Shukri Vásquez     Patient brief summary:  Angel Farmer Jr. is a 58 y.o. male initiated on antimicrobial therapy with IV Vancomycin for treatment of suspected bacteremia    The patient received a loading dose, followed by the current treatment regimen: random levels with plan to redose when level is less than 20 mcg/mL    Drug Allergies:   Review of patient's allergies indicates:   Allergen Reactions    Keflex [cephalexin] Nausea Only       Actual Body Weight:   121.5kg    Renal Function:   Estimated Creatinine Clearance: 17.5 mL/min (A) (based on SCr of 5.9 mg/dL (H)).,     Dialysis Method (if applicable):  intermittent HD    CBC (last 72 hours):  Recent Labs   Lab Result Units 07/21/19 0434 07/22/19 0434 07/22/19 2043 07/23/19  0524   WBC K/uL 5.40 6.15 7.40 5.23   Hemoglobin g/dL 8.0* 8.0* 8.5* 8.5*   Hematocrit % 24.0* 24.6* 26.5* 26.8*   Platelets K/uL 29* 31* 43* 41*   Gran% % 74.1* 72.7 69.7 61.9   Lymph% % 14.4* 15.3* 13.6* 20.3   Mono% % 10.0 10.4 15.0 15.9*   Eosinophil% % 1.3 1.6 1.6 1.7   Basophil% % 0.2 0.0 0.1 0.2   Differential Method  Automated Automated Automated Automated       Metabolic Panel (last 72 hours):  Recent Labs   Lab Result Units 07/21/19 0434 07/22/19 0434 07/22/19 2043 07/23/19  0524   Sodium mmol/L 131* 130* 135* 134*   Potassium mmol/L 4.8 4.8 4.0 4.1   Chloride mmol/L 99 97 95 95   CO2 mmol/L 22* 21* 29 28   Glucose mg/dL 96 129* 134* 107   BUN, Bld mg/dL 59* 74* 39* 45*   Creatinine  mg/dL 6.7* 8.6* 5.4* 5.9*   Albumin g/dL 2.2* 2.2* 2.5* 2.4*   Total Bilirubin mg/dL 0.9 0.8 0.9 0.9   Alkaline Phosphatase U/L 190* 210* 231* 209*   AST U/L 58* 32 29 29   ALT U/L 58* 48* 44 39   Magnesium mg/dL 2.5 2.7*  --  2.4   Phosphorus mg/dL 3.0 4.1  --  4.1       Vancomycin Administrations:  vancomycin given in the last 96 hours                     vancomycin in dextrose 5 % 1 gram/250 mL IVPB 1,000 mg (mg) 1,000 mg New Bag 07/22/19 0846     1,000 mg New Bag 07/19/19 1657                      Drug levels (last 3 results):  Recent Labs   Lab Result Units 07/22/19  0434   Vancomycin, Random ug/mL 18.9       Microbiologic Results:  Microbiology Results (last 7 days)       ** No results found for the last 168 hours. **

## 2019-07-23 NOTE — ASSESSMENT & PLAN NOTE
Patient was recently admitted to hospital for septic shock   2/2 Enterococcus and Klebsiella in blood cultures  Likely deconditioning due to severe infection  PT/OT recommended home with home health, however he is unable to perform ADL's   Will re-consult PT/OT for further recommendations and placement in SNF vs Rehab facility

## 2019-07-23 NOTE — PLAN OF CARE
Problem: Physical Therapy Goal  Goal: Physical Therapy Goal  Goals to be met by: 2019     Patient will increase functional independence with mobility by performin. Supine to sit with MInimal Assistance  2. Sit to stand transfer with stand by assistance  3. Bed to chair transfer with stand by assistance using Rolling Walker  4. Gait  x 25 feet with Stand-by Assistance using Rolling Walker.     Outcome: Ongoing (interventions implemented as appropriate)  Recommend SNF

## 2019-07-23 NOTE — ED NOTES
Assumed care of patient from LOLIS Flynn; pt reports pain 8/10 to buttocks, states he does not want narcotic medications due to hx of constipation; requesting tylenol or non-narcotic for pain; py lying on stretcher w/ call bell in hand watching TV, CR monitoring in place, no distress noted

## 2019-07-23 NOTE — PLAN OF CARE
VN note: VN unable to cue into room. Monitor offline. VN will continue to be available to patient and intervene prn.       07/23/19 1804   Type of Frequent Check   Type Patient Rounds;Other (see comments)  (VN Rounds, Monitor offline)

## 2019-07-23 NOTE — DISCHARGE SUMMARY
[facility] Hospital Medicine  Discharge Summary      Patient Name: Angel Farmer Jr.  MRN: [id number]  Admission Date: 7/17/2019  Hospital Length of Stay: 5 days  Discharge Date and Time: 7/22/2019  6:33 PM  Attending Physician: No att. providers found   Discharging Provider: Damaris Sarabia MD  Primary Care Provider: Satya Noriega MD      HPI:   Mr. Farmer is a 58 year old man with PMHx of ESRD on HD MWF, IDDM, HTN/HLD and MIKE presented to ED with 1 day history of fever to 1044, cough and generalized malaise. He was in his usual state of health until this morning after completing 4 hours of HD when he felt weak, light headed, and hot. His wife took his temperature and it was 104, so they presented to the ED. He also notes constipation, that is usual for him. He states he has a BM every 4 days, he no longer makes urine. He denies chest pain, palpitations, shortness of breath, abdominal pain, diarrhea, or LE edema.     In the ED he was febrile to 103, LA elevaated to 3.8, he became hypotensive and tachycardic meeting Severe sepsis. He received a total of 2L NS with minimal improvement in BP, anesthesia was consulted and central line placed and started on levophed and will be admitted to the ICU.     * No surgery found *      Hospital Course:   Patient was admitted to the ICU for closer monitoring and Levophed. Patient was started on Vanc and Zosyn. Patient was lethargic during one exam. Imaging was ordered. Chest X-ray showed Unchanged imaged cardiomediastinal contour. Low lung volumes.  No focal consolidation, pleural effusion or pneumothorax. CT Head showed no acute intracranial abnormality. CT of Abdomen and Pelvis showed No acute abnormality within the abdomen or pelvis.    Patient then had a marked improvement. Patient was awake and alert. Blood cultures grew Klebsiella and Enterococcus. Patient was off of Levophed on 7/20 and maintain appropriate blood pressures. Patient was  transferred to the floor. Sensitivities returned and patient was discharged on Vancomycin to be given during dialysis and Ciprofloxacin PO for 2 weeks. PT/OT recommended Home Health PT/OT which was ordered for patient. Patient had some hesitation with discharge and wanted to have family discussion for discharge which was done over the phone. After conversation with family, patient felt safe and comfortable with discharge.      Consults:   Consults (From admission, onward)        Status Ordering Provider     Inpatient consult to Registered Dietitian/Nutritionist  Once     Provider:  (Not yet assigned)    GENE Hodge          No new Assessment & Plan notes have been filed under this hospital service since the last note was generated.  Service: Hospital Medicine    Final Active Diagnoses:    Diagnosis Date Noted POA    PRINCIPAL PROBLEM:  Septic shock [A41.9, R65.21] 07/17/2019 Yes    Thrombocytopenia [D69.6] 07/21/2019 Yes    Class 3 severe obesity due to excess calories with serious comorbidity and body mass index (BMI) of 45.0 to 49.9 in adult [E66.01, Z68.42] 03/03/2019 Not Applicable    MIKE (obstructive sleep apnea) - very severe latest sleep study says BPAP @ 16/8 [G47.33] 02/25/2015 Yes    ESRD (end stage renal disease) on dialysis [N18.6, Z99.2] 03/18/2014 Not Applicable    Diabetes mellitus, type 2 [E11.9]  Yes    Hyperlipidemia [E78.5]  Yes    Hypertension [I10]  Yes      Problems Resolved During this Admission:       Discharged Condition: stable    Disposition: Home or Self Care    Follow Up:  Follow-up Information     Please follow up.    Why:  Mani HURD  -- you will get IV Vancomycin after dialysis            [facility].    Specialty:  Family Medicine  Contact information:  [address], [address]  [facility] [address] [phone]           Ruddy Wylie MD On 7/30/2019.    Specialty:  Neurosurgery  Why:  4:00 pm   Contact  information:  [address]  [address]  [facility] [address]  [phone]             [facility] On 8/5/2019.    Specialty:  Family Medicine  Why:  9:00 am     Contact information:  [address], [address]  [facility] [address] [phone]               Patient Instructions:      Referral to Home health   Referral Priority: Routine Referral Type: Home Health Care   Referral Reason: Specialty Services Required   Requested Specialty: Home Health Services   Number of Visits Requested: 1     Diet renal     Diet diabetic     Notify your health care provider if you experience any of the following:  temperature >100.4     Notify your health care provider if you experience any of the following:  persistent nausea and vomiting or diarrhea     Notify your health care provider if you experience any of the following:  severe uncontrolled pain     Notify your health care provider if you experience any of the following:  redness, tenderness, or signs of infection (pain, swelling, redness, odor or green/yellow discharge around incision site)     Notify your health care provider if you experience any of the following:  difficulty breathing or increased cough     Notify your health care provider if you experience any of the following:  severe persistent headache     Notify your health care provider if you experience any of the following:  worsening rash     Notify your health care provider if you experience any of the following:  persistent dizziness, light-headedness, or visual disturbances     Notify your health care provider if you experience any of the following:  increased confusion or weakness     Activity as tolerated       Significant Diagnostic Studies: Labs:   CMP   Recent Labs   Lab 07/22/19  0434 07/22/19  2043 07/23/19  0524   * 135* 134*   K 4.8 4.0 4.1   CL 97 95 95   CO2 21* 29 28   * 134* 107   BUN 74* 39* 45*   CREATININE 8.6* 5.4* 5.9*   CALCIUM 8.8 9.0 9.2  "  PROT 5.5* 6.2 5.9*   ALBUMIN 2.2* 2.5* 2.4*   BILITOT 0.8 0.9 0.9   ALKPHOS 210* 231* 209*   AST 32 29 29   ALT 48* 44 39   ANIONGAP 12 11 11   ESTGFRAFRICA 7* 13* 11*   EGFRNONAA 6* 11* 10*    and CBC   Recent Labs   Lab 07/22/19  0434 07/22/19  2043 07/23/19  0524   WBC 6.15 7.40 5.23   HGB 8.0* 8.5* 8.5*   HCT 24.6* 26.5* 26.8*   PLT 31* 43* 41*       Pending Diagnostic Studies:     None         Medications:  Reconciled Home Medications:      Medication List      START taking these medications    ciprofloxacin HCl 500 MG tablet  Commonly known as:  CIPRO  Take 1 tablet (500 mg total) by mouth once daily Please take after dialysis on dialysis days for 10 days     VANCOMYCIN 1 G/250 ML D5W (READY TO MIX SYSTEM)  Inject 250 mLs (1 g total) into the vein every Mon, Wed, Fri. for 10 days        CHANGE how you take these medications    midodrine 10 MG tablet  Commonly known as:  PROAMATINE  Take 2 tablets (20 mg total) by mouth every 6 (six) hours.  What changed:    · how much to take  · when to take this     pravastatin 40 MG tablet  Commonly known as:  PRAVACHOL  Take 1 tablet (40 mg total) by mouth once daily.  What changed:    · how much to take  · when to take this        CONTINUE taking these medications    acetaminophen 500 MG tablet  Commonly known as:  TYLENOL  Take 1 tablet (500 mg total) by mouth every 6 (six) hours as needed.     BD ULTRA-FINE MINI PEN NEEDLE 31 gauge x 3/16" Ndle  Generic drug:  pen needle, diabetic  USE 4 TO 5 TIMES A DAY     ( DISCARD PEN NEEDLE AFTER EACH USE )     blood-glucose meter Misc  Commonly known as:  ADVANCED GLUCOSE METER  1 each by Misc.(Non-Drug; Combo Route) route 4 (four) times daily with meals and nightly.     epoetin shefali 3,000 unit/mL Soln 3,000 Units, epoetin shefali 4,000 unit/mL Soln 4,000 Units injection  Inject 7,000 Units into the skin every Mon, Wed, Fri.     fluticasone propionate 50 mcg/actuation nasal spray  Commonly known as:  FLONASE  1 spray by Each Nare " route once daily.     gabapentin 300 MG capsule  Commonly known as:  NEURONTIN  TAKE 1 BY MOUTH EVERY      EVENING     heparin (porcine) 5,000 unit/mL injection  Inject 1 mL (5,000 Units total) into the skin every 8 (eight) hours.     insulin aspart U-100 100 unit/mL (3 mL) Inpn pen  Commonly known as:  NovoLOG  Inject 0-5 Units into the skin before meals and at bedtime as needed (Hyperglycemia).     lancets Misc  1 each by Misc.(Non-Drug; Combo Route) route 4 (four) times daily with meals and nightly.     miconazole NITRATE 2 % 2 % top powder  Commonly known as:  MICOTIN  Apply topically 2 (two) times daily as needed (moisture).     nystatin powder  Commonly known as:  MYCOSTATIN  Apply topically 2 (two) times daily. To sacral region     ONETOUCH ULTRA BLUE TEST STRIP Strp  Generic drug:  blood sugar diagnostic  USE ONE STRIP TO CHECK BLOOD GLUCOSE 4 TIMES DAILY.AT MORNING, NOON,6:OO PM AND BEDTIME.     pantoprazole 40 mg Grps  Commonly known as:  PROTONIX  Take 1 packet (40 mg total) by mouth 2 (two) times daily.            Indwelling Lines/Drains at time of discharge:   Lines/Drains/Airways     Drain                 Hemodialysis AV Fistula 12/23/12 Left forearm 2403 days                Time spent on the discharge of patient: > 30 minutes  Patient was seen and examined on the date of discharge and determined to be suitable for discharge.         Damaris Sarabia MD  Department of Hospital Medicine  [facility]

## 2019-07-24 LAB
ALBUMIN SERPL BCP-MCNC: 2.5 G/DL (ref 3.5–5.2)
ALP SERPL-CCNC: 208 U/L (ref 55–135)
ALT SERPL W/O P-5'-P-CCNC: 36 U/L (ref 10–44)
ANION GAP SERPL CALC-SCNC: 14 MMOL/L (ref 8–16)
AST SERPL-CCNC: 27 U/L (ref 10–40)
BASOPHILS # BLD AUTO: 0.02 K/UL (ref 0–0.2)
BASOPHILS NFR BLD: 0.3 % (ref 0–1.9)
BILIRUB SERPL-MCNC: 0.7 MG/DL (ref 0.1–1)
BUN SERPL-MCNC: 58 MG/DL (ref 6–20)
CALCIUM SERPL-MCNC: 9.4 MG/DL (ref 8.7–10.5)
CHLORIDE SERPL-SCNC: 96 MMOL/L (ref 95–110)
CO2 SERPL-SCNC: 25 MMOL/L (ref 23–29)
CREAT SERPL-MCNC: 7.6 MG/DL (ref 0.5–1.4)
DIFFERENTIAL METHOD: ABNORMAL
EOSINOPHIL # BLD AUTO: 0.1 K/UL (ref 0–0.5)
EOSINOPHIL NFR BLD: 1.4 % (ref 0–8)
ERYTHROCYTE [DISTWIDTH] IN BLOOD BY AUTOMATED COUNT: 14.7 % (ref 11.5–14.5)
EST. GFR  (AFRICAN AMERICAN): 8 ML/MIN/1.73 M^2
EST. GFR  (NON AFRICAN AMERICAN): 7 ML/MIN/1.73 M^2
GLUCOSE SERPL-MCNC: 118 MG/DL (ref 70–110)
HCT VFR BLD AUTO: 29 % (ref 40–54)
HGB BLD-MCNC: 9.3 G/DL (ref 14–18)
LYMPHOCYTES # BLD AUTO: 1.3 K/UL (ref 1–4.8)
LYMPHOCYTES NFR BLD: 16.5 % (ref 18–48)
MAGNESIUM SERPL-MCNC: 2.6 MG/DL (ref 1.6–2.6)
MCH RBC QN AUTO: 34.6 PG (ref 27–31)
MCHC RBC AUTO-ENTMCNC: 32.1 G/DL (ref 32–36)
MCV RBC AUTO: 108 FL (ref 82–98)
MONOCYTES # BLD AUTO: 1.2 K/UL (ref 0.3–1)
MONOCYTES NFR BLD: 15.6 % (ref 4–15)
NEUTROPHILS # BLD AUTO: 5.2 K/UL (ref 1.8–7.7)
NEUTROPHILS NFR BLD: 66.2 % (ref 38–73)
PHOSPHATE SERPL-MCNC: 6 MG/DL (ref 2.7–4.5)
PLATELET # BLD AUTO: 54 K/UL (ref 150–350)
PMV BLD AUTO: 12.1 FL (ref 9.2–12.9)
POCT GLUCOSE: 119 MG/DL (ref 70–110)
POCT GLUCOSE: 122 MG/DL (ref 70–110)
POCT GLUCOSE: 124 MG/DL (ref 70–110)
POTASSIUM SERPL-SCNC: 4.7 MMOL/L (ref 3.5–5.1)
PROT SERPL-MCNC: 6.3 G/DL (ref 6–8.4)
RBC # BLD AUTO: 2.69 M/UL (ref 4.6–6.2)
SODIUM SERPL-SCNC: 135 MMOL/L (ref 136–145)
VANCOMYCIN SERPL-MCNC: 17.3 UG/ML
WBC # BLD AUTO: 7.93 K/UL (ref 3.9–12.7)

## 2019-07-24 PROCEDURE — 80202 ASSAY OF VANCOMYCIN: CPT

## 2019-07-24 PROCEDURE — 83735 ASSAY OF MAGNESIUM: CPT

## 2019-07-24 PROCEDURE — 11000001 HC ACUTE MED/SURG PRIVATE ROOM

## 2019-07-24 PROCEDURE — 36415 COLL VENOUS BLD VENIPUNCTURE: CPT

## 2019-07-24 PROCEDURE — 25000003 PHARM REV CODE 250: Performed by: STUDENT IN AN ORGANIZED HEALTH CARE EDUCATION/TRAINING PROGRAM

## 2019-07-24 PROCEDURE — 63600175 PHARM REV CODE 636 W HCPCS: Performed by: STUDENT IN AN ORGANIZED HEALTH CARE EDUCATION/TRAINING PROGRAM

## 2019-07-24 PROCEDURE — 80053 COMPREHEN METABOLIC PANEL: CPT

## 2019-07-24 PROCEDURE — 25000003 PHARM REV CODE 250: Performed by: INTERNAL MEDICINE

## 2019-07-24 PROCEDURE — 25000003 PHARM REV CODE 250: Performed by: FAMILY MEDICINE

## 2019-07-24 PROCEDURE — 85025 COMPLETE CBC W/AUTO DIFF WBC: CPT

## 2019-07-24 PROCEDURE — 94761 N-INVAS EAR/PLS OXIMETRY MLT: CPT

## 2019-07-24 PROCEDURE — 80100016 HC MAINTENANCE HEMODIALYSIS

## 2019-07-24 PROCEDURE — 84100 ASSAY OF PHOSPHORUS: CPT

## 2019-07-24 RX ORDER — SEVELAMER CARBONATE 800 MG/1
2400 TABLET, FILM COATED ORAL
Status: DISCONTINUED | OUTPATIENT
Start: 2019-07-24 | End: 2019-07-25 | Stop reason: HOSPADM

## 2019-07-24 RX ORDER — SODIUM CHLORIDE 9 MG/ML
INJECTION, SOLUTION INTRAVENOUS
Status: DISCONTINUED | OUTPATIENT
Start: 2019-07-24 | End: 2019-07-25 | Stop reason: HOSPADM

## 2019-07-24 RX ADMIN — MICONAZOLE NITRATE: 20 POWDER TOPICAL at 08:07

## 2019-07-24 RX ADMIN — SEVELAMER CARBONATE 2400 MG: 800 TABLET, FILM COATED ORAL at 04:07

## 2019-07-24 RX ADMIN — TRIAMCINOLONE ACETONIDE: 1 PASTE TOPICAL at 08:07

## 2019-07-24 RX ADMIN — PRAVASTATIN SODIUM 40 MG: 40 TABLET ORAL at 08:07

## 2019-07-24 RX ADMIN — SENNOSIDES, DOCUSATE SODIUM 1 TABLET: 50; 8.6 TABLET, FILM COATED ORAL at 08:07

## 2019-07-24 RX ADMIN — CIPROFLOXACIN HYDROCHLORIDE 500 MG: 500 TABLET, FILM COATED ORAL at 08:07

## 2019-07-24 RX ADMIN — METOCLOPRAMIDE HYDROCHLORIDE 5 MG: 5 SOLUTION ORAL at 09:07

## 2019-07-24 RX ADMIN — PANTOPRAZOLE SODIUM 40 MG: 40 TABLET, DELAYED RELEASE ORAL at 08:07

## 2019-07-24 RX ADMIN — EPOETIN ALFA-EPBX 7000 UNITS: 10000 INJECTION, SOLUTION INTRAVENOUS; SUBCUTANEOUS at 12:07

## 2019-07-24 RX ADMIN — NYSTATIN AND TRIAMCINOLONE ACETONIDE: 100000; 1 CREAM TOPICAL at 09:07

## 2019-07-24 RX ADMIN — SODIUM CHLORIDE 1000 ML: 0.9 INJECTION, SOLUTION INTRAVENOUS at 12:07

## 2019-07-24 RX ADMIN — METOCLOPRAMIDE HYDROCHLORIDE 5 MG: 5 SOLUTION ORAL at 06:07

## 2019-07-24 RX ADMIN — MIDODRINE HYDROCHLORIDE 10 MG: 5 TABLET ORAL at 04:07

## 2019-07-24 RX ADMIN — MICONAZOLE NITRATE: 20 POWDER TOPICAL at 09:07

## 2019-07-24 RX ADMIN — SEVELAMER CARBONATE 1600 MG: 800 TABLET, FILM COATED ORAL at 08:07

## 2019-07-24 RX ADMIN — TRIAMCINOLONE ACETONIDE: 1 PASTE TOPICAL at 09:07

## 2019-07-24 RX ADMIN — MIDODRINE HYDROCHLORIDE 10 MG: 5 TABLET ORAL at 08:07

## 2019-07-24 RX ADMIN — ZINC OXIDE TOPICAL OINT.: at 09:07

## 2019-07-24 RX ADMIN — GABAPENTIN 300 MG: 300 CAPSULE ORAL at 09:07

## 2019-07-24 RX ADMIN — FLUOXETINE 20 MG: 20 CAPSULE ORAL at 08:07

## 2019-07-24 RX ADMIN — NYSTATIN AND TRIAMCINOLONE ACETONIDE: 100000; 1 CREAM TOPICAL at 08:07

## 2019-07-24 RX ADMIN — VANCOMYCIN HYDROCHLORIDE 1000 MG: 1 INJECTION, POWDER, LYOPHILIZED, FOR SOLUTION INTRAVENOUS at 04:07

## 2019-07-24 RX ADMIN — METOCLOPRAMIDE HYDROCHLORIDE 5 MG: 5 SOLUTION ORAL at 05:07

## 2019-07-24 NOTE — PT/OT/SLP PROGRESS
Occupational Therapy  Visit Attempt    Patient Name:  Angel Farmer Jr.   MRN:  [id number]    Patient not seen today secondary to ELKIN in HD. Will follow-up later, as time permits.     INDY Santiago  7/24/2019

## 2019-07-24 NOTE — PROGRESS NOTES
"Discussed bowel movements with patient and Jamal in dialysis. Patient reports he had 2 bowel movements today in diaper, which has increased compared to normal chronic constipation after he received an enema. Patient not aware of stool appearance. Jamal reported light brown stool that was the consistency of "soft serve."     7/24/2019 3:53 PM Radha Castañeda M.D.      "

## 2019-07-24 NOTE — PROGRESS NOTES
The Sw left a message for Adelaide at [facility] informing her the pt is ready for d/c if they have medically accepted the pt.

## 2019-07-24 NOTE — PROGRESS NOTES
The Sw spoke to Adelaide at [facility] who states she will speak to her doctor about this pt and she will inform the Sw of the determination but states the pt looks appropriate for their facility. She will come see the pt today after hd.

## 2019-07-24 NOTE — PROGRESS NOTES
"[facility] Hospital Medicine  Progress Note    Patient Name: Angel aFrmer Jr.  MRN: [id number]  Patient Class: IP- Inpatient   Admission Date: 7/22/2019  Length of Stay: 1 days  Attending Physician: Jamal Millan MD  Primary Care Provider: Jamal Millan MD        Subjective:     Principal Problem:Symptomatic anemia      HPI:  Mr. Farmer is a 58 year old man with PMHx of ESRD on HD MWF, IDDM, HTN/HLD and MIKE was discharged on day of presentation for septic shock and bacteremia represented to the ED after a fall. He states after he got home, he tried to stand up to use the rest room and fell onto his knees. He denies dizziness, lightheadedness, HA, confusion, nausea or vomiting. He did not hit his head or have LOC. He states his legs "just came out from underneath him" and he was unable to support himself. He states he feels very weak and sad that he is unable to care for himself. Otherwise he denies chest pain, shortness of breath, abdominal pain, nausea, vomiting, diarrhea or constipation.     Overview/Hospital Course:  No notes on file    Interval History: No acute events overnight. No changes. Awaiting placement. Denies chest pain, sob, dizziness, weaknesses, leg or calf pain.    Review of Systems   Constitutional: Negative for chills and fever.   HENT: Negative.    Eyes: Negative.  Negative for blurred vision and photophobia.   Respiratory: Negative.  Negative for cough and shortness of breath.    Cardiovascular: Negative.  Negative for chest pain and palpitations.   Gastrointestinal: Positive for diarrhea. Negative for abdominal pain, heartburn, nausea and vomiting.   Genitourinary: Negative.    Musculoskeletal: Negative.    Skin: Negative.  Negative for rash.   Neurological: Negative.    Psychiatric/Behavioral: Negative.      Objective:     Vital Signs (Most Recent):  Temp: 98.4 °F (36.9 °C) (07/24/19 0812)  Pulse: 60 (07/24/19 0914)  Resp: 18 (07/24/19 0914)  BP: (!) 159/74 " (07/24/19 0812)  SpO2: 97 % (07/24/19 0914) Vital Signs (24h Range):  Temp:  [96.7 °F (35.9 °C)-98.4 °F (36.9 °C)] 98.4 °F (36.9 °C)  Pulse:  [52-68] 60  Resp:  [16-20] 18  SpO2:  [94 %-99 %] 97 %  BP: (113-179)/(58-85) 159/74     Weight: 124.8 kg (275 lb 2.2 oz)  Body mass index is 43.09 kg/m².    Intake/Output Summary (Last 24 hours) at 7/24/2019 0934  Last data filed at 7/24/2019 0320  Gross per 24 hour   Intake 250 ml   Output 2 ml   Net 248 ml      Physical Exam   Constitutional: He is oriented to person, place, and time and well-developed, well-nourished, and in no distress. No distress.   HENT:   Head: Normocephalic and atraumatic.   Eyes: Pupils are equal, round, and reactive to light. Conjunctivae and EOM are normal.   Neck: Normal range of motion. Neck supple.   Cardiovascular: Normal rate, regular rhythm and normal heart sounds.   Pulmonary/Chest: Effort normal and breath sounds normal.   Abdominal: Soft. Bowel sounds are normal. He exhibits no distension. There is no tenderness.   Musculoskeletal: Normal range of motion.   Neurological: He is alert and oriented to person, place, and time.   Skin: Skin is warm and dry.   Psychiatric: Affect normal.     Significant Labs:   BMP:   Recent Labs   Lab 07/24/19  0513   *   *   K 4.7   CL 96   CO2 25   BUN 58*   CREATININE 7.6*   CALCIUM 9.4   MG 2.6     CBC:   Recent Labs   Lab 07/22/19 2043 07/23/19  0524 07/24/19  0513   WBC 7.40 5.23 7.93   HGB 8.5* 8.5* 9.3*   HCT 26.5* 26.8* 29.0*   PLT 43* 41* 54*     CMP:   Recent Labs   Lab 07/22/19 2043 07/23/19 0524 07/24/19  0513   * 134* 135*   K 4.0 4.1 4.7   CL 95 95 96   CO2 29 28 25   * 107 118*   BUN 39* 45* 58*   CREATININE 5.4* 5.9* 7.6*   CALCIUM 9.0 9.2 9.4   PROT 6.2 5.9* 6.3   ALBUMIN 2.5* 2.4* 2.5*   BILITOT 0.9 0.9 0.7   ALKPHOS 231* 209* 208*   AST 29 29 27   ALT 44 39 36   ANIONGAP 11 11 14   EGFRNONAA 11* 10* 7*       Significant Imaging: I have reviewed all pertinent  imaging results/findings within the past 24 hours.      Assessment/Plan:      * Symptomatic anemia  Patient unable to walk, and fell hitting his knees  Hgb 8.5, 8.0 yesterday which seems to be a little lower than his baseline.  Will continue to monitor H/H.  Consider transfusion if falls under 8.        Impaired mobility and ADLs  Patient was recently admitted to hospital for septic shock   2/2 Enterococcus and Klebsiella in blood cultures  Likely deconditioning due to severe infection  PT/OT recommended home with home health, however he is unable to perform ADL's   Will re-consult PT/OT for further recommendations and placement in SNF vs Rehab facility        Bacteremia  Blood cultures grew Klebsiella and Enterococcus  Sensitivities return and narrow antibiotics  Currently requiring IV Vancomycin and PO Ciprofloxacin      ESRD (end stage renal disease) on dialysis  ESRD on HD MWF   No signs of fluid overload   Consult [facility], appreciate recs       Hyperlipidemia  Continue statin therapy       Diabetes mellitus, type 2  Hgb A1C 5 completed this week   Hold oral anti-hyperglycemics   Will cover with SSI   Goal BG <180   Accuchecks QID, AC and HS         VTE Risk Mitigation (From admission, onward)        Ordered     IP VTE HIGH RISK PATIENT  Once      07/23/19 0023     Place sequential compression device  Until discontinued      07/23/19 0023                Tom Stinson MD  Department of Hospital Medicine   [facility]

## 2019-07-24 NOTE — PROGRESS NOTES
The Sw spoke to Adelaide at [facility] who states she met with the pt in dialysis and the pt's agreeable to their facility. She spoke to Dr. Felton in reference to this pt as well. Adelaide will remain in contact with the Sw for the pt's estimated d/c date.

## 2019-07-24 NOTE — PT/OT/SLP PROGRESS
Physical Therapy      Patient Name:  Angel Farmer Jr.   MRN:  [id number]    Patient not seen today secondary to (pt in dialysis). Will follow-up tomorrow.    Luis Alfredo Beverly, PTA

## 2019-07-24 NOTE — PLAN OF CARE
Problem: Adult Inpatient Plan of Care  Goal: Plan of Care Review  Outcome: Ongoing (interventions implemented as appropriate)  1915H Patient is awake and orientedx4. Care plan explained to patient; he verbalized understanding. On room air, O2 saturation at 92-93%. Hooked to heart monitor running Sinus Tachycardia at 53-68bpm. Patient has a 22G right hand flushed and saline locked. Maintained on renal diet. Incontinence care done. Complained of lower back pain, repositioned him and provided pillow support. The abdomen is also very distended and rounded. Patient denies shortness of breath but had a nonproductive cough.  Due medications given. Encouraged/assisted to turn every 2 hours as tolerated. Maintained on fall risk precaution. Bed in lowest position, bed alarm on, call light/personal items within reach and instructed to call for help when needed. Will continue to monitor.

## 2019-07-24 NOTE — PROGRESS NOTES
Pharmacokinetic Assessment Follow Up: IV Vancomycin    Vancomycin serum concentration assessment(s):    The random level was drawned correctly and can be used to guide therapy at this time.    Vancomycin Regimen Plan:    Re-dose when the random level is less than 20 mcg/mL, next level to be drawn at 0400 on 7/29     Pharmacy will continue to follow and monitor vancomycin.    Please contact pharmacy at extension [phone] for questions regarding this assessment.    Thank you for the consult,   Shukri Vásquez     Patient brief summary:  Angel Farmer Jr. is a 58 y.o. male initiated on antimicrobial therapy with IV Vancomycin for treatment of suspected bacteremia    The patient received a loading dose, followed by the current treatment regimen: random levels with plan to redose when level is less than 20 mcg/mL    Drug Allergies:   Review of patient's allergies indicates:   Allergen Reactions    Keflex [cephalexin] Nausea Only       Actual Body Weight:   124.8kg    Renal Function:   Estimated Creatinine Clearance: 13.8 mL/min (A) (based on SCr of 7.6 mg/dL (H)).,     Dialysis Method (if applicable):  intermittent HD    CBC (last 72 hours):  Recent Labs   Lab Result Units 07/22/19 0434 07/22/19 2043 07/23/19 0524 07/24/19  0513   WBC K/uL 6.15 7.40 5.23 7.93   Hemoglobin g/dL 8.0* 8.5* 8.5* 9.3*   Hematocrit % 24.6* 26.5* 26.8* 29.0*   Platelets K/uL 31* 43* 41* 54*   Gran% % 72.7 69.7 61.9 66.2   Lymph% % 15.3* 13.6* 20.3 16.5*   Mono% % 10.4 15.0 15.9* 15.6*   Eosinophil% % 1.6 1.6 1.7 1.4   Basophil% % 0.0 0.1 0.2 0.3   Differential Method  Automated Automated Automated Automated       Metabolic Panel (last 72 hours):  Recent Labs   Lab Result Units 07/22/19 0434 07/22/19 2043 07/23/19 0524 07/24/19  0513   Sodium mmol/L 130* 135* 134* 135*   Potassium mmol/L 4.8 4.0 4.1 4.7   Chloride mmol/L 97 95 95 96   CO2 mmol/L 21* 29 28 25   Glucose mg/dL 129* 134* 107 118*   BUN, Bld mg/dL 74* 39* 45* 58*   Creatinine  mg/dL 8.6* 5.4* 5.9* 7.6*   Albumin g/dL 2.2* 2.5* 2.4* 2.5*   Total Bilirubin mg/dL 0.8 0.9 0.9 0.7   Alkaline Phosphatase U/L 210* 231* 209* 208*   AST U/L 32 29 29 27   ALT U/L 48* 44 39 36   Magnesium mg/dL 2.7*  --  2.4 2.6   Phosphorus mg/dL 4.1  --  4.1 6.0*       Vancomycin Administrations:  vancomycin given in the last 96 hours                     vancomycin in dextrose 5 % 1 gram/250 mL IVPB 1,000 mg (mg) 1,000 mg New Bag 07/22/19 0846                      Drug levels (last 3 results):  Recent Labs   Lab Result Units 07/22/19  0434 07/24/19  0513   Vancomycin, Random ug/mL 18.9 17.3       Microbiologic Results:  Microbiology Results (last 7 days)       ** No results found for the last 168 hours. **

## 2019-07-24 NOTE — PROGRESS NOTES
Per Adelaide at [facility] the pt has been sent to their medical doctor for review and she's awaiting the answer.

## 2019-07-24 NOTE — PLAN OF CARE
Problem: Adult Inpatient Plan of Care  Goal: Plan of Care Review  Patient on room air with documented SATS and in no apparent distress. Will continue to monitor.

## 2019-07-24 NOTE — PROCEDURES
Pt seen and examined on HD, tolerating procedure well  Review of Systems   Constitutional: Negative for chills and fever.   Respiratory: Negative for cough and shortness of breath.    Cardiovascular: Negative for chest pain and leg swelling.   Gastrointestinal: Negative for nausea.     ,   AP -190  +130  Temp:  [98.2 °F (36.8 °C)-98.4 °F (36.9 °C)]   Pulse:  [54-67]   Resp:  [18-20]   BP: (111-179)/(68-87)   SpO2:  [96 %-99 %]     Physical Exam   Constitutional: He is oriented to person, place, and time and well-developed, well-nourished, and in no distress. No distress.   Morbidly obese   HENT:   Head: Normocephalic and atraumatic.   Mouth/Throat: Oropharynx is clear and moist.   Eyes: EOM are normal. No scleral icterus.   Neck: Neck supple. No JVD present.   Cardiovascular: Normal rate and regular rhythm. Exam reveals no friction rub.   No murmur heard.  Pulmonary/Chest: Effort normal and breath sounds normal. No respiratory distress. He has no wheezes. He has no rales.   Abdominal: Soft. Bowel sounds are normal. He exhibits no distension. There is no tenderness.   Musculoskeletal: He exhibits no edema.   Neurological: He is alert and oriented to person, place, and time.   Skin: Skin is warm and dry. No rash noted. He is not diaphoretic. No erythema.   Psychiatric: Affect normal.     Recent Labs   Lab 07/23/19 0524 07/24/19  0513   WBC 5.23 7.93   HGB 8.5* 9.3*   HCT 26.8* 29.0*   PLT 41* 54*     Recent Labs   Lab 07/23/19 0524 07/24/19  0513   * 135*   K 4.1 4.7   CL 95 96   CO2 28 25   BUN 45* 58*   CREATININE 5.9* 7.6*   CALCIUM 9.2 9.4     A/P  1. ESRD (N18.6 Z99.2) - on HD with Dr. Araceli Pickens in [facility] MWF, 4.5 hours, EDW 125kg  2. HTN (I10) - chronic hypotension, on midodirine 10 TID  3. Anemia of chronic kidney disease treated with JUANITO (N18.9 D63.1) -  epogen 10K today  Recent Labs   Lab 07/22/19  2043 07/23/19  0524 07/24/19  0513   HGB 8.5* 8.5* 9.3*   HCT 26.5* 26.8* 29.0*    PLT 43* 41* 54*       Iron on hold due to FUO  Lab Results   Component Value Date    IRON 169 (H) 03/01/2019    TIBC 218 (L) 03/01/2019    FERRITIN 914 (H) 03/01/2019       4. MBD (E88.9 M90.80) - increase renvela to 2400  Recent Labs   Lab 07/24/19  0513   CALCIUM 9.4   PHOS 6.0*     Recent Labs   Lab 07/22/19  0434 07/23/19  0524 07/24/19  0513   MG 2.7* 2.4 2.6         5. Hemodialysis Access (Z99.2 V45.11)-   6. Nutrition/Hypoalbuminemia (E88.09) - L wrist AVF, recent ballon PTA by Dr. Moran in [facility]  Recent Labs   Lab 07/18/19  0459  07/23/19  0524 07/24/19  0513   LABPROT 11.9  --   --   --    ALBUMIN 3.0*   < > 2.4* 2.5*    < > = values in this interval not displayed.     Nepro with meals TID. Renal vitamins daily    7. DM2  8. Severe MIKE- BiPAP 16/8      Thank you for allowing me to participate in care of your patient  With any question please call [phone]  Margoth Felton    [facility]  SHALA Forrest MD, KADEN MITCHELL MD,   MD GEORGIA Walker, NP  [address]  [place], [address] [phone]  After hours answering service: [phone]

## 2019-07-25 VITALS
OXYGEN SATURATION: 98 % | BODY MASS INDEX: 43.04 KG/M2 | WEIGHT: 274.25 LBS | HEIGHT: 67 IN | TEMPERATURE: 99 F | RESPIRATION RATE: 19 BRPM | HEART RATE: 60 BPM | SYSTOLIC BLOOD PRESSURE: 160 MMHG | DIASTOLIC BLOOD PRESSURE: 73 MMHG

## 2019-07-25 LAB
ALBUMIN SERPL BCP-MCNC: 2.3 G/DL (ref 3.5–5.2)
ALP SERPL-CCNC: 181 U/L (ref 55–135)
ALT SERPL W/O P-5'-P-CCNC: 28 U/L (ref 10–44)
ANION GAP SERPL CALC-SCNC: 10 MMOL/L (ref 8–16)
AST SERPL-CCNC: 23 U/L (ref 10–40)
BACTERIA BLD CULT: NORMAL
BACTERIA BLD CULT: NORMAL
BASOPHILS # BLD AUTO: 0.01 K/UL (ref 0–0.2)
BASOPHILS NFR BLD: 0.2 % (ref 0–1.9)
BILIRUB SERPL-MCNC: 0.6 MG/DL (ref 0.1–1)
BUN SERPL-MCNC: 36 MG/DL (ref 6–20)
CALCIUM SERPL-MCNC: 8.9 MG/DL (ref 8.7–10.5)
CHLORIDE SERPL-SCNC: 101 MMOL/L (ref 95–110)
CO2 SERPL-SCNC: 25 MMOL/L (ref 23–29)
CREAT SERPL-MCNC: 5.4 MG/DL (ref 0.5–1.4)
DIFFERENTIAL METHOD: ABNORMAL
EOSINOPHIL # BLD AUTO: 0.1 K/UL (ref 0–0.5)
EOSINOPHIL NFR BLD: 1.7 % (ref 0–8)
ERYTHROCYTE [DISTWIDTH] IN BLOOD BY AUTOMATED COUNT: 14.7 % (ref 11.5–14.5)
EST. GFR  (AFRICAN AMERICAN): 13 ML/MIN/1.73 M^2
EST. GFR  (NON AFRICAN AMERICAN): 11 ML/MIN/1.73 M^2
GLUCOSE SERPL-MCNC: 103 MG/DL (ref 70–110)
HCT VFR BLD AUTO: 25.9 % (ref 40–54)
HGB BLD-MCNC: 8.2 G/DL (ref 14–18)
LYMPHOCYTES # BLD AUTO: 1.1 K/UL (ref 1–4.8)
LYMPHOCYTES NFR BLD: 17.9 % (ref 18–48)
MAGNESIUM SERPL-MCNC: 2.3 MG/DL (ref 1.6–2.6)
MCH RBC QN AUTO: 34.5 PG (ref 27–31)
MCHC RBC AUTO-ENTMCNC: 31.7 G/DL (ref 32–36)
MCV RBC AUTO: 109 FL (ref 82–98)
MONOCYTES # BLD AUTO: 0.8 K/UL (ref 0.3–1)
MONOCYTES NFR BLD: 13.7 % (ref 4–15)
NEUTROPHILS # BLD AUTO: 3.9 K/UL (ref 1.8–7.7)
NEUTROPHILS NFR BLD: 66.5 % (ref 38–73)
PHOSPHATE SERPL-MCNC: 5.1 MG/DL (ref 2.7–4.5)
PLATELET # BLD AUTO: 68 K/UL (ref 150–350)
PMV BLD AUTO: 11.6 FL (ref 9.2–12.9)
POCT GLUCOSE: 102 MG/DL (ref 70–110)
POCT GLUCOSE: 104 MG/DL (ref 70–110)
POCT GLUCOSE: 123 MG/DL (ref 70–110)
POTASSIUM SERPL-SCNC: 3.9 MMOL/L (ref 3.5–5.1)
PROT SERPL-MCNC: 5.8 G/DL (ref 6–8.4)
RBC # BLD AUTO: 2.38 M/UL (ref 4.6–6.2)
SODIUM SERPL-SCNC: 136 MMOL/L (ref 136–145)
WBC # BLD AUTO: 5.91 K/UL (ref 3.9–12.7)

## 2019-07-25 PROCEDURE — 25000003 PHARM REV CODE 250: Performed by: INTERNAL MEDICINE

## 2019-07-25 PROCEDURE — 97530 THERAPEUTIC ACTIVITIES: CPT

## 2019-07-25 PROCEDURE — 80053 COMPREHEN METABOLIC PANEL: CPT

## 2019-07-25 PROCEDURE — 25000003 PHARM REV CODE 250: Performed by: STUDENT IN AN ORGANIZED HEALTH CARE EDUCATION/TRAINING PROGRAM

## 2019-07-25 PROCEDURE — 25000003 PHARM REV CODE 250: Performed by: FAMILY MEDICINE

## 2019-07-25 PROCEDURE — 84100 ASSAY OF PHOSPHORUS: CPT

## 2019-07-25 PROCEDURE — 94761 N-INVAS EAR/PLS OXIMETRY MLT: CPT

## 2019-07-25 PROCEDURE — 83735 ASSAY OF MAGNESIUM: CPT

## 2019-07-25 PROCEDURE — 97116 GAIT TRAINING THERAPY: CPT

## 2019-07-25 PROCEDURE — 97110 THERAPEUTIC EXERCISES: CPT

## 2019-07-25 PROCEDURE — 85025 COMPLETE CBC W/AUTO DIFF WBC: CPT

## 2019-07-25 PROCEDURE — 36415 COLL VENOUS BLD VENIPUNCTURE: CPT

## 2019-07-25 PROCEDURE — 97535 SELF CARE MNGMENT TRAINING: CPT

## 2019-07-25 RX ORDER — CIPROFLOXACIN 500 MG/1
500 TABLET ORAL DAILY
Qty: 7 TABLET | Refills: 0 | Status: SHIPPED | OUTPATIENT
Start: 2019-07-25 | End: 2019-08-01

## 2019-07-25 RX ADMIN — PRAVASTATIN SODIUM 40 MG: 40 TABLET ORAL at 09:07

## 2019-07-25 RX ADMIN — TRIAMCINOLONE ACETONIDE: 1 PASTE TOPICAL at 09:07

## 2019-07-25 RX ADMIN — PANTOPRAZOLE SODIUM 40 MG: 40 TABLET, DELAYED RELEASE ORAL at 09:07

## 2019-07-25 RX ADMIN — NYSTATIN AND TRIAMCINOLONE ACETONIDE: 100000; 1 CREAM TOPICAL at 09:07

## 2019-07-25 RX ADMIN — ZINC OXIDE TOPICAL OINT.: at 10:07

## 2019-07-25 RX ADMIN — SEVELAMER CARBONATE 2400 MG: 800 TABLET, FILM COATED ORAL at 10:07

## 2019-07-25 RX ADMIN — MIDODRINE HYDROCHLORIDE 10 MG: 5 TABLET ORAL at 05:07

## 2019-07-25 RX ADMIN — FLUOXETINE 20 MG: 20 CAPSULE ORAL at 10:07

## 2019-07-25 RX ADMIN — SEVELAMER CARBONATE 2400 MG: 800 TABLET, FILM COATED ORAL at 05:07

## 2019-07-25 RX ADMIN — CIPROFLOXACIN HYDROCHLORIDE 500 MG: 500 TABLET, FILM COATED ORAL at 10:07

## 2019-07-25 RX ADMIN — MICONAZOLE NITRATE: 20 POWDER TOPICAL at 09:07

## 2019-07-25 RX ADMIN — MIDODRINE HYDROCHLORIDE 10 MG: 5 TABLET ORAL at 10:07

## 2019-07-25 RX ADMIN — SEVELAMER CARBONATE 2400 MG: 800 TABLET, FILM COATED ORAL at 12:07

## 2019-07-25 NOTE — PLAN OF CARE
07/25/19 1536   Final Note   Assessment Type Final Discharge Note   Anticipated Discharge Disposition Rehab   Hospital Follow Up  Appt(s) scheduled? Yes   Discharge plans and expectations educations in teach back method with documentation complete? Yes   [facility] Referral Info   Post Acute Recommendation IRF   Referral Type [facility]   Facility Name [facility] [place], [place]

## 2019-07-25 NOTE — PROGRESS NOTES
The Sw spoke to Adelaide and she's waiting for the pt's OT notes and then she will call her medical director. She states the PT notes look good.

## 2019-07-25 NOTE — PLAN OF CARE
Problem: Physical Therapy Goal  Goal: Physical Therapy Goal  Goals to be met by: 2019     Patient will increase functional independence with mobility by performin. Supine to sit with MInimal Assistance  2. Sit to stand transfer with stand by assistance  3. Bed to chair transfer with stand by assistance using Rolling Walker  4. Gait  x 25 feet with Stand-by Assistance using Rolling Walker.      Outcome: Ongoing (interventions implemented as appropriate)  Goals ongoing

## 2019-07-25 NOTE — DISCHARGE SUMMARY
"[facility] Hospital Medicine  Discharge Summary      Patient Name: Angel Farmer Jr.  MRN: [id number]  Admission Date: 7/22/2019  Hospital Length of Stay: 2 days  Discharge Date and Time:  07/25/2019 4:26 PM  Attending Physician: Jamal Millan MD   Discharging Provider: Tom Stinson MD  Primary Care Provider: Jamal Millan MD      HPI:   Mr. Farmer is a 58 year old man with PMHx of ESRD on HD MWF, IDDM, HTN/HLD and MIKE was discharged on day of presentation for septic shock and bacteremia represented to the ED after a fall. He states after he got home, he tried to stand up to use the rest room and fell onto his knees. He denies dizziness, lightheadedness, HA, confusion, nausea or vomiting. He did not hit his head or have LOC. He states his legs "just came out from underneath him" and he was unable to support himself. He states he feels very weak and sad that he is unable to care for himself. Otherwise he denies chest pain, shortness of breath, abdominal pain, nausea, vomiting, diarrhea or constipation.     * No surgery found *      Hospital Course:   57 yo with history of ESRD, type 2 diabetes, HTN, MIKE who was discharged on 7/23 and returned later that evening after a fall when he arrived home. Presented to us with symptomatic anemia Hgb was 8.5 on arrival which was not much different than it was during entire previous hospital stay. During his admission we continued his antibiotics from previous admission, monitored his H/H which never dropped below a level for need for intervention. He agreed that the reason he fell was due to muscle deconditioning from his previous admission to hospital and worked with PT/OT and felt he was ready to be discharged to rehab facility. He did have a stint of loose stools during this admission, but was most likely due to treatment with antibiotics in combination with the laxatives that was treating his fecal impaction from last visit. Other etiologies of " loose stools were ruled out clinically. Will continue on outpatient antibiotics until finished with regimen for his previous bacteremia. Patient is tolerating diet and ready to go home.      Consults:   Consults (From admission, onward)        Status Ordering Provider     Case Management  Once     Provider:  (Not yet assigned)    Acknowledged REJI PLATT     Inpatient consult to [facility] (Celio Forrest, Purnima)  Once     Provider:  (Not yet assigned)    Acknowledged REJI PLATT     Inpatient consult to Social Work  Once     Provider:  (Not yet assigned)    Acknowledged KELSEY JAMES     Inpatient consult to Social Work/Case Management  Once     Provider:  (Not yet assigned)    Acknowledged KELSEY JAMES     Pharmacy to dose Vancomycin consult  Once     Provider:  (Not yet assigned)    Acknowledged GENE DELGADO          * Symptomatic anemia  Patient unable to walk, and fell hitting his knees  Hgb 8.5, 8.0 yesterday which seems to be a little lower than his baseline.    Hgb never dropped to a point that needed intervention.        Impaired mobility and ADLs  Patient was recently admitted to hospital for septic shock   2/2 Enterococcus and Klebsiella in blood cultures  Likely deconditioning due to severe infection      PT OT 5 days a week in rehab facility         Bacteremia  Blood cultures grew Klebsiella and Enterococcus    Continue antibiotics cipro and vanc outpatient until regimen is finished         Final Active Diagnoses:    Diagnosis Date Noted POA    PRINCIPAL PROBLEM:  Symptomatic anemia [D64.9] 01/13/2014 Unknown    Impaired mobility and ADLs [Z74.09] 03/14/2019 Unknown    Bacteremia [R78.81] 04/30/2017 Yes    ESRD (end stage renal disease) on dialysis [N18.6, Z99.2] 03/18/2014 Not Applicable    Diabetes mellitus, type 2 [E11.9]  Yes    Hyperlipidemia [E78.5]  Yes      Problems Resolved During this Admission:       Discharged Condition:  good    Disposition: Another Health Care Inst*    Follow Up:    Patient Instructions:      Diet Cardiac     Diet diabetic     Notify your health care provider if you experience any of the following:  temperature >100.4     Notify your health care provider if you experience any of the following:  persistent nausea and vomiting or diarrhea     Notify your health care provider if you experience any of the following:  severe uncontrolled pain     Notify your health care provider if you experience any of the following:  redness, tenderness, or signs of infection (pain, swelling, redness, odor or green/yellow discharge around incision site)     Notify your health care provider if you experience any of the following:  difficulty breathing or increased cough     Notify your health care provider if you experience any of the following:  severe persistent headache     Notify your health care provider if you experience any of the following:  worsening rash     Notify your health care provider if you experience any of the following:  persistent dizziness, light-headedness, or visual disturbances     Notify your health care provider if you experience any of the following:  increased confusion or weakness     Activity as tolerated       Significant Diagnostic Studies: Labs:   BMP:   Recent Labs   Lab 07/24/19 0513 07/25/19 0622   * 103   * 136   K 4.7 3.9   CL 96 101   CO2 25 25   BUN 58* 36*   CREATININE 7.6* 5.4*   CALCIUM 9.4 8.9   MG 2.6 2.3   , CMP   Recent Labs   Lab 07/24/19 0513 07/25/19 0622   * 136   K 4.7 3.9   CL 96 101   CO2 25 25   * 103   BUN 58* 36*   CREATININE 7.6* 5.4*   CALCIUM 9.4 8.9   PROT 6.3 5.8*   ALBUMIN 2.5* 2.3*   BILITOT 0.7 0.6   ALKPHOS 208* 181*   AST 27 23   ALT 36 28   ANIONGAP 14 10   ESTGFRAFRICA 8* 13*   EGFRNONAA 7* 11*    and CBC   Recent Labs   Lab 07/24/19 0513 07/25/19 0622   WBC 7.93 5.91   HGB 9.3* 8.2*   HCT 29.0* 25.9*   PLT 54* 68*       Pending  "Diagnostic Studies:     None         Medications:  Reconciled Home Medications:      Medication List      CHANGE how you take these medications    midodrine 10 MG tablet  Commonly known as:  PROAMATINE  Take 2 tablets (20 mg total) by mouth every 6 (six) hours.  What changed:    · how much to take  · when to take this     pravastatin 40 MG tablet  Commonly known as:  PRAVACHOL  Take 1 tablet (40 mg total) by mouth once daily.  What changed:    · how much to take  · when to take this        CONTINUE taking these medications    acetaminophen 500 MG tablet  Commonly known as:  TYLENOL  Take 1 tablet (500 mg total) by mouth every 6 (six) hours as needed.     BD ULTRA-FINE MINI PEN NEEDLE 31 gauge x 3/16" Ndle  Generic drug:  pen needle, diabetic  USE 4 TO 5 TIMES A DAY     ( DISCARD PEN NEEDLE AFTER EACH USE )     blood-glucose meter Misc  Commonly known as:  ADVANCED GLUCOSE METER  1 each by Misc.(Non-Drug; Combo Route) route 4 (four) times daily with meals and nightly.     brinzolamide 1 % ophthalmic suspension  Commonly known as:  AZOPT  Place 1 drop into both eyes 2 (two) times daily.     ciprofloxacin HCl 500 MG tablet  Commonly known as:  CIPRO  Take 1 tablet (500 mg total) by mouth once daily Please take after dialysis on dialysis days for 10 days     epoetin shefali 3,000 unit/mL Soln 3,000 Units, epoetin shefali 4,000 unit/mL Soln 4,000 Units injection  Inject 7,000 Units into the skin every Mon, Wed, Fri.     fluticasone propionate 50 mcg/actuation nasal spray  Commonly known as:  FLONASE  1 spray by Each Nare route once daily.     gabapentin 300 MG capsule  Commonly known as:  NEURONTIN  TAKE 1 BY MOUTH EVERY      EVENING     heparin (porcine) 5,000 unit/mL injection  Inject 1 mL (5,000 Units total) into the skin every 8 (eight) hours.     insulin aspart U-100 100 unit/mL (3 mL) Inpn pen  Commonly known as:  NovoLOG  Inject 0-5 Units into the skin before meals and at bedtime as needed (Hyperglycemia).     ketorolac " 0.4% 0.4 % Drop  Commonly known as:  ACULAR  Place 1 drop into the right eye 4 (four) times daily.     lancets Misc  1 each by Misc.(Non-Drug; Combo Route) route 4 (four) times daily with meals and nightly.     miconazole NITRATE 2 % 2 % top powder  Commonly known as:  MICOTIN  Apply topically 2 (two) times daily as needed (moisture).     nystatin powder  Commonly known as:  MYCOSTATIN  Apply topically 2 (two) times daily. To sacral region     ONETOUCH ULTRA BLUE TEST STRIP Strp  Generic drug:  blood sugar diagnostic  USE ONE STRIP TO CHECK BLOOD GLUCOSE 4 TIMES DAILY.AT MORNING, NOON,6:OO PM AND BEDTIME.     pantoprazole 40 mg Grps  Commonly known as:  PROTONIX  Take 1 packet (40 mg total) by mouth 2 (two) times daily.     RENAL VITAMIN ORAL  Take 1 tablet by mouth once daily.     VANCOMYCIN 1 G/250 ML D5W (READY TO MIX SYSTEM)  Inject 250 mLs (1 g total) into the vein every Mon, Wed, Fri. for 6 days  Start taking on:  7/26/2019            Indwelling Lines/Drains at time of discharge:   Lines/Drains/Airways     Drain                 Hemodialysis AV Fistula 12/23/12 Left forearm 2405 days                Time spent on the discharge of patient: 40 minutes  Patient was seen and examined on the date of discharge and determined to be suitable for discharge.         Tom Stinson MD  Department of Hospital Medicine  [facility]

## 2019-07-25 NOTE — ASSESSMENT & PLAN NOTE
Patient was recently admitted to hospital for septic shock   2/2 Enterococcus and Klebsiella in blood cultures  Likely deconditioning due to severe infection      PT OT 5 days a week in rehab facility

## 2019-07-25 NOTE — ASSESSMENT & PLAN NOTE
Patient unable to walk, and fell hitting his knees  Hgb 8.5, 8.0 yesterday which seems to be a little lower than his baseline.    Hgb never dropped to a point that needed intervention.

## 2019-07-25 NOTE — PROGRESS NOTES
The Sw arranged a 6pm w/c van transport for the pt to [facility]. The Sw informed the pt's nurse of the above mentioned info and will give her the contact info to call report. The Sw met with the pt and he's agreeable to d/c to [facility] today but understands if the docs at [facility] doesn't sign the evaluation today it will be a d/c for tomorrow. The pt signed the pt choice form and the Sw placed it in the pt's chart.  The Sw called the pt's sister in law but the phone just rung. The pt states he will call his sister in law and brother to inform them of the above mentioned info.     3:30pm The Sw received a call from Adelaide stating the dr signed the evaluation and the pt can arrive. She asked the Sw to see if the transport can be switched to 5pm. The Sw called [facility]  [phone] spoke to Julio Cesar and requested a 5pm w/c van transport instead of a 5pm and he stated he will switch it but asked the Sw to call back at 4pm to check on the estimated time of arrival.

## 2019-07-25 NOTE — PROGRESS NOTES
The Sw spoke to Julio Cesar at [facility] and transport will arrive at 6pm due to them being slammed.

## 2019-07-25 NOTE — PROGRESS NOTES
The Sw spoke to the team and Nephrology has cleared the pt for d/c. The Sw informed Adelaide of the above mentioned info via [facility] and voice message.      3:03pm The Sw faxed the d/c orders to Adelaide at [facility]. She asked the Sw to arrange the transport for a later time due to her docs not signing off on the evaluation yet.

## 2019-07-25 NOTE — PROGRESS NOTES
The Sw spoke to the team and they will speak with Nephrology to see if the pt's ready for d/c today vs tomorrow. They will call the Sw back with a determination.

## 2019-07-25 NOTE — PROGRESS NOTES
The Sw received a call from Adelaide at [facility] who states they are medically accepting the pt but they want to see th ept's therapy notes from today b/c they diodn't work with the pt yesterday. Therapy has been made aware and they are currently working with the pt and they will document the notes.

## 2019-07-25 NOTE — PROGRESS NOTES
"[facility] Hospital Medicine  Progress Note    Patient Name: Angel Farmer Jr.  MRN: [id number]  Patient Class: IP- Inpatient   Admission Date: 7/22/2019  Length of Stay: 2 days  Attending Physician: Jamal Millan MD  Primary Care Provider: Jamal Millan MD        Subjective:     Principal Problem:Symptomatic anemia      HPI:  Mr. Farmer is a 58 year old man with PMHx of ESRD on HD MWF, IDDM, HTN/HLD and MIKE was discharged on day of presentation for septic shock and bacteremia represented to the ED after a fall. He states after he got home, he tried to stand up to use the rest room and fell onto his knees. He denies dizziness, lightheadedness, HA, confusion, nausea or vomiting. He did not hit his head or have LOC. He states his legs "just came out from underneath him" and he was unable to support himself. He states he feels very weak and sad that he is unable to care for himself. Otherwise he denies chest pain, shortness of breath, abdominal pain, nausea, vomiting, diarrhea or constipation.     Overview/Hospital Course:  No notes on file    Interval History: No acute events overnight. Nurse has mentioned that he has had some loose bowel movements/diarrhea. No complaints other than that. Will make sure he does not receive and more stool softener. Patients states he is ready to go to rehab center as long as he is cleared to do so. Denies chest pain, sob, abdominal pain, leg or calf pain.    Review of Systems   Constitutional: Negative for activity change, appetite change, fatigue and unexpected weight change.   HENT: Negative for congestion, hearing loss, postnasal drip, rhinorrhea and sinus pain.    Eyes: Negative for photophobia and visual disturbance.   Respiratory: Negative for apnea, cough, choking, chest tightness, shortness of breath and wheezing.    Cardiovascular: Negative for chest pain, palpitations and leg swelling.   Gastrointestinal: Negative for abdominal distention, " abdominal pain, constipation, diarrhea, nausea and vomiting.   Endocrine: Negative for cold intolerance, heat intolerance and polyuria.   Genitourinary: Negative for flank pain.   Musculoskeletal: Negative for arthralgias, back pain, gait problem, joint swelling and myalgias.   Skin: Negative for rash.   Neurological: Negative for dizziness, tremors, seizures, syncope, weakness, light-headedness, numbness and headaches.   Hematological: Negative for adenopathy. Does not bruise/bleed easily.   Psychiatric/Behavioral: Negative for agitation, behavioral problems, dysphoric mood, self-injury, sleep disturbance and suicidal ideas.     Objective:     Vital Signs (Most Recent):  Temp: 98.4 °F (36.9 °C) (07/25/19 0755)  Pulse: (!) 59 (07/25/19 0842)  Resp: 18 (07/25/19 0842)  BP: (!) 168/73 (07/25/19 0755)  SpO2: 99 % (07/25/19 0842) Vital Signs (24h Range):  Temp:  [96.6 °F (35.9 °C)-98.7 °F (37.1 °C)] 98.4 °F (36.9 °C)  Pulse:  [55-95] 59  Resp:  [16-18] 18  SpO2:  [94 %-99 %] 99 %  BP: ()/(56-87) 168/73     Weight: 124.4 kg (274 lb 4 oz)  Body mass index is 42.95 kg/m².    Intake/Output Summary (Last 24 hours) at 7/25/2019 0903  Last data filed at 7/25/2019 0531  Gross per 24 hour   Intake 670 ml   Output 2000 ml   Net -1330 ml      Physical Exam   Constitutional: He is oriented to person, place, and time. He appears well-developed and well-nourished. No distress.   HENT:   Head: Normocephalic and atraumatic.   Nose: Nose normal.   Mouth/Throat: Oropharynx is clear and moist. No oropharyngeal exudate.   Eyes: Pupils are equal, round, and reactive to light. Conjunctivae and EOM are normal. Right eye exhibits no discharge. Left eye exhibits no discharge. No scleral icterus.   Neck: Normal range of motion. Neck supple.   Cardiovascular: Normal rate, regular rhythm, normal heart sounds and intact distal pulses. Exam reveals no gallop and no friction rub.   No murmur heard.  Pulmonary/Chest: Effort normal and breath  sounds normal. No stridor. He has no wheezes. He exhibits no tenderness.   Abdominal: Soft. Bowel sounds are normal. He exhibits no distension. There is no tenderness.   Musculoskeletal: Normal range of motion. He exhibits no edema, tenderness or deformity.   Lymphadenopathy:     He has no cervical adenopathy.   Neurological: He is alert and oriented to person, place, and time.   Skin: Skin is warm and dry. Capillary refill takes less than 2 seconds. No rash noted. He is not diaphoretic.   Psychiatric: He has a normal mood and affect. His behavior is normal.   Nursing note and vitals reviewed.      Significant Labs:   BMP:   Recent Labs   Lab 07/25/19 0622         K 3.9      CO2 25   BUN 36*   CREATININE 5.4*   CALCIUM 8.9   MG 2.3     CBC:   Recent Labs   Lab 07/24/19 0513 07/25/19 0622   WBC 7.93 5.91   HGB 9.3* 8.2*   HCT 29.0* 25.9*   PLT 54* 68*     CMP:   Recent Labs   Lab 07/24/19 0513 07/25/19 0622   * 136   K 4.7 3.9   CL 96 101   CO2 25 25   * 103   BUN 58* 36*   CREATININE 7.6* 5.4*   CALCIUM 9.4 8.9   PROT 6.3 5.8*   ALBUMIN 2.5* 2.3*   BILITOT 0.7 0.6   ALKPHOS 208* 181*   AST 27 23   ALT 36 28   ANIONGAP 14 10   EGFRNONAA 7* 11*       Significant Imaging: I have reviewed all pertinent imaging results/findings within the past 24 hours.      Assessment/Plan:      * Symptomatic anemia  Patient unable to walk, and fell hitting his knees  Hgb 8.5, 8.0 yesterday which seems to be a little lower than his baseline.  Will continue to monitor H/H.  Consider transfusion if falls under 8.        Impaired mobility and ADLs  Patient was recently admitted to hospital for septic shock   2/2 Enterococcus and Klebsiella in blood cultures  Likely deconditioning due to severe infection  PT/OT recommended home with home health, however he is unable to perform ADL's   Will re-consult PT/OT for further recommendations and placement in SNF vs Rehab facility        Bacteremia  Blood  cultures grew Klebsiella and Enterococcus  Sensitivities return and narrow antibiotics  Currently requiring IV Vancomycin and PO Ciprofloxacin      ESRD (end stage renal disease) on dialysis  ESRD on HD MWF   No signs of fluid overload   Consult [facility], appreciate recs       Hyperlipidemia  Continue statin therapy       Diabetes mellitus, type 2  Hgb A1C 5 completed this week   Hold oral anti-hyperglycemics   Will cover with SSI   Goal BG <180   Accuchecks QID, AC and HS         VTE Risk Mitigation (From admission, onward)        Ordered     IP VTE HIGH RISK PATIENT  Once      07/23/19 0023     Place sequential compression device  Until discontinued      07/23/19 0023                Tom Stinson MD  Department of Hospital Medicine   [facility]

## 2019-07-25 NOTE — ASSESSMENT & PLAN NOTE
Blood cultures grew Klebsiella and Enterococcus    Continue antibiotics cipro and vanc outpatient until regimen is finished

## 2019-07-25 NOTE — PLAN OF CARE
Problem: Occupational Therapy Goal  Goal: Occupational Therapy Goal  Goals to be met by: 08/23/2019      Patient will increase functional independence with ADLs by performing:    Feeding with Modified [place].  UE Dressing with Minimal Assistance.  Grooming while seated with Stand-by Assistance.  Rolling to Bilateral with Minimal Assistance.   Supine to sit with Moderate Assistance. --MET 7/25  BUE HEP assistance as needed      Outcome: Ongoing (interventions implemented as appropriate)  Patient demonstrating improved bed mobility and functional transitions this date. Patient /c limited ROM in BUEs limiting his ability to self-feed and perform many ADLs. Patient would benefit greatly from IPR to provide continued skilled OT to address deficits and improve performance in functional ADL tasks. Patient will benefit from IPR assessment of adaptive/modified utensils and devices for feeding, G/H, and dressing tasks.

## 2019-07-25 NOTE — PT/OT/SLP PROGRESS
Physical Therapy Treatment    Patient Name:  Angel Farmer Jr.   MRN:  [id number]    Recommendations:     Discharge Recommendations:  rehabilitation facility(spoke with evaluating PT about IPR request)   Discharge Equipment Recommendations: none   Barriers to discharge: Decreased caregiver support and decreased mobility,endurance and strength    Assessment:     Angel Farmer Jr. is a 58 y.o. male admitted with a medical diagnosis of Symptomatic anemia.  He presents with the following impairments/functional limitations:  weakness, impaired endurance, impaired functional mobilty, gait instability, impaired balance, decreased lower extremity function, pain, decreased ROM, impaired coordination, impaired sensation,pt with improving status and requires assistance with all mobility at this time,pt with minimal help at home and will benefit from IPR services upon discharge.    Rehab Prognosis: Good; patient would benefit from acute skilled PT services to address these deficits and reach maximum level of function.    Recent Surgery: * No surgery found *      Plan:     During this hospitalization, patient to be seen 6 x/week to address the identified rehab impairments via gait training, therapeutic activities, therapeutic exercises and progress toward the following goals:    · Plan of Care Expires:  08/23/19    Subjective     Chief Complaint: n/a  Patient/Family Comments/goals: pt wants to get better.  Pain/Comfort:  · Pain Rating 1: (no rating)  · Location - Side 1: Right  · Location - Orientation 1: generalized  · Location 1: thigh  · Pain Addressed 1: Reposition, Distraction      Objective:     Communicated with nsg prior to session.  Patient found supine with bed alarm, telemetry upon PT entry to room.     General Precautions: Standard, fall   Orthopedic Precautions:N/A   Braces: N/A     Functional Mobility:  · Bed Mobility:     · Supine to Sit: moderate assistance  · Sit to Supine: moderate assistance, maximal assistance  and at le's  · Transfers:     · Sit to Stand:  minimum assistance with rolling walker  · Gait: amb ~10' with RW and Min/Mod A  · Balance: fair standing balance with RW      AM-PAC 6 CLICK MOBILITY  Turning over in bed (including adjusting bedclothes, sheets and blankets)?: 2  Sitting down on and standing up from a chair with arms (e.g., wheelchair, bedside commode, etc.): 3  Moving from lying on back to sitting on the side of the bed?: 2  Moving to and from a bed to a chair (including a wheelchair)?: 3  Need to walk in hospital room?: 2  Climbing 3-5 steps with a railing?: 1  Basic Mobility Total Score: 13       Therapeutic Activities and Exercises: le supine ex's X 15 reps inc: ap,qs,hs,abd/add,slr with assistance on R,pt soiled himself during gait so had to go back supine in bed to be cleaned by pct.       Patient left supine with all lines intact, call button in reach, bed alarm on, nsg notified and pct present..    GOALS: see general POC  Multidisciplinary Problems     Physical Therapy Goals        Problem: Physical Therapy Goal    Goal Priority Disciplines Outcome Goal Variances Interventions   Physical Therapy Goal     PT, PT/OT Ongoing (interventions implemented as appropriate)     Description:  Goals to be met by: 2019     Patient will increase functional independence with mobility by performin. Supine to sit with MInimal Assistance  2. Sit to stand transfer with stand by assistance  3. Bed to chair transfer with stand by assistance using Rolling Walker  4. Gait  x 25 feet with Stand-by Assistance using Rolling Walker.                       Time Tracking:     PT Received On: 19  PT Start Time: 848     PT Stop Time: 914  PT Total Time (min): 26 min     Billable Minutes: Gait Training 15 and Therapeutic Exercise 11    Treatment Type: Treatment  PT/PTA: PTA     PTA Visit Number: 1     Luis Alfredo Beverly, PTA  2019

## 2019-07-25 NOTE — PLAN OF CARE
VN note: VN unable to cue into patient's room. Monitor offline, left message for bedside nurse. VN will continue to be available to patient and intervene prn.       07/25/19 1254   Type of Frequent Check   Type Patient Rounds;Other (see comments)  (VN Rounds, Monitor offline)

## 2019-07-25 NOTE — PLAN OF CARE
Called report to nurse Petersen at [facility]. Given patient dx and treatment on admission. Nurse given call back number. Aware of patient  time at 1800. Will call nurse when patient departure.

## 2019-07-25 NOTE — PT/OT/SLP PROGRESS
Occupational Therapy   Treatment    Name: Angel Farmer Jr.  MRN: [id number]  Admitting Diagnosis:  Symptomatic anemia       Recommendations:     Discharge Recommendations: rehabilitation facility  Discharge Equipment Recommendations:  (Defer to IPR)  Barriers to discharge:  Decreased caregiver support, Inaccessible home environment    Assessment:   Patient demonstrating improved bed mobility and functional transitions this date. Patient /c limited ROM in BUEs limiting his ability to self-feed and perform many ADLs. Patient would benefit greatly from IPR to provide continued skilled OT to address deficits and improve performance in functional ADL tasks. Patient will benefit from IPR assessment of adaptive/modified utensils and devices for feeding, G/H, and dressing tasks.     Angel Farmer Jr. is a 58 y.o. male with a medical diagnosis of Symptomatic anemia. Performance deficits affecting function are weakness, impaired endurance, impaired self care skills, impaired functional mobilty, gait instability, impaired balance, decreased upper extremity function, decreased lower extremity function, decreased ROM, impaired skin, impaired fine motor, impaired joint extensibility, impaired coordination.     Rehab Prognosis:  Good and Fair; patient would benefit from acute skilled OT services to address these deficits and reach maximum level of function.       Plan:     Patient to be seen 5 x/week to address the above listed problems via self-care/home management, therapeutic activities, therapeutic exercises  · Plan of Care Expires: 08/23/19  · Plan of Care Reviewed with: patient    Subjective     Pain/Comfort:  · Pain Rating 1: (8/10 pain in L thigh/hamstring with certain movements)    Objective:     Communicated with: nurseLaurence prior to session.  Patient found HOB elevated with bed alarm, telemetry upon OT entry to room.    General Precautions: Standard, fall   Orthopedic Precautions:N/A   Braces: N/A     Bed Mobility:     · Patient completed Rolling/Turning to Left with  moderate assistance and with side rail  · Patient completed Scooting/Bridging with contact guard assistance  · Patient completed Supine to Sit with moderate assistance and with side rail     Functional Mobility/Transfers:  · Patient completed Sit <> Stand Transfer with minimum assistance  with  rolling walker   · Patient completed Bed <> Chair Transfer using Step Transfer technique with contact guard assistance and minimum assistance with rolling walker    Activities of Daily Living:  · Feeding:  moderate assistance    · Grooming: moderate assistance      AMPAC 6 Click ADL: 12    Treatment & Education:  Patient with HOB elevated and PCT present assisting /c feeding. Patient requires proper set-up and posture to enable improved self-feeding. Patient transitioned to EOB as noted above /c increased time/effort and VCs. Patient instructed in sit > stand using RW and able to step to bedside chair.  Chair height elevated with pillows. Patient instructed in chair press-ups - completed x 10 reps /c VCs and SBA. Patient educated on use of adaptive utensils and devices for improved ADL performance; verbalized understanding.    PM session - Patient tolerated UIC x 1.75 hours /c no AD. Patient instructed in sit > stand /c CGA-Loli and VCs to ensure proper technique. Patient able to stand and t/f to EOB /s use of RW (per patient's request) /c CGA-Loli. Patient able to scoot self back on bed, but did require modA for BLE lift. Patient placed in trendelenburg and able to (A) /c scooting to HOB and use of drawsheet by 2 persons. Patient encouraged to perform AROM to tolerable range of BUEs throughout the day. B heels floated.     Patient left up in chair with all lines intact, call button in reach, chair alarm on and nurse notifiedEducation:      GOALS:   Multidisciplinary Problems     Occupational Therapy Goals        Problem: Occupational Therapy Goal    Goal Priority  Disciplines Outcome Interventions   Occupational Therapy Goal     OT, PT/OT Ongoing (interventions implemented as appropriate)    Description:  Goals to be met by: 08/23/2019      Patient will increase functional independence with ADLs by performing:    Feeding with Modified [place].  UE Dressing with Minimal Assistance.  Grooming while seated with Stand-by Assistance.  Rolling to Bilateral with Minimal Assistance.   Supine to sit with Moderate Assistance. --MET 7/25  BUE HEP assistance as needed                        Time Tracking:     OT Date of Treatment: 07/25/19  OT Start Time: 1221   1406  OT Stop Time: 6370   1434  OT Total Time (min): 26 min  28 mins    Billable Minutes:Self Care/Home Management 10  Therapeutic Activity 44    INDY Santiago  7/25/2019

## 2019-07-25 NOTE — SUBJECTIVE & OBJECTIVE
Interval History: No acute events overnight. Nurse has mentioned that he has had some loose bowel movements/diarrhea. No complaints other than that. Will make sure he does not receive and more stool softener. Patients states he is ready to go to rehab center as long as he is cleared to do so. Denies chest pain, sob, abdominal pain, leg or calf pain.    Review of Systems   Constitutional: Negative for activity change, appetite change, fatigue and unexpected weight change.   HENT: Negative for congestion, hearing loss, postnasal drip, rhinorrhea and sinus pain.    Eyes: Negative for photophobia and visual disturbance.   Respiratory: Negative for apnea, cough, choking, chest tightness, shortness of breath and wheezing.    Cardiovascular: Negative for chest pain, palpitations and leg swelling.   Gastrointestinal: Negative for abdominal distention, abdominal pain, constipation, diarrhea, nausea and vomiting.   Endocrine: Negative for cold intolerance, heat intolerance and polyuria.   Genitourinary: Negative for flank pain.   Musculoskeletal: Negative for arthralgias, back pain, gait problem, joint swelling and myalgias.   Skin: Negative for rash.   Neurological: Negative for dizziness, tremors, seizures, syncope, weakness, light-headedness, numbness and headaches.   Hematological: Negative for adenopathy. Does not bruise/bleed easily.   Psychiatric/Behavioral: Negative for agitation, behavioral problems, dysphoric mood, self-injury, sleep disturbance and suicidal ideas.     Objective:     Vital Signs (Most Recent):  Temp: 98.4 °F (36.9 °C) (07/25/19 0755)  Pulse: (!) 59 (07/25/19 0842)  Resp: 18 (07/25/19 0842)  BP: (!) 168/73 (07/25/19 0755)  SpO2: 99 % (07/25/19 0842) Vital Signs (24h Range):  Temp:  [96.6 °F (35.9 °C)-98.7 °F (37.1 °C)] 98.4 °F (36.9 °C)  Pulse:  [55-95] 59  Resp:  [16-18] 18  SpO2:  [94 %-99 %] 99 %  BP: ()/(56-87) 168/73     Weight: 124.4 kg (274 lb 4 oz)  Body mass index is 42.95  kg/m².    Intake/Output Summary (Last 24 hours) at 7/25/2019 0903  Last data filed at 7/25/2019 0531  Gross per 24 hour   Intake 670 ml   Output 2000 ml   Net -1330 ml      Physical Exam   Constitutional: He is oriented to person, place, and time. He appears well-developed and well-nourished. No distress.   HENT:   Head: Normocephalic and atraumatic.   Nose: Nose normal.   Mouth/Throat: Oropharynx is clear and moist. No oropharyngeal exudate.   Eyes: Pupils are equal, round, and reactive to light. Conjunctivae and EOM are normal. Right eye exhibits no discharge. Left eye exhibits no discharge. No scleral icterus.   Neck: Normal range of motion. Neck supple.   Cardiovascular: Normal rate, regular rhythm, normal heart sounds and intact distal pulses. Exam reveals no gallop and no friction rub.   No murmur heard.  Pulmonary/Chest: Effort normal and breath sounds normal. No stridor. He has no wheezes. He exhibits no tenderness.   Abdominal: Soft. Bowel sounds are normal. He exhibits no distension. There is no tenderness.   Musculoskeletal: Normal range of motion. He exhibits no edema, tenderness or deformity.   Lymphadenopathy:     He has no cervical adenopathy.   Neurological: He is alert and oriented to person, place, and time.   Skin: Skin is warm and dry. Capillary refill takes less than 2 seconds. No rash noted. He is not diaphoretic.   Psychiatric: He has a normal mood and affect. His behavior is normal.   Nursing note and vitals reviewed.      Significant Labs:   BMP:   Recent Labs   Lab 07/25/19 0622         K 3.9      CO2 25   BUN 36*   CREATININE 5.4*   CALCIUM 8.9   MG 2.3     CBC:   Recent Labs   Lab 07/24/19 0513 07/25/19 0622   WBC 7.93 5.91   HGB 9.3* 8.2*   HCT 29.0* 25.9*   PLT 54* 68*     CMP:   Recent Labs   Lab 07/24/19 0513 07/25/19 0622   * 136   K 4.7 3.9   CL 96 101   CO2 25 25   * 103   BUN 58* 36*   CREATININE 7.6* 5.4*   CALCIUM 9.4 8.9   PROT 6.3 5.8*    ALBUMIN 2.5* 2.3*   BILITOT 0.7 0.6   ALKPHOS 208* 181*   AST 27 23   ALT 36 28   ANIONGAP 14 10   EGFRNONAA 7* 11*       Significant Imaging: I have reviewed all pertinent imaging results/findings within the past 24 hours.

## 2019-07-25 NOTE — PLAN OF CARE
Problem: Adult Inpatient Plan of Care  Goal: Plan of Care Review  Outcome: Ongoing (interventions implemented as appropriate)  Plan of care reviewed patient verbalized understanding. Medications administered. Blood glucose monitoring per sliding scale. IV antibiotics infused. AV fistula bruit and thrill present. Dialysis removed 1.5L 07/24/19. Safety maintained bed in the lowest position, bed alarm on, call bell within reach.

## 2019-07-25 NOTE — HOSPITAL COURSE
57 yo with history of ESRD, type 2 diabetes, HTN, MIKE who was discharged on 7/23 and returned later that evening after a fall when he arrived home. Presented to us with symptomatic anemia Hgb was 8.5 on arrival which was not much different than it was during entire previous hospital stay. During his admission we continued his antibiotics from previous admission, monitored his H/H which never dropped below a level for need for intervention. He agreed that the reason he fell was due to muscle deconditioning from his previous admission to hospital and worked with PT/OT and felt he was ready to be discharged to rehab facility. He did have a stint of loose stools during this admission, but was most likely due to treatment with antibiotics in combination with the laxatives that was treating his fecal impaction from last visit. Other etiologies of loose stools were ruled out clinically. Will continue on outpatient antibiotics until finished with regimen for his previous bacteremia. Patient is tolerating diet and ready to go home.

## 2019-07-25 NOTE — CARE UPDATE
"  [facility]    FACILITY TRANSFER ORDERS      Patient Name: Angel Farmer Jr.  YOB: 1960    PCP: Jamal Millan MD   PCP Address: [address] [facility] / [place] [address]  PCP Phone Number: [phone]  PCP Fax: [phone]    Encounter Date: 07/25/2019    Admit to: [facility]    Vital Signs:  Routine    Diagnoses:   Active Hospital Problems    Diagnosis  POA    *Symptomatic anemia [D64.9]  Unknown    Impaired mobility and ADLs [Z74.09]  Unknown    Bacteremia [R78.81]  Yes    ESRD (end stage renal disease) on dialysis [N18.6, Z99.2]  Not Applicable    Diabetes mellitus, type 2 [E11.9]  Yes    Hyperlipidemia [E78.5]  Yes      Resolved Hospital Problems   No resolved problems to display.       Allergies:  Review of patient's allergies indicates:   Allergen Reactions    Keflex [cephalexin] Nausea Only       Diet: diabetic diet: 2000 calorie    Activities: Activity as tolerated     Medications: Review discharge medications with patient and family and provide education.      Current Discharge Medication List      CONTINUE these medications which have CHANGED    Details   ciprofloxacin HCl (CIPRO) 500 MG tablet Take 1 tablet (500 mg total) by mouth once daily Please take after dialysis on dialysis days for 10 days  Qty: 7 tablet, Refills: 0 End Date 08/01/19      vancomycin HCl (VANCOMYCIN 1 G/250 ML D5W, READY TO MIX SYSTEM,) Inject 250 mLs (1 g total) into the vein every Mon, Wed, Fri. for 6 days  Qty: 750 mL, Refills: 0 End Date 08/01/19         CONTINUE these medications which have NOT CHANGED    Details   acetaminophen (TYLENOL) 500 MG tablet Take 1 tablet (500 mg total) by mouth every 6 (six) hours as needed.  Refills: 0      BD ULTRA-FINE MINI PEN NEEDLE 31 gauge x 3/16" Ndle USE 4 TO 5 TIMES A DAY     ( DISCARD PEN NEEDLE AFTER EACH USE )  Qty: 500 each, Refills: 4    Associated Diagnoses: Type 2 diabetes mellitus without complication      brinzolamide (AZOPT) 1 % " ophthalmic suspension Place 1 drop into both eyes 2 (two) times daily.      epoetin shefali 3,000 unit/mL Soln 3,000 Units, epoetin shefali 4,000 unit/mL Soln 4,000 Units injection Inject 7,000 Units into the skin every Mon, Wed, Fri.      fluticasone (FLONASE) 50 mcg/actuation nasal spray 1 spray by Each Nare route once daily.  Qty: 16 g, Refills: 0    Associated Diagnoses: Allergic rhinitis      folic acid/vit B complex and C (RENAL VITAMIN ORAL) Take 1 tablet by mouth once daily.      gabapentin (NEURONTIN) 300 MG capsule TAKE 1 BY MOUTH EVERY      EVENING  Qty: 90 capsule, Refills: 3    Associated Diagnoses: Multifactorial peripheral neuropathy      heparin sodium,porcine (HEPARIN, PORCINE,) 5,000 unit/mL injection Inject 1 mL (5,000 Units total) into the skin every 8 (eight) hours.      insulin aspart U-100 (NOVOLOG) 100 unit/mL InPn pen Inject 0-5 Units into the skin before meals and at bedtime as needed (Hyperglycemia).  Refills: 0      ketorolac 0.4% (ACULAR) 0.4 % Drop Place 1 drop into the right eye 4 (four) times daily.      lancets Misc 1 each by Misc.(Non-Drug; Combo Route) route 4 (four) times daily with meals and nightly.  Qty: 200 each, Refills: 6    Associated Diagnoses: Diabetes mellitus, type 2      miconazole NITRATE 2 % (MICOTIN) 2 % top powder Apply topically 2 (two) times daily as needed (moisture).  Refills: 0      midodrine (PROAMATINE) 10 MG tablet Take 2 tablets (20 mg total) by mouth every 6 (six) hours.  Qty: 240 tablet, Refills: 11      nystatin (MYCOSTATIN) powder Apply topically 2 (two) times daily. To sacral region  Qty: 30 g, Refills: 2    Associated Diagnoses: Tinea corporis      ONETOUCH ULTRA BLUE TEST STRIP Strp USE ONE STRIP TO CHECK BLOOD GLUCOSE 4 TIMES DAILY.AT MORNING, NOON,6:OO PM AND BEDTIME.  Qty: 100 strip, Refills: 11    Comments: Please consider 90 day supplies to promote better adherence  Associated Diagnoses: Type 2 diabetes mellitus with diabetic nephropathy, with  long-term current use of insulin      pantoprazole (PROTONIX) 40 mg GrPS Take 1 packet (40 mg total) by mouth 2 (two) times daily.  Qty: 60 packet, Refills: 11      pravastatin (PRAVACHOL) 40 MG tablet Take 1 tablet (40 mg total) by mouth once daily.  Qty: 30 tablet, Refills: 6    Associated Diagnoses: Hyperlipidemia, unspecified hyperlipidemia type      blood-glucose meter (ADVANCED GLUCOSE METER) Misc 1 each by Misc.(Non-Drug; Combo Route) route 4 (four) times daily with meals and nightly.  Qty: 1 each, Refills: 0    Associated Diagnoses: Diabetes mellitus, type 2         STOP taking these medications       FLUoxetine 20 MG capsule Comments:   Reason for Stopping:         sevelamer carbonate (RENVELA) 800 mg Tab Comments:   Reason for Stopping:                    _________________________________  Tom Stinson MD  07/25/2019

## 2019-08-16 PROCEDURE — G0180 PR HOME HEALTH MD CERTIFICATION: ICD-10-PCS | Mod: ,,, | Performed by: PHYSICAL MEDICINE & REHABILITATION

## 2019-08-16 PROCEDURE — G0180 MD CERTIFICATION HHA PATIENT: HCPCS | Mod: ,,, | Performed by: PHYSICAL MEDICINE & REHABILITATION

## 2019-09-16 ENCOUNTER — HOSPITAL ENCOUNTER (OUTPATIENT)
Facility: HOSPITAL | Age: 59
Discharge: HOME | End: 2019-09-17
Attending: EMERGENCY MEDICINE | Admitting: FAMILY MEDICINE
Payer: MEDICARE

## 2019-09-16 ENCOUNTER — EXTERNAL HOME HEALTH (OUTPATIENT)
Dept: HOME HEALTH SERVICES | Facility: HOSPITAL | Age: 59
End: 2019-09-16
Payer: MEDICARE

## 2019-09-16 DIAGNOSIS — Z79.4 TYPE 2 DIABETES MELLITUS WITH CHRONIC KIDNEY DISEASE ON CHRONIC DIALYSIS, WITH LONG-TERM CURRENT USE OF INSULIN: ICD-10-CM

## 2019-09-16 DIAGNOSIS — N18.6 TYPE 2 DIABETES MELLITUS WITH CHRONIC KIDNEY DISEASE ON CHRONIC DIALYSIS, WITH LONG-TERM CURRENT USE OF INSULIN: ICD-10-CM

## 2019-09-16 DIAGNOSIS — Z79.01 WARFARIN ANTICOAGULATION: ICD-10-CM

## 2019-09-16 DIAGNOSIS — K56.41 FECAL IMPACTION IN RECTUM: Primary | ICD-10-CM

## 2019-09-16 DIAGNOSIS — Z99.2 TYPE 2 DIABETES MELLITUS WITH CHRONIC KIDNEY DISEASE ON CHRONIC DIALYSIS, WITH LONG-TERM CURRENT USE OF INSULIN: ICD-10-CM

## 2019-09-16 DIAGNOSIS — E66.01 CLASS 3 SEVERE OBESITY DUE TO EXCESS CALORIES WITH SERIOUS COMORBIDITY AND BODY MASS INDEX (BMI) OF 45.0 TO 49.9 IN ADULT: ICD-10-CM

## 2019-09-16 DIAGNOSIS — E11.22 TYPE 2 DIABETES MELLITUS WITH CHRONIC KIDNEY DISEASE ON CHRONIC DIALYSIS, WITH LONG-TERM CURRENT USE OF INSULIN: ICD-10-CM

## 2019-09-16 DIAGNOSIS — K59.00 CONSTIPATION: ICD-10-CM

## 2019-09-16 DIAGNOSIS — K92.2 LOWER GI BLEEDING: ICD-10-CM

## 2019-09-16 LAB
BASOPHILS # BLD AUTO: 0 K/UL (ref 0–0.2)
BASOPHILS NFR BLD: 0 % (ref 0–1.9)
DIFFERENTIAL METHOD: ABNORMAL
EOSINOPHIL # BLD AUTO: 0 K/UL (ref 0–0.5)
EOSINOPHIL NFR BLD: 0.3 % (ref 0–8)
ERYTHROCYTE [DISTWIDTH] IN BLOOD BY AUTOMATED COUNT: 13.9 % (ref 11.5–14.5)
HCT VFR BLD AUTO: 38.2 % (ref 40–54)
HGB BLD-MCNC: 12.2 G/DL (ref 14–18)
INR PPP: 1 (ref 0.8–1.2)
LYMPHOCYTES # BLD AUTO: 0.7 K/UL (ref 1–4.8)
LYMPHOCYTES NFR BLD: 8.5 % (ref 18–48)
MCH RBC QN AUTO: 35.6 PG (ref 27–31)
MCHC RBC AUTO-ENTMCNC: 31.9 G/DL (ref 32–36)
MCV RBC AUTO: 111 FL (ref 82–98)
MONOCYTES # BLD AUTO: 1.1 K/UL (ref 0.3–1)
MONOCYTES NFR BLD: 12.3 % (ref 4–15)
NEUTROPHILS # BLD AUTO: 6.8 K/UL (ref 1.8–7.7)
NEUTROPHILS NFR BLD: 78.9 % (ref 38–73)
OB PNL STL: POSITIVE
PLATELET # BLD AUTO: 79 K/UL (ref 150–350)
PMV BLD AUTO: 11.9 FL (ref 9.2–12.9)
PROTHROMBIN TIME: 10.1 SEC (ref 9–12.5)
RBC # BLD AUTO: 3.43 M/UL (ref 4.6–6.2)
WBC # BLD AUTO: 8.61 K/UL (ref 3.9–12.7)

## 2019-09-16 PROCEDURE — 83690 ASSAY OF LIPASE: CPT

## 2019-09-16 PROCEDURE — 86870 RBC ANTIBODY IDENTIFICATION: CPT

## 2019-09-16 PROCEDURE — 82272 OCCULT BLD FECES 1-3 TESTS: CPT

## 2019-09-16 PROCEDURE — 85025 COMPLETE CBC W/AUTO DIFF WBC: CPT

## 2019-09-16 PROCEDURE — 86850 RBC ANTIBODY SCREEN: CPT

## 2019-09-16 PROCEDURE — 86880 COOMBS TEST DIRECT: CPT

## 2019-09-16 PROCEDURE — 85610 PROTHROMBIN TIME: CPT

## 2019-09-16 PROCEDURE — 80053 COMPREHEN METABOLIC PANEL: CPT

## 2019-09-16 PROCEDURE — 99285 EMERGENCY DEPT VISIT HI MDM: CPT | Mod: 25

## 2019-09-17 VITALS
RESPIRATION RATE: 20 BRPM | SYSTOLIC BLOOD PRESSURE: 119 MMHG | TEMPERATURE: 99 F | HEART RATE: 79 BPM | BODY MASS INDEX: 40.48 KG/M2 | HEIGHT: 67 IN | DIASTOLIC BLOOD PRESSURE: 60 MMHG | WEIGHT: 257.94 LBS | OXYGEN SATURATION: 99 %

## 2019-09-17 PROBLEM — R50.9 FUO (FEVER OF UNKNOWN ORIGIN): Status: RESOLVED | Noted: 2017-06-15 | Resolved: 2019-09-17

## 2019-09-17 PROBLEM — K56.41 FECAL IMPACTION IN RECTUM: Status: ACTIVE | Noted: 2019-09-17

## 2019-09-17 PROBLEM — E87.1 HYPONATREMIA: Status: RESOLVED | Noted: 2019-03-03 | Resolved: 2019-09-17

## 2019-09-17 PROBLEM — E86.0 DEHYDRATION: Status: ACTIVE | Noted: 2019-09-17

## 2019-09-17 PROBLEM — R65.21 SEPTIC SHOCK: Status: RESOLVED | Noted: 2019-07-17 | Resolved: 2019-09-17

## 2019-09-17 PROBLEM — R55 PRE-SYNCOPE: Status: RESOLVED | Noted: 2019-03-01 | Resolved: 2019-09-17

## 2019-09-17 PROBLEM — T68.XXXA HYPOTHERMIA: Status: RESOLVED | Noted: 2019-03-03 | Resolved: 2019-09-17

## 2019-09-17 PROBLEM — R00.1 BRADYCARDIA: Status: RESOLVED | Noted: 2019-03-01 | Resolved: 2019-09-17

## 2019-09-17 PROBLEM — R78.81 BACTEREMIA: Status: RESOLVED | Noted: 2017-04-30 | Resolved: 2019-09-17

## 2019-09-17 PROBLEM — R19.7 DIARRHEA: Status: RESOLVED | Noted: 2019-03-16 | Resolved: 2019-09-17

## 2019-09-17 PROBLEM — A41.9 SEPTIC SHOCK: Status: RESOLVED | Noted: 2019-07-17 | Resolved: 2019-09-17

## 2019-09-17 LAB
ABO + RH BLD: NORMAL
ALBUMIN SERPL BCP-MCNC: 3.2 G/DL (ref 3.5–5.2)
ALBUMIN SERPL BCP-MCNC: 3.6 G/DL (ref 3.5–5.2)
ALP SERPL-CCNC: 175 U/L (ref 55–135)
ALP SERPL-CCNC: 199 U/L (ref 55–135)
ALT SERPL W/O P-5'-P-CCNC: 30 U/L (ref 10–44)
ALT SERPL W/O P-5'-P-CCNC: 32 U/L (ref 10–44)
ANION GAP SERPL CALC-SCNC: 11 MMOL/L (ref 8–16)
ANION GAP SERPL CALC-SCNC: 11 MMOL/L (ref 8–16)
AST SERPL-CCNC: 44 U/L (ref 10–40)
AST SERPL-CCNC: 56 U/L (ref 10–40)
BASOPHILS # BLD AUTO: 0.01 K/UL (ref 0–0.2)
BASOPHILS NFR BLD: 0.1 % (ref 0–1.9)
BILIRUB SERPL-MCNC: 1.2 MG/DL (ref 0.1–1)
BILIRUB SERPL-MCNC: 1.5 MG/DL (ref 0.1–1)
BLD GP AB SCN CELLS X3 SERPL QL: NORMAL
BLOOD GROUP ANTIBODIES SERPL: NORMAL
BUN SERPL-MCNC: 30 MG/DL (ref 6–20)
BUN SERPL-MCNC: 36 MG/DL (ref 6–20)
CALCIUM SERPL-MCNC: 9.5 MG/DL (ref 8.7–10.5)
CALCIUM SERPL-MCNC: 9.9 MG/DL (ref 8.7–10.5)
CHLORIDE SERPL-SCNC: 100 MMOL/L (ref 95–110)
CHLORIDE SERPL-SCNC: 98 MMOL/L (ref 95–110)
CO2 SERPL-SCNC: 28 MMOL/L (ref 23–29)
CO2 SERPL-SCNC: 30 MMOL/L (ref 23–29)
CREAT SERPL-MCNC: 5.5 MG/DL (ref 0.5–1.4)
CREAT SERPL-MCNC: 6.3 MG/DL (ref 0.5–1.4)
DAT IGG-SP REAG RBC-IMP: NORMAL
DIFFERENTIAL METHOD: ABNORMAL
EOSINOPHIL # BLD AUTO: 0.1 K/UL (ref 0–0.5)
EOSINOPHIL NFR BLD: 0.6 % (ref 0–8)
ERYTHROCYTE [DISTWIDTH] IN BLOOD BY AUTOMATED COUNT: 14.1 % (ref 11.5–14.5)
EST. GFR  (AFRICAN AMERICAN): 10 ML/MIN/1.73 M^2
EST. GFR  (AFRICAN AMERICAN): 12 ML/MIN/1.73 M^2
EST. GFR  (NON AFRICAN AMERICAN): 11 ML/MIN/1.73 M^2
EST. GFR  (NON AFRICAN AMERICAN): 9 ML/MIN/1.73 M^2
GLUCOSE SERPL-MCNC: 119 MG/DL (ref 70–110)
GLUCOSE SERPL-MCNC: 137 MG/DL (ref 70–110)
HCT VFR BLD AUTO: 37.3 % (ref 40–54)
HGB BLD-MCNC: 11.9 G/DL (ref 14–18)
LIPASE SERPL-CCNC: 32 U/L (ref 4–60)
LYMPHOCYTES # BLD AUTO: 0.8 K/UL (ref 1–4.8)
LYMPHOCYTES NFR BLD: 9.1 % (ref 18–48)
MAGNESIUM SERPL-MCNC: 2.8 MG/DL (ref 1.6–2.6)
MCH RBC QN AUTO: 34.9 PG (ref 27–31)
MCHC RBC AUTO-ENTMCNC: 31.9 G/DL (ref 32–36)
MCV RBC AUTO: 109 FL (ref 82–98)
MONOCYTES # BLD AUTO: 0.8 K/UL (ref 0.3–1)
MONOCYTES NFR BLD: 9.1 % (ref 4–15)
NEUTROPHILS # BLD AUTO: 7.2 K/UL (ref 1.8–7.7)
NEUTROPHILS NFR BLD: 81.1 % (ref 38–73)
PHOSPHATE SERPL-MCNC: 4.1 MG/DL (ref 2.7–4.5)
PLATELET # BLD AUTO: 90 K/UL (ref 150–350)
PMV BLD AUTO: 11.4 FL (ref 9.2–12.9)
POCT GLUCOSE: 122 MG/DL (ref 70–110)
POCT GLUCOSE: 126 MG/DL (ref 70–110)
POCT GLUCOSE: 127 MG/DL (ref 70–110)
POCT GLUCOSE: 132 MG/DL (ref 70–110)
POTASSIUM SERPL-SCNC: 4 MMOL/L (ref 3.5–5.1)
POTASSIUM SERPL-SCNC: 4.3 MMOL/L (ref 3.5–5.1)
PROT SERPL-MCNC: 6.4 G/DL (ref 6–8.4)
PROT SERPL-MCNC: 6.9 G/DL (ref 6–8.4)
RBC # BLD AUTO: 3.41 M/UL (ref 4.6–6.2)
SODIUM SERPL-SCNC: 139 MMOL/L (ref 136–145)
SODIUM SERPL-SCNC: 139 MMOL/L (ref 136–145)
T4 FREE SERPL-MCNC: 0.86 NG/DL (ref 0.71–1.51)
TSH SERPL DL<=0.005 MIU/L-ACNC: 0.34 UIU/ML (ref 0.4–4)
WBC # BLD AUTO: 8.82 K/UL (ref 3.9–12.7)

## 2019-09-17 PROCEDURE — G0378 HOSPITAL OBSERVATION PER HR: HCPCS

## 2019-09-17 PROCEDURE — 97116 GAIT TRAINING THERAPY: CPT | Mod: 59

## 2019-09-17 PROCEDURE — 25000003 PHARM REV CODE 250: Performed by: STUDENT IN AN ORGANIZED HEALTH CARE EDUCATION/TRAINING PROGRAM

## 2019-09-17 PROCEDURE — 84100 ASSAY OF PHOSPHORUS: CPT

## 2019-09-17 PROCEDURE — 97161 PT EVAL LOW COMPLEX 20 MIN: CPT

## 2019-09-17 PROCEDURE — 80053 COMPREHEN METABOLIC PANEL: CPT

## 2019-09-17 PROCEDURE — 84443 ASSAY THYROID STIM HORMONE: CPT

## 2019-09-17 PROCEDURE — 85025 COMPLETE CBC W/AUTO DIFF WBC: CPT

## 2019-09-17 PROCEDURE — 83735 ASSAY OF MAGNESIUM: CPT

## 2019-09-17 PROCEDURE — 82962 GLUCOSE BLOOD TEST: CPT

## 2019-09-17 PROCEDURE — 96360 HYDRATION IV INFUSION INIT: CPT | Performed by: EMERGENCY MEDICINE

## 2019-09-17 PROCEDURE — 84439 ASSAY OF FREE THYROXINE: CPT

## 2019-09-17 PROCEDURE — 96361 HYDRATE IV INFUSION ADD-ON: CPT | Performed by: EMERGENCY MEDICINE

## 2019-09-17 PROCEDURE — 97530 THERAPEUTIC ACTIVITIES: CPT

## 2019-09-17 PROCEDURE — 63600175 PHARM REV CODE 636 W HCPCS: Performed by: STUDENT IN AN ORGANIZED HEALTH CARE EDUCATION/TRAINING PROGRAM

## 2019-09-17 PROCEDURE — 97165 OT EVAL LOW COMPLEX 30 MIN: CPT

## 2019-09-17 RX ORDER — IBUPROFEN 200 MG
24 TABLET ORAL
Status: DISCONTINUED | OUTPATIENT
Start: 2019-09-17 | End: 2019-09-17 | Stop reason: HOSPADM

## 2019-09-17 RX ORDER — SYRING-NEEDL,DISP,INSUL,0.3 ML 29 G X1/2"
296 SYRINGE, EMPTY DISPOSABLE MISCELLANEOUS ONCE
Status: COMPLETED | OUTPATIENT
Start: 2019-09-17 | End: 2019-09-17

## 2019-09-17 RX ORDER — ONDANSETRON 8 MG/1
8 TABLET, ORALLY DISINTEGRATING ORAL EVERY 6 HOURS PRN
Status: DISCONTINUED | OUTPATIENT
Start: 2019-09-17 | End: 2019-09-17 | Stop reason: HOSPADM

## 2019-09-17 RX ORDER — AMOXICILLIN 250 MG
1 CAPSULE ORAL 2 TIMES DAILY
Status: DISCONTINUED | OUTPATIENT
Start: 2019-09-17 | End: 2019-09-17 | Stop reason: HOSPADM

## 2019-09-17 RX ORDER — ACETAMINOPHEN 325 MG/1
650 TABLET ORAL EVERY 4 HOURS PRN
Status: DISCONTINUED | OUTPATIENT
Start: 2019-09-17 | End: 2019-09-17 | Stop reason: HOSPADM

## 2019-09-17 RX ORDER — HYDROCORTISONE 25 MG/G
CREAM TOPICAL 2 TIMES DAILY
Status: DISCONTINUED | OUTPATIENT
Start: 2019-09-17 | End: 2019-09-17 | Stop reason: HOSPADM

## 2019-09-17 RX ORDER — INSULIN ASPART 100 [IU]/ML
1-10 INJECTION, SOLUTION INTRAVENOUS; SUBCUTANEOUS
Status: DISCONTINUED | OUTPATIENT
Start: 2019-09-17 | End: 2019-09-17 | Stop reason: HOSPADM

## 2019-09-17 RX ORDER — SODIUM CHLORIDE 0.9 % (FLUSH) 0.9 %
5 SYRINGE (ML) INJECTION
Status: DISCONTINUED | OUTPATIENT
Start: 2019-09-17 | End: 2019-09-17 | Stop reason: HOSPADM

## 2019-09-17 RX ORDER — MINERAL OIL
30 OIL (ML) ORAL ONCE
Status: COMPLETED | OUTPATIENT
Start: 2019-09-17 | End: 2019-09-17

## 2019-09-17 RX ORDER — GLUCAGON 1 MG
1 KIT INJECTION
Status: DISCONTINUED | OUTPATIENT
Start: 2019-09-17 | End: 2019-09-17 | Stop reason: HOSPADM

## 2019-09-17 RX ORDER — MIDODRINE HYDROCHLORIDE 5 MG/1
10 TABLET ORAL
Status: DISCONTINUED | OUTPATIENT
Start: 2019-09-17 | End: 2019-09-17 | Stop reason: HOSPADM

## 2019-09-17 RX ORDER — IBUPROFEN 200 MG
16 TABLET ORAL
Status: DISCONTINUED | OUTPATIENT
Start: 2019-09-17 | End: 2019-09-17 | Stop reason: HOSPADM

## 2019-09-17 RX ADMIN — Medication 30 ML: at 06:09

## 2019-09-17 RX ADMIN — MAGNESIUM CITRATE 296 ML: 1.75 LIQUID ORAL at 01:09

## 2019-09-17 RX ADMIN — SENNOSIDES, DOCUSATE SODIUM 1 TABLET: 50; 8.6 TABLET, FILM COATED ORAL at 08:09

## 2019-09-17 RX ADMIN — SODIUM CHLORIDE, SODIUM LACTATE, POTASSIUM CHLORIDE, AND CALCIUM CHLORIDE 500 ML: .6; .31; .03; .02 INJECTION, SOLUTION INTRAVENOUS at 02:09

## 2019-09-17 RX ADMIN — HYDROCORTISONE: 25 CREAM TOPICAL at 10:09

## 2019-09-17 NOTE — PLAN OF CARE
Problem: Physical Therapy Goal  Goal: Physical Therapy Goal  PT eval complete, pt performs basic functional mobility with I/ mod I using RW. No further PT indicated at this time.  D/C rec; resume HH PT

## 2019-09-17 NOTE — H&P
History & Physical  [facility]      SUBJECTIVE:     History of Present Illness:  Mr. Farmer is a 58 year old man with PMHx of ESRD on HD MWF, IDDM, HTN, HLD and MIKE, who presents to the ED complaining of constipation for the last 4 days.  No relief despite OTC laxatives including suppository medication today. Patient reports passing bloody BM while straining in attempt to pass BM. Patient unable to quantify amount of gross blood in stool. Upon conclusion of triage, patient able to pass scant amount of stool in the bathroom. However, patient still reports significant abdominal pain despite episode. Abdominal pain is diffuse 8/10 and non-radiating. Improved with rest and worsened with touch/movement  He denies fever, chills, nausea, vomiting, headache, visual disturbance, weakness, or any other additional complaints at this time. Patient follows with [facility] and receives HD MWF. In the ED, vitals were stable. Patient's midodrine was restarted for hx of of Hypotension. Occult blood stool positive. Family Medicine then consulted for admission.     Review of patient's allergies indicates:   Allergen Reactions    Keflex [cephalexin] Nausea Only       Past Medical History:   Diagnosis Date    Anemia due to stage 5 chronic kidney disease 3/1/2019    Anticoagulant long-term use     Diabetes mellitus type II     Dialysis patient     Encounter for blood transfusion     Hyperlipidemia     Hypertension     Kidney failure     MIKE (obstructive sleep apnea)     Stroke     Urinary tract infection      Past Surgical History:   Procedure Laterality Date    AV FISTULA PLACEMENT      left lower arm    SPINE, CERVICAL, POSTERIOR APPROACH  LAMINECTOMY C3-C6; C6-C7; WITH MEDIAL DISCECTOMY N/A 3/3/2019    [facility]    TONSILLECTOMY, ADENOIDECTOMY      TRANSESOPHAGEAL ECHOCARDIOGRAM (SERENITY) N/A 5/23/2017    Performed by Donaldo Mai MD at [facility] OR      Family History   Problem Relation Age of Onset    Colon cancer Mother     Lung cancer Mother     Diabetes Mother     Diabetes Father     Heart disease Father     Hypertension Father     Diabetes Maternal Grandmother     Diabetes Maternal Grandfather      Social History     Tobacco Use    Smoking status: Never Smoker    Smokeless tobacco: Never Used   Substance Use Topics    Alcohol use: No    Drug use: No      Review of Systems   Constitutional: Positive for malaise/fatigue. Negative for chills and fever.   HENT: Negative for sore throat.    Eyes: Negative for blurred vision and double vision.   Respiratory: Negative for cough and shortness of breath.    Cardiovascular: Negative for chest pain.   Gastrointestinal: Positive for abdominal pain, blood in stool, constipation and vomiting. Negative for diarrhea, heartburn and nausea.   Genitourinary: Negative for dysuria and urgency.   Musculoskeletal: Negative for back pain and falls.   Skin: Negative for itching and rash.   Neurological: Negative for headaches.   Endo/Heme/Allergies: Does not bruise/bleed easily.   Psychiatric/Behavioral: The patient is not nervous/anxious.      OBJECTIVE:     Vital Signs (Most Recent)  Temp: 98 °F (36.7 °C) (09/16/19 2054)  Pulse: 80 (09/16/19 2054)  Resp: 18 (09/16/19 2054)  BP: (!) 110/57 (09/16/19 2054)  SpO2: 99 % (09/16/19 2054)    Physical Exam   Constitutional: He is oriented to person, place, and time. He appears well-developed and well-nourished.   HENT:   Head: Normocephalic and atraumatic.   Eyes: Pupils are equal, round, and reactive to light. EOM are normal.   Neck: Normal range of motion. Neck supple.   Cardiovascular: Normal rate, regular rhythm, normal heart sounds and intact distal pulses. Exam reveals no gallop and no friction rub.   No murmur heard.  Pulmonary/Chest: Effort normal and breath sounds normal. He has no wheezes. He has no rales. He exhibits no tenderness.   Abdominal: Soft. Bowel sounds  are normal. He exhibits distension. There is tenderness. There is no guarding.   Slightly decreased BM> Diffuse tender to light palpation throughout. Tenderness worse at left lower quadrant.    Genitourinary:   Genitourinary Comments: External Hemorrhoids present.    Musculoskeletal: Normal range of motion.   Neurological: He is alert and oriented to person, place, and time.   Skin: Skin is warm and dry. Capillary refill takes less than 2 seconds. There is pallor.   Psychiatric: He has a normal mood and affect.     Laboratory  LABS  CBC  Recent Labs   Lab 09/16/19 2233   WBC 8.61   RBC 3.43*   HGB 12.2*   HCT 38.2*   PLT 79*   *   MCH 35.6*   MCHC 31.9*     BMP  Recent Labs   Lab 09/16/19 2233      K 4.0   CO2 30*   CL 98   BUN 30*   CREATININE 5.5*       Recent Labs   Lab 09/16/19 2233   CALCIUM 9.9     LFT  Recent Labs   Lab 09/16/19 2233   PROT 6.9   ALBUMIN 3.6   BILITOT 1.5*   AST 56*   ALKPHOS 199*   ALT 32     COAGS  Recent Labs   Lab 09/16/19 2233   INR 1.0     LAST HbA1c  Lab Results   Component Value Date    HGBA1C 5.0 07/18/2019     Diagnostic Results:    Imaging Results          X-Ray Abdomen AP 1 View (KUB) (Final result)  Result time 09/17/19 00:37:47    Final result by Gabriella Drake MD (09/17/19 00:37:47)                 Impression:      As above.      Electronically signed by: Gabriella Drake MD  Date:    09/17/2019  Time:    00:37             Narrative:    EXAMINATION:  XR ABDOMEN AP 1 VIEW    CLINICAL HISTORY:  Constipation, unspecified    TECHNIQUE:  AP View(s) of the abdomen was performed.    COMPARISON:  07/18/2019.    FINDINGS:  Nonspecific bowel gas pattern.  No evidence to suggest obstruction.  Moderate stool is visualized within the colon.  Scattered diffuse hyperdense material is visualized within the stool.                                ASSESSMENT/PLAN:     Fecal Impaction   Patient with chronic constipation and disimpacted in the ED   Gross blood with stool in rectal  vault.   No signs of acute infectious process developing   Receives Heparin with HD only   Given 500ml IVF, Mag Citrate and Mineral Oil   Xray of the abdomen showed moderate stool in colon. No air fluid levels.   Will continue to monitor for resolution of constipation   Recommended increased Fiber intake at home     External Hemorrhoids   No active bleeding on exam   Anusol  BID     ESRD on HD    MWF   Left AVF fistula intact   No acute management   Possible HD in house     History of Hypotension   Likely 2/2 to HD  On Midodrine 10mg TID   Will continue to monitor BP     Code: Full   Diet: Diabetic     Dispo: Monitor for resolution of constipation. BP controlled. HD tomorrow in house or regularly scheduled interval on discharge      Daniel Cat MD   [facility], PGY-2

## 2019-09-17 NOTE — ED NOTES
APPEARANCE: Alert, oriented and in no acute distress.  CARDIAC: Normal rate and rhythm, no murmur heard.   PERIPHERAL VASCULAR: peripheral pulses present. Normal cap refill. Edema noted to abdomen. Warm to touch.    RESPIRATORY:Normal rate and effort, breath sounds clear bilaterally throughout chest. Respirations are equal and unlabored no obvious signs of distress.  GASTRO: soft, bowel sounds normal, Tenderness to mid abdomen, no abdominal distention noted. Pt reports constipation   MUSC: Limited ROM due to weakness. No bony tenderness or soft tissue tenderness. No obvious deformity. Pt reports   SKIN: Skin is warm and dry, normal skin turgor, mucous membranes moist.  NEURO: 5/5 strength major flexors/extensors bilaterally. Sensory intact to light touch bilaterally. [place] coma scale: eyes open spontaneously-4, oriented & converses-5, obeys commands-6. No neurological abnormalities.   MENTAL STATUS: awake, alert and aware of environment.  EYE: PERRL, both eyes: pupils brisk and reactive to light. Normal size.  ENT: EARS: no obvious drainage. NOSE: no active bleeding.   Pt reports constipation x 4 days despite taking laxatives.

## 2019-09-17 NOTE — DISCHARGE SUMMARY
Discharge Summary      Admit Date: 9/16/2019    Discharge Date and Time: 09/17/2019    Attending Physician: Jamal Millan MD     Discharge Physician: Suzie Trinh    Principal Diagnoses: Fecal impaction in rectum  The primary encounter diagnosis was Fecal impaction in rectum. Diagnoses of Constipation, Lower GI bleeding, and Warfarin anticoagulation were also pertinent to this visit.    Discharged Condition: good    Hospital Course: Angel Farmer Jr. is a 58 y.o. male with pmh  has a past medical history of Anemia due to stage 5 chronic kidney disease (3/1/2019), Anticoagulant long-term use, Diabetes mellitus type II, Dialysis patient, Encounter for blood transfusion, Hyperlipidemia, Hypertension, Kidney failure, MIKE (obstructive sleep apnea), Stroke, and Urinary tract infection. who presented with Fecal impaction in rectum      Mr. Farmer is a 58 year old man with PMHx of ESRD on HD MWF, IDDM, HTN, HLD and MIKE, who presents to the ED complaining of constipation for the last 4 days.  No relief despite OTC laxatives including suppository medication today. Patient reports passing bloody BM while straining in attempt to pass BM. Patient unable to quantify amount of gross blood in stool. Upon conclusion of triage, patient able to pass scant amount of stool in the bathroom. However, patient still reports significant abdominal pain despite episode. Abdominal pain is diffuse 8/10 and non-radiating. Improved with rest and worsened with touch/movement  He denies fever, chills, nausea, vomiting, headache, visual disturbance, weakness, or any other additional complaints at this time. Patient follows with [facility] and receives HD MWF. In the ED, vitals were stable. Patient's midodrine was restarted for hx of of Hypotension. Occult blood stool positive. Family Medicine then consulted for admission. After receiving enema and mineral oil, the patient was able to pass a large stool, after which time he began to  have frequent bowel movements. He is tolerating a renal diet, vital signs are stable and no active signs of rectal bleeding. He is stable medically and ready for discharge.     Consults: IP CONSULT TO SOCIAL WORK/CASE MANAGEMENT    Significant Diagnostic Studies:  Recent Labs     09/16/19 2233 09/17/19  0948   WBC 8.61 8.82   HGB 12.2* 11.9*   HCT 38.2* 37.3*   PLT 79* 90*   * 109*   RDW 13.9 14.1       Recent Labs     09/16/19  2233 09/17/19  0947    139   K 4.0 4.3   CL 98 100   CO2 30* 28   * 119*   BUN 30* 36*   CREATININE 5.5* 6.3*   CALCIUM 9.9 9.5   PROT 6.9 6.4   ALBUMIN 3.6 3.2*   BILITOT 1.5* 1.2*   ALKPHOS 199* 175*   AST 56* 44*   ALT 32 30   ANIONGAP 11 11   ESTGFRAFRICA 12* 10*   EGFRNONAA 11* 9*       Recent Labs     09/17/19  0947   MG 2.8*   PHOS 4.1       Coags  Lab Results   Component Value Date    INR 1.0 09/16/2019    INR 1.1 07/18/2019    INR 1.0 03/20/2019    APTT 31.8 07/18/2019    APTT 23.4 03/04/2019    APTT 25.2 03/03/2019     Recent Labs   Lab 09/16/19  2233   INR 1.0       A1c:   Lab Results   Component Value Date    HGBA1C 5.0 07/18/2019   , Last Gluc:   Recent Labs   Lab 09/17/19  0448 09/17/19  0748 09/17/19  1151   POCTGLUCOSE 127* 122* 132*       TSH:   Lab Results   Component Value Date    TSH 0.340 (L) 09/17/2019           Imaging   Imaging Results          X-Ray Abdomen AP 1 View (KUB) (Final result)  Result time 09/17/19 00:37:47    Final result by Gabriella Drake MD (09/17/19 00:37:47)                 Impression:      As above.      Electronically signed by: Gabriella Drake MD  Date:    09/17/2019  Time:    00:37             Narrative:    EXAMINATION:  XR ABDOMEN AP 1 VIEW    CLINICAL HISTORY:  Constipation, unspecified    TECHNIQUE:  AP View(s) of the abdomen was performed.    COMPARISON:  07/18/2019.    FINDINGS:  Nonspecific bowel gas pattern.  No evidence to suggest obstruction.  Moderate stool is visualized within the colon.  Scattered diffuse  "hyperdense material is visualized within the stool.                                  Treatments: enema    Disposition: Home or Self Care    Patient Instructions:   Current Discharge Medication List      CONTINUE these medications which have NOT CHANGED    Details   acetaminophen (TYLENOL) 500 MG tablet Take 1 tablet (500 mg total) by mouth every 6 (six) hours as needed.  Refills: 0      BD ULTRA-FINE MINI PEN NEEDLE 31 gauge x 3/16" Ndle USE 4 TO 5 TIMES A DAY     ( DISCARD PEN NEEDLE AFTER EACH USE )  Qty: 500 each, Refills: 4    Associated Diagnoses: Type 2 diabetes mellitus without complication      brinzolamide (AZOPT) 1 % ophthalmic suspension Place 1 drop into both eyes 2 (two) times daily.      epoetin shefali 3,000 unit/mL Soln 3,000 Units, epoetin shefali 4,000 unit/mL Soln 4,000 Units injection Inject 7,000 Units into the skin every Mon, Wed, Fri.      fluticasone (FLONASE) 50 mcg/actuation nasal spray 1 spray by Each Nare route once daily.  Qty: 16 g, Refills: 0    Associated Diagnoses: Allergic rhinitis      folic acid/vit B complex and C (RENAL VITAMIN ORAL) Take 1 tablet by mouth once daily.      gabapentin (NEURONTIN) 300 MG capsule TAKE 1 BY MOUTH EVERY      EVENING  Qty: 90 capsule, Refills: 3    Associated Diagnoses: Multifactorial peripheral neuropathy      heparin sodium,porcine (HEPARIN, PORCINE,) 5,000 unit/mL injection Inject 1 mL (5,000 Units total) into the skin every 8 (eight) hours.      insulin aspart U-100 (NOVOLOG) 100 unit/mL InPn pen Inject 0-5 Units into the skin before meals and at bedtime as needed (Hyperglycemia).  Refills: 0      ketorolac 0.4% (ACULAR) 0.4 % Drop Place 1 drop into the right eye 4 (four) times daily.      lancets Misc 1 each by Misc.(Non-Drug; Combo Route) route 4 (four) times daily with meals and nightly.  Qty: 200 each, Refills: 6    Associated Diagnoses: Diabetes mellitus, type 2      miconazole NITRATE 2 % (MICOTIN) 2 % top powder Apply topically 2 (two) times " daily as needed (moisture).  Refills: 0      midodrine (PROAMATINE) 10 MG tablet Take 2 tablets (20 mg total) by mouth every 6 (six) hours.  Qty: 240 tablet, Refills: 11      nystatin (MYCOSTATIN) powder Apply topically 2 (two) times daily. To sacral region  Qty: 30 g, Refills: 2    Associated Diagnoses: Tinea corporis      ONETOUCH ULTRA BLUE TEST STRIP Strp USE ONE STRIP TO CHECK BLOOD GLUCOSE 4 TIMES DAILY.AT MORNING, NOON,6:OO PM AND BEDTIME.  Qty: 100 strip, Refills: 11    Comments: Please consider 90 day supplies to promote better adherence  Associated Diagnoses: Type 2 diabetes mellitus with diabetic nephropathy, with long-term current use of insulin      pantoprazole (PROTONIX) 40 mg GrPS Take 1 packet (40 mg total) by mouth 2 (two) times daily.  Qty: 60 packet, Refills: 11      pravastatin (PRAVACHOL) 40 MG tablet Take 1 tablet (40 mg total) by mouth once daily.  Qty: 30 tablet, Refills: 6    Associated Diagnoses: Hyperlipidemia, unspecified hyperlipidemia type      blood-glucose meter (ADVANCED GLUCOSE METER) Misc 1 each by Misc.(Non-Drug; Combo Route) route 4 (four) times daily with meals and nightly.  Qty: 1 each, Refills: 0    Associated Diagnoses: Diabetes mellitus, type 2             Discharge Procedure Orders   Diet diabetic     Notify your health care provider if you experience any of the following:  temperature >100.4     Notify your health care provider if you experience any of the following:  persistent nausea and vomiting or diarrhea     Notify your health care provider if you experience any of the following:  persistent dizziness, light-headedness, or visual disturbances     Notify your health care provider if you experience any of the following:  increased confusion or weakness     Activity as tolerated         Suzie Trinh MD   [facility] Medicine[facility]Y-[facility]   [facility]  9/17/2019  4:27 PM

## 2019-09-17 NOTE — ED PROVIDER NOTES
Encounter Date: 9/16/2019    SCRIBE #1 NOTE: I, Danelle Jhaveri, am scribing for, and in the presence of,  Dr. Cruz. I have scribed the entire note.       History     Chief Complaint   Patient presents with    Constipation     To ER with c/o constipation x 4 days.  Pt states that he took a suppository today with only small results.  States that he had some blood on stool.  Taking MOM QOD since last week.  Pt having BM at triage.     A 58 year-old male, PMHx ESRD on HD, presents to the ED for constipation. Family at the bedside report that patient has not been able to pass standard BM for the last 4 days. No relief despite OTC laxatives including suppository medication today. Associated symptoms include rectal bleeding secondary to straining to pass BM. Upon conclusion of triage, patient able to pass scant amount of stool in the bathroom. However, patient still reports significant abdominal pain despite episode. He denies fever, chills, nausea, vomiting, headache, visual disturbance, weakness, or any other additional complaints at this time.     The history is provided by the patient.     Review of patient's allergies indicates:   Allergen Reactions    Keflex [cephalexin] Nausea Only     Past Medical History:   Diagnosis Date    Anemia due to stage 5 chronic kidney disease 3/1/2019    Anticoagulant long-term use     Diabetes mellitus type II     Dialysis patient     Encounter for blood transfusion     Hyperlipidemia     Hypertension     Kidney failure     MIKE (obstructive sleep apnea)     Stroke     Urinary tract infection      Past Surgical History:   Procedure Laterality Date    AV FISTULA PLACEMENT      left lower arm    SPINE, CERVICAL, POSTERIOR APPROACH  LAMINECTOMY C3-C6; C6-C7; WITH MEDIAL DISCECTOMY N/A 3/3/2019    [facility]    TONSILLECTOMY, ADENOIDECTOMY      TRANSESOPHAGEAL ECHOCARDIOGRAM (SERENITY) N/A 5/23/2017    Performed by Donaldo Mai  MD at [facility] OR     Family History   Problem Relation Age of Onset    Colon cancer Mother     Lung cancer Mother     Diabetes Mother     Diabetes Father     Heart disease Father     Hypertension Father     Diabetes Maternal Grandmother     Diabetes Maternal Grandfather      Social History     Tobacco Use    Smoking status: Never Smoker    Smokeless tobacco: Never Used   Substance Use Topics    Alcohol use: No    Drug use: No     Review of Systems   Constitutional: Negative for chills and fever.   HENT: Negative for congestion, ear pain, rhinorrhea and sore throat.    Respiratory: Negative for cough and shortness of breath.    Cardiovascular: Negative for chest pain.   Gastrointestinal: Positive for abdominal pain, anal bleeding and constipation. Negative for diarrhea, nausea and vomiting.   Genitourinary: Negative for dysuria and hematuria.   Musculoskeletal: Negative for myalgias and neck pain.   Skin: Negative for rash.   Neurological: Negative for dizziness, weakness, light-headedness and headaches.   Psychiatric/Behavioral: Negative for confusion.       Physical Exam     Initial Vitals [09/16/19 2054]   BP Pulse Resp Temp SpO2   (!) 110/57 80 18 98 °F (36.7 °C) 99 %      MAP       --         Physical Exam    Nursing note and vitals reviewed.  Constitutional: He appears well-developed and well-nourished. No distress.   HENT:   Head: Normocephalic and atraumatic.   Mouth/Throat: Oropharynx is clear and moist.   Eyes: Conjunctivae and EOM are normal.   Cardiovascular: Normal rate, regular rhythm, normal heart sounds and intact distal pulses.   Pulmonary/Chest: Breath sounds normal. No respiratory distress.   Abdominal: Soft. He exhibits no distension. There is no tenderness.   Musculoskeletal: Normal range of motion. He exhibits no edema or tenderness.   Neurological: He is alert and oriented to person, place, and time. He has normal strength.   Skin: Skin is warm and dry.         ED Course    Procedures  Labs Reviewed   OCCULT BLOOD X 1, STOOL          Imaging Results    None                               Clinical Impression:       ICD-10-CM ICD-9-CM   1. Constipation K59.00 564.00

## 2019-09-17 NOTE — PROGRESS NOTES
Progress Note  [facility]      SUBJECTIVE:     Mr. Farmer is a 58 year old man with PMHx of ESRD on HD MWF, IDDM, HTN, HLD and MIKE, who presents to the ED complaining of constipation for the last 4 days.  Overnight he report having a large bowel movement, and continues have bowel movements. He has abdominal pain and bloating that is improving after enema. He has some nausea but no vomiting.       Review of Systems:  Constitutional: no fever or chills  Eyes: no visual changes  Respiratory: no cough or shortness of breath  Cardiovascular: no chest pain   Gastrointestinal: no nausea or vomiting; no abdominal pain   Genitourinary: +urination   Neurological: no new focal weakness      OBJECTIVE:     Vital Signs (Most Recent)  Temp: 98.9 °F (37.2 °C) (09/17/19 0813)  Pulse: 92 (09/17/19 0813)  Resp: 20 (09/17/19 0813)  BP: (!) 145/72 (09/17/19 0813)  SpO2: 99 % (09/16/19 2054)    Physical Exam   Constitutional: He is oriented to person, place, and time. He appears well-developed and well-nourished.   HENT:   Head: Normocephalic and atraumatic.   Eyes: Pupils are equal, round, and reactive to light. EOM are normal.   Neck: Normal range of motion. Neck supple.   Cardiovascular: Normal rate, regular rhythm, normal heart sounds and intact distal pulses. Exam reveals no gallop and no friction rub.   No murmur heard.  Pulmonary/Chest: Effort normal and breath sounds normal. He has no wheezes. He has no rales. He exhibits no tenderness.   Abdominal: Soft. Bowel sounds are normal. He exhibits distension. There is tenderness. There is no guarding.   Slightly decreased BM> Diffuse tender to light palpation throughout. Tenderness worse at left lower quadrant.    Genitourinary:   Genitourinary Comments: External Hemorrhoids present.    Musculoskeletal: Normal range of motion.   Neurological: He is alert and oriented to person, place, and time.   Skin: Skin is warm and dry. Capillary refill takes less than 2 seconds.  There is pallor.   Psychiatric: He has a normal mood and affect.     Laboratory  LABS  CBC  Recent Labs   Lab 09/16/19 2233 09/17/19 0948   WBC 8.61 8.82   RBC 3.43* 3.41*   HGB 12.2* 11.9*   HCT 38.2* 37.3*   PLT 79* 90*   * 109*   MCH 35.6* 34.9*   MCHC 31.9* 31.9*     BMP  Recent Labs   Lab 09/16/19 2233      K 4.0   CO2 30*   CL 98   BUN 30*   CREATININE 5.5*       Recent Labs   Lab 09/16/19 2233   CALCIUM 9.9     LFT  Recent Labs   Lab 09/16/19 2233   PROT 6.9   ALBUMIN 3.6   BILITOT 1.5*   AST 56*   ALKPHOS 199*   ALT 32     COAGS  Recent Labs   Lab 09/16/19 2233   INR 1.0     LAST HbA1c  Lab Results   Component Value Date    HGBA1C 5.0 07/18/2019     Diagnostic Results:    Imaging Results          X-Ray Abdomen AP 1 View (KUB) (Final result)  Result time 09/17/19 00:37:47    Final result by Gabriella Drake MD (09/17/19 00:37:47)                 Impression:      As above.      Electronically signed by: Gabriella Drake MD  Date:    09/17/2019  Time:    00:37             Narrative:    EXAMINATION:  XR ABDOMEN AP 1 VIEW    CLINICAL HISTORY:  Constipation, unspecified    TECHNIQUE:  AP View(s) of the abdomen was performed.    COMPARISON:  07/18/2019.    FINDINGS:  Nonspecific bowel gas pattern.  No evidence to suggest obstruction.  Moderate stool is visualized within the colon.  Scattered diffuse hyperdense material is visualized within the stool.                                ASSESSMENT/PLAN:     Fecal Impaction   Patient with chronic constipation and disimpacted in the ED   Gross blood with stool in rectal vault.   No signs of acute infectious process developing   Receives Heparin with HD only   Given 500ml IVF, Mag Citrate and Mineral Oil   Xray of the abdomen showed moderate stool in colon. No air fluid levels.   Will continue to monitor for resolution of constipation   Recommended increased Fiber intake at home     External Hemorrhoids   No active bleeding on exam   Anusol  BID     ESRD  on HD    MWF   Left AVF fistula intact   No acute management   Possible HD in house     History of Hypotension   Likely 2/2 to HD  On Midodrine 10mg TID   Will continue to monitor BP     Code: Full   Diet: Diabetic     Dispo: Monitor for resolution of constipation. BP controlled. HD tomorrow in house or regularly scheduled interval on discharge              Suzie Trinh MD   [facility]   [facility]  9/17/2019  10:31 AM

## 2019-09-17 NOTE — PT/OT/SLP EVAL
Physical Therapy Evaluation and Discharge Note    Patient Name:  Angel Farmer Jr.   MRN:  [id number]    Recommendations:     Discharge Recommendations:  home health PT   Discharge Equipment Recommendations: none   Barriers to discharge: None    Assessment:     Angel Farmer Jr. is a 58 y.o. male admitted with a medical diagnosis of Fecal impaction in rectum. .  At this time, patient is functioning at their prior level of function and does not require further acute PT services.     Recent Surgery: * No surgery found *      Plan:     During this hospitalization, patient does not require further acute PT services.  Please re-consult if situation changes.      Subjective     Chief Complaint: diarrhea  Patient/Family Comments/goals: resolve hemorrhoid pain   Pain/Comfort:  · Pain Rating 1: 8/10  · Location - Orientation 1: generalized  · Location 1: perirectal(intermittent /with BM)  · Pain Addressed 1: Reposition, Distraction, Cessation of Activity, Nurse notified    Patients cultural, spiritual, Restorationism conflicts given the current situation: no    Living Environment:  Pt lives with brother and sister fito and their children in a [facility] with no SLY  Prior to admission, patients level of function was mod I in home ambulates in home without AD, in community with RW. Receiving HH PT  Equipment used at home: bedside commode, bath bench, rollator, shower chair, wheelchair, hospital bed.  DME owned (not currently used): none.  Upon discharge, patient will have assistance from family and resumption of HH PT.    Objective:     Communicated with nursing prior to session.  Patient found supine with   upon PT entry to room.    General Precautions: Standard, (universal)   Orthopedic Precautions:N/A   Braces: N/A     Exams:  · Cognitive Exam:  Patient is oriented to Person, Place, Time and Situation  · Gross Motor Coordination:  WFL  · Postural Exam:  Patient presented with the following abnormalities:    · -       Rounded  shoulders  · -       Forward head  · Sensation:    · -       Impaired  light/touch B LE, pt with hx of neuropathy  · Skin Integrity/Edema:      · -       Skin integrity: Visible skin intact    B LE ROM/strength; WFL  Functional Mobility:  · Bed Mobility:     · Rolling Left:  modified independence  · Rolling Right: modified independence  · Supine to Sit: modified independence  · Sit to Supine: modified independence  · Transfers:     · Sit to Stand:  modified independence with no AD  · Bed to Chair: modified independence with  No Ad using step to  · Gait: pt ambulated > 200ft with mod I using RW, increased sway; pt reports gait pattern is usual for him  · Balance: sitting balance: good. Standing static fair+, standing dynamic fair +, with RW: good  ·     AM-PAC 6 CLICK MOBILITY  Total Score:23       Therapeutic Activities and Exercises:   PT eval complete, pt performs basic functional mobility with I/ mod I using RW. No further PT indicated at this time.  D/C rec; resume HH PT. Mobility training using bed rails, safe transfers and gait with good return demonstration.    AM-PAC 6 CLICK MOBILITY  Total Score:23     Patient left supine with all lines intact and nursing notified.    GOALS:   Multidisciplinary Problems     Physical Therapy Goals        Problem: Physical Therapy Goal    Goal Priority Disciplines Outcome Goal Variances Interventions   Physical Therapy Goal     PT, PT/OT                      History:     Past Medical History:   Diagnosis Date    Anemia due to stage 5 chronic kidney disease 3/1/2019    Anticoagulant long-term use     Diabetes mellitus type II     Dialysis patient     Encounter for blood transfusion     Hyperlipidemia     Hypertension     Kidney failure     MIKE (obstructive sleep apnea)     Stroke     Urinary tract infection        Past Surgical History:   Procedure Laterality Date    AV FISTULA PLACEMENT      left lower arm    SPINE, CERVICAL, POSTERIOR APPROACH  LAMINECTOMY  C3-C6; C6-C7; WITH MEDIAL DISCECTOMY N/A 3/3/2019    Performed by Ruddy [facility] at [facility] 2ND FLR    TONSILLECTOMY, ADENOIDECTOMY      TRANSESOPHAGEAL ECHOCARDIOGRAM (SERENITY) N/A 5/23/2017    Performed by Donaldo Mai MD at [facility] OR       Time Tracking:     PT Received On: 09/17/19  PT Start Time: 0930     PT Stop Time: 1017  PT Total Time (min): 47 min     Billable Minutes: Evaluation 18, Gait Training 10 and Therapeutic Activity 19      Cosme Hui, PT  09/17/2019

## 2019-09-17 NOTE — PT/OT/SLP EVAL
"Occupational Therapy   Evaluation and Discharge Note    Name: Angel Farmer Jr.  MRN: [id number]  Admitting Diagnosis:  Fecal impaction in rectum      Recommendations:     Discharge Recommendations: home health OT, home health PT  Discharge Equipment Recommendations:  none  Barriers to discharge:  None    Assessment:     Angel Farmer Jr. is a 58 y.o. male with a medical diagnosis of Fecal impaction in rectum. At this time, patient is functioning at their prior level of function and does not require further acute OT services.     Plan:     During this hospitalization, patient does not require further acute OT services.  Please re-consult if situation changes.    · Plan of Care Reviewed with: patient    Subjective     Chief Complaint: Pt c/o fatigue and initially refusing EOB/OPOB activity with OT; upon 2nd attempt pt agreeable to EOB activity but asked to perform OOB activity with RW to take side steps to HOB  Patient/Family Comments/goals: To return home, to be left alone    Occupational Profile:  Living Environment: Pt lives with brother, TORO, and 2 nieces in [place], ramp access, tub.sh with TTB  Previous level of function: Pt required assist for UE dressing, LE dressing, tub transfer, bathing, and toileting hygiene but he reports that he is able to transfer to the toilet without assistance  Roles and Routines: Caretaker to self, brother, 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  Patient found supine with blood pressure cuff, peripheral IV upon OT entry to room.    General Precautions: Standard, fall   Orthopedic Precautions:N/A   Braces: N/A     Occupational Performance:    Bed Mobility:    · Patient completed Rolling/Turning to Right with stand by assistance with use of bed   · Patient completed Scooting/Bridging with modified independence and supervision  · Patient completed Supine to Sit with modified independence and supervision  · Patient completed Sit to Supine with modified independence and supervision    Functional Mobility/Transfers:  · Patient completed Sit <> Stand Transfer with modified independence and supervision  with  rolling walker   Functional Mobility: Pt with fair dynamic seated and standing balance. Pt able to take 5-6 side steps to foot of bed and back to HOB; reports that his R side is stronger and that lateral steps are more physically taxing for him    Activities of Daily Living:  · Lower Body Dressing: total assistance to don doff B socks but pt reports that he has family assistance at home with LE dressing    Cognitive/Visual Perceptual:  Cognitive/Psychosocial Skills:     -       Oriented to: Person, Place, Time and Situation   -       Follows Commands/attention:Follows multistep  commands  -       Communication: clear/fluent  -       Memory: No Deficits noted  -       Safety awareness/insight to disability: impaired   -       Mood/Affect/Coping skills/emotional control: Appropriate to situation     Physical Exam:  Postural examination/scapula alignment:    -       Rounded shoulders  -       Forward head  Motor Planning:    -       WFL  Upper Extremity Range of Motion:   BUE with h/o deficts: L shoulder flex ~0-35*, R shoulder flex ~0-100* distally WFL but pt reports increased effort required for elbow flexion  Upper Extremity Strength:  BUE grossly 2/5   Strength:  3+/5  Fine Motor Coordination: -  Intact thumb/digit opposition  Gross motor  "coordination:   WFL    [facility] 6 Click ADL:  AMPA Total Score: 16    Treatment & Education:  Pt educated on role of OT and POC.   Pt performing skills as listed above.   Pt states he is having many bowel movements and stated initally that he did not want to perform EOB/OOB activity 2/2 multiple BM. Pt states that he would like to see if he is able to continue HHOT/PT as he states he "needs a new referral" but "might not be able to get more therapy until October" 2/2 insurance concerns. Pt encouraged to speak with case management as to insurance concerns.   Education:    Patient left HOB elevated with all lines intact, call button in reach and nsg notified    GOALS:   Multidisciplinary Problems     Occupational Therapy Goals     Not on file          Multidisciplinary Problems (Resolved)        Problem: Occupational Therapy Goal    Goal Priority Disciplines Outcome Interventions   Occupational Therapy Goal   (Resolved)     OT, PT/OT Outcome(s) achieved    Description:  No goals set 2/2 pt performing at baseline.                    History:     Past Medical History:   Diagnosis Date    Anemia due to stage 5 chronic kidney disease 3/1/2019    Anticoagulant long-term use     Diabetes mellitus type II     Dialysis patient     Encounter for blood transfusion     Hyperlipidemia     Hypertension     Kidney failure     MIKE (obstructive sleep apnea)     Stroke     Urinary tract infection        Past Surgical History:   Procedure Laterality Date    AV FISTULA PLACEMENT      left lower arm    SPINE, CERVICAL, POSTERIOR APPROACH  LAMINECTOMY C3-C6; C6-C7; WITH MEDIAL DISCECTOMY N/A 3/3/2019    Performed by Ruddy W[facility]ie MD at [facility] OR 2ND [facility]LR    TONSILLECTOMY, ADENOIDECTOMY      TRANSESOPHAGEAL ECHOCARDIOGRAM (SERENITY) N/A 5/23/2017    Performed by Donaldo [facility] at [facility] OR       Time Tracking:     OT Date of Treatment: 09/17/19  OT Start Time: 1307(12:00-12:10 and 1:07-1:20)  OT Stop Time: 1330  OT Total " Time (min): 23 min    Billable Minutes:Evaluation 13  Therapeutic Activity 10     Gege Babin OT  9/17/2019

## 2019-09-17 NOTE — PLAN OF CARE
VN cued into pt's room for introduction with pt's permission.  VN role explained and informed pt that VN would be working with bedside nurse and the rest of the care team.  Fall risk and bed alarm protocol education provided.  Instructed pt to call for assistance and agreeable.  Allowed time for questions. NAD noted.  Will cont to be available as needed.

## 2019-09-17 NOTE — ED PROVIDER NOTES
Encounter Date: 9/16/2019    SCRIBE #1 NOTE: I, Danelle Jhaveri, am scribing for, and in the presence of,  Dr. Cruz. I have scribed the entire note.       History     Chief Complaint   Patient presents with    Constipation     To ER with c/o constipation x 4 days.  Pt states that he took a suppository today with only small results.  States that he had some blood on stool.  Taking MOM QOD since last week.  Pt having BM at triage.     A 58 year-old male presents to the ED for constipation. Family at the bedside report that patient has not been able to pass standard BM for the last 4 days. No relief despite OTC laxatives including suppository medication today. Associated symptoms include rectal bleeding secondary to straining to pass BM. Upon conclusion of triage, patient able to pass scant amount of stool in the bathroom. However, patient still reports significant abdominal pain despite episode. He denies fever, chills, nausea, vomiting, headache, visual disturbance, weakness, or any other additional complaints at this time.     The history is provided by the patient.     Review of patient's allergies indicates:   Allergen Reactions    Keflex [cephalexin] Nausea Only     Past Medical History:   Diagnosis Date    Anemia due to stage 5 chronic kidney disease 3/1/2019    Anticoagulant long-term use     Diabetes mellitus type II     Dialysis patient     Encounter for blood transfusion     Hyperlipidemia     Hypertension     Kidney failure     MIKE (obstructive sleep apnea)     Stroke     Urinary tract infection      Past Surgical History:   Procedure Laterality Date    AV FISTULA PLACEMENT      left lower arm    SPINE, CERVICAL, POSTERIOR APPROACH  LAMINECTOMY C3-C6; C6-C7; WITH MEDIAL DISCECTOMY N/A 3/3/2019    [facility]    TONSILLECTOMY, ADENOIDECTOMY      TRANSESOPHAGEAL ECHOCARDIOGRAM (SERENITY) N/A 5/23/2017    Performed by Donaldo Mai MD at [facility] OR      Family History   Problem Relation Age of Onset    Colon cancer Mother     Lung cancer Mother     Diabetes Mother     Diabetes Father     Heart disease Father     Hypertension Father     Diabetes Maternal Grandmother     Diabetes Maternal Grandfather      Social History     Tobacco Use    Smoking status: Never Smoker    Smokeless tobacco: Never Used   Substance Use Topics    Alcohol use: No    Drug use: No     Review of Systems   Constitutional: Negative for chills and fever.   HENT: Negative for congestion, ear pain, rhinorrhea and sore throat.    Respiratory: Negative for cough and shortness of breath.    Cardiovascular: Negative for chest pain.   Gastrointestinal: Positive for abdominal pain, anal bleeding and constipation. Negative for diarrhea, nausea and vomiting.   Genitourinary: Negative for dysuria and hematuria.   Musculoskeletal: Negative for myalgias and neck pain.   Skin: Negative for rash.   Neurological: Negative for dizziness, weakness, light-headedness and headaches.   Psychiatric/Behavioral: Negative for confusion.       Physical Exam     Initial Vitals [09/16/19 2054]   BP Pulse Resp Temp SpO2   (!) 110/57 80 18 98 °F (36.7 °C) 99 %      MAP       --         Physical Exam    Nursing note and vitals reviewed.  Constitutional: He appears well-developed and well-nourished. He is Obese . No distress.   Chronically ill appearing, NAD   HENT:   Head: Normocephalic and atraumatic.   Mouth/Throat: Oropharynx is clear and moist.   Eyes: Conjunctivae and EOM are normal.   Cardiovascular: Normal rate, regular rhythm, normal heart sounds and intact distal pulses.   Pulmonary/Chest: Breath sounds normal. No respiratory distress.   Abdominal: Soft. He exhibits no distension. There is no tenderness.   Abdomen is soft, mildly distended  Non-tender to palpation   Genitourinary:   Genitourinary Comments: External hemorrhoid without active bleeding   Large amount of stool in rectal vault  Gross blood  noted on digital disimpaction    Musculoskeletal: Normal range of motion. He exhibits no edema or tenderness.   Neurological: He is alert and oriented to person, place, and time. He has normal strength.   Skin: Skin is warm and dry.         ED Course   Procedures  Labs Reviewed   OCCULT BLOOD X 1, STOOL - Abnormal; Notable for the following components:       Result Value    Occult Blood Positive (*)     All other components within normal limits   CBC W/ AUTO DIFFERENTIAL - Abnormal; Notable for the following components:    RBC 3.43 (*)     Hemoglobin 12.2 (*)     Hematocrit 38.2 (*)     Mean Corpuscular Volume 111 (*)     Mean Corpuscular Hemoglobin 35.6 (*)     Mean Corpuscular Hemoglobin Conc 31.9 (*)     Platelets 79 (*)     Lymph # 0.7 (*)     Mono # 1.1 (*)     Gran% 78.9 (*)     Lymph% 8.5 (*)     All other components within normal limits   COMPREHENSIVE METABOLIC PANEL - Abnormal; Notable for the following components:    CO2 30 (*)     Glucose 137 (*)     BUN, Bld 30 (*)     Creatinine 5.5 (*)     Total Bilirubin 1.5 (*)     Alkaline Phosphatase 199 (*)     AST 56 (*)     eGFR if  12 (*)     eGFR if non  11 (*)     All other components within normal limits   LIPASE   PROTIME-INR   TYPE & SCREEN   ANTIBODY IDENTIFICATION   DIRECT ANTIGLOBULIN TEST          Imaging Results          X-Ray Abdomen AP 1 View (KUB) (Final result)  Result time 09/17/19 00:37:47    Final result by Gabriella Drake MD (09/17/19 00:37:47)                 Impression:      As above.      Electronically signed by: Gabriella Drake MD  Date:    09/17/2019  Time:    00:37             Narrative:    EXAMINATION:  XR ABDOMEN AP 1 VIEW    CLINICAL HISTORY:  Constipation, unspecified    TECHNIQUE:  AP View(s) of the abdomen was performed.    COMPARISON:  07/18/2019.    FINDINGS:  Nonspecific bowel gas pattern.  No evidence to suggest obstruction.  Moderate stool is visualized within the colon.  Scattered diffuse  hyperdense material is visualized within the stool.                                 Medical Decision Making:   Initial Assessment:   This is a 58-year-old male history of diabetes, hypertension, hyperlipidemia, end-stage renal disease, on dialysis, who presents the ER for evaluation of constipation.  Onset approximately 4-5 days.  Patient has been taking over-the-counter medication with no improvement.  Reports vague abdominal discomfort, without nausea, or vomiting.  Reports straining, and noted small of blood in toilet.  Denies any chest pain, shortness of breath.  Reports colonoscopy approximately 16 months ago, with polyps, that were removed, no other acute findings.  Patient is chronically ill, but in no acute distress.  Abdomen is soft, mildly distended, but nontender. Rectal exam revealed a large fecal burden which I successfully disimpacted.  Patient likely suffering from chronic constipation, however other differential includes small versus large-bowel obstruction, upper GI versus lower GI bleeding, hemorrhoid bleeding, versus other cause.  Will obtain basic blood work, KUB, will reassess.              Attending Attestation:           Physician Attestation for Scribe:  Physician Attestation Statement for Scribe #1: I, Dr. Cruz, reviewed documentation, as scribed by Danelle Jhaveri in my presence, and it is both accurate and complete.                 ED Course as of Sep 17 0104   Tue Sep 17, 2019   0053 Resting comfortably in bed, no acute distress, labs imaging reviewed, patient has moderate stool burden.  Patient hemoccult positive.  Patient will be admitted for bowel regimen, and serial hemoglobin exams, patient is on warfarin.  Family understands and agrees with plan.    [SE]      ED Course User Index  [SE] Galindo Cruz MD     Clinical Impression:       ICD-10-CM ICD-9-CM   1. Fecal impaction in rectum K56.41 560.32   2. Constipation K59.00 564.00   3. Lower GI bleeding K92.2 578.9   4. Warfarin  anticoagulation Z79.01 V58.61                   Galindo Cruz MD  09/17/19 0104

## 2019-09-17 NOTE — PLAN OF CARE
Problem: Occupational Therapy Goal  Goal: Occupational Therapy Goal  No goals set 2/2 pt performing at baseline.  Outcome: Outcome(s) achieved Date Met: 09/17/19  Pt performing skills at baseline with no further OT needs; please reconsult if necessary. Rec return home with HHOT/PT; DME needs none

## 2019-09-18 NOTE — PLAN OF CARE
Discharge orders noted. Additional clinical references attached.    Patient's discharge instructions given by bedside RN and reviewed via this VN.  Education provided on new medication, diagnosis, and follow-up appointments. Patient verbalized understanding. All questions answered. Transport to [facility] requested. Floor nurse notified.      Patient requesting diaper change,  Staff notified and will tend.

## 2019-09-23 ENCOUNTER — TELEPHONE (OUTPATIENT)
Dept: FAMILY MEDICINE | Facility: HOSPITAL | Age: 59
End: 2019-09-23

## 2019-09-23 NOTE — TELEPHONE ENCOUNTER
----- Message from Verna Sinha sent at 9/19/2019  3:25 PM CDT -----  Pt is being discharged from rehab and Ofelia from [facility] want's to know if  can send in an order for outpatient physical therapy to [facility] in [facility]. Please call Ofelia at [phone] or fax order to [phone]

## 2019-09-30 NOTE — SUBJECTIVE & OBJECTIVE
Interval History: NAEON. Exam stable.     Medications:  Continuous Infusions:   sodium chloride 0.9% Stopped (03/05/19 0350)    norepinephrine bitartrate-D5W 0.24 mcg/kg/min (03/05/19 0901)     Scheduled Meds:   sodium chloride 0.9%   Intravenous Once    chlorhexidine  15 mL Mouth/Throat BID    diclofenac sodium   Topical (Top) Daily    epoetin shefali (PROCRIT) injection  7,000 Units Intravenous Every Mon, Wed, Fri    gabapentin  300 mg Oral QHS    midodrine  10 mg Per NG tube TID    pantoprazole  40 mg Per NG tube BID    polyethylene glycol  17 g Oral Daily    pravastatin  40 mg Oral Daily    senna-docusate 8.6-50 mg  1 tablet Oral BID     PRN Meds:sodium chloride 0.9%, acetaminophen, [COMPLETED] calcium gluconate IVPB **AND** calcium gluconate IVPB, dextrose 50%, glucagon (human recombinant), glucose, insulin aspart U-100, magnesium sulfate IVPB, ondansetron, oxyCODONE       Objective:     Weight: 134.3 kg (296 lb 1.2 oz)  Body mass index is 46.37 kg/m².  Vital Signs (Most Recent):  Temp: 98.1 °F (36.7 °C) (03/05/19 0701)  Pulse: 83 (03/05/19 0801)  Resp: (!) 8 (03/05/19 0801)  BP: (!) 174/74 (03/05/19 0801)  SpO2: 100 % (03/05/19 0801) Vital Signs (24h Range):  Temp:  [95.7 °F (35.4 °C)-98.1 °F (36.7 °C)] 98.1 °F (36.7 °C)  Pulse:  [63-85] 83  Resp:  [8-25] 8  SpO2:  [98 %-100 %] 100 %  BP: (117-174)/(56-74) 174/74  Arterial Line BP: (129-168)/(39-53) 154/50     Date 03/05/19 0700 - 03/06/19 0659   Shift 8357-5974 1488-2819 9191-7893 24 Hour Total   INTAKE   I.V.(mL/kg) 122.2(0.9)   122.2(0.9)   Shift Total(mL/kg) 122.2(0.9)   122.2(0.9)   OUTPUT   Shift Total(mL/kg)       Weight (kg) 134.3 134.3 134.3 134.3              Vent Mode: Spont  Oxygen Concentration (%):  [] 100  Resp Rate Total:  [11 br/min-71 br/min] 71 br/min  PEEP/CPAP:  [5 cmH20] 5 cmH20  Mean Airway Pressure:  [4 cmH20-6.3 cmH20] 4 cmH20         Closed/Suction Drain 03/03/19 1431 Posterior Neck Accordion 10 Fr. (Active)   Site  Description Unable to view 3/4/2019 11:01 AM   Drainage Sanguineous 3/4/2019  3:25 AM   Status To bulb suction 3/4/2019 11:01 AM   Output (mL) 110 mL 3/3/2019  5:40 PM            NG/OG Tube 03/03/19 1658 Left mouth (Active)   Placement Check placement verified by x-ray;placement verified by aspirate characteristics;placement verified by distal tube length measurement 3/4/2019 11:01 AM   Tolerance no signs/symptoms of discomfort 3/4/2019 11:01 AM   Securement secured to commercial device 3/4/2019 11:01 AM   Clamp Status/Tolerance clamped 3/4/2019 11:01 AM   Insertion Site Appearance no redness, warmth, tenderness, skin breakdown, drainage 3/4/2019 11:01 AM   Flush/Irrigation flushed w/;water;no resistance met 3/4/2019 11:01 AM   Feeding Action feeding held 3/4/2019 11:01 AM   Intake (mL) 120 mL 3/4/2019  9:01 AM   Tube Output(mL)(Include Discarded Residual) 160 mL 3/3/2019  6:00 PM       Male External Urinary Catheter 03/03/19 1648 Medium (Active)   Collection Container Urimeter 3/4/2019  3:25 AM   Securement Method secured to top of thigh w/ adhesive device 3/4/2019  3:25 AM   Skin no redness;no breakdown 3/4/2019  3:25 AM   Tolerance no signs/symptoms of discomfort 3/4/2019  3:25 AM   Output (mL) 0 mL 3/3/2019  6:00 PM            Hemodialysis AV Fistula Left forearm (Active)   Needle Size 15ga 3/1/2019  4:19 PM   Site Assessment Intact 3/4/2019  3:25 AM   Patency Present;Thrill;Bruit 3/4/2019  3:25 AM   Status Deaccessed 3/4/2019  3:25 AM   Flows Good 2/28/2019  8:20 AM   Dressing Status Clean;Dry;Intact 3/2/2019  3:01 PM   Site Condition No complications 3/4/2019  3:25 AM   Dressing Gauze 3/4/2019  3:25 AM            Trialysis (Dialysis) Catheter 03/04/19 0250 left internal jugular (Active)   IV Device Securement sutures 3/4/2019 11:01 AM   Patency/Care flushed w/o difficulty;infusing 3/4/2019 11:01 AM   Site Assessment Clean;Dry;Intact;No redness;No swelling 3/4/2019 11:01 AM   Status Accessed 3/4/2019 11:01 AM    Flows Good 3/4/2019 11:01 AM   Dressing Intervention Dressing reinforced 3/4/2019 11:01 AM   Dressing Status Biopatch in place;Clean;Dry;Intact 3/4/2019 11:01 AM   Dressing Change Due 03/11/19 3/4/2019 11:01 AM   Daily Line Review Performed 3/4/2019 11:01 AM       Neurosurgery Physical Exam     [id number]  AO x3  CN II -XII grossly intact  4-/5 BUE  5/5 BLE  SILT  +Sanford bilaterally w 3+ UE reflexes  2+ LE reflexes, no clonus or Babinski    Significant Labs:  Recent Labs   Lab 03/04/19  2235 03/05/19  0112 03/05/19  0509   * 158*  158* 183*  183*   * 134*  134* 132*  132*   K 4.5 4.4  4.4  4.4 4.4  4.4  4.4    101  101 99  99   CO2 23 23  23 24  24   BUN 39* 31*  31* 27*  27*   CREATININE 5.3* 4.5*  4.5* 4.2*  4.2*   CALCIUM 9.2 8.9  8.9 8.9  8.9   MG 2.3 2.4 2.3     Recent Labs   Lab 03/03/19  1718 03/04/19  0325 03/04/19  0359 03/05/19  0112   WBC 10.96  --  19.52* 20.31*   HGB 9.0*  --  9.0* 7.7*   HCT 28.3* 28* 27.8* 24.3*   PLT 95*  --  127* 117*     Recent Labs   Lab 03/04/19  1250   INR 1.0   APTT 23.4     Microbiology Results (last 7 days)     ** No results found for the last 168 hours. **          Significant Diagnostics:    Reviewed.    Review of Systems       Most Recent Lfts (Optional): WNL Counseling Text: I reviewed the side effect in detail. Patient should get monthly blood tests, not donate blood, not drive at night if vision affected, and not share medication. Hypertriglyceridemia Monitoring: I explained this is common when taking isotretinoin. We will monitor closely. Xerosis Normal Treatment: I recommended application of Cetaphil or CeraVe numerous times a day going to bed to all dry areas. Is Xerosis Present?: Yes - Normal Treatment Weight Units: kilograms Target Cumulative Dosage (In Mg/Kg): 135 Lower Range (In Mg/Kg): 120 Any Myalgia?: No Xerosis Aggressive Treatment: I recommended application of Cetaphil or CeraVe numerous times a day and before going to bed to all dry areas. I also prescribed a topical steroid for twice daily use. Add Associated Diagnosis When Managing Medication Side Effects: Yes Detail Level: Zone Most Recent Beta Hcg (Optional): NEGATIVE Dosing Month 2 (Required For Cumulative Dosing): 30mg BID Retinoid Dermatitis Aggressive Treatment: I recommended more frequent application of Cetaphil or CeraVe to the areas of dermatitis. I also prescribed a topical steroid for twice daily use until the dermatitis resolves. Any Hypercholesterolemia?: Yes - Monitoring Nosebleeds Normal Treatment: I explained this is common when taking isotretinoin. I recommended saline mist in each nostril multiple times a day. If this worsens they will contact us. Xerosis Aggressive Treatment: I recommended application of Cetaphil or CeraVe numerous times a day going to bed to all dry areas. I also prescribed a topical steroid for twice daily use. Months Of Therapy Completed: 4 Retinoid Dermatitis Normal Treatment: I recommended more frequent application of Cetaphil or CeraVe to the areas of dermatitis. Hypertriglyceridemia Treatment: I explained this is common when taking isotretinoin. If this worsens they will contact us. They may try OTC ibuprofen. What Is The Patient's Gender: Female Female Completion Statement: After discussing her treatment course we decided to discontinue isotretinoin therapy at this time. I explained that she would need to continue her birth control methods for at least one month after the last dosage. She should also get a pregnancy test one month after the last dose. She shouldn't donate blood for one month after the last dose. She should call with any new symptoms of depression. Headache Monitoring: I recommended monitoring the headaches for now. There is no evidence of increased intracranial pressure. They were instructed to call if the headaches are worsening. Hypertriglyceridemia Monitoring: I explained this is common when taking isotretinoin. If this worsens they will contact us. Kilograms Preamble Statement (Weight Entered In Details Tab): Reported Weight in kilograms: Most Recent Cholesterol (Optional): *188 (9/26/2019) High Cheilitis Aggressive Treatment: I recommended application of Vaseline or Aquaphor numerous times a day (as often as every hour) and before going to bed. I also prescribed a topical steroid for twice daily use. Most Recent Triglycerides (Optional): *120 (9/26/19) High Cheilitis Normal Treatment: I recommended application of Vaseline or Aquaphor numerous times a day (as often as every hour) and before going to bed. Female Pregnancy Counseling Text: Female patients should also be on two forms of birth control while taking this medication and for one month after their last dose. Pounds Preamble Statement (Weight Entered In Details Tab): Reported Weight in pounds: Dosing Month 1 (Required For Cumulative Dosing): 30mg Daily Male Completion Statement: After discussing his treatment course we decided to discontinue isotretinoin therapy at this time. He shouldn't donate blood for one month after the last dose. He should call with any new symptoms of depression. Patient Weight (Optional But Required For Cumulative Dose-Numbers And Decimals Only): 56 kgs. Ipledge Number (Optional): [id number] Upper Range (In Mg/Kg): 150 Xerosis Normal Treatment: I recommended application of Cetaphil or CeraVe numerous times a day and before going to bed to all dry areas.

## 2019-10-08 ENCOUNTER — TELEPHONE (OUTPATIENT)
Dept: FAMILY MEDICINE | Facility: HOSPITAL | Age: 59
End: 2019-10-08

## 2019-10-08 NOTE — TELEPHONE ENCOUNTER
----- Message from Verna Sinha sent at 10/7/2019 10:28 AM CDT -----  [facility] needs orders signed and faxed back to them. Yess[facility] will fax them over again but needs them signed ASAP. Any question please call [phone]

## 2019-10-09 ENCOUNTER — TELEPHONE (OUTPATIENT)
Dept: FAMILY MEDICINE | Facility: HOSPITAL | Age: 59
End: 2019-10-09

## 2019-12-20 ENCOUNTER — HOSPITAL ENCOUNTER (EMERGENCY)
Facility: HOSPITAL | Age: 59
Discharge: HOME | End: 2019-12-20
Attending: EMERGENCY MEDICINE
Payer: MEDICARE

## 2019-12-20 VITALS
WEIGHT: 249.31 LBS | BODY MASS INDEX: 39.13 KG/M2 | HEIGHT: 67 IN | RESPIRATION RATE: 16 BRPM | TEMPERATURE: 99 F | OXYGEN SATURATION: 96 % | SYSTOLIC BLOOD PRESSURE: 182 MMHG | DIASTOLIC BLOOD PRESSURE: 77 MMHG | HEART RATE: 59 BPM

## 2019-12-20 DIAGNOSIS — K59.00 CONSTIPATION, UNSPECIFIED CONSTIPATION TYPE: Primary | ICD-10-CM

## 2019-12-20 PROCEDURE — 25000003 PHARM REV CODE 250: Performed by: EMERGENCY MEDICINE

## 2019-12-20 PROCEDURE — 99283 EMERGENCY DEPT VISIT LOW MDM: CPT

## 2019-12-20 PROCEDURE — 63600175 PHARM REV CODE 636 W HCPCS: Performed by: EMERGENCY MEDICINE

## 2019-12-20 RX ORDER — PSEUDOEPHEDRINE/ACETAMINOPHEN 30MG-500MG
100 TABLET ORAL
Status: COMPLETED | OUTPATIENT
Start: 2019-12-20 | End: 2019-12-20

## 2019-12-20 RX ORDER — SYRING-NEEDL,DISP,INSUL,0.3 ML 29 G X1/2"
296 SYRINGE, EMPTY DISPOSABLE MISCELLANEOUS
Status: COMPLETED | OUTPATIENT
Start: 2019-12-20 | End: 2019-12-20

## 2019-12-20 RX ADMIN — MAGNESIUM CITRATE 296 ML: 1.75 LIQUID ORAL at 09:12

## 2019-12-20 RX ADMIN — SODIUM CHLORIDE 500 ML: 0.9 INJECTION, SOLUTION INTRAVENOUS at 09:12

## 2019-12-20 RX ADMIN — Medication 100 ML: at 09:12

## 2019-12-21 NOTE — ED NOTES
BM formed stool. Pt states he cannot get up to bedside commode. States he cannot wipe himself. Pt cleaned and pads changed.

## 2019-12-21 NOTE — DISCHARGE INSTRUCTIONS
Take a stool softener daily.  Use MiraLax at the onset of constipation.  Discuss further treatment with your primary care physician.

## 2019-12-21 NOTE — ED NOTES
Patient identifiers for Angel Farmer . checked and correct.  LOC: The patient is awake, alert and aware of environment with an appropriate affect, the patient is oriented x 3 and speaking appropriately.  APPEARANCE: Patient resting comfortably and in no acute distress, patient is clean and well groomed, patient's clothing are properly fastened.  SKIN: The skin is warm and dry, patient has normal skin turgor and moist mucus membranes.  MUSKULOSKELETAL: Patient moving all extremities well, no obvious swelling or deformities noted.  RESPIRATORY: Airway is open and patent, respirations are spontaneous, patient has a normal effort and rate.  ABDOMEN: Soft and non tender to palpation.

## 2019-12-21 NOTE — ED PROVIDER NOTES
Encounter Date: 12/20/2019    SCRIBE #1 NOTE: I, Ofelia Davenport, am scribing for, and in the presence of,  Dr. Solis. I have scribed the entire note.       History     Chief Complaint   Patient presents with    Constipation     Pt having frequent small hard stools. Pt took prune juice and a stool softener without relief.     Time seen by provider: 8:33 PM    This is a 59 y.o. male who presents with complaint of constipation. He reports onset of symptoms was 3-4 days ago. He has associated bloating but denies any blood in stool, nausea, vomiting, chest pain, shortness of breath, cough, congestion, fever or chills. The patient has been trying prune juice and stool softeners with no improvement. He was able to have small bowel movement but the stool was hard. This is common occurrence with the patient.     The history is provided by the patient.     Review of patient's allergies indicates:   Allergen Reactions    Keflex [cephalexin] Nausea Only     Past Medical History:   Diagnosis Date    Anemia due to stage 5 chronic kidney disease 3/1/2019    Anticoagulant long-term use     Diabetes mellitus type II     Dialysis patient     Encounter for blood transfusion     Hyperlipidemia     Hypertension     Kidney failure     MIKE (obstructive sleep apnea)     Stroke     Urinary tract infection      Past Surgical History:   Procedure Laterality Date    AV FISTULA PLACEMENT      left lower arm    FUSION OF CERVICAL SPINE BY POSTERIOR APPROACH N/A 3/3/2019    Procedure: SPINE, CERVICAL, POSTERIOR APPROACH  LAMINECTOMY C3-C6; C6-C7; WITH MEDIAL DISCECTOMY;  Surgeon: Ruddy Wylie MD;  Location: [facility] OR [address] [place] [address];  Service: Neurosurgery;  Laterality: N/A;  POSTERIOR    TONSILLECTOMY, ADENOIDECTOMY       Family History   Problem Relation Age of Onset    Colon cancer Mother     Lung cancer Mother     Diabetes Mother     Diabetes Father     Heart disease Father     Hypertension Father     Diabetes Maternal  Grandmother     Diabetes Maternal Grandfather      Social History     Tobacco Use    Smoking status: Never Smoker    Smokeless tobacco: Never Used   Substance Use Topics    Alcohol use: No    Drug use: No     Review of Systems   Constitutional: Negative for chills and fever.   HENT: Negative for congestion.    Respiratory: Negative for cough and shortness of breath.    Cardiovascular: Negative for chest pain.   Gastrointestinal: Positive for abdominal distention and constipation.   All other systems reviewed and are negative.      Physical Exam     Initial Vitals [12/20/19 1939]   BP Pulse Resp Temp SpO2   (!) 154/67 (!) 55 16 98.4 °F (36.9 °C) 100 %      MAP       --         Physical Exam    Nursing note and vitals reviewed.  Constitutional: He appears well-developed and well-nourished. He is not diaphoretic. No distress.   HENT:   Head: Normocephalic and atraumatic.   Nose: Nose normal.   Mouth/Throat: Oropharynx is clear and moist.   Eyes: Conjunctivae and EOM are normal.   Neck: Normal range of motion. Neck supple.   Cardiovascular: Normal rate, regular rhythm and normal heart sounds. Exam reveals no gallop and no friction rub.    No murmur heard.  Pulmonary/Chest: Breath sounds normal. He has no wheezes. He has no rhonchi. He has no rales.   Abdominal: Soft. He exhibits no distension. There is no tenderness.   Genitourinary:   Genitourinary Comments: Moderate amount of firm stool, no bleeding   Musculoskeletal: Normal range of motion.   Lymphadenopathy:     He has no cervical adenopathy.   Neurological: He is alert and oriented to person, place, and time. He has normal strength.   Skin: Skin is warm and dry. No rash noted.         ED Course   Procedures  Labs Reviewed - No data to display          Medical Decision Making:   Initial Assessment:   Patient is constipated and has barriers to care and will need assistance with enema. I do not suspect intraabdominal infection or bleeding.                   ED  Course as of Dec 20 2340   Fri Dec 20, 2019   2104 I performed a manual disimpaction and removed a large volume of soft brown stool without any bleeding.  I will given enema.    [YUSRA]   2205 Patient received an enema.  He had excellent response with a large bowel movement.  He will be discharged.  I recommended daily stool softener and MiraLax as needed for constipation.    [YUSRA]   2337 The patient had excellent response to his enema over several hours and had numerous bowel movements.    [YUSRA]      ED Course User Index  [YUSRA] Clarence Solis MD                Clinical Impression:     1. Constipation, unspecified constipation type           I, Dr. Shawn Solis, personally performed the services described in this documentation. All medical record entries made by the scribe were at my direction and in my presence.  I have reviewed the chart and agree that the record reflects my personal performance and is accurate and complete. Shawn Solis MD.                 Clarence Solis MD  12/20/19 8090

## 2019-12-22 DIAGNOSIS — Z79.4 TYPE 2 DIABETES MELLITUS WITH DIABETIC NEPHROPATHY, WITH LONG-TERM CURRENT USE OF INSULIN: ICD-10-CM

## 2019-12-22 DIAGNOSIS — E11.21 TYPE 2 DIABETES MELLITUS WITH DIABETIC NEPHROPATHY, WITH LONG-TERM CURRENT USE OF INSULIN: ICD-10-CM

## 2020-03-17 ENCOUNTER — TELEPHONE (OUTPATIENT)
Dept: PODIATRY | Facility: CLINIC | Age: 60
End: 2020-03-17

## 2020-03-17 ENCOUNTER — TELEPHONE (OUTPATIENT)
Dept: CARDIOLOGY | Facility: CLINIC | Age: 60
End: 2020-03-17

## 2020-03-17 NOTE — TELEPHONE ENCOUNTER
Attempted to contact the patient to let him know he needs to contact his PCP for this refill. I left a voicemail for him to call back.

## 2020-03-17 NOTE — TELEPHONE ENCOUNTER
----- Message from Praneeth Martinez sent at 3/17/2020  2:59 PM CDT -----  Type: Rx REfill Request     Who Called: APtient  REfill or New Rx: refill  Rx Name and Strength: gabapentin (NEURONTIN) 300 MG capsule    How is the patient currently taking it? (ex.1xDay):ig: TAKE 1 BY MOUTH EVERY      EVENING   Is this a 30 day or 90 day RX: 90 day  Preferred Pharmacy with phone number: [phone] [address] [place] [address] [phone] (Phone)   Local or Mail Order: local  Ordering Provider: Olman Fitch MD  Would the patient rather a call back or a response via [facility]? Call back  Best Call Back: [phone]   Additional Information : Patient has 6 pills left and pt needs 3 months supply    Thank You.

## 2020-03-17 NOTE — TELEPHONE ENCOUNTER
----- Message from Kale Terrell sent at 3/17/2020  3:35 PM CDT -----  Contact: [phone] / self   Patient would like a refill for the medication gabapentin (NEURONTIN) 300 MG capsule. Pharmacy is [facility] [place], [place]

## 2020-03-18 ENCOUNTER — TELEPHONE (OUTPATIENT)
Dept: PODIATRY | Facility: CLINIC | Age: 60
End: 2020-03-18

## 2020-03-18 NOTE — TELEPHONE ENCOUNTER
----- Message from Verna Fine sent at 3/18/2020 10:59 AM CDT -----  Contact: [phone] /self   Patient states he is returning your call. Please advise.

## 2020-03-18 NOTE — TELEPHONE ENCOUNTER
Spoke with pt and he stated that he will need a refill for the medcation gabapentin. I advised pt that the last time he was seen by Dr. Chen was in 2018 and per guidelines he will have to make an appt so Dr. Chen can reevaluate him. Pt stated that he  three times and have no way to get here. I advised pt I'am sorry put we have to follow the guidelines pt was mad and hung the phone up.

## 2020-04-01 ENCOUNTER — HOSPITAL ENCOUNTER (EMERGENCY)
Facility: HOSPITAL | Age: 60
Discharge: HOME | End: 2020-04-01
Attending: EMERGENCY MEDICINE
Payer: MEDICARE

## 2020-04-01 VITALS
BODY MASS INDEX: 39.24 KG/M2 | HEIGHT: 67 IN | DIASTOLIC BLOOD PRESSURE: 66 MMHG | RESPIRATION RATE: 18 BRPM | SYSTOLIC BLOOD PRESSURE: 149 MMHG | OXYGEN SATURATION: 99 % | WEIGHT: 250 LBS | HEART RATE: 57 BPM | TEMPERATURE: 99 F

## 2020-04-01 DIAGNOSIS — Z99.2 TYPE 2 DIABETES MELLITUS WITH CHRONIC KIDNEY DISEASE ON CHRONIC DIALYSIS, WITH LONG-TERM CURRENT USE OF INSULIN: ICD-10-CM

## 2020-04-01 DIAGNOSIS — T14.8XXA BLOOD BLISTER: Primary | ICD-10-CM

## 2020-04-01 DIAGNOSIS — Z79.4 TYPE 2 DIABETES MELLITUS WITH CHRONIC KIDNEY DISEASE ON CHRONIC DIALYSIS, WITH LONG-TERM CURRENT USE OF INSULIN: ICD-10-CM

## 2020-04-01 DIAGNOSIS — E11.22 TYPE 2 DIABETES MELLITUS WITH CHRONIC KIDNEY DISEASE ON CHRONIC DIALYSIS, WITH LONG-TERM CURRENT USE OF INSULIN: ICD-10-CM

## 2020-04-01 DIAGNOSIS — N18.6 TYPE 2 DIABETES MELLITUS WITH CHRONIC KIDNEY DISEASE ON CHRONIC DIALYSIS, WITH LONG-TERM CURRENT USE OF INSULIN: ICD-10-CM

## 2020-04-01 PROCEDURE — 99284 EMERGENCY DEPT VISIT MOD MDM: CPT

## 2020-04-01 NOTE — ED NOTES
Pt presents to the ED w/ c/ of toe pain. Pt reports blister like wound to the second digit on the left foot. Pt denies injuring foot. Pt reports pain when moving second digit on left foot. Pt reports positive sensation to toe and foot. +dorsalis pulse noted. Pt reports that he is a dialysis patient and goes MWF and reports that he received full dialysis treatment today. Pt is NAD.

## 2020-04-01 NOTE — ED PROVIDER NOTES
Encounter Date: 4/1/2020       History     Chief Complaint   Patient presents with    Diabetic Foot Ulcer     cc left toe blister on 2nd metatarsal. patient stated he started with pain yesterday. patient is a DM and does dialysis. last diaylsis was today     58 y/o male with CKD on dialysis, DM, HTN and long term anticoagulant use which presents with a blood blister on his left toe after he kicked an unknown object. Pt states he came here after dialysis because he doesn't want to lose his leg. Pt denies fever, pain to his toe or the inability to walk. Pt did NOT call his primary care provider before coming to the ED.     The history is provided by the patient.     Review of patient's allergies indicates:   Allergen Reactions    Keflex [cephalexin] Nausea Only     Past Medical History:   Diagnosis Date    Anemia due to stage 5 chronic kidney disease 3/1/2019    Anticoagulant long-term use     Diabetes mellitus type II     Dialysis patient     Encounter for blood transfusion     Hyperlipidemia     Hypertension     Kidney failure     MIKE (obstructive sleep apnea)     Stroke     Urinary tract infection      Past Surgical History:   Procedure Laterality Date    AV FISTULA PLACEMENT      left lower arm    FUSION OF CERVICAL SPINE BY POSTERIOR APPROACH N/A 3/3/2019    Procedure: SPINE, CERVICAL, POSTERIOR APPROACH  LAMINECTOMY C3-C6; C6-C7; WITH MEDIAL DISCECTOMY;  Surgeon: Ruddy Wylie MD;  Location: [facility] OR [address] [place] [address];  Service: Neurosurgery;  Laterality: N/A;  POSTERIOR    TONSILLECTOMY, ADENOIDECTOMY       Family History   Problem Relation Age of Onset    Colon cancer Mother     Lung cancer Mother     Diabetes Mother     Diabetes Father     Heart disease Father     Hypertension Father     Diabetes Maternal Grandmother     Diabetes Maternal Grandfather      Social History     Tobacco Use    Smoking status: Never Smoker    Smokeless tobacco: Never Used   Substance Use Topics    Alcohol use: No     Drug use: No     Review of Systems   Constitutional: Negative for fever.   HENT: Negative for sore throat.    Respiratory: Negative for shortness of breath.    Cardiovascular: Negative for chest pain.   Gastrointestinal: Negative for nausea.   Genitourinary: Negative for dysuria.   Musculoskeletal: Negative for back pain.   Skin: Positive for color change and wound (blood blister). Negative for rash.   Neurological: Negative for weakness.   Hematological: Does not bruise/bleed easily.   All other systems reviewed and are negative.    Physical Exam     Initial Vitals [04/01/20 1129]   BP Pulse Resp Temp SpO2   (!) 123/93 (!) 55 20 97.6 °F (36.4 °C) 99 %      MAP       --         Physical Exam    Nursing note and vitals reviewed.  Constitutional: He appears well-developed and well-nourished.   HENT:   Head: Normocephalic and atraumatic.   Eyes: Conjunctivae and EOM are normal. Pupils are equal, round, and reactive to light.   Cardiovascular: Normal rate, regular rhythm, normal heart sounds and intact distal pulses. Exam reveals no gallop and no friction rub.    No murmur heard.  Pulmonary/Chest: Breath sounds normal. No respiratory distress. He has no wheezes. He has no rhonchi. He has no rales. He exhibits no tenderness.   Neurological: He is alert and oriented to person, place, and time. He has normal strength. GCS score is 15. GCS eye subscore is 4. GCS verbal subscore is 5. GCS motor subscore is 6.   Skin:   Dime sized blood blister noted to dorsal aspect of left 2nd toe- no erythema or any other signs of infection       ED Course   Procedures  Labs Reviewed - No data to display        Medical Decision Making:   Initial Assessment:   60 y/o male with CKD on dialysis, DM, HTN and long term anticoagulant use which presents with a blood blister on his left toe after he kicked an unknown object. Pt states he came here after dialysis because he doesn't want to lose his leg. Pt denies fever, pain to his toe or the  inability to walk. Pt did NOT call his primary care provider before coming to the ED.     Differential Diagnosis:   Blood blister, trauma, diabetic ulcer, ill fitting shoes  ED Management:  Pt examined and noted to have an intact blood blister to his left 2nd toe. Pt does not have signs of infection. He has been advised to wear wide toe shoes. He has been referred to wound care and podiatry. Patient given strict return precautions and voiced understanding of all discharge instructions. Pt was stable at discharge.                        ED Course as of Apr 01 1216 Wed Apr 01, 2020   1135 BP(!): 123/93 [AT]   1135 Temp: 97.6 °F (36.4 °C) [AT]   1135 Temp src: Oral [AT]   1135 Pulse(!): 55 [AT]   1135 Resp: 20 [AT]   1135 SpO2: 99 % [AT]      ED Course User Index  [AT] NOHEMY Burch          Clinical Impression:       ICD-10-CM ICD-9-CM   1. Blood blister T14.8XXA 919.2   2. Type 2 diabetes mellitus with chronic kidney disease on chronic dialysis, with long-term current use of insulin E11.22 250.40    N18.6 585.9    Z99.2 V45.11    Z79.4 V58.67             ED Disposition Condition    Discharge Stable        ED Prescriptions     None        Follow-up Information     Follow up With Specialties Details Why Contact Info    Jamal Millan MD Family Medicine Schedule an appointment as soon as possible for a visit  As needed [address]  [address]  [place] [address]  [phone]      Balwinder Chen DPM Podiatry Schedule an appointment as soon as possible for a visit in 2 days  [address]  [address]  [place] [address]  [phone]                                       NOHEMY Burch  04/01/20 1216

## 2020-04-03 ENCOUNTER — HOSPITAL ENCOUNTER (OUTPATIENT)
Dept: WOUND CARE | Facility: HOSPITAL | Age: 60
Discharge: HOME | End: 2020-04-03
Attending: STUDENT IN AN ORGANIZED HEALTH CARE EDUCATION/TRAINING PROGRAM
Payer: MEDICARE

## 2020-04-03 ENCOUNTER — HOSPITAL ENCOUNTER (OUTPATIENT)
Dept: RADIOLOGY | Facility: HOSPITAL | Age: 60
Discharge: HOME | End: 2020-04-03
Attending: STUDENT IN AN ORGANIZED HEALTH CARE EDUCATION/TRAINING PROGRAM
Payer: MEDICARE

## 2020-04-03 VITALS
SYSTOLIC BLOOD PRESSURE: 87 MMHG | TEMPERATURE: 99 F | HEART RATE: 62 BPM | RESPIRATION RATE: 18 BRPM | DIASTOLIC BLOOD PRESSURE: 46 MMHG

## 2020-04-03 DIAGNOSIS — N18.5 ANEMIA DUE TO STAGE 5 CHRONIC KIDNEY DISEASE: ICD-10-CM

## 2020-04-03 DIAGNOSIS — B35.1 ONYCHOMYCOSIS: ICD-10-CM

## 2020-04-03 DIAGNOSIS — E11.42 DIABETIC POLYNEUROPATHY ASSOCIATED WITH TYPE 2 DIABETES MELLITUS: ICD-10-CM

## 2020-04-03 DIAGNOSIS — N18.6 TYPE 2 DIABETES MELLITUS WITH CHRONIC KIDNEY DISEASE ON CHRONIC DIALYSIS, WITH LONG-TERM CURRENT USE OF INSULIN: ICD-10-CM

## 2020-04-03 DIAGNOSIS — Z99.2 TYPE 2 DIABETES MELLITUS WITH CHRONIC KIDNEY DISEASE ON CHRONIC DIALYSIS, WITH LONG-TERM CURRENT USE OF INSULIN: ICD-10-CM

## 2020-04-03 DIAGNOSIS — T14.8XXA BLOOD BLISTER: ICD-10-CM

## 2020-04-03 DIAGNOSIS — Z79.4 TYPE 2 DIABETES MELLITUS WITH CHRONIC KIDNEY DISEASE ON CHRONIC DIALYSIS, WITH LONG-TERM CURRENT USE OF INSULIN: ICD-10-CM

## 2020-04-03 DIAGNOSIS — D63.1 ANEMIA DUE TO STAGE 5 CHRONIC KIDNEY DISEASE: ICD-10-CM

## 2020-04-03 DIAGNOSIS — E11.22 TYPE 2 DIABETES MELLITUS WITH CHRONIC KIDNEY DISEASE ON CHRONIC DIALYSIS, WITH LONG-TERM CURRENT USE OF INSULIN: ICD-10-CM

## 2020-04-03 DIAGNOSIS — E66.01 CLASS 3 SEVERE OBESITY DUE TO EXCESS CALORIES WITH SERIOUS COMORBIDITY AND BODY MASS INDEX (BMI) OF 45.0 TO 49.9 IN ADULT: Primary | ICD-10-CM

## 2020-04-03 PROCEDURE — 97597 DBRDMT OPN WND 1ST 20 CM/<: CPT

## 2020-04-03 PROCEDURE — 97597 DBRDMT OPN WND 1ST 20 CM/<: CPT | Mod: ,,, | Performed by: STUDENT IN AN ORGANIZED HEALTH CARE EDUCATION/TRAINING PROGRAM

## 2020-04-03 PROCEDURE — 87077 CULTURE AEROBIC IDENTIFY: CPT

## 2020-04-03 PROCEDURE — 73630 XR FOOT COMPLETE 3 VIEW LEFT: ICD-10-PCS | Mod: 26,LT,, | Performed by: RADIOLOGY

## 2020-04-03 PROCEDURE — 11721 DEBRIDE NAIL 6 OR MORE: CPT

## 2020-04-03 PROCEDURE — 11721 DEBRIDE NAIL 6 OR MORE: CPT | Performed by: STUDENT IN AN ORGANIZED HEALTH CARE EDUCATION/TRAINING PROGRAM

## 2020-04-03 PROCEDURE — 93925 LOWER EXTREMITY STUDY: CPT | Mod: TC

## 2020-04-03 PROCEDURE — 87075 CULTR BACTERIA EXCEPT BLOOD: CPT

## 2020-04-03 PROCEDURE — 99203 OFFICE O/P NEW LOW 30 MIN: CPT | Mod: 25

## 2020-04-03 PROCEDURE — 99214 PR OFFICE/OUTPT VISIT, EST, LEVL IV, 30-39 MIN: ICD-10-PCS | Mod: 25,,, | Performed by: STUDENT IN AN ORGANIZED HEALTH CARE EDUCATION/TRAINING PROGRAM

## 2020-04-03 PROCEDURE — 97597 DEBRIDEMENT: ICD-10-PCS | Mod: ,,, | Performed by: STUDENT IN AN ORGANIZED HEALTH CARE EDUCATION/TRAINING PROGRAM

## 2020-04-03 PROCEDURE — 27201912 HC WOUND CARE DEBRIDEMENT SUPPLIES

## 2020-04-03 PROCEDURE — 99214 OFFICE O/P EST MOD 30 MIN: CPT | Mod: 25,,, | Performed by: STUDENT IN AN ORGANIZED HEALTH CARE EDUCATION/TRAINING PROGRAM

## 2020-04-03 PROCEDURE — 93925 US LOWER EXTREMITY ARTERIES BILATERAL: ICD-10-PCS | Mod: 26,,, | Performed by: RADIOLOGY

## 2020-04-03 PROCEDURE — 87070 CULTURE OTHR SPECIMN AEROBIC: CPT

## 2020-04-03 PROCEDURE — 93925 LOWER EXTREMITY STUDY: CPT | Mod: 26,,, | Performed by: RADIOLOGY

## 2020-04-03 PROCEDURE — 11042 DBRDMT SUBQ TIS 1ST 20SQCM/<: CPT

## 2020-04-03 PROCEDURE — 73630 X-RAY EXAM OF FOOT: CPT | Mod: TC,FY,LT

## 2020-04-03 PROCEDURE — 87186 SC STD MICRODIL/AGAR DIL: CPT | Mod: 59

## 2020-04-03 PROCEDURE — 73630 X-RAY EXAM OF FOOT: CPT | Mod: 26,LT,, | Performed by: RADIOLOGY

## 2020-04-03 RX ORDER — POLYETHYLENE GLYCOL 3350 17 G/17G
POWDER, FOR SOLUTION ORAL
COMMUNITY
End: 2021-09-20

## 2020-04-03 RX ORDER — DOXYCYCLINE 100 MG/1
100 CAPSULE ORAL 2 TIMES DAILY
Qty: 28 CAPSULE | Refills: 0 | Status: SHIPPED | OUTPATIENT
Start: 2020-04-03 | End: 2020-06-19

## 2020-04-03 RX ORDER — AMMONIUM LACTATE 12 G/100G
1 CREAM TOPICAL 2 TIMES DAILY
Qty: 1 BOTTLE | Refills: 0 | Status: SHIPPED | OUTPATIENT
Start: 2020-04-03 | End: 2022-01-24

## 2020-04-03 RX ORDER — CICLOPIROX 80 MG/ML
SOLUTION TOPICAL NIGHTLY
Qty: 1 BOTTLE | Refills: 3 | Status: SHIPPED | OUTPATIENT
Start: 2020-04-03 | End: 2022-01-24

## 2020-04-03 NOTE — PROGRESS NOTES
"Subjective:       Patient ID: Angel Farmer Jr. is a 59 y.o. male.    Chief Complaint: Non-healing Wound    4/3/2020: 59 y.o. Dm, dialysis patient. Admit wound care clinic for left second toe blister.  Patient reports he" noticed the blood blister Tuesday and it was not there on Sunday after his shower."  Patient states he's not sure how the blister happened. 1+ palpapble pulses. Debridement per  patient tolerated well.  Order to apply betadine and football dressing change weekly in clinic. Patient refused home health and nursing visits in clinic due to dialysis Mon, wed, fri and patient states he gets help showering late in the evenings on tue thur and saturdays. Toe nails trimmed today in clinic by .   Xray of left foot, wound culture and arterial ultrasound BLE,  ordered today in clinic. Rx sent to patient's pharmacy for Doxycycline po. F/u 1 week in clinic with Dr. Narayanan.    Review of Systems   Constitutional: Negative for activity change, chills, diaphoresis and fever.   HENT: Negative for congestion and hearing loss.    Respiratory: Negative for cough and shortness of breath.    Cardiovascular: Negative for leg swelling.   Gastrointestinal: Negative for nausea and vomiting.   Musculoskeletal: Positive for gait problem. Negative for back pain, joint swelling and myalgias.   Skin: Positive for wound. Negative for color change, pallor and rash.   Neurological: Positive for numbness. Negative for tremors, speech difficulty and weakness.   Psychiatric/Behavioral: Negative for agitation, confusion and suicidal ideas. The patient is not nervous/anxious.        Objective:      Physical Exam   Constitutional: He is oriented to person, place, and time. He appears well-developed and well-nourished. No distress.   Cardiovascular: Intact distal pulses.     Skin temperature is within normal limits. Toes are cool to touch and feet are warm proximally. Hair growth is diminished. Skin is mildly atrophic " and with mild hyperpigmentation. Mild edema noted, reji.    Musculoskeletal: He exhibits edema and deformity. He exhibits no tenderness.        Right foot: There is decreased range of motion and deformity.        Left foot: There is decreased range of motion and deformity.       No POP noted, reji   Feet:   Right Foot:   Skin Integrity: Positive for dry skin. Negative for blister, erythema or warmth.   Left Foot:   Skin Integrity: Positive for dry skin. Negative for blister, erythema or warmth.   Lymphadenopathy:   Negative lymphadenopathy bilateral popliteal fossa and tarsal tunnel.    Neurological: He is alert and oriented to person, place, and time. A sensory deficit is present. He exhibits normal muscle tone.     [place] 5.07 monofilamant testing is diminished. Sharp/dull sensation is diminished bilaterally. Light touch is diminished bilaterally. Vibratory sensation is diminished reji   Skin: Skin is warm and dry. No abrasion, no bruising, no burn, no laceration, no petechiae and no rash noted. He is not diaphoretic. No cyanosis or erythema. No pallor. Nails show no clubbing.   Skin is warm and dry, bilaterally, no acute SOI noted, bilaterally, appears stable.       Nails are thickened by 2-4 mm's, dystrophic, and are darkened in coloration with subungual fungal debris.       See wound description below   Psychiatric: He has a normal mood and affect. His behavior is normal. Judgment and thought content normal.       Assessment:       1. Blood blister           Wound 04/03/20 1310 Blister(s) Toe, second #1 (Active)   04/03/20 1310    Pre-existing: Yes   Primary Wound Type: Blister(s)   Side: Left   Orientation:    Location: Toe, second   Wound/PI Number (optional): #1   Ankle-Brachial Index:    Pulses: 1+ palpable   Removal Indication and Assessment:    Wound Outcome:    (Retired) Wound Type:    (Retired) Wound Length (cm):    (Retired) Wound Width (cm):    (Retired) Depth (cm):    Wound Description  (Comments):    Removal Indications:    Wound Image   4/3/2020  4:10 PM   Dressing Appearance Intact;Moist drainage 4/3/2020  4:10 PM   Drainage Amount Small 4/3/2020  4:10 PM   Drainage Characteristics/Odor Serosanguineous 4/3/2020  4:10 PM   Appearance Red;Moist;Maroon 4/3/2020  4:10 PM   Tissue loss description Partial thickness 4/3/2020  4:10 PM   Red (%), Wound Tissue Color 100 % 4/3/2020  4:10 PM   Periwound Area Intact;Edematous;Redness 4/3/2020  4:10 PM   Wound Edges Defined 4/3/2020  4:10 PM   Wound Length (cm) 1 cm 4/3/2020  4:10 PM   Wound Width (cm) 2.2 cm 4/3/2020  4:10 PM   Wound Depth (cm) 0.1 cm 4/3/2020  4:10 PM   Wound Volume (cm^3) 0.22 cm^3 4/3/2020  4:10 PM   Wound Surface Area (cm^2) 2.2 cm^2 4/3/2020  4:10 PM   Care Cleansed with:;Sterile normal saline;Debrided;Applied:;Povidone iodine 4/3/2020  4:10 PM   Dressing Calcium alginate;Foam;Cast padding;Other (see comments) 4/3/2020  4:10 PM   Periwound Care Absorptive dressing applied;Dry periwound area maintained 4/3/2020  4:10 PM   Off Loading Football dressing;Off loading shoe 4/3/2020  4:10 PM   Dressing Change Due 04/09/20 4/3/2020  4:10 PM   Left Second Toe #1    Cleanse wound with:Normal Saline  Lidocaine:PRN  Silver nitrate:PRN  Periwound care:Maintain dry muna wound  Primary dressing:Betadine  Secondary dressing:Aquacel extra, 1 pramod foam over toes,  cast padding x 3 coban flexi net, blue bootie.  Offloading:Darco Shoe  Edema control: Elevate BLE as much as possible.  Avoid standing in one place for long periods of time.  Frequency:Weekly in clinic.  Follow-up:1 week 4/9/2020     Other Orders:Rx sent to patient's pharmacy for Doxycycline po  Left foot xray, BLE arterial US, and wound culture ordered today in clinic.     Patient may shower Protect dressing from getting wet.    Plan:              After obtaining verbal consent, nails 1-5 were debrided reji, using sterile nail nippers, without incident, pt tolerated procedure  well.     Dressing application as per above. Darco shoe applied to offload the area. Advised patient that this should be worn on the affected foot at all times when ambulating. Short-term goals include maintaining good offloading and minimizing bioburden, promoting granulation and epithelialization to healing.  Long-term goals include keeping the wound healed by good offloading and medical management under the direction of internist.      Shoe inspection. Diabetic Foot Education. Patient reminded of the importance of good nutrition and blood sugar control to help prevent podiatric complications of diabetes. Patient instructed on proper foot hygeine. We discussed wearing proper shoe gear, daily foot inspections, never walking without protective shoe gear, never putting sharp instruments to feet, routine podiatric nail visits      Discussed general foot care:  Wear comfortable, proper fitting shoes. Wash feet daily. Dry well. After drying, apply moisturizer to feet (no lotion to webspaces). Inspect feet daily for skin breaks, blisters, swelling, or redness. Wear cotton socks (preferably white)  Change socks every day. Do NOT walk barefoot. Do NOT use heating pads or warm/hot water soaks. I discussed with the  patient  signs and symptoms of infection including redness, drainage, purulence, odor, pain, elevated BS, streaking, fever, chills, etc . Patient is to seek medical attention (ER or urgent care) if these symptoms occur        4/9/2020

## 2020-04-07 LAB
BACTERIA SPEC AEROBE CULT: ABNORMAL
BACTERIA SPEC AEROBE CULT: ABNORMAL
BACTERIA SPEC ANAEROBE CULT: NORMAL

## 2020-04-07 NOTE — PROCEDURES
Debridement  Date/Time: 4/3/2020 10:04 AM  Performed by: Hilary Narayanan DPM  Authorized by: Hilary Narayanan DPM     Consent Done?:  Yes (Verbal)  Local anesthesia used?: No      Wound Details:    Location:  Left foot    Location:  Left 2nd Toe    Type of Debridement:  Non-excisional       Length (cm):  1       Area (sq cm):  2.2       Width (cm):  2.2       Percent Debrided (%):  50       Depth (cm):  0.1       Total Area Debrided (sq cm):  1.1    Depth of debridement:  Epidermis/Dermis    Tissue debrided:  Other    Devitalized tissue debrided:  Biofilm, Callus, Fibrin, Slough and Other    Instruments:  Blade and Curette    Bleeding:  Minimal  Patient tolerance:  Patient tolerated the procedure well with no immediate complications     Non excisional, selective debridement of devitalized and necrotic tissue.

## 2020-04-09 ENCOUNTER — HOSPITAL ENCOUNTER (OUTPATIENT)
Dept: WOUND CARE | Facility: HOSPITAL | Age: 60
Discharge: HOME | End: 2020-04-09
Attending: STUDENT IN AN ORGANIZED HEALTH CARE EDUCATION/TRAINING PROGRAM
Payer: MEDICARE

## 2020-04-09 VITALS
TEMPERATURE: 98 F | SYSTOLIC BLOOD PRESSURE: 92 MMHG | WEIGHT: 250 LBS | HEIGHT: 67 IN | HEART RATE: 57 BPM | BODY MASS INDEX: 39.24 KG/M2 | DIASTOLIC BLOOD PRESSURE: 55 MMHG

## 2020-04-09 DIAGNOSIS — Z99.2 TYPE 2 DIABETES MELLITUS WITH CHRONIC KIDNEY DISEASE ON CHRONIC DIALYSIS, WITH LONG-TERM CURRENT USE OF INSULIN: Primary | ICD-10-CM

## 2020-04-09 DIAGNOSIS — L97.509 ULCER OF TOE, UNSPECIFIED LATERALITY, UNSPECIFIED ULCER STAGE: ICD-10-CM

## 2020-04-09 DIAGNOSIS — N18.6 TYPE 2 DIABETES MELLITUS WITH CHRONIC KIDNEY DISEASE ON CHRONIC DIALYSIS, WITH LONG-TERM CURRENT USE OF INSULIN: Primary | ICD-10-CM

## 2020-04-09 DIAGNOSIS — B35.1 ONYCHOMYCOSIS DUE TO DERMATOPHYTE: ICD-10-CM

## 2020-04-09 DIAGNOSIS — E11.22 TYPE 2 DIABETES MELLITUS WITH CHRONIC KIDNEY DISEASE ON CHRONIC DIALYSIS, WITH LONG-TERM CURRENT USE OF INSULIN: Primary | ICD-10-CM

## 2020-04-09 DIAGNOSIS — Z79.4 TYPE 2 DIABETES MELLITUS WITH CHRONIC KIDNEY DISEASE ON CHRONIC DIALYSIS, WITH LONG-TERM CURRENT USE OF INSULIN: Primary | ICD-10-CM

## 2020-04-09 PROCEDURE — 87070 CULTURE OTHR SPECIMN AEROBIC: CPT

## 2020-04-09 PROCEDURE — 87075 CULTR BACTERIA EXCEPT BLOOD: CPT

## 2020-04-09 PROCEDURE — 87186 SC STD MICRODIL/AGAR DIL: CPT

## 2020-04-09 PROCEDURE — 11042 DEBRIDEMENT: ICD-10-PCS | Mod: ,,, | Performed by: STUDENT IN AN ORGANIZED HEALTH CARE EDUCATION/TRAINING PROGRAM

## 2020-04-09 PROCEDURE — 11042 DBRDMT SUBQ TIS 1ST 20SQCM/<: CPT

## 2020-04-09 PROCEDURE — 11042 DBRDMT SUBQ TIS 1ST 20SQCM/<: CPT | Mod: ,,, | Performed by: STUDENT IN AN ORGANIZED HEALTH CARE EDUCATION/TRAINING PROGRAM

## 2020-04-09 PROCEDURE — 87077 CULTURE AEROBIC IDENTIFY: CPT | Mod: 59

## 2020-04-09 PROCEDURE — 27201912 HC WOUND CARE DEBRIDEMENT SUPPLIES

## 2020-04-09 NOTE — PROGRESS NOTES
"Subjective:      Subjective:       Patient ID: Angel Farmer Jr. is a 59 y.o. male.    Chief Complaint: Wound Care    4/3/2020: 59 y.o. Dm, dialysis patient. Admit wound care clinic for left second toe blister.  Patient reports he" noticed the blood blister Tuesday and it was not there on Sunday after his shower."  Patient states he's not sure how the blister happened. 1+ palpapble pulses. Debridement per  patient tolerated well.  Order to apply betadine and football dressing change weekly in clinic. Patient refused home health and nursing visits in clinic due to dialysis Mon, wed, fri and patient states he gets help showering late in the evenings on tue thur and saturdays. Toe nails trimmed today in clinic by .   Xray of left foot, wound culture and arterial ultrasound BLE,  ordered today in clinic. Rx sent to patient's pharmacy for Doxycycline po. F/u 1 week in clinic with Dr. Narayanan.   04/09/2020: F/U with Dr. Lamar. Debridement done. Home Health orders sent to [facility].     Review of Systems   Constitutional: Negative for activity change, chills, diaphoresis and fever.   HENT: Negative for congestion and hearing loss.    Respiratory: Negative for cough and shortness of breath.    Cardiovascular: Negative for leg swelling.   Gastrointestinal: Negative for nausea and vomiting.   Musculoskeletal: Positive for gait problem. Negative for back pain, joint swelling and myalgias.   Skin: Positive for wound. Negative for color change, pallor and rash.   Neurological: Positive for numbness. Negative for tremors, speech difficulty and weakness.   Psychiatric/Behavioral: Negative for agitation, confusion and suicidal ideas. The patient is not nervous/anxious.        Objective:      Physical Exam   Constitutional: He is oriented to person, place, and time. He appears well-developed and well-nourished. No distress.   Cardiovascular: Intact distal pulses.     Skin temperature is within normal limits. " Toes are cool to touch and feet are warm proximally. Hair growth is diminished. Skin is mildly atrophic and with mild hyperpigmentation. Mild edema noted, reji.    Musculoskeletal: He exhibits edema and deformity. He exhibits no tenderness.        Right foot: There is decreased range of motion and deformity.        Left foot: There is decreased range of motion and deformity.       No POP noted, reji   Feet:   Right Foot:   Skin Integrity: Positive for dry skin. Negative for blister, erythema or warmth.   Left Foot:   Skin Integrity: Positive for dry skin. Negative for blister, erythema or warmth.   Lymphadenopathy:   Negative lymphadenopathy bilateral popliteal fossa and tarsal tunnel.    Neurological: He is alert and oriented to person, place, and time. A sensory deficit is present. He exhibits normal muscle tone.     [place] 5.07 monofilamant testing is diminished. Sharp/dull sensation is diminished bilaterally. Light touch is diminished bilaterally. Vibratory sensation is diminished reji   Skin: Skin is warm and dry. No abrasion, no bruising, no burn, no laceration, no petechiae and no rash noted. He is not diaphoretic. No cyanosis or erythema. No pallor. Nails show no clubbing.   Skin is warm and dry, bilaterally, no acute SOI noted, bilaterally, appears stable.       Nails are thickened by 2-4 mm's, dystrophic, and are darkened in coloration with subungual fungal debris.       See wound description below   Psychiatric: He has a normal mood and affect. His behavior is normal. Judgment and thought content normal.       Assessment:       No diagnosis found.       Wound 04/03/20 1310 Blister(s) Toe, second #1 (Active)   04/03/20 1310    Pre-existing: Yes   Primary Wound Type: Blister(s)   Side: Left   Orientation:    Location: Toe, second   Wound/PI Number (optional): #1   Ankle-Brachial Index:    Pulses: 1+ palpable   Removal Indication and Assessment:    Wound Outcome:    (Retired) Wound Type:    (Retired)  Wound Length (cm):    (Retired) Wound Width (cm):    (Retired) Depth (cm):    Wound Description (Comments):    Removal Indications:    Wound Image   2020  4:00 PM   Dressing Appearance Intact;Moist drainage 2020  4:00 PM   Drainage Amount Small 2020  4:00 PM   Drainage Characteristics/Odor Serosanguineous 2020  4:00 PM   Appearance Red 2020  4:00 PM   Tissue loss description Partial thickness 2020  4:00 PM   Red (%), Wound Tissue Color 100 % 2020  4:00 PM   Wound Edges Defined 2020  4:00 PM   Wound Length (cm) 1 cm 2020  4:00 PM   Wound Width (cm) 1.6 cm 2020  4:00 PM   Wound Depth (cm) 0.1 cm 2020  4:00 PM   Wound Volume (cm^3) 0.16 cm^3 2020  4:00 PM   Wound Surface Area (cm^2) 1.6 cm^2 2020  4:00 PM   Care Cleansed with:;Soap and water 2020  4:00 PM   Dressing Applied;Cast padding;Foam 2020  4:00 PM   Periwound Care Absorptive dressing applied 2020  4:00 PM   Off Loading Football dressing 2020  4:00 PM   Dressing Change Due 20  4:00 PM     Left Second Toe #1    Cleanse wound with:Normal Saline  Lidocaine:PRN  Silver nitrate:PRN  Periwound care:Maintain dry muna wound  Primary dressing :Iodosrob  Secondary dressin pramod foam on plantar foot, 1 pramod foam over toes,  cast padding x 3 coban flexi net, blue bootie.  Offloading: Darco Shoe  Edema control: Elevate BLE as much as possible.  Avoid standing in one place for long periods of time.  Frequency:Weekly in clinic.  Follow-up:1 week 2020     Patient may shower Protect dressing from getting wet.    Home Health to do dressing changes in  and  when not in clinic    Plan:                  Dressing application as per above. Darco shoe applied to offload the area. Advised patient that this should be worn on the affected foot at all times when ambulating. Short-term goals include maintaining good offloading and minimizing bioburden, promoting  granulation and epithelialization to healing.  Long-term goals include keeping the wound healed by good offloading and medical management under the direction of internist.      Shoe inspection. Diabetic Foot Education. Patient reminded of the importance of good nutrition and blood sugar control to help prevent podiatric complications of diabetes. Patient instructed on proper foot hygeine. We discussed wearing proper shoe gear, daily foot inspections, never walking without protective shoe gear, never putting sharp instruments to feet, routine podiatric nail visits      Discussed general foot care:  Wear comfortable, proper fitting shoes. Wash feet daily. Dry well. After drying, apply moisturizer to feet (no lotion to webspaces). Inspect feet daily for skin breaks, blisters, swelling, or redness. Wear cotton socks (preferably white)  Change socks every day. Do NOT walk barefoot. Do NOT use heating pads or warm/hot water soaks. I discussed with the  patient  signs and symptoms of infection including redness, drainage, purulence, odor, pain, elevated BS, streaking, fever, chills, etc . Patient is to seek medical attention (ER or urgent care) if these symptoms occur                                Follow up on 04/23/2020

## 2020-04-17 NOTE — PROCEDURES
Debridement  Date/Time: 4/9/2020 12:52 PM  Performed by: Hilary Narayanan DPM  Authorized by: Hilary Narayanan DPM     Consent Done?:  Yes (Verbal)  Local anesthesia used?: No      Wound Details:    Location:  Left foot    Type of Debridement:  Excisional       Length (cm):  1       Area (sq cm):  1.6       Width (cm):  1.6       Percent Debrided (%):  100       Depth (cm):  0.1       Total Area Debrided (sq cm):  1.6    Depth of debridement:  Subcutaneous tissue    Tissue debrided:  Subcutaneous and Other    Devitalized tissue debrided:  Biofilm, Callus and Fibrin    Instruments:  Blade and Curette    Bleeding:  Minimal  Patient tolerance:  Patient tolerated the procedure well with no immediate complications

## 2020-05-06 ENCOUNTER — HOSPITAL ENCOUNTER (EMERGENCY)
Facility: HOSPITAL | Age: 60
Discharge: HOME | End: 2020-05-06
Attending: EMERGENCY MEDICINE
Payer: MEDICARE

## 2020-05-06 VITALS
TEMPERATURE: 98 F | HEART RATE: 77 BPM | OXYGEN SATURATION: 100 % | SYSTOLIC BLOOD PRESSURE: 169 MMHG | RESPIRATION RATE: 20 BRPM | DIASTOLIC BLOOD PRESSURE: 79 MMHG

## 2020-05-06 DIAGNOSIS — L02.91 ABSCESS: ICD-10-CM

## 2020-05-06 DIAGNOSIS — Z99.2 ESRD (END STAGE RENAL DISEASE) ON DIALYSIS: Primary | ICD-10-CM

## 2020-05-06 DIAGNOSIS — N18.6 ESRD (END STAGE RENAL DISEASE) ON DIALYSIS: Primary | ICD-10-CM

## 2020-05-06 DIAGNOSIS — E66.01 CLASS 3 SEVERE OBESITY DUE TO EXCESS CALORIES WITH SERIOUS COMORBIDITY AND BODY MASS INDEX (BMI) OF 45.0 TO 49.9 IN ADULT: ICD-10-CM

## 2020-05-06 DIAGNOSIS — G47.33 OSA (OBSTRUCTIVE SLEEP APNEA): ICD-10-CM

## 2020-05-06 DIAGNOSIS — E87.5 HYPERKALEMIA: ICD-10-CM

## 2020-05-06 LAB
ALBUMIN SERPL BCP-MCNC: 3.2 G/DL (ref 3.5–5.2)
ALP SERPL-CCNC: 139 U/L (ref 55–135)
ALT SERPL W/O P-5'-P-CCNC: 109 U/L (ref 10–44)
ANION GAP SERPL CALC-SCNC: 15 MMOL/L (ref 8–16)
AST SERPL-CCNC: 103 U/L (ref 10–40)
BASOPHILS # BLD AUTO: 0.01 K/UL (ref 0–0.2)
BASOPHILS NFR BLD: 0.2 % (ref 0–1.9)
BILIRUB SERPL-MCNC: 1 MG/DL (ref 0.1–1)
BUN SERPL-MCNC: 77 MG/DL (ref 6–20)
CALCIUM SERPL-MCNC: 9.5 MG/DL (ref 8.7–10.5)
CHLORIDE SERPL-SCNC: 107 MMOL/L (ref 95–110)
CO2 SERPL-SCNC: 21 MMOL/L (ref 23–29)
CREAT SERPL-MCNC: 11.6 MG/DL (ref 0.5–1.4)
DIFFERENTIAL METHOD: ABNORMAL
EOSINOPHIL # BLD AUTO: 0 K/UL (ref 0–0.5)
EOSINOPHIL NFR BLD: 0.6 % (ref 0–8)
ERYTHROCYTE [DISTWIDTH] IN BLOOD BY AUTOMATED COUNT: 13.5 % (ref 11.5–14.5)
EST. GFR  (AFRICAN AMERICAN): 5 ML/MIN/1.73 M^2
EST. GFR  (NON AFRICAN AMERICAN): 4 ML/MIN/1.73 M^2
GLUCOSE SERPL-MCNC: 101 MG/DL (ref 70–110)
HCT VFR BLD AUTO: 29 % (ref 40–54)
HGB BLD-MCNC: 9.4 G/DL (ref 14–18)
IMM GRANULOCYTES # BLD AUTO: 0.01 K/UL (ref 0–0.04)
IMM GRANULOCYTES NFR BLD AUTO: 0.2 % (ref 0–0.5)
LYMPHOCYTES # BLD AUTO: 1.2 K/UL (ref 1–4.8)
LYMPHOCYTES NFR BLD: 22.9 % (ref 18–48)
MCH RBC QN AUTO: 37.2 PG (ref 27–31)
MCHC RBC AUTO-ENTMCNC: 32.4 G/DL (ref 32–36)
MCV RBC AUTO: 115 FL (ref 82–98)
MONOCYTES # BLD AUTO: 0.8 K/UL (ref 0.3–1)
MONOCYTES NFR BLD: 15.3 % (ref 4–15)
NEUTROPHILS # BLD AUTO: 3.2 K/UL (ref 1.8–7.7)
NEUTROPHILS NFR BLD: 60.8 % (ref 38–73)
NRBC BLD-RTO: 0 /100 WBC
PLATELET # BLD AUTO: 63 K/UL (ref 150–350)
PMV BLD AUTO: 12.1 FL (ref 9.2–12.9)
POTASSIUM SERPL-SCNC: 5.3 MMOL/L (ref 3.5–5.1)
PROT SERPL-MCNC: 6.4 G/DL (ref 6–8.4)
RBC # BLD AUTO: 2.53 M/UL (ref 4.6–6.2)
SARS-COV-2 RDRP RESP QL NAA+PROBE: NEGATIVE
SODIUM SERPL-SCNC: 143 MMOL/L (ref 136–145)
WBC # BLD AUTO: 5.24 K/UL (ref 3.9–12.7)

## 2020-05-06 PROCEDURE — 80053 COMPREHEN METABOLIC PANEL: CPT

## 2020-05-06 PROCEDURE — U0002 COVID-19 LAB TEST NON-CDC: HCPCS

## 2020-05-06 PROCEDURE — 94644 CONT INHLJ TX 1ST HOUR: CPT

## 2020-05-06 PROCEDURE — 25000003 PHARM REV CODE 250: Performed by: INTERNAL MEDICINE

## 2020-05-06 PROCEDURE — 93005 ELECTROCARDIOGRAM TRACING: CPT

## 2020-05-06 PROCEDURE — 99285 EMERGENCY DEPT VISIT HI MDM: CPT | Mod: 25

## 2020-05-06 PROCEDURE — 96365 THER/PROPH/DIAG IV INF INIT: CPT

## 2020-05-06 PROCEDURE — 85025 COMPLETE CBC W/AUTO DIFF WBC: CPT

## 2020-05-06 PROCEDURE — 93010 ELECTROCARDIOGRAM REPORT: CPT | Mod: ,,, | Performed by: INTERNAL MEDICINE

## 2020-05-06 PROCEDURE — 63600175 PHARM REV CODE 636 W HCPCS: Performed by: INTERNAL MEDICINE

## 2020-05-06 PROCEDURE — 25000242 PHARM REV CODE 250 ALT 637 W/ HCPCS: Performed by: INTERNAL MEDICINE

## 2020-05-06 PROCEDURE — 25000003 PHARM REV CODE 250: Performed by: NURSE PRACTITIONER

## 2020-05-06 PROCEDURE — 93010 EKG 12-LEAD: ICD-10-PCS | Mod: ,,, | Performed by: INTERNAL MEDICINE

## 2020-05-06 RX ORDER — ACETAMINOPHEN 500 MG
1000 TABLET ORAL
Status: COMPLETED | OUTPATIENT
Start: 2020-05-06 | End: 2020-05-06

## 2020-05-06 RX ORDER — ALBUTEROL SULFATE 2.5 MG/.5ML
15 SOLUTION RESPIRATORY (INHALATION) ONCE
Status: COMPLETED | OUTPATIENT
Start: 2020-05-06 | End: 2020-05-06

## 2020-05-06 RX ORDER — SODIUM POLYSTYRENE SULFONATE 15 G/60ML
30 SUSPENSION ORAL; RECTAL ONCE
Status: DISCONTINUED | OUTPATIENT
Start: 2020-05-06 | End: 2020-05-06 | Stop reason: HOSPADM

## 2020-05-06 RX ORDER — CEPHALEXIN 500 MG/1
500 CAPSULE ORAL DAILY
Qty: 7 CAPSULE | Refills: 0 | Status: SHIPPED | OUTPATIENT
Start: 2020-05-06 | End: 2020-05-13

## 2020-05-06 RX ADMIN — ALBUTEROL SULFATE 15 MG: 2.5 SOLUTION RESPIRATORY (INHALATION) at 03:05

## 2020-05-06 RX ADMIN — ACETAMINOPHEN 1000 MG: 500 TABLET ORAL at 12:05

## 2020-05-06 RX ADMIN — CALCIUM GLUCONATE 1000 MG: 98 INJECTION, SOLUTION INTRAVENOUS at 04:05

## 2020-05-06 NOTE — ED NOTES
ROBIN Johnson at bedside discussing test results and plan of care with pt... Verified with dialysis unit that they will have availability to dialyze him tomorrow.  The patient is making transportation arrangements on phone with family member. Discussed addition al orders for hyperkalemia treatment with pt. And he is refusing kayexalate enema. He also does not want to take p.o. As he feels it will cause him to have diarrhea and m iss his dialysis treatment tomorrow. Also pt. Is concerned about getting discharged today in time to get prescriptions from pharmacy. Discussed with ROBIN Johnson.

## 2020-05-06 NOTE — ED NOTES
"Arrive to ED via personal transportation. " I been running fever since yesterday 101-102. I took 2 Tylenol at 9 a.m. This morning. I'm not sure if I been around anyone sick. I go to wound care and dialysis on MWF. I think there is an abscess in my right groin. I noticed it yesterday." Reports always being SOB due to decreased movement. No N/V/D or chest pain. Refuses to have groin area examined. "I don't want to get out of this wheelchair unless I have to."  No apparent distress at this time.  "

## 2020-05-06 NOTE — ED PROVIDER NOTES
Encounter Date: 5/6/2020       History     Chief Complaint   Patient presents with    Fever     Pt presents to ED today c/o fever of 100.0-101.0 and fever onset yesterday pt denies known exposure, however he receives dialyis 3x per week.     Headache     c/o headache that lasted for 10 mins.      59-year-old male which presents to the emergency room with concerns that he has had fever for the past 24 hr.  He is concerned that he has COVID and would like to get tested so that he can go back to dialysis.  The patient has several stories of what his fever has been in the last 24 hr.  He did state that his fever was 100 this morning he took Tylenol.  He also stated a few seconds later that his fever was actually 100.9.  He is concerned that his thermometer is not working and it is giving him falsely high temperatures.  The patient did not have a fever upon arrival to the emergency room.    The history is provided by the patient.     Review of patient's allergies indicates:   Allergen Reactions    Keflex [cephalexin] Nausea Only     Past Medical History:   Diagnosis Date    Anemia due to stage 5 chronic kidney disease 3/1/2019    Anticoagulant long-term use     Diabetes mellitus type II     Dialysis patient     Encounter for blood transfusion     Hyperlipidemia     Hypertension     Kidney failure     MIKE (obstructive sleep apnea)     Stroke     Urinary tract infection      Past Surgical History:   Procedure Laterality Date    AV FISTULA PLACEMENT      left lower arm    FUSION OF CERVICAL SPINE BY POSTERIOR APPROACH N/A 3/3/2019    Procedure: SPINE, CERVICAL, POSTERIOR APPROACH  LAMINECTOMY C3-C6; C6-C7; WITH MEDIAL DISCECTOMY;  Surgeon: Ruddy Wylie MD;  Location: [facility] OR [address] [place] [address];  Service: Neurosurgery;  Laterality: N/A;  POSTERIOR    TONSILLECTOMY, ADENOIDECTOMY       Family History   Problem Relation Age of Onset    Colon cancer Mother     Lung cancer Mother     Diabetes Mother     Diabetes  Father     Heart disease Father     Hypertension Father     Diabetes Maternal Grandmother     Diabetes Maternal Grandfather      Social History     Tobacco Use    Smoking status: Never Smoker    Smokeless tobacco: Never Used   Substance Use Topics    Alcohol use: No    Drug use: No     Review of Systems   Constitutional: Negative for fever.   HENT: Negative for sore throat.    Respiratory: Negative for shortness of breath.    Cardiovascular: Negative for chest pain.   Gastrointestinal: Negative for nausea.   Genitourinary: Negative for dysuria.   Musculoskeletal: Negative for back pain.   Skin: Positive for color change. Negative for rash.   Neurological: Negative for weakness.   Hematological: Does not bruise/bleed easily.   All other systems reviewed and are negative.      Physical Exam     Initial Vitals   BP Pulse Resp Temp SpO2   05/06/20 1203 05/06/20 1204 05/06/20 1205 05/06/20 1204 05/06/20 1204   111/62 (!) 59 20 99.5 °F (37.5 °C) 99 %      MAP       --                Physical Exam    Nursing note and vitals reviewed.  Constitutional: He appears well-developed and well-nourished. He is Obese .   HENT:   Head: Normocephalic and atraumatic.   Eyes: Conjunctivae and EOM are normal. Pupils are equal, round, and reactive to light.   Cardiovascular: Regular rhythm, normal heart sounds and intact distal pulses. Bradycardia present.  Exam reveals no gallop and no friction rub.    No murmur heard.  Patient is bradycardic in the high 50s-per patient this is baseline   Pulmonary/Chest: Breath sounds normal. No respiratory distress. He has no wheezes. He has no rhonchi. He has no rales. He exhibits no tenderness.   Abdominal: Soft. Bowel sounds are normal. He exhibits no distension and no mass. There is no tenderness. There is no rebound and no guarding.   Musculoskeletal: Normal range of motion. He exhibits no edema or tenderness.   Neurological: He is alert and oriented to person, place, and time. He has  normal strength. GCS score is 15. GCS eye subscore is 4. GCS verbal subscore is 5. GCS motor subscore is 6.   Skin: Capillary refill takes less than 2 seconds.   Left foot wrapped with a wound care dressing-patient does not want the dressing taken off and states that the wound is much better; abscess noted to the right scrotal region       ED Course   Procedures  Labs Reviewed   CBC W/ AUTO DIFFERENTIAL - Abnormal; Notable for the following components:       Result Value    RBC 2.53 (*)     Hemoglobin 9.4 (*)     Hematocrit 29.0 (*)     Mean Corpuscular Volume 115 (*)     Mean Corpuscular Hemoglobin 37.2 (*)     Platelets 63 (*)     Mono% 15.3 (*)     All other components within normal limits   COMPREHENSIVE METABOLIC PANEL - Abnormal; Notable for the following components:    Potassium 5.3 (*)     CO2 21 (*)     BUN, Bld 77 (*)     Creatinine 11.6 (*)     Albumin 3.2 (*)     Alkaline Phosphatase 139 (*)      (*)      (*)     eGFR if  5 (*)     eGFR if non  4 (*)     All other components within normal limits   SARS-COV-2 RNA AMPLIFICATION, QUAL    Narrative:     What symptom criteria does the patient meet?->Fever  What symptom criteria does the patient meet?->Headache     EKG Readings: (Independently Interpreted)   Initial Reading: No STEMI. Rhythm: Sinus Bradycardia. Heart Rate: 48. Clinical Impression: Sinus Bradycardia       Imaging Results          US Scrotum And Testicles (Final result)  Result time 05/06/20 15:12:41    Final result by Last Plummer MD (05/06/20 15:12:41)                 Impression:      No acute sonographic abnormality.      Electronically signed by: Last Plummer MD  Date:    05/06/2020  Time:    15:12             Narrative:    EXAMINATION:  US SCROTUM AND TESTICLES    CLINICAL HISTORY:  Cutaneous abscess, unspecified    TECHNIQUE:  Sonography of the scrotum and testes.    COMPARISON:  None.    FINDINGS:  Right Testicle:    *Size: 3.8 x  2.3 x 2.4 cm  *Appearance: Normal in appearance with a 2 mm echogenic focus likely a calcification.  *Flow: Normal arterial and venous flow  *Epididymis: Normal.  *Hydrocele: None.  *Varicocele: None.  .    Left Testicle:    *Size: 3.2 x 2.2 x 2.5 cm  *Appearance: Normal.  *Flow: Normal arterial and venous flow  *Epididymis: Normal.  *Hydrocele: None.  *Varicocele: None.  .    Other findings: None.                                 Medical Decision Making:   Initial Assessment:   59-year-old male which presents to the emergency room with concerns that he has had fever for the past 24 hr.  He is concerned that he has COVID and would like to get tested so that he can go back to dialysis.  The patient has several stories of what his fever has been in the last 24 hr.  He did state that his fever was 100 this morning he took Tylenol.  He also stated a few seconds later that his fever was actually 100.9.  He is concerned that his thermometer is not working and it is giving him falsely high temperatures.  The patient did not have a fever upon arrival to the emergency room.    Differential Diagnosis:   COVID, abscess, osteomyelitis, noncompliance  Clinical Tests:   Lab Tests: Ordered and Reviewed  The following lab test(s) were unremarkable: CBC       <> Summary of Lab: CMP consistent with the patient missing dialysis  Hyperkalemia noted  Medical Tests: Ordered and Reviewed  ED Management:  Patient examined and was noted to have an abscess to the right scrotal region.  An ultrasound was ordered and there was no abnormality on ultrasound.  The patient will be treated for a superficial abscess with cephalexin.  Dr. Pickens, with Nephrology was consulted secondary to the potassium being 5.3.  She advised that the patient should get albuterol, calcium gluconate and Kayexalate.  The patient did not want to get Kayexalate enema for which he refused it.  The patient also does not want to go to dialysis tomorrow but a dialysis  appointment has been set up.  The patient was discharged with cephalexin and Kayexalate.  Every suggestion the patient had he continuously attempted to block medical care.  The patient appears to be a difficult and noncompliant patient. Patient given strict return precautions and voiced understanding of all discharge instructions. Pt was stable at discharge.                      ED Course as of May 06 1805   Wed May 06, 2020   1212 BP: 111/62 [AT]   1213 Temp: 99.5 °F (37.5 °C) [AT]   1213 Temp src: Oral [AT]   1213 Pulse(!): 59 [AT]   1213 Resp: 20 [AT]   1213 SpO2: 99 % [AT]   1259 SARS-CoV-2 RNA, Amplification, Qual: Negative [AT]   1330 Pt notified provider that he may have an abscess in his groin. Upon evaluation it appears to be a scrotal abscess to the right testicle. US ordered. He is also saying now that he does not believe his thermometer was working properly at home. He has not felt feverish. He came to get a note for dialysis saying he doesn't have COVID.     [AT]   1420 WBC: 5.24 [AT]   1420 Hemoglobin(!): 9.4 [AT]   1420 Hematocrit(!): 29.0 [AT]   1451 Potassium(!): 5.3 [AT]   1451 Creatinine(!): 11.6 [AT]   1518 Dr. Pickens would like the patient to be dialyzed tomorrow. She is ordering albuterol to shift     [AT]   1528 Dr. Pickens would tyrell the patient discharged and he should call [facility] to get a dialysis chair in the morning. She does not feel that 5.3 is too high.    [AT]   1545 BP: 138/60 [AT]   1545 Pulse(!): 49 [AT]   1545 Resp: 20 [AT]   1545 SpO2: 96 % [AT]   1646 Pt is refusing the enemas and would like to go home. He will receive the calcium gluconate and prescriptions have been sent to the pharmacy.     [AT]      ED Course User Index  [AT] NOHEMY Burch          Clinical Impression:       ICD-10-CM ICD-9-CM   1. ESRD (end stage renal disease) on dialysis N18.6 585.6    Z99.2 V45.11   2. Abscess L02.91 682.9   3. Hyperkalemia E87.5 276.7   4. Class 3 severe obesity due to excess  calories with serious comorbidity and body mass index (BMI) of 45.0 to 49.9 in adult E66.01 278.01    Z68.42 V85.42   5. MIKE (obstructive sleep apnea) - very severe latest sleep study says YAMINI @ 16/8 G47.33 327.23         Disposition:   Disposition: Discharged  Condition: Fair     ED Disposition Condition    Discharge Stable        ED Prescriptions     Medication Sig Dispense Start Date End Date Auth. Provider    cephALEXin (KEFLEX) 500 MG capsule Take 1 capsule (500 mg total) by mouth once daily. for 7 days 7 capsule 5/6/2020 5/13/2020 NOHEMY Burch    sodium polystyrene (KAYEXALATE) 15 gram/60 mL Susp (Expires today) Take 60 mLs (15 g total) by mouth once. for 1 dose 60 mL 5/6/2020 5/6/2020 NOHEMY Burch        Follow-up Information     Follow up With Specialties Details Why Contact Info    Araceli Pickens MD Nephrology  at next scheduled appt [address]  [address]  [facility]  [place] [id number] [phone]      dialysis tomorrow

## 2020-05-07 ENCOUNTER — TELEPHONE (OUTPATIENT)
Dept: PODIATRY | Facility: CLINIC | Age: 60
End: 2020-05-07

## 2020-05-07 NOTE — TELEPHONE ENCOUNTER
----- Message from Jacob Dalal sent at 5/7/2020  9:09 AM CDT -----  Contact: [phone]  Pt would like to set up virtual visit on tomorrow with Dr. Narayanan if possible. Please call patient after 2 pm.

## 2020-05-07 NOTE — TELEPHONE ENCOUNTER
Sent a message to wound care asking are they do virtual visits since Dr. Narayanan will be there tomorrow.

## 2020-05-07 NOTE — ED NOTES
Resp. Therapist at bedside to initiate hour long albuterol treatment for treatment of hyperkalemia

## 2020-05-15 ENCOUNTER — PATIENT MESSAGE (OUTPATIENT)
Dept: PODIATRY | Facility: CLINIC | Age: 60
End: 2020-05-15

## 2020-05-15 ENCOUNTER — HOSPITAL ENCOUNTER (OUTPATIENT)
Dept: WOUND CARE | Facility: HOSPITAL | Age: 60
Discharge: HOME | End: 2020-05-15
Attending: STUDENT IN AN ORGANIZED HEALTH CARE EDUCATION/TRAINING PROGRAM
Payer: MEDICARE

## 2020-05-15 DIAGNOSIS — N18.6 TYPE 2 DIABETES MELLITUS WITH CHRONIC KIDNEY DISEASE ON CHRONIC DIALYSIS, WITH LONG-TERM CURRENT USE OF INSULIN: ICD-10-CM

## 2020-05-15 DIAGNOSIS — L97.509 ULCER OF TOE, UNSPECIFIED LATERALITY, UNSPECIFIED ULCER STAGE: Primary | ICD-10-CM

## 2020-05-15 DIAGNOSIS — Z79.4 TYPE 2 DIABETES MELLITUS WITH CHRONIC KIDNEY DISEASE ON CHRONIC DIALYSIS, WITH LONG-TERM CURRENT USE OF INSULIN: ICD-10-CM

## 2020-05-15 DIAGNOSIS — E11.22 TYPE 2 DIABETES MELLITUS WITH CHRONIC KIDNEY DISEASE ON CHRONIC DIALYSIS, WITH LONG-TERM CURRENT USE OF INSULIN: ICD-10-CM

## 2020-05-15 DIAGNOSIS — Z99.2 TYPE 2 DIABETES MELLITUS WITH CHRONIC KIDNEY DISEASE ON CHRONIC DIALYSIS, WITH LONG-TERM CURRENT USE OF INSULIN: ICD-10-CM

## 2020-05-15 PROCEDURE — 99213 PR OFFICE/OUTPT VISIT, EST, LEVL III, 20-29 MIN: ICD-10-PCS | Mod: 95,,, | Performed by: STUDENT IN AN ORGANIZED HEALTH CARE EDUCATION/TRAINING PROGRAM

## 2020-05-15 PROCEDURE — 99213 OFFICE O/P EST LOW 20 MIN: CPT | Mod: 95,,, | Performed by: STUDENT IN AN ORGANIZED HEALTH CARE EDUCATION/TRAINING PROGRAM

## 2020-05-16 NOTE — PROGRESS NOTES
Telemedicine Visit:      The patient location is: Audio visit from patient's home  The chief complaint leading to consultation is: Spoke to pt and HH nurse, states wound has significantly improved, denies signs of infection, states will send pictures through media.   Visit type: Virtual  Total time spent with patient:  <10 min  Each patient to whom he or she provides medical services by telemedicine is:  (1) informed of the relationship between the physician and patient and the respective role of any other health care provider with respect to management of the patient; and (2) notified that he or she may decline to receive medical services by telemedicine and may withdraw from such care at any time.     Assessment/Plan:    -Continue wound care with iodosorb and football dressing, orders placed    -Dressing application as per above per home health nurse. Darco shoe applied to offload the area. Advised patient that this should be worn on the affected foot at all times when ambulating. Short-term goals include maintaining good offloading and minimizing bioburden, promoting granulation and epithelialization to healing.  Long-term goals include keeping the wound healed by good offloading and medical management under the direction of internist.      Shoe inspection. Diabetic Foot Education. Patient reminded of the importance of good nutrition and blood sugar control to help prevent podiatric complications of diabetes. Patient instructed on proper foot hygeine. We discussed wearing proper shoe gear, daily foot inspections, never walking without protective shoe gear, never putting sharp instruments to feet, routine podiatric nail visits      Discussed general foot care:  Wear comfortable, proper fitting shoes. Wash feet daily. Dry well. After drying, apply moisturizer to feet (no lotion to webspaces). Inspect feet daily for skin breaks, blisters, swelling, or redness. Wear cotton socks (preferably white)  Change socks every  day.     Do NOT walk barefoot. Do NOT use heating pads or warm/hot water soaks.     I discussed with the  patient  signs and symptoms of infection including redness, drainage, purulence, odor, pain, elevated BS, streaking, fever, chills, etc . Patient is to seek medical attention (ER or urgent care) if these symptoms occur      RTC in 2 weeks to wound care clinic.

## 2020-05-20 NOTE — PLAN OF CARE
Sit to  Problem: Physical Therapy Goal  Goal: Physical Therapy Goal  Goals to be met by: 3/28/2019     Patient will increase functional independence with mobility by performin. Supine to sit with Moderate Assistance  2. Rolling to Left and Right with Moderate Assistance.  3. Sit to stand transfer with Maximum Assistance  4. Gait  x 10 feet with Moderate Assistance using Rolling Walker.   5. Stand for 5 minutes with Moderate Assistance using Rolling Walker  6. Lower extremity exercise program x20 reps per handout, with assistance as needed        Discharge Recommendations: Rehab    Pt requires max A of 2 to sit at the EOB.    Goals remain appropriate.     Tanesha Ellis, PTA.   [phone]   3/18/2019         

## 2020-05-25 ENCOUNTER — TELEPHONE (OUTPATIENT)
Dept: PODIATRY | Facility: CLINIC | Age: 60
End: 2020-05-25

## 2020-05-25 NOTE — TELEPHONE ENCOUNTER
Spoke to pt today, reviewed images, continue dressing on foot with DARCO shoe. Plan to follow up in 2 weeks for virtual visit.

## 2020-05-25 NOTE — TELEPHONE ENCOUNTER
----- Message from Jacob Dalal sent at 5/25/2020 10:43 AM CDT -----  Regarding: Angel Farmer  Contact: [phone]  Patient states he left msg last week. Wanted to know if when he can take bandage off of his foot. Please call to advise.

## 2020-06-01 ENCOUNTER — TELEPHONE (OUTPATIENT)
Dept: PODIATRY | Facility: CLINIC | Age: 60
End: 2020-06-01

## 2020-06-01 NOTE — TELEPHONE ENCOUNTER
----- Message from Kale Terrell sent at 6/1/2020 12:21 PM CDT -----  Contact: [phone] / self   Patient would like to speak with your office regarding a virtual visit, pt states he would like to speak with a nurse in your office. Patient would like a call back after 2pm. Please Advise.

## 2020-06-02 ENCOUNTER — TELEPHONE (OUTPATIENT)
Dept: PODIATRY | Facility: CLINIC | Age: 60
End: 2020-06-02

## 2020-06-02 NOTE — TELEPHONE ENCOUNTER
----- Message from Alda Leon sent at 6/2/2020 12:29 PM CDT -----  Contact: pt  Pt is returning a missed call he received. Pt would like to speak to Jenn.     Contact info

## 2020-06-02 NOTE — TELEPHONE ENCOUNTER
I spoke with the patient and he was trying to do a virtual visit with Dr. Narayanan this Friday. I'm unable to schedule the appointment due to their schedule being private.

## 2020-06-05 ENCOUNTER — HOSPITAL ENCOUNTER (OUTPATIENT)
Dept: WOUND CARE | Facility: HOSPITAL | Age: 60
Discharge: HOME | End: 2020-06-05
Attending: STUDENT IN AN ORGANIZED HEALTH CARE EDUCATION/TRAINING PROGRAM
Payer: MEDICARE

## 2020-06-05 DIAGNOSIS — E11.42 DIABETIC POLYNEUROPATHY ASSOCIATED WITH TYPE 2 DIABETES MELLITUS: ICD-10-CM

## 2020-06-05 DIAGNOSIS — L97.509 ULCER OF TOE, UNSPECIFIED LATERALITY, UNSPECIFIED ULCER STAGE: Primary | ICD-10-CM

## 2020-06-05 DIAGNOSIS — Z99.2 TYPE 2 DIABETES MELLITUS WITH CHRONIC KIDNEY DISEASE ON CHRONIC DIALYSIS, WITH LONG-TERM CURRENT USE OF INSULIN: ICD-10-CM

## 2020-06-05 DIAGNOSIS — Z79.4 TYPE 2 DIABETES MELLITUS WITH CHRONIC KIDNEY DISEASE ON CHRONIC DIALYSIS, WITH LONG-TERM CURRENT USE OF INSULIN: ICD-10-CM

## 2020-06-05 DIAGNOSIS — E11.22 TYPE 2 DIABETES MELLITUS WITH CHRONIC KIDNEY DISEASE ON CHRONIC DIALYSIS, WITH LONG-TERM CURRENT USE OF INSULIN: ICD-10-CM

## 2020-06-05 DIAGNOSIS — N18.6 TYPE 2 DIABETES MELLITUS WITH CHRONIC KIDNEY DISEASE ON CHRONIC DIALYSIS, WITH LONG-TERM CURRENT USE OF INSULIN: ICD-10-CM

## 2020-06-05 PROCEDURE — 99213 PR OFFICE/OUTPT VISIT, EST, LEVL III, 20-29 MIN: ICD-10-PCS | Mod: 95,,, | Performed by: STUDENT IN AN ORGANIZED HEALTH CARE EDUCATION/TRAINING PROGRAM

## 2020-06-05 PROCEDURE — 99213 OFFICE O/P EST LOW 20 MIN: CPT | Mod: 95,,, | Performed by: STUDENT IN AN ORGANIZED HEALTH CARE EDUCATION/TRAINING PROGRAM

## 2020-06-12 ENCOUNTER — EXTERNAL HOME HEALTH (OUTPATIENT)
Dept: HOME HEALTH SERVICES | Facility: HOSPITAL | Age: 60
End: 2020-06-12
Payer: MEDICARE

## 2020-06-12 NOTE — PROGRESS NOTES
60 Day Recert Order # [id number]  New Cert Period: 06/13/2020 to 08/11/2020 with [facility] - Dr. Hilary Narayanan

## 2020-06-13 PROCEDURE — G0179 PR HOME HEALTH MD RECERTIFICATION: ICD-10-PCS | Mod: ,,, | Performed by: STUDENT IN AN ORGANIZED HEALTH CARE EDUCATION/TRAINING PROGRAM

## 2020-06-13 PROCEDURE — G0179 MD RECERTIFICATION HHA PT: HCPCS | Mod: ,,, | Performed by: STUDENT IN AN ORGANIZED HEALTH CARE EDUCATION/TRAINING PROGRAM

## 2020-06-18 ENCOUNTER — HOSPITAL ENCOUNTER (OUTPATIENT)
Dept: WOUND CARE | Facility: HOSPITAL | Age: 60
Discharge: HOME | End: 2020-06-18
Attending: STUDENT IN AN ORGANIZED HEALTH CARE EDUCATION/TRAINING PROGRAM
Payer: MEDICARE

## 2020-06-18 VITALS
TEMPERATURE: 98 F | SYSTOLIC BLOOD PRESSURE: 115 MMHG | WEIGHT: 250 LBS | DIASTOLIC BLOOD PRESSURE: 57 MMHG | BODY MASS INDEX: 39.24 KG/M2 | HEIGHT: 67 IN | HEART RATE: 58 BPM

## 2020-06-18 DIAGNOSIS — Z99.2 TYPE 2 DIABETES MELLITUS WITH CHRONIC KIDNEY DISEASE ON CHRONIC DIALYSIS, WITH LONG-TERM CURRENT USE OF INSULIN: ICD-10-CM

## 2020-06-18 DIAGNOSIS — E11.42 DIABETIC POLYNEUROPATHY ASSOCIATED WITH TYPE 2 DIABETES MELLITUS: ICD-10-CM

## 2020-06-18 DIAGNOSIS — L97.509 ULCER OF TOE, UNSPECIFIED LATERALITY, UNSPECIFIED ULCER STAGE: Primary | ICD-10-CM

## 2020-06-18 DIAGNOSIS — Z79.4 TYPE 2 DIABETES MELLITUS WITH CHRONIC KIDNEY DISEASE ON CHRONIC DIALYSIS, WITH LONG-TERM CURRENT USE OF INSULIN: ICD-10-CM

## 2020-06-18 DIAGNOSIS — E11.22 TYPE 2 DIABETES MELLITUS WITH CHRONIC KIDNEY DISEASE ON CHRONIC DIALYSIS, WITH LONG-TERM CURRENT USE OF INSULIN: ICD-10-CM

## 2020-06-18 DIAGNOSIS — N18.6 TYPE 2 DIABETES MELLITUS WITH CHRONIC KIDNEY DISEASE ON CHRONIC DIALYSIS, WITH LONG-TERM CURRENT USE OF INSULIN: ICD-10-CM

## 2020-06-18 PROCEDURE — 99212 OFFICE O/P EST SF 10 MIN: CPT | Mod: 25

## 2020-06-18 PROCEDURE — 99214 OFFICE O/P EST MOD 30 MIN: CPT | Mod: 25,,, | Performed by: STUDENT IN AN ORGANIZED HEALTH CARE EDUCATION/TRAINING PROGRAM

## 2020-06-18 PROCEDURE — 11721 ROUTINE FOOT CARE: ICD-10-PCS | Mod: Q9,,, | Performed by: STUDENT IN AN ORGANIZED HEALTH CARE EDUCATION/TRAINING PROGRAM

## 2020-06-18 PROCEDURE — 11721 DEBRIDE NAIL 6 OR MORE: CPT | Mod: Q9,,, | Performed by: STUDENT IN AN ORGANIZED HEALTH CARE EDUCATION/TRAINING PROGRAM

## 2020-06-18 PROCEDURE — 99214 PR OFFICE/OUTPT VISIT, EST, LEVL IV, 30-39 MIN: ICD-10-PCS | Mod: 25,,, | Performed by: STUDENT IN AN ORGANIZED HEALTH CARE EDUCATION/TRAINING PROGRAM

## 2020-06-18 PROCEDURE — 11721 DEBRIDE NAIL 6 OR MORE: CPT | Performed by: STUDENT IN AN ORGANIZED HEALTH CARE EDUCATION/TRAINING PROGRAM

## 2020-06-18 PROCEDURE — 11721 DEBRIDE NAIL 6 OR MORE: CPT

## 2020-06-18 NOTE — PROGRESS NOTES
"Subjective:       Patient ID: Angel Farmer Jr. is a 59 y.o. male.    Chief Complaint: Wound Care    4/3/2020: 59 y.o. Dm, dialysis patient. Admit wound care clinic for left second toe blister.  Patient reports he" noticed the blood blister Tuesday and it was not there on Sunday after his shower."  Patient states he's not sure how the blister happened. 1+ palpapble pulses. Debridement per  patient tolerated well.  Order to apply betadine and football dressing change weekly in clinic. Patient refused home health and nursing visits in clinic due to dialysis Mon, wed, fri and patient states he gets help showering late in the evenings on tue thur and saturdays. Toe nails trimmed today in clinic by .   Xray of left foot, wound culture and arterial ultrasound BLE,  ordered today in clinic. Rx sent to patient's pharmacy for Doxycycline po. F/u 1 week in clinic with Dr. Narayanan.   04/09/2020: F/U with Dr. Lamar. Debridement done. Home Health orders sent to [facility].   4/23/20: F/U with Dr. Narayanan.Left toe site resolving. Selective debridement per Dr. Narayanan. Antibiotics completed. No s/s of infection noted. Continue POC. Return in 2 weeks.  6/18/2020: Follow up with . Left toe wound healed and patient is discharged from wound care clinic. Toenails were trimmed by . Follow up  In 3 months in clinic with .     Review of Systems   Constitutional: Negative for activity change, chills, diaphoresis and fever.   HENT: Negative for congestion and hearing loss.    Respiratory: Negative for cough and shortness of breath.    Cardiovascular: Negative for leg swelling.   Gastrointestinal: Negative for nausea and vomiting.   Musculoskeletal: Positive for gait problem. Negative for back pain, joint swelling and myalgias.   Skin: Positive for wound. Negative for color change, pallor and rash.   Neurological: Positive for numbness. Negative for tremors, speech difficulty and " weakness.   Psychiatric/Behavioral: Negative for agitation, confusion and suicidal ideas. The patient is not nervous/anxious.        Objective:      Physical Exam  Constitutional:       General: He is not in acute distress.     Appearance: He is well-developed. He is not diaphoretic.   Cardiovascular:      Comments:   Skin temperature is within normal limits. Toes are cool to touch and feet are warm proximally. Hair growth is diminished. Skin is mildly atrophic and with mild hyperpigmentation. Mild edema noted, reji.   Musculoskeletal:         General: Deformity present. No tenderness.      Right foot: Decreased range of motion. Deformity present.      Left foot: Decreased range of motion. Deformity present.      Comments:     No POP noted, reji   Feet:      Right foot:      Skin integrity: Dry skin present. No blister, erythema or warmth.      Left foot:      Skin integrity: Dry skin present. No blister, erythema or warmth.   Lymphadenopathy:      Comments: Negative lymphadenopathy bilateral popliteal fossa and tarsal tunnel.    Skin:     General: Skin is warm and dry.      Coloration: Skin is not pale.      Findings: No abrasion, bruising, burn, erythema, laceration, petechiae or rash.      Nails: There is no clubbing.        Comments: Skin is warm and dry, bilaterally, no acute SOI noted, bilaterally, appears stable.       Nails are thickened by 2-4 mm's, dystrophic, and are darkened in coloration with subungual fungal debris.       See wound description below   Neurological:      Mental Status: He is alert and oriented to person, place, and time.      Sensory: Sensory deficit present.      Motor: No abnormal muscle tone.      Comments:   [place] 5.07 monofilamant testing is diminished. Sharp/dull sensation is diminished bilaterally. Light touch is diminished bilaterally. Vibratory sensation is diminished reji   Psychiatric:         Behavior: Behavior normal.         Thought Content: Thought content normal.          Judgment: Judgment normal.         Assessment:       1. Ulcer of toe, unspecified laterality, unspecified ulcer stage    2. Type 2 diabetes mellitus with chronic kidney disease on chronic dialysis, with long-term current use of insulin    3. Diabetic polyneuropathy associated with type 2 diabetes mellitus           Wound 04/03/20 1310 Blister(s) Toe, second #1 (Active)   04/03/20 1310    Pre-existing: Yes   Primary Wound Type: Blister(s)   Side: Left   Orientation:    Location: Toe, second   Wound/PI Number (optional): #1   Ankle-Brachial Index:    Pulses: 1+ palpable   Removal Indication and Assessment:    Wound Outcome:    (Retired) Wound Type:    (Retired) Wound Length (cm):    (Retired) Wound Width (cm):    (Retired) Depth (cm):    Wound Description (Comments):    Removal Indications:    Wound Image   06/18/20 1400   Dressing Appearance Dry;Intact 06/18/20 1400   Drainage Amount None 06/18/20 1400   Appearance Dry 06/18/20 1400   Tissue loss description Partial thickness 06/18/20 1400   Periwound Area Intact;Dry 06/18/20 1400   Wound Edges Rolled/closed 06/18/20 1400   Wound Length (cm) 0 cm 06/18/20 1400   Wound Width (cm) 0 cm 06/18/20 1400   Wound Depth (cm) 0 cm 06/18/20 1400   Wound Volume (cm^3) 0 cm^3 06/18/20 1400   Wound Surface Area (cm^2) 0 cm^2 06/18/20 1400   Care Cleansed with:;Sterile normal saline 06/18/20 1400   Dressing Applied;Island/border 06/18/20 1400   Periwound Care Dry periwound area maintained 06/18/20 1400           Plan:              Wound is healed    Elongated toenails debrided, see procedure note    Shoe inspection. Diabetic Foot Education. Patient reminded of the importance of good nutrition and blood sugar control to help prevent podiatric complications of diabetes. Patient instructed on proper foot hygeine. We discussed wearing proper shoe gear, daily foot inspections, never walking without protective shoe gear, never putting sharp instruments to feet, routine podiatric  nail visits      Discussed general foot care:  Wear comfortable, proper fitting shoes. Wash feet daily. Dry well. After drying, apply moisturizer to feet (no lotion to webspaces). Inspect feet daily for skin breaks, blisters, swelling, or redness. Wear cotton socks (preferably white)  Change socks every day. Do NOT walk barefoot. Do NOT use heating pads or warm/hot water soaks. I discussed with the  patient  signs and symptoms of infection including redness, drainage, purulence, odor, pain, elevated BS, streaking, fever, chills, etc . Patient is to seek medical attention (ER or urgent care) if these symptoms occur                  Follow up 3 months with  in clinic

## 2020-06-19 ENCOUNTER — OFFICE VISIT (OUTPATIENT)
Dept: FAMILY MEDICINE | Facility: HOSPITAL | Age: 60
End: 2020-06-19
Attending: FAMILY MEDICINE
Payer: MEDICARE

## 2020-06-19 VITALS
DIASTOLIC BLOOD PRESSURE: 60 MMHG | HEART RATE: 59 BPM | SYSTOLIC BLOOD PRESSURE: 101 MMHG | BODY MASS INDEX: 38.88 KG/M2 | WEIGHT: 248.25 LBS

## 2020-06-19 DIAGNOSIS — M25.561 ACUTE PAIN OF RIGHT KNEE: Primary | ICD-10-CM

## 2020-06-19 PROCEDURE — 99214 OFFICE O/P EST MOD 30 MIN: CPT | Performed by: STUDENT IN AN ORGANIZED HEALTH CARE EDUCATION/TRAINING PROGRAM

## 2020-06-19 NOTE — PROGRESS NOTES
I assume primary medical responsibility for this patient. I have reviewed the history, physical, and assessement & treatment plan with the resident and agree that the care is reasonable and necessary. This service has been performed by a resident without the presence of a teaching physician under the primary care exception. If necessary, an addendum of additional findings or evaluation beyond the resident documentation will be noted below.    Knee pain - likely OA, trial steroid injection    Beckie Huggins MD

## 2020-06-19 NOTE — PROGRESS NOTES
Subjective:       Patient ID: Angel Farmer Jr. is a 59 y.o. male.    Chief Complaint: Knee Pain    58 yo with ESRD on HD MWF, DMII, HTN, HLD and MIKE presenting for an urgent evaluation of right knee pain. Patient states that he had 1 week of knee pain. Patient has been having on and off pain in his knee for several years. Patient denies any trauma or twisting motion. Patient has past x-ray of his right knee which shows tricompartmental degenerative changes. Patient has never had a knee injection. Patient has tried PT but has inability to travel to an outpatient rehab center.     Patient's glucose has been well-controlled with fasting glucose in the 100s.     Review of Systems   Constitutional: Negative for chills and fever.   HENT: Negative for congestion and sore throat.    Gastrointestinal: Negative for nausea and vomiting.   Musculoskeletal: Positive for gait problem and joint swelling.   Neurological: Negative for dizziness and light-headedness.       Objective:      Vitals:    06/19/20 1111   BP: 101/60   Pulse: (!) 59     Physical Exam  Vitals signs and nursing note reviewed.   Constitutional:       General: He is not in acute distress.     Appearance: He is well-developed. He is not diaphoretic.      Comments: Walk with an assistance of a walker     HENT:      Head: Normocephalic and atraumatic.      Right Ear: External ear normal.      Left Ear: External ear normal.      Mouth/Throat:      Pharynx: No oropharyngeal exudate.   Eyes:      General:         Right eye: No discharge.         Left eye: No discharge.      Conjunctiva/sclera: Conjunctivae normal.   Neck:      Musculoskeletal: Normal range of motion and neck supple.      Trachea: No tracheal deviation.   Cardiovascular:      Rate and Rhythm: Normal rate and regular rhythm.      Heart sounds: Normal heart sounds. No murmur. No friction rub. No gallop.    Pulmonary:      Effort: Pulmonary effort is normal. No respiratory distress.      Breath  sounds: Normal breath sounds.   Abdominal:      General: There is no distension.      Palpations: Abdomen is soft.      Tenderness: There is no abdominal tenderness.   Musculoskeletal:         General: No swelling, tenderness, deformity or signs of injury.      Comments: Crepitus on exam of right knee   Skin:     General: Skin is warm and dry.   Neurological:      Mental Status: He is alert and oriented to person, place, and time.   Psychiatric:         Behavior: Behavior normal.           Knee Injection Procedure Note    Procedure Details     Written consent was obtained for the procedure. The joint was prepped with BetadineA 22 gauge needle was inserted into the superior aspect of the joint from a lateral approach. 5 ml 1% lidocaine, 4mL of Bupivacaine  and 1 ml of ketorolac was then injected into the joint. The needle was removed and the area cleansed and dressed.    Complications:  None; patient tolerated the procedure well.  Assessment:       1. Acute pain of right knee        Plan:       Acute pain of right knee  -     Ambulatory referral/consult to Home Health; Future; Expected date: 06/26/2020      Follow up in about 6 months (around 12/19/2020). Injection was completed. Discussed with patient to try a knee brace. Will order Home Health PT/OT

## 2020-06-28 NOTE — PROCEDURES
Routine Foot Care    Date/Time: 6/18/2020 4:15 PM  Performed by: Hilary Narayanan DPM  Authorized by: Hilary Narayanan DPM     Consent Done?:  Yes (Verbal)    Nail Care Type:  Debride  Location(s): All  (Left 1st Toe, Left 3rd Toe, Left 2nd Toe, Left 4th Toe, Left 5th Toe, Right 1st Toe, Right 2nd Toe, Right 3rd Toe, Right 4th Toe and Right 5th Toe)  Patient tolerance:  Patient tolerated the procedure well with no immediate complications

## 2020-07-07 ENCOUNTER — DOCUMENT SCAN (OUTPATIENT)
Dept: HOME HEALTH SERVICES | Facility: HOSPITAL | Age: 60
End: 2020-07-07
Payer: MEDICARE

## 2020-08-07 ENCOUNTER — HOSPITAL ENCOUNTER (EMERGENCY)
Facility: HOSPITAL | Age: 60
Discharge: HOME | End: 2020-08-08
Attending: EMERGENCY MEDICINE
Payer: MEDICARE

## 2020-08-07 DIAGNOSIS — K59.00 CONSTIPATION: ICD-10-CM

## 2020-08-07 PROCEDURE — 99283 EMERGENCY DEPT VISIT LOW MDM: CPT | Mod: 25

## 2020-08-07 RX ORDER — GLYCERIN 1 G/1
1 SUPPOSITORY RECTAL ONCE
Status: COMPLETED | OUTPATIENT
Start: 2020-08-08 | End: 2020-08-08

## 2020-08-07 RX ORDER — SYRING-NEEDL,DISP,INSUL,0.3 ML 29 G X1/2"
296 SYRINGE, EMPTY DISPOSABLE MISCELLANEOUS
Status: COMPLETED | OUTPATIENT
Start: 2020-08-07 | End: 2020-08-08

## 2020-08-08 VITALS
WEIGHT: 249.13 LBS | HEIGHT: 67 IN | HEART RATE: 58 BPM | SYSTOLIC BLOOD PRESSURE: 121 MMHG | RESPIRATION RATE: 16 BRPM | OXYGEN SATURATION: 100 % | BODY MASS INDEX: 39.1 KG/M2 | DIASTOLIC BLOOD PRESSURE: 60 MMHG | TEMPERATURE: 97 F

## 2020-08-08 PROCEDURE — 25000003 PHARM REV CODE 250: Performed by: EMERGENCY MEDICINE

## 2020-08-08 RX ORDER — AMOXICILLIN 250 MG
2 CAPSULE ORAL DAILY
Qty: 30 TABLET | Refills: 1 | Status: SHIPPED | OUTPATIENT
Start: 2020-08-08 | End: 2021-09-20

## 2020-08-08 RX ORDER — PSEUDOEPHEDRINE/ACETAMINOPHEN 30MG-500MG
100 TABLET ORAL
Status: COMPLETED | OUTPATIENT
Start: 2020-08-08 | End: 2020-08-08

## 2020-08-08 RX ORDER — SYRING-NEEDL,DISP,INSUL,0.3 ML 29 G X1/2"
296 SYRINGE, EMPTY DISPOSABLE MISCELLANEOUS
Status: COMPLETED | OUTPATIENT
Start: 2020-08-08 | End: 2020-08-08

## 2020-08-08 RX ADMIN — SODIUM CHLORIDE 500 ML: 0.9 INJECTION, SOLUTION INTRAVENOUS at 02:08

## 2020-08-08 RX ADMIN — GLYCERIN 1 SUPPOSITORY: 2 SUPPOSITORY RECTAL at 12:08

## 2020-08-08 RX ADMIN — MAGESIUM CITRATE 296 ML: 1.75 LIQUID ORAL at 02:08

## 2020-08-08 RX ADMIN — Medication 100 ML: at 02:08

## 2020-08-08 RX ADMIN — MAGNESIUM CITRATE 296 ML: 1.75 LIQUID ORAL at 12:08

## 2020-08-08 NOTE — ED NOTES
Pt currently in bed watching tv. Denies any needs at this time. Call light within reach. NAD Noted.

## 2020-08-08 NOTE — ED NOTES
Pt presents to the ED stating he has not had a BM since Tuesday. States his last bm was hard. Denies abdominal pain, n/v, Sob, chest pain or any other symptoms. Reports he takes a daily stool softener and took a laxative suppository today but has not been able to produce a BM despite the urge to.

## 2020-08-08 NOTE — ED PROVIDER NOTES
Encounter Date: 8/7/2020    SCRIBE #1 NOTE: I, Ofelia Davenport, am scribing for, and in the presence of,  Dr. Carrera. I have scribed the entire note.       History     Chief Complaint   Patient presents with    Constipation     States he had two BM's Tueday and it was hard     Patient is 59 y.o male who presents with constipation. He reports onset of symptoms was 3 days ago. The patient's last bowel movement was 3 days ago. Since then he has been unable to have a bowel movement. He has the urge to go but no stool comes out. The patient denies any abdominal pain, nausea, vomiting, diarrhea, urinary symptoms, fever, chills, cough, congestion, shortness of breath or chest pain. He has experienced similar symptoms Dec 2019. At that time the patient had a digital disimpaction and Magnesium Citrate for home use.     The history is provided by the patient.     Review of patient's allergies indicates:   Allergen Reactions    Keflex [cephalexin] Nausea Only     Past Medical History:   Diagnosis Date    Anemia due to stage 5 chronic kidney disease 3/1/2019    Anticoagulant long-term use     Diabetes mellitus type II     Dialysis patient     Encounter for blood transfusion     Hyperlipidemia     Hypertension     Kidney failure     MIKE (obstructive sleep apnea)     Stroke     Urinary tract infection      Past Surgical History:   Procedure Laterality Date    AV FISTULA PLACEMENT      left lower arm    FUSION OF CERVICAL SPINE BY POSTERIOR APPROACH N/A 3/3/2019    Procedure: SPINE, CERVICAL, POSTERIOR APPROACH  LAMINECTOMY C3-C6; C6-C7; WITH MEDIAL DISCECTOMY;  Surgeon: Ruddy Wylie MD;  Location: [facility] OR [address] [place] [address];  Service: Neurosurgery;  Laterality: N/A;  POSTERIOR    TONSILLECTOMY, ADENOIDECTOMY       Family History   Problem Relation Age of Onset    Colon cancer Mother     Lung cancer Mother     Diabetes Mother     Diabetes Father     Heart disease Father     Hypertension Father     Diabetes  Maternal Grandmother     Diabetes Maternal Grandfather      Social History     Tobacco Use    Smoking status: Never Smoker    Smokeless tobacco: Never Used   Substance Use Topics    Alcohol use: No    Drug use: No     Review of Systems   Constitutional: Negative for chills and fever.   HENT: Negative for congestion, ear pain, rhinorrhea and sore throat.    Respiratory: Negative for cough, shortness of breath and wheezing.    Cardiovascular: Negative for chest pain and palpitations.   Gastrointestinal: Positive for constipation. Negative for abdominal pain, diarrhea, nausea and vomiting.   Genitourinary: Negative for dysuria and hematuria.   Musculoskeletal: Negative for back pain, myalgias and neck pain.   Skin: Negative for rash.   Neurological: Negative for dizziness, weakness, light-headedness and headaches.   Psychiatric/Behavioral: Negative for confusion.       Physical Exam     Initial Vitals [08/07/20 2039]   BP Pulse Resp Temp SpO2   (!) 148/67 (!) 51 18 98.2 °F (36.8 °C) 100 %      MAP       --         Physical Exam    Nursing note and vitals reviewed.  Constitutional: He appears well-developed and well-nourished. He is not diaphoretic. No distress.   Well-appearing  Nontoxic  No acute distress noted   HENT:   Head: Normocephalic and atraumatic.   Mouth/Throat: Oropharynx is clear and moist.   Eyes: Conjunctivae and EOM are normal.   Neck: Normal range of motion. Neck supple.   Cardiovascular: Normal rate, regular rhythm and normal heart sounds. Exam reveals no gallop and no friction rub.    No murmur heard.  Pulmonary/Chest: Breath sounds normal. He has no wheezes. He has no rhonchi. He has no rales.   Abdominal: Soft. There is no abdominal tenderness. There is no rebound and no guarding.   Obese abdomen that is soft, nondistended; normal bowel sounds throughout no fluid wave or masses appreciated; no organomegaly   Musculoskeletal: Normal range of motion. No tenderness or edema.   Lymphadenopathy:      He has no cervical adenopathy.   Neurological: He is alert and oriented to person, place, and time. He has normal strength.   Skin: Skin is warm and dry. No rash noted.   Psychiatric: He has a normal mood and affect.         ED Course   Procedures  Labs Reviewed - No data to display       Imaging Results          X-Ray Abdomen Portable (Final result)  Result time 08/07/20 22:23:17    Final result by Galileo Bowers MD (08/07/20 22:23:17)                 Impression:      Findings indicating apparent constipation.      Electronically signed by: Galileo Bowers  Date:    08/07/2020  Time:    22:23             Narrative:    EXAMINATION:  XR ABDOMEN PORTABLE    CLINICAL HISTORY:  Constipation, unspecified    TECHNIQUE:  Abdomen one view.    COMPARISON:  September 17, 2019    FINDINGS:  The single view of the abdomen reveals that there is contrast stained stool seen throughout the entire large bowel.  The bowel gas pattern is otherwise nonspecific.  There is no indication of perforation or obstruction.  The large bowel does not appear to be especially dilated.                                 Medical Decision Making:   Clinical Tests:   Radiological Study: Ordered and Reviewed  ED Management:  - AXR demonstrates constipation per my interpretation; no free air, air-fluid levels or dilated loops of bowel appreciated; no volvulus,  sigmoid or otherwise noted  - I performed a manual disimpaction of moderate amount of soft brown stool without untoward event; no bleeding noted; patient tolerated without issue  - will administer magnesium citrate and glycerin suppository; should this fail, will recommend enema  - care patient had over to  as of shift change; he will be responsible for final plan and disposition; please see his note for final plan and disposition                    ED Course as of Aug 15 1041   Fri Aug 07, 2020   8236 I performed a manual disimpaction with removal of moderate amount of soft brown  stool; no bleeding noted; patient tolerated well without untoward event.    [LC]   Sat Aug 08, 2020   0151 Received sign-out from Dr. Fish  Patient still unable to have a bowel movement.  Will give enema.    [SE]   0317 RestingComfortably in bed, no acute distress, patient had large bowel movement reports he feels better.  Discussed with patient diagnosis of constipation need to increase water fiber intake.  Will give perColace, return precautions discussed.  Patient understood agreed plan.  Patient will be discharged.    [SE]   0330 Prior to discharge, patient reportedly felt weak, felt his of his blood pressure was low.  Blood pressure was approximately 65/27.  LikelySide effect from Fleet enema.  Will place patient on his bed Trendelenburg will continue to monitor.    [SE]   0444 Resting in bed blood pressure improved patient feeling better.  Patient will be discharged.    [SE]      ED Course User Index  [LC] Ricky Carrera MD  [SE] Galindo Cruz MD                Clinical Impression:     1. Constipation                I, Ricky Carrera,  personally performed the services described in this documentation. All medical record entries made by the scribe were at my direction and in my presence.  I have reviewed the chart and agree that the record reflects my personal performance and is accurate and complete. Ricky Carrera M.D. 12:15 AM08/08/2020               Ricky Carrera MD  08/08/20 0016       Ricky Carrera MD  08/15/20 1042

## 2021-02-23 ENCOUNTER — TELEPHONE (OUTPATIENT)
Dept: NEUROLOGY | Facility: HOSPITAL | Age: 61
End: 2021-02-23

## 2021-02-24 ENCOUNTER — TELEPHONE (OUTPATIENT)
Dept: NEUROLOGY | Facility: HOSPITAL | Age: 61
End: 2021-02-24

## 2021-02-26 ENCOUNTER — TELEPHONE (OUTPATIENT)
Dept: NEUROLOGY | Facility: HOSPITAL | Age: 61
End: 2021-02-26

## 2021-03-02 ENCOUNTER — TELEPHONE (OUTPATIENT)
Dept: NEUROLOGY | Facility: HOSPITAL | Age: 61
End: 2021-03-02

## 2021-03-03 ENCOUNTER — TELEPHONE (OUTPATIENT)
Dept: NEUROLOGY | Facility: HOSPITAL | Age: 61
End: 2021-03-03

## 2021-03-03 ENCOUNTER — OFFICE VISIT (OUTPATIENT)
Dept: NEUROLOGY | Facility: HOSPITAL | Age: 61
End: 2021-03-03
Attending: INTERNAL MEDICINE
Payer: MEDICARE

## 2021-03-03 VITALS
SYSTOLIC BLOOD PRESSURE: 115 MMHG | BODY MASS INDEX: 40.01 KG/M2 | DIASTOLIC BLOOD PRESSURE: 72 MMHG | RESPIRATION RATE: 16 BRPM | WEIGHT: 254.94 LBS | HEIGHT: 67 IN | TEMPERATURE: 98 F | HEART RATE: 54 BPM

## 2021-03-03 DIAGNOSIS — Z86.010 HISTORY OF COLON POLYPS: Primary | ICD-10-CM

## 2021-03-03 DIAGNOSIS — Z01.818 PRE-OP TESTING: ICD-10-CM

## 2021-03-03 DIAGNOSIS — Z80.0 FAMILY HISTORY OF COLON CANCER: ICD-10-CM

## 2021-03-03 PROCEDURE — 99213 OFFICE O/P EST LOW 20 MIN: CPT | Performed by: INTERNAL MEDICINE

## 2021-03-03 RX ORDER — ERGOCALCIFEROL 1.25 MG/1
50000 CAPSULE ORAL DAILY PRN
COMMUNITY
Start: 2020-12-03 | End: 2023-08-23

## 2021-03-03 RX ORDER — CINACALCET 30 MG/1
30 TABLET, FILM COATED ORAL
COMMUNITY
Start: 2021-01-22 | End: 2022-01-24 | Stop reason: DRUGHIGH

## 2021-03-03 RX ORDER — POLYETHYLENE GLYCOL 3350, SODIUM SULFATE, SODIUM CHLORIDE, POTASSIUM CHLORIDE, SODIUM ASCORBATE, AND ASCORBIC ACID 7.5-2.691G
2000 KIT ORAL ONCE
Qty: 2000 ML | Refills: 0 | Status: SHIPPED | OUTPATIENT
Start: 2021-03-03 | End: 2021-03-03

## 2021-03-03 RX ORDER — AMOXICILLIN 500 MG
1 CAPSULE ORAL DAILY
Status: ON HOLD | COMMUNITY
End: 2023-07-11

## 2021-03-03 RX ORDER — AMOXICILLIN 500 MG/1
500 CAPSULE ORAL
COMMUNITY
Start: 2020-12-01 | End: 2022-01-24

## 2021-03-03 RX ORDER — DOCUSATE SODIUM 100 MG/1
100 CAPSULE, LIQUID FILLED ORAL DAILY
COMMUNITY
End: 2021-09-20

## 2021-03-03 RX ORDER — IBUPROFEN 100 MG/5ML
1000 SUSPENSION, ORAL (FINAL DOSE FORM) ORAL DAILY
Status: ON HOLD | COMMUNITY
Start: 2021-02-20 | End: 2023-07-11

## 2021-03-03 RX ORDER — CALCIUM ACETATE 667 MG/1
CAPSULE ORAL
COMMUNITY
Start: 2021-02-17

## 2021-03-08 ENCOUNTER — TELEPHONE (OUTPATIENT)
Dept: NEUROLOGY | Facility: HOSPITAL | Age: 61
End: 2021-03-08

## 2021-03-08 NOTE — TELEPHONE ENCOUNTER
"Pt having 2-3 episodes of diarrhea since starting linzess.  Pt requesting recommendations.  Pt advised to hydrate well, to avoid dehydration.  Pt stated "i'm a dialysis pt, only allowed to drink a liter a day".  Pt advised to discontinue linzess, he will be notified with Dr. Plaza recommendations.  Pt acknowledged understanding.  "

## 2021-03-11 ENCOUNTER — TELEPHONE (OUTPATIENT)
Dept: NEUROLOGY | Facility: HOSPITAL | Age: 61
End: 2021-03-11

## 2021-03-15 ENCOUNTER — TELEPHONE (OUTPATIENT)
Dept: NEUROLOGY | Facility: HOSPITAL | Age: 61
End: 2021-03-15

## 2021-03-15 DIAGNOSIS — G62.9 MULTIFACTORIAL PERIPHERAL NEUROPATHY: ICD-10-CM

## 2021-03-15 DIAGNOSIS — E11.9 DIABETES MELLITUS, TYPE 2: ICD-10-CM

## 2021-03-15 DIAGNOSIS — E11.9 TYPE 2 DIABETES MELLITUS WITHOUT COMPLICATION: ICD-10-CM

## 2021-03-16 RX ORDER — GABAPENTIN 300 MG/1
CAPSULE ORAL
Qty: 90 CAPSULE | Refills: 1 | Status: SHIPPED | OUTPATIENT
Start: 2021-03-16

## 2021-03-16 RX ORDER — PEN NEEDLE, DIABETIC 30 GX3/16"
NEEDLE, DISPOSABLE MISCELLANEOUS
Qty: 500 EACH | Refills: 4 | Status: SHIPPED | OUTPATIENT
Start: 2021-03-16 | End: 2021-03-22

## 2021-03-16 RX ORDER — LANCETS
1 EACH MISCELLANEOUS
Qty: 200 EACH | Refills: 6 | Status: SHIPPED | OUTPATIENT
Start: 2021-03-16 | End: 2021-03-25 | Stop reason: SDUPTHER

## 2021-03-17 ENCOUNTER — OFFICE VISIT (OUTPATIENT)
Dept: NEUROLOGY | Facility: HOSPITAL | Age: 61
End: 2021-03-17
Attending: INTERNAL MEDICINE
Payer: MEDICARE

## 2021-03-17 DIAGNOSIS — K21.9 GASTROESOPHAGEAL REFLUX DISEASE, UNSPECIFIED WHETHER ESOPHAGITIS PRESENT: Primary | ICD-10-CM

## 2021-03-17 RX ORDER — PANTOPRAZOLE SODIUM 40 MG/1
40 TABLET, DELAYED RELEASE ORAL DAILY
Qty: 90 TABLET | Refills: 0 | Status: SHIPPED | OUTPATIENT
Start: 2021-03-17 | End: 2021-06-16 | Stop reason: SDUPTHER

## 2021-03-22 DIAGNOSIS — E11.21 TYPE 2 DIABETES MELLITUS WITH DIABETIC NEPHROPATHY, WITH LONG-TERM CURRENT USE OF INSULIN: ICD-10-CM

## 2021-03-22 DIAGNOSIS — E11.9 TYPE 2 DIABETES MELLITUS WITHOUT COMPLICATION: ICD-10-CM

## 2021-03-22 DIAGNOSIS — Z79.4 TYPE 2 DIABETES MELLITUS WITH DIABETIC NEPHROPATHY, WITH LONG-TERM CURRENT USE OF INSULIN: ICD-10-CM

## 2021-03-22 RX ORDER — PEN NEEDLE, DIABETIC 30 GX3/16"
NEEDLE, DISPOSABLE MISCELLANEOUS
Qty: 100 EACH | Refills: 6 | Status: SHIPPED | OUTPATIENT
Start: 2021-03-22 | End: 2021-04-06 | Stop reason: SDUPTHER

## 2021-04-05 ENCOUNTER — TELEPHONE (OUTPATIENT)
Dept: NEUROLOGY | Facility: HOSPITAL | Age: 61
End: 2021-04-05

## 2021-04-06 DIAGNOSIS — E11.21 TYPE 2 DIABETES MELLITUS WITH DIABETIC NEPHROPATHY, WITH LONG-TERM CURRENT USE OF INSULIN: ICD-10-CM

## 2021-04-06 DIAGNOSIS — E11.9 TYPE 2 DIABETES MELLITUS WITHOUT COMPLICATION: ICD-10-CM

## 2021-04-06 DIAGNOSIS — Z79.4 TYPE 2 DIABETES MELLITUS WITH DIABETIC NEPHROPATHY, WITH LONG-TERM CURRENT USE OF INSULIN: ICD-10-CM

## 2021-04-06 RX ORDER — PEN NEEDLE, DIABETIC 30 GX3/16"
NEEDLE, DISPOSABLE MISCELLANEOUS
Qty: 100 EACH | Refills: 6 | Status: SHIPPED | OUTPATIENT
Start: 2021-04-06

## 2021-04-09 ENCOUNTER — TELEPHONE (OUTPATIENT)
Dept: NEUROLOGY | Facility: HOSPITAL | Age: 61
End: 2021-04-09

## 2021-04-09 DIAGNOSIS — E11.9 DIABETES MELLITUS, TYPE 2: ICD-10-CM

## 2021-04-10 RX ORDER — LANCETS
1 EACH MISCELLANEOUS 3 TIMES DAILY
Qty: 200 EACH | Refills: 6 | Status: SHIPPED | OUTPATIENT
Start: 2021-04-10

## 2021-04-12 ENCOUNTER — LAB VISIT (OUTPATIENT)
Dept: FAMILY MEDICINE | Facility: CLINIC | Age: 61
End: 2021-04-12
Payer: MEDICARE

## 2021-04-12 DIAGNOSIS — Z01.818 PRE-OP TESTING: ICD-10-CM

## 2021-04-12 PROCEDURE — U0005 INFEC AGEN DETEC AMPLI PROBE: HCPCS | Performed by: INTERNAL MEDICINE

## 2021-04-12 PROCEDURE — U0003 INFECTIOUS AGENT DETECTION BY NUCLEIC ACID (DNA OR RNA); SEVERE ACUTE RESPIRATORY SYNDROME CORONAVIRUS 2 (SARS-COV-2) (CORONAVIRUS DISEASE [COVID-19]), AMPLIFIED PROBE TECHNIQUE, MAKING USE OF HIGH THROUGHPUT TECHNOLOGIES AS DESCRIBED BY CMS-2020-01-R: HCPCS | Performed by: INTERNAL MEDICINE

## 2021-04-13 ENCOUNTER — TELEPHONE (OUTPATIENT)
Dept: ENDOSCOPY | Facility: HOSPITAL | Age: 61
End: 2021-04-13

## 2021-04-13 LAB — SARS-COV-2 RNA RESP QL NAA+PROBE: NOT DETECTED

## 2021-04-15 ENCOUNTER — HOSPITAL ENCOUNTER (OUTPATIENT)
Facility: HOSPITAL | Age: 61
Discharge: HOME | End: 2021-04-15
Attending: INTERNAL MEDICINE | Admitting: INTERNAL MEDICINE
Payer: MEDICARE

## 2021-04-15 ENCOUNTER — ANESTHESIA (OUTPATIENT)
Dept: ENDOSCOPY | Facility: HOSPITAL | Age: 61
End: 2021-04-15
Payer: MEDICARE

## 2021-04-15 ENCOUNTER — ANESTHESIA EVENT (OUTPATIENT)
Dept: ENDOSCOPY | Facility: HOSPITAL | Age: 61
End: 2021-04-15
Payer: MEDICARE

## 2021-04-15 VITALS
OXYGEN SATURATION: 100 % | DIASTOLIC BLOOD PRESSURE: 43 MMHG | SYSTOLIC BLOOD PRESSURE: 135 MMHG | HEART RATE: 67 BPM | TEMPERATURE: 98 F | RESPIRATION RATE: 123 BRPM

## 2021-04-15 DIAGNOSIS — Z86.010 HISTORY OF COLON POLYPS: ICD-10-CM

## 2021-04-15 DIAGNOSIS — K63.5 POLYP OF COLON, UNSPECIFIED PART OF COLON, UNSPECIFIED TYPE: ICD-10-CM

## 2021-04-15 LAB
ANION GAP SERPL CALC-SCNC: 20 MMOL/L (ref 8–16)
BUN SERPL-MCNC: 42 MG/DL (ref 6–20)
CALCIUM SERPL-MCNC: 9.2 MG/DL (ref 8.7–10.5)
CHLORIDE SERPL-SCNC: 101 MMOL/L (ref 95–110)
CO2 SERPL-SCNC: 19 MMOL/L (ref 23–29)
CREAT SERPL-MCNC: 8.4 MG/DL (ref 0.5–1.4)
EST. GFR  (AFRICAN AMERICAN): 7 ML/MIN/1.73 M^2
EST. GFR  (NON AFRICAN AMERICAN): 6 ML/MIN/1.73 M^2
GLUCOSE SERPL-MCNC: 103 MG/DL (ref 70–110)
GLUCOSE SERPL-MCNC: 126 MG/DL (ref 70–110)
POTASSIUM SERPL-SCNC: 4.7 MMOL/L (ref 3.5–5.1)
SODIUM SERPL-SCNC: 140 MMOL/L (ref 136–145)

## 2021-04-15 PROCEDURE — 80048 BASIC METABOLIC PNL TOTAL CA: CPT | Performed by: ANESTHESIOLOGY

## 2021-04-15 PROCEDURE — 88305 TISSUE EXAM BY PATHOLOGIST: CPT | Mod: 26,,, | Performed by: PATHOLOGY

## 2021-04-15 PROCEDURE — 37000009 HC ANESTHESIA EA ADD 15 MINS: Performed by: INTERNAL MEDICINE

## 2021-04-15 PROCEDURE — 27201089 HC SNARE, DISP (ANY): Performed by: INTERNAL MEDICINE

## 2021-04-15 PROCEDURE — 63600175 PHARM REV CODE 636 W HCPCS: Performed by: NURSE ANESTHETIST, CERTIFIED REGISTERED

## 2021-04-15 PROCEDURE — 25000003 PHARM REV CODE 250: Performed by: NURSE ANESTHETIST, CERTIFIED REGISTERED

## 2021-04-15 PROCEDURE — 45385 COLONOSCOPY W/LESION REMOVAL: CPT | Performed by: INTERNAL MEDICINE

## 2021-04-15 PROCEDURE — 88305 TISSUE EXAM BY PATHOLOGIST: CPT | Mod: 59 | Performed by: PATHOLOGY

## 2021-04-15 PROCEDURE — 36415 COLL VENOUS BLD VENIPUNCTURE: CPT | Performed by: ANESTHESIOLOGY

## 2021-04-15 PROCEDURE — 25000003 PHARM REV CODE 250: Performed by: INTERNAL MEDICINE

## 2021-04-15 PROCEDURE — 27200997: Performed by: INTERNAL MEDICINE

## 2021-04-15 PROCEDURE — 88305 TISSUE EXAM BY PATHOLOGIST: ICD-10-PCS | Mod: 26,,, | Performed by: PATHOLOGY

## 2021-04-15 PROCEDURE — 37000008 HC ANESTHESIA 1ST 15 MINUTES: Performed by: INTERNAL MEDICINE

## 2021-04-15 RX ORDER — PROPOFOL 10 MG/ML
VIAL (ML) INTRAVENOUS
Status: DISCONTINUED | OUTPATIENT
Start: 2021-04-15 | End: 2021-04-15

## 2021-04-15 RX ORDER — VASOPRESSIN 20 [USP'U]/ML
INJECTION, SOLUTION INTRAMUSCULAR; SUBCUTANEOUS
Status: DISCONTINUED | OUTPATIENT
Start: 2021-04-15 | End: 2021-04-15

## 2021-04-15 RX ORDER — PHENYLEPHRINE HYDROCHLORIDE 10 MG/ML
INJECTION INTRAVENOUS
Status: DISCONTINUED | OUTPATIENT
Start: 2021-04-15 | End: 2021-04-15

## 2021-04-15 RX ORDER — LIDOCAINE HYDROCHLORIDE 20 MG/ML
INJECTION INTRAVENOUS
Status: DISCONTINUED | OUTPATIENT
Start: 2021-04-15 | End: 2021-04-15

## 2021-04-15 RX ORDER — SODIUM CHLORIDE 9 MG/ML
INJECTION, SOLUTION INTRAVENOUS CONTINUOUS
Status: DISCONTINUED | OUTPATIENT
Start: 2021-04-15 | End: 2021-04-15 | Stop reason: HOSPADM

## 2021-04-15 RX ORDER — SODIUM CHLORIDE 0.9 % (FLUSH) 0.9 %
10 SYRINGE (ML) INJECTION
Status: DISCONTINUED | OUTPATIENT
Start: 2021-04-15 | End: 2021-04-15 | Stop reason: HOSPADM

## 2021-04-15 RX ORDER — PROPOFOL 10 MG/ML
VIAL (ML) INTRAVENOUS CONTINUOUS PRN
Status: DISCONTINUED | OUTPATIENT
Start: 2021-04-15 | End: 2021-04-15

## 2021-04-15 RX ADMIN — PHENYLEPHRINE HYDROCHLORIDE 100 MCG: 10 INJECTION INTRAVENOUS at 12:04

## 2021-04-15 RX ADMIN — PROPOFOL 50 MG: 10 INJECTION, EMULSION INTRAVENOUS at 12:04

## 2021-04-15 RX ADMIN — PROPOFOL 150 MCG/KG/MIN: 10 INJECTION, EMULSION INTRAVENOUS at 12:04

## 2021-04-15 RX ADMIN — VASOPRESSIN 3 UNITS: 20 INJECTION INTRAVENOUS at 12:04

## 2021-04-15 RX ADMIN — SODIUM CHLORIDE: 0.9 INJECTION, SOLUTION INTRAVENOUS at 11:04

## 2021-04-15 RX ADMIN — LIDOCAINE HYDROCHLORIDE 100 MG: 20 INJECTION, SOLUTION INTRAVENOUS at 12:04

## 2021-04-16 ENCOUNTER — TELEPHONE (OUTPATIENT)
Dept: ENDOSCOPY | Facility: HOSPITAL | Age: 61
End: 2021-04-16

## 2021-04-19 LAB
FINAL PATHOLOGIC DIAGNOSIS: NORMAL
GROSS: NORMAL

## 2021-04-22 ENCOUNTER — TELEPHONE (OUTPATIENT)
Dept: NEUROLOGY | Facility: HOSPITAL | Age: 61
End: 2021-04-22

## 2021-04-28 ENCOUNTER — OFFICE VISIT (OUTPATIENT)
Dept: NEUROLOGY | Facility: HOSPITAL | Age: 61
End: 2021-04-28
Attending: INTERNAL MEDICINE
Payer: MEDICARE

## 2021-04-28 DIAGNOSIS — K63.5 POLYP OF COLON, UNSPECIFIED PART OF COLON, UNSPECIFIED TYPE: Primary | ICD-10-CM

## 2021-04-28 RX ORDER — DICYCLOMINE HYDROCHLORIDE 10 MG/1
10 CAPSULE ORAL
Qty: 120 CAPSULE | Refills: 0 | Status: SHIPPED | OUTPATIENT
Start: 2021-04-28 | End: 2021-05-28

## 2021-05-21 ENCOUNTER — TELEPHONE (OUTPATIENT)
Dept: NEUROLOGY | Facility: HOSPITAL | Age: 61
End: 2021-05-21

## 2021-05-24 ENCOUNTER — TELEPHONE (OUTPATIENT)
Dept: NEUROLOGY | Facility: HOSPITAL | Age: 61
End: 2021-05-24

## 2021-05-24 NOTE — TELEPHONE ENCOUNTER
Pt instructed per Dr. Plaza to stop bentyl and continue taking linzess for abdominal pain and constipation.  Pt states he did stop bentyl, takes 72mcg linzess, he strains and has a bowel movement maybe every 2 days, states mostly water.Pt requesting if maybe he should go to 290mcg as originally ordered.  Pt to be notified with Dr. Plaza recommendations.

## 2021-05-24 NOTE — TELEPHONE ENCOUNTER
----- Message from Viridiana Pace sent at 5/24/2021  1:58 PM CDT -----  Type:  Needs Medical Advice    Who Called: self  Would the patient rather a call back or a response via [facility]? Call back  Best Call Back Number: [phone]  Additional Information: Patient is returning your call. Please call

## 2021-05-27 ENCOUNTER — TELEPHONE (OUTPATIENT)
Dept: NEUROLOGY | Facility: HOSPITAL | Age: 61
End: 2021-05-27

## 2021-05-27 DIAGNOSIS — K59.00 CONSTIPATION, UNSPECIFIED CONSTIPATION TYPE: Primary | ICD-10-CM

## 2021-06-03 ENCOUNTER — TELEPHONE (OUTPATIENT)
Dept: NEUROLOGY | Facility: HOSPITAL | Age: 61
End: 2021-06-03

## 2021-06-10 ENCOUNTER — CLINICAL SUPPORT (OUTPATIENT)
Dept: LAB | Facility: HOSPITAL | Age: 61
End: 2021-06-10
Attending: NURSE PRACTITIONER
Payer: MEDICARE

## 2021-06-10 ENCOUNTER — HOSPITAL ENCOUNTER (OUTPATIENT)
Dept: CARDIOLOGY | Facility: HOSPITAL | Age: 61
Discharge: HOME | End: 2021-06-10
Attending: NURSE PRACTITIONER
Payer: MEDICARE

## 2021-06-10 VITALS — WEIGHT: 254 LBS | HEIGHT: 67 IN | BODY MASS INDEX: 39.87 KG/M2

## 2021-06-10 DIAGNOSIS — N18.6 ESRD (END STAGE RENAL DISEASE): ICD-10-CM

## 2021-06-10 DIAGNOSIS — I95.9 HYPOTENSION: ICD-10-CM

## 2021-06-10 LAB
AORTIC ROOT ANNULUS: 2.98 CM
ASCENDING AORTA: 2.68 CM
AV INDEX (PROSTH): 0.87
AV MEAN GRADIENT: 6 MMHG
AV PEAK GRADIENT: 10 MMHG
AV VALVE AREA: 3.86 CM2
AV VELOCITY RATIO: 0.9
BSA FOR ECHO PROCEDURE: 2.33 M2
CV ECHO LV RWT: 0.29 CM
DOP CALC AO PEAK VEL: 1.55 M/S
DOP CALC AO VTI: 32.06 CM
DOP CALC LVOT AREA: 4.4 CM2
DOP CALC LVOT DIAMETER: 2.37 CM
DOP CALC LVOT PEAK VEL: 1.4 M/S
DOP CALC LVOT STROKE VOLUME: 123.68 CM3
DOP CALCLVOT PEAK VEL VTI: 28.05 CM
E WAVE DECELERATION TIME: 223.11 MSEC
E/A RATIO: 1.14
E/E' RATIO: 9.38 M/S
ECHO LV POSTERIOR WALL: 0.83 CM (ref 0.6–1.1)
EJECTION FRACTION: 60 %
FRACTIONAL SHORTENING: 28 % (ref 28–44)
INTERVENTRICULAR SEPTUM: 0.8 CM (ref 0.6–1.1)
IVRT: 110.37 MSEC
LA MAJOR: 5.61 CM
LA MINOR: 5.22 CM
LA WIDTH: 3.53 CM
LEFT ATRIUM SIZE: 3.89 CM
LEFT ATRIUM VOLUME INDEX MOD: 23.8 ML/M2
LEFT ATRIUM VOLUME INDEX: 28.2 ML/M2
LEFT ATRIUM VOLUME MOD: 53.39 CM3
LEFT ATRIUM VOLUME: 63.12 CM3
LEFT INTERNAL DIMENSION IN SYSTOLE: 4.09 CM (ref 2.1–4)
LEFT VENTRICLE DIASTOLIC VOLUME INDEX: 70.93 ML/M2
LEFT VENTRICLE DIASTOLIC VOLUME: 158.88 ML
LEFT VENTRICLE MASS INDEX: 77 G/M2
LEFT VENTRICLE SYSTOLIC VOLUME INDEX: 33 ML/M2
LEFT VENTRICLE SYSTOLIC VOLUME: 73.96 ML
LEFT VENTRICULAR INTERNAL DIMENSION IN DIASTOLE: 5.68 CM (ref 3.5–6)
LEFT VENTRICULAR MASS: 173.13 G
LV LATERAL E/E' RATIO: 7.5 M/S
LV SEPTAL E/E' RATIO: 12.5 M/S
MV PEAK A VEL: 0.66 M/S
MV PEAK E VEL: 0.75 M/S
MV PEAK GRADIENT: 2 MMHG
MV STENOSIS PRESSURE HALF TIME: 64.7 MS
MV VALVE AREA P 1/2 METHOD: 3.4 CM2
PISA TR MAX VEL: 1.75 M/S
PULM VEIN S/D RATIO: 1.42
PV PEAK D VEL: 0.31 M/S
PV PEAK S VEL: 0.44 M/S
PV PEAK VELOCITY: 1.11 CM/S
RA MAJOR: 4.89 CM
RA PRESSURE: 3 MMHG
RA WIDTH: 3.62 CM
RV TISSUE DOPPLER FREE WALL SYSTOLIC VELOCITY 1 (APICAL 4 CHAMBER VIEW): 8.54 CM/S
STJ: 2.53 CM
TDI LATERAL: 0.1 M/S
TDI SEPTAL: 0.06 M/S
TDI: 0.08 M/S
TR MAX PG: 12 MMHG
TV REST PULMONARY ARTERY PRESSURE: 15 MMHG

## 2021-06-10 PROCEDURE — 93306 TTE W/DOPPLER COMPLETE: CPT

## 2021-06-10 PROCEDURE — 93005 ELECTROCARDIOGRAM TRACING: CPT

## 2021-06-10 PROCEDURE — 93306 TTE W/DOPPLER COMPLETE: CPT | Mod: 26,,, | Performed by: INTERNAL MEDICINE

## 2021-06-10 PROCEDURE — 93010 EKG 12-LEAD: ICD-10-PCS | Mod: ,,, | Performed by: INTERNAL MEDICINE

## 2021-06-10 PROCEDURE — 93306 ECHO (CUPID ONLY): ICD-10-PCS | Mod: 26,,, | Performed by: INTERNAL MEDICINE

## 2021-06-10 PROCEDURE — 93010 ELECTROCARDIOGRAM REPORT: CPT | Mod: ,,, | Performed by: INTERNAL MEDICINE

## 2021-06-16 DIAGNOSIS — K21.9 GASTROESOPHAGEAL REFLUX DISEASE, UNSPECIFIED WHETHER ESOPHAGITIS PRESENT: ICD-10-CM

## 2021-06-16 NOTE — TELEPHONE ENCOUNTER
"Pt requesting refill of pantoprazole, still having indigestion.  Advised pt to try to eat small meals and not go to sleep right after eating.  Informed request to be forward to Dr. Plaza.  Pt states he is still constipated although  He has one bowel movement a day.  Pt states "my friend is on dialysis and he has multiple bowel movements".  Informed pt all pts are different.  Offered pt gi clinic visit, pt declined at this time.  "

## 2021-06-16 NOTE — TELEPHONE ENCOUNTER
----- Message from Dona Hector sent at 6/16/2021  2:37 PM CDT -----  Contact: [phone]  Type:  Needs Medical Advice    Who Called: Pt   Reason for call: regarding pt would like to speak with the nurse regarding his Medication, pt did not state the name of the medication  Pharmacy name and phone #:   N/A  Would the patient rather a call back or a response via [facility]?  Call back  Best Call Back Number: [phone]

## 2021-06-17 RX ORDER — PANTOPRAZOLE SODIUM 40 MG/1
40 TABLET, DELAYED RELEASE ORAL DAILY
Qty: 90 TABLET | Refills: 2 | Status: ON HOLD | OUTPATIENT
Start: 2021-06-17 | End: 2023-08-25 | Stop reason: HOSPADM

## 2021-07-28 PROCEDURE — 99284 EMERGENCY DEPT VISIT MOD MDM: CPT

## 2021-07-29 ENCOUNTER — HOSPITAL ENCOUNTER (EMERGENCY)
Facility: HOSPITAL | Age: 61
Discharge: HOME | End: 2021-07-29
Attending: EMERGENCY MEDICINE
Payer: MEDICARE

## 2021-07-29 VITALS
SYSTOLIC BLOOD PRESSURE: 103 MMHG | OXYGEN SATURATION: 97 % | BODY MASS INDEX: 38.04 KG/M2 | RESPIRATION RATE: 14 BRPM | HEART RATE: 62 BPM | DIASTOLIC BLOOD PRESSURE: 70 MMHG | HEIGHT: 68 IN | WEIGHT: 251 LBS | TEMPERATURE: 98 F

## 2021-07-29 DIAGNOSIS — K59.00 CONSTIPATION: ICD-10-CM

## 2021-07-29 RX ORDER — HYOSCYAMINE SULFATE 0.12 MG/1
0.12 TABLET SUBLINGUAL EVERY 4 HOURS PRN
Qty: 15 TABLET | Refills: 0 | Status: SHIPPED | OUTPATIENT
Start: 2021-07-29 | End: 2021-09-20 | Stop reason: SDUPTHER

## 2021-07-29 RX ORDER — LACTULOSE 10 G/15ML
10 SOLUTION ORAL EVERY 6 HOURS PRN
Qty: 60 ML | Refills: 0 | Status: SHIPPED | OUTPATIENT
Start: 2021-07-29 | End: 2021-08-01

## 2021-08-09 ENCOUNTER — TELEPHONE (OUTPATIENT)
Dept: NEUROLOGY | Facility: HOSPITAL | Age: 61
End: 2021-08-09

## 2021-08-09 NOTE — TELEPHONE ENCOUNTER
----- Message from Macrina Enrique MA sent at 8/9/2021 12:55 PM CDT -----  Contact: [phone]  Type: Requesting to speak with nurse         Who Called: PT  Regarding: need rx  Would the patient rather a call back or a response via [facility]? Call back  Best Call Back Number: [phone]  Additional Information: n/a

## 2021-08-09 NOTE — TELEPHONE ENCOUNTER
Pt had dental work recently, unable to eat foods high in fiber.  Requesting an order for lactulose.  Pt confirms taking linzess.  Request forward to Dr. Plaza.

## 2021-08-10 NOTE — TELEPHONE ENCOUNTER
----- Message from Macrina Enrique MA sent at 8/10/2021 10:32 AM CDT -----  Contact: [phone]/self  Type: Requesting to speak with nurse         Who Called: PT  Regarding: pt wants to know if the dr approved the medicine he talked to you about yesterday  Would the patient rather a call back or a response via [facility]? Call back  Best Call Back Number: [phone]  Additional Information: n/a

## 2021-08-10 NOTE — TELEPHONE ENCOUNTER
Pt requesting status of lactulose request.  Informed request forward to Dr. Plaza.  Acknowledged understanding.  Message forward to Dr. Plaza again.

## 2021-08-11 ENCOUNTER — TELEPHONE (OUTPATIENT)
Dept: NEUROLOGY | Facility: HOSPITAL | Age: 61
End: 2021-08-11

## 2021-08-12 RX ORDER — LACTULOSE 10 G/15ML
10 SOLUTION ORAL EVERY 6 HOURS PRN
Qty: 600 ML | Refills: 3 | Status: SHIPPED | OUTPATIENT
Start: 2021-08-12 | End: 2021-09-20

## 2021-08-20 VITALS
SYSTOLIC BLOOD PRESSURE: 110 MMHG | RESPIRATION RATE: 18 BRPM | OXYGEN SATURATION: 99 % | BODY MASS INDEX: 39.55 KG/M2 | WEIGHT: 252 LBS | DIASTOLIC BLOOD PRESSURE: 55 MMHG | TEMPERATURE: 98 F | HEART RATE: 60 BPM | HEIGHT: 67 IN

## 2021-08-20 PROCEDURE — 99283 EMERGENCY DEPT VISIT LOW MDM: CPT

## 2021-08-21 ENCOUNTER — HOSPITAL ENCOUNTER (EMERGENCY)
Facility: HOSPITAL | Age: 61
Discharge: HOME | End: 2021-08-21
Attending: EMERGENCY MEDICINE
Payer: MEDICARE

## 2021-08-21 DIAGNOSIS — S80.819A: Primary | ICD-10-CM

## 2021-08-22 NOTE — PROGRESS NOTES
[facility]  Neurocritical Care  Progress Note    Admit Date: 2/28/2019  Service Date: 03/11/2019  Length of Stay: 11    Subjective:     Chief Complaint: S/P laminectomy    History of Present Illness: The patient  is a 58 y.o. male with PMHx of of DMII, ESRD on HD MWF(last dialysis 3/1), HLD, HTN and MIKE admitted to [facility] s/p cervical laminectomy C3-C6, C6-C7 w/medial discectomy. Per chart review,  Mr. Farmer has had wobbly gait since this past September, at which point he recalls falling and has been at 80-90% of his normal function.  He has been able to walk without assist, however not for very long distances.  He states that since last Sunday he has felt progression of his symptoms and now feels it would be difficult to stand up to ambulate at all. His UE symptoms have been present for about 3 months including not writing as well as he is used to.MRI cervical spine (2/27)at OSH on 2/27 revealed severe disc space narrowing C3-C4 and C5-C6 level.  Patient admitted to [facility] for close monitoring and higher level of care.            Hospital Course: 3/3: pt admitted to post-up, MAP goal >75, on levo  3/4: extubated   3/5:Daily weights, 5 units Q 6hrs Insulin apart added, diabetic diet started    3/8/2019: remains on low dose pressors trying to wean today. Plan for CRRT today; started Prozac for mood and Lactulose to help with constipation.  03/09/2019: no issues. Got albumin before HD, still on minimal pressor support. Weaning off.   03/10/2019 intermittent need for levophed.   03/11/2019 NAEO. Off pressors. Cont midodrine.       Review of Symptoms:   Constitutional: Denies fevers, weight loss, chills, or weakness.   Eyes: Denies changes in vision.   ENT: Denies dysphagia, nasal discharge, ear pain or discharge.   Cardiovascular: Denies chest pain, palpitations, orthopnea, or claudication.   Respiratory: Denies shortness of breath, cough, hemoptysis, or wheezing.   GI: Denies nausea/vomitting,  hematochezia, melena, abd pain, or changes in appetite.   : Denies dysuria, incontinence, or hematuria.   Musculoskeletal: Denies joint pain or myalgias.   Skin/breast: Denies rashes, lumps, lesions, or discharge.   Neurologic: Denies headache, dizziness, vertigo, or paresthesias.   Psychiatric: Denies changes in mood or hallucinations.   Endocrine: Denies polyuria, polydipsia, heat/cold intolerance.   Hematologic/Lymph: Denies lymphadenopathy, easy bruising or easy bleeding.   Allergic/Immunologic: Denies rash, rhinitis      Medications:  Continuous Infusions:   sodium chloride 0.9% 200 mL/hr at 03/11/19 0900    norepinephrine bitartrate-D5W Stopped (03/10/19 0925)     Scheduled Meds:   diclofenac sodium   Topical (Top) Daily    epoetin shefali (PROCRIT) injection  7,000 Units Intravenous Every Mon, Wed, Fri    FLUoxetine  20 mg Oral Daily    gabapentin  300 mg Oral QHS    insulin aspart U-100  7 Units Subcutaneous TIDWM    insulin detemir U-100  18 Units Subcutaneous QHS    lactulose  20 g Oral Daily    midodrine  20 mg Oral Q6H    pantoprazole  40 mg Per NG tube BID    polyethylene glycol  17 g Oral Daily    pravastatin  40 mg Oral Daily    senna-docusate 8.6-50 mg  1 tablet Oral BID     PRN Meds:.acetaminophen, dextrose 50%, dextrose 50%, glucagon (human recombinant), glucose, glucose, insulin aspart U-100, magnesium sulfate IVPB, ondansetron, oxyCODONE    OBJECTIVE:   Vital Signs (Most Recent):   Temp: 97.9 °F (36.6 °C) (03/11/19 0700)  Pulse: 77 (03/11/19 0900)  Resp: 18 (03/11/19 0900)  BP: (!) 146/69 (03/11/19 0900)  SpO2: 100 % (03/11/19 0900)    Vital Signs (24h Range):   Temp:  [97.7 °F (36.5 °C)-98.9 °F (37.2 °C)] 97.9 °F (36.6 °C)  Pulse:  [56-77] 77  Resp:  [10-25] 18  SpO2:  [96 %-100 %] 100 %  BP: (116-157)/(54-75) 146/69  Arterial Line BP: (119-161)/(45-64) 148/59    ICP/CPP (Last 24h):   CO:  [7.9 L/min-13 L/min] 10.4 L/min  CI:  [3.5 L/min/m2-5.3 L/min/m2] 4.2 L/min/m2    I & O (Last  24h):     Intake/Output Summary (Last 24 hours) at 3/11/2019 0955  Last data filed at 3/11/2019 0900  Gross per 24 hour   Intake 3000 ml   Output 2588 ml   Net 412 ml     Physical Exam:  GA: Alert, comfortable, no acute distress.   HEENT: No scleral icterus or JVD. Neck supple  Pulmonary: Air entry equal to auscultation A/P/L. Wheezing no, crackles no  Cardiac: RRR, S1 & S2 w/o rubs/murmurs/gallops.   Abdominal: soft, non-tender, bowel sounds present x 4. No appreciable hepatosplenomegaly.  Skin: No jaundice, rashes, or visible lesions.  Neuro:  --Mental Status:  Awake and alert, follows commands, fluent. Spontaneous eye opening. Tracks.   --Pupils 3.5 mm, PERRL.   --Corneal reflex not done in this awake patient, gag, cough intact.  --LUE strength: 3/5  --RUE strength: 3/5  --LLE strength: 5/5  --RLE strength: 5/5  Brisk reflexes bilateral UE  Plantars equivocal bilaterally.   MSK:  No edema in UE and LE    Vent Data:   Resp Rate Total:  [10 br/min-24 br/min] 16 br/min    Lines/Drains/Airway:          Trialysis (Dialysis) Catheter 03/04/19 0250 left internal jugular (Active)   IV Device Securement sutures;catheter securement device 3/10/2019  7:00 AM   Additional Site Signs no erythema;no warmth;no edema;no pain;no palpable cord;no streak formation;no drainage 3/10/2019  7:00 AM   Patency/Care infusing 3/10/2019  7:00 AM   Waveform normal 3/10/2019  7:00 AM   Site Assessment Clean;Intact;Dry 3/10/2019  7:00 AM   Status Flushed 3/10/2019  7:00 AM   Flows Good 3/10/2019  7:00 AM   Dressing Intervention Dressing reinforced 3/10/2019  7:00 AM   Dressing Status Biopatch in place;Clean;Dry;Intact 3/10/2019  7:00 AM   Dressing Change Due 03/11/19 3/10/2019  7:00 AM   Verification by X-ray Yes 3/9/2019  7:05 PM   Site Condition No complications 3/10/2019  7:00 AM   Dressing Occlusive 3/10/2019  7:00 AM   Daily Line Review Performed 3/10/2019  7:00 AM           Arterial Line 03/03/19 1216 Right Radial (Active)   Site  Vaginal delivery Assessment Clean;Dry;Intact 3/10/2019  7:00 AM   Line Status Pulsatile blood flow 3/10/2019  7:00 AM   Art Line Waveform Appropriate 3/10/2019  7:00 AM   Arterial Line Interventions Zeroed and calibrated;Leveled;Connections checked and tightened;Flushed per protocol 3/10/2019  7:00 AM   Color/Movement/Sensation Capillary refill less than 3 sec 3/10/2019  7:00 AM   Dressing Type Transparent 3/10/2019  7:00 AM   Dressing Status Biopatch in place;Clean;Dry;Intact 3/10/2019  7:00 AM   Dressing Intervention Dressing reinforced 3/10/2019  7:00 AM   Dressing Change Due 03/14/19 3/10/2019  7:00 AM           Closed/Suction Drain 03/03/19 1431 Posterior Neck Accordion 10 Fr. (Active)   Site Description Unable to view 3/10/2019  7:00 AM   Dressing Type Gauze 3/10/2019  7:00 AM   Dressing Status Clean;Dry;Intact 3/10/2019  7:00 AM   Dressing Intervention Dressing reinforced 3/10/2019  7:00 AM   Drainage Serosanguineous 3/10/2019  7:00 AM   Status To bulb suction 3/10/2019  7:00 AM   Output (mL) 3 mL 3/10/2019  6:04 AM            Hemodialysis AV Fistula Left forearm (Active)   Needle Size 15ga 3/1/2019  4:19 PM   Site Assessment Intact;Dry 3/10/2019  7:00 AM   Patency Present;Thrill;Bruit 3/10/2019  7:00 AM   Status Deaccessed 3/10/2019  7:00 AM   Flows Good 2/28/2019  8:20 AM   Dressing Intervention Dressing reinforced 3/6/2019  3:05 AM   Dressing Status Clean;Dry;Intact 3/2/2019  3:01 PM   Site Condition No complications 3/10/2019  7:00 AM   Dressing Open to air (None) 3/10/2019  7:00 AM            Trialysis (Dialysis) Catheter 03/04/19 0250 left internal jugular (Active)   IV Device Securement sutures;catheter securement device 3/10/2019  7:00 AM   Additional Site Signs no erythema;no warmth;no edema;no pain;no palpable cord;no streak formation;no drainage 3/10/2019  7:00 AM   Patency/Care infusing 3/10/2019  7:00 AM   Waveform normal 3/10/2019  7:00 AM   Site Assessment Clean;Intact;Dry 3/10/2019  7:00 AM   Status Flushed  3/10/2019  7:00 AM   Flows Good 3/10/2019  7:00 AM   Dressing Intervention Dressing reinforced 3/10/2019  7:00 AM   Dressing Status Biopatch in place;Clean;Dry;Intact 3/10/2019  7:00 AM   Dressing Change Due 03/11/19 3/10/2019  7:00 AM   Verification by X-ray Yes 3/9/2019  7:05 PM   Site Condition No complications 3/10/2019  7:00 AM   Dressing Occlusive 3/10/2019  7:00 AM   Daily Line Review Performed 3/10/2019  7:00 AM     Nutrition/Tube Feeds (if NPO state why): po     Labs:  ABG: No results for input(s): PH, PO2, PCO2, HCO3, POCSATURATED, BE in the last 24 hours.  BMP:  Recent Labs   Lab 03/11/19  0145     138   K 4.9  4.9     106   CO2 23  23   BUN 43*  43*   CREATININE 6.6*  6.6*   *  114*   MG 2.3  2.3   PHOS 5.4*  5.4*     LFT:   Lab Results   Component Value Date    AST 24 03/11/2019    ALT 11 03/11/2019    ALKPHOS 172 (H) 03/11/2019    BILITOT 0.9 03/11/2019    ALBUMIN 3.2 (L) 03/11/2019    ALBUMIN 3.2 (L) 03/11/2019    PROT 6.0 03/11/2019     CBC:   Lab Results   Component Value Date    WBC 6.51 03/11/2019    HGB 8.0 (L) 03/11/2019    HCT 25.3 (L) 03/11/2019     (H) 03/11/2019    PLT 85 (L) 03/11/2019     Microbiology x 7d:   Microbiology Results (last 7 days)     ** No results found for the last 168 hours. **        Imaging:    I personally reviewed the above image.  Today I independently reviewed pertinent medications, lines/drains/airways, imaging, cardiology results, laboratory results, microbiology results, notably:   ASSESSMENT/PLAN:     Active Hospital Problems    Diagnosis    *S/P laminectomy    Class 3 severe obesity due to excess calories with serious comorbidity and body mass index (BMI) of 45.0 to 49.9 in adult    Hyperkalemia    Hypothermia    Hyponatremia    Anemia due to stage 5 chronic kidney disease    Hyperphosphatemia    Metabolic bone disease    Problem with vascular access    Left leg pain    Pre-syncope    Hypotension    Bradycardia      see      Multifactorial peripheral neuropathy    MIKE (obstructive sleep apnea) - very severe latest sleep study says BPAP @ 16/8    ESRD (end stage renal disease) on dialysis    Hyperlipidemia    Diabetes mellitus, type 2      Neuro:   Myelopathy s/p laminectomy  PT/OT   Fluoxetine for mood - patient expressed depressed due to medical condition  Gabapentin for neuropathy    Pulmonary:   Saturation > 92%    Cardiac:   Off pressors.   Midodrine 20 mg q6  MAP goals > 65 mmhg    Renal:    ESRD on HD through fistula  If tolerated then d/c trialysis and consider transfer to [facility]    ID:   Afebrile, normal wbc    Hem/Onc:   Stable hh; Normal plt count    Endocrine:    BS stable on insulin regimen    Fluids/Electrolytes/Nutrition/GI:   Tolerating po  Lines:  Art +  CVC +  ETT NA  Douglas NA  NG NA  PEG NA    Proph:  DVT:SCD/heparin  Constipation:  Last output:bowel regimen for constipation.   PUP:protonix  VAP: NA      Uninterrupted Critical Care/Counseling Time (not including procedures):: III    Paul Vera MD  Neurocritical care attending    Full Code    Paul Vera MD  Neurocritical Care  [facility]

## 2021-08-25 NOTE — PLAN OF CARE
Problem: Adult Inpatient Plan of Care  Goal: Plan of Care Review  Outcome: Ongoing (interventions implemented as appropriate)  POC reviewed with Angel at 0500. Pt verbalized understanding. Questions and concerns addressed. No acute events overnight. Still on Norepinephrine drip titrated to maintain MAP >65mmHg.  Pt progressing toward goals. Will continue to monitor. See flowsheets for full assessment and VS info        THEN RETREAT WITHIN 10 WEEKS TIMES 1-4 DOI.

## 2021-09-17 ENCOUNTER — TELEPHONE (OUTPATIENT)
Dept: NEUROLOGY | Facility: HOSPITAL | Age: 61
End: 2021-09-17

## 2021-09-20 ENCOUNTER — OFFICE VISIT (OUTPATIENT)
Dept: NEUROLOGY | Facility: HOSPITAL | Age: 61
End: 2021-09-20
Attending: INTERNAL MEDICINE
Payer: MEDICARE

## 2021-09-20 DIAGNOSIS — R10.9 ABDOMINAL PAIN, UNSPECIFIED ABDOMINAL LOCATION: Primary | ICD-10-CM

## 2021-09-20 RX ORDER — PEPPERMINT OIL 90 MG
90 CAPSULE, DELAYED, AND EXTENDED RELEASE ORAL DAILY
Qty: 90 EACH | Refills: 3 | Status: SHIPPED | OUTPATIENT
Start: 2021-09-20 | End: 2021-12-19

## 2021-10-10 ENCOUNTER — HOSPITAL ENCOUNTER (EMERGENCY)
Facility: HOSPITAL | Age: 61
Discharge: HOME | End: 2021-10-10
Attending: EMERGENCY MEDICINE
Payer: MEDICARE

## 2021-10-10 VITALS
DIASTOLIC BLOOD PRESSURE: 74 MMHG | HEART RATE: 62 BPM | BODY MASS INDEX: 38.45 KG/M2 | TEMPERATURE: 98 F | HEIGHT: 67 IN | OXYGEN SATURATION: 98 % | RESPIRATION RATE: 18 BRPM | SYSTOLIC BLOOD PRESSURE: 130 MMHG | WEIGHT: 245 LBS

## 2021-10-10 DIAGNOSIS — S39.012A BACK STRAIN, INITIAL ENCOUNTER: Primary | ICD-10-CM

## 2021-10-10 DIAGNOSIS — K56.41 FECAL IMPACTION IN RECTUM: ICD-10-CM

## 2021-10-10 PROCEDURE — 25000003 PHARM REV CODE 250: Performed by: EMERGENCY MEDICINE

## 2021-10-10 PROCEDURE — 63600175 PHARM REV CODE 636 W HCPCS: Performed by: EMERGENCY MEDICINE

## 2021-10-10 PROCEDURE — 96372 THER/PROPH/DIAG INJ SC/IM: CPT

## 2021-10-10 PROCEDURE — 99284 EMERGENCY DEPT VISIT MOD MDM: CPT | Mod: 25

## 2021-10-10 RX ORDER — HYDROMORPHONE HYDROCHLORIDE 2 MG/ML
2 INJECTION, SOLUTION INTRAMUSCULAR; INTRAVENOUS; SUBCUTANEOUS
Status: COMPLETED | OUTPATIENT
Start: 2021-10-10 | End: 2021-10-10

## 2021-10-10 RX ADMIN — ENEMA 1 ENEMA: 19; 7 ENEMA RECTAL at 05:10

## 2021-10-10 RX ADMIN — HYDROMORPHONE HYDROCHLORIDE 2 MG: 2 INJECTION INTRAMUSCULAR; INTRAVENOUS; SUBCUTANEOUS at 05:10

## 2021-10-11 ENCOUNTER — PES CALL (OUTPATIENT)
Dept: ADMINISTRATIVE | Facility: CLINIC | Age: 61
End: 2021-10-11

## 2021-10-25 ENCOUNTER — TELEPHONE (OUTPATIENT)
Dept: PAIN MEDICINE | Facility: CLINIC | Age: 61
End: 2021-10-25
Payer: MEDICARE

## 2022-01-24 ENCOUNTER — HOSPITAL ENCOUNTER (EMERGENCY)
Facility: HOSPITAL | Age: 62
Discharge: HOME | End: 2022-01-24
Attending: EMERGENCY MEDICINE
Payer: MEDICARE

## 2022-01-24 VITALS
DIASTOLIC BLOOD PRESSURE: 45 MMHG | TEMPERATURE: 98 F | RESPIRATION RATE: 22 BRPM | SYSTOLIC BLOOD PRESSURE: 86 MMHG | OXYGEN SATURATION: 99 % | HEART RATE: 55 BPM

## 2022-01-24 DIAGNOSIS — I95.9 HYPOTENSION: ICD-10-CM

## 2022-01-24 DIAGNOSIS — N18.6 ESRD (END STAGE RENAL DISEASE): Primary | ICD-10-CM

## 2022-01-24 LAB
ALBUMIN SERPL BCP-MCNC: 3.4 G/DL (ref 3.5–5.2)
ALP SERPL-CCNC: 82 U/L (ref 55–135)
ALT SERPL W/O P-5'-P-CCNC: 34 U/L (ref 10–44)
ANION GAP SERPL CALC-SCNC: 11 MMOL/L (ref 8–16)
AST SERPL-CCNC: 31 U/L (ref 10–40)
BASOPHILS # BLD AUTO: 0.02 K/UL (ref 0–0.2)
BASOPHILS NFR BLD: 0.3 % (ref 0–1.9)
BILIRUB SERPL-MCNC: 0.7 MG/DL (ref 0.1–1)
BUN SERPL-MCNC: 40 MG/DL (ref 6–20)
CALCIUM SERPL-MCNC: 10 MG/DL (ref 8.7–10.5)
CHLORIDE SERPL-SCNC: 107 MMOL/L (ref 95–110)
CO2 SERPL-SCNC: 24 MMOL/L (ref 23–29)
CREAT SERPL-MCNC: 6 MG/DL (ref 0.5–1.4)
DIFFERENTIAL METHOD: ABNORMAL
EOSINOPHIL # BLD AUTO: 0.2 K/UL (ref 0–0.5)
EOSINOPHIL NFR BLD: 2.8 % (ref 0–8)
ERYTHROCYTE [DISTWIDTH] IN BLOOD BY AUTOMATED COUNT: 14.9 % (ref 11.5–14.5)
EST. GFR  (AFRICAN AMERICAN): 11 ML/MIN/1.73 M^2
EST. GFR  (NON AFRICAN AMERICAN): 9 ML/MIN/1.73 M^2
GLUCOSE SERPL-MCNC: 79 MG/DL (ref 70–110)
HCT VFR BLD AUTO: 33.7 % (ref 40–54)
HGB BLD-MCNC: 11.1 G/DL (ref 14–18)
IMM GRANULOCYTES # BLD AUTO: 0.02 K/UL (ref 0–0.04)
IMM GRANULOCYTES NFR BLD AUTO: 0.3 % (ref 0–0.5)
LYMPHOCYTES # BLD AUTO: 1.7 K/UL (ref 1–4.8)
LYMPHOCYTES NFR BLD: 24.1 % (ref 18–48)
MCH RBC QN AUTO: 37.9 PG (ref 27–31)
MCHC RBC AUTO-ENTMCNC: 32.9 G/DL (ref 32–36)
MCV RBC AUTO: 115 FL (ref 82–98)
MONOCYTES # BLD AUTO: 1.3 K/UL (ref 0.3–1)
MONOCYTES NFR BLD: 17.8 % (ref 4–15)
NEUTROPHILS # BLD AUTO: 3.9 K/UL (ref 1.8–7.7)
NEUTROPHILS NFR BLD: 54.7 % (ref 38–73)
NRBC BLD-RTO: 0 /100 WBC
PLATELET # BLD AUTO: 87 K/UL (ref 150–450)
PMV BLD AUTO: 10.6 FL (ref 9.2–12.9)
POTASSIUM SERPL-SCNC: 3.5 MMOL/L (ref 3.5–5.1)
PROT SERPL-MCNC: 6.4 G/DL (ref 6–8.4)
RBC # BLD AUTO: 2.93 M/UL (ref 4.6–6.2)
SODIUM SERPL-SCNC: 142 MMOL/L (ref 136–145)
WBC # BLD AUTO: 7.19 K/UL (ref 3.9–12.7)

## 2022-01-24 PROCEDURE — 25000003 PHARM REV CODE 250: Performed by: EMERGENCY MEDICINE

## 2022-01-24 PROCEDURE — 96361 HYDRATE IV INFUSION ADD-ON: CPT

## 2022-01-24 PROCEDURE — 99285 EMERGENCY DEPT VISIT HI MDM: CPT | Mod: 25

## 2022-01-24 PROCEDURE — 93005 ELECTROCARDIOGRAM TRACING: CPT

## 2022-01-24 PROCEDURE — 93010 ELECTROCARDIOGRAM REPORT: CPT | Mod: ,,, | Performed by: INTERNAL MEDICINE

## 2022-01-24 PROCEDURE — 85025 COMPLETE CBC W/AUTO DIFF WBC: CPT | Performed by: EMERGENCY MEDICINE

## 2022-01-24 PROCEDURE — 96360 HYDRATION IV INFUSION INIT: CPT

## 2022-01-24 PROCEDURE — 93010 EKG 12-LEAD: ICD-10-PCS | Mod: ,,, | Performed by: INTERNAL MEDICINE

## 2022-01-24 PROCEDURE — 80053 COMPREHEN METABOLIC PANEL: CPT | Performed by: EMERGENCY MEDICINE

## 2022-01-24 RX ORDER — CINACALCET 30 MG/1
30 TABLET, FILM COATED ORAL
Status: ON HOLD | COMMUNITY
End: 2023-07-11

## 2022-01-24 RX ORDER — ACETAMINOPHEN 325 MG/1
325 TABLET ORAL EVERY 6 HOURS PRN
COMMUNITY
End: 2023-05-27

## 2022-01-24 RX ORDER — PRAVASTATIN SODIUM 20 MG/1
20 TABLET ORAL DAILY
COMMUNITY

## 2022-01-24 RX ORDER — FLUOXETINE HYDROCHLORIDE 20 MG/1
20 CAPSULE ORAL DAILY
COMMUNITY
Start: 2022-01-17 | End: 2022-01-24 | Stop reason: DRUGHIGH

## 2022-01-24 RX ORDER — PRAVASTATIN SODIUM 20 MG/1
20 TABLET ORAL DAILY
COMMUNITY
Start: 2021-12-14 | End: 2022-01-24 | Stop reason: DRUGHIGH

## 2022-01-24 RX ORDER — FLUOXETINE HYDROCHLORIDE 40 MG/1
40 CAPSULE ORAL DAILY
COMMUNITY

## 2022-01-24 RX ORDER — PROMETHAZINE HYDROCHLORIDE 25 MG/1
25 TABLET ORAL DAILY PRN
Status: ON HOLD | COMMUNITY
End: 2023-07-11

## 2022-01-24 RX ORDER — SENNOSIDES 8.6 MG/1
1 TABLET ORAL DAILY
Status: ON HOLD | COMMUNITY
End: 2023-07-11

## 2022-01-24 RX ADMIN — SODIUM CHLORIDE 250 ML: 0.9 INJECTION, SOLUTION INTRAVENOUS at 11:01

## 2022-01-24 RX ADMIN — SODIUM CHLORIDE 100 ML: 9 INJECTION, SOLUTION INTRAVENOUS at 01:01

## 2022-01-24 NOTE — PHARMACY MED REC
"  Admission Medication History     The home medication history was taken by Sharmin Lugo CPhT.    Medication history obtained from, [facility] List Verified    You may go to "Admission" then "Reconcile Home Medications" tabs to review and/or act upon these items.      The home medication list has been updated by the Pharmacy department.    Please read ALL comments highlighted in yellow.    Please address this information as you see fit.     Feel free to contact us if you have any questions or require assistance.      The medications listed below were removed from the home medication list.  Please reorder if appropriate:  Patient reports no longer taking the following medication(s):   Ammonium lactate 12% cream   Brinzolamide 1% drops   Penlac 8% solution   Flonase 50 mcg        Sharmin Lugo CPhT.  Ext [phone]              .          "

## 2022-01-24 NOTE — ED NOTES
Assumed care over pt. Pt AAO X 4. Denies feeling ill. HR 50 on tele. Skin warm and dry. Call light inreach. Cont to monitor.

## 2022-01-24 NOTE — ED PROVIDER NOTES
"Encounter Date: 1/24/2022    SCRIBE #1 NOTE: I, Nicole Howell , am scribing for, and in the presence of, Víctor Zarate MD .       History     Chief Complaint   Patient presents with    Hypotension     Completed dialysis just PTA - when he got up, he fell with no injury. Dialysis reports at that time BP was low (70/40). EMS reports similar BP enroute. On arrival, awake, alert, oriented. C/o pain to buttocks. States that he has chronic weakness in legs that results in frequent falls, "today was no different". Normally uses walker to assist with ambulation. On arrival, BP improved and patient presents in no distress.      61-year-old male presents to the ED due to generalized weakness and hypotension.   Patient reports he completed 4 hours 15 minutes of dialysis just prior to arrival. Upon getting up, he fell on the ground and notes pain to his buttocks. Patient denies hitting his head or loss of consciousness. His blood pressure at dialysis and enroute to ED by EMS was around 70/40. Patient notes his blood pressure typically decreases when he goes home after dialysis. Last episode of hypotension was 2 weeks ago. Patient additionally notes he no longer urinates. He denies any abdominal pain. Patient typically uses walker to assist with ambulation.     The history is provided by the patient.     Review of patient's allergies indicates:   Allergen Reactions    Iodinated contrast media     Keflex [cephalexin] Nausea Only     Past Medical History:   Diagnosis Date    Anemia due to stage 5 chronic kidney disease 3/1/2019    Anticoagulant long-term use     Diabetes mellitus type II     Dialysis patient     Encounter for blood transfusion     Hyperlipidemia     Hypertension     Kidney failure     MIKE (obstructive sleep apnea)     Stroke     Urinary tract infection      Past Surgical History:   Procedure Laterality Date    AV FISTULA PLACEMENT      left lower arm    COLONOSCOPY N/A 4/15/2021    Procedure: " COLONOSCOPY;  Surgeon: Ruddy Plaza MD;  Location: [facility];  Service: Endoscopy;  Laterality: N/A;    COLONOSCOPY W/ POLYPECTOMY  04/15/2021    FUSION OF CERVICAL SPINE BY POSTERIOR APPROACH N/A 3/3/2019    Procedure: SPINE, CERVICAL, POSTERIOR APPROACH  LAMINECTOMY C3-C6; C6-C7; WITH MEDIAL DISCECTOMY;  Surgeon: Ruddy Wylie MD;  Location: [facility] OR [address] [place] [address];  Service: Neurosurgery;  Laterality: N/A;  POSTERIOR    TONSILLECTOMY, ADENOIDECTOMY       Family History   Problem Relation Age of Onset    Colon cancer Mother     Lung cancer Mother     Diabetes Mother     Diabetes Father     Heart disease Father     Hypertension Father     Diabetes Maternal Grandmother     Diabetes Maternal Grandfather      Social History     Tobacco Use    Smoking status: Never Smoker    Smokeless tobacco: Never Used   Substance Use Topics    Alcohol use: No    Drug use: No     Review of Systems   Constitutional: Positive for activity change and fatigue.   Gastrointestinal: Negative for abdominal pain.   Genitourinary: Positive for decreased urine volume.   Musculoskeletal:        Pain to buttocks   Neurological: Positive for weakness. Negative for syncope.   All other systems reviewed and are negative.      Physical Exam     Initial Vitals [01/24/22 1059]   BP Pulse Resp Temp SpO2   (!) 78/40 (!) 53 14 98.2 °F (36.8 °C) 99 %      MAP       --         Physical Exam    Nursing note and vitals reviewed.  Constitutional: He appears well-developed.   HENT:   Head: Atraumatic.   Eyes: Conjunctivae are normal.   Pupils 2 mm bilaterally   Neck: Neck supple.   Normal range of motion.  Cardiovascular: Bradycardia present.    Pulmonary/Chest: Breath sounds normal.   Abdominal: Abdomen is soft. There is no abdominal tenderness. There is no rebound and no guarding.   Musculoskeletal:      Cervical back: Normal range of motion and neck supple.      Comments: Trace edema lower extremities bilaterally     Neurological: He is alert  and oriented to person, place, and time.         ED Course   Procedures  Labs Reviewed   CBC W/ AUTO DIFFERENTIAL - Abnormal; Notable for the following components:       Result Value    RBC 2.93 (*)     Hemoglobin 11.1 (*)     Hematocrit 33.7 (*)      (*)     MCH 37.9 (*)     RDW 14.9 (*)     Platelets 87 (*)     Mono # 1.3 (*)     Mono % 17.8 (*)     All other components within normal limits   COMPREHENSIVE METABOLIC PANEL - Abnormal; Notable for the following components:    BUN 40 (*)     Creatinine 6.0 (*)     Albumin 3.4 (*)     eGFR if  11 (*)     eGFR if non  9 (*)     All other components within normal limits     EKG Readings: (Independently Interpreted)   Initial Reading: No STEMI. Rhythm: Sinus Bradycardia. Heart Rate: 51. Conduction: Intraventricular block. Axis: Left Axis Deviation.       Imaging Results          X-Ray Chest 1 View (Final result)  Result time 01/24/22 12:06:17    Final result by Melvin Bowden MD (01/24/22 12:06:17)                 Impression:      1. Stable appearing chronic interstitial findings, no large focal consolidation.      Electronically signed by: Melvin Bowden MD  Date:    01/24/2022  Time:    12:06             Narrative:    EXAMINATION:  XR CHEST 1 VIEW    CLINICAL HISTORY:  Weakness;    TECHNIQUE:  Single frontal view of the chest was performed.    COMPARISON:  07/23/2019    FINDINGS:  The cardiomediastinal silhouette is not enlarged noting calcification of the aorta.  There is elevation of the right hemidiaphragm..  There is no pleural effusion.  The trachea is midline.  The lungs are symmetrically expanded bilaterally with mildly coarse interstitial attenuation, similar to the previous exam..  No large focal consolidation seen.  There is no pneumothorax.  The osseous structures are remarkable for degenerative changes..                              X-Rays:   Independently Interpreted Readings:   Chest X-Ray: No acute  abnormalities.     Medications   sodium chloride 0.9% bolus 250 mL (0 mLs Intravenous Stopped 1/24/22 1258)   sodium chloride 0.9% bolus 100 mL (0 mLs Intravenous Stopped 1/24/22 1409)     Medical Decision Making:   Initial Assessment:   61-year-old male presents to the ED due to generalized weakness and hypotension after receiving dialysis.   Clinical Tests:   Lab Tests: Ordered and Reviewed  Radiological Study: Ordered and Reviewed  ED Management:  Lab work is stable.  Patient was given to small fluid boluses.  Systolic blood pressure shante to 97. Patient informs me that his pressure is shows E in the 90s.  He remains completely asymptomatic requesting to go home.  Patient stands and walks without difficulty having no lightheadedness.  He will be discharged and I have advised him to follow-up with his primary physician as soon as able.  Most importantly, patient will return to the ED for any possible worsening.            Scribe Attestation:   Scribe #1: I performed the above scribed service and the documentation accurately describes the services I performed. I attest to the accuracy of the note.                 Clinical Impression:   Final diagnoses:  [I95.9] Hypotension  [N18.6] ESRD (end stage renal disease) (Primary)          ED Disposition Condition    Discharge Stable        ED Prescriptions     None        Follow-up Information     Follow up With Specialties Details Why Contact Info    Daniel Cat MD Family Medicine Schedule an appointment as soon as possible for a visit   [address] [id number] [phone]           I, Dr. Víctor Zarate, personally performed the services described in this documentation. All medical record entries made by the scribe were at my direction and in my presence. I have reviewed the chart and agree that the record reflects my personal performance and is accurate and complete. Víctor Zarate MD.  12:16 PM 01/25/2022       Víctor Zarate,  MD  01/25/22 0622

## 2022-01-24 NOTE — ED NOTES
Pt able to stand and walk a little in room. Pt states ready for discharge. He called [facility] for ride home

## 2022-09-08 NOTE — EICU
e-ICU admit note      Reason for ICU admission:         HPI:     a 58 y.o. presents with fever 103.     He reports having cough, congestion, myalgias, and constipation for weeks.    Denies N/V, abdominal pain, chest pain, SOB, or any other complaints.        PHx:    DM II  CKD-HD  HTN  MIKE  anemia    Home Meds:    Gabapentin  aspart 0-5 unit with meals  Midodrine 10 mg 2 tabs q 6 hrs  sevelamir 800mg 2 tabs tid  Pantoprazole 40 mg qd\pravastatin  40 mg qd  Fluoxetine 20 mg qd    Significant labs, images:    Hb 10  BUN 36  Creat 5.3  Mg 1.3  AST 63  Lactate 3.8--> 2.0  Infuenza negative    CXR:  Lungs are significantly hypoinflated which accentuates cardiomediastinal silhouette as well as the pulmonary vascular markings.  Difficult to exclude mild pulmonary interstitial edema.  No evidence of new focal consolidation, pneumothorax, or large pleural effusion.      In ER:    I viewed thhe pt via camera at    2;26 am    Sleeping, NAD  93/47, 100NSR, 99% sat, RR 13  On Norepi 0.2 mcg/Kg/min              Assessment/Plan    Sepsic shock  Patient presented to ED febrile to 103, tachycardic to 120's and hypotensive to 90's/40's  Panculture  Empiric  IV antibiotics   IV fluids   Levophed infusion  Maintain MAP >75    Consider stress steroids        ESRD (end stage renal disease) on dialysis   dialysis on day of presentation  Nephro consult  Continue to monitor         Hx Hypertension  Questionable as pt is on Midodrine        Diabetes mellitus, type 2    Start Moderate correction scale   Goal Glucose <180   BG check QID           MIKE (obstructive sleep apnea) - very severe latest sleep study says BPAP @ 16/8  Patient reports on Bipap at night   Respiratory status stable   Will hold Bipap in setting of hypotension   Continue to monitor         Nsaids Pregnancy And Lactation Text: These medications are considered safe up to 30 weeks gestation. It is excreted in breast milk.

## 2022-11-24 ENCOUNTER — HOSPITAL ENCOUNTER (EMERGENCY)
Facility: HOSPITAL | Age: 62
Discharge: HOME | End: 2022-11-24
Attending: STUDENT IN AN ORGANIZED HEALTH CARE EDUCATION/TRAINING PROGRAM
Payer: MEDICARE

## 2022-11-24 VITALS
TEMPERATURE: 98 F | BODY MASS INDEX: 38.53 KG/M2 | RESPIRATION RATE: 20 BRPM | WEIGHT: 246 LBS | OXYGEN SATURATION: 99 % | DIASTOLIC BLOOD PRESSURE: 54 MMHG | SYSTOLIC BLOOD PRESSURE: 105 MMHG | HEART RATE: 54 BPM

## 2022-11-24 DIAGNOSIS — R53.1 WEAKNESS: ICD-10-CM

## 2022-11-24 DIAGNOSIS — Z00.00 NORMAL EXAM: Primary | ICD-10-CM

## 2022-11-24 LAB
ALBUMIN SERPL BCP-MCNC: 3.4 G/DL (ref 3.5–5.2)
ALP SERPL-CCNC: 107 U/L (ref 55–135)
ALT SERPL W/O P-5'-P-CCNC: 21 U/L (ref 10–44)
ANION GAP SERPL CALC-SCNC: 16 MMOL/L (ref 8–16)
AST SERPL-CCNC: 26 U/L (ref 10–40)
BASOPHILS # BLD AUTO: 0.03 K/UL (ref 0–0.2)
BASOPHILS NFR BLD: 0.4 % (ref 0–1.9)
BILIRUB SERPL-MCNC: 0.8 MG/DL (ref 0.1–1)
BUN SERPL-MCNC: 36 MG/DL (ref 6–20)
CALCIUM SERPL-MCNC: 8.7 MG/DL (ref 8.7–10.5)
CHLORIDE SERPL-SCNC: 102 MMOL/L (ref 95–110)
CO2 SERPL-SCNC: 23 MMOL/L (ref 23–29)
CREAT SERPL-MCNC: 9.5 MG/DL (ref 0.5–1.4)
DIFFERENTIAL METHOD: ABNORMAL
EOSINOPHIL # BLD AUTO: 0.2 K/UL (ref 0–0.5)
EOSINOPHIL NFR BLD: 3 % (ref 0–8)
ERYTHROCYTE [DISTWIDTH] IN BLOOD BY AUTOMATED COUNT: 14.2 % (ref 11.5–14.5)
EST. GFR  (NO RACE VARIABLE): 6 ML/MIN/1.73 M^2
GLUCOSE SERPL-MCNC: 98 MG/DL (ref 70–110)
HCT VFR BLD AUTO: 35.9 % (ref 40–54)
HGB BLD-MCNC: 11.4 G/DL (ref 14–18)
IMM GRANULOCYTES # BLD AUTO: 0.02 K/UL (ref 0–0.04)
IMM GRANULOCYTES NFR BLD AUTO: 0.3 % (ref 0–0.5)
INFLUENZA A, MOLECULAR: NEGATIVE
INFLUENZA B, MOLECULAR: NEGATIVE
LYMPHOCYTES # BLD AUTO: 1.7 K/UL (ref 1–4.8)
LYMPHOCYTES NFR BLD: 24.5 % (ref 18–48)
MCH RBC QN AUTO: 35.2 PG (ref 27–31)
MCHC RBC AUTO-ENTMCNC: 31.8 G/DL (ref 32–36)
MCV RBC AUTO: 111 FL (ref 82–98)
MONOCYTES # BLD AUTO: 1 K/UL (ref 0.3–1)
MONOCYTES NFR BLD: 14.3 % (ref 4–15)
NEUTROPHILS # BLD AUTO: 4 K/UL (ref 1.8–7.7)
NEUTROPHILS NFR BLD: 57.5 % (ref 38–73)
NRBC BLD-RTO: 0 /100 WBC
PLATELET # BLD AUTO: 101 K/UL (ref 150–450)
PMV BLD AUTO: 10.9 FL (ref 9.2–12.9)
POTASSIUM SERPL-SCNC: 3.6 MMOL/L (ref 3.5–5.1)
PROT SERPL-MCNC: 6.9 G/DL (ref 6–8.4)
RBC # BLD AUTO: 3.24 M/UL (ref 4.6–6.2)
SODIUM SERPL-SCNC: 141 MMOL/L (ref 136–145)
SPECIMEN SOURCE: NORMAL
WBC # BLD AUTO: 7.01 K/UL (ref 3.9–12.7)

## 2022-11-24 PROCEDURE — 93010 EKG 12-LEAD: ICD-10-PCS | Mod: ,,, | Performed by: INTERNAL MEDICINE

## 2022-11-24 PROCEDURE — 80053 COMPREHEN METABOLIC PANEL: CPT | Performed by: NURSE PRACTITIONER

## 2022-11-24 PROCEDURE — 85025 COMPLETE CBC W/AUTO DIFF WBC: CPT | Performed by: NURSE PRACTITIONER

## 2022-11-24 PROCEDURE — 99285 EMERGENCY DEPT VISIT HI MDM: CPT | Mod: 25

## 2022-11-24 PROCEDURE — 93010 ELECTROCARDIOGRAM REPORT: CPT | Mod: ,,, | Performed by: INTERNAL MEDICINE

## 2022-11-24 PROCEDURE — 93005 ELECTROCARDIOGRAM TRACING: CPT

## 2022-11-24 PROCEDURE — 87502 INFLUENZA DNA AMP PROBE: CPT | Performed by: NURSE PRACTITIONER

## 2022-11-24 NOTE — ED PROVIDER NOTES
Encounter Date: 11/24/2022    SCRIBE #1 NOTE: I, Nani Morel am scribing for, and in the presence of,  Elizabeth Quiroz NP. I have scribed the following portions of the note - Other sections scribed: HPI, ROS.     History     Chief Complaint   Patient presents with    Weakness     Pt c/o weakness and decreased appetite for the last 2 weeks. Pt is on dialysis and completed yesterday with no difficulty. Pt also has some nausea.     Angel Farmer Jr. is a 62 y.o. male with a PMHx of Anemia due to stage 5 chronic kidney disease, Anticoagulant long-term use, Diabetes mellitus type II, Hyperlipidemia, Hypertension, Kidney failure and Stroke presents to the ED for evaluation of intermittent weakness with onset two weeks ago. He notes associated nausea, generalized abdominal pain and mild dizziness but then states he really doesn't have any symptoms. Pt does not urinate due to ESRD. He is a dialysis patient and states he completed a session yesterday without any difficulty.    Pt appears to be malingering and has vague complaints. His complaints to me were different than the nurse. Upon asking him about the complaints of headaches he denied having any headaches. He denies having abdominal pain or dizziness with the nurse. Pt advised that his current blood pressures are normal and he usually runs from /50-60s. Pt states he is at his baseline for ambulation.           The history is provided by the patient. No  was used.   Review of patient's allergies indicates:   Allergen Reactions    Iodinated contrast media     Keflex [cephalexin] Nausea Only     Past Medical History:   Diagnosis Date    Anemia due to stage 5 chronic kidney disease 3/1/2019    Anticoagulant long-term use     Diabetes mellitus type II     Dialysis patient     Encounter for blood transfusion     Hyperlipidemia     Hypertension     Kidney failure     MIKE (obstructive sleep apnea)     Stroke     Urinary tract infection       Past Surgical History:   Procedure Laterality Date    AV FISTULA PLACEMENT      left lower arm    COLONOSCOPY N/A 4/15/2021    Procedure: COLONOSCOPY;  Surgeon: Ruddy Plaza MD;  Location: [facility];  Service: Endoscopy;  Laterality: N/A;    COLONOSCOPY W/ POLYPECTOMY  04/15/2021    FUSION OF CERVICAL SPINE BY POSTERIOR APPROACH N/A 3/3/2019    Procedure: SPINE, CERVICAL, POSTERIOR APPROACH  LAMINECTOMY C3-C6; C6-C7; WITH MEDIAL DISCECTOMY;  Surgeon: Ruddy Wylie MD;  Location: [facility] OR [address] [place] [address];  Service: Neurosurgery;  Laterality: N/A;  POSTERIOR    TONSILLECTOMY, ADENOIDECTOMY       Family History   Problem Relation Age of Onset    Colon cancer Mother     Lung cancer Mother     Diabetes Mother     Diabetes Father     Heart disease Father     Hypertension Father     Diabetes Maternal Grandmother     Diabetes Maternal Grandfather      Social History     Tobacco Use    Smoking status: Never    Smokeless tobacco: Never   Substance Use Topics    Alcohol use: No    Drug use: No     Review of Systems   Constitutional:  Negative for fever.   HENT:  Negative for sore throat.    Respiratory:  Negative for shortness of breath.    Cardiovascular:  Negative for chest pain.   Gastrointestinal:  Positive for abdominal pain (resolved) and nausea (resolved).   Genitourinary:  Negative for dysuria.   Musculoskeletal:  Negative for back pain.   Skin:  Negative for rash.   Neurological:  Positive for dizziness (resolved) and weakness.   Hematological:  Does not bruise/bleed easily.   All other systems reviewed and are negative.    Physical Exam     Initial Vitals [11/24/22 1135]   BP Pulse Resp Temp SpO2   (!) 96/54 (!) 56 18 98.9 °F (37.2 °C) 99 %      MAP       --         Physical Exam    Nursing note and vitals reviewed.  Constitutional: He appears well-developed and well-nourished. He is Obese .   HENT:   Head: Normocephalic and atraumatic.   Eyes: Conjunctivae and EOM are normal. Pupils are equal, round, and reactive  to light.   Neck:   Normal range of motion.  Cardiovascular:  Normal rate, regular rhythm and intact distal pulses.     Exam reveals no gallop and no friction rub.       Murmur heard.  Pulmonary/Chest: Breath sounds normal. No respiratory distress. He has no wheezes. He has no rhonchi. He has no rales. He exhibits no tenderness.   Abdominal: Abdomen is soft. Bowel sounds are normal. He exhibits no distension and no mass. There is no abdominal tenderness. There is no rebound and no guarding.   Musculoskeletal:         General: No tenderness or edema. Normal range of motion.      Cervical back: Normal range of motion.     Neurological: He is alert and oriented to person, place, and time. He has normal strength. GCS score is 15. GCS eye subscore is 4. GCS verbal subscore is 5. GCS motor subscore is 6.   Skin: Skin is warm.   Left forearm av fistula noted; multiple scars noted to bilateral lower legs     ED Course   Procedures  Labs Reviewed   CBC W/ AUTO DIFFERENTIAL - Abnormal; Notable for the following components:       Result Value    RBC 3.24 (*)     Hemoglobin 11.4 (*)     Hematocrit 35.9 (*)      (*)     MCH 35.2 (*)     MCHC 31.8 (*)     Platelets 101 (*)     All other components within normal limits   COMPREHENSIVE METABOLIC PANEL - Abnormal; Notable for the following components:    BUN 36 (*)     Creatinine 9.5 (*)     Albumin 3.4 (*)     eGFR 6 (*)     All other components within normal limits   INFLUENZA A & B BY MOLECULAR          Imaging Results              X-Ray Chest AP Portable (Final result)  Result time 11/24/22 12:17:09      Final result by Sb Babin MD (11/24/22 12:17:09)                   Impression:      No acute abnormality.      Electronically signed by: Sb Babin MD  Date:    11/24/2022  Time:    12:17               Narrative:    EXAMINATION:  XR CHEST AP PORTABLE    CLINICAL HISTORY:  Weakness    TECHNIQUE:  Single frontal view of the chest was  performed.    COMPARISON:  Prior exams dating back to 2011.    FINDINGS:  Lungs are clear.  No focal consolidation.    No effusion or pneumothorax.    No acute bone abnormality.                                       Medications - No data to display    Medical Decision Making:   Initial Assessment:   63 y/o male with very vague complaints which appears to be malingering. Labs and CXR ordered.  Differential Diagnosis:   Dehydration, electrolyte abnormality, malingering, depression, pneumonia  Clinical Tests:   Lab Tests: Ordered and Reviewed  The following lab test(s) were unremarkable: CBC and CMP  Radiological Study: Ordered and Reviewed  ED Management:  Pt examined and had a normal exam. No signs of infection on exam or with lab work. Pt notified that all results were WNL and he was discharged. Patient given strict return precautions and voiced understanding of all discharge instructions. Pt was stable at discharge.               Scribe Attestation:   Scribe #1: I performed the above scribed service and the documentation accurately describes the services I performed. I attest to the accuracy of the note.      ED Course as of 11/24/22 1716   Thu Nov 24, 2022   1159 BP(!): 96/54 [AT]   1159 Temp: 98.9 °F (37.2 °C) [AT]   1159 Temp src: Oral [AT]   1159 Pulse(!): 56 [AT]   1159 Resp: 18 [AT]   1159 SpO2: 99 % [AT]   1328 EKG interpreted by me shows sinus bradycardia at 51. No ST elevation or depression.  No T-wave inversions.  1st degree block. Qtc prolongation 545. No evidence of ischemia. Unchanged from 1/24/22.  [HL]      ED Course User Index  [AT] NOHEMY Burch  [HL] Vivian Chamorro,           This document was produced by a scribe under my direction and in my presence. I agree with the content of the note and have made any necessary edits.     Elizabeth Quiroz DNP, FNP-BC         Clinical Impression:   Final diagnoses:  [R53.1] Weakness  [Z00.00] Normal exam (Primary)        ED Disposition Condition     Discharge Stable          ED Prescriptions    None       Follow-up Information       Follow up With Specialties Details Why Contact Info    Daniel Cat MD Family Medicine Schedule an appointment as soon as possible for a visit  As needed [address]  [facility] [address]  [facility] [address] [phone]               Elizabeth Quiroz, [facility]  11/24/22 1715       Elizabeth Quiroz, [facility]  11/24/22 1714

## 2022-11-29 NOTE — ASSESSMENT & PLAN NOTE
Doing a bit better - review of EJ cultures and Davita not that helpful   SERENITY done - report pending     Another procedure planned for today     For now keep on cefipime   
Unclear etiology of fever - concerning for bacteria again and possible abscess or access related infection.  Recent CT-abd was normal and acces sie does not appear clinicall infected.  Agree with 1 dose of vanc and cefipime for now and await culture results   
Unclear etiology of fever - concerning for bacteria again and possible abscess or access related infection.  Recent CT-abd was normal and acces sie does not appear clinicall infected.  Agree with 1 dose of vanc and cefipime for now and await culture results     Keep on current Abx  - still no clear source  - await culture results   
no

## 2022-12-31 ENCOUNTER — HOSPITAL ENCOUNTER (EMERGENCY)
Facility: HOSPITAL | Age: 62
Discharge: HOME | End: 2022-12-31
Attending: EMERGENCY MEDICINE
Payer: MEDICARE

## 2022-12-31 VITALS
BODY MASS INDEX: 38.77 KG/M2 | SYSTOLIC BLOOD PRESSURE: 120 MMHG | WEIGHT: 247 LBS | HEART RATE: 53 BPM | RESPIRATION RATE: 24 BRPM | DIASTOLIC BLOOD PRESSURE: 58 MMHG | OXYGEN SATURATION: 96 % | HEIGHT: 67 IN | TEMPERATURE: 98 F

## 2022-12-31 DIAGNOSIS — Z78.9 IMPAIRED MOBILITY AND ADLS: ICD-10-CM

## 2022-12-31 DIAGNOSIS — E66.01 MORBID (SEVERE) OBESITY DUE TO EXCESS CALORIES: ICD-10-CM

## 2022-12-31 DIAGNOSIS — Z74.09 IMPAIRED MOBILITY AND ADLS: ICD-10-CM

## 2022-12-31 DIAGNOSIS — R53.1 WEAKNESS: ICD-10-CM

## 2022-12-31 DIAGNOSIS — G62.9 MULTIFACTORIAL PERIPHERAL NEUROPATHY: ICD-10-CM

## 2022-12-31 DIAGNOSIS — R29.898 MUSCULAR DECONDITIONING: Primary | ICD-10-CM

## 2022-12-31 DIAGNOSIS — S20.212A CHEST WALL CONTUSION, LEFT, INITIAL ENCOUNTER: ICD-10-CM

## 2022-12-31 DIAGNOSIS — E66.01 CLASS 3 SEVERE OBESITY DUE TO EXCESS CALORIES WITH SERIOUS COMORBIDITY AND BODY MASS INDEX (BMI) OF 45.0 TO 49.9 IN ADULT: ICD-10-CM

## 2022-12-31 DIAGNOSIS — E66.01 OBESITY, MORBID: ICD-10-CM
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
## 2022-12-31 DIAGNOSIS — Z60.2 PROBLEMS RELATED TO LIVING ALONE: ICD-10-CM

## 2022-12-31 DIAGNOSIS — R27.0 ATAXIA: ICD-10-CM

## 2022-12-31 LAB
ALBUMIN SERPL BCP-MCNC: 3.5 G/DL (ref 3.5–5.2)
ALP SERPL-CCNC: 117 U/L (ref 55–135)
ALT SERPL W/O P-5'-P-CCNC: 32 U/L (ref 10–44)
ANION GAP SERPL CALC-SCNC: 17 MMOL/L (ref 8–16)
AST SERPL-CCNC: 35 U/L (ref 10–40)
BASOPHILS # BLD AUTO: 0.03 K/UL (ref 0–0.2)
BASOPHILS NFR BLD: 0.4 % (ref 0–1.9)
BILIRUB SERPL-MCNC: 0.9 MG/DL (ref 0.1–1)
BNP SERPL-MCNC: 86 PG/ML (ref 0–99)
BUN SERPL-MCNC: 37 MG/DL (ref 6–20)
CALCIUM SERPL-MCNC: 8.4 MG/DL (ref 8.7–10.5)
CHLORIDE SERPL-SCNC: 100 MMOL/L (ref 95–110)
CO2 SERPL-SCNC: 22 MMOL/L (ref 23–29)
CREAT SERPL-MCNC: 8.6 MG/DL (ref 0.5–1.4)
DIFFERENTIAL METHOD: ABNORMAL
EOSINOPHIL # BLD AUTO: 0.2 K/UL (ref 0–0.5)
EOSINOPHIL NFR BLD: 2 % (ref 0–8)
ERYTHROCYTE [DISTWIDTH] IN BLOOD BY AUTOMATED COUNT: 14.6 % (ref 11.5–14.5)
EST. GFR  (NO RACE VARIABLE): 7 ML/MIN/1.73 M^2
GLUCOSE SERPL-MCNC: 116 MG/DL (ref 70–110)
HCT VFR BLD AUTO: 38.2 % (ref 40–54)
HGB BLD-MCNC: 12.3 G/DL (ref 14–18)
IMM GRANULOCYTES # BLD AUTO: 0.02 K/UL (ref 0–0.04)
IMM GRANULOCYTES NFR BLD AUTO: 0.3 % (ref 0–0.5)
LYMPHOCYTES # BLD AUTO: 1.4 K/UL (ref 1–4.8)
LYMPHOCYTES NFR BLD: 18.7 % (ref 18–48)
MCH RBC QN AUTO: 34.6 PG (ref 27–31)
MCHC RBC AUTO-ENTMCNC: 32.2 G/DL (ref 32–36)
MCV RBC AUTO: 108 FL (ref 82–98)
MONOCYTES # BLD AUTO: 0.9 K/UL (ref 0.3–1)
MONOCYTES NFR BLD: 12 % (ref 4–15)
NEUTROPHILS # BLD AUTO: 5.1 K/UL (ref 1.8–7.7)
NEUTROPHILS NFR BLD: 66.6 % (ref 38–73)
NRBC BLD-RTO: 0 /100 WBC
PLATELET # BLD AUTO: 111 K/UL (ref 150–450)
PMV BLD AUTO: 10.7 FL (ref 9.2–12.9)
POTASSIUM SERPL-SCNC: 3.5 MMOL/L (ref 3.5–5.1)
PROT SERPL-MCNC: 7.3 G/DL (ref 6–8.4)
RBC # BLD AUTO: 3.55 M/UL (ref 4.6–6.2)
SODIUM SERPL-SCNC: 139 MMOL/L (ref 136–145)
TROPONIN I SERPL DL<=0.01 NG/ML-MCNC: 0.01 NG/ML (ref 0–0.03)
WBC # BLD AUTO: 7.61 K/UL (ref 3.9–12.7)

## 2022-12-31 PROCEDURE — 93005 ELECTROCARDIOGRAM TRACING: CPT

## 2022-12-31 PROCEDURE — 84484 ASSAY OF TROPONIN QUANT: CPT | Performed by: EMERGENCY MEDICINE

## 2022-12-31 PROCEDURE — 93010 EKG 12-LEAD: ICD-10-PCS | Mod: ,,, | Performed by: INTERNAL MEDICINE

## 2022-12-31 PROCEDURE — 99285 EMERGENCY DEPT VISIT HI MDM: CPT | Mod: 25

## 2022-12-31 PROCEDURE — 93010 ELECTROCARDIOGRAM REPORT: CPT | Mod: ,,, | Performed by: INTERNAL MEDICINE

## 2022-12-31 PROCEDURE — 83880 ASSAY OF NATRIURETIC PEPTIDE: CPT | Performed by: EMERGENCY MEDICINE

## 2022-12-31 PROCEDURE — 80053 COMPREHEN METABOLIC PANEL: CPT | Performed by: EMERGENCY MEDICINE

## 2022-12-31 PROCEDURE — 85025 COMPLETE CBC W/AUTO DIFF WBC: CPT | Performed by: EMERGENCY MEDICINE

## 2022-12-31 SDOH — SOCIAL DETERMINANTS OF HEALTH (SDOH): PROBLEMS RELATED TO LIVING ALONE: Z60.2

## 2022-12-31 NOTE — DISCHARGE INSTRUCTIONS
You have been given a social work consult.  Given the holiday, hopefully they will call you Monday but it may be Tuesday that they are available.  Please be patient.  In the meantime, your primary care doctor can also initiate home health orders if that is what you need.  Your labs and x-ray do not show any acute abnormality that warrants hospital stay.  You may take Tylenol 1000 mg every 6 hours for pain.

## 2022-12-31 NOTE — ED NOTES
Pt arrived from home complains of increased weakness & falls X1 week . Pt reports chronic back Pain and  leg weakness . Pt further reports decreased BP. Pt denies any injury from falls, but reports decreased mobility and increased weakness due to falls. Pt appears to be comfortable and is in no distress.

## 2022-12-31 NOTE — ED PROVIDER NOTES
Encounter Date: 12/31/2022       History     Chief Complaint   Patient presents with    Weakness    Fall     Reports an increase in falls secondary to becoming weak. Increased weakness X1 week. Reports falling 6X this week. Pt believes weakness is due to his blood pressure dropping. Pain all over the body from multiple falls.      HPI  62m with multiple comorbidities pw increase in falls, weakness  Pt is compliant with medications, goes to HD as scheduled, and lives with family, but is home alone while they go to work  Denies chest pain or SOB prior to falling  Feels his knees give out on him  Walks with walker but is unable to do too much  Lies in bed all day normally    Review of patient's allergies indicates:   Allergen Reactions    Iodinated contrast media     Keflex [cephalexin] Nausea Only     Past Medical History:   Diagnosis Date    Anemia due to stage 5 chronic kidney disease 3/1/2019    Anticoagulant long-term use     Diabetes mellitus type II     Dialysis patient     Encounter for blood transfusion     Hyperlipidemia     Hypertension     Kidney failure     MIKE (obstructive sleep apnea)     Stroke     Urinary tract infection      Past Surgical History:   Procedure Laterality Date    AV FISTULA PLACEMENT      left lower arm    COLONOSCOPY N/A 4/15/2021    Procedure: COLONOSCOPY;  Surgeon: Ruddy Plaza MD;  Location: [facility];  Service: Endoscopy;  Laterality: N/A;    COLONOSCOPY W/ POLYPECTOMY  04/15/2021    FUSION OF CERVICAL SPINE BY POSTERIOR APPROACH N/A 3/3/2019    Procedure: SPINE, CERVICAL, POSTERIOR APPROACH  LAMINECTOMY C3-C6; C6-C7; WITH MEDIAL DISCECTOMY;  Surgeon: Ruddy Wylie MD;  Location: [address];  Service: Neurosurgery;  Laterality: N/A;  POSTERIOR    TONSILLECTOMY, ADENOIDECTOMY       Family History   Problem Relation Age of Onset    Colon cancer Mother     Lung cancer Mother     Diabetes Mother     Diabetes Father     Heart disease Father     Hypertension Father      Diabetes Maternal Grandmother     Diabetes Maternal Grandfather      Social History     Tobacco Use    Smoking status: Never    Smokeless tobacco: Never   Substance Use Topics    Alcohol use: No    Drug use: No     Review of Systems   Constitutional:  Positive for fatigue. Negative for fever.   HENT:  Negative for sore throat.    Respiratory:  Negative for shortness of breath.    Cardiovascular:  Negative for chest pain.   Gastrointestinal:  Negative for nausea.   Musculoskeletal:  Positive for gait problem. Negative for back pain.   Skin:  Negative for rash.   Neurological:  Positive for weakness.   Hematological:  Does not bruise/bleed easily.     Physical Exam     Initial Vitals   BP Pulse Resp Temp SpO2   12/31/22 0951 12/31/22 0951 12/31/22 0951 12/31/22 0951 12/31/22 1301   (!) 110/46 (!) 58 18 98.1 °F (36.7 °C) 100 %      MAP       --                Physical Exam    Nursing note and vitals reviewed.  Constitutional:   EXAM  General: Awake, alert and oriented. Chronically ill appearing. No acute distress.     Head: normocephalic and atraumatic     Eyes: Conjunctivae are clear without exudates or hemorrhage. Sclera is non-icteric. EOM are intact. Eyelids are normal in appearance without swelling or lesions.     Ears: The external ear and ear canal are non-tender and without swelling. The canal is clear without discharge. Hearing intact.     Nose: Nares are patent bilaterally.     Neck: The neck is supple. Trachea is midline. Full ROM.     Cardiac: bradycardic rate.     Respiratory: No signs of respiratory distress. No audible wheezes.     Abdominal: Non-distended. No pain with palpation.     Extremities: hematomas in various stages of healing BLE. Wounds in various stages of healing. No signs of infection.     Skin: Appropriate color for ethnicity.     Neurological: The patient is awake, alert and oriented to person, place, and time with normal speech.     Psychiatric: Appropriate mood and affect.     In light  of current/ongoing global covid-19 pandemic, all my encounters w pt were with full ppe including but not limited to gown, gloves, n95, eye protection OR from >6 ft away.           ED Course   Procedures  Labs Reviewed   CBC W/ AUTO DIFFERENTIAL - Abnormal; Notable for the following components:       Result Value    RBC 3.55 (*)     Hemoglobin 12.3 (*)     Hematocrit 38.2 (*)      (*)     MCH 34.6 (*)     RDW 14.6 (*)     Platelets 111 (*)     All other components within normal limits   COMPREHENSIVE METABOLIC PANEL - Abnormal; Notable for the following components:    CO2 22 (*)     Glucose 116 (*)     BUN 37 (*)     Creatinine 8.6 (*)     Calcium 8.4 (*)     Anion Gap 17 (*)     eGFR 7 (*)     All other components within normal limits   TROPONIN I   B-TYPE NATRIURETIC PEPTIDE          Imaging Results              X-Ray Chest AP Portable (Final result)  Result time 12/31/22 13:16:47      Final result by Katja Huang MD (12/31/22 13:16:47)                   Impression:      No detrimental change.      Electronically signed by: Katja Huang MD  Date:    12/31/2022  Time:    13:16               Narrative:    EXAMINATION:  XR CHEST AP PORTABLE    CLINICAL HISTORY:  Unspecified fall, initial encounter    TECHNIQUE:  Single frontal view of the chest was performed.    COMPARISON:  November 2022    FINDINGS:  Heart size normal.  Calcification along the wall of the aorta.  No pleural effusion.  Lungs are somewhat underinflated.  Mild increased interstitial markings similar to prior imaging with no superimposed focal consolidation.  No definite osseous abnormality.                                       Medications - No data to display  Medical Decision Making:   Clinical Tests:   Lab Tests: Ordered and Reviewed  Radiological Study: Ordered and Reviewed  Medical Tests: Ordered and Reviewed  ED Management:  Pt is likely deconditioned. Nontoxic appearing. Presents with sister in law, whom pt lives with. She is  concerned he is lying around all day and not using his muscles; he states he is tired and his legs give out. No chest pain or SOB. Labs are at baseline. XR shows no acute abnormalities; he states he fell and had a medicine bottle in his pocket and now it hurts. No evidence for rib fx, likely contusion. No clinical indication for hospitalization, but clearly deconditioned. SW consult placed, messaged SW (not in house as it is a weekend/holiday), and TORO will try to contact his PCP in [place] or [place]. I discussed with the patient and/or the family/caregiver that today's evaluation in the ED does not suggest any emergent or life-threatening medical conditions that would require hospitalization or immediate intervention beyond what was provided in the ED. I believe the patient is safe for discharge.  However, a reassuring evaluation in the ED does not preclude the presence or development of a serious of life-threatening condition. As such, strict return precautions were discussed with the patient expressing understanding of instructions, and all questions answered.                               Clinical Impression:   Final diagnoses:  [R53.1] Weakness  [W19.XXXA] Fall  [R29.898] Muscular deconditioning (Primary)  [E66.01] Morbid (severe) obesity due to excess calories  [Z60.2] Problems related to living alone  [Z74.09, Z78.9] Impaired mobility and ADLs  [R27.0] Ataxia  [G62.9] Multifactorial peripheral neuropathy  [E66.01] Obesity, morbid  [E66.01, Z68.42] Class 3 severe obesity due to excess calories with serious comorbidity and body mass index (BMI) of 45.0 to 49.9 in adult  [S20.212A] Chest wall contusion, left, initial encounter        ED Disposition Condition    Discharge Stable          ED Prescriptions    None       Follow-up Information       Follow up With Specialties Details Why Contact Info    Daniel Cat MD Family Medicine Schedule an appointment as soon as possible for a visit   Conrad Harkins  [address] [address]  [phone]               Dafne Tadeo MD  12/31/22 6036

## 2023-02-23 ENCOUNTER — HOSPITAL ENCOUNTER (EMERGENCY)
Facility: HOSPITAL | Age: 63
Discharge: HOME | End: 2023-02-23
Attending: EMERGENCY MEDICINE
Payer: MEDICARE

## 2023-02-23 VITALS
OXYGEN SATURATION: 99 % | TEMPERATURE: 98 F | RESPIRATION RATE: 16 BRPM | HEIGHT: 67 IN | WEIGHT: 249 LBS | DIASTOLIC BLOOD PRESSURE: 51 MMHG | SYSTOLIC BLOOD PRESSURE: 103 MMHG | BODY MASS INDEX: 39.08 KG/M2 | HEART RATE: 53 BPM

## 2023-02-23 DIAGNOSIS — S01.81XA FOREHEAD LACERATION, INITIAL ENCOUNTER: Primary | ICD-10-CM

## 2023-02-23 PROCEDURE — 25000003 PHARM REV CODE 250: Performed by: EMERGENCY MEDICINE

## 2023-02-23 PROCEDURE — 90471 IMMUNIZATION ADMIN: CPT | Performed by: EMERGENCY MEDICINE

## 2023-02-23 PROCEDURE — 12013 RPR F/E/E/N/L/M 2.6-5.0 CM: CPT

## 2023-02-23 PROCEDURE — 90715 TDAP VACCINE 7 YRS/> IM: CPT | Performed by: EMERGENCY MEDICINE

## 2023-02-23 PROCEDURE — 63600175 PHARM REV CODE 636 W HCPCS: Performed by: EMERGENCY MEDICINE

## 2023-02-23 PROCEDURE — 99284 EMERGENCY DEPT VISIT MOD MDM: CPT | Mod: 25

## 2023-02-23 RX ORDER — LIDOCAINE HYDROCHLORIDE 10 MG/ML
10 INJECTION, SOLUTION EPIDURAL; INFILTRATION; INTRACAUDAL; PERINEURAL ONCE
Status: COMPLETED | OUTPATIENT
Start: 2023-02-23 | End: 2023-02-23

## 2023-02-23 RX ADMIN — LIDOCAINE HYDROCHLORIDE 100 MG: 10 INJECTION, SOLUTION EPIDURAL; INFILTRATION; INTRACAUDAL; PERINEURAL at 08:02

## 2023-02-23 RX ADMIN — TETANUS TOXOID, REDUCED DIPHTHERIA TOXOID AND ACELLULAR PERTUSSIS VACCINE, ADSORBED 0.5 ML: 5; 2.5; 8; 8; 2.5 SUSPENSION INTRAMUSCULAR at 09:02

## 2023-02-23 NOTE — ED NOTES
Pt into room 20 via  EMS after a fall. Pt awake and alert. Plan of care reviewed, call bell within reach. MD assessment pending.

## 2023-02-23 NOTE — ED NOTES
Pt called his brother for a ride home. Pt given discharge and follow up instructions. Pt verbalized understanding and was wheeled out of ER into waiting room to wait for his brother.

## 2023-02-27 PROBLEM — Z00.00 NORMAL EXAM: Status: RESOLVED | Noted: 2022-11-24 | Resolved: 2023-02-27

## 2023-05-27 ENCOUNTER — HOSPITAL ENCOUNTER (EMERGENCY)
Facility: HOSPITAL | Age: 63
Discharge: HOME | End: 2023-05-27
Attending: EMERGENCY MEDICINE
Payer: MEDICARE

## 2023-05-27 VITALS
OXYGEN SATURATION: 98 % | TEMPERATURE: 98 F | HEART RATE: 69 BPM | WEIGHT: 300 LBS | SYSTOLIC BLOOD PRESSURE: 111 MMHG | BODY MASS INDEX: 46.99 KG/M2 | DIASTOLIC BLOOD PRESSURE: 63 MMHG | RESPIRATION RATE: 16 BRPM

## 2023-05-27 DIAGNOSIS — S71.119A: ICD-10-CM

## 2023-05-27 DIAGNOSIS — R29.6 RECURRENT FALLS: ICD-10-CM

## 2023-05-27 PROCEDURE — 25000003 PHARM REV CODE 250: Performed by: EMERGENCY MEDICINE

## 2023-05-27 PROCEDURE — 12002 RPR S/N/AX/GEN/TRNK2.6-7.5CM: CPT

## 2023-05-27 PROCEDURE — 99283 EMERGENCY DEPT VISIT LOW MDM: CPT | Mod: 25

## 2023-05-27 RX ORDER — ACETAMINOPHEN 500 MG
1000 TABLET ORAL EVERY 6 HOURS PRN
Qty: 56 TABLET | Refills: 0 | Status: SHIPPED | OUTPATIENT
Start: 2023-05-27 | End: 2023-06-10

## 2023-05-27 RX ORDER — ACETAMINOPHEN 500 MG
1000 TABLET ORAL
Status: COMPLETED | OUTPATIENT
Start: 2023-05-27 | End: 2023-05-27

## 2023-05-27 RX ADMIN — ACETAMINOPHEN 1000 MG: 500 TABLET ORAL at 09:05

## 2023-05-27 RX ADMIN — LIDOCAINE HYDROCHLORIDE 20 ML: 10; .005 INJECTION, SOLUTION EPIDURAL; INFILTRATION; INTRACAUDAL; PERINEURAL at 07:05

## 2023-05-27 RX ADMIN — NEOMYCIN-BACITRACIN-POLYMYXIN OINT 1 EACH: OINTMENT at 09:05

## 2023-05-27 NOTE — ED PROVIDER NOTES
Encounter Date: 5/27/2023       History     Chief Complaint   Patient presents with    Fall     Pt  fell in his home and was stuck between the toilet and the bathtub . Pt is complaining of back pain that is chronic. Pt also has a laceration to the right posterior thigh. Pt is also a dialysis pt.      62-year-old male past medical history including ESRD on HD brought in by EMS after mechanical ground level fall.  Patient says that he lost his balance as he was putting on his shorts and fell between the toilet and tub, he was unable to get up on his own so he pulled the medic Alert bracelet.  EMS reports that they had to break the toilet in order to lift him up.  Patient denies striking his head or loss of consciousness.  Patient says that his back hit the toilet and he has chronic, but no new back pain from the fall.  Last had dialysis yesterday.  Says that he last had dialysis yesterday as scheduled.    The history is provided by the patient and the EMS personnel. No  was used.   Review of patient's allergies indicates:   Allergen Reactions    Iodinated contrast media     Keflex [cephalexin] Nausea Only     Past Medical History:   Diagnosis Date    Anemia due to stage 5 chronic kidney disease 3/1/2019    Anticoagulant long-term use     Diabetes mellitus type II     Dialysis patient     Encounter for blood transfusion     Hyperlipidemia     Hypertension     Kidney failure     MIKE (obstructive sleep apnea)     Stroke     Urinary tract infection      Past Surgical History:   Procedure Laterality Date    AV FISTULA PLACEMENT      left lower arm    COLONOSCOPY N/A 4/15/2021    Procedure: COLONOSCOPY;  Surgeon: Ruddy Plaza MD;  Location: [facility];  Service: Endoscopy;  Laterality: N/A;    COLONOSCOPY W/ POLYPECTOMY  04/15/2021    FUSION OF CERVICAL SPINE BY POSTERIOR APPROACH N/A 3/3/2019    Procedure: SPINE, CERVICAL, POSTERIOR APPROACH  LAMINECTOMY C3-C6; C6-C7; WITH MEDIAL DISCECTOMY;   Surgeon: Ruddy Wylie MD;  Location: [facility] OR [address] [place] [address];  Service: Neurosurgery;  Laterality: N/A;  POSTERIOR    TONSILLECTOMY, ADENOIDECTOMY       Family History   Problem Relation Age of Onset    Colon cancer Mother     Lung cancer Mother     Diabetes Mother     Diabetes Father     Heart disease Father     Hypertension Father     Diabetes Maternal Grandmother     Diabetes Maternal Grandfather      Social History     Tobacco Use    Smoking status: Never    Smokeless tobacco: Never   Substance Use Topics    Alcohol use: No    Drug use: No     Review of Systems    Physical Exam     Initial Vitals [05/27/23 1759]   BP Pulse Resp Temp SpO2   (!) 143/60 65 20 98.3 °F (36.8 °C) 96 %      MAP       --         Physical Exam    Constitutional: He appears well-developed and well-nourished. No distress.   HENT:   Head: Normocephalic and atraumatic.   Eyes: Conjunctivae and EOM are normal.   Neck:   Normal range of motion.  Cardiovascular:  Normal rate.           Pulmonary/Chest: No respiratory distress.   Abdominal: Abdomen is soft. He exhibits no distension. There is no abdominal tenderness.   Musculoskeletal:         General: Normal range of motion.      Cervical back: Normal range of motion.      Comments: No C, T, or L-spine tenderness.  Fistula with palpable thrill over left upper extremity     Neurological: He is alert and oriented to person, place, and time.   Skin: Skin is warm and dry.   Laceration over posterior left thigh   Psychiatric: He has a normal mood and affect.       ED Course   Lac Repair    Date/Time: 5/27/2023 8:55 PM  Performed by: Teri Valero MD  Authorized by: Teri Valero MD     Consent:     Consent obtained:  Verbal    Consent given by:  Patient    Risks discussed:  Infection  Universal protocol:     Patient identity confirmed:  Verbally with patient  Anesthesia:     Anesthesia method:  Local infiltration    Local anesthetic:  Lidocaine 2% WITH epi  Laceration details:      Location:  Leg    Leg location:  L upper leg    Length (cm):  6  Pre-procedure details:     Preparation:  Imaging obtained to evaluate for foreign bodies  Exploration:     Imaging obtained: x-ray      Imaging outcome: foreign body not noted      Wound exploration: wound explored through full range of motion and entire depth of wound visualized      Wound extent: no foreign bodies/material noted    Treatment:     Area cleansed with:  Saline    Amount of cleaning:  Extensive    Irrigation solution:  Sterile saline    Irrigation method:  Syringe  Skin repair:     Repair method:  Sutures    Suture size:  4-0    Suture material:  Prolene    Suture technique:  Simple interrupted    Number of sutures:  5  Approximation:     Approximation:  Loose  Repair type:     Repair type:  Simple  Post-procedure details:     Dressing:  Antibiotic ointment and non-adherent dressing    Procedure completion:  Tolerated well, no immediate complications  Labs Reviewed - No data to display       Imaging Results              X-Ray Femur Ap/Lat Left (Final result)  Result time 05/27/23 19:34:05      Final result by Melvin Bowden MD (05/27/23 19:34:05)                   Impression:      1. No acute displaced fracture or dislocation of the femur.      Electronically signed by: Melvin Bowdne MD  Date:    05/27/2023  Time:    19:34               Narrative:    EXAMINATION:  XR FEMUR 2 VIEW LEFT    CLINICAL HISTORY:  Laceration without foreign body, unspecified thigh, initial encounter    TECHNIQUE:  AP and lateral views of the left femur were performed.    COMPARISON:  07/22/2019    FINDINGS:  Three views left femur.    There is osteopenia.  The left femoroacetabular joint is intact noting degenerative changes.  No acute displaced fracture or dislocation of the femur.  There are degenerative changes of the knee.  There is vascular calcification.  No radiopaque foreign body.                                       Medications   acetaminophen  tablet 1,000 mg (has no administration in time range)   neomycin-bacitracnZn-polymyxnB packet 1 each (has no administration in time range)   LIDOcaine-EPINEPHrine (PF) 1%-1:200,000 injection 20 mL (20 mLs Intradermal Given 5/27/23 1900)     Medical Decision Making:   Initial Assessment:   Chart review shows that patient had Tdap in February  Differential Diagnosis:   Laceration, abrasion, contusion  Clinical Tests:   Radiological Study: Reviewed and Ordered  ED Management:  62-year-old male with past medical history as above brought in by EMS after mechanical ground level fall.  No LOC. No indication for CT head.  X-ray without evidence of foreign body over left thigh laceration.  Laceration repaired per procedure note.  Tetanus is already up-to-date.  Given strict return precautions and outpatient follow up.  Instructed to return in 10-14 days for suture removal, and counseled extensively about appropriate wound care and observation for development of wound infection especially given immunocompromised state.  Also placed referrals for physical and occupational therapy due to family concern about recurrent falls at home.  Patient feels safe returning home at this time.    No acute emergent medical condition has been identified. The patient appears to be low risk for an emergent medical condition is appropriate for discharge with outpatient f/u as detailed in discharge instructions for reevaluation and possible continued outpatient workup and management. I have discussed the workup with the patient, who has verbalized understanding of the plan and need for outpatient follow-up.  This evaluation does not preclude the development of an emergent condition so in addition, I have advised the patient that they can return to the ED at any time with worsening or change of their symptoms, or with any other medical complaint.              ED Course as of 05/27/23 2107   Sat May 27, 2023   1811 Tdap updated 2/23/23 per chart  review [AT]      ED Course User Index  [AT] Teri Valero MD                 Clinical Impression:   Final diagnoses:  [S71.119A] Laceration of thigh  [R29.6] Recurrent falls        ED Disposition Condition    Discharge Stable          ED Prescriptions       Medication Sig Dispense Start Date End Date Auth. Provider    acetaminophen (TYLENOL) 500 MG tablet Take 2 tablets (1,000 mg total) by mouth every 6 (six) hours as needed for Pain. 56 tablet 5/27/2023 6/10/2023 Teri Valero MD          Follow-up Information       Follow up With Specialties Details Why Contact Info Additional Information    [facility] Emergency Dept Emergency Medicine  As needed, If symptoms worsen, For suture removal in 10-14 days [address] [facility] [address] [phone]     Daniel Cat MD Family Medicine Schedule an appointment as soon as possible for a visit  For suture removal in 10-14 days, and for wound re-check [address] [id number] [phone]       [facility] Physical Therapy Schedule an appointment as soon as possible for a visit in 2 days  [address] [facility] [address] [phone] At this time [facility] will only use these entries [facility], and Emergency Department due to COVID-19 precautions.     [facility]) Occupational Therapy In 2 days  [address] [facility] [address] [phone] At this time [facility] will only use these entries [facility], [facility], and Emergency Department due to COVID-19 precautions.              Teri Valero MD  05/27/23 2112

## 2023-05-28 ENCOUNTER — HOSPITAL ENCOUNTER (EMERGENCY)
Facility: HOSPITAL | Age: 63
Discharge: HOME | End: 2023-05-28
Attending: EMERGENCY MEDICINE
Payer: MEDICARE

## 2023-05-28 VITALS
HEART RATE: 59 BPM | DIASTOLIC BLOOD PRESSURE: 53 MMHG | WEIGHT: 250 LBS | TEMPERATURE: 99 F | SYSTOLIC BLOOD PRESSURE: 113 MMHG | RESPIRATION RATE: 18 BRPM | OXYGEN SATURATION: 98 % | BODY MASS INDEX: 39.16 KG/M2

## 2023-05-28 DIAGNOSIS — Z51.89 VISIT FOR WOUND CHECK: Primary | ICD-10-CM

## 2023-05-28 PROCEDURE — 99281 EMR DPT VST MAYX REQ PHY/QHP: CPT

## 2023-05-28 NOTE — DISCHARGE INSTRUCTIONS

## 2023-05-29 NOTE — ED PROVIDER NOTES
Encounter Date: 5/28/2023       History     Chief Complaint   Patient presents with    Laceration     Patient was here last night and received stitches to his left leg after a fall. The incision site has had some bleeding off and on since last night and he wants it evaluated. No active bleeding currently. Sutures intact.      62-year-old male presents for wound check.  Seen in the ED yesterday and had laceration repair to his left thigh.  Reports that he had bleeding from the site earlier that has now resolved.    The history is provided by the patient.   Review of patient's allergies indicates:   Allergen Reactions    Iodinated contrast media     Keflex [cephalexin] Nausea Only     Past Medical History:   Diagnosis Date    Anemia due to stage 5 chronic kidney disease 3/1/2019    Anticoagulant long-term use     Diabetes mellitus type II     Dialysis patient     Encounter for blood transfusion     Hyperlipidemia     Hypertension     Kidney failure     MIKE (obstructive sleep apnea)     Stroke     Urinary tract infection      Past Surgical History:   Procedure Laterality Date    AV FISTULA PLACEMENT      left lower arm    COLONOSCOPY N/A 4/15/2021    Procedure: COLONOSCOPY;  Surgeon: Ruddy Plaza MD;  Location: [facility];  Service: Endoscopy;  Laterality: N/A;    COLONOSCOPY W/ POLYPECTOMY  04/15/2021    FUSION OF CERVICAL SPINE BY POSTERIOR APPROACH N/A 3/3/2019    Procedure: SPINE, CERVICAL, POSTERIOR APPROACH  LAMINECTOMY C3-C6; C6-C7; WITH MEDIAL DISCECTOMY;  Surgeon: Ruddy Wylie MD;  Location: [address];  Service: Neurosurgery;  Laterality: N/A;  POSTERIOR    TONSILLECTOMY, ADENOIDECTOMY       Family History   Problem Relation Age of Onset    Colon cancer Mother     Lung cancer Mother     Diabetes Mother     Diabetes Father     Heart disease Father     Hypertension Father     Diabetes Maternal Grandmother     Diabetes Maternal Grandfather      Social History     Tobacco Use    Smoking status: Never     Smokeless tobacco: Never   Substance Use Topics    Alcohol use: No    Drug use: No     Review of Systems    Physical Exam     Initial Vitals [05/28/23 2145]   BP Pulse Resp Temp SpO2   (!) 93/42 (!) 54 18 99.4 °F (37.4 °C) 96 %      MAP       --         Physical Exam    Constitutional: He appears well-developed and well-nourished. No distress.   HENT:   Head: Normocephalic and atraumatic.   Eyes: EOM are normal.   Neck:   Normal range of motion.  Cardiovascular:  Normal rate.           Pulmonary/Chest: No respiratory distress.   Abdominal: He exhibits no distension.   Musculoskeletal:         General: Normal range of motion.      Cervical back: Normal range of motion.     Neurological: He is alert and oriented to person, place, and time.   Skin: Skin is warm and dry.   Sutures in place over left thigh laceration.  No active bleeding or wound drainage.   Psychiatric: He has a normal mood and affect.       ED Course   Procedures  Labs Reviewed - No data to display       Imaging Results    None          Medications - No data to display  Medical Decision Making:   History:   Old Records Summarized: other records.       <> Summary of Records: Seen in ED yday and had sutures placed.   Differential Diagnosis:   Wound infection, dehiscence, normal healing   ED Management:  Patient presents for evaluation of bleeding from left thigh wound, sutures were placed yesterday.  Bleeding has resolved during my assessment, no signs or symptoms of wound infection or dehiscence.  Discharged with return precautions and outpatient follow up for suture removal.    No acute emergent medical condition has been identified. The patient appears to be low risk for an emergent medical condition is appropriate for discharge with outpatient f/u as detailed in discharge instructions for reevaluation and possible continued outpatient workup and management. I have discussed the workup with the patient, who has verbalized understanding of the plan  and need for outpatient follow-up.  This evaluation does not preclude the development of an emergent condition so in addition, I have advised the patient that they can return to the ED at any time with worsening or change of their symptoms, or with any other medical complaint.                           Clinical Impression:   Final diagnoses:  [Z51.89] Visit for wound check (Primary)        ED Disposition Condition    Discharge Stable          ED Prescriptions    None       Follow-up Information       Follow up With Specialties Details Why Contact Info    [place] - Emergency Dept Emergency Medicine  As needed, If symptoms worsen [address] [facility] [id number] [phone]    Daniel Cat MD Family Medicine Schedule an appointment as soon as possible for a visit   [address]  [address]  [facility] [id number] [phone]               Teri Valero MD  05/29/23 5225

## 2023-05-29 NOTE — DISCHARGE INSTRUCTIONS

## 2023-06-11 ENCOUNTER — HOSPITAL ENCOUNTER (EMERGENCY)
Facility: HOSPITAL | Age: 63
Discharge: HOME | End: 2023-06-11
Attending: EMERGENCY MEDICINE
Payer: MEDICARE

## 2023-06-11 VITALS
HEART RATE: 57 BPM | TEMPERATURE: 98 F | OXYGEN SATURATION: 100 % | BODY MASS INDEX: 39.16 KG/M2 | DIASTOLIC BLOOD PRESSURE: 52 MMHG | WEIGHT: 250 LBS | SYSTOLIC BLOOD PRESSURE: 129 MMHG | RESPIRATION RATE: 20 BRPM

## 2023-06-11 DIAGNOSIS — Z51.89 VISIT FOR WOUND CHECK: ICD-10-CM

## 2023-06-11 DIAGNOSIS — Z48.02 VISIT FOR SUTURE REMOVAL: ICD-10-CM
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
## 2023-06-11 DIAGNOSIS — S20.211A RIB CONTUSION, RIGHT, INITIAL ENCOUNTER: ICD-10-CM

## 2023-06-11 DIAGNOSIS — S00.03XA CONTUSION OF SCALP, INITIAL ENCOUNTER: ICD-10-CM

## 2023-06-11 PROCEDURE — 99284 EMERGENCY DEPT VISIT MOD MDM: CPT | Mod: 25

## 2023-06-11 RX ORDER — LIDOCAINE 50 MG/G
1 PATCH TOPICAL DAILY
Qty: 5 PATCH | Refills: 0 | Status: ON HOLD | OUTPATIENT
Start: 2023-06-11 | End: 2023-07-11

## 2023-06-11 NOTE — ED PROVIDER NOTES
Encounter Date: 6/11/2023       History     Chief Complaint   Patient presents with    Suture / Staple Removal     Removal of sutures placed 2 weeks ago in upper left thigh.       62-year-old male presents to ED for wound check and suture removal after fall that occurred on 5/27 resulting in left posterior thigh laceration.  He reports laceration site has continued to heal well.  Patient and family member do report concern he continues have intermittent headaches.  Patient states he failed to mention that his fall did result in head injury.  No LOC.  On heparin.  Denying current headache.  No visual changes, nausea, vomiting, numbness, focal weakness.  He also mentions right-sided rib pain that is worse with touch or movement but completely alleviated at rest.  No chest pain, cough, shortness of breath.  No other acute complaints at this time.  He has been compliant with his dialysis.    The history is provided by the patient.   Review of patient's allergies indicates:   Allergen Reactions    Iodinated contrast media     Keflex [cephalexin] Nausea Only     Past Medical History:   Diagnosis Date    Anemia due to stage 5 chronic kidney disease 3/1/2019    Anticoagulant long-term use     Diabetes mellitus type II     Dialysis patient     Encounter for blood transfusion     Hyperlipidemia     Hypertension     Kidney failure     MIKE (obstructive sleep apnea)     Stroke     Urinary tract infection      Past Surgical History:   Procedure Laterality Date    AV FISTULA PLACEMENT      left lower arm    COLONOSCOPY N/A 4/15/2021    Procedure: COLONOSCOPY;  Surgeon: Ruddy Plaza MD;  Location: [facility];  Service: Endoscopy;  Laterality: N/A;    COLONOSCOPY W/ POLYPECTOMY  04/15/2021    FUSION OF CERVICAL SPINE BY POSTERIOR APPROACH N/A 3/3/2019    Procedure: SPINE, CERVICAL, POSTERIOR APPROACH  LAMINECTOMY C3-C6; C6-C7; WITH MEDIAL DISCECTOMY;  Surgeon: Ruddy Wylie MD;  Location: [facility] OR [address] [place] [address];  Service:  Neurosurgery;  Laterality: N/A;  POSTERIOR    TONSILLECTOMY, ADENOIDECTOMY       Family History   Problem Relation Age of Onset    Colon cancer Mother     Lung cancer Mother     Diabetes Mother     Diabetes Father     Heart disease Father     Hypertension Father     Diabetes Maternal Grandmother     Diabetes Maternal Grandfather      Social History     Tobacco Use    Smoking status: Never    Smokeless tobacco: Never   Substance Use Topics    Alcohol use: No    Drug use: No     Review of Systems   Constitutional:  Negative for chills and fever.   Eyes:  Negative for visual disturbance.   Respiratory:  Negative for cough and shortness of breath.    Cardiovascular:  Negative for chest pain.        Right rib pain   Gastrointestinal:  Negative for abdominal pain, diarrhea, nausea and vomiting.   Musculoskeletal:  Negative for back pain, neck pain and neck stiffness.   Skin:  Positive for wound.   Neurological:  Positive for headaches. Negative for dizziness, syncope, weakness, light-headedness and numbness.     Physical Exam     Initial Vitals [06/11/23 1001]   BP Pulse Resp Temp SpO2   (!) 129/52 (!) 57 20 98.1 °F (36.7 °C) 100 %      MAP       --         Physical Exam    Nursing note and vitals reviewed.  Constitutional: He appears well-developed and well-nourished. He is active. He does not have a sickly appearance. He does not appear ill. No distress.   HENT:   Head: Normocephalic and atraumatic.         Area of bruising to left occipital region.  Denying tenderness to site.   Neck:   Normal range of motion.  Pulmonary/Chest:     Right-sided lateral/ distal rib tenderness.  No physical or palpable deformities.  Slight bruising.  No wounds.   Musculoskeletal:      Cervical back: Normal range of motion. No rigidity. No spinous process tenderness.     Neurological: He is alert. GCS eye subscore is 4. GCS verbal subscore is 5. GCS motor subscore is 6.   Skin: Skin is warm and dry.     Left posterior thigh laceration site  appears to be healing well.  Sutures x5 remaining intact.   Psychiatric: He has a normal mood and affect. His speech is normal and behavior is normal.       ED Course   Suture Removal    Date/Time: 6/11/2023 11:03 AM  Location procedure was performed: [facility] EMERGENCY DEPARTMENT  Performed by: Aron aSlcido PA-C  Authorized by: Víctor Zarate MD   Body area: lower extremity  Location details: left upper leg  Wound Appearance: well healed and clean  Sutures Removed: 5  Facility: sutures placed in this facility  Patient tolerance: Patient tolerated the procedure well with no immediate complications      Labs Reviewed - No data to display       Imaging Results              CT Head Without Contrast (Final result)  Result time 06/11/23 12:41:45      Final result by Mario Up MD (06/11/23 12:41:45)                   Impression:      No acute intracranial process.  No intracranial hemorrhage.      Electronically signed by: Mario Up  Date:    06/11/2023  Time:    12:41               Narrative:    EXAMINATION:  CT HEAD WITHOUT CONTRAST    CLINICAL HISTORY:  Polytrauma, blunt;    TECHNIQUE:  Low dose axial images were obtained through the head.  Coronal and sagittal reformations were also performed. Contrast was not administered.    COMPARISON:  CT head 07/18/2019, MRI 02/26/2019    FINDINGS:  There is no evidence of hydrocephalus with mild volume loss noted progressed from 2019.  No mass effect intracranial hemorrhage or acute territorial infarct.  No parenchymal contusion.    Decreased attenuation within the periventricular white matter is nonspecific but may reflect mild to moderate chronic small vessel ischemic change, similar in appearance.    The calvarium is intact.    Atherosclerotic vascular calcifications are prominent at the skull base with also prominent calcifications of the external carotid artery branches.    The visualized sinuses and mastoid air cells are clear.                                        X-Ray Ribs 2 View Right (Final result)  Result time 06/11/23 12:12:15      Final result by Sb Babin MD (06/11/23 12:12:15)                   Impression:      No displaced fracture.      Electronically signed by: Sb Babin MD  Date:    06/11/2023  Time:    12:12               Narrative:    EXAMINATION:  XR RIBS 2 VIEW RIGHT    CLINICAL HISTORY:  Unspecified fall, initial encounter    TECHNIQUE:  Two views of the right ribs were performed.    COMPARISON:  Prior exams dating back to 2011    FINDINGS:  No displaced fracture or subluxation.    Lungs are clear.  No effusion or pneumothorax.                                       Medications - No data to display  Medical Decision Making:   Initial Assessment:     Patient presents for wound check and suture removal after having fall roughly 2 weeks ago resulting in laceration to left posterior thigh.  Patient also states his concern for intermittent headache since his fall, stating that he failed to mention hitting his head and right rib cage.  No current headache.  Afebrile with vitals grossly unremarkable.  Laceration site to left posterior thigh well healed.  Reproducible tenderness over right lateral / distal rib cage.  Area bruising to left occipital region of scalp with no tenderness or physical/ palpable deformities.  Differential Diagnosis:   Scalp contusion, concussion, laceration, skull fracture,diffuse axonal injury, countercoup injury, intracranial hemorrhage such as subdural hematoma, subarachnoid hemorrhage or epidural hematoma, cerebral contusion, soft tissue injury, paraspinal or spinal injury  Wound check, suture removal  ED Management:   CT head, x-ray right rib cage     Sutures removed from left posterior thigh without complications.  Wound site appears to be healing well with no evidence of infection.  Encouraged to continue monitoring wound healing closely.      CT head negative for acute intracranial abnormality.  X-rays of  right rib cage showing no obvious acute cardiopulmonary findings or displaced rib fracture.  Results were discussed with patient.  Will plan to continue with conservative care.  Prescription for topical Lidoderm patches.  Encouraged close PCP follow-up.  Ambulatory referral has been sent to [facility] family medicine.  ED return precautions were discussed.  Patient states his understanding and agrees with plan.                        Clinical Impression:   Final diagnoses:  [W19.XXXA] Fall (Primary)  [Z48.02] Visit for suture removal  [Z51.89] Visit for wound check  [S00.03XA] Contusion of scalp, initial encounter  [S20.211A] Rib contusion, right, initial encounter        ED Disposition Condition    Discharge Stable          ED Prescriptions       Medication Sig Dispense Start Date End Date Auth. Provider    LIDOcaine (LIDODERM) 5 % Place 1 patch onto the skin once daily. Remove & Discard patch within 12 hours or as directed by MD 5 patch 6/11/2023 -- Aron Salcido PA-C          Follow-up Information       Follow up With Specialties Details Why Contact Info Additional Information    [facility]   [address] [place] [address], [address]  [facility] [address] [phone] Please park in [address] or D and use Ankita wang. Take Medical Office Bldg. elevators.             Aron Salcido PA-C  06/11/23 8979

## 2023-06-11 NOTE — DISCHARGE INSTRUCTIONS

## 2023-06-16 ENCOUNTER — HOSPITAL ENCOUNTER (EMERGENCY)
Facility: HOSPITAL | Age: 63
Discharge: HOME | End: 2023-06-17
Attending: STUDENT IN AN ORGANIZED HEALTH CARE EDUCATION/TRAINING PROGRAM
Payer: MEDICARE

## 2023-06-16 DIAGNOSIS — M25.561 ACUTE PAIN OF RIGHT KNEE: ICD-10-CM

## 2023-06-16 DIAGNOSIS — R52 PAIN: ICD-10-CM
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
## 2023-06-16 PROCEDURE — 99283 EMERGENCY DEPT VISIT LOW MDM: CPT

## 2023-06-17 VITALS
WEIGHT: 250 LBS | BODY MASS INDEX: 39.16 KG/M2 | HEART RATE: 64 BPM | DIASTOLIC BLOOD PRESSURE: 44 MMHG | TEMPERATURE: 100 F | RESPIRATION RATE: 18 BRPM | OXYGEN SATURATION: 100 % | SYSTOLIC BLOOD PRESSURE: 94 MMHG

## 2023-06-17 NOTE — DISCHARGE INSTRUCTIONS
Thank you for coming to our Emergency Department today. It is important to remember that some problems are difficult to diagnose and may not be found during your first visit. Be sure to follow up with your primary care doctor and review any labs/imaging that was performed with them. If you do not have a primary care doctor, you may contact the one listed on your discharge paperwork or you may also call the [facility] Appointment Desk at [phone] to schedule an appointment with one.     All medications may potentially have side effects and it is impossible to predict which medications may give you side effects. If you feel that you are having a negative effect of any medication you should immediately stop taking them and seek medical attention.    Return to the ER with any questions/concerns, new/concerning symptoms, worsening or failure to improve. Do not drive or make any important decisions for 24 hours if you have received any pain medications, sedatives or mood altering drugs during your ER visit.

## 2023-06-17 NOTE — ED PROVIDER NOTES
Encounter Date: 6/16/2023       History     Chief Complaint   Patient presents with    Fall     Patient reports falling from a standing position and striking right knee on the floor.  Patient states right knee pain present.  No deformity.  Positive CSM present to distal RLE with cap refill < 3 sec.  Patient states frequent falls over the last three weeks, with difficulty maneuvering wheelchair due to doorway restrictions in current home.  Dialysis patient last today (M/W/F).  Denies neck or back pain.  Denies LOC.     62-year-old male history of ESRD on HD, long-term anticoagulation use presents after a mechanical fall.  Patient reports he was attempting to came off of his wheelchair to go to the bathroom when he felt his knees give out and slowly sat down onto the ground.  Patient denies any head injury or LOC.  Reports having chronic right knee pain.  He is not sure why he has this pain has been evaluated past for it.  Currently reports pain is rated 2/10 and described as dull in nature.  Patient denies falling on the knee .  Denies any numbness or tingling of the lower extremity.    The history is provided by the patient and a relative.   Review of patient's allergies indicates:   Allergen Reactions    Iodinated contrast media     Keflex [cephalexin] Nausea Only     Past Medical History:   Diagnosis Date    Anemia due to stage 5 chronic kidney disease 3/1/2019    Anticoagulant long-term use     Diabetes mellitus type II     Dialysis patient     Encounter for blood transfusion     Hyperlipidemia     Hypertension     Kidney failure     MIKE (obstructive sleep apnea)     Stroke     Urinary tract infection      Past Surgical History:   Procedure Laterality Date    AV FISTULA PLACEMENT      left lower arm    COLONOSCOPY N/A 4/15/2021    Procedure: COLONOSCOPY;  Surgeon: Ruddy Plaza MD;  Location: [facility];  Service: Endoscopy;  Laterality: N/A;    COLONOSCOPY W/ POLYPECTOMY  04/15/2021    FUSION OF CERVICAL  SPINE BY POSTERIOR APPROACH N/A 3/3/2019    Procedure: SPINE, CERVICAL, POSTERIOR APPROACH  LAMINECTOMY C3-C6; C6-C7; WITH MEDIAL DISCECTOMY;  Surgeon: Ruddy Wylie MD;  Location: [facility] OR [address] [place] [address];  Service: Neurosurgery;  Laterality: N/A;  POSTERIOR    TONSILLECTOMY, ADENOIDECTOMY       Family History   Problem Relation Age of Onset    Colon cancer Mother     Lung cancer Mother     Diabetes Mother     Diabetes Father     Heart disease Father     Hypertension Father     Diabetes Maternal Grandmother     Diabetes Maternal Grandfather      Social History     Tobacco Use    Smoking status: Never    Smokeless tobacco: Never   Substance Use Topics    Alcohol use: No    Drug use: No     Review of Systems   Constitutional:  Negative for chills and fever.   HENT:  Negative for congestion and rhinorrhea.    Eyes:  Negative for pain.   Respiratory:  Negative for cough and shortness of breath.    Cardiovascular:  Negative for chest pain and leg swelling.   Gastrointestinal:  Negative for abdominal pain, nausea and vomiting.   Endocrine: Negative for polyuria.   Genitourinary:  Negative for dysuria and hematuria.   Musculoskeletal:  Positive for arthralgias. Negative for gait problem and neck pain.   Skin:  Negative for rash.   Allergic/Immunologic: Negative for immunocompromised state.   Neurological:  Negative for weakness and headaches.     Physical Exam     Initial Vitals [06/16/23 2118]   BP Pulse Resp Temp SpO2   101/72 80 18 99.8 °F (37.7 °C) 95 %      MAP       --         Physical Exam    Constitutional: He appears well-developed and well-nourished. He is not diaphoretic. No distress.   HENT:   Head: Normocephalic and atraumatic.   Eyes: Conjunctivae and EOM are normal. Pupils are equal, round, and reactive to light.   Neck:   Normal range of motion.  Cardiovascular:  Regular rhythm.           Pulses:       Radial pulses are 2+ on the right side and 2+ on the left side.        Posterior tibial pulses are 2+ on the  right side and 2+ on the left side.   Pulmonary/Chest: Breath sounds normal. No respiratory distress.   Abdominal: Abdomen is soft. Bowel sounds are normal. He exhibits no distension. There is no abdominal tenderness. There is no rebound and no guarding.   Musculoskeletal:         General: Normal range of motion.      Cervical back: Normal range of motion.      Right knee: No ecchymosis, lacerations or bony tenderness. Normal range of motion. Tenderness present.      Left knee: Normal.     Lymphadenopathy:     He has no cervical adenopathy.   Neurological: He is alert and oriented to person, place, and time.   Skin: Skin is warm. Capillary refill takes less than 2 seconds.   Psychiatric: His behavior is normal.       ED Course   Procedures  Labs Reviewed - No data to display       Imaging Results              X-Ray Knee 3 View Right (Final result)  Result time 06/16/23 22:26:51      Final result by Moody Sanchez DO (06/16/23 22:26:51)                   Impression:      No acute fracture or dislocation.      Electronically signed by: Moody Sanchez  Date:    06/16/2023  Time:    22:26               Narrative:    EXAMINATION:  XR KNEE 3 VIEW RIGHT    CLINICAL HISTORY:  Pain, unspecified    TECHNIQUE:  AP, lateral, and Merchant views of the right knee were performed.    COMPARISON:  07/22/2019.    FINDINGS:  There is osteopenia.  There is no acute fracture or dislocation of the knee.  Alignment is normal.  Joint spaces are preserved.  There is no joint effusion.  There are vascular calcifications.                                       Medications - No data to display  Medical Decision Making:   History:   Old Medical Records: I decided to obtain old medical records.  Initial Assessment:   62-year-old male with past medical history of ESRD on HD, long-term anticoagulation use presents with knee pain after a fall.  Patient in no acute distress vitals within normal limits.  Focal neurological deficits on exam.  No  signs of head trauma.  GCS 15.  Right knee without any ecchymosis, deformity or decreased range of motion.  Suspect patient's symptoms are likely to chronic degenerative changes.  X-ray was obtained that did not show any acute fracture dislocation.  There significant osteopenia.  No joint effusion.  Recommended Tylenol pain patient reports taking home.  Strict return precautions and anticipatory guidance given.  Differential Diagnosis:   Differential Diagnosis includes, but is not limited to:  Fracture, dislocation, compartment syndrome, nerve injury/palsy, hemarthrosis, muscle strain, ligament tear/sprain,soft tissue contusion, osteoarthritis.      Clinical Tests:   Radiological Study: Ordered and Reviewed                 DISCLAIMER: This note was prepared with [employer] voice recognition transcription software. Garbled syntax, mangled pronouns, and other bizarre constructions may be attributed to that software system.         Clinical Impression:   Final diagnoses:  [R52] Pain  [W19.XXXA] Fall, initial encounter (Primary)  [M25.561] Acute pain of right knee        ED Disposition Condition    Discharge Stable          ED Prescriptions    None       Follow-up Information       Follow up With Specialties Details Why Contact Info    Melvin Terry MD Internal Medicine Schedule an appointment as soon as possible for a visit  for reassesment [address] [facility] [address] [place] [address]  [phone]      [facility] - Emergency Dept Emergency Medicine  If symptoms worsen [address] [facility] [id number] [phone]             Vargas Lai MD  06/16/23 8690

## 2023-07-10 ENCOUNTER — HOSPITAL ENCOUNTER (OUTPATIENT)
Facility: HOSPITAL | Age: 63
Discharge: HOME | End: 2023-07-12
Attending: STUDENT IN AN ORGANIZED HEALTH CARE EDUCATION/TRAINING PROGRAM | Admitting: HOSPITALIST
Payer: MEDICARE

## 2023-07-10 DIAGNOSIS — R07.9 CHEST PAIN: ICD-10-CM

## 2023-07-10 DIAGNOSIS — K92.1 MELENA: ICD-10-CM

## 2023-07-10 DIAGNOSIS — D64.9 ANEMIA, UNSPECIFIED TYPE: Primary | ICD-10-CM

## 2023-07-10 DIAGNOSIS — D64.9 SYMPTOMATIC ANEMIA: ICD-10-CM

## 2023-07-10 DIAGNOSIS — K92.2 LOWER GI BLEED: ICD-10-CM

## 2023-07-10 PROCEDURE — 96374 THER/PROPH/DIAG INJ IV PUSH: CPT

## 2023-07-10 PROCEDURE — 96376 TX/PRO/DX INJ SAME DRUG ADON: CPT

## 2023-07-10 PROCEDURE — 99285 EMERGENCY DEPT VISIT HI MDM: CPT

## 2023-07-11 ENCOUNTER — ANESTHESIA (OUTPATIENT)
Dept: ENDOSCOPY | Facility: HOSPITAL | Age: 63
End: 2023-07-11
Payer: MEDICARE

## 2023-07-11 ENCOUNTER — ANESTHESIA EVENT (OUTPATIENT)
Dept: ENDOSCOPY | Facility: HOSPITAL | Age: 63
End: 2023-07-11
Payer: MEDICARE

## 2023-07-11 PROBLEM — K92.1 MELENA: Chronic | Status: ACTIVE | Noted: 2023-07-11

## 2023-07-11 PROBLEM — F41.9 ANXIETY AND DEPRESSION: Status: ACTIVE | Noted: 2023-07-11

## 2023-07-11 PROBLEM — T78.40XA ALLERGIES: Status: ACTIVE | Noted: 2023-07-11

## 2023-07-11 PROBLEM — F32.A ANXIETY AND DEPRESSION: Status: ACTIVE | Noted: 2023-07-11

## 2023-07-11 PROBLEM — K92.1 MELENA: Status: ACTIVE | Noted: 2023-07-11

## 2023-07-11 LAB
ABO + RH BLD: ABNORMAL
ALBUMIN SERPL BCP-MCNC: 3.2 G/DL (ref 3.5–5.2)
ALP SERPL-CCNC: 111 U/L (ref 55–135)
ALT SERPL W/O P-5'-P-CCNC: 21 U/L (ref 10–44)
ANION GAP SERPL CALC-SCNC: 10 MMOL/L (ref 8–16)
ANION GAP SERPL CALC-SCNC: 8 MMOL/L (ref 8–16)
AST SERPL-CCNC: 26 U/L (ref 10–40)
BASOPHILS # BLD AUTO: 0.03 K/UL (ref 0–0.2)
BASOPHILS NFR BLD: 0.5 % (ref 0–1.9)
BILIRUB SERPL-MCNC: 0.9 MG/DL (ref 0.1–1)
BLD GP AB SCN CELLS X3 SERPL QL: ABNORMAL
BLD PROD TYP BPU: NORMAL
BLOOD GROUP ANTIBODIES SERPL: NORMAL
BLOOD UNIT EXPIRATION DATE: NORMAL
BLOOD UNIT TYPE CODE: 9500
BLOOD UNIT TYPE: NORMAL
BUN SERPL-MCNC: 34 MG/DL (ref 6–20)
BUN SERPL-MCNC: 36 MG/DL (ref 6–20)
CALCIUM SERPL-MCNC: 10.2 MG/DL (ref 8.7–10.5)
CALCIUM SERPL-MCNC: 9.9 MG/DL (ref 8.7–10.5)
CHLORIDE SERPL-SCNC: 102 MMOL/L (ref 95–110)
CHLORIDE SERPL-SCNC: 103 MMOL/L (ref 95–110)
CO2 SERPL-SCNC: 29 MMOL/L (ref 23–29)
CO2 SERPL-SCNC: 29 MMOL/L (ref 23–29)
CODING SYSTEM: NORMAL
CREAT SERPL-MCNC: 8.7 MG/DL (ref 0.5–1.4)
CREAT SERPL-MCNC: 8.9 MG/DL (ref 0.5–1.4)
CROSSMATCH INTERPRETATION: NORMAL
DAT IGG-SP REAG RBC-IMP: NORMAL
DIFFERENTIAL METHOD: ABNORMAL
DISPENSE STATUS: NORMAL
EOSINOPHIL # BLD AUTO: 0.2 K/UL (ref 0–0.5)
EOSINOPHIL NFR BLD: 3 % (ref 0–8)
ERYTHROCYTE [DISTWIDTH] IN BLOOD BY AUTOMATED COUNT: 16 % (ref 11.5–14.5)
EST. GFR  (NO RACE VARIABLE): 6 ML/MIN/1.73 M^2
EST. GFR  (NO RACE VARIABLE): 6 ML/MIN/1.73 M^2
GLUCOSE SERPL-MCNC: 100 MG/DL (ref 70–110)
GLUCOSE SERPL-MCNC: 89 MG/DL (ref 70–110)
HCT VFR BLD AUTO: 22.4 % (ref 40–54)
HCT VFR BLD AUTO: 24.9 % (ref 40–54)
HCT VFR BLD AUTO: 25.5 % (ref 40–54)
HGB BLD-MCNC: 7.1 G/DL (ref 14–18)
HGB BLD-MCNC: 8.1 G/DL (ref 14–18)
HGB BLD-MCNC: 8.1 G/DL (ref 14–18)
IMM GRANULOCYTES # BLD AUTO: 0.02 K/UL (ref 0–0.04)
IMM GRANULOCYTES NFR BLD AUTO: 0.3 % (ref 0–0.5)
LYMPHOCYTES # BLD AUTO: 1.3 K/UL (ref 1–4.8)
LYMPHOCYTES NFR BLD: 21.8 % (ref 18–48)
MCH RBC QN AUTO: 34.6 PG (ref 27–31)
MCHC RBC AUTO-ENTMCNC: 31.8 G/DL (ref 32–36)
MCV RBC AUTO: 109 FL (ref 82–98)
MONOCYTES # BLD AUTO: 0.7 K/UL (ref 0.3–1)
MONOCYTES NFR BLD: 11.5 % (ref 4–15)
NEUTROPHILS # BLD AUTO: 3.7 K/UL (ref 1.8–7.7)
NEUTROPHILS NFR BLD: 62.9 % (ref 38–73)
NRBC BLD-RTO: 0 /100 WBC
OB PNL STL: POSITIVE
PLATELET # BLD AUTO: 94 K/UL (ref 150–450)
PMV BLD AUTO: 11.4 FL (ref 9.2–12.9)
POCT GLUCOSE: 85 MG/DL (ref 70–110)
POCT GLUCOSE: 86 MG/DL (ref 70–110)
POTASSIUM SERPL-SCNC: 3.6 MMOL/L (ref 3.5–5.1)
POTASSIUM SERPL-SCNC: 3.9 MMOL/L (ref 3.5–5.1)
PROT SERPL-MCNC: 6.3 G/DL (ref 6–8.4)
RBC # BLD AUTO: 2.34 M/UL (ref 4.6–6.2)
SODIUM SERPL-SCNC: 140 MMOL/L (ref 136–145)
SODIUM SERPL-SCNC: 141 MMOL/L (ref 136–145)
SPECIMEN OUTDATE: ABNORMAL
TRANS ERYTHROCYTES VOL PATIENT: NORMAL ML
TROPONIN I SERPL DL<=0.01 NG/ML-MCNC: 0.02 NG/ML (ref 0–0.03)
WBC # BLD AUTO: 5.92 K/UL (ref 3.9–12.7)

## 2023-07-11 PROCEDURE — 88312 SPECIAL STAINS GROUP 1: CPT | Mod: 26,,, | Performed by: PATHOLOGY

## 2023-07-11 PROCEDURE — 80053 COMPREHEN METABOLIC PANEL: CPT | Performed by: STUDENT IN AN ORGANIZED HEALTH CARE EDUCATION/TRAINING PROGRAM

## 2023-07-11 PROCEDURE — 88305 TISSUE EXAM BY PATHOLOGIST: CPT | Performed by: PATHOLOGY

## 2023-07-11 PROCEDURE — 88313 SPECIAL STAINS GROUP 2: CPT | Mod: 59 | Performed by: PATHOLOGY

## 2023-07-11 PROCEDURE — 96376 TX/PRO/DX INJ SAME DRUG ADON: CPT

## 2023-07-11 PROCEDURE — 86870 RBC ANTIBODY IDENTIFICATION: CPT | Performed by: STUDENT IN AN ORGANIZED HEALTH CARE EDUCATION/TRAINING PROGRAM

## 2023-07-11 PROCEDURE — C9113 INJ PANTOPRAZOLE SODIUM, VIA: HCPCS | Performed by: STUDENT IN AN ORGANIZED HEALTH CARE EDUCATION/TRAINING PROGRAM

## 2023-07-11 PROCEDURE — D9220A PRA ANESTHESIA: ICD-10-PCS | Mod: CRNA,,, | Performed by: NURSE ANESTHETIST, CERTIFIED REGISTERED

## 2023-07-11 PROCEDURE — 88312 PR  SPECIAL STAINS,GROUP I: ICD-10-PCS | Mod: 26,,, | Performed by: PATHOLOGY

## 2023-07-11 PROCEDURE — 25000003 PHARM REV CODE 250: Performed by: STUDENT IN AN ORGANIZED HEALTH CARE EDUCATION/TRAINING PROGRAM

## 2023-07-11 PROCEDURE — 99215 OFFICE O/P EST HI 40 MIN: CPT | Mod: 25,GC,, | Performed by: INTERNAL MEDICINE

## 2023-07-11 PROCEDURE — 85014 HEMATOCRIT: CPT | Mod: 91 | Performed by: STUDENT IN AN ORGANIZED HEALTH CARE EDUCATION/TRAINING PROGRAM

## 2023-07-11 PROCEDURE — 88305 TISSUE EXAM BY PATHOLOGIST: CPT | Mod: 26,,, | Performed by: PATHOLOGY

## 2023-07-11 PROCEDURE — 84484 ASSAY OF TROPONIN QUANT: CPT | Performed by: STUDENT IN AN ORGANIZED HEALTH CARE EDUCATION/TRAINING PROGRAM

## 2023-07-11 PROCEDURE — 86880 COOMBS TEST DIRECT: CPT | Performed by: STUDENT IN AN ORGANIZED HEALTH CARE EDUCATION/TRAINING PROGRAM

## 2023-07-11 PROCEDURE — 37000009 HC ANESTHESIA EA ADD 15 MINS: Performed by: INTERNAL MEDICINE

## 2023-07-11 PROCEDURE — 43239 EGD BIOPSY SINGLE/MULTIPLE: CPT | Mod: ,,, | Performed by: INTERNAL MEDICINE

## 2023-07-11 PROCEDURE — 36415 COLL VENOUS BLD VENIPUNCTURE: CPT | Performed by: STUDENT IN AN ORGANIZED HEALTH CARE EDUCATION/TRAINING PROGRAM

## 2023-07-11 PROCEDURE — 85025 COMPLETE CBC W/AUTO DIFF WBC: CPT | Performed by: STUDENT IN AN ORGANIZED HEALTH CARE EDUCATION/TRAINING PROGRAM

## 2023-07-11 PROCEDURE — G0378 HOSPITAL OBSERVATION PER HR: HCPCS

## 2023-07-11 PROCEDURE — 36430 TRANSFUSION BLD/BLD COMPNT: CPT

## 2023-07-11 PROCEDURE — 86922 COMPATIBILITY TEST ANTIGLOB: CPT | Performed by: STUDENT IN AN ORGANIZED HEALTH CARE EDUCATION/TRAINING PROGRAM

## 2023-07-11 PROCEDURE — P9021 RED BLOOD CELLS UNIT: HCPCS | Performed by: STUDENT IN AN ORGANIZED HEALTH CARE EDUCATION/TRAINING PROGRAM

## 2023-07-11 PROCEDURE — D9220A PRA ANESTHESIA: ICD-10-PCS | Mod: ANES,,, | Performed by: ANESTHESIOLOGY

## 2023-07-11 PROCEDURE — 85018 HEMOGLOBIN: CPT | Mod: 91 | Performed by: STUDENT IN AN ORGANIZED HEALTH CARE EDUCATION/TRAINING PROGRAM

## 2023-07-11 PROCEDURE — 88305 TISSUE EXAM BY PATHOLOGIST: ICD-10-PCS | Mod: 26,,, | Performed by: PATHOLOGY

## 2023-07-11 PROCEDURE — 86900 BLOOD TYPING SEROLOGIC ABO: CPT | Performed by: STUDENT IN AN ORGANIZED HEALTH CARE EDUCATION/TRAINING PROGRAM

## 2023-07-11 PROCEDURE — 25000003 PHARM REV CODE 250: Performed by: INTERNAL MEDICINE

## 2023-07-11 PROCEDURE — 86905 BLOOD TYPING RBC ANTIGENS: CPT | Performed by: STUDENT IN AN ORGANIZED HEALTH CARE EDUCATION/TRAINING PROGRAM

## 2023-07-11 PROCEDURE — 88313 PR  SPECIAL STAINS,GROUP II: ICD-10-PCS | Mod: 26,,, | Performed by: PATHOLOGY

## 2023-07-11 PROCEDURE — 88313 SPECIAL STAINS GROUP 2: CPT | Mod: 26,,, | Performed by: PATHOLOGY

## 2023-07-11 PROCEDURE — 43239 PR EGD, FLEX, W/BIOPSY, SGL/MULTI: ICD-10-PCS | Mod: ,,, | Performed by: INTERNAL MEDICINE

## 2023-07-11 PROCEDURE — 82272 OCCULT BLD FECES 1-3 TESTS: CPT | Performed by: STUDENT IN AN ORGANIZED HEALTH CARE EDUCATION/TRAINING PROGRAM

## 2023-07-11 PROCEDURE — 80048 BASIC METABOLIC PNL TOTAL CA: CPT | Mod: XB | Performed by: STUDENT IN AN ORGANIZED HEALTH CARE EDUCATION/TRAINING PROGRAM

## 2023-07-11 PROCEDURE — 88312 SPECIAL STAINS GROUP 1: CPT | Performed by: PATHOLOGY

## 2023-07-11 PROCEDURE — 27201012 HC FORCEPS, HOT/COLD, DISP: Performed by: INTERNAL MEDICINE

## 2023-07-11 PROCEDURE — 99215 PR OFFICE/OUTPT VISIT, EST, LEVL V, 40-54 MIN: ICD-10-PCS | Mod: 25,GC,, | Performed by: INTERNAL MEDICINE

## 2023-07-11 PROCEDURE — 43239 EGD BIOPSY SINGLE/MULTIPLE: CPT | Performed by: INTERNAL MEDICINE

## 2023-07-11 PROCEDURE — 63600175 PHARM REV CODE 636 W HCPCS: Performed by: STUDENT IN AN ORGANIZED HEALTH CARE EDUCATION/TRAINING PROGRAM

## 2023-07-11 PROCEDURE — 25000003 PHARM REV CODE 250: Performed by: NURSE ANESTHETIST, CERTIFIED REGISTERED

## 2023-07-11 PROCEDURE — 37000008 HC ANESTHESIA 1ST 15 MINUTES: Performed by: INTERNAL MEDICINE

## 2023-07-11 PROCEDURE — D9220A PRA ANESTHESIA: Mod: ANES,,, | Performed by: ANESTHESIOLOGY

## 2023-07-11 PROCEDURE — D9220A PRA ANESTHESIA: Mod: CRNA,,, | Performed by: NURSE ANESTHETIST, CERTIFIED REGISTERED

## 2023-07-11 PROCEDURE — 63600175 PHARM REV CODE 636 W HCPCS: Performed by: NURSE ANESTHETIST, CERTIFIED REGISTERED

## 2023-07-11 RX ORDER — GABAPENTIN 300 MG/1
300 CAPSULE ORAL NIGHTLY
Status: DISCONTINUED | OUTPATIENT
Start: 2023-07-11 | End: 2023-07-13 | Stop reason: HOSPADM

## 2023-07-11 RX ORDER — ONDANSETRON 2 MG/ML
INJECTION INTRAMUSCULAR; INTRAVENOUS
Status: DISCONTINUED | OUTPATIENT
Start: 2023-07-11 | End: 2023-07-11

## 2023-07-11 RX ORDER — PROPOFOL 10 MG/ML
VIAL (ML) INTRAVENOUS
Status: DISCONTINUED | OUTPATIENT
Start: 2023-07-11 | End: 2023-07-11

## 2023-07-11 RX ORDER — ATORVASTATIN CALCIUM 20 MG/1
20 TABLET, FILM COATED ORAL DAILY
Status: DISCONTINUED | OUTPATIENT
Start: 2023-07-11 | End: 2023-07-11

## 2023-07-11 RX ORDER — IBUPROFEN 200 MG
24 TABLET ORAL
Status: DISCONTINUED | OUTPATIENT
Start: 2023-07-11 | End: 2023-07-13 | Stop reason: HOSPADM

## 2023-07-11 RX ORDER — CALCIUM ACETATE 667 MG/1
2001 CAPSULE ORAL
Status: DISCONTINUED | OUTPATIENT
Start: 2023-07-11 | End: 2023-07-13 | Stop reason: HOSPADM

## 2023-07-11 RX ORDER — ETOMIDATE 2 MG/ML
INJECTION INTRAVENOUS
Status: DISCONTINUED | OUTPATIENT
Start: 2023-07-11 | End: 2023-07-11

## 2023-07-11 RX ORDER — ONDANSETRON 2 MG/ML
4 INJECTION INTRAMUSCULAR; INTRAVENOUS EVERY 8 HOURS PRN
Status: DISCONTINUED | OUTPATIENT
Start: 2023-07-11 | End: 2023-07-13 | Stop reason: HOSPADM

## 2023-07-11 RX ORDER — FLUOXETINE HYDROCHLORIDE 20 MG/1
40 CAPSULE ORAL DAILY
Status: DISCONTINUED | OUTPATIENT
Start: 2023-07-11 | End: 2023-07-13 | Stop reason: HOSPADM

## 2023-07-11 RX ORDER — SODIUM CHLORIDE 9 MG/ML
INJECTION, SOLUTION INTRAVENOUS ONCE
Status: COMPLETED | OUTPATIENT
Start: 2023-07-11 | End: 2023-07-11

## 2023-07-11 RX ORDER — SODIUM CHLORIDE 0.9 % (FLUSH) 0.9 %
10 SYRINGE (ML) INJECTION EVERY 12 HOURS PRN
Status: DISCONTINUED | OUTPATIENT
Start: 2023-07-11 | End: 2023-07-13 | Stop reason: HOSPADM

## 2023-07-11 RX ORDER — MIDODRINE HYDROCHLORIDE 5 MG/1
10 TABLET ORAL 3 TIMES DAILY
Status: DISCONTINUED | OUTPATIENT
Start: 2023-07-11 | End: 2023-07-13 | Stop reason: HOSPADM

## 2023-07-11 RX ORDER — CALCIUM ACETATE 667 MG/1
667 CAPSULE ORAL
Status: DISCONTINUED | OUTPATIENT
Start: 2023-07-11 | End: 2023-07-13 | Stop reason: HOSPADM

## 2023-07-11 RX ORDER — SODIUM CHLORIDE 9 MG/ML
INJECTION, SOLUTION INTRAVENOUS
Status: DISCONTINUED | OUTPATIENT
Start: 2023-07-11 | End: 2023-07-13 | Stop reason: HOSPADM

## 2023-07-11 RX ORDER — MIDODRINE HYDROCHLORIDE 10 MG/1
1 TABLET ORAL 3 TIMES DAILY
COMMUNITY

## 2023-07-11 RX ORDER — MIDODRINE HYDROCHLORIDE 5 MG/1
20 TABLET ORAL EVERY 8 HOURS PRN
Status: DISCONTINUED | OUTPATIENT
Start: 2023-07-11 | End: 2023-07-11

## 2023-07-11 RX ORDER — CINACALCET 30 MG/1
30 TABLET, FILM COATED ORAL
Status: DISCONTINUED | OUTPATIENT
Start: 2023-07-11 | End: 2023-07-13 | Stop reason: HOSPADM

## 2023-07-11 RX ORDER — OLOPATADINE HYDROCHLORIDE 1 MG/ML
1 SOLUTION/ DROPS OPHTHALMIC 2 TIMES DAILY
Status: DISCONTINUED | OUTPATIENT
Start: 2023-07-11 | End: 2023-07-13 | Stop reason: HOSPADM

## 2023-07-11 RX ORDER — NALOXONE HCL 0.4 MG/ML
0.02 VIAL (ML) INJECTION
Status: DISCONTINUED | OUTPATIENT
Start: 2023-07-11 | End: 2023-07-13 | Stop reason: HOSPADM

## 2023-07-11 RX ORDER — ACETAMINOPHEN 325 MG/1
650 TABLET ORAL EVERY 4 HOURS PRN
Status: DISCONTINUED | OUTPATIENT
Start: 2023-07-11 | End: 2023-07-13 | Stop reason: HOSPADM

## 2023-07-11 RX ORDER — IBUPROFEN 200 MG
16 TABLET ORAL
Status: DISCONTINUED | OUTPATIENT
Start: 2023-07-11 | End: 2023-07-13 | Stop reason: HOSPADM

## 2023-07-11 RX ORDER — MUPIROCIN 20 MG/G
OINTMENT TOPICAL 2 TIMES DAILY
Status: DISCONTINUED | OUTPATIENT
Start: 2023-07-11 | End: 2023-07-13 | Stop reason: HOSPADM

## 2023-07-11 RX ORDER — FLUTICASONE PROPIONATE 50 MCG
2 SPRAY, SUSPENSION (ML) NASAL DAILY
Status: DISCONTINUED | OUTPATIENT
Start: 2023-07-11 | End: 2023-07-13 | Stop reason: HOSPADM

## 2023-07-11 RX ORDER — PRAVASTATIN SODIUM 10 MG/1
10 TABLET ORAL NIGHTLY
Status: DISCONTINUED | OUTPATIENT
Start: 2023-07-12 | End: 2023-07-13 | Stop reason: HOSPADM

## 2023-07-11 RX ORDER — CALCIUM ACETATE 667 MG/1
667 CAPSULE ORAL DAILY
Status: DISCONTINUED | OUTPATIENT
Start: 2023-07-11 | End: 2023-07-11

## 2023-07-11 RX ORDER — HYDROCODONE BITARTRATE AND ACETAMINOPHEN 500; 5 MG/1; MG/1
TABLET ORAL
Status: DISCONTINUED | OUTPATIENT
Start: 2023-07-11 | End: 2023-07-13 | Stop reason: HOSPADM

## 2023-07-11 RX ORDER — GLUCAGON 1 MG
1 KIT INJECTION
Status: DISCONTINUED | OUTPATIENT
Start: 2023-07-11 | End: 2023-07-13 | Stop reason: HOSPADM

## 2023-07-11 RX ORDER — LANOLIN ALCOHOL/MO/W.PET/CERES
1000 CREAM (GRAM) TOPICAL DAILY
COMMUNITY

## 2023-07-11 RX ORDER — MORPHINE SULFATE ORAL SOLUTION 10 MG/5ML
5 SOLUTION ORAL EVERY 6 HOURS PRN
Status: DISCONTINUED | OUTPATIENT
Start: 2023-07-11 | End: 2023-07-11

## 2023-07-11 RX ORDER — PANTOPRAZOLE SODIUM 40 MG/10ML
40 INJECTION, POWDER, LYOPHILIZED, FOR SOLUTION INTRAVENOUS 2 TIMES DAILY
Status: DISCONTINUED | OUTPATIENT
Start: 2023-07-11 | End: 2023-07-12

## 2023-07-11 RX ADMIN — MIDODRINE HYDROCHLORIDE 10 MG: 5 TABLET ORAL at 05:07

## 2023-07-11 RX ADMIN — PANTOPRAZOLE SODIUM 40 MG: 40 INJECTION, POWDER, LYOPHILIZED, FOR SOLUTION INTRAVENOUS at 08:07

## 2023-07-11 RX ADMIN — SODIUM CHLORIDE: 9 INJECTION, SOLUTION INTRAVENOUS at 08:07

## 2023-07-11 RX ADMIN — PROPOFOL 10 MG: 10 INJECTION, EMULSION INTRAVENOUS at 03:07

## 2023-07-11 RX ADMIN — FLUOXETINE 40 MG: 20 CAPSULE ORAL at 08:07

## 2023-07-11 RX ADMIN — OLOPATADINE HYDROCHLORIDE 1 DROP: 1.11 SOLUTION/ DROPS OPHTHALMIC at 08:07

## 2023-07-11 RX ADMIN — MUPIROCIN: 20 OINTMENT TOPICAL at 08:07

## 2023-07-11 RX ADMIN — ATORVASTATIN CALCIUM 20 MG: 20 TABLET, FILM COATED ORAL at 08:07

## 2023-07-11 RX ADMIN — SODIUM CHLORIDE: 0.9 INJECTION, SOLUTION INTRAVENOUS at 03:07

## 2023-07-11 RX ADMIN — GLYCOPYRROLATE 0.1 MG: 0.2 INJECTION, SOLUTION INTRAMUSCULAR; INTRAVITREAL at 03:07

## 2023-07-11 RX ADMIN — CINACALCET HYDROCHLORIDE 30 MG: 30 TABLET, FILM COATED ORAL at 02:07

## 2023-07-11 RX ADMIN — PANTOPRAZOLE SODIUM 40 MG: 40 INJECTION, POWDER, LYOPHILIZED, FOR SOLUTION INTRAVENOUS at 02:07

## 2023-07-11 RX ADMIN — OLOPATADINE HYDROCHLORIDE 1 DROP: 1.11 SOLUTION/ DROPS OPHTHALMIC at 01:07

## 2023-07-11 RX ADMIN — CALCIUM CHLORIDE 250 G: 100 INJECTION, SOLUTION INTRAVENOUS at 03:07

## 2023-07-11 RX ADMIN — CALCIUM ACETATE 2001 MG: 667 CAPSULE ORAL at 05:07

## 2023-07-11 RX ADMIN — NEPHROCAP 1 CAPSULE: 1 CAP ORAL at 01:07

## 2023-07-11 RX ADMIN — MIDODRINE HYDROCHLORIDE 10 MG: 5 TABLET ORAL at 01:07

## 2023-07-11 RX ADMIN — GABAPENTIN 300 MG: 300 CAPSULE ORAL at 08:07

## 2023-07-11 RX ADMIN — ETOMIDATE 2 MG: 2 INJECTION, SOLUTION INTRAVENOUS at 03:07

## 2023-07-11 RX ADMIN — ETOMIDATE 4 MG: 2 INJECTION, SOLUTION INTRAVENOUS at 03:07

## 2023-07-11 RX ADMIN — TOPICAL ANESTHETIC 2 EACH: 200 SPRAY DENTAL; PERIODONTAL at 03:07

## 2023-07-11 RX ADMIN — GABAPENTIN 300 MG: 300 CAPSULE ORAL at 02:07

## 2023-07-11 NOTE — PROGRESS NOTES
Pt arrived to unit. VN phoned into room and introduced self as VN for this shift. Admission questions completed by VN. Educated pt on safety precautions and VN's role in pt care. Opportunity given for pt's questions. All questions answered.   07/11/23 1140   Admission   Initial VN Admission Questions Complete  (via phone; no vidyo access)   Communication Issues? None   Type of Frequent Check   Type Other (see comments)  (Admission)   Pain/Comfort/Sleep   Preferred Pain Scale number (Numeric Rating Pain Scale)   Pain Rating (0-10): Rest 0

## 2023-07-11 NOTE — ANESTHESIA PREPROCEDURE EVALUATION
07/11/2023  Angel Farmer Jr. is a 62 y.o., male with extensive medical history for EGD    1.5 weeks of melena  ESRD on HD  Last HD session:  H+H: 7.1/22.4     Patient Active Problem List   Diagnosis    Hypertension    Diabetes mellitus, type 2    Hyperlipidemia    Symptomatic anemia    ESRD (end stage renal disease) on dialysis    Psoriasis    Foot pain    MIKE (obstructive sleep apnea) - very severe latest sleep study says BPAP @ 16/8    Multifactorial peripheral neuropathy    Trigger finger, left ring finger    CVA (cerebral vascular accident)    Ataxia    Anemia due to stage 5 chronic kidney disease    Hyperphosphatemia    Metabolic bone disease    Left leg pain    Hypotension    Bradycardia    Class 3 severe obesity due to excess calories with serious comorbidity and body mass index (BMI) of 45.0 to 49.9 in adult    S/P laminectomy    Hyperkalemia    Impaired mobility and ADLs    Thrombocytopenia    Fecal impaction in rectum    Dehydration    Blood blister    Abscess    Ulcer of toe    History of colon polyps    Polyp of colon    Strain of back    Weakness    Melena    Allergies    Anxiety and depression       Past Medical History:   Diagnosis Date    Anemia due to stage 5 chronic kidney disease 3/1/2019    Anticoagulant long-term use     Diabetes mellitus type II     Dialysis patient     Encounter for blood transfusion     Hyperlipidemia     Hypertension     Kidney failure     MIKE (obstructive sleep apnea)     Stroke     Urinary tract infection        ECHO: Results for orders placed during the hospital encounter of 06/10/21    Echo    Interpretation Summary  · The left ventricle is normal in size with normal systolic function.  · The estimated ejection fraction is 60-65%.  · Normal left ventricular diastolic function.  · The estimated PA systolic pressure  is 15 mmHg.  · Normal right ventricular size with normal right ventricular systolic function.  · Normal central venous pressure (3 mmHg).      Body mass index is 38.89 kg/m².    Tobacco Use: Not on file       Social History     Substance and Sexual Activity   Drug Use No        Alcohol Use: Not on file       Review of patient's allergies indicates:   Allergen Reactions    Iodinated contrast media     Keflex [cephalexin] Nausea Only         Airway:  No value filed.         Pre-op Assessment    I have reviewed the Patient Summary Reports.     I have reviewed the Nursing Notes. I have reviewed the NPO Status.   I have reviewed the Medications.     Review of Systems  Anesthesia Hx:  No problems with previous Anesthesia  History of prior surgery of interest to airway management or planning: Denies Family Hx of Anesthesia complications.   Denies Personal Hx of Anesthesia complications.   Cardiovascular:   Hypertension    Pulmonary:   Sleep Apnea    Renal/:   Chronic Renal Disease, ESRD, Dialysis    Neurological:   CVA    Endocrine:   Diabetes, poorly controlled, type 2, using insulin  Morbid Obesity / BMI > 40  Psych:   Psychiatric History             Anesthesia Plan  Type of Anesthesia, risks & benefits discussed:    Anesthesia Type: Gen Natural Airway, MAC  Intra-op Monitoring Plan: Standard ASA Monitors  Post Op Pain Control Plan: multimodal analgesia and IV/PO Opioids PRN  Induction:  IV  Informed Consent: Informed consent signed with the Patient and all parties understand the risks and agree with anesthesia plan.  All questions answered. Patient consented to blood products? No  ASA Score: 4    Ready For Surgery From Anesthesia Perspective.     .

## 2023-07-11 NOTE — ASSESSMENT & PLAN NOTE
- Continue to monitor H/H, frequency per primary team   - Continue PPI  - would transfuse 1 unit now, followed by lasix if patient still makes urine   - Maintain NPO status   - tentative plan for EGD today

## 2023-07-11 NOTE — CONSULTS
NEPHROLOGY CONSULT NOTE    HPI & INTERVAL HISTORY:    Past Medical History:   Diagnosis Date    Anemia due to stage 5 chronic kidney disease 3/1/2019    Anticoagulant long-term use     Diabetes mellitus type II     Dialysis patient     Encounter for blood transfusion     Hyperlipidemia     Hypertension     Kidney failure     MIKE (obstructive sleep apnea)     Stroke     Urinary tract infection       Past Surgical History:   Procedure Laterality Date    AV FISTULA PLACEMENT      left lower arm    COLONOSCOPY N/A 4/15/2021    Procedure: COLONOSCOPY;  Surgeon: Ruddy Plaza MD;  Location: [facility];  Service: Endoscopy;  Laterality: N/A;    COLONOSCOPY W/ POLYPECTOMY  04/15/2021    FUSION OF CERVICAL SPINE BY POSTERIOR APPROACH N/A 3/3/2019    Procedure: SPINE, CERVICAL, POSTERIOR APPROACH  LAMINECTOMY C3-C6; C6-C7; WITH MEDIAL DISCECTOMY;  Surgeon: Ruddy Wylie MD;  Location: [address];  Service: Neurosurgery;  Laterality: N/A;  POSTERIOR    TONSILLECTOMY, ADENOIDECTOMY        Review of patient's allergies indicates:   Allergen Reactions    Iodinated contrast media     Keflex [cephalexin] Nausea Only      Medications Prior to Admission   Medication Sig Dispense Refill Last Dose    calcium acetate,phosphat bind, (PHOSLO) 667 mg capsule Take 667 mg by mouth once daily. Take 3 capsules by mouth with meals and 1 capsule with snacks       cyanocobalamin (VITAMIN B-12) 1000 MCG tablet Take 1,000 mcg by mouth once daily.       ergocalciferol (ERGOCALCIFEROL) 50,000 unit Cap Take 50,000 Units by mouth daily as needed. Once a month. On the first of the month.       FLUoxetine 10 MG capsule Take 40 mg by mouth once daily.       folic acid/vit B complex and C (RENAL-TINO ORAL) Take 1 tablet by mouth once daily.       gabapentin (NEURONTIN) 300 MG capsule TAKE 1 BY MOUTH EVERY      EVENING 90 capsule 1     lactulose (CEPHULAC) 10 gram packet Take 10 g by mouth.       midodrine (PROAMATINE) 10 MG tablet Take 1 tablet  "by mouth 3 (three) times daily.       pantoprazole (PROTONIX) 40 MG tablet Take 1 tablet (40 mg total) by mouth once daily. 90 tablet 2     pravastatin (PRAVACHOL) 10 MG tablet Take 10 mg by mouth once daily.       [DISCONTINUED] midodrine (PROAMATINE) 10 MG tablet Take 2 tablets (20 mg total) by mouth every 6 (six) hours. (Patient taking differently: Take 10 mg by mouth 3 (three) times daily. Take 2 tablets by mouth 3 times daily as directed and as needed) 240 tablet 11     blood sugar diagnostic (ONETOUCH ULTRA BLUE TEST STRIP) Strp Check blood glucose qac and as directed by physican 100 strip 6     blood-glucose meter (ADVANCED GLUCOSE METER) Misc 1 each by Misc.(Non-Drug; Combo Route) route 4 (four) times daily with meals and nightly. 1 each 0     lancets Misc 1 each by Misc.(Non-Drug; Combo Route) route 3 (three) times daily. 200 each 6     pen needle, diabetic (BD ULTRA-FINE MINI PEN NEEDLE) 31 gauge x 3/16" Ndle USE 4 TO 5 TIMES A DAY     ( DISCARD PEN NEEDLE AFTER EACH USE ) 100 each 6     [DISCONTINUED] ascorbic acid, vitamin C, (VITAMIN C) 1000 MG tablet Take 1,000 mg by mouth once daily. Winter C is the brand name.       [DISCONTINUED] atorvastatin (LIPITOR) 20 MG tablet Take 20 mg by mouth once daily.       [DISCONTINUED] cinacalcet (SENSIPAR) 30 MG Tab Take 30 mg by mouth 3 (three) times a week.       [DISCONTINUED] fluticasone (VERAMYST) 27.5 mcg/actuation nasal spray 2 sprays by Nasal route.       [DISCONTINUED] heparin sodium,porcine (HEPARIN, PORCINE,) 5,000 unit/mL injection Inject 1 mL (5,000 Units total) into the skin every 8 (eight) hours.       [DISCONTINUED] Lactobacillus acidophilus (PROBIOTIC ORAL) Take by mouth.       [DISCONTINUED] LIDOcaine (LIDODERM) 5 % Place 1 patch onto the skin once daily. Remove & Discard patch within 12 hours or as directed by MD 5 patch 0     [DISCONTINUED] linaCLOtide (LINZESS) 290 mcg Cap capsule Take 290 mcg by mouth.       [DISCONTINUED] linaCLOtide (LINZESS) " 72 mcg Cap capsule Linzess 72 mcg capsule   Take 1 capsule every day by oral route for 90 days.       [DISCONTINUED] mupirocin (BACTROBAN) 2 % ointment mupirocin 2 % topical ointment   APPLY TOPICALLY TO THE AFFECTED AREA(S) THREE TIMES DAILY FOR 14 DAYS       [DISCONTINUED] nystatin (MYCOSTATIN) powder APPLY TOPICALLY TO THE AFFECTED AREA(S) TWICE DAILY       [DISCONTINUED] omega-3 fatty acids/fish oil (FISH OIL-OMEGA-3 FATTY ACIDS) 300-1,000 mg capsule Take 1 capsule by mouth once daily. daily       [DISCONTINUED] polyethylene glycol 3350 (MIRALAX ORAL) Take by mouth.       [DISCONTINUED] promethazine (PHENERGAN) 25 MG tablet Take 25 mg by mouth daily as needed for Nausea. And vomiting       [DISCONTINUED] senna (SENOKOT) 8.6 mg tablet Take 1 tablet by mouth once daily. Take as directed and as needed for constipation       [DISCONTINUED] zinc oxide 16 % Oint ointment daily as needed.          Social History     Socioeconomic History    Marital status: Single   Tobacco Use    Smoking status: Never    Smokeless tobacco: Never   Substance and Sexual Activity    Alcohol use: No    Drug use: No    Sexual activity: Not Currently        MEDS   calcium acetate(phosphat bind)  2,001 mg Oral TID WM    cinacalcet  30 mg Oral Once per day on Mon Wed Fri    FLUoxetine  40 mg Oral Daily    fluticasone propionate  2 spray Each Nostril Daily    gabapentin  300 mg Oral QHS    midodrine  10 mg Oral TID    olopatadine  1 drop Both Eyes BID    pantoprazole  40 mg Intravenous BID    [START ON 7/12/2023] pravastatin  10 mg Oral QHS    vitamin renal formula (B-complex-vitamin c-folic acid)  1 capsule Oral Daily        ROS:          CONTINOUS INFUSIONS:      Intake/Output Summary (Last 24 hours) at 7/11/2023 1807  Last data filed at 7/11/2023 1532  Gross per 24 hour   Intake 250 ml   Output --   Net 250 ml        HEMODYNAMICS:    Temp:  [96.3 °F (35.7 °C)-97.9 °F (36.6 °C)] 97 °F (36.1 °C)  Pulse:  [45-81] 69  Resp:  [12-24] 18  SpO2:   [96 %-100 %] 97 %  BP: (100-137)/(38-71) 130/61   General:   Weakness  Fatigue  Some abdominal pain  Nausea  Vomiting   Decreased appetite  Melanotic stool  Cardiology : pulse 45  Pulmonary :diminished breath sounds  Abdomen soft   Extremities : edema   Skin:dry   LABS   Lab Results   Component Value Date    WBC 5.92 07/11/2023    HGB 7.1 (L) 07/11/2023    HCT 22.4 (L) 07/11/2023     (H) 07/11/2023    PLT 94 (L) 07/11/2023        Recent Labs   Lab 07/11/23  0019 07/11/23  0557    89   CALCIUM 10.2 9.9   ALBUMIN 3.2*  --    PROT 6.3  --     140   K 3.6 3.9   CO2 29 29    103   BUN 34* 36*   CREATININE 8.7* 8.9*   ALKPHOS 111  --    ALT 21  --    AST 26  --    BILITOT 0.9  --       Lab Results   Component Value Date    CALCIUM 9.9 07/11/2023    PHOS 4.1 09/17/2019      Lab Results   Component Value Date    IRON 169 (H) 03/01/2019    TIBC 218 (L) 03/01/2019    FERRITIN 914 (H) 03/01/2019        ABG  No results for input(s): PH, PO2, PCO2, HCO3, BE in the last 168 hours.      IMAGING:  CXR    ASSESSMENT / PLAN  ESRD   Left arm AV fistula  Received dialysis yesterday  Metabolic bone disease  Anemia Hb 7.1  S/p EGD  Poor nutrition  Albumin 3.2  Blood pressure 130/61  Dialysis tomorrow

## 2023-07-11 NOTE — SUBJECTIVE & OBJECTIVE
Past Medical History:   Diagnosis Date    Anemia due to stage 5 chronic kidney disease 3/1/2019    Anticoagulant long-term use     Diabetes mellitus type II     Dialysis patient     Encounter for blood transfusion     Hyperlipidemia     Hypertension     Kidney failure     MIKE (obstructive sleep apnea)     Stroke     Urinary tract infection        Past Surgical History:   Procedure Laterality Date    AV FISTULA PLACEMENT      left lower arm    COLONOSCOPY N/A 4/15/2021    Procedure: COLONOSCOPY;  Surgeon: Ruddy Plaza MD;  Location: [facility];  Service: Endoscopy;  Laterality: N/A;    COLONOSCOPY W/ POLYPECTOMY  04/15/2021    FUSION OF CERVICAL SPINE BY POSTERIOR APPROACH N/A 3/3/2019    Procedure: SPINE, CERVICAL, POSTERIOR APPROACH  LAMINECTOMY C3-C6; C6-C7; WITH MEDIAL DISCECTOMY;  Surgeon: Ruddy Wylie MD;  Location: [facility] OR [address] [place] [address];  Service: Neurosurgery;  Laterality: N/A;  POSTERIOR    TONSILLECTOMY, ADENOIDECTOMY         Review of patient's allergies indicates:   Allergen Reactions    Iodinated contrast media     Keflex [cephalexin] Nausea Only       No current facility-administered medications on file prior to encounter.     Current Outpatient Medications on File Prior to Encounter   Medication Sig    ascorbic acid, vitamin C, (VITAMIN C) 1000 MG tablet Take 1,000 mg by mouth once daily. Winter C is the brand name.    atorvastatin (LIPITOR) 20 MG tablet Take 20 mg by mouth once daily.    blood sugar diagnostic (ONETOUCH ULTRA BLUE TEST STRIP) Strp Check blood glucose qac and as directed by physican    blood-glucose meter (ADVANCED GLUCOSE METER) Misc 1 each by Misc.(Non-Drug; Combo Route) route 4 (four) times daily with meals and nightly.    calcium acetate,phosphat bind, (PHOSLO) 667 mg capsule Take 667 mg by mouth once daily. Take 3 capsules by mouth with meals and 1 capsule with snacks    cinacalcet (SENSIPAR) 30 MG Tab Take 30 mg by mouth 3 (three) times a week.    ergocalciferol (ERGOCALCIFEROL)  "50,000 unit Cap Take 50,000 Units by mouth daily as needed. Once a month. On the first of the month.    FLUoxetine 10 MG capsule Take 40 mg by mouth once daily.    fluticasone (VERAMYST) 27.5 mcg/actuation nasal spray 2 sprays by Nasal route.    folic acid/vit B complex and C (RENAL-TINO ORAL) Take 1 tablet by mouth once daily.    gabapentin (NEURONTIN) 300 MG capsule TAKE 1 BY MOUTH EVERY      EVENING    heparin sodium,porcine (HEPARIN, PORCINE,) 5,000 unit/mL injection Inject 1 mL (5,000 Units total) into the skin every 8 (eight) hours.    Lactobacillus acidophilus (PROBIOTIC ORAL) Take by mouth.    lactulose (CEPHULAC) 10 gram packet Take 10 g by mouth.    lancets Misc 1 each by Misc.(Non-Drug; Combo Route) route 3 (three) times daily.    LIDOcaine (LIDODERM) 5 % Place 1 patch onto the skin once daily. Remove & Discard patch within 12 hours or as directed by MD    linaCLOtide (LINZESS) 290 mcg Cap capsule Take 290 mcg by mouth.    linaCLOtide (LINZESS) 72 mcg Cap capsule Linzess 72 mcg capsule   Take 1 capsule every day by oral route for 90 days.    midodrine (PROAMATINE) 10 MG tablet Take 2 tablets (20 mg total) by mouth every 6 (six) hours. (Patient taking differently: Take 20 mg by mouth 3 (three) times daily. Take 2 tablets by mouth 3 times daily as directed and as needed)    mupirocin (BACTROBAN) 2 % ointment mupirocin 2 % topical ointment   APPLY TOPICALLY TO THE AFFECTED AREA(S) THREE TIMES DAILY FOR 14 DAYS    nystatin (MYCOSTATIN) powder APPLY TOPICALLY TO THE AFFECTED AREA(S) TWICE DAILY    omega-3 fatty acids/fish oil (FISH OIL-OMEGA-3 FATTY ACIDS) 300-1,000 mg capsule Take 1 capsule by mouth once daily. daily    pantoprazole (PROTONIX) 40 MG tablet Take 1 tablet (40 mg total) by mouth once daily.    pen needle, diabetic (BD ULTRA-FINE MINI PEN NEEDLE) 31 gauge x 3/16" Ndle USE 4 TO 5 TIMES A DAY     ( DISCARD PEN NEEDLE AFTER EACH USE )    polyethylene glycol 3350 (MIRALAX ORAL) Take by mouth.    " pravastatin (PRAVACHOL) 10 MG tablet Take 10 mg by mouth once daily.    promethazine (PHENERGAN) 25 MG tablet Take 25 mg by mouth daily as needed for Nausea. And vomiting    senna (SENOKOT) 8.6 mg tablet Take 1 tablet by mouth once daily. Take as directed and as needed for constipation    zinc oxide 16 % Oint ointment daily as needed.     Family History       Problem Relation (Age of Onset)    Colon cancer Mother    Diabetes Mother, Father, Maternal Grandmother, Maternal Grandfather    Heart disease Father    Hypertension Father    Lung cancer Mother          Tobacco Use    Smoking status: Never    Smokeless tobacco: Never   Substance and Sexual Activity    Alcohol use: No    Drug use: No    Sexual activity: Not Currently     Review of Systems   Constitutional:  Positive for activity change and fatigue.   HENT:  Negative for dental problem and drooling.    Eyes:  Negative for redness.   Respiratory:  Negative for choking and chest tightness.    Cardiovascular:  Negative for palpitations and leg swelling.   Gastrointestinal:  Negative for abdominal pain and anal bleeding.   Endocrine: Negative for polydipsia.   Genitourinary:  Negative for flank pain and hematuria.   Musculoskeletal:  Negative for gait problem and joint swelling.   Skin:  Negative for pallor and rash.   Neurological:  Negative for light-headedness and headaches.   Psychiatric/Behavioral:  Negative for agitation and behavioral problems.    Objective:     Vital Signs (Most Recent):  Temp: 96.3 °F (35.7 °C) (07/10/23 2317)  Pulse: (!) 47 (07/11/23 0458)  Resp: 18 (07/11/23 0458)  BP: (!) 112/53 (07/11/23 0458)  SpO2: 97 % (07/11/23 0458) Vital Signs (24h Range):  Temp:  [96.3 °F (35.7 °C)] 96.3 °F (35.7 °C)  Pulse:  [47-54] 47  Resp:  [16-24] 18  SpO2:  [96 %-100 %] 97 %  BP: (100-121)/(49-57) 112/53        There is no height or weight on file to calculate BMI.     Physical Exam  Constitutional:       General: He is not in acute distress.      Appearance: Normal appearance. He is not ill-appearing.   HENT:      Head: Normocephalic and atraumatic.      Right Ear: There is no impacted cerumen.      Left Ear: There is no impacted cerumen.      Nose: No congestion or rhinorrhea.      Mouth/Throat:      Pharynx: No oropharyngeal exudate or posterior oropharyngeal erythema.   Eyes:      General:         Right eye: No discharge.         Left eye: No discharge.   Neck:      Vascular: No carotid bruit.   Cardiovascular:      Rate and Rhythm: Bradycardia present.      Heart sounds: No murmur heard.    No friction rub.   Pulmonary:      Breath sounds: No wheezing, rhonchi or rales.   Abdominal:      General: There is no distension.      Tenderness: There is no abdominal tenderness.      Hernia: No hernia is present.   Musculoskeletal:         General: No deformity.      Cervical back: No rigidity.   Lymphadenopathy:      Cervical: No cervical adenopathy.   Skin:     Coloration: Skin is not jaundiced.      Findings: No lesion.   Neurological:      Mental Status: He is alert.      Cranial Nerves: No cranial nerve deficit.   Psychiatric:         Mood and Affect: Mood normal.         Behavior: Behavior normal.              Significant Labs: All pertinent labs within the past 24 hours have been reviewed.  A1C: No results for input(s): HGBA1C in the last 4320 hours.  Bilirubin:   Recent Labs   Lab 07/11/23 0019   BILITOT 0.9     BMP:   Recent Labs   Lab 07/11/23 0019         K 3.6      CO2 29   BUN 34*   CREATININE 8.7*   CALCIUM 10.2     CBC:   Recent Labs   Lab 07/11/23 0019   WBC 5.92   HGB 8.1*   HCT 25.5*   PLT 94*     CMP:   Recent Labs   Lab 07/11/23 0019      K 3.6      CO2 29      BUN 34*   CREATININE 8.7*   CALCIUM 10.2   PROT 6.3   ALBUMIN 3.2*   BILITOT 0.9   ALKPHOS 111   AST 26   ALT 21   ANIONGAP 10     Lipase: No results for input(s): LIPASE in the last 48 hours.  Troponin:   Recent Labs   Lab 07/11/23 0019    TROPONINI 0.024     TSH: No results for input(s): TSH in the last 4320 hours.    Significant Imaging: I have reviewed all pertinent imaging results/findings within the past 24 hours.  I have reviewed and interpreted all pertinent imaging results/findings within the past 24 hours.

## 2023-07-11 NOTE — ASSESSMENT & PLAN NOTE
Patient is slightly hypotensive presentation    Plan:  May give midodrine 20 mg every 8 hours as needed to maintain maps greater than 60

## 2023-07-11 NOTE — H&P
[facility] Medicine  History & Physical    Patient Name: Angel Farmer Jr.  MRN: [id number]  Patient Class: OP- Observation  Admission Date: 7/10/2023  Attending Physician: Pacheco Charles MD  Primary Care Provider: Melvin Terry MD         Patient information was obtained from patient and ER records.     Subjective:     Principal Problem:Melena    Chief Complaint:   Chief Complaint   Patient presents with    Abnormal Lab     Pt to ED via EMS from home with CC of abnormal labs. His dialysis clinic reported a hemoglobin of 7 and told him to come to the ER. Pt had dialysis today. He is goes M/W/F. Pt states his only symptom is weakness but that he feels this way after dialysis often. Pt skin is pale in triage.        HPI: Angel Farmer is 62yr with PMH of hyperlipidemia, ESRD on HD, diabetes, MIKE who presents with abnormal labs.    After dialysis on Monday, patient was told by providers that his hemoglobin was low and that he should go to the ED for further evaluation.  Patient reports having dark stools over the past week but no obvious signs of blood loss such as vomiting blood or coughing up blood.  Patient reports worsening weakness over the past week, but denies any shortness of breath or chest pain.    In the ED, triage vitals were significant for bradycardia and low blood pressures.  CBC significant for macrocytic anemia.  CMP was significant for elevated creatinine.  Troponin was negative.  Occult blood test was positive.    Patient admitted for evaluation of anemia and melena.      Past Medical History:   Diagnosis Date    Anemia due to stage 5 chronic kidney disease 3/1/2019    Anticoagulant long-term use     Diabetes mellitus type II     Dialysis patient     Encounter for blood transfusion     Hyperlipidemia     Hypertension     Kidney failure     MIKE (obstructive sleep apnea)     Stroke     Urinary tract infection        Past Surgical History:   Procedure  Laterality Date    AV FISTULA PLACEMENT      left lower arm    COLONOSCOPY N/A 4/15/2021    Procedure: COLONOSCOPY;  Surgeon: Ruddy Plaza MD;  Location: [facility];  Service: Endoscopy;  Laterality: N/A;    COLONOSCOPY W/ POLYPECTOMY  04/15/2021    FUSION OF CERVICAL SPINE BY POSTERIOR APPROACH N/A 3/3/2019    Procedure: SPINE, CERVICAL, POSTERIOR APPROACH  LAMINECTOMY C3-C6; C6-C7; WITH MEDIAL DISCECTOMY;  Surgeon: Ruddy Wylie MD;  Location: [address];  Service: Neurosurgery;  Laterality: N/A;  POSTERIOR    TONSILLECTOMY, ADENOIDECTOMY         Review of patient's allergies indicates:   Allergen Reactions    Iodinated contrast media     Keflex [cephalexin] Nausea Only       No current facility-administered medications on file prior to encounter.     Current Outpatient Medications on File Prior to Encounter   Medication Sig    ascorbic acid, vitamin C, (VITAMIN C) 1000 MG tablet Take 1,000 mg by mouth once daily. Winter C is the brand name.    atorvastatin (LIPITOR) 20 MG tablet Take 20 mg by mouth once daily.    blood sugar diagnostic (ONETOUCH ULTRA BLUE TEST STRIP) Strp Check blood glucose qac and as directed by physican    blood-glucose meter (ADVANCED GLUCOSE METER) Misc 1 each by Misc.(Non-Drug; Combo Route) route 4 (four) times daily with meals and nightly.    calcium acetate,phosphat bind, (PHOSLO) 667 mg capsule Take 667 mg by mouth once daily. Take 3 capsules by mouth with meals and 1 capsule with snacks    cinacalcet (SENSIPAR) 30 MG Tab Take 30 mg by mouth 3 (three) times a week.    ergocalciferol (ERGOCALCIFEROL) 50,000 unit Cap Take 50,000 Units by mouth daily as needed. Once a month. On the first of the month.    FLUoxetine 10 MG capsule Take 40 mg by mouth once daily.    fluticasone (VERAMYST) 27.5 mcg/actuation nasal spray 2 sprays by Nasal route.    folic acid/vit B complex and C (RENAL-TINO ORAL) Take 1 tablet by mouth once daily.    gabapentin (NEURONTIN) 300 MG  "capsule TAKE 1 BY MOUTH EVERY      EVENING    heparin sodium,porcine (HEPARIN, PORCINE,) 5,000 unit/mL injection Inject 1 mL (5,000 Units total) into the skin every 8 (eight) hours.    Lactobacillus acidophilus (PROBIOTIC ORAL) Take by mouth.    lactulose (CEPHULAC) 10 gram packet Take 10 g by mouth.    lancets Misc 1 each by Misc.(Non-Drug; Combo Route) route 3 (three) times daily.    LIDOcaine (LIDODERM) 5 % Place 1 patch onto the skin once daily. Remove & Discard patch within 12 hours or as directed by MD    linaCLOtide (LINZESS) 290 mcg Cap capsule Take 290 mcg by mouth.    linaCLOtide (LINZESS) 72 mcg Cap capsule Linzess 72 mcg capsule   Take 1 capsule every day by oral route for 90 days.    midodrine (PROAMATINE) 10 MG tablet Take 2 tablets (20 mg total) by mouth every 6 (six) hours. (Patient taking differently: Take 20 mg by mouth 3 (three) times daily. Take 2 tablets by mouth 3 times daily as directed and as needed)    mupirocin (BACTROBAN) 2 % ointment mupirocin 2 % topical ointment   APPLY TOPICALLY TO THE AFFECTED AREA(S) THREE TIMES DAILY FOR 14 DAYS    nystatin (MYCOSTATIN) powder APPLY TOPICALLY TO THE AFFECTED AREA(S) TWICE DAILY    omega-3 fatty acids/fish oil (FISH OIL-OMEGA-3 FATTY ACIDS) 300-1,000 mg capsule Take 1 capsule by mouth once daily. daily    pantoprazole (PROTONIX) 40 MG tablet Take 1 tablet (40 mg total) by mouth once daily.    pen needle, diabetic (BD ULTRA-FINE MINI PEN NEEDLE) 31 gauge x 3/16" Ndle USE 4 TO 5 TIMES A DAY     ( DISCARD PEN NEEDLE AFTER EACH USE )    polyethylene glycol 3350 (MIRALAX ORAL) Take by mouth.    pravastatin (PRAVACHOL) 10 MG tablet Take 10 mg by mouth once daily.    promethazine (PHENERGAN) 25 MG tablet Take 25 mg by mouth daily as needed for Nausea. And vomiting    senna (SENOKOT) 8.6 mg tablet Take 1 tablet by mouth once daily. Take as directed and as needed for constipation    zinc oxide 16 % Oint ointment daily as needed.     Family " History       Problem Relation (Age of Onset)    Colon cancer Mother    Diabetes Mother, Father, Maternal Grandmother, Maternal Grandfather    Heart disease Father    Hypertension Father    Lung cancer Mother          Tobacco Use    Smoking status: Never    Smokeless tobacco: Never   Substance and Sexual Activity    Alcohol use: No    Drug use: No    Sexual activity: Not Currently     Review of Systems   Constitutional:  Positive for activity change and fatigue.   HENT:  Negative for dental problem and drooling.    Eyes:  Negative for redness.   Respiratory:  Negative for choking and chest tightness.    Cardiovascular:  Negative for palpitations and leg swelling.   Gastrointestinal:  Negative for abdominal pain and anal bleeding.   Endocrine: Negative for polydipsia.   Genitourinary:  Negative for flank pain and hematuria.   Musculoskeletal:  Negative for gait problem and joint swelling.   Skin:  Negative for pallor and rash.   Neurological:  Negative for light-headedness and headaches.   Psychiatric/Behavioral:  Negative for agitation and behavioral problems.    Objective:     Vital Signs (Most Recent):  Temp: 96.3 °F (35.7 °C) (07/10/23 2317)  Pulse: (!) 47 (07/11/23 0458)  Resp: 18 (07/11/23 0458)  BP: (!) 112/53 (07/11/23 0458)  SpO2: 97 % (07/11/23 0458) Vital Signs (24h Range):  Temp:  [96.3 °F (35.7 °C)] 96.3 °F (35.7 °C)  Pulse:  [47-54] 47  Resp:  [16-24] 18  SpO2:  [96 %-100 %] 97 %  BP: (100-121)/(49-57) 112/53        There is no height or weight on file to calculate BMI.     Physical Exam  Constitutional:       General: He is not in acute distress.     Appearance: Normal appearance. He is not ill-appearing.   HENT:      Head: Normocephalic and atraumatic.      Right Ear: There is no impacted cerumen.      Left Ear: There is no impacted cerumen.      Nose: No congestion or rhinorrhea.      Mouth/Throat:      Pharynx: No oropharyngeal exudate or posterior oropharyngeal erythema.   Eyes:      General:          Right eye: No discharge.         Left eye: No discharge.   Neck:      Vascular: No carotid bruit.   Cardiovascular:      Rate and Rhythm: Bradycardia present.      Heart sounds: No murmur heard.    No friction rub.   Pulmonary:      Breath sounds: No wheezing, rhonchi or rales.   Abdominal:      General: There is no distension.      Tenderness: There is no abdominal tenderness.      Hernia: No hernia is present.   Musculoskeletal:         General: No deformity.      Cervical back: No rigidity.   Lymphadenopathy:      Cervical: No cervical adenopathy.   Skin:     Coloration: Skin is not jaundiced.      Findings: No lesion.   Neurological:      Mental Status: He is alert.      Cranial Nerves: No cranial nerve deficit.   Psychiatric:         Mood and Affect: Mood normal.         Behavior: Behavior normal.              Significant Labs: All pertinent labs within the past 24 hours have been reviewed.  A1C: No results for input(s): HGBA1C in the last 4320 hours.  Bilirubin:   Recent Labs   Lab 07/11/23 0019   BILITOT 0.9     BMP:   Recent Labs   Lab 07/11/23 0019         K 3.6      CO2 29   BUN 34*   CREATININE 8.7*   CALCIUM 10.2     CBC:   Recent Labs   Lab 07/11/23 0019   WBC 5.92   HGB 8.1*   HCT 25.5*   PLT 94*     CMP:   Recent Labs   Lab 07/11/23 0019      K 3.6      CO2 29      BUN 34*   CREATININE 8.7*   CALCIUM 10.2   PROT 6.3   ALBUMIN 3.2*   BILITOT 0.9   ALKPHOS 111   AST 26   ALT 21   ANIONGAP 10     Lipase: No results for input(s): LIPASE in the last 48 hours.  Troponin:   Recent Labs   Lab 07/11/23 0019   TROPONINI 0.024     TSH: No results for input(s): TSH in the last 4320 hours.    Significant Imaging: I have reviewed all pertinent imaging results/findings within the past 24 hours.  I have reviewed and interpreted all pertinent imaging results/findings within the past 24 hours.    Assessment/Plan:     * Melena  Noted to have dark stools with a past  week  Hemoglobin of 8 which is lower than his previous  Positive occult blood test  Baseline anemia from his CKD    Plan:  H&H every 8 hours  Transfuse for hemoglobin less than 7  Pantoprazole b.i.d.  NPO   Consult GI for possible scope      Anxiety and depression  Patient has persistent depression which is moderate and is currently controlled. Will Continue anti-depressant medications. We will not consult psychiatry at this time. Patient does not display psychosis at this time. Continue to monitor closely and adjust plan of care as needed.        Allergies  No current complaints of allergies    Plan:  Continue Flonase      Thrombocytopenia  Noted history of thrombocytopenia    Plan:  Daily CBC  Hold heparin products  Transfuse platelets if less than 10      Bradycardia  Patient bradycardic although asymptomatic    Plan:  Continuous tele      Hypotension  Patient is slightly hypotensive presentation    Plan:  May give midodrine 20 mg every 8 hours as needed to maintain maps greater than 60      MIKE (obstructive sleep apnea) - very severe latest sleep study says BPAP @ 16/8  Continue patient's BiPAP for his MIKE      ESRD (end stage renal disease) on dialysis  Patient received dialysis on Monday    Plan:  Continue cinacalcet  Continue calcium acetate capsules 667 mg  Dialysis on MWF      Hyperlipidemia  Plan:  Continue with daily atorvastatin 20 mg      Diabetes mellitus, type 2  Patient's FSGs are controlled on current medication regimen.  Last A1c reviewed-   Lab Results   Component Value Date    HGBA1C 5.0 07/18/2019     Most recent fingerstick glucose reviewed- No results for input(s): POCTGLUCOSE in the last 24 hours.  Maintain anti-hyperglycemic dose as follows-   Antihyperglycemics (From admission, onward)    None        Hold Oral hypoglycemics while patient is in the hospital.      VTE Risk Mitigation (From admission, onward)         Ordered     Place sequential compression device  Until discontinued          07/11/23 0156     IP VTE HIGH RISK PATIENT  Once         07/11/23 0156     Place sequential compression device  Until discontinued         07/11/23 0153                   On 07/11/2023, patient should be placed in hospital observation services under my care.        Pacheco Charles MD  Department of Hospital Medicine  [facility] - Emergency Dept

## 2023-07-11 NOTE — NURSING
"Arrived on the unit on the stretcher, AAOX3, transferred to bed states does not ambulate "just lays on bed." Able to move all his extremities, states he has left should pain can not move his left arms all the way. Plan of care explained, safety measures in place, call bell in reach, instructed to call for assistance. Vital signs per flow sheet.  "

## 2023-07-11 NOTE — PROVATION PATIENT INSTRUCTIONS
Discharge Summary/Instructions after an Endoscopic Procedure  Patient Name: Angel Farmre  Patient MRN: [id number]  Patient YOB: 1960  Tuesday, July 11, 2023  Joey Quintanilla MD  Dear patient,  As a result of recent federal legislation (The Federal Cures Act), you may   receive lab or pathology results from your procedure in your [facility]   account before your physician is able to contact you. Your physician or   their representative will relay the results to you with their   recommendations at their soonest availability.  Thank you,  Your health is very important to us during the Covid Crisis. Following your   procedure today, you will receive a daily text for 2 weeks asking about   signs or symptoms of Covid 19.  Please respond to this text when you   receive it so we can follow up and keep you as safe as possible.   RESTRICTIONS:  During your procedure today, you received medications for sedation.  These   medications may affect your judgment, balance and coordination.  Therefore,   for 24 hours, you have the following restrictions:   - DO NOT drive a car, operate machinery, make legal/financial decisions,   sign important papers or drink alcohol.    ACTIVITY:  Today: no heavy lifting, straining or running due to procedural   sedation/anesthesia.  The following day: return to full activity including work.  DIET:  Eat and drink normally unless instructed otherwise.     TREATMENT FOR COMMON SIDE EFFECTS:  - Mild abdominal pain, nausea, belching, bloating or excessive gas:  rest,   eat lightly and use a heating pad.  - Sore Throat: treat with throat lozenges and/or gargle with warm salt   water.  - Because air was used during the procedure, expelling large amounts of air   from your rectum or belching is normal.  - If a bowel prep was taken, you may not have a bowel movement for 1-3 days.    This is normal.  SYMPTOMS TO WATCH FOR AND REPORT TO YOUR PHYSICIAN:  1. Abdominal pain or bloating, other than  gas cramps.  2. Chest pain.  3. Back pain.  4. Signs of infection such as: chills or fever occurring within 24 hours   after the procedure.  5. Rectal bleeding, which would show as bright red, maroon, or black stools.   (A tablespoon of blood from the rectum is not serious, especially if   hemorrhoids are present.)  6. Vomiting.  7. Weakness or dizziness.  GO DIRECTLY TO THE NEAREST EMERGENCY ROOM IF YOU HAVE ANY OF THE FOLLOWING:      Difficulty breathing              Chills and/or fever over 101 F   Persistent vomiting and/or vomiting blood   Severe abdominal pain   Severe chest pain   Black, tarry stools   Bleeding- more than one tablespoon   Any other symptom or condition that you feel may need urgent attention  Your doctor recommends these additional instructions:  If any biopsies were taken, your doctors clinic will contact you in 1 to 2   weeks with any results.  - Return patient to hospital [facility] for ongoing care.   - Resume previous diet.   - Continue present medications.   - Await pathology results.   - Pathology will determine next steps, but these polypoid lesions,   particularly in duodenum, are likely source of chronic blood loss, will   likely need multidisciplinary approach.  For questions, problems or results please call your physician - oJey Quintanilla MD.  EMERGENCY PHONE NUMBER: [phone],  LAB RESULTS: [phone]  IF A COMPLICATION OR EMERGENCY SITUATION ARISES AND YOU ARE UNABLE TO REACH   YOUR PHYSICIAN - GO DIRECTLY TO THE EMERGENCY ROOM.  Joey Quintanilla MD  7/11/2023 3:39:07 PM  This report has been verified and signed electronically.  Dear patient,  As a result of recent federal legislation (The Federal Cures Act), you may   receive lab or pathology results from your procedure in your [facility]   account before your physician is able to contact you. Your physician or   their representative will relay the results to you with their   recommendations at their soonest  availability.  Thank you,  PROVATION

## 2023-07-11 NOTE — HPI
Angel Farmer is 62yr with PMH of hyperlipidemia, ESRD on HD, diabetes, MIKE who presents with abnormal labs.    After dialysis on Monday, patient was told by providers that his hemoglobin was low and that he should go to the ED for further evaluation.  Patient reports having dark stools over the past week but no obvious signs of blood loss such as vomiting blood or coughing up blood.  Patient reports worsening weakness over the past week, but denies any shortness of breath or chest pain.    In the ED, triage vitals were significant for bradycardia and low blood pressures.  CBC significant for macrocytic anemia.  CMP was significant for elevated creatinine.  Troponin was negative.  Occult blood test was positive.    Patient admitted for evaluation of anemia and melena.

## 2023-07-11 NOTE — PLAN OF CARE
SW met with pt at bedside to discuss dc planning. Pt confirmed information on chart. Pt resides in home with his brother and sister in law. Pt require assistance with ADLs from his family. Pt reports that he has a supportive family. Pt reports that he is current with HH but unaware of HH agency. Pt reports that he has a wheelchair and hospital bed at home. Pt reports tht he gets HD at [facility] at 5 am. Pt reports while at HD they utilizes sher lift to get in and out HD chair. Pt reports that his family transports him to and from HD. Pt reports that he will need ambulance transport home. Pt made aware to contact SW with any questions or concerns. SW will continue to follow pt throughout his transitions of acre and assist with any dc needs.        07/11/23 1051   Discharge Assessment   Assessment Type Discharge Planning Assessment   Confirmed/corrected address, phone number and insurance Yes   Confirmed Demographics Correct on Facesheet   Source of Information patient   Communicated KATERIN with patient/caregiver Yes   Reason For Admission Melena (Chronic)   People in Home sibling(s)   Do you expect to return to your current living situation? Yes   Do you have help at home or someone to help you manage your care at home? Yes   Who are your caregiver(s) and their phone number(s)? [place] (Relative)   [phone]   Prior to hospitilization cognitive status: Alert/Oriented   Current cognitive status: Alert/Oriented   Home Layout Able to live on 1st floor   Equipment Currently Used at Home wheelchair   Readmission within 30 days? No   Patient currently being followed by outpatient case management? No   Do you currently have service(s) that help you manage your care at home? No   Do you take prescription medications? Yes   Do you have prescription coverage? Yes   Coverage MEDICARE - MEDICARE PART A & B   Do you have any problems affording any of your prescribed medications? No   Is the patient taking  medications as prescribed? yes   Who is going to help you get home at discharge? Ambulance transport   How do you get to doctors appointments? family or friend will provide   Are you on dialysis? Yes   Dialysis Name and Scheduled days [facility] @ 5am   Do you take coumadin? No   Discharge Plan A Home with family;Home Health   DME Needed Upon Discharge  wheelchair;hospital bed   Discharge Plan discussed with: Patient   Transition of Care Barriers None   OTHER   Name(s) of People in Home Brother and sister in law

## 2023-07-11 NOTE — SUBJECTIVE & OBJECTIVE
Past Medical History:   Diagnosis Date    Anemia due to stage 5 chronic kidney disease 3/1/2019    Anticoagulant long-term use     Diabetes mellitus type II     Dialysis patient     Encounter for blood transfusion     Hyperlipidemia     Hypertension     Kidney failure     MIKE (obstructive sleep apnea)     Stroke     Urinary tract infection        Past Surgical History:   Procedure Laterality Date    AV FISTULA PLACEMENT      left lower arm    COLONOSCOPY N/A 4/15/2021    Procedure: COLONOSCOPY;  Surgeon: Ruddy Plaza MD;  Location: [facility];  Service: Endoscopy;  Laterality: N/A;    COLONOSCOPY W/ POLYPECTOMY  04/15/2021    FUSION OF CERVICAL SPINE BY POSTERIOR APPROACH N/A 3/3/2019    Procedure: SPINE, CERVICAL, POSTERIOR APPROACH  LAMINECTOMY C3-C6; C6-C7; WITH MEDIAL DISCECTOMY;  Surgeon: Ruddy Wylie MD;  Location: [facility] OR [address] [place] [address];  Service: Neurosurgery;  Laterality: N/A;  POSTERIOR    TONSILLECTOMY, ADENOIDECTOMY         Review of patient's allergies indicates:   Allergen Reactions    Iodinated contrast media     Keflex [cephalexin] Nausea Only     Family History       Problem Relation (Age of Onset)    Colon cancer Mother    Diabetes Mother, Father, Maternal Grandmother, Maternal Grandfather    Heart disease Father    Hypertension Father    Lung cancer Mother          Tobacco Use    Smoking status: Never    Smokeless tobacco: Never   Substance and Sexual Activity    Alcohol use: No    Drug use: No    Sexual activity: Not Currently     Review of Systems   Constitutional:  Positive for activity change, appetite change and fatigue. Negative for chills, fever and unexpected weight change.   Respiratory:  Negative for shortness of breath.    Cardiovascular:  Negative for chest pain and palpitations.   Gastrointestinal:  Positive for abdominal pain, blood in stool, nausea and vomiting. Negative for abdominal distention, constipation and diarrhea.   Neurological:  Negative for dizziness and  light-headedness.   Objective:     Vital Signs (Most Recent):  Temp: 96.3 °F (35.7 °C) (07/10/23 2317)  Pulse: (!) 47 (07/11/23 0458)  Resp: 18 (07/11/23 0458)  BP: (!) 112/53 (07/11/23 0458)  SpO2: 97 % (07/11/23 0458) Vital Signs (24h Range):  Temp:  [96.3 °F (35.7 °C)] 96.3 °F (35.7 °C)  Pulse:  [47-54] 47  Resp:  [16-24] 18  SpO2:  [96 %-100 %] 97 %  BP: (100-121)/(49-57) 112/53        There is no height or weight on file to calculate BMI.    No intake or output data in the 24 hours ending 07/11/23 0800    Lines/Drains/Airways       Peripheral Intravenous Line  Duration                  Hemodialysis AV Fistula 12/23/12 Left forearm 3852 days         Peripheral IV - Single Lumen 07/11/23 0019 20 G Anterior;Right Forearm <1 day                     Physical Exam  Constitutional:       General: He is not in acute distress.     Appearance: He is not toxic-appearing.   HENT:      Head: Normocephalic and atraumatic.      Right Ear: External ear normal.      Left Ear: External ear normal.      Nose: Nose normal.      Mouth/Throat:      Mouth: Mucous membranes are moist.      Pharynx: Oropharynx is clear.   Eyes:      General:         Right eye: No discharge.         Left eye: No discharge.      Extraocular Movements: Extraocular movements intact.      Conjunctiva/sclera: Conjunctivae normal.   Cardiovascular:      Rate and Rhythm: Regular rhythm. Bradycardia present.      Pulses: Normal pulses.      Heart sounds: Normal heart sounds. No murmur heard.  Pulmonary:      Effort: Pulmonary effort is normal. No respiratory distress.   Abdominal:      General: Abdomen is flat. Bowel sounds are normal. There is no distension.      Palpations: Abdomen is soft.      Tenderness: There is no abdominal tenderness. There is no guarding.   Skin:     General: Skin is warm and dry.      Capillary Refill: Capillary refill takes less than 2 seconds.      Coloration: Skin is jaundiced.   Neurological:      Mental Status: He is alert and  oriented to person, place, and time. Mental status is at baseline.   Psychiatric:         Mood and Affect: Mood normal.         Behavior: Behavior normal.        Significant Labs:  CBC:   Recent Labs   Lab 07/11/23 0019   WBC 5.92   HGB 8.1*   HCT 25.5*   PLT 94*     BMP:   Recent Labs   Lab 07/11/23 0557   GLU 89      K 3.9      CO2 29   BUN 36*   CREATININE 8.9*   CALCIUM 9.9     CMP:   Recent Labs   Lab 07/11/23 0019 07/11/23 0557    89   CALCIUM 10.2 9.9   ALBUMIN 3.2*  --    PROT 6.3  --     140   K 3.6 3.9   CO2 29 29    103   BUN 34* 36*   CREATININE 8.7* 8.9*   ALKPHOS 111  --    ALT 21  --    AST 26  --    BILITOT 0.9  --        Significant Imaging:  Imaging results within the past 24 hours have been reviewed.

## 2023-07-11 NOTE — HPI
Angel Farmer is a 61 yo male with pmhx significant for ESRD (MWF), HLD, T2DM (HbA1c 5 in 2020), and multiple colonic polyps in the setting of FH CRC (mother) who presented to the ED after abnormal lab findings were documented in dialysis.     Mr. Farmer was in his usual state of health until ~1.5 weeks ago when he began to notice melanotic stools. He notes that he had 4-5 of these stools with the most recent occurring over the weekend associated with some abdominal pain, decreased appetite, and nausea. He did note NBNB emesis x1 after eating greasy food last week. He notes that he was told his blood counts were low in HD session last week, and after his session on 7/10, he was told to report to the ED for evaluation. On presentation he noted that he was fatigued but attributes this to his HD session. He denies any recent weight loss, night sweats, fevers, chills, diarrhea, and constipation at this time.

## 2023-07-11 NOTE — ANESTHESIA POSTPROCEDURE EVALUATION
Anesthesia Post Evaluation    Patient: Angel Farmer     Procedure(s) Performed: Procedure(s) (LRB):  EGD (ESOPHAGOGASTRODUODENOSCOPY) (N/A)    Final Anesthesia Type: general      Patient location during evaluation: PACU  Patient participation: Yes- Able to Participate  Level of consciousness: awake and alert  Post-procedure vital signs: reviewed and stable  Pain management: adequate  Airway patency: patent    PONV status at discharge: No PONV  Anesthetic complications: no      Cardiovascular status: blood pressure returned to baseline  Respiratory status: unassisted  Hydration status: euvolemic            Vitals Value Taken Time   /71 07/11/23 1607   Temp 36.6 °C (97.9 °F) 07/11/23 1537   Pulse 72 07/11/23 1607   Resp 12 07/11/23 1607   SpO2 99 % 07/11/23 1607         Event Time   Out of Recovery 07/11/2023 16:07:00         Pain/Benny Score: Benny Score: 10 (7/11/2023  4:07 PM)

## 2023-07-11 NOTE — NURSING
Educated on the need of blood transfusion, signs and symptoms of any reaction and how and when to report to the RN. Verbalized understanding. Intiated   blood infusion.Vital signs per flow sheet.

## 2023-07-11 NOTE — TRANSFER OF CARE
"Anesthesia Transfer of Care Note    Patient: Angel Faremr Jr.    Procedure(s) Performed: Procedure(s) (LRB):  EGD (ESOPHAGOGASTRODUODENOSCOPY) (N/A)    Patient location: GI    Anesthesia Type: general    Transport from OR: Transported from OR on 2-3 L/min O2 by NC with adequate spontaneous ventilation    Post pain: adequate analgesia    Post assessment: no apparent anesthetic complications and tolerated procedure well    Post vital signs: stable    Level of consciousness: sedated    Nausea/Vomiting: no nausea/vomiting    Complications: none    Transfer of care protocol was followed      Last vitals:   Visit Vitals  /62 (BP Location: Left arm, Patient Position: Lying)   Pulse 80   Temp 36.4 °C (97.5 °F) (Temporal)   Resp 18   Ht 5' 7" (1.702 m)   Wt 112.6 kg (248 lb 4.8 oz)   SpO2 100%   BMI 38.89 kg/m²     " Dual Chamber AICD Evaluation Report  REMOTE/CARELINK    September 19, 2018    Primary Cardiologist: Jocelyn  : Med[employer]onic Model: Protecta XT DR [id number]  Implant date: 9/27/2012    Reason for evaluation: routine  Indication for AICD: chronic systolic congestive heart failure and nonischemic cardiomyopathy    Measurements  Atrial sensing - P wave: 1.5 mV  Atrial threshold: 0.5 V @ 0.4 ms  Atrial lead impedance: 399 ohms  Ventricular sensing - R wave: 5 mV  Ventricular threshold: 0.875 V @ 0.4 ms  Ventricular lead impedance:  361 ohms  Shock impedance:  RV- 38 ohms   SVC- 43 ohms      Diagnostic Data  Atrial paced: 49.2 %  Ventricular paced: 0.2 %    Episodes recorded since 6/13/2018:  1 AT/AF episode: duration 29m:32s, average ventricular rate 111 bpm.  Anticoagulated with coumadin.  Optivol is trending up at 80.  Patient reports increased fatigue and shortness of breath.  Reports currently taking lasix Tues, Thurs, Sat, Sun.    Battery status: estimated 5.7-8.7 months remaining      Final Parameters  Mode: AAIR+  Lower rate: 60 bpm Upper rate: 130 bpm  AV Delay: Paced- 180 ms    Sensed- 150 ms     Atrial - Amplitude: 1.5 V Pulse width: 0.4 ms Sensitivity: 0.6 mV   Ventricular - Amplitude: 2 V Pulse width: 0.4 ms Sensitivity: 0.3 mV   Changes made: No changes made.  Conclusions: normal AICD function, no therapy delivered, stable pacing and sensing thresholds and battery at < 6 months remaining    Follow up: 3 months

## 2023-07-11 NOTE — ED PROVIDER NOTES
ED Provider Note - 7/10/2023    History     Chief Complaint   Patient presents with    Abnormal Lab     Pt to ED via EMS from home with CC of abnormal labs. His dialysis clinic reported a hemoglobin of 7 and told him to come to the ER. Pt had dialysis today. He is goes M/W/F. Pt states his only symptom is weakness but that he feels this way after dialysis often. Pt skin is pale in triage.       Angel Farmer Jr. is a 62 y.o. year old male with past medical and surgical history as seen below, presenting with chief complaint of low blood counts on outpatient testing.  Reports seeing intermittent black stool at times but no fely blood.  Is fatigued today but states no different than normal after dialysis session.  Last dialysis session today.  No fever or chills.  No abdominal pain.        Past Medical History:   Diagnosis Date    Anemia due to stage 5 chronic kidney disease 3/1/2019    Anticoagulant long-term use     Diabetes mellitus type II     Dialysis patient     Encounter for blood transfusion     Hyperlipidemia     Hypertension     Kidney failure     MIKE (obstructive sleep apnea)     Stroke     Urinary tract infection      Past Surgical History:   Procedure Laterality Date    AV FISTULA PLACEMENT      left lower arm    COLONOSCOPY N/A 4/15/2021    Procedure: COLONOSCOPY;  Surgeon: Ruddy Plaza MD;  Location: [facility];  Service: Endoscopy;  Laterality: N/A;    COLONOSCOPY W/ POLYPECTOMY  04/15/2021    FUSION OF CERVICAL SPINE BY POSTERIOR APPROACH N/A 3/3/2019    Procedure: SPINE, CERVICAL, POSTERIOR APPROACH  LAMINECTOMY C3-C6; C6-C7; WITH MEDIAL DISCECTOMY;  Surgeon: Ruddy Wylie MD;  Location: [address];  Service: Neurosurgery;  Laterality: N/A;  POSTERIOR    TONSILLECTOMY, ADENOIDECTOMY           Family History   Problem Relation Age of Onset    Colon cancer Mother     Lung cancer Mother     Diabetes Mother     Diabetes Father     Heart disease Father     Hypertension Father     Diabetes  Maternal Grandmother     Diabetes Maternal Grandfather      Social History     Tobacco Use    Smoking status: Never    Smokeless tobacco: Never   Substance Use Topics    Alcohol use: No    Drug use: No     Social Determinants of Health with Concerns     Tobacco Use: Not on file   Alcohol Use: Not on file   Financial Resource Strain: Not on file   Food Insecurity: Not on file   Transportation Needs: Not on file   Physical Activity: Not on file   Stress: Not on file   Social Connections: Not on file   Housing Stability: Not on file   Depression: Not on file      Review of patient's allergies indicates:   Allergen Reactions    Iodinated contrast media     Keflex [cephalexin] Nausea Only       Review of Systems     A full Review of Systems (ROS) was performed and was negative unless otherwise stated in the HPI.      Physical Exam     Vitals:    07/10/23 2317 07/11/23 0023   BP: (!) 100/49 (!) 108/53   Pulse: (!) 54 (!) 51   Resp: 16 (!) 24   Temp: 96.3 °F (35.7 °C)    TempSrc: Oral    SpO2: 100% 99%        Physical Exam    Nursing note and vitals reviewed.  Constitutional: He appears well-developed and well-nourished. No distress.   HENT:   Head: Normocephalic and atraumatic.   Right Ear: External ear normal.   Left Ear: External ear normal.   Nose: Nose normal.   Mouth/Throat: Oropharynx is clear and moist.   Eyes: Conjunctivae and EOM are normal. Pupils are equal, round, and reactive to light.   Neck: Neck supple.   Normal range of motion.  Cardiovascular:  Normal rate and regular rhythm.           No murmur heard.  Pulmonary/Chest: Breath sounds normal. No stridor. No respiratory distress. He has no wheezes. He has no rhonchi. He has no rales.   Abdominal: Abdomen is soft. Bowel sounds are normal. There is no abdominal tenderness.   Genitourinary: Rectum:      Guaiac result positive.      External hemorrhoid present.      No anal fissure.   Guaiac positive stool. : Acceptable.   Genitourinary  Comments: Scant amount red blood seen on digital rectal examination with brown stool.  No melena noted.     Musculoskeletal:         General: No edema. Normal range of motion.      Cervical back: Normal range of motion and neck supple.     Neurological: He is alert and oriented to person, place, and time. He has normal strength. No cranial nerve deficit or sensory deficit.   Skin: Skin is warm and dry. No rash noted.   Psychiatric: He has a normal mood and affect. Thought content normal.       Lab Results- Independently reviewed by myself      Labs Reviewed   CBC W/ AUTO DIFFERENTIAL - Abnormal; Notable for the following components:       Result Value    RBC 2.34 (*)     Hemoglobin 8.1 (*)     Hematocrit 25.5 (*)      (*)     MCH 34.6 (*)     MCHC 31.8 (*)     RDW 16.0 (*)     Platelets 94 (*)     All other components within normal limits   COMPREHENSIVE METABOLIC PANEL - Abnormal; Notable for the following components:    BUN 34 (*)     Creatinine 8.7 (*)     Albumin 3.2 (*)     eGFR 6 (*)     All other components within normal limits   OCCULT BLOOD X 1, STOOL - Abnormal; Notable for the following components:    Occult Blood Positive (*)     All other components within normal limits   TROPONIN I   TYPE & SCREEN           Imaging     Imaging Results    None                 ED Course         Critical Care    Date/Time: 7/11/2023 4:24 AM  Performed by: Aron Peraza MD  Authorized by: Aron Peraza MD   Direct patient critical care time: 12 minutes  Additional history critical care time: 7 minutes  Ordering / reviewing critical care time: 7 minutes  Documentation critical care time: 8 minutes  Consulting other physicians critical care time: 5 minutes  Total critical care time (exclusive of procedural time) : 39 minutes  Critical care time was exclusive of separately billable procedures and treating other patients and teaching time.  Critical care was necessary to treat or prevent imminent or life-threatening  deterioration of the following conditions: GI bleed.  Critical care was time spent personally by me on the following activities: blood draw for specimens, discussions with consultants, interpretation of cardiac output measurements, evaluation of patient's response to treatment, examination of patient, obtaining history from patient or surrogate, ordering and review of laboratory studies, ordering and performing treatments and interventions, ordering and review of radiographic studies, pulse oximetry, re-evaluation of patient's condition and review of old charts.             Orders Placed This Encounter    CBC auto differential    Comprehensive metabolic panel    Troponin I    Occult blood x 1, stool    Type & Screen    Insert Saline lock IV    Possible Hospitalization                      Medical Decision Making       The patient's list of active medical problems, social history, medications, and allergies as documented per RN staff has been reviewed.     Comorbidities taken into consideration for development of diagnosis and treatment plan include HTN and end-stage renal disease, obesity.    Medical Decision Making:   Clinical Tests:   Lab Tests: Ordered and Reviewed  ED Management:  62-year-old male with presumed acute blood loss anemia related to GI bleed.  No melena seen on rectal exam.  Given decrease in hemoglobin from most recent baseline, have discussed case with Hospital Medicine who will continue evaluation and management.  Other:   I have discussed this case with another health care provider.               Clinical Impression              ED Disposition Condition    Observation Stable            Diagnosis    ICD-10-CM ICD-9-CM   1. Anemia, unspecified type  D64.9 285.9   2. Lower GI bleed  K92.2 578.9   3. Chest pain  R07.9 786.50   4. Melena  K92.1 578.1   5. Symptomatic anemia  D64.9 285.9           Aron Peraza MD        07/11/2023          DISCLAIMER: This note was prepared with M*modal voice  recognition transcription software. Garbled syntax, mangled pronouns, and other bizarre constructions may be attributed to that software system.       Aron Peraza MD  07/12/23 4739

## 2023-07-11 NOTE — ASSESSMENT & PLAN NOTE
Patient's FSGs are controlled on current medication regimen.  Last A1c reviewed-   Lab Results   Component Value Date    HGBA1C 5.0 07/18/2019     Most recent fingerstick glucose reviewed- No results for input(s): POCTGLUCOSE in the last 24 hours.  Maintain anti-hyperglycemic dose as follows-   Antihyperglycemics (From admission, onward)    None        Hold Oral hypoglycemics while patient is in the hospital.

## 2023-07-11 NOTE — ASSESSMENT & PLAN NOTE
Noted history of thrombocytopenia    Plan:  Daily CBC  Hold heparin products  Transfuse platelets if less than 10

## 2023-07-11 NOTE — PLAN OF CARE
Reported off to [place], v/s  97.9  66  14  112/38  99%RA.  Pt. Will be transported back to room 476  via stretcher.

## 2023-07-11 NOTE — ASSESSMENT & PLAN NOTE
Noted to have dark stools with a past week  Hemoglobin of 8 which is lower than his previous  Positive occult blood test  Baseline anemia from his CKD    Plan:  H&H every 8 hours  Transfuse for hemoglobin less than 7  Pantoprazole b.i.d.  NPO   Consult GI for possible scope

## 2023-07-11 NOTE — ASSESSMENT & PLAN NOTE
Patient received dialysis on Monday    Plan:  Continue cinacalcet  Continue calcium acetate capsules 667 mg  Dialysis on MWF

## 2023-07-11 NOTE — CONSULTS
[facility] - Telemetry  Gastroenterology  Consult Note    Patient Name: Angel Farmer Jr.  MRN: [id number]  Admission Date: 7/10/2023  Hospital Length of Stay: 0 days  Code Status: Full Code   Attending Provider: Rafal Motley, *   Consulting Provider: Jose Johnson MD  Primary Care Physician: Melvin Terry MD  Principal Problem:Melena    Inpatient consult to Gastroenterology  Consult performed by: Jose Johnson MD  Consult ordered by: Pacheco Charles MD        Subjective:     HPI:  Angel Farmer is a 61 yo male with pmhx significant for ESRD (MWF), HLD, T2DM (HbA1c 5 in 2020), and multiple colonic polyps in the setting of FH CRC (mother) who presented to the ED after abnormal lab findings were documented in dialysis.     Mr. Farmer was in his usual state of health until ~1.5 weeks ago when he began to notice melanotic stools. He notes that he had 4-5 of these stools with the most recent occurring over the weekend associated with some abdominal pain, decreased appetite, and nausea. He did note NBNB emesis x1 after eating greasy food last week. He notes that he was told his blood counts were low in HD session last week, and after his session on 7/10, he was told to report to the ED for evaluation. On presentation he noted that he was fatigued but attributes this to his HD session. He denies any recent weight loss, night sweats, fevers, chills, diarrhea, and constipation at this time.       Past Medical History:   Diagnosis Date    Anemia due to stage 5 chronic kidney disease 3/1/2019    Anticoagulant long-term use     Diabetes mellitus type II     Dialysis patient     Encounter for blood transfusion     Hyperlipidemia     Hypertension     Kidney failure     MIKE (obstructive sleep apnea)     Stroke     Urinary tract infection        Past Surgical History:   Procedure Laterality Date    AV FISTULA PLACEMENT      left lower arm    COLONOSCOPY N/A 4/15/2021    Procedure: COLONOSCOPY;   Surgeon: Ruddy Plaza MD;  Location: [facility];  Service: Endoscopy;  Laterality: N/A;    COLONOSCOPY W/ POLYPECTOMY  04/15/2021    FUSION OF CERVICAL SPINE BY POSTERIOR APPROACH N/A 3/3/2019    Procedure: SPINE, CERVICAL, POSTERIOR APPROACH  LAMINECTOMY C3-C6; C6-C7; WITH MEDIAL DISCECTOMY;  Surgeon: Ruddy Wylie MD;  Location: [facility] [address] [place] [address];  Service: Neurosurgery;  Laterality: N/A;  POSTERIOR    TONSILLECTOMY, ADENOIDECTOMY         Review of patient's allergies indicates:   Allergen Reactions    Iodinated contrast media     Keflex [cephalexin] Nausea Only     Family History       Problem Relation (Age of Onset)    Colon cancer Mother    Diabetes Mother, Father, Maternal Grandmother, Maternal Grandfather    Heart disease Father    Hypertension Father    Lung cancer Mother          Tobacco Use    Smoking status: Never    Smokeless tobacco: Never   Substance and Sexual Activity    Alcohol use: No    Drug use: No    Sexual activity: Not Currently     Review of Systems   Constitutional:  Positive for activity change, appetite change and fatigue. Negative for chills, fever and unexpected weight change.   Respiratory:  Negative for shortness of breath.    Cardiovascular:  Negative for chest pain and palpitations.   Gastrointestinal:  Positive for abdominal pain, blood in stool, nausea and vomiting. Negative for abdominal distention, constipation and diarrhea.   Neurological:  Negative for dizziness and light-headedness.   Objective:     Vital Signs (Most Recent):  Temp: 96.3 °F (35.7 °C) (07/10/23 2317)  Pulse: (!) 47 (07/11/23 0458)  Resp: 18 (07/11/23 0458)  BP: (!) 112/53 (07/11/23 0458)  SpO2: 97 % (07/11/23 0458) Vital Signs (24h Range):  Temp:  [96.3 °F (35.7 °C)] 96.3 °F (35.7 °C)  Pulse:  [47-54] 47  Resp:  [16-24] 18  SpO2:  [96 %-100 %] 97 %  BP: (100-121)/(49-57) 112/53        There is no height or weight on file to calculate BMI.    No intake or output data in the 24 hours ending  07/11/23 0800    Lines/Drains/Airways       Peripheral Intravenous Line  Duration                  Hemodialysis AV Fistula 12/23/12 Left forearm 3852 days         Peripheral IV - Single Lumen 07/11/23 0019 20 G Anterior;Right Forearm <1 day                     Physical Exam  Constitutional:       General: He is not in acute distress.     Appearance: He is not toxic-appearing.   HENT:      Head: Normocephalic and atraumatic.      Right Ear: External ear normal.      Left Ear: External ear normal.      Nose: Nose normal.      Mouth/Throat:      Mouth: Mucous membranes are moist.      Pharynx: Oropharynx is clear.   Eyes:      General:         Right eye: No discharge.         Left eye: No discharge.      Extraocular Movements: Extraocular movements intact.      Conjunctiva/sclera: Conjunctivae normal.   Cardiovascular:      Rate and Rhythm: Regular rhythm. Bradycardia present.      Pulses: Normal pulses.      Heart sounds: Normal heart sounds. No murmur heard.  Pulmonary:      Effort: Pulmonary effort is normal. No respiratory distress.   Abdominal:      General: Abdomen is flat. Bowel sounds are normal. There is no distension.      Palpations: Abdomen is soft.      Tenderness: There is no abdominal tenderness. There is no guarding.   Skin:     General: Skin is warm and dry.      Capillary Refill: Capillary refill takes less than 2 seconds.      Coloration: Skin is jaundiced.   Neurological:      Mental Status: He is alert and oriented to person, place, and time. Mental status is at baseline.   Psychiatric:         Mood and Affect: Mood normal.         Behavior: Behavior normal.        Significant Labs:  CBC:   Recent Labs   Lab 07/11/23  0019   WBC 5.92   HGB 8.1*   HCT 25.5*   PLT 94*     BMP:   Recent Labs   Lab 07/11/23  0557   GLU 89      K 3.9      CO2 29   BUN 36*   CREATININE 8.9*   CALCIUM 9.9     CMP:   Recent Labs   Lab 07/11/23  0019 07/11/23  0557    89   CALCIUM 10.2 9.9   ALBUMIN 3.2*   --    PROT 6.3  --     140   K 3.6 3.9   CO2 29 29    103   BUN 34* 36*   CREATININE 8.7* 8.9*   ALKPHOS 111  --    ALT 21  --    AST 26  --    BILITOT 0.9  --        Significant Imaging:  Imaging results within the past 24 hours have been reviewed.    Assessment/Plan:       GI  Symptomatic anemia  * Melena  - Continue to monitor H/H, frequency per primary team   - Continue IV PPI BID  - would transfuse 1 unit now if able, followed by lasix if patient still makes urine   - Maintain NPO status, active type and screen    - tentative plan for EGD today       Thank you for your consult.     Jose Johnson MD  Gastroenterology  [place] - Telemetry

## 2023-07-12 VITALS
BODY MASS INDEX: 39.93 KG/M2 | RESPIRATION RATE: 16 BRPM | SYSTOLIC BLOOD PRESSURE: 145 MMHG | HEIGHT: 67 IN | OXYGEN SATURATION: 97 % | WEIGHT: 254.44 LBS | DIASTOLIC BLOOD PRESSURE: 64 MMHG | HEART RATE: 72 BPM | TEMPERATURE: 97 F

## 2023-07-12 LAB
ANION GAP SERPL CALC-SCNC: 12 MMOL/L (ref 8–16)
BASOPHILS # BLD AUTO: 0.01 K/UL (ref 0–0.2)
BASOPHILS NFR BLD: 0.3 % (ref 0–1.9)
BUN SERPL-MCNC: 47 MG/DL (ref 6–20)
CALCIUM SERPL-MCNC: 9.1 MG/DL (ref 8.7–10.5)
CHLORIDE SERPL-SCNC: 104 MMOL/L (ref 95–110)
CO2 SERPL-SCNC: 24 MMOL/L (ref 23–29)
CREAT SERPL-MCNC: 10.1 MG/DL (ref 0.5–1.4)
DIFFERENTIAL METHOD: ABNORMAL
EOSINOPHIL # BLD AUTO: 0.1 K/UL (ref 0–0.5)
EOSINOPHIL NFR BLD: 3.1 % (ref 0–8)
ERYTHROCYTE [DISTWIDTH] IN BLOOD BY AUTOMATED COUNT: 17.6 % (ref 11.5–14.5)
EST. GFR  (NO RACE VARIABLE): 5 ML/MIN/1.73 M^2
GLUCOSE SERPL-MCNC: 73 MG/DL (ref 70–110)
HCT VFR BLD AUTO: 21.5 % (ref 40–54)
HCT VFR BLD AUTO: 21.9 % (ref 40–54)
HCT VFR BLD AUTO: 22 % (ref 40–54)
HCT VFR BLD AUTO: 22.7 % (ref 40–54)
HGB BLD-MCNC: 7 G/DL (ref 14–18)
HGB BLD-MCNC: 7 G/DL (ref 14–18)
HGB BLD-MCNC: 7.2 G/DL (ref 14–18)
HGB BLD-MCNC: 7.2 G/DL (ref 14–18)
IMM GRANULOCYTES # BLD AUTO: 0.02 K/UL (ref 0–0.04)
IMM GRANULOCYTES NFR BLD AUTO: 0.7 % (ref 0–0.5)
LYMPHOCYTES # BLD AUTO: 0.9 K/UL (ref 1–4.8)
LYMPHOCYTES NFR BLD: 31.6 % (ref 18–48)
MCH RBC QN AUTO: 34.5 PG (ref 27–31)
MCHC RBC AUTO-ENTMCNC: 31.8 G/DL (ref 32–36)
MCV RBC AUTO: 108 FL (ref 82–98)
MONOCYTES # BLD AUTO: 0.3 K/UL (ref 0.3–1)
MONOCYTES NFR BLD: 11.5 % (ref 4–15)
NEUTROPHILS # BLD AUTO: 1.5 K/UL (ref 1.8–7.7)
NEUTROPHILS NFR BLD: 52.8 % (ref 38–73)
NRBC BLD-RTO: 0 /100 WBC
PLATELET # BLD AUTO: 68 K/UL (ref 150–450)
PLATELET BLD QL SMEAR: ABNORMAL
PMV BLD AUTO: 12.6 FL (ref 9.2–12.9)
POTASSIUM SERPL-SCNC: 3.9 MMOL/L (ref 3.5–5.1)
RBC # BLD AUTO: 2.03 M/UL (ref 4.6–6.2)
SODIUM SERPL-SCNC: 140 MMOL/L (ref 136–145)
WBC # BLD AUTO: 2.88 K/UL (ref 3.9–12.7)

## 2023-07-12 PROCEDURE — 85018 HEMOGLOBIN: CPT | Performed by: STUDENT IN AN ORGANIZED HEALTH CARE EDUCATION/TRAINING PROGRAM

## 2023-07-12 PROCEDURE — 96374 THER/PROPH/DIAG INJ IV PUSH: CPT | Mod: 59

## 2023-07-12 PROCEDURE — 25000003 PHARM REV CODE 250: Performed by: INTERNAL MEDICINE

## 2023-07-12 PROCEDURE — G0257 UNSCHED DIALYSIS ESRD PT HOS: HCPCS

## 2023-07-12 PROCEDURE — G0378 HOSPITAL OBSERVATION PER HR: HCPCS

## 2023-07-12 PROCEDURE — 99214 PR OFFICE/OUTPT VISIT, EST, LEVL IV, 30-39 MIN: ICD-10-PCS | Mod: GC,,, | Performed by: INTERNAL MEDICINE

## 2023-07-12 PROCEDURE — 25000242 PHARM REV CODE 250 ALT 637 W/ HCPCS: Performed by: STUDENT IN AN ORGANIZED HEALTH CARE EDUCATION/TRAINING PROGRAM

## 2023-07-12 PROCEDURE — 85018 HEMOGLOBIN: CPT | Mod: 91 | Performed by: STUDENT IN AN ORGANIZED HEALTH CARE EDUCATION/TRAINING PROGRAM

## 2023-07-12 PROCEDURE — A9698 NON-RAD CONTRAST MATERIALNOC: HCPCS | Performed by: STUDENT IN AN ORGANIZED HEALTH CARE EDUCATION/TRAINING PROGRAM

## 2023-07-12 PROCEDURE — 25500020 PHARM REV CODE 255: Performed by: STUDENT IN AN ORGANIZED HEALTH CARE EDUCATION/TRAINING PROGRAM

## 2023-07-12 PROCEDURE — 85014 HEMATOCRIT: CPT | Mod: 91 | Performed by: STUDENT IN AN ORGANIZED HEALTH CARE EDUCATION/TRAINING PROGRAM

## 2023-07-12 PROCEDURE — 96376 TX/PRO/DX INJ SAME DRUG ADON: CPT

## 2023-07-12 PROCEDURE — 80048 BASIC METABOLIC PNL TOTAL CA: CPT | Performed by: STUDENT IN AN ORGANIZED HEALTH CARE EDUCATION/TRAINING PROGRAM

## 2023-07-12 PROCEDURE — 85025 COMPLETE CBC W/AUTO DIFF WBC: CPT | Performed by: STUDENT IN AN ORGANIZED HEALTH CARE EDUCATION/TRAINING PROGRAM

## 2023-07-12 PROCEDURE — 63600175 PHARM REV CODE 636 W HCPCS: Performed by: STUDENT IN AN ORGANIZED HEALTH CARE EDUCATION/TRAINING PROGRAM

## 2023-07-12 PROCEDURE — 99214 OFFICE O/P EST MOD 30 MIN: CPT | Mod: GC,,, | Performed by: INTERNAL MEDICINE

## 2023-07-12 PROCEDURE — 96372 THER/PROPH/DIAG INJ SC/IM: CPT | Mod: 59 | Performed by: STUDENT IN AN ORGANIZED HEALTH CARE EDUCATION/TRAINING PROGRAM

## 2023-07-12 PROCEDURE — 85014 HEMATOCRIT: CPT | Performed by: STUDENT IN AN ORGANIZED HEALTH CARE EDUCATION/TRAINING PROGRAM

## 2023-07-12 PROCEDURE — 25000003 PHARM REV CODE 250: Performed by: STUDENT IN AN ORGANIZED HEALTH CARE EDUCATION/TRAINING PROGRAM

## 2023-07-12 PROCEDURE — C9113 INJ PANTOPRAZOLE SODIUM, VIA: HCPCS | Performed by: STUDENT IN AN ORGANIZED HEALTH CARE EDUCATION/TRAINING PROGRAM

## 2023-07-12 PROCEDURE — 36415 COLL VENOUS BLD VENIPUNCTURE: CPT | Performed by: STUDENT IN AN ORGANIZED HEALTH CARE EDUCATION/TRAINING PROGRAM

## 2023-07-12 PROCEDURE — 80100016 HC MAINTENANCE HEMODIALYSIS

## 2023-07-12 RX ORDER — DIPHENHYDRAMINE HYDROCHLORIDE 50 MG/ML
50 INJECTION INTRAMUSCULAR; INTRAVENOUS ONCE
Status: COMPLETED | OUTPATIENT
Start: 2023-07-12 | End: 2023-07-12

## 2023-07-12 RX ADMIN — BARIUM SULFATE 1350 ML: 1 SUSPENSION ORAL at 01:07

## 2023-07-12 RX ADMIN — OLOPATADINE HYDROCHLORIDE 1 DROP: 1.11 SOLUTION/ DROPS OPHTHALMIC at 10:07

## 2023-07-12 RX ADMIN — HYDROCORTISONE SODIUM SUCCINATE 200 MG: 100 INJECTION, POWDER, FOR SOLUTION INTRAMUSCULAR; INTRAVENOUS at 02:07

## 2023-07-12 RX ADMIN — OLOPATADINE HYDROCHLORIDE 1 DROP: 1.11 SOLUTION/ DROPS OPHTHALMIC at 08:07

## 2023-07-12 RX ADMIN — MUPIROCIN: 20 OINTMENT TOPICAL at 10:07

## 2023-07-12 RX ADMIN — MIDODRINE HYDROCHLORIDE 10 MG: 5 TABLET ORAL at 08:07

## 2023-07-12 RX ADMIN — CALCIUM ACETATE 2001 MG: 667 CAPSULE ORAL at 08:07

## 2023-07-12 RX ADMIN — DIPHENHYDRAMINE HYDROCHLORIDE 50 MG: 50 INJECTION, SOLUTION INTRAMUSCULAR; INTRAVENOUS at 02:07

## 2023-07-12 RX ADMIN — IOHEXOL 150 ML: 350 INJECTION, SOLUTION INTRAVENOUS at 03:07

## 2023-07-12 RX ADMIN — NEPHROCAP 1 CAPSULE: 1 CAP ORAL at 08:07

## 2023-07-12 RX ADMIN — FLUTICASONE PROPIONATE 100 MCG: 50 SPRAY, METERED NASAL at 08:07

## 2023-07-12 RX ADMIN — MUPIROCIN: 20 OINTMENT TOPICAL at 08:07

## 2023-07-12 RX ADMIN — PRAVASTATIN SODIUM 10 MG: 10 TABLET ORAL at 10:07

## 2023-07-12 RX ADMIN — GABAPENTIN 300 MG: 300 CAPSULE ORAL at 10:07

## 2023-07-12 RX ADMIN — FLUOXETINE 40 MG: 20 CAPSULE ORAL at 08:07

## 2023-07-12 RX ADMIN — PANTOPRAZOLE SODIUM 40 MG: 40 INJECTION, POWDER, LYOPHILIZED, FOR SOLUTION INTRAVENOUS at 08:07

## 2023-07-12 RX ADMIN — HYDROCORTISONE SODIUM SUCCINATE 200 MG: 100 INJECTION, POWDER, FOR SOLUTION INTRAMUSCULAR; INTRAVENOUS at 10:07

## 2023-07-12 NOTE — PROGRESS NOTES
[facility]  Gastroenterology  Progress Note    Patient Name: Angel Farmer Jr.  MRN: [id number]  Admission Date: 7/10/2023  Hospital Length of Stay: 0 days  Code Status: Full Code   Attending Provider: Oumou Terry MD  Consulting Provider: Justin López MD  Primary Care Physician: Melvin Terry MD  Principal Problem: Melena        Subjective:     Interval History: Patient resting comfortably in bed on interview in no acute distress. Denies chest pain, shortness of breath, nausea, vomiting, abdominal pain, hematochezia, melena. Ordered CT enterography, discussed with primary team. Patient has allergy listed in chart but appears to be due to CKD, denies history of anaphylaxis or severe reaction. Primary team with pretreat out of caution.     Review of Systems   Gastrointestinal:  Negative for abdominal pain, blood in stool, constipation, diarrhea, nausea and vomiting.   Objective:     Vital Signs (Most Recent):  Temp: 97.4 °F (36.3 °C) (07/12/23 0747)  Pulse: (!) 54 (07/12/23 0747)  Resp: 17 (07/12/23 0747)  BP: 129/62 (07/12/23 0747)  SpO2: 95 % (07/12/23 0747) Vital Signs (24h Range):  Temp:  [96.8 °F (36 °C)-98 °F (36.7 °C)] 97.4 °F (36.3 °C)  Pulse:  [45-81] 54  Resp:  [12-20] 17  SpO2:  [92 %-100 %] 95 %  BP: (112-137)/(38-71) 129/62     Weight: 115.4 kg (254 lb 6.6 oz) (07/12/23 0417)  Body mass index is 39.85 kg/m².      Intake/Output Summary (Last 24 hours) at 7/12/2023 1004  Last data filed at 7/12/2023 0858  Gross per 24 hour   Intake 555 ml   Output 0 ml   Net 555 ml       Lines/Drains/Airways       Peripheral Intravenous Line  Duration                  Hemodialysis AV Fistula 12/23/12 Left forearm 3853 days         Peripheral IV - Single Lumen 07/11/23 0019 20 G Anterior;Right Forearm 1 day         Peripheral IV - Single Lumen 07/11/23 1430 20 G Posterior;Right Hand <1 day                     Physical Exam  Constitutional:       General: He is not in acute distress.  Cardiovascular:       Rate and Rhythm: Normal rate and regular rhythm.   Pulmonary:      Effort: Pulmonary effort is normal. No respiratory distress.   Abdominal:      General: Abdomen is flat. Bowel sounds are normal. There is no distension.      Palpations: Abdomen is soft. There is no mass.      Tenderness: There is no abdominal tenderness.   Skin:     General: Skin is warm and dry.   Neurological:      General: No focal deficit present.      Mental Status: He is alert and oriented to person, place, and time.   Psychiatric:         Mood and Affect: Mood normal.         Behavior: Behavior normal.        Significant Labs:  CBC:   Recent Labs   Lab 07/11/23  0019 07/11/23  0557 07/12/23  0006 07/12/23  0220 07/12/23  0800   WBC 5.92  --   --  2.88*  --    HGB 8.1*   < > 7.0* 7.0* 7.2*   HCT 25.5*   < > 21.5* 22.0* 22.7*   PLT 94*  --   --  68*  --     < > = values in this interval not displayed.     CMP:   Recent Labs   Lab 07/11/23  0019 07/11/23  0557 07/12/23  0220      < > 73   CALCIUM 10.2   < > 9.1   ALBUMIN 3.2*  --   --    PROT 6.3  --   --       < > 140   K 3.6   < > 3.9   CO2 29   < > 24      < > 104   BUN 34*   < > 47*   CREATININE 8.7*   < > 10.1*   ALKPHOS 111  --   --    ALT 21  --   --    AST 26  --   --    BILITOT 0.9  --   --     < > = values in this interval not displayed.     Coagulation: No results for input(s): PT, INR, APTT in the last 48 hours.        Assessment/Plan:     GI  * Melena  - Continue to monitor H/H, frequency per primary team and transfuse as indicated  - Discontinue PPI  - EGD 7/11/23 remarkable for abnormal appearing stomach mucosa with diffuse polypoid/nodular gastric mucosa, and multiple polypoid duodenal lesions, concerning for atypical polyposis syndrome   - Followup pathology  - Will order CT small bowel study today to further characterize extent of involvement             Thank you for your consult. I will follow-up with patient. Please contact us if you have any additional  questions.    Justin López MD  Gastroenterology  [place] [facility]

## 2023-07-12 NOTE — SUBJECTIVE & OBJECTIVE
Subjective:     Interval History: Patient resting comfortably in bed on interview in no acute distress. Denies chest pain, shortness of breath, nausea, vomiting, abdominal pain, hematochezia, melena. Ordered CT enterography, discussed with primary team. Patient has allergy listed in chart but appears to be due to CKD, denies history of anaphylaxis or severe reaction. Primary team with pretreat out of caution.     Review of Systems   Gastrointestinal:  Negative for abdominal pain, blood in stool, constipation, diarrhea, nausea and vomiting.   Objective:     Vital Signs (Most Recent):  Temp: 97.4 °F (36.3 °C) (07/12/23 0747)  Pulse: (!) 54 (07/12/23 0747)  Resp: 17 (07/12/23 0747)  BP: 129/62 (07/12/23 0747)  SpO2: 95 % (07/12/23 0747) Vital Signs (24h Range):  Temp:  [96.8 °F (36 °C)-98 °F (36.7 °C)] 97.4 °F (36.3 °C)  Pulse:  [45-81] 54  Resp:  [12-20] 17  SpO2:  [92 %-100 %] 95 %  BP: (112-137)/(38-71) 129/62     Weight: 115.4 kg (254 lb 6.6 oz) (07/12/23 0417)  Body mass index is 39.85 kg/m².      Intake/Output Summary (Last 24 hours) at 7/12/2023 1004  Last data filed at 7/12/2023 0858  Gross per 24 hour   Intake 555 ml   Output 0 ml   Net 555 ml       Lines/Drains/Airways       Peripheral Intravenous Line  Duration                  Hemodialysis AV Fistula 12/23/12 Left forearm 3853 days         Peripheral IV - Single Lumen 07/11/23 0019 20 G Anterior;Right Forearm 1 day         Peripheral IV - Single Lumen 07/11/23 1430 20 G Posterior;Right Hand <1 day                     Physical Exam  Constitutional:       General: He is not in acute distress.  Cardiovascular:      Rate and Rhythm: Normal rate and regular rhythm.   Pulmonary:      Effort: Pulmonary effort is normal. No respiratory distress.   Abdominal:      General: Abdomen is flat. Bowel sounds are normal. There is no distension.      Palpations: Abdomen is soft. There is no mass.      Tenderness: There is no abdominal tenderness.   Skin:     General:  Skin is warm and dry.   Neurological:      General: No focal deficit present.      Mental Status: He is alert and oriented to person, place, and time.   Psychiatric:         Mood and Affect: Mood normal.         Behavior: Behavior normal.        Significant Labs:  CBC:   Recent Labs   Lab 07/11/23 0019 07/11/23 0557 07/12/23  0006 07/12/23  0220 07/12/23  0800   WBC 5.92  --   --  2.88*  --    HGB 8.1*   < > 7.0* 7.0* 7.2*   HCT 25.5*   < > 21.5* 22.0* 22.7*   PLT 94*  --   --  68*  --     < > = values in this interval not displayed.     CMP:   Recent Labs   Lab 07/11/23 0019 07/11/23 0557 07/12/23 0220      < > 73   CALCIUM 10.2   < > 9.1   ALBUMIN 3.2*  --   --    PROT 6.3  --   --       < > 140   K 3.6   < > 3.9   CO2 29   < > 24      < > 104   BUN 34*   < > 47*   CREATININE 8.7*   < > 10.1*   ALKPHOS 111  --   --    ALT 21  --   --    AST 26  --   --    BILITOT 0.9  --   --     < > = values in this interval not displayed.     Coagulation: No results for input(s): PT, INR, APTT in the last 48 hours.

## 2023-07-12 NOTE — PLAN OF CARE
SW met with pt during rounding with MD. Pt discussed possible SNF placement. SW explained that pt is currently Observation status with [employer] medicare. Pt is unable to be placed in SNF facility from hospital due to being in observation status. Pt is also not on  SNF 3-Day Rule Waiver program list. SW discuss NH and possible hiring sitters at home to assist at home. Pt expressed that he declines NH placement. SW encouraged pt to discuss with his family about sitters at home to assist with care. Pt expressed understanding. Pt reports upon dc he will need ambulance transport home. SW expressed understanding.  Referral placed for NP program at home.     SW requested GI follow up    [employer] Alert: YES response from HQ plus [facility] - [employer] ([id number]) re: Referral [id number] for patient in [facility] [id number] A: Yes, willing to accept patient NO, NOT IF HE STAYS IN OBSERVATION STATUS.      07/12/23 0949   Post-Acute Status   Post-Acute Authorization [facility]

## 2023-07-12 NOTE — NURSING
Shift assessment complete. Pt resting comfortably in bed at this time. Bed in lowest position, locked, and side rails up x 3. Bed alarm on. Wedge under pt. Call light and belongings in reach.

## 2023-07-12 NOTE — DISCHARGE SUMMARY
[facility] Medicine  Discharge Summary      Patient Name: Angel Farmer Jr.  MRN: [id number]  ASHWIN: [id number]  Patient Class: OP- Observation  Admission Date: 7/10/2023  Hospital Length of Stay: 0 days  Discharge Date and Time: No discharge date for patient encounter.  Attending Physician: Oumou Terry MD   Discharging Provider: Oumou Terry MD  Primary Care Provider: Melvin Terry MD    Primary Care Team: Networked reference to record PCT     HPI:   Angel Farmer is 62yr with PMH of hyperlipidemia, ESRD on HD, diabetes, MIKE who presents with abnormal labs.    After dialysis on Monday, patient was told by providers that his hemoglobin was low and that he should go to the ED for further evaluation.  Patient reports having dark stools over the past week but no obvious signs of blood loss such as vomiting blood or coughing up blood.  Patient reports worsening weakness over the past week, but denies any shortness of breath or chest pain.    In the ED, triage vitals were significant for bradycardia and low blood pressures.  CBC significant for macrocytic anemia.  CMP was significant for elevated creatinine.  Troponin was negative.  Occult blood test was positive.    Patient admitted for evaluation of anemia and melena.      Procedure(s) (LRB):  EGD (ESOPHAGOGASTRODUODENOSCOPY) (N/A)      Hospital Course:   62-year-old male with PMH of ESRD on HD, diabetes mellitus, MIKE who presented to the ER for anemia.  Reported melena, worsening weakness.  Transfuse 1 unit PRBC for hemoglobin 7.  Remained hemodynamically stable. Underwent EGD that showed multiple duodenal polyps concerning for atypical polyposis syndrome, pending pathology.  Subsequently underwent CT enterography as per GI recommendations.  Received premedication for IV contrast. Plan for HD post enterography. Case discussed with GI, primary RN, CT tech to co-ordinate CT enterography & HD. Cleared for DC by GI with outpatient follow up.      Physical Exam  Constitutional:       General: He is not in acute distress.     Appearance: Normal appearance. He is not ill-appearing.   Cardiovascular:      Rate and Rhythm: Bradycardia present.      Heart sounds: No murmur heard  Pulmonary:      Breath sounds: No wheezing, rhonchi or rales.   Abdominal:      General: There is no distension.      Tenderness: There is no abdominal tenderness.   Neurological:      Mental Status: He is alert.   Psychiatric:         Mood and Affect: Mood normal.             Goals of Care Treatment Preferences:  Code Status: Full Code      Consults:   Consults (From admission, onward)        Status Ordering Provider     Inpatient consult to Social Work  Once        Provider:  (Not yet assigned)    Completed ANGELA DORSEY     IP consult to case management  Once        Provider:  (Not yet assigned)    Completed DARI JIMENZE     Inpatient consult to [facility] (Celio Forrest Nimkevych)  Once        Provider:  (Not yet assigned)    Acknowledged DARI JIMENEZ     Inpatient consult to Gastroenterology  Once        Provider:  (Not yet assigned)    Completed SUSANA RAM            Final Active Diagnoses:    Diagnosis Date Noted POA    PRINCIPAL PROBLEM:  Melena [K92.1] 07/11/2023 Yes     Chronic    Allergies [T78.40XA] 07/11/2023 Yes    Anxiety and depression [F41.9, F32.A] 07/11/2023 Yes    Thrombocytopenia [D69.6] 07/21/2019 Yes    Hypotension [I95.9] 03/01/2019 Yes    Bradycardia [R00.1] 03/01/2019 Yes    MIKE (obstructive sleep apnea) - very severe latest sleep study says BPAP @ 16/8 [G47.33] 02/25/2015 Yes    ESRD (end stage renal disease) on dialysis [N18.6, Z99.2] 03/18/2014 Not Applicable    Symptomatic anemia [D64.9] 01/13/2014 Yes    Diabetes mellitus, type 2 [E11.9]  Yes    Hyperlipidemia [E78.5]  Yes      Problems Resolved During this Admission:       Discharged Condition: stable    Disposition: Home or Self Care    Follow Up:    Follow-up Information     [facility] Follow up.    Specialty: Home Health Services  Why: [facility]  Contact information:  [address] [id number] [phone]                       Patient Instructions:      Ambulatory referral/consult to Gastroenterology   Standing Status: Future   Referral Priority: Routine Referral Type: Consultation   Referral Reason: Specialty Services Required   Requested Specialty: Gastroenterology   Number of Visits Requested: 1       Significant Diagnostic Studies: Labs:   BMP:   Recent Labs   Lab 07/11/23 0019 07/11/23 0557 07/12/23  0220    89 73    140 140   K 3.6 3.9 3.9    103 104   CO2 29 29 24   BUN 34* 36* 47*   CREATININE 8.7* 8.9* 10.1*   CALCIUM 10.2 9.9 9.1    and CBC   Recent Labs   Lab 07/11/23 0019 07/11/23 0557 07/12/23 0220 07/12/23  0800 07/12/23  1545   WBC 5.92  --  2.88*  --   --    HGB 8.1*   < > 7.0* 7.2* 7.2*   HCT 25.5*   < > 22.0* 22.7* 21.9*   PLT 94*  --  68*  --   --     < > = values in this interval not displayed.       Pending Diagnostic Studies:     Procedure Component Value Units Date/Time    Specimen to Pathology, Surgery Gastrointestinal tract [[id number]] Collected: 07/11/23 1533    Order Status: Sent Lab Status: In process Updated: 07/12/23 0823    Specimen: Tissue          Medications:  Reconciled Home Medications:      Medication List      CONTINUE taking these medications    blood-glucose meter Misc  Commonly known as: ADVANCED GLUCOSE METER  1 each by Misc.(Non-Drug; Combo Route) route 4 (four) times daily with meals and nightly.     calcium acetate(phosphat bind) 667 mg capsule  Commonly known as: PHOSLO  Take 667 mg by mouth once daily. Take 3 capsules by mouth with meals and 1 capsule with snacks     cyanocobalamin 1000 MCG tablet  Commonly known as: VITAMIN B-12  Take 1,000 mcg by mouth once daily.     ergocalciferol 50,000 unit Cap  Commonly known as: ERGOCALCIFEROL  Take 50,000 Units  "by mouth daily as needed. Once a month. On the first of the month.     FLUoxetine 10 MG capsule  Take 40 mg by mouth once daily.     gabapentin 300 MG capsule  Commonly known as: NEURONTIN  TAKE 1 BY MOUTH EVERY      EVENING     lactulose 10 gram packet  Commonly known as: CEPHULAC  Take 10 g by mouth.     lancets Misc  1 each by Misc.(Non-Drug; Combo Route) route 3 (three) times daily.     midodrine 10 MG tablet  Commonly known as: PROAMATINE  Take 1 tablet by mouth 3 (three) times daily.     ONETOUCH ULTRA BLUE TEST STRIP Strp  Generic drug: blood sugar diagnostic  Check blood glucose [facility] and as directed by physican     pantoprazole 40 MG tablet  Commonly known as: PROTONIX  Take 1 tablet (40 mg total) by mouth once daily.     pen needle, diabetic 31 gauge x 3/16" Ndle  Commonly known as: BD ULTRA-FINE MINI PEN NEEDLE  USE 4 TO 5 TIMES A DAY     ( DISCARD PEN NEEDLE AFTER EACH USE )     pravastatin 10 MG tablet  Commonly known as: PRAVACHOL  Take 10 mg by mouth once daily.     RENAL-TINO ORAL  Take 1 tablet by mouth once daily.            Indwelling Lines/Drains at time of discharge:   Lines/Drains/Airways     Drain  Duration                Hemodialysis AV Fistula 12/23/12 Left forearm 3853 days                Time spent on the discharge of patient: 40 minutes         Oumou Terry MD  Department of Hospital Medicine  [place] [facility]  "

## 2023-07-12 NOTE — PLAN OF CARE
Discharge orders noted. No DME ordered. Patient is current with [facility]. I notified them via [facility] patient is discharging. They will not need orders since in OBS status. GI follow-up scheduled. Patient will be discharged after HD today.  sent message to nurse to inquire when patient returns. Per  note, patient requires ambulance transport home. Awaiting response.    Per  note: Pt reports upon dc he will need ambulance transport home.      contacted HD and was told patient has another 1 hour and 12 minutes. I spoke to bedside nurse and told her will set up [facility] for 7:30 pm to transport patient home.  set up transport to address listed on facesheet.    Address   [address] [place] [place] [address]    To check on transportation please call [facility] [phone].    Patient Contacts    Name Relation Home Work Mobile   Natalia De Guzman Relative   [phone]     Future Appointments   Date Time Provider Department Center   8/2/2023  1:00 PM Sonia Larsen NP [facility]       Name Relationship Specialty Phone Fax Address Order                [facility]  Home Health Services [phone] [address] [facility] [place] [address]         Next Steps: Follow up  Appointment:   Instructions: [employer]et          07/12/23 1721   Final Note   Assessment Type Final Discharge Note   Anticipated Discharge Disposition [facility]   Hospital Resources/Appts/Education Provided Appointments scheduled by Navigator/Coordinator   Post-Acute Status   Post-Acute Authorization Home Health   Home Health Status Set-up Complete/Auth obtained  (Patient still in OBS status (no new orders needed).)   Discharge Delays (!) Ambulance Transport/Facility Transport  (After HD)     Yenifer Phelps RN    [phone]

## 2023-07-12 NOTE — PLAN OF CARE
Problem: Adult Inpatient Plan of Care  Goal: Plan of Care Review  Outcome: Ongoing, Progressing   Updated cre plan.  Chart reviewed.

## 2023-07-12 NOTE — ASSESSMENT & PLAN NOTE
- Continue to monitor H/H, frequency per primary team and transfuse as indicated  - Discontinue PPI  - EGD 7/11/23 remarkable for abnormal appearing stomach mucosa with diffuse polypoid/nodular gastric mucosa, and multiple polypoid duodenal lesions, concerning for atypical polyposis syndrome   - Followup pathology  - Will order CT small bowel study today to further characterize extent of involvement

## 2023-07-12 NOTE — HOSPITAL COURSE
62-year-old male with PMH of ESRD on HD, diabetes mellitus, MIKE who presented to the ER for anemia.  Reported melena, worsening weakness.  Transfuse 1 unit PRBC for hemoglobin 7.  Remained hemodynamically stable. Underwent EGD that showed multiple duodenal polyps concerning for atypical polyposis syndrome, pending pathology.  Subsequently underwent CT enterography as per GI recommendations.  Received premedication for IV contrast. Plan for HD post enterography. Case discussed with GI, primary RN, CT tech to co-ordinate CT enterography & HD. Cleared for DC by GI with outpatient follow up.     Physical Exam  Constitutional:       General: He is not in acute distress.     Appearance: Normal appearance. He is not ill-appearing.   Cardiovascular:      Rate and Rhythm: Bradycardia present.      Heart sounds: No murmur heard  Pulmonary:      Breath sounds: No wheezing, rhonchi or rales.   Abdominal:      General: There is no distension.      Tenderness: There is no abdominal tenderness.   Neurological:      Mental Status: He is alert.   Psychiatric:         Mood and Affect: Mood normal.

## 2023-07-13 NOTE — PLAN OF CARE
Problem: Physical Therapy Goal  Goal: Physical Therapy Goal  Goals to be met by: 3/28/2019     Patient will increase functional independence with mobility by performin. Supine to sit with Moderate Assistance  2. Rolling to Left and Right with Moderate Assistance.  3. Sit to stand transfer with Maximum Assistance  4. Gait  x 10 feet with Moderate Assistance using Rolling Walker.   5. Stand for 5 minutes with Moderate Assistance using Rolling Walker  6. Lower extremity exercise program x20 reps per handout, with assistance as needed        Discharge Recommendations: Rehab    Pt requires max A of 2 to sit at the EOB.    Goals remain appropriate.     Tanesha Ellis, PTA.   [phone]   3/19/2019         None needed

## 2023-07-13 NOTE — NURSING
EMS at bedside to take patient home. IV x 2 out. Telemetry off and sent back to telemetry tech. Belongings and paperwork with pt.

## 2023-07-13 NOTE — PLAN OF CARE
07/12/23 1953   AVS Confirmation   Discharge instructions and AVS given to and reviewed with patient and/or significant other. Yes   Discharge orders noted. Additional clinical references attached. Patient's discharge instructions given by bedside RN and reviewed via this VN.  Education provided on new and previous medications, diagnosis, and follow-up appointments.  New medications delivered by pharmacy. Patient verbalized understanding and teach back method was used. Patient's ride/transportation home at bedside. All questions answered. Transport to [facility] requested. Floor nurse notified.

## 2023-07-13 NOTE — DISCHARGE INSTRUCTIONS
Discharge orders noted. Additional clinical references attached. Patient's discharge instructions given by bedside RN and reviewed via this VN.  Education provided on new and previous medications, diagnosis, and follow-up appointments.  New medications delivered by pharmacy. Patient verbalized understanding and teach back method was used. Patient's ride/transportation home at bedside. All questions answered. Transport to [facility] requested. Floor nurse notified.

## 2023-07-17 ENCOUNTER — HOSPITAL ENCOUNTER (EMERGENCY)
Facility: HOSPITAL | Age: 63
Discharge: HOME | End: 2023-07-17
Attending: EMERGENCY MEDICINE
Payer: MEDICARE

## 2023-07-17 VITALS
DIASTOLIC BLOOD PRESSURE: 60 MMHG | OXYGEN SATURATION: 97 % | TEMPERATURE: 98 F | SYSTOLIC BLOOD PRESSURE: 131 MMHG | RESPIRATION RATE: 17 BRPM | HEART RATE: 61 BPM | WEIGHT: 250 LBS | BODY MASS INDEX: 39.16 KG/M2

## 2023-07-17 DIAGNOSIS — D64.9 ANEMIA, UNSPECIFIED TYPE: Primary | ICD-10-CM

## 2023-07-17 DIAGNOSIS — K92.2 GI BLEED: ICD-10-CM

## 2023-07-17 DIAGNOSIS — R53.1 WEAKNESS: ICD-10-CM

## 2023-07-17 LAB
ABO + RH BLD: ABNORMAL
ALBUMIN SERPL BCP-MCNC: 3 G/DL (ref 3.5–5.2)
ALP SERPL-CCNC: 105 U/L (ref 55–135)
ALT SERPL W/O P-5'-P-CCNC: 19 U/L (ref 10–44)
ANION GAP SERPL CALC-SCNC: 12 MMOL/L (ref 8–16)
AST SERPL-CCNC: 26 U/L (ref 10–40)
BASOPHILS # BLD AUTO: 0.02 K/UL (ref 0–0.2)
BASOPHILS NFR BLD: 0.5 % (ref 0–1.9)
BILIRUB SERPL-MCNC: 0.8 MG/DL (ref 0.1–1)
BLD GP AB SCN CELLS X3 SERPL QL: ABNORMAL
BLOOD GROUP ANTIBODIES SERPL: NORMAL
BUN SERPL-MCNC: 24 MG/DL (ref 6–20)
CALCIUM SERPL-MCNC: 8.8 MG/DL (ref 8.7–10.5)
CHLORIDE SERPL-SCNC: 101 MMOL/L (ref 95–110)
CO2 SERPL-SCNC: 26 MMOL/L (ref 23–29)
CREAT SERPL-MCNC: 6.1 MG/DL (ref 0.5–1.4)
DIFFERENTIAL METHOD: ABNORMAL
EOSINOPHIL # BLD AUTO: 0.1 K/UL (ref 0–0.5)
EOSINOPHIL NFR BLD: 2.5 % (ref 0–8)
ERYTHROCYTE [DISTWIDTH] IN BLOOD BY AUTOMATED COUNT: 16.3 % (ref 11.5–14.5)
EST. GFR  (NO RACE VARIABLE): 10 ML/MIN/1.73 M^2
GLUCOSE SERPL-MCNC: 94 MG/DL (ref 70–110)
HCT VFR BLD AUTO: 23 % (ref 40–54)
HGB BLD-MCNC: 7.5 G/DL (ref 14–18)
IMM GRANULOCYTES # BLD AUTO: 0.01 K/UL (ref 0–0.04)
IMM GRANULOCYTES NFR BLD AUTO: 0.2 % (ref 0–0.5)
LYMPHOCYTES # BLD AUTO: 0.6 K/UL (ref 1–4.8)
LYMPHOCYTES NFR BLD: 15.5 % (ref 18–48)
MCH RBC QN AUTO: 34.4 PG (ref 27–31)
MCHC RBC AUTO-ENTMCNC: 32.6 G/DL (ref 32–36)
MCV RBC AUTO: 106 FL (ref 82–98)
MONOCYTES # BLD AUTO: 0.5 K/UL (ref 0.3–1)
MONOCYTES NFR BLD: 11.2 % (ref 4–15)
NEUTROPHILS # BLD AUTO: 2.8 K/UL (ref 1.8–7.7)
NEUTROPHILS NFR BLD: 70.1 % (ref 38–73)
NRBC BLD-RTO: 0 /100 WBC
PLATELET # BLD AUTO: 75 K/UL (ref 150–450)
PMV BLD AUTO: 11.2 FL (ref 9.2–12.9)
POTASSIUM SERPL-SCNC: 3.4 MMOL/L (ref 3.5–5.1)
PROT SERPL-MCNC: 5.7 G/DL (ref 6–8.4)
RBC # BLD AUTO: 2.18 M/UL (ref 4.6–6.2)
SODIUM SERPL-SCNC: 139 MMOL/L (ref 136–145)
SPECIMEN OUTDATE: ABNORMAL
WBC # BLD AUTO: 4.01 K/UL (ref 3.9–12.7)

## 2023-07-17 PROCEDURE — 99284 EMERGENCY DEPT VISIT MOD MDM: CPT | Mod: 25

## 2023-07-17 PROCEDURE — 86880 COOMBS TEST DIRECT: CPT | Performed by: EMERGENCY MEDICINE

## 2023-07-17 PROCEDURE — 93005 ELECTROCARDIOGRAM TRACING: CPT

## 2023-07-17 PROCEDURE — 93010 EKG 12-LEAD: ICD-10-PCS | Mod: ,,, | Performed by: INTERNAL MEDICINE

## 2023-07-17 PROCEDURE — 86870 RBC ANTIBODY IDENTIFICATION: CPT | Performed by: EMERGENCY MEDICINE

## 2023-07-17 PROCEDURE — 80053 COMPREHEN METABOLIC PANEL: CPT | Performed by: EMERGENCY MEDICINE

## 2023-07-17 PROCEDURE — 86900 BLOOD TYPING SEROLOGIC ABO: CPT | Performed by: EMERGENCY MEDICINE

## 2023-07-17 PROCEDURE — 93010 ELECTROCARDIOGRAM REPORT: CPT | Mod: ,,, | Performed by: INTERNAL MEDICINE

## 2023-07-17 PROCEDURE — 85025 COMPLETE CBC W/AUTO DIFF WBC: CPT | Performed by: EMERGENCY MEDICINE

## 2023-07-17 NOTE — ED PROVIDER NOTES
Emergency Department Encounter  Provider Note  Encounter Date: 7/17/2023    Patient Name: Angel Farmer Jr.  MRN: [id number]    History of Present Illness   HPI  History of Present Illness:    Chief Complaint:   Chief Complaint   Patient presents with    Abnormal Lab     Pt was told by his dialysis center that his H/H was low and he should report to the ER. Pt did complete dialysis today. Pt has had dark stool for 3 weeks and was seen and told he had polyps. Pt appears pale.        62-year-old male presenting with concern for low H and H from dialysis.  Patient states that since he was discharged, he has been feeling weak and tired.  Continuing with black stools.  Has not followed up with GI.  States that he can not get to his appointment due to transportation issues.  Denies any chest pain or shortness of breath.    The following PMH/PSH/SocHx/FamHx has been reviewed by myself:    Past Medical History:   Diagnosis Date    Anemia due to stage 5 chronic kidney disease 3/1/2019    Anticoagulant long-term use     Diabetes mellitus type II     Dialysis patient     Encounter for blood transfusion     Hyperlipidemia     Hypertension     Kidney failure     MIKE (obstructive sleep apnea)     Stroke     Urinary tract infection      Past Surgical History:   Procedure Laterality Date    AV FISTULA PLACEMENT      left lower arm    COLONOSCOPY N/A 4/15/2021    Procedure: COLONOSCOPY;  Surgeon: Ruddy Plaza MD;  Location: [facility];  Service: Endoscopy;  Laterality: N/A;    COLONOSCOPY W/ POLYPECTOMY  04/15/2021    ESOPHAGOGASTRODUODENOSCOPY N/A 7/11/2023    Procedure: EGD (ESOPHAGOGASTRODUODENOSCOPY);  Surgeon: Joey Quintanilla MD;  Location: [facility];  Service: Endoscopy;  Laterality: N/A;    FUSION OF CERVICAL SPINE BY POSTERIOR APPROACH N/A 3/3/2019    Procedure: SPINE, CERVICAL, POSTERIOR APPROACH  LAMINECTOMY C3-C6; C6-C7; WITH MEDIAL DISCECTOMY;  Surgeon: Ruddy Wylie MD;  Location: [facility] OR [address] [place] [address];  Service:  Neurosurgery;  Laterality: N/A;  POSTERIOR    TONSILLECTOMY, ADENOIDECTOMY       Social History     Tobacco Use    Smoking status: Never    Smokeless tobacco: Never   Substance Use Topics    Alcohol use: No    Drug use: No     Family History   Problem Relation Age of Onset    Colon cancer Mother     Lung cancer Mother     Diabetes Mother     Diabetes Father     Heart disease Father     Hypertension Father     Diabetes Maternal Grandmother     Diabetes Maternal Grandfather        Allergies reviewed:  Review of patient's allergies indicates:   Allergen Reactions    Iodinated contrast media     Keflex [cephalexin] Nausea Only       Medications reviewed:  Medication List with Changes/Refills   Current Medications    BLOOD SUGAR DIAGNOSTIC (ONETOUCH ULTRA BLUE TEST STRIP) STRP    Check blood glucose qac and as directed by physican    BLOOD-GLUCOSE METER (ADVANCED GLUCOSE METER) MISC    1 each by Misc.(Non-Drug; Combo Route) route 4 (four) times daily with meals and nightly.    CALCIUM ACETATE,PHOSPHAT BIND, (PHOSLO) 667 MG CAPSULE    Take 667 mg by mouth once daily. Take 3 capsules by mouth with meals and 1 capsule with snacks    CYANOCOBALAMIN (VITAMIN B-12) 1000 MCG TABLET    Take 1,000 mcg by mouth once daily.    ERGOCALCIFEROL (ERGOCALCIFEROL) 50,000 UNIT CAP    Take 50,000 Units by mouth daily as needed. Once a month. On the first of the month.    FLUOXETINE 10 MG CAPSULE    Take 40 mg by mouth once daily.    FOLIC ACID/VIT B COMPLEX AND C (RENAL-TINO ORAL)    Take 1 tablet by mouth once daily.    GABAPENTIN (NEURONTIN) 300 MG CAPSULE    TAKE 1 BY MOUTH EVERY      EVENING    LACTULOSE (CEPHULAC) 10 GRAM PACKET    Take 10 g by mouth.    LANCETS MISC    1 each by Misc.(Non-Drug; Combo Route) route 3 (three) times daily.    MIDODRINE (PROAMATINE) 10 MG TABLET    Take 1 tablet by mouth 3 (three) times daily.    PANTOPRAZOLE (PROTONIX) 40 MG TABLET    Take 1 tablet (40 mg total) by mouth once daily.    PEN NEEDLE,  "DIABETIC (BD ULTRA-FINE MINI PEN NEEDLE) 31 GAUGE X 3/16" NDLE    USE 4 TO 5 TIMES A DAY     ( DISCARD PEN NEEDLE AFTER EACH USE )    PRAVASTATIN (PRAVACHOL) 10 MG TABLET    Take 10 mg by mouth once daily.       ROS  Review of Systems:    Constitutional:  Negative for fever.   HENT:  Negative for sore throat.    Eyes:  Negative for redness.   Respiratory:  Negative for shortness of breath.    Cardiovascular:  Negative for chest pain.   Gastrointestinal:  Negative for nausea.   Genitourinary:  Negative for dysuria.   Musculoskeletal:  Negative for back pain.   Skin:  Negative for rash.   Neurological:  Positive for weakness.   Hematological:  Does not bruise/bleed easily.   Psychiatric/Behavioral:  The patient is not nervous/anxious.      Physical Exam   Physical Exam    Initial Vitals [07/17/23 1301]   BP Pulse Resp Temp SpO2   104/71 68 18 98.4 °F (36.9 °C) 95 %      MAP       --           Triage vital signs reviewed.    Constitutional:  Chronically ill-appearing.   HENT: Normocephalic, atraumatic. Moist mucous membranes.  Eyes: No conjunctival injection.  Resp: Normal respiratory effort, breathing unlabored.  Cardio: Regular rate and rhythm.  GI: Abdomen non-distended.   MSK: Extremities without any deformities noted. No lower extremity edema.  Skin: Warm and dry. No rashes or lesions noted.  Neuro: Awake and alert. Moves all extremities.    ED Course   Procedures    Medical Decision Making    History Acquisition     The history is provided by the patient.     Review of prior external/non ED notes:  Discharge summary 7/12, patient had an upper scope, and has a follow-up with GI 8/2.  Cleared for discharge from GI standpoint.    Differential Diagnoses   Based on available information and initial assessment, the working Differential Diagnosis includes, but is not limited to:  CVA/TIA, vertigo, anemia/blood loss, cardiogenic shock, arrhythmia, orthostatic hypotension, dehydration, medication side effect, vitamin " deficiency, liver disease, hypothyroidism, drug intoxication/withdrawal, metabolic derangement.  .    EKG   EKG ordered and independently reviewed by me:   Sinus rhythm, rate 68, 1st deg avb, L axis, LBBB, no stemi    Labs   Lab tests ordered and independently reviewed by me:    Labs Reviewed   CBC W/ AUTO DIFFERENTIAL - Abnormal; Notable for the following components:       Result Value    RBC 2.18 (*)     Hemoglobin 7.5 (*)     Hematocrit 23.0 (*)      (*)     MCH 34.4 (*)     RDW 16.3 (*)     Platelets 75 (*)     Lymph # 0.6 (*)     Lymph % 15.5 (*)     All other components within normal limits   COMPREHENSIVE METABOLIC PANEL - Abnormal; Notable for the following components:    Potassium 3.4 (*)     BUN 24 (*)     Creatinine 6.1 (*)     Total Protein 5.7 (*)     Albumin 3.0 (*)     eGFR 10 (*)     All other components within normal limits   TYPE & SCREEN - Abnormal; Notable for the following components:    Indirect Yannick POS (*)     All other components within normal limits   ANTIBODY IDENTIFICATION         Imaging   Imaging ordered and independently reviewed by me:   Imaging Results    None              Additional Consideration   Angel Farmer Jr.  presents to the emergency Department today with weakness concern for low H&H.  Patient denies chest pain or shortness of breath, plan for labs.    Additional testing considered but not indicated during the course of this workup: further imaging and labwork, not indicated  Co-morbidities/chronic illness/exacerbation of chronic illness considered which impacted clinical decision making: ESRD on HD  Procedures done in the ED or plan for the OR: No  Social determinants of care considered during development of treatment plan include: Decreased medical literacy and Transportation insecurities   Discussion of management or test interpretation with external provider: Yes, describe: SW  DNR discussion: No    The patient's list of active medications and allergies as  documented per RN staff has been reviewed.  Medications given in the ED and/or prescribed: Medications - No data to display          ED Course as of 07/17/23 1529   Mon Jul 17, 2023   1528 CBC auto differential(!)  Hgb is higher than dc a few days ago [CS]   1528 Comprehensive metabolic panel(!)  BUN is lowest it has been  [CS]   1528 Patient with hemoglobin at baseline, actually higher than when he was discharged, and the globus BUN he has had an awhile.  No active signs of bleeding, patient is not bradycardic and blood pressure is at his baseline.  No evidence of active bleeding.  No indication for blood transfusion, no indication for admission as when patient was discharged, his condition was slightly worsened when it was today.  He is due to see GI a/2.   came to speak with the patient as he states that he is no transportation, and he needs to go to a rehab.  This is not possible from the emergency department.  Was given his options and he will be discharged.  Strict return precautions discussed. [CS]      ED Course User Index  [CS] Dafne Tadeo MD       Explanation of disposition: Discharge    Clinical Impression:     1. Anemia, unspecified type    2. GI bleed    3. Weakness         All results from the workup were reviewed with the patient/family in detail. I discussed with the patient and/or the family/caregiver that today's evaluation in the ED does not suggest any emergent or life-threatening medical conditions that would require hospitalization or immediate intervention beyond what was provided in the ED. I believe the patient is safe for discharge.  However, a reassuring evaluation in the ED does not preclude the presence or development of a serious or life-threatening condition. As such, strict return precautions were discussed with the patient expressing understanding of instructions, and all questions answered. The patient/family communicates understanding of all this information and all  remaining questions and concerns were addressed at this time.    The patient/family has been provided with verbal and printed direction regarding our final diagnosis(es) as well as instructions regarding use of OTC and/or Rx medications intended to manage the patient's aforementioned conditions including:  ED Prescriptions    None       The patient's condition does not warrant review of the  and prescription of controlled substances.      ED Disposition Condition    Discharge Stable               Dafne Tadeo MD  07/17/23 1152

## 2023-07-17 NOTE — ED NOTES
Pt turned to left lateral side and repositioned for comfort    Spoke to Natalia( sister in law) notified her that pt will be discharged  and verified address as  [address] [place] [address]

## 2023-07-17 NOTE — DISCHARGE INSTRUCTIONS
Your blood numbers today are normal for you; in fact, they are slightly better than when you were discharged a few days ago. You don't qualify for a blood transfusion today. Please follow up with GI as scheduled on 8/2 because they need to see you for a colonoscopy.

## 2023-07-18 LAB — DAT IGG-SP REAG RBC-IMP: NORMAL

## 2023-07-19 LAB
FINAL PATHOLOGIC DIAGNOSIS: NORMAL
GROSS: NORMAL
Lab: NORMAL
MICROSCOPIC EXAM: NORMAL

## 2023-07-25 ENCOUNTER — DOCUMENT SCAN (OUTPATIENT)
Dept: HOME HEALTH SERVICES | Facility: HOSPITAL | Age: 63
End: 2023-07-25
Payer: MEDICARE

## 2023-08-23 ENCOUNTER — HOSPITAL ENCOUNTER (INPATIENT)
Facility: HOSPITAL | Age: 63
LOS: 1 days | Discharge: HOME HEALTH | DRG: 393 | End: 2023-08-25
Attending: EMERGENCY MEDICINE | Admitting: HOSPITALIST
Payer: MEDICARE

## 2023-08-23 DIAGNOSIS — D62 ACUTE BLOOD LOSS ANEMIA: Primary | ICD-10-CM

## 2023-08-23 DIAGNOSIS — N18.6 ESRD (END STAGE RENAL DISEASE): ICD-10-CM

## 2023-08-23 DIAGNOSIS — K92.1 MELENA: ICD-10-CM

## 2023-08-23 DIAGNOSIS — K21.9 GASTROESOPHAGEAL REFLUX DISEASE, UNSPECIFIED WHETHER ESOPHAGITIS PRESENT: ICD-10-CM

## 2023-08-23 DIAGNOSIS — R53.81 DEBILITY: ICD-10-CM

## 2023-08-23 PROBLEM — R06.02 SOB (SHORTNESS OF BREATH): Status: ACTIVE | Noted: 2023-08-23

## 2023-08-23 PROBLEM — E83.9 CHRONIC KIDNEY DISEASE-MINERAL AND BONE DISORDER: Status: ACTIVE | Noted: 2023-08-23

## 2023-08-23 PROBLEM — N18.9 CHRONIC KIDNEY DISEASE-MINERAL AND BONE DISORDER: Status: ACTIVE | Noted: 2023-08-23

## 2023-08-23 PROBLEM — M89.9 CHRONIC KIDNEY DISEASE-MINERAL AND BONE DISORDER: Status: ACTIVE | Noted: 2023-08-23

## 2023-08-23 LAB
ABO + RH BLD: ABNORMAL
ALBUMIN SERPL BCP-MCNC: 2.7 G/DL (ref 3.5–5.2)
ALP SERPL-CCNC: 101 U/L (ref 55–135)
ALT SERPL W/O P-5'-P-CCNC: 20 U/L (ref 10–44)
ANION GAP SERPL CALC-SCNC: 12 MMOL/L (ref 8–16)
ANISOCYTOSIS BLD QL SMEAR: SLIGHT
AST SERPL-CCNC: 26 U/L (ref 10–40)
BASOPHILS # BLD AUTO: 0.01 K/UL (ref 0–0.2)
BASOPHILS NFR BLD: 0.3 % (ref 0–1.9)
BILIRUB SERPL-MCNC: 0.6 MG/DL (ref 0.1–1)
BLD GP AB SCN CELLS X3 SERPL QL: ABNORMAL
BLD PROD TYP BPU: NORMAL
BLD PROD TYP BPU: NORMAL
BLOOD GROUP ANTIBODIES SERPL: NORMAL
BLOOD UNIT EXPIRATION DATE: NORMAL
BLOOD UNIT EXPIRATION DATE: NORMAL
BLOOD UNIT TYPE CODE: 9500
BLOOD UNIT TYPE CODE: 9500
BLOOD UNIT TYPE: NORMAL
BLOOD UNIT TYPE: NORMAL
BUN SERPL-MCNC: 76 MG/DL (ref 6–20)
CALCIUM SERPL-MCNC: 8.9 MG/DL (ref 8.7–10.5)
CHLORIDE SERPL-SCNC: 109 MMOL/L (ref 95–110)
CO2 SERPL-SCNC: 25 MMOL/L (ref 23–29)
CODING SYSTEM: NORMAL
CODING SYSTEM: NORMAL
CREAT SERPL-MCNC: 12.2 MG/DL (ref 0.5–1.4)
CROSSMATCH INTERPRETATION: NORMAL
CROSSMATCH INTERPRETATION: NORMAL
DAT IGG-SP REAG RBC-IMP: NORMAL
DIFFERENTIAL METHOD: ABNORMAL
DISPENSE STATUS: NORMAL
DISPENSE STATUS: NORMAL
EOSINOPHIL # BLD AUTO: 0.2 K/UL (ref 0–0.5)
EOSINOPHIL NFR BLD: 4.4 % (ref 0–8)
ERYTHROCYTE [DISTWIDTH] IN BLOOD BY AUTOMATED COUNT: 16.1 % (ref 11.5–14.5)
EST. GFR  (NO RACE VARIABLE): 4.3 ML/MIN/1.73 M^2
GLUCOSE SERPL-MCNC: 95 MG/DL (ref 70–110)
HBV CORE AB SERPL QL IA: NORMAL
HBV SURFACE AB SER-ACNC: 117.86 MIU/ML
HBV SURFACE AB SER-ACNC: REACTIVE M[IU]/ML
HBV SURFACE AG SERPL QL IA: NORMAL
HCT VFR BLD AUTO: 18.9 % (ref 40–54)
HCV AB SERPL QL IA: NORMAL
HGB BLD-MCNC: 5.8 G/DL (ref 14–18)
HIV 1+2 AB+HIV1 P24 AG SERPL QL IA: NORMAL
IMM GRANULOCYTES # BLD AUTO: 0.01 K/UL (ref 0–0.04)
IMM GRANULOCYTES NFR BLD AUTO: 0.3 % (ref 0–0.5)
LYMPHOCYTES # BLD AUTO: 0.7 K/UL (ref 1–4.8)
LYMPHOCYTES NFR BLD: 18.2 % (ref 18–48)
MCH RBC QN AUTO: 33 PG (ref 27–31)
MCHC RBC AUTO-ENTMCNC: 30.7 G/DL (ref 32–36)
MCV RBC AUTO: 107 FL (ref 82–98)
MONOCYTES # BLD AUTO: 0.4 K/UL (ref 0.3–1)
MONOCYTES NFR BLD: 11 % (ref 4–15)
NEUTROPHILS # BLD AUTO: 2.4 K/UL (ref 1.8–7.7)
NEUTROPHILS NFR BLD: 65.8 % (ref 38–73)
NRBC BLD-RTO: 0 /100 WBC
OVALOCYTES BLD QL SMEAR: ABNORMAL
PLATELET # BLD AUTO: 77 K/UL (ref 150–450)
PMV BLD AUTO: 10.4 FL (ref 9.2–12.9)
POIKILOCYTOSIS BLD QL SMEAR: SLIGHT
POLYCHROMASIA BLD QL SMEAR: ABNORMAL
POTASSIUM SERPL-SCNC: 5.2 MMOL/L (ref 3.5–5.1)
PROT SERPL-MCNC: 5.6 G/DL (ref 6–8.4)
RBC # BLD AUTO: 1.76 M/UL (ref 4.6–6.2)
SODIUM SERPL-SCNC: 146 MMOL/L (ref 136–145)
SPECIMEN OUTDATE: ABNORMAL
TRANS ERYTHROCYTES VOL PATIENT: NORMAL ML
TRANS ERYTHROCYTES VOL PATIENT: NORMAL ML
TROPONIN I SERPL DL<=0.01 NG/ML-MCNC: 0.03 NG/ML (ref 0–0.03)
WBC # BLD AUTO: 3.63 K/UL (ref 3.9–12.7)

## 2023-08-23 PROCEDURE — 86706 HEP B SURFACE ANTIBODY: CPT | Mod: 91 | Performed by: NURSE PRACTITIONER

## 2023-08-23 PROCEDURE — 99223 PR INITIAL HOSPITAL CARE,LEVL III: ICD-10-PCS | Mod: AI,,, | Performed by: HOSPITALIST

## 2023-08-23 PROCEDURE — 99213 OFFICE O/P EST LOW 20 MIN: CPT | Mod: ,,, | Performed by: NURSE PRACTITIONER

## 2023-08-23 PROCEDURE — 96374 THER/PROPH/DIAG INJ IV PUSH: CPT

## 2023-08-23 PROCEDURE — 99223 1ST HOSP IP/OBS HIGH 75: CPT | Mod: AI,,, | Performed by: HOSPITALIST

## 2023-08-23 PROCEDURE — 63600175 PHARM REV CODE 636 W HCPCS: Performed by: HOSPITALIST

## 2023-08-23 PROCEDURE — 86900 BLOOD TYPING SEROLOGIC ABO: CPT | Performed by: EMERGENCY MEDICINE

## 2023-08-23 PROCEDURE — 36430 TRANSFUSION BLD/BLD COMPNT: CPT

## 2023-08-23 PROCEDURE — 86902 BLOOD TYPE ANTIGEN DONOR EA: CPT | Performed by: EMERGENCY MEDICINE

## 2023-08-23 PROCEDURE — 25000003 PHARM REV CODE 250: Performed by: NURSE PRACTITIONER

## 2023-08-23 PROCEDURE — 86704 HEP B CORE ANTIBODY TOTAL: CPT | Performed by: NURSE PRACTITIONER

## 2023-08-23 PROCEDURE — P9021 RED BLOOD CELLS UNIT: HCPCS | Performed by: EMERGENCY MEDICINE

## 2023-08-23 PROCEDURE — 93010 ELECTROCARDIOGRAM REPORT: CPT | Mod: ,,, | Performed by: INTERNAL MEDICINE

## 2023-08-23 PROCEDURE — G0378 HOSPITAL OBSERVATION PER HR: HCPCS

## 2023-08-23 PROCEDURE — 86803 HEPATITIS C AB TEST: CPT | Performed by: PHYSICIAN ASSISTANT

## 2023-08-23 PROCEDURE — C9113 INJ PANTOPRAZOLE SODIUM, VIA: HCPCS | Performed by: HOSPITALIST

## 2023-08-23 PROCEDURE — 93010 EKG 12-LEAD: ICD-10-PCS | Mod: ,,, | Performed by: INTERNAL MEDICINE

## 2023-08-23 PROCEDURE — 25000003 PHARM REV CODE 250: Performed by: EMERGENCY MEDICINE

## 2023-08-23 PROCEDURE — 86870 RBC ANTIBODY IDENTIFICATION: CPT | Performed by: EMERGENCY MEDICINE

## 2023-08-23 PROCEDURE — 85025 COMPLETE CBC W/AUTO DIFF WBC: CPT | Performed by: EMERGENCY MEDICINE

## 2023-08-23 PROCEDURE — 87340 HEPATITIS B SURFACE AG IA: CPT | Performed by: NURSE PRACTITIONER

## 2023-08-23 PROCEDURE — 94660 CPAP INITIATION&MGMT: CPT

## 2023-08-23 PROCEDURE — 80053 COMPREHEN METABOLIC PANEL: CPT | Performed by: EMERGENCY MEDICINE

## 2023-08-23 PROCEDURE — 87389 HIV-1 AG W/HIV-1&-2 AB AG IA: CPT | Performed by: PHYSICIAN ASSISTANT

## 2023-08-23 PROCEDURE — 99285 EMERGENCY DEPT VISIT HI MDM: CPT | Mod: 25

## 2023-08-23 PROCEDURE — 99900035 HC TECH TIME PER 15 MIN (STAT)

## 2023-08-23 PROCEDURE — 93005 ELECTROCARDIOGRAM TRACING: CPT

## 2023-08-23 PROCEDURE — 25000003 PHARM REV CODE 250: Performed by: HOSPITALIST

## 2023-08-23 PROCEDURE — 84484 ASSAY OF TROPONIN QUANT: CPT | Performed by: HOSPITALIST

## 2023-08-23 PROCEDURE — 94761 N-INVAS EAR/PLS OXIMETRY MLT: CPT

## 2023-08-23 PROCEDURE — 99213 PR OFFICE/OUTPT VISIT, EST, LEVL III, 20-29 MIN: ICD-10-PCS | Mod: ,,, | Performed by: NURSE PRACTITIONER

## 2023-08-23 PROCEDURE — 86880 COOMBS TEST DIRECT: CPT | Performed by: HOSPITALIST

## 2023-08-23 PROCEDURE — 86922 COMPATIBILITY TEST ANTIGLOB: CPT | Performed by: EMERGENCY MEDICINE

## 2023-08-23 RX ORDER — ONDANSETRON 4 MG/1
TABLET, FILM COATED ORAL
COMMUNITY
Start: 2023-08-07

## 2023-08-23 RX ORDER — SODIUM CHLORIDE 0.9 % (FLUSH) 0.9 %
10 SYRINGE (ML) INJECTION EVERY 12 HOURS PRN
Status: DISCONTINUED | OUTPATIENT
Start: 2023-08-23 | End: 2023-08-25 | Stop reason: HOSPADM

## 2023-08-23 RX ORDER — MIDODRINE HYDROCHLORIDE 5 MG/1
10 TABLET ORAL 3 TIMES DAILY
Status: DISCONTINUED | OUTPATIENT
Start: 2023-08-23 | End: 2023-08-23

## 2023-08-23 RX ORDER — ONDANSETRON 4 MG/1
4 TABLET, ORALLY DISINTEGRATING ORAL
Status: COMPLETED | OUTPATIENT
Start: 2023-08-23 | End: 2023-08-23

## 2023-08-23 RX ORDER — IBUPROFEN 200 MG
24 TABLET ORAL
Status: DISCONTINUED | OUTPATIENT
Start: 2023-08-23 | End: 2023-08-25 | Stop reason: HOSPADM

## 2023-08-23 RX ORDER — PRAVASTATIN SODIUM 20 MG/1
20 TABLET ORAL DAILY
Status: DISCONTINUED | OUTPATIENT
Start: 2023-08-24 | End: 2023-08-25 | Stop reason: HOSPADM

## 2023-08-23 RX ORDER — PANTOPRAZOLE SODIUM 40 MG/10ML
40 INJECTION, POWDER, LYOPHILIZED, FOR SOLUTION INTRAVENOUS DAILY
Status: DISCONTINUED | OUTPATIENT
Start: 2023-08-24 | End: 2023-08-24

## 2023-08-23 RX ORDER — HYDRALAZINE HYDROCHLORIDE 25 MG/1
25 TABLET, FILM COATED ORAL ONCE
Status: COMPLETED | OUTPATIENT
Start: 2023-08-23 | End: 2023-08-23

## 2023-08-23 RX ORDER — FLUOXETINE HYDROCHLORIDE 20 MG/1
40 CAPSULE ORAL DAILY
Status: DISCONTINUED | OUTPATIENT
Start: 2023-08-24 | End: 2023-08-25 | Stop reason: HOSPADM

## 2023-08-23 RX ORDER — TALC
6 POWDER (GRAM) TOPICAL NIGHTLY PRN
Status: DISCONTINUED | OUTPATIENT
Start: 2023-08-23 | End: 2023-08-25 | Stop reason: HOSPADM

## 2023-08-23 RX ORDER — PROMETHAZINE HYDROCHLORIDE 25 MG/1
TABLET ORAL
COMMUNITY
Start: 2023-08-07

## 2023-08-23 RX ORDER — IBUPROFEN 200 MG
16 TABLET ORAL
Status: DISCONTINUED | OUTPATIENT
Start: 2023-08-23 | End: 2023-08-25 | Stop reason: HOSPADM

## 2023-08-23 RX ORDER — HYDROCODONE BITARTRATE AND ACETAMINOPHEN 500; 5 MG/1; MG/1
TABLET ORAL
Status: DISCONTINUED | OUTPATIENT
Start: 2023-08-23 | End: 2023-08-25 | Stop reason: HOSPADM

## 2023-08-23 RX ORDER — ATORVASTATIN CALCIUM 10 MG/1
10 TABLET, FILM COATED ORAL DAILY
COMMUNITY
End: 2023-08-23 | Stop reason: CLARIF

## 2023-08-23 RX ORDER — PANTOPRAZOLE SODIUM 40 MG/10ML
80 INJECTION, POWDER, LYOPHILIZED, FOR SOLUTION INTRAVENOUS DAILY
Status: DISCONTINUED | OUTPATIENT
Start: 2023-08-23 | End: 2023-08-23

## 2023-08-23 RX ORDER — SODIUM CHLORIDE 0.9 % (FLUSH) 0.9 %
10 SYRINGE (ML) INJECTION
Status: DISCONTINUED | OUTPATIENT
Start: 2023-08-23 | End: 2023-08-25 | Stop reason: HOSPADM

## 2023-08-23 RX ORDER — GABAPENTIN 300 MG/1
300 CAPSULE ORAL NIGHTLY
Status: DISCONTINUED | OUTPATIENT
Start: 2023-08-23 | End: 2023-08-25 | Stop reason: HOSPADM

## 2023-08-23 RX ORDER — GLUCAGON 1 MG
1 KIT INJECTION
Status: DISCONTINUED | OUTPATIENT
Start: 2023-08-23 | End: 2023-08-25 | Stop reason: HOSPADM

## 2023-08-23 RX ORDER — PANTOPRAZOLE SODIUM 40 MG/10ML
40 INJECTION, POWDER, LYOPHILIZED, FOR SOLUTION INTRAVENOUS DAILY
Status: DISCONTINUED | OUTPATIENT
Start: 2023-08-23 | End: 2023-08-23

## 2023-08-23 RX ORDER — NALOXONE HCL 0.4 MG/ML
0.02 VIAL (ML) INJECTION
Status: DISCONTINUED | OUTPATIENT
Start: 2023-08-23 | End: 2023-08-25 | Stop reason: HOSPADM

## 2023-08-23 RX ORDER — SIMETHICONE 80 MG
1 TABLET,CHEWABLE ORAL 3 TIMES DAILY PRN
Status: DISCONTINUED | OUTPATIENT
Start: 2023-08-23 | End: 2023-08-25 | Stop reason: HOSPADM

## 2023-08-23 RX ORDER — PANTOPRAZOLE SODIUM 40 MG/10ML
80 INJECTION, POWDER, LYOPHILIZED, FOR SOLUTION INTRAVENOUS ONCE
Status: COMPLETED | OUTPATIENT
Start: 2023-08-23 | End: 2023-08-23

## 2023-08-23 RX ADMIN — GABAPENTIN 300 MG: 300 CAPSULE ORAL at 11:08

## 2023-08-23 RX ADMIN — ONDANSETRON 4 MG: 4 TABLET, ORALLY DISINTEGRATING ORAL at 12:08

## 2023-08-23 RX ADMIN — HYDRALAZINE HYDROCHLORIDE 25 MG: 25 TABLET, FILM COATED ORAL at 07:08

## 2023-08-23 RX ADMIN — PANTOPRAZOLE SODIUM 80 MG: 40 INJECTION, POWDER, FOR SOLUTION INTRAVENOUS at 03:08

## 2023-08-23 RX ADMIN — SIMETHICONE 80 MG: 80 TABLET, CHEWABLE ORAL at 11:08

## 2023-08-23 NOTE — CONSULTS
[facility] - Emergency Dept  Nephrology  Consult Note    Patient Name: Angel Farmer Jr.  MRN: [id number]  Admission Date: 8/23/2023  Hospital Length of Stay: 0 days  Attending Provider: Noah Amor MD   Primary Care Physician: Melvin Terry MD  Principal Problem:<principal problem not specified>    Inpatient consult to Nephrology  Consult performed by: Melvin Umana NP  Consult ordered by: Ruddy Palafox MD  Reason for consult: ESRD        Subjective:     HPI: Angel Farmer is a 63 yo male with PMHx of HTN, DM2, and ESRD on iHD MWF who presented to the ED after missing his dialysis transportation today.  His last HD session was on Friday but reports that his transportation did not arrive to pick him up on Monday which led to his missed treatment then.  He reports SOB that occurred while on the ambulance today but has since improved, oxygenating well on RA.  Chemistries are pending at time of consultation but CBC resulted as low with H/H of 5.8/18.9 without evidence of shock and he has been typed and screened with plans for transfusion.  He denies hematochezia but endorses occasional melena over the past several days along with diarrhea.  According to records, he had a similar event in July of this year and had an EGD with multiple gastric and duodenal polyps visualized with biopsies taken.  Path negative for malignancy but concerning for lanthanum deposition.  He does receive JUANITO therapy at his OP dialysis unit, but reports that his hgb has been slowly trending down over the past several weeks.  He denies CP or dizziness.  Nephrology has been consulted for ESRD management while IP.            Past Medical History:   Diagnosis Date    Anemia due to stage 5 chronic kidney disease 3/1/2019    Anticoagulant long-term use     Diabetes mellitus type II     Dialysis patient     Encounter for blood transfusion     Hyperlipidemia     Hypertension     Kidney failure     MIKE (obstructive sleep  "apnea)     Stroke     Urinary tract infection        Past Surgical History:   Procedure Laterality Date    AV FISTULA PLACEMENT      left lower arm    COLONOSCOPY N/A 4/15/2021    Procedure: COLONOSCOPY;  Surgeon: Ruddy Plaza MD;  Location: [facility] ENDO;  Service: Endoscopy;  Laterality: N/A;    COLONOSCOPY W/ POLYPECTOMY  04/15/2021    ESOPHAGOGASTRODUODENOSCOPY N/A 7/11/2023    Procedure: EGD (ESOPHAGOGASTRODUODENOSCOPY);  Surgeon: Joey Quintanilla MD;  Location: [facility] ENDO;  Service: Endoscopy;  Laterality: N/A;    FUSION OF CERVICAL SPINE BY POSTERIOR APPROACH N/A 3/3/2019    Procedure: SPINE, CERVICAL, POSTERIOR APPROACH  LAMINECTOMY C3-C6; C6-C7; WITH MEDIAL DISCECTOMY;  Surgeon: Ruddy Wylie MD;  [facility] OR [address] [place] [address];  Service: Neurosurgery;  Laterality: N/A;  POSTERIOR    TONSILLECTOMY, ADENOIDECTOMY         Review of patient's allergies indicates:   Allergen Reactions    Iodinated contrast media     Keflex [cephalexin] Nausea Only     Current Facility-Administered Medications   Medication Frequency    0.9%  NaCl infusion (for blood administration) Q24H PRN    melatonin tablet 6 mg Nightly PRN    sodium chloride 0.9% flush 10 mL PRN     Current Outpatient Medications   Medication    blood sugar diagnostic (ONETOUCH ULTRA BLUE TEST STRIP) Strp    blood-glucose meter (ADVANCED GLUCOSE METER) Misc    calcium acetate,phosphat bind, (PHOSLO) 667 mg capsule    cyanocobalamin (VITAMIN B-12) 1000 MCG tablet    ergocalciferol (ERGOCALCIFEROL) 50,000 unit Cap    FLUoxetine 10 MG capsule    folic acid/vit B complex and C (RENAL-TINO ORAL)    gabapentin (NEURONTIN) 300 MG capsule    lactulose (CEPHULAC) 10 gram packet    lancets Misc    midodrine (PROAMATINE) 10 MG tablet    pantoprazole (PROTONIX) 40 MG tablet    pen needle, diabetic (BD ULTRA-FINE MINI PEN NEEDLE) 31 gauge x 3/16" Ndle    pravastatin (PRAVACHOL) 10 MG tablet     Family History       Problem Relation (Age of Onset) "    Colon cancer Mother    Diabetes Mother, Father, Maternal Grandmother, Maternal Grandfather    Heart disease Father    Hypertension Father    Lung cancer Mother          Tobacco Use    Smoking status: Never    Smokeless tobacco: Never   Substance and Sexual Activity    Alcohol use: No    Drug use: No    Sexual activity: Not Currently     Review of Systems   Constitutional:  Positive for fatigue. Negative for activity change, appetite change and fever.   HENT:  Negative for congestion, facial swelling, postnasal drip, rhinorrhea, sinus pressure and sinus pain.    Respiratory:  Positive for shortness of breath. Negative for cough and chest tightness.    Cardiovascular:  Negative for chest pain, palpitations and leg swelling.   Gastrointestinal:  Positive for diarrhea, nausea and vomiting. Negative for abdominal distention, abdominal pain and constipation.   Musculoskeletal:  Positive for arthralgias, back pain and gait problem.   Skin:  Negative for color change, rash and wound.   Neurological:  Negative for dizziness, light-headedness and headaches.   Psychiatric/Behavioral:  Negative for agitation, behavioral problems and confusion.      Objective:     Vital Signs (Most Recent):  Temp: 98.6 °F (37 °C) (08/23/23 1118)  Pulse: (!) 58 (08/23/23 1356)  Resp: 16 (08/23/23 1356)  BP: (!) 186/75 (08/23/23 1356)  SpO2: 97 % (08/23/23 1356) Vital Signs (24h Range):  Temp:  [98.6 °F (37 °C)] 98.6 °F (37 °C)  Pulse:  [58-65] 58  Resp:  [16-24] 16  SpO2:  [97 %-100 %] 97 %  BP: (145-186)/(60-75) 186/75        There is no height or weight on file to calculate BMI.  There is no height or weight on file to calculate BSA.    No intake/output data recorded.     Physical Exam  Vitals and nursing note reviewed.   Constitutional:       General: He is not in acute distress.     Appearance: He is obese. He is ill-appearing. He is not toxic-appearing.   HENT:      Head: Normocephalic and atraumatic.      Nose: No congestion or  rhinorrhea.      Mouth/Throat:      Mouth: Mucous membranes are moist.      Pharynx: No oropharyngeal exudate or posterior oropharyngeal erythema.   Eyes:      General: No scleral icterus.        Right eye: No discharge.         Left eye: No discharge.      Extraocular Movements: Extraocular movements intact.      Conjunctiva/sclera: Conjunctivae normal.   Cardiovascular:      Rate and Rhythm: Regular rhythm. Bradycardia present.      Heart sounds: No murmur heard.     No friction rub. No gallop.      Comments: LFA AVF  Pulmonary:      Effort: Pulmonary effort is normal. No respiratory distress.      Breath sounds: No wheezing or rales.   Abdominal:      General: There is no distension.      Palpations: Abdomen is soft.      Tenderness: There is no abdominal tenderness.   Musculoskeletal:      Cervical back: Normal range of motion and neck supple.      Right lower leg: Edema present.      Left lower leg: Edema present.   Skin:     General: Skin is warm and dry.      Coloration: Skin is pale.   Neurological:      General: No focal deficit present.      Mental Status: He is alert and oriented to person, place, and time.          Significant Labs:  CBC:   Recent Labs   Lab 08/23/23  1228   WBC 3.63*   RBC 1.76*   HGB 5.8*   HCT 18.9*   PLT 77*   *   MCH 33.0*   MCHC 30.7*     CMP:   Recent Labs   Lab 08/23/23  1228   GLU 95   CALCIUM 8.9   ALBUMIN 2.7*   PROT 5.6*   *   K 5.2*   CO2 25      BUN 76*   CREATININE 12.2*   ALKPHOS 101   ALT 20   AST 26   BILITOT 0.6         Assessment/Plan:     Renal/  Chronic kidney disease-mineral and bone disorder  Check phos level  Phos binders with meals if not NPO  Renal diet if not NPO    ESRD (end stage renal disease) on dialysis  ESRD on iHD MWF  Sabrina New  3.5 hours  EDW:  113.5 kg  LFA AVF  Minimal residual renal fx    Plan/Recommendations:  -no emergent need for RRT at this time  -will need to be transfused 1 unit of PRBC prior to HD  initiation, can transfuse 2nd unit with HD later tonight  -renal diet when advanced, 1L fluid restrictions  -add on phos level  -hold phos binders if patient is NPO  -EPO with HD    Oncology  Symptomatic anemia  -H/H 5.8/18.9 on admit  -hx of GIB, EGD 7/2023 with numerous polyps   -Bx consistent with lanthanum deposition  -recommend transfusion of 2 units of PRBC  -consider GI consultation      Melvin Vazquez, NP  Nephrology  [facility] - Emergency Dept

## 2023-08-23 NOTE — SUBJECTIVE & OBJECTIVE
Past Medical History:   Diagnosis Date    Anemia due to stage 5 chronic kidney disease 3/1/2019    Anticoagulant long-term use     Diabetes mellitus type II     Dialysis patient     Encounter for blood transfusion     Hyperlipidemia     Hypertension     Kidney failure     MIKE (obstructive sleep apnea)     Stroke     Urinary tract infection        Past Surgical History:   Procedure Laterality Date    AV FISTULA PLACEMENT      left lower arm    COLONOSCOPY N/A 4/15/2021    Procedure: COLONOSCOPY;  Surgeon: Ruddy Plaza MD;  Location: [facility] ENDO;  Service: Endoscopy;  Laterality: N/A;    COLONOSCOPY W/ POLYPECTOMY  04/15/2021    ESOPHAGOGASTRODUODENOSCOPY N/A 7/11/2023    Procedure: EGD (ESOPHAGOGASTRODUODENOSCOPY);  Surgeon: Joey Quintanilla MD;  Location: [facility] ENDO;  Service: Endoscopy;  Laterality: N/A;    FUSION OF CERVICAL SPINE BY POSTERIOR APPROACH N/A 3/3/2019    Procedure: SPINE, CERVICAL, POSTERIOR APPROACH  LAMINECTOMY C3-C6; C6-C7; WITH MEDIAL DISCECTOMY;  Surgeon: Ruddy Wylie MD;  Location: [facility] OR [address] [place] [address];  Service: Neurosurgery;  Laterality: N/A;  POSTERIOR    TONSILLECTOMY, ADENOIDECTOMY         Review of patient's allergies indicates:   Allergen Reactions    Iodinated contrast media     Keflex [cephalexin] Nausea Only     Current Facility-Administered Medications   Medication Frequency    0.9%  NaCl infusion (for blood administration) Q24H PRN    melatonin tablet 6 mg Nightly PRN    sodium chloride 0.9% flush 10 mL PRN     Current Outpatient Medications   Medication    blood sugar diagnostic (ONETOUCH ULTRA BLUE TEST STRIP) Strp    blood-glucose meter (ADVANCED GLUCOSE METER) Misc    calcium acetate,phosphat bind, (PHOSLO) 667 mg capsule    cyanocobalamin (VITAMIN B-12) 1000 MCG tablet    ergocalciferol (ERGOCALCIFEROL) 50,000 unit Cap    FLUoxetine 10 MG capsule    folic acid/vit B complex and C (RENAL-TINO ORAL)    gabapentin (NEURONTIN) 300 MG capsule    lactulose (CEPHULAC) 10 gram packet  "   lancets Misc    midodrine (PROAMATINE) 10 MG tablet    pantoprazole (PROTONIX) 40 MG tablet    pen needle, diabetic (BD ULTRA-FINE MINI PEN NEEDLE) 31 gauge x 3/16" Ndle    pravastatin (PRAVACHOL) 10 MG tablet     Family History       Problem Relation (Age of Onset)    Colon cancer Mother    Diabetes Mother, Father, Maternal Grandmother, Maternal Grandfather    Heart disease Father    Hypertension Father    Lung cancer Mother          Tobacco Use    Smoking status: Never    Smokeless tobacco: Never   Substance and Sexual Activity    Alcohol use: No    Drug use: No    Sexual activity: Not Currently     Review of Systems   Constitutional:  Positive for fatigue. Negative for activity change, appetite change and fever.   HENT:  Negative for congestion, facial swelling, postnasal drip, rhinorrhea, sinus pressure and sinus pain.    Respiratory:  Positive for shortness of breath. Negative for cough and chest tightness.    Cardiovascular:  Negative for chest pain, palpitations and leg swelling.   Gastrointestinal:  Positive for diarrhea, nausea and vomiting. Negative for abdominal distention, abdominal pain and constipation.   Musculoskeletal:  Positive for arthralgias, back pain and gait problem.   Skin:  Negative for color change, rash and wound.   Neurological:  Negative for dizziness, light-headedness and headaches.   Psychiatric/Behavioral:  Negative for agitation, behavioral problems and confusion.      Objective:     Vital Signs (Most Recent):  Temp: 98.6 °F (37 °C) (08/23/23 1118)  Pulse: (!) 58 (08/23/23 1356)  Resp: 16 (08/23/23 1356)  BP: (!) 186/75 (08/23/23 1356)  SpO2: 97 % (08/23/23 1356) Vital Signs (24h Range):  Temp:  [98.6 °F (37 °C)] 98.6 °F (37 °C)  Pulse:  [58-65] 58  Resp:  [16-24] 16  SpO2:  [97 %-100 %] 97 %  BP: (145-186)/(60-75) 186/75        There is no height or weight on file to calculate BMI.  There is no height or weight on file to calculate BSA.    No intake/output data recorded.   "   Physical Exam  Vitals and nursing note reviewed.   Constitutional:       General: He is not in acute distress.     Appearance: He is obese. He is ill-appearing. He is not toxic-appearing.   HENT:      Head: Normocephalic and atraumatic.      Nose: No congestion or rhinorrhea.      Mouth/Throat:      Mouth: Mucous membranes are moist.      Pharynx: No oropharyngeal exudate or posterior oropharyngeal erythema.   Eyes:      General: No scleral icterus.        Right eye: No discharge.         Left eye: No discharge.      Extraocular Movements: Extraocular movements intact.      Conjunctiva/sclera: Conjunctivae normal.   Cardiovascular:      Rate and Rhythm: Regular rhythm. Bradycardia present.      Heart sounds: No murmur heard.     No friction rub. No gallop.      Comments: LFA AVF  Pulmonary:      Effort: Pulmonary effort is normal. No respiratory distress.      Breath sounds: No wheezing or rales.   Abdominal:      General: There is no distension.      Palpations: Abdomen is soft.      Tenderness: There is no abdominal tenderness.   Musculoskeletal:      Cervical back: Normal range of motion and neck supple.      Right lower leg: Edema present.      Left lower leg: Edema present.   Skin:     General: Skin is warm and dry.      Coloration: Skin is pale.   Neurological:      General: No focal deficit present.      Mental Status: He is alert and oriented to person, place, and time.          Significant Labs:  CBC:   Recent Labs   Lab 08/23/23  1228   WBC 3.63*   RBC 1.76*   HGB 5.8*   HCT 18.9*   PLT 77*   *   MCH 33.0*   MCHC 30.7*     CMP:   Recent Labs   Lab 08/23/23  1228   GLU 95   CALCIUM 8.9   ALBUMIN 2.7*   PROT 5.6*   *   K 5.2*   CO2 25      BUN 76*   CREATININE 12.2*   ALKPHOS 101   ALT 20   AST 26   BILITOT 0.6

## 2023-08-23 NOTE — ASSESSMENT & PLAN NOTE
Hb 5.8  Known innumerable polyps from EGD in 7/2023 at [facility], nonmalignant biopsy  W/ melena now start PPI; d/w GI NPO after midnight  prbcs ordered by ED  Stable v/s  Trend hb    Indirect radha is +; obtain radha for now; no clinical evidence of hemolysis

## 2023-08-23 NOTE — PHARMACY MED REC
"Admission Medication History     The home medication history was taken by Sharron Kaiser.    You may go to "Admission" then "Reconcile Home Medications" tabs to review and/or act upon these items.     The home medication list has been updated by the Pharmacy department.   Please read ALL comments highlighted in yellow.   Please address this information as you see fit.    Feel free to contact us if you have any questions or require assistance.      The medications listed below were removed from the home medication list. Please reorder if appropriate:  Patient reports no longer taking the following medication(s):  ERGOCALCIFEROL 50,000 UNIT  LACTULOSE     Medications listed below were obtained from: Patient/family  Current Outpatient Medications on File Prior to Encounter   Medication Sig    calcium acetate,phosphat bind, (PHOSLO) 667 mg capsule Take 3 capsules by mouth with each meal & 1 capsule with snacks    cyanocobalamin (VITAMIN B-12) 1000 MCG tablet Take 1,000 mcg by mouth once daily.    FLUoxetine 40 MG capsule Take 40 mg by mouth once daily.    folic acid/vit B complex and C (RENAL-TINO ORAL) Take 1 tablet by mouth once daily.    gabapentin (NEURONTIN) 300 MG capsule TAKE 1 BY MOUTH EVERY      EVENING    midodrine (PROAMATINE) 10 MG tablet Take 1 tablet by mouth 3 (three) times daily.    ondansetron (ZOFRAN) 4 MG tablet TAKE ONE TABLET BY MOUTH THREE TIMES DAILY AS NEEDED FOR NAUSEA    pantoprazole (PROTONIX) 40 MG tablet Take 1 tablet (40 mg total) by mouth once daily.    pravastatin (PRAVACHOL) 20 MG tablet Take 20 mg by mouth once daily.    promethazine (PHENERGAN) 25 MG tablet TAKE ONE TABLET BY MOUTH EVERY 6 HOURS AS NEEDED (IF ZOFRAN DOESN'T WORK)    simethicone (GAS-X ORAL) Take 1 tablet by mouth 2 (two) times daily as needed.    blood sugar diagnostic (ONETOUCH ULTRA BLUE TEST STRIP) Strp Check blood glucose qac and as directed by felix    blood-glucose meter (ADVANCED GLUCOSE METER) Mis 1 each by " "Misc.(Non-Drug; Combo Route) route 4 (four) times daily with meals and nightly.    lancets Misc 1 each by Misc.(Non-Drug; Combo Route) route 3 (three) times daily.    pen needle, diabetic (BD ULTRA-FINE MINI PEN NEEDLE) 31 gauge x 3/16" Ndle USE 4 TO 5 TIMES A DAY     ( DISCARD PEN NEEDLE AFTER EACH USE )               Sharron Kaiser  EXT [id number]                  .          "

## 2023-08-23 NOTE — H&P
"[facility] - Emergency [facility] Medicine  History & Physical    Patient Name: Angel Farmer Jr.  MRN: [id number]  Patient Class: OP- Observation  Admission Date: 8/23/2023  Attending Physician: Noah Amor MD   Primary Care Provider: Melvin Terry MD         Patient information was obtained from patient, past medical records and ER records.     Subjective:     Principal Problem:Acute blood loss anemia    Chief Complaint:   Chief Complaint   Patient presents with    Shortness of Breath     Arrived via EMS from home. Missed dialysis, missed 5 days so far.         HPI: 62-year-old white male being admitted for acute GI bleed and pulmonary edema secondary to missing dialysis.  Patient reports being in his usual state of health until about 2 days ago when he began noticing some black stools.  No hematochezia.  Reports some nausea but no fely vomiting.  No abdominal pain.  Also reports that he did not get his last dialysis session on Monday due to transportation issues.  Has developed shortness of breath while lying flat that eases when he sits up.  No fely chest pain.  He denies taking any NSAIDs or anticoagulants/antiplatelets.    Patient just had an EGD at [facility] about a month ago for GI bleed for which yielded innumerable gastric and duodenal polyps concerning for atypical polyposis syndrome; nonmalignant per biopsy results.     Patient reports that he lives with his brother and sister-in-law, patient is quite wheelchair bed-bound since about a month ago.  Prior to then he was using a walker.  He can not specify exactly why he has gone from an ambulatory status to sedentary but does just reports his legs have become weaker.  He does report having undergone attempted lumbar spinal surgery in 2019 but reports having coded 7 times on the table" and that they were unable to complete the surgery and had to aborted.  He denies any hx of cardiac stenting.     Here in the ED vital signs are " stable, hemoglobin is noted to be 5.8.  EKG which I personally reviewed shows no acute ischemic changes. ED ordered 2 units of prbc. Indirect radha test is +.      Past Medical History:   Diagnosis Date    Anemia due to stage 5 chronic kidney disease 3/1/2019    Anticoagulant long-term use     Diabetes mellitus type II     Dialysis patient     Encounter for blood transfusion     Hyperlipidemia     Hypertension     Kidney failure     MIKE (obstructive sleep apnea)     Stroke     Urinary tract infection        Past Surgical History:   Procedure Laterality Date    AV FISTULA PLACEMENT      left lower arm    COLONOSCOPY N/A 4/15/2021    Procedure: COLONOSCOPY;  Surgeon: Ruddy Plaza MD;  Location: [facility] ENDO;  Service: Endoscopy;  Laterality: N/A;    COLONOSCOPY W/ POLYPECTOMY  04/15/2021    ESOPHAGOGASTRODUODENOSCOPY N/A 7/11/2023    Procedure: EGD (ESOPHAGOGASTRODUODENOSCOPY);  Surgeon: Joey Quintanilla MD;  Location: [facility];  Service: Endoscopy;  Laterality: N/A;    FUSION OF CERVICAL SPINE BY POSTERIOR APPROACH N/A 3/3/2019    Procedure: SPINE, CERVICAL, POSTERIOR APPROACH  LAMINECTOMY C3-C6; C6-C7; WITH MEDIAL DISCECTOMY;  Surgeon: Ruddy Wylie MD;  Location: [address];  Service: Neurosurgery;  Laterality: N/A;  POSTERIOR    TONSILLECTOMY, ADENOIDECTOMY         Review of patient's allergies indicates:   Allergen Reactions    Iodinated contrast media     Keflex [cephalexin] Nausea Only       No current facility-administered medications on file prior to encounter.     Current Outpatient Medications on File Prior to Encounter   Medication Sig    calcium acetate,phosphat bind, (PHOSLO) 667 mg capsule Take 3 capsules by mouth with each meal & 1 capsule with snacks    cyanocobalamin (VITAMIN B-12) 1000 MCG tablet Take 1,000 mcg by mouth once daily.    FLUoxetine 40 MG capsule Take 40 mg by mouth once daily.    folic acid/vit B complex and C (RENAL-TINO ORAL) Take 1 tablet by mouth  "once daily.    gabapentin (NEURONTIN) 300 MG capsule TAKE 1 BY MOUTH EVERY      EVENING    midodrine (PROAMATINE) 10 MG tablet Take 1 tablet by mouth 3 (three) times daily.    ondansetron (ZOFRAN) 4 MG tablet TAKE ONE TABLET BY MOUTH THREE TIMES DAILY AS NEEDED FOR NAUSEA    pantoprazole (PROTONIX) 40 MG tablet Take 1 tablet (40 mg total) by mouth once daily.    pravastatin (PRAVACHOL) 20 MG tablet Take 20 mg by mouth once daily.    promethazine (PHENERGAN) 25 MG tablet TAKE ONE TABLET BY MOUTH EVERY 6 HOURS AS NEEDED (IF ZOFRAN DOESN'T WORK)    simethicone (GAS-X ORAL) Take 1 tablet by mouth 2 (two) times daily as needed.    blood sugar diagnostic (ONETOUCH ULTRA BLUE TEST STRIP) Strp Check blood glucose qac and as directed by [facility]    blood-glucose meter (ADVANCED GLUCOSE METER) Misc 1 each by Misc.(Non-Drug; Combo Route) route 4 (four) times daily with meals and nightly.    lancets Misc 1 each by Misc.(Non-Drug; Combo Route) route 3 (three) times daily.    pen needle, diabetic (BD ULTRA-FINE MINI PEN NEEDLE) 31 gauge x 3/16" Ndle USE 4 TO 5 TIMES A DAY     ( DISCARD PEN NEEDLE AFTER EACH USE )    [DISCONTINUED] atorvastatin (LIPITOR) 10 MG tablet Take 10 mg by mouth once daily.    [DISCONTINUED] ergocalciferol (ERGOCALCIFEROL) 50,000 unit Cap Take 50,000 Units by mouth daily as needed. Once a month. On the first of the month.    [DISCONTINUED] lactulose (CEPHULAC) 10 gram packet Take 10 g by mouth.     Family History       Problem Relation (Age of Onset)    Colon cancer Mother    Diabetes Mother, Father, Maternal Grandmother, Maternal Grandfather    Heart disease Father    Hypertension Father    Lung cancer Mother          Tobacco Use    Smoking status: Never    Smokeless tobacco: Never   Substance and Sexual Activity    Alcohol use: No    Drug use: No    Sexual activity: Not Currently     Review of Systems  Objective:     Vital Signs (Most Recent):  Temp: 97.8 °F (36.6 °C) (08/23/23 " 1528)  Pulse: 62 (08/23/23 1528)  Resp: 20 (08/23/23 1528)  BP: (!) 186/72 (08/23/23 1528)  SpO2: (!) 93 % (08/23/23 1528) Vital Signs (24h Range):  Temp:  [97.8 °F (36.6 °C)-98.6 °F (37 °C)] 97.8 °F (36.6 °C)  Pulse:  [58-65] 62  Resp:  [16-24] 20  SpO2:  [93 %-100 %] 93 %  BP: (145-186)/(60-75) 186/72        There is no height or weight on file to calculate BMI.     Physical Exam  Vitals reviewed.             General: well nourished, nontoxic appearing  Skin: Warm and dry  Eyes: No conjunctivitis, no scleral icterus  ENT: No nasal bleeding, no obvious discharge  Cardiovascular: Regular rate and rhythm. No obvious JVD  Pulmonary/Chest: No accessory muscle use. Clear to auscultation bilaterally  Gastrointestinal: Abdomen, ND.  Extremities: no clubbing, cyanosis ; mild LE edema  Musculoskeletal: appropriate muscle bulk, intact movement of extremities; 5/5 BUE strength including fist  and 5/5 LE strength;   no facial droop, no slurred speech  Psychiatric: appropriate mood and affect, insight intact       Oriented to self place and time.     Assessment/Plan:     * Acute blood loss anemia  Hb 5.8  Known innumerable polyps from EGD in 7/2023 at [facility], nonmalignant biopsy  W/ melena now start PPI; d/w GI NPO after midnight  prbcs ordered by ED  Stable v/s  Trend hb    Indirect radha is +; obtain radha for now; no clinical evidence of hemolysis    SOB (shortness of breath)  Acute presentation; mild  CXR pending  Suspect 2/2 anemia and missing HD  Stable sats on RA; ekg and trop unremarkable      MIKE (obstructive sleep apnea) - very severe latest sleep study says BPAHARESH @ 16/8  Offer NIPPV while inpatient      ESRD (end stage renal disease) on dialysis  nephro consulted for HD  Anticipate dialysis in AM      Diabetes mellitus, type 2  Apparently diet controlled; monitor on AM labs      VTE Risk Mitigation (From admission, onward)         Ordered     IP VTE HIGH RISK PATIENT  Once         08/23/23 1534     Place  sequential compression device  Until discontinued         08/23/23 1534     Reason for No Pharmacological VTE Prophylaxis  Once        Question:  Reasons:  Answer:  Active Bleeding    08/23/23 1534     Place sequential compression device  Until discontinued         08/23/23 1317                   On 08/23/2023, patient should be placed in hospital observation services under my care.    Patient affirms DNR status after discussion.     Noah Amor MD  Department of Hospital Medicine  [facility] - Emergency Dept

## 2023-08-23 NOTE — ASSESSMENT & PLAN NOTE
-H/H 5.8/18.9 on admit  -hx of GIB, EGD 7/2023 with numerous polyps   -Bx consistent with lanthanum deposition  -recommend transfusion of 2 units of PRBC

## 2023-08-23 NOTE — ED PROVIDER NOTES
Encounter Date: 8/23/2023       History     Chief Complaint   Patient presents with    Shortness of Breath     Arrived via EMS from home. Missed dialysis, missed 5 days so far.      62-year-old male with history of end-stage renal disease on Monday Wednesday Friday dialysis.  The patient missed dialysis on Monday due to lack of transportation.  Today he was unable to obtain transportation to dialysis.  He complains of feeling generally ill consistent with missed dialysis.  He was previously able to make his own way to dialysis with assistance to bus, but has had acute progressive decline in function over last 4 days and He complains of vomiting x1.  He denies fever or chills.  He denies headache, neck pain, chest pain, or abdominal pain.      Review of patient's allergies indicates:   Allergen Reactions    Iodinated contrast media     Keflex [cephalexin] Nausea Only     Past Medical History:   Diagnosis Date    Anemia due to stage 5 chronic kidney disease 3/1/2019    Anticoagulant long-term use     Diabetes mellitus type II     Dialysis patient     Encounter for blood transfusion     Hyperlipidemia     Hypertension     Kidney failure     MIKE (obstructive sleep apnea)     Stroke     Urinary tract infection      Past Surgical History:   Procedure Laterality Date    AV FISTULA PLACEMENT      left lower arm    COLONOSCOPY N/A 4/15/2021    Procedure: COLONOSCOPY;  Surgeon: Ruddy Plaza MD;  Location: [facility];  Service: Endoscopy;  Laterality: N/A;    COLONOSCOPY W/ POLYPECTOMY  04/15/2021    ESOPHAGOGASTRODUODENOSCOPY N/A 7/11/2023    Procedure: EGD (ESOPHAGOGASTRODUODENOSCOPY);  Surgeon: Joey Quintanilla MD;  Location: [facility];  Service: Endoscopy;  Laterality: N/A;    FUSION OF CERVICAL SPINE BY POSTERIOR APPROACH N/A 3/3/2019    Procedure: SPINE, CERVICAL, POSTERIOR APPROACH  LAMINECTOMY C3-C6; C6-C7; WITH MEDIAL DISCECTOMY;  Surgeon: Ruddy Wylie MD;  Location: [facility] OR [address] [place] [address];  Service: Neurosurgery;   Laterality: N/A;  POSTERIOR    TONSILLECTOMY, ADENOIDECTOMY       Family History   Problem Relation Age of Onset    Colon cancer Mother     Lung cancer Mother     Diabetes Mother     Diabetes Father     Heart disease Father     Hypertension Father     Diabetes Maternal Grandmother     Diabetes Maternal Grandfather      Social History     Tobacco Use    Smoking status: Never    Smokeless tobacco: Never   Substance Use Topics    Alcohol use: No    Drug use: No     Review of Systems  All other systems reviewed and negative except as noted in HPI    Physical Exam     Initial Vitals [08/23/23 1118]   BP Pulse Resp Temp SpO2   (!) 145/60 65 (!) 24 98.6 °F (37 °C) 100 %      MAP       --         Physical Exam    Nursing note and vitals reviewed.  Constitutional:   Ill-appearing. debilitated    HENT:   Head: Normocephalic and atraumatic.   Dry mucous   Eyes: Conjunctivae and EOM are normal. Pupils are equal, round, and reactive to light.   Neck: Neck supple.   Normal range of motion.  Cardiovascular:  Normal rate, regular rhythm, normal heart sounds and intact distal pulses.           Pulmonary/Chest: Breath sounds normal. No respiratory distress. He has no wheezes. He has no rhonchi. He has no rales. He exhibits no tenderness.   Diminished at bilateral bases   Abdominal: Abdomen is soft. He exhibits no distension. There is no abdominal tenderness.   Musculoskeletal:         General: No tenderness or edema. Normal range of motion.      Cervical back: Normal range of motion and neck supple.     Neurological: He is alert and oriented to person, place, and time. He has normal strength and normal reflexes. GCS score is 15. GCS eye subscore is 4. GCS verbal subscore is 5. GCS motor subscore is 6.   Skin: Skin is warm and dry. Capillary refill takes less than 2 seconds.           ED Course   Procedures  Labs Reviewed   CBC W/ AUTO DIFFERENTIAL - Abnormal; Notable for the following components:       Result Value    WBC 3.63 (*)      RBC 1.76 (*)     Hemoglobin 5.8 (*)     Hematocrit 18.9 (*)      (*)     MCH 33.0 (*)     MCHC 30.7 (*)     RDW 16.1 (*)     Platelets 77 (*)     Lymph # 0.7 (*)     All other components within normal limits    Narrative:      H & H critical result(s) called and verbal readback obtained from   Leslie Salinas RN by KF2 08/23/2023 12:56   COMPREHENSIVE METABOLIC PANEL - Abnormal; Notable for the following components:    Sodium 146 (*)     Potassium 5.2 (*)     BUN 76 (*)     Creatinine 12.2 (*)     Total Protein 5.6 (*)     Albumin 2.7 (*)     eGFR 4.3 (*)     All other components within normal limits   TYPE & SCREEN - Abnormal; Notable for the following components:    Indirect Yannick POS (*)     All other components within normal limits   HIV 1 / 2 ANTIBODY    Narrative:     Release to patient->Immediate   HEPATITIS C ANTIBODY    Narrative:     Release to patient->Immediate   HEPATITIS B CORE ANTIBODY, TOTAL   HEPATITIS B SURFACE ANTIBODY   HEPATITIS B SURFACE ANTIGEN   URINALYSIS, REFLEX TO URINE CULTURE   TROPONIN I   ANTIBODY IDENTIFICATION   DIRECT ANTIGLOBULIN TEST   ISTAT CHEM8   PREPARE RBC SOFT          Imaging Results              X-Ray Chest AP Portable (Final result)  Result time 08/23/23 17:36:16      Final result by Thomas Poe MD (08/23/23 17:36:16)                   Impression:      Suspected mild pulmonary edema.  No consolidation.    Electronically signed by resident: Killian Stanley  Date:    08/23/2023  Time:    17:20    Electronically signed by: Thomas Poe MD  Date:    08/23/2023  Time:    17:36               Narrative:    EXAMINATION:  XR CHEST AP PORTABLE    CLINICAL HISTORY:  sob;    TECHNIQUE:  Single frontal view of the chest was performed.    COMPARISON:  Rib radiograph 06/11/2023    Chest radiograph 12/31/2022    FINDINGS:  Mediastinal structures midline.  Cardiac silhouette stable.  Aortic arch calcification.  Similar nonspecific elevation of the right  hemidiaphragm.    Increased vascular prominence and diffuse interstitial opacities suggestive of edema, new from prior.  No pleural fluid.  No pneumothorax.    No significant osseous abnormality.  Resolution is somewhat limited by body habitus with underpenetration.                                       Medications   0.9%  NaCl infusion (for blood administration) (has no administration in time range)   sodium chloride 0.9% flush 10 mL (has no administration in time range)   melatonin tablet 6 mg (has no administration in time range)   pantoprazole injection 40 mg (has no administration in time range)   sodium chloride 0.9% flush 10 mL (has no administration in time range)   naloxone 0.4 mg/mL injection 0.02 mg (has no administration in time range)   glucose chewable tablet 16 g (has no administration in time range)   glucose chewable tablet 24 g (has no administration in time range)   glucagon (human recombinant) injection 1 mg (has no administration in time range)   FLUoxetine capsule 40 mg (has no administration in time range)   gabapentin capsule 300 mg (has no administration in time range)   pravastatin tablet 20 mg (has no administration in time range)   ondansetron disintegrating tablet 4 mg (4 mg Oral Given 8/23/23 1241)   pantoprazole injection 80 mg (80 mg Intravenous Given 8/23/23 2864)     Medical Decision Making  Patient will require dialysis today to help alleviate his symptoms including his significant severe hypertension.  Of greater concern is his acute decline in function and inability to provide care for himself despite the use of home health in his family as well as inability to make his way to dialysis with the assistance.  Will plan for disposition to rehab after performing dialysis if the patient iss stable.  Otherwise, patient will require placement in observation and subsequent dispositioned to rehab    Problems Addressed:  Debility: acute illness or injury  ESRD (end stage renal disease):  chronic illness or injury    Amount and/or Complexity of Data Reviewed  Independent Historian: EMS  External Data Reviewed: labs, radiology, ECG and notes.  Labs: ordered. Decision-making details documented in ED Course.  Radiology: ordered and independent interpretation performed. Decision-making details documented in ED Course.  ECG/medicine tests: ordered and independent interpretation performed. Decision-making details documented in ED Course.    Risk  OTC drugs.  Prescription drug management.  Decision regarding hospitalization.                               Clinical Impression:   Final diagnoses:  [N18.6] ESRD (end stage renal disease)  [R53.81] Debility (Primary)        ED Disposition Condition    Observation Stable                Sessions, Ruddy BRITTON MD  08/23/23 0309

## 2023-08-23 NOTE — HPI
Angel Farmer is a 61 yo male with PMHx of HTN, DM2, and ESRD on iHD MWF who presented to the ED after missing his dialysis transportation today.  His last HD session was on Friday but reports that his transportation did not arrive to pick him up on Monday which led to his missed treatment then.  He reports SOB that occurred while on the ambulance today but has since improved, oxygenating well on RA.  Chemistries are pending at time of consultation but CBC resulted as low with H/H of 5.8/18.9 without evidence of shock and he has been typed and screened with plans for transfusion.  He denies hematochezia but endorses occasional melena over the past several days along with diarrhea.  According to records, he had a similar event in July of this year and had an EGD with multiple gastric and duodenal polyps visualized with biopsies taken.  Path negative for malignancy but concerning for lanthanum deposition.  He does receive JUANITO therapy at his OP dialysis unit, but reports that his hgb has been slowly trending down over the past several weeks.  He denies CP or dizziness.  Nephrology has been consulted for ESRD management while IP.

## 2023-08-23 NOTE — ACP (ADVANCE CARE PLANNING)
Advance Care Planning     Date: 08/23/2023    Today a voluntary meeting took place: bedside    Patient Participation: Patient is able to participate     Attendees (Name and  Relationship to patient):  patient     Staff attendees (Name and  Role): Noah Amor MD, attending    ACP Conversation (General): Other (specify below) code status    Code Status: DNR; status confirmed/order placed in chart     ACP Documents: None                SUBJECTIVE:   Patient is seen upon request by  Nursing staff for emergency situation with patient;  Patient is a 66 year old male with complaints of : having swollen lips today 2 hrs after eating star burst candies with cherry flavor.   No breathing difficulty  No swallowing difficulty  He denies any itching on lips but some  Numbness on lips with swelling  No other rashes  Never had any allergies to candy before  denies any chest pain, sob, palpitations or any other cardiovascular symptoms;  denies any respiratory distress or symptoms;  denies any gastrointestinal symptoms;  denies any genitourinary symptoms;  denies any headaches, visual changes or any other neurologic symptoms;    Past Medical History:   Diagnosis Date   • Chronic pain     back pain   • Diabetes mellitus (CMS/[facility])    • Essential (primary) hypertension    • Substance abuse (CMS/[facility])          PHYSICAL EXAM:  APPEARANCE:  Healthy, alert, in no distress  HEAD: normocephalic. No masses, lesions, tenderness or abnormalities  EYES: Pupils equal, round reactive to light & accommodation and normal extraocular movements  Lips: significantly swollen lips but able to open  Mouth and swallow food and drink fluids   EARS: tympanic membranes normal  NOSE: normal  THROAT: normal oropharynx; no swelling noted; patent oropharynx   NECK: Neck supple. No masses. No adenopathy.  no JVD  LUNGS: clear to auscultation and percussion and no wheezing   HEART: regular rate and rhythm, S1 and S2 normal and with no gallops or murmurs  NEUROLOGIC: CN II-XII intact, sensory and motor grossly intact, reflexes normal and symmetric. and normal speech and gait    ASSESSMENT:  Angioedema due to lisinopril vs due to allergic reaction   Will hold  lisinopril - which can cause angioedema  Start claritin and prednison for now  If no better or worsening needs for immediate attention

## 2023-08-23 NOTE — ASSESSMENT & PLAN NOTE
ESRD on iHD MWF  Sabrina New  3.5 hours  EDW:  113.5 kg  LFA AVF  Minimal residual renal fx    Plan/Recommendations:  -no emergent need for RRT at this time  -will need to be transfused 1 un it of PRBC prior to HD initiation, can transfuse 2nd unit with HD later tonight  -renal diet when advanced, 1L fluid restrictions  -add on phos level  -hold phos binders if patient is NPO  -EPO with HD

## 2023-08-23 NOTE — SUBJECTIVE & OBJECTIVE
Past Medical History:   Diagnosis Date    Anemia due to stage 5 chronic kidney disease 3/1/2019    Anticoagulant long-term use     Diabetes mellitus type II     Dialysis patient     Encounter for blood transfusion     Hyperlipidemia     Hypertension     Kidney failure     MIKE (obstructive sleep apnea)     Stroke     Urinary tract infection        Past Surgical History:   Procedure Laterality Date    AV FISTULA PLACEMENT      left lower arm    COLONOSCOPY N/A 4/15/2021    Procedure: COLONOSCOPY;  Surgeon: Ruddy Plaza MD;  Location: [facility] ENDO;  Service: Endoscopy;  Laterality: N/A;    COLONOSCOPY W/ POLYPECTOMY  04/15/2021    ESOPHAGOGASTRODUODENOSCOPY N/A 7/11/2023    Procedure: EGD (ESOPHAGOGASTRODUODENOSCOPY);  Surgeon: Joey Quintanilla MD;  Location: [facility] ENDO;  Service: Endoscopy;  Laterality: N/A;    FUSION OF CERVICAL SPINE BY POSTERIOR APPROACH N/A 3/3/2019    Procedure: SPINE, CERVICAL, POSTERIOR APPROACH  LAMINECTOMY C3-C6; C6-C7; WITH MEDIAL DISCECTOMY;  Surgeon: Ruddy Wylie MD;  Location: [address];  Service: Neurosurgery;  Laterality: N/A;  POSTERIOR    TONSILLECTOMY, ADENOIDECTOMY         Review of patient's allergies indicates:   Allergen Reactions    Iodinated contrast media     Keflex [cephalexin] Nausea Only       No current facility-administered medications on file prior to encounter.     Current Outpatient Medications on File Prior to Encounter   Medication Sig    calcium acetate,phosphat bind, (PHOSLO) 667 mg capsule Take 3 capsules by mouth with each meal & 1 capsule with snacks    cyanocobalamin (VITAMIN B-12) 1000 MCG tablet Take 1,000 mcg by mouth once daily.    FLUoxetine 40 MG capsule Take 40 mg by mouth once daily.    folic acid/vit B complex and C (RENAL-TINO ORAL) Take 1 tablet by mouth once daily.    gabapentin (NEURONTIN) 300 MG capsule TAKE 1 BY MOUTH EVERY      EVENING    midodrine (PROAMATINE) 10 MG tablet Take 1 tablet by mouth 3 (three) times daily.    ondansetron  "(ZOFRAN) 4 MG tablet TAKE ONE TABLET BY MOUTH THREE TIMES DAILY AS NEEDED FOR NAUSEA    pantoprazole (PROTONIX) 40 MG tablet Take 1 tablet (40 mg total) by mouth once daily.    pravastatin (PRAVACHOL) 20 MG tablet Take 20 mg by mouth once daily.    promethazine (PHENERGAN) 25 MG tablet TAKE ONE TABLET BY MOUTH EVERY 6 HOURS AS NEEDED (IF ZOFRAN DOESN'T WORK)    simethicone (GAS-X ORAL) Take 1 tablet by mouth 2 (two) times daily as needed.    blood sugar diagnostic (ONETOUCH ULTRA BLUE TEST STRIP) Strp Check blood glucose qac and as directed by physican    blood-glucose meter (ADVANCED GLUCOSE METER) Misc 1 each by Misc.(Non-Drug; Combo Route) route 4 (four) times daily with meals and nightly.    lancets Misc 1 each by Misc.(Non-Drug; Combo Route) route 3 (three) times daily.    pen needle, diabetic (BD ULTRA-FINE MINI PEN NEEDLE) 31 gauge x 3/16" Ndle USE 4 TO 5 TIMES A DAY     ( DISCARD PEN NEEDLE AFTER EACH USE )    [DISCONTINUED] atorvastatin (LIPITOR) 10 MG tablet Take 10 mg by mouth once daily.    [DISCONTINUED] ergocalciferol (ERGOCALCIFEROL) 50,000 unit Cap Take 50,000 Units by mouth daily as needed. Once a month. On the first of the month.    [DISCONTINUED] lactulose (CEPHULAC) 10 gram packet Take 10 g by mouth.     Family History       Problem Relation (Age of Onset)    Colon cancer Mother    Diabetes Mother, Father, Maternal Grandmother, Maternal Grandfather    Heart disease Father    Hypertension Father    Lung cancer Mother          Tobacco Use    Smoking status: Never    Smokeless tobacco: Never   Substance and Sexual Activity    Alcohol use: No    Drug use: No    Sexual activity: Not Currently     Review of Systems  Objective:     Vital Signs (Most Recent):  Temp: 97.8 °F (36.6 °C) (08/23/23 1528)  Pulse: 62 (08/23/23 1528)  Resp: 20 (08/23/23 1528)  BP: (!) 186/72 (08/23/23 1528)  SpO2: (!) 93 % (08/23/23 1528) Vital Signs (24h Range):  Temp:  [97.8 °F (36.6 °C)-98.6 °F (37 °C)] 97.8 °F (36.6 " °C)  Pulse:  [58-65] 62  Resp:  [16-24] 20  SpO2:  [93 %-100 %] 93 %  BP: (145-186)/(60-75) 186/72        There is no height or weight on file to calculate BMI.     Physical Exam  Vitals reviewed.             General: well nourished, nontoxic appearing  Skin: Warm and dry  Eyes: No conjunctivitis, no scleral icterus  ENT: No nasal bleeding, no obvious discharge  Cardiovascular: Regular rate and rhythm. No obvious JVD  Pulmonary/Chest: No accessory muscle use. Clear to auscultation bilaterally  Gastrointestinal: Abdomen, ND.  Extremities: no clubbing, cyanosis ; mild LE edema  Musculoskeletal: appropriate muscle bulk, intact movement of extremities; 5/5 BUE strength including fist  and 5/5 LE strength;   no facial droop, no slurred speech  Psychiatric: appropriate mood and affect, insight intact

## 2023-08-23 NOTE — ED NOTES
..Patient identifiers for Angel Farmer Jr. 62 y.o. male checked and correct.  Chief Complaint   Patient presents with    Shortness of Breath     Arrived via EMS from home. Missed dialysis, missed 5 days so far.      Past Medical History:   Diagnosis Date    Anemia due to stage 5 chronic kidney disease 3/1/2019    Anticoagulant long-term use     Diabetes mellitus type II     Dialysis patient     Encounter for blood transfusion     Hyperlipidemia     Hypertension     Kidney failure     MIKE (obstructive sleep apnea)     Stroke     Urinary tract infection      Allergies reported:   Review of patient's allergies indicates:   Allergen Reactions    Iodinated contrast media     Keflex [cephalexin] Nausea Only         LOC: Patient is awake, alert, and aware of environment with an appropriate affect. Patient is oriented x 3 and speaking appropriately.  APPEARANCE: Patient resting comfortably and in no acute distress.   HEENT: **AAO--Hasn't had dialysis since Friday due to transportation problems   SKIN: The skin is warm and dry. Patient has normal skin turgor and moist mucus membranes. Skin is intact; no bruising or breakdown noted.Shunt in left wrist --does not make urine .  MUSKULOSKELETAL: Patient is moving all extremities well, no obvious deformities noted. Pulses intact.   RESPIRATORY: Airway is open and patent. Respirations are spontaneous and non-labored with normal effort and rate, BBS=clear  CARDIAC: Patient has a normal rate and rhythm.Sinus margarette  on cardiac monitor,No peripheral edema noted.   ABDOMEN: No distention noted. Bowel sounds active in all 4 quadrants. Soft and non-tender upon palpation.  NEUROLOGICAL:. Facial expression is symmetrical. Hand grasps are equal bilaterally. Normal sensation in all extremities when touched with finger.

## 2023-08-23 NOTE — HPI
"62-year-old white male being admitted for acute GI bleed and pulmonary edema secondary to missing dialysis.  Patient reports being in his usual state of health until about 2 days ago when he began noticing some black stools.  No hematochezia.  Reports some nausea but no fely vomiting.  No abdominal pain.  Also reports that he did not get his last dialysis session on Monday due to transportation issues.  Has developed shortness of breath while lying flat that eases when he sits up.  No fely chest pain.  He denies taking any NSAIDs or anticoagulants/antiplatelets.    Patient just had an EGD at [facility] about a month ago for GI bleed for which yielded innumerable gastric and duodenal polyps concerning for atypical polyposis syndrome; nonmalignant per biopsy results.     Patient reports that he lives with his brother and sister-in-law, patient is quite wheelchair bed-bound since about a month ago.  Prior to then he was using a walker.  He can not specify exactly why he has gone from an ambulatory status to sedentary but does just reports his legs have become weaker.  He does report having undergone attempted lumbar spinal surgery in 2019 but reports having coded 7 times on the table" and that they were unable to complete the surgery and had to aborted.  He denies any hx of cardiac stenting.     Here in the ED vital signs are stable, hemoglobin is noted to be 5.8.  EKG which I personally reviewed shows no acute ischemic changes. ED ordered 2 units of prbc. Indirect radha test is +.  "

## 2023-08-23 NOTE — ASSESSMENT & PLAN NOTE
Acute presentation; mild  CXR pending  Suspect 2/2 anemia and missing HD  Stable sats on RA; ekg and trop unremarkable

## 2023-08-24 ENCOUNTER — ANESTHESIA (OUTPATIENT)
Dept: ENDOSCOPY | Facility: HOSPITAL | Age: 63
DRG: 393 | End: 2023-08-24
Payer: MEDICARE

## 2023-08-24 ENCOUNTER — ANESTHESIA EVENT (OUTPATIENT)
Dept: ENDOSCOPY | Facility: HOSPITAL | Age: 63
DRG: 393 | End: 2023-08-24
Payer: MEDICARE

## 2023-08-24 PROBLEM — R06.02 SOB (SHORTNESS OF BREATH): Status: RESOLVED | Noted: 2023-08-23 | Resolved: 2023-08-24

## 2023-08-24 LAB
ANION GAP SERPL CALC-SCNC: 11 MMOL/L (ref 8–16)
BASOPHILS # BLD AUTO: 0.02 K/UL (ref 0–0.2)
BASOPHILS NFR BLD: 0.7 % (ref 0–1.9)
BUN SERPL-MCNC: 40 MG/DL (ref 6–20)
CALCIUM SERPL-MCNC: 9.2 MG/DL (ref 8.7–10.5)
CHLORIDE SERPL-SCNC: 109 MMOL/L (ref 95–110)
CO2 SERPL-SCNC: 25 MMOL/L (ref 23–29)
CREAT SERPL-MCNC: 7.6 MG/DL (ref 0.5–1.4)
DIFFERENTIAL METHOD: ABNORMAL
EOSINOPHIL # BLD AUTO: 0.1 K/UL (ref 0–0.5)
EOSINOPHIL NFR BLD: 4.3 % (ref 0–8)
ERYTHROCYTE [DISTWIDTH] IN BLOOD BY AUTOMATED COUNT: 17.3 % (ref 11.5–14.5)
EST. GFR  (NO RACE VARIABLE): 7.6 ML/MIN/1.73 M^2
GLUCOSE SERPL-MCNC: 86 MG/DL (ref 70–110)
HCT VFR BLD AUTO: 26.1 % (ref 40–54)
HGB BLD-MCNC: 8 G/DL (ref 14–18)
IMM GRANULOCYTES # BLD AUTO: 0.01 K/UL (ref 0–0.04)
IMM GRANULOCYTES NFR BLD AUTO: 0.3 % (ref 0–0.5)
LYMPHOCYTES # BLD AUTO: 0.4 K/UL (ref 1–4.8)
LYMPHOCYTES NFR BLD: 14.2 % (ref 18–48)
MCH RBC QN AUTO: 32.4 PG (ref 27–31)
MCHC RBC AUTO-ENTMCNC: 30.7 G/DL (ref 32–36)
MCV RBC AUTO: 106 FL (ref 82–98)
MONOCYTES # BLD AUTO: 0.3 K/UL (ref 0.3–1)
MONOCYTES NFR BLD: 8.6 % (ref 4–15)
NEUTROPHILS # BLD AUTO: 2.2 K/UL (ref 1.8–7.7)
NEUTROPHILS NFR BLD: 71.9 % (ref 38–73)
NRBC BLD-RTO: 0 /100 WBC
PLATELET # BLD AUTO: 49 K/UL (ref 150–450)
PMV BLD AUTO: 11.1 FL (ref 9.2–12.9)
POCT GLUCOSE: 67 MG/DL (ref 70–110)
POCT GLUCOSE: 70 MG/DL (ref 70–110)
POCT GLUCOSE: 75 MG/DL (ref 70–110)
POCT GLUCOSE: 77 MG/DL (ref 70–110)
POTASSIUM SERPL-SCNC: 4.1 MMOL/L (ref 3.5–5.1)
RBC # BLD AUTO: 2.47 M/UL (ref 4.6–6.2)
SODIUM SERPL-SCNC: 145 MMOL/L (ref 136–145)
WBC # BLD AUTO: 3.02 K/UL (ref 3.9–12.7)

## 2023-08-24 PROCEDURE — 88341 PR IHC OR ICC EACH ADD'L SINGLE ANTIBODY  STAINPR: ICD-10-PCS | Mod: 26,,, | Performed by: PATHOLOGY

## 2023-08-24 PROCEDURE — 37000009 HC ANESTHESIA EA ADD 15 MINS: Performed by: INTERNAL MEDICINE

## 2023-08-24 PROCEDURE — 99233 SBSQ HOSP IP/OBS HIGH 50: CPT | Mod: ,,, | Performed by: STUDENT IN AN ORGANIZED HEALTH CARE EDUCATION/TRAINING PROGRAM

## 2023-08-24 PROCEDURE — 88305 TISSUE EXAM BY PATHOLOGIST: CPT | Performed by: PATHOLOGY

## 2023-08-24 PROCEDURE — 25000003 PHARM REV CODE 250: Performed by: HOSPITALIST

## 2023-08-24 PROCEDURE — 88305 TISSUE EXAM BY PATHOLOGIST: ICD-10-PCS | Mod: 26,,, | Performed by: PATHOLOGY

## 2023-08-24 PROCEDURE — 43251 EGD REMOVE LESION SNARE: CPT | Mod: GC,,, | Performed by: INTERNAL MEDICINE

## 2023-08-24 PROCEDURE — D9220A PRA ANESTHESIA: ICD-10-PCS | Mod: ANES,,, | Performed by: ANESTHESIOLOGY

## 2023-08-24 PROCEDURE — 43251 EGD REMOVE LESION SNARE: CPT | Performed by: INTERNAL MEDICINE

## 2023-08-24 PROCEDURE — 11000001 HC ACUTE MED/SURG PRIVATE ROOM

## 2023-08-24 PROCEDURE — 94761 N-INVAS EAR/PLS OXIMETRY MLT: CPT

## 2023-08-24 PROCEDURE — 27200997: Performed by: INTERNAL MEDICINE

## 2023-08-24 PROCEDURE — 63600175 PHARM REV CODE 636 W HCPCS: Performed by: STUDENT IN AN ORGANIZED HEALTH CARE EDUCATION/TRAINING PROGRAM

## 2023-08-24 PROCEDURE — 99215 OFFICE O/P EST HI 40 MIN: CPT | Mod: 25,GC,, | Performed by: INTERNAL MEDICINE

## 2023-08-24 PROCEDURE — 37000008 HC ANESTHESIA 1ST 15 MINUTES: Performed by: INTERNAL MEDICINE

## 2023-08-24 PROCEDURE — D9220A PRA ANESTHESIA: Mod: ANES,,, | Performed by: ANESTHESIOLOGY

## 2023-08-24 PROCEDURE — 88342 CHG IMMUNOCYTOCHEMISTRY: ICD-10-PCS | Mod: 26,,, | Performed by: PATHOLOGY

## 2023-08-24 PROCEDURE — 88342 IMHCHEM/IMCYTCHM 1ST ANTB: CPT | Performed by: PATHOLOGY

## 2023-08-24 PROCEDURE — 88342 IMHCHEM/IMCYTCHM 1ST ANTB: CPT | Mod: 26,,, | Performed by: PATHOLOGY

## 2023-08-24 PROCEDURE — 27201089 HC SNARE, DISP (ANY): Performed by: INTERNAL MEDICINE

## 2023-08-24 PROCEDURE — 88305 TISSUE EXAM BY PATHOLOGIST: CPT | Mod: 26,,, | Performed by: PATHOLOGY

## 2023-08-24 PROCEDURE — 25000003 PHARM REV CODE 250: Performed by: STUDENT IN AN ORGANIZED HEALTH CARE EDUCATION/TRAINING PROGRAM

## 2023-08-24 PROCEDURE — 85025 COMPLETE CBC W/AUTO DIFF WBC: CPT | Performed by: HOSPITALIST

## 2023-08-24 PROCEDURE — 99900035 HC TECH TIME PER 15 MIN (STAT)

## 2023-08-24 PROCEDURE — 99233 PR SUBSEQUENT HOSPITAL CARE,LEVL III: ICD-10-PCS | Mod: ,,, | Performed by: STUDENT IN AN ORGANIZED HEALTH CARE EDUCATION/TRAINING PROGRAM

## 2023-08-24 PROCEDURE — 43251 PR EGD, FLEX, W/REMOVAL, TUMOR/POLYP/LESION(S), SNARE: ICD-10-PCS | Mod: GC,,, | Performed by: INTERNAL MEDICINE

## 2023-08-24 PROCEDURE — G0257 UNSCHED DIALYSIS ESRD PT HOS: HCPCS

## 2023-08-24 PROCEDURE — C9113 INJ PANTOPRAZOLE SODIUM, VIA: HCPCS | Performed by: STUDENT IN AN ORGANIZED HEALTH CARE EDUCATION/TRAINING PROGRAM

## 2023-08-24 PROCEDURE — 88313 SPECIAL STAINS GROUP 2: CPT | Mod: 26,,, | Performed by: PATHOLOGY

## 2023-08-24 PROCEDURE — 94660 CPAP INITIATION&MGMT: CPT

## 2023-08-24 PROCEDURE — 80048 BASIC METABOLIC PNL TOTAL CA: CPT | Performed by: HOSPITALIST

## 2023-08-24 PROCEDURE — 99215 PR OFFICE/OUTPT VISIT, EST, LEVL V, 40-54 MIN: ICD-10-PCS | Mod: 25,GC,, | Performed by: INTERNAL MEDICINE

## 2023-08-24 PROCEDURE — 27201042 HC RETRIEVAL NET: Performed by: INTERNAL MEDICINE

## 2023-08-24 PROCEDURE — D9220A PRA ANESTHESIA: Mod: CRNA,,, | Performed by: STUDENT IN AN ORGANIZED HEALTH CARE EDUCATION/TRAINING PROGRAM

## 2023-08-24 PROCEDURE — 88341 IMHCHEM/IMCYTCHM EA ADD ANTB: CPT | Mod: 26,,, | Performed by: PATHOLOGY

## 2023-08-24 PROCEDURE — D9220A PRA ANESTHESIA: ICD-10-PCS | Mod: CRNA,,, | Performed by: STUDENT IN AN ORGANIZED HEALTH CARE EDUCATION/TRAINING PROGRAM

## 2023-08-24 PROCEDURE — 88313 PR  SPECIAL STAINS,GROUP II: ICD-10-PCS | Mod: 26,,, | Performed by: PATHOLOGY

## 2023-08-24 PROCEDURE — 88313 SPECIAL STAINS GROUP 2: CPT | Performed by: PATHOLOGY

## 2023-08-24 PROCEDURE — 36415 COLL VENOUS BLD VENIPUNCTURE: CPT | Performed by: HOSPITALIST

## 2023-08-24 PROCEDURE — 82962 GLUCOSE BLOOD TEST: CPT | Performed by: INTERNAL MEDICINE

## 2023-08-24 PROCEDURE — 27000221 HC OXYGEN, UP TO 24 HOURS

## 2023-08-24 PROCEDURE — 88341 IMHCHEM/IMCYTCHM EA ADD ANTB: CPT | Performed by: PATHOLOGY

## 2023-08-24 RX ORDER — PROPOFOL 10 MG/ML
VIAL (ML) INTRAVENOUS
Status: DISCONTINUED | OUTPATIENT
Start: 2023-08-24 | End: 2023-08-24

## 2023-08-24 RX ORDER — HYDRALAZINE HYDROCHLORIDE 10 MG/1
10 TABLET, FILM COATED ORAL ONCE
Status: DISCONTINUED | OUTPATIENT
Start: 2023-08-24 | End: 2023-08-25 | Stop reason: HOSPADM

## 2023-08-24 RX ORDER — SODIUM CHLORIDE 0.9 % (FLUSH) 0.9 %
10 SYRINGE (ML) INJECTION
Status: DISCONTINUED | OUTPATIENT
Start: 2023-08-24 | End: 2023-08-24 | Stop reason: HOSPADM

## 2023-08-24 RX ORDER — PHENYLEPHRINE HYDROCHLORIDE 10 MG/ML
INJECTION INTRAVENOUS
Status: DISCONTINUED | OUTPATIENT
Start: 2023-08-24 | End: 2023-08-24

## 2023-08-24 RX ORDER — PANTOPRAZOLE SODIUM 40 MG/10ML
40 INJECTION, POWDER, LYOPHILIZED, FOR SOLUTION INTRAVENOUS 2 TIMES DAILY
Status: DISCONTINUED | OUTPATIENT
Start: 2023-08-24 | End: 2023-08-25 | Stop reason: HOSPADM

## 2023-08-24 RX ORDER — LIDOCAINE HYDROCHLORIDE 20 MG/ML
INJECTION INTRAVENOUS
Status: DISCONTINUED | OUTPATIENT
Start: 2023-08-24 | End: 2023-08-24

## 2023-08-24 RX ORDER — SODIUM CHLORIDE 9 MG/ML
INJECTION, SOLUTION INTRAVENOUS ONCE
Status: COMPLETED | OUTPATIENT
Start: 2023-08-25 | End: 2023-08-25

## 2023-08-24 RX ADMIN — PHENYLEPHRINE HYDROCHLORIDE 100 MCG: 10 INJECTION INTRAVENOUS at 01:08

## 2023-08-24 RX ADMIN — SODIUM CHLORIDE: 0.9 INJECTION, SOLUTION INTRAVENOUS at 12:08

## 2023-08-24 RX ADMIN — LIDOCAINE HYDROCHLORIDE 100 MG: 20 INJECTION INTRAVENOUS at 12:08

## 2023-08-24 RX ADMIN — PANTOPRAZOLE SODIUM 40 MG: 40 INJECTION, POWDER, FOR SOLUTION INTRAVENOUS at 10:08

## 2023-08-24 RX ADMIN — FLUOXETINE 40 MG: 20 CAPSULE ORAL at 10:08

## 2023-08-24 RX ADMIN — PRAVASTATIN SODIUM 20 MG: 20 TABLET ORAL at 10:08

## 2023-08-24 RX ADMIN — PROPOFOL 150 MCG/KG/MIN: 10 INJECTION, EMULSION INTRAVENOUS at 12:08

## 2023-08-24 RX ADMIN — PROPOFOL 70 MG: 10 INJECTION, EMULSION INTRAVENOUS at 12:08

## 2023-08-24 RX ADMIN — GLYCOPYRROLATE 0.2 MG: 0.2 INJECTION, SOLUTION INTRAMUSCULAR; INTRAVENOUS at 12:08

## 2023-08-24 RX ADMIN — GABAPENTIN 300 MG: 300 CAPSULE ORAL at 10:08

## 2023-08-24 RX ADMIN — PHENYLEPHRINE HYDROCHLORIDE 100 MCG: 10 INJECTION INTRAVENOUS at 12:08

## 2023-08-24 NOTE — PROGRESS NOTES
08/24/23 0600   Vital Signs   Temp 96.4 °F (35.8 °C)   Temp Source Axillary   Pulse 67   Heart Rate Source Monitor   Resp 18   BP (!) 127/58   MAP (mmHg) 83   BP Location Right arm   BP Method Automatic   Patient Position Lying   Orthostatic VS Yes     HD tx completed and pt tolerated well.  Net UF of 1L.  Pt is stable,VSS.

## 2023-08-24 NOTE — ASSESSMENT & PLAN NOTE
- Interval history and physical exam findings as described above  - Nephology consulted: further decision for HD per nephology  - Renally dosing all medications  - Avoiding nephrotoxins  - Renal diet  - Will continue to monitor on daily labs

## 2023-08-24 NOTE — NURSING
Patient completed second unit of PRBC. Tolerated well. No signs of blood transfusion reaction noted. Call light in reach.

## 2023-08-24 NOTE — PROGRESS NOTES
"[facility] ([address] [place] [address] Medicine  Progress Note    Patient Name: Angel Farmer Jr.  MRN: [id number]  Patient Class: OP- Observation   Admission Date: 8/23/2023  Length of Stay: 0 days  Attending Physician: Werner Barrientos MD  Primary Care Provider: Melvin Terry MD        Subjective:     Principal Problem:Acute blood loss anemia        HPI:  62-year-old white male being admitted for acute GI bleed and pulmonary edema secondary to missing dialysis.  Patient reports being in his usual state of health until about 2 days ago when he began noticing some black stools.  No hematochezia.  Reports some nausea but no fely vomiting.  No abdominal pain.  Also reports that he did not get his last dialysis session on Monday due to transportation issues.  Has developed shortness of breath while lying flat that eases when he sits up.  No fely chest pain.  He denies taking any NSAIDs or anticoagulants/antiplatelets.    Patient just had an EGD at [facility] about a month ago for GI bleed for which yielded innumerable gastric and duodenal polyps concerning for atypical polyposis syndrome; nonmalignant per biopsy results.     Patient reports that he lives with his brother and sister-in-law, patient is quite wheelchair bed-bound since about a month ago.  Prior to then he was using a walker.  He can not specify exactly why he has gone from an ambulatory status to sedentary but does just reports his legs have become weaker.  He does report having undergone attempted lumbar spinal surgery in 2019 but reports having coded 7 times on the table" and that they were unable to complete the surgery and had to aborted.  He denies any hx of cardiac stenting.     Here in the ED vital signs are stable, hemoglobin is noted to be 5.8.  EKG which I personally reviewed shows no acute ischemic changes. ED ordered 2 units of prbc. Indirect radha test is +.      Overview/Hospital Course:  [place] [facility] and transfused " pRBCs. He underwent EGD with GI on 8/24, which showed an oozing polyp that was removed and clipped.      Interval History: Pt seen and examined this morning on payam BOOGIE. Grossly asymptomatic, denies any complaints. Care plan reviewed. Otherwise, doing well and with no further complaints at this time.      Objective:     Vital Signs (Most Recent):  Temp: 98.1 °F (36.7 °C) (08/24/23 1219)  Pulse: 67 (08/24/23 1219)  Resp: 16 (08/24/23 1219)  BP: (!) 149/64 (08/24/23 1219)  SpO2: 95 % (08/24/23 1219) Vital Signs (24h Range):  Temp:  [96.1 °F (35.6 °C)-98.7 °F (37.1 °C)] 98.1 °F (36.7 °C)  Pulse:  [51-80] 67  Resp:  [15-20] 16  SpO2:  [93 %-99 %] 95 %  BP: (107-199)/(53-85) 149/64     Weight: 115.2 kg (253 lb 15.5 oz)  Body mass index is 44.99 kg/m².    Intake/Output Summary (Last 24 hours) at 8/24/2023 1327  Last data filed at 8/24/2023 1320  Gross per 24 hour   Intake 1500 ml   Output 2000 ml   Net -500 ml         Physical Exam  Gen: in NAD, appears stated age; pt is morbidly obese  Neuro: AAOx4, CN2-12 grossly intact BL; motor, sensory, and strength grossly intact BL  HEENT: NTNC, EOMI, PERRLA, MMM; no thyromegaly or lymphadenopathy; no JVD appreciated  CVS: RRR, no m/r/g; S1/S2 auscultated with no S3 or S4; capillary refill < 2 sec  Resp: lungs CTAB, no w/r/r; no belabored breathing or accessory muscle use appreciated   Abd: BS+ in all 4 quadrants; NTND, soft to palpation; no organomegaly appreciated   Extrem: pulses full, equal, and regular over all 4 extremities; no UE or LE edema BL      Significant Labs: All pertinent labs within the past 24 hours have been reviewed.    Significant Imaging: I have reviewed all pertinent imaging results/findings within the past 24 hours.      Assessment/Plan:      * Acute blood loss anemia  Hb 5.8 on admission, now improved  Known innumerable polyps from EGD in 7/2023 at [facility], nonmalignant biopsy  W/ melena now start PPI; d/w GI NPO after midnight  prbcs ordered by  ED  Stable v/s  Trend hb    Underwent EGD with GI on 8/24, which showed an oozing polyp that was removed and clipped.    Morbid obesity with BMI of 40.0-44.9, adult  - Body mass index is 44.99 kg/m².  - Needs dietary and lifestyle modifications in relation to health  - Consider dietary/nutrition consult outpatient    MIKE (obstructive sleep apnea) - very severe latest sleep study says BPAP @ 16/8  Offer NIPPV while inpatient      ESRD (end stage renal disease) on dialysis  - Interval history and physical exam findings as described above  - Nephology consulted: further decision for HD per nephology  - Renally dosing all medications  - Avoiding nephrotoxins  - Renal diet  - Will continue to monitor on daily labs    Diabetes mellitus, type 2  Apparently diet controlled; monitor on AM labs      VTE Risk Mitigation (From admission, onward)         Ordered     IP VTE HIGH RISK PATIENT  Once         08/23/23 1534     Place sequential compression device  Until discontinued         08/23/23 1534     Reason for No Pharmacological VTE Prophylaxis  Once        Question:  Reasons:  Answer:  Active Bleeding    08/23/23 1534     Place sequential compression device  Until discontinued         08/23/23 1317                Discharge Planning   KATERIN: 8/25/2023     Code Status: DNR   Is the patient medically ready for discharge?: No    Reason for patient still in hospital (select all that apply): Patient trending condition             Werner Barrientos MD  Attending Physician  Medical Director - [facility]  Department of Hospital Medicine  8/24/2023

## 2023-08-24 NOTE — ASSESSMENT & PLAN NOTE
- Body mass index is 44.99 kg/m².  - Needs dietary and lifestyle modifications in relation to health  - Consider dietary/nutrition consult outpatient

## 2023-08-24 NOTE — HPI
"Angel Farmer is a 62 year old male for whom GI is consulted for anemia in the setting of melena. He has a history of ESRD on iHD M/W/F on EPO, JOSE, DM2, and HTN.     He was brought to [facility] ED via EMS w/ c/o dyspnea and feeling generally unwell and "weak in the legs" after missing his dialysis on Monday 8/21 and Wednesday 8/23. Pt states "he never notices anything in his own stool" but his brother noticed dark black melena stools over the past several days w/ diarrhea. On arrival, he was afebrile and HDS. Laboratory workup notable H/H of 5.8/18.9 (down from baseline Hgb 7) and BUN/Cr 76/12.2. He was given 2 U pRBCs with post transfusion hgb being 8. Per nurse this AM, pt had a small amount of dark black stool this morning in diaper. Denies any NSAID use, anticoagulants/antiplatelets or pepto bismol. He does receive iron injections wih dialysis. Per chart review, he had similar presentation in July of this year; EGD 7/11/23 at [facility] showing innumerable gastric and duodenal polyps w/biopsies taken. Path negative for malignancy but c/f atypical polyposis syndrome w/ lanthanum carbonate deposition.    Most recent endoscopic history:  EGD 7/11/23: Innumerable gastric polyps. Duodenal polyposis.   Colonoscopy 4/2021: Three 4 to 5 mm polyps in the rectum (x1) and in the ascending colon (x2). Clips were placed at two ascending colon polypectomy sites due to oozing after polypectomy.  "

## 2023-08-24 NOTE — ANESTHESIA PREPROCEDURE EVALUATION
08/24/2023  Angel Farmer Jr. is a 62 y.o., male with extensive medical  history who was admitted for melena. He missed a couple of HD sessions this week but had dialysis this am.      Patient Active Problem List   Diagnosis    Hypertension    Diabetes mellitus, type 2    Hyperlipidemia    Acute blood loss anemia    ESRD (end stage renal disease) on dialysis    Psoriasis    Foot pain    MIKE (obstructive sleep apnea) - very severe latest sleep study says BPAP @ 16/8    Multifactorial peripheral neuropathy    Trigger finger, left ring finger    CVA (cerebral vascular accident)    Ataxia    Anemia due to stage 5 chronic kidney disease    Hyperphosphatemia    Metabolic bone disease    Left leg pain    Hypotension    Bradycardia    Class 3 severe obesity due to excess calories with serious comorbidity and body mass index (BMI) of 45.0 to 49.9 in adult    S/P laminectomy    Hyperkalemia    Impaired mobility and ADLs    Thrombocytopenia    Fecal impaction in rectum    Dehydration    Blood blister    Abscess    Ulcer of toe    History of colon polyps    Polyp of colon    Strain of back    Weakness    Melena    Allergies    Anxiety and depression    Chronic kidney disease-mineral and bone disorder    SOB (shortness of breath)       Past Medical History:   Diagnosis Date    Anemia due to stage 5 chronic kidney disease 3/1/2019    Anticoagulant long-term use     Diabetes mellitus type II     Dialysis patient     Encounter for blood transfusion     Hyperlipidemia     Hypertension     Kidney failure     MIKE (obstructive sleep apnea)     Stroke     Urinary tract infection        ECHO: Results for orders placed during the hospital encounter of 06/10/21    Echo    Interpretation Summary  · The left ventricle is normal in size with normal systolic function.  · The estimated  ejection fraction is 60-65%.  · Normal left ventricular diastolic function.  · The estimated PA systolic pressure is 15 mmHg.  · Normal right ventricular size with normal right ventricular systolic function.  · Normal central venous pressure (3 mmHg).      There is no height or weight on file to calculate BMI.    Tobacco Use: Low Risk  (8/24/2023)    Patient History     Smoking Tobacco Use: Never     Smokeless Tobacco Use: Never     Passive Exposure: Not on file       Social History     Substance and Sexual Activity   Drug Use No        Alcohol Use: Not on file       Review of patient's allergies indicates:   Allergen Reactions    Iodinated contrast media     Keflex [cephalexin] Nausea Only         Airway:  No value filed.         Pre-op Assessment    I have reviewed the Patient Summary Reports.     I have reviewed the Nursing Notes. I have reviewed the NPO Status.   I have reviewed the Medications.     Review of Systems  Anesthesia Hx:  No problems with previous Anesthesia  History of prior surgery of interest to airway management or planning: Denies Family Hx of Anesthesia complications.   Denies Personal Hx of Anesthesia complications.   Cardiovascular:   Hypertension    Pulmonary:   Sleep Apnea    Renal/:   Chronic Renal Disease, ESRD, Dialysis    Neurological:   CVA    Endocrine:   Diabetes, poorly controlled, type 2, using insulin  Morbid Obesity / BMI > 40  Psych:   Psychiatric History               Anesthesia Plan  Type of Anesthesia, risks & benefits discussed:    Anesthesia Type: Gen Natural Airway, MAC  Intra-op Monitoring Plan: Standard ASA Monitors  Post Op Pain Control Plan: multimodal analgesia and IV/PO Opioids PRN  Induction:  IV  Informed Consent: Informed consent signed with the Patient and all parties understand the risks and agree with anesthesia plan.  All questions answered. Patient consented to blood products? No  ASA Score: 4    Ready For Surgery From Anesthesia Perspective.      .

## 2023-08-24 NOTE — TREATMENT PLAN
GI Post Procedure Treatment Plan  Procedure Performed:     Impression:    - Normal esophagus.                          - Innumerable gastric polyps.                          - Innumerable duodenal polyps.                          - A single duodenal polyp with oozing blood.                          Resected and retrieved. Clips (MR conditional)                          were placed. Clip : Hallspot.                          - Normal third portion of the duodenum.     Recommendation:                                 - Return patient to hospital [facility] for ongoing care.                          - Resume previous diet.                          - Use a proton pump inhibitor PO daily.                          - Await pathology results.                          - Refer to a genetics counselor with concern for                          polyposis syndrome.       Hodan Thomas MD  Gastroenterology, PGY-4

## 2023-08-24 NOTE — SUBJECTIVE & OBJECTIVE
Past Medical History:   Diagnosis Date    Anemia due to stage 5 chronic kidney disease 3/1/2019    Anticoagulant long-term use     Diabetes mellitus type II     Dialysis patient     Encounter for blood transfusion     Hyperlipidemia     Hypertension     Kidney failure     MIKE (obstructive sleep apnea)     Stroke     Urinary tract infection        Past Surgical History:   Procedure Laterality Date    AV FISTULA PLACEMENT      left lower arm    COLONOSCOPY N/A 4/15/2021    Procedure: COLONOSCOPY;  Surgeon: Ruddy Plaza MD;  Location: [facility] ENDO;  Service: Endoscopy;  Laterality: N/A;    COLONOSCOPY W/ POLYPECTOMY  04/15/2021    ESOPHAGOGASTRODUODENOSCOPY N/A 7/11/2023    Procedure: EGD (ESOPHAGOGASTRODUODENOSCOPY);  Surgeon: Joey Quintanilla MD;  Location: [facility] ENDO;  Service: Endoscopy;  Laterality: N/A;    FUSION OF CERVICAL SPINE BY POSTERIOR APPROACH N/A 3/3/2019    Procedure: SPINE, CERVICAL, POSTERIOR APPROACH  LAMINECTOMY C3-C6; C6-C7; WITH MEDIAL DISCECTOMY;  Surgeon: Ruddy Wylie MD;  Location: [facility] OR [address] [place] [address];  Service: Neurosurgery;  Laterality: N/A;  POSTERIOR    TONSILLECTOMY, ADENOIDECTOMY         Review of patient's allergies indicates:   Allergen Reactions    Iodinated contrast media     Keflex [cephalexin] Nausea Only     Family History       Problem Relation (Age of Onset)    Colon cancer Mother    Diabetes Mother, Father, Maternal Grandmother, Maternal Grandfather    Heart disease Father    Hypertension Father    Lung cancer Mother          Tobacco Use    Smoking status: Never    Smokeless tobacco: Never   Substance and Sexual Activity    Alcohol use: No    Drug use: No    Sexual activity: Not Currently     Review of Systems   Constitutional:  Negative for chills and fever.   Gastrointestinal:  Positive for blood in stool. Negative for abdominal pain, nausea and vomiting.     Objective:     Vital Signs (Most Recent):  Temp: 98 °F (36.7 °C) (08/24/23 0858)  Pulse: 80 (08/24/23 0858)  Resp: 16  (08/24/23 0858)  BP: (!) 157/70 (08/24/23 0858)  SpO2: (!) 93 % (08/24/23 0858) Vital Signs (24h Range):  Temp:  [96.1 °F (35.6 °C)-98.7 °F (37.1 °C)] 98 °F (36.7 °C)  Pulse:  [51-80] 80  Resp:  [15-24] 16  SpO2:  [93 %-100 %] 93 %  BP: (107-199)/(53-85) 157/70     Weight: 115.2 kg (253 lb 15.5 oz) (08/23/23 2120)  Body mass index is 44.99 kg/m².      Intake/Output Summary (Last 24 hours) at 8/24/2023 0956  Last data filed at 8/24/2023 0600  Gross per 24 hour   Intake 1000 ml   Output 2000 ml   Net -1000 ml       Lines/Drains/Airways       Peripheral Intravenous Line  Duration                  Hemodialysis AV Fistula 12/23/12 Left forearm 3896 days         Peripheral IV - Single Lumen 08/23/23 1355 20 G Anterior;Right Forearm <1 day         Peripheral IV - Single Lumen 08/23/23 1355 22 G Anterior;Right Wrist <1 day                     Physical Exam  Vitals reviewed.   Constitutional:       General: He is not in acute distress.     Appearance: He is obese.   HENT:      Head: Normocephalic.   Eyes:      Extraocular Movements: Extraocular movements intact.   Cardiovascular:      Rate and Rhythm: Normal rate.   Pulmonary:      Effort: Pulmonary effort is normal. No respiratory distress.   Abdominal:      General: There is no distension.      Palpations: Abdomen is soft.      Tenderness: There is no abdominal tenderness.   Genitourinary:     Comments: Declined rectal exam.  Neurological:      Mental Status: He is alert and oriented to person, place, and time. Mental status is at baseline.          Significant Labs:  All pertinent lab results from the last 24 hours have been reviewed.    Significant Imaging:  Imaging results within the past 24 hours have been reviewed.

## 2023-08-24 NOTE — PROVATION PATIENT INSTRUCTIONS
Discharge Summary/Instructions after an Endoscopic Procedure  Patient Name: Angel Farmer  Patient MRN: [id number]  Patient YOB: 1960  Thursday, August 24, 2023  Satya Huang MD  Dear patient,  As a result of recent federal legislation (The Federal Cures Act), you may   receive lab or pathology results from your procedure in your [facility]   account before your physician is able to contact you. Your physician or   their representative will relay the results to you with their   recommendations at their soonest availability.  Thank you,  RESTRICTIONS:  During your procedure today, you received medications for sedation.  These   medications may affect your judgment, balance and coordination.  Therefore,   for 24 hours, you have the following restrictions:   - DO NOT drive a car, operate machinery, make legal/financial decisions,   sign important papers or drink alcohol.    ACTIVITY:  Today: no heavy lifting, straining or running due to procedural   sedation/anesthesia.  The following day: return to full activity including work.  DIET:  Eat and drink normally unless instructed otherwise.     TREATMENT FOR COMMON SIDE EFFECTS:  - Mild abdominal pain, nausea, belching, bloating or excessive gas:  rest,   eat lightly and use a heating pad.  - Sore Throat: treat with throat lozenges and/or gargle with warm salt   water.  - Because air was used during the procedure, expelling large amounts of air   from your rectum or belching is normal.  - If a bowel prep was taken, you may not have a bowel movement for 1-3 days.    This is normal.  SYMPTOMS TO WATCH FOR AND REPORT TO YOUR PHYSICIAN:  1. Abdominal pain or bloating, other than gas cramps.  2. Chest pain.  3. Back pain.  4. Signs of infection such as: chills or fever occurring within 24 hours   after the procedure.  5. Rectal bleeding, which would show as bright red, maroon, or black stools.   (A tablespoon of blood from the rectum is not serious, especially  if   hemorrhoids are present.)  6. Vomiting.  7. Weakness or dizziness.  GO DIRECTLY TO THE NEAREST EMERGENCY ROOM IF YOU HAVE ANY OF THE FOLLOWING:      Difficulty breathing              Chills and/or fever over 101 F   Persistent vomiting and/or vomiting blood   Severe abdominal pain   Severe chest pain   Black, tarry stools   Bleeding- more than one tablespoon   Any other symptom or condition that you feel may need urgent attention  Your doctor recommends these additional instructions:  If any biopsies were taken, your doctors clinic will contact you in 1 to 2   weeks with any results.  - Return patient to hospital [facility] for ongoing care.   - Resume previous diet.   - Use a proton pump inhibitor PO daily.   - Await pathology results.   - Refer to a genetics counselor with concern for polyposis syndrome.  For questions, problems or results please call your physician - Satya Huang MD at Work:  [phone].  [facility], EMERGENCY ROOM PHONE NUMBER: [phone]  IF A COMPLICATION OR EMERGENCY SITUATION ARISES AND YOU ARE UNABLE TO REACH   YOUR PHYSICIAN - GO DIRECTLY TO THE EMERGENCY ROOM.  Satya Huang MD  8/24/2023 2:53:39 PM  This report has been verified and signed electronically.  Dear patient,  As a result of recent federal legislation (The Federal Cures Act), you may   receive lab or pathology results from your procedure in your [facility]   account before your physician is able to contact you. Your physician or   their representative will relay the results to you with their   recommendations at their soonest availability.  Thank you,  PROVATION

## 2023-08-24 NOTE — HOSPITAL COURSE
[place] [facility] [place]used pRBCs. He underwent EGD with GI on 8/24, which showed an oozing duodenal polyp that was removed and clipped. Pt tolerated well and H/H remained stable into 8/25. Pt deemed appropriate for discharge. SW/CM assisting with HH and ride home. Plan discussed with pt, who was agreeable and amenable; medications were discussed and reviewed, outpatient follow-up scheduled, ER precautions were given, all questions were answered to the pt's satisfaction, and Mr. Farmer was subsequently discharged.

## 2023-08-24 NOTE — ASSESSMENT & PLAN NOTE
Hb 5.8 on admission, now improved  Known innumerable polyps from EGD in 7/2023 at [facility], nonmalignant biopsy  W/ melena now start PPI; d/w GI NPO after midnight  prbcs ordered by ED  Stable v/s  Trend hb    Underwent EGD with GI on 8/24, which showed an oozing polyp that was removed and clipped.

## 2023-08-24 NOTE — PROGRESS NOTES
08/24/23 0220   Vital Signs   Temp 96.1 °F (35.6 °C)   Temp Source Axillary   Pulse (!) 53   Heart Rate Source Monitor   Resp 18   SpO2 98 %   Device (Oxygen Therapy) CPAP   BP (!) 154/85   BP Location Right arm   BP Method Automatic   Patient Position Lying     Report received from the primary RN.  Bedside HD tx initiated via  LFA AVF using 15g fistula needle.  Pt is on CPAP and stable for tx.

## 2023-08-24 NOTE — TRANSFER OF CARE
"Anesthesia Transfer of Care Note    Patient: Angel Farmer Jr.    Procedure(s) Performed: Procedure(s) (LRB):  EGD (ESOPHAGOGASTRODUODENOSCOPY) (N/A)    Patient location: PACU    Anesthesia Type: general    Transport from OR: Transported from OR on room air with adequate spontaneous ventilation    Post pain: adequate analgesia    Post assessment: no apparent anesthetic complications and tolerated procedure well    Post vital signs: stable    Level of consciousness: awake and alert    Nausea/Vomiting: no nausea/vomiting    Complications: none    Transfer of care protocol was followed      Last vitals:   Visit Vitals  BP (!) 149/64 (BP Location: Right arm, Patient Position: Lying)   Pulse 67   Temp 36.7 °C (98.1 °F) (Oral)   Resp 16   Ht 5' 3" (1.6 m)   Wt 115.2 kg (253 lb 15.5 oz)   SpO2 96%   BMI 44.99 kg/m²     "

## 2023-08-24 NOTE — ASSESSMENT & PLAN NOTE
62-year-old male with ESRD on HD on EPO, iron deficiency anemia, known polyposis syndrome presenting after missing dialysis with reports of melena for the past few days.  Hemoglobin 5.8 on admission.  He received 2 units PRBCs posttransfusion hemoglobin of 8.  He had another episode of melena on 08/24 seen by bedside nursing.    Recommendations:  - Keep NPO; Plan for EGD 8/24.  - Trend Hgb q8 hrs. Transfuse for Hgb > 7, unless otherwise indicated  - Continue IV PPI 40mg BID  - Maintain IV access with 2 large bore Ivs  - Intravascular resuscitation/support with IVFs   - Hold all NSAIDs and anticoagulants, unless contraindicated  - Please correct any coagulopathy with platelets and FFP for goal of platelets >50K and INR <2.0  - Please notify GI team if there is significant change in patient's clinical status

## 2023-08-24 NOTE — PROGRESS NOTES
Pt. Hypoglycemic with blood sugar in 60s. Administered crackers, peanut butter and apple juice. Rechecked sugar and now 70. Receiving RN on 11th floor notified of hypoglycemia and to monitor.

## 2023-08-24 NOTE — H&P (VIEW-ONLY)
"[facility]  Gastroenterology  Consult Note    Patient Name: Angel Farmer Jr.  MRN: [id number]  Admission Date: 8/23/2023  Hospital Length of Stay: 0 days  Code Status: DNR   Attending Provider: Werner Barrientos MD   Consulting Provider: Hodan Thomas MD  Primary Care Physician: Melvin Terry MD  Principal Problem:Acute blood loss anemia    Inpatient consult to Gastroenterology  Consult performed by: Hodan Thomas MD  Consult ordered by: Noah Amor MD        Subjective:     HPI:  Angel Farmer is a 62 year old male for whom GI is consulted for anemia in the setting of melena. He has a history of ESRD on iHD M/W/F on EPO, JOSE, DM2, and HTN.     He was brought to [facility] ED via EMS w/ c/o dyspnea and feeling generally unwell and "weak in the legs" after missing his dialysis on Monday 8/21 and Wednesday 8/23. Pt states "he never notices anything in his own stool" but his brother noticed dark black melena stools over the past several days w/ diarrhea. On arrival, he was afebrile and HDS. Laboratory workup notable H/H of 5.8/18.9 (down from baseline Hgb 7) and BUN/Cr 76/12.2. He was given 2 U pRBCs with post transfusion hgb being 8. Per nurse this AM, pt had a small amount of dark black stool this morning in diaper. Denies any NSAID use, anticoagulants/antiplatelets or pepto bismol. He does receive iron injections wih dialysis. Per chart review, he had similar presentation in July of this year; EGD 7/11/23 at [facility] showing innumerable gastric and duodenal polyps w/biopsies taken. Path negative for malignancy but c/f atypical polyposis syndrome w/ lanthanum carbonate deposition.    Most recent endoscopic history:  EGD 7/11/23: Innumerable gastric polyps. Duodenal polyposis.   Colonoscopy 4/2021: Three 4 to 5 mm polyps in the rectum (x1) and in the ascending colon (x2). Clips were placed at two ascending colon polypectomy sites due to oozing after polypectomy.      Past " Medical History:   Diagnosis Date    Anemia due to stage 5 chronic kidney disease 3/1/2019    Anticoagulant long-term use     Diabetes mellitus type II     Dialysis patient     Encounter for blood transfusion     Hyperlipidemia     Hypertension     Kidney failure     MIKE (obstructive sleep apnea)     Stroke     Urinary tract infection        Past Surgical History:   Procedure Laterality Date    AV FISTULA PLACEMENT      left lower arm    COLONOSCOPY N/A 4/15/2021    Procedure: COLONOSCOPY;  Surgeon: Ruddy Plaza MD;  Location: [facility] ENDO;  Service: Endoscopy;  Laterality: N/A;    COLONOSCOPY W/ POLYPECTOMY  04/15/2021    ESOPHAGOGASTRODUODENOSCOPY N/A 7/11/2023    Procedure: EGD (ESOPHAGOGASTRODUODENOSCOPY);  Surgeon: Joey Quintanilla MD;  Location: [facility] ENDO;  Service: Endoscopy;  Laterality: N/A;    FUSION OF CERVICAL SPINE BY POSTERIOR APPROACH N/A 3/3/2019    Procedure: SPINE, CERVICAL, POSTERIOR APPROACH  LAMINECTOMY C3-C6; C6-C7; WITH MEDIAL DISCECTOMY;  Surgeon: Ruddy Wylie MD;  Location: [facility] OR [address] [place] [address];  Service: Neurosurgery;  Laterality: N/A;  POSTERIOR    TONSILLECTOMY, ADENOIDECTOMY         Review of patient's allergies indicates:   Allergen Reactions    Iodinated contrast media     Keflex [cephalexin] Nausea Only     Family History       Problem Relation (Age of Onset)    Colon cancer Mother    Diabetes Mother, Father, Maternal Grandmother, Maternal Grandfather    Heart disease Father    Hypertension Father    Lung cancer Mother          Tobacco Use    Smoking status: Never    Smokeless tobacco: Never   Substance and Sexual Activity    Alcohol use: No    Drug use: No    Sexual activity: Not Currently     Review of Systems   Constitutional:  Negative for chills and fever.   Gastrointestinal:  Positive for blood in stool. Negative for abdominal pain, nausea and vomiting.     Objective:     Vital Signs (Most Recent):  Temp: 98 °F (36.7 °C) (08/24/23 0858)  Pulse: 80  (08/24/23 0858)  Resp: 16 (08/24/23 0858)  BP: (!) 157/70 (08/24/23 0858)  SpO2: (!) 93 % (08/24/23 0858) Vital Signs (24h Range):  Temp:  [96.1 °F (35.6 °C)-98.7 °F (37.1 °C)] 98 °F (36.7 °C)  Pulse:  [51-80] 80  Resp:  [15-24] 16  SpO2:  [93 %-100 %] 93 %  BP: (107-199)/(53-85) 157/70     Weight: 115.2 kg (253 lb 15.5 oz) (08/23/23 2120)  Body mass index is 44.99 kg/m².      Intake/Output Summary (Last 24 hours) at 8/24/2023 0956  Last data filed at 8/24/2023 0600  Gross per 24 hour   Intake 1000 ml   Output 2000 ml   Net -1000 ml       Lines/Drains/Airways       Peripheral Intravenous Line  Duration                  Hemodialysis AV Fistula 12/23/12 Left forearm 3896 days         Peripheral IV - Single Lumen 08/23/23 1355 20 G Anterior;Right Forearm <1 day         Peripheral IV - Single Lumen 08/23/23 1355 22 G Anterior;Right Wrist <1 day                     Physical Exam  Vitals reviewed.   Constitutional:       General: He is not in acute distress.     Appearance: He is obese.   HENT:      Head: Normocephalic.   Eyes:      Extraocular Movements: Extraocular movements intact.   Cardiovascular:      Rate and Rhythm: Normal rate.   Pulmonary:      Effort: Pulmonary effort is normal. No respiratory distress.   Abdominal:      General: There is no distension.      Palpations: Abdomen is soft.      Tenderness: There is no abdominal tenderness.   Genitourinary:     Comments: Declined rectal exam.  Neurological:      Mental Status: He is alert and oriented to person, place, and time. Mental status is at baseline.          Significant Labs:  All pertinent lab results from the last 24 hours have been reviewed.    Significant Imaging:  Imaging results within the past 24 hours have been reviewed.    Assessment/Plan:     Oncology  * Acute blood loss anemia  62-year-old male with ESRD on HD on EPO, iron deficiency anemia, known polyposis syndrome presenting after missing dialysis with reports of melena for the past few days.   Hemoglobin 5.8 on admission.  He received 2 units PRBCs posttransfusion hemoglobin of 8.  He had another episode of melena on 08/24 seen by bedside nursing.    Recommendations:  - Keep NPO; Plan for EGD 8/24.  - Trend Hgb q8 hrs. Transfuse for Hgb > 7, unless otherwise indicated  - Continue IV PPI 40mg BID  - Maintain IV access with 2 large bore Ivs  - Intravascular resuscitation/support with IVFs   - Hold all NSAIDs and anticoagulants, unless contraindicated  - Please correct any coagulopathy with platelets and FFP for goal of platelets >50K and INR <2.0  - Please notify GI team if there is significant change in patient's clinical status        Thank you for your consult. I will follow-up with patient. Please contact us if you have any additional questions.    Hodan Thomas MD  Gastroenterology  [facility]

## 2023-08-24 NOTE — ANESTHESIA POSTPROCEDURE EVALUATION
Anesthesia Post Evaluation    Patient: Angel Farmer JrNadia    Procedure(s) Performed: Procedure(s) (LRB):  EGD (ESOPHAGOGASTRODUODENOSCOPY) (N/A)    Final Anesthesia Type: general      Patient location during evaluation: PACU  Patient participation: Yes- Able to Participate  Level of consciousness: awake and alert and oriented  Post-procedure vital signs: reviewed and stable  Pain management: adequate  Airway patency: patent    PONV status at discharge: No PONV  Anesthetic complications: no      Cardiovascular status: hemodynamically stable  Respiratory status: unassisted, spontaneous ventilation and room air  Hydration status: euvolemic  Follow-up not needed.          Vitals Value Taken Time   /73 08/24/23 1431   Temp 36.3 °C (97.3 °F) 08/24/23 1330   Pulse 79 08/24/23 1434   Resp 23 08/24/23 1434   SpO2 98 % 08/24/23 1434   Vitals shown include unvalidated device data.      No case tracking events are documented in the log.      Pain/Benny Score: Benny Score: 10 (8/24/2023  2:00 PM)

## 2023-08-24 NOTE — NURSING TRANSFER
Nursing Transfer Note      8/24/2023   2:33 PM    Nurse giving handoff:LOLIS tellez  Nurse receiving handoff:11th floor RN    Reason patient is being transferred: post op    Transfer To: returning to [facility]    Transfer via stretcher    Transported by transport    Any special needs or follow-up needed: monitor blood sugar     Patient belongings transferred with patient: Yes    Chart send with patient: Yes    Patient reassessed at:1430

## 2023-08-24 NOTE — CONSULTS
"[facility]  Gastroenterology  Consult Note    Patient Name: Angel Farmer Jr.  MRN: [id number]  Admission Date: 8/23/2023  Hospital Length of Stay: 0 days  Code Status: DNR   Attending Provider: Werner Barrientos MD   Consulting Provider: Hodan Thomas MD  Primary Care Physician: Melvin Terry MD  Principal Problem:Acute blood loss anemia    Inpatient consult to Gastroenterology  Consult performed by: Hodan Thomas MD  Consult ordered by: Noah Amor MD        Subjective:     HPI:  Angel Farmer is a 62 year old male for whom GI is consulted for anemia in the setting of melena. He has a history of ESRD on iHD M/W/F on EPO, JOSE, DM2, and HTN.     He was brought to [facility] ED via EMS w/ c/o dyspnea and feeling generally unwell and "weak in the legs" after missing his dialysis on Monday 8/21 and Wednesday 8/23. Pt states "he never notices anything in his own stool" but his brother noticed dark black melena stools over the past several days w/ diarrhea. On arrival, he was afebrile and HDS. Laboratory workup notable H/H of 5.8/18.9 (down from baseline Hgb 7) and BUN/Cr 76/12.2. He was given 2 U pRBCs with post transfusion hgb being 8. Per nurse this AM, pt had a small amount of dark black stool this morning in diaper. Denies any NSAID use, anticoagulants/antiplatelets or pepto bismol. He does receive iron injections wih dialysis. Per chart review, he had similar presentation in July of this year; EGD 7/11/23 at [facility] showing innumerable gastric and duodenal polyps w/biopsies taken. Path negative for malignancy but c/f atypical polyposis syndrome w/ lanthanum carbonate deposition.    Most recent endoscopic history:  EGD 7/11/23: Innumerable gastric polyps. Duodenal polyposis.   Colonoscopy 4/2021: Three 4 to 5 mm polyps in the rectum (x1) and in the ascending colon (x2). Clips were placed at two ascending colon polypectomy sites due to oozing after polypectomy.      Past " Medical History:   Diagnosis Date    Anemia due to stage 5 chronic kidney disease 3/1/2019    Anticoagulant long-term use     Diabetes mellitus type II     Dialysis patient     Encounter for blood transfusion     Hyperlipidemia     Hypertension     Kidney failure     MIKE (obstructive sleep apnea)     Stroke     Urinary tract infection        Past Surgical History:   Procedure Laterality Date    AV FISTULA PLACEMENT      left lower arm    COLONOSCOPY N/A 4/15/2021    Procedure: COLONOSCOPY;  Surgeon: Ruddy Plaza MD;  Location: [facility] ENDO;  Service: Endoscopy;  Laterality: N/A;    COLONOSCOPY W/ POLYPECTOMY  04/15/2021    ESOPHAGOGASTRODUODENOSCOPY N/A 7/11/2023    Procedure: EGD (ESOPHAGOGASTRODUODENOSCOPY);  Surgeon: Joey Quintanilla MD;  Location: [facility] ENDO;  Service: Endoscopy;  Laterality: N/A;    FUSION OF CERVICAL SPINE BY POSTERIOR APPROACH N/A 3/3/2019    Procedure: SPINE, CERVICAL, POSTERIOR APPROACH  LAMINECTOMY C3-C6; C6-C7; WITH MEDIAL DISCECTOMY;  Surgeon: Ruddy Wylie MD;  Location: [facility] OR [address] [place] [address];  Service: Neurosurgery;  Laterality: N/A;  POSTERIOR    TONSILLECTOMY, ADENOIDECTOMY         Review of patient's allergies indicates:   Allergen Reactions    Iodinated contrast media     Keflex [cephalexin] Nausea Only     Family History       Problem Relation (Age of Onset)    Colon cancer Mother    Diabetes Mother, Father, Maternal Grandmother, Maternal Grandfather    Heart disease Father    Hypertension Father    Lung cancer Mother          Tobacco Use    Smoking status: Never    Smokeless tobacco: Never   Substance and Sexual Activity    Alcohol use: No    Drug use: No    Sexual activity: Not Currently     Review of Systems   Constitutional:  Negative for chills and fever.   Gastrointestinal:  Positive for blood in stool. Negative for abdominal pain, nausea and vomiting.     Objective:     Vital Signs (Most Recent):  Temp: 98 °F (36.7 °C) (08/24/23 0858)  Pulse: 80  (08/24/23 0858)  Resp: 16 (08/24/23 0858)  BP: (!) 157/70 (08/24/23 0858)  SpO2: (!) 93 % (08/24/23 0858) Vital Signs (24h Range):  Temp:  [96.1 °F (35.6 °C)-98.7 °F (37.1 °C)] 98 °F (36.7 °C)  Pulse:  [51-80] 80  Resp:  [15-24] 16  SpO2:  [93 %-100 %] 93 %  BP: (107-199)/(53-85) 157/70     Weight: 115.2 kg (253 lb 15.5 oz) (08/23/23 2120)  Body mass index is 44.99 kg/m².      Intake/Output Summary (Last 24 hours) at 8/24/2023 0956  Last data filed at 8/24/2023 0600  Gross per 24 hour   Intake 1000 ml   Output 2000 ml   Net -1000 ml       Lines/Drains/Airways       Peripheral Intravenous Line  Duration                  Hemodialysis AV Fistula 12/23/12 Left forearm 3896 days         Peripheral IV - Single Lumen 08/23/23 1355 20 G Anterior;Right Forearm <1 day         Peripheral IV - Single Lumen 08/23/23 1355 22 G Anterior;Right Wrist <1 day                     Physical Exam  Vitals reviewed.   Constitutional:       General: He is not in acute distress.     Appearance: He is obese.   HENT:      Head: Normocephalic.   Eyes:      Extraocular Movements: Extraocular movements intact.   Cardiovascular:      Rate and Rhythm: Normal rate.   Pulmonary:      Effort: Pulmonary effort is normal. No respiratory distress.   Abdominal:      General: There is no distension.      Palpations: Abdomen is soft.      Tenderness: There is no abdominal tenderness.   Genitourinary:     Comments: Declined rectal exam.  Neurological:      Mental Status: He is alert and oriented to person, place, and time. Mental status is at baseline.          Significant Labs:  All pertinent lab results from the last 24 hours have been reviewed.    Significant Imaging:  Imaging results within the past 24 hours have been reviewed.    Assessment/Plan:     Oncology  * Acute blood loss anemia  62-year-old male with ESRD on HD on EPO, iron deficiency anemia, known polyposis syndrome presenting after missing dialysis with reports of melena for the past few days.   Hemoglobin 5.8 on admission.  He received 2 units PRBCs posttransfusion hemoglobin of 8.  He had another episode of melena on 08/24 seen by bedside nursing.    Recommendations:  - Keep NPO; Plan for EGD 8/24.  - Trend Hgb q8 hrs. Transfuse for Hgb > 7, unless otherwise indicated  - Continue IV PPI 40mg BID  - Maintain IV access with 2 large bore Ivs  - Intravascular resuscitation/support with IVFs   - Hold all NSAIDs and anticoagulants, unless contraindicated  - Please correct any coagulopathy with platelets and FFP for goal of platelets >50K and INR <2.0  - Please notify GI team if there is significant change in patient's clinical status        Thank you for your consult. I will follow-up with patient. Please contact us if you have any additional questions.    Hodan Thomas MD  Gastroenterology  [facility]

## 2023-08-24 NOTE — SUBJECTIVE & OBJECTIVE
Interval History: Pt seen and examined this morning on payam BOOGIE. Grossly asymptomatic, denies any complaints. Care plan reviewed. Otherwise, doing well and with no further complaints at this time.      Objective:     Vital Signs (Most Recent):  Temp: 98.1 °F (36.7 °C) (08/24/23 1219)  Pulse: 67 (08/24/23 1219)  Resp: 16 (08/24/23 1219)  BP: (!) 149/64 (08/24/23 1219)  SpO2: 95 % (08/24/23 1219) Vital Signs (24h Range):  Temp:  [96.1 °F (35.6 °C)-98.7 °F (37.1 °C)] 98.1 °F (36.7 °C)  Pulse:  [51-80] 67  Resp:  [15-20] 16  SpO2:  [93 %-99 %] 95 %  BP: (107-199)/(53-85) 149/64     Weight: 115.2 kg (253 lb 15.5 oz)  Body mass index is 44.99 kg/m².    Intake/Output Summary (Last 24 hours) at 8/24/2023 1327  Last data filed at 8/24/2023 1320  Gross per 24 hour   Intake 1500 ml   Output 2000 ml   Net -500 ml         Physical Exam  Gen: in NAD, appears stated age; pt is morbidly obese  Neuro: AAOx4, CN2-12 grossly intact BL; motor, sensory, and strength grossly intact BL  HEENT: NTNC, EOMI, PERRLA, MMM; no thyromegaly or lymphadenopathy; no JVD appreciated  CVS: RRR, no m/r/g; S1/S2 auscultated with no S3 or S4; capillary refill < 2 sec  Resp: lungs CTAB, no w/r/r; no belabored breathing or accessory muscle use appreciated   Abd: BS+ in all 4 quadrants; NTND, soft to palpation; no organomegaly appreciated   Extrem: pulses full, equal, and regular over all 4 extremities; no UE or LE edema BL      Significant Labs: All pertinent labs within the past 24 hours have been reviewed.    Significant Imaging: I have reviewed all pertinent imaging results/findings within the past 24 hours.

## 2023-08-24 NOTE — PLAN OF CARE
08/24/23 1609   Discharge Assessment   Assessment Type Discharge Planning Assessment   Confirmed/corrected address, phone number and insurance Yes   Confirmed Demographics Correct on Facesheet   Source of Information patient   When was your last doctors appointment?   (Patient does virtual.visits. His last visit was within the last 6 months)   Communicated KATERIN with patient/caregiver Date not available/Unable to determine   Reason For Admission Anemia   People in Home sibling(s);other relative(s)   Do you expect to return to your current living situation? Yes   Do you have help at home or someone to help you manage your care at home? Yes   Who are your caregiver(s) and their phone number(s)? Natalia Guerreroer [phone], Luciano vigiler, oRss vigiler. Patient could not remember their numbers   Prior to hospitilization cognitive status: Alert/Oriented   Current cognitive status: Alert/Oriented   Walking or Climbing Stairs transferring difficulty, assistance 1 person   Dressing/Bathing bathing difficulty, assistance 1 person   Home Accessibility wheelchair accessible   Equipment Currently Used at Home hospital bed;shower chair;wheelchair;other (see comments);walker, rolling;lift device  (Pt statesd that his CPAP was recalled.)   Readmission within 30 days? No   Patient currently being followed by outpatient case management? No   Do you currently have service(s) that help you manage your care at home? Yes   How Many hours does patient receive services   (weekly PT & OT)   Name and Contact number of agency [facility]   Do you have prescription coverage? Yes   Coverage Medicare & BCBS   Do you have any problems affording any of your prescribed medications? No   Is the patient taking medications as prescribed? yes   Who is going to help you get home at discharge? Nataliaottoniel Guerreroer [phone], Luciano vigiler, Ross vigiler. Patient could not remember their numbers   How do you get to  doctors appointments? public transportation   Are you on dialysis? Yes   Dialysis Name and Scheduled days [facility]   Do you take coumadin? No   DME Needed Upon Discharge  none   Discharge Plan discussed with: Patient   Discharge Plan A Rehab   Physical Activity   On average, how many days per week do you engage in moderate to strenuous exercise (like a brisk walk)? 0 days   On average, how many minutes do you engage in exercise at this level? 30 min   Financial Resource Strain   How hard is it for you to pay for the very basics like food, housing, medical care, and heating? Not hard   Housing Stability   In the last 12 months, was there a time when you were not able to pay the mortgage or rent on time? N   In the last 12 months, was there a time when you did not have a steady place to sleep or slept in a shelter (including now)? N   Transportation Needs   In the past 12 months, has lack of transportation kept you from medical appointments or from getting medications? no   In the past 12 months, has lack of transportation kept you from meetings, work, or from getting things needed for daily living? No   Food Insecurity   Within the past 12 months, you worried that your food would run out before you got the money to buy more. Never true   Within the past 12 months, the food you bought just didn't last and you didn't have money to get more. Never true     MARGOT spoke with patient.  He states he lives with his brother Luciano Farmer and his wife Natalia. Patient could not remember brothers number. Cm asked if family is able to manage his care. Patient states yes. Patient states that HH comes once a week but he does not know the company. Cm asked if Margot can call his sister in law Natalia. Patient states he plan to go home at time of d/c   CM called Natalia. She states that patient has [facility]. She asked about patient going to rehab. She states that patient had asked her to bring clothes because a MD told  patient he was going to rehab. She states that their is a facility next to his HD center. CM explained that it is a SNF. She asked about the difference between SNF and NH. CM explained that skill is for about 20 days and patient go home. NH is prison which mean that's the patient's home. She asked CM to explained that to the patient. CM asked about how long patient has been w/c. She states that 4 months ago he was walking but starting getting weak and had a fall which required stitches and patient has gone down since then. CM asked if they can manage his care at home. She states that they can but sometimes its a little difficult. Cm explained that currently therapy has not seen patient and there is no recommendations for rehab. Patient is currently in a w/c daily. Family use a lift to get him out of bed. Jodee Albright RN

## 2023-08-24 NOTE — PLAN OF CARE
Problem: Adult Inpatient Plan of Care  Goal: Plan of Care Review  8/24/2023 0737 by Bev Wolf RN  Outcome: Ongoing, Progressing  8/24/2023 0659 by Bev Wolf RN  Outcome: Ongoing, Progressing  Goal: Patient-Specific Goal (Individualized)  8/24/2023 0737 by Bev Wolf RN  Outcome: Ongoing, Progressing  8/24/2023 0659 by Bev Wolf RN  Outcome: Ongoing, Progressing  Goal: Absence of Hospital-Acquired Illness or Injury  Outcome: Ongoing, Progressing  Goal: Readiness for Transition of Care  Outcome: Ongoing, Progressing     Problem: Device-Related Complication Risk (Hemodialysis)  Goal: Safe, Effective Therapy Delivery  Outcome: Ongoing, Progressing     Problem: Hemodynamic Instability (Hemodialysis)  Goal: Effective Tissue Perfusion  8/24/2023 0737 by Bev Wolf RN  Outcome: Ongoing, Progressing  8/24/2023 0659 by Bev Wolf RN  Outcome: Ongoing, Progressing     Problem: Bariatric Environmental Safety  Goal: Safety Maintained with Care  8/24/2023 0737 by Bev Wolf RN  Outcome: Ongoing, Progressing  8/24/2023 0659 by Bev Wolf RN  Outcome: Ongoing, Progressing     Problem: Fall Injury Risk  Goal: Absence of Fall and Fall-Related Injury  Outcome: Ongoing, Progressing

## 2023-08-24 NOTE — PLAN OF CARE
To procedure room 3 via stretcher. All vital signs, medications, IV fluids and sedation per CRNA. See anesthesia notes. Pt's belongings under stretcher

## 2023-08-24 NOTE — INTERVAL H&P NOTE
The patient has been examined and the H&P has been reviewed:    Pre-Procedure H and P Addendum    Patient seen and examined.  History and exam unchanged from prior history and physical.      Procedure: EGD  Indication: Melena  ASA Class: per anesthesiology  Airway: normal  Neck Mobility: full range of motion  Mallampatti score: per anesthesia  History of anesthesia problems: no  Family history of anesthesia problems: no  Anesthesia Plan: MAC        Active Hospital Problems    Diagnosis  POA    *Acute blood loss anemia [D62]  Yes    Chronic kidney disease-mineral and bone disorder [N18.9, E83.9, M89.9]  Unknown    SOB (shortness of breath) [R06.02]  Unknown    MIKE (obstructive sleep apnea) - very severe latest sleep study says BPAP @ 16/8 [G47.33]  Yes    ESRD (end stage renal disease) on dialysis [N18.6, Z99.2]  Not Applicable    Diabetes mellitus, type 2 [E11.9]  Yes      Resolved Hospital Problems   No resolved problems to display.

## 2023-08-25 ENCOUNTER — TELEPHONE (OUTPATIENT)
Dept: ENDOSCOPY | Facility: HOSPITAL | Age: 63
End: 2023-08-25
Payer: MEDICARE

## 2023-08-25 VITALS
WEIGHT: 254 LBS | HEART RATE: 82 BPM | HEIGHT: 63 IN | TEMPERATURE: 99 F | BODY MASS INDEX: 45 KG/M2 | DIASTOLIC BLOOD PRESSURE: 61 MMHG | RESPIRATION RATE: 18 BRPM | OXYGEN SATURATION: 96 % | SYSTOLIC BLOOD PRESSURE: 130 MMHG

## 2023-08-25 LAB
ANION GAP SERPL CALC-SCNC: 11 MMOL/L (ref 8–16)
BASOPHILS # BLD AUTO: 0 K/UL (ref 0–0.2)
BASOPHILS NFR BLD: 0 % (ref 0–1.9)
BUN SERPL-MCNC: 46 MG/DL (ref 6–20)
CALCIUM SERPL-MCNC: 8.9 MG/DL (ref 8.7–10.5)
CHLORIDE SERPL-SCNC: 108 MMOL/L (ref 95–110)
CO2 SERPL-SCNC: 20 MMOL/L (ref 23–29)
CREAT SERPL-MCNC: 8.8 MG/DL (ref 0.5–1.4)
DIFFERENTIAL METHOD: ABNORMAL
EOSINOPHIL # BLD AUTO: 0.1 K/UL (ref 0–0.5)
EOSINOPHIL NFR BLD: 3.6 % (ref 0–8)
ERYTHROCYTE [DISTWIDTH] IN BLOOD BY AUTOMATED COUNT: 17.3 % (ref 11.5–14.5)
EST. GFR  (NO RACE VARIABLE): 6.3 ML/MIN/1.73 M^2
GLUCOSE SERPL-MCNC: 95 MG/DL (ref 70–110)
HCT VFR BLD AUTO: 27.2 % (ref 40–54)
HGB BLD-MCNC: 8.2 G/DL (ref 14–18)
IMM GRANULOCYTES # BLD AUTO: 0.01 K/UL (ref 0–0.04)
IMM GRANULOCYTES NFR BLD AUTO: 0.3 % (ref 0–0.5)
LYMPHOCYTES # BLD AUTO: 0.6 K/UL (ref 1–4.8)
LYMPHOCYTES NFR BLD: 18.9 % (ref 18–48)
MCH RBC QN AUTO: 32.7 PG (ref 27–31)
MCHC RBC AUTO-ENTMCNC: 30.1 G/DL (ref 32–36)
MCV RBC AUTO: 108 FL (ref 82–98)
MONOCYTES # BLD AUTO: 0.4 K/UL (ref 0.3–1)
MONOCYTES NFR BLD: 11.1 % (ref 4–15)
NEUTROPHILS # BLD AUTO: 2.2 K/UL (ref 1.8–7.7)
NEUTROPHILS NFR BLD: 66.1 % (ref 38–73)
NRBC BLD-RTO: 0 /100 WBC
PLATELET # BLD AUTO: 56 K/UL (ref 150–450)
PMV BLD AUTO: 11 FL (ref 9.2–12.9)
POCT GLUCOSE: 100 MG/DL (ref 70–110)
POCT GLUCOSE: 90 MG/DL (ref 70–110)
POTASSIUM SERPL-SCNC: 5 MMOL/L (ref 3.5–5.1)
RBC # BLD AUTO: 2.51 M/UL (ref 4.6–6.2)
SODIUM SERPL-SCNC: 139 MMOL/L (ref 136–145)
WBC # BLD AUTO: 3.33 K/UL (ref 3.9–12.7)

## 2023-08-25 PROCEDURE — 63600175 PHARM REV CODE 636 W HCPCS: Performed by: STUDENT IN AN ORGANIZED HEALTH CARE EDUCATION/TRAINING PROGRAM

## 2023-08-25 PROCEDURE — 25000003 PHARM REV CODE 250: Performed by: NURSE PRACTITIONER

## 2023-08-25 PROCEDURE — 25000003 PHARM REV CODE 250: Performed by: HOSPITALIST

## 2023-08-25 PROCEDURE — 99239 PR HOSPITAL DISCHARGE DAY,>30 MIN: ICD-10-PCS | Mod: ,,, | Performed by: STUDENT IN AN ORGANIZED HEALTH CARE EDUCATION/TRAINING PROGRAM

## 2023-08-25 PROCEDURE — 90935 HEMODIALYSIS ONE EVALUATION: CPT | Mod: ,,, | Performed by: NURSE PRACTITIONER

## 2023-08-25 PROCEDURE — 94761 N-INVAS EAR/PLS OXIMETRY MLT: CPT

## 2023-08-25 PROCEDURE — 80100016 HC MAINTENANCE HEMODIALYSIS

## 2023-08-25 PROCEDURE — 27000221 HC OXYGEN, UP TO 24 HOURS

## 2023-08-25 PROCEDURE — 99239 HOSP IP/OBS DSCHRG MGMT >30: CPT | Mod: ,,, | Performed by: STUDENT IN AN ORGANIZED HEALTH CARE EDUCATION/TRAINING PROGRAM

## 2023-08-25 PROCEDURE — 36415 COLL VENOUS BLD VENIPUNCTURE: CPT | Performed by: HOSPITALIST

## 2023-08-25 PROCEDURE — 99900035 HC TECH TIME PER 15 MIN (STAT)

## 2023-08-25 PROCEDURE — 80048 BASIC METABOLIC PNL TOTAL CA: CPT | Performed by: HOSPITALIST

## 2023-08-25 PROCEDURE — 85025 COMPLETE CBC W/AUTO DIFF WBC: CPT | Performed by: HOSPITALIST

## 2023-08-25 PROCEDURE — 90935 PR HEMODIALYSIS, ONE EVALUATION: ICD-10-PCS | Mod: ,,, | Performed by: NURSE PRACTITIONER

## 2023-08-25 PROCEDURE — C9113 INJ PANTOPRAZOLE SODIUM, VIA: HCPCS | Performed by: STUDENT IN AN ORGANIZED HEALTH CARE EDUCATION/TRAINING PROGRAM

## 2023-08-25 PROCEDURE — 94660 CPAP INITIATION&MGMT: CPT

## 2023-08-25 RX ORDER — PANTOPRAZOLE SODIUM 40 MG/1
40 TABLET, DELAYED RELEASE ORAL DAILY
Qty: 90 TABLET | Refills: 0 | Status: SHIPPED | OUTPATIENT
Start: 2023-08-25 | End: 2024-08-24

## 2023-08-25 RX ADMIN — FLUOXETINE 40 MG: 20 CAPSULE ORAL at 03:08

## 2023-08-25 RX ADMIN — SODIUM CHLORIDE: 9 INJECTION, SOLUTION INTRAVENOUS at 08:08

## 2023-08-25 RX ADMIN — PRAVASTATIN SODIUM 20 MG: 20 TABLET ORAL at 03:08

## 2023-08-25 RX ADMIN — PANTOPRAZOLE SODIUM 40 MG: 40 INJECTION, POWDER, FOR SOLUTION INTRAVENOUS at 03:08

## 2023-08-25 NOTE — TELEPHONE ENCOUNTER
----- Message from Mason Farmer sent at 8/25/2023  9:51 AM CDT -----  Regarding: 'Appt  Contact: pt [phone]  [facility] calling to schedule appt. Referred to Dr. Werner Gavin. Please call pt [phone]

## 2023-08-25 NOTE — PLAN OF CARE
CHW scheduled the [facility] hospital follow up for August 29 @ 11:00am    CHW unable to schedule the Gastroenterology hospital follow up. A message was sent to the clinic requesting an appointment on the patients behalf. I added this information to the AVS as a reminder for the patient.

## 2023-08-25 NOTE — PROGRESS NOTES
[facility] HEMODIALYSIS NOTE    Angel Farmer Jr. is a 62 y.o. male currently on hemodialysis for removal of uremic toxins and volume.     Patient seen and evaluated on hemodialysis, tolerating treatment, see HD flowsheet for vitals and assessments.    No Hypotension, chest pain, shortness of breath, cramping, nausea or vomiting.      Labs have been reviewed and the dialysate bath has been adjusted.     Labs:     Recent Labs   Lab 08/23/23  1228 08/24/23  0707 08/25/23  0518   * 145 139   K 5.2* 4.1 5.0    109 108   CO2 25 25 20*   BUN 76* 40* 46*   CREATININE 12.2* 7.6* 8.8*   CALCIUM 8.9 9.2 8.9     Recent Labs   Lab 08/23/23  1228 08/24/23  0707 08/25/23  0518   WBC 3.63* 3.02* 3.33*   HGB 5.8* 8.0* 8.2*   HCT 18.9* 26.1* 27.2*   PLT 77* 49* 56*     Lab Results   Component Value Date    FESATURATED 78 (H) 03/01/2019    FERRITIN 914 (H) 03/01/2019        Assessment/Plan      Ultrafiltration goal: Liters: 2L. Duration:  3.5 hrs    Monday/Wednesday/Friday    - Seen on dialysis today, tolerating session with current UFR, no complications.    - Pending dc home with HH - patient with concerns about discharge - discussed with deferred to primary team.  - Encouraged patient to limit fluid intake to 32 oz per day.   - No lab stick/BP intake on access site  - Continue to monitor intake and output, daily weights   - Please avoid gadolinium, fleets, phos-based laxatives, NSAIDs  - Will follow closely and continue dialysis treatments while in-patient    Anemia  - Hgb 8.2  - EPO can be administered and dosed per his OP unit upon discharge.    BMM  - Renal diet with protein intake goal 1.5 g/kg/d if appropriate   - Novasource with meals   - F/U PO4, Mg, Calcium. And albumin levels.     HTN  - BP Normal  - Goal for BP <140mmHg SBP and <90 mmHg DBP. Maintain MAP > 65 mmHg.  - Continue home antihypertensive regimen; adjust as needed.       Delia Willingham, EILEEN, APRN, FNP-C  Nephrology Department  Pager:   [phone]

## 2023-08-25 NOTE — PLAN OF CARE
[facility]      HOME HEALTH ORDERS  FACE TO FACE ENCOUNTER    Patient Name: Angel Farmer Jr.  YOB: 1960    PCP: Melvin Terry MD   PCP Address: [address] / [place] [address]  PCP Phone Number: [phone]  PCP Fax: [phone]    Encounter Date: 8/23/23    Admit to Home Health    Diagnoses:  Active Hospital Problems    Diagnosis  POA    *Acute blood loss anemia [D62]  Yes    Chronic kidney disease-mineral and bone disorder [N18.9, E83.9, M89.9]  Yes    Morbid obesity with BMI of 40.0-44.9, adult [E66.01, Z68.41]  Not Applicable    MIKE (obstructive sleep apnea) - very severe latest sleep study says BPAP @ 16/8 [G47.33]  Yes    ESRD (end stage renal disease) on dialysis [N18.6, Z99.2]  Not Applicable    Diabetes mellitus, type 2 [E11.9]  Yes      Resolved Hospital Problems    Diagnosis Date Resolved POA    SOB (shortness of breath) [R06.02] 08/24/2023 Yes       Follow Up Appointments:  No future appointments.    Allergies:  Review of patient's allergies indicates:   Allergen Reactions    Iodinated contrast media     Keflex [cephalexin] Nausea Only       Medications: Review discharge medications with patient and family and provide education.    Current Facility-Administered Medications   Medication Dose Route Frequency Provider Last Rate Last Admin    0.9%  NaCl infusion (for blood administration)   Intravenous Q24H PRN Sessions, Ruddy BRITTON MD        0.9%  NaCl infusion   Intravenous Once Delia Willingham, DNP, FNP-C 100 mL/hr at 08/25/23 0852 New Bag at 08/25/23 0852    FLUoxetine capsule 40 mg  40 mg Oral Daily Noah Amor MD   40 mg at 08/24/23 1023    gabapentin capsule 300 mg  300 mg Oral QHS Noah Amor MD   300 mg at 08/24/23 2242    glucagon (human recombinant) injection 1 mg  1 mg Intramuscular PRN Noah Amor MD        glucose chewable tablet 16 g  16 g Oral PRN Noah Amor MD        glucose chewable tablet 24 g  24 g  Oral PRN Noah Amor MD        hydrALAZINE tablet 10 mg  10 mg Oral Once Neyda Lopez NP        melatonin tablet 6 mg  6 mg Oral Nightly PRN Ruddy Palafox MD        naloxone 0.4 mg/mL injection 0.02 mg  0.02 mg Intravenous PRN Noah Amor MD        pantoprazole injection 40 mg  40 mg Intravenous BID Werner Barrientos MD   40 mg at 08/24/23 2242    pravastatin tablet 20 mg  20 mg Oral Daily Noah Amor MD   20 mg at 08/24/23 1024    simethicone chewable tablet 80 mg  1 tablet Oral TID PRN Neyda Lopez NP   80 mg at 08/23/23 2300    sodium chloride 0.9% flush 10 mL  10 mL Intravenous PRN Ruddy Palafox MD        sodium chloride 0.9% flush 10 mL  10 mL Intravenous Q12H PRN Noah Amor MD         Current Discharge Medication List        CONTINUE these medications which have CHANGED    Details   pantoprazole (PROTONIX) 40 MG tablet Take 1 tablet (40 mg total) by mouth once daily.  Qty: 90 tablet, Refills: 0           CONTINUE these medications which have NOT CHANGED    Details   calcium acetate,phosphat bind, (PHOSLO) 667 mg capsule Take 3 capsules by mouth with each meal & 1 capsule with snacks      cyanocobalamin (VITAMIN B-12) 1000 MCG tablet Take 1,000 mcg by mouth once daily.      FLUoxetine 40 MG capsule Take 40 mg by mouth once daily.      folic acid/vit B complex and C (RENAL-TINO ORAL) Take 1 tablet by mouth once daily.      gabapentin (NEURONTIN) 300 MG capsule TAKE 1 BY MOUTH EVERY      EVENING  Qty: 90 capsule, Refills: 1    Associated Diagnoses: Multifactorial peripheral neuropathy      midodrine (PROAMATINE) 10 MG tablet Take 1 tablet by mouth 3 (three) times daily.      ondansetron (ZOFRAN) 4 MG tablet TAKE ONE TABLET BY MOUTH THREE TIMES DAILY AS NEEDED FOR NAUSEA      pravastatin (PRAVACHOL) 20 MG tablet Take 20 mg by mouth once daily.      promethazine (PHENERGAN) 25 MG tablet TAKE ONE TABLET BY MOUTH EVERY 6 HOURS AS NEEDED (IF ZOFRAN DOESN'T  "WORK)      simethicone (GAS-X ORAL) Take 1 tablet by mouth 2 (two) times daily as needed.      blood sugar diagnostic (ONETOUCH ULTRA BLUE TEST STRIP) Strp Check blood glucose qac and as directed by physican  Qty: 100 strip, Refills: 6    Comments: Patient needs Onetouch ultra strips  Associated Diagnoses: Type 2 diabetes mellitus with diabetic nephropathy, with long-term current use of insulin      blood-glucose meter (ADVANCED GLUCOSE METER) Misc 1 each by Misc.(Non-Drug; Combo Route) route 4 (four) times daily with meals and nightly.  Qty: 1 each, Refills: 0    Associated Diagnoses: Diabetes mellitus, type 2      lancets Misc 1 each by Misc.(Non-Drug; Combo Route) route 3 (three) times daily.  Qty: 200 each, Refills: 6    Comments: Please dispense One Touch Ultra Soft  Associated Diagnoses: Diabetes mellitus, type 2      pen needle, diabetic (BD ULTRA-FINE MINI PEN NEEDLE) 31 gauge x 3/16" Ndle USE 4 TO 5 TIMES A DAY     ( DISCARD PEN NEEDLE AFTER EACH USE )  Qty: 100 each, Refills: 6    Associated Diagnoses: Type 2 diabetes mellitus without complication               I have seen and examined this patient within the last 30 days. My clinical findings that support the need for the home health skilled services and home bound status are the following:no   Weakness/numbness causing balance and gait disturbance due to Weakness/Debility making it taxing to leave home.  Requiring assistive device to leave home due to unsteady gait caused by  Weakness/Debility.     Diet:   cardiac diet, diabetic diet 2000 calorie, and renal diet    Labs:  Report Lab results to PCP.    Referrals/ Consults  Physical Therapy to evaluate and treat. Evaluate for home safety and equipment needs; Establish/upgrade home exercise program. Perform / instruct on therapeutic exercises, gait training, transfer training, and Range of Motion.  Occupational Therapy to evaluate and treat. Evaluate home environment for safety and equipment needs. " Perform/Instruct on transfers, ADL training, ROM, and therapeutic exercises.  Aide to provide assistance with personal care, ADLs, and vital signs.    Activities:   activity as tolerated    Nursing:   Agency to admit patient within 24 hours of hospital discharge unless specified on physician order or at patient request    SN to complete comprehensive assessment including routine vital signs. Instruct on disease process and s/s of complications to report to MD. Review/verify medication list sent home with the patient at time of discharge  and instruct patient/caregiver as needed. Frequency may be adjusted depending on start of care date.     Skilled nurse to perform up to 3 visits PRN for symptoms related to diagnosis    Notify MD if SBP > 160 or < 90; DBP > 90 or < 50; HR > 120 or < 50; Temp > 101; O2 < 88%    Ok to schedule additional visits based on staff availability and patient request on consecutive days within the home health episode.    When multiple disciplines ordered:    Start of Care occurs on Sunday - Wednesday schedule remaining discipline evaluations as ordered on separate consecutive days following the start of care.    Thursday SOC -schedule subsequent evaluations Friday and Monday the following week.     Friday - Saturday SOC - schedule subsequent discipline evaluations on consecutive days starting Monday of the following week.    For all post-discharge communication and subsequent orders please contact patient's primary care physician.     Miscellaneous   Routine Skin for Bedridden Patients: Instruct patient/caregiver to apply moisture barrier cream to all skin folds and wet areas in perineal area daily and after baths and all bowel movements.  Diabetic Care:   SN to perform and educate Diabetic management with blood glucose monitoring:, Fingerstick blood sugar AC and HS, and Report CBG < 60 or > 350 to physician.    Home Health Aide:  Nursing Three times weekly, Physical Therapy Three times  weekly, Occupational Therapy Three times weekly, and Home Health Aide Three times weekly    Wound Care Orders  no    I certify that this patient is confined to his home and needs intermittent skilled nursing care, physical therapy, and occupational therapy.        Werner Barrientos MD  Attending Physician  Medical Director - [facility]  Department of Hospital Medicine  8/25/2023

## 2023-08-25 NOTE — PROGRESS NOTES
Pt arrived to unit via hospital bed, AAOx4. HD initiated via left FA AVF, tolerated well, flows good. UF goal 2L as tolerated

## 2023-08-25 NOTE — DISCHARGE SUMMARY
"[facility] [address] [place] [address] Medicine  Discharge Summary      Patient Name: Angel Farmer Jr.  MRN: [id number]  ASHWIN: [id number]  Patient Class: IP- Inpatient  Admission Date: 8/23/2023  Hospital Length of Stay: 1 days  Discharge Date and Time:  08/25/2023 9:53 AM  Attending Physician: Werner Barrientos MD   Discharging Provider: Werner Barrientos MD  Primary Care Provider: Melvin Terry MD  [facility] Medicine Team: [facility]P MED  Werner Barrientos MD  Primary Care Team: [facility] HOSP [facility]    HPI:   62-year-old white male being admitted for acute GI bleed and pulmonary edema secondary to missing dialysis.  Patient reports being in his usual state of health until about 2 days ago when he began noticing some black stools.  No hematochezia.  Reports some nausea but no fely vomiting.  No abdominal pain.  Also reports that he did not get his last dialysis session on Monday due to transportation issues.  Has developed shortness of breath while lying flat that eases when he sits up.  No fely chest pain.  He denies taking any NSAIDs or anticoagulants/antiplatelets.    Patient just had an EGD at [facility] about a month ago for GI bleed for which yielded innumerable gastric and duodenal polyps concerning for atypical polyposis syndrome; nonmalignant per biopsy results.     Patient reports that he lives with his brother and sister-in-law, patient is quite wheelchair bed-bound since about a month ago.  Prior to then he was using a walker.  He can not specify exactly why he has gone from an ambulatory status to sedentary but does just reports his legs have become weaker.  He does report having undergone attempted lumbar spinal surgery in 2019 but reports having coded 7 times on the table" and that they were unable to complete the surgery and had to aborted.  He denies any hx of cardiac stenting.     Here in the ED vital signs are stable, hemoglobin is noted to be 5.8.  EKG which I personally reviewed shows no " acute ischemic changes. ED ordered 2 units of prbc. Indirect radha test is +.      Procedure(s) (LRB):  EGD (ESOPHAGOGASTRODUODENOSCOPY) (N/A)      Hospital Course:   Pt admitted to [facility] and transfused pRBCs. He underwent EGD with GI on 8/24, which showed an oozing duodenal polyp that was removed and clipped. Pt tolerated well and H/H remained stable into 8/25. Pt deemed appropriate for discharge. SW/CM assisting with HH and ride home. Plan discussed with pt, who was agreeable and amenable; medications were discussed and reviewed, outpatient follow-up scheduled, ER precautions were given, all questions were answered to the pt's satisfaction, and Mr. Farmer was subsequently discharged.         Goals of Care Treatment Preferences:  Code Status: DNR      Consults:   Consults (From admission, onward)        Status Ordering Provider     Inpatient consult to Gastroenterology  Once        Provider:  (Not yet assigned)    Completed NOMI DOWNING     Inpatient consult to Nephrology  Once        Provider:  (Not yet assigned)    Completed AZALIA MOE          No new Assessment & Plan notes have been filed under this hospital service since the last note was generated.  Service: Hospital Medicine    Final Active Diagnoses:    Diagnosis Date Noted POA    PRINCIPAL PROBLEM:  Acute blood loss anemia [D62] 01/13/2014 Yes    Chronic kidney disease-mineral and bone disorder [N18.9, E83.9, M89.9] 08/23/2023 Yes    Morbid obesity with BMI of 40.0-44.9, adult [E66.01, Z68.41] 03/03/2019 Not Applicable    MIKE (obstructive sleep apnea) - very severe latest sleep study says BPAHARESH @ 16/8 [G47.33] 02/25/2015 Yes    ESRD (end stage renal disease) on dialysis [N18.6, Z99.2] 03/18/2014 Not Applicable    Diabetes mellitus, type 2 [E11.9]  Yes      Problems Resolved During this Admission:    Diagnosis Date Noted Date Resolved POA    SOB (shortness of breath) [R06.02] 08/23/2023 08/24/2023 Yes       Discharged Condition:  stable    Disposition: Home or Self Care    Follow Up:   Follow-up Information     Melvin Terry MD Follow up on 8/29/2023.    Specialty: Internal Medicine  Why: Hospital follow up visit at 11:00am. Please bring your hospital discharge summary, current medications, insurance card and photo ID  Contact information:  [address] [place] [address]  [phone]             Gastroenterology Follow up.    Why: A message has been sent to the Gastroenterology clinic, the clinic nurse will contact you to schedule a hospital follow up visit. However, if you do not hear from them within 24 to 48 hours of discharge please call to schedule the appointment.  Contact information:  Gastroenterology   [phone]                     Patient Instructions:      Ambulatory referral/consult to Gastroenterology   Standing Status: Future   Referral Priority: Routine Referral Type: Consultation   Referral Reason: Specialty Services Required   Requested Specialty: Gastroenterology   Number of Visits Requested: 1     Ambulatory referral/consult to Internal Medicine   Standing Status: Future   Referral Priority: Routine Referral Type: Consultation   Referral Reason: Specialty Services Required   Requested Specialty: Internal Medicine   Number of Visits Requested: 1     Diet diabetic     Diet Cardiac     Diet renal     Notify your health care provider if you experience any of the following:  increased confusion or weakness     Notify your health care provider if you experience any of the following:  persistent dizziness, light-headedness, or visual disturbances     Notify your health care provider if you experience any of the following:  worsening rash     Notify your health care provider if you experience any of the following:  severe persistent headache     Notify your health care provider if you experience any of the following:  difficulty breathing or increased cough     Notify your health care provider if you experience  any of the following:  severe uncontrolled pain     Notify your health care provider if you experience any of the following:  persistent nausea and vomiting or diarrhea     Notify your health care provider if you experience any of the following:  temperature >100.4     Activity as tolerated       Significant Diagnostic Studies: Labs: All labs within the past 24 hours have been reviewed    Pending Diagnostic Studies:     Procedure Component Value Units Date/Time    Specimen to Pathology, Surgery Gastrointestinal tract [[id number]] Collected: 08/24/23 1322    Order Status: Sent Lab Status: In process Updated: 08/24/23 172    Specimen: Tissue          Medications:  Reconciled Home Medications:      Medication List      CONTINUE taking these medications    blood-glucose meter Misc  Commonly known as: ADVANCED GLUCOSE METER  1 each by Misc.(Non-Drug; Combo Route) route 4 (four) times daily with meals and nightly.     calcium acetate(phosphat bind) 667 mg capsule  Commonly known as: PHOSLO  Take 3 capsules by mouth with each meal & 1 capsule with snacks     cyanocobalamin 1000 MCG tablet  Commonly known as: VITAMIN B-12  Take 1,000 mcg by mouth once daily.     FLUoxetine 40 MG capsule  Take 40 mg by mouth once daily.     gabapentin 300 MG capsule  Commonly known as: NEURONTIN  TAKE 1 BY MOUTH EVERY      EVENING     GAS-X ORAL  Take 1 tablet by mouth 2 (two) times daily as needed.     lancets Misc  1 each by Misc.(Non-Drug; Combo Route) route 3 (three) times daily.     midodrine 10 MG tablet  Commonly known as: PROAMATINE  Take 1 tablet by mouth 3 (three) times daily.     ondansetron 4 MG tablet  Commonly known as: ZOFRAN  TAKE ONE TABLET BY MOUTH THREE TIMES DAILY AS NEEDED FOR NAUSEA     ONETOUCH ULTRA BLUE TEST STRIP Strp  Generic drug: blood sugar diagnostic  Check blood glucose qac and as directed by felix     pantoprazole 40 MG tablet  Commonly known as: PROTONIX  Take 1 tablet (40 mg total) by mouth once daily.    "  pen needle, diabetic 31 gauge x 3/16" Ndle  Commonly known as: BD ULTRA-FINE MINI PEN NEEDLE  USE 4 TO 5 TIMES A DAY     ( DISCARD PEN NEEDLE AFTER EACH USE )     pravastatin 20 MG tablet  Commonly known as: PRAVACHOL  Take 20 mg by mouth once daily.     promethazine 25 MG tablet  Commonly known as: PHENERGAN  TAKE ONE TABLET BY MOUTH EVERY 6 HOURS AS NEEDED (IF ZOFRAN DOESN'T WORK)     RENAL-TINO ORAL  Take 1 tablet by mouth once daily.            Indwelling Lines/Drains at time of discharge:   Lines/Drains/Airways     Drain  Duration                Hemodialysis AV Fistula 12/23/12 Left forearm 3897 days                Time spent on the discharge of patient: 35 minutes         Werner Barrientos MD  Attending Physician  Medical Director - [facility]  Department of Hospital Medicine  8/25/2023    "

## 2023-08-25 NOTE — PROGRESS NOTES
HD tx complete. 2 L removed over 3.5 hours, tolerated well. Blood returned, needles removed, gauze and tape applied. Pressure held to each site for 5 minutes, hemostasis achieved. Report given to primary nurse Amira. Pt transferred from unit via hospital bed by transport back to rm 1143. VSS

## 2023-08-25 NOTE — PLAN OF CARE
Problem: Adult Inpatient Plan of Care  Goal: Plan of Care Review  Outcome: Met  Goal: Patient-Specific Goal (Individualized)  Outcome: Met  Goal: Absence of Hospital-Acquired Illness or Injury  Outcome: Met  Goal: Optimal Comfort and Wellbeing  Outcome: Met  Goal: Readiness for Transition of Care  Outcome: Met     Problem: Device-Related Complication Risk (Hemodialysis)  Goal: Safe, Effective Therapy Delivery  Outcome: Met     Problem: Hemodynamic Instability (Hemodialysis)  Goal: Effective Tissue Perfusion  Outcome: Met     Problem: Infection (Hemodialysis)  Goal: Absence of Infection Signs and Symptoms  Outcome: Met     Problem: Diabetes Comorbidity  Goal: Blood Glucose Level Within Targeted Range  Outcome: Met     Problem: Bariatric Environmental Safety  Goal: Safety Maintained with Care  Outcome: Met     Problem: Fall Injury Risk  Goal: Absence of Fall and Fall-Related Injury  Outcome: Met     Problem: Skin Injury Risk Increased  Goal: Skin Health and Integrity  Outcome: Met     Problem: Electrolyte Imbalance (Chronic Kidney Disease)  Goal: Electrolyte Balance  Outcome: Met

## 2023-08-25 NOTE — PLAN OF CARE
08/25/23 1502   Post-Acute Status   Post-Acute Authorization Home Health   Home Health Status Referrals Sent   Discharge Plan   Discharge Plan A Home with family;Home Health     Patient being d/c today. Patient is in agreement with continuing with [facility]. Referral sent along with orders. Patient asked CM to called his brother Ross [phone] about patient being d/c . Patient does not have a key to the home. CM called brother and informed him of the discharge. Transportation arranged for 4pm. Jodee Albright RN

## 2023-08-28 NOTE — PLAN OF CARE
08/28/23 0832   Final Note   Assessment Type Final Discharge Note   Anticipated Discharge Disposition Home-Health   Post-Acute Status   Post-Acute Authorization Home Health   Home Health Status Set-up Complete/Auth obtained     Patient d/c home  8/25/23 w/ [facility]. Jodee Albright RN

## 2023-08-30 LAB
FINAL PATHOLOGIC DIAGNOSIS: NORMAL
GROSS: NORMAL
Lab: NORMAL
MICROSCOPIC EXAM: NORMAL

## 2023-10-25 NOTE — PLAN OF CARE
Patient ready to discharge, pt has no HH or DME needs. Pt will need to continue 2 mG cefepime IV after HD on MWF. Pt aware and agrees with plan concerned HD center may not have medication in stock.      TN placed call to [phone]), which cares for patient on MWF at [facility] and verified with Rashad Assistant   that medication has been ordered and now in stock at [facility].      IV Cefepime 2mg order faxed to [phone]     Patient has no other d/c needs.           05/25/17 1406   Final Note   Assessment Type Final Discharge Note   Discharge Disposition Home   Discharge planning education complete? Yes   What phone number can be called within the next 1-3 days to see how you are doing after discharge? [phone]   Hospital Follow Up  Appt(s) scheduled? Yes   Discharge plans and expectations educations in teach back method with documentation complete? Yes   Offered [facility] -- Bedside Delivery? Yes   Discharge/Hospital Encounter Summary to ([facility]) PCP Yes   Referral to Outpatient Case Management complete? n/a   Referral to / orders for Home Health Complete? n/a   30 day supply of medicines given at discharge, if documented non-compliance / non-adherence? n/a   Any social issues identified prior to discharge? n/a   Did you assess the readiness or willingness of the family or caregiver to support self management of care? Yes   Right Care Referral Info   Post Acute Recommendation No Care      4 = No assist / stand by assistance

## 2024-12-16 NOTE — SUBJECTIVE & OBJECTIVE
Interval History: No acute events overnight. No changes. Awaiting placement. Denies chest pain, sob, dizziness, weaknesses, leg or calf pain.    Review of Systems  Objective:     Vital Signs (Most Recent):  Temp: 98.4 °F (36.9 °C) (07/24/19 0812)  Pulse: 60 (07/24/19 0914)  Resp: 18 (07/24/19 0914)  BP: (!) 159/74 (07/24/19 0812)  SpO2: 97 % (07/24/19 0914) Vital Signs (24h Range):  Temp:  [96.7 °F (35.9 °C)-98.4 °F (36.9 °C)] 98.4 °F (36.9 °C)  Pulse:  [52-68] 60  Resp:  [16-20] 18  SpO2:  [94 %-99 %] 97 %  BP: (113-179)/(58-85) 159/74     Weight: 124.8 kg (275 lb 2.2 oz)  Body mass index is 43.09 kg/m².    Intake/Output Summary (Last 24 hours) at 7/24/2019 0934  Last data filed at 7/24/2019 0320  Gross per 24 hour   Intake 250 ml   Output 2 ml   Net 248 ml      Physical Exam    Significant Labs:   BMP:   Recent Labs   Lab 07/24/19  0513   *   *   K 4.7   CL 96   CO2 25   BUN 58*   CREATININE 7.6*   CALCIUM 9.4   MG 2.6     CBC:   Recent Labs   Lab 07/22/19 2043 07/23/19  0524 07/24/19  0513   WBC 7.40 5.23 7.93   HGB 8.5* 8.5* 9.3*   HCT 26.5* 26.8* 29.0*   PLT 43* 41* 54*     CMP:   Recent Labs   Lab 07/22/19 2043 07/23/19  0524 07/24/19  0513   * 134* 135*   K 4.0 4.1 4.7   CL 95 95 96   CO2 29 28 25   * 107 118*   BUN 39* 45* 58*   CREATININE 5.4* 5.9* 7.6*   CALCIUM 9.0 9.2 9.4   PROT 6.2 5.9* 6.3   ALBUMIN 2.5* 2.4* 2.5*   BILITOT 0.9 0.9 0.7   ALKPHOS 231* 209* 208*   AST 29 29 27   ALT 44 39 36   ANIONGAP 11 11 14   EGFRNONAA 11* 10* 7*       Significant Imaging: I have reviewed all pertinent imaging results/findings within the past 24 hours.   Patient informed of below.  He verbalized his understanding.   Had no further questions or concerns at this time.    Referral placed.

## 2025-01-16 NOTE — ED NOTES
Provider Encounter- Pressure Injury        HISTORY OF PRESENT ILLNESS  Wound History:    START OF CARE IN CLINIC: 1/31/2024 (return to clinic after hospitalization)    REFERRING PROVIDER: Racquel York       WOUNDS-superior coccyx pressure injury, stage IV-resolved                                 Coccyx pressure injury, stage IV-resolved           Inferior coccyx pressure injury, stage IV resolved                                 Right ischial pressure injury, stage IV                                 Left heel pressure injury, recurring stage III-resolved                                 Left posterior lower leg/calf-stage III-resolved                                 Left medial lower leg surgical wound dehiscence-resolved, reopens frequently-resolved            Left ischial pressure injury, stage IV-first observed in clinic 5/1/2024          HISTORY: Patient with history of incomplete quadriplegia referred to [facility] for treatment of a stage IV pressure injury.  He has a history of previous pressure injuries to this area, and underwent muscle flaps in 2019, and then again in 2020.  He was seen in the wound clinic in November 2021 for an ulcer proximal from his current ulcer, and pressure injuries to his left posterior lower leg and left heel.  At that time, it was discovered that the patient had retained VAC foam embedded in the wound bed of the sacral wound.  Attempts were made to get him back to his plastic surgeon, though unsuccessful.  In January he underwent surgical removal of VAC sponge along with excisional debridement of his sacral wound by Dr. Chaves.  After the surgery, his wound went on to heal without incident.   In early April 2022, his home health nurse noted a new sacral ulcer, below the previous ulcer which quickly tripled in size over the following weeks.  The ulcer to his left medial lower leg had also deteriorated, with bone visible at the base..  He was hospitalized from 4/22 until 4/27/2022 and  Pt speaking to  at bedside    underwent surgery with Dr. Raman on 4/26 for irrigation and debridement of multiple compartments of the left lower extremity, bone excision, and complex closure of chronic wound using biologic skin substitute.   His sacrococcygeal wound was not surgically addressed during this admission.  He was discharged back to his group home, with home health, and referral to outpatient wound clinic for his sacral wound.  He was instructed to follow-up with his surgeon for his lower leg wound.       Postoperatively, the left medial lower leg incision dehisced.  He was seen by his surgeon at [facility] on 5/11.  The surgeon opted to leave remaining sutures in place, and refer him to the wound clinic for treatment of this wound.   Treatment of this wound was initiated in clinic on 5/12.  During this visit was also noted that his heel DTI had resolved, but that he had a new pressure injury to his left posterior lower extremity.     A new pressure injury was noted to patient's right upper buttock/lower back on 5/20/2022.  Wound was linear in shape, skin discolored but intact.     Abrasion noted to left anterior lower leg.  First observed in clinic on 7/22/2022.  Patient states he bumped his leg into his food tray.     Small DTI noted to patient's left lateral lower leg on 7/29/2022.  Skin intact but discolored.     Large area of deep tissue injury noted to patient's left exterior lower leg.  Patient denied any trauma to this area.  Skin intact.  Wound documented.    1/27/2023: Patient was admitted to [facility] from 1/23-1/25/2023 with gross hematuria. He underwent RICHARD which showed watchman device was in place and he was taken off of Xarelto. While hospitalized wound team was consulted. He was referred back to [facility] and home health upon discharge.    Patient was hospitalized at [facility] for pyelonephritis from 2/26 until 3/2/2023, admitted for fever and general malaise.  He was admitted and initially started on linezolid and meropenem for suspected  UTI and history of multidrug-resistant organisms.  Urine cultures were negative. ID was consulted, recommended CT of chest and abdomen,which were negative for acute findings. However, he was treated with 5 days total course of antibiotics for suspected UTI, and symptoms completely resolved.  During this admission, the inpatient wound team was consulted for treatment of his sacral and lower leg wounds.  A wound culture was taken from his left heel pressure ulcer, negative.  Once stabilized, he was discharged home and referred back to [facility] to resume treatment of his wounds.    Patient was hospitalized at [facility] from 12/11 until 12/23/2023, admitted for fever.  Wound infection suspected.  CT scan of abdomen and pelvis for evaluation of sacral pressure injury showed gas tracking down to the bone consistent with osteomyelitis.  He underwent I&D of right ischial ulcer (documented as buttock) with Dr. Bansal, medial tract leading to an abscess was identified.  Cavity was opened allowing it to drain into the main wound bed.  Wound VAC was placed and managed by wound team during this admission.  A bone scan of patient's left foot was also done, initially concerning for osteomyelitis.  Orthopedic surgery was consulted and did not recommend surgical intervention. ID consulted also, recommended the patient to receive IV ertapenem 1 g every 24 hours plus IV daptomycin 8 mg/kg every 24 hours through 1/22/2024.   He was discharged to [facility] on 1/23 for IV antibiotics and wound care.  From the LTAC he was discharged home on 1/22 with home health and referral back to [facility] to resume management of his wounds  .      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  Never used smokeless tobacco    Patient's problem list, allergies, and current medications reviewed and updated in Epic    Interval History:  Interval History thinned 7/29/2022.  Please see previous notes for complete interval history.   Interval History thinned 1/27/2023. Please see previous note for complete interval history.  Interval History thinned 3/3/2023.  Please see previous notes for complete interval history.    Interval History thinned 8/4/2023.  Please see previous notes for complete interval history.    Interval History thinned 1/31/2024.  Please see previous notes for complete interval history.    Interval History thinned 6/26/2024.  Please see previous notes for complete interval history.      6/19/2024: Clinic visit with Steve Hodges MD. Patient denies any fevers or chills. Reports he is tolerating Abx. He has appointment with ID later today to discuss OM treatment. He is tolerating wound VAC. Slight bone exposed bilaterally in ischial wounds, slightly worse.    6/26/2024 : Clinic visit with ADILSON Arthur, FNPEGGY-BC, CWDINESHN, CFTRACI.   Patient presents today with significant deterioration of his both ischial wounds, with increased depth of undermining.   He now has a PICC line, and will be starting outpatient infusions of ertapenem later today.  He states he is feeling well, denies fevers, chills, nausea, vomiting, cough or shortness of breath.  Due to deterioration of his wound, we did discuss possibly having him go over the hospital for surgical debridement and IV antibiotics.  He was hesitant to do so.  We agreed to see how he looks after a week or so IV antibiotics.  He is agreeable to minimizing time up in his wheelchair.  He also agrees to go to the emergency room immediately if he develops any signs or symptoms of infection.    7/10/2024: Clinic visit with Steve Hodges MD. Patient reports feeling in normal state of health. He missed  last appointment as he had fall out of wheelchair and was evaluated in ED. He denies any injuries from fall. Patient wounds are measuring slightly smaller. He continues on Ertapenem. Patient reports that he has appointment for seating evaluation from [facility] scheduled, but does not recall the date.    7/17/2024 : Clinic visit with ADILSON Arthur, HOLDEN, LILIYA VALERIO.   Anjum states he is feeling well.  Left ischial wound measures significantly smaller today, however measurements vary somewhat depending on pt's position.  Both wounds appear to be progressing slightly, with improving tissue quality.    7/24/2024 : Clinic visit with ADILSON Arthur, HOLDEN, IMAN, LILIYA.   Patient continues to feel well, offers no complaints.  Right ischial wound measures smaller, left ischial wound is larger.  Patient tolerating VAC without any difficulty.  Home health continues to see him in between clinic visits.  He is still receiving daily infusions of IV antibiotics, will be completing these in August.    7/31/2024 : Clinic visit with ADILSON Arthur, HOLDEN, IMAN, LILIYA.   Anjum continues to feel well, his wounds are slowly progressing.  Tolerating VAC.  He is on IV antibiotics for 1 more week.  Admits that he is up in his wheelchair for 10 to 14 hours/day.    8/7/2024 : Clinic visit with ADILSON Arthur FNP-BC, LILIYA VALERIO.   Anjum states he is feeling well today, offers no complaints.  Both wounds measure a bit smaller today, new granulation tissue noted.  He will be getting his last IV antibiotic infusion today.    8/14/2024 : Clinic visit with ADILSON Arthur FNP-BC, IMAN, LILIYA.   Patient continues to feel well.  He has completed his antibiotics, followed up with ID earlier this week, no further antibiotic therapy at this time.  Ischial wounds are slowly progressing.  Lower extremity wounds remain resolved.    8/21/2024 : Clinic visit with ADILSON Arthur FNP-BC, LILIYA VALERIO.   Anjum states he is feeling  well, offers no complaints.  His wounds are slowly progressing, increased granulation.    8/28/2024 : Clinic visit with ADILSON Arthur, HOLDEN, LILIYA VALERIO.   Turk states he is feeling well overall.  His wounds measure slightly smaller.  He has had some urinary symptoms, reports leakage from around his suprapubic catheter last night, and excessively full urine bag.  He has been in contact with his urologist office.  He also mentions that he has a recurring small scab to his nose, states that it falls off only to return shortly after.  This has been going on for several months.  I offered to refer him to dermatology.    9/11/2024 : Clinic visit with ADILSON Arthur, HOLDEN, LILIYA VALERIO.   Turk states he is feeling well.  He missed his appointment last week due to car troubles.  His vehicle is now running well.  Ischial wounds show some improvement, increased granulation tissue.  He does have a small reopening of left posterior lower leg wound, appears to be a small abrasion over area of scar tissue.    9/18/2024: Clinic visit with Steve Hodges MD. Patient reports doing ok. Denies any acute issues with wound VAC. Reports that he was fitted by [employer] for new seat cushion and is being manufactured, he is not sure when will be ready. Patient's wounds appears slightly improved. Left posterior leg wound remains open.    9/25/2024 : Clinic visit with ADILSON Arthur, HOLDEN, IMAN, LILIYA.   Patient states he is feeling well.  His wounds measure about the same.    10/2/2024: Clinic visit with HOLDEN Johnson CWON, LILIYA.  Pt denies fevers, chills, nausea, vomiting. Bilateral ischial ulcers increased in area.  Left IT quality overall worse compared to right IT.  Recommend Dakin's soak.  Continue with VAC to bilateral IT.    10/9/2024 : Clinic visit with ADILSON Arthur, HOLDEN, IMAN, LILIYA.   Patient continues to feel well overall.  His wounds measure about the same, no evidence of infection.   He does have some minor breakdown over the scar tissue of his sacrum which bears watching.  He has not heard from [employer] about his seat cushion, states he will contact them again.    10/16/2024 : Clinic visit with ADILSON Arthur, HOLDEN, IMAN, LILIYA.   Anjum continues to feel well.  His wounds measure slightly smaller.  Sacral wound just slightly open.  He called [employer] earlier this week, was told his new cushion is ready, and that they would deliver it next Monday.  He also states he is due for a new wheelchair, but has not yet become process.      10/23/2024 : Clinic visit with ADILSON Arthur, HOLDEN, IMAN, LILIYA.   Anjum's wounds present today with significantly more odor.  He states he is feeling fine, denies fevers, chills, nausea, vomiting, cough or shortness of breath.  Increased drainage noted.  Wounds measure about the same.  Culture collected in clinic today after debridement.  VAC placed on hold.  Wounds packed with Puracyn moistened silver Hydrofiber.   Patient reminded that he is to go to the emergency room if he notices any increased redness, swelling, drainage or odor, or if he develops fever, chills, nausea or vomiting.    He has a new cushion to his wheelchair.  States that he does not yet have a new wheelchair, will be picking on out the next few weeks.    10/30/2024 : Clinic visit with ADILSON Arthur, HOLDEN, IMAN, LILIYA.   Anjum states he is feeling well.  Wound odor still noticeable.  Culture collected last visit was positive for light growth of Proteus.  Given continued odor, will go ahead and prescribe short course of Augmentin, no other p.o. options available based on sensitivities.  Continue with Dakin's wound cleansing.  Home health to restart VAC on Friday 11/6/2024: Clinic visit with Steve Hodges MD. Patient reports doing well, denies any acute issues. Tolerating augmentin well without side effects. Odor much improved today. Wounds are improving slowly. Continue wound  VAC.    11/14/2024: Clinic visit with Steve Hodges MD. Patient reports doing ok. He was frustrated coming into clinic, was having trouble exiting his van. No evidence of wound infection today    11/20/2024: Clinic visit with Steve Hodges MD. Patient reports feeling in normal state of health. His ischial wounds stable and slowly improving. He has reopened sacral wound however. Denies any signs or symptoms of infection.    11/27/2024: Clinic visit with Steve Hodges MD. Patient reports doing well, denies any acute issues. Sacral wound measuring smaller. Right ischial wound smaller. Left ischial wound larger with increased slough and necrotic tissue at base of wound. Patient has contacted [employer] technician to evaluate seat due to reopening of sacrum, he is pending call back.    12/4/2024: Clinic visit with Steve Hodges MD. Patient reports doing well, denies any signs or symptoms of infection. Denies any issues with wound VAC. Sacrum has resolved. Right ischium wound larger with necrotic tissue, left ischium stable. He denies any traumatic events or any changes to his routine that would have increased pressure on right ischium besides time spent in chair during thanksgiving with family.    12/11/2024: Clinic visit with Steve Hodges MD. Patient reports doing well, denies any acute issues. Wounds are stable. No further necrosis of right ischium. Patient denies any signs or symptoms of infection.    12/18/2024: Clinic visit with Steve Hodges MD. Patient reports doing ok. Reports was diagnosed with UTI by urology, picking up Abx later today, thinks that he was prescribed Augmentin. Right ischial wound measuring larger, dusky tissue concerning for increased pressure. He reports that he has been up in wheelchair more this week with friend in town and has not been using tilt feature of chair as frequently. He was encouraged to spend more time offloading.   Due to holiday's, and dressings only going to be  changed 2x weekly, it is medically necessary to continue wound VAC for now as it would be severely problematic for patient to be sitting with saturated dressings during this period.    1/8/2025: Clinic visit with Steve Hodges MD. Patient reports chest congestion with low grade fevers for last few days. Denies any issues with wounds. He has increased necrosis of left IT pressure injury, he denies any changes in activity and is using tilt feature in chair every 30 min. Patient reports that he has appointment with [facility] next Friday to re-evaluate custom seat.    1/15/2025: Clinic visit with Steve Hodges MD. Patient returns to clinic following hospitalization for mycoplasma pneumonia.  Patient reports that he is feeling better denies any fevers or chills currently.  Patient has upcoming seating eval by [employer].  Wound VAC has been held since last week, wound seem to be slightly improved with holding VAC. Will continue to hold this this week.    REVIEW OF SYSTEMS:   Unchanged from previous wound clinic assessment on 1/8/2025, except as noted in interval history above.      PHYSICAL EXAMINATION:   /72   Pulse (!) 47 Comment: RN notified  Temp 36.3 °C (97.3 °F) (Temporal)   Resp 18   SpO2 100%   Physical Exam  Constitutional:       Appearance: He is obese.   Cardiovascular:      Rate and Rhythm: Normal rate.   Pulmonary:      Effort: Pulmonary effort is normal.   Abdominal:      Comments: Colostomy left lower quadrant   Genitourinary:     Comments: Suprapubic catheter to down drain   Skin:     Comments: Stage IV pressure injury to right ischium: Right ischial wound slightly improved, slight increase in granulation tissue.    Stage IV pressure injury of left ischium: Wound slightly improved, no longer dusky tissue at base. Continues to have slough.    Stage IV pressure injury sacrum: Resolved    All other wounds remain healed, high risk for recurrence     Neurological:      Mental Status: He is alert  and oriented to person, place, and time.   Psychiatric:         Mood and Affect: Mood normal.         WOUND ASSESSMENT  Wound 12/12/23 Pressure Injury Ischium Right (Active)   Wound Image    01/15/25 1545   Site Assessment Pink;Red;Yellow 01/15/25 1545   Periwound Assessment Scar tissue 01/15/25 1545   Margins Unattached edges 01/15/25 1545   Closure Secondary intention 10/16/24 1700   Drainage Amount Moderate 01/15/25 1545   Drainage Description Serosanguineous 01/15/25 1545   Treatments Cleansed;Provider debridement 01/15/25 1545   Offloading/DME Other (comment) 01/15/25 1545   Wound Cleansing Hypochlorus Acid 01/15/25 1545   Periwound Protectant Skin Protectant Wipes to Periwound 01/15/25 1545   Dressing Status Intact;Old drainage 10/02/24 1500   Dressing Changed Changed 01/15/25 1545   Dressing Cleansing/Solutions Normal Saline 01/15/25 1545   Dressing Options Hydrofera Blue Classic;Other (Comments);Offloading Dressing - Sacral 01/15/25 1545   Dressing Change/Treatment Frequency Monday, Wednesday, Friday, and As Needed 01/15/25 1545   Wound Team Following Weekly 10/16/24 1700   WOUND NURSE ONLY - Pressure Injury Stage 4 01/15/25 1545   Wound Length (cm) 4.7 cm 01/15/25 1545   Wound Width (cm) 2 cm 01/15/25 1545   Wound Depth (cm) 0.8 cm 01/15/25 1545   Wound Surface Area (cm^2) 9.4 cm^2 01/15/25 1545   Wound Volume (cm^3) 7.52 cm^3 01/15/25 1545   Post-Procedure Length (cm) 4.7 cm 01/15/25 1545   Post-Procedure Width (cm) 2.1 cm 01/15/25 1545   Post-Procedure Depth (cm) 0.8 cm 01/15/25 1545   Post-Procedure Surface Area (cm^2) 9.87 cm^2 01/15/25 1545   Post-Procedure Volume (cm^3) 7.896 cm^3 01/15/25 1545   Wound Healing % 87 01/15/25 1545   Tunneling (cm) 0 cm 01/15/25 1545   Undermining (cm) 2.2 cm 01/15/25 1545   Undermining of Wound, 1st Location From 1 o'clock;To 7 o'clock 01/15/25 1545   Undermining (cm) - 2nd location 0 cm 01/08/25 1640   Undermining of Wound, 2nd Location From 12 o'clock;To 1 o'clock  12/04/24 1615   Wound Odor None 01/15/25 1545   Exposed Structures Fascia 01/15/25 1545   Number of days: 402       Wound 05/01/24 Pressure Injury Ischium Left (Active)   Wound Image    01/15/25 1545   Site Assessment Pink;Red;Yellow 01/15/25 1545   Periwound Assessment Scar tissue 01/15/25 1545   Margins Unattached edges 01/15/25 1545   Closure Secondary intention 09/18/24 1500   Drainage Amount Moderate 01/15/25 1545   Drainage Description Serosanguineous 01/15/25 1545   Treatments Cleansed;Provider debridement 01/15/25 1545   Offloading/DME Other (comment) 01/15/25 1545   Wound Cleansing Hypochlorus Acid 01/15/25 1545   Periwound Protectant Skin Protectant Wipes to Periwound 01/15/25 1545   Dressing Changed Changed 12/04/24 1615   Dressing Cleansing/Solutions Normal Saline 01/15/25 1545   Dressing Options Hydrofera Blue Classic;Other (Comments);Offloading Dressing - Sacral 01/15/25 1545   Dressing Change/Treatment Frequency Monday, Wednesday, Friday, and As Needed 01/15/25 1545   Wound Team Following Weekly 12/18/24 1700   WOUND NURSE ONLY - Pressure Injury Stage 4 01/15/25 1545   Non-staged Wound Description Not applicable 09/18/24 1600   Wound Length (cm) 4.5 cm 01/15/25 1545   Wound Width (cm) 2.1 cm 01/15/25 1545   Wound Depth (cm) 1.3 cm 01/15/25 1545   Wound Surface Area (cm^2) 9.45 cm^2 01/15/25 1545   Wound Volume (cm^3) 12.285 cm^3 01/15/25 1545   Post-Procedure Length (cm) 4.6 cm 01/15/25 1545   Post-Procedure Width (cm) 2.2 cm 01/15/25 1545   Post-Procedure Depth (cm) 1.3 cm 01/15/25 1545   Post-Procedure Surface Area (cm^2) 10.12 cm^2 01/15/25 1545   Post-Procedure Volume (cm^3) 13.156 cm^3 01/15/25 1545   Wound Healing % -5484 01/15/25 1545   Tunneling (cm) 0 cm 01/15/25 1545   Undermining (cm) 0 cm 01/15/25 1545   Undermining of Wound, 1st Location From 4 o'clock;To 10 o'clock 12/18/24 1700   Wound Odor None 01/15/25 1545   Exposed Structures Fascia 01/15/25 1545   Number of days: 261       Wound  "01/09/25 Other (comment) Coccyx Left;Right (Active)   Number of days: 8       Wound 01/09/25 Pressure Injury Sacrum (Active)   Number of days: 8       Wound 01/09/25 Other (comment) Toe, 3rd;Toe, 4th Left (Active)   Number of days: 8       PROCEDURE: Excisional debridement of right and left ischial wounds  -2% viscous lidocaine applied topically to wound bed for approximately 5 minutes prior to debridement  -Curette used to debride wound bed.  Excisional debridement was performed to remove devitalized tissue until healthy, bleeding tissue was visualized.  Total area debrided was approximately 19.99 cm², including into undermined areas of wounds.  Tissue debrided into the muscle / fascia layer.    -Bleeding controlled with manual pressure.    -Wound care completed by wound RN, refer to flowsheet  -Patient tolerated the procedure well, without c/o pain or discomfort.    Pertinent Labs and Diagnostics:    Labs:     A1c: No results found for: \"HBA1C\"     [facility] results, 7/1/2022 (under media tab)    CRP 13    ESR 31      IMAGING:     X-ray left tib-fib ordered 2/16/2024 through [facility]    12/11/2023-CT of abdomen pelvis with contrast  IMPRESSION:   1.  Right ischial decubitus ulcer extending to bone with soft tissue gas tracking along the right perineum. Appearance suggesting osteomyelitis, consider component of necrotizing fasciitis as clinically appropriate.  2.  Small pericardial effusion  3.  Left adrenal nodule, density on prior noncontrast CT demonstrates adenoma.  4.  Hepatomegaly  5.  Enlarged prostate, workup and evaluation for causes of prostate enlargement recommended as clinically appropriate.  6.  Atherosclerosis and atherosclerotic coronary artery disease    12/15/2023-bone scan of left foot  IMPRESSION:     1.  Mild increased activity in the LEFT 1st and 3rd toes on blood pool and delayed images possibly indicating inflammation/infection.  2.  No significant blood flow " asymmetry.          VASCULAR STUDIES: No results found.    LAST  WOUND CULTURE:   Lab Results   Component Value Date/Time    CULTRSULT  01/10/2025 04:45 PM     Moderate growth usual upper respiratory ade  No clinically significant Staphylococcus aureus, Methicillin  Resistant Staphylococcus aureus, or Pseudomonas species  isolated.        PATHOLOGY  2/17/2023-bone fragment extracted from left lower extremity wound\\  FINAL DIAGNOSIS:     A. Left leg bone fragment at base of chronic wound:          Extensively degenerated fibrocartilaginous tissue with a rim of           fibrinopurulent debris          Correlate with culture findings            Comment: While no residual intact bone is identified, these           findings are suggestive of adjacent osteomyelitis.      ASSESSMENT AND PLAN:     1. Sacral decubitus ulcer, stage IV (HCC)  Comments: Ulcer first noted in early April 2022 as small open area which quickly enlarged.  This ulcer is present distal from previous sacral ulcer which healed after surgery in January.  Patient has history of flap reconstruction x2 to this area.    1/8/2025: Wound remains resolved  - Continue to protect area with pressure relieving sacral foam dressing.  -Patient does spend a lot of time up in his wheelchair, and wishes to continue to do so for his quality of life.  He lives independently, drives, and is involved with family activities.    -His presents today with a new cushion in his wheelchair.  Has yet to pick out a new wheelchair  - Known OM that was previously treated. CT scan done during recent hospitalization did not show OM of sacrum, however OM of the ischium noted.  -Patient is very well versed in pressure relief strategies    Wound care: silicone adhesive foam dressing    2. Pressure injury of right ischium, stage 4 (HCC)  Comments: Abscess and OM found on CT during hospitalization in December 2023.  Patient underwent I&D with VAC placement.  IV antibiotics through  1/22/2024.    1/15/2025: Wound measures larger, however increased granulation tissue and improved tissue quality.  - Excisional debridement of nonviable tissue from wound in clinic today, medically necessary to promote wound healing  - Home health  to continue Dakins soaked gauze to wound for approximately 10 minutes with each dressing change  - Continue hold wound VAC, consider d/c next week if continues to improve and drainage well managed.  -Patient to return to clinic weekly for assessment, debridement, and dressing change.    -Home health to change dressing 2 times per week in between clinic visits.    -Patient is very well versed on pressure relief measures, has adequate surface on bed, alternating low air loss.    Wound care: Hydrofera Blue Classic, Enluxtra, Silicone foam    3. Pressure injury of left ischium, stage 4 ([facility])    1/15/2025: Wound measures larger, however increased granulation tissue and improved tissue quality.  - Excisional debridement of wound in clinic today, medically necessary to promote wound healing.  - Patient to return to clinic weekly for assessment, debridement, and dressing change  - Hold VAC  -Home health to change dressing 2 times per week in between clinic visits.    -Patient has new cushion in his wheelchair, see above  -Patient is very well versed on pressure relief measures, has adequate surface on bed, alternating low air loss.    Wound care: Hydrofera Blue Classic, Enluxtra, Silicone foam    4. Other acute osteomyelitis, other site ([facility])    1/15/2025: Bone fragments removed during clinic visit in June were sent for pathology, polymicrobial, most concerning was an MDR ESBL Proteus.   -Patient completed IV antibiotics.  No further antibiotics at this time per ID  -Patient understands he has a very low threshold for infection/sepsis.  He understands he is to go to the emergency room immediately if he begins to experience fevers, chills, nausea or vomiting, or if he notices  radiating erythema or purulent drainage from his wounds.    5. Postoperative wound dehiscence, subsequent encounter  6. Osteomyelitis of left lower extremity ([facility])  Comments: On 4/26/2022 patient underwent irrigation and debridement of multiple compartments of the left lower extremity states for pressure injury, with bone excision, and complex closure of chronic wound using biologic skin substitute.  During postop visit in surgeons office on 5/11, surgical site was noted to be dehisced.      1/15/2025: Skin remains intact  - Wound waxes and wanes due to pressure and underlying known chronic OM  -Patient to be mindful of offloading when supine, should assure that his leg is not rotating medially  -Monitor site each clinic visit  Wound care: Silicone foam dressing, Tubigrip D    7. Pressure injury of left foot, stage 4 ([facility])  8. Pressure injury of left leg, stage 3 ([facility])  9. Pressure injury of left heel, stage 3 ([facility])    1/15/2025: All of these wounds remain healed  -Monitor pressure points each clinic visit  - Patient aware of offloading.    10. Quadriplegia, C5-C7 incomplete ([facility])  Comments: Complicating factor.  Impaired mobility and sensation  -Patient is still spending 7-8 hours/day up in his wheelchair and knows to reposition frequently.  Wears heel float boots bilaterally at all times  - Patient reports that he has seating evaluation with [employer] upcoming.    Please note that this note may have been created using voice recognition software. I have worked with technical experts from [facility] to optimize the interface.  I have made every reasonable attempt to correct obvious errors, but there may be errors of grammar and possibly content that I did not discover before finalizing the note.

## (undated) DEVICE — SUT CTD VICRYL 2-0 CR/CT-2

## (undated) DEVICE — SUT VICRYL PLUS 0 CT1 18IN

## (undated) DEVICE — SYS LABEL CORRECT MED

## (undated) DEVICE — BLADE MILL BONE MEDIUM

## (undated) DEVICE — GELATIN SURGIPOWDER ABSORBABLE

## (undated) DEVICE — SUT ETHILON 2-0 PSLX 30IN

## (undated) DEVICE — DRESSING AQUACEL FOAM 5 X 5

## (undated) DEVICE — DRAIN HEMOVAC 3/16 LARGE

## (undated) DEVICE — DRESSING ADH ISLAND 3.6 X 14

## (undated) DEVICE — GAUZE SPONGE 4X4 12PLY